# Patient Record
Sex: FEMALE | Race: WHITE | NOT HISPANIC OR LATINO | Employment: OTHER | URBAN - NONMETROPOLITAN AREA
[De-identification: names, ages, dates, MRNs, and addresses within clinical notes are randomized per-mention and may not be internally consistent; named-entity substitution may affect disease eponyms.]

---

## 2017-02-23 ENCOUNTER — OFFICE VISIT (OUTPATIENT)
Dept: OTOLARYNGOLOGY | Facility: CLINIC | Age: 77
End: 2017-02-23

## 2017-02-23 VITALS
HEART RATE: 64 BPM | SYSTOLIC BLOOD PRESSURE: 128 MMHG | WEIGHT: 181 LBS | DIASTOLIC BLOOD PRESSURE: 80 MMHG | HEIGHT: 60 IN | BODY MASS INDEX: 35.53 KG/M2

## 2017-02-23 DIAGNOSIS — E04.2 MULTINODULAR GOITER: Primary | ICD-10-CM

## 2017-02-23 DIAGNOSIS — E04.1 THYROID NODULE: Primary | ICD-10-CM

## 2017-02-23 PROCEDURE — 76536 US EXAM OF HEAD AND NECK: CPT | Performed by: OTOLARYNGOLOGY

## 2017-02-23 PROCEDURE — 99213 OFFICE O/P EST LOW 20 MIN: CPT | Performed by: PHYSICIAN ASSISTANT

## 2017-02-23 NOTE — PROGRESS NOTES
YOB: 1940  Location: [facility]  Location Address: [address] [place] [address], [facility], [address] [place] [address]  Location Phone: [phone]    Chief Complaint   Patient presents with   • Follow-up     Thyroid       History of Present Illness  Carmen Obrien is a 77 y.o. female who presents for follow up for thyroid complaints. She has fatigue, insomnia, weight gain and dysphagia. Patient denies hoarseness, neck swelling and neck tenderness. She has had a barium swallow to evaluate esophagus. An ultrasound was repeated today. A TSH level was last performed 2016.                  10/3/16  TSH 1.110                Past Medical History   Diagnosis Date   • Anxiety    • Arthritis    • Deep venous thrombosis    • Dysphagia    • GERD (gastroesophageal reflux disease)    • History of transfusion    • Hypercholesteremia    • Hypertension    • LPRD (laryngopharyngeal reflux disease)    • Macular degeneration    • Multinodular goiter 2017   • Osteoarthritis    • Thyroid nodule        Past Surgical History   Procedure Laterality Date   • Cataract extraction     • Hysterectomy     • Cystocele repair     • Replacement total knee     • Cholecystectomy     • Femur fracture surgery     • Ulnar nerve transposition           Current Outpatient Prescriptions:   •  ALPRAZolam (XANAX) 1 MG tablet, Take 1 mg by mouth 2 (Two) Times a Day As Needed for anxiety., Disp: , Rfl:   •  atorvastatin (LIPITOR) 10 MG tablet, Take 10 mg by mouth Daily., Disp: , Rfl:   •  Calcium Carbonate-Vitamin D (CALTRATE 600+D PO), Take  by mouth 2 (Two) Times a Day., Disp: , Rfl:   •  Cyanocobalamin (VITAMIN B-12 IJ), Inject 1,000 mcg as directed Every 30 (Thirty) Days., Disp: , Rfl:   •  cycloSPORINE (RESTASIS) 0.05 % ophthalmic emulsion, Administer 1 drop to both eyes 2 (Two) Times a Day As Needed., Disp: , Rfl:   •  esomeprazole (NexIUM) 20 MG capsule, Take 40 mg by mouth Every Morning Before Breakfast., Disp: , Rfl:   •  gabapentin  (NEURONTIN) 300 MG capsule, Take 300 mg by mouth 2 (Two) Times a Day., Disp: , Rfl:   •  hydrochlorothiazide (HYDRODIURIL) 25 MG tablet, Take 25 mg by mouth 2 (Two) Times a Day., Disp: , Rfl:   •  HYDROcodone-acetaminophen (NORCO)  MG per tablet, Take 1 tablet by mouth Every 6 (Six) Hours As Needed for moderate pain (4-6)., Disp: , Rfl:   •  lidocaine (LIDODERM) 5 %, Place 1 patch on the skin Every 12 (Twelve) Hours., Disp: , Rfl:   •  lisinopril (PRINIVIL,ZESTRIL) 40 MG tablet, Take 40 mg by mouth Daily., Disp: , Rfl:   •  montelukast (SINGULAIR) 10 MG tablet, Take 10 mg by mouth Every Night., Disp: , Rfl:   •  MULTIPLE VITAMINS-MINERALS ER PO, Take by mouth daily, Disp: , Rfl:   •  mupirocin (BACTROBAN) 2 % ointment, Apply 1 application topically 3 (Three) Times a Day., Disp: , Rfl:   •  nabumetone (RELAFEN) 500 MG tablet, Take 500 mg by mouth 2 (Two) Times a Day., Disp: , Rfl:   •  nortriptyline (PAMELOR) 50 MG capsule, Take 50 mg by mouth Every Night., Disp: , Rfl:   •  nystatin (NYSTOP) 949524 UNIT/GM powder powder, Apply 100,000 Units topically 2 (Two) Times a Day., Disp: , Rfl:   •  senna (SENOKOT) 8.6 MG tablet, Take 1 tablet by mouth Daily., Disp: , Rfl:     Levaquin [levofloxacin] and Lyrica [pregabalin]    History reviewed. No pertinent family history.    Social History     Social History   • Marital status:      Spouse name: N/A   • Number of children: N/A   • Years of education: N/A     Occupational History   • Not on file.     Social History Main Topics   • Smoking status: Never Smoker   • Smokeless tobacco: Not on file   • Alcohol use No   • Drug use: No   • Sexual activity: Defer     Other Topics Concern   • Not on file     Social History Narrative       Review of Systems   Constitutional: Positive for fatigue and unexpected weight change.   HENT: Positive for trouble swallowing.    Eyes: Negative.    Respiratory: Negative.    Cardiovascular: Negative.    Gastrointestinal: Negative.     Endocrine: Negative.    Genitourinary: Negative.    Musculoskeletal: Negative.    Skin: Negative.    Allergic/Immunologic: Negative.    Neurological: Negative.    Hematological: Negative.    Psychiatric/Behavioral: Positive for sleep disturbance.       Vitals:    02/23/17 1117   BP: 128/80   Pulse: 64       Objective     Physical Exam  CONSTITUTIONAL: well nourished, alert, oriented, in no acute distress     COMMUNICATION AND VOICE: able to communicate normally, normal voice quality    HEAD: normocephalic, no lesions, atraumatic, no tenderness, no masses     FACE: appearance normal, no lesions, no tenderness, no deformities, facial motion symmetric    SALIVARY GLANDS: parotid glands with no tenderness, no swelling, no masses, submandibular glands with normal size, nontender    EYES: ocular motility normal, eyelids normal, orbits normal, no proptosis, conjunctiva normal , pupils equal, round     EARS:  Hearing: response to conversational voice normal bilaterally   External Ears: auricles without lesions    NOSE:  External Nose: structure normal, no tenderness on palpation, no nasal discharge, no lesions, no evidence of trauma, nostrils patent     ORAL:  Lips: upper and lower lips without lesion     NECK: neck appearance normal, no mass,  noted without erythema or tenderness    THYROID: no overt thyromegaly, no tenderness, nodules or mass present on palpation, position midline     LYMPH NODES: no lymphadenopathy    CHEST/RESPIRATORY: respiratory effort normal, normal breath sounds     CARDIOVASCULAR: rate and rhythm normal, extremities without cyanosis or edema      NEUROLOGIC/PSYCHIATRIC: oriented to time, place and person, mood normal, affect appropriate, CN II-XII intact grossly    Assessment/Plan   Problems Addressed this Visit        Endocrine    Multinodular goiter - Primary    Relevant Orders    US Thyroid    TSH        * Surgery not found *  Orders Placed This Encounter   Procedures   • US Thyroid      Standing Status:   Future     Standing Expiration Date:   2/23/2018     Order Specific Question:   Reason for Exam:     Answer:   thyroid nodules   • TSH     Standing Status:   Future     Standing Expiration Date:   2/23/2018     Return in about 1 year (around 2/23/2018) for Recheck thyroid with ultrasound.       Patient Instructions           FALL PREVENTION INSTRUCTIONS      1. Use prescribed walking aid all times.  2. Move through positional transitions slowly to avoid imbalance.  3. Use elevators instead of stairs or ramps, if possible.  4. Use handrails, whenever possible, if stairs and ramps must be used.  5. Avoid sitting in low, overstuffed chairs, if you have difficulty standing up. When standing up, scoot your hips to the edge of the chair prior to rising.  6. Wear low-heeled, laced shoes with a firm sole (no slippers).  7. Home modifications:  • Always keep a light on.  Turn a light on prior to entering any dark room.  • Keep electrical cords and small objects out of walking paths.  • Remove throw rugs.  • Keep frequently used items in the kitchen and bathroom within easy reach, preferably on eye-level shelves.  • Use nonskid decals or a rubber mat in the bathtub and shower.  • Install grab bars in the bathtub and shower, if needed.    8.   If rapid head movements cause imbalance, do not drive.    :

## 2017-02-23 NOTE — PATIENT INSTRUCTIONS
FALL PREVENTION INSTRUCTIONS      1. Use prescribed walking aid all times.  2. Move through positional transitions slowly to avoid imbalance.  3. Use elevators instead of stairs or ramps, if possible.  4. Use handrails, whenever possible, if stairs and ramps must be used.  5. Avoid sitting in low, overstuffed chairs, if you have difficulty standing up. When standing up, scoot your hips to the edge of the chair prior to rising.  6. Wear low-heeled, laced shoes with a firm sole (no slippers).  7. Home modifications:  • Always keep a light on.  Turn a light on prior to entering any dark room.  • Keep electrical cords and small objects out of walking paths.  • Remove throw rugs.  • Keep frequently used items in the kitchen and bathroom within easy reach, preferably on eye-level shelves.  • Use nonskid decals or a rubber mat in the bathtub and shower.  • Install grab bars in the bathtub and shower, if needed.    8.   If rapid head movements cause imbalance, do not drive.    :

## 2017-06-26 ENCOUNTER — APPOINTMENT (OUTPATIENT)
Dept: LAB | Facility: HOSPITAL | Age: 77
End: 2017-06-26

## 2017-06-26 ENCOUNTER — TRANSCRIBE ORDERS (OUTPATIENT)
Dept: ADMINISTRATIVE | Facility: HOSPITAL | Age: 77
End: 2017-06-26

## 2017-06-26 DIAGNOSIS — R19.7 DIARRHEA, UNSPECIFIED TYPE: Primary | ICD-10-CM

## 2017-06-26 DIAGNOSIS — M85.9 DISORDER OF BONE DENSITY AND STRUCTURE, UNSPECIFIED: ICD-10-CM

## 2017-06-26 DIAGNOSIS — E55.9 UNSPECIFIED VITAMIN D DEFICIENCY: ICD-10-CM

## 2017-06-26 DIAGNOSIS — R41.3 MEMORY LOSS: ICD-10-CM

## 2017-06-26 DIAGNOSIS — M15.0 PRIMARY GENERALIZED HYPERTROPHIC OSTEOARTHROSIS: ICD-10-CM

## 2017-06-26 LAB
25(OH)D3 SERPL-MCNC: 78.3 NG/ML (ref 30–100)
ALBUMIN SERPL-MCNC: 4.4 G/DL (ref 3.5–5)
ALBUMIN/GLOB SERPL: 1.3 G/DL (ref 1.1–2.5)
ALP SERPL-CCNC: 61 U/L (ref 24–120)
ALT SERPL W P-5'-P-CCNC: 55 U/L (ref 0–54)
ANION GAP SERPL CALCULATED.3IONS-SCNC: 12 MMOL/L (ref 4–13)
AST SERPL-CCNC: 47 U/L (ref 7–45)
BILIRUB SERPL-MCNC: 0.8 MG/DL (ref 0.1–1)
BUN BLD-MCNC: 12 MG/DL (ref 5–21)
BUN/CREAT SERPL: 15.2 (ref 7–25)
CALCIUM SPEC-SCNC: 12.3 MG/DL (ref 8.4–10.4)
CHLORIDE SERPL-SCNC: 98 MMOL/L (ref 98–110)
CO2 SERPL-SCNC: 26 MMOL/L (ref 24–31)
CREAT BLD-MCNC: 0.79 MG/DL (ref 0.5–1.4)
DEPRECATED RDW RBC AUTO: 45 FL (ref 40–54)
ERYTHROCYTE [DISTWIDTH] IN BLOOD BY AUTOMATED COUNT: 13.3 % (ref 12–15)
GFR SERPL CREATININE-BSD FRML MDRD: 71 ML/MIN/1.73
GLOBULIN UR ELPH-MCNC: 3.3 GM/DL
GLUCOSE BLD-MCNC: 97 MG/DL (ref 70–100)
HCT VFR BLD AUTO: 35.9 % (ref 37–47)
HGB BLD-MCNC: 11.8 G/DL (ref 12–16)
MCH RBC QN AUTO: 30.5 PG (ref 28–32)
MCHC RBC AUTO-ENTMCNC: 32.9 G/DL (ref 33–36)
MCV RBC AUTO: 92.8 FL (ref 82–98)
PLATELET # BLD AUTO: 305 10*3/MM3 (ref 130–400)
PMV BLD AUTO: 10.4 FL (ref 6–12)
POTASSIUM BLD-SCNC: 4.1 MMOL/L (ref 3.5–5.3)
PROT SERPL-MCNC: 7.7 G/DL (ref 6.3–8.7)
RBC # BLD AUTO: 3.87 10*6/MM3 (ref 4.2–5.4)
SODIUM BLD-SCNC: 136 MMOL/L (ref 135–145)
T4 FREE SERPL-MCNC: 2.05 NG/DL (ref 0.78–2.19)
TSH SERPL DL<=0.05 MIU/L-ACNC: 0.58 MIU/ML (ref 0.47–4.68)
VIT B12 BLD-MCNC: >1000 PG/ML (ref 239–931)
WBC NRBC COR # BLD: 6.65 10*3/MM3 (ref 4.8–10.8)

## 2017-06-26 PROCEDURE — 36415 COLL VENOUS BLD VENIPUNCTURE: CPT | Performed by: NURSE PRACTITIONER

## 2017-06-26 PROCEDURE — 82607 VITAMIN B-12: CPT | Performed by: NURSE PRACTITIONER

## 2017-06-26 PROCEDURE — 84439 ASSAY OF FREE THYROXINE: CPT | Performed by: NURSE PRACTITIONER

## 2017-06-26 PROCEDURE — 85027 COMPLETE CBC AUTOMATED: CPT | Performed by: NURSE PRACTITIONER

## 2017-06-26 PROCEDURE — 82306 VITAMIN D 25 HYDROXY: CPT | Performed by: NURSE PRACTITIONER

## 2017-06-26 PROCEDURE — 84443 ASSAY THYROID STIM HORMONE: CPT | Performed by: NURSE PRACTITIONER

## 2017-06-26 PROCEDURE — 80053 COMPREHEN METABOLIC PANEL: CPT | Performed by: NURSE PRACTITIONER

## 2017-07-03 ENCOUNTER — APPOINTMENT (OUTPATIENT)
Dept: LAB | Facility: HOSPITAL | Age: 77
End: 2017-07-03

## 2017-07-03 LAB
ADV 40+41 DNA STL QL NAA+NON-PROBE: NOT DETECTED
ASTRO TYP 1-8 RNA STL QL NAA+NON-PROBE: NOT DETECTED
C CAYETANENSIS DNA STL QL NAA+NON-PROBE: NOT DETECTED
C DIFF TOX GENS STL QL NAA+PROBE: NOT DETECTED
CAMPY SP DNA.DIARRHEA STL QL NAA+PROBE: NOT DETECTED
CRYPTOSP STL CULT: NOT DETECTED
E COLI DNA SPEC QL NAA+PROBE: NOT DETECTED
E HISTOLYT AG STL-ACNC: NOT DETECTED
EAEC PAA PLAS AGGR+AATA ST NAA+NON-PRB: NOT DETECTED
EC STX1+STX2 GENES STL QL NAA+NON-PROBE: NOT DETECTED
EPEC EAE GENE STL QL NAA+NON-PROBE: NOT DETECTED
ETEC LTA+ST1A+ST1B TOX ST NAA+NON-PROBE: NOT DETECTED
G LAMBLIA DNA SPEC QL NAA+PROBE: NOT DETECTED
NOROVIRUS GI+II RNA STL QL NAA+NON-PROBE: NOT DETECTED
P SHIGELLOIDES DNA STL QL NAA+NON-PROBE: NOT DETECTED
RV RNA STL NAA+PROBE: NOT DETECTED
SALMONELLA DNA SPEC QL NAA+PROBE: NOT DETECTED
SAPO I+II+IV+V RNA STL QL NAA+NON-PROBE: NOT DETECTED
SHIGELLA SP+EIEC IPAH ST NAA+NON-PROBE: NOT DETECTED
V CHOLERAE DNA SPEC QL NAA+PROBE: NOT DETECTED
VIBRIO DNA SPEC NAA+PROBE: NOT DETECTED
YERSINIA STL CULT: NOT DETECTED

## 2017-07-03 PROCEDURE — 87999 UNLISTED MICROBIOLOGY PX: CPT | Performed by: NURSE PRACTITIONER

## 2017-07-03 PROCEDURE — 87507 IADNA-DNA/RNA PROBE TQ 12-25: CPT | Performed by: NURSE PRACTITIONER

## 2017-07-17 ENCOUNTER — APPOINTMENT (OUTPATIENT)
Dept: LAB | Facility: HOSPITAL | Age: 77
End: 2017-07-17

## 2017-07-17 ENCOUNTER — TRANSCRIBE ORDERS (OUTPATIENT)
Dept: ADMINISTRATIVE | Facility: HOSPITAL | Age: 77
End: 2017-07-17

## 2017-07-17 DIAGNOSIS — M81.0 AGE RELATED OSTEOPOROSIS: Primary | ICD-10-CM

## 2017-07-17 LAB
25(OH)D3 SERPL-MCNC: 68 NG/ML (ref 30–100)
CALCIUM SPEC-SCNC: 10.4 MG/DL (ref 8.4–10.4)

## 2017-07-17 PROCEDURE — 82306 VITAMIN D 25 HYDROXY: CPT | Performed by: NURSE PRACTITIONER

## 2017-07-17 PROCEDURE — 82310 ASSAY OF CALCIUM: CPT | Performed by: NURSE PRACTITIONER

## 2017-07-17 PROCEDURE — 36415 COLL VENOUS BLD VENIPUNCTURE: CPT | Performed by: NURSE PRACTITIONER

## 2017-08-10 ENCOUNTER — HOSPITAL ENCOUNTER (OUTPATIENT)
Dept: VASCULAR LAB | Age: 77
Discharge: HOME | End: 2017-08-10
Payer: MEDICARE

## 2017-08-10 ENCOUNTER — OFFICE VISIT (OUTPATIENT)
Dept: VASCULAR SURGERY | Age: 77
End: 2017-08-10
Payer: MEDICARE

## 2017-08-10 VITALS
SYSTOLIC BLOOD PRESSURE: 131 MMHG | HEART RATE: 70 BPM | TEMPERATURE: 97.5 F | DIASTOLIC BLOOD PRESSURE: 67 MMHG | RESPIRATION RATE: 18 BRPM

## 2017-08-10 DIAGNOSIS — I70.213 ATHEROSCLEROSIS OF NATIVE ARTERY OF BOTH LOWER EXTREMITIES WITH INTERMITTENT CLAUDICATION (HCC): ICD-10-CM

## 2017-08-10 DIAGNOSIS — R09.89 DECREASED PULSES IN FEET: ICD-10-CM

## 2017-08-10 PROCEDURE — 93923 UPR/LXTR ART STDY 3+ LVLS: CPT

## 2017-08-10 PROCEDURE — 99212 OFFICE O/P EST SF 10 MIN: CPT | Performed by: NURSE PRACTITIONER

## 2017-08-10 RX ORDER — GINKGO BILOBA 60 MG
TABLET ORAL
COMMUNITY
End: 2018-08-21 | Stop reason: CLARIF

## 2017-08-10 RX ORDER — AMLODIPINE BESYLATE 10 MG/1
10 TABLET ORAL DAILY
COMMUNITY

## 2017-08-10 RX ORDER — MAGNESIUM 200 MG
200 TABLET ORAL DAILY
COMMUNITY
End: 2018-08-21 | Stop reason: CLARIF

## 2017-08-11 PROBLEM — I70.213 ATHEROSCLEROSIS OF NATIVE ARTERY OF BOTH LOWER EXTREMITIES WITH INTERMITTENT CLAUDICATION (HCC): Status: ACTIVE | Noted: 2017-08-11

## 2018-02-14 ENCOUNTER — TRANSCRIBE ORDERS (OUTPATIENT)
Dept: ADMINISTRATIVE | Facility: HOSPITAL | Age: 78
End: 2018-02-14

## 2018-02-14 ENCOUNTER — LAB (OUTPATIENT)
Dept: LAB | Facility: HOSPITAL | Age: 78
End: 2018-02-14

## 2018-02-14 DIAGNOSIS — E04.2 NONTOXIC MULTINODULAR GOITER: ICD-10-CM

## 2018-02-14 DIAGNOSIS — E04.2 NONTOXIC MULTINODULAR GOITER: Primary | ICD-10-CM

## 2018-02-14 LAB — TSH SERPL DL<=0.05 MIU/L-ACNC: 1.9 MIU/ML (ref 0.47–4.68)

## 2018-02-14 PROCEDURE — 84443 ASSAY THYROID STIM HORMONE: CPT | Performed by: PHYSICIAN ASSISTANT

## 2018-02-14 PROCEDURE — 36415 COLL VENOUS BLD VENIPUNCTURE: CPT

## 2018-02-22 ENCOUNTER — OFFICE VISIT (OUTPATIENT)
Dept: OTOLARYNGOLOGY | Facility: CLINIC | Age: 78
End: 2018-02-22

## 2018-02-22 VITALS
TEMPERATURE: 97.2 F | BODY MASS INDEX: 34.55 KG/M2 | WEIGHT: 176 LBS | DIASTOLIC BLOOD PRESSURE: 80 MMHG | SYSTOLIC BLOOD PRESSURE: 130 MMHG | HEIGHT: 60 IN

## 2018-02-22 DIAGNOSIS — E04.2 MULTINODULAR GOITER: Primary | ICD-10-CM

## 2018-02-22 PROCEDURE — 99214 OFFICE O/P EST MOD 30 MIN: CPT | Performed by: PHYSICIAN ASSISTANT

## 2018-02-22 RX ORDER — AMLODIPINE BESYLATE 10 MG/1
5 TABLET ORAL DAILY
COMMUNITY
End: 2021-05-18 | Stop reason: SDUPTHER

## 2018-02-22 RX ORDER — OXYCODONE 13.5 MG/1
CAPSULE, EXTENDED RELEASE ORAL
COMMUNITY
Start: 2018-02-12 | End: 2021-04-21

## 2018-02-22 NOTE — PROGRESS NOTES
YOB: 1940  Location: [facility]  Location Address: [address] [place] [address], [facility], [address] [place] [address]  Location Phone: [phone]    Chief Complaint   Patient presents with   • Follow-up     thyroid       History of Present Illness  Carmen Obrien is a 77 y.o. female who presents for follow up for thyroid complaints. She has fatigue, insomnia, weight gain and dysphagia. Patient denies hoarseness, neck swelling and neck tenderness. She has had a barium swallow to evaluate esophagus. An ultrasound was repeated today. A TSH level was last performed 2016.                        10/3/16  TSH 1.110                   18   TSH 1.900                          Past Medical History:   Diagnosis Date   • Anxiety    • Arthritis    • Deep venous thrombosis    • Dysphagia    • GERD (gastroesophageal reflux disease)    • History of transfusion    • Hypercholesteremia    • Hypertension    • LPRD (laryngopharyngeal reflux disease)    • Macular degeneration    • Multinodular goiter 2017   • Osteoarthritis    • Thyroid nodule        Past Surgical History:   Procedure Laterality Date   • CATARACT EXTRACTION     • CHOLECYSTECTOMY     • CYSTOCELE REPAIR     • FEMUR FRACTURE SURGERY     • HYSTERECTOMY     • REPLACEMENT TOTAL KNEE     • ULNAR NERVE TRANSPOSITION         Outpatient Prescriptions Marked as Taking for the 18 encounter (Office Visit) with Antonio Lunsford MD   Medication Sig Dispense Refill   • ALPRAZolam (XANAX) 1 MG tablet Take 1 mg by mouth 2 (Two) Times a Day As Needed for anxiety.     • amLODIPine (NORVASC) 10 MG tablet Take 10 mg by mouth.     • aspirin 81 MG tablet Take 81 mg by mouth.     • atorvastatin (LIPITOR) 10 MG tablet Take 10 mg by mouth Daily.     • Calcium Carbonate-Vitamin D (CALTRATE 600+D PO) Take  by mouth 2 (Two) Times a Day.     • Cyanocobalamin (VITAMIN B-12 IJ) Inject 1,000 mcg as directed Every 30 (Thirty) Days.     • cycloSPORINE (RESTASIS) 0.05 %  ophthalmic emulsion Administer 1 drop to both eyes 2 (Two) Times a Day As Needed.     • esomeprazole (NexIUM) 20 MG capsule Take 40 mg by mouth Every Morning Before Breakfast.     • hydrochlorothiazide (HYDRODIURIL) 25 MG tablet Take 25 mg by mouth 2 (Two) Times a Day.     • HYDROcodone-acetaminophen (NORCO)  MG per tablet Take 1 tablet by mouth Every 6 (Six) Hours As Needed for moderate pain (4-6).     • lidocaine (LIDODERM) 5 % Place 1 patch on the skin Every 12 (Twelve) Hours.     • lisinopril (PRINIVIL,ZESTRIL) 40 MG tablet Take 40 mg by mouth Daily.     • montelukast (SINGULAIR) 10 MG tablet Take 10 mg by mouth Every Night.     • MULTIPLE VITAMINS-MINERALS ER PO Take by mouth daily     • nabumetone (RELAFEN) 500 MG tablet Take 500 mg by mouth 2 (Two) Times a Day.     • nortriptyline (PAMELOR) 50 MG capsule Take 50 mg by mouth Every Night.     • senna (SENOKOT) 8.6 MG tablet Take 1 tablet by mouth Daily.     • XTAMPZA ER 13.5 MG capsule extended-release 12 hour           Levaquin [levofloxacin] and Lyrica [pregabalin]    History reviewed. No pertinent family history.    Social History     Social History   • Marital status:      Spouse name: N/A   • Number of children: N/A   • Years of education: N/A     Occupational History   • Not on file.     Social History Main Topics   • Smoking status: Never Smoker   • Smokeless tobacco: Never Used   • Alcohol use No   • Drug use: No   • Sexual activity: Defer     Other Topics Concern   • Not on file     Social History Narrative       Review of Systems    Vitals:    02/22/18 1056   BP: 130/80   Temp: 97.2 °F (36.2 °C)       Body mass index is 34.37 kg/(m^2).    Objective     Physical Exam  CONSTITUTIONAL: well nourished, alert, oriented, in no acute distress     COMMUNICATION AND VOICE: able to communicate normally, normal voice quality    HEAD: normocephalic, no lesions, atraumatic, no tenderness, no masses     FACE: appearance normal, no lesions, no  tenderness, no deformities, facial motion symmetric    SALIVARY GLANDS: parotid glands with no tenderness, no swelling, no masses, submandibular glands with normal size, nontender    EYES: ocular motility normal, eyelids normal, orbits normal, no proptosis, conjunctiva normal , pupils equal, round     EARS:  Hearing: response to conversational voice normal bilaterally   External Ears: auricles without lesions  Otoscopic: tympanic membrane appearance normal, no lesions, no perforation, normal mobility, no fluid    NOSE:  External Nose: structure normal, no tenderness on palpation, no nasal discharge, no lesions, no evidence of trauma, nostrils patent   Intranasal Exam: nasal mucosa normal, vestibule within normal limits, inferior turbinate normal, nasal septum midline   Nasopharynx:     ORAL:  Lips: upper and lower lips without lesion   Teeth: dentition within normal limits for age   Gums: gingivae healthy   Oral Mucosa: oral mucosa normal, no mucosal lesions   Floor of Mouth: Warthin’s duct patent, mucosa normal  Tongue: lingual mucosa normal without lesions, normal tongue mobility   Palate: soft and hard palates with normal mucosa and structure  Oropharynx: oropharyngeal mucosa normal    HYPOPHARYNX:   LARYNX: epiglottis and arytenoid cartilage within normal limits, vocal cord mucosa normal with normal mobility     NECK: neck appearance normal, no mass,  noted without erythema or tenderness    THYROID: no overt thyromegaly, no tenderness, nodules or mass present on palpation, position midline     LYMPH NODES: no lymphadenopathy    CHEST/RESPIRATORY: respiratory effort normal, normal breath sounds     CARDIOVASCULAR: rate and rhythm normal, extremities without cyanosis or edema      NEUROLOGIC/PSYCHIATRIC: oriented to time, place and person, mood normal, affect appropriate, CN II-XII intact grossly    Assessment/Plan     * Surgery not found *  No orders of the defined types were placed in this encounter.    No  Follow-up on file.       There are no Patient Instructions on file for this visit.

## 2018-02-22 NOTE — PROGRESS NOTES
YOB: 1940  Location: [facility]  Location Address: [address] [place] [address], [facility], [address] [place] [address]  Location Phone: [phone]    Chief Complaint   Patient presents with   • Follow-up     thyroid       History of Present Illness  Carmen Obrien is a 78 y.o. female who presents for follow up for thyroid complaints. She has fatigue, insomnia, weight gain and dysphagia. Patient denies hoarseness, neck swelling and neck tenderness. Thyroid ultrasound and TSH stable and normal today.                  10/3/16  TSH 1.110                      Ref Range & Units 8d ago   8mo ago   1yr ago      TSH 0.470 - 4.680 mIU/mL 1.900 0.582 1.110   Resulting Agency   [facility]      Specimen Collected: 18 11:43 AM Last Resulted: 18 12:33 PM          Past Medical History:   Diagnosis Date   • Anxiety    • Arthritis    • Deep venous thrombosis    • Dysphagia    • GERD (gastroesophageal reflux disease)    • History of transfusion    • Hypercholesteremia    • Hypertension    • LPRD (laryngopharyngeal reflux disease)    • Macular degeneration    • Multinodular goiter 2017   • Osteoarthritis    • Thyroid nodule        Past Surgical History:   Procedure Laterality Date   • CATARACT EXTRACTION     • CHOLECYSTECTOMY     • CYSTOCELE REPAIR     • FEMUR FRACTURE SURGERY     • HYSTERECTOMY     • REPLACEMENT TOTAL KNEE     • ULNAR NERVE TRANSPOSITION           Current Outpatient Prescriptions:   •  ALPRAZolam (XANAX) 1 MG tablet, Take 1 mg by mouth 2 (Two) Times a Day As Needed for anxiety., Disp: , Rfl:   •  amLODIPine (NORVASC) 10 MG tablet, Take 10 mg by mouth., Disp: , Rfl:   •  aspirin 81 MG tablet, Take 81 mg by mouth., Disp: , Rfl:   •  atorvastatin (LIPITOR) 10 MG tablet, Take 10 mg by mouth Daily., Disp: , Rfl:   •  Calcium Carbonate-Vitamin D (CALTRATE 600+D PO), Take  by mouth 2 (Two) Times a Day., Disp: , Rfl:   •  Cyanocobalamin (VITAMIN B-12 IJ), Inject 1,000 mcg as directed  Every 30 (Thirty) Days., Disp: , Rfl:   •  cycloSPORINE (RESTASIS) 0.05 % ophthalmic emulsion, Administer 1 drop to both eyes 2 (Two) Times a Day As Needed., Disp: , Rfl:   •  esomeprazole (NexIUM) 20 MG capsule, Take 40 mg by mouth Every Morning Before Breakfast., Disp: , Rfl:   •  hydrochlorothiazide (HYDRODIURIL) 25 MG tablet, Take 25 mg by mouth 2 (Two) Times a Day., Disp: , Rfl:   •  HYDROcodone-acetaminophen (NORCO)  MG per tablet, Take 1 tablet by mouth Every 6 (Six) Hours As Needed for moderate pain (4-6)., Disp: , Rfl:   •  lidocaine (LIDODERM) 5 %, Place 1 patch on the skin Every 12 (Twelve) Hours., Disp: , Rfl:   •  lisinopril (PRINIVIL,ZESTRIL) 40 MG tablet, Take 40 mg by mouth Daily., Disp: , Rfl:   •  montelukast (SINGULAIR) 10 MG tablet, Take 10 mg by mouth Every Night., Disp: , Rfl:   •  MULTIPLE VITAMINS-MINERALS ER PO, Take by mouth daily, Disp: , Rfl:   •  nabumetone (RELAFEN) 500 MG tablet, Take 500 mg by mouth 2 (Two) Times a Day., Disp: , Rfl:   •  nortriptyline (PAMELOR) 50 MG capsule, Take 50 mg by mouth Every Night., Disp: , Rfl:   •  senna (SENOKOT) 8.6 MG tablet, Take 1 tablet by mouth Daily., Disp: , Rfl:   •  XTAMPZA ER 13.5 MG capsule extended-release 12 hour , , Disp: , Rfl:   •  gabapentin (NEURONTIN) 300 MG capsule, Take 300 mg by mouth 2 (Two) Times a Day., Disp: , Rfl:   •  mupirocin (BACTROBAN) 2 % ointment, Apply 1 application topically 3 (Three) Times a Day., Disp: , Rfl:   •  nystatin (NYSTOP) 490511 UNIT/GM powder powder, Apply 100,000 Units topically 2 (Two) Times a Day., Disp: , Rfl:     Levaquin [levofloxacin] and Lyrica [pregabalin]    History reviewed. No pertinent family history.    Social History     Social History   • Marital status:      Spouse name: N/A   • Number of children: N/A   • Years of education: N/A     Occupational History   • Not on file.     Social History Main Topics   • Smoking status: Never Smoker   • Smokeless tobacco: Never Used   •  Alcohol use No   • Drug use: No   • Sexual activity: Defer     Other Topics Concern   • Not on file     Social History Narrative       Review of Systems   Constitutional: Positive for fatigue and unexpected weight change.   HENT: Positive for trouble swallowing.    Eyes: Negative.    Respiratory: Negative.    Cardiovascular: Negative.    Gastrointestinal: Negative.    Endocrine: Negative.    Genitourinary: Negative.    Musculoskeletal: Negative.    Skin: Negative.    Allergic/Immunologic: Negative.    Neurological: Negative.    Hematological: Negative.    Psychiatric/Behavioral: Positive for sleep disturbance.       Vitals:    02/22/18 1056   BP: 130/80   Temp: 97.2 °F (36.2 °C)       Objective     Physical Exam  CONSTITUTIONAL: well nourished, alert, oriented, in no acute distress     COMMUNICATION AND VOICE: able to communicate normally, normal voice quality    HEAD: normocephalic, no lesions, atraumatic, no tenderness, no masses     FACE: appearance normal, no lesions, no tenderness, no deformities, facial motion symmetric    SALIVARY GLANDS: parotid glands with no tenderness, no swelling, no masses, submandibular glands with normal size, nontender    EYES: ocular motility normal, eyelids normal, orbits normal, no proptosis, conjunctiva normal , pupils equal, round     EARS:  Hearing: response to conversational voice normal bilaterally   External Ears: auricles without lesions    NOSE:  External Nose: structure normal, no tenderness on palpation, no nasal discharge, no lesions, no evidence of trauma, nostrils patent     ORAL:  Lips: upper and lower lips without lesion     NECK: neck appearance normal, no mass,  noted without erythema or tenderness    THYROID: no overt thyromegaly, no tenderness, nodules or mass present on palpation, position midline     LYMPH NODES: no lymphadenopathy    CHEST/RESPIRATORY: respiratory effort normal, normal breath sounds     CARDIOVASCULAR: rate and rhythm normal, extremities  without cyanosis or edema      NEUROLOGIC/PSYCHIATRIC: oriented to time, place and person, mood normal, affect appropriate, CN II-XII intact grossly    Assessment/Plan   Problems Addressed this Visit        Endocrine    Multinodular goiter - Primary    Relevant Orders    TSH    US Thyroid        * Surgery not found *  Orders Placed This Encounter   Procedures   • US Thyroid     Standing Status:   Future     Standing Expiration Date:   3/22/2019     Order Specific Question:   Reason for Exam:     Answer:   multinodular goiter   • TSH     Standing Status:   Future     Standing Expiration Date:   3/22/2019     Return in about 1 year (around 2/22/2019) for Recheck thyroid with ultrasound and lab.       Patient Instructions   Will continue to monitor with repeat ultrasound and TSH in one year. Follow-up sooner if symptoms worsen.    [unfilled]    [employer] from [employer]  The general, healthful diet is based on the 2010 Dietary Guidelines for Americans. The amount of food you need to eat from each food group depends on your age, sex, and level of physical activity and can be individualized by a dietitian. Go to [employer]Plate.gov for more information.  What do I need to know about the MyPlate plan?  · Enjoy your food, but eat less.  · Avoid oversized portions.  ¨ ½ of your plate should include fruits and vegetables.  ¨ ¼ of your plate should be grains.  ¨ ¼ of your plate should be protein.  Grains   · Make at least half of your grains whole grains.  · For a 2,000 calorie daily food plan, eat 6 oz every day.  · 1 oz is about 1 slice bread, 1 cup cereal, or ½ cup cooked rice, cereal, or pasta.  Vegetables   · Make half your plate fruits and vegetables.  · For a 2,000 calorie daily food plan, eat 2½ cups every day.  · 1 cup is about 1 cup raw or cooked vegetables or vegetable juice or 2 cups raw leafy greens.  Fruits   · Make half your plate fruits and vegetables.  · For a 2,000 calorie daily food plan, eat 2 cups every  day.  · 1 cup is about 1 cup fruit or 100% fruit juice or ½ cup dried fruit.  Protein   · For a 2,000 calorie daily food plan, eat 5½ oz every day.  · 1 oz is about 1 oz meat, poultry, or fish, ¼ cup cooked beans, 1 egg, 1 Tbsp peanut butter, or ½ oz nuts or seeds.  Dairy   · Switch to fat-free or low-fat (1%) milk.  · For a 2,000 calorie daily food plan, eat 3 cups every day.  · 1 cup is about 1 cup milk or yogurt or soy milk (soy beverage), 1½ oz natural cheese, or 2 oz processed cheese.  Fats, Oils, and Empty Calories   · Only small amounts of oils are recommended.  · Empty calories are calories from solid fats or added sugars.  · Compare sodium in foods like soup, bread, and frozen meals. Choose the foods with lower numbers.  · Drink water instead of sugary drinks.  What foods can I eat?  Grains   Whole grains such as whole wheat, quinoa, millet, and bulgur. Bread, rolls, and pasta made from whole grains. Brown or wild rice. Hot or cold cereals made from whole grains and without added sugar.  Vegetables   All fresh vegetables, especially fresh red, dark green, or orange vegetables. Peas and beans. Low-sodium frozen or canned vegetables prepared without added salt. Low-sodium vegetable juices.  Fruits   All fresh, frozen, and dried fruits. Canned fruit packed in water or fruit juice without added sugar. Fruit juices without added sugar.  Meats and Other Protein Sources   Boiled, baked, or grilled lean meat trimmed of fat. Skinless poultry. Fresh seafood and shellfish. Canned seafood packed in water. Unsalted nuts and unsalted nut butters. Tofu. Dried beans and pea. Eggs.  Dairy   Low-fat or fat-free milk, yogurt, and cheeses.  Sweets and Desserts   Frozen desserts made from low-fat milk.  Fats and Oils   Olive, peanut, and canola oils and margarine. Salad dressing and mayonnaise made from these oils.  Other   Soups and casseroles made from allowed ingredients and without added fat or salt.  The items listed above  may not be a complete list of recommended foods or beverages. Contact your dietitian for more options.   What foods are not recommended?  Grains   Sweetened, low-fiber cereals. Packaged baked goods. Snack crackers and chips. Cheese crackers, butter crackers, and biscuits. Frozen waffles, sweet breads, doughnuts, pastries, packaged baking mixes, pancakes, cakes, and cookies.  Vegetables   Regular canned or frozen vegetables or vegetables prepared with salt. Canned tomatoes. Canned tomato sauce. Fried vegetables. Vegetables in cream sauce or cheese sauce.  Fruits   Fruits packed in syrup or made with added sugar.  Meats and Other Protein Sources   Marbled or fatty meats such as ribs. Poultry with skin. Fried meats, poultry, eggs, or fish. Sausages, hot dogs, and deli meats such as pastrami, bologna, or salami.  Dairy   Whole milk, cream, cheeses made from whole milk, sour cream. Ice cream or yogurt made from whole milk or with added sugar.  Beverages   For adults, no more than one alcoholic drink per day. Regular soft drinks or other sugary beverages. Juice drinks.  Sweets and Desserts   Sugary or fatty desserts, candy, and other sweets.  Fats and Oils   Solid shortening or partially hydrogenated oils. Solid margarine. Margarine that contains trans fats. Butter.  The items listed above may not be a complete list of foods and beverages to avoid. Contact your dietitian for more information.   This information is not intended to replace advice given to you by your health care provider. Make sure you discuss any questions you have with your health care provider.  Document Released: 01/06/2009 Document Revised: 05/25/2017 Document Reviewed: 11/26/2014  [employer] Interactive Patient Education © 2017 [employer]     Calorie Counting for Weight Loss  Calories are units of energy. Your body needs a certain amount of calories from food to keep you going throughout the day. When you eat more calories than your body needs, your  body stores the extra calories as fat. When you eat fewer calories than your body needs, your body burns fat to get the energy it needs.  Calorie counting means keeping track of how many calories you eat and drink each day. Calorie counting can be helpful if you need to lose weight. If you make sure to eat fewer calories than your body needs, you should lose weight. Ask your health care provider what a healthy weight is for you.  For calorie counting to work, you will need to eat the right number of calories in a day in order to lose a healthy amount of weight per week. A dietitian can help you determine how many calories you need in a day and will give you suggestions on how to reach your calorie goal.  · A healthy amount of weight to lose per week is usually 1-2 lb (0.5-0.9 kg). This usually means that your daily calorie intake should be reduced by 500-750 calories.  · Eating 1,200 - 1,500 calories per day can help most women lose weight.  · Eating 1,500 - 1,800 calories per day can help most men lose weight.  What is my plan?  My goal is to have __________ calories per day.  If I have this many calories per day, I should lose around __________ pounds per week.  What do I need to know about calorie counting?  In order to meet your daily calorie goal, you will need to:  · Find out how many calories are in each food you would like to eat. Try to do this before you eat.  · Decide how much of the food you plan to eat.  · Write down what you ate and how many calories it had. Doing this is called keeping a food log.  To successfully lose weight, it is important to balance calorie counting with a healthy lifestyle that includes regular activity. Aim for 150 minutes of moderate exercise (such as walking) or 75 minutes of vigorous exercise (such as running) each week.  Where do I find calorie information?     The number of calories in a food can be found on a Nutrition Facts label. If a food does not have a Nutrition Facts  label, try to look up the calories online or ask your dietitian for help.  Remember that calories are listed per serving. If you choose to have more than one serving of a food, you will have to multiply the calories per serving by the amount of servings you plan to eat. For example, the label on a package of bread might say that a serving size is 1 slice and that there are 90 calories in a serving. If you eat 1 slice, you will have eaten 90 calories. If you eat 2 slices, you will have eaten 180 calories.  How do I keep a food log?  Immediately after each meal, record the following information in your food log:  · What you ate. Don't forget to include toppings, sauces, and other extras on the food.  · How much you ate. This can be measured in cups, ounces, or number of items.  · How many calories each food and drink had.  · The total number of calories in the meal.  Keep your food log near you, such as in a small notebook in your pocket, or use a mobile lior or website. Some programs will calculate calories for you and show you how many calories you have left for the day to meet your goal.  What are some calorie counting tips?  · Use your calories on foods and drinks that will fill you up and not leave you hungry:  ¨ Some examples of foods that fill you up are nuts and nut butters, vegetables, lean proteins, and high-fiber foods like whole grains. High-fiber foods are foods with more than 5 g fiber per serving.  ¨ Drinks such as sodas, specialty coffee drinks, alcohol, and juices have a lot of calories, yet do not fill you up.  · Eat nutritious foods and avoid empty calories. Empty calories are calories you get from foods or beverages that do not have many vitamins or protein, such as candy, sweets, and soda. It is better to have a nutritious high-calorie food (such as an avocado) than a food with few nutrients (such as a bag of chips).  · Know how many calories are in the foods you eat most often. This will help you  "calculate calorie counts faster.  · Pay attention to calories in drinks. Low-calorie drinks include water and unsweetened drinks.  · Pay attention to nutrition labels for \"low fat\" or \"fat free\" foods. These foods sometimes have the same amount of calories or more calories than the full fat versions. They also often have added sugar, starch, or salt, to make up for flavor that was removed with the fat.  · Find a way of tracking calories that works for you. Get creative. Try different apps or programs if writing down calories does not work for you.  What are some portion control tips?  · Know how many calories are in a serving. This will help you know how many servings of a certain food you can have.  · Use a measuring cup to measure serving sizes. You could also try weighing out portions on a kitchen scale. With time, you will be able to estimate serving sizes for some foods.  · Take some time to put servings of different foods on your favorite plates, bowls, and cups so you know what a serving looks like.  · Try not to eat straight from a bag or box. Doing this can lead to overeating. Put the amount you would like to eat in a cup or on a plate to make sure you are eating the right portion.  · Use smaller plates, glasses, and bowls to prevent overeating.  · Try not to multitask (for example, watch TV or use your computer) while eating. If it is time to eat, sit down at a table and enjoy your food. This will help you to know when you are full. It will also help you to be aware of what you are eating and how much you are eating.  What are tips for following this plan?  Reading food labels   · Check the calorie count compared to the serving size. The serving size may be smaller than what you are used to eating.  · Check the source of the calories. Make sure the food you are eating is high in vitamins and protein and low in saturated and trans fats.  Shopping   · Read nutrition labels while you shop. This will help you " make healthy decisions before you decide to purchase your food.  · Make a grocery list and stick to it.  Cooking   · Try to cook your favorite foods in a healthier way. For example, try baking instead of frying.  · Use low-fat dairy products.  Meal planning   · Use more fruits and vegetables. Half of your plate should be fruits and vegetables.  · Include lean proteins like poultry and fish.  How do I count calories when eating out?  · Ask for smaller portion sizes.  · Consider sharing an entree and sides instead of getting your own entree.  · If you get your own entree, eat only half. Ask for a box at the beginning of your meal and put the rest of your entree in it so you are not tempted to eat it.  · If calories are listed on the menu, choose the lower calorie options.  · Choose dishes that include vegetables, fruits, whole grains, low-fat dairy products, and lean protein.  · Choose items that are boiled, broiled, grilled, or steamed. Stay away from items that are buttered, battered, fried, or served with cream sauce. Items labeled “crispy” are usually fried, unless stated otherwise.  · Choose water, low-fat milk, unsweetened iced tea, or other drinks without added sugar. If you want an alcoholic beverage, choose a lower calorie option such as a glass of wine or light beer.  · Ask for dressings, sauces, and syrups on the side. These are usually high in calories, so you should limit the amount you eat.  · If you want a salad, choose a garden salad and ask for grilled meats. Avoid extra toppings like nunn, cheese, or fried items. Ask for the dressing on the side, or ask for olive oil and vinegar or lemon to use as dressing.  · Estimate how many servings of a food you are given. For example, a serving of cooked rice is ½ cup or about the size of half a baseball. Knowing serving sizes will help you be aware of how much food you are eating at restaurants. The list below tells you how big or small some common portion  sizes are based on everyday objects:  ¨ 1 oz--4 stacked dice.  ¨ 3 oz--1 deck of cards.  ¨ 1 tsp--1 die.  ¨ 1 Tbsp--½ a ping-pong ball.  ¨ 2 Tbsp--1 ping-pong ball.  ¨ ½ cup--½ baseball.  ¨ 1 cup--1 baseball.  Summary  · Calorie counting means keeping track of how many calories you eat and drink each day. If you eat fewer calories than your body needs, you should lose weight.  · A healthy amount of weight to lose per week is usually 1-2 lb (0.5-0.9 kg). This usually means reducing your daily calorie intake by 500-750 calories.  · The number of calories in a food can be found on a Nutrition Facts label. If a food does not have a Nutrition Facts label, try to look up the calories online or ask your dietitian for help.  · Use your calories on foods and drinks that will fill you up, and not on foods and drinks that will leave you hungry.  · Use smaller plates, glasses, and bowls to prevent overeating.  This information is not intended to replace advice given to you by your health care provider. Make sure you discuss any questions you have with your health care provider.  Document Released: 12/18/2006 Document Revised: 11/17/2017 Document Reviewed: 11/17/2017  [employer] Interactive Patient Education © 2017 [employer]     Exercising to Lose Weight  Exercising can help you to lose weight. In order to lose weight through exercise, you need to do vigorous-intensity exercise. You can tell that you are exercising with vigorous intensity if you are breathing very hard and fast and cannot hold a conversation while exercising.  Moderate-intensity exercise helps to maintain your current weight. You can tell that you are exercising at a moderate level if you have a higher heart rate and faster breathing, but you are still able to hold a conversation.  How often should I exercise?  Choose an activity that you enjoy and set realistic goals. Your health care provider can help you to make an activity plan that works for you. Exercise  regularly as directed by your health care provider. This may include:  · Doing resistance training twice each week, such as:  ¨ Push-ups.  ¨ Sit-ups.  ¨ Lifting weights.  ¨ Using resistance bands.  · Doing a given intensity of exercise for a given amount of time. Choose from these options:  ¨ 150 minutes of moderate-intensity exercise every week.  ¨ 75 minutes of vigorous-intensity exercise every week.  ¨ A mix of moderate-intensity and vigorous-intensity exercise every week.  Children, pregnant women, people who are out of shape, people who are overweight, and older adults may need to consult a health care provider for individual recommendations. If you have any sort of medical condition, be sure to consult your health care provider before starting a new exercise program.  What are some activities that can help me to lose weight?  · Walking at a rate of at least 4.5 miles an hour.  · Jogging or running at a rate of 5 miles per hour.  · Biking at a rate of at least 10 miles per hour.  · Lap swimming.  · Roller-skating or in-line skating.  · Cross-country skiing.  · Vigorous competitive sports, such as football, basketball, and soccer.  · Jumping rope.  · Aerobic dancing.  How can I be more active in my day-to-day activities?  · Use the stairs instead of the elevator.  · Take a walk during your lunch break.  · If you drive, park your car farther away from work or school.  · If you take public transportation, get off one stop early and walk the rest of the way.  · Make all of your phone calls while standing up and walking around.  · Get up, stretch, and walk around every 30 minutes throughout the day.  What guidelines should I follow while exercising?  · Do not exercise so much that you hurt yourself, feel dizzy, or get very short of breath.  · Consult your health care provider prior to starting a new exercise program.  · Wear comfortable clothes and shoes with good support.  · Drink plenty of water while you exercise  to prevent dehydration or heat stroke. Body water is lost during exercise and must be replaced.  · Work out until you breathe faster and your heart beats faster.  This information is not intended to replace advice given to you by your health care provider. Make sure you discuss any questions you have with your health care provider.  Document Released: 01/20/2012 Document Revised: 05/25/2017 Document Reviewed: 05/21/2015  [employer] Patient Education © 2017 [employer]    [unfilled]

## 2018-02-22 NOTE — PATIENT INSTRUCTIONS
Will continue to monitor with repeat ultrasound and TSH in one year. Follow-up sooner if symptoms worsen.    [unfilled]    MyPlate from [employer]  The general, healthful diet is based on the 2010 Dietary Guidelines for Americans. The amount of food you need to eat from each food group depends on your age, sex, and level of physical activity and can be individualized by a dietitian. Go to ChooseMyPlate.gov for more information.  What do I need to know about the MyPlate plan?  · Enjoy your food, but eat less.  · Avoid oversized portions.  ¨ ½ of your plate should include fruits and vegetables.  ¨ ¼ of your plate should be grains.  ¨ ¼ of your plate should be protein.  Grains   · Make at least half of your grains whole grains.  · For a 2,000 calorie daily food plan, eat 6 oz every day.  · 1 oz is about 1 slice bread, 1 cup cereal, or ½ cup cooked rice, cereal, or pasta.  Vegetables   · Make half your plate fruits and vegetables.  · For a 2,000 calorie daily food plan, eat 2½ cups every day.  · 1 cup is about 1 cup raw or cooked vegetables or vegetable juice or 2 cups raw leafy greens.  Fruits   · Make half your plate fruits and vegetables.  · For a 2,000 calorie daily food plan, eat 2 cups every day.  · 1 cup is about 1 cup fruit or 100% fruit juice or ½ cup dried fruit.  Protein   · For a 2,000 calorie daily food plan, eat 5½ oz every day.  · 1 oz is about 1 oz meat, poultry, or fish, ¼ cup cooked beans, 1 egg, 1 Tbsp peanut butter, or ½ oz nuts or seeds.  Dairy   · Switch to fat-free or low-fat (1%) milk.  · For a 2,000 calorie daily food plan, eat 3 cups every day.  · 1 cup is about 1 cup milk or yogurt or soy milk (soy beverage), 1½ oz natural cheese, or 2 oz processed cheese.  Fats, Oils, and Empty Calories   · Only small amounts of oils are recommended.  · Empty calories are calories from solid fats or added sugars.  · Compare sodium in foods like soup, bread, and frozen meals. Choose the foods with lower  numbers.  · Drink water instead of sugary drinks.  What foods can I eat?  Grains   Whole grains such as whole wheat, quinoa, millet, and bulgur. Bread, rolls, and pasta made from whole grains. Brown or wild rice. Hot or cold cereals made from whole grains and without added sugar.  Vegetables   All fresh vegetables, especially fresh red, dark green, or orange vegetables. Peas and beans. Low-sodium frozen or canned vegetables prepared without added salt. Low-sodium vegetable juices.  Fruits   All fresh, frozen, and dried fruits. Canned fruit packed in water or fruit juice without added sugar. Fruit juices without added sugar.  Meats and Other Protein Sources   Boiled, baked, or grilled lean meat trimmed of fat. Skinless poultry. Fresh seafood and shellfish. Canned seafood packed in water. Unsalted nuts and unsalted nut butters. Tofu. Dried beans and pea. Eggs.  Dairy   Low-fat or fat-free milk, yogurt, and cheeses.  Sweets and Desserts   Frozen desserts made from low-fat milk.  Fats and Oils   Olive, peanut, and canola oils and margarine. Salad dressing and mayonnaise made from these oils.  Other   Soups and casseroles made from allowed ingredients and without added fat or salt.  The items listed above may not be a complete list of recommended foods or beverages. Contact your dietitian for more options.   What foods are not recommended?  Grains   Sweetened, low-fiber cereals. Packaged baked goods. Snack crackers and chips. Cheese crackers, butter crackers, and biscuits. Frozen waffles, sweet breads, doughnuts, pastries, packaged baking mixes, pancakes, cakes, and cookies.  Vegetables   Regular canned or frozen vegetables or vegetables prepared with salt. Canned tomatoes. Canned tomato sauce. Fried vegetables. Vegetables in cream sauce or cheese sauce.  Fruits   Fruits packed in syrup or made with added sugar.  Meats and Other Protein Sources   Marbled or fatty meats such as ribs. Poultry with skin. Fried meats,  poultry, eggs, or fish. Sausages, hot dogs, and deli meats such as pastrami, bologna, or salami.  Dairy   Whole milk, cream, cheeses made from whole milk, sour cream. Ice cream or yogurt made from whole milk or with added sugar.  Beverages   For adults, no more than one alcoholic drink per day. Regular soft drinks or other sugary beverages. Juice drinks.  Sweets and Desserts   Sugary or fatty desserts, candy, and other sweets.  Fats and Oils   Solid shortening or partially hydrogenated oils. Solid margarine. Margarine that contains trans fats. Butter.  The items listed above may not be a complete list of foods and beverages to avoid. Contact your dietitian for more information.   This information is not intended to replace advice given to you by your health care provider. Make sure you discuss any questions you have with your health care provider.  Document Released: 01/06/2009 Document Revised: 05/25/2017 Document Reviewed: 11/26/2014  [employer] Interactive Patient Education © 2017 [employer]     Calorie Counting for Weight Loss  Calories are units of energy. Your body needs a certain amount of calories from food to keep you going throughout the day. When you eat more calories than your body needs, your body stores the extra calories as fat. When you eat fewer calories than your body needs, your body burns fat to get the energy it needs.  Calorie counting means keeping track of how many calories you eat and drink each day. Calorie counting can be helpful if you need to lose weight. If you make sure to eat fewer calories than your body needs, you should lose weight. Ask your health care provider what a healthy weight is for you.  For calorie counting to work, you will need to eat the right number of calories in a day in order to lose a healthy amount of weight per week. A dietitian can help you determine how many calories you need in a day and will give you suggestions on how to reach your calorie goal.  · A  healthy amount of weight to lose per week is usually 1-2 lb (0.5-0.9 kg). This usually means that your daily calorie intake should be reduced by 500-750 calories.  · Eating 1,200 - 1,500 calories per day can help most women lose weight.  · Eating 1,500 - 1,800 calories per day can help most men lose weight.  What is my plan?  My goal is to have __________ calories per day.  If I have this many calories per day, I should lose around __________ pounds per week.  What do I need to know about calorie counting?  In order to meet your daily calorie goal, you will need to:  · Find out how many calories are in each food you would like to eat. Try to do this before you eat.  · Decide how much of the food you plan to eat.  · Write down what you ate and how many calories it had. Doing this is called keeping a food log.  To successfully lose weight, it is important to balance calorie counting with a healthy lifestyle that includes regular activity. Aim for 150 minutes of moderate exercise (such as walking) or 75 minutes of vigorous exercise (such as running) each week.  Where do I find calorie information?     The number of calories in a food can be found on a Nutrition Facts label. If a food does not have a Nutrition Facts label, try to look up the calories online or ask your dietitian for help.  Remember that calories are listed per serving. If you choose to have more than one serving of a food, you will have to multiply the calories per serving by the amount of servings you plan to eat. For example, the label on a package of bread might say that a serving size is 1 slice and that there are 90 calories in a serving. If you eat 1 slice, you will have eaten 90 calories. If you eat 2 slices, you will have eaten 180 calories.  How do I keep a food log?  Immediately after each meal, record the following information in your food log:  · What you ate. Don't forget to include toppings, sauces, and other extras on the food.  · How  "much you ate. This can be measured in cups, ounces, or number of items.  · How many calories each food and drink had.  · The total number of calories in the meal.  Keep your food log near you, such as in a small notebook in your pocket, or use a mobile lior or website. Some programs will calculate calories for you and show you how many calories you have left for the day to meet your goal.  What are some calorie counting tips?  · Use your calories on foods and drinks that will fill you up and not leave you hungry:  ¨ Some examples of foods that fill you up are nuts and nut butters, vegetables, lean proteins, and high-fiber foods like whole grains. High-fiber foods are foods with more than 5 g fiber per serving.  ¨ Drinks such as sodas, specialty coffee drinks, alcohol, and juices have a lot of calories, yet do not fill you up.  · Eat nutritious foods and avoid empty calories. Empty calories are calories you get from foods or beverages that do not have many vitamins or protein, such as candy, sweets, and soda. It is better to have a nutritious high-calorie food (such as an avocado) than a food with few nutrients (such as a bag of chips).  · Know how many calories are in the foods you eat most often. This will help you calculate calorie counts faster.  · Pay attention to calories in drinks. Low-calorie drinks include water and unsweetened drinks.  · Pay attention to nutrition labels for \"low fat\" or \"fat free\" foods. These foods sometimes have the same amount of calories or more calories than the full fat versions. They also often have added sugar, starch, or salt, to make up for flavor that was removed with the fat.  · Find a way of tracking calories that works for you. Get creative. Try different apps or programs if writing down calories does not work for you.  What are some portion control tips?  · Know how many calories are in a serving. This will help you know how many servings of a certain food you can " have.  · Use a measuring cup to measure serving sizes. You could also try weighing out portions on a kitchen scale. With time, you will be able to estimate serving sizes for some foods.  · Take some time to put servings of different foods on your favorite plates, bowls, and cups so you know what a serving looks like.  · Try not to eat straight from a bag or box. Doing this can lead to overeating. Put the amount you would like to eat in a cup or on a plate to make sure you are eating the right portion.  · Use smaller plates, glasses, and bowls to prevent overeating.  · Try not to multitask (for example, watch TV or use your computer) while eating. If it is time to eat, sit down at a table and enjoy your food. This will help you to know when you are full. It will also help you to be aware of what you are eating and how much you are eating.  What are tips for following this plan?  Reading food labels   · Check the calorie count compared to the serving size. The serving size may be smaller than what you are used to eating.  · Check the source of the calories. Make sure the food you are eating is high in vitamins and protein and low in saturated and trans fats.  Shopping   · Read nutrition labels while you shop. This will help you make healthy decisions before you decide to purchase your food.  · Make a grocery list and stick to it.  Cooking   · Try to cook your favorite foods in a healthier way. For example, try baking instead of frying.  · Use low-fat dairy products.  Meal planning   · Use more fruits and vegetables. Half of your plate should be fruits and vegetables.  · Include lean proteins like poultry and fish.  How do I count calories when eating out?  · Ask for smaller portion sizes.  · Consider sharing an entree and sides instead of getting your own entree.  · If you get your own entree, eat only half. Ask for a box at the beginning of your meal and put the rest of your entree in it so you are not tempted to eat  it.  · If calories are listed on the menu, choose the lower calorie options.  · Choose dishes that include vegetables, fruits, whole grains, low-fat dairy products, and lean protein.  · Choose items that are boiled, broiled, grilled, or steamed. Stay away from items that are buttered, battered, fried, or served with cream sauce. Items labeled “crispy” are usually fried, unless stated otherwise.  · Choose water, low-fat milk, unsweetened iced tea, or other drinks without added sugar. If you want an alcoholic beverage, choose a lower calorie option such as a glass of wine or light beer.  · Ask for dressings, sauces, and syrups on the side. These are usually high in calories, so you should limit the amount you eat.  · If you want a salad, choose a garden salad and ask for grilled meats. Avoid extra toppings like nunn, cheese, or fried items. Ask for the dressing on the side, or ask for olive oil and vinegar or lemon to use as dressing.  · Estimate how many servings of a food you are given. For example, a serving of cooked rice is ½ cup or about the size of half a baseball. Knowing serving sizes will help you be aware of how much food you are eating at restaurants. The list below tells you how big or small some common portion sizes are based on everyday objects:  ¨ 1 oz--4 stacked dice.  ¨ 3 oz--1 deck of cards.  ¨ 1 tsp--1 die.  ¨ 1 Tbsp--½ a ping-pong ball.  ¨ 2 Tbsp--1 ping-pong ball.  ¨ ½ cup--½ baseball.  ¨ 1 cup--1 baseball.  Summary  · Calorie counting means keeping track of how many calories you eat and drink each day. If you eat fewer calories than your body needs, you should lose weight.  · A healthy amount of weight to lose per week is usually 1-2 lb (0.5-0.9 kg). This usually means reducing your daily calorie intake by 500-750 calories.  · The number of calories in a food can be found on a Nutrition Facts label. If a food does not have a Nutrition Facts label, try to look up the calories online or ask your  dietitian for help.  · Use your calories on foods and drinks that will fill you up, and not on foods and drinks that will leave you hungry.  · Use smaller plates, glasses, and bowls to prevent overeating.  This information is not intended to replace advice given to you by your health care provider. Make sure you discuss any questions you have with your health care provider.  Document Released: 12/18/2006 Document Revised: 11/17/2017 Document Reviewed: 11/17/2017  [employer] Patient Education © 2017 [employer]     Exercising to Lose Weight  Exercising can help you to lose weight. In order to lose weight through exercise, you need to do vigorous-intensity exercise. You can tell that you are exercising with vigorous intensity if you are breathing very hard and fast and cannot hold a conversation while exercising.  Moderate-intensity exercise helps to maintain your current weight. You can tell that you are exercising at a moderate level if you have a higher heart rate and faster breathing, but you are still able to hold a conversation.  How often should I exercise?  Choose an activity that you enjoy and set realistic goals. Your health care provider can help you to make an activity plan that works for you. Exercise regularly as directed by your health care provider. This may include:  · Doing resistance training twice each week, such as:  ¨ Push-ups.  ¨ Sit-ups.  ¨ Lifting weights.  ¨ Using resistance bands.  · Doing a given intensity of exercise for a given amount of time. Choose from these options:  ¨ 150 minutes of moderate-intensity exercise every week.  ¨ 75 minutes of vigorous-intensity exercise every week.  ¨ A mix of moderate-intensity and vigorous-intensity exercise every week.  Children, pregnant women, people who are out of shape, people who are overweight, and older adults may need to consult a health care provider for individual recommendations. If you have any sort of medical condition, be  sure to consult your health care provider before starting a new exercise program.  What are some activities that can help me to lose weight?  · Walking at a rate of at least 4.5 miles an hour.  · Jogging or running at a rate of 5 miles per hour.  · Biking at a rate of at least 10 miles per hour.  · Lap swimming.  · Roller-skating or in-line skating.  · Cross-country skiing.  · Vigorous competitive sports, such as football, basketball, and soccer.  · Jumping rope.  · Aerobic dancing.  How can I be more active in my day-to-day activities?  · Use the stairs instead of the elevator.  · Take a walk during your lunch break.  · If you drive, park your car farther away from work or school.  · If you take public transportation, get off one stop early and walk the rest of the way.  · Make all of your phone calls while standing up and walking around.  · Get up, stretch, and walk around every 30 minutes throughout the day.  What guidelines should I follow while exercising?  · Do not exercise so much that you hurt yourself, feel dizzy, or get very short of breath.  · Consult your health care provider prior to starting a new exercise program.  · Wear comfortable clothes and shoes with good support.  · Drink plenty of water while you exercise to prevent dehydration or heat stroke. Body water is lost during exercise and must be replaced.  · Work out until you breathe faster and your heart beats faster.  This information is not intended to replace advice given to you by your health care provider. Make sure you discuss any questions you have with your health care provider.  Document Released: 01/20/2012 Document Revised: 05/25/2017 Document Reviewed: 05/21/2015  [employer] Interactive Patient Education © 2017 [employer]    [unfilled]

## 2018-03-05 ENCOUNTER — TRANSCRIBE ORDERS (OUTPATIENT)
Dept: PULMONOLOGY | Facility: HOSPITAL | Age: 78
End: 2018-03-05

## 2018-03-05 DIAGNOSIS — Z78.0 POSTMENOPAUSAL: ICD-10-CM

## 2018-03-05 DIAGNOSIS — Z12.31 ENCOUNTER FOR SCREENING MAMMOGRAM FOR MALIGNANT NEOPLASM OF BREAST: Primary | ICD-10-CM

## 2018-03-06 ENCOUNTER — OFFICE VISIT (OUTPATIENT)
Dept: UROLOGY | Facility: CLINIC | Age: 78
End: 2018-03-06

## 2018-03-06 VITALS
HEIGHT: 65 IN | SYSTOLIC BLOOD PRESSURE: 130 MMHG | BODY MASS INDEX: 29.72 KG/M2 | WEIGHT: 178.4 LBS | DIASTOLIC BLOOD PRESSURE: 58 MMHG | TEMPERATURE: 98.5 F

## 2018-03-06 DIAGNOSIS — R31.29 MICROSCOPIC HEMATURIA: Primary | ICD-10-CM

## 2018-03-06 LAB
BILIRUB BLD-MCNC: NEGATIVE MG/DL
CLARITY, POC: CLEAR
COLOR UR: YELLOW
GLUCOSE UR STRIP-MCNC: NEGATIVE MG/DL
KETONES UR QL: ABNORMAL
LEUKOCYTE EST, POC: ABNORMAL
NITRITE UR-MCNC: NEGATIVE MG/ML
PH UR: 5 [PH] (ref 5–8)
PROT UR STRIP-MCNC: ABNORMAL MG/DL
RBC # UR STRIP: NEGATIVE /UL
SP GR UR: 1.01 (ref 1–1.03)
UROBILINOGEN UR QL: NORMAL

## 2018-03-06 PROCEDURE — 51798 US URINE CAPACITY MEASURE: CPT | Performed by: UROLOGY

## 2018-03-06 PROCEDURE — 99203 OFFICE O/P NEW LOW 30 MIN: CPT | Performed by: UROLOGY

## 2018-03-06 PROCEDURE — 81001 URINALYSIS AUTO W/SCOPE: CPT | Performed by: UROLOGY

## 2018-03-06 NOTE — PROGRESS NOTES
Ms. Obrien is 78 y.o. female    Chief Complaint   Patient presents with   • Blood in Urine       Blood in Urine   This is a new problem. The current episode started 1 to 4 weeks ago. The problem is unchanged. She describes the hematuria as microscopic hematuria. She is experiencing no pain. She describes her urine color as clear. Irritative symptoms include urgency. Irritative symptoms do not include frequency. Pertinent negatives include no abdominal pain, chills, dysuria, fever or flank pain. There is no history of recent infection.       The following portions of the patient's history were reviewed and updated as appropriate: allergies, current medications, past family history, past medical history, past social history, past surgical history and problem list.    Review of Systems   Constitutional: Negative for chills and fever.   HENT: Negative for congestion and sore throat.    Eyes: Positive for itching. Negative for pain.   Respiratory: Negative for cough and shortness of breath.    Cardiovascular: Negative for chest pain.   Gastrointestinal: Negative for abdominal pain, anal bleeding and blood in stool.   Endocrine: Negative for cold intolerance and heat intolerance.   Genitourinary: Positive for urgency. Negative for difficulty urinating, dysuria, flank pain, frequency and hematuria.   Musculoskeletal: Negative for back pain and neck pain.   Skin: Negative for color change and rash.   Allergic/Immunologic: Negative for food allergies.   Neurological: Negative for dizziness and headaches.   Hematological: Bruises/bleeds easily.   Psychiatric/Behavioral: Negative for confusion and sleep disturbance.         Current Outpatient Prescriptions:   •  ALPRAZolam (XANAX) 1 MG tablet, Take 1 mg by mouth 2 (Two) Times a Day As Needed for anxiety., Disp: , Rfl:   •  amLODIPine (NORVASC) 10 MG tablet, Take 10 mg by mouth., Disp: , Rfl:   •  aspirin 81 MG tablet, Take 81 mg by mouth., Disp: , Rfl:   •  atorvastatin  (LIPITOR) 10 MG tablet, Take 10 mg by mouth Daily., Disp: , Rfl:   •  Calcium Carbonate-Vitamin D (CALTRATE 600+D PO), Take  by mouth 2 (Two) Times a Day., Disp: , Rfl:   •  Cyanocobalamin (VITAMIN B-12 IJ), Inject 1,000 mcg as directed Every 30 (Thirty) Days., Disp: , Rfl:   •  cycloSPORINE (RESTASIS) 0.05 % ophthalmic emulsion, Administer 1 drop to both eyes 2 (Two) Times a Day As Needed., Disp: , Rfl:   •  esomeprazole (NexIUM) 20 MG capsule, Take 40 mg by mouth Every Morning Before Breakfast., Disp: , Rfl:   •  hydrochlorothiazide (HYDRODIURIL) 25 MG tablet, Take 25 mg by mouth 2 (Two) Times a Day., Disp: , Rfl:   •  HYDROcodone-acetaminophen (NORCO)  MG per tablet, Take 1 tablet by mouth Every 6 (Six) Hours As Needed for moderate pain (4-6)., Disp: , Rfl:   •  lisinopril (PRINIVIL,ZESTRIL) 40 MG tablet, Take 40 mg by mouth Daily., Disp: , Rfl:   •  montelukast (SINGULAIR) 10 MG tablet, Take 10 mg by mouth Every Night., Disp: , Rfl:   •  MULTIPLE VITAMINS-MINERALS ER PO, Take by mouth daily, Disp: , Rfl:   •  mupirocin (BACTROBAN) 2 % ointment, Apply 1 application topically 3 (Three) Times a Day., Disp: , Rfl:   •  nabumetone (RELAFEN) 500 MG tablet, Take 500 mg by mouth 2 (Two) Times a Day., Disp: , Rfl:   •  nortriptyline (PAMELOR) 50 MG capsule, Take 50 mg by mouth Every Night., Disp: , Rfl:   •  nystatin (NYSTOP) 987237 UNIT/GM powder powder, Apply 100,000 Units topically 2 (Two) Times a Day., Disp: , Rfl:   •  senna (SENOKOT) 8.6 MG tablet, Take 1 tablet by mouth Daily., Disp: , Rfl:   •  XTAMPZA ER 13.5 MG capsule extended-release 12 hour , , Disp: , Rfl:     Past Medical History:   Diagnosis Date   • Anxiety    • Arthritis    • Deep venous thrombosis    • Dysphagia    • GERD (gastroesophageal reflux disease)    • History of transfusion    • Hypercholesteremia    • Hypertension    • LPRD (laryngopharyngeal reflux disease)    • Macular degeneration    • Multinodular goiter 2/23/2017   • Osteoarthritis  "   • Thyroid nodule        Past Surgical History:   Procedure Laterality Date   • CATARACT EXTRACTION     • CHOLECYSTECTOMY     • CYSTOCELE REPAIR     • FEMUR FRACTURE SURGERY     • HYSTERECTOMY     • REPLACEMENT TOTAL KNEE     • ULNAR NERVE TRANSPOSITION         Social History     Social History   • Marital status:      Social History Main Topics   • Smoking status: Never Smoker   • Smokeless tobacco: Never Used   • Alcohol use No   • Drug use: No   • Sexual activity: Defer       History reviewed. No pertinent family history.    Objective    /58  Temp 98.5 °F (36.9 °C)  Ht 165.1 cm (65\")  Wt 80.9 kg (178 lb 6.4 oz)  BMI 29.69 kg/m2    Physical Exam  Constitutional: Obese  Psychiatric: Appropriate affect; Alert and oriented  Eyes: Unremarkable  Musculoskeletal: Onset he gait and station, swollen left knee  GI: Abdomen is soft, non-tender  Respiratory: No distress; Unlabored movement; No accessory musculature needed with symmetric movements  Skin: No pallor or diaphoresis  : Labia normal; Urethral meatus normal position, not stenotic; mild bladder and rectal prolapse    Lab on 02/14/2018   Component Date Value Ref Range Status   • TSH 02/14/2018 1.900  0.470 - 4.680 mIU/mL Final       Results for orders placed or performed in visit on 03/06/18   POC Urinalysis Dipstick, Automated   Result Value Ref Range    Color Yellow Yellow, Straw, Dark Yellow, Jaz    Clarity, UA Clear Clear    Glucose, UA Negative Negative, 1000 mg/dL (3+) mg/dL    Bilirubin Negative Negative    Ketones, UA Trace (A) Negative    Specific Gravity  1.015 1.005 - 1.030    Blood, UA Negative Negative    pH, Urine 5.0 5.0 - 8.0    Protein, POC Trace (A) Negative mg/dL    Urobilinogen, UA Normal Normal    Leukocytes Small (1+) (A) Negative    Nitrite, UA Negative Negative     Assessment and Plan    Carmen was seen today for blood in urine.    Diagnoses and all orders for this visit:    Microscopic hematuria  -     POC Urinalysis " Dipstick, Automated   I reviewed her outside records.  This is a 78-year-old female with multiple medical problems who was found to have microscopic hematuria during routine workup.  On review her outside labs, she had dipstick +1+ blood in her urine.  A microscopic analysis showed 0-2 red blood cells per high-powered field, which is normal.    Urinalysis today is negative for any blood.  With review of her outside records, she had a microscopic urinalysis showing 0-2 red blood cells per high-powered field.  I discussed with her that the indication for a full hematuria workup is gross hematuria or microscopic hematuria showing greater than 3 red blood cells per high-powered field.  At this point, she does not meet you these criteria.  She expressed understanding.  At this point she does not have a strong indication for a full hematuria workup and I would continue to monitor this.  I will see her back in 3 months with a repeat urinalysis.  If she does at some point display microscopic hematuria, I will consider full hematuria workup in the future.    She is complaining of significant prolapse, and on exam today she does have mild bladder and rectum prolapse, but this is not significant.  Given her other comorbidities I would not suggest surgical intervention.        Estimation of residual urine via abdominal ultrasound  Residual Urine: 44 ml  Indication: urinary urgency   Position: Supine  Examination: Incremental scanning of the suprapubic area using 3 MHz transducer using copious amounts of acoustic gel.   Findings: An anechoic area was demonstrated which represented the bladder, with measurement of residual urine as noted. I inspected this myself. In that the residual urine was stable or insignificant, no treatment will be necessary at this time.

## 2018-03-14 ENCOUNTER — HOSPITAL ENCOUNTER (OUTPATIENT)
Dept: MAMMOGRAPHY | Facility: HOSPITAL | Age: 78
Discharge: HOME | End: 2018-03-14
Admitting: NURSE PRACTITIONER

## 2018-03-14 DIAGNOSIS — Z12.31 ENCOUNTER FOR SCREENING MAMMOGRAM FOR MALIGNANT NEOPLASM OF BREAST: ICD-10-CM

## 2018-03-14 PROCEDURE — 77067 SCR MAMMO BI INCL CAD: CPT

## 2018-03-14 PROCEDURE — 77063 BREAST TOMOSYNTHESIS BI: CPT

## 2018-06-01 ENCOUNTER — APPOINTMENT (OUTPATIENT)
Dept: ULTRASOUND IMAGING | Facility: HOSPITAL | Age: 78
End: 2018-06-01

## 2018-06-01 ENCOUNTER — HOSPITAL ENCOUNTER (EMERGENCY)
Facility: HOSPITAL | Age: 78
Discharge: HOME | End: 2018-06-01
Attending: FAMILY MEDICINE | Admitting: FAMILY MEDICINE

## 2018-06-01 VITALS
RESPIRATION RATE: 18 BRPM | WEIGHT: 175 LBS | OXYGEN SATURATION: 98 % | TEMPERATURE: 97 F | SYSTOLIC BLOOD PRESSURE: 143 MMHG | BODY MASS INDEX: 29.88 KG/M2 | HEART RATE: 69 BPM | HEIGHT: 64 IN | DIASTOLIC BLOOD PRESSURE: 62 MMHG

## 2018-06-01 DIAGNOSIS — M79.605 PAIN OF LEFT LOWER EXTREMITY: Primary | ICD-10-CM

## 2018-06-01 LAB
ALBUMIN SERPL-MCNC: 4.2 G/DL (ref 3.5–5)
ALBUMIN/GLOB SERPL: 1.4 G/DL (ref 1.1–2.5)
ALP SERPL-CCNC: 48 U/L (ref 24–120)
ALT SERPL W P-5'-P-CCNC: 42 U/L (ref 0–54)
ANION GAP SERPL CALCULATED.3IONS-SCNC: 9 MMOL/L (ref 4–13)
APTT PPP: 31.5 SECONDS (ref 24.1–34.8)
AST SERPL-CCNC: 34 U/L (ref 7–45)
BASOPHILS # BLD AUTO: 0.08 10*3/MM3 (ref 0–0.2)
BASOPHILS NFR BLD AUTO: 0.7 % (ref 0–2)
BILIRUB SERPL-MCNC: 0.3 MG/DL (ref 0.1–1)
BUN BLD-MCNC: 34 MG/DL (ref 5–21)
BUN/CREAT SERPL: 41.5 (ref 7–25)
CALCIUM SPEC-SCNC: 9.1 MG/DL (ref 8.4–10.4)
CHLORIDE SERPL-SCNC: 96 MMOL/L (ref 98–110)
CO2 SERPL-SCNC: 27 MMOL/L (ref 24–31)
CREAT BLD-MCNC: 0.82 MG/DL (ref 0.5–1.4)
DEPRECATED RDW RBC AUTO: 45.9 FL (ref 40–54)
EOSINOPHIL # BLD AUTO: 0.14 10*3/MM3 (ref 0–0.7)
EOSINOPHIL NFR BLD AUTO: 1.2 % (ref 0–4)
ERYTHROCYTE [DISTWIDTH] IN BLOOD BY AUTOMATED COUNT: 13.5 % (ref 12–15)
GFR SERPL CREATININE-BSD FRML MDRD: 68 ML/MIN/1.73
GLOBULIN UR ELPH-MCNC: 3 GM/DL
GLUCOSE BLD-MCNC: 121 MG/DL (ref 70–100)
HCT VFR BLD AUTO: 35.3 % (ref 37–47)
HGB BLD-MCNC: 11.6 G/DL (ref 12–16)
IMM GRANULOCYTES # BLD: 0.08 10*3/MM3 (ref 0–0.03)
IMM GRANULOCYTES NFR BLD: 0.7 % (ref 0–5)
INR PPP: 0.99 (ref 0.91–1.09)
LYMPHOCYTES # BLD AUTO: 1.9 10*3/MM3 (ref 0.72–4.86)
LYMPHOCYTES NFR BLD AUTO: 16.6 % (ref 15–45)
MCH RBC QN AUTO: 30.4 PG (ref 28–32)
MCHC RBC AUTO-ENTMCNC: 32.9 G/DL (ref 33–36)
MCV RBC AUTO: 92.4 FL (ref 82–98)
MONOCYTES # BLD AUTO: 0.95 10*3/MM3 (ref 0.19–1.3)
MONOCYTES NFR BLD AUTO: 8.3 % (ref 4–12)
NEUTROPHILS # BLD AUTO: 8.31 10*3/MM3 (ref 1.87–8.4)
NEUTROPHILS NFR BLD AUTO: 72.5 % (ref 39–78)
NRBC BLD MANUAL-RTO: 0 /100 WBC (ref 0–0)
PLATELET # BLD AUTO: 289 10*3/MM3 (ref 130–400)
PMV BLD AUTO: 10 FL (ref 6–12)
POTASSIUM BLD-SCNC: 4.3 MMOL/L (ref 3.5–5.3)
PROT SERPL-MCNC: 7.2 G/DL (ref 6.3–8.7)
PROTHROMBIN TIME: 13.4 SECONDS (ref 11.9–14.6)
RBC # BLD AUTO: 3.82 10*6/MM3 (ref 4.2–5.4)
SODIUM BLD-SCNC: 132 MMOL/L (ref 135–145)
WBC NRBC COR # BLD: 11.46 10*3/MM3 (ref 4.8–10.8)

## 2018-06-01 PROCEDURE — 36415 COLL VENOUS BLD VENIPUNCTURE: CPT

## 2018-06-01 PROCEDURE — 93971 EXTREMITY STUDY: CPT

## 2018-06-01 PROCEDURE — 93971 EXTREMITY STUDY: CPT | Performed by: SURGERY

## 2018-06-01 PROCEDURE — 85025 COMPLETE CBC W/AUTO DIFF WBC: CPT | Performed by: FAMILY MEDICINE

## 2018-06-01 PROCEDURE — 85610 PROTHROMBIN TIME: CPT | Performed by: FAMILY MEDICINE

## 2018-06-01 PROCEDURE — 80053 COMPREHEN METABOLIC PANEL: CPT | Performed by: FAMILY MEDICINE

## 2018-06-01 PROCEDURE — 99282 EMERGENCY DEPT VISIT SF MDM: CPT

## 2018-06-01 PROCEDURE — 85730 THROMBOPLASTIN TIME PARTIAL: CPT | Performed by: FAMILY MEDICINE

## 2018-06-02 NOTE — ED PROVIDER NOTES
[facility]  eMERGENCY dEPARTMENT eNCOUnter      Pt Name: Carmen Obrien  MRN: [id number]  Birthdate 1940  Date of evaluation: 6/1/2018      CHIEF COMPLAINT       Chief Complaint   Patient presents with   • Leg Pain       Nurses Notes reviewed and I agree except as noted in the HPI.      HISTORY OF PRESENT ILLNESS    Carmen Obrien is a 78 y.o. female who presents      presents for intermittent sharp pains in her lower left leg.  Patient reports that she has had a DVT in the past.  She is concerned that she has another DVT.  Pain is worse with flexion of the foot.  I does worse with any type of squeezing on the calf.  No treatment prior to arrival other than her home medications and over-the-counter medications.  She states that that has not improved her leg pain.  She does however state that it is intermittent in nature.  And that currently it not present.       REVIEW OF SYSTEMS     Review of Systems  CONSTITUTION: No Fever, No chills, No activity change, No diaphoresis, No unexpected wt change.    HENT: No congestion, no dental problems, no ear pain or discharge, , no nuchal rigidity, no meningeal signs.  EYES: No drainage, no itching, no photophobia, no visual disturbance  RESPIRATORY: No apnea, no chest tightness, no cough, no shortness of breath, no stridor, no wheezing  CARDIOVASCULAR: No chest pain, no palpitations, no leg swelling  GI: No abdominal distention, no abdominal pain, no rectal bleeding, no melena, no hematochezia, no diarrhea, no nausea, no vomiting  ENDOCRINE: No polydipsia, no polyphagia, no polyuria, no cold or heat intolerance  : No difficulty urinating, no dyspareunia, no dysuria, no flank pain, no frequency, no genital sore, no hematuria, no menstrual problem if female, no decreased urination, no hesitation of urination, no vaginal discharge if female, no vaginal pain if female no penile pain if male  ALLERG/IMMUNO:  No env or food allergies, not  immunocompromised  NEUROLOGICAL: No dizziness, no facial asymmetry, no Headaches, no Light-headedness, no numbness nor paresthesias, no seizures, no speech difficulty, No syncope, no tremors, no weakness  HEMATOLOGIC: No adenopathy, no unusual bleeding or bruising  MUSCULOSKELETAL: As per the HPI otherwise negative..  SKIN: No rash, no color change, no pallor, no wound  PSYCH: No agitation, no behavior problem, no confusion, no decreased concentration, no hallucinations, no suicidal ideation, no homicidal ideation, no self injury, no sleep disturbance  All other systems negative.    PAST MEDICAL HISTORY     Past Medical History:   Diagnosis Date   • Anxiety    • Arthritis    • Deep venous thrombosis    • Dysphagia    • GERD (gastroesophageal reflux disease)    • History of transfusion    • Hypercholesteremia    • Hypertension    • LPRD (laryngopharyngeal reflux disease)    • Macular degeneration    • Multinodular goiter 2/23/2017   • Osteoarthritis    • Thyroid nodule        SURGICAL HISTORY      has a past surgical history that includes Cataract extraction; Hysterectomy; Cystocele repair; Replacement total knee; Cholecystectomy; Femur fracture surgery; and Ulnar nerve transposition.    CURRENT MEDICATIONS        Medication List      CONTINUE taking these medications    ALPRAZolam 1 MG tablet  Commonly known as:  XANAX     amLODIPine 10 MG tablet  Commonly known as:  NORVASC     aspirin 81 MG tablet     atorvastatin 10 MG tablet  Commonly known as:  LIPITOR     CALTRATE 600+D PO     cycloSPORINE 0.05 % ophthalmic emulsion  Commonly known as:  RESTASIS     esomeprazole 20 MG capsule  Commonly known as:  nexIUM     hydrochlorothiazide 25 MG tablet  Commonly known as:  HYDRODIURIL     HYDROcodone-acetaminophen  MG per tablet  Commonly known as:  NORCO     lisinopril 40 MG tablet  Commonly known as:  PRINIVIL,ZESTRIL     montelukast 10 MG tablet  Commonly known as:  SINGULAIR     MULTIPLE VITAMINS-MINERALS ER PO    "  mupirocin 2 % ointment  Commonly known as:  BACTROBAN     nabumetone 500 MG tablet  Commonly known as:  RELAFEN     nortriptyline 50 MG capsule  Commonly known as:  PAMELOR     nystatin 456523 UNIT/GM powder  Generic drug:  nystatin     senna 8.6 MG tablet  Commonly known as:  SENOKOT     VITAMIN B-12 IJ     XTAMPZA ER 13.5 MG capsule extended-release 12 hour   Generic drug:  OxyCODONE ER          ALLERGIES     is allergic to levaquin [levofloxacin] and lyrica [pregabalin].    FAMILY HISTORY     has no family status information on file.    family history is not on file.    SOCIAL HISTORY      reports that she has never smoked. She has never used smokeless tobacco. She reports that she does not drink alcohol or use drugs.    PHYSICAL EXAM     INITIAL VITALS:  height is 162.6 cm (64\") and weight is 79.4 kg (175 lb). Her tympanic temperature is 97.4 °F (36.3 °C). Her blood pressure is 158/58 and her pulse is 78. Her respiration is 13 and oxygen saturation is 97%.    Physical Exam    CONSTITUTIONAL: Well developed, well nourished, not diaphoretic nor distressed  HENT: Normocephalic, atraumatic, oropharynx clear and moist  EYES: PERRL, EOM normal, no discharge, no scleral icterus  NECK: ROM normal, supple, no tracheal deviation nor JVD, no stridor  CARDIOVASCULAR: Normal rate and rhythm, heart sounds normal, no rub no gallop, intact distal pulses, normal cap refill  PULMONARY: Normal effort and breath sounds, no distress, no wheezes, rhonchi or rales, no chest tenderness  ABDOMINAL: Soft, nontender, no guarding, no mass, no rebound, no hernia  GENITOURINARY/ANORECTAL: deferred  MUSCULOSKELETAL: ROM normal, Tenderness to palpation left calf nor deformity, no edema  NEUROLOGICAL: Alert, oriented x 3, DTRs normal, CN x 10 normal, normal tone  SKIN: Warm, dry, no erythema, no rash, normal color  PSYCH: Mood and affect normal, behavior normal, thought content and judgement normal.      DIFFERENTIAL DIAGNOSIS: "       DIAGNOSTIC RESULTS     EKG: All EKG's are interpreted by the Emergency Department Physician who either signs or Co-signs this chart in the absence of a cardiologist.      RADIOLOGY: non-plain film images(s) such as CT, Ultrasound and MRI are read by the radiologist.  Plain radiographic images are visualized and preliminarily interpreted by the emergency physician unless otherwise stated below.  Us Venous Doppler Lower Extremity Left (duplex)    Result Date: 6/4/2018  Narrative: History: Pain and swelling      Impression: Impression: There is no evidence of deep venous thrombosis or superficial thrombophlebitis of the left lower extremity.  Comments: Left lower extremity venous duplex exam was performed using color Doppler flow, Doppler wave form analysis, and grayscale imaging, with and without compression. There is no evidence of deep venous thrombosis of the common femoral, superficial femoral, popliteal, posterior tibial, and peroneal veins. There is no thrombus identified in the saphenofemoral junction or the greater saphenous vein. There is no evidence of clot in the right common femoral vein.  This report was finalized on 06/04/2018 08:43 by Dr. Devin Grubbs MD.             LABS:   Lab Results (last 24 hours)     Procedure Component Value Units Date/Time    CBC & Differential [[id number]] Collected:  06/01/18 1908    Specimen:  Blood Updated:  06/01/18 1916    Narrative:       The following orders were created for panel order CBC & Differential.  Procedure                               Abnormality         Status                     ---------                               -----------         ------                     CBC Auto Differential[[id number]]         Abnormal            Final result                 Please view results for these tests on the individual orders.    Comprehensive Metabolic Panel [[id number]]  (Abnormal) Collected:  06/01/18 1908    Specimen:  Blood Updated:  06/01/18 1925     Glucose  "121 (H) mg/dL      BUN 34 (H) mg/dL      Creatinine 0.82 mg/dL      Sodium 132 (L) mmol/L      Potassium 4.3 mmol/L      Chloride 96 (L) mmol/L      CO2 27.0 mmol/L      Calcium 9.1 mg/dL      Total Protein 7.2 g/dL      Albumin 4.20 g/dL      ALT (SGPT) 42 U/L      AST (SGOT) 34 U/L      Alkaline Phosphatase 48 U/L      Total Bilirubin 0.3 mg/dL      eGFR Non African Amer 68 mL/min/1.73      Globulin 3.0 gm/dL      A/G Ratio 1.4 g/dL      BUN/Creatinine Ratio 41.5 (H)     Anion Gap 9.0 mmol/L     Narrative:       The MDRD GFR formula is only valid for adults with stable renal function between ages 18 and 70.    Protime-INR [[id number]]  (Normal) Collected:  06/01/18 1908    Specimen:  Blood Updated:  06/01/18 1930     Protime 13.4 Seconds      INR 0.99    aPTT [[id number]]  (Normal) Collected:  06/01/18 1908    Specimen:  Blood Updated:  06/01/18 1930     PTT 31.5 seconds     CBC Auto Differential [[id number]]  (Abnormal) Collected:  06/01/18 1908    Specimen:  Blood Updated:  06/01/18 1916     WBC 11.46 (H) 10*3/mm3      RBC 3.82 (L) 10*6/mm3      Hemoglobin 11.6 (L) g/dL      Hematocrit 35.3 (L) %      MCV 92.4 fL      MCH 30.4 pg      MCHC 32.9 (L) g/dL      RDW 13.5 %      RDW-SD 45.9 fl      MPV 10.0 fL      Platelets 289 10*3/mm3      Neutrophil % 72.5 %      Lymphocyte % 16.6 %      Monocyte % 8.3 %      Eosinophil % 1.2 %      Basophil % 0.7 %      Immature Grans % 0.7 %      Neutrophils, Absolute 8.31 10*3/mm3      Lymphocytes, Absolute 1.90 10*3/mm3      Monocytes, Absolute 0.95 10*3/mm3      Eosinophils, Absolute 0.14 10*3/mm3      Basophils, Absolute 0.08 10*3/mm3      Immature Grans, Absolute 0.08 (H) 10*3/mm3      nRBC 0.0 /100 WBC           EMERGENCY DEPARTMENT COURSE:   Vitals:    Vitals:    06/01/18 1837 06/01/18 1838   BP:  158/58   Pulse: 78    Resp: 13    Temp: 97.4 °F (36.3 °C)    TempSrc: Tympanic    SpO2: 97%    Weight: 79.4 kg (175 lb)    Height: 162.6 cm (64\")        The patient was given the " following medications:  Medications - No data to display         CRITICAL CARE:  none    CONSULTS:  none    PROCEDURES:  None    MDM    FINAL IMPRESSION      1. Pain of left lower extremity          DISPOSITION/PLAN   Condition discharged home in improved condition.      PATIENT REFERRED TO:  Adam Delgadillo MD  [address] [address]  [phone]    In 2 days        DISCHARGE MEDICATIONS:     Medication List      CONTINUE taking these medications    ALPRAZolam 1 MG tablet  Commonly known as:  XANAX     amLODIPine 10 MG tablet  Commonly known as:  NORVASC     aspirin 81 MG tablet     atorvastatin 10 MG tablet  Commonly known as:  LIPITOR     CALTRATE 600+D PO     cycloSPORINE 0.05 % ophthalmic emulsion  Commonly known as:  RESTASIS     esomeprazole 20 MG capsule  Commonly known as:  nexIUM     hydrochlorothiazide 25 MG tablet  Commonly known as:  HYDRODIURIL     HYDROcodone-acetaminophen  MG per tablet  Commonly known as:  NORCO     lisinopril 40 MG tablet  Commonly known as:  PRINIVIL,ZESTRIL     montelukast 10 MG tablet  Commonly known as:  SINGULAIR     MULTIPLE VITAMINS-MINERALS ER PO     mupirocin 2 % ointment  Commonly known as:  BACTROBAN     nabumetone 500 MG tablet  Commonly known as:  RELAFEN     nortriptyline 50 MG capsule  Commonly known as:  PAMELOR     nystatin 467756 UNIT/GM powder  Generic drug:  nystatin     senna 8.6 MG tablet  Commonly known as:  SENOKOT     VITAMIN B-12 IJ     XTAMPZA ER 13.5 MG capsule extended-release 12 hour   Generic drug:  OxyCODONE ER          (Please note that portions of this note were completed with a voice recognition program.)    Ana Gilbert, DO Ana Gilbert DO  06/14/18 0427

## 2018-06-07 NOTE — ED NOTES
"ED Call Back Questions    1. How are you doing since leaving the Emergency Department?    Just doing, still feel bad  2. Do you have any questions about your discharge instructions? No     3. Have you filled your new prescriptions yet? Yes   a. Do you have any questions about those medications? N/A    4. Were you able to make a follow-up appointment with the physician? Yes     5. Do you have a primary care physician? Yes   a. If No, would you like for me to set you up with one? N/A  i. If Yes, “I will have our ED  give you a call right back at this number to work with you on the best time for an appointment.”    6. We are always looking to get better at what we do. Do you have any suggestions for what we can do to be even better? N/A  a. If Yes, \"Thank you for sharing your concerns. I apologize. I will follow up with our manager and patient . Would you like someone to call you back?\" N/A    7. Is there anything else I can do for you? N/A visit heber Ceballos  06/07/18 1027    "

## 2018-08-21 ENCOUNTER — HOSPITAL ENCOUNTER (OUTPATIENT)
Dept: VASCULAR LAB | Age: 78
Discharge: HOME | End: 2018-08-21
Payer: MEDICARE

## 2018-08-21 ENCOUNTER — OFFICE VISIT (OUTPATIENT)
Dept: VASCULAR SURGERY | Age: 78
End: 2018-08-21
Payer: MEDICARE

## 2018-08-21 VITALS
SYSTOLIC BLOOD PRESSURE: 137 MMHG | HEART RATE: 66 BPM | RESPIRATION RATE: 18 BRPM | DIASTOLIC BLOOD PRESSURE: 70 MMHG | TEMPERATURE: 96.2 F

## 2018-08-21 DIAGNOSIS — I70.213 ATHEROSCLEROSIS OF NATIVE ARTERY OF BOTH LOWER EXTREMITIES WITH INTERMITTENT CLAUDICATION (HCC): ICD-10-CM

## 2018-08-21 DIAGNOSIS — I70.213 ATHEROSCLEROSIS OF NATIVE ARTERY OF BOTH LOWER EXTREMITIES WITH INTERMITTENT CLAUDICATION (HCC): Primary | ICD-10-CM

## 2018-08-21 PROCEDURE — 1036F TOBACCO NON-USER: CPT | Performed by: PHYSICIAN ASSISTANT

## 2018-08-21 PROCEDURE — 1090F PRES/ABSN URINE INCON ASSESS: CPT | Performed by: PHYSICIAN ASSISTANT

## 2018-08-21 PROCEDURE — G8421 BMI NOT CALCULATED: HCPCS | Performed by: PHYSICIAN ASSISTANT

## 2018-08-21 PROCEDURE — 99213 OFFICE O/P EST LOW 20 MIN: CPT | Performed by: PHYSICIAN ASSISTANT

## 2018-08-21 PROCEDURE — 1123F ACP DISCUSS/DSCN MKR DOCD: CPT | Performed by: PHYSICIAN ASSISTANT

## 2018-08-21 PROCEDURE — 4040F PNEUMOC VAC/ADMIN/RCVD: CPT | Performed by: PHYSICIAN ASSISTANT

## 2018-08-21 PROCEDURE — 93923 UPR/LXTR ART STDY 3+ LVLS: CPT

## 2018-08-21 PROCEDURE — 1101F PT FALLS ASSESS-DOCD LE1/YR: CPT | Performed by: PHYSICIAN ASSISTANT

## 2018-08-21 PROCEDURE — G8400 PT W/DXA NO RESULTS DOC: HCPCS | Performed by: PHYSICIAN ASSISTANT

## 2018-08-21 PROCEDURE — G8427 DOCREV CUR MEDS BY ELIG CLIN: HCPCS | Performed by: PHYSICIAN ASSISTANT

## 2018-08-21 PROCEDURE — G8598 ASA/ANTIPLAT THER USED: HCPCS | Performed by: PHYSICIAN ASSISTANT

## 2018-08-21 RX ORDER — CALCIUM CARBONATE 200(500)MG
1 TABLET,CHEWABLE ORAL DAILY
COMMUNITY

## 2018-08-21 NOTE — PROGRESS NOTES
reports has claudication. Gastrointestinal - no abdominal swelling or pain. No blood in stool. No severe constipation, diarrhea, nausea, or vomiting. Genitourinary - No difficulty urinating, dysuria, frequency, or urgency. No flank pain or hematuria. Musculoskeletal - no back pain, gait disturbance, or myalgia. Skin - no color change, rash, pallor, or new wound. Neurologic - no dizziness, facial asymmetry, or light headedness. No seizures. No speech difficulty or lateralizing weakness. Hematologic - no easy bruising or excessive bleeding. Psychiatric - no severe anxiety or nervousness. No confusion. All other review of systems are negative. Physical Exam    /70 Comment: right  Pulse 66   Temp 96.2 °F (35.7 °C)   Resp 18     Constitutional - well developed, well nourished. No diaphoresis or acute distress. HENT - head normocephalic. Right external ear canal appears normal.  Left external ear canal appears normal.  Septum appears midline. Eyes - conjunctiva normal.  EOMS normal.  No exudate. No icterus. Neck- ROM appears normal, no tracheal deviation. Cardiovascular - Regular rate and rhythm. Heart sounds are normal.  No murmur, rub, or gallop. Carotid pulses are 2+ to palpation bilaterally without bruit. Extremities - Radial and brachial pulses are 2+ to palpation bilaterally. Right femoral pulse: present 2+; Right popliteal pulse: absent Right DP: absent; Right PT absent; Left femoral pulse: present 2+; Left popliteal pulse: absent; Left DP: absent; Left PT: absent No cyanosis, clubbing, or significant edema. No signs atheroembolic event. Pulmonary - effort appears normal.  No respiratory distress. Lungs - Breath sounds normal. No wheezes or rales. GI - Abdomen - soft, non tender, bowel sounds X 4 quadrants. No guarding or rebound tenderness. No distension or palpable mass. Genitourinary - deferred.   Musculoskeletal - ROM appears normal.  No significant

## 2018-09-19 ENCOUNTER — HOSPITAL ENCOUNTER (OUTPATIENT)
Dept: VASCULAR LAB | Age: 78
Discharge: HOME | End: 2018-09-19
Payer: MEDICARE

## 2018-09-19 DIAGNOSIS — M79.605 PAIN IN LEFT LEG: ICD-10-CM

## 2018-09-19 DIAGNOSIS — M79.604 PAIN IN RIGHT LEG: Primary | ICD-10-CM

## 2018-09-19 LAB
BASOPHILS ABSOLUTE: 0.1 K/UL (ref 0–0.2)
BASOPHILS RELATIVE PERCENT: 0.5 % (ref 0–1)
C-REACTIVE PROTEIN: 0.08 MG/DL (ref 0–0.5)
EOSINOPHILS ABSOLUTE: 0.2 K/UL (ref 0–0.6)
EOSINOPHILS RELATIVE PERCENT: 2.3 % (ref 0–5)
HCT VFR BLD CALC: 37.7 % (ref 37–47)
HEMOGLOBIN: 12.1 G/DL (ref 12–16)
LYMPHOCYTES ABSOLUTE: 1.4 K/UL (ref 1.1–4.5)
LYMPHOCYTES RELATIVE PERCENT: 15.2 % (ref 20–40)
MCH RBC QN AUTO: 30.5 PG (ref 27–31)
MCHC RBC AUTO-ENTMCNC: 32.1 G/DL (ref 33–37)
MCV RBC AUTO: 95 FL (ref 81–99)
MONOCYTES ABSOLUTE: 0.7 K/UL (ref 0–0.9)
MONOCYTES RELATIVE PERCENT: 7.5 % (ref 0–10)
NEUTROPHILS ABSOLUTE: 7 K/UL (ref 1.5–7.5)
NEUTROPHILS RELATIVE PERCENT: 74.2 % (ref 50–65)
PDW BLD-RTO: 13.2 % (ref 11.5–14.5)
PLATELET # BLD: 268 K/UL (ref 130–400)
PMV BLD AUTO: 10 FL (ref 9.4–12.3)
RBC # BLD: 3.97 M/UL (ref 4.2–5.4)
SEDIMENTATION RATE, ERYTHROCYTE: 28 MM/HR (ref 0–25)
WBC # BLD: 9.4 K/UL (ref 4.8–10.8)

## 2018-09-19 PROCEDURE — 93970 EXTREMITY STUDY: CPT

## 2018-11-19 ENCOUNTER — HOSPITAL ENCOUNTER (OUTPATIENT)
Dept: GENERAL RADIOLOGY | Age: 78
Discharge: HOME | End: 2018-11-19
Payer: MEDICARE

## 2018-11-19 ENCOUNTER — HOSPITAL ENCOUNTER (OUTPATIENT)
Dept: PREADMISSION TESTING | Age: 78
Discharge: HOME | End: 2018-11-23
Payer: MEDICARE

## 2018-11-19 VITALS — WEIGHT: 184 LBS

## 2018-11-19 LAB
ALBUMIN SERPL-MCNC: 4 G/DL (ref 3.5–5.2)
ALP BLD-CCNC: 56 U/L (ref 35–104)
ALT SERPL-CCNC: 22 U/L (ref 5–33)
ANION GAP SERPL CALCULATED.3IONS-SCNC: 11 MMOL/L (ref 7–19)
APTT: 35.6 SEC (ref 26–36.2)
AST SERPL-CCNC: 23 U/L (ref 5–32)
BACTERIA: NEGATIVE /HPF
BASOPHILS ABSOLUTE: 0.1 K/UL (ref 0–0.2)
BASOPHILS RELATIVE PERCENT: 0.6 % (ref 0–1)
BILIRUB SERPL-MCNC: 0.3 MG/DL (ref 0.2–1.2)
BILIRUBIN URINE: NEGATIVE
BLOOD, URINE: ABNORMAL
BUN BLDV-MCNC: 15 MG/DL (ref 8–23)
C-REACTIVE PROTEIN: 0.29 MG/DL (ref 0–0.5)
CALCIUM SERPL-MCNC: 9.4 MG/DL (ref 8.8–10.2)
CHLORIDE BLD-SCNC: 96 MMOL/L (ref 98–111)
CLARITY: CLEAR
CO2: 25 MMOL/L (ref 22–29)
COLOR: YELLOW
CREAT SERPL-MCNC: 0.6 MG/DL (ref 0.5–0.9)
EKG P AXIS: 43 DEGREES
EKG P-R INTERVAL: 192 MS
EKG Q-T INTERVAL: 398 MS
EKG QRS DURATION: 112 MS
EKG QTC CALCULATION (BAZETT): 410 MS
EKG T AXIS: 46 DEGREES
EOSINOPHILS ABSOLUTE: 0.2 K/UL (ref 0–0.6)
EOSINOPHILS RELATIVE PERCENT: 1.8 % (ref 0–5)
EPITHELIAL CELLS, UA: 0 /HPF (ref 0–5)
GFR NON-AFRICAN AMERICAN: >60
GLUCOSE BLD-MCNC: 95 MG/DL (ref 74–109)
GLUCOSE URINE: NEGATIVE MG/DL
HCT VFR BLD CALC: 34.1 % (ref 37–47)
HEMOGLOBIN: 11 G/DL (ref 12–16)
HYALINE CASTS: 1 /HPF (ref 0–8)
INR BLD: 1.03 (ref 0.88–1.18)
KETONES, URINE: NEGATIVE MG/DL
LEUKOCYTE ESTERASE, URINE: ABNORMAL
LYMPHOCYTES ABSOLUTE: 1.2 K/UL (ref 1.1–4.5)
LYMPHOCYTES RELATIVE PERCENT: 12.1 % (ref 20–40)
MCH RBC QN AUTO: 30.2 PG (ref 27–31)
MCHC RBC AUTO-ENTMCNC: 32.3 G/DL (ref 33–37)
MCV RBC AUTO: 93.7 FL (ref 81–99)
MONOCYTES ABSOLUTE: 0.7 K/UL (ref 0–0.9)
MONOCYTES RELATIVE PERCENT: 6.6 % (ref 0–10)
NEUTROPHILS ABSOLUTE: 7.9 K/UL (ref 1.5–7.5)
NEUTROPHILS RELATIVE PERCENT: 78.4 % (ref 50–65)
NITRITE, URINE: NEGATIVE
PDW BLD-RTO: 12.9 % (ref 11.5–14.5)
PH UA: 6.5
PLATELET # BLD: 346 K/UL (ref 130–400)
PMV BLD AUTO: 10.3 FL (ref 9.4–12.3)
POTASSIUM SERPL-SCNC: 4.3 MMOL/L (ref 3.5–5)
PROTEIN UA: NEGATIVE MG/DL
PROTHROMBIN TIME: 13.4 SEC (ref 12–14.6)
RBC # BLD: 3.64 M/UL (ref 4.2–5.4)
RBC UA: 1 /HPF (ref 0–4)
SEDIMENTATION RATE, ERYTHROCYTE: 43 MM/HR (ref 0–25)
SODIUM BLD-SCNC: 132 MMOL/L (ref 136–145)
SPECIFIC GRAVITY UA: 1.01
TOTAL PROTEIN: 7.4 G/DL (ref 6.6–8.7)
URINE REFLEX TO CULTURE: YES
UROBILINOGEN, URINE: 0.2 E.U./DL
WBC # BLD: 10 K/UL (ref 4.8–10.8)
WBC UA: 1 /HPF (ref 0–5)

## 2018-11-19 PROCEDURE — 85025 COMPLETE CBC W/AUTO DIFF WBC: CPT

## 2018-11-19 PROCEDURE — 85730 THROMBOPLASTIN TIME PARTIAL: CPT

## 2018-11-19 PROCEDURE — 80053 COMPREHEN METABOLIC PANEL: CPT

## 2018-11-19 PROCEDURE — 87081 CULTURE SCREEN ONLY: CPT

## 2018-11-19 PROCEDURE — 71046 X-RAY EXAM CHEST 2 VIEWS: CPT

## 2018-11-19 PROCEDURE — 86140 C-REACTIVE PROTEIN: CPT

## 2018-11-19 PROCEDURE — 85652 RBC SED RATE AUTOMATED: CPT

## 2018-11-19 PROCEDURE — 87086 URINE CULTURE/COLONY COUNT: CPT

## 2018-11-19 PROCEDURE — 93005 ELECTROCARDIOGRAM TRACING: CPT

## 2018-11-19 PROCEDURE — 81001 URINALYSIS AUTO W/SCOPE: CPT

## 2018-11-19 PROCEDURE — 85610 PROTHROMBIN TIME: CPT

## 2018-11-19 RX ORDER — CELECOXIB 200 MG/1
200 CAPSULE ORAL ONCE
Status: CANCELLED | OUTPATIENT
Start: 2018-11-30

## 2018-11-19 RX ORDER — OXYCODONE HCL 10 MG/1
10 TABLET, FILM COATED, EXTENDED RELEASE ORAL ONCE
Status: CANCELLED | OUTPATIENT
Start: 2018-11-30

## 2018-11-19 RX ORDER — VANCOMYCIN HYDROCHLORIDE 1 G/200ML
1000 INJECTION, SOLUTION INTRAVENOUS ONCE
Status: CANCELLED | OUTPATIENT
Start: 2018-11-30

## 2018-11-19 RX ORDER — DIPHENHYDRAMINE HCL 25 MG
25 TABLET ORAL NIGHTLY PRN
COMMUNITY
End: 2020-09-01 | Stop reason: ALTCHOICE

## 2018-11-19 RX ORDER — DEXAMETHASONE SODIUM PHOSPHATE 10 MG/ML
10 INJECTION INTRAMUSCULAR; INTRAVENOUS ONCE
Status: CANCELLED | OUTPATIENT
Start: 2018-11-30

## 2018-11-19 RX ORDER — ACETAMINOPHEN 500 MG
1000 TABLET ORAL ONCE
Status: CANCELLED | OUTPATIENT
Start: 2018-11-30

## 2018-11-20 LAB — MRSA CULTURE ONLY: NORMAL

## 2018-11-21 LAB — URINE CULTURE, ROUTINE: NORMAL

## 2018-11-27 ENCOUNTER — HOSPITAL ENCOUNTER (EMERGENCY)
Facility: HOSPITAL | Age: 78
Discharge: HOME | End: 2018-11-28
Attending: EMERGENCY MEDICINE | Admitting: EMERGENCY MEDICINE

## 2018-11-27 DIAGNOSIS — Z77.098 CHEMICAL EXPOSURE OF EYE: Primary | ICD-10-CM

## 2018-11-27 PROCEDURE — 99283 EMERGENCY DEPT VISIT LOW MDM: CPT

## 2018-11-27 RX ORDER — SULFACETAMIDE SODIUM 100 MG/ML
1 SOLUTION/ DROPS OPHTHALMIC EVERY 4 HOURS
Qty: 5 ML | Refills: 0 | Status: SHIPPED | OUTPATIENT
Start: 2018-11-27 | End: 2021-04-21

## 2018-11-28 VITALS
WEIGHT: 174 LBS | HEIGHT: 64 IN | DIASTOLIC BLOOD PRESSURE: 76 MMHG | TEMPERATURE: 98.3 F | OXYGEN SATURATION: 97 % | SYSTOLIC BLOOD PRESSURE: 131 MMHG | RESPIRATION RATE: 17 BRPM | BODY MASS INDEX: 29.71 KG/M2 | HEART RATE: 81 BPM

## 2019-02-09 ENCOUNTER — HOSPITAL ENCOUNTER (EMERGENCY)
Facility: HOSPITAL | Age: 79
Discharge: HOME | End: 2019-02-10
Attending: EMERGENCY MEDICINE | Admitting: EMERGENCY MEDICINE

## 2019-02-09 DIAGNOSIS — R55 SYNCOPE, UNSPECIFIED SYNCOPE TYPE: ICD-10-CM
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
## 2019-02-09 PROCEDURE — 99284 EMERGENCY DEPT VISIT MOD MDM: CPT

## 2019-02-09 PROCEDURE — 93010 ELECTROCARDIOGRAM REPORT: CPT | Performed by: INTERNAL MEDICINE

## 2019-02-09 PROCEDURE — 93005 ELECTROCARDIOGRAM TRACING: CPT | Performed by: EMERGENCY MEDICINE

## 2019-02-09 RX ORDER — SODIUM CHLORIDE 0.9 % (FLUSH) 0.9 %
10 SYRINGE (ML) INJECTION AS NEEDED
Status: DISCONTINUED | OUTPATIENT
Start: 2019-02-09 | End: 2019-02-10 | Stop reason: HOSPADM

## 2019-02-10 ENCOUNTER — APPOINTMENT (OUTPATIENT)
Dept: GENERAL RADIOLOGY | Facility: HOSPITAL | Age: 79
End: 2019-02-10

## 2019-02-10 VITALS
OXYGEN SATURATION: 99 % | HEIGHT: 61 IN | HEART RATE: 71 BPM | DIASTOLIC BLOOD PRESSURE: 69 MMHG | WEIGHT: 178 LBS | TEMPERATURE: 97.8 F | BODY MASS INDEX: 33.61 KG/M2 | RESPIRATION RATE: 15 BRPM | SYSTOLIC BLOOD PRESSURE: 144 MMHG

## 2019-02-10 LAB
ALBUMIN SERPL-MCNC: 4.3 G/DL (ref 3.5–5)
ALBUMIN/GLOB SERPL: 1.4 G/DL (ref 1.1–2.5)
ALP SERPL-CCNC: 84 U/L (ref 24–120)
ALT SERPL W P-5'-P-CCNC: 46 U/L (ref 0–54)
ANION GAP SERPL CALCULATED.3IONS-SCNC: 12 MMOL/L (ref 4–13)
AST SERPL-CCNC: 80 U/L (ref 7–45)
BASOPHILS # BLD AUTO: 0.05 10*3/MM3 (ref 0–0.2)
BASOPHILS NFR BLD AUTO: 0.3 % (ref 0–2)
BILIRUB SERPL-MCNC: 1.1 MG/DL (ref 0.1–1)
BUN BLD-MCNC: 18 MG/DL (ref 5–21)
BUN/CREAT SERPL: 24 (ref 7–25)
CALCIUM SPEC-SCNC: 10.3 MG/DL (ref 8.4–10.4)
CHLORIDE SERPL-SCNC: 92 MMOL/L (ref 98–110)
CO2 SERPL-SCNC: 28 MMOL/L (ref 24–31)
CREAT BLD-MCNC: 0.75 MG/DL (ref 0.5–1.4)
DEPRECATED RDW RBC AUTO: 41.4 FL (ref 40–54)
EOSINOPHIL # BLD AUTO: 0.06 10*3/MM3 (ref 0–0.7)
EOSINOPHIL NFR BLD AUTO: 0.4 % (ref 0–4)
ERYTHROCYTE [DISTWIDTH] IN BLOOD BY AUTOMATED COUNT: 13.4 % (ref 12–15)
GFR SERPL CREATININE-BSD FRML MDRD: 75 ML/MIN/1.73
GLOBULIN UR ELPH-MCNC: 3.1 GM/DL
GLUCOSE BLD-MCNC: 139 MG/DL (ref 70–100)
HCT VFR BLD AUTO: 32.8 % (ref 37–47)
HGB BLD-MCNC: 11.4 G/DL (ref 12–16)
IMM GRANULOCYTES # BLD AUTO: 0.06 10*3/MM3 (ref 0–0.03)
IMM GRANULOCYTES NFR BLD AUTO: 0.4 % (ref 0–5)
LYMPHOCYTES # BLD AUTO: 1.02 10*3/MM3 (ref 0.72–4.86)
LYMPHOCYTES NFR BLD AUTO: 6.9 % (ref 15–45)
MCH RBC QN AUTO: 29.5 PG (ref 28–32)
MCHC RBC AUTO-ENTMCNC: 34.8 G/DL (ref 33–36)
MCV RBC AUTO: 84.8 FL (ref 82–98)
MONOCYTES # BLD AUTO: 1.11 10*3/MM3 (ref 0.19–1.3)
MONOCYTES NFR BLD AUTO: 7.6 % (ref 4–12)
NEUTROPHILS # BLD AUTO: 12.38 10*3/MM3 (ref 1.87–8.4)
NEUTROPHILS NFR BLD AUTO: 84.4 % (ref 39–78)
NRBC BLD AUTO-RTO: 0 /100 WBC (ref 0–0)
PLATELET # BLD AUTO: 326 10*3/MM3 (ref 130–400)
PMV BLD AUTO: 9.9 FL (ref 6–12)
POTASSIUM BLD-SCNC: 3.8 MMOL/L (ref 3.5–5.3)
PROT SERPL-MCNC: 7.4 G/DL (ref 6.3–8.7)
RBC # BLD AUTO: 3.87 10*6/MM3 (ref 4.2–5.4)
SODIUM BLD-SCNC: 132 MMOL/L (ref 135–145)
TROPONIN I SERPL-MCNC: <0.012 NG/ML (ref 0–0.03)
WBC NRBC COR # BLD: 14.68 10*3/MM3 (ref 4.8–10.8)

## 2019-02-10 PROCEDURE — 73560 X-RAY EXAM OF KNEE 1 OR 2: CPT

## 2019-02-10 PROCEDURE — 73030 X-RAY EXAM OF SHOULDER: CPT

## 2019-02-10 PROCEDURE — 84484 ASSAY OF TROPONIN QUANT: CPT | Performed by: EMERGENCY MEDICINE

## 2019-02-10 PROCEDURE — 85025 COMPLETE CBC W/AUTO DIFF WBC: CPT | Performed by: EMERGENCY MEDICINE

## 2019-02-10 PROCEDURE — 80053 COMPREHEN METABOLIC PANEL: CPT | Performed by: EMERGENCY MEDICINE

## 2019-02-10 PROCEDURE — 73523 X-RAY EXAM HIPS BI 5/> VIEWS: CPT

## 2019-02-10 NOTE — ED PROVIDER NOTES
Subjective   History of Present Illness    79-year-old female presenting with a fall and possible syncopal episode yesterday.    Patient notes falling yesterday, she cannot remember the exact circumstances surrounding this, states that she fell down and had difficulty getting back up. Patient was able to get back up and get back into bed. Patient is presenting to the emergency department today due to worsening pain throughout the day today. Patient notes that the pain is dull, constant, worse with movement, located primarily in her left shoulder, bilateral hips and bilateral knees.    Patient denies any associated chest pain, shortness of breath, nausea, vomiting, lightheadedness, palpitations    Review of Systems   Constitutional: Negative for chills and fever.   HENT: Negative for sinus pain.    Eyes: Negative for pain.   Respiratory: Negative for shortness of breath.    Cardiovascular: Negative for chest pain.   Gastrointestinal: Negative for abdominal pain, nausea and vomiting.   Genitourinary: Negative for flank pain.   Musculoskeletal: Positive for arthralgias and gait problem. Negative for back pain and neck pain.   Skin: Negative for wound.   Neurological: Negative for syncope and weakness.   Psychiatric/Behavioral: The patient is not hyperactive.        Past Medical History:   Diagnosis Date   • Anxiety    • Arthritis    • Deep venous thrombosis (CMS/HCC)    • Dysphagia    • GERD (gastroesophageal reflux disease)    • History of transfusion    • Hypercholesteremia    • Hypertension    • LPRD (laryngopharyngeal reflux disease)    • Macular degeneration    • Multinodular goiter 2/23/2017   • Osteoarthritis    • Thyroid nodule        Allergies   Allergen Reactions   • Levaquin [Levofloxacin] Unknown (See Comments)     Pt doesn't remember   • Lyrica [Pregabalin] Unknown (See Comments)     Pt doesn't know       Past Surgical History:   Procedure Laterality Date   • CATARACT EXTRACTION     • CHOLECYSTECTOMY     •  CYSTOCELE REPAIR     • FEMUR FRACTURE SURGERY     • HYSTERECTOMY     • REPLACEMENT TOTAL KNEE     • ULNAR NERVE TRANSPOSITION         No family history on file.    Social History     Socioeconomic History   • Marital status:      Spouse name: Not on file   • Number of children: Not on file   • Years of education: Not on file   • Highest education level: Not on file   Tobacco Use   • Smoking status: Never Smoker   • Smokeless tobacco: Never Used   Substance and Sexual Activity   • Alcohol use: No   • Drug use: No   • Sexual activity: Defer           Objective   Physical Exam   Constitutional: She is oriented to person, place, and time.   Chronically ill-appearing   HENT:   Head: Normocephalic and atraumatic.   Eyes: Conjunctivae are normal.   Neck: Normal range of motion.   Cardiovascular: Normal rate, regular rhythm and intact distal pulses.   Pulmonary/Chest: Effort normal and breath sounds normal. No respiratory distress.   Abdominal: Soft. She exhibits no distension. There is no tenderness.   Musculoskeletal: She exhibits tenderness. She exhibits no edema.   Left shoulder, lateral bruising and tenderness, range of motion limited by pain.  Bilateral lateral hip tenderness.  Bilateral anterior knee tenderness   Neurological: She is alert and oriented to person, place, and time.   Skin: Skin is warm and dry.   Psychiatric: She has a normal mood and affect.   Nursing note and vitals reviewed.      Procedures           ED Course  ED Course as of Feb 10 0643   Sun Feb 10, 2019   0642 Imaging negative for acute fracture or dislocation. Patient is able to stand and ambulate slowly. Awaiting family to come pick the patient up and assist her back to her house.  [NR]      ED Course User Index  [NR] Hermes Bhatti MD                  [facility]  Number of Diagnoses or Management Options  Fall, initial encounter:   Syncope, unspecified syncope type:   Diagnosis management comments: 79-year-old female presenting with a  fall yesterday. Questionable syncopal episode as well. Today patient has pain in various parts of her body. Workup will include EKG, basic labs, troponin x-rays.       Amount and/or Complexity of Data Reviewed  Clinical lab tests: reviewed  Tests in the radiology section of CPT®: reviewed  Tests in the medicine section of CPT®: reviewed  Independent visualization of images, tracings, or specimens: yes          Final diagnoses:   Fall, initial encounter   Syncope, unspecified syncope type            Hermes Bhatti MD  02/10/19 0660

## 2019-02-13 ENCOUNTER — TRANSCRIBE ORDERS (OUTPATIENT)
Dept: LAB | Facility: HOSPITAL | Age: 79
End: 2019-02-13

## 2019-02-13 ENCOUNTER — TRANSCRIBE ORDERS (OUTPATIENT)
Dept: ADMINISTRATIVE | Facility: HOSPITAL | Age: 79
End: 2019-02-13

## 2019-02-13 ENCOUNTER — APPOINTMENT (OUTPATIENT)
Dept: LAB | Facility: HOSPITAL | Age: 79
End: 2019-02-13

## 2019-02-13 DIAGNOSIS — L90.5 SCAR PAINFUL: ICD-10-CM

## 2019-02-13 DIAGNOSIS — R52 SCAR PAINFUL: ICD-10-CM

## 2019-02-13 DIAGNOSIS — I65.23 CAROTID OCCLUSION, BILATERAL: ICD-10-CM
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
## 2019-02-13 DIAGNOSIS — I65.29: ICD-10-CM

## 2019-02-13 DIAGNOSIS — I65.29 OBSTRUCTION OF CAROTID ARTERY, UNSPECIFIED LATERALITY: ICD-10-CM

## 2019-02-13 DIAGNOSIS — R41.3 AMNESIA: Primary | ICD-10-CM

## 2019-02-13 DIAGNOSIS — R41.3 MEMORY LOSS: ICD-10-CM

## 2019-02-13 LAB
ALBUMIN SERPL-MCNC: 4.2 G/DL (ref 3.5–5)
ALBUMIN/GLOB SERPL: 1.2 G/DL (ref 1.1–2.5)
ALP SERPL-CCNC: 75 U/L (ref 24–120)
ALT SERPL W P-5'-P-CCNC: 54 U/L (ref 0–54)
ANION GAP SERPL CALCULATED.3IONS-SCNC: 12 MMOL/L (ref 4–13)
AST SERPL-CCNC: 81 U/L (ref 7–45)
BACTERIA UR QL AUTO: ABNORMAL /HPF
BILIRUB SERPL-MCNC: 0.6 MG/DL (ref 0.1–1)
BILIRUB UR QL STRIP: NEGATIVE
BUN BLD-MCNC: 15 MG/DL (ref 5–21)
BUN/CREAT SERPL: 21.1 (ref 7–25)
CALCIUM SPEC-SCNC: 9.5 MG/DL (ref 8.4–10.4)
CHLORIDE SERPL-SCNC: 95 MMOL/L (ref 98–110)
CLARITY UR: ABNORMAL
CO2 SERPL-SCNC: 26 MMOL/L (ref 24–31)
COLOR UR: YELLOW
CREAT BLD-MCNC: 0.71 MG/DL (ref 0.5–1.4)
DEPRECATED RDW RBC AUTO: 43.7 FL (ref 40–54)
ERYTHROCYTE [DISTWIDTH] IN BLOOD BY AUTOMATED COUNT: 13.6 % (ref 12–15)
GFR SERPL CREATININE-BSD FRML MDRD: 80 ML/MIN/1.73
GLOBULIN UR ELPH-MCNC: 3.6 GM/DL
GLUCOSE BLD-MCNC: 101 MG/DL (ref 70–100)
GLUCOSE UR STRIP-MCNC: NEGATIVE MG/DL
HCT VFR BLD AUTO: 31 % (ref 37–47)
HGB BLD-MCNC: 10.3 G/DL (ref 12–16)
HGB UR QL STRIP.AUTO: NEGATIVE
HYALINE CASTS UR QL AUTO: ABNORMAL /LPF
KETONES UR QL STRIP: NEGATIVE
LEUKOCYTE ESTERASE UR QL STRIP.AUTO: ABNORMAL
MCH RBC QN AUTO: 28.9 PG (ref 28–32)
MCHC RBC AUTO-ENTMCNC: 33.2 G/DL (ref 33–36)
MCV RBC AUTO: 87.1 FL (ref 82–98)
NITRITE UR QL STRIP: POSITIVE
PH UR STRIP.AUTO: 7 [PH] (ref 5–8)
PLATELET # BLD AUTO: 320 10*3/MM3 (ref 130–400)
PMV BLD AUTO: 9.5 FL (ref 6–12)
POTASSIUM BLD-SCNC: 3.8 MMOL/L (ref 3.5–5.3)
PROT SERPL-MCNC: 7.8 G/DL (ref 6.3–8.7)
PROT UR QL STRIP: NEGATIVE
RBC # BLD AUTO: 3.56 10*6/MM3 (ref 4.2–5.4)
RBC # UR: ABNORMAL /HPF
REF LAB TEST METHOD: ABNORMAL
SODIUM BLD-SCNC: 133 MMOL/L (ref 135–145)
SP GR UR STRIP: 1.01 (ref 1–1.03)
SQUAMOUS #/AREA URNS HPF: ABNORMAL /HPF
T4 FREE SERPL-MCNC: 2.17 NG/DL (ref 0.78–2.19)
TSH SERPL DL<=0.05 MIU/L-ACNC: 1.36 MIU/ML (ref 0.47–4.68)
UROBILINOGEN UR QL STRIP: ABNORMAL
VIT B12 BLD-MCNC: >1000 PG/ML (ref 239–931)
WBC NRBC COR # BLD: 7.43 10*3/MM3 (ref 4.8–10.8)
WBC UR QL AUTO: ABNORMAL /HPF

## 2019-02-13 PROCEDURE — 81001 URINALYSIS AUTO W/SCOPE: CPT | Performed by: NURSE PRACTITIONER

## 2019-02-13 PROCEDURE — 85027 COMPLETE CBC AUTOMATED: CPT | Performed by: NURSE PRACTITIONER

## 2019-02-13 PROCEDURE — 84443 ASSAY THYROID STIM HORMONE: CPT | Performed by: NURSE PRACTITIONER

## 2019-02-13 PROCEDURE — 84439 ASSAY OF FREE THYROXINE: CPT | Performed by: NURSE PRACTITIONER

## 2019-02-13 PROCEDURE — 80053 COMPREHEN METABOLIC PANEL: CPT | Performed by: NURSE PRACTITIONER

## 2019-02-13 PROCEDURE — 36415 COLL VENOUS BLD VENIPUNCTURE: CPT

## 2019-02-13 PROCEDURE — 82607 VITAMIN B-12: CPT | Performed by: NURSE PRACTITIONER

## 2019-02-15 ENCOUNTER — APPOINTMENT (OUTPATIENT)
Dept: ULTRASOUND IMAGING | Facility: HOSPITAL | Age: 79
End: 2019-02-15

## 2019-02-15 ENCOUNTER — APPOINTMENT (OUTPATIENT)
Dept: CT IMAGING | Facility: HOSPITAL | Age: 79
End: 2019-02-15

## 2019-02-18 ENCOUNTER — HOSPITAL ENCOUNTER (OUTPATIENT)
Dept: ULTRASOUND IMAGING | Facility: HOSPITAL | Age: 79
Discharge: HOME | End: 2019-02-18

## 2019-02-18 ENCOUNTER — HOSPITAL ENCOUNTER (OUTPATIENT)
Dept: CT IMAGING | Facility: HOSPITAL | Age: 79
Discharge: HOME | End: 2019-02-18
Admitting: NURSE PRACTITIONER

## 2019-02-18 DIAGNOSIS — I65.23 CAROTID OCCLUSION, BILATERAL: ICD-10-CM

## 2019-02-18 DIAGNOSIS — R41.3 AMNESIA: ICD-10-CM

## 2019-02-18 PROCEDURE — 93880 EXTRACRANIAL BILAT STUDY: CPT

## 2019-02-18 PROCEDURE — 93880 EXTRACRANIAL BILAT STUDY: CPT | Performed by: SURGERY

## 2019-02-18 PROCEDURE — 70450 CT HEAD/BRAIN W/O DYE: CPT

## 2019-02-19 DIAGNOSIS — E04.2 MULTINODULAR GOITER: Primary | ICD-10-CM

## 2019-03-04 ENCOUNTER — APPOINTMENT (OUTPATIENT)
Dept: ULTRASOUND IMAGING | Facility: HOSPITAL | Age: 79
End: 2019-03-04

## 2019-03-06 ENCOUNTER — APPOINTMENT (OUTPATIENT)
Dept: ULTRASOUND IMAGING | Facility: HOSPITAL | Age: 79
End: 2019-03-06

## 2019-03-07 ENCOUNTER — OFFICE VISIT (OUTPATIENT)
Dept: OTOLARYNGOLOGY | Facility: CLINIC | Age: 79
End: 2019-03-07

## 2019-03-07 ENCOUNTER — HOSPITAL ENCOUNTER (OUTPATIENT)
Dept: ULTRASOUND IMAGING | Facility: HOSPITAL | Age: 79
Discharge: HOME | End: 2019-03-07
Admitting: PHYSICIAN ASSISTANT

## 2019-03-07 VITALS
WEIGHT: 176 LBS | BODY MASS INDEX: 33.23 KG/M2 | SYSTOLIC BLOOD PRESSURE: 134 MMHG | DIASTOLIC BLOOD PRESSURE: 80 MMHG | HEIGHT: 61 IN | TEMPERATURE: 97.7 F

## 2019-03-07 DIAGNOSIS — E04.2 MULTINODULAR GOITER: ICD-10-CM

## 2019-03-07 DIAGNOSIS — E04.2 MULTINODULAR GOITER: Primary | ICD-10-CM

## 2019-03-07 PROCEDURE — 76536 US EXAM OF HEAD AND NECK: CPT

## 2019-03-07 PROCEDURE — 99213 OFFICE O/P EST LOW 20 MIN: CPT | Performed by: PHYSICIAN ASSISTANT

## 2019-03-07 NOTE — PATIENT INSTRUCTIONS
Will continue to monitor thyroid with repeat ultrasound and TSH in one year. If symptoms worsen call for sooner appointment.      MyPlate from [employer]  The general, healthful diet is based on the 2010 Dietary Guidelines for Americans. The amount of food you need to eat from each food group depends on your age, sex, and level of physical activity and can be individualized by a dietitian. Go to ChooseMyPlate.gov for more information.  What do I need to know about the MyPlate plan?  · Enjoy your food, but eat less.  · Avoid oversized portions.  ? ½ of your plate should include fruits and vegetables.  ? ¼ of your plate should be grains.  ? ¼ of your plate should be protein.  Grains  · Make at least half of your grains whole grains.  · For a 2,000 calorie daily food plan, eat 6 oz every day.  · 1 oz is about 1 slice bread, 1 cup cereal, or ½ cup cooked rice, cereal, or pasta.  Vegetables  · Make half your plate fruits and vegetables.  · For a 2,000 calorie daily food plan, eat 2½ cups every day.  · 1 cup is about 1 cup raw or cooked vegetables or vegetable juice or 2 cups raw leafy greens.  Fruits  · Make half your plate fruits and vegetables.  · For a 2,000 calorie daily food plan, eat 2 cups every day.  · 1 cup is about 1 cup fruit or 100% fruit juice or ½ cup dried fruit.  Protein  · For a 2,000 calorie daily food plan, eat 5½ oz every day.  · 1 oz is about 1 oz meat, poultry, or fish, ¼ cup cooked beans, 1 egg, 1 Tbsp peanut butter, or ½ oz nuts or seeds.  Dairy  · Switch to fat-free or low-fat (1%) milk.  · For a 2,000 calorie daily food plan, eat 3 cups every day.  · 1 cup is about 1 cup milk or yogurt or soy milk (soy beverage), 1½ oz natural cheese, or 2 oz processed cheese.  Fats, Oils, and Empty Calories  · Only small amounts of oils are recommended.  · Empty calories are calories from solid fats or added sugars.  · Compare sodium in foods like soup, bread, and frozen meals. Choose the foods with lower  numbers.  · Drink water instead of sugary drinks.  What foods can I eat?  Grains  Whole grains such as whole wheat, quinoa, millet, and bulgur. Bread, rolls, and pasta made from whole grains. Brown or wild rice. Hot or cold cereals made from whole grains and without added sugar.  Vegetables  All fresh vegetables, especially fresh red, dark green, or orange vegetables. Peas and beans. Low-sodium frozen or canned vegetables prepared without added salt. Low-sodium vegetable juices.  Fruits  All fresh, frozen, and dried fruits. Canned fruit packed in water or fruit juice without added sugar. Fruit juices without added sugar.  Meats and Other Protein Sources  Boiled, baked, or grilled lean meat trimmed of fat. Skinless poultry. Fresh seafood and shellfish. Canned seafood packed in water. Unsalted nuts and unsalted nut butters. Tofu. Dried beans and pea. Eggs.  Dairy  Low-fat or fat-free milk, yogurt, and cheeses.  Sweets and Desserts  Frozen desserts made from low-fat milk.  Fats and Oils  Olive, peanut, and canola oils and margarine. Salad dressing and mayonnaise made from these oils.  Other  Soups and casseroles made from allowed ingredients and without added fat or salt.  The items listed above may not be a complete list of recommended foods or beverages. Contact your dietitian for more options.  What foods are not recommended?  Grains  Sweetened, low-fiber cereals. Packaged baked goods. Snack crackers and chips. Cheese crackers, butter crackers, and biscuits. Frozen waffles, sweet breads, doughnuts, pastries, packaged baking mixes, pancakes, cakes, and cookies.  Vegetables  Regular canned or frozen vegetables or vegetables prepared with salt. Canned tomatoes. Canned tomato sauce. Fried vegetables. Vegetables in cream sauce or cheese sauce.  Fruits  Fruits packed in syrup or made with added sugar.  Meats and Other Protein Sources  Marbled or fatty meats such as ribs. Poultry with skin. Fried meats, poultry, eggs, or  fish. Sausages, hot dogs, and deli meats such as pastrami, bologna, or salami.  Dairy  Whole milk, cream, cheeses made from whole milk, sour cream. Ice cream or yogurt made from whole milk or with added sugar.  Beverages  For adults, no more than one alcoholic drink per day. Regular soft drinks or other sugary beverages. Juice drinks.  Sweets and Desserts  Sugary or fatty desserts, candy, and other sweets.  Fats and Oils  Solid shortening or partially hydrogenated oils. Solid margarine. Margarine that contains trans fats. Butter.  The items listed above may not be a complete list of foods and beverages to avoid. Contact your dietitian for more information.  This information is not intended to replace advice given to you by your health care provider. Make sure you discuss any questions you have with your health care provider.  Document Released: 01/06/2009 Document Revised: 05/25/2017 Document Reviewed: 11/26/2014  [email or web address] Interactive Patient Education © 2018 [employer]     Calorie Counting for Weight Loss  Calories are units of energy. Your body needs a certain amount of calories from food to keep you going throughout the day. When you eat more calories than your body needs, your body stores the extra calories as fat. When you eat fewer calories than your body needs, your body burns fat to get the energy it needs.  Calorie counting means keeping track of how many calories you eat and drink each day. Calorie counting can be helpful if you need to lose weight. If you make sure to eat fewer calories than your body needs, you should lose weight. Ask your health care provider what a healthy weight is for you.  For calorie counting to work, you will need to eat the right number of calories in a day in order to lose a healthy amount of weight per week. A dietitian can help you determine how many calories you need in a day and will give you suggestions on how to reach your calorie goal.  · A healthy amount of weight to  lose per week is usually 1-2 lb (0.5-0.9 kg). This usually means that your daily calorie intake should be reduced by 500-750 calories.  · Eating 1,200 - 1,500 calories per day can help most women lose weight.  · Eating 1,500 - 1,800 calories per day can help most men lose weight.    What do I need to know about calorie counting?  In order to meet your daily calorie goal, you will need to:  · Find out how many calories are in each food you would like to eat. Try to do this before you eat.  · Decide how much of the food you plan to eat.  · Write down what you ate and how many calories it had. Doing this is called keeping a food log.    To successfully lose weight, it is important to balance calorie counting with a healthy lifestyle that includes regular activity. Aim for 150 minutes of moderate exercise (such as walking) or 75 minutes of vigorous exercise (such as running) each week.  Where do I find calorie information?    The number of calories in a food can be found on a Nutrition Facts label. If a food does not have a Nutrition Facts label, try to look up the calories online or ask your dietitian for help.  Remember that calories are listed per serving. If you choose to have more than one serving of a food, you will have to multiply the calories per serving by the amount of servings you plan to eat. For example, the label on a package of bread might say that a serving size is 1 slice and that there are 90 calories in a serving. If you eat 1 slice, you will have eaten 90 calories. If you eat 2 slices, you will have eaten 180 calories.  How do I keep a food log?  Immediately after each meal, record the following information in your food log:  · What you ate. Don't forget to include toppings, sauces, and other extras on the food.  · How much you ate. This can be measured in cups, ounces, or number of items.  · How many calories each food and drink had.  · The total number of calories in the meal.    Keep your food  "log near you, such as in a small notebook in your pocket, or use a mobile lior or website. Some programs will calculate calories for you and show you how many calories you have left for the day to meet your goal.  What are some calorie counting tips?  · Use your calories on foods and drinks that will fill you up and not leave you hungry:  ? Some examples of foods that fill you up are nuts and nut butters, vegetables, lean proteins, and high-fiber foods like whole grains. High-fiber foods are foods with more than 5 g fiber per serving.  ? Drinks such as sodas, specialty coffee drinks, alcohol, and juices have a lot of calories, yet do not fill you up.  · Eat nutritious foods and avoid empty calories. Empty calories are calories you get from foods or beverages that do not have many vitamins or protein, such as candy, sweets, and soda. It is better to have a nutritious high-calorie food (such as an avocado) than a food with few nutrients (such as a bag of chips).  · Know how many calories are in the foods you eat most often. This will help you calculate calorie counts faster.  · Pay attention to calories in drinks. Low-calorie drinks include water and unsweetened drinks.  · Pay attention to nutrition labels for \"low fat\" or \"fat free\" foods. These foods sometimes have the same amount of calories or more calories than the full fat versions. They also often have added sugar, starch, or salt, to make up for flavor that was removed with the fat.  · Find a way of tracking calories that works for you. Get creative. Try different apps or programs if writing down calories does not work for you.  What are some portion control tips?  · Know how many calories are in a serving. This will help you know how many servings of a certain food you can have.  · Use a measuring cup to measure serving sizes. You could also try weighing out portions on a kitchen scale. With time, you will be able to estimate serving sizes for some " foods.  · Take some time to put servings of different foods on your favorite plates, bowls, and cups so you know what a serving looks like.  · Try not to eat straight from a bag or box. Doing this can lead to overeating. Put the amount you would like to eat in a cup or on a plate to make sure you are eating the right portion.  · Use smaller plates, glasses, and bowls to prevent overeating.  · Try not to multitask (for example, watch TV or use your computer) while eating. If it is time to eat, sit down at a table and enjoy your food. This will help you to know when you are full. It will also help you to be aware of what you are eating and how much you are eating.  What are tips for following this plan?  Reading food labels  · Check the calorie count compared to the serving size. The serving size may be smaller than what you are used to eating.  · Check the source of the calories. Make sure the food you are eating is high in vitamins and protein and low in saturated and trans fats.  Shopping  · Read nutrition labels while you shop. This will help you make healthy decisions before you decide to purchase your food.  · Make a grocery list and stick to it.  Cooking  · Try to cook your favorite foods in a healthier way. For example, try baking instead of frying.  · Use low-fat dairy products.  Meal planning  · Use more fruits and vegetables. Half of your plate should be fruits and vegetables.  · Include lean proteins like poultry and fish.  How do I count calories when eating out?  · Ask for smaller portion sizes.  · Consider sharing an entree and sides instead of getting your own entree.  · If you get your own entree, eat only half. Ask for a box at the beginning of your meal and put the rest of your entree in it so you are not tempted to eat it.  · If calories are listed on the menu, choose the lower calorie options.  · Choose dishes that include vegetables, fruits, whole grains, low-fat dairy products, and lean  protein.  · Choose items that are boiled, broiled, grilled, or steamed. Stay away from items that are buttered, battered, fried, or served with cream sauce. Items labeled “crispy” are usually fried, unless stated otherwise.  · Choose water, low-fat milk, unsweetened iced tea, or other drinks without added sugar. If you want an alcoholic beverage, choose a lower calorie option such as a glass of wine or light beer.  · Ask for dressings, sauces, and syrups on the side. These are usually high in calories, so you should limit the amount you eat.  · If you want a salad, choose a garden salad and ask for grilled meats. Avoid extra toppings like nunn, cheese, or fried items. Ask for the dressing on the side, or ask for olive oil and vinegar or lemon to use as dressing.  · Estimate how many servings of a food you are given. For example, a serving of cooked rice is ½ cup or about the size of half a baseball. Knowing serving sizes will help you be aware of how much food you are eating at restaurants. The list below tells you how big or small some common portion sizes are based on everyday objects:  ? 1 oz--4 stacked dice.  ? 3 oz--1 deck of cards.  ? 1 tsp--1 die.  ? 1 Tbsp--½ a ping-pong ball.  ? 2 Tbsp--1 ping-pong ball.  ? ½ cup--½ baseball.  ? 1 cup--1 baseball.  Summary  · Calorie counting means keeping track of how many calories you eat and drink each day. If you eat fewer calories than your body needs, you should lose weight.  · A healthy amount of weight to lose per week is usually 1-2 lb (0.5-0.9 kg). This usually means reducing your daily calorie intake by 500-750 calories.  · The number of calories in a food can be found on a Nutrition Facts label. If a food does not have a Nutrition Facts label, try to look up the calories online or ask your dietitian for help.  · Use your calories on foods and drinks that will fill you up, and not on foods and drinks that will leave you hungry.  · Use smaller plates, glasses,  and bowls to prevent overeating.  This information is not intended to replace advice given to you by your health care provider. Make sure you discuss any questions you have with your health care provider.  Document Released: 12/18/2006 Document Revised: 11/17/2017 Document Reviewed: 11/17/2017  [employer] Interactive Patient Education © 2018 [employer]     Exercising to Lose Weight  Exercising can help you to lose weight. In order to lose weight through exercise, you need to do vigorous-intensity exercise. You can tell that you are exercising with vigorous intensity if you are breathing very hard and fast and cannot hold a conversation while exercising.  Moderate-intensity exercise helps to maintain your current weight. You can tell that you are exercising at a moderate level if you have a higher heart rate and faster breathing, but you are still able to hold a conversation.  How often should I exercise?  Choose an activity that you enjoy and set realistic goals. Your health care provider can help you to make an activity plan that works for you. Exercise regularly as directed by your health care provider. This may include:  · Doing resistance training twice each week, such as:  ? Push-ups.  ? Sit-ups.  ? Lifting weights.  ? Using resistance bands.  · Doing a given intensity of exercise for a given amount of time. Choose from these options:  ? 150 minutes of moderate-intensity exercise every week.  ? 75 minutes of vigorous-intensity exercise every week.  ? A mix of moderate-intensity and vigorous-intensity exercise every week.    Children, pregnant women, people who are out of shape, people who are overweight, and older adults may need to consult a health care provider for individual recommendations. If you have any sort of medical condition, be sure to consult your health care provider before starting a new exercise program.  What are some activities that can help me to lose weight?  · Walking at a rate of at least  4.5 miles an hour.  · Jogging or running at a rate of 5 miles per hour.  · Biking at a rate of at least 10 miles per hour.  · Lap swimming.  · Roller-skating or in-line skating.  · Cross-country skiing.  · Vigorous competitive sports, such as football, basketball, and soccer.  · Jumping rope.  · Aerobic dancing.  How can I be more active in my day-to-day activities?  · Use the stairs instead of the elevator.  · Take a walk during your lunch break.  · If you drive, park your car farther away from work or school.  · If you take public transportation, get off one stop early and walk the rest of the way.  · Make all of your phone calls while standing up and walking around.  · Get up, stretch, and walk around every 30 minutes throughout the day.  What guidelines should I follow while exercising?  · Do not exercise so much that you hurt yourself, feel dizzy, or get very short of breath.  · Consult your health care provider prior to starting a new exercise program.  · Wear comfortable clothes and shoes with good support.  · Drink plenty of water while you exercise to prevent dehydration or heat stroke. Body water is lost during exercise and must be replaced.  · Work out until you breathe faster and your heart beats faster.  This information is not intended to replace advice given to you by your health care provider. Make sure you discuss any questions you have with your health care provider.  Document Released: 01/20/2012 Document Revised: 05/25/2017 Document Reviewed: 05/21/2015  [employer] Interactive Patient Education © 2018 [employer]

## 2019-03-07 NOTE — PROGRESS NOTES
YOB: 1940  Location: [facility]  Location Address: [address] [place] [address], [facility], [address] [place] [address]  Location Phone: [phone]    Chief Complaint   Patient presents with   • Follow-up     thyroid, ears       History of Present Illness  Carmen Obrien is a 79 y.o. female who presents for follow up for thyroid complaints. She has fatigue, insomnia, weight gain and dysphagia. Patient denies hoarseness, neck swelling and neck tenderness. Thyroid ultrasound and TSH stable today.    Study Result     EXAMINATION: US THYROID-     3/7/2019 8:14 AM CST     HISTORY: THYROID DISEASE.     Grayscale and color-flow thyroid ultrasound evaluation.     Comparison is made with 2015.     Bilateral thyroid nodules.     Right lobe = 46 x 16 x 20 mm.     Left lobe = 37 x 16 x 19 mm.     Normal isthmus measuring 5 mm.     Complex nodule within the midportion of the left lobe measures 18 x 14 x  10 mm compared with 13 x 12 x 8 mm 4 years ago. Echogenicity and  discrete margins are similar.     Small nodules noted within the right lobe.  The largest of these measures 11 x 7 x 5 mm compared with 9 x 6 x 4 mm.     Summary:  1. Similar appearance of bilateral thyroid nodules showing slight  increase size compared with 4 years ago.  This report was finalized on 2019 09:07 by Dr. Yaakov Childress MD.           Past Medical History:   Diagnosis Date   • Anxiety    • Arthritis    • Deep venous thrombosis (CMS/HCC)    • Dysphagia    • GERD (gastroesophageal reflux disease)    • History of transfusion    • Hypercholesteremia    • Hypertension    • LPRD (laryngopharyngeal reflux disease)    • Macular degeneration    • Multinodular goiter 2017   • Osteoarthritis    • Thyroid nodule        Past Surgical History:   Procedure Laterality Date   • CATARACT EXTRACTION     • CHOLECYSTECTOMY     • CYSTOCELE REPAIR     • FEMUR FRACTURE SURGERY     • HYSTERECTOMY     • REPLACEMENT TOTAL KNEE     • ULNAR NERVE TRANSPOSITION            Current Outpatient Medications:   •  ALPRAZolam (XANAX) 1 MG tablet, Take 1 mg by mouth 2 (Two) Times a Day As Needed for anxiety., Disp: , Rfl:   •  amLODIPine (NORVASC) 10 MG tablet, Take 10 mg by mouth., Disp: , Rfl:   •  aspirin 81 MG tablet, Take 81 mg by mouth., Disp: , Rfl:   •  atorvastatin (LIPITOR) 10 MG tablet, Take 10 mg by mouth Daily., Disp: , Rfl:   •  Calcium Carbonate-Vitamin D (CALTRATE 600+D PO), Take  by mouth 2 (Two) Times a Day., Disp: , Rfl:   •  Cyanocobalamin (VITAMIN B-12 IJ), Inject 1,000 mcg as directed Every 30 (Thirty) Days., Disp: , Rfl:   •  cycloSPORINE (RESTASIS) 0.05 % ophthalmic emulsion, Administer 1 drop to both eyes 2 (Two) Times a Day As Needed., Disp: , Rfl:   •  Dextran 70-Hypromellose, PF, ([facility] NATURAL TEARS) 0.1-0.3 % solution, Apply 1 drop to eye(s) as directed by provider 2 (Two) Times a Day As Needed (itchy or dry eyes)., Disp: 32 each, Rfl: 0  •  esomeprazole (NexIUM) 20 MG capsule, Take 40 mg by mouth Every Morning Before Breakfast., Disp: , Rfl:   •  hydrochlorothiazide (HYDRODIURIL) 25 MG tablet, Take 25 mg by mouth 2 (Two) Times a Day., Disp: , Rfl:   •  HYDROcodone-acetaminophen (NORCO)  MG per tablet, Take 1 tablet by mouth Every 6 (Six) Hours As Needed for moderate pain (4-6)., Disp: , Rfl:   •  lisinopril (PRINIVIL,ZESTRIL) 40 MG tablet, Take 40 mg by mouth Daily., Disp: , Rfl:   •  montelukast (SINGULAIR) 10 MG tablet, Take 10 mg by mouth Every Night., Disp: , Rfl:   •  MULTIPLE VITAMINS-MINERALS ER PO, Take by mouth daily, Disp: , Rfl:   •  nabumetone (RELAFEN) 500 MG tablet, Take 500 mg by mouth 2 (Two) Times a Day., Disp: , Rfl:   •  nortriptyline (PAMELOR) 50 MG capsule, Take 50 mg by mouth Every Night., Disp: , Rfl:   •  senna (SENOKOT) 8.6 MG tablet, Take 1 tablet by mouth Daily., Disp: , Rfl:   •  sulfacetamide (BLEPH-10) 10 % ophthalmic solution, Administer 1 drop to both eyes Every 4 (Four) Hours. For 7 days, Disp: 5 mL, Rfl: 0  •   XTAMPZA ER 13.5 MG capsule extended-release 12 hour , , Disp: , Rfl:   •  mupirocin (BACTROBAN) 2 % ointment, Apply 1 application topically 3 (Three) Times a Day., Disp: , Rfl:   •  nystatin (NYSTOP) 120229 UNIT/GM powder powder, Apply 100,000 Units topically 2 (Two) Times a Day., Disp: , Rfl:     Levaquin [levofloxacin] and Lyrica [pregabalin]    History reviewed. No pertinent family history.    Social History     Socioeconomic History   • Marital status:      Spouse name: Not on file   • Number of children: Not on file   • Years of education: Not on file   • Highest education level: Not on file   Social Needs   • Financial resource strain: Not on file   • Food insecurity - worry: Not on file   • Food insecurity - inability: Not on file   • Transportation needs - medical: Not on file   • Transportation needs - non-medical: Not on file   Occupational History   • Not on file   Tobacco Use   • Smoking status: Never Smoker   • Smokeless tobacco: Never Used   Substance and Sexual Activity   • Alcohol use: No   • Drug use: No   • Sexual activity: Defer   Other Topics Concern   • Not on file   Social History Narrative   • Not on file       Review of Systems   Constitutional: Positive for fatigue and unexpected weight change.   HENT: Positive for trouble swallowing.    Eyes: Negative.    Respiratory: Negative.    Cardiovascular: Negative.    Gastrointestinal: Negative.    Endocrine: Negative.    Genitourinary: Negative.    Musculoskeletal: Negative.    Skin: Negative.    Allergic/Immunologic: Negative.    Neurological: Negative.    Hematological: Negative.    Psychiatric/Behavioral: Positive for sleep disturbance.       Vitals:    03/07/19 0934   BP: 134/80   Temp: 97.7 °F (36.5 °C)       Objective     Physical Exam  CONSTITUTIONAL: well nourished, alert, oriented, in no acute distress     COMMUNICATION AND VOICE: able to communicate normally, normal voice quality    HEAD: normocephalic, no lesions, atraumatic, no  tenderness, no masses     FACE: appearance normal, no lesions, no tenderness, no deformities, facial motion symmetric    SALIVARY GLANDS: parotid glands with no tenderness, no swelling, no masses, submandibular glands with normal size, nontender    EYES: ocular motility normal, eyelids normal, orbits normal, no proptosis, conjunctiva normal , pupils equal, round     EARS:  Hearing: response to conversational voice normal bilaterally   External Ears: auricles without lesions    NOSE:  External Nose: structure normal, no tenderness on palpation, no nasal discharge, no lesions, no evidence of trauma, nostrils patent     ORAL:  Lips: upper and lower lips without lesion     NECK: neck appearance normal, no mass,  noted without erythema or tenderness    THYROID: no overt thyromegaly, no tenderness, nodules or mass present on palpation, position midline     LYMPH NODES: no lymphadenopathy    CHEST/RESPIRATORY: respiratory effort normal, normal breath sounds     CARDIOVASCULAR: rate and rhythm normal, extremities without cyanosis or edema      NEUROLOGIC/PSYCHIATRIC: oriented to time, place and person, mood normal, affect appropriate, CN II-XII intact grossly    Assessment/Plan   Problems Addressed this Visit        Endocrine    Multinodular goiter - Primary    Relevant Orders    US Thyroid    TSH        * Surgery not found *  Orders Placed This Encounter   Procedures   • US Thyroid     Standing Status:   Future     Standing Expiration Date:   3/7/2020     Order Specific Question:   Reason for Exam:     Answer:   multinodular goiter   • TSH     Standing Status:   Future     Standing Expiration Date:   3/7/2020     Return in about 1 year (around 3/7/2020) for Recheck thyroid with ultrasound and TSH.       Patient Instructions   Will continue to monitor thyroid with repeat ultrasound and TSH in one year. If symptoms worsen call for sooner appointment.      MyPlate from [employer]  The general, healthful diet is based on the 2010  Dietary Guidelines for Americans. The amount of food you need to eat from each food group depends on your age, sex, and level of physical activity and can be individualized by a dietitian. Go to ChooseMyPlate.gov for more information.  What do I need to know about the MyPlate plan?  · Enjoy your food, but eat less.  · Avoid oversized portions.  ? ½ of your plate should include fruits and vegetables.  ? ¼ of your plate should be grains.  ? ¼ of your plate should be protein.  Grains  · Make at least half of your grains whole grains.  · For a 2,000 calorie daily food plan, eat 6 oz every day.  · 1 oz is about 1 slice bread, 1 cup cereal, or ½ cup cooked rice, cereal, or pasta.  Vegetables  · Make half your plate fruits and vegetables.  · For a 2,000 calorie daily food plan, eat 2½ cups every day.  · 1 cup is about 1 cup raw or cooked vegetables or vegetable juice or 2 cups raw leafy greens.  Fruits  · Make half your plate fruits and vegetables.  · For a 2,000 calorie daily food plan, eat 2 cups every day.  · 1 cup is about 1 cup fruit or 100% fruit juice or ½ cup dried fruit.  Protein  · For a 2,000 calorie daily food plan, eat 5½ oz every day.  · 1 oz is about 1 oz meat, poultry, or fish, ¼ cup cooked beans, 1 egg, 1 Tbsp peanut butter, or ½ oz nuts or seeds.  Dairy  · Switch to fat-free or low-fat (1%) milk.  · For a 2,000 calorie daily food plan, eat 3 cups every day.  · 1 cup is about 1 cup milk or yogurt or soy milk (soy beverage), 1½ oz natural cheese, or 2 oz processed cheese.  Fats, Oils, and Empty Calories  · Only small amounts of oils are recommended.  · Empty calories are calories from solid fats or added sugars.  · Compare sodium in foods like soup, bread, and frozen meals. Choose the foods with lower numbers.  · Drink water instead of sugary drinks.  What foods can I eat?  Grains  Whole grains such as whole wheat, quinoa, millet, and bulgur. Bread, rolls, and pasta made from whole grains. Brown or wild  rice. Hot or cold cereals made from whole grains and without added sugar.  Vegetables  All fresh vegetables, especially fresh red, dark green, or orange vegetables. Peas and beans. Low-sodium frozen or canned vegetables prepared without added salt. Low-sodium vegetable juices.  Fruits  All fresh, frozen, and dried fruits. Canned fruit packed in water or fruit juice without added sugar. Fruit juices without added sugar.  Meats and Other Protein Sources  Boiled, baked, or grilled lean meat trimmed of fat. Skinless poultry. Fresh seafood and shellfish. Canned seafood packed in water. Unsalted nuts and unsalted nut butters. Tofu. Dried beans and pea. Eggs.  Dairy  Low-fat or fat-free milk, yogurt, and cheeses.  Sweets and Desserts  Frozen desserts made from low-fat milk.  Fats and Oils  Olive, peanut, and canola oils and margarine. Salad dressing and mayonnaise made from these oils.  Other  Soups and casseroles made from allowed ingredients and without added fat or salt.  The items listed above may not be a complete list of recommended foods or beverages. Contact your dietitian for more options.  What foods are not recommended?  Grains  Sweetened, low-fiber cereals. Packaged baked goods. Snack crackers and chips. Cheese crackers, butter crackers, and biscuits. Frozen waffles, sweet breads, doughnuts, pastries, packaged baking mixes, pancakes, cakes, and cookies.  Vegetables  Regular canned or frozen vegetables or vegetables prepared with salt. Canned tomatoes. Canned tomato sauce. Fried vegetables. Vegetables in cream sauce or cheese sauce.  Fruits  Fruits packed in syrup or made with added sugar.  Meats and Other Protein Sources  Marbled or fatty meats such as ribs. Poultry with skin. Fried meats, poultry, eggs, or fish. Sausages, hot dogs, and deli meats such as pastrami, bologna, or salami.  Dairy  Whole milk, cream, cheeses made from whole milk, sour cream. Ice cream or yogurt made from whole milk or with added  sugar.  Beverages  For adults, no more than one alcoholic drink per day. Regular soft drinks or other sugary beverages. Juice drinks.  Sweets and Desserts  Sugary or fatty desserts, candy, and other sweets.  Fats and Oils  Solid shortening or partially hydrogenated oils. Solid margarine. Margarine that contains trans fats. Butter.  The items listed above may not be a complete list of foods and beverages to avoid. Contact your dietitian for more information.  This information is not intended to replace advice given to you by your health care provider. Make sure you discuss any questions you have with your health care provider.  Document Released: 01/06/2009 Document Revised: 05/25/2017 Document Reviewed: 11/26/2014  [employer] Interactive Patient Education © 2018 [employer]     Calorie Counting for Weight Loss  Calories are units of energy. Your body needs a certain amount of calories from food to keep you going throughout the day. When you eat more calories than your body needs, your body stores the extra calories as fat. When you eat fewer calories than your body needs, your body burns fat to get the energy it needs.  Calorie counting means keeping track of how many calories you eat and drink each day. Calorie counting can be helpful if you need to lose weight. If you make sure to eat fewer calories than your body needs, you should lose weight. Ask your health care provider what a healthy weight is for you.  For calorie counting to work, you will need to eat the right number of calories in a day in order to lose a healthy amount of weight per week. A dietitian can help you determine how many calories you need in a day and will give you suggestions on how to reach your calorie goal.  · A healthy amount of weight to lose per week is usually 1-2 lb (0.5-0.9 kg). This usually means that your daily calorie intake should be reduced by 500-750 calories.  · Eating 1,200 - 1,500 calories per day can help most women lose  weight.  · Eating 1,500 - 1,800 calories per day can help most men lose weight.    What do I need to know about calorie counting?  In order to meet your daily calorie goal, you will need to:  · Find out how many calories are in each food you would like to eat. Try to do this before you eat.  · Decide how much of the food you plan to eat.  · Write down what you ate and how many calories it had. Doing this is called keeping a food log.    To successfully lose weight, it is important to balance calorie counting with a healthy lifestyle that includes regular activity. Aim for 150 minutes of moderate exercise (such as walking) or 75 minutes of vigorous exercise (such as running) each week.  Where do I find calorie information?    The number of calories in a food can be found on a Nutrition Facts label. If a food does not have a Nutrition Facts label, try to look up the calories online or ask your dietitian for help.  Remember that calories are listed per serving. If you choose to have more than one serving of a food, you will have to multiply the calories per serving by the amount of servings you plan to eat. For example, the label on a package of bread might say that a serving size is 1 slice and that there are 90 calories in a serving. If you eat 1 slice, you will have eaten 90 calories. If you eat 2 slices, you will have eaten 180 calories.  How do I keep a food log?  Immediately after each meal, record the following information in your food log:  · What you ate. Don't forget to include toppings, sauces, and other extras on the food.  · How much you ate. This can be measured in cups, ounces, or number of items.  · How many calories each food and drink had.  · The total number of calories in the meal.    Keep your food log near you, such as in a small notebook in your pocket, or use a mobile lior or website. Some programs will calculate calories for you and show you how many calories you have left for the day to meet  "your goal.  What are some calorie counting tips?  · Use your calories on foods and drinks that will fill you up and not leave you hungry:  ? Some examples of foods that fill you up are nuts and nut butters, vegetables, lean proteins, and high-fiber foods like whole grains. High-fiber foods are foods with more than 5 g fiber per serving.  ? Drinks such as sodas, specialty coffee drinks, alcohol, and juices have a lot of calories, yet do not fill you up.  · Eat nutritious foods and avoid empty calories. Empty calories are calories you get from foods or beverages that do not have many vitamins or protein, such as candy, sweets, and soda. It is better to have a nutritious high-calorie food (such as an avocado) than a food with few nutrients (such as a bag of chips).  · Know how many calories are in the foods you eat most often. This will help you calculate calorie counts faster.  · Pay attention to calories in drinks. Low-calorie drinks include water and unsweetened drinks.  · Pay attention to nutrition labels for \"low fat\" or \"fat free\" foods. These foods sometimes have the same amount of calories or more calories than the full fat versions. They also often have added sugar, starch, or salt, to make up for flavor that was removed with the fat.  · Find a way of tracking calories that works for you. Get creative. Try different apps or programs if writing down calories does not work for you.  What are some portion control tips?  · Know how many calories are in a serving. This will help you know how many servings of a certain food you can have.  · Use a measuring cup to measure serving sizes. You could also try weighing out portions on a kitchen scale. With time, you will be able to estimate serving sizes for some foods.  · Take some time to put servings of different foods on your favorite plates, bowls, and cups so you know what a serving looks like.  · Try not to eat straight from a bag or box. Doing this can lead to " overeating. Put the amount you would like to eat in a cup or on a plate to make sure you are eating the right portion.  · Use smaller plates, glasses, and bowls to prevent overeating.  · Try not to multitask (for example, watch TV or use your computer) while eating. If it is time to eat, sit down at a table and enjoy your food. This will help you to know when you are full. It will also help you to be aware of what you are eating and how much you are eating.  What are tips for following this plan?  Reading food labels  · Check the calorie count compared to the serving size. The serving size may be smaller than what you are used to eating.  · Check the source of the calories. Make sure the food you are eating is high in vitamins and protein and low in saturated and trans fats.  Shopping  · Read nutrition labels while you shop. This will help you make healthy decisions before you decide to purchase your food.  · Make a grocery list and stick to it.  Cooking  · Try to cook your favorite foods in a healthier way. For example, try baking instead of frying.  · Use low-fat dairy products.  Meal planning  · Use more fruits and vegetables. Half of your plate should be fruits and vegetables.  · Include lean proteins like poultry and fish.  How do I count calories when eating out?  · Ask for smaller portion sizes.  · Consider sharing an entree and sides instead of getting your own entree.  · If you get your own entree, eat only half. Ask for a box at the beginning of your meal and put the rest of your entree in it so you are not tempted to eat it.  · If calories are listed on the menu, choose the lower calorie options.  · Choose dishes that include vegetables, fruits, whole grains, low-fat dairy products, and lean protein.  · Choose items that are boiled, broiled, grilled, or steamed. Stay away from items that are buttered, battered, fried, or served with cream sauce. Items labeled “crispy” are usually fried, unless stated  otherwise.  · Choose water, low-fat milk, unsweetened iced tea, or other drinks without added sugar. If you want an alcoholic beverage, choose a lower calorie option such as a glass of wine or light beer.  · Ask for dressings, sauces, and syrups on the side. These are usually high in calories, so you should limit the amount you eat.  · If you want a salad, choose a garden salad and ask for grilled meats. Avoid extra toppings like nunn, cheese, or fried items. Ask for the dressing on the side, or ask for olive oil and vinegar or lemon to use as dressing.  · Estimate how many servings of a food you are given. For example, a serving of cooked rice is ½ cup or about the size of half a baseball. Knowing serving sizes will help you be aware of how much food you are eating at restaurants. The list below tells you how big or small some common portion sizes are based on everyday objects:  ? 1 oz--4 stacked dice.  ? 3 oz--1 deck of cards.  ? 1 tsp--1 die.  ? 1 Tbsp--½ a ping-pong ball.  ? 2 Tbsp--1 ping-pong ball.  ? ½ cup--½ baseball.  ? 1 cup--1 baseball.  Summary  · Calorie counting means keeping track of how many calories you eat and drink each day. If you eat fewer calories than your body needs, you should lose weight.  · A healthy amount of weight to lose per week is usually 1-2 lb (0.5-0.9 kg). This usually means reducing your daily calorie intake by 500-750 calories.  · The number of calories in a food can be found on a Nutrition Facts label. If a food does not have a Nutrition Facts label, try to look up the calories online or ask your dietitian for help.  · Use your calories on foods and drinks that will fill you up, and not on foods and drinks that will leave you hungry.  · Use smaller plates, glasses, and bowls to prevent overeating.  This information is not intended to replace advice given to you by your health care provider. Make sure you discuss any questions you have with your health care provider.  Document  Released: 12/18/2006 Document Revised: 11/17/2017 Document Reviewed: 11/17/2017  [employer] Interactive Patient Education © 2018 [employer]     Exercising to Lose Weight  Exercising can help you to lose weight. In order to lose weight through exercise, you need to do vigorous-intensity exercise. You can tell that you are exercising with vigorous intensity if you are breathing very hard and fast and cannot hold a conversation while exercising.  Moderate-intensity exercise helps to maintain your current weight. You can tell that you are exercising at a moderate level if you have a higher heart rate and faster breathing, but you are still able to hold a conversation.  How often should I exercise?  Choose an activity that you enjoy and set realistic goals. Your health care provider can help you to make an activity plan that works for you. Exercise regularly as directed by your health care provider. This may include:  · Doing resistance training twice each week, such as:  ? Push-ups.  ? Sit-ups.  ? Lifting weights.  ? Using resistance bands.  · Doing a given intensity of exercise for a given amount of time. Choose from these options:  ? 150 minutes of moderate-intensity exercise every week.  ? 75 minutes of vigorous-intensity exercise every week.  ? A mix of moderate-intensity and vigorous-intensity exercise every week.    Children, pregnant women, people who are out of shape, people who are overweight, and older adults may need to consult a health care provider for individual recommendations. If you have any sort of medical condition, be sure to consult your health care provider before starting a new exercise program.  What are some activities that can help me to lose weight?  · Walking at a rate of at least 4.5 miles an hour.  · Jogging or running at a rate of 5 miles per hour.  · Biking at a rate of at least 10 miles per hour.  · Lap swimming.  · Roller-skating or in-line skating.  · Cross-country skiing.  · Vigorous  competitive sports, such as football, basketball, and soccer.  · Jumping rope.  · Aerobic dancing.  How can I be more active in my day-to-day activities?  · Use the stairs instead of the elevator.  · Take a walk during your lunch break.  · If you drive, park your car farther away from work or school.  · If you take public transportation, get off one stop early and walk the rest of the way.  · Make all of your phone calls while standing up and walking around.  · Get up, stretch, and walk around every 30 minutes throughout the day.  What guidelines should I follow while exercising?  · Do not exercise so much that you hurt yourself, feel dizzy, or get very short of breath.  · Consult your health care provider prior to starting a new exercise program.  · Wear comfortable clothes and shoes with good support.  · Drink plenty of water while you exercise to prevent dehydration or heat stroke. Body water is lost during exercise and must be replaced.  · Work out until you breathe faster and your heart beats faster.  This information is not intended to replace advice given to you by your health care provider. Make sure you discuss any questions you have with your health care provider.  Document Released: 01/20/2012 Document Revised: 05/25/2017 Document Reviewed: 05/21/2015  Elsevier Interactive Patient Education © 2018 [employer] Inc[employer]

## 2019-03-11 ENCOUNTER — TRANSCRIBE ORDERS (OUTPATIENT)
Dept: ADMINISTRATIVE | Facility: HOSPITAL | Age: 79
End: 2019-03-11

## 2019-03-11 DIAGNOSIS — Z12.31 ENCOUNTER FOR SCREENING MAMMOGRAM FOR MALIGNANT NEOPLASM OF BREAST: Primary | ICD-10-CM

## 2019-03-15 ENCOUNTER — APPOINTMENT (OUTPATIENT)
Dept: MAMMOGRAPHY | Facility: HOSPITAL | Age: 79
End: 2019-03-15

## 2019-04-01 ENCOUNTER — HOSPITAL ENCOUNTER (OUTPATIENT)
Dept: GENERAL RADIOLOGY | Age: 79
Discharge: HOME | End: 2019-04-01
Payer: MEDICARE

## 2019-04-01 ENCOUNTER — HOSPITAL ENCOUNTER (OUTPATIENT)
Dept: PREADMISSION TESTING | Age: 79
Discharge: HOME | End: 2019-04-05
Payer: MEDICARE

## 2019-04-01 VITALS — WEIGHT: 171 LBS | HEIGHT: 60 IN | BODY MASS INDEX: 33.57 KG/M2

## 2019-04-01 LAB
ALBUMIN SERPL-MCNC: 4.1 G/DL (ref 3.5–5.2)
ALP BLD-CCNC: 63 U/L (ref 35–104)
ALT SERPL-CCNC: 22 U/L (ref 5–33)
ANION GAP SERPL CALCULATED.3IONS-SCNC: 16 MMOL/L (ref 7–19)
APTT: 31.8 SEC (ref 26–36.2)
AST SERPL-CCNC: 22 U/L (ref 5–32)
BASOPHILS ABSOLUTE: 0 K/UL (ref 0–0.2)
BASOPHILS RELATIVE PERCENT: 0.4 % (ref 0–1)
BILIRUB SERPL-MCNC: 0.4 MG/DL (ref 0.2–1.2)
BILIRUBIN URINE: NEGATIVE
BLOOD, URINE: NEGATIVE
BUN BLDV-MCNC: 20 MG/DL (ref 8–23)
C-REACTIVE PROTEIN: 0.59 MG/DL (ref 0–0.5)
CALCIUM SERPL-MCNC: 9.3 MG/DL (ref 8.8–10.2)
CHLORIDE BLD-SCNC: 96 MMOL/L (ref 98–111)
CLARITY: CLEAR
CO2: 23 MMOL/L (ref 22–29)
COLOR: NORMAL
CREAT SERPL-MCNC: 0.7 MG/DL (ref 0.5–0.9)
EKG P AXIS: 19 DEGREES
EKG P-R INTERVAL: 172 MS
EKG Q-T INTERVAL: 394 MS
EKG QRS DURATION: 102 MS
EKG QTC CALCULATION (BAZETT): 411 MS
EKG T AXIS: 38 DEGREES
EOSINOPHILS ABSOLUTE: 0.2 K/UL (ref 0–0.6)
EOSINOPHILS RELATIVE PERCENT: 1.6 % (ref 0–5)
GFR NON-AFRICAN AMERICAN: >60
GLUCOSE BLD-MCNC: 95 MG/DL (ref 74–109)
GLUCOSE URINE: NEGATIVE MG/DL
HCT VFR BLD CALC: 34.7 % (ref 37–47)
HEMOGLOBIN: 10.7 G/DL (ref 12–16)
INR BLD: 1.1 (ref 0.88–1.18)
KETONES, URINE: NEGATIVE MG/DL
LEUKOCYTE ESTERASE, URINE: NEGATIVE
LYMPHOCYTES ABSOLUTE: 1.5 K/UL (ref 1.1–4.5)
LYMPHOCYTES RELATIVE PERCENT: 13.4 % (ref 20–40)
MCH RBC QN AUTO: 29.2 PG (ref 27–31)
MCHC RBC AUTO-ENTMCNC: 30.8 G/DL (ref 33–37)
MCV RBC AUTO: 94.8 FL (ref 81–99)
MONOCYTES ABSOLUTE: 0.9 K/UL (ref 0–0.9)
MONOCYTES RELATIVE PERCENT: 7.6 % (ref 0–10)
NEUTROPHILS ABSOLUTE: 8.5 K/UL (ref 1.5–7.5)
NEUTROPHILS RELATIVE PERCENT: 76.5 % (ref 50–65)
NITRITE, URINE: NEGATIVE
PDW BLD-RTO: 14.4 % (ref 11.5–14.5)
PH UA: 6 (ref 5–8)
PLATELET # BLD: 285 K/UL (ref 130–400)
PMV BLD AUTO: 10.6 FL (ref 9.4–12.3)
POTASSIUM SERPL-SCNC: 4.3 MMOL/L (ref 3.5–5)
PROTEIN UA: NEGATIVE MG/DL
PROTHROMBIN TIME: 13.6 SEC (ref 12–14.6)
RBC # BLD: 3.66 M/UL (ref 4.2–5.4)
SEDIMENTATION RATE, ERYTHROCYTE: 34 MM/HR (ref 0–25)
SODIUM BLD-SCNC: 135 MMOL/L (ref 136–145)
SPECIFIC GRAVITY UA: 1.02 (ref 1–1.03)
TOTAL PROTEIN: 7.4 G/DL (ref 6.6–8.7)
URINE REFLEX TO CULTURE: NORMAL
UROBILINOGEN, URINE: 0.2 E.U./DL
WBC # BLD: 11.2 K/UL (ref 4.8–10.8)

## 2019-04-01 PROCEDURE — 85652 RBC SED RATE AUTOMATED: CPT

## 2019-04-01 PROCEDURE — 85610 PROTHROMBIN TIME: CPT

## 2019-04-01 PROCEDURE — 85730 THROMBOPLASTIN TIME PARTIAL: CPT

## 2019-04-01 PROCEDURE — 71046 X-RAY EXAM CHEST 2 VIEWS: CPT

## 2019-04-01 PROCEDURE — 87081 CULTURE SCREEN ONLY: CPT

## 2019-04-01 PROCEDURE — 81003 URINALYSIS AUTO W/O SCOPE: CPT

## 2019-04-01 PROCEDURE — 86140 C-REACTIVE PROTEIN: CPT

## 2019-04-01 PROCEDURE — 85025 COMPLETE CBC W/AUTO DIFF WBC: CPT

## 2019-04-01 PROCEDURE — 93005 ELECTROCARDIOGRAM TRACING: CPT

## 2019-04-01 PROCEDURE — 80053 COMPREHEN METABOLIC PANEL: CPT

## 2019-04-01 RX ORDER — CYCLOSPORINE 0.5 MG/ML
1 EMULSION OPHTHALMIC 2 TIMES DAILY
COMMUNITY

## 2019-04-01 RX ORDER — DEXAMETHASONE SODIUM PHOSPHATE 10 MG/ML
10 INJECTION INTRAMUSCULAR; INTRAVENOUS ONCE
Status: CANCELLED | OUTPATIENT
Start: 2019-04-24

## 2019-04-01 RX ORDER — ACETAMINOPHEN 500 MG
1000 TABLET ORAL ONCE
Status: CANCELLED | OUTPATIENT
Start: 2019-04-24

## 2019-04-01 RX ORDER — CELECOXIB 200 MG/1
200 CAPSULE ORAL ONCE
Status: CANCELLED | OUTPATIENT
Start: 2019-04-24

## 2019-04-03 LAB — MRSA CULTURE ONLY: NORMAL

## 2019-04-24 ENCOUNTER — HOSPITAL ENCOUNTER (INPATIENT)
Age: 79
LOS: 4 days | Discharge: NURSING, REHAB OR LONG-TERM CARE | DRG: 467 | End: 2019-04-28
Attending: ORTHOPAEDIC SURGERY | Admitting: ORTHOPAEDIC SURGERY
Payer: MEDICARE

## 2019-04-24 ENCOUNTER — APPOINTMENT (OUTPATIENT)
Dept: GENERAL RADIOLOGY | Age: 79
DRG: 467 | End: 2019-04-24
Attending: ORTHOPAEDIC SURGERY
Payer: MEDICARE

## 2019-04-24 ENCOUNTER — ANESTHESIA (OUTPATIENT)
Dept: OPERATING ROOM | Age: 79
DRG: 467 | End: 2019-04-24
Payer: MEDICARE

## 2019-04-24 ENCOUNTER — ANESTHESIA EVENT (OUTPATIENT)
Dept: OPERATING ROOM | Age: 79
DRG: 467 | End: 2019-04-24
Payer: MEDICARE

## 2019-04-24 VITALS
TEMPERATURE: 94.6 F | RESPIRATION RATE: 17 BRPM | DIASTOLIC BLOOD PRESSURE: 44 MMHG | OXYGEN SATURATION: 100 % | SYSTOLIC BLOOD PRESSURE: 105 MMHG

## 2019-04-24 DIAGNOSIS — Z96.659 LOOSE TOTAL KNEE ARTHROPLASTY, SUBSEQUENT ENCOUNTER: Primary | ICD-10-CM

## 2019-04-24 DIAGNOSIS — T84.038D LOOSE TOTAL KNEE ARTHROPLASTY, SUBSEQUENT ENCOUNTER: Primary | ICD-10-CM

## 2019-04-24 PROBLEM — T84.038A LOOSE TOTAL KNEE ARTHROPLASTY (HCC): Status: ACTIVE | Noted: 2019-04-24

## 2019-04-24 LAB
ABO/RH: NORMAL
ANTIBODY SCREEN: NORMAL
BASOPHILS ABSOLUTE: 0 K/UL (ref 0–0.2)
BASOPHILS RELATIVE PERCENT: 0.2 % (ref 0–1)
BLOOD BANK DISPENSE STATUS: NORMAL
BLOOD BANK DISPENSE STATUS: NORMAL
BLOOD BANK PRODUCT CODE: NORMAL
BLOOD BANK PRODUCT CODE: NORMAL
BPU ID: NORMAL
BPU ID: NORMAL
DESCRIPTION BLOOD BANK: NORMAL
DESCRIPTION BLOOD BANK: NORMAL
EOSINOPHILS ABSOLUTE: 0 K/UL (ref 0–0.6)
EOSINOPHILS RELATIVE PERCENT: 0.1 % (ref 0–5)
HCT VFR BLD CALC: 26.2 % (ref 37–47)
HEMOGLOBIN: 8.4 G/DL (ref 12–16)
LYMPHOCYTES ABSOLUTE: 0.8 K/UL (ref 1.1–4.5)
LYMPHOCYTES RELATIVE PERCENT: 5.3 % (ref 20–40)
MCH RBC QN AUTO: 29.7 PG (ref 27–31)
MCHC RBC AUTO-ENTMCNC: 32.1 G/DL (ref 33–37)
MCV RBC AUTO: 92.6 FL (ref 81–99)
MONOCYTES ABSOLUTE: 0.3 K/UL (ref 0–0.9)
MONOCYTES RELATIVE PERCENT: 1.8 % (ref 0–10)
NEUTROPHILS ABSOLUTE: 14 K/UL (ref 1.5–7.5)
NEUTROPHILS RELATIVE PERCENT: 91.7 % (ref 50–65)
PDW BLD-RTO: 13.8 % (ref 11.5–14.5)
PLATELET # BLD: 245 K/UL (ref 130–400)
PMV BLD AUTO: 9.4 FL (ref 9.4–12.3)
RBC # BLD: 2.83 M/UL (ref 4.2–5.4)
WBC # BLD: 15.3 K/UL (ref 4.8–10.8)

## 2019-04-24 PROCEDURE — 87075 CULTR BACTERIA EXCEPT BLOOD: CPT

## 2019-04-24 PROCEDURE — 1210000000 HC MED SURG R&B

## 2019-04-24 PROCEDURE — 6370000000 HC RX 637 (ALT 250 FOR IP): Performed by: ORTHOPAEDIC SURGERY

## 2019-04-24 PROCEDURE — 2580000003 HC RX 258: Performed by: ORTHOPAEDIC SURGERY

## 2019-04-24 PROCEDURE — 85025 COMPLETE CBC W/AUTO DIFF WBC: CPT

## 2019-04-24 PROCEDURE — 2780000010 HC IMPLANT OTHER: Performed by: ORTHOPAEDIC SURGERY

## 2019-04-24 PROCEDURE — 7100000000 HC PACU RECOVERY - FIRST 15 MIN: Performed by: ORTHOPAEDIC SURGERY

## 2019-04-24 PROCEDURE — 6360000002 HC RX W HCPCS

## 2019-04-24 PROCEDURE — 6360000002 HC RX W HCPCS: Performed by: ORTHOPAEDIC SURGERY

## 2019-04-24 PROCEDURE — 87205 SMEAR GRAM STAIN: CPT

## 2019-04-24 PROCEDURE — 0SRD0J9 REPLACEMENT OF LEFT KNEE JOINT WITH SYNTHETIC SUBSTITUTE, CEMENTED, OPEN APPROACH: ICD-10-PCS | Performed by: ORTHOPAEDIC SURGERY

## 2019-04-24 PROCEDURE — C1776 JOINT DEVICE (IMPLANTABLE): HCPCS | Performed by: ORTHOPAEDIC SURGERY

## 2019-04-24 PROCEDURE — 7100000001 HC PACU RECOVERY - ADDTL 15 MIN: Performed by: ORTHOPAEDIC SURGERY

## 2019-04-24 PROCEDURE — 86900 BLOOD TYPING SEROLOGIC ABO: CPT

## 2019-04-24 PROCEDURE — 6360000002 HC RX W HCPCS: Performed by: ANESTHESIOLOGY

## 2019-04-24 PROCEDURE — 2709999900 HC NON-CHARGEABLE SUPPLY: Performed by: ORTHOPAEDIC SURGERY

## 2019-04-24 PROCEDURE — 2500000003 HC RX 250 WO HCPCS: Performed by: ORTHOPAEDIC SURGERY

## 2019-04-24 PROCEDURE — C9290 INJ, BUPIVACAINE LIPOSOME: HCPCS | Performed by: ORTHOPAEDIC SURGERY

## 2019-04-24 PROCEDURE — 86923 COMPATIBILITY TEST ELECTRIC: CPT

## 2019-04-24 PROCEDURE — 86901 BLOOD TYPING SEROLOGIC RH(D): CPT

## 2019-04-24 PROCEDURE — 6360000002 HC RX W HCPCS: Performed by: NURSE ANESTHETIST, CERTIFIED REGISTERED

## 2019-04-24 PROCEDURE — 86850 RBC ANTIBODY SCREEN: CPT

## 2019-04-24 PROCEDURE — C1713 ANCHOR/SCREW BN/BN,TIS/BN: HCPCS | Performed by: ORTHOPAEDIC SURGERY

## 2019-04-24 PROCEDURE — 73560 X-RAY EXAM OF KNEE 1 OR 2: CPT

## 2019-04-24 PROCEDURE — 94664 DEMO&/EVAL PT USE INHALER: CPT

## 2019-04-24 PROCEDURE — 36415 COLL VENOUS BLD VENIPUNCTURE: CPT

## 2019-04-24 PROCEDURE — 87070 CULTURE OTHR SPECIMN AEROBIC: CPT

## 2019-04-24 PROCEDURE — 64447 NJX AA&/STRD FEMORAL NRV IMG: CPT | Performed by: NURSE ANESTHETIST, CERTIFIED REGISTERED

## 2019-04-24 PROCEDURE — 3700000000 HC ANESTHESIA ATTENDED CARE: Performed by: ORTHOPAEDIC SURGERY

## 2019-04-24 PROCEDURE — 3600000005 HC SURGERY LEVEL 5 BASE: Performed by: ORTHOPAEDIC SURGERY

## 2019-04-24 PROCEDURE — 2500000003 HC RX 250 WO HCPCS: Performed by: NURSE ANESTHETIST, CERTIFIED REGISTERED

## 2019-04-24 PROCEDURE — 2580000003 HC RX 258: Performed by: NURSE ANESTHETIST, CERTIFIED REGISTERED

## 2019-04-24 PROCEDURE — 3600000015 HC SURGERY LEVEL 5 ADDTL 15MIN: Performed by: ORTHOPAEDIC SURGERY

## 2019-04-24 PROCEDURE — 2720000010 HC SURG SUPPLY STERILE: Performed by: ORTHOPAEDIC SURGERY

## 2019-04-24 PROCEDURE — P9045 ALBUMIN (HUMAN), 5%, 250 ML: HCPCS | Performed by: NURSE ANESTHETIST, CERTIFIED REGISTERED

## 2019-04-24 PROCEDURE — 0SPD0JZ REMOVAL OF SYNTHETIC SUBSTITUTE FROM LEFT KNEE JOINT, OPEN APPROACH: ICD-10-PCS | Performed by: ORTHOPAEDIC SURGERY

## 2019-04-24 PROCEDURE — P9016 RBC LEUKOCYTES REDUCED: HCPCS

## 2019-04-24 PROCEDURE — 3700000001 HC ADD 15 MINUTES (ANESTHESIA): Performed by: ORTHOPAEDIC SURGERY

## 2019-04-24 PROCEDURE — 87102 FUNGUS ISOLATION CULTURE: CPT

## 2019-04-24 DEVICE — IMPLANTABLE DEVICE: Type: IMPLANTABLE DEVICE | Site: KNEE | Status: FUNCTIONAL

## 2019-04-24 DEVICE — STEM FEM L75MM DIA14MM UNIV KNEE FLUT PRESSFIT FOR ROT HNG: Type: IMPLANTABLE DEVICE | Site: KNEE | Status: FUNCTIONAL

## 2019-04-24 DEVICE — SLEEVE FEM L34MM UNIV MTPHSEAL FULL POR LPS: Type: IMPLANTABLE DEVICE | Site: KNEE | Status: FUNCTIONAL

## 2019-04-24 DEVICE — TRAY TIB SZ 3 AP45.8MM ML69.6MM THK4.8MM CEM KEELED MOB: Type: IMPLANTABLE DEVICE | Site: KNEE | Status: FUNCTIONAL

## 2019-04-24 DEVICE — STEM FEM L115MM DIA12MM UNIV KNEE FLUT PRESSFIT FOR ROT HNG: Type: IMPLANTABLE DEVICE | Site: KNEE | Status: FUNCTIONAL

## 2019-04-24 DEVICE — SLEEVE TIB H40MM AP27MM ML45MM MTPHSEAL TI PORCOAT POR REV: Type: IMPLANTABLE DEVICE | Site: KNEE | Status: FUNCTIONAL

## 2019-04-24 DEVICE — CABLE SURG DIA1.7MM S STL HA CERCLAGE W/ CRMP 29880101S] DEPUY SYNTHES USA]: Type: IMPLANTABLE DEVICE | Site: KNEE | Status: FUNCTIONAL

## 2019-04-24 RX ORDER — OXYCODONE HYDROCHLORIDE 5 MG/1
10 TABLET ORAL EVERY 4 HOURS PRN
Status: DISCONTINUED | OUTPATIENT
Start: 2019-04-24 | End: 2019-04-28 | Stop reason: HOSPADM

## 2019-04-24 RX ORDER — FENTANYL CITRATE 50 UG/ML
INJECTION, SOLUTION INTRAMUSCULAR; INTRAVENOUS PRN
Status: DISCONTINUED | OUTPATIENT
Start: 2019-04-24 | End: 2019-04-24 | Stop reason: SDUPTHER

## 2019-04-24 RX ORDER — SODIUM CHLORIDE, SODIUM LACTATE, POTASSIUM CHLORIDE, CALCIUM CHLORIDE 600; 310; 30; 20 MG/100ML; MG/100ML; MG/100ML; MG/100ML
INJECTION, SOLUTION INTRAVENOUS CONTINUOUS
Status: DISCONTINUED | OUTPATIENT
Start: 2019-04-24 | End: 2019-04-24

## 2019-04-24 RX ORDER — 0.9 % SODIUM CHLORIDE 0.9 %
250 INTRAVENOUS SOLUTION INTRAVENOUS ONCE
Status: DISCONTINUED | OUTPATIENT
Start: 2019-04-24 | End: 2019-04-28 | Stop reason: HOSPADM

## 2019-04-24 RX ORDER — ONDANSETRON 2 MG/ML
INJECTION INTRAMUSCULAR; INTRAVENOUS PRN
Status: DISCONTINUED | OUTPATIENT
Start: 2019-04-24 | End: 2019-04-24 | Stop reason: SDUPTHER

## 2019-04-24 RX ORDER — CYANOCOBALAMIN 1000 UG/ML
1000 INJECTION INTRAMUSCULAR; SUBCUTANEOUS
Status: DISCONTINUED | OUTPATIENT
Start: 2019-05-01 | End: 2019-04-28 | Stop reason: HOSPADM

## 2019-04-24 RX ORDER — ESOMEPRAZOLE MAGNESIUM 40 MG/1
40 FOR SUSPENSION ORAL DAILY
Status: DISCONTINUED | OUTPATIENT
Start: 2019-04-24 | End: 2019-04-24 | Stop reason: CLARIF

## 2019-04-24 RX ORDER — CHOLECALCIFEROL (VITAMIN D3) 125 MCG
1000 CAPSULE ORAL
Status: DISCONTINUED | OUTPATIENT
Start: 2019-04-24 | End: 2019-04-24

## 2019-04-24 RX ORDER — ATORVASTATIN CALCIUM 10 MG/1
10 TABLET, FILM COATED ORAL DAILY
Status: DISCONTINUED | OUTPATIENT
Start: 2019-04-24 | End: 2019-04-28 | Stop reason: HOSPADM

## 2019-04-24 RX ORDER — METOCLOPRAMIDE HYDROCHLORIDE 5 MG/ML
10 INJECTION INTRAMUSCULAR; INTRAVENOUS
Status: DISCONTINUED | OUTPATIENT
Start: 2019-04-24 | End: 2019-04-24 | Stop reason: HOSPADM

## 2019-04-24 RX ORDER — MIDAZOLAM HYDROCHLORIDE 1 MG/ML
2 INJECTION INTRAMUSCULAR; INTRAVENOUS
Status: DISCONTINUED | OUTPATIENT
Start: 2019-04-24 | End: 2019-04-24 | Stop reason: HOSPADM

## 2019-04-24 RX ORDER — DIPHENHYDRAMINE HYDROCHLORIDE 50 MG/ML
12.5 INJECTION INTRAMUSCULAR; INTRAVENOUS
Status: DISCONTINUED | OUTPATIENT
Start: 2019-04-24 | End: 2019-04-24 | Stop reason: HOSPADM

## 2019-04-24 RX ORDER — CYCLOSPORINE 0.5 MG/ML
1 EMULSION OPHTHALMIC 2 TIMES DAILY
Status: DISCONTINUED | OUTPATIENT
Start: 2019-04-24 | End: 2019-04-24 | Stop reason: CLARIF

## 2019-04-24 RX ORDER — SODIUM CHLORIDE 0.9 % (FLUSH) 0.9 %
10 SYRINGE (ML) INJECTION EVERY 12 HOURS SCHEDULED
Status: DISCONTINUED | OUTPATIENT
Start: 2019-04-24 | End: 2019-04-24 | Stop reason: HOSPADM

## 2019-04-24 RX ORDER — LABETALOL HYDROCHLORIDE 5 MG/ML
5 INJECTION, SOLUTION INTRAVENOUS EVERY 10 MIN PRN
Status: DISCONTINUED | OUTPATIENT
Start: 2019-04-24 | End: 2019-04-24 | Stop reason: HOSPADM

## 2019-04-24 RX ORDER — CEFAZOLIN SODIUM 1 G/3ML
INJECTION, POWDER, FOR SOLUTION INTRAMUSCULAR; INTRAVENOUS PRN
Status: DISCONTINUED | OUTPATIENT
Start: 2019-04-24 | End: 2019-04-24 | Stop reason: SDUPTHER

## 2019-04-24 RX ORDER — CALCIUM CHLORIDE 100 MG/ML
INJECTION INTRAVENOUS; INTRAVENTRICULAR PRN
Status: DISCONTINUED | OUTPATIENT
Start: 2019-04-24 | End: 2019-04-24 | Stop reason: SDUPTHER

## 2019-04-24 RX ORDER — DEXAMETHASONE SODIUM PHOSPHATE 10 MG/ML
INJECTION INTRAMUSCULAR; INTRAVENOUS PRN
Status: DISCONTINUED | OUTPATIENT
Start: 2019-04-24 | End: 2019-04-24 | Stop reason: SDUPTHER

## 2019-04-24 RX ORDER — DEXAMETHASONE SODIUM PHOSPHATE 10 MG/ML
10 INJECTION INTRAMUSCULAR; INTRAVENOUS ONCE
Status: DISCONTINUED | OUTPATIENT
Start: 2019-04-24 | End: 2019-04-24 | Stop reason: HOSPADM

## 2019-04-24 RX ORDER — SODIUM CHLORIDE 0.9 % (FLUSH) 0.9 %
10 SYRINGE (ML) INJECTION PRN
Status: DISCONTINUED | OUTPATIENT
Start: 2019-04-24 | End: 2019-04-28 | Stop reason: HOSPADM

## 2019-04-24 RX ORDER — DIPHENHYDRAMINE HCL 25 MG
25 TABLET ORAL NIGHTLY PRN
Status: DISCONTINUED | OUTPATIENT
Start: 2019-04-24 | End: 2019-04-28 | Stop reason: HOSPADM

## 2019-04-24 RX ORDER — CALCIUM CARBONATE 200(500)MG
500 TABLET,CHEWABLE ORAL DAILY
Status: DISCONTINUED | OUTPATIENT
Start: 2019-04-24 | End: 2019-04-28 | Stop reason: HOSPADM

## 2019-04-24 RX ORDER — SODIUM CHLORIDE 9 MG/ML
INJECTION, SOLUTION INTRAVENOUS CONTINUOUS
Status: DISCONTINUED | OUTPATIENT
Start: 2019-04-24 | End: 2019-04-28 | Stop reason: HOSPADM

## 2019-04-24 RX ORDER — SCOLOPAMINE TRANSDERMAL SYSTEM 1 MG/1
1 PATCH, EXTENDED RELEASE TRANSDERMAL ONCE
Status: DISCONTINUED | OUTPATIENT
Start: 2019-04-24 | End: 2019-04-24 | Stop reason: HOSPADM

## 2019-04-24 RX ORDER — OXYCODONE HYDROCHLORIDE 5 MG/1
5 TABLET ORAL EVERY 4 HOURS PRN
Status: DISCONTINUED | OUTPATIENT
Start: 2019-04-24 | End: 2019-04-28 | Stop reason: HOSPADM

## 2019-04-24 RX ORDER — TRAMADOL HYDROCHLORIDE 50 MG/1
50 TABLET ORAL EVERY 6 HOURS
Status: DISCONTINUED | OUTPATIENT
Start: 2019-04-24 | End: 2019-04-28 | Stop reason: HOSPADM

## 2019-04-24 RX ORDER — SODIUM CHLORIDE 0.9 % (FLUSH) 0.9 %
10 SYRINGE (ML) INJECTION EVERY 12 HOURS SCHEDULED
Status: DISCONTINUED | OUTPATIENT
Start: 2019-04-24 | End: 2019-04-28 | Stop reason: HOSPADM

## 2019-04-24 RX ORDER — ROPIVACAINE HYDROCHLORIDE 5 MG/ML
INJECTION, SOLUTION EPIDURAL; INFILTRATION; PERINEURAL PRN
Status: DISCONTINUED | OUTPATIENT
Start: 2019-04-24 | End: 2019-04-24 | Stop reason: SDUPTHER

## 2019-04-24 RX ORDER — DOCUSATE SODIUM 100 MG/1
100 CAPSULE, LIQUID FILLED ORAL 2 TIMES DAILY
Status: DISCONTINUED | OUTPATIENT
Start: 2019-04-24 | End: 2019-04-28 | Stop reason: HOSPADM

## 2019-04-24 RX ORDER — DIAZEPAM 5 MG/1
5 TABLET ORAL EVERY 6 HOURS PRN
Status: DISCONTINUED | OUTPATIENT
Start: 2019-04-24 | End: 2019-04-28 | Stop reason: HOSPADM

## 2019-04-24 RX ORDER — PROMETHAZINE HYDROCHLORIDE 25 MG/ML
6.25 INJECTION, SOLUTION INTRAMUSCULAR; INTRAVENOUS
Status: DISCONTINUED | OUTPATIENT
Start: 2019-04-24 | End: 2019-04-24 | Stop reason: HOSPADM

## 2019-04-24 RX ORDER — EPHEDRINE SULFATE 50 MG/ML
INJECTION, SOLUTION INTRAVENOUS PRN
Status: DISCONTINUED | OUTPATIENT
Start: 2019-04-24 | End: 2019-04-24 | Stop reason: SDUPTHER

## 2019-04-24 RX ORDER — HYDRALAZINE HYDROCHLORIDE 20 MG/ML
5 INJECTION INTRAMUSCULAR; INTRAVENOUS EVERY 10 MIN PRN
Status: DISCONTINUED | OUTPATIENT
Start: 2019-04-24 | End: 2019-04-24 | Stop reason: HOSPADM

## 2019-04-24 RX ORDER — CYANOCOBALAMIN 1000 UG/ML
1000 INJECTION INTRAMUSCULAR; SUBCUTANEOUS
Status: ON HOLD | COMMUNITY
End: 2019-06-04 | Stop reason: HOSPADM

## 2019-04-24 RX ORDER — LIDOCAINE HYDROCHLORIDE 10 MG/ML
INJECTION, SOLUTION INFILTRATION; PERINEURAL PRN
Status: DISCONTINUED | OUTPATIENT
Start: 2019-04-24 | End: 2019-04-24 | Stop reason: SDUPTHER

## 2019-04-24 RX ORDER — OXYCODONE HCL 10 MG/1
10 TABLET, FILM COATED, EXTENDED RELEASE ORAL EVERY 12 HOURS SCHEDULED
Status: DISCONTINUED | OUTPATIENT
Start: 2019-04-24 | End: 2019-04-28 | Stop reason: HOSPADM

## 2019-04-24 RX ORDER — PROPOFOL 10 MG/ML
INJECTION, EMULSION INTRAVENOUS PRN
Status: DISCONTINUED | OUTPATIENT
Start: 2019-04-24 | End: 2019-04-24 | Stop reason: SDUPTHER

## 2019-04-24 RX ORDER — ROCURONIUM BROMIDE 10 MG/ML
INJECTION, SOLUTION INTRAVENOUS PRN
Status: DISCONTINUED | OUTPATIENT
Start: 2019-04-24 | End: 2019-04-24 | Stop reason: SDUPTHER

## 2019-04-24 RX ORDER — MONTELUKAST SODIUM 10 MG/1
10 TABLET ORAL NIGHTLY
Status: DISCONTINUED | OUTPATIENT
Start: 2019-04-24 | End: 2019-04-28 | Stop reason: HOSPADM

## 2019-04-24 RX ORDER — FENTANYL CITRATE 50 UG/ML
50 INJECTION, SOLUTION INTRAMUSCULAR; INTRAVENOUS
Status: DISCONTINUED | OUTPATIENT
Start: 2019-04-24 | End: 2019-04-24 | Stop reason: HOSPADM

## 2019-04-24 RX ORDER — ROPIVACAINE HYDROCHLORIDE 5 MG/ML
INJECTION, SOLUTION EPIDURAL; INFILTRATION; PERINEURAL
Status: COMPLETED | OUTPATIENT
Start: 2019-04-24 | End: 2019-04-24

## 2019-04-24 RX ORDER — OXYCODONE HYDROCHLORIDE 5 MG/1
20 TABLET ORAL EVERY 4 HOURS PRN
Status: DISCONTINUED | OUTPATIENT
Start: 2019-04-24 | End: 2019-04-28 | Stop reason: HOSPADM

## 2019-04-24 RX ORDER — POLYVINYL ALCOHOL 14 MG/ML
1 SOLUTION/ DROPS OPHTHALMIC 2 TIMES DAILY
Status: DISCONTINUED | OUTPATIENT
Start: 2019-04-24 | End: 2019-04-28 | Stop reason: HOSPADM

## 2019-04-24 RX ORDER — ALBUMIN, HUMAN INJ 5% 5 %
SOLUTION INTRAVENOUS PRN
Status: DISCONTINUED | OUTPATIENT
Start: 2019-04-24 | End: 2019-04-24 | Stop reason: SDUPTHER

## 2019-04-24 RX ORDER — NORTRIPTYLINE HYDROCHLORIDE 25 MG/1
50 CAPSULE ORAL NIGHTLY
Status: DISCONTINUED | OUTPATIENT
Start: 2019-04-24 | End: 2019-04-28 | Stop reason: HOSPADM

## 2019-04-24 RX ORDER — ENALAPRILAT 2.5 MG/2ML
1.25 INJECTION INTRAVENOUS
Status: DISCONTINUED | OUTPATIENT
Start: 2019-04-24 | End: 2019-04-24 | Stop reason: HOSPADM

## 2019-04-24 RX ORDER — ONDANSETRON 2 MG/ML
4 INJECTION INTRAMUSCULAR; INTRAVENOUS EVERY 6 HOURS PRN
Status: DISCONTINUED | OUTPATIENT
Start: 2019-04-24 | End: 2019-04-28 | Stop reason: HOSPADM

## 2019-04-24 RX ORDER — PHENOL 1.4 %
600 AEROSOL, SPRAY (ML) MUCOUS MEMBRANE 2 TIMES DAILY
Status: DISCONTINUED | OUTPATIENT
Start: 2019-04-24 | End: 2019-04-28 | Stop reason: HOSPADM

## 2019-04-24 RX ORDER — MIDAZOLAM HYDROCHLORIDE 1 MG/ML
INJECTION INTRAMUSCULAR; INTRAVENOUS
Status: COMPLETED
Start: 2019-04-24 | End: 2019-04-24

## 2019-04-24 RX ORDER — SODIUM CHLORIDE 9 MG/ML
INJECTION, SOLUTION INTRAVENOUS CONTINUOUS PRN
Status: DISCONTINUED | OUTPATIENT
Start: 2019-04-24 | End: 2019-04-24 | Stop reason: SDUPTHER

## 2019-04-24 RX ORDER — SODIUM CHLORIDE 0.9 % (FLUSH) 0.9 %
10 SYRINGE (ML) INJECTION PRN
Status: DISCONTINUED | OUTPATIENT
Start: 2019-04-24 | End: 2019-04-24 | Stop reason: HOSPADM

## 2019-04-24 RX ORDER — VANCOMYCIN HYDROCHLORIDE 1 G/200ML
1000 INJECTION, SOLUTION INTRAVENOUS ONCE
Status: COMPLETED | OUTPATIENT
Start: 2019-04-24 | End: 2019-04-24

## 2019-04-24 RX ORDER — CELECOXIB 200 MG/1
200 CAPSULE ORAL ONCE
Status: COMPLETED | OUTPATIENT
Start: 2019-04-24 | End: 2019-04-24

## 2019-04-24 RX ORDER — 0.9 % SODIUM CHLORIDE 0.9 %
500 INTRAVENOUS SOLUTION INTRAVENOUS PRN
Status: DISCONTINUED | OUTPATIENT
Start: 2019-04-24 | End: 2019-04-28 | Stop reason: HOSPADM

## 2019-04-24 RX ORDER — HYDROCHLOROTHIAZIDE 25 MG/1
25 TABLET ORAL 2 TIMES DAILY
Status: DISCONTINUED | OUTPATIENT
Start: 2019-04-24 | End: 2019-04-28 | Stop reason: HOSPADM

## 2019-04-24 RX ORDER — KETAMINE HYDROCHLORIDE 100 MG/ML
INJECTION, SOLUTION INTRAMUSCULAR; INTRAVENOUS PRN
Status: DISCONTINUED | OUTPATIENT
Start: 2019-04-24 | End: 2019-04-24 | Stop reason: SDUPTHER

## 2019-04-24 RX ORDER — LIDOCAINE HYDROCHLORIDE 10 MG/ML
1 INJECTION, SOLUTION EPIDURAL; INFILTRATION; INTRACAUDAL; PERINEURAL
Status: DISCONTINUED | OUTPATIENT
Start: 2019-04-24 | End: 2019-04-24 | Stop reason: HOSPADM

## 2019-04-24 RX ORDER — PANTOPRAZOLE SODIUM 40 MG/1
40 TABLET, DELAYED RELEASE ORAL DAILY
Status: DISCONTINUED | OUTPATIENT
Start: 2019-04-25 | End: 2019-04-28 | Stop reason: HOSPADM

## 2019-04-24 RX ORDER — POLYVINYL ALCOHOL 14 MG/ML
1 SOLUTION/ DROPS OPHTHALMIC PRN
Status: DISCONTINUED | OUTPATIENT
Start: 2019-04-24 | End: 2019-04-28 | Stop reason: HOSPADM

## 2019-04-24 RX ORDER — ALPRAZOLAM 1 MG/1
1 TABLET ORAL NIGHTLY PRN
Status: DISCONTINUED | OUTPATIENT
Start: 2019-04-24 | End: 2019-04-28 | Stop reason: HOSPADM

## 2019-04-24 RX ORDER — VANCOMYCIN HYDROCHLORIDE 1 G/200ML
1000 INJECTION, SOLUTION INTRAVENOUS EVERY 12 HOURS
Status: DISCONTINUED | OUTPATIENT
Start: 2019-04-24 | End: 2019-04-24 | Stop reason: DRUGHIGH

## 2019-04-24 RX ORDER — MEPERIDINE HYDROCHLORIDE 50 MG/ML
12.5 INJECTION INTRAMUSCULAR; INTRAVENOUS; SUBCUTANEOUS EVERY 5 MIN PRN
Status: DISCONTINUED | OUTPATIENT
Start: 2019-04-24 | End: 2019-04-24 | Stop reason: HOSPADM

## 2019-04-24 RX ORDER — ACETAMINOPHEN 500 MG
1000 TABLET ORAL ONCE
Status: COMPLETED | OUTPATIENT
Start: 2019-04-24 | End: 2019-04-24

## 2019-04-24 RX ADMIN — ROCURONIUM BROMIDE 20 MG: 10 INJECTION INTRAVENOUS at 14:33

## 2019-04-24 RX ADMIN — CALCIUM CHLORIDE 1 G: 100 INJECTION, SOLUTION INTRAVENOUS; INTRAVENTRICULAR at 16:13

## 2019-04-24 RX ADMIN — ROPIVACAINE HYDROCHLORIDE 20 ML: 5 INJECTION, SOLUTION EPIDURAL; INFILTRATION; PERINEURAL at 11:46

## 2019-04-24 RX ADMIN — CELECOXIB 200 MG: 200 CAPSULE ORAL at 11:12

## 2019-04-24 RX ADMIN — EPHEDRINE SULFATE 10 MG: 50 INJECTION, SOLUTION INTRAMUSCULAR; INTRAVENOUS; SUBCUTANEOUS at 16:07

## 2019-04-24 RX ADMIN — EPHEDRINE SULFATE 15 MG: 50 INJECTION, SOLUTION INTRAMUSCULAR; INTRAVENOUS; SUBCUTANEOUS at 17:32

## 2019-04-24 RX ADMIN — SODIUM CHLORIDE, SODIUM LACTATE, POTASSIUM CHLORIDE, AND CALCIUM CHLORIDE: 600; 310; 30; 20 INJECTION, SOLUTION INTRAVENOUS at 15:32

## 2019-04-24 RX ADMIN — CEFAZOLIN 2000 MG: 330 INJECTION, POWDER, FOR SOLUTION INTRAMUSCULAR; INTRAVENOUS at 16:10

## 2019-04-24 RX ADMIN — MIDAZOLAM 1 MG: 1 INJECTION INTRAMUSCULAR; INTRAVENOUS at 11:41

## 2019-04-24 RX ADMIN — ONDANSETRON HYDROCHLORIDE 4 MG: 2 INJECTION, SOLUTION INTRAMUSCULAR; INTRAVENOUS at 16:26

## 2019-04-24 RX ADMIN — FENTANYL CITRATE 50 MCG: 50 INJECTION INTRAMUSCULAR; INTRAVENOUS at 13:17

## 2019-04-24 RX ADMIN — Medication 30 MG: at 12:28

## 2019-04-24 RX ADMIN — ROCURONIUM BROMIDE 70 MG: 10 INJECTION INTRAVENOUS at 12:28

## 2019-04-24 RX ADMIN — ACETAMINOPHEN 1000 MG: 500 TABLET, FILM COATED ORAL at 11:12

## 2019-04-24 RX ADMIN — SODIUM CHLORIDE: 9 INJECTION, SOLUTION INTRAVENOUS at 20:25

## 2019-04-24 RX ADMIN — FENTANYL CITRATE 50 MCG: 50 INJECTION INTRAMUSCULAR; INTRAVENOUS at 12:28

## 2019-04-24 RX ADMIN — Medication 2 G: at 20:24

## 2019-04-24 RX ADMIN — FENTANYL CITRATE 100 MCG: 50 INJECTION INTRAMUSCULAR; INTRAVENOUS at 13:02

## 2019-04-24 RX ADMIN — Medication 10 MG: at 13:29

## 2019-04-24 RX ADMIN — Medication 10 ML: at 20:29

## 2019-04-24 RX ADMIN — NORTRIPTYLINE HYDROCHLORIDE 50 MG: 25 CAPSULE ORAL at 20:20

## 2019-04-24 RX ADMIN — ROPIVACAINE HYDROCHLORIDE 20 ML: 5 INJECTION, SOLUTION EPIDURAL; INFILTRATION; PERINEURAL at 11:45

## 2019-04-24 RX ADMIN — Medication 2 G: at 12:32

## 2019-04-24 RX ADMIN — ONDANSETRON 4 MG: 2 INJECTION INTRAMUSCULAR; INTRAVENOUS at 20:24

## 2019-04-24 RX ADMIN — EPHEDRINE SULFATE 10 MG: 50 INJECTION, SOLUTION INTRAMUSCULAR; INTRAVENOUS; SUBCUTANEOUS at 12:43

## 2019-04-24 RX ADMIN — ATORVASTATIN CALCIUM 10 MG: 10 TABLET, FILM COATED ORAL at 22:03

## 2019-04-24 RX ADMIN — VANCOMYCIN HYDROCHLORIDE 1 G: 1 INJECTION, SOLUTION INTRAVENOUS at 12:47

## 2019-04-24 RX ADMIN — ALBUMIN (HUMAN) 250 ML: 2.5 SOLUTION INTRAVENOUS at 15:10

## 2019-04-24 RX ADMIN — POLYVINYL ALCOHOL 1 DROP: 14 SOLUTION/ DROPS OPHTHALMIC at 20:25

## 2019-04-24 RX ADMIN — Medication 10 MG: at 14:35

## 2019-04-24 RX ADMIN — SODIUM CHLORIDE, SODIUM LACTATE, POTASSIUM CHLORIDE, AND CALCIUM CHLORIDE: 600; 310; 30; 20 INJECTION, SOLUTION INTRAVENOUS at 13:29

## 2019-04-24 RX ADMIN — EPHEDRINE SULFATE 10 MG: 50 INJECTION, SOLUTION INTRAMUSCULAR; INTRAVENOUS; SUBCUTANEOUS at 16:14

## 2019-04-24 RX ADMIN — Medication 600 MG: at 20:20

## 2019-04-24 RX ADMIN — SUGAMMADEX 160 MG: 100 INJECTION, SOLUTION INTRAVENOUS at 16:39

## 2019-04-24 RX ADMIN — TRAMADOL HYDROCHLORIDE 50 MG: 50 TABLET, COATED ORAL at 20:22

## 2019-04-24 RX ADMIN — MONTELUKAST SODIUM 10 MG: 10 TABLET, FILM COATED ORAL at 20:22

## 2019-04-24 RX ADMIN — DEXAMETHASONE SODIUM PHOSPHATE 10 MG: 10 INJECTION INTRAMUSCULAR; INTRAVENOUS at 12:34

## 2019-04-24 RX ADMIN — SODIUM CHLORIDE, SODIUM LACTATE, POTASSIUM CHLORIDE, AND CALCIUM CHLORIDE: 600; 310; 30; 20 INJECTION, SOLUTION INTRAVENOUS at 11:12

## 2019-04-24 RX ADMIN — CALCIUM CARBONATE 500 MG: 500 TABLET, CHEWABLE ORAL at 22:03

## 2019-04-24 RX ADMIN — OXYCODONE HYDROCHLORIDE 10 MG: 5 TABLET ORAL at 22:04

## 2019-04-24 RX ADMIN — HYDROMORPHONE HYDROCHLORIDE 0.5 MG: 1 INJECTION, SOLUTION INTRAMUSCULAR; INTRAVENOUS; SUBCUTANEOUS at 16:49

## 2019-04-24 RX ADMIN — HYDROMORPHONE HYDROCHLORIDE 0.5 MG: 1 INJECTION, SOLUTION INTRAMUSCULAR; INTRAVENOUS; SUBCUTANEOUS at 16:55

## 2019-04-24 RX ADMIN — ROCURONIUM BROMIDE 20 MG: 10 INJECTION INTRAVENOUS at 15:45

## 2019-04-24 RX ADMIN — LIDOCAINE HYDROCHLORIDE 50 MG: 10 INJECTION, SOLUTION INFILTRATION; PERINEURAL at 12:28

## 2019-04-24 RX ADMIN — PROPOFOL 120 MG: 10 INJECTION, EMULSION INTRAVENOUS at 12:28

## 2019-04-24 RX ADMIN — EPHEDRINE SULFATE 5 MG: 50 INJECTION, SOLUTION INTRAMUSCULAR; INTRAVENOUS; SUBCUTANEOUS at 14:21

## 2019-04-24 RX ADMIN — SODIUM CHLORIDE: 9 INJECTION, SOLUTION INTRAVENOUS at 16:00

## 2019-04-24 RX ADMIN — OXYCODONE HYDROCHLORIDE 10 MG: 10 TABLET, FILM COATED, EXTENDED RELEASE ORAL at 20:24

## 2019-04-24 RX ADMIN — FENTANYL CITRATE 50 MCG: 50 INJECTION INTRAMUSCULAR; INTRAVENOUS at 16:44

## 2019-04-24 RX ADMIN — ALBUMIN (HUMAN) 250 ML: 2.5 SOLUTION INTRAVENOUS at 13:57

## 2019-04-24 RX ADMIN — DOCUSATE SODIUM 100 MG: 100 CAPSULE, LIQUID FILLED ORAL at 20:20

## 2019-04-24 ASSESSMENT — LIFESTYLE VARIABLES: SMOKING_STATUS: 0

## 2019-04-24 ASSESSMENT — PAIN SCALES - GENERAL
PAINLEVEL_OUTOF10: 7
PAINLEVEL_OUTOF10: 0
PAINLEVEL_OUTOF10: 8

## 2019-04-24 ASSESSMENT — PAIN - FUNCTIONAL ASSESSMENT: PAIN_FUNCTIONAL_ASSESSMENT: 0-10

## 2019-04-24 NOTE — H&P
[facility] ([facility]) Pre-Operative History and Physical    Patient Name: Ariane Todd  : 1940    BP (!) 140/60   Pulse 70   Temp 98.7 °F (37.1 °C)   Resp 16   Ht 5' (1.524 m)   Wt 171 lb (77.6 kg)   SpO2 98%   BMI 33.40 kg/m²     Pre-Operative Diagnosis:  Knee arthritits    Proposed Surgical Procedure:   Knee replacement      Past Medical Hisotry:       Diagnosis Date    Anxiety     Carotid artery stenosis     Chronic back pain     H/O blood clots     Hx of blood clots     bilat legs    Hyperlipidemia     Hypertension     Osteoarthritis      Past Surgical History:       Procedure Laterality Date    CATARACT REMOVAL Bilateral     CHOLECYSTECTOMY      EYE SURGERY      HYSTERECTOMY      JOINT REPLACEMENT      OTHER SURGICAL HISTORY      Decompression of ulnar nerve        Medications:   Prior to Admission medications    Medication Sig Start Date End Date Taking? Authorizing Provider   rivaroxaban (XARELTO) 10 MG TABS tablet Take 10 mg by mouth nightly   Yes Historical Provider, MD   vitamin B-12 (CYANOCOBALAMIN) 1000 MCG tablet Take 1,000 mcg by mouth every 30 days    Yes Historical Provider, MD   cycloSPORINE (RESTASIS) 0.05 % ophthalmic emulsion Place 1 drop into both eyes 2 times daily    Historical Provider, MD   polyethyl glycol-propyl glycol 0.4-0.3 % (SYSTANE) 0.4-0.3 % ophthalmic solution 1 drop as needed for Dry Eyes    Historical Provider, MD   diphenhydrAMINE (BENADRYL) 25 MG tablet Take 25 mg by mouth nightly as needed for Itching    Historical Provider, MD   Linaclotide (LINZESS PO) Take 290 mcg by mouth daily as needed     Historical Provider, MD   calcium carbonate (TUMS) 500 MG chewable tablet Take 1 tablet by mouth daily    Historical Provider, MD   Cholecalciferol (VITAMIN D3) 5000 units TABS Take by mouth daily     Historical Provider, MD   OxyCODONE ER (XTAMPZA ER) 9 MG C12A Take by mouth 2 times daily as needed. Shannon Olivares     Historical Provider, MD   amLODIPine (NORVASC) 10 MG tablet Take 10 mg by mouth daily    Historical Provider, MD   atorvastatin (LIPITOR) 10 MG tablet Take 10 mg by mouth daily    Historical Provider, MD   Calcium Carbonate (CALTRATE 600 PO) Take 600 mg by mouth 2 times daily    Historical Provider, MD   hydrochlorothiazide (HYDRODIURIL) 25 MG tablet Take 25 mg by mouth 2 times daily    Historical Provider, MD   lisinopril (PRINIVIL;ZESTRIL) 40 MG tablet Take 40 mg by mouth daily    Historical Provider, MD   montelukast (SINGULAIR) 10 MG tablet Take 10 mg by mouth nightly    Historical Provider, MD   nabumetone (RELAFEN) 500 MG tablet Take 500 mg by mouth 2 times daily    Historical Provider, MD   esomeprazole Magnesium (NEXIUM) 40 MG PACK Take 40 mg by mouth daily    Historical Provider, MD   HYDROcodone-acetaminophen (NORCO)  MG per tablet Take 1 tablet by mouth every 8 hours as needed for Pain. Historical Provider, MD   nortriptyline (PAMELOR) 50 MG capsule Take 50 mg by mouth nightly    Historical Provider, MD   nystatin 160845 UNIT/GM POWD Apply 100,000 g topically 2 times daily    Historical Provider, MD   Multiple Vitamins-Minerals (PRESERVISION AREDS PO) Take by mouth daily    Historical Provider, MD   Misc. Devices (QUAD CANE) MISC by Does not apply route    Historical Provider, MD   ALPRAZolam Mustapha Tita) 1 MG tablet Take 1 mg by mouth nightly as needed for Sleep    Historical Provider, MD       Allergies:  Codeine; Levaquin [levofloxacin]; Lyrica [pregabalin]; and Morphine    Social History:   Tobacco:  reports that she has never smoked. She has never used smokeless tobacco.   Alcohol:  reports that she does not drink alcohol.     Review of Systems:  General: Denies any fever or chills  EYES: Denies any diplopia  ENT: Tinnitus or vertigo  Resp: Denies any shortness of breath, cough or wheezing  Cardiac: Denies any chest pain, palpitations, claudication or edema  GI: Denies any melena, hematochezia, hematemesis or pyrosis  : Denies any frequency, urgency, hesitancy or incontinence  Musculoskeletal: Denies back pain, joint pain, myalgias  Heme: Denies bruising or bleeding easily  Endocrine: Denies any history of diabetes or thyroid disease  Psych: Denies anxiety or depression  Neuro: Denies any focal motor or sensory deficits    NEUROLOGIC: CN II-XI grossly intact, no motor or sensory deficits   PSYCH: mood and affect are normal with a normal rate and tone of speech    Physical Exam:  Vitals: BP (!) 140/60   Pulse 70   Temp 98.7 °F (37.1 °C)   Resp 16   Ht 5' (1.524 m)   Wt 171 lb (77.6 kg)   SpO2 98%   BMI 33.40 kg/m²   CONSTITUTIONAL: Alert, appropriate, no acute distress. EYES: Non icteric, EOM intact, pupils equal round and reactive to light  ENT: Mucus membranes moist, no oral pharyngeal lesions, nares patent   NECK: Supple, no masses, no JVD, trachea mid line   CHEST/LUNGS: CTA bilaterally, normal respiratory effort   CARDIOVASCULAR: RRR, no murmurs,  2+ DP and radial pulses bilaterally  ABDOMEN: soft, nontender  EXTREMITIES: warm, well perfused, no edema   SKIN: warm, dry with no rashes or lesions  LYMPH: No cervical or inguinal lymphadenopathy    General Appearance: Patient is well nourished, well developed    HEENT: Normal or PERRLA, EOMI, fundi benign    CARDIOVASCULAR: Normal S1 and S2.  Regular rhythm. No murmurs, gallops, or rubs. PULMONARY: Normal.    GASTROINTESTINAL: Soft, non-tender, normal bowel sounds. No bruits, organomegaly or masses.     EXTREMITIES: positive exam findings: lateral joint line tenderness, tender over tibial tubercle, ecchymosis noted entire limb and ROM limited to approximately 80 degrees    MUSCULOSKELETAL: Tenderness over right hip(s)    NEUROLOGICAL: gait and coordination normal or speech normal    Diagnostic Studies:      Labs: CBC with Differential:    Lab Results   Component Value Date    WBC 11.2 04/01/2019    RBC 3.66 04/01/2019    HGB 10.7 04/01/2019    HCT 34.7 04/01/2019    HCT 35.6 03/01/2012     04/01/2019     03/01/2012    MCV 94.8 04/01/2019    MCH 29.2 04/01/2019    MCHC 30.8 04/01/2019    RDW 14.4 04/01/2019    LYMPHOPCT 13.4 04/01/2019    MONOPCT 7.6 04/01/2019    EOSPCT 5.0 03/01/2012    BASOPCT 0.4 04/01/2019    MONOSABS 0.90 04/01/2019    LYMPHSABS 1.5 04/01/2019    EOSABS 0.20 04/01/2019    BASOSABS 0.00 04/01/2019     BMP:    Lab Results   Component Value Date     04/01/2019     03/01/2012    K 4.3 04/01/2019    K 4.9 03/01/2012    CL 96 04/01/2019     03/01/2012    CO2 23 04/01/2019    BUN 20 04/01/2019    LABALBU 4.1 04/01/2019    LABALBU 3.7 03/01/2012    CREATININE 0.7 04/01/2019    CREATININE 0.6 03/01/2012    CALCIUM 9.3 04/01/2019    LABGLOM >60 04/01/2019    GLUCOSE 95 04/01/2019     Sodium:    Lab Results   Component Value Date     04/01/2019     03/01/2012     Potassium:    Lab Results   Component Value Date    K 4.3 04/01/2019    K 4.9 03/01/2012     BUN/Creatinine:    Lab Results   Component Value Date    BUN 20 04/01/2019    CREATININE 0.7 04/01/2019    CREATININE 0.6 03/01/2012     PT/INR:    Lab Results   Component Value Date    PROTIME 13.6 04/01/2019    PROTIME 10.80 02/29/2012    INR 1.10 04/01/2019     PTT:    Lab Results   Component Value Date    APTT 31.8 04/01/2019    PTT 22.20 02/29/2012   [APTT}  U/A:    Lab Results   Component Value Date    COLORU DK YELLOW 04/01/2019    PHUR 6.0 04/01/2019    WBCUA 1 11/19/2018    RBCUA 1 11/19/2018    BACTERIA NEGATIVE 11/19/2018    CLARITYU Clear 04/01/2019    SPECGRAV 1.017 04/01/2019    LEUKOCYTESUR Negative 04/01/2019    UROBILINOGEN 0.2 04/01/2019    BILIRUBINUR Negative 04/01/2019    BILIRUBINUR NEGATIVE 02/29/2012    BLOODU Negative 04/01/2019    GLUCOSEU Negative 04/01/2019     HgBA1c:  No components found for: HGBA1C        PLAN:  Knee replacement L.       Electronically signed by Chanda Mullen MD on 4/24/2019 at 12:23 PM

## 2019-04-24 NOTE — ANESTHESIA POSTPROCEDURE EVALUATION
Department of Anesthesiology  Postprocedure Note    Patient: Linda Garrett  MRN: [id number]  [place]: 1940  Date of evaluation: 4/24/2019  Time:  5:39 PM     Procedure Summary     Date:  04/24/19 Room / Location:  [facility] OR 08 / [facility] OR    Anesthesia Start:  1224 Anesthesia Stop:  5941    Procedures:       LEFT KNEE REVISION (Left )      HARDWARE REMOVAL (Left ) Diagnosis:  (O96.621U, M25.562)    Surgeon:  Tawny Young MD Responsible Provider:  MATTEO Doan CRNA    Anesthesia Type:  general, regional ASA Status:  3          Anesthesia Type: general, regional    Fazal Phase I: Fazal Score: 4    Fazal Phase II:      Last vitals: Reviewed and per EMR flowsheets. Anesthesia Post Evaluation    Patient location during evaluation: PACU  Patient participation: complete - patient participated  Level of consciousness: sleepy but conscious  Pain score: 0  Airway patency: patent  Nausea & Vomiting: no nausea and no vomiting  Complications: no  Cardiovascular status: hemodynamically stable and hypotensive  Respiratory status: acceptable, oral airway and nasal cannula  Hydration status: euvolemic  Comments: Hypotensive upon arrival to PACU with SBP in 70s. Responsive to ephedrine. Patient opens her eyes to command. Fluid bolus given and CBC drawn.

## 2019-04-24 NOTE — ANESTHESIA PROCEDURE NOTES
Peripheral Block    Patient location during procedure: holding area  Staffing  Anesthesiologist: Trinidad Evans MD  Performed: anesthesiologist   Preanesthetic Checklist  Completed: patient identified, site marked, surgical consent, pre-op evaluation, timeout performed, IV checked, risks and benefits discussed, monitors and equipment checked, anesthesia consent given, oxygen available and patient being monitored  Peripheral Block  Patient position: supine  Prep: ChloraPrep  Patient monitoring: cardiac monitor, continuous pulse ox, frequent blood pressure checks and IV access  Block type: Femoral  Laterality: left  Injection technique: single-shot  Procedures: ultrasound guided  Local infiltration: ropivacaine  Infiltration strength: 0.5 %  Dose: 20 mL  Adductor canal  Provider prep: mask and sterile gloves  Local infiltration: ropivacaine  Needle  Needle type: combined needle/nerve stimulator   Needle gauge: 22 G  Needle length: 10 cm  Needle localization: ultrasound guidance  Assessment  Injection assessment: negative aspiration for heme, no paresthesia on injection and local visualized surrounding nerve on ultrasound  Paresthesia pain: none  Slow fractionated injection: yes  Hemodynamics: stable  Additional Notes  Under ultrasound (\"US\") guidance, a 22 gauge needle was inserted and placed in close proximity to the femoral nerve. Ultrasound was also used to visualize the spread of the anesthetic in close proximity to the nerve being blocked. The nerve appeared anatomically normal, and there were no abnormal pathological findings. A permanent image was recorded.      20 mL 0.5% Ropivacaine injected  Reason for block: post-op pain management

## 2019-04-24 NOTE — ANESTHESIA PRE PROCEDURE
10 MG tablet Take 10 mg by mouth nightly    Historical Provider, MD   nabumetone (RELAFEN) 500 MG tablet Take 500 mg by mouth 2 times daily    Historical Provider, MD   esomeprazole Magnesium (NEXIUM) 40 MG PACK Take 40 mg by mouth daily    Historical Provider, MD   HYDROcodone-acetaminophen (NORCO)  MG per tablet Take 1 tablet by mouth every 8 hours as needed for Pain. Historical Provider, MD   nortriptyline (PAMELOR) 50 MG capsule Take 50 mg by mouth nightly    Historical Provider, MD   nystatin [id number] UNIT/GM POWD Apply 100,000 g topically 2 times daily    Historical Provider, MD   Multiple Vitamins-Minerals (PRESERVISION AREDS PO) Take by mouth daily    Historical Provider, MD   Misc. Devices (QUAD CANE) MISC by Does not apply route    Historical Provider, MD   ALPRAZolam Elfida Scarlet) 1 MG tablet Take 1 mg by mouth nightly as needed for Sleep    Historical Provider, MD       Current medications:    Current Facility-Administered Medications   Medication Dose Route Frequency Provider Last Rate Last Dose    midazolam (VERSED) 2 MG/2ML injection             lactated ringers infusion   Intravenous Continuous Som Bernard MD        acetaminophen (TYLENOL) tablet 1,000 mg  1,000 mg Oral Once Som Bernard MD        ceFAZolin (ANCEF) 2 g in 0.9% sodium chloride 50 mL IVPB  2 g Intravenous Once Som Bernard MD        celecoxib (CELEBREX) capsule 200 mg  200 mg Oral Once Som Bernard MD        dexamethasone (DECADRON) injection 10 mg  10 mg Intravenous Once Som Bernard MD           Allergies: Allergies   Allergen Reactions    Codeine      Patient unsure of allergies.  Levaquin [Levofloxacin]      Patient unsure of allergies.  Lyrica [Pregabalin]      Patient unsure of allergies.  Morphine      Patient unsure of allergies.        Problem List:    Patient Active Problem List   Diagnosis Code    Decreased pulses in feet R09.89    Atherosclerosis of native artery of both lower extremities with intermittent claudication (HCC) I70.213       Past Medical History:        Diagnosis Date    Anxiety     Carotid artery stenosis     Chronic back pain     H/O blood clots     Hx of blood clots     bilat legs    Hyperlipidemia     Hypertension     Osteoarthritis        Past Surgical History:        Procedure Laterality Date    CATARACT REMOVAL Bilateral     CHOLECYSTECTOMY      EYE SURGERY      HYSTERECTOMY      JOINT REPLACEMENT      OTHER SURGICAL HISTORY      Decompression of ulnar nerve        Social History:    Social History     Tobacco Use    Smoking status: Never Smoker    Smokeless tobacco: Never Used   Substance Use Topics    Alcohol use: No                                Counseling given: Not Answered      Vital Signs (Current): There were no vitals filed for this visit.                                            BP Readings from Last 3 Encounters:   08/21/18 137/70   08/10/17 131/67   08/09/16 143/72       NPO Status: Time of last liquid consumption: 0000                        Time of last solid consumption: 0000                        Date of last liquid consumption: 04/23/19                        Date of last solid food consumption: 04/23/19    BMI:   Wt Readings from Last 3 Encounters:   04/01/19 171 lb (77.6 kg)   11/19/18 184 lb (83.5 kg)     There is no height or weight on file to calculate BMI.    CBC:   Lab Results   Component Value Date    WBC 11.2 04/01/2019    RBC 3.66 04/01/2019    HGB 10.7 04/01/2019    HCT 34.7 04/01/2019    HCT 35.6 03/01/2012    MCV 94.8 04/01/2019    RDW 14.4 04/01/2019     04/01/2019     03/01/2012       CMP:   Lab Results   Component Value Date     04/01/2019     03/01/2012    K 4.3 04/01/2019    K 4.9 03/01/2012    CL 96 04/01/2019     03/01/2012    CO2 23 04/01/2019    BUN 20 04/01/2019    CREATININE 0.7 04/01/2019    CREATININE 0.6 03/01/2012    LABGLOM >60 04/01/2019    GLUCOSE 95 04/01/2019    PROT 7.4 04/01/2019    PROT 6.2 03/01/2012    CALCIUM 9.3 04/01/2019    BILITOT 0.4 04/01/2019    ALKPHOS 63 04/01/2019    ALKPHOS 107 03/01/2012    AST 22 04/01/2019    ALT 22 04/01/2019       POC Tests: No results for input(s): POCGLU, POCNA, POCK, POCCL, POCBUN, POCHEMO, POCHCT in the last 72 hours. Coags:   Lab Results   Component Value Date    PROTIME 13.6 04/01/2019    PROTIME 10.80 02/29/2012    INR 1.10 04/01/2019    APTT 31.8 04/01/2019       HCG (If Applicable): No results found for: PREGTESTUR, PREGSERUM, HCG, HCGQUANT     ABGs: No results found for: PHART, PO2ART, OFV8XJS, TRN7HFX, BEART, M3XQEBXY     Type & Screen (If Applicable):  No results found for: LABABO, [address]    Anesthesia Evaluation  Patient summary reviewed and Nursing notes reviewed no history of anesthetic complications:   Airway: Mallampati: II  TM distance: >3 FB   Neck ROM: full  Mouth opening: > = 3 FB Dental:    (+) edentulous      Pulmonary:normal exam        (-) not a current smoker                           Cardiovascular:    (+) hypertension:, hyperlipidemia    (-) CAD and CABG/stent    ECG reviewed               Beta Blocker:  Not on Beta Blocker         Neuro/Psych:   (+) depression/anxiety             GI/Hepatic/Renal:   (+) GERD: well controlled,      (-) liver disease and no renal disease       Endo/Other:    (+) blood dyscrasia: anticoagulation therapy:., .                 Abdominal:           Vascular:   + DVT, . Anesthesia Plan      general and regional     ASA 3     (Adductor canal block)  Induction: intravenous. MIPS: Postoperative opioids intended and Prophylactic antiemetics administered. Anesthetic plan and risks discussed with patient. Plan discussed with CRNA.                   Joe Pina MD   4/24/2019

## 2019-04-24 NOTE — BRIEF OP NOTE
Department of Orthopedic Surgery  Brief Operative Report      Surgeon: Jamie Reeves    Procedure: Left TKA Revision    Anesthesia:  General endotrachial anesthesia and block    Estimated blood loss:  1600cc    Fluids:3000cc LR, 500cc Albumin, 2u PRBC    TT:   1:17 minutes       2: 103 minutes    UO: 355cc     Drians:  HV x 1. See dictated operative report for full details.     Electronically signed by Honey Sood MD on 4/24/19 at 5:09 PM

## 2019-04-25 PROBLEM — I10 ESSENTIAL HYPERTENSION: Status: ACTIVE | Noted: 2019-04-25

## 2019-04-25 PROBLEM — Z86.718 HISTORY OF MATERNAL DEEP VEIN THROMBOSIS (DVT): Status: ACTIVE | Noted: 2019-04-25

## 2019-04-25 PROBLEM — E87.1 HYPONATREMIA: Status: ACTIVE | Noted: 2019-04-25

## 2019-04-25 PROBLEM — K21.9 GERD (GASTROESOPHAGEAL REFLUX DISEASE): Status: ACTIVE | Noted: 2019-04-25

## 2019-04-25 PROBLEM — K59.01 SLOW TRANSIT CONSTIPATION: Status: ACTIVE | Noted: 2019-04-25

## 2019-04-25 PROBLEM — D62 ACUTE BLOOD LOSS AS CAUSE OF POSTOPERATIVE ANEMIA: Status: ACTIVE | Noted: 2019-04-25

## 2019-04-25 PROBLEM — Z87.59 HISTORY OF MATERNAL DEEP VEIN THROMBOSIS (DVT): Status: ACTIVE | Noted: 2019-04-25

## 2019-04-25 PROBLEM — R73.9 HYPERGLYCEMIA: Status: ACTIVE | Noted: 2019-04-25

## 2019-04-25 LAB
ANION GAP SERPL CALCULATED.3IONS-SCNC: 8 MMOL/L (ref 7–19)
BUN BLDV-MCNC: 11 MG/DL (ref 8–23)
CALCIUM SERPL-MCNC: 7.9 MG/DL (ref 8.8–10.2)
CHLORIDE BLD-SCNC: 103 MMOL/L (ref 98–111)
CO2: 21 MMOL/L (ref 22–29)
CREAT SERPL-MCNC: 0.5 MG/DL (ref 0.5–0.9)
GFR NON-AFRICAN AMERICAN: >60
GLUCOSE BLD-MCNC: 188 MG/DL (ref 70–99)
GLUCOSE BLD-MCNC: 213 MG/DL (ref 70–99)
GLUCOSE BLD-MCNC: 223 MG/DL (ref 74–109)
GLUCOSE BLD-MCNC: 259 MG/DL (ref 70–99)
HCT VFR BLD CALC: 23 % (ref 37–47)
HEMOGLOBIN: 7.6 G/DL (ref 12–16)
PERFORMED ON: ABNORMAL
POTASSIUM REFLEX MAGNESIUM: 4.3 MMOL/L (ref 3.5–5)
SODIUM BLD-SCNC: 132 MMOL/L (ref 136–145)

## 2019-04-25 PROCEDURE — 2580000003 HC RX 258: Performed by: ORTHOPAEDIC SURGERY

## 2019-04-25 PROCEDURE — 80048 BASIC METABOLIC PNL TOTAL CA: CPT

## 2019-04-25 PROCEDURE — 85018 HEMOGLOBIN: CPT

## 2019-04-25 PROCEDURE — 6370000000 HC RX 637 (ALT 250 FOR IP): Performed by: PHYSICIAN ASSISTANT

## 2019-04-25 PROCEDURE — 85014 HEMATOCRIT: CPT

## 2019-04-25 PROCEDURE — 97530 THERAPEUTIC ACTIVITIES: CPT

## 2019-04-25 PROCEDURE — 6360000002 HC RX W HCPCS: Performed by: ORTHOPAEDIC SURGERY

## 2019-04-25 PROCEDURE — 6370000000 HC RX 637 (ALT 250 FOR IP): Performed by: ORTHOPAEDIC SURGERY

## 2019-04-25 PROCEDURE — 36415 COLL VENOUS BLD VENIPUNCTURE: CPT

## 2019-04-25 PROCEDURE — 97165 OT EVAL LOW COMPLEX 30 MIN: CPT

## 2019-04-25 PROCEDURE — 6370000000 HC RX 637 (ALT 250 FOR IP): Performed by: FAMILY MEDICINE

## 2019-04-25 PROCEDURE — 94664 DEMO&/EVAL PT USE INHALER: CPT

## 2019-04-25 PROCEDURE — 97161 PT EVAL LOW COMPLEX 20 MIN: CPT

## 2019-04-25 PROCEDURE — 2700000000 HC OXYGEN THERAPY PER DAY

## 2019-04-25 PROCEDURE — 82948 REAGENT STRIP/BLOOD GLUCOSE: CPT

## 2019-04-25 PROCEDURE — 1210000000 HC MED SURG R&B

## 2019-04-25 RX ORDER — POLYETHYLENE GLYCOL 3350 17 G/17G
17 POWDER, FOR SOLUTION ORAL DAILY
Status: DISCONTINUED | OUTPATIENT
Start: 2019-04-25 | End: 2019-04-28 | Stop reason: HOSPADM

## 2019-04-25 RX ORDER — DEXTROSE MONOHYDRATE 50 MG/ML
100 INJECTION, SOLUTION INTRAVENOUS PRN
Status: DISCONTINUED | OUTPATIENT
Start: 2019-04-25 | End: 2019-04-28 | Stop reason: HOSPADM

## 2019-04-25 RX ORDER — BISACODYL 10 MG
10 SUPPOSITORY, RECTAL RECTAL DAILY PRN
Status: DISCONTINUED | OUTPATIENT
Start: 2019-04-25 | End: 2019-04-28 | Stop reason: HOSPADM

## 2019-04-25 RX ORDER — DEXTROSE MONOHYDRATE 25 G/50ML
12.5 INJECTION, SOLUTION INTRAVENOUS PRN
Status: DISCONTINUED | OUTPATIENT
Start: 2019-04-25 | End: 2019-04-28 | Stop reason: HOSPADM

## 2019-04-25 RX ORDER — ALPRAZOLAM 1 MG/1
1 TABLET ORAL ONCE
Status: COMPLETED | OUTPATIENT
Start: 2019-04-25 | End: 2019-04-25

## 2019-04-25 RX ORDER — NICOTINE POLACRILEX 4 MG
15 LOZENGE BUCCAL PRN
Status: DISCONTINUED | OUTPATIENT
Start: 2019-04-25 | End: 2019-04-28 | Stop reason: HOSPADM

## 2019-04-25 RX ADMIN — OXYCODONE HYDROCHLORIDE 10 MG: 5 TABLET ORAL at 00:15

## 2019-04-25 RX ADMIN — OXYCODONE HYDROCHLORIDE 10 MG: 10 TABLET, FILM COATED, EXTENDED RELEASE ORAL at 10:31

## 2019-04-25 RX ADMIN — OXYCODONE HYDROCHLORIDE 20 MG: 5 TABLET ORAL at 13:38

## 2019-04-25 RX ADMIN — INSULIN LISPRO 1 UNITS: 100 INJECTION, SOLUTION INTRAVENOUS; SUBCUTANEOUS at 21:45

## 2019-04-25 RX ADMIN — CALCIUM CARBONATE 500 MG: 500 TABLET, CHEWABLE ORAL at 10:32

## 2019-04-25 RX ADMIN — HYDROMORPHONE HYDROCHLORIDE 1 MG: 1 INJECTION, SOLUTION INTRAMUSCULAR; INTRAVENOUS; SUBCUTANEOUS at 05:51

## 2019-04-25 RX ADMIN — ALPRAZOLAM 1 MG: 1 TABLET ORAL at 21:30

## 2019-04-25 RX ADMIN — DOCUSATE SODIUM 100 MG: 100 CAPSULE, LIQUID FILLED ORAL at 12:14

## 2019-04-25 RX ADMIN — POLYETHYLENE GLYCOL 3350 17 G: 17 POWDER, FOR SOLUTION ORAL at 12:14

## 2019-04-25 RX ADMIN — DOCUSATE SODIUM 100 MG: 100 CAPSULE, LIQUID FILLED ORAL at 21:30

## 2019-04-25 RX ADMIN — VANCOMYCIN HYDROCHLORIDE 1250 MG: 10 INJECTION, POWDER, LYOPHILIZED, FOR SOLUTION INTRAVENOUS at 14:45

## 2019-04-25 RX ADMIN — Medication 10 ML: at 21:30

## 2019-04-25 RX ADMIN — TRAMADOL HYDROCHLORIDE 50 MG: 50 TABLET, COATED ORAL at 10:32

## 2019-04-25 RX ADMIN — SODIUM CHLORIDE: 9 INJECTION, SOLUTION INTRAVENOUS at 10:30

## 2019-04-25 RX ADMIN — ALPRAZOLAM 1 MG: 1 TABLET ORAL at 14:20

## 2019-04-25 RX ADMIN — PANTOPRAZOLE SODIUM 40 MG: 40 TABLET, DELAYED RELEASE ORAL at 10:32

## 2019-04-25 RX ADMIN — NORTRIPTYLINE HYDROCHLORIDE 50 MG: 25 CAPSULE ORAL at 21:30

## 2019-04-25 RX ADMIN — RIVAROXABAN 10 MG: 10 TABLET, FILM COATED ORAL at 01:45

## 2019-04-25 RX ADMIN — HYDROCHLOROTHIAZIDE 25 MG: 25 TABLET ORAL at 21:30

## 2019-04-25 RX ADMIN — TRAMADOL HYDROCHLORIDE 50 MG: 50 TABLET, COATED ORAL at 21:43

## 2019-04-25 RX ADMIN — HYDROCHLOROTHIAZIDE 25 MG: 25 TABLET ORAL at 10:31

## 2019-04-25 RX ADMIN — Medication 2 G: at 21:43

## 2019-04-25 RX ADMIN — OXYCODONE HYDROCHLORIDE 10 MG: 10 TABLET, FILM COATED, EXTENDED RELEASE ORAL at 21:33

## 2019-04-25 RX ADMIN — Medication 600 MG: at 09:44

## 2019-04-25 RX ADMIN — Medication 2 G: at 10:37

## 2019-04-25 RX ADMIN — POLYVINYL ALCOHOL 1 DROP: 14 SOLUTION/ DROPS OPHTHALMIC at 17:01

## 2019-04-25 RX ADMIN — RIVAROXABAN 10 MG: 10 TABLET, FILM COATED ORAL at 17:34

## 2019-04-25 RX ADMIN — POLYVINYL ALCOHOL 1 DROP: 14 SOLUTION/ DROPS OPHTHALMIC at 21:30

## 2019-04-25 RX ADMIN — Medication 600 MG: at 21:30

## 2019-04-25 RX ADMIN — MONTELUKAST SODIUM 10 MG: 10 TABLET, FILM COATED ORAL at 21:30

## 2019-04-25 RX ADMIN — ATORVASTATIN CALCIUM 10 MG: 10 TABLET, FILM COATED ORAL at 10:31

## 2019-04-25 RX ADMIN — TRAMADOL HYDROCHLORIDE 50 MG: 50 TABLET, COATED ORAL at 00:15

## 2019-04-25 ASSESSMENT — PAIN SCALES - GENERAL
PAINLEVEL_OUTOF10: 10
PAINLEVEL_OUTOF10: 8
PAINLEVEL_OUTOF10: 8
PAINLEVEL_OUTOF10: 10

## 2019-04-25 ASSESSMENT — PAIN DESCRIPTION - PAIN TYPE: TYPE: SURGICAL PAIN

## 2019-04-25 ASSESSMENT — PAIN DESCRIPTION - DESCRIPTORS: DESCRIPTORS: ACHING

## 2019-04-25 ASSESSMENT — PAIN DESCRIPTION - LOCATION: LOCATION: KNEE

## 2019-04-25 ASSESSMENT — PAIN DESCRIPTION - ORIENTATION: ORIENTATION: LEFT

## 2019-04-25 ASSESSMENT — PAIN SCALES - WONG BAKER
WONGBAKER_NUMERICALRESPONSE: 8
WONGBAKER_NUMERICALRESPONSE: 2

## 2019-04-25 NOTE — CARE COORDINATION
Pt has requested to be listed with [facility].  SW has notified the facility of the referral. Awaiting approval/denial.  [address] [facility], [place]  [phone]  Electronically signed by Marita Pinto on 4/25/2019 at 7:47 AM

## 2019-04-25 NOTE — PROGRESS NOTES
Pharmacy Note  Vancomycin Consult    Juli Wilks is a 70 y.o. female started on Vancomycin for Post op prophylaxis; consult received from Dr. Sangita Guadalupe to manage therapy. Also receiving the following antibiotics: Cefepime. Patient Active Problem List   Diagnosis    Decreased pulses in feet    Atherosclerosis of native artery of both lower extremities with intermittent claudication (HCC)    Loose total knee arthroplasty (HCC)       Allergies:  Codeine; Levaquin [levofloxacin]; Lyrica [pregabalin]; and Morphine     Temp max: 98.7    No results for input(s): BUN in the last 72 hours. No results for input(s): CREATININE in the last 72 hours. Recent Labs     04/24/19  1737   WBC 15.3*         Intake/Output Summary (Last 24 hours) at 4/24/2019 1956  Last data filed at 4/24/2019 1653  Gross per 24 hour   Intake 3600 ml   Output 1955 ml   Net 1645 ml       Culture Date Source Results   04/24/19 Surgical sent                 Ht Readings from Last 1 Encounters:   04/24/19 5' (1.524 m)        Wt Readings from Last 1 Encounters:   04/24/19 171 lb (77.6 kg)         Body mass index is 33.4 kg/m². CrCl cannot be calculated (Patient's most recent lab result is older than the maximum 10 days allowed. ). Assessment/Plan:  Will initiate vancomycin 1250 mg IV every 24 hours X 2 doses. Thank you for the consult.      Electronically signed by Zaida Bell [address] [place] [address] on 4/24/2019 at 7:56 PM

## 2019-04-25 NOTE — PROGRESS NOTES
Occupational Therapy   Occupational Therapy Initial Assessment  Date: 2019   Patient Name: Micaela Escobedo  MRN: [id number]     : 1940    Date of Service: 2019    Discharge Recommendations:          Assessment   Performance deficits / Impairments: Decreased functional mobility ; Decreased ADL status; Decreased endurance;Decreased balance  Assessment: Will progress as tolerated  Treatment Diagnosis: L TKR (revision)  Prognosis: Good  Decision Making: Low Complexity  REQUIRES OT FOLLOW UP: Yes  Activity Tolerance  Activity Tolerance: Patient Tolerated treatment well           Patient Diagnosis(es): There were no encounter diagnoses. has a past medical history of Anxiety, Carotid artery stenosis, Chronic back pain, H/O blood clots, Hx of blood clots, Hyperlipidemia, Hypertension, and Osteoarthritis. has a past surgical history that includes eye surgery; Hysterectomy; joint replacement; Cholecystectomy; other surgical history; Cataract removal (Bilateral); Revision total knee arthroplasty (Left, 2019); and REMOVE HARDWARE FEMUR (Left, 2019).     Treatment Diagnosis: L TKR (revision)      Restrictions       Subjective   General  Chart Reviewed: Yes  Patient assessed for rehabilitation services?: Yes  Family / Caregiver Present: Yes  Diagnosis: L TKR (revision)  Pain Assessment  Pain Assessment: Faces  Bowen-Baker Pain Rating: Hurts whole lot  Pain Location: Knee  Pain Orientation: Left  Pain Descriptors: Aching  Social/Functional History  Social/Functional History  Lives With: Alone  Type of Home: House  Bathroom Toilet: Handicap height  Home Equipment: 4 wheeled walker  ADL Assistance: Independent  Ambulation Assistance: Independent  Transfer Assistance: Independent       Objective   Vision: Within Functional Limits  Hearing: Within functional limits    Orientation  Overall Orientation Status: Within Normal Limits        ADL  Feeding: Independent  Grooming: Independent  UE Bathing:
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
## 2019-04-25 NOTE — PROGRESS NOTES
4 Eyes Skin Assessment    Pratima Del Valle is being assessed upon: Admission    I agree that I, Lindy Hanson, along with Theo Reyes, RN (either 2 RN's or 1 LPN and 1 RN) have performed a thorough Head to Toe Skin Assessment on the patient. ALL assessment sites listed below have been assessed. Areas assessed by both nurses:     [x]   Head, Face, and Ears   [x]   Shoulders, Back, and Chest  [x]   Arms, Elbows, and Hands   [x]   Coccyx, Sacrum, and Ischium  [x]   Legs, Feet, and Heels    Does the Patient have Skin Breakdown?  No    Renato Prevention initiated: Yes  Wound Care Orders initiated: No    [facility] nurse consulted for Pressure Injury (Stage 3,4, Unstageable, DTI, NWPT, and Complex wounds) and New or Established Ostomies: No        Primary Nurse eSignature: Lindy Hanson RN on 4/25/2019 at 1:37 AM      Co-Signer eSignature: Electronically signed by Marcelo Cox RN on 4/25/19 at 2:04 AM

## 2019-04-25 NOTE — PROGRESS NOTES
Subjective:     Post-Operative Day: 1 Status Post left tka revision  Systemic or Specific Complaints:No Complaints  no nausea  Pain 4    Objective:     Patient Vitals for the past 24 hrs:   BP Temp Temp src Pulse Resp SpO2 Height Weight   04/25/19 0659 -- -- -- -- 18 96 % -- --   04/25/19 0239 (!) 112/58 98 °F (36.7 °C) Temporal 78 16 97 % -- --   04/24/19 2323 (!) 119/57 97 °F (36.1 °C) Temporal 70 18 95 % -- --   04/24/19 2038 (!) 113/56 96.1 °F (35.6 °C) Temporal 64 16 96 % -- --   04/24/19 2014 (!) 113/50 98.3 °F (36.8 °C) Temporal 61 16 97 % -- --   04/24/19 1929 (!) 108/51 97.8 °F (36.6 °C) Temporal 56 16 96 % -- --   04/24/19 1840 (!) 112/48 -- -- 54 13 94 % -- --   04/24/19 1833 (!) 121/41 -- -- 55 18 93 % -- --   04/24/19 1830 (!) 118/42 -- -- 54 14 93 % -- --   04/24/19 1815 (!) 105/33 97.1 °F (36.2 °C) Temporal 54 17 93 % -- --   04/24/19 1810 (!) 100/40 -- -- 55 15 94 % -- --   04/24/19 1805 105/76 -- -- 56 15 94 % -- --   04/24/19 1800 (!) 112/39 -- -- 55 14 95 % -- --   04/24/19 1755 (!) 109/37 -- -- 55 13 94 % -- --   04/24/19 1750 (!) 100/50 -- -- 53 16 95 % -- --   04/24/19 1745 (!) 96/31 -- -- 53 14 91 % -- --   04/24/19 1740 (!) 90/29 -- -- 53 14 92 % -- --   04/24/19 1735 (!) 74/24 -- -- 52 13 92 % -- --   04/24/19 1730 -- -- -- -- -- 92 % -- --   04/24/19 1722 (!) 69/26 97.5 °F (36.4 °C) Temporal 57 16 91 % -- --   04/24/19 1115 (!) 140/60 98.7 °F (37.1 °C) -- 70 16 98 % 5' (1.524 m) 171 lb (77.6 kg)       General: alert, appears stated age and cooperative   Exam: Incision clean, dry, and intact, no evidence of infection. Neurovascular: Exam normal       Data Review:  Recent Labs     04/24/19 1737 04/25/19 0218   HGB 8.4* 7.6*     Recent Labs     04/25/19 0218   *   K 4.3   CREATININE 0.5     Recent Labs     04/25/19 0218   LABGLOM >60           Assessment:     Status Post left tka revision. Doing well postoperatively. Plan:     Continues current post-op course.   SNF on pod 3      Electronically signed by Zeinab Oro MD on 4/25/2019 at 7:08 AM

## 2019-04-25 NOTE — PROGRESS NOTES
Physical Therapy    Facility/Department: [facility] SURG SERVICES  Initial Assessment    NAME: Alexa Winchester  : 1940  MRN: [id number]    Date of Service: 2019    Discharge Recommendations:           Assessment   Body structures, Functions, Activity limitations: Decreased functional mobility   Treatment Diagnosis: Left TKA  Prognosis: Good  Decision Making: Low Complexity  REQUIRES PT FOLLOW UP: Yes  Activity Tolerance  Activity Tolerance: Patient Tolerated treatment well       Patient Diagnosis(es): There were no encounter diagnoses. has a past medical history of Anxiety, Carotid artery stenosis, Chronic back pain, H/O blood clots, Hx of blood clots, Hyperlipidemia, Hypertension, and Osteoarthritis. has a past surgical history that includes eye surgery; Hysterectomy; joint replacement; Cholecystectomy; other surgical history; Cataract removal (Bilateral); Revision total knee arthroplasty (Left, 2019); and REMOVE HARDWARE FEMUR (Left, 2019). Restrictions     Vision/Hearing        Subjective  General  Chart Reviewed: Yes  Follows Commands: Within Functional Limits  Subjective  Subjective: Agrees to work with therapy          Orientation  Orientation  Overall Orientation Status: Within Functional Limits  Social/Functional History     Cognition        Objective          PROM RLE (degrees)  RLE PROM: WFL  PROM LLE (degrees)  LLE General PROM: Knee full extension in supine and flexion to 80 in sitting  Strength RLE  Strength RLE: WFL  Strength LLE  Comment: Grossly 3-/5        Bed mobility  Supine to Sit: Moderate assistance  Sit to Supine: Moderate assistance  Scooting:  Moderate assistance  Transfers  Sit to Stand: Minimal Assistance  Stand to sit: Minimal Assistance  Bed to Chair: Minimal assistance  Ambulation  Ambulation?: Yes  WB Status: WBAT  Ambulation 1  Device: Rolling Walker  Assistance: Minimal assistance  Quality of Gait: Unsteady  Distance: 5 feet to chair     Balance  Posture:
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
## 2019-04-26 LAB
ANION GAP SERPL CALCULATED.3IONS-SCNC: 12 MMOL/L (ref 7–19)
BLOOD BANK DISPENSE STATUS: NORMAL
BLOOD BANK DISPENSE STATUS: NORMAL
BLOOD BANK PRODUCT CODE: NORMAL
BLOOD BANK PRODUCT CODE: NORMAL
BPU ID: NORMAL
BPU ID: NORMAL
BUN BLDV-MCNC: 11 MG/DL (ref 8–23)
CALCIUM SERPL-MCNC: 8.2 MG/DL (ref 8.8–10.2)
CHLORIDE BLD-SCNC: 97 MMOL/L (ref 98–111)
CO2: 20 MMOL/L (ref 22–29)
CREAT SERPL-MCNC: <0.5 MG/DL (ref 0.5–0.9)
DESCRIPTION BLOOD BANK: NORMAL
DESCRIPTION BLOOD BANK: NORMAL
GFR NON-AFRICAN AMERICAN: >60
GLUCOSE BLD-MCNC: 111 MG/DL (ref 70–99)
GLUCOSE BLD-MCNC: 151 MG/DL (ref 74–109)
GLUCOSE BLD-MCNC: 207 MG/DL (ref 70–99)
GLUCOSE BLD-MCNC: 400 MG/DL (ref 70–99)
GLUCOSE BLD-MCNC: 85 MG/DL (ref 70–99)
HCT VFR BLD CALC: 20.3 % (ref 37–47)
HEMOGLOBIN: 6.1 G/DL (ref 12–16)
IRON SATURATION: 7 % (ref 14–50)
IRON: 13 UG/DL (ref 37–145)
PERFORMED ON: ABNORMAL
PERFORMED ON: NORMAL
POTASSIUM REFLEX MAGNESIUM: 4.3 MMOL/L (ref 3.5–5)
SODIUM BLD-SCNC: 129 MMOL/L (ref 136–145)
TOTAL IRON BINDING CAPACITY: 175 UG/DL (ref 250–400)
VITAMIN B-12: 897 PG/ML (ref 211–946)

## 2019-04-26 PROCEDURE — 2700000000 HC OXYGEN THERAPY PER DAY

## 2019-04-26 PROCEDURE — 80048 BASIC METABOLIC PNL TOTAL CA: CPT

## 2019-04-26 PROCEDURE — 2580000003 HC RX 258: Performed by: FAMILY MEDICINE

## 2019-04-26 PROCEDURE — P9016 RBC LEUKOCYTES REDUCED: HCPCS

## 2019-04-26 PROCEDURE — 36430 TRANSFUSION BLD/BLD COMPNT: CPT

## 2019-04-26 PROCEDURE — 83550 IRON BINDING TEST: CPT

## 2019-04-26 PROCEDURE — 82948 REAGENT STRIP/BLOOD GLUCOSE: CPT

## 2019-04-26 PROCEDURE — 6370000000 HC RX 637 (ALT 250 FOR IP): Performed by: PHYSICIAN ASSISTANT

## 2019-04-26 PROCEDURE — 6370000000 HC RX 637 (ALT 250 FOR IP): Performed by: FAMILY MEDICINE

## 2019-04-26 PROCEDURE — 6370000000 HC RX 637 (ALT 250 FOR IP): Performed by: ORTHOPAEDIC SURGERY

## 2019-04-26 PROCEDURE — 86923 COMPATIBILITY TEST ELECTRIC: CPT

## 2019-04-26 PROCEDURE — 36415 COLL VENOUS BLD VENIPUNCTURE: CPT

## 2019-04-26 PROCEDURE — 1210000000 HC MED SURG R&B

## 2019-04-26 PROCEDURE — 6360000002 HC RX W HCPCS: Performed by: ORTHOPAEDIC SURGERY

## 2019-04-26 PROCEDURE — 2580000003 HC RX 258: Performed by: ORTHOPAEDIC SURGERY

## 2019-04-26 PROCEDURE — 82607 VITAMIN B-12: CPT

## 2019-04-26 PROCEDURE — 85018 HEMOGLOBIN: CPT

## 2019-04-26 PROCEDURE — 83540 ASSAY OF IRON: CPT

## 2019-04-26 PROCEDURE — 85014 HEMATOCRIT: CPT

## 2019-04-26 RX ORDER — 0.9 % SODIUM CHLORIDE 0.9 %
250 INTRAVENOUS SOLUTION INTRAVENOUS ONCE
Status: COMPLETED | OUTPATIENT
Start: 2019-04-26 | End: 2019-04-26

## 2019-04-26 RX ORDER — IRON POLYSACCHARIDE COMPLEX 150 MG
150 CAPSULE ORAL 2 TIMES DAILY
Status: DISCONTINUED | OUTPATIENT
Start: 2019-04-26 | End: 2019-04-28 | Stop reason: HOSPADM

## 2019-04-26 RX ADMIN — DOCUSATE SODIUM 100 MG: 100 CAPSULE, LIQUID FILLED ORAL at 20:28

## 2019-04-26 RX ADMIN — Medication 150 MG: at 20:27

## 2019-04-26 RX ADMIN — VANCOMYCIN HYDROCHLORIDE 1250 MG: 10 INJECTION, POWDER, LYOPHILIZED, FOR SOLUTION INTRAVENOUS at 12:43

## 2019-04-26 RX ADMIN — RIVAROXABAN 10 MG: 10 TABLET, FILM COATED ORAL at 17:16

## 2019-04-26 RX ADMIN — TRAMADOL HYDROCHLORIDE 50 MG: 50 TABLET, COATED ORAL at 12:43

## 2019-04-26 RX ADMIN — PANTOPRAZOLE SODIUM 40 MG: 40 TABLET, DELAYED RELEASE ORAL at 07:36

## 2019-04-26 RX ADMIN — TRAMADOL HYDROCHLORIDE 50 MG: 50 TABLET, COATED ORAL at 20:28

## 2019-04-26 RX ADMIN — HYDROCHLOROTHIAZIDE 25 MG: 25 TABLET ORAL at 07:34

## 2019-04-26 RX ADMIN — DOCUSATE SODIUM 100 MG: 100 CAPSULE, LIQUID FILLED ORAL at 07:36

## 2019-04-26 RX ADMIN — Medication 2 G: at 20:28

## 2019-04-26 RX ADMIN — SODIUM CHLORIDE 250 ML: 9 INJECTION, SOLUTION INTRAVENOUS at 07:00

## 2019-04-26 RX ADMIN — OXYCODONE HYDROCHLORIDE 10 MG: 10 TABLET, FILM COATED, EXTENDED RELEASE ORAL at 20:27

## 2019-04-26 RX ADMIN — NORTRIPTYLINE HYDROCHLORIDE 50 MG: 25 CAPSULE ORAL at 20:28

## 2019-04-26 RX ADMIN — Medication 150 MG: at 07:37

## 2019-04-26 RX ADMIN — Medication 2 G: at 07:33

## 2019-04-26 RX ADMIN — OXYCODONE HYDROCHLORIDE 20 MG: 5 TABLET ORAL at 00:00

## 2019-04-26 RX ADMIN — Medication 10 MG: at 12:42

## 2019-04-26 RX ADMIN — Medication 10 ML: at 07:38

## 2019-04-26 RX ADMIN — Medication 600 MG: at 07:37

## 2019-04-26 RX ADMIN — POLYETHYLENE GLYCOL 3350 17 G: 17 POWDER, FOR SOLUTION ORAL at 07:34

## 2019-04-26 RX ADMIN — INSULIN LISPRO 6 UNITS: 100 INJECTION, SOLUTION INTRAVENOUS; SUBCUTANEOUS at 17:16

## 2019-04-26 RX ADMIN — HYDROCHLOROTHIAZIDE 25 MG: 25 TABLET ORAL at 20:28

## 2019-04-26 RX ADMIN — POLYVINYL ALCOHOL 1 DROP: 14 SOLUTION/ DROPS OPHTHALMIC at 20:29

## 2019-04-26 RX ADMIN — TRAMADOL HYDROCHLORIDE 50 MG: 50 TABLET, COATED ORAL at 07:37

## 2019-04-26 RX ADMIN — POLYVINYL ALCOHOL 1 DROP: 14 SOLUTION/ DROPS OPHTHALMIC at 07:37

## 2019-04-26 RX ADMIN — DIAZEPAM 5 MG: 5 TABLET ORAL at 13:54

## 2019-04-26 RX ADMIN — Medication 600 MG: at 20:28

## 2019-04-26 RX ADMIN — CALCIUM CARBONATE 500 MG: 500 TABLET, CHEWABLE ORAL at 07:36

## 2019-04-26 RX ADMIN — OXYCODONE HYDROCHLORIDE 20 MG: 5 TABLET ORAL at 10:27

## 2019-04-26 RX ADMIN — ATORVASTATIN CALCIUM 10 MG: 10 TABLET, FILM COATED ORAL at 07:37

## 2019-04-26 RX ADMIN — MONTELUKAST SODIUM 10 MG: 10 TABLET, FILM COATED ORAL at 20:28

## 2019-04-26 RX ADMIN — OXYCODONE HYDROCHLORIDE 10 MG: 10 TABLET, FILM COATED, EXTENDED RELEASE ORAL at 07:36

## 2019-04-26 ASSESSMENT — PAIN SCALES - GENERAL
PAINLEVEL_OUTOF10: 8
PAINLEVEL_OUTOF10: 0
PAINLEVEL_OUTOF10: 8
PAINLEVEL_OUTOF10: 10
PAINLEVEL_OUTOF10: 9
PAINLEVEL_OUTOF10: 10

## 2019-04-26 NOTE — PROGRESS NOTES
Upon entering patients room, I found the hemovac drain on her bedside table. Patient states she removed it. Pressure dressing (4x4 and Mepilex) applied. Vitals stable. Patient in bed resting comfortably. Will continue to monitor.     Electronically signed by Chely Wise RN on 4/25/2019 at 10:45 PM

## 2019-04-26 NOTE — CARE COORDINATION
Pt has been accepted at [facility] and Pre-Cert has cleared for Pt to DC to the facility on Saturday April 27th. If Pt does not DC on Saturday, then a Brand New Pre-Cert will need to be started on Monday April 29th.    [address]  Electronically signed by Elba Peter on 4/26/2019 at 1:22 PM

## 2019-04-26 NOTE — PROGRESS NOTES
04/26/19 1102   Subjective   Subjective I just got up and went to the bathroom I am hurting I don't feel like walking   Physical Therapy    Electronically signed by Flo Juarez PTA on 4/26/2019 at 11:04 AM

## 2019-04-26 NOTE — PROGRESS NOTES
Subjective:     Post-Operative Day: 2 Status Post left tka revision. Pt is resting in bed. Reports pain to left lower extremity. Otherwise stable. No other issues. Systemic or Specific Complaints:No Complaints  no nausea  Pain 7    Objective:     Patient Vitals for the past 24 hrs:   BP Temp Temp src Pulse Resp SpO2   04/26/19 0843 (!) 154/72 99 °F (37.2 °C) Temporal 94 16 100 %   04/26/19 0816 (!) 161/66 98.6 °F (37 °C) Temporal 93 14 100 %   04/26/19 0709 (!) 148/69 97.7 °F (36.5 °C) Temporal 97 18 99 %   04/26/19 0523 (!) 115/52 98.2 °F (36.8 °C) -- 97 16 100 %   04/26/19 0453 135/68 97.5 °F (36.4 °C) Temporal 96 16 100 %   04/26/19 0449 135/68 97.5 °F (36.4 °C) -- 92 16 --   04/26/19 0247 136/64 96.4 °F (35.8 °C) Temporal 96 16 100 %   04/25/19 2237 (!) 153/75 99.3 °F (37.4 °C) Temporal 106 14 100 %   04/25/19 1911 128/61 97.5 °F (36.4 °C) Temporal 101 18 100 %   04/25/19 1455 -- -- -- -- 16 97 %   04/25/19 1412 (!) 152/66 98.8 °F (37.1 °C) Temporal 95 14 98 %       General: alert, appears stated age and cooperative   Exam: Incision clean, dry, and intact, no evidence of infection. Good active motion to left ankle and foot. Neurovascular: Exam normal       Data Review:  Recent Labs     04/25/19  0218 04/26/19  0155   HGB 7.6* 6.1*     Recent Labs     04/26/19 0155   *   K 4.3   CREATININE <0.5     Recent Labs     04/26/19 0155   LABGLOM >60           Assessment:     Status Post left tka revision. Plan:     Pain control. Continue current post-op course.   SNF on pod 3      Electronically signed by Emile Richard PA-C on 4/26/2019 at 8:59 AM

## 2019-04-26 NOTE — PROGRESS NOTES
Physical Therapy       04/26/19 1316   Subjective   Subjective Attempted therapy. Pt said she was in to much pain.    Electronically signed by Hood Tapia PTA Student on 4/26/2019 at 1:17 PM

## 2019-04-26 NOTE — PROGRESS NOTES
[facility]  Daily Progress Note  Erum Verma  MRN: [id number] LOS: 2    Admit Date: 4/24/2019 4/26/2019 6:42 AM          Chief Complaint:  Left knee pain      Interval History:    Reviewed overnight events and nursing notes. Status:  not changed  Postoperative pain is controlled. Denies chest pain no shortness of breath. Reviewed with patient and family at bedside had normal labs.       DIET:  Dietary Nutrition Supplements: Standard High Calorie Oral Supplement  DIET GENERAL; Carb Control: 4 carb choices (60 gms)/meal    Medications:      dextrose      sodium chloride 150 mL/hr at 04/25/19 1030      sodium chloride  250 mL Intravenous Once    insulin lispro  0-6 Units Subcutaneous TID WC    insulin lispro  0-3 Units Subcutaneous Nightly    polyethylene glycol  17 g Oral Daily    sodium chloride  250 mL Intravenous Once    sodium chloride  250 mL Intravenous Once    atorvastatin  10 mg Oral Daily    calcium carbonate  600 mg Oral BID    calcium carbonate  500 mg Oral Daily    hydrochlorothiazide  25 mg Oral BID    montelukast  10 mg Oral Nightly    nortriptyline  50 mg Oral Nightly    sodium chloride flush  10 mL Intravenous 2 times per day    docusate sodium  100 mg Oral BID    rivaroxaban  10 mg Oral Daily    oxyCODONE  10 mg Oral 2 times per day    traMADol  50 mg Oral Q6H    cefepime  2 g Intravenous Q12H    polyvinyl alcohol  1 drop Both Eyes BID    pantoprazole  40 mg Oral Daily    [START ON 5/1/2019] cyanocobalamin  1,000 mcg Intramuscular Q30 Days    vancomycin  1,250 mg Intravenous Q24H       Data:     Code Status: Full Code    Family History   Problem Relation Age of Onset    Dementia Mother     Heart Disease Father     Cancer Brother     COPD Other     Other Other         Blood clot    Diabetes Other      Social History     Socioeconomic History    Marital status:      Spouse name: Not on file    Number of children: Not on file    Years of education: Not on file    Highest education level: Not on file   Occupational History    Not on file   Social Needs    Financial resource strain: Not on file    Food insecurity:     Worry: Not on file     Inability: Not on file    Transportation needs:     Medical: Not on file     Non-medical: Not on file   Tobacco Use    Smoking status: Never Smoker    Smokeless tobacco: Never Used   Substance and Sexual Activity    Alcohol use: No    Drug use: No    Sexual activity: Not on file   Lifestyle    Physical activity:     Days per week: Not on file     Minutes per session: Not on file    Stress: Not on file   Relationships    Social connections:     Talks on phone: Not on file     Gets together: Not on file     Attends Orthodox service: Not on file     Active member of club or organization: Not on file     Attends meetings of clubs or organizations: Not on file     Relationship status: Not on file    Intimate partner violence:     Fear of current or ex partner: Not on file     Emotionally abused: Not on file     Physically abused: Not on file     Forced sexual activity: Not on file   Other Topics Concern    Not on file   Social History Narrative    Not on file       Labs:  Hematology:  Recent Labs     04/24/19  1737 04/25/19  0218 04/26/19  0155   WBC 15.3*  --   --    HGB 8.4* 7.6* 6.1*   HCT 26.2* 23.0* 20.3*     --   --      Chemistry:  Recent Labs     04/25/19  0218 04/26/19  0155   * 129*   K 4.3 4.3    97*   CO2 21* 20*   GLUCOSE 223* 151*   BUN 11 11   CREATININE 0.5 <0.5   ANIONGAP 8 12   LABGLOM >60 >60   CALCIUM 7.9* 8.2*     No results for input(s): PROT, LABALBU, LABA1C, V9IAPHG, B7OMCFX, FT4, TSH, AST, ALT, LDH, GGT, ALKPHOS, BILITOT, BILIDIR, AMMONIA, AMYLASE, LIPASE, LACTATE, CHOL, HDL, LDLCHOLESTEROL, CHOLHDLRATIO, TRIG, VLDL, PHENYTOIN, PHENYF in the last 72 hours.     Objective:     Vitals: BP (!) 115/52   Pulse 97   Temp 98.2 °F (36.8 °C)   Resp 16   Ht 5' (1.524 m)   [employer] to face interaction and coordination of care. Electronically signed by Luis Mendes PA-C on 4/26/2019 at 6:42 AM     Awaiting pre-CERT for [facility]. Patient is medically stable for discharge to [facility] when bed available. Her primary care doctor, Dr. Eran Atkins will also serve as her SNiFist at [facility]. I have discussed the care of Lavera Klinefelter, including pertinent history and exam findings with the ARNP/PA. I have seen and examined the patient and the key elements of all parts of the encounter have been performed by me. I agree with the assessment and plan as outlined by the ARNP/PA. Please refer to my separate note for complete documentation.      Electronically signed by Gloria Lombardi MD on 4/26/2019 at 7:47 AM

## 2019-04-27 LAB
ANION GAP SERPL CALCULATED.3IONS-SCNC: 9 MMOL/L (ref 7–19)
BUN BLDV-MCNC: 10 MG/DL (ref 8–23)
CALCIUM SERPL-MCNC: 8.6 MG/DL (ref 8.8–10.2)
CHLORIDE BLD-SCNC: 93 MMOL/L (ref 98–111)
CO2: 27 MMOL/L (ref 22–29)
CREAT SERPL-MCNC: <0.5 MG/DL (ref 0.5–0.9)
GFR NON-AFRICAN AMERICAN: >60
GLUCOSE BLD-MCNC: 100 MG/DL (ref 70–99)
GLUCOSE BLD-MCNC: 107 MG/DL (ref 70–99)
GLUCOSE BLD-MCNC: 108 MG/DL (ref 74–109)
GLUCOSE BLD-MCNC: 120 MG/DL (ref 70–99)
GLUCOSE BLD-MCNC: 185 MG/DL (ref 70–99)
HCT VFR BLD CALC: 26.4 % (ref 37–47)
HEMOGLOBIN: 8.5 G/DL (ref 12–16)
PERFORMED ON: ABNORMAL
POTASSIUM REFLEX MAGNESIUM: 4.1 MMOL/L (ref 3.5–5)
SODIUM BLD-SCNC: 129 MMOL/L (ref 136–145)

## 2019-04-27 PROCEDURE — 6370000000 HC RX 637 (ALT 250 FOR IP): Performed by: PHYSICIAN ASSISTANT

## 2019-04-27 PROCEDURE — 85018 HEMOGLOBIN: CPT

## 2019-04-27 PROCEDURE — 97530 THERAPEUTIC ACTIVITIES: CPT

## 2019-04-27 PROCEDURE — 6360000002 HC RX W HCPCS: Performed by: ORTHOPAEDIC SURGERY

## 2019-04-27 PROCEDURE — 80048 BASIC METABOLIC PNL TOTAL CA: CPT

## 2019-04-27 PROCEDURE — 6370000000 HC RX 637 (ALT 250 FOR IP): Performed by: ORTHOPAEDIC SURGERY

## 2019-04-27 PROCEDURE — 85014 HEMATOCRIT: CPT

## 2019-04-27 PROCEDURE — 97116 GAIT TRAINING THERAPY: CPT

## 2019-04-27 PROCEDURE — 6370000000 HC RX 637 (ALT 250 FOR IP): Performed by: FAMILY MEDICINE

## 2019-04-27 PROCEDURE — 2580000003 HC RX 258: Performed by: ORTHOPAEDIC SURGERY

## 2019-04-27 PROCEDURE — 82948 REAGENT STRIP/BLOOD GLUCOSE: CPT

## 2019-04-27 PROCEDURE — 2700000000 HC OXYGEN THERAPY PER DAY

## 2019-04-27 PROCEDURE — 1210000000 HC MED SURG R&B

## 2019-04-27 RX ADMIN — OXYCODONE HYDROCHLORIDE 10 MG: 10 TABLET, FILM COATED, EXTENDED RELEASE ORAL at 07:57

## 2019-04-27 RX ADMIN — OXYCODONE HYDROCHLORIDE 5 MG: 5 TABLET ORAL at 13:30

## 2019-04-27 RX ADMIN — TRAMADOL HYDROCHLORIDE 50 MG: 50 TABLET, COATED ORAL at 13:29

## 2019-04-27 RX ADMIN — ATORVASTATIN CALCIUM 10 MG: 10 TABLET, FILM COATED ORAL at 07:56

## 2019-04-27 RX ADMIN — POLYETHYLENE GLYCOL 3350 17 G: 17 POWDER, FOR SOLUTION ORAL at 07:55

## 2019-04-27 RX ADMIN — Medication 10 ML: at 20:14

## 2019-04-27 RX ADMIN — Medication 600 MG: at 20:10

## 2019-04-27 RX ADMIN — OXYCODONE HYDROCHLORIDE 5 MG: 5 TABLET ORAL at 02:18

## 2019-04-27 RX ADMIN — POLYVINYL ALCOHOL 1 DROP: 14 SOLUTION/ DROPS OPHTHALMIC at 20:09

## 2019-04-27 RX ADMIN — HYDROCHLOROTHIAZIDE 25 MG: 25 TABLET ORAL at 07:56

## 2019-04-27 RX ADMIN — INSULIN LISPRO 1 UNITS: 100 INJECTION, SOLUTION INTRAVENOUS; SUBCUTANEOUS at 17:39

## 2019-04-27 RX ADMIN — Medication 600 MG: at 07:56

## 2019-04-27 RX ADMIN — MONTELUKAST SODIUM 10 MG: 10 TABLET, FILM COATED ORAL at 20:09

## 2019-04-27 RX ADMIN — DOCUSATE SODIUM 100 MG: 100 CAPSULE, LIQUID FILLED ORAL at 20:10

## 2019-04-27 RX ADMIN — PANTOPRAZOLE SODIUM 40 MG: 40 TABLET, DELAYED RELEASE ORAL at 07:56

## 2019-04-27 RX ADMIN — POLYVINYL ALCOHOL 1 DROP: 14 SOLUTION/ DROPS OPHTHALMIC at 07:55

## 2019-04-27 RX ADMIN — RIVAROXABAN 10 MG: 10 TABLET, FILM COATED ORAL at 17:40

## 2019-04-27 RX ADMIN — Medication 2 G: at 07:55

## 2019-04-27 RX ADMIN — TRAMADOL HYDROCHLORIDE 50 MG: 50 TABLET, COATED ORAL at 20:09

## 2019-04-27 RX ADMIN — Medication 150 MG: at 07:56

## 2019-04-27 RX ADMIN — Medication 150 MG: at 20:10

## 2019-04-27 RX ADMIN — TRAMADOL HYDROCHLORIDE 50 MG: 50 TABLET, COATED ORAL at 02:17

## 2019-04-27 RX ADMIN — Medication 10 ML: at 07:57

## 2019-04-27 RX ADMIN — NORTRIPTYLINE HYDROCHLORIDE 50 MG: 25 CAPSULE ORAL at 20:10

## 2019-04-27 RX ADMIN — DOCUSATE SODIUM 100 MG: 100 CAPSULE, LIQUID FILLED ORAL at 07:57

## 2019-04-27 RX ADMIN — OXYCODONE HYDROCHLORIDE 10 MG: 10 TABLET, FILM COATED, EXTENDED RELEASE ORAL at 21:55

## 2019-04-27 ASSESSMENT — PAIN SCALES - GENERAL
PAINLEVEL_OUTOF10: 10
PAINLEVEL_OUTOF10: 8
PAINLEVEL_OUTOF10: 6
PAINLEVEL_OUTOF10: 3
PAINLEVEL_OUTOF10: 4
PAINLEVEL_OUTOF10: 0
PAINLEVEL_OUTOF10: 7
PAINLEVEL_OUTOF10: 4
PAINLEVEL_OUTOF10: 2
PAINLEVEL_OUTOF10: 2

## 2019-04-27 NOTE — PROGRESS NOTES
Physical Therapy  Juli Wilks  [id number]     04/27/19 1430   Subjective   Subjective Pt states she is willing to walk    General Comment   Comments needs to use the restroom    Bed Mobility   Supine to Sit Minimal assistance; Moderate assistance   Scooting Moderate assistance   Transfers   Sit to Stand Contact guard assistance;Minimal Assistance   Stand to sit Contact guard assistance   Ambulation   Ambulation? Yes   WB Status WBAT   Ambulation 1   Surface level tile   Device Rolling Walker   Assistance Contact guard assistance   Quality of Gait slow and antalgic   Distance 15' + 10'   Comments back to bed    Short term goals   Time Frame for Short term goals 2 weeks   Short term goal 1 Independent with bed mobility   Short term goal 2 Independent with transfers   Short term goal 3 Ambulate 75 feet with assistive device and CGA   Conditions Requiring Skilled Therapeutic Intervention   Body structures, Functions, Activity limitations Decreased functional mobility    Assessment Pt back to bed with ice to knee   Activity Tolerance   Activity Tolerance Patient limited by endurance; Patient limited by pain   Electronically signed by Lizzy Mireles PTA on 4/27/2019 at 2:40 PM

## 2019-04-27 NOTE — PROGRESS NOTES
Subjective:     Post-Operative Day: 3 Status Post left tka revision. Pt is resting in bed. Reports pain improved to left lower extremity. She had some confusion after taking valium, better currently. Systemic or Specific Complaints:No Complaints  no nausea  Pain 5    Objective:     Patient Vitals for the past 24 hrs:   BP Temp Temp src Pulse Resp SpO2   04/27/19 0616 131/64 97.9 °F (36.6 °C) Temporal 86 20 92 %   04/27/19 0230 (!) 146/67 97.4 °F (36.3 °C) Temporal 88 18 92 %   04/26/19 2200 (!) 142/66 98 °F (36.7 °C) Temporal 96 18 94 %   04/26/19 1818 (!) 157/79 98.9 °F (37.2 °C) Temporal 105 18 93 %   04/26/19 1518 (!) 150/65 98.3 °F (36.8 °C) Temporal 103 18 96 %   04/26/19 1120 (!) 152/66 97.4 °F (36.3 °C) Temporal 101 18 98 %       General: alert, appears stated age and cooperative   Exam: Incision clean, dry, and intact, no evidence of infection. Good active motion to left ankle and foot. Neurovascular: Exam normal       Data Review:  Recent Labs     04/26/19  0155 04/27/19  0249   HGB 6.1* 8.5*     Recent Labs     04/27/19  0249   *   K 4.1   CREATININE <0.5     Recent Labs     04/27/19  0249   LABGLOM >60           Assessment:     Status Post left tka revision. Plan:     Pt is doing better. We will stop Valium. She wants to wait until Monday for [facility]. Continue current post-op course.   SNF on pod 3      Electronically signed by Rosendo Mayes PA-C on 4/27/2019 at 9:09 AM

## 2019-04-27 NOTE — PROGRESS NOTES
Progress Note  Gema Wilson  4/27/2019 5:39 PM  Subjective:   Admit Date:   4/24/2019      CC/ADMIT DX:       Interval History:   Reviewed overnight events and nursing notes. No new issues. Pain is controlled. I have reviewed all labs/diagnostics from the last 24hrs. ROS:   I have done a 10 point ROS and all are negative, except what is mentioned in the HPI.     Dietary Nutrition Supplements: Standard High Calorie Oral Supplement  DIET GENERAL; Carb Control: 4 carb choices (60 gms)/meal    Medications:      dextrose      sodium chloride 150 mL/hr at 04/25/19 1030      iron polysaccharides  150 mg Oral BID    insulin lispro  0-6 Units Subcutaneous TID WC    insulin lispro  0-3 Units Subcutaneous Nightly    polyethylene glycol  17 g Oral Daily    sodium chloride  250 mL Intravenous Once    sodium chloride  250 mL Intravenous Once    atorvastatin  10 mg Oral Daily    calcium carbonate  600 mg Oral BID    calcium carbonate  500 mg Oral Daily    hydrochlorothiazide  25 mg Oral BID    montelukast  10 mg Oral Nightly    nortriptyline  50 mg Oral Nightly    sodium chloride flush  10 mL Intravenous 2 times per day    docusate sodium  100 mg Oral BID    rivaroxaban  10 mg Oral Daily    oxyCODONE  10 mg Oral 2 times per day    traMADol  50 mg Oral Q6H    polyvinyl alcohol  1 drop Both Eyes BID    pantoprazole  40 mg Oral Daily    [START ON 5/1/2019] cyanocobalamin  1,000 mcg Intramuscular Q30 Days           Objective:   Vitals: BP (!) 151/68   Pulse 94   Temp 97.8 °F (36.6 °C) (Temporal)   Resp 20   Ht 5' (1.524 m)   Wt 171 lb (77.6 kg)   SpO2 93%   BMI 33.40 kg/m²      Intake/Output Summary (Last 24 hours) at 4/27/2019 1739  Last data filed at 4/27/2019 1206  Gross per 24 hour   Intake 1080 ml   Output 600 ml   Net 480 ml     General appearance: resting  Lungs: clear to auscultation bilaterally  Heart: RRR  Abdomen: soft, non-tender; bowel sounds normal; no masses,  no organomegaly  Extremities: extremities normal, atraumatic, no cyanosis or edema  Neurologic:  No obvious focal neurologic deficits. Assessment and Plan:   Principal Problem:    Loose total knee arthroplasty (HCC)  Active Problems:    History of maternal deep vein thrombosis (DVT)    Acute blood loss as cause of postoperative anemia    Essential hypertension    Slow transit constipation    Hyponatremia    Hyperglycemia    GERD (gastroesophageal reflux disease)  Resolved Problems:    * No resolved hospital problems. *      Plan:  1. Continue present medication(s)   2. Recheck labs in am  3. Follow with Orthopedics      Discharge planning:   SNF vs Home    Reviewed treatment plans with the patient and/or family.              Electronically signed by Sury Avitia MD on 4/27/2019 at 5:39 PM

## 2019-04-28 VITALS
SYSTOLIC BLOOD PRESSURE: 139 MMHG | BODY MASS INDEX: 33.57 KG/M2 | WEIGHT: 171 LBS | HEART RATE: 86 BPM | RESPIRATION RATE: 16 BRPM | OXYGEN SATURATION: 96 % | TEMPERATURE: 97.5 F | DIASTOLIC BLOOD PRESSURE: 66 MMHG | HEIGHT: 60 IN

## 2019-04-28 LAB
ANAEROBIC CULTURE: NORMAL
ANION GAP SERPL CALCULATED.3IONS-SCNC: 9 MMOL/L (ref 7–19)
BUN BLDV-MCNC: 9 MG/DL (ref 8–23)
CALCIUM SERPL-MCNC: 8.4 MG/DL (ref 8.8–10.2)
CHLORIDE BLD-SCNC: 90 MMOL/L (ref 98–111)
CO2: 28 MMOL/L (ref 22–29)
CREAT SERPL-MCNC: <0.5 MG/DL (ref 0.5–0.9)
CULTURE SURGICAL: NORMAL
GFR NON-AFRICAN AMERICAN: >60
GLUCOSE BLD-MCNC: 100 MG/DL (ref 74–109)
GLUCOSE BLD-MCNC: 135 MG/DL (ref 70–99)
GLUCOSE BLD-MCNC: 95 MG/DL (ref 70–99)
GRAM STAIN RESULT: NORMAL
PERFORMED ON: ABNORMAL
PERFORMED ON: NORMAL
POTASSIUM SERPL-SCNC: 3.6 MMOL/L (ref 3.5–5)
SODIUM BLD-SCNC: 127 MMOL/L (ref 136–145)

## 2019-04-28 PROCEDURE — 97116 GAIT TRAINING THERAPY: CPT

## 2019-04-28 PROCEDURE — 82948 REAGENT STRIP/BLOOD GLUCOSE: CPT

## 2019-04-28 PROCEDURE — 97530 THERAPEUTIC ACTIVITIES: CPT

## 2019-04-28 PROCEDURE — 6370000000 HC RX 637 (ALT 250 FOR IP): Performed by: ORTHOPAEDIC SURGERY

## 2019-04-28 PROCEDURE — 36415 COLL VENOUS BLD VENIPUNCTURE: CPT

## 2019-04-28 PROCEDURE — 6370000000 HC RX 637 (ALT 250 FOR IP): Performed by: PHYSICIAN ASSISTANT

## 2019-04-28 PROCEDURE — 2580000003 HC RX 258: Performed by: ORTHOPAEDIC SURGERY

## 2019-04-28 PROCEDURE — 80048 BASIC METABOLIC PNL TOTAL CA: CPT

## 2019-04-28 RX ORDER — ALPRAZOLAM 1 MG/1
1 TABLET ORAL NIGHTLY PRN
Qty: 30 TABLET | Refills: 0 | Status: SHIPPED | OUTPATIENT
Start: 2019-04-28 | End: 2019-05-28

## 2019-04-28 RX ORDER — OXYCODONE HYDROCHLORIDE 5 MG/1
5 TABLET ORAL SEE ADMIN INSTRUCTIONS
Qty: 90 TABLET | Refills: 0 | Status: SHIPPED | OUTPATIENT
Start: 2019-04-28 | End: 2019-05-28

## 2019-04-28 RX ADMIN — ATORVASTATIN CALCIUM 10 MG: 10 TABLET, FILM COATED ORAL at 09:11

## 2019-04-28 RX ADMIN — POLYVINYL ALCOHOL 1 DROP: 14 SOLUTION/ DROPS OPHTHALMIC at 09:11

## 2019-04-28 RX ADMIN — Medication 150 MG: at 09:10

## 2019-04-28 RX ADMIN — TRAMADOL HYDROCHLORIDE 50 MG: 50 TABLET, COATED ORAL at 09:10

## 2019-04-28 RX ADMIN — OXYCODONE HYDROCHLORIDE 5 MG: 5 TABLET ORAL at 05:17

## 2019-04-28 RX ADMIN — OXYCODONE HYDROCHLORIDE 5 MG: 5 TABLET ORAL at 10:37

## 2019-04-28 RX ADMIN — OXYCODONE HYDROCHLORIDE 5 MG: 5 TABLET ORAL at 05:15

## 2019-04-28 RX ADMIN — TRAMADOL HYDROCHLORIDE 50 MG: 50 TABLET, COATED ORAL at 01:15

## 2019-04-28 RX ADMIN — CALCIUM CARBONATE 500 MG: 500 TABLET, CHEWABLE ORAL at 09:11

## 2019-04-28 RX ADMIN — PANTOPRAZOLE SODIUM 40 MG: 40 TABLET, DELAYED RELEASE ORAL at 09:10

## 2019-04-28 RX ADMIN — DOCUSATE SODIUM 100 MG: 100 CAPSULE, LIQUID FILLED ORAL at 09:10

## 2019-04-28 RX ADMIN — Medication 10 ML: at 09:12

## 2019-04-28 RX ADMIN — Medication 600 MG: at 09:10

## 2019-04-28 RX ADMIN — OXYCODONE HYDROCHLORIDE 10 MG: 10 TABLET, FILM COATED, EXTENDED RELEASE ORAL at 09:10

## 2019-04-28 ASSESSMENT — PAIN SCALES - GENERAL
PAINLEVEL_OUTOF10: 7
PAINLEVEL_OUTOF10: 10
PAINLEVEL_OUTOF10: 2
PAINLEVEL_OUTOF10: 4
PAINLEVEL_OUTOF10: 10
PAINLEVEL_OUTOF10: 5
PAINLEVEL_OUTOF10: 4
PAINLEVEL_OUTOF10: 4

## 2019-04-28 NOTE — PROGRESS NOTES
Subjective:     Post-Operative Day: 4 Status Post left tka revision. Pt is resting in bed. Reports pain improved to left lower extremity. She had some confusion after taking valium, better currently. Systemic or Specific Complaints:No Complaints  no nausea  Pain 5    Objective:     Patient Vitals for the past 24 hrs:   BP Temp Temp src Pulse Resp SpO2   04/28/19 0955 138/63 98.1 °F (36.7 °C) Temporal 83 16 95 %   04/28/19 0753 132/60 98 °F (36.7 °C) Temporal 78 16 95 %   04/28/19 0317 (!) 141/67 99 °F (37.2 °C) Temporal 82 16 96 %   04/27/19 2154 (!) 140/64 97.9 °F (36.6 °C) Temporal 89 17 92 %   04/27/19 1825 (!) 142/68 97.9 °F (36.6 °C) Temporal 92 16 93 %       General: alert, appears stated age and cooperative   Exam: Incision clean, dry, and intact, no evidence of infection. Good active motion to left ankle and foot. Neurovascular: Exam normal       Data Review:  Recent Labs     04/26/19  0155 04/27/19  0249   HGB 6.1* 8.5*     Recent Labs     04/28/19  0231   *   K 3.6   CREATININE <0.5     Recent Labs     04/28/19  0231   LABGLOM >60           Assessment:     Status Post left tka revision. Plan:     Pt is doing better. We will stop Valium. SNF today.       Electronically signed by Therese Santana MD on 4/28/2019 at 11:31 AM

## 2019-04-28 NOTE — DISCHARGE SUMMARY
[facility] [place] [address]  Discharge Summary    The Naun Ramirez is a 70 y.o. female underwent L TKR procedure without complication. Naun Ramirez was admitted to the floor following her   recovery in the PACU.      Discharge Diagnosis  left loose tka    Current Inpatient Medications    Current Facility-Administered Medications: iron polysaccharides (NIFEREX) capsule 150 mg, 150 mg, Oral, BID  insulin lispro (HUMALOG) injection vial 0-6 Units, 0-6 Units, Subcutaneous, TID WC  insulin lispro (HUMALOG) injection vial 0-3 Units, 0-3 Units, Subcutaneous, Nightly  glucose (GLUTOSE) 40 % oral gel 15 g, 15 g, Oral, PRN  dextrose 50 % solution 12.5 g, 12.5 g, Intravenous, PRN  glucagon (rDNA) injection 1 mg, 1 mg, Intramuscular, PRN  dextrose 5 % solution, 100 mL/hr, Intravenous, PRN  polyethylene glycol (GLYCOLAX) packet 17 g, 17 g, Oral, Daily  bisacodyl (DULCOLAX) suppository 10 mg, 10 mg, Rectal, Daily PRN  0.9 % sodium chloride bolus, 250 mL, Intravenous, Once  0.9 % sodium chloride bolus, 250 mL, Intravenous, Once  ALPRAZolam (XANAX) tablet 1 mg, 1 mg, Oral, Nightly PRN  atorvastatin (LIPITOR) tablet 10 mg, 10 mg, Oral, Daily  calcium carbonate tablet 600 mg, 600 mg, Oral, BID  calcium carbonate (TUMS) chewable tablet 500 mg, 500 mg, Oral, Daily  diphenhydrAMINE (BENADRYL) tablet 25 mg, 25 mg, Oral, Nightly PRN  hydrochlorothiazide (HYDRODIURIL) tablet 25 mg, 25 mg, Oral, BID  montelukast (SINGULAIR) tablet 10 mg, 10 mg, Oral, Nightly  nortriptyline (PAMELOR) capsule 50 mg, 50 mg, Oral, Nightly  0.9 % sodium chloride infusion, , Intravenous, Continuous  0.9 % sodium chloride bolus, 500 mL, Intravenous, PRN  sodium chloride flush 0.9 % injection 10 mL, 10 mL, Intravenous, 2 times per day  sodium chloride flush 0.9 % injection 10 mL, 10 mL, Intravenous, PRN  oxyCODONE (ROXICODONE) immediate release tablet 5 mg, 5 mg, Oral, Q4H PRN **OR** oxyCODONE (ROXICODONE) immediate release tablet 10 mg, 10 mg, Oral, Q4H PRN  docusate sodium (COLACE) capsule 100 mg, 100 mg, Oral, BID  ondansetron (ZOFRAN) injection 4 mg, 4 mg, Intravenous, Q6H PRN  rivaroxaban (XARELTO) tablet 10 mg, 10 mg, Oral, Daily  oxyCODONE (OXYCONTIN) extended release tablet 10 mg, 10 mg, Oral, 2 times per day  oxyCODONE (ROXICODONE) immediate release tablet 10 mg, 10 mg, Oral, Q4H PRN **OR** oxyCODONE (ROXICODONE) immediate release tablet 20 mg, 20 mg, Oral, Q4H PRN  diazepam (VALIUM) tablet 5 mg, 5 mg, Oral, Q6H PRN  traMADol (ULTRAM) tablet 50 mg, 50 mg, Oral, Q6H  HYDROmorphone (DILAUDID) injection 0.5 mg, 0.5 mg, Intravenous, Q3H PRN **OR** HYDROmorphone (DILAUDID) injection 1 mg, 1 mg, Intravenous, Q3H PRN  HYDROmorphone (DILAUDID) injection 1 mg, 1 mg, Intravenous, Q3H PRN **OR** HYDROmorphone (DILAUDID) injection 2 mg, 2 mg, Intravenous, Q3H PRN  polyvinyl alcohol (LIQUIFILM TEARS) 1.4 % ophthalmic solution 1 drop, 1 drop, Both Eyes, BID  pantoprazole (PROTONIX) tablet 40 mg, 40 mg, Oral, Daily  polyvinyl alcohol (LIQUIFILM TEARS) 1.4 % ophthalmic solution 1 drop, 1 drop, Both Eyes, PRN  [START ON 5/1/2019] cyanocobalamin injection 1,000 mcg, 1,000 mcg, Intramuscular, Q30 Days    Post-operatively the patients diet was advanced as tolerated and their incision was checked on POD #1. The incision is dressing in place, clean, dry and intact with no signs of infection. The patient remained neurovascularly intact in the lower extremity and had intact pulses distally. Patients calf remained soft and showed no evidence of DVT. The patient was able to move their knee without any problems post-operatively. Physical therapy and occupational therapy were consulted and began working with the patient post-operatively. The patient progressed with PT/OT as would be expected and continued to improve through their stay.   The patients pain was initially controlled with IV medications but we were able to transition to oral pain medications soon after arrival to the floor and their pain remained under good control through their hospital stay. From a medical standpoint the patient remained stable and continued to have the medicine team follow throughout their stay. The patients dressing was changed/incison was checked on day of d/c. The patient will be discharged at this time to [address] [place] [address]  with their current diet restrictions and will continue to follow the knee precautions outlined to them by us and PT/OT. Condition on Discharge: Stable    Plan  Return visit in 5 weeks. .  Patient was instructed on the use of pain medications, the signs and symptoms of infection, and was given our number to call should they have any questions or concerns following discharge.

## 2019-04-28 NOTE — PROGRESS NOTES
Physical Therapy  Gema Wilson  [id number]     04/28/19 7989   Subjective   Subjective Pt states she is ready to walk, states her leg is more swollen today but thinks she is doing better. General Comment   Comments on [facility]   Transfers   Sit to Stand Stand by assistance   Stand to sit Stand by assistance   Ambulation   Ambulation? Yes   WB Status WBAT   Ambulation 1   Surface level tile   Device Rolling Walker   Assistance Contact guard assistance   Quality of Gait antalgic, slow but steady   Distance 60'   Comments pt was able to increase amb distance , back to recliner with feet elevated and fresh ice to knee   Short term goals   Time Frame for Short term goals 2 weeks   Short term goal 1 Independent with bed mobility   Short term goal 2 Independent with transfers   Short term goal 3 Ambulate 75 feet with assistive device and CGA   Conditions Requiring Skilled Therapeutic Intervention   Body structures, Functions, Activity limitations Decreased functional mobility ; Decreased ADL status   Assessment Pt did better with amb today, increasing her distance. Activity Tolerance   Activity Tolerance Patient limited by endurance; Patient limited by pain; Patient Tolerated treatment well   Safety Devices   Type of devices Left in chair;Call light within reach  (family present)   Electronically signed by Pam Ross PTA on 4/28/2019 at 9:52 AM

## 2019-04-30 ENCOUNTER — LAB REQUISITION (OUTPATIENT)
Dept: LAB | Facility: HOSPITAL | Age: 79
End: 2019-04-30

## 2019-04-30 DIAGNOSIS — Z00.00 ENCOUNTER FOR GENERAL ADULT MEDICAL EXAMINATION WITHOUT ABNORMAL FINDINGS: ICD-10-CM

## 2019-04-30 LAB
ANION GAP SERPL CALCULATED.3IONS-SCNC: 10 MMOL/L (ref 4–13)
BASOPHILS # BLD AUTO: 0.01 10*3/MM3 (ref 0–0.2)
BASOPHILS NFR BLD AUTO: 0.1 % (ref 0–2)
BUN BLD-MCNC: 12 MG/DL (ref 5–21)
BUN/CREAT SERPL: 32.4 (ref 7–25)
CALCIUM SPEC-SCNC: 8.3 MG/DL (ref 8.4–10.4)
CHLORIDE SERPL-SCNC: 90 MMOL/L (ref 98–110)
CO2 SERPL-SCNC: 28 MMOL/L (ref 24–31)
CREAT BLD-MCNC: 0.37 MG/DL (ref 0.5–1.4)
DEPRECATED RDW RBC AUTO: 41.3 FL (ref 40–54)
EOSINOPHIL # BLD AUTO: 0.02 10*3/MM3 (ref 0–0.7)
EOSINOPHIL NFR BLD AUTO: 0.2 % (ref 0–4)
ERYTHROCYTE [DISTWIDTH] IN BLOOD BY AUTOMATED COUNT: 13.2 % (ref 12–15)
GFR SERPL CREATININE-BSD FRML MDRD: >150 ML/MIN/1.73
GLUCOSE BLD-MCNC: 136 MG/DL (ref 70–100)
HCT VFR BLD AUTO: 22.7 % (ref 37–47)
HGB BLD-MCNC: 7.6 G/DL (ref 12–16)
IMM GRANULOCYTES # BLD AUTO: 0.21 10*3/MM3 (ref 0–0.05)
IMM GRANULOCYTES NFR BLD AUTO: 2.2 % (ref 0–5)
LYMPHOCYTES # BLD AUTO: 0.64 10*3/MM3 (ref 0.72–4.86)
LYMPHOCYTES NFR BLD AUTO: 6.6 % (ref 15–45)
MCH RBC QN AUTO: 28.8 PG (ref 28–32)
MCHC RBC AUTO-ENTMCNC: 33.5 G/DL (ref 33–36)
MCV RBC AUTO: 86 FL (ref 82–98)
MONOCYTES # BLD AUTO: 0.48 10*3/MM3 (ref 0.19–1.3)
MONOCYTES NFR BLD AUTO: 5 % (ref 4–12)
NEUTROPHILS # BLD AUTO: 8.33 10*3/MM3 (ref 1.87–8.4)
NEUTROPHILS NFR BLD AUTO: 85.9 % (ref 39–78)
NRBC BLD AUTO-RTO: 0 /100 WBC (ref 0–0.2)
PLATELET # BLD AUTO: 369 10*3/MM3 (ref 130–400)
PMV BLD AUTO: 9.2 FL (ref 6–12)
POTASSIUM BLD-SCNC: 4 MMOL/L (ref 3.5–5.3)
RBC # BLD AUTO: 2.64 10*6/MM3 (ref 4.2–5.4)
SODIUM BLD-SCNC: 128 MMOL/L (ref 135–145)
WBC NRBC COR # BLD: 9.69 10*3/MM3 (ref 4.8–10.8)

## 2019-04-30 PROCEDURE — 80048 BASIC METABOLIC PNL TOTAL CA: CPT | Performed by: NURSE PRACTITIONER

## 2019-04-30 PROCEDURE — 36415 COLL VENOUS BLD VENIPUNCTURE: CPT | Performed by: NURSE PRACTITIONER

## 2019-04-30 PROCEDURE — 85025 COMPLETE CBC W/AUTO DIFF WBC: CPT | Performed by: NURSE PRACTITIONER

## 2019-05-03 ENCOUNTER — LAB REQUISITION (OUTPATIENT)
Dept: LAB | Facility: HOSPITAL | Age: 79
End: 2019-05-03

## 2019-05-03 DIAGNOSIS — Z00.00 ENCOUNTER FOR GENERAL ADULT MEDICAL EXAMINATION WITHOUT ABNORMAL FINDINGS: ICD-10-CM

## 2019-05-03 LAB
ANION GAP SERPL CALCULATED.3IONS-SCNC: 9 MMOL/L (ref 4–13)
BASOPHILS # BLD AUTO: 0.05 10*3/MM3 (ref 0–0.2)
BASOPHILS NFR BLD AUTO: 0.5 % (ref 0–2)
BUN BLD-MCNC: 11 MG/DL (ref 5–21)
BUN/CREAT SERPL: 22.9 (ref 7–25)
CALCIUM SPEC-SCNC: 8.2 MG/DL (ref 8.4–10.4)
CHLORIDE SERPL-SCNC: 90 MMOL/L (ref 98–110)
CO2 SERPL-SCNC: 29 MMOL/L (ref 24–31)
CREAT BLD-MCNC: 0.48 MG/DL (ref 0.5–1.4)
DEPRECATED RDW RBC AUTO: 42.4 FL (ref 40–54)
EOSINOPHIL # BLD AUTO: 0.3 10*3/MM3 (ref 0–0.7)
EOSINOPHIL NFR BLD AUTO: 2.7 % (ref 0–4)
ERYTHROCYTE [DISTWIDTH] IN BLOOD BY AUTOMATED COUNT: 13.5 % (ref 12–15)
GFR SERPL CREATININE-BSD FRML MDRD: 125 ML/MIN/1.73
GLUCOSE BLD-MCNC: 103 MG/DL (ref 70–100)
HCT VFR BLD AUTO: 22.9 % (ref 37–47)
HGB BLD-MCNC: 7.7 G/DL (ref 12–16)
IMM GRANULOCYTES # BLD AUTO: 0.25 10*3/MM3 (ref 0–0.05)
IMM GRANULOCYTES NFR BLD AUTO: 2.3 % (ref 0–5)
LYMPHOCYTES # BLD AUTO: 1.77 10*3/MM3 (ref 0.72–4.86)
LYMPHOCYTES NFR BLD AUTO: 15.9 % (ref 15–45)
MCH RBC QN AUTO: 29.1 PG (ref 28–32)
MCHC RBC AUTO-ENTMCNC: 33.6 G/DL (ref 33–36)
MCV RBC AUTO: 86.4 FL (ref 82–98)
MONOCYTES # BLD AUTO: 1.02 10*3/MM3 (ref 0.19–1.3)
MONOCYTES NFR BLD AUTO: 9.2 % (ref 4–12)
NEUTROPHILS # BLD AUTO: 7.71 10*3/MM3 (ref 1.87–8.4)
NEUTROPHILS NFR BLD AUTO: 69.4 % (ref 39–78)
NRBC BLD AUTO-RTO: 0 /100 WBC (ref 0–0.2)
PLATELET # BLD AUTO: 506 10*3/MM3 (ref 130–400)
PMV BLD AUTO: 8.9 FL (ref 6–12)
POTASSIUM BLD-SCNC: 3.8 MMOL/L (ref 3.5–5.3)
RBC # BLD AUTO: 2.65 10*6/MM3 (ref 4.2–5.4)
SODIUM BLD-SCNC: 128 MMOL/L (ref 135–145)
WBC NRBC COR # BLD: 11.1 10*3/MM3 (ref 4.8–10.8)

## 2019-05-03 PROCEDURE — 80048 BASIC METABOLIC PNL TOTAL CA: CPT | Performed by: NURSE PRACTITIONER

## 2019-05-03 PROCEDURE — 36415 COLL VENOUS BLD VENIPUNCTURE: CPT | Performed by: NURSE PRACTITIONER

## 2019-05-03 PROCEDURE — 85025 COMPLETE CBC W/AUTO DIFF WBC: CPT | Performed by: NURSE PRACTITIONER

## 2019-05-06 ENCOUNTER — LAB REQUISITION (OUTPATIENT)
Dept: LAB | Facility: HOSPITAL | Age: 79
End: 2019-05-06

## 2019-05-06 DIAGNOSIS — Z00.00 ENCOUNTER FOR GENERAL ADULT MEDICAL EXAMINATION WITHOUT ABNORMAL FINDINGS: ICD-10-CM

## 2019-05-06 LAB
ANION GAP SERPL CALCULATED.3IONS-SCNC: 11 MMOL/L (ref 4–13)
BASOPHILS # BLD AUTO: 0.04 10*3/MM3 (ref 0–0.2)
BASOPHILS NFR BLD AUTO: 0.4 % (ref 0–2)
BUN BLD-MCNC: 14 MG/DL (ref 5–21)
BUN/CREAT SERPL: 26.9 (ref 7–25)
CALCIUM SPEC-SCNC: 8 MG/DL (ref 8.4–10.4)
CHLORIDE SERPL-SCNC: 90 MMOL/L (ref 98–110)
CO2 SERPL-SCNC: 28 MMOL/L (ref 24–31)
CREAT BLD-MCNC: 0.52 MG/DL (ref 0.5–1.4)
DEPRECATED RDW RBC AUTO: 44 FL (ref 40–54)
EOSINOPHIL # BLD AUTO: 0.28 10*3/MM3 (ref 0–0.7)
EOSINOPHIL NFR BLD AUTO: 2.6 % (ref 0–4)
ERYTHROCYTE [DISTWIDTH] IN BLOOD BY AUTOMATED COUNT: 13.5 % (ref 12–15)
GFR SERPL CREATININE-BSD FRML MDRD: 114 ML/MIN/1.73
GLUCOSE BLD-MCNC: 108 MG/DL (ref 70–100)
HCT VFR BLD AUTO: 26.1 % (ref 37–47)
HGB BLD-MCNC: 8.5 G/DL (ref 12–16)
IMM GRANULOCYTES # BLD AUTO: 0.11 10*3/MM3 (ref 0–0.05)
IMM GRANULOCYTES NFR BLD AUTO: 1 % (ref 0–5)
LYMPHOCYTES # BLD AUTO: 1.41 10*3/MM3 (ref 0.72–4.86)
LYMPHOCYTES NFR BLD AUTO: 13.3 % (ref 15–45)
MCH RBC QN AUTO: 28.8 PG (ref 28–32)
MCHC RBC AUTO-ENTMCNC: 32.6 G/DL (ref 33–36)
MCV RBC AUTO: 88.5 FL (ref 82–98)
MONOCYTES # BLD AUTO: 0.81 10*3/MM3 (ref 0.19–1.3)
MONOCYTES NFR BLD AUTO: 7.6 % (ref 4–12)
NEUTROPHILS # BLD AUTO: 7.99 10*3/MM3 (ref 1.87–8.4)
NEUTROPHILS NFR BLD AUTO: 75.1 % (ref 39–78)
NRBC BLD AUTO-RTO: 0 /100 WBC (ref 0–0.2)
PLATELET # BLD AUTO: 589 10*3/MM3 (ref 130–400)
PMV BLD AUTO: 9 FL (ref 6–12)
POTASSIUM BLD-SCNC: 4.8 MMOL/L (ref 3.5–5.3)
RBC # BLD AUTO: 2.95 10*6/MM3 (ref 4.2–5.4)
SODIUM BLD-SCNC: 129 MMOL/L (ref 135–145)
WBC NRBC COR # BLD: 10.64 10*3/MM3 (ref 4.8–10.8)

## 2019-05-06 PROCEDURE — 36415 COLL VENOUS BLD VENIPUNCTURE: CPT | Performed by: NURSE PRACTITIONER

## 2019-05-06 PROCEDURE — 80048 BASIC METABOLIC PNL TOTAL CA: CPT | Performed by: NURSE PRACTITIONER

## 2019-05-06 PROCEDURE — 85025 COMPLETE CBC W/AUTO DIFF WBC: CPT | Performed by: NURSE PRACTITIONER

## 2019-05-13 ENCOUNTER — LAB REQUISITION (OUTPATIENT)
Dept: LAB | Facility: HOSPITAL | Age: 79
End: 2019-05-13

## 2019-05-13 DIAGNOSIS — Z00.00 ENCOUNTER FOR GENERAL ADULT MEDICAL EXAMINATION WITHOUT ABNORMAL FINDINGS: ICD-10-CM

## 2019-05-13 LAB
ANION GAP SERPL CALCULATED.3IONS-SCNC: 10 MMOL/L (ref 4–13)
BASOPHILS # BLD AUTO: 0.04 10*3/MM3 (ref 0–0.2)
BASOPHILS NFR BLD AUTO: 0.5 % (ref 0–2)
BUN BLD-MCNC: 16 MG/DL (ref 5–21)
BUN/CREAT SERPL: 28.1 (ref 7–25)
CALCIUM SPEC-SCNC: 9.3 MG/DL (ref 8.4–10.4)
CHLORIDE SERPL-SCNC: 90 MMOL/L (ref 98–110)
CO2 SERPL-SCNC: 28 MMOL/L (ref 24–31)
CREAT BLD-MCNC: 0.57 MG/DL (ref 0.5–1.4)
DEPRECATED RDW RBC AUTO: 42.6 FL (ref 40–54)
EOSINOPHIL # BLD AUTO: 0.18 10*3/MM3 (ref 0–0.7)
EOSINOPHIL NFR BLD AUTO: 2.3 % (ref 0–4)
ERYTHROCYTE [DISTWIDTH] IN BLOOD BY AUTOMATED COUNT: 13.7 % (ref 12–15)
GFR SERPL CREATININE-BSD FRML MDRD: 103 ML/MIN/1.73
GLUCOSE BLD-MCNC: 90 MG/DL (ref 70–100)
HCT VFR BLD AUTO: 23.3 % (ref 37–47)
HGB BLD-MCNC: 7.8 G/DL (ref 12–16)
IMM GRANULOCYTES # BLD AUTO: 0.06 10*3/MM3 (ref 0–0.05)
IMM GRANULOCYTES NFR BLD AUTO: 0.8 % (ref 0–5)
LYMPHOCYTES # BLD AUTO: 1.07 10*3/MM3 (ref 0.72–4.86)
LYMPHOCYTES NFR BLD AUTO: 13.6 % (ref 15–45)
MCH RBC QN AUTO: 29.2 PG (ref 28–32)
MCHC RBC AUTO-ENTMCNC: 33.5 G/DL (ref 33–36)
MCV RBC AUTO: 87.3 FL (ref 82–98)
MONOCYTES # BLD AUTO: 0.97 10*3/MM3 (ref 0.19–1.3)
MONOCYTES NFR BLD AUTO: 12.4 % (ref 4–12)
NEUTROPHILS # BLD AUTO: 5.53 10*3/MM3 (ref 1.87–8.4)
NEUTROPHILS NFR BLD AUTO: 70.4 % (ref 39–78)
NRBC BLD AUTO-RTO: 0 /100 WBC (ref 0–0.2)
PLATELET # BLD AUTO: 535 10*3/MM3 (ref 130–400)
PMV BLD AUTO: 9.3 FL (ref 6–12)
POTASSIUM BLD-SCNC: 4.4 MMOL/L (ref 3.5–5.3)
RBC # BLD AUTO: 2.67 10*6/MM3 (ref 4.2–5.4)
SODIUM BLD-SCNC: 128 MMOL/L (ref 135–145)
WBC NRBC COR # BLD: 7.85 10*3/MM3 (ref 4.8–10.8)

## 2019-05-13 PROCEDURE — 85025 COMPLETE CBC W/AUTO DIFF WBC: CPT | Performed by: NURSE PRACTITIONER

## 2019-05-13 PROCEDURE — 80048 BASIC METABOLIC PNL TOTAL CA: CPT | Performed by: NURSE PRACTITIONER

## 2019-05-13 PROCEDURE — 36415 COLL VENOUS BLD VENIPUNCTURE: CPT | Performed by: NURSE PRACTITIONER

## 2019-05-20 ENCOUNTER — LAB REQUISITION (OUTPATIENT)
Dept: LAB | Facility: HOSPITAL | Age: 79
End: 2019-05-20

## 2019-05-20 DIAGNOSIS — Z00.00 ENCOUNTER FOR GENERAL ADULT MEDICAL EXAMINATION WITHOUT ABNORMAL FINDINGS: ICD-10-CM

## 2019-05-20 LAB
ANION GAP SERPL CALCULATED.3IONS-SCNC: 8 MMOL/L (ref 4–13)
BASOPHILS # BLD AUTO: 0.04 10*3/MM3 (ref 0–0.2)
BASOPHILS NFR BLD AUTO: 0.5 % (ref 0–2)
BUN BLD-MCNC: 12 MG/DL (ref 5–21)
BUN/CREAT SERPL: 20 (ref 7–25)
CALCIUM SPEC-SCNC: 8.8 MG/DL (ref 8.4–10.4)
CHLORIDE SERPL-SCNC: 96 MMOL/L (ref 98–110)
CO2 SERPL-SCNC: 28 MMOL/L (ref 24–31)
CREAT BLD-MCNC: 0.6 MG/DL (ref 0.5–1.4)
DEPRECATED RDW RBC AUTO: 45.4 FL (ref 40–54)
EOSINOPHIL # BLD AUTO: 0.63 10*3/MM3 (ref 0–0.7)
EOSINOPHIL NFR BLD AUTO: 8.5 % (ref 0–4)
ERYTHROCYTE [DISTWIDTH] IN BLOOD BY AUTOMATED COUNT: 13.9 % (ref 12–15)
GFR SERPL CREATININE-BSD FRML MDRD: 97 ML/MIN/1.73
GLUCOSE BLD-MCNC: 79 MG/DL (ref 70–100)
HCT VFR BLD AUTO: 26 % (ref 37–47)
HGB BLD-MCNC: 8.5 G/DL (ref 12–16)
IMM GRANULOCYTES # BLD AUTO: 0.06 10*3/MM3 (ref 0–0.05)
IMM GRANULOCYTES NFR BLD AUTO: 0.8 % (ref 0–5)
LYMPHOCYTES # BLD AUTO: 1.06 10*3/MM3 (ref 0.72–4.86)
LYMPHOCYTES NFR BLD AUTO: 14.2 % (ref 15–45)
MCH RBC QN AUTO: 29.1 PG (ref 28–32)
MCHC RBC AUTO-ENTMCNC: 32.7 G/DL (ref 33–36)
MCV RBC AUTO: 89 FL (ref 82–98)
MONOCYTES # BLD AUTO: 0.78 10*3/MM3 (ref 0.19–1.3)
MONOCYTES NFR BLD AUTO: 10.5 % (ref 4–12)
NEUTROPHILS # BLD AUTO: 4.88 10*3/MM3 (ref 1.87–8.4)
NEUTROPHILS NFR BLD AUTO: 65.5 % (ref 39–78)
NRBC BLD AUTO-RTO: 0 /100 WBC (ref 0–0.2)
PLATELET # BLD AUTO: 398 10*3/MM3 (ref 130–400)
PMV BLD AUTO: 9.6 FL (ref 6–12)
POTASSIUM BLD-SCNC: 5.1 MMOL/L (ref 3.5–5.3)
RBC # BLD AUTO: 2.92 10*6/MM3 (ref 4.2–5.4)
SODIUM BLD-SCNC: 132 MMOL/L (ref 135–145)
WBC NRBC COR # BLD: 7.45 10*3/MM3 (ref 4.8–10.8)

## 2019-05-20 PROCEDURE — 36415 COLL VENOUS BLD VENIPUNCTURE: CPT | Performed by: NURSE PRACTITIONER

## 2019-05-20 PROCEDURE — 85025 COMPLETE CBC W/AUTO DIFF WBC: CPT | Performed by: NURSE PRACTITIONER

## 2019-05-20 PROCEDURE — 80048 BASIC METABOLIC PNL TOTAL CA: CPT | Performed by: NURSE PRACTITIONER

## 2019-05-21 ENCOUNTER — LAB REQUISITION (OUTPATIENT)
Dept: LAB | Facility: HOSPITAL | Age: 79
End: 2019-05-21

## 2019-05-21 DIAGNOSIS — Z00.00 ENCOUNTER FOR GENERAL ADULT MEDICAL EXAMINATION WITHOUT ABNORMAL FINDINGS: ICD-10-CM

## 2019-05-21 LAB
BACTERIA UR QL AUTO: ABNORMAL /HPF
BILIRUB UR QL STRIP: NEGATIVE
CLARITY UR: ABNORMAL
COLOR UR: YELLOW
GLUCOSE UR STRIP-MCNC: NEGATIVE MG/DL
HGB UR QL STRIP.AUTO: ABNORMAL
HYALINE CASTS UR QL AUTO: ABNORMAL /LPF
KETONES UR QL STRIP: NEGATIVE
LEUKOCYTE ESTERASE UR QL STRIP.AUTO: ABNORMAL
NITRITE UR QL STRIP: NEGATIVE
PH UR STRIP.AUTO: <=5 [PH] (ref 5–8)
PROT UR QL STRIP: NEGATIVE
RBC # UR: ABNORMAL /HPF
REF LAB TEST METHOD: ABNORMAL
SP GR UR STRIP: 1.01 (ref 1–1.03)
SQUAMOUS #/AREA URNS HPF: ABNORMAL /HPF
UROBILINOGEN UR QL STRIP: ABNORMAL
WBC UR QL AUTO: ABNORMAL /HPF

## 2019-05-21 PROCEDURE — 81001 URINALYSIS AUTO W/SCOPE: CPT | Performed by: NURSE PRACTITIONER

## 2019-05-21 PROCEDURE — 87184 SC STD DISK METHOD PER PLATE: CPT | Performed by: NURSE PRACTITIONER

## 2019-05-21 PROCEDURE — 87088 URINE BACTERIA CULTURE: CPT | Performed by: NURSE PRACTITIONER

## 2019-05-21 PROCEDURE — 87086 URINE CULTURE/COLONY COUNT: CPT | Performed by: NURSE PRACTITIONER

## 2019-05-21 PROCEDURE — 87186 SC STD MICRODIL/AGAR DIL: CPT | Performed by: NURSE PRACTITIONER

## 2019-05-24 LAB — BACTERIA SPEC AEROBE CULT: ABNORMAL

## 2019-05-28 LAB
FUNGUS (MYCOLOGY) CULTURE: NORMAL
KOH PREP: NORMAL

## 2019-05-29 ENCOUNTER — APPOINTMENT (OUTPATIENT)
Dept: CT IMAGING | Age: 79
DRG: 683 | End: 2019-05-29
Payer: MEDICARE

## 2019-05-29 ENCOUNTER — APPOINTMENT (OUTPATIENT)
Dept: GENERAL RADIOLOGY | Age: 79
DRG: 683 | End: 2019-05-29
Payer: MEDICARE

## 2019-05-29 ENCOUNTER — HOSPITAL ENCOUNTER (INPATIENT)
Age: 79
LOS: 5 days | Discharge: NURSING, REHAB OR LONG-TERM CARE | DRG: 683 | End: 2019-06-04
Attending: INTERNAL MEDICINE | Admitting: INTERNAL MEDICINE
Payer: MEDICARE

## 2019-05-29 DIAGNOSIS — G47.00 INSOMNIA, UNSPECIFIED TYPE: ICD-10-CM

## 2019-05-29 DIAGNOSIS — S22.41XA CLOSED FRACTURE OF MULTIPLE RIBS OF RIGHT SIDE, INITIAL ENCOUNTER: ICD-10-CM
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
## 2019-05-29 LAB
ALBUMIN SERPL-MCNC: 3.4 G/DL (ref 3.5–5.2)
ALP BLD-CCNC: 83 U/L (ref 35–104)
ALT SERPL-CCNC: 65 U/L (ref 5–33)
ANION GAP SERPL CALCULATED.3IONS-SCNC: 14 MMOL/L (ref 7–19)
APTT: 43.6 SEC (ref 26–36.2)
AST SERPL-CCNC: 50 U/L (ref 5–32)
BASOPHILS ABSOLUTE: 0 K/UL (ref 0–0.2)
BASOPHILS RELATIVE PERCENT: 0.5 % (ref 0–1)
BILIRUB SERPL-MCNC: 0.4 MG/DL (ref 0.2–1.2)
BILIRUBIN URINE: NEGATIVE
BLOOD, URINE: NEGATIVE
BUN BLDV-MCNC: 28 MG/DL (ref 8–23)
CALCIUM SERPL-MCNC: 8.8 MG/DL (ref 8.8–10.2)
CHLORIDE BLD-SCNC: 93 MMOL/L (ref 98–111)
CLARITY: CLEAR
CO2: 22 MMOL/L (ref 22–29)
COLOR: YELLOW
CREAT SERPL-MCNC: 1.1 MG/DL (ref 0.5–0.9)
EOSINOPHILS ABSOLUTE: 0.3 K/UL (ref 0–0.6)
EOSINOPHILS RELATIVE PERCENT: 3.9 % (ref 0–5)
GFR NON-AFRICAN AMERICAN: 48
GLUCOSE BLD-MCNC: 132 MG/DL (ref 74–109)
GLUCOSE URINE: NEGATIVE MG/DL
HCT VFR BLD CALC: 26.7 % (ref 37–47)
HEMOGLOBIN: 8.5 G/DL (ref 12–16)
INR BLD: 1.72 (ref 0.88–1.18)
KETONES, URINE: NEGATIVE MG/DL
LACTIC ACID: 1.4 MMOL/L (ref 0.5–1.9)
LEUKOCYTE ESTERASE, URINE: NEGATIVE
LYMPHOCYTES ABSOLUTE: 1.1 K/UL (ref 1.1–4.5)
LYMPHOCYTES RELATIVE PERCENT: 12 % (ref 20–40)
MCH RBC QN AUTO: 28.9 PG (ref 27–31)
MCHC RBC AUTO-ENTMCNC: 31.8 G/DL (ref 33–37)
MCV RBC AUTO: 90.8 FL (ref 81–99)
MONOCYTES ABSOLUTE: 0.6 K/UL (ref 0–0.9)
MONOCYTES RELATIVE PERCENT: 7.3 % (ref 0–10)
NEUTROPHILS ABSOLUTE: 6.7 K/UL (ref 1.5–7.5)
NEUTROPHILS RELATIVE PERCENT: 75.7 % (ref 50–65)
NITRITE, URINE: NEGATIVE
PDW BLD-RTO: 13.9 % (ref 11.5–14.5)
PH UA: 5.5 (ref 5–8)
PLATELET # BLD: 300 K/UL (ref 130–400)
PMV BLD AUTO: 9.3 FL (ref 9.4–12.3)
POTASSIUM REFLEX MAGNESIUM: 3.7 MMOL/L (ref 3.5–5)
PRO-BNP: 190 PG/ML (ref 0–1800)
PROTEIN UA: NEGATIVE MG/DL
PROTHROMBIN TIME: 19.4 SEC (ref 12–14.6)
RBC # BLD: 2.94 M/UL (ref 4.2–5.4)
SODIUM BLD-SCNC: 129 MMOL/L (ref 136–145)
SPECIFIC GRAVITY UA: 1.02 (ref 1–1.03)
TOTAL PROTEIN: 6.6 G/DL (ref 6.6–8.7)
TROPONIN: 0.01 NG/ML (ref 0–0.03)
URINE REFLEX TO CULTURE: NORMAL
UROBILINOGEN, URINE: 0.2 E.U./DL
WBC # BLD: 8.8 K/UL (ref 4.8–10.8)

## 2019-05-29 PROCEDURE — 70450 CT HEAD/BRAIN W/O DYE: CPT

## 2019-05-29 PROCEDURE — 85652 RBC SED RATE AUTOMATED: CPT

## 2019-05-29 PROCEDURE — 99219 PR INITIAL OBSERVATION CARE/DAY 50 MINUTES: CPT | Performed by: INTERNAL MEDICINE

## 2019-05-29 PROCEDURE — 93005 ELECTROCARDIOGRAM TRACING: CPT

## 2019-05-29 PROCEDURE — 36415 COLL VENOUS BLD VENIPUNCTURE: CPT

## 2019-05-29 PROCEDURE — 86140 C-REACTIVE PROTEIN: CPT

## 2019-05-29 PROCEDURE — 2580000003 HC RX 258: Performed by: NURSE PRACTITIONER

## 2019-05-29 PROCEDURE — 84484 ASSAY OF TROPONIN QUANT: CPT

## 2019-05-29 PROCEDURE — 73560 X-RAY EXAM OF KNEE 1 OR 2: CPT

## 2019-05-29 PROCEDURE — 80053 COMPREHEN METABOLIC PANEL: CPT

## 2019-05-29 PROCEDURE — 83880 ASSAY OF NATRIURETIC PEPTIDE: CPT

## 2019-05-29 PROCEDURE — 85610 PROTHROMBIN TIME: CPT

## 2019-05-29 PROCEDURE — 83605 ASSAY OF LACTIC ACID: CPT

## 2019-05-29 PROCEDURE — 87040 BLOOD CULTURE FOR BACTERIA: CPT

## 2019-05-29 PROCEDURE — 99285 EMERGENCY DEPT VISIT HI MDM: CPT

## 2019-05-29 PROCEDURE — 71045 X-RAY EXAM CHEST 1 VIEW: CPT

## 2019-05-29 PROCEDURE — 85730 THROMBOPLASTIN TIME PARTIAL: CPT

## 2019-05-29 PROCEDURE — 81003 URINALYSIS AUTO W/O SCOPE: CPT

## 2019-05-29 PROCEDURE — 85025 COMPLETE CBC W/AUTO DIFF WBC: CPT

## 2019-05-29 RX ORDER — OXYCODONE HYDROCHLORIDE 5 MG/1
5 TABLET ORAL EVERY 4 HOURS PRN
Status: ON HOLD | COMMUNITY
End: 2019-06-04 | Stop reason: HOSPADM

## 2019-05-29 RX ORDER — POLYETHYLENE GLYCOL 3350 17 G/17G
17 POWDER, FOR SOLUTION ORAL 3 TIMES DAILY
Status: ON HOLD | COMMUNITY
End: 2019-06-04 | Stop reason: HOSPADM

## 2019-05-29 RX ORDER — TIZANIDINE 4 MG/1
4 TABLET ORAL EVERY 6 HOURS PRN
Status: ON HOLD | COMMUNITY
End: 2019-07-19 | Stop reason: HOSPADM

## 2019-05-29 RX ORDER — 0.9 % SODIUM CHLORIDE 0.9 %
30 INTRAVENOUS SOLUTION INTRAVENOUS ONCE
Status: COMPLETED | OUTPATIENT
Start: 2019-05-29 | End: 2019-05-30

## 2019-05-29 RX ORDER — FERROUS SULFATE 325(65) MG
325 TABLET ORAL 2 TIMES DAILY
COMMUNITY

## 2019-05-29 RX ADMIN — SODIUM CHLORIDE 2313 ML: 9 INJECTION, SOLUTION INTRAVENOUS at 22:10

## 2019-05-30 PROBLEM — R29.6 MULTIPLE FALLS: Status: ACTIVE | Noted: 2019-05-30

## 2019-05-30 PROBLEM — E86.0 DEHYDRATION: Status: ACTIVE | Noted: 2019-05-30

## 2019-05-30 PROBLEM — R62.7 FAILURE TO THRIVE IN ADULT: Status: ACTIVE | Noted: 2019-05-30

## 2019-05-30 PROBLEM — D64.9 ANEMIA: Status: ACTIVE | Noted: 2019-05-30

## 2019-05-30 LAB
ALBUMIN SERPL-MCNC: 3 G/DL (ref 3.5–5.2)
ALP BLD-CCNC: 75 U/L (ref 35–104)
ALT SERPL-CCNC: 57 U/L (ref 5–33)
ANION GAP SERPL CALCULATED.3IONS-SCNC: 15 MMOL/L (ref 7–19)
AST SERPL-CCNC: 37 U/L (ref 5–32)
BASE EXCESS ARTERIAL: -0.5 MMOL/L (ref -2–2)
BILIRUB SERPL-MCNC: 0.3 MG/DL (ref 0.2–1.2)
BUN BLDV-MCNC: 22 MG/DL (ref 8–23)
C-REACTIVE PROTEIN: 1.9 MG/DL (ref 0–0.5)
CALCIUM SERPL-MCNC: 8.6 MG/DL (ref 8.8–10.2)
CARBOXYHEMOGLOBIN ARTERIAL: 1.6 % (ref 0–5)
CHLORIDE BLD-SCNC: 102 MMOL/L (ref 98–111)
CO2: 19 MMOL/L (ref 22–29)
CREAT SERPL-MCNC: 0.6 MG/DL (ref 0.5–0.9)
GFR NON-AFRICAN AMERICAN: >60
GLUCOSE BLD-MCNC: 98 MG/DL (ref 74–109)
HCO3 ARTERIAL: 24.2 MMOL/L (ref 22–26)
HCT VFR BLD CALC: 26.6 % (ref 37–47)
HEMOGLOBIN, ART, EXTENDED: 8 G/DL (ref 12–16)
HEMOGLOBIN: 8.5 G/DL (ref 12–16)
MCH RBC QN AUTO: 29.4 PG (ref 27–31)
MCHC RBC AUTO-ENTMCNC: 32 G/DL (ref 33–37)
MCV RBC AUTO: 92 FL (ref 81–99)
METHEMOGLOBIN ARTERIAL: 1.2 %
O2 CONTENT ARTERIAL: 11 ML/DL
O2 SAT, ARTERIAL: 96.4 %
O2 THERAPY: ABNORMAL
PCO2 ARTERIAL: 39 MMHG (ref 35–45)
PDW BLD-RTO: 14 % (ref 11.5–14.5)
PH ARTERIAL: 7.4 (ref 7.35–7.45)
PLATELET # BLD: 302 K/UL (ref 130–400)
PMV BLD AUTO: 9.8 FL (ref 9.4–12.3)
PO2 ARTERIAL: 99 MMHG (ref 80–100)
POTASSIUM SERPL-SCNC: 4.2 MMOL/L (ref 3.5–5)
POTASSIUM, WHOLE BLOOD: 3.4
RBC # BLD: 2.89 M/UL (ref 4.2–5.4)
SEDIMENTATION RATE, ERYTHROCYTE: 49 MM/HR (ref 0–25)
SODIUM BLD-SCNC: 136 MMOL/L (ref 136–145)
TOTAL PROTEIN: 6.1 G/DL (ref 6.6–8.7)
WBC # BLD: 6.6 K/UL (ref 4.8–10.8)

## 2019-05-30 PROCEDURE — 36415 COLL VENOUS BLD VENIPUNCTURE: CPT

## 2019-05-30 PROCEDURE — 1210000000 HC MED SURG R&B

## 2019-05-30 PROCEDURE — 97116 GAIT TRAINING THERAPY: CPT

## 2019-05-30 PROCEDURE — 97161 PT EVAL LOW COMPLEX 20 MIN: CPT

## 2019-05-30 PROCEDURE — 6360000002 HC RX W HCPCS: Performed by: INTERNAL MEDICINE

## 2019-05-30 PROCEDURE — 6370000000 HC RX 637 (ALT 250 FOR IP): Performed by: ORTHOPAEDIC SURGERY

## 2019-05-30 PROCEDURE — 36600 WITHDRAWAL OF ARTERIAL BLOOD: CPT

## 2019-05-30 PROCEDURE — 99225 PR SBSQ OBSERVATION CARE/DAY 25 MINUTES: CPT | Performed by: PHYSICIAN ASSISTANT

## 2019-05-30 PROCEDURE — G0378 HOSPITAL OBSERVATION PER HR: HCPCS

## 2019-05-30 PROCEDURE — 80053 COMPREHEN METABOLIC PANEL: CPT

## 2019-05-30 PROCEDURE — 99285 EMERGENCY DEPT VISIT HI MDM: CPT | Performed by: EMERGENCY MEDICINE

## 2019-05-30 PROCEDURE — 85027 COMPLETE CBC AUTOMATED: CPT

## 2019-05-30 PROCEDURE — 2580000003 HC RX 258: Performed by: INTERNAL MEDICINE

## 2019-05-30 PROCEDURE — 6370000000 HC RX 637 (ALT 250 FOR IP): Performed by: INTERNAL MEDICINE

## 2019-05-30 PROCEDURE — 82803 BLOOD GASES ANY COMBINATION: CPT

## 2019-05-30 PROCEDURE — 84132 ASSAY OF SERUM POTASSIUM: CPT

## 2019-05-30 PROCEDURE — 97535 SELF CARE MNGMENT TRAINING: CPT

## 2019-05-30 PROCEDURE — 97165 OT EVAL LOW COMPLEX 30 MIN: CPT

## 2019-05-30 RX ORDER — ACETAMINOPHEN 500 MG
500 TABLET ORAL EVERY 6 HOURS PRN
COMMUNITY
End: 2020-09-01 | Stop reason: ALTCHOICE

## 2019-05-30 RX ORDER — POLYETHYLENE GLYCOL 3350 17 G/17G
17 POWDER, FOR SOLUTION ORAL DAILY PRN
Status: DISCONTINUED | OUTPATIENT
Start: 2019-05-30 | End: 2019-06-04 | Stop reason: HOSPADM

## 2019-05-30 RX ORDER — ACETAMINOPHEN 325 MG/1
650 TABLET ORAL EVERY 8 HOURS PRN
Status: DISCONTINUED | OUTPATIENT
Start: 2019-05-30 | End: 2019-06-04 | Stop reason: HOSPADM

## 2019-05-30 RX ORDER — OXYCODONE HYDROCHLORIDE 5 MG/1
5 TABLET ORAL EVERY 4 HOURS PRN
Status: DISCONTINUED | OUTPATIENT
Start: 2019-05-30 | End: 2019-06-02

## 2019-05-30 RX ORDER — NABUMETONE 500 MG/1
500 TABLET, FILM COATED ORAL 2 TIMES DAILY
COMMUNITY
End: 2019-08-26

## 2019-05-30 RX ORDER — POLYETHYLENE GLYCOL 3350 17 G/17G
17 POWDER, FOR SOLUTION ORAL 3 TIMES DAILY
Status: DISCONTINUED | OUTPATIENT
Start: 2019-05-30 | End: 2019-05-31

## 2019-05-30 RX ORDER — ATORVASTATIN CALCIUM 10 MG/1
10 TABLET, FILM COATED ORAL DAILY
Status: DISCONTINUED | OUTPATIENT
Start: 2019-05-30 | End: 2019-06-04 | Stop reason: HOSPADM

## 2019-05-30 RX ORDER — LACTULOSE 10 G/15ML
20 SOLUTION ORAL ONCE
Status: COMPLETED | OUTPATIENT
Start: 2019-05-30 | End: 2019-05-30

## 2019-05-30 RX ORDER — ONDANSETRON 2 MG/ML
4 INJECTION INTRAMUSCULAR; INTRAVENOUS EVERY 6 HOURS PRN
Status: DISCONTINUED | OUTPATIENT
Start: 2019-05-30 | End: 2019-06-04 | Stop reason: HOSPADM

## 2019-05-30 RX ORDER — SODIUM CHLORIDE 9 MG/ML
INJECTION, SOLUTION INTRAVENOUS CONTINUOUS
Status: DISCONTINUED | OUTPATIENT
Start: 2019-05-30 | End: 2019-05-31

## 2019-05-30 RX ORDER — SENNA PLUS 8.6 MG/1
1 TABLET ORAL DAILY
COMMUNITY

## 2019-05-30 RX ORDER — PANTOPRAZOLE SODIUM 40 MG/1
40 TABLET, DELAYED RELEASE ORAL DAILY
Status: DISCONTINUED | OUTPATIENT
Start: 2019-05-30 | End: 2019-05-31

## 2019-05-30 RX ORDER — NORTRIPTYLINE HYDROCHLORIDE 25 MG/1
50 CAPSULE ORAL NIGHTLY
Status: DISCONTINUED | OUTPATIENT
Start: 2019-05-30 | End: 2019-06-04 | Stop reason: HOSPADM

## 2019-05-30 RX ADMIN — RIVAROXABAN 10 MG: 10 TABLET, FILM COATED ORAL at 08:34

## 2019-05-30 RX ADMIN — LACTULOSE 20 G: 20 SOLUTION ORAL at 08:34

## 2019-05-30 RX ADMIN — ACETAMINOPHEN 650 MG: 325 TABLET ORAL at 20:55

## 2019-05-30 RX ADMIN — PANTOPRAZOLE SODIUM 40 MG: 40 TABLET, DELAYED RELEASE ORAL at 08:34

## 2019-05-30 RX ADMIN — POLYETHYLENE GLYCOL 3350 17 G: 17 POWDER, FOR SOLUTION ORAL at 22:09

## 2019-05-30 RX ADMIN — ATORVASTATIN CALCIUM 10 MG: 10 TABLET, FILM COATED ORAL at 08:33

## 2019-05-30 RX ADMIN — NORTRIPTYLINE HYDROCHLORIDE 50 MG: 25 CAPSULE ORAL at 20:55

## 2019-05-30 RX ADMIN — POLYETHYLENE GLYCOL 3350 17 G: 17 POWDER, FOR SOLUTION ORAL at 08:33

## 2019-05-30 RX ADMIN — OXYCODONE HYDROCHLORIDE 5 MG: 5 TABLET ORAL at 22:08

## 2019-05-30 RX ADMIN — ONDANSETRON 4 MG: 2 INJECTION INTRAMUSCULAR; INTRAVENOUS at 22:50

## 2019-05-30 RX ADMIN — SODIUM CHLORIDE: 9 INJECTION, SOLUTION INTRAVENOUS at 03:00

## 2019-05-30 RX ADMIN — POLYETHYLENE GLYCOL 3350 17 G: 17 POWDER, FOR SOLUTION ORAL at 20:56

## 2019-05-30 ASSESSMENT — ENCOUNTER SYMPTOMS
BACK PAIN: 0
EYE PAIN: 0
WHEEZING: 0
PHOTOPHOBIA: 0
VOMITING: 0
EYE ITCHING: 0
EYE REDNESS: 0
ALLERGIC/IMMUNOLOGIC NEGATIVE: 1
STRIDOR: 0
SHORTNESS OF BREATH: 0
DIARRHEA: 0
BLOOD IN STOOL: 0
CONSTIPATION: 0
EYE DISCHARGE: 0
TROUBLE SWALLOWING: 0
NAUSEA: 0
COLOR CHANGE: 0
ABDOMINAL DISTENTION: 0
ABDOMINAL PAIN: 0
CHEST TIGHTNESS: 0
SINUS PAIN: 0
CHANGE IN BOWEL HABIT: 1
SORE THROAT: 0

## 2019-05-30 ASSESSMENT — PAIN SCALES - GENERAL
PAINLEVEL_OUTOF10: 6
PAINLEVEL_OUTOF10: 10
PAINLEVEL_OUTOF10: 6

## 2019-05-30 NOTE — CARE COORDINATION
SW met with Pt to discuss DC planning options. Pt understands she will need short term rehab again before she goes back home. Pt was agreeable to [address] [place] [address] since she had been there recently. Pt insurance requires PT/OT evals for Pre-Cert to begin.    [address]  Electronically signed by Foreign Rios on 5/30/2019 at 12:02 PM

## 2019-05-30 NOTE — PROGRESS NOTES
Occupational Therapy   Occupational Therapy Initial Assessment  Date: 2019   Patient Name: Carl Acosta  MRN: [id number]     : 1940    Date of Service: 2019    Discharge Recommendations:  Patient would benefit from continued therapy after discharge       Assessment   Performance deficits / Impairments: Decreased functional mobility ; Decreased high-level IADLs;Decreased balance;Decreased ADL status; Decreased strength  Assessment: Pt benefits from skilled OT to address decreased pt I to complete functional mobility, balance, endurance, and ADL tasks s/p frequent falls, and safe d/c plan. Pt most likely would benefit from Assisted living placement, Long term care due to pt decreased safety to complete homemaking tasks, balance ,endurance, and ADL tasks on own and safety concerns. Prognosis: Good  Decision Making: Low Complexity  REQUIRES OT FOLLOW UP: Yes  Activity Tolerance  Activity Tolerance: Patient Tolerated treatment well  Safety Devices  Safety Devices in place: Yes  Type of devices: Call light within reach;Nurse notified; Left in chair           Patient Diagnosis(es): There were no encounter diagnoses. has a past medical history of Anxiety, Carotid artery stenosis, Chronic back pain, H/O blood clots, Hx of blood clots, Hyperlipidemia, Hypertension, and Osteoarthritis. has a past surgical history that includes eye surgery; Hysterectomy; joint replacement; Cholecystectomy; other surgical history; Cataract removal (Bilateral); Revision total knee arthroplasty (Left, 2019); and REMOVE HARDWARE FEMUR (Left, 2019).          Restrictions  Restrictions/Precautions  Restrictions/Precautions: Weight Bearing  Lower Extremity Weight Bearing Restrictions  Left Lower Extremity Weight Bearing: Weight Bearing As Tolerated    Subjective   General  Patient assessed for rehabilitation services?: Yes  Additional Pertinent Hx: Pt reports that she has broken R shoulder   Referring Practitioner:  Swati   Diagnosis: L TKR   Subjective  Subjective: Pt demonstrates high anxiety and severe depression during session      Social/Functional History  Social/Functional History  Lives With: Alone  Type of Home: House  Home Access: Ramped entrance  Bathroom Shower/Tub: Tub/Shower unit  Bathroom Toilet: Handicap height  Bathroom Equipment: Tub transfer bench  Home Equipment: 4 wheeled walker, Rolling walker, Electric scooter  ADL Assistance: Independent  Homemaking Assistance: Independent  Ambulation Assistance: Independent  Transfer Assistance: Independent  Active : No  Additional Comments: pt has been trying to get meals on wheels, and someone to help assist with cleaning        Objective   Vision: Impaired  Vision Exceptions: Wears glasses for reading  Hearing: Within functional limits    Orientation  Overall Orientation Status: Within Functional Limits  Observation/Palpation  Posture: Good  Observation: hunches over RW, keeps RW in front of pt during ambulation   Balance  Sitting Balance: Stand by assistance  Standing Balance: Contact guard assistance  Functional Mobility  Functional - Mobility Device: Rolling Walker  Activity: To/from bathroom  Assist Level: Minimal assistance  Functional Mobility Comments: Min A to stand from toilet; cues for upright posture  Toilet Transfers  Toilet Transfer: Contact guard assistance;Minimal assistance  ADL  Feeding: Independent  Grooming: Stand by assistance  UE Bathing: Stand by assistance  LE Bathing: Moderate assistance  UE Dressing: Stand by assistance  LE Dressing:  Moderate assistance  Toileting: Minimal assistance  Additional Comments: Pt demonstrates decreased safety throughout transfers and mobility due to inability to maintain upright posture with RW         Bed mobility  Supine to Sit: Contact guard assistance;Minimal assistance  Transfers  Sit to stand: Contact guard assistance  Stand to sit: Contact guard assistance     Cognition  Overall Cognitive Status: WFL  Cognition Comment: Anxiety impairs safety at times with ambulation especially RW use         Sensation  Overall Sensation Status: WFL        LUE AROM (degrees)  LUE AROM : WFL  LUE General AROM: 90 degrees of shoulder flexion   Left Hand AROM (degrees)  Left Hand AROM: WFL  RUE AROM (degrees)  RUE General AROM: Pt able to complete shoulder flexion to 70 degrees   Right Hand AROM (degrees)  Right Hand AROM: WFL        Plan   Plan  Times per week: 3-5x/wk  Current Treatment Recommendations: Strengthening, Equipment Evaluation, Education, & procurement, Self-Care / ADL, Functional Mobility Training, Patient/Caregiver Education & Training, Balance Training, Safety Education & Training, Endurance Training       Goals  Short term goals  Time Frame for Short term goals: 1 week  Short term goal 1: Pt will be Modified I for toileting task/transfer  Short term goal 2: Pt will be Modified I for functional mobility to/from bathroom   Short term goal 3: Pt will be Modified I for LE dressing   Patient Goals   Patient goals :  To go home     Electronically signed by Harshil Lopez OT on 5/30/2019 at 1:26 PM    Harshil Lopez OT

## 2019-05-30 NOTE — H&P
Hospital Medicine    History & Physical      PCP: Andrei Desai MD      CC:   Chief Complaint   Patient presents with   Lyndon Marie     pt fell to knees with walker, pt had knee replacement recently, pt complains of left leg pain       History Obtained From:  patient, electronic medical record    HISTORY OF PRESENT ILLNESS:      The patient is a 70 y.o. female who presents:    Fatigue   This is a new problem. The current episode started in the past 7 days. The problem occurs constantly. The problem has been gradually worsening. Associated symptoms include anorexia, arthralgias, a change in bowel habit and fatigue. Nothing aggravates the symptoms. She has tried nothing for the symptoms. The treatment provided no relief. post op left knee surgery. Progressive weakness, lower ext,         Past Medical History:        Diagnosis Date    Anxiety     Carotid artery stenosis     Chronic back pain     H/O blood clots     Hx of blood clots     bilat legs    Hyperlipidemia     Hypertension     Osteoarthritis        Past Surgical History:        Procedure Laterality Date    CATARACT REMOVAL Bilateral     CHOLECYSTECTOMY      EYE SURGERY      HYSTERECTOMY      JOINT REPLACEMENT      OTHER SURGICAL HISTORY      Decompression of ulnar nerve     REMOVE HARDWARE FEMUR Left 4/24/2019    HARDWARE REMOVAL performed by Chanda Mullen MD at [place] [address] Left 4/24/2019    LEFT KNEE REVISION performed by Chanda Mullen MD at [address] [place] [address]       Medications Prior to Admission:    Not in a hospital admission. Allergies:  Codeine; Levaquin [levofloxacin]; Lyrica [pregabalin]; and Morphine    Social History:   TOBACCO:   reports that she has never smoked. She has never used smokeless tobacco.  ETOH:   reports that she does not drink alcohol.   Patient currently lives with family     Family History:       Problem Relation Age of Onset    Dementia Mother     Heart Disease Father     Cancer Brother I have reviewed EKG with the following interpretation:  Imaging:    XR CHEST PORTABLE   Final Result   Impression:   No acute cardiopulmonary disease. Signed by Dr Malina Middleton on 5/29/2019 10:01 PM      XR KNEE LEFT (1-2 VIEWS)   Final Result   Impression:   No acute osseous pathology. Signed by Dr Malina Middleton on 5/29/2019 10:02 PM      CT Head WO Contrast    (Results Pending)              Labs Reviewed   CBC WITH AUTO DIFFERENTIAL - Abnormal; Notable for the following components:       Result Value    RBC 2.94 (*)     Hemoglobin 8.5 (*)     Hematocrit 26.7 (*)     MCHC 31.8 (*)     MPV 9.3 (*)     Neutrophils % 75.7 (*)     Lymphocytes % 12.0 (*)     All other components within normal limits   COMPREHENSIVE METABOLIC PANEL W/ REFLEX TO MG FOR LOW K - Abnormal; Notable for the following components:    Sodium 129 (*)     Chloride 93 (*)     Glucose 132 (*)     BUN 28 (*)     CREATININE 1.1 (*)     GFR Non- 48 (*)     Alb 3.4 (*)     ALT 65 (*)     AST 50 (*)     All other components within normal limits   PROTIME-INR - Abnormal; Notable for the following components:    Protime 19.4 (*)     INR 1.72 (*)     All other components within normal limits   APTT - Abnormal; Notable for the following components:    aPTT 43.6 (*)     All other components within normal limits   CULTURE BLOOD #1   CULTURE BLOOD #2   URINE RT REFLEX TO CULTURE   LACTIC ACID, PLASMA   TROPONIN   BRAIN NATRIURETIC PEPTIDE          IMPRESSION:    1. Failure to thrive  2. Dehydration  3. raphael  4. Hyponatremia  5.  Sp left tkr  6. constipation    Patient Active Problem List   Diagnosis    Decreased pulses in feet    Atherosclerosis of native artery of both lower extremities with intermittent claudication (HCC)    Loose total knee arthroplasty ([facility] [address]    History of maternal deep vein thrombosis (DVT)    Acute blood loss as cause of postoperative anemia    Essential hypertension    Slow transit constipation    Hyponatremia  Hyperglycemia    GERD (gastroesophageal reflux disease)    Failure to thrive in adult         PLAN:       1. Admit to ortho floor  2. Fall precaution  3. Review home medications  4. Iv hydration  5. Case management consult   6. Continue rivaroxaban  7. Ortho consult follow up  8. Treat constipation  9.  Monitor I/o    CC: MD Marya Crowe MD      Internal Medicine Hospitalist

## 2019-05-30 NOTE — ED NOTES
Bed: 08  Expected date:   Expected time:   Means of arrival:   Comments:  [facility]     [facility]  05/29/19 4640

## 2019-05-30 NOTE — PROGRESS NOTES
[facility] Hospitalists      Patient:    Sj Mloina  YOB: 1940  Date of Service:  5/30/2019  MRN:    [id number]    Room:   [address] [place] [address]   PCP:    Bethel Ricardo MD     Chief Complaint:  Fall    Subjective:  Ms. Jorge Post reports weakness at home. States she was doing well with therapy while at [facility] and ambulating with the use of a walker. She was discharged home on 5/26/19. There was some confusion with her medication list, a neighbor is helping her with her meds. She isn't sure if she was even taking the correct medications. She then had two falls at home. Cumulative Hospital Course: The patient is a pleasant 70year old female who was recently admitted to the hospital and underwent an explantation of left knee, deep hardware removal requiring metal cutting kobi and cutting of distal femoral plate, and segmental left total knee revision on 4/24/19. 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 She had reached a point where she required a walker constantly. After the surgery, the patient was discharged to [facility], where, according to the patient, she progressed well with therapy. She was discharged home. She states she began feeling weaker. She reports two different falls. She states there was some confusion regarding her medication list. She came to the ER after the second fall and was admitted to the hospital for further evaluation and treatment. She was evaluated on 5/30/19 by Dr. Renee Cueva. After examining the patient, he did not find any problems with the knee itself. PT/OT are consulted. SW consulted to assist with discharge planning. Review of Systems:   10 point review of systems is negative except as specifically addressed above.     Objective:   VITALS:  BP (!) 122/55   Pulse 83   Temp 98.1 °F (36.7 °C) (Temporal)   Resp 16   Ht 5' (1.524 m)   Wt 172 lb (78 kg)   SpO2 93%   BMI 33.59 kg/m²   24HR INTAKE/OUTPUT:      Intake/Output Summary (Last 24 hours) at 5/30/2019 1243  Last data filed at 5/30/2019 1000  Gross per 24 hour   Intake 120 ml   Output --   Net 120 ml     General appearance: alert, appears stated age, cooperative and no distress  Head: Normocephalic, without obvious abnormality, atraumatic  Eyes: conjunctivae/corneas clear. PERRL, EOM's intact. Ears: normal external ears and nose, throat without exudate  Neck: Supple, no JVD, no tracheal deviation  Lungs: Normal respiratory effort, clear to auscultation bilaterally,no rales or wheezes   Heart: Regular rate and rhythm, S1, S2 normal, no murmur  Abdomen: Soft, non-tender; non-distended, normal bowel sounds no masses, no organomegaly  Extremities: No lower extremity edema,  Left knee incision well healed, no erythema. Skin: Warm and dry, not diaphoretic. Neurologic: No focal deficits noted.   Psychiatric: Alert and oriented, thought content appropriate, normal insight, mood appropriate      Medications:      sodium chloride 75 mL/hr at 05/30/19 0300      atorvastatin  10 mg Oral Daily    pantoprazole  40 mg Oral Daily    nortriptyline  50 mg Oral Nightly    polyethylene glycol  17 g Oral TID    rivaroxaban  10 mg Oral Q24H     ondansetron, polyethylene glycol, acetaminophen  DIET CARB CONTROL;     Lab and other Data:     Recent Labs     05/29/19 2158 05/30/19  0751   WBC 8.8 6.6   HGB 8.5* 8.5*    302     Recent Labs     05/29/19 2158 05/30/19  0134 05/30/19  0751   *  --  136   K 3.7 3.4 4.2   CL 93*  --  102   CO2 22  --  19*   BUN 28*  --  22   CREATININE 1.1*  --  0.6   GLUCOSE 132*  --  98     Recent Labs     05/29/19 2158 05/30/19  0751   AST 50* 37*   ALT 65* 57*   BILITOT 0.4 0.3   ALKPHOS 83 75     Troponin T:   Recent Labs     05/29/19 2158   TROPONINI 0.01     Pro-BNP: No results for input(s): BNP in the last 72 hours.  INR:   Recent Labs     05/29/19  2158   INR 1.72*     ABGs:   Lab Results   Component Value Date    PHART 7.400 05/30/2019    PO2ART 99.0 05/30/2019    QHS2LZY 39.0 05/30/2019     UA:  Recent Labs     05/29/19  2214   COLORU YELLOW   PHUR 5.5   CLARITYU Clear   SPECGRAV 1.021   LEUKOCYTESUR Negative   UROBILINOGEN 0.2   BILIRUBINUR Negative   BLOODU Negative   GLUCOSEU Negative       RAD:   Xr Knee Left (1-2 Views)    Result Date: 5/29/2019  Exam:   XR KNEE LEFT (1-2 VIEWS)  Date:  5/29/2019 History:  Female, age  70 years; recent total knee. Swelling, fall COMPARISON:  None. Findings : There is no acute displaced fracture. No dislocation. No suspicious lytic or blastic lesion. No radiopaque foreign body. Left knee arthroplasty, without hardware loosening or failure. There is no acute fracture line identified. Joint effusion is present. Impression: No acute osseous pathology. Signed by Dr Zain Healy on 5/29/2019 10:02 PM    Ct Head Wo Contrast    Result Date: 5/30/2019  Examination. CT HEAD WO CONTRAST History: The patient fell and complains of headache. She is on anticoagulant therapy. DLP: 667 mGycm. The CT scan of the head is performed without intravenous contrast enhancement. The images are acquired in axial plane with subsequent reconstruction in coronal and sagittal planes. The comparison is made with the previous study dated 2/29/2012. There is no evidence of intracranial hemorrhage or hematoma. There is no evidence of mass or midline shift. Moderately prominent ventricles, basal cistern and the cortical sulci are age-appropriate. The scattered areas of chronic white matter ischemia bilaterally are noted which is stable. There is normal gray-white matter differentiation. The images reviewed in bone window show no acute bony abnormality. The visualized paranasal sinuses and mastoid air cells are normal and clear. No acute intracranial abnormality.  Moderate chronic ischemic and atrophic

## 2019-05-30 NOTE — PROGRESS NOTES
5/30/2019  1:36 AM - Krys Olmos Incoming Lab Results From [employer] Value Ref Range & Units Status Collected Lab   pH, Arterial 7.400  7.350 - 7.450 Final 05/30/2019  1:34 AM [facility]   pCO2, Arterial 39.0  35.0 - 45.0 mmHg Final 05/30/2019  1:34 AM [facility]   pO2, Arterial 99.0  80.0 - 100.0 mmHg Final 05/30/2019  1:34 AM [facility]   HCO3, Arterial 24.2  22.0 - 26.0 mmol/L Final 05/30/2019  1:34 AM [facility] Excess, Arterial -0.5  -2.0 - 2.0 mmol/L Final 05/30/2019  1:34 AM [facility]   Hemoglobin, Art, Extended 8.0Low   12.0 - 16.0 g/dL Final 05/30/2019  1:34 AM [facility]   O2 Sat, Arterial 96.4  >92 % Final 05/30/2019  1:34 AM [facility]   Carboxyhgb, Arterial 1.6  0.0 - 5.0 % Final 05/30/2019  1:34 AM [facility]        0.0-1.5   (Smokers 1.5-5.0)    Methemoglobin, Arterial 1.2  <1.5 % Final 05/30/2019  1:34 AM [facility]   O2 Content, Arterial 11.0          Pt on 1 lpm nc, RR, AT+

## 2019-05-30 NOTE — ED PROVIDER NOTES
[facility] 5 SURG SERVICES  eMERGENCYdEPARTMENT eNCOUnter      Pt Name: Janett Medrano  MRN: [id number]  [place] 1940  Date of evaluation: 5/29/2019  MATTEO Gamez - KRYSTAL    CHIEF COMPLAINT       Chief Complaint   Patient presents with    Fall     pt fell to knees with walker, pt had knee replacement recently, pt complains of left leg pain         HISTORY OF PRESENT ILLNESS  (Location/Symptom, Timing/Onset, Context/Setting, Quality, Duration, Modifying Factors, Severity.)   Janett Medrano is a 70 y.o. female who presents to the emergency department for evaluation following a fall. Upon arrival patient was difficult to arouse but answered all questions appropriately and was alert and oriented once she was aroused. Pt was hypotensive initially and verbalized to me that she felt weak. Patient says she has fallen twice and multiple times this week. Patient reports to me she fell onto her left knee. Reports having recent left knee replacement by Dr. Saritha Canas- this surgery was on 4/24/2019. Pt was sent to [address] [place] [address] for rehab and has been at home for the past week. Daughter in law at the bedside says patient has complained of ongoing knee pain and difficulty with ambulation despite using a walker. Patient says she did not hit her head but the fall was unwitnessed and patient is on xarelto. Patient has also been on doxycycline for the past week for a UTI. Patient says she is drowsy because she took night time medications prior to her family calling EMS. The history is provided by the patient and a relative. Nursing Notes were reviewed and I agree. REVIEW OF SYSTEMS    (2-9 systems for level 4, 10 or more for level 5)     Review of Systems   Constitutional: Positive for fatigue. Negative for activity change, appetite change, chills, diaphoresis, fever and unexpected weight change. HENT: Negative for ear pain, sinus pain, sore throat and trouble swallowing.     Eyes: Negative for Severe Chronic Pain, Pain      senna (SENOKOT) 8.6 MG tablet Take 1 tablet by mouth daily      acetaminophen (TYLENOL) 500 MG tablet Take 500 mg by mouth every 6 hours as needed for Pain      Miconazole Nitrate (REMEDY PHYTOPLEX ANTIFUNGAL EX) Apply topically      polyethylene glycol (GLYCOLAX) powder Take 17 g by mouth 3 times daily      ferrous sulfate 325 (65 Fe) MG tablet Take 325 mg by mouth 2 times daily      oxyCODONE (ROXICODONE) 5 MG immediate release tablet Take 5 mg by mouth every 4 hours as needed for Pain.  Indications: 1-2 tabs      tiZANidine (ZANAFLEX) 4 MG tablet Take 4 mg by mouth every 6 hours as needed      rivaroxaban (XARELTO) 10 MG TABS tablet Take 1 tablet by mouth every 24 hours  Qty: 40 tablet, Refills: 0      polyethyl glycol-propyl glycol 0.4-0.3 % (SYSTANE) 0.4-0.3 % ophthalmic solution 2 drops as needed for Dry Eyes       diphenhydrAMINE (BENADRYL) 25 MG tablet Take 25 mg by mouth nightly as needed for Itching      Linaclotide (LINZESS PO) Take 290 mcg by mouth daily as needed       calcium carbonate (TUMS) 500 MG chewable tablet Take 1 tablet by mouth daily      Cholecalciferol (VITAMIN D3) 5000 units TABS Take by mouth daily       amLODIPine (NORVASC) 10 MG tablet Take 10 mg by mouth daily      atorvastatin (LIPITOR) 10 MG tablet Take 10 mg by mouth daily      Calcium Carbonate (CALTRATE 600 PO) Take 600 mg by mouth 2 times daily      hydrochlorothiazide (HYDRODIURIL) 25 MG tablet Take 25 mg by mouth 2 times daily      lisinopril (PRINIVIL;ZESTRIL) 40 MG tablet Take 40 mg by mouth daily      montelukast (SINGULAIR) 10 MG tablet Take 10 mg by mouth nightly      esomeprazole Magnesium (NEXIUM) 40 MG PACK Take 40 mg by mouth daily      nortriptyline (PAMELOR) 50 MG capsule Take 50 mg by mouth nightly      nystatin 229910 UNIT/GM POWD Apply 100,000 g topically 2 times daily      Multiple Vitamins-Minerals (PRESERVISION AREDS PO) Take by mouth daily      ALPRAZolam (XANAX) 1 MG tablet Take 1 mg by mouth nightly as needed for Sleep      cyanocobalamin 1000 MCG/ML injection Inject 1,000 mcg into the muscle every 30 days      cycloSPORINE (RESTASIS) 0.05 % ophthalmic emulsion Place 1 drop into both eyes 2 times daily             ALLERGIES     Codeine; Levaquin [levofloxacin];  Lyrica [pregabalin]; and Morphine    FAMILY HISTORY       Family History   Problem Relation Age of Onset    Dementia Mother     Heart Disease Father     Cancer Brother     COPD Other     Other Other         Blood clot    Diabetes Other           SOCIAL HISTORY       Social History     Socioeconomic History    Marital status:      Spouse name: None    Number of children: None    Years of education: None    Highest education level: None   Occupational History    None   Social Needs    Financial resource strain: None    Food insecurity:     Worry: None     Inability: None    Transportation needs:     Medical: None     Non-medical: None   Tobacco Use    Smoking status: Never Smoker    Smokeless tobacco: Never Used   Substance and Sexual Activity    Alcohol use: No    Drug use: No    Sexual activity: None   Lifestyle    Physical activity:     Days per week: None     Minutes per session: None    Stress: None   Relationships    Social connections:     Talks on phone: None     Gets together: None     Attends Religion service: None     Active member of club or organization: None     Attends meetings of clubs or organizations: None     Relationship status: None    Intimate partner violence:     Fear of current or ex partner: None     Emotionally abused: None     Physically abused: None     Forced sexual activity: None   Other Topics Concern    None   Social History Narrative    None       SCREENINGS    Juan Coma Scale  Eye Opening: Spontaneous  Best Verbal Response: Oriented  Best Motor Response: Obeys commands  Juan Coma Scale Score: 15      PHYSICAL EXAM    (up to 7 forlevel 4, 8 or more for level 5) Notable for the following components:    aPTT 43.6 (*)     All other components within normal limits   C-REACTIVE PROTEIN - Abnormal; Notable for the following components:    CRP 1.90 (*)     All other components within normal limits   SEDIMENTATION RATE - Abnormal; Notable for the following components:    Sed Rate 49 (*)     All other components within normal limits   BLOOD GAS, ARTERIAL - Abnormal; Notable for the following components:    Hemoglobin, Art, Extended 8.0 (*)     All other components within normal limits   COMPREHENSIVE METABOLIC PANEL - Abnormal; Notable for the following components:    CO2 19 (*)     Calcium 8.6 (*)     Total Protein 6.1 (*)     Alb 3.0 (*)     ALT 57 (*)     AST 37 (*)     All other components within normal limits   CBC - Abnormal; Notable for the following components:    RBC 2.89 (*)     Hemoglobin 8.5 (*)     Hematocrit 26.6 (*)     MCHC 32.0 (*)     All other components within normal limits   BASIC METABOLIC PANEL - Abnormal; Notable for the following components:    Sodium 130 (*)     Chloride 96 (*)     CO2 20 (*)     Glucose 179 (*)     Calcium 8.7 (*)     All other components within normal limits   CBC - Abnormal; Notable for the following components:    RBC 3.18 (*)     Hemoglobin 9.3 (*)     Hematocrit 28.0 (*)     All other components within normal limits   FERRITIN - Abnormal; Notable for the following components:    Ferritin 517.1 (*)     All other components within normal limits   IRON AND TIBC - Abnormal; Notable for the following components:    Iron 21 (*)     Iron Saturation 8 (*)     All other components within normal limits   VITAMIN B12 - Abnormal; Notable for the following components:    Vitamin B-12 1655 (*)     All other components within normal limits   CULTURE BLOOD #1    Narrative:     ORDER#: [id number]                          ORDERED BY: Troy Peters  SOURCE: Blood Hand, Left                   COLLECTED:  05/29/19 21:45  ANTIBIOTICS AT [facility].: RECEIVED :  05/29/19 22:05   CULTURE BLOOD #2    Narrative:     ORDER#: [id number]                          ORDERED BY: Aniya Eduardo  SOURCE: Blood Antecubital-Left             COLLECTED:  05/29/19 21:58  ANTIBIOTICS AT [facility].:                      RECEIVED :  05/29/19 22:05   URINE RT REFLEX TO CULTURE   LACTIC ACID, PLASMA   TROPONIN   BRAIN NATRIURETIC PEPTIDE   POTASSIUM, WHOLE BLOOD   FOLATE       All other labs were within normal range or notreturned as of this dictation. RE-ASSESSMENT        EMERGENCY DEPARTMENT COURSE and DIFFERENTIAL DIAGNOSIS/MDM:   Vitals:    Vitals:    05/30/19 1815 05/30/19 2205 05/31/19 0833 05/31/19 0833   BP: (!) 131/57 (!) 161/64 (!) 148/64    Pulse: 88 95  83   Resp: 16 16 18    Temp: 98.9 °F (37.2 °C) 98.4 °F (36.9 °C) 97.2 °F (36.2 °C)    TempSrc: Temporal Axillary Temporal    SpO2: 94% 98% (!) 75% 99%   Weight:       Height:             MDM  Number of Diagnoses or Management Options  Diagnosis management comments: Patient presented to the ED for evaluation following two falls today. Patient was hypotensive so septic protocol was initiated. After two liters of NS bp increased to 103/47. Patient was still drowsy. A CT of the head was obtained due to patient being on anti-coagulants and possibly hitting head. CT of the head was negative. Lab workup initiated which showed normal WBC count. CMP showed elevated creatinine at 1.1 compared to 0.5 previously. Troponin negative. Coags negative. EKG showed Regular rate and rhythm. Normal P waves and normal CA interval. Normal QRS. No ST elevations or ST depressions. No significant Twave abnormalities. Discussed case with Dr. Karmen Baker and Dr. Karmen Baker also assessed the patient and agrees with need for admission for nursing home placement. Dr. Karmen Baker examined the left knee and does not feel infection is present. Xray was normal. Sed rate and CPR were added by Dr. Karmen Baker.      Dr. Hannah De Jesus was contacted for admission. PROCEDURES:    Procedures      FINAL IMPRESSION      1.  Fall, initial encounter          DISPOSITION/PLAN   DISPOSITION        PATIENT REFERRED TO:  Kirk Devi MD  [address] [place] [address] Night, [address] [facility]  [phone]    Schedule an appointment as soon as possible for a visit in 5 weeks  Post op check      DISCHARGE MEDICATIONS:  Current Discharge Medication List          (Please note that portions of this note were completed with a voice recognition program.  Efforts were made to edit the dictations but occasionallywords are mis-transcribed.)    MATTEO Howell CNP, APRN - CNP  05/31/19 0128

## 2019-05-30 NOTE — CARE COORDINATION
Patient is current with [facility] for SN/PT/OT/ST Services. OT eval scheduled for today. ST has seen but not sure if they picked her up. Will follow. Please advise when patient discharges. [address] [place] [address] [phone]. [phone].   Electronically signed by Trace Trotter RN on 5/30/19 at 9:00 AM

## 2019-05-30 NOTE — PROGRESS NOTES
I called [facility] in [place] and requested that they fax over a home medication list. They informed me that the most recent one they have on her is from March but they are sending it. I also called [address] [place] [address] and requested a medication list since the patient was just discharged from them this past Sagar, May 26th.  They are also going to fax over a med list. Electronically signed by Clarisa Red RN on 5/30/2019 at 4:42 PM

## 2019-05-30 NOTE — ED PROVIDER NOTES
Attending Supervising Physician's Attestation Statement  I performed a history and physical examination on the patient and discussed the management with the nurse practitioner. I reviewed and agree with the findings and plan as documented in her note . I was asked to evaluate this pt by the hospitalist since it is unclear if the left knee has a possible infection. Family tells me that pt has had 2 falls today alone. Pt had left knee replacement 4/24/19 with Dr. Sawyer Peter. Apparently pt had a difficult time following the operation and NH/rehab stay. She got home about a week ago and has since had trouble with ambulation even while using her walker. Pt has become unsteady on the walker and family is afraid that she may injure herself. The falls today did not help the situation. Eval of the left knee does not show an overlying cellulitis. Her lab work this evening is essentially unremarkable. The Creatinine is up above her baseline, but this could be hydration status. Imaging was also non-diagnostic. She was initially hypotensive but fluids have helped. Pt has also taken her nighttime meds which was hydrocodone, nortriptyline, and Xanax. Pt is a bit tired, but answers all questions appropriately. Family would like to get pt admitted for NH placement since she lives alone and there is no one near that can care for her.       Electronically signed by Elijah Grijalva MD on 5/30/19 at 12:28 AM        Makayla Redd MD  05/30/19 [id number]

## 2019-05-30 NOTE — PROGRESS NOTES
Physical Therapy    Facility/Department: [facility] SURG SERVICES  Initial Assessment    NAME: David Long  : 1940  MRN: [id number]    Date of Service: 2019    Discharge Recommendations:  Continue to assess pending progress, Patient would benefit from continued therapy after discharge   PT Equipment Recommendations  Other: ASSESSING    Assessment   Body structures, Functions, Activity limitations: Decreased functional mobility ; Decreased endurance;Decreased safe awareness; Increased Pain  Assessment: pt APPEARS TO BE AT INC RISK FOR A FALL DUE TO RECENT FALL AND GT DEVIATIONS AS WELL AS DECREASED ENDURANCE/FATIGUE  Prognosis: Good  Decision Making: Low Complexity  Patient Education: FALL PREVENTION  REQUIRES PT FOLLOW UP: Yes  Activity Tolerance  Activity Tolerance: Patient Tolerated treatment well       Patient Diagnosis(es): There were no encounter diagnoses. has a past medical history of Anxiety, Carotid artery stenosis, Chronic back pain, H/O blood clots, Hx of blood clots, Hyperlipidemia, Hypertension, and Osteoarthritis. has a past surgical history that includes eye surgery; Hysterectomy; joint replacement; Cholecystectomy; other surgical history; Cataract removal (Bilateral); Revision total knee arthroplasty (Left, 2019); and REMOVE HARDWARE FEMUR (Left, 2019). Restrictions  Restrictions/Precautions  Restrictions/Precautions: Weight Bearing  Lower Extremity Weight Bearing Restrictions  Left Lower Extremity Weight Bearing: Weight Bearing As Tolerated  Vision/Hearing  Vision: Impaired  Vision Exceptions: Wears glasses for reading  Hearing: Within functional limits     Subjective  General  Patient assessed for rehabilitation services?: Yes  Additional Pertinent Hx: L TKR 2019  Diagnosis: FAILURE TO THRIVE IN ADULT  Follows Commands: Within Functional Limits  Subjective  Subjective: pt  STATES SHE NEEDS TO GO TO THE BR AND THEN WILL SIT UP IN THE RECLINER.  CHART NOTES pt HAS HAD FALLS AT HOME AND NOW WISHES TO D/C TO SNF. pt C/O PAIN IN R SIDE BUT IS NOT REQUESTING PAIN MEDS. Pain Screening  Patient Currently in Pain: No          Orientation  Orientation  Overall Orientation Status: (NOT FORMALLY TESTED. CONVERSING APPROPRIATELY BUT DID SAY SHE HAS A FX R SHOULDER BUT NO NOTE OF THIS IN CHART)  Social/Functional History  Social/Functional History  Lives With: Alone  Type of Home: House  Home Access: Ramped entrance  Bathroom Shower/Tub: Tub/Shower unit  Bathroom Toilet: Handicap height  Bathroom Equipment: Tub transfer bench  Home Equipment: 4 wheeled walker, Rolling walker, Electric scooter  ADL Assistance: Independent  Homemaking Assistance: Independent  Ambulation Assistance: Independent  Transfer Assistance: Independent  Active : No  Additional Comments: pt has been trying to get meals on wheels, and someone to help assist with cleaning   Cognition        Objective          AROM RLE (degrees)  RLE AROM: WFL  AROM LLE (degrees)  LLE AROM : WFL  Strength Other  Other: MOVES LE'S AGAINST GRAVITY        Bed mobility  Supine to Sit: Contact guard assistance;Minimal assistance  Transfers  Sit to Stand: Contact guard assistance  Stand to sit: Contact guard assistance  Bed to Chair: Contact guard assistance  Ambulation  Ambulation?: Yes  Ambulation 1  Surface: level tile  Device: Rolling Walker  Other Apparatus: (GT BELT)  Assistance: Contact guard assistance  Quality of Gait: FLEXED POSTURE. HAS A TENDENCY TO PLACE FOREARMS ON HANDGRIPS OF WALKER AND NOT STAY IN [employer].    Gait Deviations: Slow Gina;Decreased step length;Decreased step height  Distance: 10 FT X2  Comments: pt DEMONSTRATES DECREASED AWARENESS OF SAFETY FOR AMB AND IS AT INC RISK FOR A FALL     Balance  Comments: pt WAS ABLE TO STAND AND PULL UP BRIEF WITHOUT LOB         Plan   Plan  Times per week: PT AT LEAST ONCE DAILY X 14 DAYS  Current Treatment Recommendations: Strengthening, Gait Training, Patient/Caregiver Education & Training, Pain Management, Functional Mobility Training, Positioning, Transfer Training, Safety Education & Training  Plan Comment: CONTINUE TO PROGRESS MOBILITY   Safety Devices  Type of devices: Call light within reach, Chair alarm in place, Left in chair, Gait belt    G-Code       OutComes Score                                                  AM-PAC Score             Goals  Short term goals  Time Frame for Short term goals: 14 DAYS BEFORE RE EVAL  Short term goal 1: SUP<>SIT, IND  Short term goal 2: SIT<>STAND, SBA  Short term goal 3:  FT WITH RW AND SBA       Therapy Time   Individual Concurrent Group Co-treatment   Time In           Time Out           Minutes                   Mike Gibbons PT    Electronically signed by Mike Gibbons PT on 5/30/2019 at 1:10 PM

## 2019-05-31 ENCOUNTER — APPOINTMENT (OUTPATIENT)
Dept: ULTRASOUND IMAGING | Age: 79
DRG: 683 | End: 2019-05-31
Payer: MEDICARE

## 2019-05-31 ENCOUNTER — APPOINTMENT (OUTPATIENT)
Dept: GENERAL RADIOLOGY | Age: 79
DRG: 683 | End: 2019-05-31
Payer: MEDICARE

## 2019-05-31 LAB
ANION GAP SERPL CALCULATED.3IONS-SCNC: 14 MMOL/L (ref 7–19)
BUN BLDV-MCNC: 10 MG/DL (ref 8–23)
CALCIUM SERPL-MCNC: 8.7 MG/DL (ref 8.8–10.2)
CHLORIDE BLD-SCNC: 96 MMOL/L (ref 98–111)
CO2: 20 MMOL/L (ref 22–29)
CREAT SERPL-MCNC: <0.5 MG/DL (ref 0.5–0.9)
EKG P AXIS: 21 DEGREES
EKG P-R INTERVAL: 216 MS
EKG Q-T INTERVAL: 440 MS
EKG QRS DURATION: 120 MS
EKG QTC CALCULATION (BAZETT): 443 MS
EKG T AXIS: 57 DEGREES
FERRITIN: 517.1 NG/ML (ref 13–150)
FOLATE: >20 NG/ML (ref 4.8–37.3)
GFR NON-AFRICAN AMERICAN: >60
GLUCOSE BLD-MCNC: 179 MG/DL (ref 74–109)
HCT VFR BLD CALC: 28 % (ref 37–47)
HEMOGLOBIN: 9.3 G/DL (ref 12–16)
IRON SATURATION: 8 % (ref 14–50)
IRON: 21 UG/DL (ref 37–145)
MCH RBC QN AUTO: 29.2 PG (ref 27–31)
MCHC RBC AUTO-ENTMCNC: 33.2 G/DL (ref 33–37)
MCV RBC AUTO: 88.1 FL (ref 81–99)
PDW BLD-RTO: 13.9 % (ref 11.5–14.5)
PLATELET # BLD: 320 K/UL (ref 130–400)
PMV BLD AUTO: 10.1 FL (ref 9.4–12.3)
POTASSIUM SERPL-SCNC: 4.5 MMOL/L (ref 3.5–5)
RBC # BLD: 3.18 M/UL (ref 4.2–5.4)
SODIUM BLD-SCNC: 130 MMOL/L (ref 136–145)
TOTAL IRON BINDING CAPACITY: 250 UG/DL (ref 250–400)
VITAMIN B-12: 1655 PG/ML (ref 211–946)
WBC # BLD: 8.5 K/UL (ref 4.8–10.8)

## 2019-05-31 PROCEDURE — 6370000000 HC RX 637 (ALT 250 FOR IP): Performed by: HOSPITALIST

## 2019-05-31 PROCEDURE — 83540 ASSAY OF IRON: CPT

## 2019-05-31 PROCEDURE — 82728 ASSAY OF FERRITIN: CPT

## 2019-05-31 PROCEDURE — 99225 PR SBSQ OBSERVATION CARE/DAY 25 MINUTES: CPT | Performed by: PHYSICIAN ASSISTANT

## 2019-05-31 PROCEDURE — 99999 PR OFFICE/OUTPT VISIT,PROCEDURE ONLY: CPT | Performed by: HOSPITALIST

## 2019-05-31 PROCEDURE — 6370000000 HC RX 637 (ALT 250 FOR IP): Performed by: ORTHOPAEDIC SURGERY

## 2019-05-31 PROCEDURE — 85027 COMPLETE CBC AUTOMATED: CPT

## 2019-05-31 PROCEDURE — 6360000002 HC RX W HCPCS: Performed by: INTERNAL MEDICINE

## 2019-05-31 PROCEDURE — G0378 HOSPITAL OBSERVATION PER HR: HCPCS

## 2019-05-31 PROCEDURE — 83550 IRON BINDING TEST: CPT

## 2019-05-31 PROCEDURE — 82607 VITAMIN B-12: CPT

## 2019-05-31 PROCEDURE — 6370000000 HC RX 637 (ALT 250 FOR IP): Performed by: INTERNAL MEDICINE

## 2019-05-31 PROCEDURE — 94640 AIRWAY INHALATION TREATMENT: CPT

## 2019-05-31 PROCEDURE — 6360000002 HC RX W HCPCS: Performed by: HOSPITALIST

## 2019-05-31 PROCEDURE — 82746 ASSAY OF FOLIC ACID SERUM: CPT

## 2019-05-31 PROCEDURE — 1210000000 HC MED SURG R&B

## 2019-05-31 PROCEDURE — C9113 INJ PANTOPRAZOLE SODIUM, VIA: HCPCS | Performed by: HOSPITALIST

## 2019-05-31 PROCEDURE — 74018 RADEX ABDOMEN 1 VIEW: CPT

## 2019-05-31 PROCEDURE — 36415 COLL VENOUS BLD VENIPUNCTURE: CPT

## 2019-05-31 PROCEDURE — 80048 BASIC METABOLIC PNL TOTAL CA: CPT

## 2019-05-31 PROCEDURE — 2580000003 HC RX 258: Performed by: HOSPITALIST

## 2019-05-31 PROCEDURE — 76770 US EXAM ABDO BACK WALL COMP: CPT

## 2019-05-31 PROCEDURE — 71100 X-RAY EXAM RIBS UNI 2 VIEWS: CPT

## 2019-05-31 PROCEDURE — 6370000000 HC RX 637 (ALT 250 FOR IP): Performed by: PHYSICIAN ASSISTANT

## 2019-05-31 RX ORDER — POLYETHYLENE GLYCOL 3350 17 G/17G
17 POWDER, FOR SOLUTION ORAL DAILY
Status: DISCONTINUED | OUTPATIENT
Start: 2019-06-01 | End: 2019-06-04 | Stop reason: HOSPADM

## 2019-05-31 RX ORDER — MONTELUKAST SODIUM 10 MG/1
10 TABLET ORAL NIGHTLY
Status: DISCONTINUED | OUTPATIENT
Start: 2019-05-31 | End: 2019-06-04 | Stop reason: HOSPADM

## 2019-05-31 RX ORDER — AMLODIPINE BESYLATE 10 MG/1
10 TABLET ORAL DAILY
Status: DISCONTINUED | OUTPATIENT
Start: 2019-05-31 | End: 2019-06-04 | Stop reason: HOSPADM

## 2019-05-31 RX ORDER — FERROUS SULFATE 325(65) MG
325 TABLET ORAL 2 TIMES DAILY WITH MEALS
Status: DISCONTINUED | OUTPATIENT
Start: 2019-05-31 | End: 2019-06-04 | Stop reason: HOSPADM

## 2019-05-31 RX ORDER — 0.9 % SODIUM CHLORIDE 0.9 %
10 VIAL (ML) INJECTION DAILY
Status: DISCONTINUED | OUTPATIENT
Start: 2019-05-31 | End: 2019-06-04

## 2019-05-31 RX ORDER — IPRATROPIUM BROMIDE AND ALBUTEROL SULFATE 2.5; .5 MG/3ML; MG/3ML
1 SOLUTION RESPIRATORY (INHALATION)
Status: DISCONTINUED | OUTPATIENT
Start: 2019-05-31 | End: 2019-06-01

## 2019-05-31 RX ORDER — CYANOCOBALAMIN 1000 UG/ML
1000 INJECTION INTRAMUSCULAR; SUBCUTANEOUS
Status: DISCONTINUED | OUTPATIENT
Start: 2019-05-31 | End: 2019-05-31

## 2019-05-31 RX ORDER — PANTOPRAZOLE SODIUM 40 MG/10ML
40 INJECTION, POWDER, LYOPHILIZED, FOR SOLUTION INTRAVENOUS DAILY
Status: DISCONTINUED | OUTPATIENT
Start: 2019-05-31 | End: 2019-06-04

## 2019-05-31 RX ORDER — SENNA PLUS 8.6 MG/1
1 TABLET ORAL DAILY
Status: DISCONTINUED | OUTPATIENT
Start: 2019-05-31 | End: 2019-06-04 | Stop reason: HOSPADM

## 2019-05-31 RX ORDER — ALPRAZOLAM 1 MG/1
1 TABLET ORAL NIGHTLY PRN
Status: DISCONTINUED | OUTPATIENT
Start: 2019-05-31 | End: 2019-06-04 | Stop reason: HOSPADM

## 2019-05-31 RX ORDER — CALCIUM CARBONATE 200(500)MG
1 TABLET,CHEWABLE ORAL DAILY
Status: DISCONTINUED | OUTPATIENT
Start: 2019-05-31 | End: 2019-06-04 | Stop reason: HOSPADM

## 2019-05-31 RX ORDER — CALCIUM CARBONATE 200(500)MG
500 TABLET,CHEWABLE ORAL 3 TIMES DAILY PRN
Status: DISCONTINUED | OUTPATIENT
Start: 2019-05-31 | End: 2019-06-04 | Stop reason: HOSPADM

## 2019-05-31 RX ADMIN — METHYLNALTREXONE BROMIDE 12 MG: 12 INJECTION, SOLUTION SUBCUTANEOUS at 18:48

## 2019-05-31 RX ADMIN — IPRATROPIUM BROMIDE AND ALBUTEROL SULFATE 1 AMPULE: .5; 3 SOLUTION RESPIRATORY (INHALATION) at 18:28

## 2019-05-31 RX ADMIN — FERROUS SULFATE TAB 325 MG (65 MG ELEMENTAL FE) 325 MG: 325 (65 FE) TAB at 12:52

## 2019-05-31 RX ADMIN — ATORVASTATIN CALCIUM 10 MG: 10 TABLET, FILM COATED ORAL at 08:04

## 2019-05-31 RX ADMIN — MONTELUKAST SODIUM 10 MG: 10 TABLET, FILM COATED ORAL at 20:50

## 2019-05-31 RX ADMIN — RIVAROXABAN 10 MG: 10 TABLET, FILM COATED ORAL at 08:04

## 2019-05-31 RX ADMIN — OXYCODONE HYDROCHLORIDE 5 MG: 5 TABLET ORAL at 08:04

## 2019-05-31 RX ADMIN — PANTOPRAZOLE SODIUM 40 MG: 40 TABLET, DELAYED RELEASE ORAL at 08:04

## 2019-05-31 RX ADMIN — OXYCODONE HYDROCHLORIDE 5 MG: 5 TABLET ORAL at 20:50

## 2019-05-31 RX ADMIN — Medication 10 ML: at 10:55

## 2019-05-31 RX ADMIN — OXYCODONE HYDROCHLORIDE 5 MG: 5 TABLET ORAL at 02:45

## 2019-05-31 RX ADMIN — FERROUS SULFATE TAB 325 MG (65 MG ELEMENTAL FE) 325 MG: 325 (65 FE) TAB at 20:50

## 2019-05-31 RX ADMIN — ANTACID TABLETS 500 MG: 500 TABLET, CHEWABLE ORAL at 10:54

## 2019-05-31 RX ADMIN — ONDANSETRON 4 MG: 2 INJECTION INTRAMUSCULAR; INTRAVENOUS at 10:54

## 2019-05-31 RX ADMIN — Medication 8.6 MG: at 10:54

## 2019-05-31 RX ADMIN — ANTACID TABLETS 500 MG: 500 TABLET, CHEWABLE ORAL at 18:39

## 2019-05-31 RX ADMIN — ALPRAZOLAM 1 MG: 1 TABLET ORAL at 20:50

## 2019-05-31 RX ADMIN — AMLODIPINE BESYLATE 10 MG: 10 TABLET ORAL at 10:54

## 2019-05-31 RX ADMIN — PANTOPRAZOLE SODIUM 40 MG: 40 INJECTION, POWDER, FOR SOLUTION INTRAVENOUS at 10:52

## 2019-05-31 RX ADMIN — NORTRIPTYLINE HYDROCHLORIDE 50 MG: 25 CAPSULE ORAL at 20:49

## 2019-05-31 ASSESSMENT — PAIN SCALES - GENERAL
PAINLEVEL_OUTOF10: 10
PAINLEVEL_OUTOF10: 10
PAINLEVEL_OUTOF10: 8
PAINLEVEL_OUTOF10: 0
PAINLEVEL_OUTOF10: 0

## 2019-05-31 NOTE — CONSULTS
[employer] [facility] [address] [facility]                                  CONSULTATION    PATIENT NAME: Benny Kumar                    :        1940  MED REC NO:   [id number]                              ROOM:       [facility]  ACCOUNT NO:   [de-identified]                           ADMIT DATE: 2019  PROVIDER:     Elbert Galvan MD    CONSULT DATE:  2019    REASON FOR REFERRAL:  She is 6 weeks status post complex primary left  knee replacement, admitted for failure to thrive. HISTORY:  This is a 68-year-old lady who is well known to me, who  underwent a very complex left knee revision on 2019. She was  apparently admitted because of recurrent falls and some failure to  thrive. Orthopedic surgery consultation was asked for. PAST MEDICAL HISTORY:  For details of her past medical and surgical  history please see the admission history and physical done by the  Internal Medicine team.  Specific past orthopedic history:  Complex  primary left knee replacement done by myself on 2019. PHYSICAL EXAMINATION:  This is a 68-year-old lady. She is alert and  oriented. Looking at her left knee, her incision is well healed. She  is able to straight leg raise, has full extension, flexion is about 85  to 90 degrees. LABORATORY DATA:  Her x-rays taken on 2019, left knee x-rays show  maintained alignment of her segmental left total knee replacement. IMPRESSION:  Six weeks status post complex left knee revision on  2019. PLAN:  At this point, the knee looks excellent. From a medical  standpoint, I did review her last set of electrolytes, which were within  normal limits. Her hemoglobin is 9.3.   From an orthopedic standpoint,  she is doing well and we will defer her medical issues to the medical  team.        Javier Ruiz MD    D: 2019 8:35:39      T: 2019 10:25:32     [id number]  Job#: [id number]

## 2019-05-31 NOTE — PROGRESS NOTES
[id number] [facility]      Patient:    Larissa Askew  YOB: 1940  Date of Service:  5/31/2019  MRN:    [id number]    Room:   [address] [place] [address]   PCP:    Baldo Caicedo MD     Chief Complaint:  Fall    Subjective:  Complains of indigestion and belching this morning. States she takes nexium at home. Denies abdominal pain or discomfort. Requesting to go to [facility] rather than return to [facility] as her spouse is at [address] yesterday. Cumulative Hospital Course: The patient is a pleasant 70year old female who was recently admitted to the hospital and underwent an explantation of left knee, deep hardware removal requiring metal cutting okbi and cutting of distal femoral plate, and segmental left total knee revision on 4/24/19. 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 She had reached a point where she required a walker constantly. After the surgery, the patient was discharged to [facility], where, according to the patient, she progressed well with therapy. She was discharged home. She states she began feeling weaker. She reports two different falls. She states there was some confusion regarding her medication list. She came to the ER after the second fall and was admitted to the hospital for further evaluation and treatment. She was evaluated on 5/30/19 by Dr. Burke Bernheim. After examining the patient, he did not find any problems with the knee itself. PT/OT are consulted. SW consulted to assist with discharge planning. Review of Systems:   10 point review of systems is negative except as specifically addressed above.     Objective:   VITALS:  BP (!) 148/64   Pulse 83   Temp 97.2 °F (36.2 °C) (Temporal)   Resp 18   Ht 5' (1.524 m)   Wt 172 lb (78 kg)   SpO2 99%   BMI 33.59 kg/m²   24HR INTAKE/OUTPUT: Intake/Output Summary (Last 24 hours) at 5/31/2019 1227  Last data filed at 5/31/2019 1058  Gross per 24 hour   Intake 1443.58 ml   Output 900 ml   Net 543.58 ml     General appearance: alert, appears stated age, cooperative and no distress  Head: Normocephalic, without obvious abnormality, atraumatic  Eyes: conjunctivae/corneas clear. PERRL, EOM's intact. Ears: normal external ears and nose, throat without exudate  Neck: Supple, no JVD, no tracheal deviation  Lungs: Normal respiratory effort, clear to auscultation bilaterally, no wheezes, rales, or rhonchi. Heart: Regular rate and rhythm, normal S1 and S2. Abdomen: Soft, nontender, nondistended, positive bowel sounds, no rebound or guarding. Extremities: Moves all extremities. No pedal edema. Skin: Warm and dry, not diaphoretic. Neurologic: No focal deficits noted.   Psychiatric: Alert and oriented, thought content appropriate, normal insight, mood appropriate      Medications:        pantoprazole  40 mg Intravenous Daily    And    sodium chloride (PF)  10 mL Intravenous Daily    amLODIPine  10 mg Oral Daily    calcium carbonate  1 tablet Oral Daily    montelukast  10 mg Oral Nightly    senna  1 tablet Oral Daily    ferrous sulfate  325 mg Oral BID WC    atorvastatin  10 mg Oral Daily    nortriptyline  50 mg Oral Nightly    polyethylene glycol  17 g Oral TID    rivaroxaban  10 mg Oral Q24H     calcium carbonate, ALPRAZolam, ondansetron, polyethylene glycol, acetaminophen, oxyCODONE  DIET CARB CONTROL;     Lab and other Data:     Recent Labs     05/29/19 2158 05/30/19  0751 05/31/19  0319   WBC 8.8 6.6 8.5   HGB 8.5* 8.5* 9.3*    302 320     Recent Labs     05/29/19 2158 05/30/19  0134 05/30/19  0751 05/31/19  0319   *  --  136 130*   K 3.7 3.4 4.2 4.5   CL 93*  --  102 96*   CO2 22  --  19* 20*   BUN 28*  --  22 10   CREATININE 1.1*  --  0.6 <0.5   GLUCOSE 132*  --  98 179*     Recent Labs     05/29/19 2158 05/30/19  0751   AST 50* 37*   ALT 65* 57*   BILITOT 0.4 0.3   ALKPHOS 83 75     Troponin T:   Recent Labs     05/29/19 2158   TROPONINI 0.01     Pro-BNP: No results for input(s): BNP in the last 72 hours. INR:   Recent Labs     05/29/19 2158   INR 1.72*     ABGs:   Lab Results   Component Value Date    PHART 7.400 05/30/2019    PO2ART 99.0 05/30/2019    VAC9KPC 39.0 05/30/2019     UA:  Recent Labs     05/29/19 2214   COLORU YELLOW   PHUR 5.5   CLARITYU Clear   SPECGRAV 1.021   LEUKOCYTESUR Negative   UROBILINOGEN 0.2   BILIRUBINUR Negative   BLOODU Negative   GLUCOSEU Negative       RAD:   Xr Knee Left (1-2 Views)    Result Date: 5/29/2019  Exam:   XR KNEE LEFT (1-2 VIEWS)  Date:  5/29/2019 History:  Female, age  70 years; recent total knee. Swelling, fall COMPARISON:  None. Findings : There is no acute displaced fracture. No dislocation. No suspicious lytic or blastic lesion. No radiopaque foreign body. Left knee arthroplasty, without hardware loosening or failure. There is no acute fracture line identified. Joint effusion is present. Impression: No acute osseous pathology. Signed by Dr Raymond Murrell on 5/29/2019 10:02 PM    Ct Head Wo Contrast    Result Date: 5/30/2019  Examination. CT HEAD WO CONTRAST History: The patient fell and complains of headache. She is on anticoagulant therapy. DLP: 667 mGycm. The CT scan of the head is performed without intravenous contrast enhancement. The images are acquired in axial plane with subsequent reconstruction in coronal and sagittal planes. The comparison is made with the previous study dated 2/29/2012. There is no evidence of intracranial hemorrhage or hematoma. There is no evidence of mass or midline shift. Moderately prominent ventricles, basal cistern and the cortical sulci are age-appropriate. The scattered areas of chronic white matter ischemia bilaterally are noted which is stable. There is normal gray-white matter differentiation.  The images reviewed in bone window show no acute bony abnormality. The visualized paranasal sinuses and mastoid air cells are normal and clear. No acute intracranial abnormality. Moderate chronic ischemic and atrophic changes. The above study was initially reviewed and reported by stat rads. I do not find any discrepancies. Signed by Dr Desiree Sheth on 5/30/2019 7:29 AM    Xr Chest Portable    Result Date: 5/29/2019  Exam:   XR CHEST PORTABLE  Date:  5/29/2019 History:  Female, age  70 year fatigue. COMPARISON:  None. Findings : The heart and mediastinum are normal in size. Lungs are without focal infiltrate, mass or effusions. Chronic interstitial lung changes. The bones show no acute pathology. Impression: No acute cardiopulmonary disease. Signed by Dr Lou Booth on 5/29/2019 10:01 PM      Problem List:     Patient Active Problem List    Diagnosis Date Noted    Failure to thrive in adult 05/30/2019    Dehydration 05/30/2019    Multiple falls 05/30/2019    Anemia 05/30/2019    History of maternal deep vein thrombosis (DVT) 04/25/2019    Acute blood loss as cause of postoperative anemia 04/25/2019    Essential hypertension 04/25/2019    Slow transit constipation 04/25/2019    Hyponatremia 04/25/2019    Hyperglycemia 04/25/2019    GERD (gastroesophageal reflux disease) 04/25/2019    Loose total knee arthroplasty ([facility] [address] 04/24/2019    Atherosclerosis of native artery of both lower extremities with intermittent claudication ([facility] [address] 08/11/2017    Decreased pulses in feet 08/16/2016       Assessment and Plan:     Principal Problem:    Multiple falls  Active Problems:    Essential hypertension    Hyponatremia    Failure to thrive in adult    Dehydration    Anemia  Resolved Problems:    * No resolved hospital problems. *    Continue PT/OT. Repeat labs in am. Replace iron. D/C cyanocobalamin supplementation given Vitamin B12 level elevation. Change protonix to IV. Plan for discharge to SNF when arrangements finalized.     Advance Directive:  Full Code    DVT Prophylaxis:  Xarelto  GI prophylaxis:  Protonix    Sima Story PA-C  5/31/2019       Attestation Statement     I have independently seen and examined this patient and agree with the asesment and plan by mid level provider                                                           [facility] MEDICINE  - PROGRESS NOTE         Objective:   Vitals: BP (!) 148/64   Pulse 83   Temp 97.2 °F (36.2 °C) (Temporal)   Resp 18   Ht 5' (1.524 m)   Wt 172 lb (78 kg)   SpO2 99%   BMI 33.59 kg/m²   General appearance: alert, appears stated age and cooperative  Skin: Skin color, texture, turgor normal.   HEENT: Head: Normocephalic, no lesions, without obvious abnormality.   Neck: no adenopathy, no carotid bruit, no JVD and supple, symmetrical, trachea midline  Lungs: clear to auscultation bilaterally  Heart: regular rate and rhythm, S1, S2 normal, no murmur, click, rub or gallop  Abdomen: soft, non-tender; bowel sounds normal; no masses,  no organomegaly  Extremities: extremities normal, atraumatic, no cyanosis or edema  Lymphatic: No significant lymph node enlargement papable  Neurologic: Mental status: Alert, oriented, thought content appropriate      Assessment & Plan:    Non displaced right 10th rib fx- pain control  Constipation - relistor times one   Hyponatremia - monitor- hold fluids     Neftali Maravilla MD

## 2019-05-31 NOTE — PROGRESS NOTES
Physical Therapy  Sagarlissette Askew  [id number]     05/31/19 7198   Subjective   Subjective Attempt, patient sitting up in recliner and states she has been vomitting and does not feel up walking at this time. Patient left in chair with blue emesis bag and all needs in reach. Will cont to follow.    Electronically signed by Everette Ramirez PTA on 5/31/2019 at 8:45 AM

## 2019-05-31 NOTE — CARE COORDINATION
Pt has now decided to go with [facility] instead of [facility] since her  is already there. Pre-Cert has been stopped for [facility].  [facility] will initiate new Pre-Cert for approval.   [facility]  [address] [facility]  Electronically signed by Sai Pink on 5/31/2019 at 11:48 AM

## 2019-05-31 NOTE — PROGRESS NOTES
[facility]      Patient:  Erum Verma  YOB: 1940  Date of Service: 5/31/2019  MRN: [id number]   Acct: [de-identified]   Primary Care Physician: Kiana Ruiz MD  Advance Directive: Full Code  Admit Date: 5/29/2019       Hospital Day: 0    CHIEF COMPLAINT- Fall- Pt fell to knees with walker and complains of left leg pain. Pt recently had a knee replacement. SUBJECTIVE:  Pt complains of right-sided lower thoracic pain, worsens with breathing and movement. Pt complains of heartburn and significant hiccups since last night. She states that Nexium is the only thing that helps and that Protonix does nothing to help. Admits to feeling weak. She admits to nausea, but denies vomiting. She denies diarrhea, but is unsure of when her last BM was. Review of Systems:   Constitutional / general:  Denies fever / chills / sweats  Head:  Denies headache / neck stiffness   Eyes:  Denies acute visual changes  CV:  Denies chest pain / palpitations  Respiratory:  Denies cough / shortness of breath   GI: Admits to nausea. Denies vomiting / abdominal pain / diarrhea / constipation  :  Denies dysuria / hesitancy / urgency   Neuro: Denies  syncope / headache   Musculoskeletal:  Admits to right sided thoracic pain / generalized weakness   Vascular: Admits to left ankle swelling, but states that is normal for her. Denies edema elsewhere. 14 point review of systems is negative except as specifically addressed above.       Objective:   VITALS:  BP (!) 148/64   Pulse 83   Temp 97.2 °F (36.2 °C) (Temporal)   Resp 18   Ht 5' (1.524 m)   Wt 172 lb (78 kg)   SpO2 99%   BMI 33.59 kg/m²   24HR INTAKE/OUTPUT:      Intake/Output Summary (Last 24 hours) at 5/31/2019 1227  Last data filed at 5/31/2019 1058  Gross per 24 hour   Intake 1443.58 ml   Output 900 ml   Net 543.58 ml       General appearance: alert, appears stated age, cooperative and no distress  Head: Normocephalic, without obvious abnormality, atraumatic  Eyes: conjunctivae/corneas clear. EOM's intact. Ears: normal external ears and nose  Lungs: clear to auscultation bilaterally,no rales or wheezes   Heart: regular rate and rhythm, S1, S2 normal, no murmur  Abdomen:soft, non-tender; non-distended, normal bowel sounds no masses, no organomegaly  Extremities: Left ankle swelling present. No lower extremity edema,  No erythema, no tenderness to palpation  Skin: Skin color, texture, turgor normal. No rashes or lesions  Neurologic: Alert and oriented X 3, normal strength and tone.   Mental status: Alert, oriented, thought content appropriate  Cranial nerves:II-XII Grossly intact Sensory: normal Motor:grossly normal  Psychiatric: Alert and oriented, thought content appropriate, normal insight, mood appropriate      Medications:      pantoprazole  40 mg Intravenous Daily    And    sodium chloride (PF)  10 mL Intravenous Daily    amLODIPine  10 mg Oral Daily    calcium carbonate  1 tablet Oral Daily    montelukast  10 mg Oral Nightly    senna  1 tablet Oral Daily    ferrous sulfate  325 mg Oral BID WC    atorvastatin  10 mg Oral Daily    nortriptyline  50 mg Oral Nightly    polyethylene glycol  17 g Oral TID    rivaroxaban  10 mg Oral Q24H     calcium carbonate, ALPRAZolam, ondansetron, polyethylene glycol, acetaminophen, oxyCODONE  DIET CARB CONTROL;     Lab and other Data:     Recent Labs     05/29/19 2158 05/30/19  0751 05/31/19  0319   WBC 8.8 6.6 8.5   HGB 8.5* 8.5* 9.3*    302 320     Recent Labs     05/29/19 2158 05/30/19  0134 05/30/19  0751 05/31/19  0319   *  --  136 130*   K 3.7 3.4 4.2 4.5   CL 93*  --  102 96*   CO2 22  --  19* 20*   BUN 28*  --  22 10   CREATININE 1.1*  --  0.6 <0.5   GLUCOSE 132*  --  98 179*     Recent Labs     05/29/19 2158 05/30/19  0751   AST 50* 37*   ALT 65* 57*   BILITOT 0.4 0.3   ALKPHOS 83 75     Troponin T:   Recent Labs     05/29/19 2158   TROPONINI 0.01     Pro-BNP: No results for input(s): air cells are normal and clear. No acute intracranial abnormality. Moderate chronic ischemic and atrophic changes. The above study was initially reviewed and reported by stat rads. I do not find any discrepancies. Signed by Dr Britt Schmitt on 5/30/2019 7:29 AM    Xr Chest Portable    Result Date: 5/29/2019  Exam:   XR CHEST PORTABLE  Date:  5/29/2019 History:  Female, age  70 year fatigue. COMPARISON:  None. Findings : The heart and mediastinum are normal in size. Lungs are without focal infiltrate, mass or effusions. Chronic interstitial lung changes. The bones show no acute pathology. Impression: No acute cardiopulmonary disease. Signed by Dr Richard Shi on 5/29/2019 10:01 PM          Assessment/Plan   Principal Problem:    Multiple falls  Active Problems:    Essential hypertension    Hyponatremia    Failure to thrive in adult    Dehydration    Anemia  Resolved Problems:    * No resolved hospital problems. *     Falls- Admit for observation, vitals Q4 hrs to monitor for hypotension, PT consult, discharge to SNF for rehab    Essential HTN- Continue home BP meds, vitals Q4 hours to monitor for medication effectiveness     Hyponatremia- NaCl 0.9% IVF to return to normal Na+ levels, CBC QAM to monitor changes in Na+ levels     Dehydration- IVF fluid resuscitation, vitals Q4 hours to monitor for HTN or other changes    Anemia- Repeat CBC CMP QAM, order iron studies, supplement as needed for low iron, blood transfusions as needed, monitor stool and urine for gross blood, order POC guiac testing to R/O occult GI bleed.            DVT Prophylaxis: Xarelto           Compression device     GI prophylaxis:  Protonix

## 2019-06-01 LAB
ANION GAP SERPL CALCULATED.3IONS-SCNC: 11 MMOL/L (ref 7–19)
BACTERIA: ABNORMAL /HPF
BILIRUBIN URINE: NEGATIVE
BLOOD, URINE: NEGATIVE
BUN BLDV-MCNC: 5 MG/DL (ref 8–23)
CALCIUM SERPL-MCNC: 9 MG/DL (ref 8.8–10.2)
CHLORIDE BLD-SCNC: 93 MMOL/L (ref 98–111)
CLARITY: ABNORMAL
CO2: 24 MMOL/L (ref 22–29)
COLOR: YELLOW
CREAT SERPL-MCNC: <0.5 MG/DL (ref 0.5–0.9)
EPITHELIAL CELLS, UA: 0 /HPF (ref 0–5)
GFR NON-AFRICAN AMERICAN: >60
GLUCOSE BLD-MCNC: 124 MG/DL (ref 74–109)
GLUCOSE URINE: NEGATIVE MG/DL
HCT VFR BLD CALC: 30.3 % (ref 37–47)
HEMOGLOBIN: 9.7 G/DL (ref 12–16)
HYALINE CASTS: 0 /HPF (ref 0–8)
KETONES, URINE: NEGATIVE MG/DL
LEUKOCYTE ESTERASE, URINE: ABNORMAL
MCH RBC QN AUTO: 28.6 PG (ref 27–31)
MCHC RBC AUTO-ENTMCNC: 32 G/DL (ref 33–37)
MCV RBC AUTO: 89.4 FL (ref 81–99)
NITRITE, URINE: NEGATIVE
PDW BLD-RTO: 13.6 % (ref 11.5–14.5)
PH UA: 6.5 (ref 5–8)
PLATELET # BLD: 336 K/UL (ref 130–400)
PMV BLD AUTO: 9.4 FL (ref 9.4–12.3)
POTASSIUM SERPL-SCNC: 3.4 MMOL/L (ref 3.5–5)
PROTEIN UA: 30 MG/DL
RBC # BLD: 3.39 M/UL (ref 4.2–5.4)
RBC UA: 2 /HPF (ref 0–4)
SODIUM BLD-SCNC: 128 MMOL/L (ref 136–145)
SPECIFIC GRAVITY UA: 1.01 (ref 1–1.03)
TSH SERPL DL<=0.05 MIU/L-ACNC: 1.28 UIU/ML (ref 0.27–4.2)
UROBILINOGEN, URINE: 1 E.U./DL
WBC # BLD: 8.7 K/UL (ref 4.8–10.8)
WBC UA: 46 /HPF (ref 0–5)

## 2019-06-01 PROCEDURE — 84443 ASSAY THYROID STIM HORMONE: CPT

## 2019-06-01 PROCEDURE — G0378 HOSPITAL OBSERVATION PER HR: HCPCS

## 2019-06-01 PROCEDURE — 6370000000 HC RX 637 (ALT 250 FOR IP): Performed by: HOSPITALIST

## 2019-06-01 PROCEDURE — 51798 US URINE CAPACITY MEASURE: CPT

## 2019-06-01 PROCEDURE — 87186 SC STD MICRODIL/AGAR DIL: CPT

## 2019-06-01 PROCEDURE — 2580000003 HC RX 258: Performed by: HOSPITALIST

## 2019-06-01 PROCEDURE — 6370000000 HC RX 637 (ALT 250 FOR IP): Performed by: INTERNAL MEDICINE

## 2019-06-01 PROCEDURE — 1210000000 HC MED SURG R&B

## 2019-06-01 PROCEDURE — 80048 BASIC METABOLIC PNL TOTAL CA: CPT

## 2019-06-01 PROCEDURE — 6370000000 HC RX 637 (ALT 250 FOR IP): Performed by: PHYSICIAN ASSISTANT

## 2019-06-01 PROCEDURE — 81001 URINALYSIS AUTO W/SCOPE: CPT

## 2019-06-01 PROCEDURE — 36415 COLL VENOUS BLD VENIPUNCTURE: CPT

## 2019-06-01 PROCEDURE — 94640 AIRWAY INHALATION TREATMENT: CPT

## 2019-06-01 PROCEDURE — C9113 INJ PANTOPRAZOLE SODIUM, VIA: HCPCS | Performed by: HOSPITALIST

## 2019-06-01 PROCEDURE — 99232 SBSQ HOSP IP/OBS MODERATE 35: CPT | Performed by: HOSPITALIST

## 2019-06-01 PROCEDURE — 85027 COMPLETE CBC AUTOMATED: CPT

## 2019-06-01 PROCEDURE — 6370000000 HC RX 637 (ALT 250 FOR IP): Performed by: ORTHOPAEDIC SURGERY

## 2019-06-01 PROCEDURE — 51701 INSERT BLADDER CATHETER: CPT

## 2019-06-01 PROCEDURE — 87086 URINE CULTURE/COLONY COUNT: CPT

## 2019-06-01 PROCEDURE — 6360000002 HC RX W HCPCS: Performed by: HOSPITALIST

## 2019-06-01 RX ORDER — POTASSIUM CHLORIDE 750 MG/1
10 TABLET, EXTENDED RELEASE ORAL ONCE
Status: COMPLETED | OUTPATIENT
Start: 2019-06-01 | End: 2019-06-01

## 2019-06-01 RX ORDER — BISACODYL 10 MG
10 SUPPOSITORY, RECTAL RECTAL DAILY
Status: DISCONTINUED | OUTPATIENT
Start: 2019-06-01 | End: 2019-06-04 | Stop reason: HOSPADM

## 2019-06-01 RX ORDER — SODIUM CHLORIDE 9 MG/ML
INJECTION, SOLUTION INTRAVENOUS CONTINUOUS
Status: DISCONTINUED | OUTPATIENT
Start: 2019-06-01 | End: 2019-06-02

## 2019-06-01 RX ADMIN — POTASSIUM CHLORIDE 10 MEQ: 750 TABLET, EXTENDED RELEASE ORAL at 13:02

## 2019-06-01 RX ADMIN — Medication 8.6 MG: at 08:46

## 2019-06-01 RX ADMIN — FERROUS SULFATE TAB 325 MG (65 MG ELEMENTAL FE) 325 MG: 325 (65 FE) TAB at 08:46

## 2019-06-01 RX ADMIN — OXYCODONE HYDROCHLORIDE 5 MG: 5 TABLET ORAL at 00:09

## 2019-06-01 RX ADMIN — MONTELUKAST SODIUM 10 MG: 10 TABLET, FILM COATED ORAL at 20:18

## 2019-06-01 RX ADMIN — ANTACID TABLETS 500 MG: 500 TABLET, CHEWABLE ORAL at 08:46

## 2019-06-01 RX ADMIN — Medication 10 MG: at 17:54

## 2019-06-01 RX ADMIN — OXYCODONE HYDROCHLORIDE 5 MG: 5 TABLET ORAL at 20:18

## 2019-06-01 RX ADMIN — Medication 10 ML: at 08:45

## 2019-06-01 RX ADMIN — MAGNESIUM HYDROXIDE 30 ML: 400 SUSPENSION ORAL at 17:23

## 2019-06-01 RX ADMIN — IPRATROPIUM BROMIDE AND ALBUTEROL SULFATE 1 AMPULE: .5; 3 SOLUTION RESPIRATORY (INHALATION) at 07:06

## 2019-06-01 RX ADMIN — ALPRAZOLAM 1 MG: 1 TABLET ORAL at 20:18

## 2019-06-01 RX ADMIN — SODIUM CHLORIDE: 9 INJECTION, SOLUTION INTRAVENOUS at 12:18

## 2019-06-01 RX ADMIN — Medication 1 G: at 17:23

## 2019-06-01 RX ADMIN — OXYCODONE HYDROCHLORIDE 5 MG: 5 TABLET ORAL at 08:46

## 2019-06-01 RX ADMIN — METHYLNALTREXONE BROMIDE 12 MG: 12 INJECTION, SOLUTION SUBCUTANEOUS at 17:24

## 2019-06-01 RX ADMIN — ANTACID TABLETS 500 MG: 500 TABLET, CHEWABLE ORAL at 20:19

## 2019-06-01 RX ADMIN — ANTACID TABLETS 500 MG: 500 TABLET, CHEWABLE ORAL at 17:54

## 2019-06-01 RX ADMIN — NORTRIPTYLINE HYDROCHLORIDE 50 MG: 25 CAPSULE ORAL at 20:18

## 2019-06-01 RX ADMIN — FERROUS SULFATE TAB 325 MG (65 MG ELEMENTAL FE) 325 MG: 325 (65 FE) TAB at 17:24

## 2019-06-01 RX ADMIN — Medication 10 ML: at 20:20

## 2019-06-01 RX ADMIN — POLYETHYLENE GLYCOL 3350 17 G: 17 POWDER, FOR SOLUTION ORAL at 08:42

## 2019-06-01 RX ADMIN — RIVAROXABAN 10 MG: 10 TABLET, FILM COATED ORAL at 08:46

## 2019-06-01 RX ADMIN — ATORVASTATIN CALCIUM 10 MG: 10 TABLET, FILM COATED ORAL at 08:46

## 2019-06-01 RX ADMIN — AMLODIPINE BESYLATE 10 MG: 10 TABLET ORAL at 08:46

## 2019-06-01 RX ADMIN — PANTOPRAZOLE SODIUM 40 MG: 40 INJECTION, POWDER, FOR SOLUTION INTRAVENOUS at 08:45

## 2019-06-01 ASSESSMENT — PAIN SCALES - GENERAL
PAINLEVEL_OUTOF10: 10
PAINLEVEL_OUTOF10: 0
PAINLEVEL_OUTOF10: 5
PAINLEVEL_OUTOF10: 10
PAINLEVEL_OUTOF10: 0
PAINLEVEL_OUTOF10: 5

## 2019-06-01 NOTE — PROGRESS NOTES
[facility] MEDICINE  - PROGRESS NOTE    Admit Date: 5/29/2019         CC: fall    Subjective: abd pain    Objective: mild distress    Diet: DIET CLEAR LIQUID;  Pain is:Mild  Nausea:Mild  Bowel Movement/Flatus no    Data:   Scheduled Meds: Reviewed  Current Facility-Administered Medications   Medication Dose Route Frequency Provider Last Rate Last Dose    0.9 % sodium chloride infusion   Intravenous Continuous Rosalino Chavez MD 75 mL/hr at 06/01/19 1218      cefTRIAXone (ROCEPHIN) 1 g in sodium chloride (PF) 10 mL IV syringe  1 g Intravenous Q24H Rosalino Chavez MD        methylnaltrexone (RELISTOR) injection 12 mg  12 mg Subcutaneous Once Rosalino Chavez MD        magnesium hydroxide (MILK OF MAGNESIA) 400 MG/5ML suspension 30 mL  30 mL Oral Daily Rosalino Chavez MD        bisacodyl (DULCOLAX) suppository 10 mg  10 mg Rectal Daily Rosalino Chavez MD        pantoprazole (PROTONIX) injection 40 mg  40 mg Intravenous Daily Rosalino Chavez MD   40 mg at 06/01/19 0845    And    sodium chloride (PF) 0.9 % injection 10 mL  10 mL Intravenous Daily Rosalino Chavez MD   10 mL at 06/01/19 0845    calcium carbonate (TUMS) chewable tablet 500 mg  500 mg Oral TID PRN Rosalino Chavez MD   500 mg at 05/31/19 1839    ALPRAZolam (XANAX) tablet 1 mg  1 mg Oral Nightly PRN Rosalino Chavez MD   1 mg at 05/31/19 2050    amLODIPine (NORVASC) tablet 10 mg  10 mg Oral Daily Rosalino Chavez MD   10 mg at 06/01/19 0846    calcium carbonate (TUMS) chewable tablet 500 mg  1 tablet Oral Daily Rosalino Chavez MD   500 mg at 06/01/19 0846    montelukast (SINGULAIR) tablet 10 mg  10 mg Oral Nightly Rosalino Chavez MD   10 mg at 05/31/19 2050    senna (SENOKOT) tablet 8.6 mg  1 tablet Oral Daily Rosalino Chavez MD   8.6 mg at 06/01/19 0846    ferrous sulfate tablet 325 mg  325 mg and cooperative  Skin: Skin color, texture, turgor normal.   HEENT: Head: Normocephalic, no lesions, without obvious abnormality.   Neck: no adenopathy, no carotid bruit, no JVD and supple, symmetrical, trachea midline  Lungs: clear to auscultation bilaterally  Heart: regular rate and rhythm, S1, S2 normal, no murmur, click, rub or gallop  Abdomen: soft, non-tender; bowel sounds normal; no masses,  no organomegaly  Extremities: extremities normal, atraumatic, no cyanosis or edema  Lymphatic: No significant lymph node enlargement papable  Neurologic: Mental status: Alert, oriented, thought content appropriate        Assessment & Plan:    · uti- start ab  · Fall- needs rehab  · Hyponatremia - stop fluids       Slow transit constipation     Hyponatremia     GERD (gastroesophageal reflux disease)     Failure to thrive in adult        Disposition: needs SNF    Sandra Hutson

## 2019-06-01 NOTE — PROGRESS NOTES
Patient resting better this shift, vs stable and cms+ to all 4 extremities, pulses noted x 4 , ambulates p x 1 to bathroom, will cont to monitor Electronically signed by Codie Epstein RN on 6/1/2019 at 2:01 AM

## 2019-06-02 LAB
ANION GAP SERPL CALCULATED.3IONS-SCNC: 12 MMOL/L (ref 7–19)
ANION GAP SERPL CALCULATED.3IONS-SCNC: 13 MMOL/L (ref 7–19)
ANION GAP SERPL CALCULATED.3IONS-SCNC: 14 MMOL/L (ref 7–19)
BUN BLDV-MCNC: 6 MG/DL (ref 8–23)
BUN BLDV-MCNC: 6 MG/DL (ref 8–23)
BUN BLDV-MCNC: 8 MG/DL (ref 8–23)
CALCIUM SERPL-MCNC: 8.2 MG/DL (ref 8.8–10.2)
CALCIUM SERPL-MCNC: 8.4 MG/DL (ref 8.8–10.2)
CALCIUM SERPL-MCNC: 8.6 MG/DL (ref 8.8–10.2)
CHLORIDE BLD-SCNC: 89 MMOL/L (ref 98–111)
CHLORIDE BLD-SCNC: 91 MMOL/L (ref 98–111)
CHLORIDE BLD-SCNC: 91 MMOL/L (ref 98–111)
CO2: 21 MMOL/L (ref 22–29)
CO2: 23 MMOL/L (ref 22–29)
CO2: 24 MMOL/L (ref 22–29)
CREAT SERPL-MCNC: <0.5 MG/DL (ref 0.5–0.9)
GFR NON-AFRICAN AMERICAN: >60
GLUCOSE BLD-MCNC: 132 MG/DL (ref 74–109)
GLUCOSE BLD-MCNC: 134 MG/DL (ref 74–109)
GLUCOSE BLD-MCNC: 142 MG/DL (ref 74–109)
POTASSIUM SERPL-SCNC: 3.2 MMOL/L (ref 3.5–5)
POTASSIUM SERPL-SCNC: 3.3 MMOL/L (ref 3.5–5)
POTASSIUM SERPL-SCNC: 4.2 MMOL/L (ref 3.5–5)
PRO-BNP: 1159 PG/ML (ref 0–1800)
SODIUM BLD-SCNC: 126 MMOL/L (ref 136–145)
URIC ACID, SERUM: 3.3 MG/DL (ref 2.4–5.7)

## 2019-06-02 PROCEDURE — 36415 COLL VENOUS BLD VENIPUNCTURE: CPT

## 2019-06-02 PROCEDURE — C9113 INJ PANTOPRAZOLE SODIUM, VIA: HCPCS | Performed by: HOSPITALIST

## 2019-06-02 PROCEDURE — 51798 US URINE CAPACITY MEASURE: CPT

## 2019-06-02 PROCEDURE — 84550 ASSAY OF BLOOD/URIC ACID: CPT

## 2019-06-02 PROCEDURE — 83880 ASSAY OF NATRIURETIC PEPTIDE: CPT

## 2019-06-02 PROCEDURE — 6370000000 HC RX 637 (ALT 250 FOR IP): Performed by: ORTHOPAEDIC SURGERY

## 2019-06-02 PROCEDURE — 6360000002 HC RX W HCPCS: Performed by: HOSPITALIST

## 2019-06-02 PROCEDURE — 6370000000 HC RX 637 (ALT 250 FOR IP): Performed by: HOSPITALIST

## 2019-06-02 PROCEDURE — 1210000000 HC MED SURG R&B

## 2019-06-02 PROCEDURE — 2580000003 HC RX 258: Performed by: HOSPITALIST

## 2019-06-02 PROCEDURE — 6370000000 HC RX 637 (ALT 250 FOR IP): Performed by: INTERNAL MEDICINE

## 2019-06-02 PROCEDURE — 6370000000 HC RX 637 (ALT 250 FOR IP): Performed by: PHYSICIAN ASSISTANT

## 2019-06-02 PROCEDURE — 80048 BASIC METABOLIC PNL TOTAL CA: CPT

## 2019-06-02 PROCEDURE — 99232 SBSQ HOSP IP/OBS MODERATE 35: CPT | Performed by: HOSPITALIST

## 2019-06-02 RX ORDER — OXYCODONE AND ACETAMINOPHEN 7.5; 325 MG/1; MG/1
1 TABLET ORAL EVERY 4 HOURS PRN
Status: DISCONTINUED | OUTPATIENT
Start: 2019-06-02 | End: 2019-06-04 | Stop reason: HOSPADM

## 2019-06-02 RX ORDER — POTASSIUM CHLORIDE 20 MEQ/1
20 TABLET, EXTENDED RELEASE ORAL DAILY
Status: DISCONTINUED | OUTPATIENT
Start: 2019-06-02 | End: 2019-06-04 | Stop reason: HOSPADM

## 2019-06-02 RX ORDER — POTASSIUM CHLORIDE 750 MG/1
20 CAPSULE, EXTENDED RELEASE ORAL DAILY
Status: DISCONTINUED | OUTPATIENT
Start: 2019-06-02 | End: 2019-06-02 | Stop reason: SDUPTHER

## 2019-06-02 RX ADMIN — Medication 1 G: at 19:11

## 2019-06-02 RX ADMIN — Medication 10 ML: at 08:47

## 2019-06-02 RX ADMIN — POTASSIUM CHLORIDE 20 MEQ: 20 TABLET, EXTENDED RELEASE ORAL at 19:11

## 2019-06-02 RX ADMIN — AMLODIPINE BESYLATE 10 MG: 10 TABLET ORAL at 08:47

## 2019-06-02 RX ADMIN — OXYCODONE HYDROCHLORIDE 5 MG: 5 TABLET ORAL at 05:27

## 2019-06-02 RX ADMIN — POLYETHYLENE GLYCOL 3350 17 G: 17 POWDER, FOR SOLUTION ORAL at 08:46

## 2019-06-02 RX ADMIN — ATORVASTATIN CALCIUM 10 MG: 10 TABLET, FILM COATED ORAL at 08:47

## 2019-06-02 RX ADMIN — OXYCODONE HYDROCHLORIDE 5 MG: 5 TABLET ORAL at 09:02

## 2019-06-02 RX ADMIN — OXYCODONE HYDROCHLORIDE AND ACETAMINOPHEN 1 TABLET: 7.5; 325 TABLET ORAL at 15:34

## 2019-06-02 RX ADMIN — PANTOPRAZOLE SODIUM 40 MG: 40 INJECTION, POWDER, FOR SOLUTION INTRAVENOUS at 08:49

## 2019-06-02 RX ADMIN — RIVAROXABAN 10 MG: 10 TABLET, FILM COATED ORAL at 08:46

## 2019-06-02 RX ADMIN — MAGNESIUM HYDROXIDE 30 ML: 400 SUSPENSION ORAL at 08:46

## 2019-06-02 RX ADMIN — ALPRAZOLAM 1 MG: 1 TABLET ORAL at 20:43

## 2019-06-02 RX ADMIN — NORTRIPTYLINE HYDROCHLORIDE 50 MG: 25 CAPSULE ORAL at 20:43

## 2019-06-02 RX ADMIN — FERROUS SULFATE TAB 325 MG (65 MG ELEMENTAL FE) 325 MG: 325 (65 FE) TAB at 19:11

## 2019-06-02 RX ADMIN — ANTACID TABLETS 500 MG: 500 TABLET, CHEWABLE ORAL at 15:34

## 2019-06-02 RX ADMIN — MONTELUKAST SODIUM 10 MG: 10 TABLET, FILM COATED ORAL at 20:43

## 2019-06-02 RX ADMIN — ANTACID TABLETS 500 MG: 500 TABLET, CHEWABLE ORAL at 08:47

## 2019-06-02 RX ADMIN — OXYCODONE HYDROCHLORIDE AND ACETAMINOPHEN 1 TABLET: 7.5; 325 TABLET ORAL at 20:43

## 2019-06-02 RX ADMIN — FERROUS SULFATE TAB 325 MG (65 MG ELEMENTAL FE) 325 MG: 325 (65 FE) TAB at 08:47

## 2019-06-02 RX ADMIN — Medication 10 MG: at 08:46

## 2019-06-02 RX ADMIN — Medication 8.6 MG: at 08:47

## 2019-06-02 RX ADMIN — ACETAMINOPHEN 650 MG: 325 TABLET ORAL at 09:02

## 2019-06-02 ASSESSMENT — PAIN SCALES - GENERAL
PAINLEVEL_OUTOF10: 0
PAINLEVEL_OUTOF10: 10
PAINLEVEL_OUTOF10: 10
PAINLEVEL_OUTOF10: 0
PAINLEVEL_OUTOF10: 10
PAINLEVEL_OUTOF10: 8
PAINLEVEL_OUTOF10: 0
PAINLEVEL_OUTOF10: 0

## 2019-06-02 NOTE — PLAN OF CARE
Patient is very unpleasant and complains about everything. She states that she got a cold shower at midnight last night and she told me that she did not think she had a BM last night but it is charted that her bowels moved at 0100. Patient is very steady on her feet and uses the walker appropriately. She refuses to walk in the hallway but does really well with assist of one person and her walker. She is voiding, but not emptying her bladder. Her skin is normal. She says she is always hurting no matter what we do for her.  Electronically signed by Juana Kong RN on 6/2/2019 at 10:08 AM

## 2019-06-02 NOTE — CONSULTS
Inpatient consult to Nephrology  Consult performed by: Steffen Jones MD  Consult ordered by: Ole Reed MD  Assessment/Recommendations: Nephrology [address] [facility]) Consult Note      Patient:  Natasha Mckeon  YOB: 1940  Date of Service: 6/2/2019  MRN: [id number]   Acct: [de-identified]   Primary Care Physician: Anshu Chadwick MD  Advance Directive: Full Code  Admit Date: 5/29/2019       Hospital Day: 0  Referring Provider: Ole Reed, *    Patient independently seen and examined, Chart, Consults, Notes, Operative notes, Labs, Cardiology, and Radiology studies reviewed as able. Chief complaint: Abnormal lab    Subjective:  Natasha Mckeon is a 70 y.o. female  whom we were consulted for HYPONATREMIA/hypokalemia. Patient denies any previous history of hyponatremia. She was admitted from the nursing home where she has been recently repeatedly falling. She was recently hospitalized, underwent left knee surgery that included removal of previous prosthesis and insertion of new prosthetic knee. Apparently she has been taking hydrochlorothiazide as part of blood pressure medications. He presented with a recurrent fall. Incidental finding of the labs shows serum sodium was 125 mmol. Patient was also drinking plenty of water to keep herself hydrated. Allergies:  Codeine; Levaquin (levofloxacin);  Lyrica (pregabalin); and Morphine    Medicines:  Current Facility-Administered Medications:  oxyCODONE-acetaminophen (PERCOCET) 7.5-325 MG per tablet 1 tablet, 1 tablet, Oral, Q4H PRN, Ole Reed MD, 1 tablet at 06/02/19 1534  potassium chloride (KLOR-CON M) extended release tablet 20 mEq, 20 mEq, Oral, Daily, Ole Reed MD  cefTRIAXone (ROCEPHIN) 1 g in sodium chloride (PF) 10 mL IV syringe, 1 g, Intravenous, Q24H, Ole Reed MD, 1 g at 06/01/19 1723  magnesium hydroxide (MILK OF MAGNESIA) 400 MG/5ML suspension 30 mL, 30 mL, Oral, Daily, 2186        Past Medical History:  Past Medical History:  No date: Anxiety  No date: Carotid artery stenosis  No date: Chronic back pain  No date: H/O blood clots  No date: Hx of blood clots      Comment:  bilat legs  No date: Hyperlipidemia  No date: Hypertension  No date: Osteoarthritis    Past Surgical History:  Past Surgical History:  No date: CATARACT REMOVAL; Bilateral  No date: CHOLECYSTECTOMY  No date: EYE SURGERY  No date: HYSTERECTOMY  No date: JOINT REPLACEMENT  No date: OTHER SURGICAL HISTORY      Comment:  Decompression of ulnar nerve   4/24/2019: REMOVE HARDWARE FEMUR; Left      Comment:  HARDWARE REMOVAL performed by Sangita Miller MD at [address] [place] [address]  4/24/2019: 7 Nicole Dia;  Left      Comment:  LEFT KNEE REVISION performed by Sangita Miller MD at                [address] [place] [address] J History  Review of patient's family history indicates:  Problem: Dementia      Relation: Mother          Age of Onset: (Not Specified)  Problem: Heart Disease      Relation: Father          Age of Onset: (Not Specified)  Problem: Cancer      Relation: Brother          Age of Onset: (Not Specified)  Problem: COPD      Relation: Other          Age of Onset: (Not Specified)  Problem: Other      Relation: Other          Age of Onset: (Not Specified)          Comment: Blood clot  Problem: Diabetes      Relation: Other          Age of Onset: (Not Specified)      Social History  Social History    Socioeconomic History      Marital status:       Spouse name: Not on file      Number of children: Not on file      Years of education: Not on file      Highest education level: Not on file    Occupational History      Not on file    Social Needs      Financial resource strain: Not on file      Food insecurity:        Worry: Not on file        Inability: Not on file      Transportation needs:        Medical: Not on file        Non-medical: Not on file    Tobacco Use      Smoking status: Never Smoker Smokeless tobacco: Never Used    Substance and Sexual Activity      Alcohol use: No      Drug use: No      Sexual activity: Not on file    Lifestyle      Physical activity:        Days per week: Not on file        Minutes per session: Not on file      Stress: Not on file    Relationships      Social connections:        Talks on phone: Not on file        Gets together: Not on file        Attends Mu-ism service: Not on file        Active member of club or organization: Not on file        Attends meetings of clubs or organizations: Not on file        Relationship status: Not on file      Intimate partner violence:        Fear of current or ex partner: Not on file        Emotionally abused: Not on file        Physically abused: Not on file        Forced sexual activity: Not on file    Other Topics      Concerns:        Not on file    Social History Narrative      Not on file        Review of Systems:  History obtained from chart review and the patient  General ROS: No fever or chills  Respiratory ROS: No cough, shortness of breath, wheezing  Cardiovascular ROS: No chest pain or palpitations  Gastrointestinal ROS: No abdominal pain or melena  Genito-Urinary ROS: No dysuria or hematuria  Musculoskeletal ROS: No joint pain or swelling   14 point ROS reviewed with the patient and negative except as noted above and in the HPI unless unable to obtain.     Objective:  Patient Vitals in the past 24 hrs:  06/02/19 1115, BP:123/60, Temp:99.1 °F (37.3 °C), Temp src:Temporal, Pulse:79, Resp:18, SpO2:98 %  06/02/19 0643, BP:134/79, Temp:98.9 °F (37.2 °C), Temp src:Temporal, Pulse:96, Resp:16, SpO2:97 %  06/02/19 0530, BP:(!) 158/77, Temp:99.1 °F (37.3 °C), Temp src:Temporal, Pulse:81, Resp:18, SpO2:96 %  06/02/19 0040, BP:(!) 149/67, Temp:99.5 °F (37.5 °C), Temp src:Temporal, Pulse:85, Resp:17, SpO2:94 %  06/01/19 1830, BP:(!) 152/64, Temp:98.9 °F (37.2 °C), Temp src:Temporal, Pulse:85, Resp:18, SpO2:95 %  Intake/Output Summary (Last 24 hours) at 06/02/19 1748  Last data filed at 06/02/19 1344          Gross per 24 hour  Intake:             1183 ml  Output:             2642 ml  Net   :            -1459 ml  General: awake/alert   HEENT: Normocephalic atraumatic head  Neck: Supple with no JVD or carotid bruits. Chest:  clear to auscultation bilaterally without respiratory distress  CVS: regular rate and rhythm  Abdominal: soft, nontender, normal bowel sounds  Extremities: no cyanosis or edema  Skin: warm and dry without rash      Labs:  BMP:                 06/01/19 06/02/19 06/02/19                       0150          0842          1200          NA           128*         126*         126*          K            3.4*         4.2          3.3*          CL           93*          91*          91*           CO2          24           21*          23            BUN          5*           6*           6*            CREATININE   <0.5         <0.5         <0.5          CALCIUM      9.0          8.6*         8.4*          CBC:                 05/31/19 06/01/19                       0319          0150          WBC          8.5          8.7           HGB          9.3*         9.7*          HCT          28.0*        30.3*         MCV          88.1         89.4          PLT          320          336           LIVER PROFILE: No results for input(s): AST, ALT, LIPASE, BILIDIR, BILITOT, ALKPHOS in the last 72 hours. Invalid input(s): AMYLASE,  ALB  PT/INR: No results for input(s): PROTIME, INR in the last 72 hours. APTT: No results for input(s): APTT in the last 72 hours. BNP:  No results for input(s): BNP in the last 72 hours. Ionized Calcium:No results for input(s): IONCA in the last 72 hours. Magnesium:No results for input(s): MG in the last 72 hours. Phosphorus:No results for input(s): PHOS in the last 72 hours. HgbA1C: No results for input(s): LABA1C in the last 72 hours.   Hepatic: No results for input(s): ALKPHOS, ALT, AST, PROT, BILITOT, BILIDIR, LABALBU in the last 72 hours. Lactic Acid: No results for input(s): LACTA in the last 72 hours. Troponin: No results for input(s): CKTOTAL, CKMB, TROPONINT in the last 72 hours. ABGs: No results for input(s): PH, PCO2, PO2, HCO3, O2SAT in the last 72 hours. CRP:  No results for input(s): CRP in the last 72 hours. Sed Rate:  No results for input(s): SEDRATE in the last 72 hours. Cultures:   No results for input(s): CULTURE in the last 72 hours. No results for input(s): BC, Rick Left in the last 72 hours. No results for input(s): CXSURG in the last 72 hours. Radiology reports as per the Radiologist  Radiology: Xr Knee Left (1-2 Views)    Result Date: 5/29/2019  Exam:   XR KNEE LEFT (1-2 VIEWS)  Date:  5/29/2019 History:  Female, age  70 years; recent total knee. Swelling, fall COMPARISON:  None. Findings : There is no acute displaced fracture. No dislocation. No suspicious lytic or blastic lesion. No radiopaque foreign body. Left knee arthroplasty, without hardware loosening or failure. There is no acute fracture line identified. Joint effusion is present. Impression: No acute osseous pathology. Signed by Dr Dorothy Monroe on 5/29/2019 10:02 PM    Ct Head Wo Contrast    Result Date: 5/30/2019  Examination. CT HEAD WO CONTRAST History: The patient fell and complains of headache. She is on anticoagulant therapy. DLP: 667 mGycm. The CT scan of the head is performed without intravenous contrast enhancement. The images are acquired in axial plane with subsequent reconstruction in coronal and sagittal planes. The comparison is made with the previous study dated 2/29/2012. There is no evidence of intracranial hemorrhage or hematoma. There is no evidence of mass or midline shift. Moderately prominent ventricles, basal cistern and the cortical sulci are age-appropriate. The scattered areas of chronic white matter ischemia bilaterally are noted which is stable.  There is normal gray-white matter differentiation. The images reviewed in bone window show no acute bony abnormality. The visualized paranasal sinuses and mastoid air cells are normal and clear. No acute intracranial abnormality. Moderate chronic ischemic and atrophic changes. The above study was initially reviewed and reported by stat [facility]. I do not find any discrepancies. Signed by Dr Stephanie Ovalle on 5/30/2019 7:29 AM    Xr Chest Portable    Result Date: 5/29/2019  Exam:   XR CHEST PORTABLE  Date:  5/29/2019 History:  Female, age  70 year fatigue. COMPARISON:  None. Findings : The heart and mediastinum are normal in size. Lungs are without focal infiltrate, mass or effusions. Chronic interstitial lung changes. The bones show no acute pathology. Impression: No acute cardiopulmonary disease. Signed by Dr Mary Nassar on 5/29/2019 10:01 PM       Assessment  1. HYPONATREMIA/use of hydrochlorothiazide. 2. Hypokalemia related to #1.  3. Excessive use of water also contributed towards hyponatremia. 4. Recent history of left knee surgery. Plan:  1. By mouth water restriction. 2. Potassium supplement. 3. Serum uric acid level. 4. Urinary electrolytes/osmolality. 5. Continue to monitor serum sodium. Thank you for the consult, we appreciate the opportunity to provide care to your patients. Feel free to contact me if I can be of any further assistance.       Linwood Beltran MD  06/02/19  5:48 PM

## 2019-06-03 PROBLEM — E86.0 DEHYDRATION: Status: RESOLVED | Noted: 2019-05-30 | Resolved: 2019-06-03

## 2019-06-03 PROBLEM — Z16.12 UTI DUE TO EXTENDED-SPECTRUM BETA LACTAMASE (ESBL) PRODUCING ESCHERICHIA COLI: Status: ACTIVE | Noted: 2019-06-03

## 2019-06-03 PROBLEM — B96.29 UTI DUE TO EXTENDED-SPECTRUM BETA LACTAMASE (ESBL) PRODUCING ESCHERICHIA COLI: Status: ACTIVE | Noted: 2019-06-03

## 2019-06-03 PROBLEM — N39.0 UTI DUE TO EXTENDED-SPECTRUM BETA LACTAMASE (ESBL) PRODUCING ESCHERICHIA COLI: Status: ACTIVE | Noted: 2019-06-03

## 2019-06-03 LAB
ALBUMIN SERPL-MCNC: 3 G/DL (ref 3.5–5.2)
ALP BLD-CCNC: 68 U/L (ref 35–104)
ALT SERPL-CCNC: 29 U/L (ref 5–33)
ANION GAP SERPL CALCULATED.3IONS-SCNC: 10 MMOL/L (ref 7–19)
ANION GAP SERPL CALCULATED.3IONS-SCNC: 12 MMOL/L (ref 7–19)
AST SERPL-CCNC: 24 U/L (ref 5–32)
BILIRUB SERPL-MCNC: 0.3 MG/DL (ref 0.2–1.2)
BUN BLDV-MCNC: 10 MG/DL (ref 8–23)
BUN BLDV-MCNC: 11 MG/DL (ref 8–23)
CALCIUM SERPL-MCNC: 7.9 MG/DL (ref 8.8–10.2)
CALCIUM SERPL-MCNC: 8 MG/DL (ref 8.8–10.2)
CHLORIDE BLD-SCNC: 90 MMOL/L (ref 98–111)
CHLORIDE BLD-SCNC: 92 MMOL/L (ref 98–111)
CO2: 23 MMOL/L (ref 22–29)
CO2: 25 MMOL/L (ref 22–29)
CREAT SERPL-MCNC: 0.5 MG/DL (ref 0.5–0.9)
CREAT SERPL-MCNC: <0.5 MG/DL (ref 0.5–0.9)
GFR NON-AFRICAN AMERICAN: >60
GFR NON-AFRICAN AMERICAN: >60
GLUCOSE BLD-MCNC: 86 MG/DL (ref 74–109)
GLUCOSE BLD-MCNC: 91 MG/DL (ref 74–109)
HCT VFR BLD CALC: 26.7 % (ref 37–47)
HEMOGLOBIN: 8.9 G/DL (ref 12–16)
MCH RBC QN AUTO: 29.5 PG (ref 27–31)
MCHC RBC AUTO-ENTMCNC: 33.3 G/DL (ref 33–37)
MCV RBC AUTO: 88.4 FL (ref 81–99)
ORGANISM: ABNORMAL
PDW BLD-RTO: 13.6 % (ref 11.5–14.5)
PLATELET # BLD: 368 K/UL (ref 130–400)
PMV BLD AUTO: 9.5 FL (ref 9.4–12.3)
POTASSIUM SERPL-SCNC: 3.7 MMOL/L (ref 3.5–5)
POTASSIUM SERPL-SCNC: 3.9 MMOL/L (ref 3.5–5)
RBC # BLD: 3.02 M/UL (ref 4.2–5.4)
SODIUM BLD-SCNC: 125 MMOL/L (ref 136–145)
SODIUM BLD-SCNC: 127 MMOL/L (ref 136–145)
TOTAL PROTEIN: 5.1 G/DL (ref 6.6–8.7)
URINE CULTURE, ROUTINE: ABNORMAL
URINE CULTURE, ROUTINE: ABNORMAL
WBC # BLD: 9.7 K/UL (ref 4.8–10.8)

## 2019-06-03 PROCEDURE — 97116 GAIT TRAINING THERAPY: CPT

## 2019-06-03 PROCEDURE — 2580000003 HC RX 258: Performed by: PHYSICIAN ASSISTANT

## 2019-06-03 PROCEDURE — 85027 COMPLETE CBC AUTOMATED: CPT

## 2019-06-03 PROCEDURE — 97535 SELF CARE MNGMENT TRAINING: CPT

## 2019-06-03 PROCEDURE — 97530 THERAPEUTIC ACTIVITIES: CPT

## 2019-06-03 PROCEDURE — 36415 COLL VENOUS BLD VENIPUNCTURE: CPT

## 2019-06-03 PROCEDURE — 99232 SBSQ HOSP IP/OBS MODERATE 35: CPT | Performed by: PHYSICIAN ASSISTANT

## 2019-06-03 PROCEDURE — 6360000002 HC RX W HCPCS: Performed by: HOSPITALIST

## 2019-06-03 PROCEDURE — 6370000000 HC RX 637 (ALT 250 FOR IP): Performed by: HOSPITALIST

## 2019-06-03 PROCEDURE — 1210000000 HC MED SURG R&B

## 2019-06-03 PROCEDURE — 6360000002 HC RX W HCPCS: Performed by: PHYSICIAN ASSISTANT

## 2019-06-03 PROCEDURE — C9113 INJ PANTOPRAZOLE SODIUM, VIA: HCPCS | Performed by: HOSPITALIST

## 2019-06-03 PROCEDURE — 6370000000 HC RX 637 (ALT 250 FOR IP): Performed by: INTERNAL MEDICINE

## 2019-06-03 PROCEDURE — 2580000003 HC RX 258: Performed by: HOSPITALIST

## 2019-06-03 PROCEDURE — 6370000000 HC RX 637 (ALT 250 FOR IP): Performed by: PHYSICIAN ASSISTANT

## 2019-06-03 PROCEDURE — 80053 COMPREHEN METABOLIC PANEL: CPT

## 2019-06-03 PROCEDURE — 6360000002 HC RX W HCPCS: Performed by: INTERNAL MEDICINE

## 2019-06-03 RX ORDER — ONDANSETRON 4 MG/1
4 TABLET, FILM COATED ORAL EVERY 6 HOURS PRN
Status: DISCONTINUED | OUTPATIENT
Start: 2019-06-03 | End: 2019-06-04 | Stop reason: HOSPADM

## 2019-06-03 RX ADMIN — Medication 10 ML: at 07:39

## 2019-06-03 RX ADMIN — RIVAROXABAN 10 MG: 10 TABLET, FILM COATED ORAL at 07:39

## 2019-06-03 RX ADMIN — OXYCODONE HYDROCHLORIDE AND ACETAMINOPHEN 1 TABLET: 7.5; 325 TABLET ORAL at 02:41

## 2019-06-03 RX ADMIN — ONDANSETRON HYDROCHLORIDE 4 MG: 4 TABLET, FILM COATED ORAL at 11:52

## 2019-06-03 RX ADMIN — ANTACID TABLETS 500 MG: 500 TABLET, CHEWABLE ORAL at 13:15

## 2019-06-03 RX ADMIN — NORTRIPTYLINE HYDROCHLORIDE 50 MG: 25 CAPSULE ORAL at 20:26

## 2019-06-03 RX ADMIN — FERROUS SULFATE TAB 325 MG (65 MG ELEMENTAL FE) 325 MG: 325 (65 FE) TAB at 07:39

## 2019-06-03 RX ADMIN — OXYCODONE HYDROCHLORIDE AND ACETAMINOPHEN 1 TABLET: 7.5; 325 TABLET ORAL at 13:15

## 2019-06-03 RX ADMIN — MONTELUKAST SODIUM 10 MG: 10 TABLET, FILM COATED ORAL at 20:26

## 2019-06-03 RX ADMIN — Medication 8.6 MG: at 07:39

## 2019-06-03 RX ADMIN — MEROPENEM 1 G: 1 INJECTION, POWDER, FOR SOLUTION INTRAVENOUS at 11:52

## 2019-06-03 RX ADMIN — ANTACID TABLETS 500 MG: 500 TABLET, CHEWABLE ORAL at 07:39

## 2019-06-03 RX ADMIN — OXYCODONE HYDROCHLORIDE AND ACETAMINOPHEN 1 TABLET: 7.5; 325 TABLET ORAL at 07:39

## 2019-06-03 RX ADMIN — PANTOPRAZOLE SODIUM 40 MG: 40 INJECTION, POWDER, FOR SOLUTION INTRAVENOUS at 07:38

## 2019-06-03 RX ADMIN — MEROPENEM 1 G: 1 INJECTION, POWDER, FOR SOLUTION INTRAVENOUS at 16:49

## 2019-06-03 RX ADMIN — ACETAMINOPHEN 650 MG: 325 TABLET ORAL at 17:20

## 2019-06-03 RX ADMIN — POTASSIUM CHLORIDE 20 MEQ: 20 TABLET, EXTENDED RELEASE ORAL at 07:39

## 2019-06-03 RX ADMIN — ATORVASTATIN CALCIUM 10 MG: 10 TABLET, FILM COATED ORAL at 07:39

## 2019-06-03 ASSESSMENT — PAIN SCALES - GENERAL
PAINLEVEL_OUTOF10: 10
PAINLEVEL_OUTOF10: 0
PAINLEVEL_OUTOF10: 10
PAINLEVEL_OUTOF10: 8
PAINLEVEL_OUTOF10: 7

## 2019-06-03 NOTE — PROGRESS NOTES
Occupational Therapy  Facility/Department: [facility] SURG SERVICES  Daily Treatment Note  NAME: Max Brunson  : 1940  MRN: [id number]    Date of Service: 6/3/2019    Discharge Recommendations:  Patient would benefit from continued therapy after discharge       Assessment   Performance deficits / Impairments: Decreased functional mobility ; Decreased high-level IADLs;Decreased balance;Decreased ADL status; Decreased strength  Assessment: Pt tolerated tx well, pt benefits from continued therapy to address decreased ADL's, decreased mobility and transfers. Prognosis: Good  REQUIRES OT FOLLOW UP: Yes  Activity Tolerance  Activity Tolerance: Patient Tolerated treatment well         Patient Diagnosis(es): The encounter diagnosis was Fall, initial encounter. has a past medical history of Anxiety, Carotid artery stenosis, Chronic back pain, H/O blood clots, Hx of blood clots, Hyperlipidemia, Hypertension, and Osteoarthritis. has a past surgical history that includes eye surgery; Hysterectomy; joint replacement; Cholecystectomy; other surgical history; Cataract removal (Bilateral); Revision total knee arthroplasty (Left, 2019); and REMOVE HARDWARE FEMUR (Left, 2019).     Restrictions  Restrictions/Precautions  Restrictions/Precautions: Weight Bearing  Lower Extremity Weight Bearing Restrictions  Left Lower Extremity Weight Bearing: Weight Bearing As Tolerated  Subjective   General  Chart Reviewed: Yes  Patient assessed for rehabilitation services?: Yes  Additional Pertinent Hx: Pt reports that she has broken R shoulder   Response to previous treatment: Patient with no complaints from previous session  Family / Caregiver Present: No  Referring Practitioner: Dr. Jo Ann Sue   Diagnosis: L TKR   Subjective  Subjective: Pt demonstrates high anxiety and severe depression during session   Vital Signs  Patient Currently in Pain: No   Orientation     Objective    ADL  Toileting: Contact guard assistance Balance  Sitting Balance: Stand by assistance  Standing Balance: Contact guard assistance  Toilet Transfers  Toilet Transfer: Contact guard assistance  Bed mobility  Supine to Sit: Contact guard assistance;Minimal assistance  Sit to Supine: Stand by assistance  Scooting: Minimal assistance  Transfers  Sit to stand: Contact guard assistance  Stand to sit: Contact guard assistance                                                                 Plan   Plan  Times per week: 3-5x/wk  Current Treatment Recommendations: Strengthening, Equipment Evaluation, Education, & procurement, Self-Care / ADL, Functional Mobility Training, Patient/Caregiver Education & Training, Balance Training, Safety Education & Training, Endurance Training  G-Code     OutComes Score                                                  AM-PAC Score             Goals  Short term goals  Time Frame for Short term goals: 1 week  Short term goal 1: Pt will be Modified I for toileting task/transfer  Short term goal 2: Pt will be Modified I for functional mobility to/from bathroom   Short term goal 3: Pt will be Modified I for LE dressing   Patient Goals   Patient goals :  To go home       Therapy Time   Individual Concurrent Group Co-treatment   Time In           Time Out           Minutes                   HORACE Matute

## 2019-06-03 NOTE — PROGRESS NOTES
Physical Therapy  Name: Nakia Pedroza  MRN:  [id number]  Date of service:  6/3/2019       06/03/19 1642   Subjective   Subjective Patient agreeable to work with therapy   Bed Mobility   Supine to Sit Minimal assistance   Sit to Supine Contact guard assistance   Transfers   Sit to Stand Contact guard assistance   Stand to sit Contact guard assistance   Bed to Chair Contact guard assistance   Ambulation 1   Surface level tile   Device Rolling Walker   Assistance Contact guard assistance   Quality of Gait antalgic, very guarded, but no LOB noted   Gait Deviations Slow Gina;Decreased step length;Decreased step height   Distance 10' & 95'   Other Activities   Comment Assisted patient to and from the bathroom, patient able to stand at sink and wash hands without LOB noted   Short term goals   Time Frame for Short term goals 14 DAYS BEFORE RE EVAL   Short term goal 1 SUP<>SIT, IND   Short term goal 2 SIT<>STAND, SBA   Short term goal 3  FT WITH RW AND SBA   Conditions Requiring Skilled Therapeutic Intervention   Body structures, Functions, Activity limitations Decreased functional mobility ; Decreased endurance;Decreased safe awareness; Increased Pain   Assessment Assisted patient back to bed and left with all needs in reach.    Prognosis Good       Electronically signed by Maliha Reynolds PTA on 6/3/2019 at 4:46 PM

## 2019-06-03 NOTE — PROGRESS NOTES
HOSPITAL MEDICINE  - PROGRESS NOTE    Admit Date: 5/29/2019         CC: fall    Subjective: constipated     Objective: mild distress    Diet: DIET CLEAR LIQUID;  Pain is:Mild  Nausea:Mild  Bowel Movement/Flatus no    Data:   Scheduled Meds: Reviewed  Current Facility-Administered Medications   Medication Dose Route Frequency Provider Last Rate Last Dose    oxyCODONE-acetaminophen (PERCOCET) 7.5-325 MG per tablet 1 tablet  1 tablet Oral Q4H PRN Catie Bradley MD   1 tablet at 06/02/19 1534    potassium chloride (KLOR-CON M) extended release tablet 20 mEq  20 mEq Oral Daily Catie Bradley MD   20 mEq at 06/02/19 1911    cefTRIAXone (ROCEPHIN) 1 g in sodium chloride (PF) 10 mL IV syringe  1 g Intravenous Q24H Catie Bradley MD   1 g at 06/02/19 1911    magnesium hydroxide (MILK OF MAGNESIA) 400 MG/5ML suspension 30 mL  30 mL Oral Daily Catie Bradley MD   30 mL at 06/02/19 0846    bisacodyl (DULCOLAX) suppository 10 mg  10 mg Rectal Daily Catie Bradley MD   10 mg at 06/02/19 0846    pantoprazole (PROTONIX) injection 40 mg  40 mg Intravenous Daily Catie Bradley MD   40 mg at 06/02/19 0849    And    sodium chloride (PF) 0.9 % injection 10 mL  10 mL Intravenous Daily Catie Bradley MD   10 mL at 06/02/19 0847    calcium carbonate (TUMS) chewable tablet 500 mg  500 mg Oral TID PRN Catie Bradley MD   500 mg at 06/02/19 1534    ALPRAZolam (XANAX) tablet 1 mg  1 mg Oral Nightly PRN Catie Bradley MD   1 mg at 06/01/19 2018    amLODIPine (NORVASC) tablet 10 mg  10 mg Oral Daily Catie Bradley MD   10 mg at 06/02/19 0847    calcium carbonate (TUMS) chewable tablet 500 mg  1 tablet Oral Daily Catie Bradley MD   500 mg at 06/02/19 0847    montelukast (SINGULAIR) tablet 10 mg  10 mg Oral Nightly Catie Bradley MD   10 mg at 06/01/19 2018    senna (SENOKOT) tablet 8.6 mg  1 tablet Oral Daily Leigha Castellanos MD   8.6 mg at 06/02/19 0847    ferrous sulfate tablet 325 mg  325 mg Oral BID  Aiden Gates PA-C   325 mg at 06/02/19 1911    polyethylene glycol (GLYCOLAX) packet 17 g  17 g Oral Daily Leigha Castellanos MD   17 g at 06/02/19 0846    ondansetron (ZOFRAN) injection 4 mg  4 mg Intravenous Q6H PRN Danielle Moncada MD   4 mg at 05/31/19 1054    polyethylene glycol (GLYCOLAX) packet 17 g  17 g Oral Daily PRN Danielle Moncada MD   17 g at 05/30/19 2056    acetaminophen (TYLENOL) tablet 650 mg  650 mg Oral Q8H PRN Danielle Moncada MD   650 mg at 06/02/19 0902    atorvastatin (LIPITOR) tablet 10 mg  10 mg Oral Daily Bradley Guillen MD   10 mg at 06/02/19 0847    nortriptyline (PAMELOR) capsule 50 mg  50 mg Oral Nightly Bradley Guillen MD   50 mg at 06/01/19 2018    rivaroxaban (XARELTO) tablet 10 mg  10 mg Oral Q24H Bradley Guillen MD   10 mg at 06/02/19 0846     DVT Prophylaxis: Lovenox 40 mg sq daily    Continuous Infusions:      Intake/Output Summary (Last 24 hours) at 6/2/2019 1920  Last data filed at 6/2/2019 1344  Gross per 24 hour   Intake 1033 ml   Output 2642 ml   Net -1609 ml     CBC:   Recent Labs     05/31/19  0319 06/01/19  0150   WBC 8.5 8.7   HGB 9.3* 9.7*    336     BMP:  Recent Labs     06/02/19  0842 06/02/19  1200 06/02/19  1750   * 126* 126*   K 4.2 3.3* 3.2*   CL 91* 91* 89*   CO2 21* 23 24   BUN 6* 6* 8   CREATININE <0.5 <0.5 <0.5   GLUCOSE 142* 132* 134*     ABGs:   Lab Results   Component Value Date    PHART 7.400 05/30/2019    PO2ART 99.0 05/30/2019    DFO3CHG 39.0 05/30/2019     INR:   No results for input(s): INR in the last 72 hours.       Objective:   Vitals: /60   Pulse 79   Temp 99.1 °F (37.3 °C) (Temporal)   Resp 18   Ht 5' (1.524 m)   Wt 172 lb (78 kg)   SpO2 98%   BMI 33.59 kg/m²   General appearance: alert, appears stated age and cooperative  Skin: Skin color, texture, turgor normal.   HEENT: Head: Normocephalic, no lesions, without obvious abnormality.   Neck: no adenopathy, no carotid bruit, no JVD and supple, symmetrical, trachea midline  Lungs: clear to auscultation bilaterally  Heart: regular rate and rhythm, S1, S2 normal, no murmur, click, rub or gallop  Abdomen: soft, non-tender; bowel sounds normal; no masses,  no organomegaly  Extremities: extremities normal, atraumatic, no cyanosis or edema  Lymphatic: No significant lymph node enlargement papable  Neurologic: Mental status: Alert, oriented, thought content appropriate        Assessment & Plan:    · uti- start ab  · Fall- needs rehab  · Hyponatremia - consult nephrology  · Hypokalemia -replace  · Constipation - treat   · Rib fx- pain control  · Urinary retention - in and out cath       Slow transit constipation     Hyponatremia     GERD (gastroesophageal reflux disease)     Failure to thrive in adult        Disposition: needs [facility]    Hermann Marie 44

## 2019-06-03 NOTE — PROGRESS NOTES
Nephrology ([address] [facility]) Progress Note    Patient:  Pratima Del Valle  YOB: 1940  Date of Service: 6/3/2019  MRN: [id number]   Acct: [de-identified]   Primary Care Physician: Rosalino Edward MD  Advance Directive: Full Code  Admit Date: 5/29/2019       Hospital Day: 1  Referring Provider: Laura Natarajan DO    Patient independently seen and examined, Chart, Consults, Notes, Operative notes, Labs, Cardiology, and Radiology studies reviewed as able. Subjective:  Pratima Del Valle is a 70 y.o. female  whom we were consulted for hyponatremia/hypokalemia. Patient denies any previous history of hyponatremia. She was admitted from the nursing home where she has been recently repeatedly falling. She was recently hospitalized, underwent left knee surgery that included removal of previous prosthesis and insertion of new prosthetic knee. Apparently she has been taking hydrochlorothiazide as part of blood pressure medications. She presented with a recurrent fall. Incidental finding showed serum sodium was 125 mmol. Patient was also drinking plenty of water to keep herself hydrated. Today, she complains of nausea. Nursing notes that she has lost IV access and only has IV medications for nausea and as of the time of the examination had been unable to get orders to change to oral medications. She denies chest pain, shortness of air. Complains of back pain. Allergies:  Codeine; Levaquin [levofloxacin];  Lyrica [pregabalin]; and Morphine    Medicines:  Current Facility-Administered Medications   Medication Dose Route Frequency Provider Last Rate Last Dose    meropenem (MERREM) 1 g in sodium chloride 0.9 % 100 mL IVPB (mini-bag)  1 g Intravenous Q8H Stiven Matos PA-C 200 mL/hr at 06/03/19 1152 1 g at 06/03/19 1152    ondansetron (ZOFRAN) tablet 4 mg  4 mg Oral Q6H PRN Barrington Allen MD   4 mg at 06/03/19 1152    oxyCODONE-acetaminophen (PERCOCET) 7.5-325 MG per tablet 1 tablet  1 tablet Oral Q4H PRN Yuly You MD   1 tablet at 06/03/19 1315    potassium chloride (KLOR-CON M) extended release tablet 20 mEq  20 mEq Oral Daily Yuly You MD   20 mEq at 06/03/19 0739    magnesium hydroxide (MILK OF MAGNESIA) 400 MG/5ML suspension 30 mL  30 mL Oral Daily Yuly You MD   30 mL at 06/02/19 0846    bisacodyl (DULCOLAX) suppository 10 mg  10 mg Rectal Daily Yuly You MD   10 mg at 06/02/19 0846    pantoprazole (PROTONIX) injection 40 mg  40 mg Intravenous Daily Yuly You MD   40 mg at 06/03/19 8731    And    sodium chloride (PF) 0.9 % injection 10 mL  10 mL Intravenous Daily Yuly You MD   10 mL at 06/03/19 0739    calcium carbonate (TUMS) chewable tablet 500 mg  500 mg Oral TID PRN Yuly You MD   500 mg at 06/03/19 1315    ALPRAZolam (XANAX) tablet 1 mg  1 mg Oral Nightly PRN Yuly You MD   1 mg at 06/02/19 2043    amLODIPine (NORVASC) tablet 10 mg  10 mg Oral Daily Yuly You MD   10 mg at 06/02/19 0847    calcium carbonate (TUMS) chewable tablet 500 mg  1 tablet Oral Daily Yuly You MD   500 mg at 06/03/19 0739    montelukast (SINGULAIR) tablet 10 mg  10 mg Oral Nightly Yuly You MD   10 mg at 06/02/19 2043    senna (SENOKOT) tablet 8.6 mg  1 tablet Oral Daily Yuly You MD   8.6 mg at 06/03/19 0739    ferrous sulfate tablet 325 mg  325 mg Oral BID NATHAN Marks PA-C   325 mg at 06/03/19 0739    polyethylene glycol (GLYCOLAX) packet 17 g  17 g Oral Daily Yuly You MD   17 g at 06/02/19 0846    ondansetron (ZOFRAN) injection 4 mg  4 mg Intravenous Q6H PRN Bradley Guillen MD   4 mg at 05/31/19 1054    polyethylene glycol (GLYCOLAX) packet 17 g  17 g Oral Daily PRN Simon Rene MD   17 g at 05/30/19 2056    acetaminophen (TYLENOL) tablet 650 mg  650 mg Oral Q8H PRN Simon Rene MD   650 mg at 06/02/19 0902    atorvastatin (LIPITOR) tablet 10 mg  10 mg Oral Daily Bradley Guillen MD   10 mg at 06/03/19 0739    nortriptyline (PAMELOR) capsule 50 mg  50 mg Oral Nightly Bradley Guillen MD   50 mg at 06/02/19 2043    rivaroxaban (XARELTO) tablet 10 mg  10 mg Oral Q24H Ryan Park MD   10 mg at 06/03/19 0561       Past Medical History:  Past Medical History:   Diagnosis Date    Anxiety     Carotid artery stenosis     Chronic back pain     H/O blood clots     Hx of blood clots     bilat legs    Hyperlipidemia     Hypertension     Osteoarthritis        Past Surgical History:  Past Surgical History:   Procedure Laterality Date    CATARACT REMOVAL Bilateral     CHOLECYSTECTOMY      EYE SURGERY      HYSTERECTOMY      JOINT REPLACEMENT      OTHER SURGICAL HISTORY      Decompression of ulnar nerve     REMOVE HARDWARE FEMUR Left 4/24/2019    HARDWARE REMOVAL performed by Rebecca Gama MD at [place] [id number] Left 4/24/2019    LEFT KNEE REVISION performed [facility] OR       Family History  Family History   Problem Relation Age of Onset    Dementia Mother     Heart Disease Father     Cancer Brother     COPD Other     Other Other         Blood clot    Diabetes Other        Social History  Social History     Socioeconomic History    Marital status:      Spouse name: Not on file    Number of children: Not on file    Years of education: Not on file    Highest education level: Not on file   Occupational History    Not on file   Social Needs    Financial resource strain: Not on file    Food insecurity:     Worry: Not on file     Inability: Not on file    Transportation needs:     Medical: Not on file     Non-medical: Not on file   Tobacco Use    Smoking status: Never Smoker    Smokeless tobacco: Never Used   Substance and Sexual Activity    Alcohol use: No    Drug use: No    Sexual activity: Not on file   Lifestyle    Physical activity:     Days per week: Not on file     Minutes per session: Not on file    Stress: Not on file   Relationships    Social connections:     Talks on phone: Not on file     Gets together: Not on file     Attends Synagogue service: Not on file     Active member of club or organization: Not on file     Attends meetings of clubs or organizations: Not on file     Relationship status: Not on file    Intimate partner violence:     Fear of current or ex partner: Not on file     Emotionally abused: Not on file     Physically abused: Not on file     Forced sexual activity: Not on file   Other Topics Concern    Not on file   Social History Narrative    Not on file         Review of Systems:  History obtained from chart review and the patient  General ROS: No fever or chills  Respiratory ROS: No cough, shortness of breath, wheezing  Cardiovascular ROS: No chest pain or palpitations  Gastrointestinal ROS: No abdominal pain or melena  Genito-Urinary ROS: No dysuria or hematuria  Musculoskeletal ROS: No joint pain or swelling         Objective:  Patient Vitals for the past 24 hrs:   BP Temp Temp src Pulse Resp SpO2 Weight   06/03/19 0747 -- -- -- 69 -- -- --   06/03/19 0614 (!) 111/51 97.7 °F (36.5 °C) Temporal 69 18 92 % --   06/03/19 0530 -- -- -- -- -- -- 168 lb 12.8 oz (76.6 kg)   06/03/19 0420 124/66 99 °F (37.2 °C) Temporal 65 18 95 % --   06/02/19 2232 116/62 100 °F (37.8 °C) Temporal 74 16 95 % --   06/02/19 2100 -- -- -- 78 -- -- --   06/02/19 1924 (!) 145/68 98.5 °F (36.9 °C) Temporal 78 16 96 % --       Intake/Output Summary (Last 24 hours) at 6/3/2019 1403  Last data filed at 6/3/2019 0858  Gross per 24 hour   Intake 480 ml   Output 500 ml   Net -20 ml     General: awake/alert   Chest:  clear to auscultation bilaterally without respiratory distress  CVS: regular rate and rhythm  Abdominal: soft, nontender, normal bowel sounds  Extremities: no cyanosis or edema  Skin: warm and dry without rash    Labs:  BMP:   Recent Labs of the head is performed without intravenous contrast enhancement. The images are acquired in axial plane with subsequent reconstruction in coronal and sagittal planes. The comparison is made with the previous study dated 2/29/2012. There is no evidence of intracranial hemorrhage or hematoma. There is no evidence of mass or midline shift. Moderately prominent ventricles, basal cistern and the cortical sulci are age-appropriate. The scattered areas of chronic white matter ischemia bilaterally are noted which is stable. There is normal gray-white matter differentiation. The images reviewed in bone window show no acute bony abnormality. The visualized paranasal sinuses and mastoid air cells are normal and clear. No acute intracranial abnormality. Moderate chronic ischemic and atrophic changes. The above study was initially reviewed and reported by stat rads. I do not find any discrepancies. Signed by Dr Dulce Maria Bhandari on 5/30/2019 7:29 AM    Us Renal Complete    Result Date: 5/31/2019  EXAMINATION: US RENAL COMPLETE 5/31/2019 4:08 PM HISTORY: Abdominal pain COMPARISON: None FINDINGS: Transabdominal sonographic evaluation of the kidneys and urinary bladder was performed in the transverse and longitudinal projections. The right kidney appears normal in size and echogenicity, measuring 9.9 x 5.5 x 5.0 cm in size. There is no sign of collecting system dilatation or solid renal mass. No shadowing stones are demonstrated. The urinary bladder is partially distended and grossly normal in appearance. Left kidney appears normal in size and echogenicity, measuring 9.9 x 5.6 x 5.0 cm in size. There is no sign of collecting system dilatation or solid renal mass. No shadowing stones are demonstrated. Normal renal ultrasound. Signed by Dr Michael Jones on 5/31/2019 4:09 PM    Xr Chest Portable    Result Date: 5/29/2019  Exam:   XR CHEST PORTABLE  Date:  5/29/2019 History:  Female, age  70 year fatigue. COMPARISON:  None. Findings : The heart and mediastinum are normal in size. Lungs are without focal infiltrate, mass or effusions. Chronic interstitial lung changes. The bones show no acute pathology. Impression: No acute cardiopulmonary disease.  Signed by Dr Mary Nassar on 5/29/2019 10:01 PM       Assessment   Hyponatremia  Hypokalemia  Nausea  Back pain  Urinary tract infection with expanded spectrum beta-lactamase producing E. coli  Fall  Failure to thrive in adult  Anemia with recent acute blood loss due to surgery  hypertension    Plan:  Sodium and potassium improving off hydrochlorothiazide, would not restart as an outpatient  Verbal order given to nursing for oral anti-emetics until hospitalist can be reached  Continue antibiotics for infection  Discussed with patient, family, nursing, Jasmeet Patton MD  06/03/19  2:03 PM

## 2019-06-03 NOTE — CARE COORDINATION
Pre-Cert has cleared for Pt to DC to [facility] on Monday June 3rd.   [facility]  [address] [facility]  Electronically signed by Derick Dutton on 6/3/2019 at 10:58 AM

## 2019-06-03 NOTE — PROGRESS NOTES
Patient resting well this shift, cms+ to lle and palpable pedal pulse noted lle, prineo dressing d/i to left knee will cont to monitor Electronically signed by Estevan Luong RN on 6/3/2019 at 1:54 AM

## 2019-06-03 NOTE — PROGRESS NOTES
[facility]      Patient:    Lissette Turcios  YOB: 1940  Date of Service:  6/3/2019  MRN:    [id number]    Room:   [address] [place] [address]   PCP:    Giles Ormond, MD     Chief Complaint:  Fall    Subjective:  Constipation improved. Pain is controlled. Urine culture + ESBL E coli - patient complains of urinary retention - states they have had to straight cath on 2 occasions during this hospitalization, new problem for her. Denies dysuria or burning. Cumulative Hospital Course: The patient is a pleasant 70year old female who was recently admitted to the hospital and underwent an explantation of left knee, deep hardware removal requiring metal cutting kobi and cutting of distal femoral plate, and segmental left total knee revision on 4/24/19. 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 She had reached a point where she required a walker constantly. After the surgery, the patient was discharged to [facility], where, according to the patient, she progressed well with therapy. She was discharged home. She states she began feeling weaker. She reports two different falls. She states there was some confusion regarding her medication list. She came to the ER after the second fall and was admitted to the hospital for further evaluation and treatment.     She was evaluated on 5/30/19 by Dr. Facundo Preston. After examining the patient, he did not find any problems with the knee itself. PT/OT are consulted. SW consulted to assist with discharge planning. Referral sent to [facility]. Nephrology is consulted for hyponatremia. HCTZ is discontinued. Fluid restriction of 1200 mL / 24 hour period. Urine culture + ESBL E Coli - Antibiotics adjusted.     Review of Systems:   10 point review of systems is negative except as specifically addressed above. Objective:   VITALS:  BP (!) 111/51   Pulse 69   Temp 97.7 °F (36.5 °C) (Temporal)   Resp 18   Ht 5' (1.524 m)   Wt 168 lb 12.8 oz (76.6 kg)   SpO2 92%   BMI 32.97 kg/m²   24HR INTAKE/OUTPUT:      Intake/Output Summary (Last 24 hours) at 6/3/2019 3846  Last data filed at 6/3/2019 0858  Gross per 24 hour   Intake 720 ml   Output 1142 ml   Net -422 ml     General appearance: alert, appears stated age, cooperative and no distress  Head: Normocephalic, without obvious abnormality, atraumatic  Eyes: conjunctivae/corneas clear. PERRL, EOM's intact. Ears: normal external ears and nose, throat without exudate  Neck: Supple, no JVD, no tracheal deviation  Lungs: Normal respiratory effort, clear to auscultation bilaterally,no rales or wheezes   Heart: Regular rate and rhythm, S1, S2 normal, no murmur  Abdomen: Soft, non-tender; non-distended, normal bowel sounds no masses, no organomegaly  Extremities: No lower extremity edema,  No erythema, no tenderness to palpation  Skin: Skin color, texture, turgor normal. No rashes or lesions  Neurologic: Cranial nerves:II-XII Grossly intact. No focal deficits noted.   Psychiatric: Alert and oriented, thought content appropriate, normal insight, mood appropriate      Medications:      meropenem  1 g Intravenous Q8H    potassium chloride  20 mEq Oral Daily    magnesium hydroxide  30 mL Oral Daily    bisacodyl  10 mg Rectal Daily    pantoprazole  40 mg Intravenous Daily    And    sodium chloride (PF)  10 mL Intravenous Daily    amLODIPine  10 mg Oral Daily    calcium carbonate  1 tablet Oral Daily    montelukast  10 mg Oral Nightly    senna  1 tablet Oral Daily    ferrous sulfate  325 mg Oral BID WC    polyethylene glycol  17 g Oral Daily    atorvastatin  10 mg Oral Daily    nortriptyline  50 mg Oral Nightly    rivaroxaban  10 mg Oral Q24H     oxyCODONE-acetaminophen, calcium carbonate, ALPRAZolam, ondansetron, polyethylene glycol, 5/29/2019 10:02 PM    Xr Abdomen (kub) (single Ap View)    Result Date: 5/31/2019  EXAMINATION: KUB radiograph 5/31/2019 HISTORY: Abdominal pain and vomiting FINDINGS: KUB radiograph demonstrates a nonspecific bowel gas pattern with mild constipation. There is increased opacity over the pelvis suggesting a distended bladder. There is arthritic change of the lower lumbar spine and hips. The patient's undergone prior cholecystectomy. 1.. Nonspecific bowel gas pattern. There is mild/moderate constipation. 2. Increased opacity over the pelvis likely representing a distended urinary bladder. Signed by Dr Chiara Leo on 5/31/2019 4:12 PM    Ct Head Wo Contrast    Result Date: 5/30/2019  Examination. CT HEAD WO CONTRAST History: The patient fell and complains of headache. She is on anticoagulant therapy. DLP: 667 mGycm. The CT scan of the head is performed without intravenous contrast enhancement. The images are acquired in axial plane with subsequent reconstruction in coronal and sagittal planes. The comparison is made with the previous study dated 2/29/2012. There is no evidence of intracranial hemorrhage or hematoma. There is no evidence of mass or midline shift. Moderately prominent ventricles, basal cistern and the cortical sulci are age-appropriate. The scattered areas of chronic white matter ischemia bilaterally are noted which is stable. There is normal gray-white matter differentiation. The images reviewed in bone window show no acute bony abnormality. The visualized paranasal sinuses and mastoid air cells are normal and clear. No acute intracranial abnormality. Moderate chronic ischemic and atrophic changes. The above study was initially reviewed and reported by stat rads. I do not find any discrepancies.  Signed by Dr Sarah Sommer on 5/30/2019 7:29 AM    Us Renal Complete    Result Date: 5/31/2019  EXAMINATION: US RENAL COMPLETE 5/31/2019 4:08 PM HISTORY: Abdominal pain COMPARISON: None FINDINGS: Transabdominal sonographic evaluation of the kidneys and urinary bladder was performed in the transverse and longitudinal projections. The right kidney appears normal in size and echogenicity, measuring 9.9 x 5.5 x 5.0 cm in size. There is no sign of collecting system dilatation or solid renal mass. No shadowing stones are demonstrated. The urinary bladder is partially distended and grossly normal in appearance. Left kidney appears normal in size and echogenicity, measuring 9.9 x 5.6 x 5.0 cm in size. There is no sign of collecting system dilatation or solid renal mass. No shadowing stones are demonstrated. Normal renal ultrasound. Signed by Dr Yamilka Canales on 5/31/2019 4:09 PM    Xr Chest Portable    Result Date: 5/29/2019  Exam:   XR CHEST PORTABLE  Date:  5/29/2019 History:  Female, age  70 year fatigue. COMPARISON:  None. Findings : The heart and mediastinum are normal in size. Lungs are without focal infiltrate, mass or effusions. Chronic interstitial lung changes. The bones show no acute pathology. Impression: No acute cardiopulmonary disease.  Signed by Dr Colten Raymond on 5/29/2019 10:01 PM    Problem List:     Patient Active Problem List    Diagnosis Date Noted    UTI due to extended-spectrum beta lactamase (ESBL) producing Escherichia coli 06/03/2019    Fall     Failure to thrive in adult 05/30/2019    Multiple falls 05/30/2019    Anemia 05/30/2019    History of maternal deep vein thrombosis (DVT) 04/25/2019    Acute blood loss as cause of postoperative anemia 04/25/2019    Essential hypertension 04/25/2019    Slow transit constipation 04/25/2019    Hyponatremia 04/25/2019    Hyperglycemia 04/25/2019    GERD (gastroesophageal reflux disease) 04/25/2019    Loose total knee arthroplasty ([address] 04/24/2019    Atherosclerosis of native artery of both lower extremities with intermittent claudication ([address] 08/11/2017    Decreased pulses in feet 08/16/2016       Assessment and Plan:

## 2019-06-04 VITALS
HEART RATE: 76 BPM | WEIGHT: 168.8 LBS | HEIGHT: 60 IN | OXYGEN SATURATION: 100 % | DIASTOLIC BLOOD PRESSURE: 63 MMHG | RESPIRATION RATE: 16 BRPM | TEMPERATURE: 97.6 F | BODY MASS INDEX: 33.14 KG/M2 | SYSTOLIC BLOOD PRESSURE: 138 MMHG

## 2019-06-04 LAB
ANION GAP SERPL CALCULATED.3IONS-SCNC: 10 MMOL/L (ref 7–19)
BACTERIA: NEGATIVE /HPF
BILIRUBIN URINE: NEGATIVE
BLOOD CULTURE, ROUTINE: NORMAL
BLOOD, URINE: NEGATIVE
BUN BLDV-MCNC: 8 MG/DL (ref 8–23)
CALCIUM SERPL-MCNC: 8.2 MG/DL (ref 8.8–10.2)
CHLORIDE BLD-SCNC: 95 MMOL/L (ref 98–111)
CLARITY: ABNORMAL
CO2: 22 MMOL/L (ref 22–29)
COLOR: YELLOW
CREAT SERPL-MCNC: <0.5 MG/DL (ref 0.5–0.9)
CULTURE, BLOOD 2: NORMAL
EPITHELIAL CELLS, UA: 0 /HPF (ref 0–5)
GFR NON-AFRICAN AMERICAN: >60
GLUCOSE BLD-MCNC: 83 MG/DL (ref 74–109)
GLUCOSE URINE: NEGATIVE MG/DL
HCT VFR BLD CALC: 29.9 % (ref 37–47)
HEMOGLOBIN: 9.1 G/DL (ref 12–16)
HYALINE CASTS: 1 /HPF (ref 0–8)
KETONES, URINE: NEGATIVE MG/DL
LEUKOCYTE ESTERASE, URINE: ABNORMAL
MCH RBC QN AUTO: 28.3 PG (ref 27–31)
MCHC RBC AUTO-ENTMCNC: 30.4 G/DL (ref 33–37)
MCV RBC AUTO: 93.1 FL (ref 81–99)
NITRITE, URINE: NEGATIVE
OSMOLALITY URINE: 184 MOSM/KG (ref 250–1200)
PDW BLD-RTO: 13.8 % (ref 11.5–14.5)
PH UA: 7.5 (ref 5–8)
PLATELET # BLD: 325 K/UL (ref 130–400)
PMV BLD AUTO: 9 FL (ref 9.4–12.3)
POTASSIUM SERPL-SCNC: 4.1 MMOL/L (ref 3.5–5)
PROTEIN UA: NEGATIVE MG/DL
RBC # BLD: 3.21 M/UL (ref 4.2–5.4)
RBC UA: 2 /HPF (ref 0–4)
SODIUM BLD-SCNC: 127 MMOL/L (ref 136–145)
SODIUM URINE: 51 MMOL/L
SPECIFIC GRAVITY UA: 1.01 (ref 1–1.03)
UROBILINOGEN, URINE: 0.2 E.U./DL
WBC # BLD: 9.7 K/UL (ref 4.8–10.8)
WBC UA: 213 /HPF (ref 0–5)

## 2019-06-04 PROCEDURE — 36415 COLL VENOUS BLD VENIPUNCTURE: CPT

## 2019-06-04 PROCEDURE — 83935 ASSAY OF URINE OSMOLALITY: CPT

## 2019-06-04 PROCEDURE — 97116 GAIT TRAINING THERAPY: CPT

## 2019-06-04 PROCEDURE — 02HV33Z INSERTION OF INFUSION DEVICE INTO SUPERIOR VENA CAVA, PERCUTANEOUS APPROACH: ICD-10-PCS | Performed by: INTERNAL MEDICINE

## 2019-06-04 PROCEDURE — 2580000003 HC RX 258: Performed by: PHYSICIAN ASSISTANT

## 2019-06-04 PROCEDURE — 99239 HOSP IP/OBS DSCHRG MGMT >30: CPT | Performed by: PHYSICIAN ASSISTANT

## 2019-06-04 PROCEDURE — 6370000000 HC RX 637 (ALT 250 FOR IP): Performed by: HOSPITALIST

## 2019-06-04 PROCEDURE — 84300 ASSAY OF URINE SODIUM: CPT

## 2019-06-04 PROCEDURE — 6360000002 HC RX W HCPCS: Performed by: HOSPITALIST

## 2019-06-04 PROCEDURE — 36569 INSJ PICC 5 YR+ W/O IMAGING: CPT

## 2019-06-04 PROCEDURE — 85027 COMPLETE CBC AUTOMATED: CPT

## 2019-06-04 PROCEDURE — 80048 BASIC METABOLIC PNL TOTAL CA: CPT

## 2019-06-04 PROCEDURE — C9113 INJ PANTOPRAZOLE SODIUM, VIA: HCPCS | Performed by: HOSPITALIST

## 2019-06-04 PROCEDURE — 76937 US GUIDE VASCULAR ACCESS: CPT

## 2019-06-04 PROCEDURE — 81001 URINALYSIS AUTO W/SCOPE: CPT

## 2019-06-04 PROCEDURE — C1751 CATH, INF, PER/CENT/MIDLINE: HCPCS

## 2019-06-04 PROCEDURE — 6360000002 HC RX W HCPCS: Performed by: PHYSICIAN ASSISTANT

## 2019-06-04 PROCEDURE — 2580000003 HC RX 258: Performed by: HOSPITALIST

## 2019-06-04 PROCEDURE — 6370000000 HC RX 637 (ALT 250 FOR IP): Performed by: INTERNAL MEDICINE

## 2019-06-04 PROCEDURE — 6370000000 HC RX 637 (ALT 250 FOR IP): Performed by: PHYSICIAN ASSISTANT

## 2019-06-04 RX ORDER — OXYCODONE AND ACETAMINOPHEN 7.5; 325 MG/1; MG/1
1 TABLET ORAL EVERY 4 HOURS PRN
Qty: 18 TABLET | Refills: 0 | Status: SHIPPED | OUTPATIENT
Start: 2019-06-04 | End: 2019-06-07

## 2019-06-04 RX ORDER — BISACODYL 10 MG
10 SUPPOSITORY, RECTAL RECTAL DAILY PRN
Qty: 30 SUPPOSITORY | Refills: 0 | Status: ON HOLD | DISCHARGE
Start: 2019-06-04 | End: 2019-07-19 | Stop reason: HOSPADM

## 2019-06-04 RX ORDER — POLYETHYLENE GLYCOL 3350 17 G/17G
17 POWDER, FOR SOLUTION ORAL DAILY
Qty: 527 G | Refills: 0 | Status: ON HOLD | DISCHARGE
Start: 2019-06-05 | End: 2019-07-19 | Stop reason: HOSPADM

## 2019-06-04 RX ORDER — SODIUM CHLORIDE 0.9 % (FLUSH) 0.9 %
10 SYRINGE (ML) INJECTION EVERY 12 HOURS SCHEDULED
Status: DISCONTINUED | OUTPATIENT
Start: 2019-06-04 | End: 2019-06-04 | Stop reason: HOSPADM

## 2019-06-04 RX ORDER — PANTOPRAZOLE SODIUM 40 MG/1
40 TABLET, DELAYED RELEASE ORAL
Status: DISCONTINUED | OUTPATIENT
Start: 2019-06-05 | End: 2019-06-04 | Stop reason: HOSPADM

## 2019-06-04 RX ORDER — LIDOCAINE HYDROCHLORIDE 10 MG/ML
5 INJECTION, SOLUTION EPIDURAL; INFILTRATION; INTRACAUDAL; PERINEURAL ONCE
Status: DISCONTINUED | OUTPATIENT
Start: 2019-06-04 | End: 2019-06-04 | Stop reason: HOSPADM

## 2019-06-04 RX ORDER — SODIUM CHLORIDE 0.9 % (FLUSH) 0.9 %
10 SYRINGE (ML) INJECTION PRN
Status: DISCONTINUED | OUTPATIENT
Start: 2019-06-04 | End: 2019-06-04 | Stop reason: HOSPADM

## 2019-06-04 RX ORDER — ALPRAZOLAM 1 MG/1
1 TABLET ORAL NIGHTLY PRN
Qty: 3 TABLET | Refills: 0 | Status: SHIPPED | OUTPATIENT
Start: 2019-06-04 | End: 2019-06-07

## 2019-06-04 RX ADMIN — Medication 8.6 MG: at 08:01

## 2019-06-04 RX ADMIN — ANTACID TABLETS 500 MG: 500 TABLET, CHEWABLE ORAL at 08:01

## 2019-06-04 RX ADMIN — MEROPENEM 1 G: 1 INJECTION, POWDER, FOR SOLUTION INTRAVENOUS at 08:00

## 2019-06-04 RX ADMIN — PANTOPRAZOLE SODIUM 40 MG: 40 INJECTION, POWDER, FOR SOLUTION INTRAVENOUS at 08:01

## 2019-06-04 RX ADMIN — MEROPENEM 1 G: 1 INJECTION, POWDER, FOR SOLUTION INTRAVENOUS at 15:58

## 2019-06-04 RX ADMIN — FERROUS SULFATE TAB 325 MG (65 MG ELEMENTAL FE) 325 MG: 325 (65 FE) TAB at 08:02

## 2019-06-04 RX ADMIN — OXYCODONE HYDROCHLORIDE AND ACETAMINOPHEN 1 TABLET: 7.5; 325 TABLET ORAL at 13:11

## 2019-06-04 RX ADMIN — ALPRAZOLAM 1 MG: 1 TABLET ORAL at 01:09

## 2019-06-04 RX ADMIN — ANTACID TABLETS 500 MG: 500 TABLET, CHEWABLE ORAL at 01:09

## 2019-06-04 RX ADMIN — POLYETHYLENE GLYCOL 3350 17 G: 17 POWDER, FOR SOLUTION ORAL at 08:01

## 2019-06-04 RX ADMIN — Medication 10 ML: at 08:01

## 2019-06-04 RX ADMIN — ATORVASTATIN CALCIUM 10 MG: 10 TABLET, FILM COATED ORAL at 08:01

## 2019-06-04 RX ADMIN — AMLODIPINE BESYLATE 10 MG: 10 TABLET ORAL at 08:01

## 2019-06-04 RX ADMIN — POTASSIUM CHLORIDE 20 MEQ: 20 TABLET, EXTENDED RELEASE ORAL at 08:01

## 2019-06-04 RX ADMIN — RIVAROXABAN 10 MG: 10 TABLET, FILM COATED ORAL at 08:02

## 2019-06-04 RX ADMIN — MAGNESIUM HYDROXIDE 30 ML: 400 SUSPENSION ORAL at 08:01

## 2019-06-04 RX ADMIN — MEROPENEM 1 G: 1 INJECTION, POWDER, FOR SOLUTION INTRAVENOUS at 00:14

## 2019-06-04 RX ADMIN — OXYCODONE HYDROCHLORIDE AND ACETAMINOPHEN 1 TABLET: 7.5; 325 TABLET ORAL at 00:14

## 2019-06-04 RX ADMIN — Medication 10 MG: at 08:02

## 2019-06-04 RX ADMIN — OXYCODONE HYDROCHLORIDE AND ACETAMINOPHEN 1 TABLET: 7.5; 325 TABLET ORAL at 06:08

## 2019-06-04 ASSESSMENT — PAIN DESCRIPTION - PAIN TYPE
TYPE: ACUTE PAIN
TYPE: CHRONIC PAIN
TYPE: ACUTE PAIN

## 2019-06-04 ASSESSMENT — PAIN DESCRIPTION - LOCATION
LOCATION: RIB CAGE
LOCATION: BACK

## 2019-06-04 ASSESSMENT — PAIN SCALES - GENERAL
PAINLEVEL_OUTOF10: 6
PAINLEVEL_OUTOF10: 10
PAINLEVEL_OUTOF10: 8
PAINLEVEL_OUTOF10: 2

## 2019-06-04 ASSESSMENT — PAIN DESCRIPTION - ORIENTATION: ORIENTATION: RIGHT

## 2019-06-04 NOTE — PROGRESS NOTES
Nephrology ([facility]) Progress Note    Patient:  Micaela Escobedo  YOB: 1940  Date of Service: 6/4/2019  MRN: [id number]   Acct: [de-identified]   Primary Care Physician: Neville Saenz MD  Advance Directive: Full Code  Admit Date: 5/29/2019       Hospital Day: 5  Referring Provider: Garrett Redd DO    Patient independently seen and examined, Chart, Consults, Notes, Operative notes, Labs, Cardiology, and Radiology studies reviewed as able. Subjective:  Micaela Escobedo is a 70 y.o. female  whom we were consulted for hyponatremia/hypokalemia. Patient denies any previous history of hyponatremia. She was admitted from the nursing home where she has been recently repeatedly falling. She was recently hospitalized, underwent left knee surgery that included removal of previous prosthesis and insertion of new prosthetic knee. Apparently she has been taking hydrochlorothiazide as part of blood pressure medications. She presented with a recurrent fall. Incidental finding showed serum sodium was 125 mmol. Patient was also drinking plenty of water to keep herself hydrated. Today, has questions about see Angelique and electrolytes. She denies chest pain, shortness of air. Her nausea is better. Allergies:  Codeine; Levaquin [levofloxacin];  Lyrica [pregabalin]; and Morphine    Medicines:  Current Facility-Administered Medications   Medication Dose Route Frequency Provider Last Rate Last Dose    lidocaine PF 1 % injection 5 mL  5 mL Intradermal Once Lukasz Archer DO        sodium chloride flush 0.9 % injection 10 mL  10 mL Intravenous 2 times per day Garrett Redd DO        sodium chloride flush 0.9 % injection 10 mL  10 mL Intravenous PRN Margarita Archer DO        [START ON 6/5/2019] pantoprazole (PROTONIX) tablet 40 mg  40 mg Oral QAM BRYAN Dennis MD        meropenem (MERREM) 1 g in sodium chloride 0.9 % 100 mL IVPB (mini-bag)  1 g Intravenous Q8H Tressia Lake, BRUCE   Stopped at 06/04/19 0830    ondansetron (ZOFRAN) tablet 4 mg  4 mg Oral Q6H PRN Jessica Higgins MD   4 mg at 06/03/19 1152    oxyCODONE-acetaminophen (PERCOCET) 7.5-325 MG per tablet 1 tablet  1 tablet Oral Q4H PRN Georges Arango MD   1 tablet at 06/04/19 1311    potassium chloride (KLOR-CON M) extended release tablet 20 mEq  20 mEq Oral Daily Georges Arango MD   20 mEq at 06/04/19 0801    magnesium hydroxide (MILK OF MAGNESIA) 400 MG/5ML suspension 30 mL  30 mL Oral Daily Georges Arango MD   30 mL at 06/04/19 0801    bisacodyl (DULCOLAX) suppository 10 mg  10 mg Rectal Daily Georges Arango MD   10 mg at 06/04/19 0802    calcium carbonate (TUMS) chewable tablet 500 mg  500 mg Oral TID PRN Goerges Arango MD   500 mg at 06/04/19 0109    ALPRAZolam (XANAX) tablet 1 mg  1 mg Oral Nightly PRN Georges Arango MD   1 mg at 06/04/19 0109    amLODIPine (NORVASC) tablet 10 mg  10 mg Oral Daily Georges Arango MD   10 mg at 06/04/19 0801    calcium carbonate (TUMS) chewable tablet 500 mg  1 tablet Oral Daily Georges Arango MD   500 mg at 06/04/19 0801    montelukast (SINGULAIR) tablet 10 mg  10 mg Oral Nightly Georges Arango MD   10 mg at 06/03/19 2026    senna (SENOKOT) tablet 8.6 mg  1 tablet Oral Daily Georges Arango MD   8.6 mg at 06/04/19 0801    ferrous sulfate tablet 325 mg  325 mg Oral BID WC Gordon Reeves PA-C   325 mg at 06/04/19 0802    polyethylene glycol (GLYCOLAX) packet 17 g  17 g Oral Daily Georges Arango MD   17 g at 06/04/19 0801    ondansetron (ZOFRAN) injection 4 mg  4 mg Intravenous Q6H PRN Bradley Guillen MD   4 mg at 05/31/19 1054    polyethylene glycol (GLYCOLAX) packet 17 g  17 g Oral Daily PRN Thomas Breaux MD   17 g at 05/30/19 2056    acetaminophen (TYLENOL) tablet 650 mg  650 mg Oral Q8H PRN Thomas Breaux MD   650 mg at 06/03/19 1720    atorvastatin (LIPITOR) tablet 10 mg 10 mg Oral Daily Bradley Guillen MD   10 mg at 06/04/19 0801    nortriptyline (PAMELOR) capsule 50 mg  50 mg Oral Nightly Bradley Guillen MD   50 mg at 06/03/19 2026    rivaroxaban (XARELTO) tablet 10 mg  10 mg Oral Q24H Lizet Johnson MD   10 mg at 06/04/19 0802       Past Medical History:  Past Medical History:   Diagnosis Date    Anxiety     Carotid artery stenosis     Chronic back pain     H/O blood clots     Hx of blood clots     bilat legs    Hyperlipidemia     Hypertension     Osteoarthritis        Past Surgical History:  Past Surgical History:   Procedure Laterality Date    CATARACT REMOVAL Bilateral     CHOLECYSTECTOMY      EYE SURGERY      HYSTERECTOMY      JOINT REPLACEMENT      OTHER SURGICAL HISTORY      Decompression of ulnar nerve     REMOVE HARDWARE FEMUR Left 4/24/2019    HARDWARE REMOVAL performed by Shriley Miramontes MD at [place] [id number] Left 4/24/2019    LEFT KNEE REVISION performed by Shirley [facility] at [facility] OR       Family History  Family History   Problem Relation Age of Onset    Dementia Mother     Heart Disease Father     Cancer Brother     COPD Other     Other Other         Blood clot    Diabetes Other        Social History  Social History     Socioeconomic History    Marital status:      Spouse name: Not on file    Number of children: Not on file    Years of education: Not on file    Highest education level: Not on file   Occupational History    Not on file   Social Needs    Financial resource strain: Not on file    Food insecurity:     Worry: Not on file     Inability: Not on file    Transportation needs:     Medical: Not on file     Non-medical: Not on file   Tobacco Use    Smoking status: Never Smoker    Smokeless tobacco: Never Used   Substance and Sexual Activity    Alcohol use: No    Drug use: No    Sexual activity: Not on file   Lifestyle    Physical activity:     Days per week: Not on file     Minutes per session: Not on file    Stress: Not on file   Relationships    Social connections:     Talks on phone: Not on file     Gets together: Not on file     Attends Sikh service: Not on file     Active member of club or organization: Not on file     Attends meetings of clubs or organizations: Not on file     Relationship status: Not on file    Intimate partner violence:     Fear of current or ex partner: Not on file     Emotionally abused: Not on file     Physically abused: Not on file     Forced sexual activity: Not on file   Other Topics Concern    Not on file   Social History Narrative    Not on file         Review of Systems:  History obtained from chart review and the patient  General ROS: No fever or chills  Respiratory ROS: No cough, shortness of breath, wheezing  Cardiovascular ROS: No chest pain or palpitations  Gastrointestinal ROS: No abdominal pain or melena  Genito-Urinary ROS: No dysuria or hematuria  Musculoskeletal ROS: No joint pain or swelling         Objective:  Patient Vitals for the past 24 hrs:   BP Temp Temp src Pulse Resp SpO2   06/04/19 1037 138/63 97.6 °F (36.4 °C) Temporal 76 16 100 %   06/04/19 0610 (!) 153/68 97.2 °F (36.2 °C) Temporal 73 16 96 %   06/04/19 0307 128/61 99.6 °F (37.6 °C) Temporal 62 14 96 %   06/03/19 2251 (!) 161/66 99.8 °F (37.7 °C) Temporal 69 18 96 %   06/03/19 1918 137/70 99.8 °F (37.7 °C) Temporal 73 16 93 %       Intake/Output Summary (Last 24 hours) at 6/4/2019 1433  Last data filed at 6/4/2019 1000  Gross per 24 hour   Intake 480 ml   Output 500 ml   Net -20 ml     General: awake/alert   Chest:  clear to auscultation bilaterally without respiratory distress  CVS: regular rate and rhythm  Abdominal: soft, nontender, normal bowel sounds  Extremities: no cyanosis or edema  Skin: warm and dry without rash    Labs:  BMP:   Recent Labs     06/03/19  0000 06/03/19  0550 06/04/19  0224   * 127* 127*   K 3.7 3.9 4.1   CL 90* 92* 95*   CO2 23 25 22   BUN 10 11 8 CREATININE 0.5 <0.5 <0.5   CALCIUM 8.0* 7.9* 8.2*     CBC:   Recent Labs     06/03/19  0000 06/04/19  0224   WBC 9.7 9.7   HGB 8.9* 9.1*   HCT 26.7* 29.9*   MCV 88.4 93.1    325     LIVER PROFILE:   Recent Labs     06/03/19  0550   AST 24   ALT 29   BILITOT 0.3   ALKPHOS 68     PT/INR: No results for input(s): PROTIME, INR in the last 72 hours. APTT: No results for input(s): APTT in the last 72 hours. BNP:  No results for input(s): BNP in the last 72 hours. Ionized Calcium:No results for input(s): IONCA in the last 72 hours. Magnesium:No results for input(s): MG in the last 72 hours. Phosphorus:No results for input(s): PHOS in the last 72 hours. HgbA1C: No results for input(s): LABA1C in the last 72 hours. Hepatic:   Recent Labs     06/03/19  0550   ALKPHOS 68   ALT 29   AST 24   PROT 5.1*   BILITOT 0.3   LABALBU 3.0*     Lactic Acid: No results for input(s): LACTA in the last 72 hours. Troponin: No results for input(s): CKTOTAL, CKMB, TROPONINT in the last 72 hours. ABGs:   Lab Results   Component Value Date    PHART 7.400 05/30/2019    PO2ART 99.0 05/30/2019    ZSI4FUO 39.0 05/30/2019     CRP:  No results for input(s): CRP in the last 72 hours. Sed Rate:  No results for input(s): SEDRATE in the last 72 hours. Culture Results:   Blood Culture Recent:   Recent Labs     05/29/19  2145   BC No growth after 5 days of incubation. Urine Culture Recent :   Recent Labs     06/01/19  1225   LABURIN >100,000 CFU/ml*  Heavy growth  CONTACT PRECAUTIONS INDICATED  Carbapenem antibiotics are the best option for infections caused  by ESBL-producing organisms. Other antibiotic classes are  likely to result in treatment failure, even for organisms  demonstrating in vitro susceptibility. *       Radiology reports as per the Radiologist  Radiology: Xr Ribs Right (2 Views)    Result Date: 5/31/2019  Examination. XR RIBS RIGHT (2 VIEWS) History: The patient fell and complains of right-sided hip pain.  The and sagittal planes. The comparison is made with the previous study dated 2/29/2012. There is no evidence of intracranial hemorrhage or hematoma. There is no evidence of mass or midline shift. Moderately prominent ventricles, basal cistern and the cortical sulci are age-appropriate. The scattered areas of chronic white matter ischemia bilaterally are noted which is stable. There is normal gray-white matter differentiation. The images reviewed in bone window show no acute bony abnormality. The visualized paranasal sinuses and mastoid air cells are normal and clear. No acute intracranial abnormality. Moderate chronic ischemic and atrophic changes. The above study was initially reviewed and reported by stat rads. I do not find any discrepancies. Signed by Dr Herrera Leal on 5/30/2019 7:29 AM    Us Renal Complete    Result Date: 5/31/2019  EXAMINATION: US RENAL COMPLETE 5/31/2019 4:08 PM HISTORY: Abdominal pain COMPARISON: None FINDINGS: Transabdominal sonographic evaluation of the kidneys and urinary bladder was performed in the transverse and longitudinal projections. The right kidney appears normal in size and echogenicity, measuring 9.9 x 5.5 x 5.0 cm in size. There is no sign of collecting system dilatation or solid renal mass. No shadowing stones are demonstrated. The urinary bladder is partially distended and grossly normal in appearance. Left kidney appears normal in size and echogenicity, measuring 9.9 x 5.6 x 5.0 cm in size. There is no sign of collecting system dilatation or solid renal mass. No shadowing stones are demonstrated. Normal renal ultrasound. Signed by Dr Danyel Richmond on 5/31/2019 4:09 PM    Xr Chest Portable    Result Date: 5/29/2019  Exam:   XR CHEST PORTABLE  Date:  5/29/2019 History:  Female, age  70 year fatigue. COMPARISON:  None. Findings : The heart and mediastinum are normal in size. Lungs are without focal infiltrate, mass or effusions. Chronic interstitial lung changes.  The bones show no acute pathology. Impression: No acute cardiopulmonary disease.  Signed by Dr Lakshmi Kelsey on 5/29/2019 10:01 PM       Assessment   Hyponatremia  Hypokalemia  Nausea  Back pain  Urinary tract infection with expanded spectrum beta-lactamase producing E. coli  Fall  Failure to thrive in adult  Anemia with recent acute blood loss due to surgery  hypertension    Plan:  Sodium and potassium improving off hydrochlorothiazide, would not restart as an outpatient  Continue antibiotics for infection  Discussed with patient, family, nursing, Gil Park MD  06/04/19  2:33 PM

## 2019-06-04 NOTE — PROGRESS NOTES
Pharmacy Intravenous to Oral Protocol  Medication changed per P&T protocol: Protonix    Patient meets criteria based on the followin. IV therapy > 24 hours - yes  2. Nausea/vomiting - none documented today  3. Regular diet - yes  4. Tolerating other oral medications: yes  5. Afebrile for 24 hours: n/a  6.  WBC trending downward: n/a    BOAZ POTTS, PHARM D, 2019, 2:09 PM

## 2019-06-04 NOTE — DISCHARGE SUMMARY
restriction of 1200 mL / 24 hour period. Hyponatremia remains stable.      Urine culture + ESBL E Coli - Antibiotics adjusted. She will continue antibiotic treatment with ertapenem 1 gram IV q 24 hours. She has been difficult to maintain access. Midline order placed per Dr. Marcy Orlando. She has been accepted to [facility]. Insurance precert has cleared. On day of discharge, the patient's vital signs and laboratory data are stable. She is cleared for discharge by attending and consulting phsyicians. She will be discharged to [address] [place] [address] in Stable condition. Physical Examination:  VITALS:  /63   Pulse 76   Temp 97.6 °F (36.4 °C) (Temporal)   Resp 16   Ht 5' (1.524 m)   Wt 168 lb 12.8 oz (76.6 kg)   SpO2 100%   BMI 32.97 kg/m²   24HR INTAKE/OUTPUT:      Intake/Output Summary (Last 24 hours) at 6/4/2019 1417  Last data filed at 6/4/2019 1000  Gross per 24 hour   Intake 480 ml   Output 500 ml   Net -20 ml     Constitutional: 70 y.o. female, Well-developed and well-nourished. No distress. HENT:    Head: Normocephalic and atraumatic.    Mouth/Throat: Oropharynx is clear and moist. No oropharyngeal exudate. Eyes: Conjunctivae and EOM are normal. Pupils are equal, round, and reactive to light. No scleral icterus. Neck: Normal range of motion. Neck supple. No JVD present. No tracheal deviation present. No thyromegaly present. Cardiovascular: Normal rate, regular rhythm and normal heart sounds.  Exam reveals no gallop and no friction rub.     No murmur heard. Pulmonary/Chest: Effort normal and breath sounds normal. No stridor. No respiratory distress. No wheezes. No rales. Abdominal: Soft. Bowel sounds are normal. No distension and no mass. There is no tenderness. There is no rebound and no guarding. Musculoskeletal: Normal range of motion. There is no edema or tenderness. Lymphadenopathy: No cervical adenopathy.    Neurological: Alert and oriented to person, place, and time. No cranial nerve deficit. Skin: Skin is warm and dry. No rash noted. Not diaphoretic. No erythema. No pallor. Psychiatric: Normal mood and affect. Behavior is normal. Judgment and thought content normal.     Significant Diagnostic Studies:    Recent Labs     06/03/19  0000 06/04/19  0224   WBC 9.7 9.7   HGB 8.9* 9.1*   HCT 26.7* 29.9*    325     Recent Labs     06/03/19  0000 06/03/19  0550 06/04/19 0224   * 127* 127*   K 3.7 3.9 4.1   CL 90* 92* 95*   CO2 23 25 22   BUN 10 11 8   CREATININE 0.5 <0.5 <0.5   CALCIUM 8.0* 7.9* 8.2*   GLUCOSE 91 86 83     Lab Results   Component Value Date    TSH 1.280 06/01/2019     No results found for: LABA1C  No results found for: EAG  No results found for: CHOL  No results found for: TRIG  No results found for: HDL  No results found for: LDLCHOLESTEROL, LDLCALC  No results found for: LABVLDL, VLDL  No results found for: CHOLHDLRATIO    Culture: No results for input(s): BC, BLOODCULT2, ORG in the last 72 hours. Discharge Medications:       Melo Torres   Home Medication Instructions QRB:[id number]    Printed on:06/04/19 0943   Medication Information                      acetaminophen (TYLENOL) 500 MG tablet  Take 500 mg by mouth every 6 hours as needed for Pain             ALPRAZolam (XANAX) 1 MG tablet  Take 1 tablet by mouth nightly as needed for Sleep for up to 3 days.              amLODIPine (NORVASC) 10 MG tablet  Take 10 mg by mouth daily             atorvastatin (LIPITOR) 10 MG tablet  Take 10 mg by mouth daily             bisacodyl (DULCOLAX) 10 MG suppository  Place 1 suppository rectally daily as needed for Constipation             Calcium Carbonate (CALTRATE 600 PO)  Take 600 mg by mouth 2 times daily             calcium carbonate (TUMS) 500 MG chewable tablet  Take 1 tablet by mouth daily             Cholecalciferol (VITAMIN D3) 5000 units TABS  Take by mouth daily              cycloSPORINE (RESTASIS) 0.05 % ophthalmic emulsion  Place 1 drop into both eyes 2 times daily             diphenhydrAMINE (BENADRYL) 25 MG tablet  Take 25 mg by mouth nightly as needed for Itching             ertapenem (INVANZ) infusion  Infuse 1,000 mg intravenously every 24 hours for 6 days Compound per protocol. esomeprazole Magnesium (NEXIUM) 40 MG PACK  Take 40 mg by mouth daily             ferrous sulfate 325 (65 Fe) MG tablet  Take 325 mg by mouth 2 times daily             Linaclotide (LINZESS PO)  Take 290 mcg by mouth daily as needed              lisinopril (PRINIVIL;ZESTRIL) 40 MG tablet  Take 40 mg by mouth daily             Miconazole Nitrate (REMEDY PHYTOPLEX ANTIFUNGAL EX)  Apply topically             montelukast (SINGULAIR) 10 MG tablet  Take 10 mg by mouth nightly             Multiple Vitamins-Minerals (PRESERVISION AREDS PO)  Take by mouth daily             nabumetone (RELAFEN) 500 MG tablet  Take 500 mg by mouth 2 times daily             nortriptyline (PAMELOR) 50 MG capsule  Take 50 mg by mouth nightly             nystatin 549783 UNIT/GM POWD  Apply 100,000 g topically 2 times daily             oxyCODONE-acetaminophen (PERCOCET) 7.5-325 MG per tablet  Take 1 tablet by mouth every 4 hours as needed for Pain for up to 3 days. polyethyl glycol-propyl glycol 0.4-0.3 % (SYSTANE) 0.4-0.3 % ophthalmic solution  2 drops as needed for Dry Eyes              polyethylene glycol (GLYCOLAX) packet  Take 17 g by mouth daily             rivaroxaban (XARELTO) 10 MG TABS tablet  Take 1 tablet by mouth every 24 hours             senna (SENOKOT) 8.6 MG tablet  Take 1 tablet by mouth daily             tiZANidine (ZANAFLEX) 4 MG tablet  Take 4 mg by mouth every 6 hours as needed                 Condition:  Stable    Disposition:  Skilled nursing facility    Diet:  cardiac diet and 1200 mL / 24 hour period fluid restriction     Follow Up Instructions: Follow up with Giles Ormond, MD in 2-3 days. Follow up with Dr. Facundo Preston as directed.   Follow up with Nephrology as directed. Time spent on discharge: 32 minutes.     Signed:  Stiven Matos PA-C  6/4/2019

## 2019-06-04 NOTE — PROCEDURES
Midline Insertion Procedure Note    Procedure: Insertion of #4 FR/18G Midline    Indications:  Home IV therapy X 6 days    Procedure Details   Informed consent was obtained for the procedure, including local anesthetic. Risks and benefits were discussed. #4 FR/18G Midline inserted to the R Basilic vein per hospital protocol. Blood return:  yes    Findings:  Catheter inserted to 12 cm, with 0 cm exposed. Catheter was flushed with 10 cc NS. Patient did tolerate procedure well. Recommendations:  Tip distal to axilla per measurements. Okay to use. Midline Brochure given to patient with teaching instruction.

## 2019-06-18 PROBLEM — Z86.010 HISTORY OF ADENOMATOUS POLYP OF COLON: Status: ACTIVE | Noted: 2019-06-18

## 2019-06-18 PROBLEM — Z80.0 FAMILY HX OF COLON CANCER: Status: ACTIVE | Noted: 2019-06-18

## 2019-06-18 PROBLEM — Z86.0101 HISTORY OF ADENOMATOUS POLYP OF COLON: Status: ACTIVE | Noted: 2019-06-18

## 2019-07-02 ENCOUNTER — APPOINTMENT (OUTPATIENT)
Dept: GENERAL RADIOLOGY | Age: 79
End: 2019-07-02
Payer: MEDICARE

## 2019-07-02 ENCOUNTER — HOSPITAL ENCOUNTER (OUTPATIENT)
Age: 79
Setting detail: OBSERVATION
Discharge: NURSING, REHAB OR LONG-TERM CARE | End: 2019-07-19
Attending: FAMILY MEDICINE
Payer: MEDICARE

## 2019-07-02 ENCOUNTER — APPOINTMENT (OUTPATIENT)
Dept: CT IMAGING | Age: 79
End: 2019-07-02
Payer: MEDICARE

## 2019-07-02 DIAGNOSIS — R07.89 CHEST WALL PAIN: ICD-10-CM

## 2019-07-02 DIAGNOSIS — R29.6 FALLS FREQUENTLY: ICD-10-CM

## 2019-07-02 DIAGNOSIS — R62.7 ADULT FAILURE TO THRIVE: Primary | ICD-10-CM

## 2019-07-02 DIAGNOSIS — T76.01XA SUSPECTED ELDER NEGLECT, INITIAL ENCOUNTER: ICD-10-CM

## 2019-07-02 DIAGNOSIS — R41.82 ALTERED MENTAL STATUS, UNSPECIFIED ALTERED MENTAL STATUS TYPE: ICD-10-CM

## 2019-07-02 LAB
ACETAMINOPHEN LEVEL: <15 UG/ML
ALBUMIN SERPL-MCNC: 4 G/DL (ref 3.5–5.2)
ALP BLD-CCNC: 83 U/L (ref 35–104)
ALT SERPL-CCNC: 16 U/L (ref 5–33)
ANION GAP SERPL CALCULATED.3IONS-SCNC: 15 MMOL/L (ref 7–19)
AST SERPL-CCNC: 18 U/L (ref 5–32)
BASOPHILS ABSOLUTE: 0.1 K/UL (ref 0–0.2)
BASOPHILS RELATIVE PERCENT: 0.4 % (ref 0–1)
BILIRUB SERPL-MCNC: 0.5 MG/DL (ref 0.2–1.2)
BILIRUBIN URINE: NEGATIVE
BLOOD, URINE: NEGATIVE
BUN BLDV-MCNC: 9 MG/DL (ref 8–23)
C-REACTIVE PROTEIN: 0.82 MG/DL (ref 0–0.5)
CALCIUM SERPL-MCNC: 10.3 MG/DL (ref 8.8–10.2)
CHLORIDE BLD-SCNC: 96 MMOL/L (ref 98–111)
CLARITY: CLEAR
CO2: 21 MMOL/L (ref 22–29)
COLOR: YELLOW
CREAT SERPL-MCNC: 0.7 MG/DL (ref 0.5–0.9)
EOSINOPHILS ABSOLUTE: 0.1 K/UL (ref 0–0.6)
EOSINOPHILS RELATIVE PERCENT: 0.4 % (ref 0–5)
GFR NON-AFRICAN AMERICAN: >60
GLUCOSE BLD-MCNC: 119 MG/DL (ref 74–109)
GLUCOSE URINE: NEGATIVE MG/DL
HCT VFR BLD CALC: 33.7 % (ref 37–47)
HEMOGLOBIN: 10.8 G/DL (ref 12–16)
KETONES, URINE: NEGATIVE MG/DL
LEUKOCYTE ESTERASE, URINE: NEGATIVE
LYMPHOCYTES ABSOLUTE: 1.4 K/UL (ref 1.1–4.5)
LYMPHOCYTES RELATIVE PERCENT: 12.6 % (ref 20–40)
MCH RBC QN AUTO: 29.3 PG (ref 27–31)
MCHC RBC AUTO-ENTMCNC: 32 G/DL (ref 33–37)
MCV RBC AUTO: 91.3 FL (ref 81–99)
MONOCYTES ABSOLUTE: 0.8 K/UL (ref 0–0.9)
MONOCYTES RELATIVE PERCENT: 6.9 % (ref 0–10)
NEUTROPHILS ABSOLUTE: 9.1 K/UL (ref 1.5–7.5)
NEUTROPHILS RELATIVE PERCENT: 79.1 % (ref 50–65)
NITRITE, URINE: NEGATIVE
PDW BLD-RTO: 15.2 % (ref 11.5–14.5)
PH UA: 7 (ref 5–8)
PLATELET # BLD: 316 K/UL (ref 130–400)
PMV BLD AUTO: 10.3 FL (ref 9.4–12.3)
POTASSIUM SERPL-SCNC: 3.8 MMOL/L (ref 3.5–5)
PROTEIN UA: NEGATIVE MG/DL
RBC # BLD: 3.69 M/UL (ref 4.2–5.4)
SALICYLATE, SERUM: <3 MG/DL (ref 3–10)
SODIUM BLD-SCNC: 132 MMOL/L (ref 136–145)
SPECIFIC GRAVITY UA: 1.01 (ref 1–1.03)
TOTAL PROTEIN: 7.2 G/DL (ref 6.6–8.7)
TROPONIN: <0.01 NG/ML (ref 0–0.03)
TSH SERPL DL<=0.05 MIU/L-ACNC: 1.1 UIU/ML (ref 0.27–4.2)
URINE REFLEX TO CULTURE: NORMAL
UROBILINOGEN, URINE: 0.2 E.U./DL
WBC # BLD: 11.5 K/UL (ref 4.8–10.8)

## 2019-07-02 PROCEDURE — 80053 COMPREHEN METABOLIC PANEL: CPT

## 2019-07-02 PROCEDURE — 85025 COMPLETE CBC W/AUTO DIFF WBC: CPT

## 2019-07-02 PROCEDURE — 87040 BLOOD CULTURE FOR BACTERIA: CPT

## 2019-07-02 PROCEDURE — 84443 ASSAY THYROID STIM HORMONE: CPT

## 2019-07-02 PROCEDURE — G0480 DRUG TEST DEF 1-7 CLASSES: HCPCS

## 2019-07-02 PROCEDURE — 70450 CT HEAD/BRAIN W/O DYE: CPT

## 2019-07-02 PROCEDURE — 71045 X-RAY EXAM CHEST 1 VIEW: CPT

## 2019-07-02 PROCEDURE — 86140 C-REACTIVE PROTEIN: CPT

## 2019-07-02 PROCEDURE — 36415 COLL VENOUS BLD VENIPUNCTURE: CPT

## 2019-07-02 PROCEDURE — 2580000003 HC RX 258: Performed by: FAMILY MEDICINE

## 2019-07-02 PROCEDURE — 93005 ELECTROCARDIOGRAM TRACING: CPT

## 2019-07-02 PROCEDURE — 84484 ASSAY OF TROPONIN QUANT: CPT

## 2019-07-02 PROCEDURE — 99285 EMERGENCY DEPT VISIT HI MDM: CPT

## 2019-07-02 RX ORDER — SODIUM CHLORIDE 9 MG/ML
INJECTION, SOLUTION INTRAVENOUS CONTINUOUS
Status: DISCONTINUED | OUTPATIENT
Start: 2019-07-02 | End: 2019-07-05

## 2019-07-02 RX ADMIN — SODIUM CHLORIDE: 9 INJECTION, SOLUTION INTRAVENOUS at 23:55

## 2019-07-02 ASSESSMENT — PAIN SCALES - WONG BAKER: WONGBAKER_NUMERICALRESPONSE: 2

## 2019-07-02 ASSESSMENT — PAIN DESCRIPTION - LOCATION: LOCATION: CHEST

## 2019-07-03 PROBLEM — T76.01XA SUSPECTED ELDER NEGLECT: Status: ACTIVE | Noted: 2019-07-03

## 2019-07-03 LAB
AMMONIA: 24 UMOL/L (ref 11–51)
EKG P AXIS: 37 DEGREES
EKG P-R INTERVAL: 190 MS
EKG Q-T INTERVAL: 374 MS
EKG QRS DURATION: 106 MS
EKG QTC CALCULATION (BAZETT): 417 MS
EKG T AXIS: 58 DEGREES
MAGNESIUM: 1.5 MG/DL (ref 1.6–2.4)
TROPONIN: <0.01 NG/ML (ref 0–0.03)

## 2019-07-03 PROCEDURE — G0378 HOSPITAL OBSERVATION PER HR: HCPCS

## 2019-07-03 PROCEDURE — 2580000003 HC RX 258: Performed by: HOSPITALIST

## 2019-07-03 PROCEDURE — 6360000002 HC RX W HCPCS: Performed by: INTERNAL MEDICINE

## 2019-07-03 PROCEDURE — 2500000003 HC RX 250 WO HCPCS: Performed by: HOSPITALIST

## 2019-07-03 PROCEDURE — 99222 1ST HOSP IP/OBS MODERATE 55: CPT | Performed by: HOSPITALIST

## 2019-07-03 PROCEDURE — 84484 ASSAY OF TROPONIN QUANT: CPT

## 2019-07-03 PROCEDURE — 6370000000 HC RX 637 (ALT 250 FOR IP): Performed by: HOSPITALIST

## 2019-07-03 PROCEDURE — 82140 ASSAY OF AMMONIA: CPT

## 2019-07-03 PROCEDURE — 96372 THER/PROPH/DIAG INJ SC/IM: CPT

## 2019-07-03 PROCEDURE — 87040 BLOOD CULTURE FOR BACTERIA: CPT

## 2019-07-03 PROCEDURE — 99285 EMERGENCY DEPT VISIT HI MDM: CPT | Performed by: FAMILY MEDICINE

## 2019-07-03 PROCEDURE — 83735 ASSAY OF MAGNESIUM: CPT

## 2019-07-03 PROCEDURE — 36415 COLL VENOUS BLD VENIPUNCTURE: CPT

## 2019-07-03 PROCEDURE — 92523 SPEECH SOUND LANG COMPREHEN: CPT

## 2019-07-03 PROCEDURE — 81003 URINALYSIS AUTO W/O SCOPE: CPT

## 2019-07-03 PROCEDURE — 2580000003 HC RX 258: Performed by: FAMILY MEDICINE

## 2019-07-03 RX ORDER — LISINOPRIL 20 MG/1
40 TABLET ORAL DAILY
Status: DISCONTINUED | OUTPATIENT
Start: 2019-07-03 | End: 2019-07-19 | Stop reason: HOSPADM

## 2019-07-03 RX ORDER — HEPARIN SODIUM 5000 [USP'U]/ML
5000 INJECTION, SOLUTION INTRAVENOUS; SUBCUTANEOUS EVERY 8 HOURS SCHEDULED
Status: DISCONTINUED | OUTPATIENT
Start: 2019-07-03 | End: 2019-07-03

## 2019-07-03 RX ORDER — ATORVASTATIN CALCIUM 10 MG/1
10 TABLET, FILM COATED ORAL DAILY
Status: DISCONTINUED | OUTPATIENT
Start: 2019-07-03 | End: 2019-07-15

## 2019-07-03 RX ORDER — POLYETHYLENE GLYCOL 3350 17 G/17G
17 POWDER, FOR SOLUTION ORAL DAILY PRN
Status: DISCONTINUED | OUTPATIENT
Start: 2019-07-03 | End: 2019-07-19 | Stop reason: HOSPADM

## 2019-07-03 RX ORDER — MONTELUKAST SODIUM 10 MG/1
10 TABLET ORAL NIGHTLY
Status: DISCONTINUED | OUTPATIENT
Start: 2019-07-03 | End: 2019-07-19 | Stop reason: HOSPADM

## 2019-07-03 RX ORDER — MAGNESIUM SULFATE 1 G/100ML
1 INJECTION INTRAVENOUS PRN
Status: DISCONTINUED | OUTPATIENT
Start: 2019-07-03 | End: 2019-07-19 | Stop reason: HOSPADM

## 2019-07-03 RX ORDER — SODIUM CHLORIDE 0.9 % (FLUSH) 0.9 %
10 SYRINGE (ML) INJECTION EVERY 12 HOURS SCHEDULED
Status: DISCONTINUED | OUTPATIENT
Start: 2019-07-03 | End: 2019-07-19 | Stop reason: HOSPADM

## 2019-07-03 RX ORDER — POTASSIUM CHLORIDE 20 MEQ/1
40 TABLET, EXTENDED RELEASE ORAL PRN
Status: DISCONTINUED | OUTPATIENT
Start: 2019-07-03 | End: 2019-07-19 | Stop reason: HOSPADM

## 2019-07-03 RX ORDER — AMLODIPINE BESYLATE 10 MG/1
10 TABLET ORAL DAILY
Status: DISCONTINUED | OUTPATIENT
Start: 2019-07-03 | End: 2019-07-19 | Stop reason: HOSPADM

## 2019-07-03 RX ORDER — DIPHENHYDRAMINE HCL 25 MG
25 TABLET ORAL NIGHTLY PRN
Status: DISCONTINUED | OUTPATIENT
Start: 2019-07-03 | End: 2019-07-19 | Stop reason: HOSPADM

## 2019-07-03 RX ORDER — CELECOXIB 100 MG/1
200 CAPSULE ORAL 2 TIMES DAILY
Status: DISCONTINUED | OUTPATIENT
Start: 2019-07-03 | End: 2019-07-19 | Stop reason: HOSPADM

## 2019-07-03 RX ORDER — POLYVINYL ALCOHOL 14 MG/ML
1 SOLUTION/ DROPS OPHTHALMIC EVERY 4 HOURS PRN
Status: DISCONTINUED | OUTPATIENT
Start: 2019-07-03 | End: 2019-07-19 | Stop reason: HOSPADM

## 2019-07-03 RX ORDER — FERROUS SULFATE 325(65) MG
325 TABLET ORAL 2 TIMES DAILY
Status: DISCONTINUED | OUTPATIENT
Start: 2019-07-03 | End: 2019-07-19 | Stop reason: HOSPADM

## 2019-07-03 RX ORDER — TIZANIDINE 4 MG/1
4 TABLET ORAL EVERY 8 HOURS PRN
Status: DISCONTINUED | OUTPATIENT
Start: 2019-07-03 | End: 2019-07-19 | Stop reason: HOSPADM

## 2019-07-03 RX ORDER — ACETAMINOPHEN 500 MG
500 TABLET ORAL EVERY 6 HOURS PRN
Status: DISCONTINUED | OUTPATIENT
Start: 2019-07-03 | End: 2019-07-19 | Stop reason: HOSPADM

## 2019-07-03 RX ORDER — BISACODYL 10 MG
10 SUPPOSITORY, RECTAL RECTAL DAILY PRN
Status: DISCONTINUED | OUTPATIENT
Start: 2019-07-03 | End: 2019-07-19 | Stop reason: HOSPADM

## 2019-07-03 RX ORDER — VIT A/VIT C/VIT E/ZINC/COPPER 4296-226
1 CAPSULE ORAL DAILY
Status: DISCONTINUED | OUTPATIENT
Start: 2019-07-03 | End: 2019-07-05 | Stop reason: CLARIF

## 2019-07-03 RX ORDER — CALCIUM CARBONATE 200(500)MG
1 TABLET,CHEWABLE ORAL DAILY
Status: DISCONTINUED | OUTPATIENT
Start: 2019-07-03 | End: 2019-07-05

## 2019-07-03 RX ORDER — PANTOPRAZOLE SODIUM 40 MG/1
40 TABLET, DELAYED RELEASE ORAL
Status: DISCONTINUED | OUTPATIENT
Start: 2019-07-03 | End: 2019-07-19 | Stop reason: HOSPADM

## 2019-07-03 RX ORDER — NORTRIPTYLINE HYDROCHLORIDE 25 MG/1
50 CAPSULE ORAL NIGHTLY
Status: DISCONTINUED | OUTPATIENT
Start: 2019-07-03 | End: 2019-07-19 | Stop reason: HOSPADM

## 2019-07-03 RX ORDER — SODIUM CHLORIDE 0.9 % (FLUSH) 0.9 %
10 SYRINGE (ML) INJECTION PRN
Status: DISCONTINUED | OUTPATIENT
Start: 2019-07-03 | End: 2019-07-19 | Stop reason: HOSPADM

## 2019-07-03 RX ORDER — POLYETHYLENE GLYCOL 3350 17 G/17G
17 POWDER, FOR SOLUTION ORAL DAILY
Status: DISCONTINUED | OUTPATIENT
Start: 2019-07-03 | End: 2019-07-19 | Stop reason: HOSPADM

## 2019-07-03 RX ORDER — HALOPERIDOL 5 MG/ML
5 INJECTION INTRAMUSCULAR ONCE
Status: COMPLETED | OUTPATIENT
Start: 2019-07-03 | End: 2019-07-03

## 2019-07-03 RX ORDER — ONDANSETRON 2 MG/ML
4 INJECTION INTRAMUSCULAR; INTRAVENOUS EVERY 6 HOURS PRN
Status: DISCONTINUED | OUTPATIENT
Start: 2019-07-03 | End: 2019-07-19 | Stop reason: HOSPADM

## 2019-07-03 RX ORDER — ESOMEPRAZOLE MAGNESIUM 40 MG/1
40 FOR SUSPENSION ORAL DAILY
Status: DISCONTINUED | OUTPATIENT
Start: 2019-07-03 | End: 2019-07-03 | Stop reason: CLARIF

## 2019-07-03 RX ORDER — POTASSIUM CHLORIDE 7.45 MG/ML
10 INJECTION INTRAVENOUS PRN
Status: DISCONTINUED | OUTPATIENT
Start: 2019-07-03 | End: 2019-07-19 | Stop reason: HOSPADM

## 2019-07-03 RX ORDER — SENNA PLUS 8.6 MG/1
1 TABLET ORAL DAILY
Status: DISCONTINUED | OUTPATIENT
Start: 2019-07-03 | End: 2019-07-19 | Stop reason: HOSPADM

## 2019-07-03 RX ORDER — PHENOL 1.4 %
600 AEROSOL, SPRAY (ML) MUCOUS MEMBRANE 2 TIMES DAILY
Status: DISCONTINUED | OUTPATIENT
Start: 2019-07-03 | End: 2019-07-19 | Stop reason: HOSPADM

## 2019-07-03 RX ADMIN — Medication 10 ML: at 10:20

## 2019-07-03 RX ADMIN — CHOLECALCIFEROL CAP 125 MCG (5000 UNIT) 5000 UNITS: 125 CAP at 10:20

## 2019-07-03 RX ADMIN — POLYVINYL ALCOHOL 1 DROP: 14 SOLUTION/ DROPS OPHTHALMIC at 10:20

## 2019-07-03 RX ADMIN — RIVAROXABAN 10 MG: 10 TABLET, FILM COATED ORAL at 10:24

## 2019-07-03 RX ADMIN — PANTOPRAZOLE SODIUM 40 MG: 40 TABLET, DELAYED RELEASE ORAL at 10:19

## 2019-07-03 RX ADMIN — LISINOPRIL 40 MG: 20 TABLET ORAL at 10:20

## 2019-07-03 RX ADMIN — CELECOXIB 200 MG: 100 CAPSULE ORAL at 10:19

## 2019-07-03 RX ADMIN — HALOPERIDOL LACTATE 5 MG: 5 INJECTION INTRAMUSCULAR at 11:35

## 2019-07-03 RX ADMIN — Medication 8.6 MG: at 10:19

## 2019-07-03 RX ADMIN — MICONAZOLE NITRATE: 20 POWDER TOPICAL at 10:20

## 2019-07-03 RX ADMIN — Medication 600 MG: at 10:19

## 2019-07-03 RX ADMIN — ANTACID TABLETS 500 MG: 500 TABLET, CHEWABLE ORAL at 10:19

## 2019-07-03 RX ADMIN — SODIUM CHLORIDE: 9 INJECTION, SOLUTION INTRAVENOUS at 10:37

## 2019-07-03 RX ADMIN — ATORVASTATIN CALCIUM 10 MG: 10 TABLET, FILM COATED ORAL at 10:20

## 2019-07-03 RX ADMIN — AMLODIPINE BESYLATE 10 MG: 10 TABLET ORAL at 10:19

## 2019-07-03 RX ADMIN — FERROUS SULFATE TAB 325 MG (65 MG ELEMENTAL FE) 325 MG: 325 (65 FE) TAB at 10:19

## 2019-07-03 RX ADMIN — POLYETHYLENE GLYCOL (3350) 17 G: 17 POWDER, FOR SOLUTION ORAL at 10:20

## 2019-07-03 ASSESSMENT — ENCOUNTER SYMPTOMS
NAUSEA: 0
CHEST TIGHTNESS: 0
VOMITING: 0
CONSTIPATION: 0
COLOR CHANGE: 0
DIARRHEA: 0
SINUS PAIN: 0
ABDOMINAL PAIN: 0
RHINORRHEA: 0
COUGH: 0
WHEEZING: 0
SHORTNESS OF BREATH: 0
BACK PAIN: 0

## 2019-07-03 ASSESSMENT — PAIN SCALES - GENERAL: PAINLEVEL_OUTOF10: 0
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
## 2019-07-03 NOTE — ED PROVIDER NOTES
ANTIFUNGAL EX)    Apply topically    MONTELUKAST (SINGULAIR) 10 MG TABLET    Take 10 mg by mouth nightly    MULTIPLE VITAMINS-MINERALS (PRESERVISION AREDS PO)    Take by mouth daily    NABUMETONE (RELAFEN) 500 MG TABLET    Take 500 mg by mouth 2 times daily    NORTRIPTYLINE (PAMELOR) 50 MG CAPSULE    Take 50 mg by mouth nightly    NYSTATIN 732233 UNIT/GM POWD    Apply 100,000 g topically 2 times daily    POLYETHYL GLYCOL-PROPYL GLYCOL 0.4-0.3 % (SYSTANE) 0.4-0.3 % OPHTHALMIC SOLUTION    2 drops as needed for Dry Eyes     POLYETHYLENE GLYCOL (GLYCOLAX) PACKET    Take 17 g by mouth daily    RIVAROXABAN (XARELTO) 10 MG TABS TABLET    Take 1 tablet by mouth every 24 hours    SENNA (SENOKOT) 8.6 MG TABLET    Take 1 tablet by mouth daily    TIZANIDINE (ZANAFLEX) 4 MG TABLET    Take 4 mg by mouth every 6 hours as needed       ALLERGIES     Codeine; Levaquin [levofloxacin];  Lyrica [pregabalin]; and Morphine    FAMILY HISTORY       Family History   Problem Relation Age of Onset    Dementia Mother     Heart Disease Father     Cancer Brother     COPD Other     Other Other         Blood clot    Diabetes Other           SOCIAL HISTORY       Social History     Socioeconomic History    Marital status:      Spouse name: None    Number of children: None    Years of education: None    Highest education level: None   Occupational History    None   Social Needs    Financial resource strain: None    Food insecurity:     Worry: None     Inability: None    Transportation needs:     Medical: None     Non-medical: None   Tobacco Use    Smoking status: Never Smoker    Smokeless tobacco: Never Used   Substance and Sexual Activity    Alcohol use: No    Drug use: No    Sexual activity: None   Lifestyle    Physical activity:     Days per week: None     Minutes per session: None    Stress: None   Relationships    Social connections:     Talks on phone: None     Gets together: None     Attends Spiritism service: None
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
## 2019-07-03 NOTE — PROGRESS NOTES
Patient uncooperative with exam and cares. Sitter at bedside due to multiple attempts to get OOB. Bruises present buttocks, BUE, BLE. Patient indicates discomfort at legs being touched and at back of head being touched. Unable to see any injury. Excoriation present abdominal fold. Bandage present rt hand, patient will not allow examination. BLE 2+ edema. Bag of medications taken to pharmacy for storage. Check book taken by security for safety. Patient was able to identify the year, place and current president but is unaware of own physical limitations.

## 2019-07-03 NOTE — CARE COORDINATION
Patient is current with [facility] for SN/PT/OT/MSW Services. Will follow. Please advise when patient discharges. [address] [place] [address] [phone]. [phone].     Jacky Alvarez RN, Home Care Liaison  [phone] P  Electronically signed by Dre Martínez RN on 7/3/2019 at 9:13 AM

## 2019-07-04 LAB
ANION GAP SERPL CALCULATED.3IONS-SCNC: 13 MMOL/L (ref 7–19)
BASOPHILS ABSOLUTE: 0 K/UL (ref 0–0.2)
BASOPHILS RELATIVE PERCENT: 0.4 % (ref 0–1)
BUN BLDV-MCNC: 7 MG/DL (ref 8–23)
CALCIUM SERPL-MCNC: 9.3 MG/DL (ref 8.8–10.2)
CHLORIDE BLD-SCNC: 102 MMOL/L (ref 98–111)
CO2: 22 MMOL/L (ref 22–29)
CREAT SERPL-MCNC: <0.5 MG/DL (ref 0.5–0.9)
EOSINOPHILS ABSOLUTE: 0.1 K/UL (ref 0–0.6)
EOSINOPHILS RELATIVE PERCENT: 1.4 % (ref 0–5)
GFR NON-AFRICAN AMERICAN: >60
GLUCOSE BLD-MCNC: 114 MG/DL (ref 74–109)
HCT VFR BLD CALC: 34 % (ref 37–47)
HEMOGLOBIN: 10.8 G/DL (ref 12–16)
LYMPHOCYTES ABSOLUTE: 1.4 K/UL (ref 1.1–4.5)
LYMPHOCYTES RELATIVE PERCENT: 13.9 % (ref 20–40)
MCH RBC QN AUTO: 28.9 PG (ref 27–31)
MCHC RBC AUTO-ENTMCNC: 31.8 G/DL (ref 33–37)
MCV RBC AUTO: 90.9 FL (ref 81–99)
MONOCYTES ABSOLUTE: 0.8 K/UL (ref 0–0.9)
MONOCYTES RELATIVE PERCENT: 7.4 % (ref 0–10)
NEUTROPHILS ABSOLUTE: 7.7 K/UL (ref 1.5–7.5)
NEUTROPHILS RELATIVE PERCENT: 76.5 % (ref 50–65)
PDW BLD-RTO: 15.5 % (ref 11.5–14.5)
PLATELET # BLD: 325 K/UL (ref 130–400)
PMV BLD AUTO: 10.4 FL (ref 9.4–12.3)
POTASSIUM REFLEX MAGNESIUM: 4 MMOL/L (ref 3.5–5)
RBC # BLD: 3.74 M/UL (ref 4.2–5.4)
SODIUM BLD-SCNC: 137 MMOL/L (ref 136–145)
URIC ACID, SERUM: 6.2 MG/DL (ref 2.4–5.7)
WBC # BLD: 10.1 K/UL (ref 4.8–10.8)

## 2019-07-04 PROCEDURE — G0378 HOSPITAL OBSERVATION PER HR: HCPCS

## 2019-07-04 PROCEDURE — 2580000003 HC RX 258: Performed by: HOSPITALIST

## 2019-07-04 PROCEDURE — 80048 BASIC METABOLIC PNL TOTAL CA: CPT

## 2019-07-04 PROCEDURE — 36415 COLL VENOUS BLD VENIPUNCTURE: CPT

## 2019-07-04 PROCEDURE — 6370000000 HC RX 637 (ALT 250 FOR IP): Performed by: HOSPITALIST

## 2019-07-04 PROCEDURE — 99225 PR SBSQ OBSERVATION CARE/DAY 25 MINUTES: CPT | Performed by: INTERNAL MEDICINE

## 2019-07-04 PROCEDURE — 85025 COMPLETE CBC W/AUTO DIFF WBC: CPT

## 2019-07-04 PROCEDURE — 84550 ASSAY OF BLOOD/URIC ACID: CPT

## 2019-07-04 RX ADMIN — MICONAZOLE NITRATE: 20 POWDER TOPICAL at 09:13

## 2019-07-04 RX ADMIN — RIVAROXABAN 10 MG: 10 TABLET, FILM COATED ORAL at 09:19

## 2019-07-04 RX ADMIN — AMLODIPINE BESYLATE 10 MG: 10 TABLET ORAL at 09:12

## 2019-07-04 RX ADMIN — MICONAZOLE NITRATE: 20 POWDER TOPICAL at 23:19

## 2019-07-04 RX ADMIN — FERROUS SULFATE TAB 325 MG (65 MG ELEMENTAL FE) 325 MG: 325 (65 FE) TAB at 23:19

## 2019-07-04 RX ADMIN — Medication 8.6 MG: at 09:11

## 2019-07-04 RX ADMIN — NORTRIPTYLINE HYDROCHLORIDE 50 MG: 25 CAPSULE ORAL at 23:19

## 2019-07-04 RX ADMIN — Medication 10 ML: at 23:19

## 2019-07-04 RX ADMIN — FERROUS SULFATE TAB 325 MG (65 MG ELEMENTAL FE) 325 MG: 325 (65 FE) TAB at 09:12

## 2019-07-04 RX ADMIN — PANTOPRAZOLE SODIUM 40 MG: 40 TABLET, DELAYED RELEASE ORAL at 09:19

## 2019-07-04 RX ADMIN — Medication 600 MG: at 23:19

## 2019-07-04 RX ADMIN — CELECOXIB 200 MG: 100 CAPSULE ORAL at 23:18

## 2019-07-04 RX ADMIN — Medication 600 MG: at 09:12

## 2019-07-04 RX ADMIN — CHOLECALCIFEROL CAP 125 MCG (5000 UNIT) 5000 UNITS: 125 CAP at 09:11

## 2019-07-04 RX ADMIN — CELECOXIB 200 MG: 100 CAPSULE ORAL at 09:12

## 2019-07-04 RX ADMIN — LISINOPRIL 40 MG: 20 TABLET ORAL at 09:11

## 2019-07-04 RX ADMIN — ANTACID TABLETS 500 MG: 500 TABLET, CHEWABLE ORAL at 09:12

## 2019-07-04 RX ADMIN — ATORVASTATIN CALCIUM 10 MG: 10 TABLET, FILM COATED ORAL at 09:12

## 2019-07-04 RX ADMIN — MONTELUKAST SODIUM 10 MG: 10 TABLET, FILM COATED ORAL at 23:19

## 2019-07-04 ASSESSMENT — PAIN SCALES - GENERAL
PAINLEVEL_OUTOF10: 0
PAINLEVEL_OUTOF10: 4
PAINLEVEL_OUTOF10: 0
PAINLEVEL_OUTOF10: 0

## 2019-07-04 NOTE — PLAN OF CARE
Problem: Falls - Risk of:  Goal: Will remain free from falls  Description  Will remain free from falls  7/4/2019 0327 by Abner Beach RN  Outcome: Ongoing  7/3/2019 1540 by Hayde Serra RN  Outcome: Ongoing  Goal: Absence of physical injury  Description  Absence of physical injury  7/4/2019 0327 by Abner Beach RN  Outcome: Ongoing  7/3/2019 1540 by Hayde Serra RN  Outcome: Ongoing

## 2019-07-05 PROCEDURE — 6370000000 HC RX 637 (ALT 250 FOR IP): Performed by: HOSPITALIST

## 2019-07-05 PROCEDURE — 99225 PR SBSQ OBSERVATION CARE/DAY 25 MINUTES: CPT | Performed by: INTERNAL MEDICINE

## 2019-07-05 PROCEDURE — 2580000003 HC RX 258: Performed by: HOSPITALIST

## 2019-07-05 PROCEDURE — 2580000003 HC RX 258: Performed by: FAMILY MEDICINE

## 2019-07-05 PROCEDURE — G0378 HOSPITAL OBSERVATION PER HR: HCPCS

## 2019-07-05 RX ADMIN — LISINOPRIL 40 MG: 20 TABLET ORAL at 08:34

## 2019-07-05 RX ADMIN — PANTOPRAZOLE SODIUM 40 MG: 40 TABLET, DELAYED RELEASE ORAL at 06:34

## 2019-07-05 RX ADMIN — RIVAROXABAN 10 MG: 10 TABLET, FILM COATED ORAL at 08:34

## 2019-07-05 RX ADMIN — FERROUS SULFATE TAB 325 MG (65 MG ELEMENTAL FE) 325 MG: 325 (65 FE) TAB at 08:34

## 2019-07-05 RX ADMIN — SODIUM CHLORIDE: 9 INJECTION, SOLUTION INTRAVENOUS at 02:16

## 2019-07-05 RX ADMIN — CELECOXIB 200 MG: 100 CAPSULE ORAL at 08:34

## 2019-07-05 RX ADMIN — POLYETHYLENE GLYCOL (3350) 17 G: 17 POWDER, FOR SOLUTION ORAL at 08:34

## 2019-07-05 RX ADMIN — MICONAZOLE NITRATE: 20 POWDER TOPICAL at 08:45

## 2019-07-05 RX ADMIN — AMLODIPINE BESYLATE 10 MG: 10 TABLET ORAL at 08:34

## 2019-07-05 RX ADMIN — Medication 8.6 MG: at 08:35

## 2019-07-05 RX ADMIN — MICONAZOLE NITRATE: 20 POWDER TOPICAL at 20:11

## 2019-07-05 RX ADMIN — ATORVASTATIN CALCIUM 10 MG: 10 TABLET, FILM COATED ORAL at 08:34

## 2019-07-05 RX ADMIN — Medication 10 ML: at 20:10

## 2019-07-05 RX ADMIN — CHOLECALCIFEROL CAP 125 MCG (5000 UNIT) 5000 UNITS: 125 CAP at 08:34

## 2019-07-05 RX ADMIN — Medication 600 MG: at 08:35

## 2019-07-05 ASSESSMENT — PAIN SCALES - GENERAL
PAINLEVEL_OUTOF10: 0

## 2019-07-05 NOTE — PROGRESS NOTES
Physical Therapy  Pt has been too  Confused and agitated since admission to participate in PT eval. On this date, Pt initially agreed to get oob and the quickly became agitated and stated she wanted PT to leave her alone. Pt has a 1:1 sitter that can assist her to chair of bsc as needed. Please re order PT/OT when Pt is able to have meaningful participation with therapy.   Electronically signed by Mat Khan PT on 7/5/2019 at 11:14 AM

## 2019-07-05 NOTE — PLAN OF CARE
Problem: Falls - Risk of:  Goal: Will remain free from falls  Description  Will remain free from falls  Outcome: Ongoing  Goal: Absence of physical injury  Description  Absence of physical injury  Outcome: Ongoing     Problem: Risk for Impaired Skin Integrity  Goal: Tissue integrity - skin and mucous membranes  Description  Structural intactness and normal physiological function of skin and  mucous membranes.   Outcome: Ongoing     Problem: Skin Integrity:  Goal: Will show no infection signs and symptoms  Description  Will show no infection signs and symptoms  Outcome: Ongoing  Goal: Absence of new skin breakdown  Description  Absence of new skin breakdown  Outcome: Ongoing  Goal: Risk for impaired skin integrity will decrease  Description  Risk for impaired skin integrity will decrease  Outcome: Ongoing     Problem: ABCDS Injury Assessment  Goal: Absence of physical injury  Outcome: Ongoing     Problem: Infection:  Goal: Will remain free from infection  Description  Will remain free from infection  Outcome: Ongoing     Problem: Safety:  Goal: Free from accidental physical injury  Description  Free from accidental physical injury  Outcome: Ongoing  Goal: Free from intentional harm  Description  Free from intentional harm  Outcome: Ongoing  Goal: Ability to remain free from injury will improve  Description  Ability to remain free from injury will improve  Outcome: Ongoing     Problem: Daily Care:  Goal: Daily care needs are met  Description  Daily care needs are met  Outcome: Ongoing     Problem: Pain:  Goal: Patient's pain/discomfort is manageable  Description  Patient's pain/discomfort is manageable  Outcome: Ongoing     Problem: Skin Integrity:  Goal: Skin integrity will stabilize  Description  Skin integrity will stabilize  Outcome: Ongoing     Problem: Discharge Planning:  Goal: Patients continuum of care needs are met  Description  Patients continuum of care needs are met  Outcome: Ongoing     Problem: Health of psychomotor disturbance signs and symptoms  Description  Absence of psychomotor disturbance signs and symptoms  Outcome: Ongoing     Problem: Sensory Perception - Impaired:  Goal: Demonstrations of improved sensory functioning will increase  Description  Demonstrations of improved sensory functioning will increase  Outcome: Ongoing  Goal: Decrease in sensory misperception frequency  Description  Decrease in sensory misperception frequency  Outcome: Ongoing  Goal: Able to refrain from responding to false sensory perceptions  Description  Able to refrain from responding to false sensory perceptions  Outcome: Ongoing  Goal: Demonstrates accurate environmental perceptions  Description  Demonstrates accurate environmental perceptions  Outcome: Ongoing  Goal: Able to distinguish between reality-based and nonreality-based thinking  Description  Able to distinguish between reality-based and nonreality-based thinking  Outcome: Ongoing  Goal: Able to interrupt nonreality-based thinking  Description  Able to interrupt nonreality-based thinking  Outcome: Ongoing     Problem: Sleep Pattern Disturbance:  Goal: Appears well-rested  Description  Appears well-rested  Outcome: Ongoing     Problem: Sensory:  Goal: General experience of comfort will improve  Description  General experience of comfort will improve  Outcome: Ongoing     Problem: Urinary Elimination:  Goal: Signs and symptoms of infection will decrease  Description  Signs and symptoms of infection will decrease  Outcome: Ongoing  Goal: Ability to reestablish a normal urinary elimination pattern will improve - after catheter removal  Description  Ability to reestablish a normal urinary elimination pattern will improve  Outcome: Ongoing  Goal: Complications related to the disease process, condition or treatment will be avoided or minimized  Description  Complications related to the disease process, condition or treatment will be avoided or minimized  Outcome: Ongoing

## 2019-07-06 PROCEDURE — 2580000003 HC RX 258: Performed by: HOSPITALIST

## 2019-07-06 PROCEDURE — 99224 PR SBSQ OBSERVATION CARE/DAY 15 MINUTES: CPT | Performed by: INTERNAL MEDICINE

## 2019-07-06 PROCEDURE — 6370000000 HC RX 637 (ALT 250 FOR IP): Performed by: HOSPITALIST

## 2019-07-06 PROCEDURE — G0378 HOSPITAL OBSERVATION PER HR: HCPCS

## 2019-07-06 RX ADMIN — LISINOPRIL 40 MG: 20 TABLET ORAL at 08:57

## 2019-07-06 RX ADMIN — Medication 10 ML: at 22:45

## 2019-07-06 RX ADMIN — MICONAZOLE NITRATE: 20 POWDER TOPICAL at 09:01

## 2019-07-06 RX ADMIN — CELECOXIB 200 MG: 100 CAPSULE ORAL at 08:57

## 2019-07-06 RX ADMIN — Medication 600 MG: at 08:57

## 2019-07-06 RX ADMIN — Medication 8.6 MG: at 08:57

## 2019-07-06 RX ADMIN — ATORVASTATIN CALCIUM 10 MG: 10 TABLET, FILM COATED ORAL at 08:57

## 2019-07-06 RX ADMIN — CHOLECALCIFEROL CAP 125 MCG (5000 UNIT) 5000 UNITS: 125 CAP at 08:57

## 2019-07-06 RX ADMIN — MICONAZOLE NITRATE: 20 POWDER TOPICAL at 22:45

## 2019-07-06 RX ADMIN — Medication 10 ML: at 08:57

## 2019-07-06 RX ADMIN — AMLODIPINE BESYLATE 10 MG: 10 TABLET ORAL at 08:57

## 2019-07-06 RX ADMIN — RIVAROXABAN 10 MG: 10 TABLET, FILM COATED ORAL at 08:57

## 2019-07-06 RX ADMIN — FERROUS SULFATE TAB 325 MG (65 MG ELEMENTAL FE) 325 MG: 325 (65 FE) TAB at 08:57

## 2019-07-06 ASSESSMENT — PAIN SCALES - GENERAL
PAINLEVEL_OUTOF10: 0

## 2019-07-06 NOTE — PROGRESS NOTES
the last 72 hours. TSH:    Lab Results   Component Value Date    TSH 1.100 07/02/2019     Troponin T:   No results for input(s): TROPONINI in the last 72 hours. CT Head WO Contrast [[id number]] Resulted: 07/03/19 0645     Order Status: Completed Updated: 07/03/19 0647     Narrative:       EXAMINATION: CT HEAD WO CONTRAST 7/3/2019 6:44 AM  HISTORY: CT BRAIN without contrast 7/2/2019 9:30 PM  HISTORY: Confusion  COMPARISON: May 29, 2019   DLP: 660 mGy cm  TECHNIQUE: Serial axial tomographic images of the brain were obtained  without the use of intravenous contrast.   FINDINGS:   The midline structures are nondisplaced. There is mild cerebral and  cerebellar volume loss, with an associated increase in the prominence  of the ventricles and sulci. The basilar cisterns are normal in size  and configuration. There is no evidence of intracranial hemorrhage or  mass-effect. There is low attenuation in the periventricular white  matter, consistent with chronic ischemic change. There are no abnormal  extra-axial fluid collections. There is no evidence of tonsillar  herniation. The included orbits and their contents are unremarkable. The  visualized paranasal sinuses, mastoid air cells and middle ear  cavities are clear. The visualized osseous structures and overlying  soft tissues of the skull and face are intact.      Impression:       Mild cerebral and cerebellar volume loss with chronic microvascular  disease but no evidence of acute intracranial process. Signed by Dr Goran Douglas on 7/3/2019 6:45 AM     XR CHEST PORTABLE [[id number]] Resulted: 07/03/19 0643     Order Status: Completed Updated: 07/03/19 0645     Narrative:       EXAMINATION: XR CHEST PORTABLE 7/3/2019 6:41 AM  HISTORY: XR CHEST PORTABLE 7/2/2019 9:30 PM  HISTORY: Left-sided chest pain  COMPARISON: May 31, 2019. FINDINGS:   The lungs are clear. The cardiomediastinal silhouette and pulmonary  vascularity are within normal limits.    The osseous

## 2019-07-06 NOTE — PLAN OF CARE
Problem: Falls - Risk of:  Goal: Will remain free from falls  Description  Will remain free from falls  7/6/2019 0040 by Simeon Friend RN  Outcome: Ongoing  7/5/2019 1737 by Kristofer Harris LPN  Outcome: Ongoing  Goal: Absence of physical injury  Description  Absence of physical injury  7/6/2019 0040 by Simeon Friend RN  Outcome: Ongoing  7/5/2019 1737 by Kristofer Harris LPN  Outcome: Ongoing     Problem: Risk for Impaired Skin Integrity  Goal: Tissue integrity - skin and mucous membranes  Description  Structural intactness and normal physiological function of skin and  mucous membranes.   7/6/2019 0040 by Simeon Friend RN  Outcome: Ongoing  7/5/2019 1737 by Kristofer Harris LPN  Outcome: Ongoing     Problem: Skin Integrity:  Goal: Will show no infection signs and symptoms  Description  Will show no infection signs and symptoms  7/6/2019 0040 by Simeon Friend RN  Outcome: Ongoing  7/5/2019 1737 by Kristofer Harris LPN  Outcome: Ongoing  Goal: Absence of new skin breakdown  Description  Absence of new skin breakdown  7/6/2019 0040 by Simeon Friend RN  Outcome: Ongoing  7/5/2019 1737 by Kristofer Harris LPN  Outcome: Ongoing  Goal: Risk for impaired skin integrity will decrease  Description  Risk for impaired skin integrity will decrease  7/6/2019 0040 by Simeon Friend RN  Outcome: Ongoing  7/5/2019 1737 by Kristofer Harris LPN  Outcome: Ongoing     Problem: ABCDS Injury Assessment  Goal: Absence of physical injury  7/6/2019 0040 by Simeon Friend RN  Outcome: Ongoing  7/5/2019 1737 by Kristofer Harris LPN  Outcome: Ongoing     Problem: Infection:  Goal: Will remain free from infection  Description  Will remain free from infection  7/6/2019 0040 by Simeon Friend RN  Outcome: Ongoing  7/5/2019 1737 by Kristofer Harris LPN  Outcome: Ongoing     Problem: Safety:  Goal: Free from accidental physical injury  Description  Free from accidental physical Valencia Arellano LPN  Outcome: Ongoing     Problem: Physical Regulation:  Goal: Prevent transmision of infection  Description  Prevent transmision of infection  7/6/2019 0040 by Jair Nieves RN  Outcome: Ongoing  7/5/2019 1737 by Jake Caballero LPN  Outcome: Ongoing     Problem:  Activity:  Goal: Ability to tolerate increased activity will improve  Description  Ability to tolerate increased activity will improve  7/6/2019 0040 by Jair Nieves RN  Outcome: Ongoing  7/5/2019 1737 by Jake Caballero LPN  Outcome: Ongoing     Problem: Cardiac:  Goal: Complications related to the disease process, condition or treatment will be avoided or minimized  Description  Complications related to the disease process, condition or treatment will be avoided or minimized  7/6/2019 0040 by Jair Nieves RN  Outcome: Ongoing  7/5/2019 1737 by Jake Caballero LPN  Outcome: Ongoing  Goal: Hemodynamic stability will improve  Description  Hemodynamic stability will improve  7/6/2019 0040 by Jair Nieves RN  Outcome: Ongoing  7/5/2019 1737 by Jake Caballero LPN  Outcome: Ongoing  Goal: Cerebral tissue perfusion will improve  Description  Cerebral tissue perfusion will improve  7/6/2019 0040 by Jair Nieves RN  Outcome: Ongoing  7/5/2019 1737 by Jake Caballero LPN  Outcome: Ongoing     Problem: Coping:  Goal: Ability to identify and develop effective coping behavior will improve  Description  Ability to identify and develop effective coping behavior will improve  7/6/2019 0040 by Jair Nieves RN  Outcome: Ongoing  7/5/2019 1737 by Jake Caballero LPN  Outcome: Ongoing     Problem: Nutritional:  Goal: Ability to identify appropriate dietary choices will improve  Description  Ability to identify appropriate dietary choices will improve  7/6/2019 0040 by Jair Nieves RN  Outcome: Ongoing  7/5/2019 1737 by Jake Caballero LPN  Outcome: Ongoing     Problem: Nutrition  Goal: Optimal

## 2019-07-07 PROCEDURE — G0378 HOSPITAL OBSERVATION PER HR: HCPCS

## 2019-07-07 PROCEDURE — 2580000003 HC RX 258: Performed by: HOSPITALIST

## 2019-07-07 PROCEDURE — 6370000000 HC RX 637 (ALT 250 FOR IP): Performed by: HOSPITALIST

## 2019-07-07 PROCEDURE — 99224 PR SBSQ OBSERVATION CARE/DAY 15 MINUTES: CPT | Performed by: INTERNAL MEDICINE

## 2019-07-07 RX ADMIN — Medication 10 ML: at 22:18

## 2019-07-07 RX ADMIN — FERROUS SULFATE TAB 325 MG (65 MG ELEMENTAL FE) 325 MG: 325 (65 FE) TAB at 11:04

## 2019-07-07 RX ADMIN — Medication 10 ML: at 13:10

## 2019-07-07 RX ADMIN — LISINOPRIL 40 MG: 20 TABLET ORAL at 11:04

## 2019-07-07 RX ADMIN — AMLODIPINE BESYLATE 10 MG: 10 TABLET ORAL at 11:04

## 2019-07-07 RX ADMIN — MICONAZOLE NITRATE: 20 POWDER TOPICAL at 09:00

## 2019-07-07 RX ADMIN — Medication 8.6 MG: at 11:04

## 2019-07-07 RX ADMIN — PANTOPRAZOLE SODIUM 40 MG: 40 TABLET, DELAYED RELEASE ORAL at 13:10

## 2019-07-07 RX ADMIN — RIVAROXABAN 10 MG: 10 TABLET, FILM COATED ORAL at 13:10

## 2019-07-07 RX ADMIN — ATORVASTATIN CALCIUM 10 MG: 10 TABLET, FILM COATED ORAL at 11:04

## 2019-07-07 RX ADMIN — CHOLECALCIFEROL CAP 125 MCG (5000 UNIT) 5000 UNITS: 125 CAP at 11:04

## 2019-07-07 RX ADMIN — CELECOXIB 200 MG: 100 CAPSULE ORAL at 11:04

## 2019-07-07 RX ADMIN — Medication 600 MG: at 11:04

## 2019-07-07 RX ADMIN — MICONAZOLE NITRATE: 20 POWDER TOPICAL at 22:18

## 2019-07-07 ASSESSMENT — PAIN SCALES - GENERAL
PAINLEVEL_OUTOF10: 0

## 2019-07-07 ASSESSMENT — PAIN SCALES - WONG BAKER: WONGBAKER_NUMERICALRESPONSE: 2

## 2019-07-07 NOTE — PLAN OF CARE
Problem: Falls - Risk of:  Goal: Will remain free from falls  Description  Will remain free from falls  7/7/2019 1018 by Aniya Jacobson RN  Outcome: Ongoing  7/7/2019 0311 by Surendra Gambino RN  Outcome: Ongoing  Goal: Absence of physical injury  Description  Absence of physical injury  7/7/2019 1018 by Aniya Jacobson RN  Outcome: Ongoing  7/7/2019 0311 by Surendra Gambino RN  Outcome: Ongoing     Problem: Risk for Impaired Skin Integrity  Goal: Tissue integrity - skin and mucous membranes  Description  Structural intactness and normal physiological function of skin and  mucous membranes.   7/7/2019 1018 by Aniya Jacobson RN  Outcome: Ongoing  7/7/2019 0311 by Surendra Gambino RN  Outcome: Ongoing     Problem: Skin Integrity:  Goal: Will show no infection signs and symptoms  Description  Will show no infection signs and symptoms  7/7/2019 1018 by Aniya Jacobson RN  Outcome: Ongoing  7/7/2019 0311 by Surendra Gambino RN  Outcome: Ongoing  Goal: Absence of new skin breakdown  Description  Absence of new skin breakdown  7/7/2019 1018 by Aniya Jacobson RN  Outcome: Ongoing  7/7/2019 0311 by Surendra Gambino RN  Outcome: Ongoing  Goal: Risk for impaired skin integrity will decrease  Description  Risk for impaired skin integrity will decrease  7/7/2019 1018 by Aniya Jacobson RN  Outcome: Ongoing  7/7/2019 0311 by Surendra Gambino RN  Outcome: Ongoing  Goal: Skin integrity will be maintained  Description  Skin integrity will be maintained  7/7/2019 1018 by Aniya Jacobson RN  Outcome: Ongoing  7/7/2019 0311 by Surendra Gambino RN  Outcome: Ongoing     Problem: ABCDS Injury Assessment  Goal: Absence of physical injury  7/7/2019 1018 by Aniya Jacobson RN  Outcome: Ongoing  7/7/2019 0311 by Surendra Gambino RN  Outcome: Ongoing     Problem: Infection:  Goal: Will remain free from infection  Description  Will remain free from infection  7/7/2019 1018 by Aniya Jacobson RN  Outcome: Ongoing  7/7/2019 0311 by Surendra Gambino RN  Outcome: Ongoing     Problem: improve  Description  Ability to participate appropriately in conversations will improve  7/7/2019 1018 by Wilver Beyer RN  Outcome: Ongoing  7/7/2019 0311 by Mari Odonnell RN  Outcome: Ongoing     Problem: Self-Care:  Goal: Ability to participate in self-care as condition permits will improve  Description  Ability to participate in self-care as condition permits will improve  7/7/2019 1018 by Wilver Beyer RN  Outcome: Ongoing  7/7/2019 0311 by Mari Odonnell RN  Outcome: Ongoing     Problem: Confusion - Acute:  Goal: Absence of continued neurological deterioration signs and symptoms  Description  Absence of continued neurological deterioration signs and symptoms  7/7/2019 1018 by Wilver Beyer RN  Outcome: Ongoing  7/7/2019 0311 by Mari Odonnell RN  Outcome: Ongoing  Goal: Mental status will be restored to baseline  Description  Mental status will be restored to baseline  7/7/2019 1018 by Wilver Beyer RN  Outcome: Ongoing  7/7/2019 0311 by Mari Odonnell RN  Outcome: Ongoing     Problem: Discharge Planning:  Goal: Ability to perform activities of daily living will improve  Description  Ability to perform activities of daily living will improve  7/7/2019 1018 by Wilver Beyer RN  Outcome: Ongoing  7/7/2019 0311 by Mari Odonnell RN  Outcome: Ongoing  Goal: Participates in care planning  Description  Participates in care planning  7/7/2019 1018 by Wilver Beyer RN  Outcome: Ongoing  7/7/2019 0311 by Mari Odonnell RN  Outcome: Ongoing     Problem: Injury - Risk of, Physical Injury:  Goal: Will remain free from falls  Description  Will remain free from falls  7/7/2019 1018 by Wilver Beyer RN  Outcome: Ongoing  7/7/2019 0311 by Mari Odonnell RN  Outcome: Ongoing  Goal: Absence of physical injury  Description  Absence of physical injury  7/7/2019 1018 by Wilver Beyer RN  Outcome: Ongoing  7/7/2019 0311 by Mari Odonnell RN  Outcome: Ongoing     Problem: Mood - Altered:  Goal: Mood stable  Description  Mood stable  7/7/2019 1018 reality-based and nonreality-based thinking  7/7/2019 1018 by Angela Lopez RN  Outcome: Ongoing  7/7/2019 0311 by Kellie Posadas RN  Outcome: Ongoing  Goal: Able to interrupt nonreality-based thinking  Description  Able to interrupt nonreality-based thinking  7/7/2019 1018 by Angela Lopez RN  Outcome: Ongoing  7/7/2019 0311 by Kellie Posadas RN  Outcome: Ongoing     Problem: Sleep Pattern Disturbance:  Goal: Appears well-rested  Description  Appears well-rested  7/7/2019 1018 by Angela Lopez RN  Outcome: Ongoing  7/7/2019 0311 by Kellie Posadas RN  Outcome: Ongoing     Problem: Sensory:  Goal: General experience of comfort will improve  Description  General experience of comfort will improve  7/7/2019 1018 by Angela Lopez RN  Outcome: Ongoing  7/7/2019 0311 by Kellie Posadas RN  Outcome: Ongoing     Problem: Urinary Elimination:  Goal: Signs and symptoms of infection will decrease  Description  Signs and symptoms of infection will decrease  7/7/2019 1018 by Angela Lopez RN  Outcome: Ongoing  7/7/2019 0311 by Kellie Posadas RN  Outcome: Ongoing  Goal: Ability to reestablish a normal urinary elimination pattern will improve - after catheter removal  Description  Ability to reestablish a normal urinary elimination pattern will improve  7/7/2019 1018 by Angela Lopez RN  Outcome: Ongoing  7/7/2019 0311 by Kellie Posadas RN  Outcome: Ongoing  Goal: Complications related to the disease process, condition or treatment will be avoided or minimized  Description  Complications related to the disease process, condition or treatment will be avoided or minimized  7/7/2019 1018 by Angela Lopez RN  Outcome: Ongoing  7/7/2019 0311 by Kellie Posadas RN  Outcome: Ongoing  Goal: Ability to urinate at appropriate intervals will improve  Description  Ability to urinate at appropriate intervals will improve  7/7/2019 1018 by Angela Lopez RN  Outcome: Ongoing  7/7/2019 0311 by Kellie Posadas RN  Outcome: Ongoing  Goal: Occurrences of urination in Luciana Vasquez RN  Outcome: Ongoing  Goal: Hemodynamic stability will improve  Description  Hemodynamic stability will improve  7/7/2019 0311 by Rocky Wheeler RN  Outcome: Ongoing  Goal: Cerebral tissue perfusion will improve  Description  Cerebral tissue perfusion will improve  7/7/2019 0311 by Rocky Wheeler RN  Outcome: Ongoing     Problem: Coping:  Goal: Ability to identify and develop effective coping behavior will improve  Description  Ability to identify and develop effective coping behavior will improve  7/7/2019 0311 by Rocky Wheeler RN  Outcome: Ongoing     Problem: Nutritional:  Goal: Ability to identify appropriate dietary choices will improve  Description  Ability to identify appropriate dietary choices will improve  7/7/2019 0311 by Rocky Wheeler RN  Outcome: Ongoing     Problem: Nutrition  Goal: Optimal nutrition therapy  Description  Nutrition Problem: Inadequate oral intake  Intervention: Food and/or Nutrient Delivery: Continue current diet, Start ONS  Nutritional Goals: po intake 5-% or greater.   Weight stable     7/7/2019 0311 by Rocky Wheeler RN  Outcome: Ongoing

## 2019-07-07 NOTE — PROGRESS NOTES
Pt was assisted to the chair with the [facility]. Pt stood easily and was cooperative. She has been confused this shift. She has been very tearful over family members.

## 2019-07-07 NOTE — PLAN OF CARE
by Star Polk RN  Outcome: Ongoing  7/6/2019 1403 by Cristy Jeong RN  Outcome: Ongoing  Goal: Absence of abusive behavior  Description  Absence of abusive behavior  7/7/2019 0311 by Star Polk RN  Outcome: Ongoing  7/6/2019 1403 by Cristy Jeong RN  Outcome: Ongoing  Goal: Verbalizations of feeling emotionally comfortable while being cared for will increase  Description  Verbalizations of feeling emotionally comfortable while being cared for will increase  7/7/2019 0311 by Star Polk RN  Outcome: Ongoing  7/6/2019 1403 by Cristy Jeong RN  Outcome: Ongoing     Problem: Psychomotor Activity - Altered:  Goal: Absence of psychomotor disturbance signs and symptoms  Description  Absence of psychomotor disturbance signs and symptoms  7/7/2019 0311 by Star Polk RN  Outcome: Ongoing  7/6/2019 1403 by Cristy Jeong RN  Outcome: Ongoing     Problem: Sensory Perception - Impaired:  Goal: Demonstrations of improved sensory functioning will increase  Description  Demonstrations of improved sensory functioning will increase  7/7/2019 0311 by Star Polk RN  Outcome: Ongoing  7/6/2019 1403 by Cristy Jeong RN  Outcome: Ongoing  Goal: Decrease in sensory misperception frequency  Description  Decrease in sensory misperception frequency  7/7/2019 0311 by Star Polk RN  Outcome: Ongoing  7/6/2019 1403 by Cristy Jeong RN  Outcome: Ongoing  Goal: Able to refrain from responding to false sensory perceptions  Description  Able to refrain from responding to false sensory perceptions  7/7/2019 0311 by Star Polk RN  Outcome: Ongoing  7/6/2019 1403 by Cristy Jeong RN  Outcome: Ongoing  Goal: Demonstrates accurate environmental perceptions  Description  Demonstrates accurate environmental perceptions  7/7/2019 0311 by Star Polk RN  Outcome: Ongoing  7/6/2019 1403 by Cristy Jeong RN  Outcome: Ongoing  Goal: Able to distinguish between reality-based and nonreality-based thinking  Description  Able to distinguish between
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
## 2019-07-08 LAB
BLOOD CULTURE, ROUTINE: NORMAL
CULTURE, BLOOD 2: NORMAL

## 2019-07-08 PROCEDURE — 2580000003 HC RX 258: Performed by: HOSPITALIST

## 2019-07-08 PROCEDURE — 99232 SBSQ HOSP IP/OBS MODERATE 35: CPT | Performed by: FAMILY MEDICINE

## 2019-07-08 PROCEDURE — G0378 HOSPITAL OBSERVATION PER HR: HCPCS

## 2019-07-08 RX ADMIN — Medication 10 ML: at 22:51

## 2019-07-08 RX ADMIN — MICONAZOLE NITRATE: 20 POWDER TOPICAL at 08:19

## 2019-07-08 RX ADMIN — MICONAZOLE NITRATE: 20 POWDER TOPICAL at 22:51

## 2019-07-08 RX ADMIN — Medication 10 ML: at 08:20

## 2019-07-08 ASSESSMENT — PAIN SCALES - GENERAL
PAINLEVEL_OUTOF10: 0
PAINLEVEL_OUTOF10: 0

## 2019-07-08 NOTE — PROGRESS NOTES
Patient was found to be incontinent of urine. She was provided with bed bath and complete linen change. The patient is very tearful this morning and is speaking out loud to family members that are not present in the room. Repositioned and is now resting comfortably in bed. Will continue to monitor.      Electronically signed by Michael Medeiros RN on 7/8/2019 at 10:27 AM

## 2019-07-08 NOTE — PLAN OF CARE
being cared for will increase  Outcome: Ongoing     Problem: Psychomotor Activity - Altered:  Goal: Absence of psychomotor disturbance signs and symptoms  Description  Absence of psychomotor disturbance signs and symptoms  Outcome: Ongoing     Problem: Sensory Perception - Impaired:  Goal: Demonstrations of improved sensory functioning will increase  Description  Demonstrations of improved sensory functioning will increase  Outcome: Ongoing  Goal: Decrease in sensory misperception frequency  Description  Decrease in sensory misperception frequency  Outcome: Ongoing  Goal: Able to refrain from responding to false sensory perceptions  Description  Able to refrain from responding to false sensory perceptions  Outcome: Ongoing  Goal: Demonstrates accurate environmental perceptions  Description  Demonstrates accurate environmental perceptions  Outcome: Ongoing  Goal: Able to distinguish between reality-based and nonreality-based thinking  Description  Able to distinguish between reality-based and nonreality-based thinking  Outcome: Ongoing  Goal: Able to interrupt nonreality-based thinking  Description  Able to interrupt nonreality-based thinking  Outcome: Ongoing     Problem: Sleep Pattern Disturbance:  Goal: Appears well-rested  Description  Appears well-rested  Outcome: Ongoing     Problem: Sensory:  Goal: General experience of comfort will improve  Description  General experience of comfort will improve  Outcome: Ongoing     Problem: Urinary Elimination:  Goal: Signs and symptoms of infection will decrease  Description  Signs and symptoms of infection will decrease  Outcome: Ongoing  Goal: Ability to reestablish a normal urinary elimination pattern will improve - after catheter removal  Description  Ability to reestablish a normal urinary elimination pattern will improve  Outcome: Ongoing  Goal: Complications related to the disease process, condition or treatment will be avoided or
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
## 2019-07-08 NOTE — PLAN OF CARE
Nutrition Problem: Inadequate oral intake  Intervention: Food and/or Nutrient Delivery: Continue current diet, Continue current ONS  Nutritional Goals: po intake 5-% or greater.   Weight stable

## 2019-07-09 PROCEDURE — G0378 HOSPITAL OBSERVATION PER HR: HCPCS

## 2019-07-09 PROCEDURE — 2580000003 HC RX 258: Performed by: HOSPITALIST

## 2019-07-09 PROCEDURE — 99232 SBSQ HOSP IP/OBS MODERATE 35: CPT | Performed by: FAMILY MEDICINE

## 2019-07-09 PROCEDURE — 6370000000 HC RX 637 (ALT 250 FOR IP): Performed by: HOSPITALIST

## 2019-07-09 RX ADMIN — PANTOPRAZOLE SODIUM 40 MG: 40 TABLET, DELAYED RELEASE ORAL at 06:13

## 2019-07-09 RX ADMIN — MICONAZOLE NITRATE: 20 POWDER TOPICAL at 23:22

## 2019-07-09 RX ADMIN — Medication 10 ML: at 09:39

## 2019-07-09 ASSESSMENT — PAIN SCALES - GENERAL
PAINLEVEL_OUTOF10: 0

## 2019-07-09 NOTE — PLAN OF CARE
improve  Outcome: Ongoing  Goal: Ability to verbalize feelings about incontinence will improve  Description  Ability to verbalize feelings about incontinence will improve  Outcome: Ongoing  Goal: Ability to recognize the urge to urinate will improve  Description  Ability to recognize the urge to urinate will improve  Outcome: Ongoing  Goal: Ability to maintain continence will improve  Description  Ability to maintain continence will improve  Outcome: Ongoing     Problem: Physical Regulation:  Goal: Prevent transmision of infection  Description  Prevent transmision of infection  Outcome: Ongoing     Problem: Cardiac:  Goal: Complications related to the disease process, condition or treatment will be avoided or minimized  Description  Complications related to the disease process, condition or treatment will be avoided or minimized  Outcome: Ongoing  Goal: Hemodynamic stability will improve  Description  Hemodynamic stability will improve  Outcome: Ongoing  Goal: Cerebral tissue perfusion will improve  Description  Cerebral tissue perfusion will improve  Outcome: Ongoing     Problem: Nutrition  Goal: Optimal nutrition therapy  Description  Nutrition Problem: Inadequate oral intake  Intervention: Food and/or Nutrient Delivery: Continue current diet, Start ONS  Nutritional Goals: po intake 5-% or greater.   Weight stable     Outcome: Ongoing
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
## 2019-07-10 PROCEDURE — 6370000000 HC RX 637 (ALT 250 FOR IP): Performed by: HOSPITALIST

## 2019-07-10 PROCEDURE — G0378 HOSPITAL OBSERVATION PER HR: HCPCS

## 2019-07-10 PROCEDURE — 99232 SBSQ HOSP IP/OBS MODERATE 35: CPT | Performed by: FAMILY MEDICINE

## 2019-07-10 RX ADMIN — NORTRIPTYLINE HYDROCHLORIDE 50 MG: 25 CAPSULE ORAL at 22:04

## 2019-07-10 RX ADMIN — MONTELUKAST SODIUM 10 MG: 10 TABLET, FILM COATED ORAL at 22:05

## 2019-07-10 RX ADMIN — Medication 600 MG: at 22:03

## 2019-07-10 RX ADMIN — FERROUS SULFATE TAB 325 MG (65 MG ELEMENTAL FE) 325 MG: 325 (65 FE) TAB at 22:03

## 2019-07-10 RX ADMIN — CELECOXIB 200 MG: 100 CAPSULE ORAL at 22:03

## 2019-07-10 ASSESSMENT — PAIN SCALES - GENERAL
PAINLEVEL_OUTOF10: 0

## 2019-07-10 ASSESSMENT — PAIN DESCRIPTION - PAIN TYPE: TYPE: ACUTE PAIN
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
## 2019-07-11 PROCEDURE — G0378 HOSPITAL OBSERVATION PER HR: HCPCS

## 2019-07-11 PROCEDURE — 99224 PR SBSQ OBSERVATION CARE/DAY 15 MINUTES: CPT | Performed by: FAMILY MEDICINE

## 2019-07-11 PROCEDURE — 6370000000 HC RX 637 (ALT 250 FOR IP): Performed by: HOSPITALIST

## 2019-07-11 PROCEDURE — 2580000003 HC RX 258: Performed by: HOSPITALIST

## 2019-07-11 RX ADMIN — ATORVASTATIN CALCIUM 10 MG: 10 TABLET, FILM COATED ORAL at 08:58

## 2019-07-11 RX ADMIN — Medication 10 ML: at 21:19

## 2019-07-11 RX ADMIN — AMLODIPINE BESYLATE 10 MG: 10 TABLET ORAL at 08:58

## 2019-07-11 RX ADMIN — LISINOPRIL 40 MG: 20 TABLET ORAL at 08:58

## 2019-07-11 RX ADMIN — FERROUS SULFATE TAB 325 MG (65 MG ELEMENTAL FE) 325 MG: 325 (65 FE) TAB at 21:18

## 2019-07-11 RX ADMIN — NORTRIPTYLINE HYDROCHLORIDE 50 MG: 25 CAPSULE ORAL at 21:18

## 2019-07-11 RX ADMIN — CHOLECALCIFEROL CAP 125 MCG (5000 UNIT) 5000 UNITS: 125 CAP at 08:58

## 2019-07-11 RX ADMIN — MONTELUKAST SODIUM 10 MG: 10 TABLET, FILM COATED ORAL at 21:18

## 2019-07-11 RX ADMIN — PANTOPRAZOLE SODIUM 40 MG: 40 TABLET, DELAYED RELEASE ORAL at 06:42

## 2019-07-11 RX ADMIN — FERROUS SULFATE TAB 325 MG (65 MG ELEMENTAL FE) 325 MG: 325 (65 FE) TAB at 08:58

## 2019-07-11 RX ADMIN — Medication 600 MG: at 08:58

## 2019-07-11 RX ADMIN — CELECOXIB 200 MG: 100 CAPSULE ORAL at 08:58

## 2019-07-11 RX ADMIN — RIVAROXABAN 10 MG: 10 TABLET, FILM COATED ORAL at 08:58

## 2019-07-11 RX ADMIN — Medication 600 MG: at 21:18

## 2019-07-11 RX ADMIN — Medication 8.6 MG: at 08:58

## 2019-07-11 RX ADMIN — CELECOXIB 200 MG: 100 CAPSULE ORAL at 21:18

## 2019-07-11 ASSESSMENT — PAIN SCALES - GENERAL
PAINLEVEL_OUTOF10: 0
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
## 2019-07-12 PROCEDURE — 6370000000 HC RX 637 (ALT 250 FOR IP): Performed by: HOSPITALIST

## 2019-07-12 PROCEDURE — 99224 PR SBSQ OBSERVATION CARE/DAY 15 MINUTES: CPT | Performed by: FAMILY MEDICINE

## 2019-07-12 PROCEDURE — G0378 HOSPITAL OBSERVATION PER HR: HCPCS

## 2019-07-12 RX ADMIN — Medication 8.6 MG: at 09:30

## 2019-07-12 RX ADMIN — TIZANIDINE 4 MG: 4 TABLET ORAL at 18:15

## 2019-07-12 RX ADMIN — PANTOPRAZOLE SODIUM 40 MG: 40 TABLET, DELAYED RELEASE ORAL at 06:34

## 2019-07-12 RX ADMIN — ATORVASTATIN CALCIUM 10 MG: 10 TABLET, FILM COATED ORAL at 09:30

## 2019-07-12 RX ADMIN — AMLODIPINE BESYLATE 10 MG: 10 TABLET ORAL at 09:30

## 2019-07-12 RX ADMIN — RIVAROXABAN 10 MG: 10 TABLET, FILM COATED ORAL at 09:30

## 2019-07-12 RX ADMIN — POLYVINYL ALCOHOL 1 DROP: 14 SOLUTION/ DROPS OPHTHALMIC at 09:51

## 2019-07-12 RX ADMIN — MICONAZOLE NITRATE: 20 POWDER TOPICAL at 09:51

## 2019-07-12 RX ADMIN — POLYETHYLENE GLYCOL (3350) 17 G: 17 POWDER, FOR SOLUTION ORAL at 09:29

## 2019-07-12 RX ADMIN — CELECOXIB 200 MG: 100 CAPSULE ORAL at 09:30

## 2019-07-12 RX ADMIN — FERROUS SULFATE TAB 325 MG (65 MG ELEMENTAL FE) 325 MG: 325 (65 FE) TAB at 09:30

## 2019-07-12 RX ADMIN — ACETAMINOPHEN 500 MG: 500 TABLET, FILM COATED ORAL at 18:15

## 2019-07-12 RX ADMIN — Medication 600 MG: at 20:05

## 2019-07-12 RX ADMIN — CHOLECALCIFEROL CAP 125 MCG (5000 UNIT) 5000 UNITS: 125 CAP at 09:30

## 2019-07-12 RX ADMIN — Medication 600 MG: at 09:30

## 2019-07-12 RX ADMIN — LISINOPRIL 40 MG: 20 TABLET ORAL at 09:30

## 2019-07-12 ASSESSMENT — PAIN DESCRIPTION - PAIN TYPE: TYPE: CHRONIC PAIN

## 2019-07-12 ASSESSMENT — PAIN SCALES - GENERAL
PAINLEVEL_OUTOF10: 0
PAINLEVEL_OUTOF10: 0
PAINLEVEL_OUTOF10: 8
PAINLEVEL_OUTOF10: 0

## 2019-07-12 ASSESSMENT — PAIN DESCRIPTION - LOCATION: LOCATION: GENERALIZED

## 2019-07-13 PROCEDURE — G0378 HOSPITAL OBSERVATION PER HR: HCPCS

## 2019-07-13 PROCEDURE — 6370000000 HC RX 637 (ALT 250 FOR IP): Performed by: HOSPITALIST

## 2019-07-13 PROCEDURE — 99224 PR SBSQ OBSERVATION CARE/DAY 15 MINUTES: CPT | Performed by: FAMILY MEDICINE

## 2019-07-13 RX ADMIN — Medication 8.6 MG: at 10:32

## 2019-07-13 RX ADMIN — MICONAZOLE NITRATE: 20 POWDER TOPICAL at 10:37

## 2019-07-13 RX ADMIN — MONTELUKAST SODIUM 10 MG: 10 TABLET, FILM COATED ORAL at 20:32

## 2019-07-13 RX ADMIN — NORTRIPTYLINE HYDROCHLORIDE 50 MG: 25 CAPSULE ORAL at 20:32

## 2019-07-13 RX ADMIN — RIVAROXABAN 10 MG: 10 TABLET, FILM COATED ORAL at 10:37

## 2019-07-13 RX ADMIN — CELECOXIB 200 MG: 100 CAPSULE ORAL at 20:31

## 2019-07-13 RX ADMIN — FERROUS SULFATE TAB 325 MG (65 MG ELEMENTAL FE) 325 MG: 325 (65 FE) TAB at 20:32

## 2019-07-13 RX ADMIN — LISINOPRIL 40 MG: 20 TABLET ORAL at 10:32

## 2019-07-13 RX ADMIN — CELECOXIB 200 MG: 100 CAPSULE ORAL at 05:52

## 2019-07-13 RX ADMIN — Medication 600 MG: at 20:31

## 2019-07-13 RX ADMIN — ATORVASTATIN CALCIUM 10 MG: 10 TABLET, FILM COATED ORAL at 10:32

## 2019-07-13 RX ADMIN — ACETAMINOPHEN 500 MG: 500 TABLET, FILM COATED ORAL at 12:58

## 2019-07-13 RX ADMIN — Medication 600 MG: at 10:32

## 2019-07-13 RX ADMIN — FERROUS SULFATE TAB 325 MG (65 MG ELEMENTAL FE) 325 MG: 325 (65 FE) TAB at 05:53

## 2019-07-13 RX ADMIN — FERROUS SULFATE TAB 325 MG (65 MG ELEMENTAL FE) 325 MG: 325 (65 FE) TAB at 10:32

## 2019-07-13 RX ADMIN — TIZANIDINE 4 MG: 4 TABLET ORAL at 12:58

## 2019-07-13 RX ADMIN — MONTELUKAST SODIUM 10 MG: 10 TABLET, FILM COATED ORAL at 05:53

## 2019-07-13 RX ADMIN — POLYVINYL ALCOHOL 1 DROP: 14 SOLUTION/ DROPS OPHTHALMIC at 10:37

## 2019-07-13 RX ADMIN — PANTOPRAZOLE SODIUM 40 MG: 40 TABLET, DELAYED RELEASE ORAL at 06:47

## 2019-07-13 RX ADMIN — POLYETHYLENE GLYCOL (3350) 17 G: 17 POWDER, FOR SOLUTION ORAL at 10:32

## 2019-07-13 RX ADMIN — CELECOXIB 200 MG: 100 CAPSULE ORAL at 10:32

## 2019-07-13 RX ADMIN — AMLODIPINE BESYLATE 10 MG: 10 TABLET ORAL at 10:32

## 2019-07-13 RX ADMIN — NORTRIPTYLINE HYDROCHLORIDE 50 MG: 25 CAPSULE ORAL at 05:53

## 2019-07-13 RX ADMIN — CHOLECALCIFEROL CAP 125 MCG (5000 UNIT) 5000 UNITS: 125 CAP at 10:32

## 2019-07-13 ASSESSMENT — PAIN SCALES - GENERAL
PAINLEVEL_OUTOF10: 0
PAINLEVEL_OUTOF10: 6
PAINLEVEL_OUTOF10: 10
PAINLEVEL_OUTOF10: 8

## 2019-07-13 ASSESSMENT — PAIN DESCRIPTION - LOCATION: LOCATION: GENERALIZED

## 2019-07-13 ASSESSMENT — PAIN DESCRIPTION - PAIN TYPE: TYPE: CHRONIC PAIN
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
## 2019-07-14 PROCEDURE — G0378 HOSPITAL OBSERVATION PER HR: HCPCS

## 2019-07-14 PROCEDURE — 99224 PR SBSQ OBSERVATION CARE/DAY 15 MINUTES: CPT | Performed by: FAMILY MEDICINE

## 2019-07-14 PROCEDURE — 6370000000 HC RX 637 (ALT 250 FOR IP): Performed by: HOSPITALIST

## 2019-07-14 RX ADMIN — FERROUS SULFATE TAB 325 MG (65 MG ELEMENTAL FE) 325 MG: 325 (65 FE) TAB at 09:04

## 2019-07-14 RX ADMIN — ATORVASTATIN CALCIUM 10 MG: 10 TABLET, FILM COATED ORAL at 09:04

## 2019-07-14 RX ADMIN — POLYVINYL ALCOHOL 1 DROP: 14 SOLUTION/ DROPS OPHTHALMIC at 09:16

## 2019-07-14 RX ADMIN — ACETAMINOPHEN 500 MG: 500 TABLET, FILM COATED ORAL at 15:56

## 2019-07-14 RX ADMIN — CELECOXIB 200 MG: 100 CAPSULE ORAL at 09:04

## 2019-07-14 RX ADMIN — CHOLECALCIFEROL CAP 125 MCG (5000 UNIT) 5000 UNITS: 125 CAP at 09:04

## 2019-07-14 RX ADMIN — TIZANIDINE 4 MG: 4 TABLET ORAL at 15:56

## 2019-07-14 RX ADMIN — AMLODIPINE BESYLATE 10 MG: 10 TABLET ORAL at 09:04

## 2019-07-14 RX ADMIN — Medication 600 MG: at 09:04

## 2019-07-14 RX ADMIN — POLYETHYLENE GLYCOL (3350) 17 G: 17 POWDER, FOR SOLUTION ORAL at 09:04

## 2019-07-14 RX ADMIN — Medication 8.6 MG: at 09:04

## 2019-07-14 RX ADMIN — MONTELUKAST SODIUM 10 MG: 10 TABLET, FILM COATED ORAL at 21:13

## 2019-07-14 RX ADMIN — RIVAROXABAN 10 MG: 10 TABLET, FILM COATED ORAL at 09:07

## 2019-07-14 RX ADMIN — NORTRIPTYLINE HYDROCHLORIDE 50 MG: 25 CAPSULE ORAL at 21:13

## 2019-07-14 RX ADMIN — Medication 600 MG: at 21:13

## 2019-07-14 RX ADMIN — CELECOXIB 200 MG: 100 CAPSULE ORAL at 21:13

## 2019-07-14 RX ADMIN — MICONAZOLE NITRATE: 20 POWDER TOPICAL at 09:16

## 2019-07-14 RX ADMIN — LISINOPRIL 40 MG: 20 TABLET ORAL at 09:04

## 2019-07-14 RX ADMIN — FERROUS SULFATE TAB 325 MG (65 MG ELEMENTAL FE) 325 MG: 325 (65 FE) TAB at 21:14

## 2019-07-14 RX ADMIN — PANTOPRAZOLE SODIUM 40 MG: 40 TABLET, DELAYED RELEASE ORAL at 06:40

## 2019-07-14 ASSESSMENT — PAIN DESCRIPTION - LOCATION: LOCATION: GENERALIZED

## 2019-07-14 ASSESSMENT — PAIN SCALES - GENERAL
PAINLEVEL_OUTOF10: 7
PAINLEVEL_OUTOF10: 0
PAINLEVEL_OUTOF10: 8

## 2019-07-14 ASSESSMENT — PAIN DESCRIPTION - PAIN TYPE: TYPE: CHRONIC PAIN

## 2019-07-14 NOTE — PROGRESS NOTES
Teodora Mi MD   17 g at 07/14/19 0904    polyethyl glycol-propyl glycol 0.4-0.3 % (SYSTANE) ophthalmic solution 2 drop  2 drop Both Eyes PRN Noel Garcia MD        [facility]) tablet 8.6 mg  1 tablet Oral Daily Noel Garcia MD   8.6 mg at 07/14/19 0904    tiZANidine (ZANAFLEX) tablet 4 mg  4 mg Oral Q8H PRN Noel Garcia MD   4 mg at 07/13/19 1258    pantoprazole (PROTONIX) tablet 40 mg  40 mg Oral QAM AC Noel Garcia MD   40 mg at 07/14/19 0640        Labs:   No results for input(s): WBC, RBC, HGB, HCT, MCV, MCH, MCHC, PLT in the last 72 hours. No results for input(s): NA, K, ANIONGAP, CL, CO2, BUN, CREATININE, GLUCOSE, CALCIUM, GFR in the last 72 hours. No results for input(s): MG, PHOS in the last 72 hours. No results for input(s): AST, ALT, ALB, BILITOT, ALKPHOS, ALB in the last 72 hours. ABGs:No results for input(s): PH, PO2, PCO2, HCO3, BE, O2SAT in the last 72 hours. Troponin T: No results for input(s): TROPONINI in the last 72 hours. INR: No results for input(s): INR in the last 72 hours. Lactic Acid: No results for input(s): LACTA in the last 72 hours.     Objective:   Vitals: /72   Pulse 76   Temp 98.2 °F (36.8 °C) (Temporal)   Resp 18   Ht 5' 3\" (1.6 m)   Wt 148 lb 12.8 oz (67.5 kg)   SpO2 98%   BMI 26.36 kg/m²   24HR INTAKE/OUTPUT:      Intake/Output Summary (Last 24 hours) at 7/14/2019 0946  Last data filed at 7/13/2019 1820  Gross per 24 hour   Intake 840 ml   Output --   Net 840 ml     General appearance: alert and cooperative with exam  HEENT: atraumatic, eyes with clear conjunctiva and normal lids, pupils and irises normal, external ears and nose are normal, lips normal  Neck without masses, lympadenopathy, bruit, thyroid normal  Lungs: no increased work of breathing, \"clear to auscultation bilaterally\" without rales, rhonchi or wheezes  Heart: regular rate and rhythm, S1, S2 normal, no murmur, click, rub or gallop  Abdomen: soft, non-tender; bowel sounds normal; no

## 2019-07-15 LAB
ANION GAP SERPL CALCULATED.3IONS-SCNC: 15 MMOL/L (ref 7–19)
BUN BLDV-MCNC: 18 MG/DL (ref 8–23)
C-REACTIVE PROTEIN: <0.03 MG/DL (ref 0–0.5)
CALCIUM SERPL-MCNC: 10.6 MG/DL (ref 8.8–10.2)
CHLORIDE BLD-SCNC: 96 MMOL/L (ref 98–111)
CO2: 24 MMOL/L (ref 22–29)
CREAT SERPL-MCNC: 0.8 MG/DL (ref 0.5–0.9)
D DIMER: 2.86 UG/ML FEU (ref 0–0.48)
GFR NON-AFRICAN AMERICAN: >60
GLUCOSE BLD-MCNC: 135 MG/DL (ref 74–109)
MAGNESIUM: 1.5 MG/DL (ref 1.6–2.4)
POTASSIUM SERPL-SCNC: 3.8 MMOL/L (ref 3.5–5)
RHEUMATOID FACTOR: <10 IU/ML
SEDIMENTATION RATE, ERYTHROCYTE: 22 MM/HR (ref 0–25)
SODIUM BLD-SCNC: 135 MMOL/L (ref 136–145)
TOTAL CK: 27 U/L (ref 26–192)
TSH SERPL DL<=0.05 MIU/L-ACNC: 0.6 UIU/ML (ref 0.27–4.2)
VITAMIN D 25-HYDROXY: 80.4 NG/ML

## 2019-07-15 PROCEDURE — 96365 THER/PROPH/DIAG IV INF INIT: CPT

## 2019-07-15 PROCEDURE — 82550 ASSAY OF CK (CPK): CPT

## 2019-07-15 PROCEDURE — 84443 ASSAY THYROID STIM HORMONE: CPT

## 2019-07-15 PROCEDURE — 83735 ASSAY OF MAGNESIUM: CPT

## 2019-07-15 PROCEDURE — 86431 RHEUMATOID FACTOR QUANT: CPT

## 2019-07-15 PROCEDURE — 86140 C-REACTIVE PROTEIN: CPT

## 2019-07-15 PROCEDURE — 80048 BASIC METABOLIC PNL TOTAL CA: CPT

## 2019-07-15 PROCEDURE — 99225 PR SBSQ OBSERVATION CARE/DAY 25 MINUTES: CPT | Performed by: HOSPITALIST

## 2019-07-15 PROCEDURE — 82306 VITAMIN D 25 HYDROXY: CPT

## 2019-07-15 PROCEDURE — G0378 HOSPITAL OBSERVATION PER HR: HCPCS

## 2019-07-15 PROCEDURE — 6360000002 HC RX W HCPCS: Performed by: HOSPITALIST

## 2019-07-15 PROCEDURE — 85379 FIBRIN DEGRADATION QUANT: CPT

## 2019-07-15 PROCEDURE — 85652 RBC SED RATE AUTOMATED: CPT

## 2019-07-15 PROCEDURE — 6370000000 HC RX 637 (ALT 250 FOR IP): Performed by: HOSPITALIST

## 2019-07-15 RX ADMIN — MAGNESIUM SULFATE HEPTAHYDRATE 1 G: 1 INJECTION, SOLUTION INTRAVENOUS at 23:52

## 2019-07-15 RX ADMIN — PANTOPRAZOLE SODIUM 40 MG: 40 TABLET, DELAYED RELEASE ORAL at 06:34

## 2019-07-15 RX ADMIN — FERROUS SULFATE TAB 325 MG (65 MG ELEMENTAL FE) 325 MG: 325 (65 FE) TAB at 20:21

## 2019-07-15 RX ADMIN — RIVAROXABAN 10 MG: 10 TABLET, FILM COATED ORAL at 08:48

## 2019-07-15 RX ADMIN — Medication 600 MG: at 20:21

## 2019-07-15 RX ADMIN — POLYVINYL ALCOHOL 1 DROP: 14 SOLUTION/ DROPS OPHTHALMIC at 08:48

## 2019-07-15 RX ADMIN — CELECOXIB 200 MG: 100 CAPSULE ORAL at 08:48

## 2019-07-15 RX ADMIN — Medication 600 MG: at 08:48

## 2019-07-15 RX ADMIN — MICONAZOLE NITRATE: 20 POWDER TOPICAL at 08:50

## 2019-07-15 RX ADMIN — CELECOXIB 200 MG: 100 CAPSULE ORAL at 20:21

## 2019-07-15 RX ADMIN — Medication 8.6 MG: at 08:49

## 2019-07-15 RX ADMIN — FERROUS SULFATE TAB 325 MG (65 MG ELEMENTAL FE) 325 MG: 325 (65 FE) TAB at 08:49

## 2019-07-15 RX ADMIN — ACETAMINOPHEN 500 MG: 500 TABLET, FILM COATED ORAL at 20:21

## 2019-07-15 RX ADMIN — AMLODIPINE BESYLATE 10 MG: 10 TABLET ORAL at 08:48

## 2019-07-15 RX ADMIN — ATORVASTATIN CALCIUM 10 MG: 10 TABLET, FILM COATED ORAL at 08:49

## 2019-07-15 RX ADMIN — NORTRIPTYLINE HYDROCHLORIDE 50 MG: 25 CAPSULE ORAL at 20:21

## 2019-07-15 RX ADMIN — CHOLECALCIFEROL CAP 125 MCG (5000 UNIT) 5000 UNITS: 125 CAP at 08:49

## 2019-07-15 RX ADMIN — POLYETHYLENE GLYCOL (3350) 17 G: 17 POWDER, FOR SOLUTION ORAL at 08:48

## 2019-07-15 RX ADMIN — LISINOPRIL 40 MG: 20 TABLET ORAL at 08:49

## 2019-07-15 RX ADMIN — MONTELUKAST SODIUM 10 MG: 10 TABLET, FILM COATED ORAL at 20:32

## 2019-07-15 RX ADMIN — DIPHENHYDRAMINE HCL 25 MG: 25 TABLET ORAL at 22:19

## 2019-07-15 RX ADMIN — ACETAMINOPHEN 500 MG: 500 TABLET, FILM COATED ORAL at 08:49

## 2019-07-15 ASSESSMENT — PAIN DESCRIPTION - PAIN TYPE
TYPE: CHRONIC PAIN
TYPE: CHRONIC PAIN

## 2019-07-15 ASSESSMENT — PAIN DESCRIPTION - ORIENTATION
ORIENTATION: RIGHT;LEFT
ORIENTATION: RIGHT;LEFT

## 2019-07-15 ASSESSMENT — PAIN DESCRIPTION - LOCATION
LOCATION: GENERALIZED;KNEE
LOCATION: GENERALIZED;KNEE

## 2019-07-15 ASSESSMENT — PAIN SCALES - GENERAL
PAINLEVEL_OUTOF10: 5
PAINLEVEL_OUTOF10: 8
PAINLEVEL_OUTOF10: 8

## 2019-07-15 NOTE — PLAN OF CARE
Problem: Falls - Risk of:  Goal: Will remain free from falls  Description  Will remain free from falls  Outcome: Ongoing  Goal: Absence of physical injury  Description  Absence of physical injury  Outcome: Ongoing     Problem: Risk for Impaired Skin Integrity  Goal: Tissue integrity - skin and mucous membranes  Description  Structural intactness and normal physiological function of skin and  mucous membranes.   Outcome: Ongoing     Problem: Skin Integrity:  Goal: Skin integrity will be maintained  Description  Skin integrity will be maintained  Outcome: Ongoing     Problem: Daily Care:  Goal: Daily care needs are met  Description  Daily care needs are met  Outcome: Ongoing     Problem: Discharge Planning:  Goal: Patients continuum of care needs are met  Description  Patients continuum of care needs are met  Outcome: Ongoing     Problem: Discharge Planning:  Goal: Participates in care planning  Description  Participates in care planning  Outcome: Ongoing     Problem: Injury - Risk of, Physical Injury:  Goal: Will remain free from falls  Description  Will remain free from falls  Outcome: Ongoing  Goal: Absence of physical injury  Description  Absence of physical injury  Outcome: Ongoing

## 2019-07-16 LAB
HCT VFR BLD CALC: 31.7 % (ref 37–47)
HEMOGLOBIN: 10.1 G/DL (ref 12–16)
MCH RBC QN AUTO: 28.7 PG (ref 27–31)
MCHC RBC AUTO-ENTMCNC: 31.9 G/DL (ref 33–37)
MCV RBC AUTO: 90.1 FL (ref 81–99)
PDW BLD-RTO: 14.7 % (ref 11.5–14.5)
PLATELET # BLD: 248 K/UL (ref 130–400)
PMV BLD AUTO: 10.8 FL (ref 9.4–12.3)
RBC # BLD: 3.52 M/UL (ref 4.2–5.4)
WBC # BLD: 7 K/UL (ref 4.8–10.8)

## 2019-07-16 PROCEDURE — G0378 HOSPITAL OBSERVATION PER HR: HCPCS

## 2019-07-16 PROCEDURE — 36415 COLL VENOUS BLD VENIPUNCTURE: CPT

## 2019-07-16 PROCEDURE — 96366 THER/PROPH/DIAG IV INF ADDON: CPT

## 2019-07-16 PROCEDURE — 93970 EXTREMITY STUDY: CPT

## 2019-07-16 PROCEDURE — 6360000002 HC RX W HCPCS: Performed by: HOSPITALIST

## 2019-07-16 PROCEDURE — 6370000000 HC RX 637 (ALT 250 FOR IP): Performed by: HOSPITALIST

## 2019-07-16 PROCEDURE — 99225 PR SBSQ OBSERVATION CARE/DAY 25 MINUTES: CPT | Performed by: HOSPITALIST

## 2019-07-16 PROCEDURE — 2500000003 HC RX 250 WO HCPCS: Performed by: HOSPITALIST

## 2019-07-16 PROCEDURE — 2580000003 HC RX 258: Performed by: HOSPITALIST

## 2019-07-16 PROCEDURE — 85027 COMPLETE CBC AUTOMATED: CPT

## 2019-07-16 RX ADMIN — ACETAMINOPHEN 500 MG: 500 TABLET, FILM COATED ORAL at 19:17

## 2019-07-16 RX ADMIN — TIZANIDINE 4 MG: 4 TABLET ORAL at 00:24

## 2019-07-16 RX ADMIN — RIVAROXABAN 10 MG: 10 TABLET, FILM COATED ORAL at 09:54

## 2019-07-16 RX ADMIN — CELECOXIB 200 MG: 100 CAPSULE ORAL at 09:53

## 2019-07-16 RX ADMIN — AMLODIPINE BESYLATE 10 MG: 10 TABLET ORAL at 09:54

## 2019-07-16 RX ADMIN — CELECOXIB 200 MG: 100 CAPSULE ORAL at 20:17

## 2019-07-16 RX ADMIN — NORTRIPTYLINE HYDROCHLORIDE 50 MG: 25 CAPSULE ORAL at 20:17

## 2019-07-16 RX ADMIN — MONTELUKAST SODIUM 10 MG: 10 TABLET, FILM COATED ORAL at 20:17

## 2019-07-16 RX ADMIN — FERROUS SULFATE TAB 325 MG (65 MG ELEMENTAL FE) 325 MG: 325 (65 FE) TAB at 20:18

## 2019-07-16 RX ADMIN — Medication 600 MG: at 09:53

## 2019-07-16 RX ADMIN — MAGNESIUM SULFATE HEPTAHYDRATE 1 G: 1 INJECTION, SOLUTION INTRAVENOUS at 01:50

## 2019-07-16 RX ADMIN — DIPHENHYDRAMINE HCL 25 MG: 25 TABLET ORAL at 20:21

## 2019-07-16 RX ADMIN — Medication 8.6 MG: at 09:54

## 2019-07-16 RX ADMIN — POLYETHYLENE GLYCOL (3350) 17 G: 17 POWDER, FOR SOLUTION ORAL at 09:54

## 2019-07-16 RX ADMIN — MICONAZOLE NITRATE: 20 POWDER TOPICAL at 20:17

## 2019-07-16 RX ADMIN — Medication 10 ML: at 09:54

## 2019-07-16 RX ADMIN — Medication 600 MG: at 20:17

## 2019-07-16 RX ADMIN — MICONAZOLE NITRATE: 20 POWDER TOPICAL at 09:54

## 2019-07-16 RX ADMIN — CHOLECALCIFEROL CAP 125 MCG (5000 UNIT) 5000 UNITS: 125 CAP at 09:54

## 2019-07-16 RX ADMIN — Medication 10 ML: at 20:18

## 2019-07-16 RX ADMIN — PANTOPRAZOLE SODIUM 40 MG: 40 TABLET, DELAYED RELEASE ORAL at 06:22

## 2019-07-16 RX ADMIN — LISINOPRIL 40 MG: 20 TABLET ORAL at 09:54

## 2019-07-16 RX ADMIN — MICONAZOLE NITRATE: 20 POWDER TOPICAL at 00:23

## 2019-07-16 RX ADMIN — FERROUS SULFATE TAB 325 MG (65 MG ELEMENTAL FE) 325 MG: 325 (65 FE) TAB at 09:54

## 2019-07-16 ASSESSMENT — PAIN SCALES - GENERAL
PAINLEVEL_OUTOF10: 0
PAINLEVEL_OUTOF10: 5
PAINLEVEL_OUTOF10: 5

## 2019-07-16 NOTE — PROGRESS NOTES
Vascular lab preliminary. Bilateral L/E venous duplex scan completed. No evidence for DVT, SVT, or reflux noted at this time. Final report pending.

## 2019-07-17 LAB — MAGNESIUM: 1.6 MG/DL (ref 1.6–2.4)

## 2019-07-17 PROCEDURE — 83735 ASSAY OF MAGNESIUM: CPT

## 2019-07-17 PROCEDURE — 2580000003 HC RX 258: Performed by: HOSPITALIST

## 2019-07-17 PROCEDURE — 36415 COLL VENOUS BLD VENIPUNCTURE: CPT

## 2019-07-17 PROCEDURE — 99232 SBSQ HOSP IP/OBS MODERATE 35: CPT | Performed by: HOSPITALIST

## 2019-07-17 PROCEDURE — 6370000000 HC RX 637 (ALT 250 FOR IP): Performed by: HOSPITALIST

## 2019-07-17 PROCEDURE — G0378 HOSPITAL OBSERVATION PER HR: HCPCS

## 2019-07-17 PROCEDURE — 96366 THER/PROPH/DIAG IV INF ADDON: CPT

## 2019-07-17 PROCEDURE — 6360000002 HC RX W HCPCS: Performed by: HOSPITALIST

## 2019-07-17 RX ORDER — DIAPER,BRIEF,INFANT-TODD,DISP
EACH MISCELLANEOUS 3 TIMES DAILY PRN
Status: DISCONTINUED | OUTPATIENT
Start: 2019-07-17 | End: 2019-07-19 | Stop reason: HOSPADM

## 2019-07-17 RX ORDER — KETOROLAC TROMETHAMINE 30 MG/ML
15 INJECTION, SOLUTION INTRAMUSCULAR; INTRAVENOUS ONCE
Status: COMPLETED | OUTPATIENT
Start: 2019-07-17 | End: 2019-07-17

## 2019-07-17 RX ADMIN — HYDROCORTISONE: 1 CREAM TOPICAL at 13:13

## 2019-07-17 RX ADMIN — Medication 10 ML: at 21:37

## 2019-07-17 RX ADMIN — AMLODIPINE BESYLATE 10 MG: 10 TABLET ORAL at 07:15

## 2019-07-17 RX ADMIN — Medication 600 MG: at 21:37

## 2019-07-17 RX ADMIN — LISINOPRIL 40 MG: 20 TABLET ORAL at 07:14

## 2019-07-17 RX ADMIN — FERROUS SULFATE TAB 325 MG (65 MG ELEMENTAL FE) 325 MG: 325 (65 FE) TAB at 07:15

## 2019-07-17 RX ADMIN — MICONAZOLE NITRATE: 20 POWDER TOPICAL at 07:16

## 2019-07-17 RX ADMIN — CHOLECALCIFEROL CAP 125 MCG (5000 UNIT) 5000 UNITS: 125 CAP at 07:14

## 2019-07-17 RX ADMIN — CELECOXIB 200 MG: 100 CAPSULE ORAL at 21:37

## 2019-07-17 RX ADMIN — ACETAMINOPHEN 500 MG: 500 TABLET, FILM COATED ORAL at 10:49

## 2019-07-17 RX ADMIN — PANTOPRAZOLE SODIUM 40 MG: 40 TABLET, DELAYED RELEASE ORAL at 05:31

## 2019-07-17 RX ADMIN — MICONAZOLE NITRATE: 20 POWDER TOPICAL at 21:44

## 2019-07-17 RX ADMIN — Medication 600 MG: at 07:15

## 2019-07-17 RX ADMIN — Medication 8.6 MG: at 07:16

## 2019-07-17 RX ADMIN — MONTELUKAST SODIUM 10 MG: 10 TABLET, FILM COATED ORAL at 21:37

## 2019-07-17 RX ADMIN — KETOROLAC TROMETHAMINE 15 MG: 30 INJECTION, SOLUTION INTRAMUSCULAR; INTRAVENOUS at 18:35

## 2019-07-17 RX ADMIN — FERROUS SULFATE TAB 325 MG (65 MG ELEMENTAL FE) 325 MG: 325 (65 FE) TAB at 21:37

## 2019-07-17 RX ADMIN — MAGNESIUM SULFATE HEPTAHYDRATE 1 G: 1 INJECTION, SOLUTION INTRAVENOUS at 15:19

## 2019-07-17 RX ADMIN — NORTRIPTYLINE HYDROCHLORIDE 50 MG: 25 CAPSULE ORAL at 21:37

## 2019-07-17 RX ADMIN — RIVAROXABAN 10 MG: 10 TABLET, FILM COATED ORAL at 07:15

## 2019-07-17 RX ADMIN — POLYETHYLENE GLYCOL (3350) 17 G: 17 POWDER, FOR SOLUTION ORAL at 07:16

## 2019-07-17 RX ADMIN — MAGNESIUM SULFATE HEPTAHYDRATE 1 G: 1 INJECTION, SOLUTION INTRAVENOUS at 13:13

## 2019-07-17 RX ADMIN — Medication 10 ML: at 13:13

## 2019-07-17 RX ADMIN — CELECOXIB 200 MG: 100 CAPSULE ORAL at 07:15

## 2019-07-17 ASSESSMENT — PAIN SCALES - GENERAL
PAINLEVEL_OUTOF10: 6
PAINLEVEL_OUTOF10: 7
PAINLEVEL_OUTOF10: 8
PAINLEVEL_OUTOF10: 5
PAINLEVEL_OUTOF10: 0
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
## 2019-07-17 NOTE — PLAN OF CARE
Problem: Falls - Risk of:  Goal: Will remain free from falls  Description  Will remain free from falls  Outcome: Ongoing  Goal: Absence of physical injury  Description  Absence of physical injury  Outcome: Ongoing     Problem: Risk for Impaired Skin Integrity  Goal: Tissue integrity - skin and mucous membranes  Description  Structural intactness and normal physiological function of skin and  mucous membranes.   Outcome: Ongoing     Problem: Skin Integrity:  Goal: Will show no infection signs and symptoms  Description  Will show no infection signs and symptoms  Outcome: Ongoing  Goal: Absence of new skin breakdown  Description  Absence of new skin breakdown  Outcome: Ongoing  Goal: Risk for impaired skin integrity will decrease  Description  Risk for impaired skin integrity will decrease  Outcome: Ongoing  Goal: Skin integrity will be maintained  Description  Skin integrity will be maintained  Outcome: Ongoing     Problem: ABCDS Injury Assessment  Goal: Absence of physical injury  Outcome: Ongoing     Problem: Infection:  Goal: Will remain free from infection  Description  Will remain free from infection  Outcome: Ongoing     Problem: Safety:  Goal: Free from accidental physical injury  Description  Free from accidental physical injury  Outcome: Ongoing  Goal: Free from intentional harm  Description  Free from intentional harm  Outcome: Ongoing  Goal: Ability to remain free from injury will improve  Description  Ability to remain free from injury will improve  Outcome: Ongoing     Problem: Daily Care:  Goal: Daily care needs are met  Description  Daily care needs are met  Outcome: Ongoing     Problem: Pain:  Goal: Patient's pain/discomfort is manageable  Description  Patient's pain/discomfort is manageable  Outcome: Ongoing  Goal: Pain level will decrease  Description  Pain level will decrease  Outcome: Ongoing  Goal: Control of acute pain  Description  Control of acute pain  Outcome: Ongoing  Goal: Control of Ongoing  Goal: Absence of abusive behavior  Description  Absence of abusive behavior  Outcome: Ongoing  Goal: Verbalizations of feeling emotionally comfortable while being cared for will increase  Description  Verbalizations of feeling emotionally comfortable while being cared for will increase  Outcome: Ongoing     Problem: Psychomotor Activity - Altered:  Goal: Absence of psychomotor disturbance signs and symptoms  Description  Absence of psychomotor disturbance signs and symptoms  Outcome: Ongoing     Problem: Sensory Perception - Impaired:  Goal: Demonstrations of improved sensory functioning will increase  Description  Demonstrations of improved sensory functioning will increase  Outcome: Ongoing  Goal: Decrease in sensory misperception frequency  Description  Decrease in sensory misperception frequency  Outcome: Ongoing  Goal: Able to refrain from responding to false sensory perceptions  Description  Able to refrain from responding to false sensory perceptions  Outcome: Ongoing  Goal: Demonstrates accurate environmental perceptions  Description  Demonstrates accurate environmental perceptions  Outcome: Ongoing  Goal: Able to distinguish between reality-based and nonreality-based thinking  Description  Able to distinguish between reality-based and nonreality-based thinking  Outcome: Ongoing  Goal: Able to interrupt nonreality-based thinking  Description  Able to interrupt nonreality-based thinking  Outcome: Ongoing     Problem: Sleep Pattern Disturbance:  Goal: Appears well-rested  Description  Appears well-rested  Outcome: Ongoing     Problem: Sensory:  Goal: General experience of comfort will improve  Description  General experience of comfort will improve  Outcome: Ongoing     Problem: Urinary Elimination:  Goal: Signs and symptoms of infection will decrease  Description  Signs and symptoms of infection will decrease  Outcome: Ongoing  Goal: Ability to reestablish a normal urinary elimination pattern will

## 2019-07-18 LAB
BACTERIA: ABNORMAL /HPF
BILIRUBIN URINE: NEGATIVE
BLOOD, URINE: NEGATIVE
CLARITY: ABNORMAL
COLOR: YELLOW
EPITHELIAL CELLS, UA: 0 /HPF (ref 0–5)
GLUCOSE URINE: NEGATIVE MG/DL
HYALINE CASTS: 3 /HPF (ref 0–8)
KETONES, URINE: NEGATIVE MG/DL
LEUKOCYTE ESTERASE, URINE: ABNORMAL
NITRITE, URINE: NEGATIVE
PH UA: 7 (ref 5–8)
PROTEIN UA: NEGATIVE MG/DL
RBC UA: 1 /HPF (ref 0–4)
SPECIFIC GRAVITY UA: 1.02 (ref 1–1.03)
URINE REFLEX TO CULTURE: YES
UROBILINOGEN, URINE: 0.2 E.U./DL
WBC UA: 75 /HPF (ref 0–5)

## 2019-07-18 PROCEDURE — 97530 THERAPEUTIC ACTIVITIES: CPT

## 2019-07-18 PROCEDURE — 97161 PT EVAL LOW COMPLEX 20 MIN: CPT

## 2019-07-18 PROCEDURE — 87077 CULTURE AEROBIC IDENTIFY: CPT

## 2019-07-18 PROCEDURE — G0378 HOSPITAL OBSERVATION PER HR: HCPCS

## 2019-07-18 PROCEDURE — 6370000000 HC RX 637 (ALT 250 FOR IP): Performed by: HOSPITALIST

## 2019-07-18 PROCEDURE — 97165 OT EVAL LOW COMPLEX 30 MIN: CPT

## 2019-07-18 PROCEDURE — 2580000003 HC RX 258: Performed by: HOSPITALIST

## 2019-07-18 PROCEDURE — 87186 SC STD MICRODIL/AGAR DIL: CPT

## 2019-07-18 PROCEDURE — 6360000002 HC RX W HCPCS: Performed by: HOSPITALIST

## 2019-07-18 PROCEDURE — 81001 URINALYSIS AUTO W/SCOPE: CPT

## 2019-07-18 PROCEDURE — 99232 SBSQ HOSP IP/OBS MODERATE 35: CPT | Performed by: HOSPITALIST

## 2019-07-18 PROCEDURE — 87086 URINE CULTURE/COLONY COUNT: CPT

## 2019-07-18 PROCEDURE — 96375 TX/PRO/DX INJ NEW DRUG ADDON: CPT

## 2019-07-18 PROCEDURE — 97535 SELF CARE MNGMENT TRAINING: CPT

## 2019-07-18 RX ADMIN — CHOLECALCIFEROL CAP 125 MCG (5000 UNIT) 5000 UNITS: 125 CAP at 09:01

## 2019-07-18 RX ADMIN — DIPHENHYDRAMINE HCL 25 MG: 25 TABLET ORAL at 22:13

## 2019-07-18 RX ADMIN — NORTRIPTYLINE HYDROCHLORIDE 50 MG: 25 CAPSULE ORAL at 22:13

## 2019-07-18 RX ADMIN — RIVAROXABAN 10 MG: 10 TABLET, FILM COATED ORAL at 09:01

## 2019-07-18 RX ADMIN — Medication 1 G: at 17:02

## 2019-07-18 RX ADMIN — MICONAZOLE NITRATE: 20 POWDER TOPICAL at 22:12

## 2019-07-18 RX ADMIN — FERROUS SULFATE TAB 325 MG (65 MG ELEMENTAL FE) 325 MG: 325 (65 FE) TAB at 09:00

## 2019-07-18 RX ADMIN — POLYETHYLENE GLYCOL (3350) 17 G: 17 POWDER, FOR SOLUTION ORAL at 09:05

## 2019-07-18 RX ADMIN — FERROUS SULFATE TAB 325 MG (65 MG ELEMENTAL FE) 325 MG: 325 (65 FE) TAB at 22:13

## 2019-07-18 RX ADMIN — AMLODIPINE BESYLATE 10 MG: 10 TABLET ORAL at 09:01

## 2019-07-18 RX ADMIN — MONTELUKAST SODIUM 10 MG: 10 TABLET, FILM COATED ORAL at 22:13

## 2019-07-18 RX ADMIN — Medication 8.6 MG: at 09:01

## 2019-07-18 RX ADMIN — Medication 10 ML: at 22:16

## 2019-07-18 RX ADMIN — Medication 600 MG: at 22:13

## 2019-07-18 RX ADMIN — Medication 600 MG: at 09:01

## 2019-07-18 RX ADMIN — Medication 10 ML: at 12:32

## 2019-07-18 RX ADMIN — PANTOPRAZOLE SODIUM 40 MG: 40 TABLET, DELAYED RELEASE ORAL at 06:43

## 2019-07-18 RX ADMIN — MICONAZOLE NITRATE: 20 POWDER TOPICAL at 09:05

## 2019-07-18 RX ADMIN — ACETAMINOPHEN 500 MG: 500 TABLET, FILM COATED ORAL at 14:25

## 2019-07-18 RX ADMIN — CELECOXIB 200 MG: 100 CAPSULE ORAL at 09:01

## 2019-07-18 RX ADMIN — CELECOXIB 200 MG: 100 CAPSULE ORAL at 22:13

## 2019-07-18 RX ADMIN — LISINOPRIL 40 MG: 20 TABLET ORAL at 09:01

## 2019-07-18 ASSESSMENT — PAIN SCALES - GENERAL
PAINLEVEL_OUTOF10: 7
PAINLEVEL_OUTOF10: 6
PAINLEVEL_OUTOF10: 0
PAINLEVEL_OUTOF10: 5
PAINLEVEL_OUTOF10: 0

## 2019-07-18 ASSESSMENT — PAIN DESCRIPTION - ORIENTATION: ORIENTATION: RIGHT;LEFT

## 2019-07-18 ASSESSMENT — PAIN DESCRIPTION - PAIN TYPE: TYPE: CHRONIC PAIN

## 2019-07-18 NOTE — PLAN OF CARE
chronic pain  Description  Control of chronic pain  Outcome: Ongoing     Problem: Skin Integrity:  Goal: Skin integrity will stabilize  Description  Skin integrity will stabilize  Outcome: Ongoing     Problem: Discharge Planning:  Goal: Patients continuum of care needs are met  Description  Patients continuum of care needs are met  Outcome: Ongoing     Problem: Health Behavior:  Goal: Identification of resources available to assist in meeting health care needs will improve  Description  Identification of resources available to assist in meeting health care needs will improve  Outcome: Ongoing     Problem: Role Relationship:  Goal: Ability to participate appropriately in conversations will improve  Description  Ability to participate appropriately in conversations will improve  Outcome: Ongoing     Problem: Self-Care:  Goal: Ability to participate in self-care as condition permits will improve  Description  Ability to participate in self-care as condition permits will improve  Outcome: Ongoing     Problem: Confusion - Acute:  Goal: Absence of continued neurological deterioration signs and symptoms  Description  Absence of continued neurological deterioration signs and symptoms  Outcome: Ongoing  Goal: Mental status will be restored to baseline  Description  Mental status will be restored to baseline  Outcome: Ongoing     Problem: Discharge Planning:  Goal: Ability to perform activities of daily living will improve  Description  Ability to perform activities of daily living will improve  Outcome: Ongoing  Goal: Participates in care planning  Description  Participates in care planning  Outcome: Ongoing     Problem: Injury - Risk of, Physical Injury:  Goal: Will remain free from falls  Description  Will remain free from falls  Outcome: Ongoing  Goal: Absence of physical injury  Description  Absence of physical injury  Outcome: Ongoing     Problem: Mood - Altered:  Goal: Mood stable  Description  Mood stable  Outcome:

## 2019-07-18 NOTE — PROGRESS NOTES
Safety Education & Training  Safety Devices  Type of devices: Left in chair, Call light within reach, Gait belt    G-Code       OutComes Score                                                  AM-PAC Score             Goals  Short term goals  Time Frame for Short term goals: 14 DAYS  Short term goal 1: SIT<>STAND, SUPERVISION  Short term goal 2: AMB 50 FT, RW, SUPERVISION  Short term goal 3: SUP<>SIT, SUPERVISION  Patient Goals   Patient goals : D/C SNF       Therapy Time   Individual Concurrent Group Co-treatment   Time In           Time Out           Minutes                   Nishant Espinosa    Electronically signed by Nishant Espinosa PT Student, 7/18/2019, 1:01 PM

## 2019-07-18 NOTE — PROGRESS NOTES
No  Pain Assessment  Pain Assessment: 0-10  Pain Level: 0  Pain Type: Chronic pain  Pain Orientation: Right;Left  Response to Pain Intervention: Patient Satisfied  Vital Signs  Patient Currently in Pain: No       Social/Functional History  Social/Functional History  Lives With: Alone  Type of Home: House  Home Layout: Able to Live on Main level with bedroom/bathroom  Home Access: Stairs to enter with rails  Entrance Stairs - Number of Steps: 1 step  Bathroom Shower/Tub: Tub/Shower unit  Bathroom Equipment: Shower chair  Home Equipment: 4 wheeled walker, Rolling walker  ADL Assistance: Independent  Homemaking Assistance: Independent  Ambulation Assistance: Independent(with use of RW)  Transfer Assistance: Independent  Active : Yes  Mode of Transportation: Car       Objective   Vision: Within Functional Limits  Hearing: Within functional limits    Orientation  Overall Orientation Status: Within Functional Limits  Observation/Palpation  Posture: Good  Balance  Sitting Balance: Stand by assistance  Standing Balance: Contact guard assistance  Functional Mobility  Functional - Mobility Device: Rolling Walker  Assist Level: Contact guard assistance  Toilet Transfers  Toilet Transfer: Stand by assistance;Contact guard assistance  ADL  Feeding: Independent  Grooming: Stand by assistance  UE Bathing: Stand by assistance  LE Bathing: Contact guard assistance  UE Dressing: Stand by assistance  LE Dressing: Minimal assistance;Contact guard assistance  Toileting: Contact guard assistance        Bed mobility  Supine to Sit: Contact guard assistance  Sit to Supine: Contact guard assistance  Scooting: Contact guard assistance  Transfers  Sit to stand: Contact guard assistance;Stand by assistance  Stand to sit: Contact guard assistance;Stand by assistance     Cognition  Overall Cognitive Status: [facility]        Sensation  Overall Sensation Status: [facility]        Plan   Plan  Times per week: 3-5x/wk   Current Treatment Recommendations:

## 2019-07-18 NOTE — PROGRESS NOTES
Units at 07/18/19 0901    polyvinyl alcohol (LIQUIFILM TEARS) 1.4 % ophthalmic solution 1 drop  1 drop Both Eyes Q4H PRN Man Acosta MD   1 drop at 07/15/19 0848    diphenhydrAMINE (BENADRYL) tablet 25 mg  25 mg Oral Nightly PRN Man Acosta MD   25 mg at 07/16/19 2021    ferrous sulfate tablet 325 mg  325 mg Oral BID Man Acosta MD   325 mg at 07/18/19 0900    linaclotide (LINZESS) capsule 290 mcg  290 mcg Oral Daily PRN Man Acosta MD        lisinopril (PRINIVIL;ZESTRIL) tablet 40 mg  40 mg Oral Daily Man Acosta MD   40 mg at 07/18/19 0901    miconazole (MICOTIN) 2 % powder   Topical BID Man Acosta MD        montelukast (SINGULAIR) tablet 10 mg  10 mg Oral Nightly Man Acosta MD   10 mg at 07/17/19 2137    celecoxib (CELEBREX) capsule 200 mg  200 mg Oral BID Man Acosta MD   200 mg at 07/18/19 0901    nortriptyline (PAMELOR) capsule 50 mg  50 mg Oral Nightly Man Acosta MD   50 mg at 07/17/19 2137    rivaroxaban (XARELTO) tablet 10 mg  10 mg Oral Q24H Man Acosta MD   10 mg at 07/18/19 0901    polyethylene glycol (GLYCOLAX) packet 17 g  17 g Oral Daily Man Acosta MD   17 g at 07/18/19 0905    polyethyl glycol-propyl glycol 0.4-0.3 % (SYSTANE) ophthalmic solution 2 drop  2 drop Both Eyes PRN Man Acosta MD        [facility]) tablet 8.6 mg  1 tablet Oral Daily Man Acosta MD   8.6 mg at 07/18/19 0901    tiZANidine (ZANAFLEX) tablet 4 mg  4 mg Oral Q8H PRN Man Acosta MD   4 mg at 07/16/19 0024    pantoprazole (PROTONIX) tablet 40 mg  40 mg Oral QAM AC Man Acosta MD   40 mg at 07/18/19 5608     DVT Prophylaxis: Lovenox 40 mg sq daily    Continuous Infusions:    Intake/Output Summary (Last 24 hours) at 7/18/2019 1512  Last data filed at 7/18/2019 1506  Gross per 24 hour   Intake 960 ml   Output 1225 ml   Net -265 ml     CBC:   Recent Labs     07/16/19  1450   WBC 7.0   HGB 10.1*        BMP:  Recent Labs     07/15/19  1740   *   K 3.8   CL

## 2019-07-19 VITALS
WEIGHT: 148.8 LBS | SYSTOLIC BLOOD PRESSURE: 123 MMHG | TEMPERATURE: 98.6 F | BODY MASS INDEX: 26.36 KG/M2 | RESPIRATION RATE: 20 BRPM | HEIGHT: 63 IN | HEART RATE: 72 BPM | DIASTOLIC BLOOD PRESSURE: 66 MMHG | OXYGEN SATURATION: 97 %

## 2019-07-19 PROCEDURE — 99238 HOSP IP/OBS DSCHRG MGMT 30/<: CPT | Performed by: HOSPITALIST

## 2019-07-19 PROCEDURE — 96376 TX/PRO/DX INJ SAME DRUG ADON: CPT

## 2019-07-19 PROCEDURE — 2580000003 HC RX 258: Performed by: HOSPITALIST

## 2019-07-19 PROCEDURE — 6370000000 HC RX 637 (ALT 250 FOR IP): Performed by: HOSPITALIST

## 2019-07-19 PROCEDURE — 6360000002 HC RX W HCPCS: Performed by: HOSPITALIST

## 2019-07-19 PROCEDURE — G0378 HOSPITAL OBSERVATION PER HR: HCPCS

## 2019-07-19 RX ORDER — CEFDINIR 300 MG/1
300 CAPSULE ORAL 2 TIMES DAILY
Qty: 14 CAPSULE | Refills: 0
Start: 2019-07-19 | End: 2019-07-26

## 2019-07-19 RX ADMIN — Medication 10 ML: at 08:13

## 2019-07-19 RX ADMIN — ACETAMINOPHEN 500 MG: 500 TABLET, FILM COATED ORAL at 05:11

## 2019-07-19 RX ADMIN — CELECOXIB 200 MG: 100 CAPSULE ORAL at 08:12

## 2019-07-19 RX ADMIN — RIVAROXABAN 10 MG: 10 TABLET, FILM COATED ORAL at 08:13

## 2019-07-19 RX ADMIN — PANTOPRAZOLE SODIUM 40 MG: 40 TABLET, DELAYED RELEASE ORAL at 05:14

## 2019-07-19 RX ADMIN — POLYETHYLENE GLYCOL (3350) 17 G: 17 POWDER, FOR SOLUTION ORAL at 08:12

## 2019-07-19 RX ADMIN — CHOLECALCIFEROL CAP 125 MCG (5000 UNIT) 5000 UNITS: 125 CAP at 08:13

## 2019-07-19 RX ADMIN — TIZANIDINE 4 MG: 4 TABLET ORAL at 01:02

## 2019-07-19 RX ADMIN — FERROUS SULFATE TAB 325 MG (65 MG ELEMENTAL FE) 325 MG: 325 (65 FE) TAB at 08:13

## 2019-07-19 RX ADMIN — Medication 8.6 MG: at 08:13

## 2019-07-19 RX ADMIN — LISINOPRIL 40 MG: 20 TABLET ORAL at 08:13

## 2019-07-19 RX ADMIN — MICONAZOLE NITRATE: 20 POWDER TOPICAL at 08:14

## 2019-07-19 RX ADMIN — ACETAMINOPHEN 500 MG: 500 TABLET, FILM COATED ORAL at 15:02

## 2019-07-19 RX ADMIN — Medication 1 G: at 15:02

## 2019-07-19 RX ADMIN — Medication 600 MG: at 08:13

## 2019-07-19 RX ADMIN — AMLODIPINE BESYLATE 10 MG: 10 TABLET ORAL at 08:12

## 2019-07-19 ASSESSMENT — PAIN SCALES - GENERAL
PAINLEVEL_OUTOF10: 2
PAINLEVEL_OUTOF10: 5
PAINLEVEL_OUTOF10: 3
PAINLEVEL_OUTOF10: 1
PAINLEVEL_OUTOF10: 1

## 2019-07-19 NOTE — PROGRESS NOTES
Monitoring    Nutrition Evaluation:   · Evaluation: Progressing toward goals   · Goals: PO >/= 50%. Weight stable.     · Monitoring: Meal Intake, Supplement Intake, Weight, Pertinent Labs      Electronically signed by Sky Espinoza MS, RD, LD on 7/19/19 at [facility] Number: [phone]
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
## 2019-07-19 NOTE — CARE COORDINATION
Dc summary faxed to [facility] darian to Jorge Gupta as requested from [facility]  awaiitng to hear back if they have the auth for her to be discharged there today
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
## 2019-07-19 NOTE — PLAN OF CARE
met  Description  Patients continuum of care needs are met  7/19/2019 0202 by Priscila Santana RN  Outcome: Ongoing  7/18/2019 1355 by Radha England RN  Outcome: Ongoing     Problem: Health Behavior:  Goal: Identification of resources available to assist in meeting health care needs will improve  Description  Identification of resources available to assist in meeting health care needs will improve  7/19/2019 0202 by Priscila Santana RN  Outcome: Ongoing  7/18/2019 1355 by Radha England RN  Outcome: Ongoing     Problem: Role Relationship:  Goal: Ability to participate appropriately in conversations will improve  Description  Ability to participate appropriately in conversations will improve  7/19/2019 0202 by Priscila Santana RN  Outcome: Ongoing  7/18/2019 1355 by Radha England RN  Outcome: Ongoing     Problem: Self-Care:  Goal: Ability to participate in self-care as condition permits will improve  Description  Ability to participate in self-care as condition permits will improve  7/19/2019 0202 by Priscila Santana RN  Outcome: Ongoing  7/18/2019 1355 by Radha England RN  Outcome: Ongoing     Problem: Confusion - Acute:  Goal: Absence of continued neurological deterioration signs and symptoms  Description  Absence of continued neurological deterioration signs and symptoms  7/19/2019 0202 by Priscila Santana RN  Outcome: Ongoing  7/18/2019 1355 by Radha England RN  Outcome: Ongoing  Goal: Mental status will be restored to baseline  Description  Mental status will be restored to baseline  7/19/2019 0202 by Priscila Santana RN  Outcome: Ongoing  7/18/2019 1355 by Radha England RN  Outcome: Ongoing     Problem: Discharge Planning:  Goal: Ability to perform activities of daily living will improve  Description  Ability to perform activities of daily living will improve  7/19/2019 0202 by Priscila Santana RN  Outcome: Ongoing  7/18/2019 1355 by Radha England RN  Outcome: Ongoing  Goal: Participates in care planning  Description  Participates in care planning  7/19/2019 0202 by Claudette Page, RN  Outcome: Ongoing  7/18/2019 1355 by Anderson Oneil RN  Outcome: Ongoing     Problem: Injury - Risk of, Physical Injury:  Goal: Will remain free from falls  Description  Will remain free from falls  7/19/2019 0202 by Claudette Page, RN  Outcome: Ongoing  7/18/2019 1355 by Anderson Oneil RN  Outcome: Ongoing  Goal: Absence of physical injury  Description  Absence of physical injury  7/19/2019 0202 by Claudette Page, RN  Outcome: Ongoing  7/18/2019 1355 by Anderson Oneil RN  Outcome: Ongoing     Problem: Mood - Altered:  Goal: Mood stable  Description  Mood stable  7/19/2019 0202 by Claudette Page, RN  Outcome: Ongoing  7/18/2019 1355 by Anderson Oneil RN  Outcome: Ongoing  Goal: Absence of abusive behavior  Description  Absence of abusive behavior  7/19/2019 0202 by Claudette Page, RN  Outcome: Ongoing  7/18/2019 1355 by Anderson Oneil RN  Outcome: Ongoing  Goal: Verbalizations of feeling emotionally comfortable while being cared for will increase  Description  Verbalizations of feeling emotionally comfortable while being cared for will increase  7/19/2019 0202 by Claudette Page, RN  Outcome: Ongoing  7/18/2019 1355 by Anderson Oneil RN  Outcome: Ongoing     Problem: Psychomotor Activity - Altered:  Goal: Absence of psychomotor disturbance signs and symptoms  Description  Absence of psychomotor disturbance signs and symptoms  7/19/2019 0202 by Claudette Page, RN  Outcome: Ongoing  7/18/2019 1355 by Anderson Oneil RN  Outcome: Ongoing     Problem: Sensory Perception - Impaired:  Goal: Demonstrations of improved sensory functioning will increase  Description  Demonstrations of improved sensory functioning will increase  7/19/2019 0202 by Claudette Page, RN  Outcome: Ongoing  7/18/2019 1355 by Anderson Oneil RN  Outcome: Ongoing  Goal: Decrease in sensory misperception frequency  Description  Decrease in sensory misperception frequency  7/19/2019 0202 by Scott Bassett RN  Outcome: Ongoing  7/18/2019 1355 by Preet Yost RN  Outcome: Ongoing  Goal: Able to refrain from responding to false sensory perceptions  Description  Able to refrain from responding to false sensory perceptions  7/19/2019 0202 by Scott Bassett RN  Outcome: Ongoing  7/18/2019 1355 by Preet Yost RN  Outcome: Ongoing  Goal: Demonstrates accurate environmental perceptions  Description  Demonstrates accurate environmental perceptions  7/19/2019 0202 by Scott Bassett RN  Outcome: Ongoing  7/18/2019 1355 by Preet Yost RN  Outcome: Ongoing  Goal: Able to distinguish between reality-based and nonreality-based thinking  Description  Able to distinguish between reality-based and nonreality-based thinking  7/19/2019 0202 by Scott Bassett RN  Outcome: Ongoing  7/18/2019 1355 by Preet Yost RN  Outcome: Ongoing  Goal: Able to interrupt nonreality-based thinking  Description  Able to interrupt nonreality-based thinking  7/19/2019 0202 by Scott Bassett RN  Outcome: Ongoing  7/18/2019 1355 by Preet Yost RN  Outcome: Ongoing     Problem: Sleep Pattern Disturbance:  Goal: Appears well-rested  Description  Appears well-rested  7/19/2019 0202 by Scott Bassett RN  Outcome: Ongoing  7/18/2019 1355 by Preet Yost RN  Outcome: Ongoing     Problem: Sensory:  Goal: General experience of comfort will improve  Description  General experience of comfort will improve  7/19/2019 0202 by Scott Bassett RN  Outcome: Ongoing  7/18/2019 1355 by Preet Yost RN  Outcome: Ongoing     Problem: Urinary Elimination:  Goal: Signs and symptoms of infection will decrease  Description  Signs and symptoms of infection will decrease  7/19/2019 0202 by Scott Bassett RN  Outcome: Ongoing  7/18/2019 1355 by Preet Yost RN  Outcome: Ongoing  Goal: Ability to reestablish a normal urinary elimination pattern will improve - after catheter removal  Description  Ability to reestablish a normal urinary elimination pattern will improve  7/19/2019 0202 by Unruly Duval RN  Outcome: Ongoing  7/18/2019 1355 by León Bonds RN  Outcome: Ongoing  Goal: Complications related to the disease process, condition or treatment will be avoided or minimized  Description  Complications related to the disease process, condition or treatment will be avoided or minimized  7/19/2019 0202 by Unruly Duval RN  Outcome: Ongoing  7/18/2019 1355 by León Bonds RN  Outcome: Ongoing  Goal: Ability to urinate at appropriate intervals will improve  Description  Ability to urinate at appropriate intervals will improve  7/19/2019 0202 by Unruly Duval RN  Outcome: Ongoing  7/18/2019 1355 by León Bonds RN  Outcome: Ongoing  Goal: Occurrences of urination in toilet or other acceptable device will increase  Description  Occurrences of urination in toilet or other acceptable device will increase  7/19/2019 0202 by Unruly Duval RN  Outcome: Ongoing  7/18/2019 1355 by León Bonds RN  Outcome: Ongoing  Goal: Ability to completely empty bladder with each voiding will improve  Description  Ability to completely empty bladder with each voiding will improve  7/19/2019 0202 by Unruly Duval RN  Outcome: Ongoing  7/18/2019 1355 by León Bonds RN  Outcome: Ongoing  Goal: Ability to verbalize feelings about incontinence will improve  Description  Ability to verbalize feelings about incontinence will improve  7/19/2019 0202 by Unruly Duval RN  Outcome: Ongoing  7/18/2019 1355 by León Bonds RN  Outcome: Ongoing  Goal: Ability to recognize the urge to urinate will improve  Description  Ability to recognize the urge to urinate will improve  7/19/2019 0202 by Unruly Duval RN  Outcome: Ongoing  7/18/2019 1355 by León Bonds RN  Outcome: Ongoing  Goal: Ability to maintain continence will improve  Description  Ability to maintain continence will improve  7/19/2019 0202 by Unruly Duval RN  Outcome:

## 2019-07-20 LAB
ORGANISM: ABNORMAL
ORGANISM: ABNORMAL
URINE CULTURE, ROUTINE: ABNORMAL

## 2019-08-26 ENCOUNTER — OFFICE VISIT (OUTPATIENT)
Dept: VASCULAR SURGERY | Age: 79
End: 2019-08-26
Payer: MEDICARE

## 2019-08-26 ENCOUNTER — HOSPITAL ENCOUNTER (OUTPATIENT)
Dept: VASCULAR LAB | Age: 79
Discharge: HOME | End: 2019-08-26
Payer: MEDICARE

## 2019-08-26 VITALS — SYSTOLIC BLOOD PRESSURE: 152 MMHG | TEMPERATURE: 97.5 F | DIASTOLIC BLOOD PRESSURE: 84 MMHG | HEART RATE: 74 BPM

## 2019-08-26 DIAGNOSIS — I73.9 PVD (PERIPHERAL VASCULAR DISEASE) (HCC): ICD-10-CM

## 2019-08-26 DIAGNOSIS — I70.213 ATHEROSCLEROSIS OF NATIVE ARTERY OF BOTH LOWER EXTREMITIES WITH INTERMITTENT CLAUDICATION (HCC): ICD-10-CM

## 2019-08-26 PROBLEM — E04.2 MULTINODULAR GOITER: Status: ACTIVE | Noted: 2017-02-23

## 2019-08-26 PROBLEM — Z80.0 FAMILY HX OF COLON CANCER: Status: ACTIVE | Noted: 2019-06-18

## 2019-08-26 PROBLEM — Z86.010 HISTORY OF ADENOMATOUS POLYP OF COLON: Status: ACTIVE | Noted: 2019-06-18

## 2019-08-26 PROBLEM — Z86.0101 HISTORY OF ADENOMATOUS POLYP OF COLON: Status: ACTIVE | Noted: 2019-06-18

## 2019-08-26 PROCEDURE — 1090F PRES/ABSN URINE INCON ASSESS: CPT | Performed by: PHYSICIAN ASSISTANT

## 2019-08-26 PROCEDURE — 4040F PNEUMOC VAC/ADMIN/RCVD: CPT | Performed by: PHYSICIAN ASSISTANT

## 2019-08-26 PROCEDURE — G8598 ASA/ANTIPLAT THER USED: HCPCS | Performed by: PHYSICIAN ASSISTANT

## 2019-08-26 PROCEDURE — G8400 PT W/DXA NO RESULTS DOC: HCPCS | Performed by: PHYSICIAN ASSISTANT

## 2019-08-26 PROCEDURE — G8427 DOCREV CUR MEDS BY ELIG CLIN: HCPCS | Performed by: PHYSICIAN ASSISTANT

## 2019-08-26 PROCEDURE — G8419 CALC BMI OUT NRM PARAM NOF/U: HCPCS | Performed by: PHYSICIAN ASSISTANT

## 2019-08-26 PROCEDURE — 1036F TOBACCO NON-USER: CPT | Performed by: PHYSICIAN ASSISTANT

## 2019-08-26 PROCEDURE — 1123F ACP DISCUSS/DSCN MKR DOCD: CPT | Performed by: PHYSICIAN ASSISTANT

## 2019-08-26 PROCEDURE — 99212 OFFICE O/P EST SF 10 MIN: CPT | Performed by: PHYSICIAN ASSISTANT

## 2019-08-26 PROCEDURE — 93923 UPR/LXTR ART STDY 3+ LVLS: CPT

## 2019-08-26 RX ORDER — AMLODIPINE BESYLATE 5 MG/1
TABLET ORAL
Refills: 5 | COMMUNITY
Start: 2019-05-28 | End: 2019-08-26

## 2019-08-26 RX ORDER — RIVAROXABAN 10 MG/1
TABLET, FILM COATED ORAL
COMMUNITY
Start: 2019-08-15 | End: 2019-08-26

## 2019-08-26 RX ORDER — ESOMEPRAZOLE MAGNESIUM 40 MG/1
CAPSULE, DELAYED RELEASE ORAL
Refills: 0 | COMMUNITY
Start: 2019-08-01

## 2019-08-26 RX ORDER — LISINOPRIL 40 MG/1
20 TABLET ORAL
COMMUNITY

## 2019-08-26 RX ORDER — FERROUS FUMARATE 324(106)MG
TABLET ORAL
Refills: 0 | COMMUNITY
Start: 2019-08-01

## 2019-08-26 RX ORDER — ALPRAZOLAM 1 MG/1
TABLET ORAL
COMMUNITY

## 2019-08-26 RX ORDER — DIAPER,BRIEF,INFANT-TODD,DISP
EACH MISCELLANEOUS
COMMUNITY
End: 2020-09-01 | Stop reason: ALTCHOICE

## 2019-08-26 RX ORDER — HYDROCHLOROTHIAZIDE 25 MG/1
TABLET ORAL
COMMUNITY

## 2019-08-26 RX ORDER — MONTELUKAST SODIUM 10 MG/1
TABLET ORAL
COMMUNITY

## 2019-08-26 RX ORDER — NABUMETONE 500 MG/1
TABLET, FILM COATED ORAL
COMMUNITY
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
## 2020-01-31 ENCOUNTER — OFFICE VISIT (OUTPATIENT)
Dept: OBSTETRICS AND GYNECOLOGY | Facility: CLINIC | Age: 80
End: 2020-01-31

## 2020-01-31 VITALS
SYSTOLIC BLOOD PRESSURE: 144 MMHG | BODY MASS INDEX: 34.36 KG/M2 | WEIGHT: 182 LBS | DIASTOLIC BLOOD PRESSURE: 82 MMHG | HEIGHT: 61 IN

## 2020-01-31 DIAGNOSIS — R32 URINARY INCONTINENCE, UNSPECIFIED TYPE: ICD-10-CM

## 2020-01-31 DIAGNOSIS — R15.9 INCONTINENCE OF FECES, UNSPECIFIED FECAL INCONTINENCE TYPE: Primary | ICD-10-CM

## 2020-01-31 DIAGNOSIS — Z78.9 NON-SMOKER: ICD-10-CM

## 2020-01-31 PROCEDURE — 99203 OFFICE O/P NEW LOW 30 MIN: CPT | Performed by: OBSTETRICS & GYNECOLOGY

## 2020-01-31 RX ORDER — HYDROCODONE BITARTRATE AND ACETAMINOPHEN 7.5; 325 MG/1; MG/1
TABLET ORAL
COMMUNITY
Start: 2020-01-04 | End: 2020-01-31 | Stop reason: ALTCHOICE

## 2020-01-31 RX ORDER — HYDROXYZINE HYDROCHLORIDE 25 MG/1
25 TABLET, FILM COATED ORAL 3 TIMES DAILY PRN
COMMUNITY
Start: 2020-01-29 | End: 2021-05-18 | Stop reason: SDUPTHER

## 2020-01-31 RX ORDER — FERROUS SULFATE 325(65) MG
325 TABLET ORAL
COMMUNITY
Start: 2019-11-06

## 2020-01-31 RX ORDER — ESOMEPRAZOLE MAGNESIUM 40 MG/1
CAPSULE, DELAYED RELEASE ORAL
COMMUNITY
Start: 2019-08-01 | End: 2020-01-31 | Stop reason: ALTCHOICE

## 2020-01-31 RX ORDER — CYCLOSPORINE 0.5 MG/ML
1 EMULSION OPHTHALMIC EVERY 12 HOURS
COMMUNITY

## 2020-01-31 RX ORDER — CALCIUM CARBONATE 200(500)MG
1 TABLET,CHEWABLE ORAL
COMMUNITY
End: 2020-01-31 | Stop reason: ALTCHOICE

## 2020-01-31 RX ORDER — MONTELUKAST SODIUM 10 MG/1
TABLET ORAL
COMMUNITY
End: 2021-05-18 | Stop reason: SDUPTHER

## 2020-01-31 NOTE — PROGRESS NOTES
Subjective   Carmen Obrien is a 80 y.o. female is being seen for consultation today at the request of Self Referring     Patient presents today with multiple complaints.    History of Present Illness     She has a long history of prolapse symptoms, urinary incontinence, and fecal incontinence.  Urinary incontinence is constant and has mixed components.  Prolapse symptoms include pressure and bulging.  Fecal incontinence includes loss of gas, liquid, and solid stool.    The following portions of the patient's history were reviewed and updated as appropriate: allergies, current medications, past family history, past medical history, past social history, past surgical history and problem list.    Review of Systems   Gastrointestinal:        Fecal incontinence   Genitourinary: Positive for pelvic pain.        Urinary incontinence   All other systems reviewed and are negative.      Objective   Physical Exam   Constitutional: She is oriented to person, place, and time. She appears well-developed and well-nourished.   HENT:   Head: Normocephalic and atraumatic.   Abdominal: Soft. Bowel sounds are normal.   Genitourinary:   Genitourinary Comments: Deferred   Neurological: She is alert and oriented to person, place, and time.   Skin: Skin is warm and dry.   Psychiatric: She has a normal mood and affect. Her behavior is normal. Judgment and thought content normal.   Nursing note and vitals reviewed.        Assessment/Plan   Carmen was seen today for urine leakage.    Diagnoses and all orders for this visit:    Incontinence of feces, unspecified fecal incontinence type  -     Ambulatory Referral to Gynecology    Urinary incontinence, unspecified type  -     Ambulatory Referral to Gynecology    Non-smoker      Referral to urogynecology.  At least needs some sort of diagnostic evaluation of the external anal sphincter.  Nobody locally performs the diagnostic testing nor the surgical treatment if indicated.  Can return to office  for postop checkups if needed.  Call for concerns or questions.    Will Guajardo MD

## 2020-02-18 ENCOUNTER — LAB (OUTPATIENT)
Dept: LAB | Facility: HOSPITAL | Age: 80
End: 2020-02-18

## 2020-02-18 ENCOUNTER — TRANSCRIBE ORDERS (OUTPATIENT)
Dept: ADMINISTRATIVE | Facility: HOSPITAL | Age: 80
End: 2020-02-18

## 2020-02-18 DIAGNOSIS — M81.0 AGE-RELATED OSTEOPOROSIS WITHOUT CURRENT PATHOLOGICAL FRACTURE: ICD-10-CM

## 2020-02-18 DIAGNOSIS — E04.2 MULTINODULAR GOITER: ICD-10-CM

## 2020-02-18 DIAGNOSIS — M81.0 AGE-RELATED OSTEOPOROSIS WITHOUT CURRENT PATHOLOGICAL FRACTURE: Primary | ICD-10-CM

## 2020-02-18 PROCEDURE — 36415 COLL VENOUS BLD VENIPUNCTURE: CPT

## 2020-02-18 PROCEDURE — 84443 ASSAY THYROID STIM HORMONE: CPT | Performed by: PHYSICIAN ASSISTANT

## 2020-02-18 PROCEDURE — 82310 ASSAY OF CALCIUM: CPT | Performed by: PHYSICIAN ASSISTANT

## 2020-02-18 PROCEDURE — 82306 VITAMIN D 25 HYDROXY: CPT | Performed by: PHYSICIAN ASSISTANT

## 2020-02-19 ENCOUNTER — TELEPHONE (OUTPATIENT)
Dept: OTOLARYNGOLOGY | Facility: CLINIC | Age: 80
End: 2020-02-19

## 2020-02-19 LAB
25(OH)D3 SERPL-MCNC: 56.9 NG/ML (ref 30–100)
CALCIUM SPEC-SCNC: 9.8 MG/DL (ref 8.6–10.5)
TSH SERPL DL<=0.05 MIU/L-ACNC: 1.94 UIU/ML (ref 0.27–4.2)

## 2020-02-19 NOTE — TELEPHONE ENCOUNTER
----- Message from SUZAN Ramirez sent at 2/19/2020  8:56 AM CST -----  Please call the patient regarding her normal result.

## 2020-03-05 ENCOUNTER — HOSPITAL ENCOUNTER (OUTPATIENT)
Dept: ULTRASOUND IMAGING | Facility: HOSPITAL | Age: 80
Discharge: HOME | End: 2020-03-05
Admitting: PHYSICIAN ASSISTANT

## 2020-03-05 DIAGNOSIS — E04.2 MULTINODULAR GOITER: ICD-10-CM

## 2020-03-05 PROCEDURE — 76536 US EXAM OF HEAD AND NECK: CPT

## 2020-03-09 ENCOUNTER — OFFICE VISIT (OUTPATIENT)
Dept: OTOLARYNGOLOGY | Facility: CLINIC | Age: 80
End: 2020-03-09

## 2020-03-09 VITALS
HEIGHT: 60 IN | TEMPERATURE: 96.8 F | SYSTOLIC BLOOD PRESSURE: 147 MMHG | BODY MASS INDEX: 35.14 KG/M2 | WEIGHT: 179 LBS | HEART RATE: 70 BPM | DIASTOLIC BLOOD PRESSURE: 68 MMHG

## 2020-03-09 DIAGNOSIS — E04.2 MULTINODULAR GOITER: Primary | ICD-10-CM

## 2020-03-09 PROCEDURE — 99213 OFFICE O/P EST LOW 20 MIN: CPT | Performed by: PHYSICIAN ASSISTANT

## 2020-03-09 NOTE — PROGRESS NOTES
YOB: 1940  Location: [facility]  Location Address: [address] [place] [address], [facility], [address] [place] [address]  Location Phone: [phone]    Chief Complaint   Patient presents with   • Follow-up     thyroid        History of Present Illness  Carmen Obrien is a 80 y.o. female who presents for follow up for thyroid complaints. She has been followed for thyroid nodules for several years with stability noted on ultrasound. The patient has no obvious clinical symptoms related to thyroid otherwise. Patient denies hoarseness, neck swelling and neck tenderness. Thyroid ultrasound and TSH stable today.    Thyroid ultrasounds since  have been relatively stable.    Study Result     EXAMINATION: US THYROID- 3/5/2020 1:03 PM CST     HISTORY: Multinodular goiter     COMPARISON: Thyroid ultrasound 3/7/2019     FINDINGS:  Sonographic evaluation of the thyroid gland was performed in the  transverse and longitudinal projections.     The right thyroid lobe measures 4.2 x 1.4 x 2.1 cm in size. Multiple  nodules are noted throughout the right thyroid lobe. A hypoechoic nodule  measures 1.0 x 0.4 x 0.6 cm in size, previously measuring 1.1 x 0.4 x  0.7 cm. An adjacent complex cystic and solid nodule measures 0.6 x 0.4 x  0.6 cm in size, previously measuring 0.6 x 0.6 x 0.8 cm.     Thyroid isthmus measures 0.4 cm in AP diameter.     The left thyroid lobe measures 4.2 x 1.6 x 1.9 cm in size. A spongiform  nodule measures 1.8 x 1.1 x 1.7 cm in size, previously measuring 1.8 x  1.0 x 1.4 cm in size. A small adjacent nodule measures 0.6 x 0.4 x 0.7  cm in size, previously measuring 0.6 x 0.4 x 0.5 cm in size.     IMPRESSION:  Findings compatible with multinodular goiter. The largest  nodule in the left is stable in size compared to the prior exam.  This report was finalized on 2020 13:08 by Dr. Serg Arevalo MD.             Past Medical History:   Diagnosis Date   • Anxiety    • Arthritis    • CAD (coronary artery  disease)    • Colon polyp    • DDD (degenerative disc disease), lumbar    • Deep venous thrombosis (CMS/HCC)    • Depression    • Diverticulosis    • Esophageal stricture    • GERD (gastroesophageal reflux disease)    • History of transfusion    • Hypercholesteremia    • Hypertension    • LPRD (laryngopharyngeal reflux disease)    • Macular degeneration    • Multinodular goiter 2/23/2017   • Osteoarthritis    • Pruritic disorder    • Spastic colon    • Thyroid nodule        Past Surgical History:   Procedure Laterality Date   • BLADDER REPAIR     • CATARACT EXTRACTION     • CHOLECYSTECTOMY     • COLONOSCOPY  04/22/2014    2 polyps, adenomatous, diverticulosis   • CYSTOCELE REPAIR     • ENDOSCOPY  04/22/2014    Schatzki's ring dilated   • FEMUR FRACTURE SURGERY     • HYSTERECTOMY     • REPLACEMENT TOTAL KNEE     • ULNAR NERVE TRANSPOSITION           Current Outpatient Medications:   •  ALPRAZolam (XANAX) 1 MG tablet, Take 1 mg by mouth 2 (Two) Times a Day As Needed for anxiety., Disp: , Rfl:   •  amLODIPine (NORVASC) 10 MG tablet, Take 10 mg by mouth., Disp: , Rfl:   •  atorvastatin (LIPITOR) 10 MG tablet, Take 10 mg by mouth Daily., Disp: , Rfl:   •  Calcium Carb-Cholecalciferol (CALTRATE 600+D3 SOFT) 600-800 MG-UNIT chewable tablet, Caltrate 600 plus D  600 mg (1,500 mg)-800 unit chewable tablet  Take by oral route., Disp: , Rfl:   •  Cholecalciferol (VITAMIN D3) 125 MCG (5000 UT) capsule capsule, Vitamin D3 125 mcg (5,000 unit) tablet  Take by oral route., Disp: , Rfl:   •  Cyanocobalamin (VITAMIN B-12 IJ), Inject 1,000 mcg as directed Every 30 (Thirty) Days., Disp: , Rfl:   •  cycloSPORINE (RESTASIS) 0.05 % ophthalmic emulsion, Restasis 0.05 % eye drops in a dropperette  INSTILL 1 DROP INTO AFFECTED EYE(S) BY OPHTHALMIC ROUTE EVERY 12 HOURS, Disp: , Rfl:   •  Dextran 70-Hypromellose, PF, ([facility] NATURAL TEARS) 0.1-0.3 % solution, Apply 1 drop to eye(s) as directed by provider 2 (Two) Times a Day As Needed (itchy or  dry eyes)., Disp: 32 each, Rfl: 0  •  esomeprazole (NexIUM) 20 MG capsule, Take 40 mg by mouth Every Morning Before Breakfast., Disp: , Rfl:   •  FEROSUL 325 (65 Fe) MG tablet, , Disp: , Rfl:   •  hydrochlorothiazide (HYDRODIURIL) 25 MG tablet, Take 25 mg by mouth 2 (Two) Times a Day., Disp: , Rfl:   •  hydrOXYzine (ATARAX) 25 MG tablet, , Disp: , Rfl:   •  linaclotide (LINZESS) 145 MCG capsule capsule, Linzess 145 mcg capsule  Take 1 capsule every day by oral route., Disp: , Rfl:   •  lisinopril (PRINIVIL,ZESTRIL) 40 MG tablet, Take 40 mg by mouth Daily., Disp: , Rfl:   •  montelukast (SINGULAIR) 10 MG tablet, montelukast 10 mg tablet  Take 1 tablet every day by oral route., Disp: , Rfl:   •  MULTIPLE VITAMINS-MINERALS ER PO, Take by mouth daily, Disp: , Rfl:   •  mupirocin (BACTROBAN) 2 % ointment, Apply 1 application topically 3 (Three) Times a Day., Disp: , Rfl:   •  nabumetone (RELAFEN) 500 MG tablet, Take 500 mg by mouth 2 (Two) Times a Day., Disp: , Rfl:   •  nortriptyline (PAMELOR) 50 MG capsule, Take 50 mg by mouth Every Night., Disp: , Rfl:   •  nystatin (MYCOSTATIN) 048775 UNIT/ML suspension, , Disp: , Rfl:   •  nystatin (NYSTOP) 914023 UNIT/GM powder powder, Apply 100,000 Units topically 2 (Two) Times a Day., Disp: , Rfl:   •  polyethyl glycol-propyl glycol (SYSTANE) 0.4-0.3 % solution ophthalmic solution, 2 drops., Disp: , Rfl:   •  rivaroxaban (XARELTO) 10 MG tablet, Take 10 mg by mouth., Disp: , Rfl:   •  senna (SENOKOT) 8.6 MG tablet, Take 1 tablet by mouth Daily., Disp: , Rfl:   •  sulfacetamide (BLEPH-10) 10 % ophthalmic solution, Administer 1 drop to both eyes Every 4 (Four) Hours. For 7 days, Disp: 5 mL, Rfl: 0  •  XTAMPZA ER 13.5 MG capsule extended-release 12 hour , , Disp: , Rfl:     Levaquin [levofloxacin]; Lyrica [pregabalin]; Codeine; and Morphine and related    Family History   Problem Relation Age of Onset   • Colon cancer Mother        Social History     Socioeconomic History   • Marital  status:      Spouse name: Not on file   • Number of children: Not on file   • Years of education: Not on file   • Highest education level: Not on file   Tobacco Use   • Smoking status: Never Smoker   • Smokeless tobacco: Never Used   Substance and Sexual Activity   • Alcohol use: No   • Drug use: No   • Sexual activity: Defer       Review of Systems   Constitutional: Negative for activity change, appetite change, chills, diaphoresis, fatigue, fever and unexpected weight change.   HENT: Negative for congestion, dental problem, drooling, ear discharge, ear pain, facial swelling, hearing loss, mouth sores, nosebleeds, postnasal drip, rhinorrhea, sinus pressure, sneezing, sore throat, tinnitus, trouble swallowing and voice change.         Multinodular goiter   Eyes: Negative.    Respiratory: Negative.    Cardiovascular: Negative.    Gastrointestinal: Negative.    Endocrine: Negative.    Genitourinary: Negative.    Musculoskeletal: Negative.    Skin: Negative.    Allergic/Immunologic: Negative.  Negative for environmental allergies, food allergies and immunocompromised state.   Neurological: Negative.    Hematological: Negative.    Psychiatric/Behavioral: Negative for sleep disturbance.       Vitals:    03/09/20 1012   BP: 147/68   Pulse: 70   Temp: 96.8 °F (36 °C)       Objective     Physical Exam  CONSTITUTIONAL: well nourished, alert, oriented, in no acute distress     COMMUNICATION AND VOICE: able to communicate normally, normal voice quality    HEAD: normocephalic, no lesions, atraumatic, no tenderness, no masses     FACE: appearance normal, no lesions, no tenderness, no deformities, facial motion symmetric    SALIVARY GLANDS: parotid glands with no tenderness, no swelling, no masses, submandibular glands with normal size, nontender    EYES: ocular motility normal, eyelids normal, orbits normal, no proptosis, conjunctiva normal , pupils equal, round     EARS:  Hearing: response to conversational voice normal  bilaterally   External Ears: auricles without lesions    NOSE:  External Nose: structure normal, no tenderness on palpation, no nasal discharge, no lesions, no evidence of trauma, nostrils patent     ORAL:  Lips: upper and lower lips without lesion     NECK: neck appearance normal, no mass,  noted without erythema or tenderness    THYROID: no overt thyromegaly, no tenderness, nodules or mass present on palpation, but noted stability on ultrasound, position midline     LYMPH NODES: no lymphadenopathy    CHEST/RESPIRATORY: respiratory effort normal, normal breath sounds     CARDIOVASCULAR: rate and rhythm normal, extremities without cyanosis or edema      NEUROLOGIC/PSYCHIATRIC: oriented to time, place and person, mood normal, affect appropriate, CN II-XII intact grossly    Assessment/Plan   Problems Addressed this Visit        Endocrine    Multinodular goiter - Primary        * Surgery not found *  No orders of the defined types were placed in this encounter.    Return if symptoms worsen or fail to improve.       Patient Instructions   Due to relative stability of thyroid for 6 years will have patient follow-up as needed. No further ultrasounds recommended at this time. If symptoms develop or other ENT problems develop advised to call for follow-up appointment.

## 2020-03-09 NOTE — PATIENT INSTRUCTIONS
Due to relative stability of thyroid for 6 years will have patient follow-up as needed. No further ultrasounds recommended at this time. If symptoms develop or other ENT problems develop advised to call for follow-up appointment.

## 2020-03-10 NOTE — PROGRESS NOTES
Antonio Lunsford MD   I have seen and/or examined Carmen Obrien and have reviewed the notes, assessments, and/or procedures and I concur with this documentation.    Antonio Lunsford MD  3/10/2020  08:41

## 2020-06-11 ENCOUNTER — OFFICE VISIT (OUTPATIENT)
Dept: GASTROENTEROLOGY | Facility: CLINIC | Age: 80
End: 2020-06-11

## 2020-06-11 VITALS
BODY MASS INDEX: 34.74 KG/M2 | WEIGHT: 184 LBS | HEART RATE: 70 BPM | SYSTOLIC BLOOD PRESSURE: 154 MMHG | DIASTOLIC BLOOD PRESSURE: 70 MMHG | OXYGEN SATURATION: 100 % | HEIGHT: 61 IN | TEMPERATURE: 97.2 F

## 2020-06-11 DIAGNOSIS — Z80.0 FAMILY HX OF COLON CANCER: ICD-10-CM

## 2020-06-11 DIAGNOSIS — K21.9 GASTROESOPHAGEAL REFLUX DISEASE, ESOPHAGITIS PRESENCE NOT SPECIFIED: ICD-10-CM

## 2020-06-11 DIAGNOSIS — I10 ESSENTIAL HYPERTENSION: ICD-10-CM

## 2020-06-11 DIAGNOSIS — Z86.010 HISTORY OF ADENOMATOUS POLYP OF COLON: ICD-10-CM

## 2020-06-11 DIAGNOSIS — K59.00 CONSTIPATION, UNSPECIFIED CONSTIPATION TYPE: Primary | ICD-10-CM

## 2020-06-11 DIAGNOSIS — R13.14 PHARYNGOESOPHAGEAL DYSPHAGIA: ICD-10-CM

## 2020-06-11 PROCEDURE — 99204 OFFICE O/P NEW MOD 45 MIN: CPT | Performed by: NURSE PRACTITIONER

## 2020-06-11 RX ORDER — CETIRIZINE HYDROCHLORIDE 10 MG/1
10 TABLET ORAL DAILY
COMMUNITY
End: 2022-10-15 | Stop reason: HOSPADM

## 2020-06-11 RX ORDER — CLONIDINE HYDROCHLORIDE 0.1 MG/1
0.1 TABLET ORAL DAILY
COMMUNITY
End: 2021-05-18 | Stop reason: SDUPTHER

## 2020-06-11 RX ORDER — HYDROCODONE BITARTRATE AND ACETAMINOPHEN 7.5; 325 MG/1; MG/1
1 TABLET ORAL EVERY 8 HOURS PRN
Status: ON HOLD | COMMUNITY
End: 2021-11-11 | Stop reason: SDUPTHER

## 2020-06-11 RX ORDER — ACETAMINOPHEN 500 MG
500 TABLET ORAL EVERY 6 HOURS PRN
COMMUNITY
End: 2021-04-21

## 2020-06-11 RX ORDER — CALCIUM CARBONATE 200(500)MG
1 TABLET,CHEWABLE ORAL AS NEEDED
COMMUNITY
End: 2021-05-27

## 2020-06-11 NOTE — H&P (VIEW-ONLY)
[facility]    Primary Physician Adam Delgadillo MD    6/11/2020    Carmen Obrien   1940      Chief Complaint   Patient presents with   • Colonoscopy   • Endoscopy   constipation  Dysphagia  Regurgitation      Subjective     HPI    Carmen Obrien is a 80 y.o. female who presents as a referral for preventative maintenance. She has no complaints of nausea or vomiting.  No wt loss. No BRBPR. No melena. No abdominal pain.       Chronic constipation  Has had worsening constipation.  Has bm once every 4 days. Takes milk of magnesia that will promote bm. Takes narcotic for joint.  No rectal bleeding.       Dysphagia  Intermittent x > 1 year. Points to the neck area where solids will. Has had to regurgitates food up to get relief.  No problems with liquids.  No hematemesis.  No nausea or vomiting.      Regurgitation  Almost daily for over a year. Takes nexium once daily for years. Caffeine ( tea 1 glass/day).   Cokes 2 /day. No tobacco.  No hematemesis.      Last colonoscopy was 4/2014 noting colon polyps ( path tubular adenomatous) and diverticulosis.   The patient does have history of colon polyps. The patient does have history of colon cancer.   There is not a  family history of colon polyps. There is family history of colon cancer mother.       Last egd 4/2014 noting a schatzki ring dilated.    Past Medical History:   Diagnosis Date   • Anxiety    • Arthritis    • CAD (coronary artery disease)    • Colon polyp    • DDD (degenerative disc disease), lumbar    • Deep venous thrombosis (CMS/HCC)    • Depression    • Diverticulosis    • Esophageal stricture    • GERD (gastroesophageal reflux disease)    • History of transfusion    • Hypercholesteremia    • Hypertension    • LPRD (laryngopharyngeal reflux disease)    • Macular degeneration    • Multinodular goiter 2/23/2017   • Osteoarthritis    • Pruritic disorder    • Spastic colon    • Thyroid nodule        Past Surgical History:      Procedure Laterality Date   • BLADDER REPAIR     • CATARACT EXTRACTION     • CHOLECYSTECTOMY     • COLONOSCOPY  04/22/2014    2 polyps, adenomatous, diverticulosis   • CYSTOCELE REPAIR     • ENDOSCOPY  04/22/2014    Schatzki's ring dilated   • FEMUR FRACTURE SURGERY     • HYSTERECTOMY     • REPLACEMENT TOTAL KNEE     • ULNAR NERVE TRANSPOSITION         Outpatient Medications Marked as Taking for the 6/11/20 encounter (Office Visit) with Mecca Lunsford APRN   Medication Sig Dispense Refill   • acetaminophen (TYLENOL) 500 MG tablet Take 500 mg by mouth Every 6 (Six) Hours As Needed for Mild Pain .     • amLODIPine (NORVASC) 10 MG tablet Take 10 mg by mouth.     • atorvastatin (LIPITOR) 10 MG tablet Take 10 mg by mouth Daily.     • Calcium Carb-Cholecalciferol (CALTRATE 600+D3 SOFT) 600-800 MG-UNIT chewable tablet Caltrate 600 plus D  600 mg (1,500 mg)-800 unit chewable tablet   Take by oral route.     • calcium carbonate (Tums) 500 MG chewable tablet Chew 1 tablet Daily.     • cetirizine (zyrTEC) 10 MG tablet Take 10 mg by mouth Daily.     • Cholecalciferol (VITAMIN D3) 125 MCG (5000 UT) capsule capsule Vitamin D3 125 mcg (5,000 unit) tablet   Take by oral route.     • cloNIDine (CATAPRES) 0.1 MG tablet Take 0.1 mg by mouth 2 (Two) Times a Day.     • Cyanocobalamin (VITAMIN B-12 IJ) Inject 1,000 mcg as directed Every 30 (Thirty) Days.     • esomeprazole (nexIUM) 20 MG capsule Take 2 capsules by mouth 2 (two) times a day. 60 capsule 11   • FEROSUL 325 (65 Fe) MG tablet      • hydrochlorothiazide (HYDRODIURIL) 25 MG tablet Take 25 mg by mouth 2 (Two) Times a Day.     • HYDROcodone-acetaminophen (NORCO) 7.5-325 MG per tablet Take 1 tablet by mouth Every 6 (Six) Hours As Needed for Moderate Pain .     • hydrOXYzine (ATARAX) 25 MG tablet      • lisinopril (PRINIVIL,ZESTRIL) 40 MG tablet Take 40 mg by mouth Daily.     • magnesium hydroxide (MILK OF MAGNESIA) 2400 MG/10ML suspension suspension Take 10 mL by mouth Daily As  Needed.     • Menthol, Topical Analgesic, (BIOFREEZE EX) Apply  topically.     • montelukast (SINGULAIR) 10 MG tablet montelukast 10 mg tablet   Take 1 tablet every day by oral route.     • MULTIPLE VITAMINS-MINERALS ER PO Take by mouth daily     • nabumetone (RELAFEN) 500 MG tablet Take 500 mg by mouth 2 (Two) Times a Day.     • nortriptyline (PAMELOR) 50 MG capsule Take 50 mg by mouth Every Night.     • polyethyl glycol-propyl glycol (SYSTANE) 0.4-0.3 % solution ophthalmic solution 2 drops.     • [DISCONTINUED] esomeprazole (NexIUM) 20 MG capsule Take 40 mg by mouth Every Morning Before Breakfast.         Allergies   Allergen Reactions   • Levaquin [Levofloxacin] Unknown (See Comments)     Pt doesn't remember   • Lyrica [Pregabalin] Unknown (See Comments)     Pt doesn't know   • Codeine Other (See Comments)     Pt does not recall   • Morphine And Related Other (See Comments)     Pt does not recall       Social History     Socioeconomic History   • Marital status:      Spouse name: Not on file   • Number of children: Not on file   • Years of education: Not on file   • Highest education level: Not on file   Tobacco Use   • Smoking status: Never Smoker   • Smokeless tobacco: Never Used   Substance and Sexual Activity   • Alcohol use: No   • Drug use: No   • Sexual activity: Defer       Family History   Problem Relation Age of Onset   • Colon cancer Mother    • Colon polyps Neg Hx        Review of Systems   Constitutional: Negative for appetite change, chills, fatigue, fever and unexpected weight change.   HENT: Positive for trouble swallowing. Negative for sore throat.    Eyes: Negative for visual disturbance.   Respiratory: Negative for cough, shortness of breath and wheezing.    Cardiovascular: Negative for chest pain and palpitations.   Gastrointestinal: Positive for constipation. Negative for abdominal distention, abdominal pain, anal bleeding, blood in stool, diarrhea, nausea and vomiting.        As  mentioned in hpi   Genitourinary: Negative for difficulty urinating and hematuria.   Musculoskeletal: Negative for back pain.        Chronic joint pain    Skin: Negative for color change and rash.   Neurological: Negative for dizziness, seizures, syncope, light-headedness and headaches.   Hematological: Negative for adenopathy.   Psychiatric/Behavioral: Negative for confusion. The patient is not nervous/anxious.        Objective     Vitals:    06/11/20 0837   BP: 154/70   Pulse: 70   Temp: 97.2 °F (36.2 °C)   SpO2: 100%         06/11/20  0837   Weight: 83.5 kg (184 lb)     Body mass index is 34.77 kg/m².    Physical Exam   Constitutional: She appears well-developed and well-nourished. No distress.   HENT:   Head: Normocephalic and atraumatic.   Eyes: EOM are normal. No scleral icterus.   Neck: Neck supple. No JVD present.   Cardiovascular: Normal rate, regular rhythm and normal heart sounds.   Pulmonary/Chest: Effort normal and breath sounds normal.   Abdominal: Soft. Bowel sounds are normal. She exhibits no distension. There is no tenderness.   Musculoskeletal: Normal range of motion. She exhibits no deformity.   Neurological: She is alert.   Skin: Skin is warm and dry. No rash noted.   Psychiatric: She has a normal mood and affect. Her behavior is normal.   Vitals reviewed.      Imaging Results (Most Recent)     None          Assessment/Plan     Carmen was seen today for colonoscopy and endoscopy.    Diagnoses and all orders for this visit:    Constipation, unspecified constipation type  -     Case Request; Standing  -     Case Request    Family hx of colon cancer  -     Case Request; Standing  -     Case Request    History of adenomatous polyp of colon  -     Case Request; Standing  -     Case Request    Pharyngoesophageal dysphagia  -     Case Request; Standing  -     Case Request    Gastroesophageal reflux disease, esophagitis presence not specified  -     Case Request; Standing  -     Case Request    Essential  hypertension    Other orders  -     Follow Anesthesia Guidelines / Protocol; Future  -     Obtain Informed Consent; Future  -     polyethylene glycol (Golytely) 236 g solution; Take 4,000 mL by mouth 1 (One) Time for 1 dose. Take as directed per instruction sheet.  -     esomeprazole (nexIUM) 20 MG capsule; Take 2 capsules by mouth 2 (two) times a day.      In regards to constipation, would recommend increase water intake, exercise, and daily fiber supplement.  Also recommended taking MiraLAX on a daily basis and discussed dosing.  Also plan for repeat colonoscopy.    In regards to dysphagia, differential diagnosis discussed.  Recommend cut food small pieces and chew well.  I would recommend upper endoscopy for further evaluation and she is agreeable.    In regards to GERD, recommend strict antireflux precautions.  She has taken Nexium daily for several years.  I am going to increase dose to twice daily for now.       Body mass index is 34.77 kg/m².    Patient's Body mass index is 34.77 kg/m². BMI is above normal parameters. Recommendations include: recommmend weight loss, no f/u .          ESOPHAGOGASTRODUODENOSCOPY WITH ANESTHESIA (N/A), COLONOSCOPY WITH ANESTHESIA (N/A)  All risks, benefits, alternatives, and indications of colonoscopy procedure have been discussed with the patient. Risks to include perforation of the colon requiring possible surgery or colostomy, risk of bleeding from biopsies or removal of colon tissue, possibility of missing a colon polyp or cancer, or adverse drug reaction.  Benefits to include the diagnosis and management of disease of the colon and rectum. Alternatives to include barium enema, radiographic evaluation, lab testing or no intervention. Pt verbalizes understanding and agrees.     Risk, benefits, and alternatives of endoscopy were explained in full.  They understand that there is a risk of bleeding, perforation, and infection.  The risk of perforation goes up with esophageal  dilation.  Other options to evaluate UGI complaints could involve barium swallow or UGI series, but these would be diagnostic tests only.  Patient was given time to ask questions.  I answered them to their satisfaction and they are agreeable to proceeding    ABDON Gonzalez Dragon/transcription disclaimer:  Much of this encounter note is electronic transcription/translation of spoken language to printed text.  The electronic translation of spoken language may be erroneous, or at times, nonsensical words or phrases may be inadvertently transcribed.  Although I have reviewed the note for such errors, some may still exist.

## 2020-06-11 NOTE — PROGRESS NOTES
[facility] Gastroenterology    Primary Physician Adam Delgadillo MD    6/11/2020    Carmen Obrien   1940      Chief Complaint   Patient presents with   • Colonoscopy   • Endoscopy   constipation  Dysphagia  Regurgitation      Subjective     HPI    Carmen Obrien is a 80 y.o. female who presents as a referral for preventative maintenance. She has no complaints of nausea or vomiting.  No wt loss. No BRBPR. No melena. No abdominal pain.       Chronic constipation  Has had worsening constipation.  Has bm once every 4 days. Takes milk of magnesia that will promote bm. Takes narcotic for joint.  No rectal bleeding.       Dysphagia  Intermittent x > 1 year. Points to the neck area where solids will. Has had to regurgitates food up to get relief.  No problems with liquids.  No hematemesis.  No nausea or vomiting.      Regurgitation  Almost daily for over a year. Takes nexium once daily for years. Caffeine ( tea 1 glass/day).   Cokes 2 /day. No tobacco.  No hematemesis.      Last colonoscopy was 4/2014 noting colon polyps ( path tubular adenomatous) and diverticulosis.   The patient does have history of colon polyps. The patient does have history of colon cancer.   There is not a  family history of colon polyps. There is family history of colon cancer mother.       Last egd 4/2014 noting a schatzki ring dilated.    Past Medical History:   Diagnosis Date   • Anxiety    • Arthritis    • CAD (coronary artery disease)    • Colon polyp    • DDD (degenerative disc disease), lumbar    • Deep venous thrombosis (CMS/HCC)    • Depression    • Diverticulosis    • Esophageal stricture    • GERD (gastroesophageal reflux disease)    • History of transfusion    • Hypercholesteremia    • Hypertension    • LPRD (laryngopharyngeal reflux disease)    • Macular degeneration    • Multinodular goiter 2/23/2017   • Osteoarthritis    • Pruritic disorder    • Spastic colon    • Thyroid nodule        Past Surgical History:    Procedure Laterality Date   • BLADDER REPAIR     • CATARACT EXTRACTION     • CHOLECYSTECTOMY     • COLONOSCOPY  04/22/2014    2 polyps, adenomatous, diverticulosis   • CYSTOCELE REPAIR     • ENDOSCOPY  04/22/2014    Schatzki's ring dilated   • FEMUR FRACTURE SURGERY     • HYSTERECTOMY     • REPLACEMENT TOTAL KNEE     • ULNAR NERVE TRANSPOSITION         Outpatient Medications Marked as Taking for the 6/11/20 encounter (Office Visit) with Mecca Lunsford APRN   Medication Sig Dispense Refill   • acetaminophen (TYLENOL) 500 MG tablet Take 500 mg by mouth Every 6 (Six) Hours As Needed for Mild Pain .     • amLODIPine (NORVASC) 10 MG tablet Take 10 mg by mouth.     • atorvastatin (LIPITOR) 10 MG tablet Take 10 mg by mouth Daily.     • Calcium Carb-Cholecalciferol (CALTRATE 600+D3 SOFT) 600-800 MG-UNIT chewable tablet Caltrate 600 plus D  600 mg (1,500 mg)-800 unit chewable tablet   Take by oral route.     • calcium carbonate (Tums) 500 MG chewable tablet Chew 1 tablet Daily.     • cetirizine (zyrTEC) 10 MG tablet Take 10 mg by mouth Daily.     • Cholecalciferol (VITAMIN D3) 125 MCG (5000 UT) capsule capsule Vitamin D3 125 mcg (5,000 unit) tablet   Take by oral route.     • cloNIDine (CATAPRES) 0.1 MG tablet Take 0.1 mg by mouth 2 (Two) Times a Day.     • Cyanocobalamin (VITAMIN B-12 IJ) Inject 1,000 mcg as directed Every 30 (Thirty) Days.     • esomeprazole (nexIUM) 20 MG capsule Take 2 capsules by mouth 2 (two) times a day. 60 capsule 11   • FEROSUL 325 (65 Fe) MG tablet      • hydrochlorothiazide (HYDRODIURIL) 25 MG tablet Take 25 mg by mouth 2 (Two) Times a Day.     • HYDROcodone-acetaminophen (NORCO) 7.5-325 MG per tablet Take 1 tablet by mouth Every 6 (Six) Hours As Needed for Moderate Pain .     • hydrOXYzine (ATARAX) 25 MG tablet      • lisinopril (PRINIVIL,ZESTRIL) 40 MG tablet Take 40 mg by mouth Daily.     • magnesium hydroxide (MILK OF MAGNESIA) 2400 MG/10ML suspension suspension Take 10 mL by mouth Daily As  Needed.     • Menthol, Topical Analgesic, (BIOFREEZE EX) Apply  topically.     • montelukast (SINGULAIR) 10 MG tablet montelukast 10 mg tablet   Take 1 tablet every day by oral route.     • MULTIPLE VITAMINS-MINERALS ER PO Take by mouth daily     • nabumetone (RELAFEN) 500 MG tablet Take 500 mg by mouth 2 (Two) Times a Day.     • nortriptyline (PAMELOR) 50 MG capsule Take 50 mg by mouth Every Night.     • polyethyl glycol-propyl glycol (SYSTANE) 0.4-0.3 % solution ophthalmic solution 2 drops.     • [DISCONTINUED] esomeprazole (NexIUM) 20 MG capsule Take 40 mg by mouth Every Morning Before Breakfast.         Allergies   Allergen Reactions   • Levaquin [Levofloxacin] Unknown (See Comments)     Pt doesn't remember   • Lyrica [Pregabalin] Unknown (See Comments)     Pt doesn't know   • Codeine Other (See Comments)     Pt does not recall   • Morphine And Related Other (See Comments)     Pt does not recall       Social History     Socioeconomic History   • Marital status:      Spouse name: Not on file   • Number of children: Not on file   • Years of education: Not on file   • Highest education level: Not on file   Tobacco Use   • Smoking status: Never Smoker   • Smokeless tobacco: Never Used   Substance and Sexual Activity   • Alcohol use: No   • Drug use: No   • Sexual activity: Defer       Family History   Problem Relation Age of Onset   • Colon cancer Mother    • Colon polyps Neg Hx        Review of Systems   Constitutional: Negative for appetite change, chills, fatigue, fever and unexpected weight change.   HENT: Positive for trouble swallowing. Negative for sore throat.    Eyes: Negative for visual disturbance.   Respiratory: Negative for cough, shortness of breath and wheezing.    Cardiovascular: Negative for chest pain and palpitations.   Gastrointestinal: Positive for constipation. Negative for abdominal distention, abdominal pain, anal bleeding, blood in stool, diarrhea, nausea and vomiting.        As  mentioned in hpi   Genitourinary: Negative for difficulty urinating and hematuria.   Musculoskeletal: Negative for back pain.        Chronic joint pain    Skin: Negative for color change and rash.   Neurological: Negative for dizziness, seizures, syncope, light-headedness and headaches.   Hematological: Negative for adenopathy.   Psychiatric/Behavioral: Negative for confusion. The patient is not nervous/anxious.        Objective     Vitals:    06/11/20 0837   BP: 154/70   Pulse: 70   Temp: 97.2 °F (36.2 °C)   SpO2: 100%         06/11/20  0837   Weight: 83.5 kg (184 lb)     Body mass index is 34.77 kg/m².    Physical Exam   Constitutional: She appears well-developed and well-nourished. No distress.   HENT:   Head: Normocephalic and atraumatic.   Eyes: EOM are normal. No scleral icterus.   Neck: Neck supple. No JVD present.   Cardiovascular: Normal rate, regular rhythm and normal heart sounds.   Pulmonary/Chest: Effort normal and breath sounds normal.   Abdominal: Soft. Bowel sounds are normal. She exhibits no distension. There is no tenderness.   Musculoskeletal: Normal range of motion. She exhibits no deformity.   Neurological: She is alert.   Skin: Skin is warm and dry. No rash noted.   Psychiatric: She has a normal mood and affect. Her behavior is normal.   Vitals reviewed.      Imaging Results (Most Recent)     None          Assessment/Plan     Carmen was seen today for colonoscopy and endoscopy.    Diagnoses and all orders for this visit:    Constipation, unspecified constipation type  -     Case Request; Standing  -     Case Request    Family hx of colon cancer  -     Case Request; Standing  -     Case Request    History of adenomatous polyp of colon  -     Case Request; Standing  -     Case Request    Pharyngoesophageal dysphagia  -     Case Request; Standing  -     Case Request    Gastroesophageal reflux disease, esophagitis presence not specified  -     Case Request; Standing  -     Case Request    Essential  hypertension    Other orders  -     Follow Anesthesia Guidelines / Protocol; Future  -     Obtain Informed Consent; Future  -     polyethylene glycol (Golytely) 236 g solution; Take 4,000 mL by mouth 1 (One) Time for 1 dose. Take as directed per instruction sheet.  -     esomeprazole (nexIUM) 20 MG capsule; Take 2 capsules by mouth 2 (two) times a day.      In regards to constipation, would recommend increase water intake, exercise, and daily fiber supplement.  Also recommended taking MiraLAX on a daily basis and discussed dosing.  Also plan for repeat colonoscopy.    In regards to dysphagia, differential diagnosis discussed.  Recommend cut food small pieces and chew well.  I would recommend upper endoscopy for further evaluation and she is agreeable.    In regards to GERD, recommend strict antireflux precautions.  She has taken Nexium daily for several years.  I am going to increase dose to twice daily for now.       Body mass index is 34.77 kg/m².    Patient's Body mass index is 34.77 kg/m². BMI is above normal parameters. Recommendations include: recommmend weight loss, no f/u .          ESOPHAGOGASTRODUODENOSCOPY WITH ANESTHESIA (N/A), COLONOSCOPY WITH ANESTHESIA (N/A)  All risks, benefits, alternatives, and indications of colonoscopy procedure have been discussed with the patient. Risks to include perforation of the colon requiring possible surgery or colostomy, risk of bleeding from biopsies or removal of colon tissue, possibility of missing a colon polyp or cancer, or adverse drug reaction.  Benefits to include the diagnosis and management of disease of the colon and rectum. Alternatives to include barium enema, radiographic evaluation, lab testing or no intervention. Pt verbalizes understanding and agrees.     Risk, benefits, and alternatives of endoscopy were explained in full.  They understand that there is a risk of bleeding, perforation, and infection.  The risk of perforation goes up with esophageal  dilation.  Other options to evaluate UGI complaints could involve barium swallow or UGI series, but these would be diagnostic tests only.  Patient was given time to ask questions.  I answered them to their satisfaction and they are agreeable to proceeding    ABDON Gonzalez Dragon/transcription disclaimer:  Much of this encounter note is electronic transcription/translation of spoken language to printed text.  The electronic translation of spoken language may be erroneous, or at times, nonsensical words or phrases may be inadvertently transcribed.  Although I have reviewed the note for such errors, some may still exist.

## 2020-06-12 PROBLEM — R13.14 PHARYNGOESOPHAGEAL DYSPHAGIA: Status: ACTIVE | Noted: 2020-06-12

## 2020-06-12 PROBLEM — K21.9 GASTROESOPHAGEAL REFLUX DISEASE: Status: ACTIVE | Noted: 2020-06-12

## 2020-06-17 ENCOUNTER — TRANSCRIBE ORDERS (OUTPATIENT)
Dept: ADMINISTRATIVE | Facility: HOSPITAL | Age: 80
End: 2020-06-17

## 2020-06-17 DIAGNOSIS — Z01.818 PRE-OP TESTING: Primary | ICD-10-CM

## 2020-06-23 ENCOUNTER — LAB (OUTPATIENT)
Dept: LAB | Facility: HOSPITAL | Age: 80
End: 2020-06-23

## 2020-06-23 PROCEDURE — U0003 INFECTIOUS AGENT DETECTION BY NUCLEIC ACID (DNA OR RNA); SEVERE ACUTE RESPIRATORY SYNDROME CORONAVIRUS 2 (SARS-COV-2) (CORONAVIRUS DISEASE [COVID-19]), AMPLIFIED PROBE TECHNIQUE, MAKING USE OF HIGH THROUGHPUT TECHNOLOGIES AS DESCRIBED BY CMS-2020-01-R: HCPCS | Performed by: INTERNAL MEDICINE

## 2020-06-24 LAB
COVID LABCORP PRIORITY: NORMAL
SARS-COV-2 RNA RESP QL NAA+PROBE: NOT DETECTED

## 2020-06-26 ENCOUNTER — HOSPITAL ENCOUNTER (OUTPATIENT)
Facility: HOSPITAL | Age: 80
Setting detail: HOSPITAL OUTPATIENT SURGERY
Discharge: HOME | End: 2020-06-26
Attending: INTERNAL MEDICINE | Admitting: INTERNAL MEDICINE

## 2020-06-26 ENCOUNTER — ANESTHESIA EVENT (OUTPATIENT)
Dept: GASTROENTEROLOGY | Facility: HOSPITAL | Age: 80
End: 2020-06-26

## 2020-06-26 ENCOUNTER — TELEPHONE (OUTPATIENT)
Dept: GASTROENTEROLOGY | Facility: CLINIC | Age: 80
End: 2020-06-26

## 2020-06-26 ENCOUNTER — ANESTHESIA (OUTPATIENT)
Dept: GASTROENTEROLOGY | Facility: HOSPITAL | Age: 80
End: 2020-06-26

## 2020-06-26 VITALS
SYSTOLIC BLOOD PRESSURE: 134 MMHG | HEIGHT: 61 IN | OXYGEN SATURATION: 100 % | DIASTOLIC BLOOD PRESSURE: 50 MMHG | TEMPERATURE: 97.2 F | WEIGHT: 180 LBS | HEART RATE: 66 BPM | RESPIRATION RATE: 15 BRPM | BODY MASS INDEX: 33.99 KG/M2

## 2020-06-26 PROCEDURE — 43248 EGD GUIDE WIRE INSERTION: CPT | Performed by: INTERNAL MEDICINE

## 2020-06-26 PROCEDURE — 45378 DIAGNOSTIC COLONOSCOPY: CPT | Performed by: INTERNAL MEDICINE

## 2020-06-26 PROCEDURE — 25010000002 PROPOFOL 10 MG/ML EMULSION: Performed by: NURSE ANESTHETIST, CERTIFIED REGISTERED

## 2020-06-26 RX ORDER — ONDANSETRON 2 MG/ML
4 INJECTION INTRAMUSCULAR; INTRAVENOUS ONCE AS NEEDED
Status: DISCONTINUED | OUTPATIENT
Start: 2020-06-26 | End: 2020-06-26 | Stop reason: HOSPADM

## 2020-06-26 RX ORDER — PROPOFOL 10 MG/ML
VIAL (ML) INTRAVENOUS AS NEEDED
Status: DISCONTINUED | OUTPATIENT
Start: 2020-06-26 | End: 2020-06-26 | Stop reason: SURG

## 2020-06-26 RX ORDER — SODIUM CHLORIDE 0.9 % (FLUSH) 0.9 %
10 SYRINGE (ML) INJECTION AS NEEDED
Status: DISCONTINUED | OUTPATIENT
Start: 2020-06-26 | End: 2020-06-26 | Stop reason: HOSPADM

## 2020-06-26 RX ORDER — SODIUM CHLORIDE 9 MG/ML
500 INJECTION, SOLUTION INTRAVENOUS CONTINUOUS PRN
Status: DISCONTINUED | OUTPATIENT
Start: 2020-06-26 | End: 2020-06-26 | Stop reason: HOSPADM

## 2020-06-26 RX ORDER — LIDOCAINE HYDROCHLORIDE 10 MG/ML
0.5 INJECTION, SOLUTION EPIDURAL; INFILTRATION; INTRACAUDAL; PERINEURAL ONCE AS NEEDED
Status: CANCELLED | OUTPATIENT
Start: 2020-06-26

## 2020-06-26 RX ADMIN — PROPOFOL 50 MG: 10 INJECTION, EMULSION INTRAVENOUS at 09:42

## 2020-06-26 RX ADMIN — PROPOFOL 200 MG: 10 INJECTION, EMULSION INTRAVENOUS at 09:32

## 2020-06-26 RX ADMIN — LIDOCAINE HYDROCHLORIDE 40 MG: 20 INJECTION, SOLUTION INTRAVENOUS at 09:23

## 2020-06-26 RX ADMIN — SODIUM CHLORIDE 500 ML: 9 INJECTION, SOLUTION INTRAVENOUS at 08:56

## 2020-06-26 RX ADMIN — PROPOFOL 200 MG: 10 INJECTION, EMULSION INTRAVENOUS at 09:23

## 2020-06-26 NOTE — ANESTHESIA POSTPROCEDURE EVALUATION
Patient: Carmen Obrien    Procedure Summary     Date:  06/26/20 Room / Location:  [facility] ENDOSCOPY 5 / [facility]    Anesthesia Start:  0915 Anesthesia Stop:  0949    Procedures:       ESOPHAGOGASTRODUODENOSCOPY WITH ANESTHESIA (N/A )      COLONOSCOPY WITH ANESTHESIA (N/A ) Diagnosis:       Constipation, unspecified constipation type      Family hx of colon cancer      History of adenomatous polyp of colon      Pharyngoesophageal dysphagia      Gastroesophageal reflux disease, esophagitis presence not specified      (Constipation, unspecified constipation type [K59.00])      (Family hx of colon cancer [Z80.0])      (History of adenomatous polyp of colon [Z86.010])      (Pharyngoesophageal dysphagia [R13.14])      (Gastroesophageal reflux disease, esophagitis presence not specified [K21.9])    Surgeon:  John Coleman MD Provider:  Ti Guzman CRNA    Anesthesia Type:  MAC ASA Status:  2          Anesthesia Type: MAC    Vitals  Vitals Value Taken Time   /50 6/26/2020 10:00 AM   Temp     Pulse 66 6/26/2020 10:03 AM   Resp 15 6/26/2020 10:00 AM   SpO2 100 % 6/26/2020 10:03 AM   Vitals shown include unvalidated device data.        Post Anesthesia Care and Evaluation    Patient location during evaluation: PHASE II  Patient participation: complete - patient participated  Level of consciousness: awake  Pain score: 0  Pain management: adequate  Airway patency: patent  Anesthetic complications: No anesthetic complications  PONV Status: noneRespiratory status: acceptable  Hydration status: acceptable  No anesthesia care post op

## 2020-06-26 NOTE — ANESTHESIA PREPROCEDURE EVALUATION
Anesthesia Evaluation     Patient summary reviewed   no history of anesthetic complications:  NPO Solid Status: > 8 hours  NPO Liquid Status: > 2 hours           Airway   Mallampati: I  TM distance: >3 FB  Neck ROM: full  Dental    (+) upper dentures and lower dentures    Pulmonary - negative pulmonary ROS   Cardiovascular     (+) hypertension, hyperlipidemia,   (-) past MI, CAD, dysrhythmias, cardiac stents, DVT      Neuro/Psych- negative ROS  GI/Hepatic/Renal/Endo    (+) obesity,     (-) liver disease, no renal disease, diabetes    Musculoskeletal     Abdominal    Substance History      OB/GYN          Other                        Anesthesia Plan    ASA 2     MAC     intravenous induction     Anesthetic plan, all risks, benefits, and alternatives have been provided, discussed and informed consent has been obtained with: patient.

## 2020-09-01 ENCOUNTER — APPOINTMENT (OUTPATIENT)
Dept: CT IMAGING | Age: 80
End: 2020-09-01
Payer: MEDICARE

## 2020-09-01 ENCOUNTER — HOSPITAL ENCOUNTER (EMERGENCY)
Age: 80
Discharge: HOME | End: 2020-09-01
Payer: MEDICARE

## 2020-09-01 ENCOUNTER — APPOINTMENT (OUTPATIENT)
Dept: GENERAL RADIOLOGY | Age: 80
End: 2020-09-01
Payer: MEDICARE

## 2020-09-01 VITALS
WEIGHT: 190 LBS | RESPIRATION RATE: 18 BRPM | SYSTOLIC BLOOD PRESSURE: 180 MMHG | OXYGEN SATURATION: 97 % | DIASTOLIC BLOOD PRESSURE: 73 MMHG | HEART RATE: 67 BPM | BODY MASS INDEX: 37.3 KG/M2 | HEIGHT: 60 IN | TEMPERATURE: 97.9 F

## 2020-09-01 PROCEDURE — 6360000002 HC RX W HCPCS: Performed by: PHYSICIAN ASSISTANT

## 2020-09-01 PROCEDURE — 12002 RPR S/N/AX/GEN/TRNK2.6-7.5CM: CPT

## 2020-09-01 PROCEDURE — 73030 X-RAY EXAM OF SHOULDER: CPT

## 2020-09-01 PROCEDURE — 90471 IMMUNIZATION ADMIN: CPT | Performed by: PHYSICIAN ASSISTANT

## 2020-09-01 PROCEDURE — 99284 EMERGENCY DEPT VISIT MOD MDM: CPT

## 2020-09-01 PROCEDURE — 70450 CT HEAD/BRAIN W/O DYE: CPT

## 2020-09-01 PROCEDURE — 2500000003 HC RX 250 WO HCPCS: Performed by: PHYSICIAN ASSISTANT

## 2020-09-01 PROCEDURE — 90715 TDAP VACCINE 7 YRS/> IM: CPT | Performed by: PHYSICIAN ASSISTANT

## 2020-09-01 PROCEDURE — 99285 EMERGENCY DEPT VISIT HI MDM: CPT

## 2020-09-01 PROCEDURE — 72125 CT NECK SPINE W/O DYE: CPT

## 2020-09-01 PROCEDURE — 71045 X-RAY EXAM CHEST 1 VIEW: CPT

## 2020-09-01 RX ORDER — CEPHALEXIN 500 MG/1
500 CAPSULE ORAL 2 TIMES DAILY
Qty: 14 CAPSULE | Refills: 0 | Status: SHIPPED | OUTPATIENT
Start: 2020-09-01 | End: 2020-09-08

## 2020-09-01 RX ORDER — BUPIVACAINE HYDROCHLORIDE 5 MG/ML
30 INJECTION, SOLUTION EPIDURAL; INTRACAUDAL ONCE
Status: COMPLETED | OUTPATIENT
Start: 2020-09-01 | End: 2020-09-01

## 2020-09-01 RX ORDER — LIDOCAINE HYDROCHLORIDE AND EPINEPHRINE 10; 10 MG/ML; UG/ML
20 INJECTION, SOLUTION INFILTRATION; PERINEURAL ONCE
Status: COMPLETED | OUTPATIENT
Start: 2020-09-01 | End: 2020-09-01

## 2020-09-01 RX ORDER — HYDROCODONE BITARTRATE AND ACETAMINOPHEN 7.5; 325 MG/1; MG/1
1 TABLET ORAL EVERY 6 HOURS PRN
COMMUNITY

## 2020-09-01 RX ADMIN — LIDOCAINE HYDROCHLORIDE,EPINEPHRINE BITARTRATE 20 ML: 10; .01 INJECTION, SOLUTION INFILTRATION; PERINEURAL at 13:15

## 2020-09-01 RX ADMIN — BUPIVACAINE HYDROCHLORIDE 150 MG: 5 INJECTION, SOLUTION EPIDURAL; INTRACAUDAL; PERINEURAL at 11:50

## 2020-09-01 RX ADMIN — TETANUS TOXOID, REDUCED DIPHTHERIA TOXOID AND ACELLULAR PERTUSSIS VACCINE, ADSORBED 0.5 ML: 5; 2.5; 8; 8; 2.5 SUSPENSION INTRAMUSCULAR at 11:50

## 2020-09-01 ASSESSMENT — ENCOUNTER SYMPTOMS
COUGH: 0
NAUSEA: 0
ABDOMINAL PAIN: 0
APNEA: 0
PHOTOPHOBIA: 0
SORE THROAT: 0
BACK PAIN: 0
SHORTNESS OF BREATH: 0
COLOR CHANGE: 0
ABDOMINAL DISTENTION: 0
RHINORRHEA: 0
EYE PAIN: 0
EYE DISCHARGE: 0

## 2020-09-01 ASSESSMENT — PAIN SCALES - GENERAL
PAINLEVEL_OUTOF10: 7

## 2020-09-01 ASSESSMENT — PAIN DESCRIPTION - PAIN TYPE: TYPE: ACUTE PAIN

## 2020-09-01 ASSESSMENT — PAIN DESCRIPTION - LOCATION: LOCATION: HEAD

## 2020-09-01 ASSESSMENT — PAIN DESCRIPTION - ORIENTATION: ORIENTATION: RIGHT

## 2020-09-01 NOTE — ED PROVIDER NOTES
[facility] [place] [facility] [place]  eMERGENCYdEPARTMENT eNCOUnter      Pt Name: Terrance Ganser  MRN: [id number]  [place] 1940  Date of evaluation: 9/1/2020  Provider:TAD Ma    CHIEF COMPLAINT       Chief Complaint   Patient presents with    Fall    Head Laceration     right         HISTORY OF PRESENT ILLNESS  (Location/Symptom, Timing/Onset, Context/Setting, Quality, Duration, Modifying Factors, Severity.)   Terrance Ganser is a 68 y.o. female who presents to the emergency department with complaints of mechanical fall coming out of the bathroom today at [place] here with her caregiver confirms normal mentation she has an obvious laceration to the right side parietal parietal aspect of her scalp. Patient is not on blood thinners denies any LOC. She denies any vision changes or headache. She has ambulated since the event. Denies any neck pain or any other arthralgias or complaints. She is mainly worried about the blood in her hair and that she has 2 appointments tomorrow at [facility]. Patient denies any back pain abdominal pain or chest discomfort. HPI    Nursing Notes were reviewed and I agree. REVIEW OF SYSTEMS    (2-9 systems for level 4, 10 or more for level 5)     Review of Systems   Constitutional: Negative for activity change, appetite change, chills and fever. HENT: Negative for congestion, postnasal drip, rhinorrhea and sore throat. Eyes: Negative for photophobia, pain, discharge and visual disturbance. Respiratory: Negative for apnea, cough and shortness of breath. Cardiovascular: Negative for chest pain and leg swelling. Gastrointestinal: Negative for abdominal distention, abdominal pain and nausea. Genitourinary: Negative for vaginal bleeding. Musculoskeletal: Negative for arthralgias, back pain, joint swelling, neck pain and neck stiffness. Skin: Positive for wound. Negative for color change and rash.    Neurological: Negative for dizziness, syncope, facial asymmetry and headaches. Hematological: Negative for adenopathy. Does not bruise/bleed easily. Psychiatric/Behavioral: Negative for agitation, behavioral problems and confusion. Except as noted above the remainder of the review of systems was reviewed and negative. PAST MEDICAL HISTORY     Past Medical History:   Diagnosis Date    Anxiety     Carotid artery stenosis     Chronic back pain     H/O blood clots     Hx of blood clots     bilat legs    Hyperlipidemia     Hypertension     Osteoarthritis          SURGICAL HISTORY       Past Surgical History:   Procedure Laterality Date    CATARACT REMOVAL Bilateral     CHOLECYSTECTOMY      EYE SURGERY      HYSTERECTOMY      JOINT REPLACEMENT      OTHER SURGICAL HISTORY      Decompression of ulnar nerve     REMOVE HARDWARE FEMUR Left 4/24/2019    HARDWARE REMOVAL performed by Creighton Bosworth, MD at [place] [address] Left 4/24/2019    LEFT KNEE REVISION performed by Creighton Bosworth, MD at [address] [place] [address]       Discharge Medication List as of 9/1/2020  3:49 PM      CONTINUE these medications which have NOT CHANGED    Details   HYDROcodone-acetaminophen (NORCO) 7.5-325 MG per tablet Take 1 tablet by mouth every 6 hours as needed for Pain. Historical Med      ALPRAZolam (XANAX) 1 MG tablet Xanax 1 mg tablet   Take 1 tablet twice a day by oral route as needed. Historical Med      Calcium Carb-Cholecalciferol (CALTRATE 600+D3 SOFT) 600-800 MG-UNIT CHEW Caltrate 600 plus D  600 mg (1,500 mg)-800 unit chewable tablet   Take by oral route. Historical Med      FERROCITE 324 MG TABS 1 TABLET BY MOUTH TWICE A DAY, R-0, DAWHistorical Med      esomeprazole (NEXIUM) 40 MG delayed release capsule 1 CAPSULE BY MOUTH DAILY, R-0Historical Med      linaclotide (LINZESS) 145 MCG capsule Linzess 145 mcg capsule   Take 1 capsule every day by oral route. Historical Med      lisinopril (PRINIVIL;ZESTRIL) 40 MG tablet lisinopril 40 mg tablet Take 1 tablet every day by oral route. Historical Med      montelukast (SINGULAIR) 10 MG tablet montelukast 10 mg tablet   Take 1 tablet every day by oral route. Historical Med      nabumetone (RELAFEN) 500 MG tablet nabumetone 500 mg tablet   Take 1 tablet twice a day by oral route. Historical Med      ferrous sulfate 325 (65 Fe) MG tablet Take 325 mg by mouth 2 times dailyHistorical Med      cycloSPORINE (RESTASIS) 0.05 % ophthalmic emulsion Place 1 drop into both eyes 2 times dailyHistorical Med      polyethyl glycol-propyl glycol 0.4-0.3 % (SYSTANE) 0.4-0.3 % ophthalmic solution 2 drops as needed for Dry Eyes Historical Med      calcium carbonate (TUMS) 500 MG chewable tablet Take 1 tablet by mouth dailyHistorical Med      Cholecalciferol (VITAMIN D3) 5000 units TABS Take by mouth daily Historical Med      amLODIPine (NORVASC) 10 MG tablet Take 10 mg by mouth dailyHistorical Med      atorvastatin (LIPITOR) 10 MG tablet Take 10 mg by mouth daily      Calcium Carbonate (CALTRATE 600 PO) Take 600 mg by mouth 2 times daily      nortriptyline (PAMELOR) 50 MG capsule Take 50 mg by mouth nightly      Multiple Vitamins-Minerals (PRESERVISION AREDS PO) Take by mouth daily      hydrochlorothiazide (HYDRODIURIL) 25 MG tablet hydrochlorothiazide 25 mg tablet   Take 1 tablet twice a day by oral route. Historical Med      senna (SENOKOT) 8.6 MG tablet Take 1 tablet by mouth dailyHistorical Med             ALLERGIES     Codeine; Levofloxacin; Levofloxacin in d5w; Morphine; and Pregabalin    FAMILY HISTORY       Family History   Problem Relation Age of Onset    Dementia Mother     Heart Disease Father    [de-identified] Cancer Brother     COPD Other     Other Other         Blood clot    Diabetes Other           SOCIAL HISTORY       Social History     Socioeconomic History    Marital status:       Spouse name: Not on file    Number of children: Not on file    Years of education: Not on file    Highest education level: Not on file exudate. Eyes:      General:         Right eye: No discharge. Left eye: No discharge. Pupils: Pupils are equal, round, and reactive to light. Neck:      Musculoskeletal: Normal range of motion and neck supple. Thyroid: No thyromegaly. Cardiovascular:      Rate and Rhythm: Normal rate and regular rhythm. Pulses: Normal pulses. Heart sounds: Normal heart sounds. No murmur. No friction rub. Pulmonary:      Effort: Pulmonary effort is normal. No respiratory distress. Breath sounds: Normal breath sounds. No stridor. No wheezing. Abdominal:      General: Abdomen is flat. Bowel sounds are normal. There is no distension. Palpations: Abdomen is soft. Tenderness: There is no abdominal tenderness. Musculoskeletal: Normal range of motion. Skin:     General: Skin is warm and dry. Capillary Refill: Capillary refill takes less than 2 seconds. Findings: No rash. Neurological:      General: No focal deficit present. Mental Status: She is alert and oriented to person, place, and time. Mental status is at baseline. Cranial Nerves: No cranial nerve deficit. Sensory: No sensory deficit. Coordination: Coordination normal.   Psychiatric:         Mood and Affect: Mood normal.         Behavior: Behavior normal.         Thought Content: Thought content normal.         Judgment: Judgment normal.           DIAGNOSTIC RESULTS     RADIOLOGY:   Non-plain film images such as CT, Ultrasound and MRI are read by the radiologist. [facility] radiographic images are visualized and preliminarilyinterpreted by No att. providers found with the below findings:      Interpretation per the Radiologist below, if available at the time of this note:    CT Cervical Spine WO Contrast   Final Result   1. No CT evidence of acute bony injury to the cervical spine. 2. Multilevel disc degeneration and spondylosis.    Signed by Dr Avinash Espinosa on 9/1/2020 1:08 PM      CT Head WO Contrast   Final Result   Impression:    1. Right frontal/parietal scalp laceration. 2. No CT evidence of acute intracranial process. Signed by Dr No Levine on 9/1/2020 1:01 PM      XR CHEST PORTABLE   Final Result   Impression:   No acute cardiopulmonary disease. Signed by Dr Goldy Castillo on 9/1/2020 1:02 PM      XR SHOULDER RIGHT (MIN 2 VIEWS)   Final Result   1. No acute osseous abnormality. 2. Degenerative changes. Signed by Dr No Levine on 9/1/2020 1:17 PM      XR SHOULDER LEFT (MIN 2 VIEWS)   Final Result   Impression:   No acute osseous pathology. Signed by Dr Goldy Castillo on 9/1/2020 1:11 PM          LABS:  Labs Reviewed - No data to display    All other labs were within normal range or notreturned as of this dictation. RE-ASSESSMENT        EMERGENCY DEPARTMENT COURSE and DIFFERENTIAL DIAGNOSIS/MDM:   Vitals:    Vitals:    09/01/20 1200 09/01/20 1233 09/01/20 1330 09/01/20 1533   BP: (!) 197/80 (!) 207/74 (!) 198/78 (!) 180/73   Pulse:   67    Resp:   18    Temp:       TempSrc:       SpO2: 97% 95% 95% 97%   Weight:       Height:           MDM  Patient has no acute findings for fracture on imaging. She tolerates stapling and suturing well. Plan will be for discharge and removal of staples in 10 to 14 days and sutures in 7 estimated. PROCEDURES:    Lac Repair    Date/Time: 9/1/2020 5:04 PM  Performed by: TAD Navarrete  Authorized by: TAD Navarrete     Consent:     Consent obtained:  Verbal    Risks discussed:  Infection, pain and poor cosmetic result  Anesthesia (see MAR for exact dosages):      Anesthesia method:  Local infiltration    Local anesthetic:  Lidocaine 1% WITH epi  Laceration details:     Location:  Scalp    Scalp location:  R parietal    Length (cm):  3  Repair type:     Repair type:  Simple  Pre-procedure details:     Preparation:  Patient was prepped and draped in usual sterile fashion  Exploration:     Contaminated: no    Treatment:     Area cleansed with:  Betadine and saline    Visualized foreign bodies/material removed: no    Skin repair:     Repair method:  Staples    Number of staples:  10  Approximation:     Approximation:  Close  Post-procedure details:     Dressing:  Open (no dressing)    Patient tolerance of procedure: Tolerated well, no immediate complications  Lac Repair    Date/Time: 9/1/2020 5:05 PM  Performed by: TAD Joseph  Authorized by: TAD Joseph     Consent:     Consent obtained:  Verbal    Consent given by:  Patient    Risks discussed:  Pain  Anesthesia (see MAR for exact dosages): Anesthesia method:  Local infiltration    Local anesthetic:  Lidocaine 1% WITH epi  Laceration details:     Location:  Shoulder/arm    Shoulder/arm location:  R elbow    Wound length (cm): puncture. Repair type:     Repair type:  Simple  Exploration:     Wound exploration: wound explored through full range of motion      Contaminated: no    Treatment:     Area cleansed with:  Betadine and saline    Amount of cleaning:  Standard    Irrigation solution:  Sterile saline    Visualized foreign bodies/material removed: no    Skin repair:     Repair method:  Sutures    Suture size:  4-0    Suture material:  Nylon    Suture technique:  Simple interrupted    Number of sutures:  2  Approximation:     Approximation:  Close  Post-procedure details:     Dressing:  Adhesive bandage and antibiotic ointment    Patient tolerance of procedure: Tolerated well, no immediate complications          FINAL IMPRESSION      1. Fall, initial encounter    2. Laceration of scalp, initial encounter    3.  Elbow laceration, right, initial encounter          DISPOSITION/PLAN   DISPOSITION Decision To Discharge 09/01/2020 03:36:19 PM      PATIENT REFERRED TO:  [facility] [place] [facility] EMERGENCY DEPT  [address] [address] [phone]    scalp staples can be removed in 7-14 days; sutures in upper extremity 7 days      DISCHARGE MEDICATIONS:  Discharge Medication List as of 9/1/2020  3:49 PM      START taking these medications    Details   cephALEXin (KEFLEX) 500 MG capsule Take 1 capsule by mouth 2 times daily for 7 days, Disp-14 capsule,R-0Print             (Please note that portions of this note were completed with a voice recognition program.  Efforts were made to edit the dictations but occasionallywords are mis-transcribed.)    Marlene Flaherty [address], [place]  09/01/20 3979

## 2020-09-01 NOTE — ED TRIAGE NOTES
[facility] coming out of bathroom and hit right side head.  Lac to  Head no loc not on blood thinners

## 2020-09-10 ENCOUNTER — HOSPITAL ENCOUNTER (EMERGENCY)
Age: 80
Discharge: HOME | End: 2020-09-10
Payer: MEDICARE

## 2020-09-10 VITALS
RESPIRATION RATE: 20 BRPM | TEMPERATURE: 97.8 F | HEART RATE: 78 BPM | WEIGHT: 190 LBS | BODY MASS INDEX: 37.11 KG/M2 | DIASTOLIC BLOOD PRESSURE: 78 MMHG | SYSTOLIC BLOOD PRESSURE: 175 MMHG | OXYGEN SATURATION: 99 %

## 2020-09-10 PROCEDURE — 99282 EMERGENCY DEPT VISIT SF MDM: CPT

## 2020-09-10 PROCEDURE — 99999 PR OFFICE/OUTPT VISIT,PROCEDURE ONLY: CPT | Performed by: NURSE PRACTITIONER

## 2020-10-07 ENCOUNTER — TRANSCRIBE ORDERS (OUTPATIENT)
Dept: ADMINISTRATIVE | Facility: HOSPITAL | Age: 80
End: 2020-10-07

## 2020-10-07 ENCOUNTER — HOSPITAL ENCOUNTER (OUTPATIENT)
Dept: ULTRASOUND IMAGING | Facility: HOSPITAL | Age: 80
Discharge: HOME | End: 2020-10-07
Admitting: NURSE PRACTITIONER

## 2020-10-07 DIAGNOSIS — R60.9 EDEMA, UNSPECIFIED TYPE: Primary | ICD-10-CM

## 2020-10-07 DIAGNOSIS — I82.503 CHRONIC EMBLSM AND THOMBOS UNSP DEEP VEINS OF LOW EXTRM, BI (HCC): ICD-10-CM

## 2020-10-07 DIAGNOSIS — R60.9 EDEMA, UNSPECIFIED TYPE: ICD-10-CM

## 2020-10-07 PROCEDURE — 93970 EXTREMITY STUDY: CPT

## 2020-11-06 NOTE — CARE COORDINATION
Pt has been accepted at [address] [place] [address] with the insurance. Pt will need Pre-Cert approval before they can DC to the facility. Admissions will notify  once Pre-Cert has cleared.   [address] [place] [address] Street  [phone] Y   F  Electronically signed by Myla Ulloa on 4/25/2019 at 7:58 AM Calcipotriene Counseling:  I discussed with the patient the risks of calcipotriene including but not limited to erythema, scaling, itching, and irritation.

## 2020-12-03 ENCOUNTER — HOSPITAL ENCOUNTER (OUTPATIENT)
Dept: NON INVASIVE DIAGNOSTICS | Age: 80
Discharge: HOME | End: 2020-12-03
Payer: MEDICARE

## 2020-12-03 PROCEDURE — 93923 UPR/LXTR ART STDY 3+ LVLS: CPT

## 2020-12-09 ENCOUNTER — VIRTUAL VISIT (OUTPATIENT)
Dept: VASCULAR SURGERY | Age: 80
End: 2020-12-09
Payer: MEDICARE

## 2020-12-09 PROCEDURE — G8428 CUR MEDS NOT DOCUMENT: HCPCS | Performed by: PHYSICIAN ASSISTANT

## 2020-12-09 PROCEDURE — 99213 OFFICE O/P EST LOW 20 MIN: CPT | Performed by: PHYSICIAN ASSISTANT

## 2020-12-09 PROCEDURE — 1090F PRES/ABSN URINE INCON ASSESS: CPT | Performed by: PHYSICIAN ASSISTANT

## 2020-12-09 PROCEDURE — G8417 CALC BMI ABV UP PARAM F/U: HCPCS | Performed by: PHYSICIAN ASSISTANT

## 2020-12-09 PROCEDURE — 1123F ACP DISCUSS/DSCN MKR DOCD: CPT | Performed by: PHYSICIAN ASSISTANT

## 2020-12-09 PROCEDURE — 1036F TOBACCO NON-USER: CPT | Performed by: PHYSICIAN ASSISTANT

## 2020-12-09 PROCEDURE — G8400 PT W/DXA NO RESULTS DOC: HCPCS | Performed by: PHYSICIAN ASSISTANT

## 2020-12-09 PROCEDURE — 4040F PNEUMOC VAC/ADMIN/RCVD: CPT | Performed by: PHYSICIAN ASSISTANT

## 2020-12-09 PROCEDURE — G8484 FLU IMMUNIZE NO ADMIN: HCPCS | Performed by: PHYSICIAN ASSISTANT

## 2020-12-09 NOTE — PROGRESS NOTES
2020    TELEHEALTH EVALUATION -- Audio/Visual (During [facility]PUOQ-83 public health emergency)    HPI:    Doreen Posadas (:  1940) has requested an audio/video evaluation for the following concern(s):      Details of this discussion including any medical advice provided: PMH, medications and allergies reviewed. Mrs. Laura Bauman is a 68 female who has a history of PVD. Today we are following up for this. She  denies any claudication, IRP or new or non healing wounds. She is currently on a statin daily. She reports BLE leg swelling. She reports that her BP medication has been changed but her legs are still swollen. Review of Systems    Constitutional - no significant activity change, appetite change, or unexpected weight change. No fever or chills. No diaphoresis or significant fatigue. HENT - no significant rhinorrhea or epistaxis. No tinnitus or significant hearing loss. Eyes - no sudden vision change or amaurosis. Respiratory - no significant shortness of breath, wheezing, or stridor. No apnea, cough, or chest tightness associated with shortness of breath. Cardiovascular - no chest pain, syncope, or significant dizziness. No palpitations. She reports bilateral leg swelling. Patient reports no claudication. Gastrointestinal - no abdominal swelling or pain. No blood in stool. No severe constipation, diarrhea, nausea, or vomiting. Genitourinary - No difficulty urinating, dysuria, frequency, or urgency. No flank pain or hematuria. Musculoskeletal - no back pain, gait disturbance, or myalgia. Skin - no color change, rash, pallor, or new wound. Neurologic - no dizziness, facial asymmetry, or light headedness. No seizures. No speech difficulty or lateralizing weakness. Hematologic - no easy bruising or excessive bleeding. Psychiatric - no severe anxiety or nervousness. No confusion. All other review of systems are negative.       PHYSICAL EXAMINATION:  [ INSTRUCTIONS:  \"[x]\" Indicates a on 12/03/2020 03:12 PM     ----------------------------------------------------------------         Velocities are measured in cm/s ; Diameters are measured in mm         Pressures    +--------------------------------------++--------+-----+----+--------+-----+    !                                      ! ! Right   !     !Left!        !     !    +--------------------------------------++--------+-----+----+--------+-----+    ! Location                              ! ! Pressure! Ratio!    !Pressure! Ratio! +--------------------------------------++--------+-----+----+--------+-----+    ! Lower Thigh                           !!255     !1.53 !    !255     !1.53 !    +--------------------------------------++--------+-----+----+--------+-----+    ! Calf                                  !!255     !1.53 !    !255     !1.53 !    +--------------------------------------++--------+-----+----+--------+-----+    ! Ankle PT                              !!255     !1.53 !    !255     !1.53 !    +--------------------------------------++--------+-----+----+--------+-----+    ! DP                                    !!255     !1.53 !    !255     !1.53 !    +--------------------------------------++--------+-----+----+--------+-----+    ! Great Toe                             !!80      !0.5  !    !104     !0.62 !    +--------------------------------------++--------+-----+----+--------+-----+           - Brachial Pressure:Right: 167. Left:163.           - DANYELLE:Right: 1.53. Left: 1.53.         Plethysmographic Digit Evaluation    +---------++--------+-----+---------------++--------+-----+----------------+    !         ! ! Right   !     ! Left           !!        !     !                !    +---------++--------+-----+---------------++--------+-----+----------------+    ! Location ! !Pressure! Ratio! PPG Wave Form  !!Pressure! Ratio! PPG Wave Form   !    +---------++--------+-----+---------------++--------+-----+----------------+    ! Great Toe!!83      !0.5  !               !!104     !0.62 !                !    +---------++--------+-----+---------------++--------+-----+----------------+           Assessment/Plan -      1. PVD  2. Leg swelling      Recommend she start ASA unless contraindicated. Recommend she continue statin daily  Recommend no smoking  Recommend leg elevation above the level of the heart  Recommend continue compression stockings daily  Follow up 12 months with a BRANDEN or sooner if claudication worsens/develops, develops new wound or IRP. Jasmina Tomas is a 68 y.o. female being evaluated by a Virtual Visit (video visit) encounter to address concerns as mentioned above. A caregiver was present when appropriate. Due to this being a TeleHealth encounter (During [facility]-75 public health emergency), evaluation of the following organ systems was limited: Vitals/Constitutional/EENT/Resp/CV/GI//MS/Neuro/Skin/Heme-Lymph-Imm. Pursuant to the emergency declaration under the [facility], [address] [place] [address] authority and the [employer] Act, this Virtual Visit was conducted with patient's (and/or legal guardian's) consent, to reduce the patient's risk of exposure to COVID-19 and provide necessary medical care. The patient (and/or legal guardian) has also been advised to contact this office for worsening conditions or problems, and seek emergency medical treatment and/or call 911 if deemed necessary. Patient identification was verified at the start of the visit: Yes    Services were provided through a video synchronous discussion virtually to substitute for in-person clinic visit. Patient and provider were located at their individual homes. --Isidro Perkins PA-C on 12/9/2020 at 3:45 PM    An electronic signature was used to authenticate this note.

## 2020-12-13 NOTE — ED PROVIDER NOTES
[facility] EMERGENCY DEPT  eMERGENCY dEPARTMENT eNCOUnter      Pt Name: Apolinar Anthony  MRN: [id number]  [place] 1940  Date of evaluation: 9/10/2020  Provider: Danilo Jain, [address]       Chief Complaint   Patient presents with    Suture / Staple Removal     sutures placed in R elbow and staples to R side of head. placed on 9/1         HISTORY OF PRESENT ILLNESS   (Location/Symptom, Timing/Onset,Context/Setting, Quality, Duration, Modifying Factors, Severity)  Note limiting factors. Rodney Parmar a 68 y.o. female who presents to the emergency department for evaluation of suture removal. Pt tells me that she is here for staple and suture removal having had sustained wounds to right parietal scalp and righ elbow area 10 days ago after a fall. She had wound repair here at that time. She tells me that she is here as previously recommended for staple and suture removal.   HPI    Nursing Notes were reviewed. REVIEW OF SYSTEMS    (2-9 systems for level 4, 10 or more for level 5)     Review of Systems   Constitutional: Negative. Skin: Positive for wound (right parietal scalp, right elbow). A complete review of systems was performed and is negative except as noted above in the HPI.        PAST MEDICAL HISTORY     Past Medical History:   Diagnosis Date    Anxiety     Carotid artery stenosis     Chronic back pain     H/O blood clots     Hx of blood clots     bilat legs    Hyperlipidemia     Hypertension     Osteoarthritis          SURGICAL HISTORY       Past Surgical History:   Procedure Laterality Date    CATARACT REMOVAL Bilateral     CHOLECYSTECTOMY      EYE SURGERY      HYSTERECTOMY      JOINT REPLACEMENT      OTHER SURGICAL HISTORY      Decompression of ulnar nerve     REMOVE HARDWARE FEMUR Left 4/24/2019    HARDWARE REMOVAL performed by Batool Muñoz MD at [place] [id number] Left 4/24/2019    LEFT KNEE REVISION performed by Batool Muñoz MD at [address] [place] [address] OR         CURRENT MEDICATIONS       Discharge Medication List as of 9/10/2020 10:34 AM      CONTINUE these medications which have NOT CHANGED    Details   HYDROcodone-acetaminophen (NORCO) 7.5-325 MG per tablet Take 1 tablet by mouth every 6 hours as needed for Pain. Historical Med      ALPRAZolam (XANAX) 1 MG tablet Xanax 1 mg tablet   Take 1 tablet twice a day by oral route as needed. Historical Med      Calcium Carb-Cholecalciferol (CALTRATE 600+D3 SOFT) 600-800 MG-UNIT CHEW Caltrate 600 plus D  600 mg (1,500 mg)-800 unit chewable tablet   Take by oral route. Historical Med      FERROCITE 324 MG TABS 1 TABLET BY MOUTH TWICE A DAY, R-0, DAWHistorical Med      hydrochlorothiazide (HYDRODIURIL) 25 MG tablet hydrochlorothiazide 25 mg tablet   Take 1 tablet twice a day by oral route. Historical Med      esomeprazole (NEXIUM) 40 MG delayed release capsule 1 CAPSULE BY MOUTH DAILY, R-0Historical Med      linaclotide (LINZESS) 145 MCG capsule Linzess 145 mcg capsule   Take 1 capsule every day by oral route. Historical Med      lisinopril (PRINIVIL;ZESTRIL) 40 MG tablet lisinopril 40 mg tablet   Take 1 tablet every day by oral route. Historical Med      montelukast (SINGULAIR) 10 MG tablet montelukast 10 mg tablet   Take 1 tablet every day by oral route. Historical Med      nabumetone (RELAFEN) 500 MG tablet nabumetone 500 mg tablet   Take 1 tablet twice a day by oral route. Historical Med      senna (SENOKOT) 8.6 MG tablet Take 1 tablet by mouth dailyHistorical Med      ferrous sulfate 325 (65 Fe) MG tablet Take 325 mg by mouth 2 times dailyHistorical Med      cycloSPORINE (RESTASIS) 0.05 % ophthalmic emulsion Place 1 drop into both eyes 2 times dailyHistorical Med      polyethyl glycol-propyl glycol 0.4-0.3 % (SYSTANE) 0.4-0.3 % ophthalmic solution 2 drops as needed for Dry Eyes Historical Med      calcium carbonate (TUMS) 500 MG chewable tablet Take 1 tablet by mouth dailyHistorical Med      Cholecalciferol (VITAMIN D3) 5000 Not on file     Forced sexual activity: Not on file   Other Topics Concern    Not on file   Social History Narrative    Not on file       SCREENINGS             PHYSICAL EXAM    (up to 7 for level 4, 8 or more for level 5)     ED Triage Vitals [09/10/20 0950]   BP Temp Temp Source Pulse Resp SpO2 Height Weight   (!) 175/78 97.8 °F (36.6 °C) Temporal 78 20 99 % -- 190 lb (86.2 kg)       Physical Exam  Vitals signs reviewed. HENT:      Head:      Comments: Right parietal scalp with flap laceration staples intact, crusting/scabbing to wound edges that are well approximated without secondary sign of infection  Right olecranon area with small healing wound with sutures in place  Cardiovascular:      Rate and Rhythm: Normal rate. Pulmonary:      Effort: Pulmonary effort is normal.   Skin:     General: Skin is warm and dry. Neurological:      Mental Status: She is alert and oriented to person, place, and time. DIAGNOSTIC RESULTS     EKG: All EKG's are interpreted by the Emergency Department Physician who either signs or Co-signs this chart in the absence of acardiologist.        RADIOLOGY:   Non-plain film images such as CT, Ultrasound andMRI are read by the radiologist. Plain radiographic images are visualized and preliminarily interpreted by the emergency physician with the below findings:        Interpretation per the Radiologist below, if available at the time of this note:    No orders to display         ED BEDSIDE ULTRASOUND:   Performed by ED Physician - none    LABS:  Labs Reviewed - No data to display    All other labs were within normal range or not returned as of this dictation.     RE-ASSESSMENT           EMERGENCY DEPARTMENT COURSE and DIFFERENTIALDIAGNOSIS/MDM:   Vitals:    Vitals:    09/10/20 0950   BP: (!) 175/78   Pulse: 78   Resp: 20   Temp: 97.8 °F (36.6 °C)   TempSrc: Temporal   SpO2: 99%   Weight: 190 lb (86.2 kg)       MDM      CONSULTS:  None    PROCEDURES:  Unless otherwise notedbelow, none     Procedures    FINAL IMPRESSION     1. Encounter for removal of sutures    2. Encounter for staple removal    3.  Healing wound          DISPOSITION/PLAN   DISPOSITION Decision To Discharge 09/10/2020 10:38:31 AM      PATIENT REFERRED TO:  Ritika Purvis, [address] [facility] [phone]    Schedule an appointment as soon as possible for a visit   As needed      DISCHARGE MEDICATIONS:       Discharge Medication List as of 9/10/2020 10:34 AM          (Pleasenote that portions of this note were completed with a voice recognition program.  Efforts were made to edit the dictations but occasionally words are mis-transcribed.)              Henri Abbott, MATTEO  09/10/20 7231 Statement Selected

## 2021-01-06 ENCOUNTER — TRANSCRIBE ORDERS (OUTPATIENT)
Dept: ADMINISTRATIVE | Facility: HOSPITAL | Age: 81
End: 2021-01-06

## 2021-01-06 ENCOUNTER — HOSPITAL ENCOUNTER (OUTPATIENT)
Dept: GENERAL RADIOLOGY | Facility: HOSPITAL | Age: 81
Discharge: HOME | End: 2021-01-06
Admitting: NURSE PRACTITIONER

## 2021-01-06 DIAGNOSIS — M25.511 RIGHT SHOULDER PAIN, UNSPECIFIED CHRONICITY: Primary | ICD-10-CM

## 2021-01-06 PROCEDURE — 73030 X-RAY EXAM OF SHOULDER: CPT

## 2021-02-21 ENCOUNTER — APPOINTMENT (OUTPATIENT)
Dept: ULTRASOUND IMAGING | Facility: HOSPITAL | Age: 81
End: 2021-02-21

## 2021-02-21 ENCOUNTER — HOSPITAL ENCOUNTER (EMERGENCY)
Facility: HOSPITAL | Age: 81
Discharge: HOME | End: 2021-02-21
Admitting: EMERGENCY MEDICINE

## 2021-02-21 ENCOUNTER — APPOINTMENT (OUTPATIENT)
Dept: GENERAL RADIOLOGY | Facility: HOSPITAL | Age: 81
End: 2021-02-21

## 2021-02-21 ENCOUNTER — NURSE TRIAGE (OUTPATIENT)
Dept: CALL CENTER | Facility: HOSPITAL | Age: 81
End: 2021-02-21

## 2021-02-21 VITALS
TEMPERATURE: 97.9 F | SYSTOLIC BLOOD PRESSURE: 143 MMHG | RESPIRATION RATE: 15 BRPM | BODY MASS INDEX: 35.73 KG/M2 | DIASTOLIC BLOOD PRESSURE: 69 MMHG | WEIGHT: 182 LBS | HEART RATE: 78 BPM | HEIGHT: 60 IN | OXYGEN SATURATION: 99 %

## 2021-02-21 DIAGNOSIS — T14.8XXA MUSCLE STRAIN: Primary | ICD-10-CM

## 2021-02-21 PROCEDURE — 99283 EMERGENCY DEPT VISIT LOW MDM: CPT

## 2021-02-21 PROCEDURE — 73502 X-RAY EXAM HIP UNI 2-3 VIEWS: CPT

## 2021-02-21 PROCEDURE — 93971 EXTREMITY STUDY: CPT

## 2021-02-21 PROCEDURE — 93971 EXTREMITY STUDY: CPT | Performed by: SURGERY

## 2021-02-21 NOTE — TELEPHONE ENCOUNTER
"11:00am trying on new clothes all of a sudden had pain in left groin and shoots all the way to the knee. Has bad legs, they are red and inflammed and wears compression hose. Can't bear weight, it is all she can do to stand. Has taken Norco and Tylenol for pain, no relief. Has a history of blood clots. Has been on blood thinners in the past but not now. Care advice per guideline.    Reason for Disposition  • [1] SEVERE pain (e.g., excruciating, unable to do any normal activities) AND [2] not improved after 2 hours of pain medicine    Additional Information  • Negative: Looks like a broken bone or dislocated joint (e.g., crooked or deformed)  • Negative: Sounds like a life-threatening emergency to the triager  • Negative: Followed a leg injury  • Negative: Leg swelling is main symptom  • Negative: Back pain radiating (shooting) into leg(s)  • Negative: Knee pain is main symptom  • Negative: Ankle pain is main symptom  • Negative: Pregnant  • Negative: Postpartum (from 0 to 6 weeks after delivery)  • Negative: Chest pain  • Negative: Difficulty breathing  • Negative: Entire foot is cool or blue in comparison to other side  • Negative: Unable to walk  • Negative: [1] Red area or streak AND [2] fever  • Negative: [1] Swollen joint AND [2] fever  • Negative: [1] Cast on leg or ankle AND [2] now increased pain  • Negative: Patient sounds very sick or weak to the triager    Answer Assessment - Initial Assessment Questions  1. ONSET: \"When did the pain start?\"   About 11:00am  2. LOCATION: \"Where is the pain located?\"   Left groin  3. PAIN: \"How bad is the pain?\"    (Scale 1-10; or mild, moderate, severe)    -  MILD (1-3): doesn't interfere with normal activities     -  MODERATE (4-7): interferes with normal activities (e.g., work or school) or awakens from sleep, limping     -  SEVERE (8-10): excruciating pain, unable to do any normal activities, unable to walk      10/10  4. WORK OR EXERCISE: \"Has there been any recent " "work or exercise that involved this part of the body?\"       *No Answer*  5. CAUSE: \"What do you think is causing the leg pain?\"    Not sure  6. OTHER SYMPTOMS: \"Do you have any other symptoms?\" (e.g., chest pain, back pain, breathing difficulty, swelling, rash, fever, numbness, weakness)   lower back and hips x 2 weeks  7. PREGNANCY: \"Is there any chance you are pregnant?\" \"When was your last menstrual period?\"  na    Protocols used: LEG PAIN-ADULT-AH      "

## 2021-02-22 NOTE — ED PROVIDER NOTES
Subjective   Patient is a 81-year-old female presents emergency department with chief complaints of left groin pain.  Patient states that she was trying on some clothing that she had recently purchased.  She states that she was putting a pair of pants on when she began experiencing acute onset of left groin pain.  She states that she does not experience pain until she stands up or with certain leg positions.  She states that she assumed that she pulled a groin muscle however due to continued pain she elected to come to the ER for evaluation and treatment.  Patient additionally tells me that she has history of a DVT and is no longer on blood thinners and was uncertain if she was experiencing a blood clot to the region.  Patient denies any shortness of breath or chest pain.  She has no other associated symptoms or modifying factors.          Review of Systems   Constitutional: Negative.  Negative for fever.   HENT: Negative.  Negative for congestion.    Eyes: Negative.    Respiratory: Negative.  Negative for cough and shortness of breath.    Cardiovascular: Negative.  Negative for chest pain.   Gastrointestinal: Negative.  Negative for abdominal pain, diarrhea, nausea and vomiting.   Genitourinary: Negative.    Musculoskeletal:        Positive for left groin pain   Skin: Negative.    All other systems reviewed and are negative.      Past Medical History:   Diagnosis Date   • Anxiety    • Arthritis    • CAD (coronary artery disease)    • Colon polyp    • DDD (degenerative disc disease), lumbar    • Deep venous thrombosis (CMS/HCC)    • Depression    • Diverticulosis    • Esophageal stricture    • GERD (gastroesophageal reflux disease)    • History of transfusion    • Hypercholesteremia    • Hypertension    • LPRD (laryngopharyngeal reflux disease)    • Macular degeneration    • Multinodular goiter 2/23/2017   • Osteoarthritis    • Pruritic disorder    • Spastic colon    • Thyroid nodule        Allergies   Allergen  Reactions   • Levaquin [Levofloxacin] Unknown (See Comments)     Pt doesn't remember   • Lyrica [Pregabalin] Unknown (See Comments)     Pt doesn't know   • Codeine Other (See Comments)     Pt does not recall   • Morphine And Related Other (See Comments)     Pt does not recall       Past Surgical History:   Procedure Laterality Date   • BLADDER REPAIR     • CATARACT EXTRACTION     • CHOLECYSTECTOMY     • COLONOSCOPY  04/22/2014    2 polyps, adenomatous, diverticulosis   • COLONOSCOPY N/A 6/26/2020    Procedure: COLONOSCOPY WITH ANESTHESIA;  Surgeon: John Coleman MD;  Location:  [facility];  Service: Gastroenterology;  Laterality: N/A;  pre op: constipation  post op: diverticulosis,   PCP: Adam Delgadillo MD   • CYSTOCELE REPAIR     • ENDOSCOPY  04/22/2014    Schatzki's ring dilated   • ENDOSCOPY N/A 6/26/2020    Procedure: ESOPHAGOGASTRODUODENOSCOPY WITH ANESTHESIA;  Surgeon: John Coleman MD;  Location:  [facility];  Service: Gastroenterology;  Laterality: N/A;  pre op: dysphagia  post op:stricture, dilated   PCP:Adam Delgadillo MD   • FEMUR FRACTURE SURGERY     • HYSTERECTOMY     • REPLACEMENT TOTAL KNEE     • ULNAR NERVE TRANSPOSITION         Family History   Problem Relation Age of Onset   • Colon cancer Mother    • Colon polyps Neg Hx        Social History     Socioeconomic History   • Marital status:      Spouse name: Not on file   • Number of children: Not on file   • Years of education: Not on file   • Highest education level: Not on file   Tobacco Use   • Smoking status: Never Smoker   • Smokeless tobacco: Never Used   Substance and Sexual Activity   • Alcohol use: No   • Drug use: No   • Sexual activity: Defer           Objective   Physical Exam  Vitals signs and nursing note reviewed.   Constitutional:       Appearance: Normal appearance. She is well-developed.   HENT:      Head: Normocephalic and atraumatic.      Right Ear: External ear normal.      Left Ear: External  ear normal.      Nose: Nose normal.      Mouth/Throat:      Mouth: Mucous membranes are moist.      Pharynx: Oropharynx is clear.   Eyes:      Extraocular Movements: Extraocular movements intact.      Conjunctiva/sclera: Conjunctivae normal.      Pupils: Pupils are equal, round, and reactive to light.   Neck:      Musculoskeletal: Normal range of motion and neck supple.   Cardiovascular:      Rate and Rhythm: Normal rate and regular rhythm.      Heart sounds: Normal heart sounds.   Pulmonary:      Effort: Pulmonary effort is normal.      Breath sounds: Normal breath sounds.   Abdominal:      General: Abdomen is flat. Bowel sounds are normal.      Palpations: Abdomen is soft.   Musculoskeletal: Normal range of motion.         General: Tenderness present.      Comments: Pain to the inner aspect of the left groin, range of motion intact however induces pain with movement of the leg, neurovascular intact, sensory intact   Skin:     General: Skin is warm and dry.      Capillary Refill: Capillary refill takes less than 2 seconds.   Neurological:      General: No focal deficit present.      Mental Status: She is alert and oriented to person, place, and time.   Psychiatric:         Mood and Affect: Mood normal.         Behavior: Behavior normal.         Thought Content: Thought content normal.         Judgment: Judgment normal.         Procedures           ED Course venous duplex Doppler ultrasound is negative for DVT.  X-ray of the left hip and pelvis were negative for acute findings.  Explained to the patient we will go ahead and assume she may have pulled a groin muscle given the mechanism of injury however she will need close follow-up with her primary care physician for further evaluation with continued symptoms.  Patient expressed understanding.  Currently she denies any pain on reexam.                                           MDM  Number of Diagnoses or Management Options  Muscle strain: new and requires workup      Amount and/or Complexity of Data Reviewed  Tests in the radiology section of CPT®: ordered and reviewed  Discuss the patient with other providers: yes    Risk of Complications, Morbidity, and/or Mortality  Presenting problems: low  Diagnostic procedures: low  Management options: low    Patient Progress  Patient progress: improved      Final diagnoses:   Muscle strain            Nereida Holden, APRN  02/21/21 2055

## 2021-03-03 ENCOUNTER — LAB (OUTPATIENT)
Dept: LAB | Facility: HOSPITAL | Age: 81
End: 2021-03-03

## 2021-03-03 ENCOUNTER — TRANSCRIBE ORDERS (OUTPATIENT)
Dept: ADMINISTRATIVE | Facility: HOSPITAL | Age: 81
End: 2021-03-03

## 2021-03-03 DIAGNOSIS — M81.0 AGE-RELATED OSTEOPOROSIS WITHOUT CURRENT PATHOLOGICAL FRACTURE: Primary | ICD-10-CM

## 2021-03-03 LAB
25(OH)D3 SERPL-MCNC: 71.2 NG/ML (ref 30–100)
CALCIUM SPEC-SCNC: 10.2 MG/DL (ref 8.4–10.4)

## 2021-03-03 PROCEDURE — 82310 ASSAY OF CALCIUM: CPT | Performed by: PHYSICIAN ASSISTANT

## 2021-03-03 PROCEDURE — 36415 COLL VENOUS BLD VENIPUNCTURE: CPT | Performed by: PHYSICIAN ASSISTANT

## 2021-03-03 PROCEDURE — 82306 VITAMIN D 25 HYDROXY: CPT | Performed by: PHYSICIAN ASSISTANT

## 2021-04-21 ENCOUNTER — LAB (OUTPATIENT)
Dept: LAB | Facility: HOSPITAL | Age: 81
End: 2021-04-21

## 2021-04-21 ENCOUNTER — OFFICE VISIT (OUTPATIENT)
Dept: INTERNAL MEDICINE | Facility: CLINIC | Age: 81
End: 2021-04-21

## 2021-04-21 VITALS
DIASTOLIC BLOOD PRESSURE: 80 MMHG | TEMPERATURE: 97.2 F | SYSTOLIC BLOOD PRESSURE: 138 MMHG | RESPIRATION RATE: 15 BRPM | OXYGEN SATURATION: 98 % | WEIGHT: 173.4 LBS | BODY MASS INDEX: 34.04 KG/M2 | HEIGHT: 60 IN | HEART RATE: 74 BPM

## 2021-04-21 DIAGNOSIS — Z11.59 NEED FOR HEPATITIS C SCREENING TEST: ICD-10-CM

## 2021-04-21 DIAGNOSIS — D64.9 ANEMIA, UNSPECIFIED TYPE: ICD-10-CM

## 2021-04-21 DIAGNOSIS — E78.5 HYPERLIPIDEMIA, UNSPECIFIED HYPERLIPIDEMIA TYPE: ICD-10-CM

## 2021-04-21 DIAGNOSIS — M15.9 PRIMARY OSTEOARTHRITIS INVOLVING MULTIPLE JOINTS: Primary | ICD-10-CM

## 2021-04-21 DIAGNOSIS — I87.8 CHRONIC VENOUS STASIS: ICD-10-CM

## 2021-04-21 DIAGNOSIS — E04.2 MULTINODULAR GOITER: ICD-10-CM

## 2021-04-21 DIAGNOSIS — I10 ESSENTIAL HYPERTENSION: ICD-10-CM

## 2021-04-21 DIAGNOSIS — M15.9 PRIMARY OSTEOARTHRITIS INVOLVING MULTIPLE JOINTS: ICD-10-CM

## 2021-04-21 PROBLEM — M81.0 AGE-RELATED OSTEOPOROSIS WITHOUT CURRENT PATHOLOGICAL FRACTURE: Status: ACTIVE | Noted: 2021-04-21

## 2021-04-21 PROCEDURE — 99204 OFFICE O/P NEW MOD 45 MIN: CPT | Performed by: INTERNAL MEDICINE

## 2021-04-21 RX ORDER — UREA 10 %
1 LOTION (ML) TOPICAL DAILY
COMMUNITY
End: 2021-04-21 | Stop reason: SDUPTHER

## 2021-04-21 RX ORDER — MAGNESIUM 200 MG
2.5 TABLET ORAL DAILY
Status: ON HOLD | COMMUNITY
End: 2022-11-07 | Stop reason: SDUPTHER

## 2021-04-21 RX ORDER — DENOSUMAB 60 MG/ML
60 INJECTION SUBCUTANEOUS ONCE
COMMUNITY
Start: 2021-03-02 | End: 2021-11-11 | Stop reason: HOSPADM

## 2021-04-21 RX ORDER — MELOXICAM 15 MG/1
15 TABLET ORAL DAILY PRN
COMMUNITY
Start: 2021-03-30 | End: 2021-11-01

## 2021-04-21 RX ORDER — PREGABALIN 50 MG/1
50 CAPSULE ORAL EVERY 12 HOURS PRN
Status: ON HOLD | COMMUNITY
Start: 2021-04-08 | End: 2022-10-15 | Stop reason: SDUPTHER

## 2021-04-21 RX ORDER — TOLTERODINE 4 MG/1
1 CAPSULE, EXTENDED RELEASE ORAL DAILY
COMMUNITY
Start: 2021-04-15 | End: 2022-10-15 | Stop reason: HOSPADM

## 2021-04-21 NOTE — PATIENT INSTRUCTIONS
-we will get labs today  -stop the HCTZ  -stop the relafen        How to Use Compression Stockings  Compression stockings are elastic socks that squeeze the legs. They help increase blood flow (circulation) to the legs, decrease swelling in the legs, and reduce the chance of developing blood clots in the lower legs. Compression stockings are often used by people who:  · Are recovering from surgery.  · Have poor circulation in their legs.  · Tend to get blood clots in their legs.  · Have bulging (varicose) veins.  · Sit or stay in bed for long periods of time.  Follow instructions from your health care provider about how and when to wear your compression stockings.  How to wear compression stockings  Before you put on your compression stockings:  · Make sure that they are the correct size and degree of compression. If you do not know your size or required grade of compression, ask your health care provider and follow the 's instructions that come with the stockings.  · Make sure that they are clean, dry, and in good condition.  · Check them for rips and tears. Do not put them on if they are ripped or torn.  Put your stockings on first thing in the morning, before you get out of bed. Keep them on for as long as your health care provider advises. When you are wearing your stockings:  · Keep them as smooth as possible. Do not allow them to bunch up. It is especially important to prevent the stockings from bunching up around your toes or behind your knees.  · Do not roll the stockings downward and leave them rolled down. This can decrease blood flow to your leg.  · Change them right away if they become wet or dirty.  When you take off your stockings, inspect your legs and feet. Check for:  · Open sores.  · Red spots.  · Swelling.  General tips  · Do not stop wearing compression stockings without talking to your health care provider first.  · Wash your stockings every day with mild detergent in cold or warm  water. Do not use bleach. Air-dry your stockings or dry them in a clothes dryer on low heat. It may be helpful to have two pairs so that you have a pair to wear while the other is being washed.  · Replace your stockings every 3-6 months.  · If skin moisturizing is part of your treatment plan, apply lotion or cream at night so that your skin will be dry when you put on the stockings in the morning. It is harder to put the stockings on when you have lotion on your legs or feet.  · Wear nonskid shoes or slip-resistant socks when walking while wearing compression stockings.  Contact a health care provider and remove your stockings if you have:  · A feeling of pins and needles in your feet or legs.  · Open sores, red spots, or other skin changes on your feet or legs.  · Swelling or pain that gets worse.  Get help right away if you have:  · Numbness or tingling in your lower legs that does not get better right after you take the stockings off.  · Toes or feet that are unusually cold or turn a bluish color.  · A warm or red area on your leg.  · New swelling or soreness in your leg.  · Shortness of breath.  · Chest pain.  · A fast or irregular heartbeat.  · Light-headedness.  · Dizziness.  Summary  · Compression stockings are elastic socks that squeeze the legs.  · They help increase blood flow (circulation) to the legs, decrease swelling in the legs, and reduce the chance of developing blood clots in the lower legs.  · Follow instructions from your health care provider about how and when to wear your compression stockings.  · Do not stop wearing your compression stockings without talking to your health care provider first.  This information is not intended to replace advice given to you by your health care provider. Make sure you discuss any questions you have with your health care provider.  Document Revised: 12/20/2018 Document Reviewed: 12/20/2018  Elsevier Patient Education © 2021 [employer]

## 2021-04-21 NOTE — PROGRESS NOTES
Chief Complaint   Patient presents with   • Establish Care   • Arthritis   • Incontinence   • Shoulder Pain     Right Shoulder         History:  Carmen Obrien is a 81 y.o. female who presents today for evaluation of the above problems.      She presents today to establish care.  She has numerous medical issues including urine and stool incontinence, multinodular goiter, arthritis followed by pain management, macular degeneration, hypertension, coronary artery disease, among others.    She sees Matheus Funez for osteoarthritis of numerous joints.  She states that they recommended right shoulder replacement but she has declined.  She has had injections which are only minimally beneficial.  She is currently on Norco and Lyrica for her pain prescribed by orth[facility].    In addition she is now on 2 different NSAIDs including Relafen and Mobic.    She has hypertension and is on lisinopril 40 mg daily, Norvasc 5 mg daily and clonidine 0.1 mg daily.    She has bowel and bladder incontinence and has seen Dr. Miky Mittal in [place] as a referral from Dr. Guajardo.    She also has lower extremity edema.  Has had negative Dopplers in October 2020.  She also reports seeing a vascular specialist who recommended compression stockings but does not currently wearing them.    Her PDMP data was reviewed today.    She is also on Prolia which is being managed by Manasa MARTINEZ    ROS:  Review of Systems   Constitutional: Positive for activity change.   Respiratory: Negative for shortness of breath.    Cardiovascular: Positive for leg swelling. Negative for chest pain.   Musculoskeletal: Positive for arthralgias, back pain, gait problem, joint swelling and myalgias.   Neurological: Positive for weakness.         Current Outpatient Medications:   •  amLODIPine (NORVASC) 10 MG tablet, Take 5 mg by mouth Daily., Disp: , Rfl:   •  atorvastatin (LIPITOR) 10 MG tablet, Take 10 mg by mouth Daily., Disp: , Rfl:   •  calcium  carbonate (Tums) 500 MG chewable tablet, Chew 1 tablet As Needed., Disp: , Rfl:   •  cetirizine (zyrTEC) 10 MG tablet, Take 10 mg by mouth Daily., Disp: , Rfl:   •  Cholecalciferol (VITAMIN D3) 125 MCG (5000 UT) capsule capsule, Vitamin D3 125 mcg (5,000 unit) tablet  Take by oral route., Disp: , Rfl:   •  ciclopirox (LOPROX) 0.77 % cream, Apply  topically to the appropriate area as directed 2 (Two) Times a Day., Disp: , Rfl:   •  cloNIDine (CATAPRES) 0.1 MG tablet, Take 0.1 mg by mouth Daily., Disp: , Rfl:   •  Cyanocobalamin (Vitamin B-12) 1000 MCG sublingual tablet, Place 1 tablet under the tongue Daily., Disp: , Rfl:   •  cycloSPORINE (RESTASIS) 0.05 % ophthalmic emulsion, Restasis 0.05 % eye drops in a dropperette  INSTILL 1 DROP INTO AFFECTED EYE(S) BY OPHTHALMIC ROUTE EVERY 12 HOURS, Disp: , Rfl:   •  Dextran 70-Hypromellose, PF, ([facility] NATURAL TEARS) 0.1-0.3 % solution, Apply 1 drop to eye(s) as directed by provider 2 (Two) Times a Day As Needed (itchy or dry eyes)., Disp: 32 each, Rfl: 0  •  esomeprazole (nexIUM) 20 MG capsule, Take 2 capsules by mouth 2 (two) times a day., Disp: 60 capsule, Rfl: 11  •  FEROSUL 325 (65 Fe) MG tablet, Take 65 mg by mouth Daily With Breakfast., Disp: , Rfl:   •  HYDROcodone-acetaminophen (NORCO) 7.5-325 MG per tablet, Take 1 tablet by mouth Every 8 (Eight) Hours As Needed for Moderate Pain ., Disp: , Rfl:   •  hydrOXYzine (ATARAX) 25 MG tablet, Take 25 mg by mouth 3 (Three) Times a Day As Needed., Disp: , Rfl:   •  lisinopril (PRINIVIL,ZESTRIL) 40 MG tablet, Take 40 mg by mouth Daily., Disp: , Rfl:   •  meloxicam (MOBIC) 15 MG tablet, Take 15 mg by mouth Daily As Needed., Disp: , Rfl:   •  Menthol, Topical Analgesic, (BIOFREEZE EX), Apply  topically., Disp: , Rfl:   •  montelukast (SINGULAIR) 10 MG tablet, montelukast 10 mg tablet  Take 1 tablet every day by oral route., Disp: , Rfl:   •  MULTIPLE VITAMINS-MINERALS ER PO, Take by mouth daily, Disp: , Rfl:   •  nortriptyline  (PAMELOR) 50 MG capsule, Take 50 mg by mouth Every Night., Disp: , Rfl:   •  nystatin (NYSTOP) 103822 UNIT/GM powder powder, Apply 100,000 Units topically 2 (Two) Times a Day., Disp: , Rfl:   •  polyethyl glycol-propyl glycol (SYSTANE) 0.4-0.3 % solution ophthalmic solution, 2 drops., Disp: , Rfl:   •  pregabalin (LYRICA) 50 MG capsule, Take 50 mg by mouth Every 12 (Twelve) Hours As Needed., Disp: , Rfl:   •  Prolia 60 MG/ML solution prefilled syringe syringe, , Disp: , Rfl:   •  senna (SENOKOT) 8.6 MG tablet, Take 1 tablet by mouth Daily., Disp: , Rfl:   •  tolterodine LA (DETROL LA) 4 MG 24 hr capsule, Take 1 capsule by mouth Daily., Disp: , Rfl:   •  linaclotide (LINZESS) 145 MCG capsule capsule, Linzess 145 mcg capsule  Take 1 capsule every day by oral route., Disp: , Rfl:     Lab Results   Component Value Date    GLUCOSE 135 (H) 07/15/2019    BUN 18 07/15/2019    CREATININE 0.8 07/15/2019    EGFRIFNONA >60 07/15/2019    BCR 20.0 05/20/2019    K 3.8 07/15/2019    CO2 24 07/15/2019    CALCIUM 10.2 03/03/2021    ALBUMIN 4 07/02/2019    AST 18 07/02/2019    ALT 16 07/02/2019       WBC   Date Value Ref Range Status   07/16/2019 7.0 4.8 - 10.8 K/uL Final     RBC   Date Value Ref Range Status   07/16/2019 3.52 (L) 4.20 - 5.40 M/uL Final     Hemoglobin   Date Value Ref Range Status   07/16/2019 10.1 (L) 12.0 - 16.0 g/dL Final     Hematocrit   Date Value Ref Range Status   07/16/2019 31.7 (L) 37.0 - 47.0 % Final     MCV   Date Value Ref Range Status   07/16/2019 90.1 81.0 - 99.0 fL Final     MCH   Date Value Ref Range Status   07/16/2019 28.7 27.0 - 31.0 pg Final     MCHC   Date Value Ref Range Status   07/16/2019 31.9 (L) 33.0 - 37.0 g/dL Final     RDW   Date Value Ref Range Status   07/16/2019 14.7 (H) 11.5 - 14.5 % Final     RDW-SD   Date Value Ref Range Status   05/20/2019 45.4 40.0 - 54.0 fl Final     MPV   Date Value Ref Range Status   07/16/2019 10.8 9.4 - 12.3 fL Final     Platelets   Date Value Ref Range Status  "  07/16/2019 248 130 - 400 K/uL Final     Neutrophil Rel %   Date Value Ref Range Status   07/04/2019 76.5 (H) 50.0 - 65.0 % Final     Lymphocyte Rel %   Date Value Ref Range Status   07/04/2019 13.9 (L) 20.0 - 40.0 % Final     Monocyte Rel %   Date Value Ref Range Status   07/04/2019 7.4 0.0 - 10.0 % Final     Eosinophil Rel %   Date Value Ref Range Status   07/04/2019 1.4 0.0 - 5.0 % Final     Basophil Rel %   Date Value Ref Range Status   07/04/2019 0.4 0.0 - 1.0 % Final     Immature Grans %   Date Value Ref Range Status   05/20/2019 0.8 0.0 - 5.0 % Final     Neutrophils Absolute   Date Value Ref Range Status   07/04/2019 7.7 (H) 1.5 - 7.5 K/uL Final     Lymphocytes Absolute   Date Value Ref Range Status   07/04/2019 1.4 1.1 - 4.5 K/uL Final     Monocytes Absolute   Date Value Ref Range Status   07/04/2019 0.80 0.00 - 0.90 K/uL Final     Eosinophils Absolute   Date Value Ref Range Status   07/04/2019 0.10 0.00 - 0.60 K/uL Final     Basophils Absolute   Date Value Ref Range Status   07/04/2019 0.00 0.00 - 0.20 K/uL Final     Immature Grans, Absolute   Date Value Ref Range Status   05/20/2019 0.06 (H) 0.00 - 0.05 10*3/mm3 Final     nRBC   Date Value Ref Range Status   05/20/2019 0.0 0.0 - 0.2 /100 WBC Final         OBJECTIVE:  Visit Vitals  /80 (BP Location: Left arm, Patient Position: Sitting, Cuff Size: Adult)   Pulse 74   Temp 97.2 °F (36.2 °C) (Oral)   Resp 15   Ht 152.4 cm (60\")   Wt 78.7 kg (173 lb 6.4 oz)   SpO2 98%   BMI 33.86 kg/m²      Physical Exam  Constitutional:       General: She is not in acute distress.     Appearance: Normal appearance. She is well-developed. She is obese. She is not diaphoretic.      Comments: pleasant   HENT:      Head: Normocephalic and atraumatic.   Eyes:      Pupils: Pupils are equal, round, and reactive to light.   Neck:      Thyroid: No thyromegaly.      Trachea: Phonation normal.   Cardiovascular:      Rate and Rhythm: Normal rate and regular rhythm.      Heart " sounds: No murmur heard.        Comments: Bilateral nonpitting edema.  Signs of chronic venous stasis on her lower extremities bilaterally  Pulmonary:      Effort: Pulmonary effort is normal. No respiratory distress.      Breath sounds: Normal breath sounds. No stridor. No wheezing, rhonchi or rales.   Abdominal:      Tenderness: There is no abdominal tenderness.   Musculoskeletal:         General: Swelling and tenderness present.      Right shoulder: Swelling, tenderness and bony tenderness present. Decreased range of motion.   Skin:     General: Skin is warm and dry.      Coloration: Skin is not jaundiced or pale.      Findings: No bruising or erythema.   Neurological:      Mental Status: She is alert. Mental status is at baseline.   Psychiatric:         Behavior: Behavior normal.         Thought Content: Thought content normal.         Judgment: Judgment normal.         Assessment/Plan    Diagnoses and all orders for this visit:    1. Primary osteoarthritis involving multiple joints (Primary)  -     Comprehensive Metabolic Panel; Future    2. Essential hypertension  -     Comprehensive Metabolic Panel; Future    3. Chronic venous stasis    4. Multinodular goiter    5. Hyperlipidemia, unspecified hyperlipidemia type  -     Lipid Panel; Future  -     Comprehensive Metabolic Panel; Future    6. Need for hepatitis C screening test  -     Hepatitis C Antibody; Future    7. Anemia, unspecified type  -     CBC & Differential; Future      I would like to check the above labs for further work-up.    I would like her to stop the hydrochlorothiazide.    She is currently taking to use NSAIDs in the form of Relafen and Mobic.  We will stop the Relafen today.    Her chronic and fairly severe arthritis is being managed by orthopedic [place] [facility] and defer medications to them.    At the next visit in 4 weeks we will make further adjustments in her antihypertension regimen.  I would very much like her to come off of her clonidine.   We can make adjustments in her Norvasc increasing the dose if necessary although this may contribute more to her lower extremity edema.  Given her coronary artery disease beta-blocker would be ideal.    Discussed wearing daily compression stockings for her lower extremity edema.  This is secondary to chronic venous stasis.  I would like to avoid diuretics if at all possible    Continue seeing her specialist in [place] for bowel and bladder incontinence    Continue seeing Dr. Guajardo  Continue seeing Dr. Coleman  Continue seeing Perez Pacheco    Continue seeing Manasa Pulido for Prolia.  She does report her last DEXA scan was just last month although I do not have any record of this.    Follow-up in 4 weeks for Medicare wellness.  We will make this a 30-minute appointment.  Follow-up then or sooner if indicated  Return in about 4 weeks (around 5/19/2021) for 30 minutes. Medicare Wellness.    Patient was given instructions and counseling regarding his/her condition or for health maintenance advice. Please see specific information pulled into the AVS if appropriate.     AMY Noble MD   10:06 CDT  4/21/2021

## 2021-04-22 ENCOUNTER — LAB (OUTPATIENT)
Dept: LAB | Facility: HOSPITAL | Age: 81
End: 2021-04-22

## 2021-04-22 DIAGNOSIS — E86.0 DEHYDRATION: ICD-10-CM

## 2021-04-22 DIAGNOSIS — E87.5 HYPERKALEMIA: ICD-10-CM

## 2021-04-22 LAB
ALBUMIN SERPL-MCNC: 4.7 G/DL (ref 3.5–5)
ALBUMIN/GLOB SERPL: 1.4 G/DL (ref 1.1–2.5)
ALP SERPL-CCNC: 67 U/L (ref 24–120)
ALT SERPL W P-5'-P-CCNC: 45 U/L (ref 0–35)
ANION GAP SERPL CALCULATED.3IONS-SCNC: 13 MMOL/L (ref 4–13)
AST SERPL-CCNC: 58 U/L (ref 7–45)
AUTO MIXED CELLS #: 0.7 10*3/MM3 (ref 0.1–2.6)
AUTO MIXED CELLS %: 6.5 % (ref 0.1–24)
BILIRUB SERPL-MCNC: 0.5 MG/DL (ref 0.1–1)
BUN SERPL-MCNC: 28 MG/DL (ref 5–21)
BUN/CREAT SERPL: 27.7
CALCIUM SPEC-SCNC: 10.2 MG/DL (ref 8.4–10.4)
CHLORIDE SERPL-SCNC: 102 MMOL/L (ref 98–110)
CHOLEST SERPL-MCNC: 184 MG/DL (ref 130–200)
CO2 SERPL-SCNC: 22 MMOL/L (ref 24–31)
CREAT SERPL-MCNC: 1.01 MG/DL (ref 0.5–1.4)
ERYTHROCYTE [DISTWIDTH] IN BLOOD BY AUTOMATED COUNT: 13 % (ref 12.3–15.4)
GFR SERPL CREATININE-BSD FRML MDRD: 53 ML/MIN/1.73
GLOBULIN UR ELPH-MCNC: 3.4 GM/DL
GLUCOSE SERPL-MCNC: 110 MG/DL (ref 70–100)
HCT VFR BLD AUTO: 38.2 % (ref 34–46.6)
HDLC SERPL-MCNC: 47 MG/DL
HGB BLD-MCNC: 12.4 G/DL (ref 12–15.9)
LDLC SERPL CALC-MCNC: 96 MG/DL (ref 0–99)
LDLC/HDLC SERPL: 1.89 {RATIO}
LYMPHOCYTES # BLD AUTO: 2.1 10*3/MM3 (ref 0.7–3.1)
LYMPHOCYTES NFR BLD AUTO: 20.1 % (ref 19.6–45.3)
MCH RBC QN AUTO: 30 PG (ref 26.6–33)
MCHC RBC AUTO-ENTMCNC: 32.5 G/DL (ref 31.5–35.7)
MCV RBC AUTO: 92.5 FL (ref 79–97)
NEUTROPHILS NFR BLD AUTO: 7.8 10*3/MM3 (ref 1.7–7)
NEUTROPHILS NFR BLD AUTO: 73.4 % (ref 42.7–76)
PLATELET # BLD AUTO: 378 10*3/MM3 (ref 140–450)
PMV BLD AUTO: 8.5 FL (ref 6–12)
POTASSIUM SERPL-SCNC: 5.9 MMOL/L (ref 3.5–5.3)
PROT SERPL-MCNC: 8.1 G/DL (ref 6.3–8.7)
RBC # BLD AUTO: 4.13 10*6/MM3 (ref 3.77–5.28)
SODIUM SERPL-SCNC: 137 MMOL/L (ref 135–145)
TRIGL SERPL-MCNC: 242 MG/DL (ref 0–149)
VLDLC SERPL-MCNC: 41 MG/DL (ref 5–40)
WBC # BLD AUTO: 10.6 10*3/MM3 (ref 3.4–10.8)

## 2021-04-22 PROCEDURE — 80061 LIPID PANEL: CPT

## 2021-04-22 PROCEDURE — 80053 COMPREHEN METABOLIC PANEL: CPT

## 2021-04-22 PROCEDURE — 36415 COLL VENOUS BLD VENIPUNCTURE: CPT

## 2021-04-22 PROCEDURE — 85025 COMPLETE CBC W/AUTO DIFF WBC: CPT

## 2021-04-22 PROCEDURE — 86803 HEPATITIS C AB TEST: CPT

## 2021-04-23 DIAGNOSIS — R74.8 ELEVATED LIVER ENZYMES: Primary | ICD-10-CM

## 2021-04-23 DIAGNOSIS — E87.5 HYPERKALEMIA: ICD-10-CM

## 2021-04-23 DIAGNOSIS — E86.0 DEHYDRATION: ICD-10-CM

## 2021-04-23 LAB — HCV AB SER DONR QL: NORMAL

## 2021-04-26 ENCOUNTER — LAB (OUTPATIENT)
Dept: LAB | Facility: HOSPITAL | Age: 81
End: 2021-04-26

## 2021-04-26 DIAGNOSIS — E87.5 HYPERKALEMIA: ICD-10-CM

## 2021-04-26 DIAGNOSIS — E86.0 DEHYDRATION: ICD-10-CM

## 2021-04-26 DIAGNOSIS — R74.8 ELEVATED LIVER ENZYMES: ICD-10-CM

## 2021-04-26 DIAGNOSIS — E86.0 DEHYDRATION: Primary | ICD-10-CM

## 2021-04-26 LAB
ALBUMIN SERPL-MCNC: 4.6 G/DL (ref 3.5–5)
ALBUMIN/GLOB SERPL: 1.2 G/DL (ref 1.1–2.5)
ALP SERPL-CCNC: 50 U/L (ref 24–120)
ALT SERPL W P-5'-P-CCNC: 31 U/L (ref 0–35)
ANION GAP SERPL CALCULATED.3IONS-SCNC: 11 MMOL/L (ref 4–13)
AST SERPL-CCNC: 45 U/L (ref 7–45)
BILIRUB SERPL-MCNC: 1 MG/DL (ref 0.1–1)
BUN SERPL-MCNC: 21 MG/DL (ref 5–21)
BUN/CREAT SERPL: 37.5
CALCIUM SPEC-SCNC: 9.8 MG/DL (ref 8.4–10.4)
CHLORIDE SERPL-SCNC: 99 MMOL/L (ref 98–110)
CO2 SERPL-SCNC: 24 MMOL/L (ref 24–31)
CREAT SERPL-MCNC: 0.56 MG/DL (ref 0.5–1.4)
GFR SERPL CREATININE-BSD FRML MDRD: 104 ML/MIN/1.73
GLOBULIN UR ELPH-MCNC: 3.7 GM/DL
GLUCOSE SERPL-MCNC: 101 MG/DL (ref 70–100)
POTASSIUM SERPL-SCNC: 5.6 MMOL/L (ref 3.5–5.3)
PROT SERPL-MCNC: 8.3 G/DL (ref 6.3–8.7)
SODIUM SERPL-SCNC: 134 MMOL/L (ref 135–145)

## 2021-04-26 PROCEDURE — 36415 COLL VENOUS BLD VENIPUNCTURE: CPT

## 2021-04-26 PROCEDURE — 80053 COMPREHEN METABOLIC PANEL: CPT

## 2021-04-27 ENCOUNTER — TELEPHONE (OUTPATIENT)
Dept: INTERNAL MEDICINE | Facility: CLINIC | Age: 81
End: 2021-04-27

## 2021-04-27 NOTE — TELEPHONE ENCOUNTER
PATIENT HAS CALLED, SHE ASK IF HYDROCODONE WAS TO BE DISCONTINUED?  SHE STATED THAT SHE SEES PEGGY GARCIA WITH [facility].  SHE STATED THAT AT HER OFFICE VISIT HERE THERE WAS A LOT OF CHANGES AND SHE TRIED TO WRITE THEM DOWN AND SHE CANNOT REMEMBER.  I DID NOT SEE THIS DCD.  PLEASE ADVISE.

## 2021-04-28 RX ORDER — CARVEDILOL 6.25 MG/1
6.25 TABLET ORAL 2 TIMES DAILY WITH MEALS
Qty: 60 TABLET | Refills: 2 | Status: SHIPPED | OUTPATIENT
Start: 2021-04-28 | End: 2021-08-04

## 2021-04-28 NOTE — TELEPHONE ENCOUNTER
PATIENT HAS BEEN CALLED, GIVEN MESSAGE AND VOICED UNDERSTANDING.   PATIENT STATED THAT SHE CANCELED HER APPOINTMENTS WITH DR SEGOVIA AND SHE CANCELED HER LABS HE ORDERED.

## 2021-05-04 ENCOUNTER — TRANSCRIBE ORDERS (OUTPATIENT)
Dept: ADMINISTRATIVE | Facility: HOSPITAL | Age: 81
End: 2021-05-04

## 2021-05-04 ENCOUNTER — LAB (OUTPATIENT)
Dept: LAB | Facility: HOSPITAL | Age: 81
End: 2021-05-04

## 2021-05-04 DIAGNOSIS — E86.0 DEHYDRATION: Primary | ICD-10-CM

## 2021-05-04 DIAGNOSIS — E86.0 DEHYDRATION: ICD-10-CM

## 2021-05-04 LAB
ANION GAP SERPL CALCULATED.3IONS-SCNC: 9 MMOL/L (ref 4–13)
BUN SERPL-MCNC: 18 MG/DL (ref 5–21)
BUN/CREAT SERPL: 27.7
CALCIUM SPEC-SCNC: 9.3 MG/DL (ref 8.4–10.4)
CHLORIDE SERPL-SCNC: 99 MMOL/L (ref 98–110)
CO2 SERPL-SCNC: 28 MMOL/L (ref 24–31)
CREAT SERPL-MCNC: 0.65 MG/DL (ref 0.5–1.4)
GFR SERPL CREATININE-BSD FRML MDRD: 88 ML/MIN/1.73
GLUCOSE SERPL-MCNC: 101 MG/DL (ref 70–100)
POTASSIUM SERPL-SCNC: 4.7 MMOL/L (ref 3.5–5.3)
SODIUM SERPL-SCNC: 136 MMOL/L (ref 135–145)

## 2021-05-04 PROCEDURE — 80048 BASIC METABOLIC PNL TOTAL CA: CPT

## 2021-05-04 PROCEDURE — 36415 COLL VENOUS BLD VENIPUNCTURE: CPT

## 2021-05-18 RX ORDER — MONTELUKAST SODIUM 10 MG/1
10 TABLET ORAL NIGHTLY
Qty: 30 TABLET | Refills: 5 | Status: ON HOLD | OUTPATIENT
Start: 2021-05-18 | End: 2021-11-05

## 2021-05-18 RX ORDER — ATORVASTATIN CALCIUM 10 MG/1
10 TABLET, FILM COATED ORAL DAILY
Qty: 30 TABLET | Refills: 5 | Status: ON HOLD | OUTPATIENT
Start: 2021-05-18 | End: 2021-11-05

## 2021-05-18 RX ORDER — AMLODIPINE BESYLATE 10 MG/1
5 TABLET ORAL DAILY
Qty: 30 TABLET | Refills: 2 | Status: SHIPPED | OUTPATIENT
Start: 2021-05-18 | End: 2021-06-18 | Stop reason: SDUPTHER

## 2021-05-18 RX ORDER — CLONIDINE HYDROCHLORIDE 0.1 MG/1
0.1 TABLET ORAL DAILY
Qty: 30 TABLET | Refills: 2 | Status: SHIPPED | OUTPATIENT
Start: 2021-05-18 | End: 2021-05-27

## 2021-05-18 RX ORDER — HYDROXYZINE HYDROCHLORIDE 25 MG/1
25 TABLET, FILM COATED ORAL 3 TIMES DAILY PRN
Qty: 90 TABLET | Refills: 5 | Status: SHIPPED | OUTPATIENT
Start: 2021-05-18 | End: 2021-07-15

## 2021-05-27 ENCOUNTER — OFFICE VISIT (OUTPATIENT)
Dept: INTERNAL MEDICINE | Facility: CLINIC | Age: 81
End: 2021-05-27

## 2021-05-27 VITALS
BODY MASS INDEX: 37.3 KG/M2 | DIASTOLIC BLOOD PRESSURE: 70 MMHG | WEIGHT: 190 LBS | HEART RATE: 60 BPM | HEIGHT: 60 IN | SYSTOLIC BLOOD PRESSURE: 124 MMHG | RESPIRATION RATE: 15 BRPM | OXYGEN SATURATION: 98 % | TEMPERATURE: 98.2 F

## 2021-05-27 DIAGNOSIS — Z00.00 MEDICARE ANNUAL WELLNESS VISIT, SUBSEQUENT: Primary | ICD-10-CM

## 2021-05-27 DIAGNOSIS — M81.0 AGE-RELATED OSTEOPOROSIS WITHOUT CURRENT PATHOLOGICAL FRACTURE: ICD-10-CM

## 2021-05-27 DIAGNOSIS — Z13.31 DEPRESSION SCREEN: ICD-10-CM

## 2021-05-27 DIAGNOSIS — I10 ESSENTIAL HYPERTENSION: ICD-10-CM

## 2021-05-27 PROCEDURE — 1125F AMNT PAIN NOTED PAIN PRSNT: CPT | Performed by: INTERNAL MEDICINE

## 2021-05-27 PROCEDURE — 1159F MED LIST DOCD IN RCRD: CPT | Performed by: INTERNAL MEDICINE

## 2021-05-27 PROCEDURE — 1170F FXNL STATUS ASSESSED: CPT | Performed by: INTERNAL MEDICINE

## 2021-05-27 PROCEDURE — G0439 PPPS, SUBSEQ VISIT: HCPCS | Performed by: INTERNAL MEDICINE

## 2021-05-27 PROCEDURE — 96160 PT-FOCUSED HLTH RISK ASSMT: CPT | Performed by: INTERNAL MEDICINE

## 2021-05-27 RX ORDER — CLONIDINE HYDROCHLORIDE 0.1 MG/1
0.1 TABLET ORAL DAILY PRN
Qty: 30 TABLET | Refills: 2
Start: 2021-05-27 | End: 2021-11-11 | Stop reason: HOSPADM

## 2021-05-27 NOTE — PROGRESS NOTES
The ABCs of the Annual Wellness Visit  Subsequent Medicare Wellness Visit    Chief Complaint   Patient presents with   • Medicare Wellness-subsequent       Subjective   History of Present Illness:  Carmen Obrien is a 81 y.o. female who presents for a Subsequent Medicare Wellness Visit.    No new complaints  She has appointment with Matheus Funez on June 2nd   Didn't take clonidine last night, but she had been taking it nightly.  I reviewed her blood pressure log which was controlled    HEALTH RISK ASSESSMENT    Recent Hospitalizations:  No hospitalization(s) within the last year.    Current Medical Providers:  Patient Care Team:  VERA Noble MD as PCP - General (Internal Medicine)  Antonio Lunsford MD as Consulting Physician (Otolaryngology)  John Coleman MD as Consulting Physician (Gastroenterology)    Smoking Status:  Social History     Tobacco Use   Smoking Status Never Smoker   Smokeless Tobacco Never Used       Alcohol Consumption:  Social History     Substance and Sexual Activity   Alcohol Use No       Depression Screen:   PHQ-2/PHQ-9 Depression Screening 5/27/2021   Little interest or pleasure in doing things 0   Feeling down, depressed, or hopeless 0   Total Score 0       Fall Risk Screen:  STEADI Fall Risk Assessment was completed, and patient is at HIGH risk for falls. Assessment completed on:5/27/2021    Health Habits and Functional and Cognitive Screening:  Functional & Cognitive Status 5/27/2021   Do you have difficulty preparing food and eating? No   Do you have difficulty bathing yourself, getting dressed or grooming yourself? No   Do you have difficulty using the toilet? No   Do you have difficulty moving around from place to place? Yes   Do you have trouble with steps or getting out of a bed or a chair? No   Current Diet Unhealthy Diet   Dental Exam Up to date        Dental Exam Comment DENTURES   Eye Exam Up to date   Exercise (times per week) 5 times per week   Current  Exercise Activities Include Walking   Do you need help using the phone?  No   Are you deaf or do you have serious difficulty hearing?  No   Do you need help with transportation? No   Do you need help shopping? No   Do you need help preparing meals?  Yes   Do you need help with housework?  No   Do you need help with laundry? No   Do you need help taking your medications? No   Do you need help managing money? No   Do you ever drive or ride in a car without wearing a seat belt? No   Have you felt unusual stress, anger or loneliness in the last month? No   Who do you live with? Alone   If you need help, do you have trouble finding someone available to you? No   Have you been bothered in the last four weeks by sexual problems? No   Do you have difficulty concentrating, remembering or making decisions? No       Does the patient have evidence of cognitive impairment? No    Asprin use counseling:Start ASA 81 mg daily      The 10-year ASCVD risk score (Juan FRANKS Jr., et al., 2013) is: 28.9%    Values used to calculate the score:      Age: 81 years      Sex: Female      Is Non- : No      Diabetic: No      Tobacco smoker: No      Systolic Blood Pressure: 124 mmHg      Is BP treated: Yes      HDL Cholesterol: 47 mg/dL      Total Cholesterol: 184 mg/dL      Age-appropriate Screening Schedule:  Refer to the list below for future screening recommendations based on patient's age, sex and/or medical conditions. Orders for these recommended tests are listed in the plan section. The patient has been provided with a written plan.    Health Maintenance   Topic Date Due   • DXA SCAN  08/20/2016   • ZOSTER VACCINE (2 of 2) 05/06/2019   • INFLUENZA VACCINE  08/01/2021   • LIPID PANEL  04/22/2022   • TDAP/TD VACCINES (2 - Td or Tdap) 09/01/2030          The following portions of the patient's history were reviewed and updated as appropriate: allergies, current medications, past family history, past medical history,  past social history, past surgical history and problem list.    Outpatient Medications Prior to Visit   Medication Sig Dispense Refill   • amLODIPine (NORVASC) 10 MG tablet Take 0.5 tablets by mouth Daily. (Patient taking differently: Take 10 mg by mouth Daily.) 30 tablet 2   • atorvastatin (LIPITOR) 10 MG tablet Take 1 tablet by mouth Daily. 30 tablet 5   • cetirizine (zyrTEC) 10 MG tablet Take 10 mg by mouth Daily.     • Cholecalciferol (VITAMIN D3) 125 MCG (5000 UT) capsule capsule Vitamin D3 125 mcg (5,000 unit) tablet   Take by oral route.     • ciclopirox (LOPROX) 0.77 % cream Apply  topically to the appropriate area as directed 2 (Two) Times a Day.     • Cyanocobalamin (Vitamin B-12) 1000 MCG sublingual tablet Place 1 tablet under the tongue Daily.     • cycloSPORINE (RESTASIS) 0.05 % ophthalmic emulsion Restasis 0.05 % eye drops in a dropperette   INSTILL 1 DROP INTO AFFECTED EYE(S) BY OPHTHALMIC ROUTE EVERY 12 HOURS     • Dextran 70-Hypromellose, PF, ([facility] NATURAL TEARS) 0.1-0.3 % solution Apply 1 drop to eye(s) as directed by provider 2 (Two) Times a Day As Needed (itchy or dry eyes). 32 each 0   • esomeprazole (nexIUM) 20 MG capsule Take 1 capsule by mouth 2 (two) times a day. 60 capsule 11   • FEROSUL 325 (65 Fe) MG tablet Take 65 mg by mouth Daily With Breakfast.     • HYDROcodone-acetaminophen (NORCO) 7.5-325 MG per tablet Take 1 tablet by mouth Every 8 (Eight) Hours As Needed for Moderate Pain .     • hydrOXYzine (ATARAX) 25 MG tablet Take 1 tablet by mouth 3 (Three) Times a Day As Needed for Anxiety. 90 tablet 5   • linaclotide (LINZESS) 145 MCG capsule capsule Linzess 145 mcg capsule   Take 1 capsule every day by oral route.     • meloxicam (MOBIC) 15 MG tablet Take 15 mg by mouth Daily As Needed.     • Menthol, Topical Analgesic, (BIOFREEZE EX) Apply  topically.     • montelukast (SINGULAIR) 10 MG tablet Take 1 tablet by mouth Every Night. 30 tablet 5   • MULTIPLE VITAMINS-MINERALS ER PO Take by  mouth daily     • nortriptyline (PAMELOR) 50 MG capsule Take 50 mg by mouth Every Night.     • nystatin (NYSTOP) 556407 UNIT/GM powder powder Apply 100,000 Units topically 2 (Two) Times a Day.     • polyethyl glycol-propyl glycol (SYSTANE) 0.4-0.3 % solution ophthalmic solution 2 drops.     • pregabalin (LYRICA) 50 MG capsule Take 50 mg by mouth Every 12 (Twelve) Hours As Needed.     • Prolia 60 MG/ML solution prefilled syringe syringe      • tolterodine LA (DETROL LA) 4 MG 24 hr capsule Take 1 capsule by mouth Daily.     • cloNIDine (CATAPRES) 0.1 MG tablet Take 1 tablet by mouth Daily. 30 tablet 2   • carvedilol (Coreg) 6.25 MG tablet Take 1 tablet by mouth 2 (Two) Times a Day With Meals. 60 tablet 2   • calcium carbonate (Tums) 500 MG chewable tablet Chew 1 tablet As Needed.     • senna (SENOKOT) 8.6 MG tablet Take 1 tablet by mouth Daily.       No facility-administered medications prior to visit.       Patient Active Problem List   Diagnosis   • Shoulder dislocation   • Multinodular goiter   • History of adenomatous polyp of colon   • Family hx of colon cancer   • Constipation   • Pharyngoesophageal dysphagia   • Gastroesophageal reflux disease   • Age-related osteoporosis without current pathological fracture   • Hypertension       Advanced Care Planning:  ACP discussion was held with the patient during this visit. Patient does not have an advance directive, information provided.    Review of Systems   Constitutional: Positive for activity change. Negative for fatigue.   Respiratory: Negative for shortness of breath.    Cardiovascular: Positive for leg swelling. Negative for chest pain.   Musculoskeletal: Positive for arthralgias, back pain, gait problem, joint swelling and myalgias.   Neurological: Positive for weakness.   Hematological: Bruises/bleeds easily.   Psychiatric/Behavioral: Negative.  Negative for sleep disturbance.       Compared to one year ago, the patient feels her physical health is  "better.  Compared to one year ago, the patient feels her mental health is the same.    Reviewed chart for potential of high risk medication in the elderly: yes  Reviewed chart for potential of harmful drug interactions in the elderly:yes    Objective         Vitals:    05/27/21 0913   BP: 124/70   BP Location: Left arm   Patient Position: Sitting   Cuff Size: Adult   Pulse: 60   Resp: 15   Temp: 98.2 °F (36.8 °C)   TempSrc: Oral   SpO2: 98%   Weight: 86.2 kg (190 lb)   Height: 152.4 cm (60\")       Body mass index is 37.11 kg/m².  Discussed the patient's BMI with her. The BMI is above average; no BMI management plan is appropriate..    Physical Exam  Constitutional:       General: She is not in acute distress.     Appearance: Normal appearance. She is well-developed. She is obese. She is not diaphoretic.      Comments: pleasant   HENT:      Head: Normocephalic and atraumatic.   Eyes:      Pupils: Pupils are equal, round, and reactive to light.   Neck:      Thyroid: No thyromegaly.      Vascular: No carotid bruit.      Trachea: Phonation normal.   Cardiovascular:      Rate and Rhythm: Normal rate and regular rhythm.      Heart sounds: No murmur heard.        Comments: Bilateral nonpitting edema.  Signs of chronic venous stasis on her lower extremities bilaterally  Pulmonary:      Effort: Pulmonary effort is normal. No respiratory distress.      Breath sounds: Normal breath sounds. No stridor. No wheezing, rhonchi or rales.   Abdominal:      General: Bowel sounds are normal. There is no distension.      Palpations: Abdomen is soft.      Tenderness: There is no abdominal tenderness.   Skin:     General: Skin is warm and dry.      Coloration: Skin is not jaundiced or pale.      Findings: No bruising or erythema.   Neurological:      Mental Status: She is alert. Mental status is at baseline.   Psychiatric:         Behavior: Behavior normal.         Thought Content: Thought content normal.         Judgment: Judgment " normal.         Lab Results   Component Value Date    TRIG 242 (H) 04/22/2021    HDL 47 (L) 04/22/2021    LDL 96 04/22/2021    VLDL 41 (H) 04/22/2021        Assessment/Plan   Medicare Risks and Personalized Health Plan  CMS Preventative Services Quick Reference  Advance Directive Discussion  Chronic Pain   Dementia/Memory   Depression/Dysphoria  Fall Risk  Immunizations Discussed/Encouraged (specific immunizations; COVID19 )  Inadequate Social Support, Isolation, Loneliness, Lack of Transportation, Financial Difficulties, or Caregiver Stress   Inactivity/Sedentary  Osteoporosis Risk  Polypharmacy    The above risks/problems have been discussed with the patient.  Pertinent information has been shared with the patient in the After Visit Summary.  Follow up plans and orders are seen below in the Assessment/Plan Section.    Diagnoses and all orders for this visit:    1. Medicare annual wellness visit, subsequent (Primary)    2. Depression screen    3. Age-related osteoporosis without current pathological fracture    4. Essential hypertension  -     cloNIDine (CATAPRES) 0.1 MG tablet; Take 1 tablet by mouth Daily As Needed for High Blood Pressure (systolic >180 or diastolic >110).  Dispense: 30 tablet; Refill: 2    In regards to her preventive care she is mostly up-to-date.  She reports having a DEXA scan recently at orthopedic [facility] and we will try to get records.  She is on Prolia per Manasa Pulido.  Depression screen was negative with PHQ 2 of 0.  She has received her COVID-19 vaccine.  She has polypharmacy but recently was able to come off of her Xanax.  She is on Lyrica and hydrocodone for pain managed by Matheus Funez.    Her blood pressure is well controlled.  I reviewed her medication list which is quite confusing.  She does not know exactly what she is on either.  We called her pharmacy and found out that her last prescription of Norvasc was early May, last prescription of Coreg was April 28 her last  prescription for lisinopril was in February, but this was a 90-day supply.  I have asked her to bring all of her medications with her to the next visit so we can ensure we have the most accurate records.    For the blood pressure we will continue her current regimen, but start taking clonidine on an as-needed basis for systolic more than 180 or diastolic more than 110.    She has lower extremity edema related to chronic venous stasis.  She does have compression stockings on today.  The edema is not pitting, but in the future she may benefit from an as needed dose of Lasix although I would be worried about causing dehydration or worsening renal function.    Follow-up in 3 months or sooner if clinically indicated  Follow Up:  Return in about 3 months (around 8/27/2021) for Recheck with Linsey.     An After Visit Summary and PPPS were given to the patient.

## 2021-06-18 ENCOUNTER — TELEPHONE (OUTPATIENT)
Dept: INTERNAL MEDICINE | Facility: CLINIC | Age: 81
End: 2021-06-18

## 2021-06-18 RX ORDER — AMLODIPINE BESYLATE 10 MG/1
10 TABLET ORAL DAILY
Qty: 30 TABLET | Refills: 5 | Status: SHIPPED | OUTPATIENT
Start: 2021-06-18 | End: 2021-07-30 | Stop reason: DRUGHIGH

## 2021-06-18 NOTE — TELEPHONE ENCOUNTER
Caller: [facility] - [place], [place] - [address] - [phone] [facility] [phone] FX    Relationship to patient: Pharmacy    Best call back number: [phone]    Patient is needing: Clarification on Amlodipine dosage.

## 2021-07-06 ENCOUNTER — TELEPHONE (OUTPATIENT)
Dept: INTERNAL MEDICINE | Facility: CLINIC | Age: 81
End: 2021-07-06

## 2021-07-06 NOTE — TELEPHONE ENCOUNTER
Spoke with patient and she stated that she will have to call back once she speaks to her daughter since the patient does not drive.

## 2021-07-06 NOTE — TELEPHONE ENCOUNTER
Caller: Carmen Obrien    Relationship: Self    Best call back number: [phone]      Who are you requesting to speak with (clinical staff, provider,  specific staff member): CLINICAL STAFF    What was the call regarding:    PATIENT IS CALLING IN STATES THAT SHE IS RETAINING FLUID. HER LEGS AND FEET ARE COMPLETELY SWOLLEN    SHE IS OUT OF nortriptyline (PAMELOR) 50 MG capsule SO IF THAT MEDICATION     HER STOMACH IS SWOLLEN AND SHE IS SHORT OF BREATHE AT TIMES.     REQUESTING A CALLBACK TO DISCUSS

## 2021-07-07 ENCOUNTER — OFFICE VISIT (OUTPATIENT)
Dept: INTERNAL MEDICINE | Facility: CLINIC | Age: 81
End: 2021-07-07

## 2021-07-07 ENCOUNTER — LAB (OUTPATIENT)
Dept: LAB | Facility: HOSPITAL | Age: 81
End: 2021-07-07

## 2021-07-07 ENCOUNTER — HOSPITAL ENCOUNTER (OUTPATIENT)
Dept: GENERAL RADIOLOGY | Facility: HOSPITAL | Age: 81
Discharge: HOME | End: 2021-07-07

## 2021-07-07 VITALS
HEART RATE: 64 BPM | HEIGHT: 60 IN | DIASTOLIC BLOOD PRESSURE: 62 MMHG | OXYGEN SATURATION: 96 % | SYSTOLIC BLOOD PRESSURE: 118 MMHG | WEIGHT: 187 LBS | BODY MASS INDEX: 36.71 KG/M2

## 2021-07-07 DIAGNOSIS — R60.0 LOWER EXTREMITY EDEMA: ICD-10-CM

## 2021-07-07 DIAGNOSIS — R14.0 ABDOMINAL DISTENTION: ICD-10-CM

## 2021-07-07 DIAGNOSIS — R06.02 SHORTNESS OF BREATH: ICD-10-CM

## 2021-07-07 DIAGNOSIS — R06.02 SHORTNESS OF BREATH: Primary | ICD-10-CM

## 2021-07-07 DIAGNOSIS — R11.10 REGURGITATION OF FOOD: ICD-10-CM

## 2021-07-07 LAB
ALBUMIN SERPL-MCNC: 4.1 G/DL (ref 3.5–5.2)
ALBUMIN/GLOB SERPL: 1.4 G/DL
ALP SERPL-CCNC: 53 U/L (ref 39–117)
ALT SERPL W P-5'-P-CCNC: 25 U/L (ref 1–33)
ANION GAP SERPL CALCULATED.3IONS-SCNC: 9 MMOL/L (ref 5–15)
AST SERPL-CCNC: 31 U/L (ref 1–32)
BILIRUB SERPL-MCNC: 0.6 MG/DL (ref 0–1.2)
BUN SERPL-MCNC: 16 MG/DL (ref 8–23)
BUN/CREAT SERPL: 25.8 (ref 7–25)
CALCIUM SPEC-SCNC: 10.2 MG/DL (ref 8.6–10.5)
CHLORIDE SERPL-SCNC: 102 MMOL/L (ref 98–107)
CO2 SERPL-SCNC: 27 MMOL/L (ref 22–29)
CREAT SERPL-MCNC: 0.62 MG/DL (ref 0.57–1)
GFR SERPL CREATININE-BSD FRML MDRD: 93 ML/MIN/1.73
GLOBULIN UR ELPH-MCNC: 3 GM/DL
GLUCOSE SERPL-MCNC: 113 MG/DL (ref 65–99)
NT-PROBNP SERPL-MCNC: 176.6 PG/ML (ref 0–1800)
POTASSIUM SERPL-SCNC: 4.2 MMOL/L (ref 3.5–5.2)
PROT SERPL-MCNC: 7.1 G/DL (ref 6–8.5)
SODIUM SERPL-SCNC: 138 MMOL/L (ref 136–145)

## 2021-07-07 PROCEDURE — 80053 COMPREHEN METABOLIC PANEL: CPT

## 2021-07-07 PROCEDURE — 99214 OFFICE O/P EST MOD 30 MIN: CPT | Performed by: NURSE PRACTITIONER

## 2021-07-07 PROCEDURE — 36415 COLL VENOUS BLD VENIPUNCTURE: CPT

## 2021-07-07 PROCEDURE — 93000 ELECTROCARDIOGRAM COMPLETE: CPT | Performed by: NURSE PRACTITIONER

## 2021-07-07 PROCEDURE — 83880 ASSAY OF NATRIURETIC PEPTIDE: CPT

## 2021-07-07 PROCEDURE — 71046 X-RAY EXAM CHEST 2 VIEWS: CPT

## 2021-07-07 PROCEDURE — 74018 RADEX ABDOMEN 1 VIEW: CPT

## 2021-07-07 RX ORDER — HYDROCHLOROTHIAZIDE 25 MG/1
25 TABLET ORAL AS NEEDED
COMMUNITY
End: 2021-07-07

## 2021-07-07 RX ORDER — FUROSEMIDE 20 MG/1
20 TABLET ORAL DAILY PRN
Qty: 30 TABLET | Refills: 0 | Status: SHIPPED | OUTPATIENT
Start: 2021-07-07 | End: 2021-07-22

## 2021-07-07 NOTE — PROGRESS NOTES
Chief Complaint   Patient presents with   • Shortness of Breath     c/o   • Edema     in stomach, feet and legs   • Constipation     c/o         History:  Carmen Obrien is a 81 y.o. female who presents today for evaluation of the above problems.          She is here for a couple of acute problems.  Yesterday she was on her feet a lot, hayley.  Her lower legs became extremely swollen below each knee to the toes, to the point they were weeping.  She wears compressions stockings, and the socks were too tight.  Took HCTZ 25 mg from an old prescription she had from Dr. Delgadillo twice yesterday.  Urinated a lot, swelling went down.  She was also feeling short of breath yesterday, especially when she was lying down.  She had to sleep upright on 3 pillows to breathe, but that is better now too.  She has not had any chest pain.  It is not unusual for her to swell sometimes and be short of breath like this, sometimes having to sleep on 3 pillows.    Feels food coming up sometimes after she eats 1-2x/week.  She will eat a meal then the whole mouthful of food comes back up into her mouth, and she has to spit it into a bowl.  It is not just acid reflux, it is the whole meal.  She is not throwing up actually.  It just comes back up into her throat.  Last year she had an upper endoscopy, and they dilated her esophagus.  She thinks this may need to be done again.  She has really bad constipation, has to take 3 stool softeners a day, and sometimes a dose of milk of magnesia to have a bowl movement.  Last colonoscopy last year.      ROS:  Review of Systems   As above    Allergies   Allergen Reactions   • Levaquin [Levofloxacin] Unknown (See Comments)     Pt doesn't remember   • Lyrica [Pregabalin] Unknown (See Comments)     Pt doesn't know   • Codeine Other (See Comments)     Pt does not recall   • Morphine And Related Other (See Comments)     Pt does not recall     Past Medical History:   Diagnosis Date   • Anxiety    • Arthritis     • CAD (coronary artery disease)    • Colon polyp    • DDD (degenerative disc disease), lumbar    • Deep venous thrombosis (CMS/HCC)    • Depression    • Diverticulosis    • Esophageal stricture    • GERD (gastroesophageal reflux disease)    • History of transfusion    • Hypercholesteremia    • Hypertension    • LPRD (laryngopharyngeal reflux disease)    • Macular degeneration    • Multinodular goiter 2/23/2017   • Osteoarthritis    • Pruritic disorder    • Spastic colon    • Thyroid nodule      Past Surgical History:   Procedure Laterality Date   • BLADDER REPAIR     • CATARACT EXTRACTION     • CHOLECYSTECTOMY     • COLONOSCOPY  04/22/2014    2 polyps, adenomatous, diverticulosis   • COLONOSCOPY N/A 6/26/2020    Procedure: COLONOSCOPY WITH ANESTHESIA;  Surgeon: John Coleman MD;  Location:  [facility];  Service: Gastroenterology;  Laterality: N/A;  pre op: constipation  post op: diverticulosis,   PCP: Adam Delgadillo MD   • CYSTOCELE REPAIR     • ENDOSCOPY  04/22/2014    Schatzki's ring dilated   • ENDOSCOPY N/A 6/26/2020    Procedure: ESOPHAGOGASTRODUODENOSCOPY WITH ANESTHESIA;  Surgeon: John Coleman MD;  Location: [facility];  Service: Gastroenterology;  Laterality: N/A;  pre op: dysphagia  post op:stricture, dilated   PCP:Adam Delgadillo MD   • FEMUR FRACTURE SURGERY     • HYSTERECTOMY     • REPLACEMENT TOTAL KNEE     • ULNAR NERVE TRANSPOSITION       Family History   Problem Relation Age of Onset   • Colon cancer Mother    • Colon polyps Neg Hx      Carmen  reports that she has never smoked. She has never used smokeless tobacco. She reports that she does not drink alcohol and does not use drugs.    I have reviewed and updated the above documentation (if necessary) including but not limited to chief complaint, ROS, PFSH, allergies and medications        Current Outpatient Medications:   •  amLODIPine (NORVASC) 10 MG tablet, Take 1 tablet by mouth Daily., Disp: 30 tablet, Rfl: 5  •   atorvastatin (LIPITOR) 10 MG tablet, Take 1 tablet by mouth Daily., Disp: 30 tablet, Rfl: 5  •  carvedilol (Coreg) 6.25 MG tablet, Take 1 tablet by mouth 2 (Two) Times a Day With Meals., Disp: 60 tablet, Rfl: 2  •  cetirizine (zyrTEC) 10 MG tablet, Take 10 mg by mouth Daily., Disp: , Rfl:   •  Cholecalciferol (VITAMIN D3) 125 MCG (5000 UT) capsule capsule, Vitamin D3 125 mcg (5,000 unit) tablet  Take by oral route., Disp: , Rfl:   •  ciclopirox (LOPROX) 0.77 % cream, Apply  topically to the appropriate area as directed 2 (Two) Times a Day., Disp: , Rfl:   •  cloNIDine (CATAPRES) 0.1 MG tablet, Take 1 tablet by mouth Daily As Needed for High Blood Pressure (systolic >180 or diastolic >110)., Disp: 30 tablet, Rfl: 2  •  Cyanocobalamin (Vitamin B-12) 1000 MCG sublingual tablet, Place 1 tablet under the tongue Daily., Disp: , Rfl:   •  cycloSPORINE (RESTASIS) 0.05 % ophthalmic emulsion, Restasis 0.05 % eye drops in a dropperette  INSTILL 1 DROP INTO AFFECTED EYE(S) BY OPHTHALMIC ROUTE EVERY 12 HOURS, Disp: , Rfl:   •  Dextran 70-Hypromellose, PF, ([employer] NATURAL TEARS) 0.1-0.3 % solution, Apply 1 drop to eye(s) as directed by provider 2 (Two) Times a Day As Needed (itchy or dry eyes)., Disp: 32 each, Rfl: 0  •  esomeprazole (nexIUM) 20 MG capsule, Take 1 capsule by mouth 2 (two) times a day., Disp: 60 capsule, Rfl: 11  •  FEROSUL 325 (65 Fe) MG tablet, Take 65 mg by mouth Daily With Breakfast., Disp: , Rfl:   •  HYDROcodone-acetaminophen (NORCO) 7.5-325 MG per tablet, Take 1 tablet by mouth Every 8 (Eight) Hours As Needed for Moderate Pain ., Disp: , Rfl:   •  hydrOXYzine (ATARAX) 25 MG tablet, Take 1 tablet by mouth 3 (Three) Times a Day As Needed for Anxiety., Disp: 90 tablet, Rfl: 5  •  linaclotide (LINZESS) 145 MCG capsule capsule, Linzess 145 mcg capsule  Take 1 capsule every day by oral route., Disp: , Rfl:   •  meloxicam (MOBIC) 15 MG tablet, Take 15 mg by mouth Daily As Needed., Disp: , Rfl:   •  Menthol, Topical  Analgesic, (BIOFREEZE EX), Apply  topically., Disp: , Rfl:   •  montelukast (SINGULAIR) 10 MG tablet, Take 1 tablet by mouth Every Night., Disp: 30 tablet, Rfl: 5  •  MULTIPLE VITAMINS-MINERALS ER PO, Take by mouth daily, Disp: , Rfl:   •  nortriptyline (PAMELOR) 50 MG capsule, Take 50 mg by mouth Every Night., Disp: , Rfl:   •  nystatin (NYSTOP) 278399 UNIT/GM powder powder, Apply 100,000 Units topically 2 (Two) Times a Day., Disp: , Rfl:   •  polyethyl glycol-propyl glycol (SYSTANE) 0.4-0.3 % solution ophthalmic solution, 2 drops., Disp: , Rfl:   •  pregabalin (LYRICA) 50 MG capsule, Take 50 mg by mouth Every 12 (Twelve) Hours As Needed., Disp: , Rfl:   •  Prolia 60 MG/ML solution prefilled syringe syringe, , Disp: , Rfl:   •  tolterodine LA (DETROL LA) 4 MG 24 hr capsule, Take 1 capsule by mouth Daily., Disp: , Rfl:   •  furosemide (Lasix) 20 MG tablet, Take 1 tablet by mouth Daily As Needed (swelling)., Disp: 30 tablet, Rfl: 0    PHQ-9 Depression Screening  Little interest or pleasure in doing things?     Feeling down, depressed, or hopeless?     Trouble falling or staying asleep, or sleeping too much?     Feeling tired or having little energy?     Poor appetite or overeating?     Feeling bad about yourself - or that you are a failure or have let yourself or your family down?     Trouble concentrating on things, such as reading the newspaper or watching television?     Moving or speaking so slowly that other people could have noticed? Or the opposite - being so fidgety or restless that you have been moving around a lot more than usual?     Thoughts that you would be better off dead, or of hurting yourself in some way?     PHQ-9 Total Score     If you checked off any problems, how difficult have these problems made it for you to do your work, take care of things at home, or get along with other people?       PHQ-9 Total Score:      OBJECTIVE:  Visit Vitals  /62 (BP Location: Right arm, Patient Position:  "Sitting, Cuff Size: Adult)   Pulse 64   Ht 152.4 cm (60\")   Wt 84.8 kg (187 lb)   SpO2 96%   BMI 36.52 kg/m²      Physical Exam  Vitals and nursing note reviewed.   Constitutional:       General: She is not in acute distress.     Appearance: Normal appearance. She is not ill-appearing or diaphoretic.   HENT:      Head: Atraumatic.   Cardiovascular:      Rate and Rhythm: Normal rate and regular rhythm.      Heart sounds: Normal heart sounds. No murmur heard.     Pulmonary:      Effort: Pulmonary effort is normal. No respiratory distress.      Breath sounds: Normal breath sounds. No stridor. No wheezing, rhonchi or rales.   Abdominal:      General: There is no distension.      Palpations: Abdomen is soft. There is no mass.      Tenderness: There is no abdominal tenderness.      Hernia: No hernia is present.   Musculoskeletal:         General: Swelling present. No tenderness, deformity or signs of injury. Normal range of motion.      Cervical back: Normal range of motion and neck supple.      Right lower leg: Edema (1+ edema, but soft; one area of weeping small amount; no cellulitis) present.      Left lower leg: Edema (1+ edema but soft) present.   Skin:     General: Skin is warm and dry.      Coloration: Skin is not jaundiced or pale.      Findings: No bruising, erythema, lesion or rash.   Neurological:      General: No focal deficit present.      Mental Status: She is alert and oriented to person, place, and time.   Psychiatric:         Mood and Affect: Mood normal.         Behavior: Behavior normal.         Thought Content: Thought content normal.         Judgment: Judgment normal.         [facility]    Assessment/Plan    Diagnoses and all orders for this visit:    1. Shortness of breath (Primary)  -     XR Chest PA & Lateral; Future  -     Comprehensive metabolic panel; Future  -     ECG 12 Lead  -     proBNP; Future  -     Adult Transthoracic Echo Complete W/ Cont if Necessary Per Protocol; Future  -     furosemide " (Lasix) 20 MG tablet; Take 1 tablet by mouth Daily As Needed (swelling).  Dispense: 30 tablet; Refill: 0    2. Lower extremity edema  -     XR Chest PA & Lateral; Future  -     Comprehensive metabolic panel; Future  -     ECG 12 Lead  -     proBNP; Future  -     Adult Transthoracic Echo Complete W/ Cont if Necessary Per Protocol; Future  -     furosemide (Lasix) 20 MG tablet; Take 1 tablet by mouth Daily As Needed (swelling).  Dispense: 30 tablet; Refill: 0    3. Abdominal distention  -     XR Abdomen KUB; Future    4. Regurgitation of food  -     Ambulatory Referral to Gastroenterology    Normal colonoscopy results from 6/26/20 noted in chart. Same day upper endo showed esophageal stenosis which was dilated during procedure without complication.    EKG was non-acute today.  Will order tests as above to assess for causes of edema and SOB.  As per last note, I will give her Lasix to take PRN, but I have encouraged her not to use it regularly.  We discussed continued use of compression stockings and monitoring how low she stands on her feet.  Always try to elevate feet when she is sitting.    Will refer to GI for evaluation of the regurgitation.  If her KUB shows constipation, consider Miralax regularly for management of the constipation.      Education materials and an After Visit Summary were printed and given to the patient at discharge.  Return for Next scheduled follow up.         ABDON Smith   08:42 CDT  7/7/2021

## 2021-07-07 NOTE — PATIENT INSTRUCTIONS
Shortness of Breath, Adult  Shortness of breath is when a person has trouble breathing enough air or when a person feels like she or he is having trouble breathing in enough air. Shortness of breath could be a sign of a medical problem.  Follow these instructions at home:    · Pay attention to any changes in your symptoms.  · Do not use any products that contain nicotine or tobacco, such as cigarettes, e-cigarettes, and chewing tobacco.  · Do not smoke. Smoking is a common cause of shortness of breath. If you need help quitting, ask your health care provider.  · Avoid things that can irritate your airways, such as:  ? Mold.  ? Dust.  ? Air pollution.  ? Chemical fumes.  ? Things that can cause allergy symptoms (allergens), if you have allergies.  · Keep your living space clean and free of mold and dust.  · Rest as needed. Slowly return to your usual activities.  · Take over-the-counter and prescription medicines only as told by your health care provider. This includes oxygen therapy and inhaled medicines.  · Keep all follow-up visits as told by your health care provider. This is important.  Contact a health care provider if:  · Your condition does not improve as soon as expected.  · You have a hard time doing your normal activities, even after you rest.  · You have new symptoms.  Get help right away if:  · Your shortness of breath gets worse.  · You have shortness of breath when you are resting.  · You feel light-headed or you faint.  · You have a cough that is not controlled with medicines.  · You cough up blood.  · You have pain with breathing.  · You have pain in your chest, arms, shoulders, or abdomen.  · You have a fever.  · You cannot walk up stairs or exercise the way that you normally do.  These symptoms may represent a serious problem that is an emergency. Do not wait to see if the symptoms will go away. Get medical help right away. Call your local emergency services (911 in the [place] Do not drive yourself  to the hospital.  Summary  · Shortness of breath is when a person has trouble breathing enough air. It can be a sign of a medical problem.  · Avoid things that irritate your lungs, such as smoking, pollution, mold, and dust.  · Pay attention to changes in your symptoms and contact your health care provider if you have a hard time completing daily activities because of shortness of breath.  This information is not intended to replace advice given to you by your health care provider. Make sure you discuss any questions you have with your health care provider.  Document Revised: 05/20/2019 Document Reviewed: 05/20/2019  Elsevier Patient Education © 2021 [employer]

## 2021-07-07 NOTE — PROGRESS NOTES
Spoke with patient regarding results. Noted understanding. She is going to try miralax. She has some at home.

## 2021-07-08 NOTE — PROGRESS NOTES
Procedure     ECG 12 Lead    Date/Time: 7/7/2021 9:28 AM  Performed by: Vianney Figueredo APRN  Authorized by: Vianney Figueredo APRN   Comparison: compared with previous ECG from 2/9/2019  Similar to previous ECG  Rhythm: sinus bradycardia  Rhythm comments: 58 bpm  ST Segments: ST segments normal  Other findings comments: Left axis deviation, meets criteria for LVH, consider normal variant    Clinical impression: abnormal EKG  Comments: No acute changes

## 2021-07-14 ENCOUNTER — TELEPHONE (OUTPATIENT)
Dept: INTERNAL MEDICINE | Facility: CLINIC | Age: 81
End: 2021-07-14

## 2021-07-15 ENCOUNTER — TELEPHONE (OUTPATIENT)
Dept: INTERNAL MEDICINE | Facility: CLINIC | Age: 81
End: 2021-07-15

## 2021-07-15 NOTE — TELEPHONE ENCOUNTER
Caller: Carmen Obrien    Relationship: Self    Best call back number: [phone] (H)    Medication needed:   nortriptyline (PAMELOR) 50 MG capsule IF DR QUINTANA WANTS HER TO CONTINUE MEDICATION     When do you need the refill by: SOON     What additional details did the patient provide when requesting the medication: STATES SHE HAS 3 DAYS LEFT OF THE MEDICATION AND WAS TOLD BY [employer]   THAT HER MEDICATION NORTRIPTYLINE COULD CAUSE SIDE AFFECTS ALONG WITH ZYRTEC AND (ATARAX)  REQUESTING TO BE ADVISED     Does the patient have less than a 3 day supply:  [] Yes  [x] No    What is the patient's preferred pharmacy:    [facility] - [place], [place] - [address] [phone] [facility] [phone]   [phone]

## 2021-07-16 RX ORDER — NORTRIPTYLINE HYDROCHLORIDE 50 MG/1
50 CAPSULE ORAL NIGHTLY
Qty: 30 CAPSULE | Refills: 5 | Status: SHIPPED | OUTPATIENT
Start: 2021-07-16 | End: 2022-03-08

## 2021-07-22 ENCOUNTER — TELEPHONE (OUTPATIENT)
Dept: INTERNAL MEDICINE | Facility: CLINIC | Age: 81
End: 2021-07-22

## 2021-07-22 ENCOUNTER — OFFICE VISIT (OUTPATIENT)
Dept: GASTROENTEROLOGY | Facility: CLINIC | Age: 81
End: 2021-07-22

## 2021-07-22 ENCOUNTER — OFFICE VISIT (OUTPATIENT)
Dept: INTERNAL MEDICINE | Facility: CLINIC | Age: 81
End: 2021-07-22

## 2021-07-22 ENCOUNTER — LAB (OUTPATIENT)
Dept: LAB | Facility: HOSPITAL | Age: 81
End: 2021-07-22

## 2021-07-22 VITALS — HEART RATE: 60 BPM | OXYGEN SATURATION: 95 % | SYSTOLIC BLOOD PRESSURE: 148 MMHG | DIASTOLIC BLOOD PRESSURE: 86 MMHG

## 2021-07-22 VITALS
BODY MASS INDEX: 35.3 KG/M2 | OXYGEN SATURATION: 98 % | TEMPERATURE: 97.1 F | WEIGHT: 187 LBS | DIASTOLIC BLOOD PRESSURE: 80 MMHG | SYSTOLIC BLOOD PRESSURE: 162 MMHG | HEART RATE: 62 BPM | HEIGHT: 61 IN

## 2021-07-22 DIAGNOSIS — R60.0 LOWER EXTREMITY EDEMA: Primary | ICD-10-CM

## 2021-07-22 DIAGNOSIS — R06.02 SHORTNESS OF BREATH: ICD-10-CM

## 2021-07-22 DIAGNOSIS — Z79.899 MEDICATION MANAGEMENT: ICD-10-CM

## 2021-07-22 DIAGNOSIS — R60.0 LOWER EXTREMITY EDEMA: ICD-10-CM

## 2021-07-22 DIAGNOSIS — R13.19 ESOPHAGEAL DYSPHAGIA: Primary | ICD-10-CM

## 2021-07-22 PROBLEM — M75.100 ROTATOR CUFF TEAR ARTHROPATHY: Status: ACTIVE | Noted: 2017-04-27

## 2021-07-22 PROBLEM — Z16.12 UTI DUE TO EXTENDED-SPECTRUM BETA LACTAMASE (ESBL) PRODUCING ESCHERICHIA COLI: Status: ACTIVE | Noted: 2019-06-03

## 2021-07-22 PROBLEM — I70.213 ATHEROSCLEROSIS OF NATIVE ARTERY OF BOTH LOWER EXTREMITIES WITH INTERMITTENT CLAUDICATION: Status: ACTIVE | Noted: 2017-08-11

## 2021-07-22 PROBLEM — Z96.659 LOOSE TOTAL KNEE ARTHROPLASTY: Status: ACTIVE | Noted: 2018-09-11

## 2021-07-22 PROBLEM — M12.819 ROTATOR CUFF TEAR ARTHROPATHY: Status: ACTIVE | Noted: 2017-04-27

## 2021-07-22 PROBLEM — Z87.59 HISTORY OF MATERNAL DEEP VEIN THROMBOSIS (DVT): Status: ACTIVE | Noted: 2019-04-25

## 2021-07-22 PROBLEM — T84.038A LOOSE TOTAL KNEE ARTHROPLASTY: Status: ACTIVE | Noted: 2018-09-11

## 2021-07-22 PROBLEM — D64.9 ANEMIA: Status: ACTIVE | Noted: 2019-05-30

## 2021-07-22 PROBLEM — R29.6 MULTIPLE FALLS: Status: ACTIVE | Noted: 2019-05-30

## 2021-07-22 PROBLEM — R73.9 HYPERGLYCEMIA: Status: ACTIVE | Noted: 2019-04-25

## 2021-07-22 PROBLEM — R62.7 FAILURE TO THRIVE IN ADULT: Status: ACTIVE | Noted: 2019-05-30

## 2021-07-22 PROBLEM — E87.1 HYPONATREMIA: Status: ACTIVE | Noted: 2019-04-25

## 2021-07-22 PROBLEM — D62 ACUTE BLOOD LOSS AS CAUSE OF POSTOPERATIVE ANEMIA: Status: ACTIVE | Noted: 2019-04-25

## 2021-07-22 PROBLEM — S46.919A SHOULDER STRAIN: Status: ACTIVE | Noted: 2017-12-18

## 2021-07-22 PROBLEM — T76.01XA SUSPECTED ELDER NEGLECT: Status: ACTIVE | Noted: 2019-07-03

## 2021-07-22 PROBLEM — Z86.718 HISTORY OF MATERNAL DEEP VEIN THROMBOSIS (DVT): Status: ACTIVE | Noted: 2019-04-25

## 2021-07-22 PROBLEM — M25.369 UNSTABLE KNEE: Status: ACTIVE | Noted: 2018-02-19

## 2021-07-22 PROBLEM — N39.0 UTI DUE TO EXTENDED-SPECTRUM BETA LACTAMASE (ESBL) PRODUCING ESCHERICHIA COLI: Status: ACTIVE | Noted: 2019-06-03

## 2021-07-22 PROBLEM — B96.29 UTI DUE TO EXTENDED-SPECTRUM BETA LACTAMASE (ESBL) PRODUCING ESCHERICHIA COLI: Status: ACTIVE | Noted: 2019-06-03

## 2021-07-22 LAB
ANION GAP SERPL CALCULATED.3IONS-SCNC: 13 MMOL/L (ref 5–15)
BUN SERPL-MCNC: 13 MG/DL (ref 8–23)
BUN/CREAT SERPL: 22 (ref 7–25)
CALCIUM SPEC-SCNC: 9.9 MG/DL (ref 8.6–10.5)
CHLORIDE SERPL-SCNC: 101 MMOL/L (ref 98–107)
CO2 SERPL-SCNC: 23 MMOL/L (ref 22–29)
CREAT SERPL-MCNC: 0.59 MG/DL (ref 0.57–1)
GFR SERPL CREATININE-BSD FRML MDRD: 98 ML/MIN/1.73
GLUCOSE SERPL-MCNC: 128 MG/DL (ref 65–99)
POTASSIUM SERPL-SCNC: 4.1 MMOL/L (ref 3.5–5.2)
SODIUM SERPL-SCNC: 137 MMOL/L (ref 136–145)
TSH SERPL DL<=0.05 MIU/L-ACNC: 1.32 UIU/ML (ref 0.27–4.2)

## 2021-07-22 PROCEDURE — 80048 BASIC METABOLIC PNL TOTAL CA: CPT

## 2021-07-22 PROCEDURE — 99214 OFFICE O/P EST MOD 30 MIN: CPT | Performed by: NURSE PRACTITIONER

## 2021-07-22 PROCEDURE — 36415 COLL VENOUS BLD VENIPUNCTURE: CPT

## 2021-07-22 PROCEDURE — 84443 ASSAY THYROID STIM HORMONE: CPT

## 2021-07-22 RX ORDER — NABUMETONE 500 MG/1
500 TABLET, FILM COATED ORAL 2 TIMES DAILY PRN
COMMUNITY
End: 2021-07-30

## 2021-07-22 RX ORDER — LISINOPRIL 40 MG/1
40 TABLET ORAL DAILY
COMMUNITY
End: 2021-07-30

## 2021-07-22 RX ORDER — FUROSEMIDE 40 MG/1
40 TABLET ORAL DAILY
Qty: 30 TABLET | Refills: 2 | Status: ON HOLD | OUTPATIENT
Start: 2021-07-22 | End: 2021-11-05

## 2021-07-22 RX ORDER — CEPHALEXIN 500 MG/1
CAPSULE ORAL
COMMUNITY
Start: 2021-07-15 | End: 2021-07-30

## 2021-07-22 RX ORDER — FLUTICASONE PROPIONATE 50 MCG
2 SPRAY, SUSPENSION (ML) NASAL DAILY PRN
COMMUNITY
End: 2022-11-07 | Stop reason: HOSPADM

## 2021-07-22 RX ORDER — FOLIC ACID 1 MG/1
1 TABLET ORAL DAILY
COMMUNITY
End: 2021-07-30

## 2021-07-22 RX ORDER — HYDROCHLOROTHIAZIDE 25 MG/1
25 TABLET ORAL DAILY
COMMUNITY
End: 2021-07-30

## 2021-07-22 RX ORDER — HYDROXYZINE HYDROCHLORIDE 25 MG/1
25 TABLET, FILM COATED ORAL 3 TIMES DAILY PRN
COMMUNITY
End: 2021-11-11 | Stop reason: HOSPADM

## 2021-07-22 NOTE — PATIENT INSTRUCTIONS
Go get lab today after leaving office.  PLease call us back with exactly which medications you are taking.  Dr. Antoine Morales is the name of the rheumatologist we were talking about.    Scheduling should be calling you to get your echo and lower extremity ultrasound scheduled quickly.

## 2021-07-22 NOTE — PROGRESS NOTES
Chief Complaint   Patient presents with   • Edema     legs and feet         History:  Carmen Obrien is a 81 y.o. female who presents today for evaluation of the above problems.          Here for follow up on bilateral lower extremity edema and dyspnea.  Taking Lasix 20 mg daily since last office visit on 7/7/21.  Her legs continue to swell from the knee down, but not so badly that they are weeping like they were a few weeks ago.  She is not having respiratory difficulty, but she does get short of breath with exertion.  She still has to sleep with head elevated on 3 pillows at times. She says she has not been keeping her feet elevated; worked in yard a long time yesterday.  She is wearing compression stockings regularly.    On Keflex for toe infection left second toe from foot doctor.    At her last visit, I ordered BMP, BNP, and CXR, all of which were normal. KUB was also ordered for regurgitation of food problems.  KUB showed moderate constipation.  She saw GI this morning and is scheduled for esophagram.  I had ordered an echocardiogram, and this is scheduled for 8/27/21.    She is somewhat confused about which medications she is taking and what she is supposed to be taking.  She says a pharmacist from her insurance company called her and told her some of her medications should be discontinued.  She brings a piece of notebook paper full of lists of medications that have been prescribed in the past, several things marked out and re-written--very confusing.      ROS:  Review of Systems  As above    Allergies   Allergen Reactions   • Levaquin [Levofloxacin] Unknown (See Comments)     Pt doesn't remember   • Codeine Other (See Comments)     Pt does not recall   • Morphine And Related Other (See Comments)     Pt does not recall     Past Medical History:   Diagnosis Date   • Anxiety    • Arthritis    • CAD (coronary artery disease)    • Colon polyp    • DDD (degenerative disc disease), lumbar    • Deep venous  thrombosis (CMS/HCC)    • Depression    • Diverticulosis    • Esophageal stricture    • GERD (gastroesophageal reflux disease)    • History of transfusion    • Hypercholesteremia    • Hypertension    • LPRD (laryngopharyngeal reflux disease)    • Macular degeneration    • Multinodular goiter 2/23/2017   • Osteoarthritis    • Pruritic disorder    • Spastic colon    • Thyroid nodule      Past Surgical History:   Procedure Laterality Date   • BLADDER REPAIR     • CATARACT EXTRACTION     • CHOLECYSTECTOMY     • COLONOSCOPY  04/22/2014    2 polyps, adenomatous, diverticulosis   • COLONOSCOPY N/A 6/26/2020    Procedure: COLONOSCOPY WITH ANESTHESIA;  Surgeon: John Coleman MD;  Location:  [facility];  Service: Gastroenterology;  Laterality: N/A;  pre op: constipation  post op: diverticulosis,   PCP: Adam Delgadillo MD   • CYSTOCELE REPAIR     • ENDOSCOPY  04/22/2014    Schatzki's ring dilated   • ENDOSCOPY N/A 6/26/2020    Procedure: ESOPHAGOGASTRODUODENOSCOPY WITH ANESTHESIA;  Surgeon: John Coleman MD;  Location:  [facility];  Service: Gastroenterology;  Laterality: N/A;  pre op: dysphagia  post op:stricture, dilated   PCP:Adam Delgadillo MD   • FEMUR FRACTURE SURGERY     • HYSTERECTOMY     • REPLACEMENT TOTAL KNEE     • ULNAR NERVE TRANSPOSITION       Family History   Problem Relation Age of Onset   • Colon cancer Mother    • Colon polyps Neg Hx    • Esophageal cancer Neg Hx      Carmen  reports that she has never smoked. She has never used smokeless tobacco. She reports that she does not drink alcohol and does not use drugs.    I have reviewed and updated the above documentation (if necessary) including but not limited to chief complaint, ROS, PFSH, allergies and medications        Current Outpatient Medications:   •  amLODIPine (NORVASC) 10 MG tablet, Take 1 tablet by mouth Daily., Disp: 30 tablet, Rfl: 5  •  atorvastatin (LIPITOR) 10 MG tablet, Take 1 tablet by mouth Daily., Disp: 30  tablet, Rfl: 5  •  Calcium Carbonate-Vitamin D (CALTRATE 600+D PO), Take  by mouth., Disp: , Rfl:   •  carvedilol (Coreg) 6.25 MG tablet, Take 1 tablet by mouth 2 (Two) Times a Day With Meals., Disp: 60 tablet, Rfl: 2  •  cephalexin (KEFLEX) 500 MG capsule, TAKE 1 CAPSULE BY MOUTH TWO TIMES A DAY FOR 10 DAYS, Disp: , Rfl:   •  cetirizine (zyrTEC) 10 MG tablet, Take 10 mg by mouth Daily., Disp: , Rfl:   •  Cholecalciferol (VITAMIN D3) 125 MCG (5000 UT) capsule capsule, Vitamin D3 125 mcg (5,000 unit) tablet  Take by oral route., Disp: , Rfl:   •  ciclopirox (LOPROX) 0.77 % cream, Apply  topically to the appropriate area as directed 2 (Two) Times a Day., Disp: , Rfl:   •  cloNIDine (CATAPRES) 0.1 MG tablet, Take 1 tablet by mouth Daily As Needed for High Blood Pressure (systolic >180 or diastolic >110)., Disp: 30 tablet, Rfl: 2  •  Cyanocobalamin (Vitamin B-12) 1000 MCG sublingual tablet, Place 1 tablet under the tongue Daily., Disp: , Rfl:   •  cycloSPORINE (RESTASIS) 0.05 % ophthalmic emulsion, Restasis 0.05 % eye drops in a dropperette  INSTILL 1 DROP INTO AFFECTED EYE(S) BY OPHTHALMIC ROUTE EVERY 12 HOURS, Disp: , Rfl:   •  Dextran 70-Hypromellose, PF, ([employer] NATURAL TEARS) 0.1-0.3 % solution, Apply 1 drop to eye(s) as directed by provider 2 (Two) Times a Day As Needed (itchy or dry eyes)., Disp: 32 each, Rfl: 0  •  docusate sodium (COLACE) 50 MG capsule, Take  by mouth 2 (Two) Times a Day., Disp: , Rfl:   •  esomeprazole (nexIUM) 20 MG capsule, Take 1 capsule by mouth 2 (two) times a day., Disp: 60 capsule, Rfl: 11  •  FEROSUL 325 (65 Fe) MG tablet, Take 65 mg by mouth Daily With Breakfast., Disp: , Rfl:   •  fluticasone (FLONASE) 50 MCG/ACT nasal spray, 2 sprays into the nostril(s) as directed by provider Daily., Disp: , Rfl:   •  folic acid (FOLVITE) 1 MG tablet, Take 1 mg by mouth Daily., Disp: , Rfl:   •  furosemide (Lasix) 40 MG tablet, Take 1 tablet by mouth Daily., Disp: 30 tablet, Rfl: 2  •   HYDROcodone-acetaminophen (NORCO) 7.5-325 MG per tablet, Take 1 tablet by mouth Every 8 (Eight) Hours As Needed for Moderate Pain ., Disp: , Rfl:   •  hydrOXYzine (ATARAX) 25 MG tablet, Take 25 mg by mouth 3 (Three) Times a Day As Needed for Itching., Disp: , Rfl:   •  linaclotide (LINZESS) 145 MCG capsule capsule, Linzess 145 mcg capsule  Take 1 capsule every day by oral route., Disp: , Rfl:   •  lisinopril (PRINIVIL,ZESTRIL) 40 MG tablet, Take 40 mg by mouth Daily., Disp: , Rfl:   •  meloxicam (MOBIC) 15 MG tablet, Take 15 mg by mouth Daily As Needed., Disp: , Rfl:   •  Menthol, Topical Analgesic, (BIOFREEZE EX), Apply  topically., Disp: , Rfl:   •  montelukast (SINGULAIR) 10 MG tablet, Take 1 tablet by mouth Every Night., Disp: 30 tablet, Rfl: 5  •  MULTIPLE VITAMINS-MINERALS ER PO, Take by mouth daily, Disp: , Rfl:   •  nortriptyline (PAMELOR) 50 MG capsule, Take 1 capsule by mouth Every Night., Disp: 30 capsule, Rfl: 5  •  nystatin (NYSTOP) 173709 UNIT/GM powder powder, Apply 100,000 Units topically 2 (Two) Times a Day., Disp: , Rfl:   •  polyethyl glycol-propyl glycol (SYSTANE) 0.4-0.3 % solution ophthalmic solution, 2 drops., Disp: , Rfl:   •  pregabalin (LYRICA) 50 MG capsule, Take 50 mg by mouth Every 12 (Twelve) Hours As Needed., Disp: , Rfl:   •  Prolia 60 MG/ML solution prefilled syringe syringe, , Disp: , Rfl:   •  tolterodine LA (DETROL LA) 4 MG 24 hr capsule, Take 1 capsule by mouth Daily., Disp: , Rfl:   •  hydroCHLOROthiazide (HYDRODIURIL) 25 MG tablet, Take 25 mg by mouth Daily., Disp: , Rfl:   •  nabumetone (RELAFEN) 500 MG tablet, Take 500 mg by mouth 2 (Two) Times a Day As Needed for Mild Pain ., Disp: , Rfl:     PHQ-9 Depression Screening  Little interest or pleasure in doing things?     Feeling down, depressed, or hopeless?     Trouble falling or staying asleep, or sleeping too much?     Feeling tired or having little energy?     Poor appetite or overeating?     Feeling bad about yourself - or  that you are a failure or have let yourself or your family down?     Trouble concentrating on things, such as reading the newspaper or watching television?     Moving or speaking so slowly that other people could have noticed? Or the opposite - being so fidgety or restless that you have been moving around a lot more than usual?     Thoughts that you would be better off dead, or of hurting yourself in some way?     PHQ-9 Total Score     If you checked off any problems, how difficult have these problems made it for you to do your work, take care of things at home, or get along with other people?       PHQ-9 Total Score:      OBJECTIVE:  Visit Vitals  /86 (BP Location: Right arm, Patient Position: Sitting, Cuff Size: Adult)   Pulse 60   SpO2 95%      Physical Exam  Vitals and nursing note reviewed.   Constitutional:       General: She is not in acute distress.     Appearance: Normal appearance. She is not ill-appearing, toxic-appearing or diaphoretic.      Comments: Very pleasant, conversational, appropriate, no distress   HENT:      Head: Normocephalic and atraumatic.   Cardiovascular:      Rate and Rhythm: Normal rate and regular rhythm.      Heart sounds: Murmur (1/6 systolic murmur appreciated) heard.     Pulmonary:      Effort: Pulmonary effort is normal. No respiratory distress.      Breath sounds: Normal breath sounds. No stridor. No wheezing, rhonchi or rales.   Abdominal:      General: There is no distension.      Palpations: Abdomen is soft.      Tenderness: There is no abdominal tenderness.   Musculoskeletal:         General: Swelling present. Normal range of motion.      Cervical back: Neck supple. No rigidity or tenderness.      Right lower leg: Edema (1+ edema from knee-down, soft, no weeping, no calf tenderness) present.      Left lower leg: Edema (1+ edema, knee down, no weeping, no calf tenderness) present.   Lymphadenopathy:      Cervical: No cervical adenopathy.   Skin:     General: Skin is  warm and dry.      Findings: Lesion (small blister left second toe around the nail, no cellulitis) present.   Neurological:      General: No focal deficit present.      Mental Status: She is alert and oriented to person, place, and time.   Psychiatric:         Mood and Affect: Mood normal.         Behavior: Behavior normal.         Thought Content: Thought content normal.         Judgment: Judgment normal.         [facility]    Assessment/Plan    Diagnoses and all orders for this visit:    1. Lower extremity edema (Primary)  -     US venous doppler lower extremity bilateral (duplex); Future  -     Basic metabolic panel; Future  -     TSH; Future  -     furosemide (Lasix) 40 MG tablet; Take 1 tablet by mouth Daily.  Dispense: 30 tablet; Refill: 2  -     Adult Transthoracic Echo Complete W/ Cont if Necessary Per Protocol; Future    2. Medication management  -     Basic metabolic panel; Future    3. Shortness of breath  -     furosemide (Lasix) 40 MG tablet; Take 1 tablet by mouth Daily.  Dispense: 30 tablet; Refill: 2  -     Adult Transthoracic Echo Complete W/ Cont if Necessary Per Protocol; Future    We have called and asked scheduling to get the echo done more urgently; they asked me to re-order as stat, and I have done this.  I told patient to let us know in the next few days if she hasn't heard from scheduling about echo and US venous lower extremities.      I feel her swelling is likely worsened by staying on her feet, and possibly aggravated by the Norvasc 10 mg daily.  I have asked her to go home, get out the bottles of the medications she is actually taking at this time, and call us back so we can be sure to know what she is taking before making adjustments. I did spend about 5 minutes trying to go down her list with her, and the paper she has is very confusing. She does know she is not taking HCTZ any longer.     I am increasing her Lasix to 40 mg daily and will plan to see her back in 1 week for follow up and  BMP.    Education materials and an After Visit Summary were printed and given to the patient at discharge.  Return in about 1 week (around 7/29/2021) for Recheck.         ABDON Smith   09:58 CDT  7/22/2021

## 2021-07-22 NOTE — PROGRESS NOTES
[facility] GASTROENTEROLOGY - OFFICE NOTE    7/22/2021    Carmen Obrien   1940    Primary Physician: VERA Noble MD    Chief Complaint   Patient presents with   • Difficulty Swallowing         HISTORY OF PRESENT ILLNESS:      Carmen Obrien is a 81 y.o. female presents with dysphagia/regurgitation. Occurs intermittently.  Takes nexium twice daily.  She had egd last year for same symptoms and had esophageal dilation that helped. Started having trouble again with dysphagia in the last 2-3 months.  This is noted with solid foods and points to the upper chest area where this occurs.  No heartburn. No fever. No hematemesis.     She has had sob and lower extremity swelling. Saw her pcp 7/7/21 echo ordered, labs, and kub.      KUB 7/7/21 noted moderate to large amount of stool.       Last upper endoscopy June 2020 for dysphagia noting benign esophageal stenosis which was dilated.    Past Medical History:   Diagnosis Date   • Anxiety    • Arthritis    • CAD (coronary artery disease)    • Colon polyp    • DDD (degenerative disc disease), lumbar    • Deep venous thrombosis (CMS/HCC)    • Depression    • Diverticulosis    • Esophageal stricture    • GERD (gastroesophageal reflux disease)    • History of transfusion    • Hypercholesteremia    • Hypertension    • LPRD (laryngopharyngeal reflux disease)    • Macular degeneration    • Multinodular goiter 2/23/2017   • Osteoarthritis    • Pruritic disorder    • Spastic colon    • Thyroid nodule        Past Surgical History:   Procedure Laterality Date   • BLADDER REPAIR     • CATARACT EXTRACTION     • CHOLECYSTECTOMY     • COLONOSCOPY  04/22/2014    2 polyps, adenomatous, diverticulosis   • COLONOSCOPY N/A 6/26/2020    Procedure: COLONOSCOPY WITH ANESTHESIA;  Surgeon: John Coleman MD;  Location: [facility] END[facility]COPY;  Service: Gastroenterology;  Laterality: N/A;  pre op: constipation  post op: diverticulosis,   PCP: Adam Delgadillo MD   • CYSTOCELE REPAIR      • ENDOSCOPY  04/22/2014    Schatzki's ring dilated   • ENDOSCOPY N/A 6/26/2020    Procedure: ESOPHAGOGASTRODUODENOSCOPY WITH ANESTHESIA;  Surgeon: John Cloeman MD;  Location: [facility] ENDOSCOPY;  Service: Gastroenterology;  Laterality: N/A;  pre op: dysphagia  post op:stricture, dilated   PCP:Adam Delgadillo MD   • FEMUR FRACTURE SURGERY     • HYSTERECTOMY     • REPLACEMENT TOTAL KNEE     • ULNAR NERVE TRANSPOSITION         Outpatient Medications Marked as Taking for the 7/22/21 encounter (Office Visit) with Mecca Lunsford APRN   Medication Sig Dispense Refill   • amLODIPine (NORVASC) 10 MG tablet Take 1 tablet by mouth Daily. 30 tablet 5   • atorvastatin (LIPITOR) 10 MG tablet Take 1 tablet by mouth Daily. 30 tablet 5   • Calcium Carbonate-Vitamin D (CALTRATE 600+D PO) Take  by mouth.     • carvedilol (Coreg) 6.25 MG tablet Take 1 tablet by mouth 2 (Two) Times a Day With Meals. 60 tablet 2   • cetirizine (zyrTEC) 10 MG tablet Take 10 mg by mouth Daily.     • Cholecalciferol (VITAMIN D3) 125 MCG (5000 UT) capsule capsule Vitamin D3 125 mcg (5,000 unit) tablet   Take by oral route.     • ciclopirox (LOPROX) 0.77 % cream Apply  topically to the appropriate area as directed 2 (Two) Times a Day.     • cloNIDine (CATAPRES) 0.1 MG tablet Take 1 tablet by mouth Daily As Needed for High Blood Pressure (systolic >180 or diastolic >110). 30 tablet 2   • Cyanocobalamin (Vitamin B-12) 1000 MCG sublingual tablet Place 1 tablet under the tongue Daily.     • cycloSPORINE (RESTASIS) 0.05 % ophthalmic emulsion Restasis 0.05 % eye drops in a dropperette   INSTILL 1 DROP INTO AFFECTED EYE(S) BY OPHTHALMIC ROUTE EVERY 12 HOURS     • Dextran 70-Hypromellose, PF, ([employer] NATURAL TEARS) 0.1-0.3 % solution Apply 1 drop to eye(s) as directed by provider 2 (Two) Times a Day As Needed (itchy or dry eyes). 32 each 0   • docusate sodium (COLACE) 50 MG capsule Take  by mouth 2 (Two) Times a Day.     • esomeprazole (nexIUM) 20 MG capsule  Take 1 capsule by mouth 2 (two) times a day. 60 capsule 11   • FEROSUL 325 (65 Fe) MG tablet Take 65 mg by mouth Daily With Breakfast.     • fluticasone (FLONASE) 50 MCG/ACT nasal spray 2 sprays into the nostril(s) as directed by provider Daily.     • folic acid (FOLVITE) 1 MG tablet Take 1 mg by mouth Daily.     • furosemide (Lasix) 20 MG tablet Take 1 tablet by mouth Daily As Needed (swelling). 30 tablet 0   • hydroCHLOROthiazide (HYDRODIURIL) 25 MG tablet Take 25 mg by mouth Daily.     • HYDROcodone-acetaminophen (NORCO) 7.5-325 MG per tablet Take 1 tablet by mouth Every 8 (Eight) Hours As Needed for Moderate Pain .     • hydrOXYzine (ATARAX) 25 MG tablet Take 25 mg by mouth 3 (Three) Times a Day As Needed for Itching.     • linaclotide (LINZESS) 145 MCG capsule capsule Linzess 145 mcg capsule   Take 1 capsule every day by oral route.     • lisinopril (PRINIVIL,ZESTRIL) 40 MG tablet Take 40 mg by mouth Daily.     • meloxicam (MOBIC) 15 MG tablet Take 15 mg by mouth Daily As Needed.     • Menthol, Topical Analgesic, (BIOFREEZE EX) Apply  topically.     • montelukast (SINGULAIR) 10 MG tablet Take 1 tablet by mouth Every Night. 30 tablet 5   • MULTIPLE VITAMINS-MINERALS ER PO Take by mouth daily     • nabumetone (RELAFEN) 500 MG tablet Take 500 mg by mouth 2 (Two) Times a Day As Needed for Mild Pain .     • nortriptyline (PAMELOR) 50 MG capsule Take 1 capsule by mouth Every Night. 30 capsule 5   • nystatin (NYSTOP) 480767 UNIT/GM powder powder Apply 100,000 Units topically 2 (Two) Times a Day.     • polyethyl glycol-propyl glycol (SYSTANE) 0.4-0.3 % solution ophthalmic solution 2 drops.     • pregabalin (LYRICA) 50 MG capsule Take 50 mg by mouth Every 12 (Twelve) Hours As Needed.     • Prolia 60 MG/ML solution prefilled syringe syringe      • tolterodine LA (DETROL LA) 4 MG 24 hr capsule Take 1 capsule by mouth Daily.         Allergies   Allergen Reactions   • Levaquin [Levofloxacin] Unknown (See Comments)     Pt  "doesn't remember   • Codeine Other (See Comments)     Pt does not recall   • Morphine And Related Other (See Comments)     Pt does not recall       Social History     Socioeconomic History   • Marital status:      Spouse name: Not on file   • Number of children: Not on file   • Years of education: Not on file   • Highest education level: Not on file   Tobacco Use   • Smoking status: Never Smoker   • Smokeless tobacco: Never Used   Substance and Sexual Activity   • Alcohol use: No   • Drug use: No   • Sexual activity: Defer       Family History   Problem Relation Age of Onset   • Colon cancer Mother    • Colon polyps Neg Hx    • Esophageal cancer Neg Hx        Review of Systems   Constitutional: Negative for chills, fever and unexpected weight change.   Respiratory: Negative for cough, shortness of breath and wheezing.    Cardiovascular: Negative for chest pain and palpitations.   Gastrointestinal: Negative for abdominal distention, abdominal pain, anal bleeding, blood in stool, constipation, diarrhea, nausea and vomiting.        Vitals:    07/22/21 0838   BP: 162/80   Pulse: 62   Temp: 97.1 °F (36.2 °C)   SpO2: 98%   Weight: 84.8 kg (187 lb)   Height: 155 cm (61.02\")      Body mass index is 35.31 kg/m².    Physical Exam  Vitals reviewed.   Constitutional:       General: She is not in acute distress.  Cardiovascular:      Rate and Rhythm: Normal rate and regular rhythm.      Heart sounds: Normal heart sounds.   Pulmonary:      Effort: Pulmonary effort is normal.      Breath sounds: Normal breath sounds.   Abdominal:      General: Bowel sounds are normal. There is no distension.      Palpations: Abdomen is soft.      Tenderness: There is no abdominal tenderness.   Skin:     General: Skin is warm and dry.   Neurological:      Mental Status: She is alert.         Results for orders placed or performed in visit on 07/07/21   proBNP    Specimen: Blood   Result Value Ref Range    proBNP 176.6 0.0-1,800.0 pg/mL "           ASSESSMENT AND PLAN    Assessment/Plan     Diagnoses and all orders for this visit:    1. Esophageal dysphagia (Primary)  -     FL Esophagram Complete Single Contrast       Differential diagnoses discussed.   Recommend cut food small pieces and chew well. I would recommend esophagram and she is agreeable.  I will contact patient with results.  I discussed pursuing egd but I would like for her to have echo first. She is breathing much better at this time. Recommend continue ppi twice daily.                    Mecca Lunsford, APRN

## 2021-07-23 ENCOUNTER — TRANSCRIBE ORDERS (OUTPATIENT)
Dept: LAB | Facility: HOSPITAL | Age: 81
End: 2021-07-23

## 2021-07-23 DIAGNOSIS — Z01.818 PREOPERATIVE TESTING: Primary | ICD-10-CM

## 2021-07-26 ENCOUNTER — LAB (OUTPATIENT)
Dept: LAB | Facility: HOSPITAL | Age: 81
End: 2021-07-26

## 2021-07-26 LAB — SARS-COV-2 ORF1AB RESP QL NAA+PROBE: NOT DETECTED

## 2021-07-26 PROCEDURE — U0004 COV-19 TEST NON-CDC HGH THRU: HCPCS | Performed by: NURSE PRACTITIONER

## 2021-07-26 PROCEDURE — C9803 HOPD COVID-19 SPEC COLLECT: HCPCS | Performed by: NURSE PRACTITIONER

## 2021-07-28 ENCOUNTER — TELEPHONE (OUTPATIENT)
Dept: INTERNAL MEDICINE | Facility: CLINIC | Age: 81
End: 2021-07-28

## 2021-07-28 NOTE — TELEPHONE ENCOUNTER
PATIENT HAS CALLED, SHE IS SCHEDULED FOR 3 APPOINTMENTS ON 07/29/21 AND SCHEDULED TO SEE LAURIE COPPOLA AT 0930 AS WELL.  SHE IS ASKING IF THIS APPOINTMENT COULD BE RESCHEDULED FOR A DIFFERENT DAY?  PLEASE ADVISE.   PATIENT STATED SHE WAS COMING IN FOR HER LEGS.

## 2021-07-28 NOTE — TELEPHONE ENCOUNTER
Yes absolutely.  We can change it to a day near that day that would be better for her.  Will you also please ask her to bring the medicine bottles of exactly what she is taking daily when she comes? We need to be sure we have all of her medicines and doses straightened out. Thank you!

## 2021-07-29 ENCOUNTER — APPOINTMENT (OUTPATIENT)
Dept: ULTRASOUND IMAGING | Facility: HOSPITAL | Age: 81
End: 2021-07-29

## 2021-07-29 ENCOUNTER — HOSPITAL ENCOUNTER (OUTPATIENT)
Dept: GENERAL RADIOLOGY | Facility: HOSPITAL | Age: 81
Discharge: HOME | End: 2021-07-29

## 2021-07-29 ENCOUNTER — HOSPITAL ENCOUNTER (OUTPATIENT)
Dept: ULTRASOUND IMAGING | Facility: HOSPITAL | Age: 81
Discharge: HOME | End: 2021-07-29

## 2021-07-29 ENCOUNTER — HOSPITAL ENCOUNTER (OUTPATIENT)
Dept: CARDIOLOGY | Facility: HOSPITAL | Age: 81
Discharge: HOME | End: 2021-07-29

## 2021-07-29 DIAGNOSIS — R60.0 LOWER EXTREMITY EDEMA: ICD-10-CM

## 2021-07-29 DIAGNOSIS — I35.1 AORTIC VALVE INSUFFICIENCY, ETIOLOGY OF CARDIAC VALVE DISEASE UNSPECIFIED: Primary | ICD-10-CM

## 2021-07-29 DIAGNOSIS — R06.02 SHORTNESS OF BREATH: ICD-10-CM

## 2021-07-29 LAB
BH CV ECHO MEAS - AO MAX PG (FULL): 3.5 MMHG
BH CV ECHO MEAS - AO MAX PG: 10.8 MMHG
BH CV ECHO MEAS - AO MEAN PG (FULL): 3 MMHG
BH CV ECHO MEAS - AO MEAN PG: 6 MMHG
BH CV ECHO MEAS - AO ROOT AREA (BSA CORRECTED): 1.6
BH CV ECHO MEAS - AO ROOT AREA: 7.1 CM^2
BH CV ECHO MEAS - AO ROOT DIAM: 3 CM
BH CV ECHO MEAS - AO V2 MAX: 164 CM/SEC
BH CV ECHO MEAS - AO V2 MEAN: 113 CM/SEC
BH CV ECHO MEAS - AO V2 VTI: 35.9 CM
BH CV ECHO MEAS - AVA(I,A): 2.6 CM^2
BH CV ECHO MEAS - AVA(I,D): 2.6 CM^2
BH CV ECHO MEAS - AVA(V,A): 2.3 CM^2
BH CV ECHO MEAS - AVA(V,D): 2.3 CM^2
BH CV ECHO MEAS - BSA(HAYCOCK): 1.9 M^2
BH CV ECHO MEAS - BSA: 1.8 M^2
BH CV ECHO MEAS - BZI_BMI: 35 KILOGRAMS/M^2
BH CV ECHO MEAS - BZI_METRIC_HEIGHT: 154.9 CM
BH CV ECHO MEAS - BZI_METRIC_WEIGHT: 83.9 KG
BH CV ECHO MEAS - EDV(CUBED): 46.7 ML
BH CV ECHO MEAS - EDV(MOD-SP2): 115 ML
BH CV ECHO MEAS - EDV(MOD-SP4): 59.1 ML
BH CV ECHO MEAS - EDV(TEICH): 54.4 ML
BH CV ECHO MEAS - EF(CUBED): 41.5 %
BH CV ECHO MEAS - EF(MOD-SP2): 79.7 %
BH CV ECHO MEAS - EF(MOD-SP4): 59.2 %
BH CV ECHO MEAS - EF(TEICH): 35.2 %
BH CV ECHO MEAS - ESV(CUBED): 27.3 ML
BH CV ECHO MEAS - ESV(MOD-SP2): 23.3 ML
BH CV ECHO MEAS - ESV(MOD-SP4): 24.1 ML
BH CV ECHO MEAS - ESV(TEICH): 35.3 ML
BH CV ECHO MEAS - FS: 16.4 %
BH CV ECHO MEAS - IVS/LVPW: 1.3
BH CV ECHO MEAS - IVSD: 1.1 CM
BH CV ECHO MEAS - LAT PEAK E' VEL: 8.9 CM/SEC
BH CV ECHO MEAS - LV DIASTOLIC VOL/BSA (35-75): 32.3 ML/M^2
BH CV ECHO MEAS - LV MASS(C)D: 97.1 GRAMS
BH CV ECHO MEAS - LV MASS(C)DI: 53.1 GRAMS/M^2
BH CV ECHO MEAS - LV MAX PG: 7.3 MMHG
BH CV ECHO MEAS - LV MEAN PG: 3 MMHG
BH CV ECHO MEAS - LV SYSTOLIC VOL/BSA (12-30): 13.2 ML/M^2
BH CV ECHO MEAS - LV V1 MAX: 135 CM/SEC
BH CV ECHO MEAS - LV V1 MEAN: 83.9 CM/SEC
BH CV ECHO MEAS - LV V1 VTI: 33 CM
BH CV ECHO MEAS - LVIDD: 3.6 CM
BH CV ECHO MEAS - LVIDS: 3 CM
BH CV ECHO MEAS - LVLD AP2: 7.8 CM
BH CV ECHO MEAS - LVLD AP4: 6.1 CM
BH CV ECHO MEAS - LVLS AP2: 5.3 CM
BH CV ECHO MEAS - LVLS AP4: 5.4 CM
BH CV ECHO MEAS - LVOT AREA (M): 2.8 CM^2
BH CV ECHO MEAS - LVOT AREA: 2.8 CM^2
BH CV ECHO MEAS - LVOT DIAM: 1.9 CM
BH CV ECHO MEAS - LVPWD: 0.81 CM
BH CV ECHO MEAS - MED PEAK E' VEL: 7.5 CM/SEC
BH CV ECHO MEAS - MR MAX PG: 130.4 MMHG
BH CV ECHO MEAS - MR MAX VEL: 571 CM/SEC
BH CV ECHO MEAS - MV A MAX VEL: 130 CM/SEC
BH CV ECHO MEAS - MV DEC SLOPE: 309.5 CM/SEC^2
BH CV ECHO MEAS - MV DEC TIME: 317 SEC
BH CV ECHO MEAS - MV E MAX VEL: 73.2 CM/SEC
BH CV ECHO MEAS - MV E/A: 0.56
BH CV ECHO MEAS - MV MAX PG: 7.7 MMHG
BH CV ECHO MEAS - MV MEAN PG: 3 MMHG
BH CV ECHO MEAS - MV P1/2T MAX VEL: 89.6 CM/SEC
BH CV ECHO MEAS - MV P1/2T: 84.8 MSEC
BH CV ECHO MEAS - MV V2 MAX: 139 CM/SEC
BH CV ECHO MEAS - MV V2 MEAN: 79.9 CM/SEC
BH CV ECHO MEAS - MV V2 VTI: 34.8 CM
BH CV ECHO MEAS - MVA P1/2T LCG: 2.5 CM^2
BH CV ECHO MEAS - MVA(P1/2T): 2.6 CM^2
BH CV ECHO MEAS - MVA(VTI): 2.7 CM^2
BH CV ECHO MEAS - RAP SYSTOLE: 5 MMHG
BH CV ECHO MEAS - RVSP: 24 MMHG
BH CV ECHO MEAS - SI(AO): 138.9 ML/M^2
BH CV ECHO MEAS - SI(CUBED): 10.6 ML/M^2
BH CV ECHO MEAS - SI(LVOT): 51.2 ML/M^2
BH CV ECHO MEAS - SI(MOD-SP2): 50.2 ML/M^2
BH CV ECHO MEAS - SI(MOD-SP4): 19.2 ML/M^2
BH CV ECHO MEAS - SI(TEICH): 10.5 ML/M^2
BH CV ECHO MEAS - SV(AO): 253.8 ML
BH CV ECHO MEAS - SV(CUBED): 19.4 ML
BH CV ECHO MEAS - SV(LVOT): 93.6 ML
BH CV ECHO MEAS - SV(MOD-SP2): 91.7 ML
BH CV ECHO MEAS - SV(MOD-SP4): 35 ML
BH CV ECHO MEAS - SV(TEICH): 19.1 ML
BH CV ECHO MEAS - TR MAX VEL: 218 CM/SEC
BH CV ECHO MEASUREMENTS AVERAGE E/E' RATIO: 8.93
BH CV XLRA - RV BASE: 3.1 CM
BH CV XLRA - TDI S': 16.3 CM/SEC
LEFT ATRIUM VOLUME INDEX: 26 ML/M2
MAXIMAL PREDICTED HEART RATE: 141 BPM
STRESS TARGET HR: 120 BPM

## 2021-07-29 PROCEDURE — 63710000001 BARIUM SULFATE 96 % RECONSTITUTED SUSPENSION: Performed by: NURSE PRACTITIONER

## 2021-07-29 PROCEDURE — 25010000002 PERFLUTREN (DEFINITY) 8.476 MG IN SODIUM CHLORIDE (PF) 0.9 % 10 ML INJECTION: Performed by: NURSE PRACTITIONER

## 2021-07-29 PROCEDURE — 93306 TTE W/DOPPLER COMPLETE: CPT

## 2021-07-29 PROCEDURE — A9270 NON-COVERED ITEM OR SERVICE: HCPCS | Performed by: NURSE PRACTITIONER

## 2021-07-29 PROCEDURE — 74220 X-RAY XM ESOPHAGUS 1CNTRST: CPT

## 2021-07-29 PROCEDURE — 93970 EXTREMITY STUDY: CPT

## 2021-07-29 PROCEDURE — 63710000001 BARIUM SULFATE 98 % RECONSTITUTED SUSPENSION: Performed by: NURSE PRACTITIONER

## 2021-07-29 PROCEDURE — 63710000001 BARIUM SULFATE 700 MG TABLET: Performed by: NURSE PRACTITIONER

## 2021-07-29 PROCEDURE — 93306 TTE W/DOPPLER COMPLETE: CPT | Performed by: EMERGENCY MEDICINE

## 2021-07-29 PROCEDURE — 63710000001 SOD BICARB-CITRIC ACID-SIMETHICONE 2.21-1.53-0.04 G PACK: Performed by: NURSE PRACTITIONER

## 2021-07-29 RX ADMIN — BARIUM SULFATE 120 ML: 960 POWDER, FOR SUSPENSION ORAL at 08:40

## 2021-07-29 RX ADMIN — ANTACID/ANTIFLATULENT 1 PACKET: 380; 550; 10; 10 GRANULE, EFFERVESCENT ORAL at 08:40

## 2021-07-29 RX ADMIN — SODIUM CHLORIDE 2 ML: 9 INJECTION INTRAMUSCULAR; INTRAVENOUS; SUBCUTANEOUS at 12:10

## 2021-07-29 RX ADMIN — BARIUM SULFATE 120 ML: 980 POWDER, FOR SUSPENSION ORAL at 08:40

## 2021-07-29 RX ADMIN — BARIUM SULFATE 700 MG: 700 TABLET ORAL at 08:40

## 2021-07-30 ENCOUNTER — TELEPHONE (OUTPATIENT)
Dept: GASTROENTEROLOGY | Facility: CLINIC | Age: 81
End: 2021-07-30

## 2021-07-30 ENCOUNTER — OFFICE VISIT (OUTPATIENT)
Dept: INTERNAL MEDICINE | Facility: CLINIC | Age: 81
End: 2021-07-30

## 2021-07-30 VITALS — OXYGEN SATURATION: 98 % | SYSTOLIC BLOOD PRESSURE: 144 MMHG | DIASTOLIC BLOOD PRESSURE: 78 MMHG | HEART RATE: 68 BPM

## 2021-07-30 DIAGNOSIS — I10 ESSENTIAL HYPERTENSION: ICD-10-CM

## 2021-07-30 DIAGNOSIS — R60.0 LOWER EXTREMITY EDEMA: ICD-10-CM

## 2021-07-30 DIAGNOSIS — Z79.899 MEDICATION MANAGEMENT: Primary | ICD-10-CM

## 2021-07-30 PROCEDURE — 99214 OFFICE O/P EST MOD 30 MIN: CPT | Performed by: NURSE PRACTITIONER

## 2021-07-30 RX ORDER — ASPIRIN 81 MG/1
81 TABLET ORAL DAILY
Status: ON HOLD | COMMUNITY
End: 2022-11-02

## 2021-07-30 RX ORDER — AMLODIPINE BESYLATE 5 MG/1
5 TABLET ORAL DAILY
Qty: 30 TABLET | Refills: 2 | Status: SHIPPED | OUTPATIENT
Start: 2021-07-30 | End: 2021-08-09

## 2021-07-30 NOTE — PROGRESS NOTES
Mecca Hussein.  I think she should be good for the EGD as long as cardiology agrees when they see her.  Thank you!

## 2021-07-30 NOTE — TELEPHONE ENCOUNTER
Please let her know the esophagram showed dysmotility which means the esophagus doesn't squeeze as well as it should, occurs with aging. We can plan for egd if her pcp is in agreement and no further cardiac work-up planned.  Looks like she see Linsey COPPOLA today. Let me know if the patient is in agreement with egd and then I will put case request in.        In regards to esophageal dysmotility, I would recommend that she cut her food in very small pieces and chew well.      I did review her echo that was done recently.

## 2021-07-31 NOTE — PROGRESS NOTES
Chief Complaint   Patient presents with   • Follow-up   • Edema   • Med Management         History:  Carmen Obrien is a 81 y.o. female who presents today for evaluation of the above problems.          I am seeing her today in follow-up of swelling of both lower extremities, and also to thoroughly review all her medications, as she had been confused at last visit about everything she is taking.  Today she has brought a bag with multiple supplements, as well as a storage basket full of bottles of medications for us to review.    She does feel that her swelling in both legs, while still there, is much better.  She is taking Lasix 40 mg for this now.  I had discontinued her HCTZ last visit, but she brings it today as one of the bottles and says she has taken it for a few days.  She is also supposed to be on amlodipine 10 mg day, but there are two bottles of this in her basket, and she is not really sure if she has been taking a pill from both bottles or not. I also note in her basket of meds she is supposedly taking, that she is still taking Relafen and Mobic both; but she is really not sure about this either. She does not have any lisinopril with her and not sure if she has any at home, though we did have that she was taking 40 mg of this.    She is denying any shortness of breath or chest pain.  States swelling in legs is better and is no longer having any weeping from the lower extremities.  No calf pain.    CXR was normal on 7/7/21 when she was first seen for swelling and shortness of breath.  US venous BLE was normal on 7/22/21.  2-D Echocardiogram on 7/29/21 showed EF >60%, but mild to moderate aortic valve regurgitation.  I have referred her to cardiology for this, but she hasn't heard from them yet for appointment.      ROS:  Review of Systems  As above    Allergies   Allergen Reactions   • Levaquin [Levofloxacin] Unknown (See Comments)     Pt doesn't remember   • Codeine Other (See Comments)     Pt does not  recall   • Morphine And Related Other (See Comments)     Pt does not recall     Past Medical History:   Diagnosis Date   • Anxiety    • Arthritis    • CAD (coronary artery disease)    • Colon polyp    • DDD (degenerative disc disease), lumbar    • Deep venous thrombosis (CMS/HCC)    • Depression    • Diverticulosis    • Esophageal stricture    • GERD (gastroesophageal reflux disease)    • History of transfusion    • Hypercholesteremia    • Hypertension    • LPRD (laryngopharyngeal reflux disease)    • Macular degeneration    • Multinodular goiter 2/23/2017   • Osteoarthritis    • Pruritic disorder    • Spastic colon    • Thyroid nodule      Past Surgical History:   Procedure Laterality Date   • BLADDER REPAIR     • CATARACT EXTRACTION     • CHOLECYSTECTOMY     • COLONOSCOPY  04/22/2014    2 polyps, adenomatous, diverticulosis   • COLONOSCOPY N/A 6/26/2020    Procedure: COLONOSCOPY WITH ANESTHESIA;  Surgeon: John Coleman MD;  Location:  [facility];  Service: Gastroenterology;  Laterality: N/A;  pre op: constipation  post op: diverticulosis,   PCP: Adam Delgadillo MD   • CYSTOCELE REPAIR     • ENDOSCOPY  04/22/2014    Schatzki's ring dilated   • ENDOSCOPY N/A 6/26/2020    Procedure: ESOPHAGOGASTRODUODENOSCOPY WITH ANESTHESIA;  Surgeon: John Coleman MD;  Location:  [facility];  Service: Gastroenterology;  Laterality: N/A;  pre op: dysphagia  post op:stricture, dilated   PCP:Adam Delgadillo MD   • FEMUR FRACTURE SURGERY     • HYSTERECTOMY     • REPLACEMENT TOTAL KNEE     • ULNAR NERVE TRANSPOSITION       Family History   Problem Relation Age of Onset   • Colon cancer Mother    • Colon polyps Neg Hx    • Esophageal cancer Neg Hx      Carmen  reports that she has never smoked. She has never used smokeless tobacco. She reports that she does not drink alcohol and does not use drugs.    I have reviewed and updated the above documentation (if necessary) including but not limited to chief  complaint, ROS, PFSH, allergies and medications        Current Outpatient Medications:   •  aspirin 81 MG EC tablet, Take 81 mg by mouth Daily., Disp: , Rfl:   •  atorvastatin (LIPITOR) 10 MG tablet, Take 1 tablet by mouth Daily., Disp: 30 tablet, Rfl: 5  •  Calcium Carbonate-Vitamin D (CALTRATE 600+D PO), Take  by mouth 2 (two) times a day. Morning and nightly, Disp: , Rfl:   •  carvedilol (Coreg) 6.25 MG tablet, Take 1 tablet by mouth 2 (Two) Times a Day With Meals., Disp: 60 tablet, Rfl: 2  •  cetirizine (zyrTEC) 10 MG tablet, Take 10 mg by mouth Daily., Disp: , Rfl:   •  Cholecalciferol (VITAMIN D3) 125 MCG (5000 UT) capsule capsule, Vitamin D3 125 mcg (5,000 unit) tablet  Take by oral route., Disp: , Rfl:   •  cloNIDine (CATAPRES) 0.1 MG tablet, Take 1 tablet by mouth Daily As Needed for High Blood Pressure (systolic >180 or diastolic >110)., Disp: 30 tablet, Rfl: 2  •  Cyanocobalamin (Vitamin B-12) 1000 MCG sublingual tablet, Place 1 tablet under the tongue Daily. 2500mcg, Disp: , Rfl:   •  cycloSPORINE (RESTASIS) 0.05 % ophthalmic emulsion, Restasis 0.05 % eye drops in a dropperette  INSTILL 1 DROP INTO AFFECTED EYE(S) BY OPHTHALMIC ROUTE EVERY 12 HOURS, Disp: , Rfl:   •  Dextran 70-Hypromellose, PF, ([employer] NATURAL TEARS) 0.1-0.3 % solution, Apply 1 drop to eye(s) as directed by provider 2 (Two) Times a Day As Needed (itchy or dry eyes)., Disp: 32 each, Rfl: 0  •  docusate sodium (COLACE) 50 MG capsule, Take  by mouth 2 (Two) Times a Day., Disp: , Rfl:   •  esomeprazole (nexIUM) 20 MG capsule, Take 1 capsule by mouth 2 (two) times a day., Disp: 60 capsule, Rfl: 11  •  FEROSUL 325 (65 Fe) MG tablet, Take 65 mg by mouth Daily With Breakfast., Disp: , Rfl:   •  fluticasone (FLONASE) 50 MCG/ACT nasal spray, 2 sprays into the nostril(s) as directed by provider Daily., Disp: , Rfl:   •  furosemide (Lasix) 40 MG tablet, Take 1 tablet by mouth Daily., Disp: 30 tablet, Rfl: 2  •  HYDROcodone-acetaminophen (NORCO)  7.5-325 MG per tablet, Take 1 tablet by mouth Every 8 (Eight) Hours As Needed for Moderate Pain ., Disp: , Rfl:   •  hydrOXYzine (ATARAX) 25 MG tablet, Take 25 mg by mouth 3 (Three) Times a Day As Needed for Itching., Disp: , Rfl:   •  meloxicam (MOBIC) 15 MG tablet, Take 15 mg by mouth Daily As Needed., Disp: , Rfl:   •  Menthol, Topical Analgesic, (BIOFREEZE EX), Apply  topically., Disp: , Rfl:   •  montelukast (SINGULAIR) 10 MG tablet, Take 1 tablet by mouth Every Night., Disp: 30 tablet, Rfl: 5  •  Multiple Vitamins-Minerals (ICAPS AREDS 2 PO), Take  by mouth 2 (two) times a day. Morning and night, Disp: , Rfl:   •  MULTIPLE VITAMINS-MINERALS ER PO, Centrum silver womens 50+, Disp: , Rfl:   •  nortriptyline (PAMELOR) 50 MG capsule, Take 1 capsule by mouth Every Night., Disp: 30 capsule, Rfl: 5  •  nystatin (NYSTOP) 942163 UNIT/GM powder powder, Apply 100,000 Units topically 2 (Two) Times a Day., Disp: , Rfl:   •  polyethyl glycol-propyl glycol (SYSTANE) 0.4-0.3 % solution ophthalmic solution, 2 drops., Disp: , Rfl:   •  pregabalin (LYRICA) 50 MG capsule, Take 50 mg by mouth Every 12 (Twelve) Hours As Needed., Disp: , Rfl:   •  Prolia 60 MG/ML solution prefilled syringe syringe, , Disp: , Rfl:   •  tolterodine LA (DETROL LA) 4 MG 24 hr capsule, Take 1 capsule by mouth Daily., Disp: , Rfl:   •  amLODIPine (Norvasc) 5 MG tablet, Take 1 tablet by mouth Daily., Disp: 30 tablet, Rfl: 2  •  ciclopirox (LOPROX) 0.77 % cream, Apply  topically to the appropriate area as directed 2 (Two) Times a Day., Disp: , Rfl:   •  linaclotide (LINZESS) 145 MCG capsule capsule, Take 1 capsule by mouth Every Morning Before Breakfast., Disp: 30 capsule, Rfl:          OBJECTIVE:  Visit Vitals  /78 (BP Location: Right arm, Patient Position: Sitting, Cuff Size: Adult)   Pulse 68   SpO2 98%      Physical Exam  Vitals and nursing note reviewed.   Constitutional:       General: She is not in acute distress.     Appearance: Normal  appearance. She is not ill-appearing, toxic-appearing or diaphoretic.      Comments: Very pleasant, conversational, thankful that we are taking time to help her with medications today   HENT:      Head: Atraumatic.   Cardiovascular:      Rate and Rhythm: Regular rhythm.   Pulmonary:      Effort: Pulmonary effort is normal. No respiratory distress.      Breath sounds: Normal breath sounds. No stridor. No wheezing or rhonchi.   Abdominal:      General: There is no distension.      Palpations: Abdomen is soft.      Tenderness: There is no abdominal tenderness.   Musculoskeletal:         General: Swelling present.      Right lower leg: Edema present.      Left lower leg: Edema present.      Comments: BLE still with 1+ pitting edema at ankles and feet, trace pitting pre-tibially.  Calves are soft, no weeping. No evidence of cellulitis.    Skin:     General: Skin is warm and dry.   Neurological:      Mental Status: She is alert and oriented to person, place, and time.   Psychiatric:         Mood and Affect: Mood normal.         Behavior: Behavior normal.         Thought Content: Thought content normal.         Judgment: Judgment normal.     Basic metabolic panel  Order: [id number]  Status:  Final result   Visible to patient:  No (not released)   Next appt:  08/12/2021 at 09:00 AM in Internal Medicine (VERA Noble MD)   Dx:  Lower extremity edema; Medication man...  Specimen Information: Blood         5 Result Notes  Component   Ref Range & Units 9 d ago   Glucose   65 - 99 mg/dL 128High     BUN   8 - 23 mg/dL 13    Creatinine   0.57 - 1.00 mg/dL 0.59    Sodium   136 - 145 mmol/L 137    Potassium   3.5 - 5.2 mmol/L 4.1    Chloride   98 - 107 mmol/L 101    CO2   22.0 - 29.0 mmol/L 23.0    Calcium   8.6 - 10.5 mg/dL 9.9    eGFR Non African Amer   >60 mL/min/1.73 98                [facility]    Assessment/Plan    Diagnoses and all orders for this visit:    1. Medication management (Primary)    2. Essential hypertension  -      amLODIPine (Norvasc) 5 MG tablet; Take 1 tablet by mouth Daily.  Dispense: 30 tablet; Refill: 2    3. Lower extremity edema    Other orders  -     linaclotide (LINZESS) 145 MCG capsule capsule; Take 1 capsule by mouth Every Morning Before Breakfast.  Dispense: 30 capsule    I have spent 25-30 minutes with patient today, taking time to review all of the medications with her even after my MA had spent about 15 minutes trying to be clear with patient on exactly what she is taking.  The patient does have a friend who helps her, with her today.  I have serious concerns about all of her medications and the confusion about all of these bottles and what she is taking.  I have tried to make a list for her discharge summary of what I expect her to be taking, and I have asked her to throw all other bottles away.  All of the extra OTC supplements are in a bag, and I advised her not to take any of these right now, only what is on her list.  I have discontinued the Norvasc 10 mg and am decreasing it to 5 mg, in hopes of helping with the lower extremity edema. I have discontinued the Relafen and told her she can continue Mobic for now, one daily, as per her pain management provider.    I have expressed my concerns to patient and friend that multiple medications, especially taking wrong doses and discontinued medications, can be dangerous and life-threatening.  It could also certainly contribute to her lower extremity edema in this case.    Since I have changed the amlodipine and streamlined the medications, I want her to return in 2 weeks for a blood pressure check and to re-assess the lower extremites.  I did not re-start any lisinopril today since it appears she has not been taking it, but this may be re-started in the future if indicated.    She does wear compression stockings daily, and I have encouraged her to continue this, as well as to elevated feet as much as possible, and avoid extra sodium in diet.    I also told her  she needs to check with her eye doctor to review the eye medications she brought today, as this is not my specialty, and I want her to be sure she is taking what she is supposed to be taking in that regard.    She has been following with GI for constipation and takes Linzess PRN for this.  I told her to check with them on this medication.  I also reviewed note from GI about scheduling a EGD for dysmotility, and responded that I think this would be ok as long as cardiology agrees after they see her.     She was very sweet and gracious as she left the office today; I did emphasize to her friend that she is going to need help sorting through her medication list and bottles, and the friend indicates she is going to help the patient.    Education materials and an After Visit Summary were printed and given to the patient at discharge.  Return in about 2 weeks (around 8/13/2021) for blood pressure check and re-check edema with Dr. Noble.         ABDON Smith   10:40 CDT  7/31/2021

## 2021-08-03 ENCOUNTER — TELEPHONE (OUTPATIENT)
Dept: INTERNAL MEDICINE | Facility: CLINIC | Age: 81
End: 2021-08-03

## 2021-08-03 NOTE — TELEPHONE ENCOUNTER
Yes! Actually, we can go ahead and cancel that follow-up because we are seeing her back sooner with Dr. Noble, and he can decide when the next follow-up needs to be. Thank you!

## 2021-08-03 NOTE — TELEPHONE ENCOUNTER
PATIENT HAS CALLED, SHE IS ASKING IF IT WOULD BE OK TO RESCHEDULE HER APPOINTMENT ON 08/26/21 WITH JORDEN COPPOLA.  SHE STATED THAT SHE CAN HAVE HER ENDO DONE ON THAT DAY.  PLEASE ADVISE.   [phone]

## 2021-08-03 NOTE — TELEPHONE ENCOUNTER
PATIENT HAS CALLED, SHE STATED THAT DR MANSFIELD OFFICE TOLD PATIENT THAT SHE WOULD NEED TO SEE DR FARMER BEFORE SHE HAS AN ENDO.

## 2021-08-04 DIAGNOSIS — R60.0 LOWER EXTREMITY EDEMA: ICD-10-CM

## 2021-08-04 DIAGNOSIS — R06.02 SHORTNESS OF BREATH: ICD-10-CM

## 2021-08-04 RX ORDER — FUROSEMIDE 20 MG/1
20 TABLET ORAL DAILY PRN
Qty: 30 TABLET | Refills: 0 | Status: SHIPPED | OUTPATIENT
Start: 2021-08-04 | End: 2021-08-09 | Stop reason: SDUPTHER

## 2021-08-04 RX ORDER — CARVEDILOL 6.25 MG/1
6.25 TABLET ORAL 2 TIMES DAILY WITH MEALS
Qty: 60 TABLET | Refills: 2 | Status: ON HOLD | OUTPATIENT
Start: 2021-08-04 | End: 2021-11-05

## 2021-08-09 ENCOUNTER — LAB (OUTPATIENT)
Dept: LAB | Facility: HOSPITAL | Age: 81
End: 2021-08-09

## 2021-08-09 ENCOUNTER — OFFICE VISIT (OUTPATIENT)
Dept: CARDIOLOGY | Facility: CLINIC | Age: 81
End: 2021-08-09

## 2021-08-09 VITALS
WEIGHT: 185 LBS | DIASTOLIC BLOOD PRESSURE: 60 MMHG | HEIGHT: 61 IN | OXYGEN SATURATION: 95 % | BODY MASS INDEX: 34.93 KG/M2 | HEART RATE: 52 BPM | SYSTOLIC BLOOD PRESSURE: 118 MMHG

## 2021-08-09 DIAGNOSIS — I35.1 NONRHEUMATIC AORTIC VALVE INSUFFICIENCY: Primary | ICD-10-CM

## 2021-08-09 DIAGNOSIS — R60.0 LOWER EXTREMITY EDEMA: ICD-10-CM

## 2021-08-09 DIAGNOSIS — E66.09 CLASS 1 OBESITY DUE TO EXCESS CALORIES WITH SERIOUS COMORBIDITY AND BODY MASS INDEX (BMI) OF 34.0 TO 34.9 IN ADULT: ICD-10-CM

## 2021-08-09 DIAGNOSIS — I10 ESSENTIAL HYPERTENSION: ICD-10-CM

## 2021-08-09 DIAGNOSIS — Z01.818 PREOPERATIVE EVALUATION TO RULE OUT SURGICAL CONTRAINDICATION: ICD-10-CM

## 2021-08-09 PROBLEM — E66.811 CLASS 1 OBESITY DUE TO EXCESS CALORIES WITH SERIOUS COMORBIDITY AND BODY MASS INDEX (BMI) OF 34.0 TO 34.9 IN ADULT: Status: ACTIVE | Noted: 2021-08-09

## 2021-08-09 LAB
ANION GAP SERPL CALCULATED.3IONS-SCNC: 9 MMOL/L (ref 5–15)
BUN SERPL-MCNC: 14 MG/DL (ref 8–23)
BUN/CREAT SERPL: 22.2 (ref 7–25)
CALCIUM SPEC-SCNC: 9.9 MG/DL (ref 8.6–10.5)
CHLORIDE SERPL-SCNC: 100 MMOL/L (ref 98–107)
CO2 SERPL-SCNC: 30 MMOL/L (ref 22–29)
CREAT SERPL-MCNC: 0.63 MG/DL (ref 0.57–1)
GFR SERPL CREATININE-BSD FRML MDRD: 91 ML/MIN/1.73
GLUCOSE SERPL-MCNC: 123 MG/DL (ref 65–99)
POTASSIUM SERPL-SCNC: 3.6 MMOL/L (ref 3.5–5.2)
SODIUM SERPL-SCNC: 139 MMOL/L (ref 136–145)

## 2021-08-09 PROCEDURE — 99204 OFFICE O/P NEW MOD 45 MIN: CPT | Performed by: INTERNAL MEDICINE

## 2021-08-09 PROCEDURE — 93000 ELECTROCARDIOGRAM COMPLETE: CPT | Performed by: INTERNAL MEDICINE

## 2021-08-09 PROCEDURE — 80048 BASIC METABOLIC PNL TOTAL CA: CPT

## 2021-08-09 PROCEDURE — 36415 COLL VENOUS BLD VENIPUNCTURE: CPT

## 2021-08-09 RX ORDER — HYDROCHLOROTHIAZIDE 25 MG/1
12.5 TABLET ORAL DAILY
Qty: 15 TABLET | Refills: 11 | Status: SHIPPED | OUTPATIENT
Start: 2021-08-09 | End: 2021-11-11 | Stop reason: HOSPADM
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
## 2021-08-10 ENCOUNTER — TELEPHONE (OUTPATIENT)
Dept: INTERNAL MEDICINE | Facility: CLINIC | Age: 81
End: 2021-08-10

## 2021-08-11 ENCOUNTER — PREP FOR SURGERY (OUTPATIENT)
Dept: OTHER | Facility: HOSPITAL | Age: 81
End: 2021-08-11

## 2021-08-11 DIAGNOSIS — R13.19 ESOPHAGEAL DYSPHAGIA: Primary | ICD-10-CM

## 2021-08-12 ENCOUNTER — OFFICE VISIT (OUTPATIENT)
Dept: INTERNAL MEDICINE | Facility: CLINIC | Age: 81
End: 2021-08-12

## 2021-08-12 ENCOUNTER — TELEPHONE (OUTPATIENT)
Dept: INTERNAL MEDICINE | Facility: CLINIC | Age: 81
End: 2021-08-12

## 2021-08-12 VITALS
BODY MASS INDEX: 36.02 KG/M2 | DIASTOLIC BLOOD PRESSURE: 72 MMHG | TEMPERATURE: 97.9 F | HEIGHT: 61 IN | OXYGEN SATURATION: 96 % | HEART RATE: 52 BPM | WEIGHT: 190.8 LBS | SYSTOLIC BLOOD PRESSURE: 138 MMHG

## 2021-08-12 DIAGNOSIS — I87.8 CHRONIC VENOUS STASIS: ICD-10-CM

## 2021-08-12 DIAGNOSIS — R60.0 LOWER EXTREMITY EDEMA: Primary | ICD-10-CM

## 2021-08-12 DIAGNOSIS — I10 ESSENTIAL HYPERTENSION: ICD-10-CM

## 2021-08-12 PROCEDURE — 99213 OFFICE O/P EST LOW 20 MIN: CPT | Performed by: INTERNAL MEDICINE

## 2021-08-12 NOTE — TELEPHONE ENCOUNTER
PATIENT HAS CALLED, SHE IS ASKING FOR SOMETHING TO HELP HER TO SLEEP.  PATIENT STATED THAT SHE FORGOT TO ASK WHEN SHE WAS HERE TODAY FOR APPOINTMENT.   PATIENT USES [facility].

## 2021-08-12 NOTE — PROGRESS NOTES
Chief Complaint   Patient presents with   • Follow-up   • Edema         History:  Carmen Obrien is a 81 y.o. female who presents today for evaluation of the above problems.        She presents today for 2-week follow-up.  There is been a lot of confusion regarding her medications and what she was taking.  She saw Linsey Figueredo on July 22, 2021 and her note was reviewed today.  All of the over-the-counter supplements were stopped.  Her Norvasc 10 mg was decreased to 5 mg to help with her lower extremity edema.  Her Relafen was also discontinued but then the Mobic was continued.      Her blood pressure is good.  Her legs are still bad.  She saw Dr. Araujo on August 9 and he stopped the Norvasc completely and started on hydrochlorothiazide.  She has not started this regimen yet but wanted to get my opinion first.  She does not have compression stockings on today, but does typically wear them and elevates her legs as much as possible.  She also takes Lasix on a daily basis.    She sees Dr. Gomez for her left second toe which has a callus and a scab.  Recommended she schedule another appointment with him.    HPI    ROS:  Review of Systems      Current Outpatient Medications:   •  aspirin 81 MG EC tablet, Take 81 mg by mouth Daily., Disp: , Rfl:   •  atorvastatin (LIPITOR) 10 MG tablet, Take 1 tablet by mouth Daily., Disp: 30 tablet, Rfl: 5  •  Calcium Carbonate-Vitamin D (CALTRATE 600+D PO), Take  by mouth 2 (two) times a day. Morning and nightly, Disp: , Rfl:   •  carvedilol (COREG) 6.25 MG tablet, Take 1 tablet by mouth 2 (Two) Times a Day With Meals., Disp: 60 tablet, Rfl: 2  •  cetirizine (zyrTEC) 10 MG tablet, Take 10 mg by mouth Daily., Disp: , Rfl:   •  Cholecalciferol (VITAMIN D3) 125 MCG (5000 UT) capsule capsule, Vitamin D3 125 mcg (5,000 unit) tablet  Take by oral route., Disp: , Rfl:   •  ciclopirox (LOPROX) 0.77 % cream, Apply  topically to the appropriate area as directed 2 (Two) Times a Day., Disp: , Rfl:    •  cloNIDine (CATAPRES) 0.1 MG tablet, Take 1 tablet by mouth Daily As Needed for High Blood Pressure (systolic >180 or diastolic >110)., Disp: 30 tablet, Rfl: 2  •  Cyanocobalamin (Vitamin B-12) 1000 MCG sublingual tablet, Place 1 tablet under the tongue Daily. 2500mcg, Disp: , Rfl:   •  cycloSPORINE (RESTASIS) 0.05 % ophthalmic emulsion, Restasis 0.05 % eye drops in a dropperette  INSTILL 1 DROP INTO AFFECTED EYE(S) BY OPHTHALMIC ROUTE EVERY 12 HOURS, Disp: , Rfl:   •  Dextran 70-Hypromellose, PF, ([employer] NATURAL TEARS) 0.1-0.3 % solution, Apply 1 drop to eye(s) as directed by provider 2 (Two) Times a Day As Needed (itchy or dry eyes)., Disp: 32 each, Rfl: 0  •  docusate sodium (COLACE) 50 MG capsule, Take  by mouth 2 (Two) Times a Day., Disp: , Rfl:   •  esomeprazole (nexIUM) 20 MG capsule, Take 1 capsule by mouth 2 (two) times a day., Disp: 60 capsule, Rfl: 11  •  FEROSUL 325 (65 Fe) MG tablet, Take 65 mg by mouth Daily With Breakfast., Disp: , Rfl:   •  fluticasone (FLONASE) 50 MCG/ACT nasal spray, 2 sprays into the nostril(s) as directed by provider Daily., Disp: , Rfl:   •  furosemide (Lasix) 40 MG tablet, Take 1 tablet by mouth Daily., Disp: 30 tablet, Rfl: 2  •  hydroCHLOROthiazide (HYDRODIURIL) 25 MG tablet, Take 0.5 tablets by mouth Daily., Disp: 15 tablet, Rfl: 11  •  HYDROcodone-acetaminophen (NORCO) 7.5-325 MG per tablet, Take 1 tablet by mouth Every 8 (Eight) Hours As Needed for Moderate Pain ., Disp: , Rfl:   •  hydrOXYzine (ATARAX) 25 MG tablet, Take 25 mg by mouth 3 (Three) Times a Day As Needed for Itching., Disp: , Rfl:   •  linaclotide (LINZESS) 145 MCG capsule capsule, Take 1 capsule by mouth Every Morning Before Breakfast., Disp: 30 capsule, Rfl:   •  meloxicam (MOBIC) 15 MG tablet, Take 15 mg by mouth Daily As Needed., Disp: , Rfl:   •  Menthol, Topical Analgesic, (BIOFREEZE EX), Apply  topically., Disp: , Rfl:   •  montelukast (SINGULAIR) 10 MG tablet, Take 1 tablet by mouth Every Night.,  Disp: 30 tablet, Rfl: 5  •  Multiple Vitamins-Minerals (ICAPS AREDS 2 PO), Take  by mouth 2 (two) times a day. Morning and night, Disp: , Rfl:   •  MULTIPLE VITAMINS-MINERALS ER PO, Centrum silver womens 50+, Disp: , Rfl:   •  nortriptyline (PAMELOR) 50 MG capsule, Take 1 capsule by mouth Every Night., Disp: 30 capsule, Rfl: 5  •  nystatin (NYSTOP) 561926 UNIT/GM powder powder, Apply 100,000 Units topically 2 (Two) Times a Day., Disp: , Rfl:   •  polyethyl glycol-propyl glycol (SYSTANE) 0.4-0.3 % solution ophthalmic solution, 2 drops., Disp: , Rfl:   •  pregabalin (LYRICA) 50 MG capsule, Take 50 mg by mouth Every 12 (Twelve) Hours As Needed., Disp: , Rfl:   •  Prolia 60 MG/ML solution prefilled syringe syringe, , Disp: , Rfl:   •  tolterodine LA (DETROL LA) 4 MG 24 hr capsule, Take 1 capsule by mouth Daily., Disp: , Rfl:     Lab Results   Component Value Date    GLUCOSE 123 (H) 08/09/2021    BUN 14 08/09/2021    CREATININE 0.63 08/09/2021    EGFRIFNONA 91 08/09/2021    BCR 22.2 08/09/2021    K 3.6 08/09/2021    CO2 30.0 (H) 08/09/2021    CALCIUM 9.9 08/09/2021    ALBUMIN 4.10 07/07/2021    AST 31 07/07/2021    ALT 25 07/07/2021       WBC   Date Value Ref Range Status   04/22/2021 10.60 3.40 - 10.80 10*3/mm3 Final   07/16/2019 7.0 4.8 - 10.8 K/uL Final     RBC   Date Value Ref Range Status   04/22/2021 4.13 3.77 - 5.28 10*6/mm3 Final   07/16/2019 3.52 (L) 4.20 - 5.40 M/uL Final     Hemoglobin   Date Value Ref Range Status   04/22/2021 12.4 12.0 - 15.9 g/dL Final   07/16/2019 10.1 (L) 12.0 - 16.0 g/dL Final     Hematocrit   Date Value Ref Range Status   04/22/2021 38.2 34.0 - 46.6 % Final   07/16/2019 31.7 (L) 37.0 - 47.0 % Final     MCV   Date Value Ref Range Status   04/22/2021 92.5 79.0 - 97.0 fL Final   07/16/2019 90.1 81.0 - 99.0 fL Final     MCH   Date Value Ref Range Status   04/22/2021 30.0 26.6 - 33.0 pg Final   07/16/2019 28.7 27.0 - 31.0 pg Final     MCHC   Date Value Ref Range Status   04/22/2021 32.5 31.5  - 35.7 g/dL Final   07/16/2019 31.9 (L) 33.0 - 37.0 g/dL Final     RDW   Date Value Ref Range Status   04/22/2021 13.0 12.3 - 15.4 % Final   07/16/2019 14.7 (H) 11.5 - 14.5 % Final     RDW-SD   Date Value Ref Range Status   05/20/2019 45.4 40.0 - 54.0 fl Final     MPV   Date Value Ref Range Status   04/22/2021 8.5 6.0 - 12.0 fL Final   07/16/2019 10.8 9.4 - 12.3 fL Final     Platelets   Date Value Ref Range Status   04/22/2021 378 140 - 450 10*3/mm3 Final   07/16/2019 248 130 - 400 K/uL Final     Neutrophil Rel %   Date Value Ref Range Status   07/04/2019 76.5 (H) 50.0 - 65.0 % Final     Neutrophil %   Date Value Ref Range Status   04/22/2021 73.4 42.7 - 76.0 % Final     Lymphocyte Rel %   Date Value Ref Range Status   07/04/2019 13.9 (L) 20.0 - 40.0 % Final     Lymphocyte %   Date Value Ref Range Status   04/22/2021 20.1 19.6 - 45.3 % Final     Monocyte Rel %   Date Value Ref Range Status   07/04/2019 7.4 0.0 - 10.0 % Final     Eosinophil Rel %   Date Value Ref Range Status   07/04/2019 1.4 0.0 - 5.0 % Final     Basophil Rel %   Date Value Ref Range Status   07/04/2019 0.4 0.0 - 1.0 % Final     Immature Grans %   Date Value Ref Range Status   05/20/2019 0.8 0.0 - 5.0 % Final     Neutrophils Absolute   Date Value Ref Range Status   07/04/2019 7.7 (H) 1.5 - 7.5 K/uL Final     Neutrophils, Absolute   Date Value Ref Range Status   04/22/2021 7.80 (H) 1.70 - 7.00 10*3/mm3 Final     Lymphocytes Absolute   Date Value Ref Range Status   07/04/2019 1.4 1.1 - 4.5 K/uL Final     Lymphocytes, Absolute   Date Value Ref Range Status   04/22/2021 2.10 0.70 - 3.10 10*3/mm3 Final     Monocytes Absolute   Date Value Ref Range Status   07/04/2019 0.80 0.00 - 0.90 K/uL Final     Eosinophils Absolute   Date Value Ref Range Status   07/04/2019 0.10 0.00 - 0.60 K/uL Final     Basophils Absolute   Date Value Ref Range Status   07/04/2019 0.00 0.00 - 0.20 K/uL Final     Immature Grans, Absolute   Date Value Ref Range Status   05/20/2019  "0.06 (H) 0.00 - 0.05 10*3/mm3 Final     [facility]   Date Value Ref Range Status   2019 0.0 0.0 - 0.2 /100 WBC Final         OBJECTIVE:  Visit Vitals  /72 (BP Location: Left arm, Patient Position: Sitting, Cuff Size: Adult)   Pulse 52   Temp 97.9 °F (36.6 °C) (Oral)   Ht 154.9 cm (61\")   Wt 86.5 kg (190 lb 12.8 oz)   SpO2 96%   Breastfeeding No   BMI 36.05 kg/m²      Physical Exam  Constitutional:       General: She is not in acute distress.     Appearance: She is well-developed. She is not diaphoretic.   HENT:      Head: Normocephalic and atraumatic.   Eyes:      Pupils: Pupils are equal, round, and reactive to light.   Neck:      Thyroid: No thyromegaly.      Trachea: Phonation normal.   Pulmonary:      Effort: No respiratory distress.   Musculoskeletal:      Right lower le+ Pitting Edema present.      Left lower le+ Pitting Edema present.   Skin:     Coloration: Skin is not pale.      Findings: No erythema.   Neurological:      Mental Status: She is alert.   Psychiatric:         Behavior: Behavior normal.         Thought Content: Thought content normal.         Judgment: Judgment normal.         Assessment/Plan    Diagnoses and all orders for this visit:    1. Lower extremity edema (Primary)  -     Cancel: Basic metabolic panel; Future  -     Basic metabolic panel; Future    2. Essential hypertension  -     Cancel: Basic metabolic panel; Future  -     Basic metabolic panel; Future    3. Chronic venous stasis    Blood pressure is well controlled.  I agree with stopping Norvasc completely and starting hydrochlorothiazide 12.5 mg daily.  She is on Lasix daily as well we will need to keep a very close eye on her potassium and renal function.  Dr. Araujo had ordered a BMP for around .  However, the patient had this lab test the same day as the appointment on accident.  Therefore, I am reordering the BMP for 2 weeks from now.  She has not started the hydrochlorothiazide yet, but will start it " today.    She will continue trying to use of compression stockings and elevate her legs as much as possible.  Suspect the symptoms are mostly related to chronic venous stasis.    Follow-up in 4 weeks to recheck on her swelling and blood pressure.    Return in about 4 weeks (around 9/9/2021) for Recheck with Linsey.    Patient was given instructions and counseling regarding his/her condition or for health maintenance advice. Please see specific information pulled into the AVS if appropriate.     AMY Noble MD   09:09 CDT  8/12/2021

## 2021-08-12 NOTE — TELEPHONE ENCOUNTER
Will you let her know she can try the Atarax (hydroxyzine) that she has at home for itching, to try at bedtime to see if it helps with sleep.

## 2021-08-26 ENCOUNTER — TRANSCRIBE ORDERS (OUTPATIENT)
Dept: LAB | Facility: HOSPITAL | Age: 81
End: 2021-08-26

## 2021-08-26 DIAGNOSIS — Z01.818 PREOPERATIVE TESTING: Primary | ICD-10-CM

## 2021-08-27 ENCOUNTER — APPOINTMENT (OUTPATIENT)
Dept: CARDIOLOGY | Facility: HOSPITAL | Age: 81
End: 2021-08-27

## 2021-08-30 ENCOUNTER — LAB (OUTPATIENT)
Dept: LAB | Facility: HOSPITAL | Age: 81
End: 2021-08-30

## 2021-08-30 LAB — SARS-COV-2 ORF1AB RESP QL NAA+PROBE: NOT DETECTED

## 2021-08-30 PROCEDURE — U0004 COV-19 TEST NON-CDC HGH THRU: HCPCS | Performed by: INTERNAL MEDICINE

## 2021-08-30 PROCEDURE — C9803 HOPD COVID-19 SPEC COLLECT: HCPCS | Performed by: INTERNAL MEDICINE

## 2021-09-02 ENCOUNTER — ANESTHESIA (OUTPATIENT)
Dept: GASTROENTEROLOGY | Facility: HOSPITAL | Age: 81
End: 2021-09-02

## 2021-09-02 ENCOUNTER — ANESTHESIA EVENT (OUTPATIENT)
Dept: GASTROENTEROLOGY | Facility: HOSPITAL | Age: 81
End: 2021-09-02

## 2021-09-02 ENCOUNTER — HOSPITAL ENCOUNTER (OUTPATIENT)
Facility: HOSPITAL | Age: 81
Setting detail: HOSPITAL OUTPATIENT SURGERY
Discharge: HOME | End: 2021-09-02
Attending: INTERNAL MEDICINE | Admitting: INTERNAL MEDICINE

## 2021-09-02 VITALS
BODY MASS INDEX: 34.93 KG/M2 | TEMPERATURE: 96.9 F | SYSTOLIC BLOOD PRESSURE: 169 MMHG | HEIGHT: 61 IN | OXYGEN SATURATION: 100 % | HEART RATE: 62 BPM | RESPIRATION RATE: 18 BRPM | WEIGHT: 185 LBS | DIASTOLIC BLOOD PRESSURE: 72 MMHG

## 2021-09-02 DIAGNOSIS — R13.19 ESOPHAGEAL DYSPHAGIA: ICD-10-CM

## 2021-09-02 PROCEDURE — 87081 CULTURE SCREEN ONLY: CPT | Performed by: INTERNAL MEDICINE

## 2021-09-02 PROCEDURE — 43239 EGD BIOPSY SINGLE/MULTIPLE: CPT | Performed by: INTERNAL MEDICINE

## 2021-09-02 PROCEDURE — 25010000002 PROPOFOL 10 MG/ML EMULSION: Performed by: NURSE ANESTHETIST, CERTIFIED REGISTERED

## 2021-09-02 RX ORDER — ONDANSETRON 2 MG/ML
4 INJECTION INTRAMUSCULAR; INTRAVENOUS ONCE AS NEEDED
Status: DISCONTINUED | OUTPATIENT
Start: 2021-09-02 | End: 2021-09-02 | Stop reason: HOSPADM

## 2021-09-02 RX ORDER — LIDOCAINE HYDROCHLORIDE 20 MG/ML
INJECTION, SOLUTION EPIDURAL; INFILTRATION; INTRACAUDAL; PERINEURAL AS NEEDED
Status: DISCONTINUED | OUTPATIENT
Start: 2021-09-02 | End: 2021-09-02 | Stop reason: SURG

## 2021-09-02 RX ORDER — SODIUM CHLORIDE 9 MG/ML
500 INJECTION, SOLUTION INTRAVENOUS CONTINUOUS PRN
Status: DISCONTINUED | OUTPATIENT
Start: 2021-09-02 | End: 2021-09-02 | Stop reason: HOSPADM

## 2021-09-02 RX ORDER — PROPOFOL 10 MG/ML
VIAL (ML) INTRAVENOUS AS NEEDED
Status: DISCONTINUED | OUTPATIENT
Start: 2021-09-02 | End: 2021-09-02 | Stop reason: SURG

## 2021-09-02 RX ORDER — SODIUM CHLORIDE 0.9 % (FLUSH) 0.9 %
10 SYRINGE (ML) INJECTION AS NEEDED
Status: DISCONTINUED | OUTPATIENT
Start: 2021-09-02 | End: 2021-09-02 | Stop reason: HOSPADM

## 2021-09-02 RX ADMIN — SODIUM CHLORIDE 500 ML: 9 INJECTION, SOLUTION INTRAVENOUS at 08:05

## 2021-09-02 RX ADMIN — PROPOFOL 150 MG: 10 INJECTION, EMULSION INTRAVENOUS at 08:37

## 2021-09-02 RX ADMIN — LIDOCAINE HYDROCHLORIDE 200 MG: 20 INJECTION, SOLUTION EPIDURAL; INFILTRATION; INTRACAUDAL; PERINEURAL at 08:37

## 2021-09-02 NOTE — H&P
[facility] Gastroenterology  Pre Procedure History & Physical    Chief Complaint:   Dysphagia    Subjective     HPI:   The patient complained of dysphagia    Past Medical History:   Past Medical History:   Diagnosis Date   • Anxiety    • Arthritis    • CAD (coronary artery disease)    • Colon polyp    • DDD (degenerative disc disease), lumbar    • Deep venous thrombosis (CMS/HCC)    • Depression    • Diverticulosis    • Esophageal stricture    • GERD (gastroesophageal reflux disease)    • History of transfusion    • Hypercholesteremia    • Hypertension    • LPRD (laryngopharyngeal reflux disease)    • Macular degeneration    • Multinodular goiter 2/23/2017   • Osteoarthritis    • Pruritic disorder    • Spastic colon    • Thyroid nodule        Past Surgical History:  Past Surgical History:   Procedure Laterality Date   • BLADDER REPAIR     • CATARACT EXTRACTION     • CHOLECYSTECTOMY     • COLONOSCOPY  04/22/2014    2 polyps, adenomatous, diverticulosis   • COLONOSCOPY N/A 6/26/2020    Procedure: COLONOSCOPY WITH ANESTHESIA;  Surgeon: John Coleman MD;  Location: [facility] ENDOSCOPY;  Service: Gastroenterology;  Laterality: N/A;  pre op: constipation  post op: diverticulosis,   PCP: Adam Delgadillo MD   • CYSTOCELE REPAIR     • ENDOSCOPY  04/22/2014    Schatzki's ring dilated   • ENDOSCOPY N/A 6/26/2020    Procedure: ESOPHAGOGASTRODUODENOSCOPY WITH ANESTHESIA;  Surgeon: John Coleman MD;  Location: [facility] ENDOSCOPY;  Service: Gastroenterology;  Laterality: N/A;  pre op: dysphagia  post op:stricture, dilated   PCP:Adam Delgadillo MD   • FEMUR FRACTURE SURGERY     • HYSTERECTOMY     • REPLACEMENT TOTAL KNEE     • ULNAR NERVE TRANSPOSITION         Family History:  Family History   Problem Relation Age of Onset   • Colon cancer Mother    • Heart disease Mother    • Heart disease Father    • Colon polyps Neg Hx    • Esophageal cancer Neg Hx        Social History:   reports that she has never smoked. She  has never used smokeless tobacco. She reports that she does not drink alcohol and does not use drugs.    Medications:   Prior to Admission medications    Medication Sig Start Date End Date Taking? Authorizing Provider   aspirin 81 MG EC tablet Take 81 mg by mouth Daily.   Yes Ying Salazar MD   atorvastatin (LIPITOR) 10 MG tablet Take 1 tablet by mouth Daily. 5/18/21  Yes Vianney Figueredo APRN   Calcium Carbonate-Vitamin D (CALTRATE 600+D PO) Take  by mouth 2 (two) times a day. Morning and nightly   Yes Ying Salazar MD   carvedilol (COREG) 6.25 MG tablet Take 1 tablet by mouth 2 (Two) Times a Day With Meals. 8/4/21  Yes VERA Noble MD   cetirizine (zyrTEC) 10 MG tablet Take 10 mg by mouth Daily.   Yes Ying Salazar MD   Cholecalciferol (VITAMIN D3) 125 MCG (5000 UT) capsule capsule Vitamin D3 125 mcg (5,000 unit) tablet   Take by oral route.   Yes Ying Salazar MD   Cyanocobalamin (Vitamin B-12) 1000 MCG sublingual tablet Place 1 tablet under the tongue Daily. 2500mcg   Yes Ying Salazar MD   cycloSPORINE (RESTASIS) 0.05 % ophthalmic emulsion Restasis 0.05 % eye drops in a dropperette   INSTILL 1 DROP INTO AFFECTED EYE(S) BY OPHTHALMIC ROUTE EVERY 12 HOURS   Yes Ying Salazar MD   esomeprazole (nexIUM) 20 MG capsule Take 1 capsule by mouth 2 (two) times a day. 5/18/21  Yes Vianney Figueredo APRN   FEROSUL 325 (65 Fe) MG tablet Take 65 mg by mouth Daily With Breakfast. 11/6/19  Yes Ying Salazar MD   furosemide (Lasix) 40 MG tablet Take 1 tablet by mouth Daily. 7/22/21  Yes Vianney Figueredo APRN   hydroCHLOROthiazide (HYDRODIURIL) 25 MG tablet Take 0.5 tablets by mouth Daily. 8/9/21  Yes Jaxson Araujo MD   HYDROcodone-acetaminophen (NORCO) 7.5-325 MG per tablet Take 1 tablet by mouth Every 8 (Eight) Hours As Needed for Moderate Pain .   Yes Ying Salazar MD   hydrOXYzine (ATARAX) 25 MG tablet Take 25 mg by mouth 3 (Three) Times a Day As  Needed for Itching.   Yes Ying Salazar MD   meloxicam (MOBIC) 15 MG tablet Take 15 mg by mouth Daily As Needed. 3/30/21  Yes Ying Salazar MD   montelukast (SINGULAIR) 10 MG tablet Take 1 tablet by mouth Every Night. 5/18/21  Yes Vianney Figueredo APRN   Multiple Vitamins-Minerals (ICAPS AREDS 2 PO) Take  by mouth 2 (two) times a day. Morning and night   Yes Ying Salazar MD   MULTIPLE VITAMINS-MINERALS ER PO Centrum silver womens 50+   Yes Ying Salazar MD   nortriptyline (PAMELOR) 50 MG capsule Take 1 capsule by mouth Every Night. 7/16/21  Yes VERA Noble MD   pregabalin (LYRICA) 50 MG capsule Take 50 mg by mouth Every 12 (Twelve) Hours As Needed. 4/8/21  Yes Ying Salazar MD   tolterodine LA (DETROL LA) 4 MG 24 hr capsule Take 1 capsule by mouth Daily. 4/15/21  Yes Ying Salazar MD   ciclopirox (LOPROX) 0.77 % cream Apply  topically to the appropriate area as directed 2 (Two) Times a Day.    ProviderYing MD   cloNIDine (CATAPRES) 0.1 MG tablet Take 1 tablet by mouth Daily As Needed for High Blood Pressure (systolic >180 or diastolic >110). 5/27/21   VERA Noble MD   Dextran 70-Hypromellose, PF, ([employer] NATURAL TEARS) 0.1-0.3 % solution Apply 1 drop to eye(s) as directed by provider 2 (Two) Times a Day As Needed (itchy or dry eyes). 11/27/18   Mika Miller MD   docusate sodium (COLACE) 50 MG capsule Take  by mouth 2 (Two) Times a Day.    Ying Salazar MD   fluticasone (FLONASE) 50 MCG/ACT nasal spray 2 sprays into the nostril(s) as directed by provider Daily.    Ying Salaazr MD   linaclotide (LINZESS) 145 MCG capsule capsule Take 1 capsule by mouth Every Morning Before Breakfast. 7/30/21   Vianney Figueredo APRN   Menthol, Topical Analgesic, (BIOFREEZE EX) Apply  topically.    Ying Salazar MD   nystatin (NYSTOP) 687633 UNIT/GM powder powder Apply 100,000 Units topically 2 (Two) Times a Day.    Provider,  "MD Ying   polyethyl glycol-propyl glycol (SYSTANE) 0.4-0.3 % solution ophthalmic solution 2 drops.    ProviderYing MD   Prolia 60 MG/ML solution prefilled syringe syringe  3/2/21   Provider, MD Ying       Allergies:  Levaquin [levofloxacin], Codeine, and Morphine and related    ROS:    General: Weight stable  Resp: No SOA  Cardiovascular: No CP    Objective     Blood pressure 167/65, pulse 63, temperature 96.9 °F (36.1 °C), temperature source Temporal, resp. rate 20, height 154.9 cm (61\"), weight 83.9 kg (185 lb), SpO2 100 %, not currently breastfeeding.    Physical Exam   Constitutional: Pt is oriented to person, place, and in no distress.   Cardiovascular: Normal rate, regular rhythm.    Pulmonary/Chest: Effort normal. No respiratory distress.   Abdominal: Non-distended  Psychiatric: Mood, memory, affect and judgment appear normal.     Assessment/Plan     Diagnosis:  Dysphagia    Anticipated Surgical Procedure:  Endoscopy    The risks, benefits, and alternatives of this procedure have been discussed with the patient or the responsible party- the patient understands and agrees to proceed.      EMR Dragon/transcription disclaimer:  Much of this encounter note is electronic transcription/translation of spoken language to printed text.  The electronic translation of spoken language may be erroneous, or at times, nonsensical words or phrases may be inadvertently transcribed.  Although I have reviewed the note for such errors, some may still exist.  "

## 2021-09-02 NOTE — ANESTHESIA POSTPROCEDURE EVALUATION
Patient: Carmen Obrien    Procedure Summary     Date: 09/02/21 Room / Location: [facility] ENDOSCOPY 5 / [facility] ENDOSCOPY    Anesthesia Start: 0834 Anesthesia Stop: 0847    Procedure: ESOPHAGOGASTRODUODENOSCOPY WITH ANESTHESIA (N/A ) Diagnosis:       Esophageal dysphagia      (Esophageal dysphagia [R13.10])    Surgeons: John Coleman MD Provider: Shakira Roldan CRNA    Anesthesia Type: MAC ASA Status: 3          Anesthesia Type: MAC    Vitals  Vitals Value Taken Time   /86 09/02/21 0856   Temp     Pulse 59 09/02/21 0859   Resp     SpO2 98 % 09/02/21 0858   Vitals shown include unvalidated device data.        Post Anesthesia Care and Evaluation    Patient location during evaluation: PHASE II  Patient participation: complete - patient participated  Level of consciousness: awake and alert  Pain management: adequate  Airway patency: patent  Anesthetic complications: No anesthetic complications  PONV Status: none  Cardiovascular status: acceptable  Respiratory status: acceptable  Hydration status: acceptable

## 2021-09-02 NOTE — ANESTHESIA PREPROCEDURE EVALUATION
Anesthesia Evaluation     Patient summary reviewed   no history of anesthetic complications:  NPO Solid Status: > 8 hours  NPO Liquid Status: > 8 hours           Airway   Mallampati: I  TM distance: >3 FB  Neck ROM: full  No difficulty expected  Dental    (+) lower dentures and upper dentures    Pulmonary - negative pulmonary ROS and normal exam   Cardiovascular - normal exam  Exercise tolerance: poor (<4 METS)    (+) hypertension well controlled 2 medications or greater, CAD, PVD, DVT resolved, hyperlipidemia,       Neuro/Psych  (+) psychiatric history Anxiety and Depression,     GI/Hepatic/Renal/Endo    (+) obesity,  GERD poorly controlled,    (-) morbid obesity    Musculoskeletal     Abdominal  - normal exam   Substance History - negative use     OB/GYN          Other   arthritis,                      Anesthesia Plan    ASA 3     MAC     intravenous induction     Anesthetic plan, all risks, benefits, and alternatives have been provided, discussed and informed consent has been obtained with: patient.

## 2021-09-03 LAB — UREASE TISS QL: NEGATIVE

## 2021-09-09 ENCOUNTER — LAB (OUTPATIENT)
Dept: LAB | Facility: HOSPITAL | Age: 81
End: 2021-09-09

## 2021-09-09 ENCOUNTER — TRANSCRIBE ORDERS (OUTPATIENT)
Dept: ADMINISTRATIVE | Facility: HOSPITAL | Age: 81
End: 2021-09-09

## 2021-09-09 ENCOUNTER — OFFICE VISIT (OUTPATIENT)
Dept: INTERNAL MEDICINE | Facility: CLINIC | Age: 81
End: 2021-09-09

## 2021-09-09 VITALS — HEART RATE: 62 BPM | OXYGEN SATURATION: 96 % | SYSTOLIC BLOOD PRESSURE: 140 MMHG | DIASTOLIC BLOOD PRESSURE: 86 MMHG

## 2021-09-09 DIAGNOSIS — M81.0 OSTEOPOROSIS WITHOUT CURRENT PATHOLOGICAL FRACTURE, UNSPECIFIED OSTEOPOROSIS TYPE: ICD-10-CM

## 2021-09-09 DIAGNOSIS — R60.0 LOWER EXTREMITY EDEMA: ICD-10-CM

## 2021-09-09 DIAGNOSIS — M81.0 OSTEOPOROSIS WITHOUT CURRENT PATHOLOGICAL FRACTURE, UNSPECIFIED OSTEOPOROSIS TYPE: Primary | ICD-10-CM

## 2021-09-09 DIAGNOSIS — I73.9 PERIPHERAL VASCULAR DISEASE (HCC): ICD-10-CM

## 2021-09-09 DIAGNOSIS — L84 PRE-ULCERATIVE CORN OR CALLOUS: ICD-10-CM

## 2021-09-09 DIAGNOSIS — I10 ESSENTIAL HYPERTENSION: ICD-10-CM

## 2021-09-09 DIAGNOSIS — R60.0 LOWER EXTREMITY EDEMA: Primary | ICD-10-CM

## 2021-09-09 LAB
ANION GAP SERPL CALCULATED.3IONS-SCNC: 6 MMOL/L (ref 4–13)
BUN SERPL-MCNC: 25 MG/DL (ref 5–21)
BUN/CREAT SERPL: 32.9
CALCIUM SPEC-SCNC: 10.1 MG/DL (ref 8.4–10.4)
CHLORIDE SERPL-SCNC: 101 MMOL/L (ref 98–110)
CO2 SERPL-SCNC: 30 MMOL/L (ref 24–31)
CREAT SERPL-MCNC: 0.76 MG/DL (ref 0.5–1.4)
GFR SERPL CREATININE-BSD FRML MDRD: 73 ML/MIN/1.73
GLUCOSE SERPL-MCNC: 116 MG/DL (ref 70–100)
POTASSIUM SERPL-SCNC: 4.4 MMOL/L (ref 3.5–5.3)
SODIUM SERPL-SCNC: 137 MMOL/L (ref 135–145)

## 2021-09-09 PROCEDURE — 82306 VITAMIN D 25 HYDROXY: CPT

## 2021-09-09 PROCEDURE — 80048 BASIC METABOLIC PNL TOTAL CA: CPT

## 2021-09-09 PROCEDURE — 99214 OFFICE O/P EST MOD 30 MIN: CPT | Performed by: NURSE PRACTITIONER

## 2021-09-09 PROCEDURE — 36415 COLL VENOUS BLD VENIPUNCTURE: CPT

## 2021-09-09 RX ORDER — AMLODIPINE BESYLATE 10 MG/1
10 TABLET ORAL DAILY
COMMUNITY
End: 2021-09-09

## 2021-09-09 NOTE — PROGRESS NOTES
"Chief Complaint   Patient presents with   • Follow-up   • Fall     pt states she fell at a doctor office since her last visit         History:  Carmen Obrien is a 81 y.o. female who presents today for evaluation of the above problems.          Blood pressure at home \"normal\", she has been checking it.    After her last visit in our office, she fell going into Dr. Gomez' office.  She says she hurt her tailbone and her right shoulder and both hands.  She defers xrays of these areas and has been getting along fine. She would like a referral to a podiatrist here for the callous on her left second toe.    Also, she has seen [facility] vascular surgeon for atherosclerosis of native artery of both lower extremities with intermittent claudication.  She is due for follow-up aterial studies in December.  She would like to start going to someone in [facility] network for this.    At her last visit, she was started on HCTZ 12.5 mg for edema.  Norvasc had recently been discontinued for swelling, and she is now wearing compression stockings regularly.  She feels the swelling is much better.    Is not having any shortness of breath or chest pain. She did not have a syncopal episode when she fell at Dr. Gomez' office.          ROS:  Review of Systems  As above    Allergies   Allergen Reactions   • Levaquin [Levofloxacin] Unknown (See Comments)     Pt doesn't remember   • Codeine Other (See Comments)     Pt does not recall   • Morphine And Related Other (See Comments)     Pt does not recall     Past Medical History:   Diagnosis Date   • Anxiety    • Arthritis    • CAD (coronary artery disease)    • Colon polyp    • DDD (degenerative disc disease), lumbar    • Deep venous thrombosis (CMS/HCC)    • Depression    • Diverticulosis    • Esophageal stricture    • GERD (gastroesophageal reflux disease)    • History of transfusion    • Hypercholesteremia    • Hypertension    • LPRD (laryngopharyngeal reflux disease)    • Macular " degeneration    • Multinodular goiter 2/23/2017   • Osteoarthritis    • Pruritic disorder    • Spastic colon    • Thyroid nodule      Past Surgical History:   Procedure Laterality Date   • BLADDER REPAIR     • CATARACT EXTRACTION     • CHOLECYSTECTOMY     • COLONOSCOPY  04/22/2014    2 polyps, adenomatous, diverticulosis   • COLONOSCOPY N/A 6/26/2020    Procedure: COLONOSCOPY WITH ANESTHESIA;  Surgeon: John Coleman MD;  Location:  [facility];  Service: Gastroenterology;  Laterality: N/A;  pre op: constipation  post op: diverticulosis,   PCP: Adam Delgadillo MD   • CYSTOCELE REPAIR     • ENDOSCOPY  04/22/2014    Schatzki's ring dilated   • ENDOSCOPY N/A 6/26/2020    Procedure: ESOPHAGOGASTRODUODENOSCOPY WITH ANESTHESIA;  Surgeon: John Coleman MD;  Location:  [facility];  Service: Gastroenterology;  Laterality: N/A;  pre op: dysphagia  post op:stricture, dilated   PCP:Adam Delgadillo MD   • ENDOSCOPY N/A 9/2/2021    Procedure: ESOPHAGOGASTRODUODENOSCOPY WITH ANESTHESIA;  Surgeon: John Coleman MD;  Location: [facility];  Service: Gastroenterology;  Laterality: N/A;  pre op: dysphagia  post op:gastritis  PCP: VERA Noble MD   • FEMUR FRACTURE SURGERY     • HYSTERECTOMY     • REPLACEMENT TOTAL KNEE     • ULNAR NERVE TRANSPOSITION       Family History   Problem Relation Age of Onset   • Colon cancer Mother    • Heart disease Mother    • Heart disease Father    • Colon polyps Neg Hx    • Esophageal cancer Neg Hx      Carmen  reports that she has never smoked. She has never used smokeless tobacco. She reports that she does not drink alcohol and does not use drugs.    I have reviewed and updated the above documentation (if necessary) including but not limited to chief complaint, ROS, PFSH, allergies and medications        Current Outpatient Medications:   •  aspirin 81 MG EC tablet, Take 81 mg by mouth Daily., Disp: , Rfl:   •  atorvastatin (LIPITOR) 10 MG tablet, Take 1  tablet by mouth Daily., Disp: 30 tablet, Rfl: 5  •  Calcium Carbonate-Vitamin D (CALTRATE 600+D PO), Take  by mouth 2 (two) times a day. Morning and nightly, Disp: , Rfl:   •  carvedilol (COREG) 6.25 MG tablet, Take 1 tablet by mouth 2 (Two) Times a Day With Meals., Disp: 60 tablet, Rfl: 2  •  cetirizine (zyrTEC) 10 MG tablet, Take 10 mg by mouth Daily., Disp: , Rfl:   •  Cholecalciferol (VITAMIN D3) 125 MCG (5000 UT) capsule capsule, Vitamin D3 125 mcg (5,000 unit) tablet  Take by oral route., Disp: , Rfl:   •  ciclopirox (LOPROX) 0.77 % cream, Apply  topically to the appropriate area as directed 2 (Two) Times a Day., Disp: , Rfl:   •  cloNIDine (CATAPRES) 0.1 MG tablet, Take 1 tablet by mouth Daily As Needed for High Blood Pressure (systolic >180 or diastolic >110)., Disp: 30 tablet, Rfl: 2  •  Cyanocobalamin (Vitamin B-12) 1000 MCG sublingual tablet, Place 1 tablet under the tongue Daily. 2500mcg, Disp: , Rfl:   •  cycloSPORINE (RESTASIS) 0.05 % ophthalmic emulsion, Restasis 0.05 % eye drops in a dropperette  INSTILL 1 DROP INTO AFFECTED EYE(S) BY OPHTHALMIC ROUTE EVERY 12 HOURS, Disp: , Rfl:   •  Dextran 70-Hypromellose, PF, ([employer] NATURAL TEARS) 0.1-0.3 % solution, Apply 1 drop to eye(s) as directed by provider 2 (Two) Times a Day As Needed (itchy or dry eyes)., Disp: 32 each, Rfl: 0  •  docusate sodium (COLACE) 50 MG capsule, Take  by mouth 2 (Two) Times a Day., Disp: , Rfl:   •  esomeprazole (nexIUM) 20 MG capsule, Take 1 capsule by mouth 2 (two) times a day., Disp: 60 capsule, Rfl: 11  •  FEROSUL 325 (65 Fe) MG tablet, Take 65 mg by mouth Daily With Breakfast., Disp: , Rfl:   •  fluticasone (FLONASE) 50 MCG/ACT nasal spray, 2 sprays into the nostril(s) as directed by provider Daily., Disp: , Rfl:   •  furosemide (Lasix) 40 MG tablet, Take 1 tablet by mouth Daily., Disp: 30 tablet, Rfl: 2  •  hydroCHLOROthiazide (HYDRODIURIL) 25 MG tablet, Take 0.5 tablets by mouth Daily., Disp: 15 tablet, Rfl: 11  •   HYDROcodone-acetaminophen (NORCO) 7.5-325 MG per tablet, Take 1 tablet by mouth Every 8 (Eight) Hours As Needed for Moderate Pain ., Disp: , Rfl:   •  hydrOXYzine (ATARAX) 25 MG tablet, Take 25 mg by mouth 3 (Three) Times a Day As Needed for Itching., Disp: , Rfl:   •  linaclotide (LINZESS) 145 MCG capsule capsule, Take 1 capsule by mouth Every Morning Before Breakfast., Disp: 30 capsule, Rfl:   •  meloxicam (MOBIC) 15 MG tablet, Take 15 mg by mouth Daily As Needed., Disp: , Rfl:   •  Menthol, Topical Analgesic, (BIOFREEZE EX), Apply  topically., Disp: , Rfl:   •  montelukast (SINGULAIR) 10 MG tablet, Take 1 tablet by mouth Every Night., Disp: 30 tablet, Rfl: 5  •  Multiple Vitamins-Minerals (ICAPS AREDS 2 PO), Take  by mouth 2 (two) times a day. Morning and night, Disp: , Rfl:   •  MULTIPLE VITAMINS-MINERALS ER PO, Centrum silver womens 50+, Disp: , Rfl:   •  nortriptyline (PAMELOR) 50 MG capsule, Take 1 capsule by mouth Every Night., Disp: 30 capsule, Rfl: 5  •  nystatin (NYSTOP) 314031 UNIT/GM powder powder, Apply 100,000 Units topically 2 (Two) Times a Day., Disp: , Rfl:   •  polyethyl glycol-propyl glycol (SYSTANE) 0.4-0.3 % solution ophthalmic solution, 2 drops., Disp: , Rfl:   •  pregabalin (LYRICA) 50 MG capsule, Take 50 mg by mouth Every 12 (Twelve) Hours As Needed., Disp: , Rfl:   •  Prolia 60 MG/ML solution prefilled syringe syringe, , Disp: , Rfl:   •  tolterodine LA (DETROL LA) 4 MG 24 hr capsule, Take 1 capsule by mouth Daily., Disp: , Rfl:         PHQ-9 Total Score:      OBJECTIVE:  Visit Vitals  /86 (BP Location: Right arm, Patient Position: Sitting, Cuff Size: Adult)   Pulse 62   SpO2 96%      Physical Exam  Vitals and nursing note (She is using a walker) reviewed.   Constitutional:       General: She is not in acute distress.     Appearance: Normal appearance. She is not ill-appearing, toxic-appearing or diaphoretic.   HENT:      Head: Normocephalic and atraumatic.   Cardiovascular:      Rate  and Rhythm: Normal rate and regular rhythm.      Heart sounds: Normal heart sounds.   Pulmonary:      Effort: Pulmonary effort is normal. No respiratory distress.      Breath sounds: Normal breath sounds. No stridor. No wheezing or rhonchi.   Abdominal:      General: There is no distension.      Palpations: Abdomen is soft.      Tenderness: There is no abdominal tenderness.   Musculoskeletal:      Right lower leg: Edema (trace edema ankle) present.      Left lower leg: Edema (trace edema ankle) present.      Comments: Both calves are soft, non-tender.  There is trace pitting edema bilateral ankles   Skin:     General: Skin is warm and dry.      Findings: Bruising (small bruise right wrist) present.   Neurological:      Mental Status: She is alert and oriented to person, place, and time.      Gait: Gait abnormal (slow gait, using walker).   Psychiatric:         Mood and Affect: Mood normal.         Behavior: Behavior normal.         Thought Content: Thought content normal.         Judgment: Judgment normal.         MDM    Assessment/Plan    Diagnoses and all orders for this visit:    1. Lower extremity edema (Primary)    2. Pre-ulcerative corn or callous  -     Ambulatory Referral to Podiatry    3. Essential hypertension    4. Peripheral vascular disease (CMS/HCC)  -     Ambulatory Referral to Vascular Surgery      Her edema is improved with the discontinuation of the Norvasc and the addition of the HCTZ to the Lasix that she takes regularly.  I will have her continue this regimen, and we will check a BMP today.  This order has already been place by Dr. Stoddard at last week's visit.    Referred to Dr. Chung for the callous and to Dr. Grubbs for the PVD.       Education materials and an After Visit Summary were printed and given to the patient at discharge.  Return in about 3 months (around 12/9/2021) for follow up with Dr stoddard.       After the visit was complete, the patient went to the bathroom in our  office.  The  staff heard her calling for help.  Dr. Guerra and Marylu De Los Santos went to her aid and helped her up into a chair.  She said she had fallen while trying to sit on the toilet.  Denies any syncope.  Her right forearm had a new bruise on it, approximately 3 inches long and 1 inch wide, but no other injury or bruise was noted at the time of the fall.  The patient did not report any new pain, and she was able to finish going to the bathroom (Marylu and TIFFANI stayed in with her to assist if necessary.)  She was able to ambulate out with her walker.     Vianney Figueredo, ABDON   10:55 CDT  9/9/2021

## 2021-09-10 LAB — 25(OH)D3 SERPL-MCNC: 60.6 NG/ML (ref 30–100)

## 2021-09-15 ENCOUNTER — TELEPHONE (OUTPATIENT)
Dept: VASCULAR SURGERY | Facility: CLINIC | Age: 81
End: 2021-09-15

## 2021-09-15 NOTE — TELEPHONE ENCOUNTER
Spoke with Mrs Obrien reminding her of her appointment for Thursday, September 16th, 2021 at 145 pm with Dr Grubbs. Mrs Obrien confirmed he would be here.

## 2021-09-16 ENCOUNTER — OFFICE VISIT (OUTPATIENT)
Dept: VASCULAR SURGERY | Facility: CLINIC | Age: 81
End: 2021-09-16

## 2021-09-16 VITALS
WEIGHT: 185 LBS | BODY MASS INDEX: 36.32 KG/M2 | DIASTOLIC BLOOD PRESSURE: 80 MMHG | HEIGHT: 60 IN | HEART RATE: 74 BPM | OXYGEN SATURATION: 96 % | SYSTOLIC BLOOD PRESSURE: 136 MMHG

## 2021-09-16 DIAGNOSIS — E78.2 MIXED HYPERLIPIDEMIA: ICD-10-CM

## 2021-09-16 DIAGNOSIS — I73.9 PAD (PERIPHERAL ARTERY DISEASE) (HCC): Primary | ICD-10-CM

## 2021-09-16 DIAGNOSIS — I87.323 CHRONIC VENOUS HYPERTENSION WITH INFLAMMATION INVOLVING BOTH SIDES: ICD-10-CM

## 2021-09-16 DIAGNOSIS — I89.0 LYMPHEDEMA: ICD-10-CM

## 2021-09-16 DIAGNOSIS — I10 ESSENTIAL HYPERTENSION: ICD-10-CM

## 2021-09-16 DIAGNOSIS — I87.8 CHRONIC VENOUS STASIS: ICD-10-CM

## 2021-09-16 DIAGNOSIS — I70.213 ATHEROSCLEROSIS OF NATIVE ARTERY OF BOTH LOWER EXTREMITIES WITH INTERMITTENT CLAUDICATION (HCC): ICD-10-CM

## 2021-09-16 DIAGNOSIS — I65.23 BILATERAL CAROTID ARTERY STENOSIS: ICD-10-CM

## 2021-09-16 PROCEDURE — 99204 OFFICE O/P NEW MOD 45 MIN: CPT | Performed by: SURGERY
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
## 2021-09-22 ENCOUNTER — TELEPHONE (OUTPATIENT)
Dept: INTERNAL MEDICINE | Facility: CLINIC | Age: 81
End: 2021-09-22

## 2021-09-22 NOTE — TELEPHONE ENCOUNTER
PATIENT HAS CALLED,  HER BP PRESSURE READINGS  09/10/21  148/75   HR 73  5pm  09/11/21  101/75   HR 71  7pm  09/12/21  143/62   HR 78  8pm  09/13/21  134/69   HR 78  3pm  09/14/21  116/76   HR 71  10am  09/15/21  138/75   HR 72    09/17/21  125/70   HR 64  11am  09/19/21  149/74   HR 78   4pm  09/20/21  131/75   HR 71  09/22/21  120/73   HR 84

## 2021-09-23 ENCOUNTER — TRANSCRIBE ORDERS (OUTPATIENT)
Dept: ADMINISTRATIVE | Facility: HOSPITAL | Age: 81
End: 2021-09-23

## 2021-09-23 ENCOUNTER — HOSPITAL ENCOUNTER (OUTPATIENT)
Dept: GENERAL RADIOLOGY | Facility: HOSPITAL | Age: 81
Discharge: HOME | End: 2021-09-23
Admitting: INTERNAL MEDICINE

## 2021-09-23 DIAGNOSIS — M15.0 PRIMARY GENERALIZED (OSTEO)ARTHRITIS: Primary | ICD-10-CM

## 2021-09-23 PROCEDURE — 73130 X-RAY EXAM OF HAND: CPT

## 2021-09-23 NOTE — TELEPHONE ENCOUNTER
Please let her know I am very pleased with these blood pressure readings, and have her continue the same medications. I hope she is doing well! Thank you!

## 2021-09-28 ENCOUNTER — TELEPHONE (OUTPATIENT)
Dept: INTERNAL MEDICINE | Facility: CLINIC | Age: 81
End: 2021-09-28

## 2021-09-28 NOTE — PROGRESS NOTES
"    [facility] - PODIATRY    Today's Date: 09/30/21    Patient Name: Carmen Obrien  MRN: [id number]  CSN: [id number]  PCP: VERA Noble MD  Referring Provider: Vianney Figueredo APRN    SUBJECTIVE     Chief Complaint   Patient presents with   • Establish Care     pcp09/09/2021 Lesion on L second toe and nail trimming, pt is not diabetic- pt states has seen Dr Aviles and he did some work \"grinding\" on her feet and now has a wound that not healing good- pt denies pain at present- pt presents with some swellling to feet and ankles, compression type socks on, long thick discolored nails, sore on inside edge of 2nd toe left foot, another healing sore on tip of 3rd toe right foot, some hard places on ball of right foot     HPI: Carmen Obrien, a 81 y.o.female, comes to clinic as a(n) new patient complaining of painful lesion to left 2nd toe. Patient has h/o anxiety, arthritis, CAD, DDD, DVT, Depression , GERD, HTN, Hypercholesterolemia, LPRD, Macular degeneration, OA. Patient is non-diabetic.  Relates occasional numbness and tingling in her feet but no loss of sensation.  Denies any bari open wounds.  Relates a painful lesion on the inside of her left second toe.  Admits to mild discoloration.  She does utilize a piece of foam in the past to pad the area.  The site has previously been treated by Dr. Gomez.  Denies any redness or drainage.  Relates swelling in her lower legs.  She utilizes compression stockings daily. Denies pain currently. Relates previous treatment(s) including care by podiatry. Denies any constitutional symptoms. No other pedal complaints at this time.    Past Medical History:   Diagnosis Date   • Anxiety    • Arthritis    • CAD (coronary artery disease)    • Colon polyp    • DDD (degenerative disc disease), lumbar    • Deep venous thrombosis (CMS/HCC)    • Depression    • Diverticulosis    • Esophageal stricture    • GERD (gastroesophageal reflux disease)    • History of " transfusion    • Hypercholesteremia    • Hypertension    • LPRD (laryngopharyngeal reflux disease)    • Macular degeneration    • Multinodular goiter 2/23/2017   • Osteoarthritis    • Pruritic disorder    • Spastic colon    • Thyroid nodule      Past Surgical History:   Procedure Laterality Date   • BLADDER REPAIR     • CATARACT EXTRACTION     • CHOLECYSTECTOMY     • COLONOSCOPY  04/22/2014    2 polyps, adenomatous, diverticulosis   • COLONOSCOPY N/A 6/26/2020    Procedure: COLONOSCOPY WITH ANESTHESIA;  Surgeon: John Coleman MD;  Location:  [facility];  Service: Gastroenterology;  Laterality: N/A;  pre op: constipation  post op: diverticulosis,   PCP: Adam Delgadillo MD   • CYSTOCELE REPAIR     • ENDOSCOPY  04/22/2014    Schatzki's ring dilated   • ENDOSCOPY N/A 6/26/2020    Procedure: ESOPHAGOGASTRODUODENOSCOPY WITH ANESTHESIA;  Surgeon: John Coleman MD;  Location: [facility]COPY;  Service: Gastroenterology;  Laterality: N/A;  pre op: dysphagia  post op:stricture, dilated   PCP:Adam Delgadillo MD   • ENDOSCOPY N/A 9/2/2021    Procedure: ESOPHAGOGASTRODUODENOSCOPY WITH ANESTHESIA;  Surgeon: Jonh Coleman MD;  Location:  [facility];  Service: Gastroenterology;  Laterality: N/A;  pre op: dysphagia  post op:gastritis  PCP: VERA Noble MD   • FEMUR FRACTURE SURGERY     • HYSTERECTOMY     • REPLACEMENT TOTAL KNEE     • ULNAR NERVE TRANSPOSITION       Family History   Problem Relation Age of Onset   • Colon cancer Mother    • Heart disease Mother    • Heart disease Father    • Colon polyps Neg Hx    • Esophageal cancer Neg Hx      Social History     Socioeconomic History   • Marital status:      Spouse name: Not on file   • Number of children: Not on file   • Years of education: Not on file   • Highest education level: Not on file   Tobacco Use   • Smoking status: Never Smoker   • Smokeless tobacco: Never Used   Vaping Use   • Vaping Use: Never used   Substance and  Sexual Activity   • Alcohol use: No   • Drug use: No   • Sexual activity: Defer     Allergies   Allergen Reactions   • Levaquin [Levofloxacin] Unknown (See Comments)     Pt doesn't remember   • Codeine Other (See Comments)     Pt does not recall   • Morphine And Related Other (See Comments)     Pt does not recall     Current Outpatient Medications   Medication Sig Dispense Refill   • aspirin 81 MG EC tablet Take 81 mg by mouth Daily.     • atorvastatin (LIPITOR) 10 MG tablet Take 1 tablet by mouth Daily. 30 tablet 5   • Calcium Carbonate-Vitamin D (CALTRATE 600+D PO) Take  by mouth 2 (two) times a day. Morning and nightly     • carvedilol (COREG) 6.25 MG tablet Take 1 tablet by mouth 2 (Two) Times a Day With Meals. 60 tablet 2   • cetirizine (zyrTEC) 10 MG tablet Take 10 mg by mouth Daily.     • Cholecalciferol (VITAMIN D3) 125 MCG (5000 UT) capsule capsule Vitamin D3 125 mcg (5,000 unit) tablet   Take by oral route.     • ciclopirox (LOPROX) 0.77 % cream Apply  topically to the appropriate area as directed 2 (Two) Times a Day.     • cloNIDine (CATAPRES) 0.1 MG tablet Take 1 tablet by mouth Daily As Needed for High Blood Pressure (systolic >180 or diastolic >110). 30 tablet 2   • Cyanocobalamin (Vitamin B-12) 1000 MCG sublingual tablet Place 1 tablet under the tongue Daily. 2500mcg     • cycloSPORINE (RESTASIS) 0.05 % ophthalmic emulsion Restasis 0.05 % eye drops in a dropperette   INSTILL 1 DROP INTO AFFECTED EYE(S) BY OPHTHALMIC ROUTE EVERY 12 HOURS     • Dextran 70-Hypromellose, PF, ([employer] NATURAL TEARS) 0.1-0.3 % solution Apply 1 drop to eye(s) as directed by provider 2 (Two) Times a Day As Needed (itchy or dry eyes). 32 each 0   • docusate sodium (COLACE) 50 MG capsule Take  by mouth 2 (Two) Times a Day.     • esomeprazole (nexIUM) 20 MG capsule Take 1 capsule by mouth 2 (two) times a day. 60 capsule 11   • FEROSUL 325 (65 Fe) MG tablet Take 65 mg by mouth Daily With Breakfast.     • fluticasone (FLONASE) 50  MCG/ACT nasal spray 2 sprays into the nostril(s) as directed by provider Daily.     • furosemide (Lasix) 40 MG tablet Take 1 tablet by mouth Daily. 30 tablet 2   • hydroCHLOROthiazide (HYDRODIURIL) 25 MG tablet Take 0.5 tablets by mouth Daily. 15 tablet 11   • HYDROcodone-acetaminophen (NORCO) 7.5-325 MG per tablet Take 1 tablet by mouth Every 8 (Eight) Hours As Needed for Moderate Pain .     • hydrOXYzine (ATARAX) 25 MG tablet Take 25 mg by mouth 3 (Three) Times a Day As Needed for Itching.     • linaclotide (LINZESS) 145 MCG capsule capsule Take 1 capsule by mouth Every Morning Before Breakfast. 30 capsule    • meloxicam (MOBIC) 15 MG tablet Take 15 mg by mouth Daily As Needed.     • Menthol, Topical Analgesic, (BIOFREEZE EX) Apply  topically.     • montelukast (SINGULAIR) 10 MG tablet Take 1 tablet by mouth Every Night. 30 tablet 5   • Multiple Vitamins-Minerals (ICAPS AREDS 2 PO) Take  by mouth 2 (two) times a day. Morning and night     • MULTIPLE VITAMINS-MINERALS ER PO Centrum silver womens 50+     • nortriptyline (PAMELOR) 50 MG capsule Take 1 capsule by mouth Every Night. 30 capsule 5   • nystatin (NYSTOP) 503064 UNIT/GM powder powder Apply 100,000 Units topically 2 (Two) Times a Day.     • polyethyl glycol-propyl glycol (SYSTANE) 0.4-0.3 % solution ophthalmic solution 2 drops.     • pregabalin (LYRICA) 50 MG capsule Take 50 mg by mouth Every 12 (Twelve) Hours As Needed.     • Prolia 60 MG/ML solution prefilled syringe syringe      • tolterodine LA (DETROL LA) 4 MG 24 hr capsule Take 1 capsule by mouth Daily.       No current facility-administered medications for this visit.     Review of Systems   Constitutional: Negative for chills and fever.   HENT: Negative for congestion.    Respiratory: Negative for shortness of breath.    Cardiovascular: Positive for leg swelling. Negative for chest pain.   Gastrointestinal: Negative for constipation, diarrhea, nausea and vomiting.   Musculoskeletal: Positive for  arthralgias and gait problem.        Foot pain   Skin: Negative for wound.   Neurological: Positive for numbness.       OBJECTIVE     Vitals:    21 0822   BP: 142/64   Pulse: 69   SpO2: 99%       PHYSICAL EXAM  GEN:   Accompanied by none.     Foot/Ankle Exam:       General:   Appearance: appears stated age and healthy and obesity    Orientation: AAOx3    Gait: unimpaired    Assistance: walker    Shoe Gear:  Diabetic shoes    VASCULAR      Right Foot Vascularity   Dorsalis pedis:  2+  Posterior tibial:  2+  Skin Temperature: warm    Edema Gradin+ and pitting  CFT:  4  Pedal Hair Growth:  Present  Varicosities: mild varicosities       Left Foot Vascularity   Dorsalis pedis:  2+  Posterior tibial:  2+  Skin Temperature: warm    Edema Gradin+ and pitting  CFT:  4  Pedal Hair Growth:  Present  Varicosities: mild varicosities        NEUROLOGIC     Right Foot Neurologic   Light touch sensation:  Diminished  Vibratory sensation:  Diminished  Hot/Cold sensation: normal    Protective Sensation using Cooter-Latanya Monofilament:  10     Left Foot Neurologic   Light touch sensation:  Diminished  Vibratory sensation:  Diminished  Hot/cold sensation: normal    Protective Sensation using Cooter-Latanya Monofilament:  10     MUSCULOSKELETAL      Right Foot Musculoskeletal   Ecchymosis:  None  Tenderness: none    Arch:  Normal  Hammertoe:  Second toe, third toe, fourth toe and fifth toe  Hallux valgus: Yes       Left Foot Musculoskeletal   Ecchymosis:  None  Tenderness: left foot callus and toe 2    Arch:  Normal  Hammertoe:  Second toe, third toe, fourth toe and fifth toe  Hallux valgus: Yes       MUSCLE STRENGTH     Right Foot Muscle Strength   Foot dorsiflexion:  4+  Foot plantar flexion:  4+  Foot inversion:  4+  Foot eversion:  4+     Left Foot Muscle Strength   Foot dorsiflexion:  4+  Foot plantar flexion:  4+  Foot inversion:  4+  Foot eversion:  4+     RANGE OF MOTION      Right Foot Range of Motion    Foot and ankle ROM within normal limits       Left Foot Range of Motion   Foot and ankle ROM within normal limits       DERMATOLOGIC     Right Foot Dermatologic   Skin: skin intact    Nails: onychomycosis, abnormally thick, subungual debris and dystrophic nails       Left Foot Dermatologic   Skin: corn (medial 2nd toe.  Subdermal bleeding.  After paring, no open wounds or sores noted.)    Nails: onychomycosis, abnormally thick, subungual debris and dystrophic nails        RADIOLOGY/NUCLEAR:  XR Hand 3+ View Left    Result Date: 9/23/2021  Narrative: EXAMINATION: XR HAND 3+ VW LEFT-  9/23/2021 2:50 PM CDT  HISTORY: m15.0; M15.0-Primary generalized (osteo)arthritis  COMPARISON: 10/23/2013  FINDINGS: Frontal, lateral and oblique radiographs of the left hand were provided for review.  Significant interval worsening of degenerative change in the hand and wrist since the previous study in 2013. There is no evidence of an acute fracture. There is joint space loss at the second, third, and fourth MCP joints, worst at the third MCP joint. There is some chondrocalcinosis at the third MCP joint, the TFCC, and at the lunotriquetral joint space. Severe degenerative osteoarthritis at the first CMC joint with hypertrophic change and joint space loss as well as some sclerosis. There is also osteophyte RIGHT is at the second through fifth DIP joints evidence by hypertrophic change and joint space loss.       Impression:  1.  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   This report was finalized on 09/23/2021 14:51 by Dr. Arsalan Natarajan MD.    XR Hand 3+ View Right    Result Date: 9/23/2021  Narrative: EXAMINATION: XR HAND 3+ VW RIGHT-  9/23/2021 2:46 PM CDT  HISTORY: m15.0; M15.0-Primary generalized (osteo)arthritis  COMPARISON: None  FINDINGS: Frontal, lateral and oblique radiographs of the RIGHT hand were provided for review.  Multifocal chondrocalcinosis and joint space loss throughout  the hand and wrist. Calcification surrounding the second, third, and fourth MCP joints, the TFCC, and the lunotriquetral joint space. There is severe osteoarthritis at the first CMC joint with hypertrophic change and severe joint space loss. There is joint space loss in the second and third MCP joints, most pronounced in the second MCP joint. There is diffuse joint space loss and hypertrophic spurring at the second through fifth DIP joints and the first IP joint. No definite erosive change.       Impression:  1.  Mixed osteoarthritis and CPPD arthropathy as discussed above.   This report was finalized on 09/23/2021 14:50 by Dr. Arsalan Natarajan MD.      LABORATORY/CULTURE RESULTS:      PATHOLOGY RESULTS:       ASSESSMENT/PLAN     Diagnoses and all orders for this visit:    1. Pre-ulcerative calluses (Primary)    2. Hammer toes of both feet  -     XR Foot 3+ View Left; Future    3. Foot pain, left      Comprehensive lower extremity examination and evaluation was performed.  Discussed findings and treatment plan including risks, benefits, and treatment options with patient in detail. Patient agreed with treatment plan.  After verbal consent obtained, calluses x1 pared utilizing dermal curette and/or scalpel without incidence  Patient may maintain nails and calluses at home utilizing emery board or pumice stone between visits as needed   Dispensed 1 toe spacer and Budin toe splint   Xrays to evaluate foot structure.  Discussed that if the patient were to require surgery on the left second toe, amputation versus correction may be needed.  Defer nail care as this may not be a covered service for her.  An After Visit Summary was printed and given to the patient at discharge, including (if requested) any available informative/educational handouts regarding diagnosis, treatment, or medications. All questions were answered to patient/family satisfaction. Should symptoms fail to improve or worsen they agree to call or return  to clinic or to go to the Emergency Department. Discussed the importance of following up with any needed screening tests/labs/specialist appointments and any requested follow-up recommended by me today. Importance of maintaining follow-up discussed and patient accepts that missed appointments can delay diagnosis and potentially lead to worsening of conditions.  Return if symptoms worsen or fail to improve., or sooner if acute issues arise.    Lab Frequency Next Occurrence   Follow Anesthesia Guidelines / Protocol Once 06/11/2020   Obtain Informed Consent Once 06/16/2020   Adult Transthoracic Echo Complete W/ Cont if Necessary Per Protocol Once 07/12/2021   Follow Anesthesia Guidelines / Protocol Once 08/11/2021   Obtain Informed Consent Once 08/16/2021   Diet: Once 09/02/2021   Diet: Once 09/02/2021   US Carotid Bilateral Once 03/15/2022   US Ankle / Brachial Indices Extremity Complete Once 03/15/2022       This document has been electronically signed by Juaquin Chung DPM on September 30, 2021 12:39 CDT

## 2021-09-29 ENCOUNTER — TELEPHONE (OUTPATIENT)
Dept: INTERNAL MEDICINE | Facility: CLINIC | Age: 81
End: 2021-09-29

## 2021-09-30 ENCOUNTER — OFFICE VISIT (OUTPATIENT)
Dept: PODIATRY | Facility: CLINIC | Age: 81
End: 2021-09-30

## 2021-09-30 VITALS
HEIGHT: 60 IN | OXYGEN SATURATION: 99 % | DIASTOLIC BLOOD PRESSURE: 64 MMHG | BODY MASS INDEX: 36.83 KG/M2 | HEART RATE: 69 BPM | WEIGHT: 187.6 LBS | SYSTOLIC BLOOD PRESSURE: 142 MMHG

## 2021-09-30 DIAGNOSIS — M20.41 HAMMER TOES OF BOTH FEET: ICD-10-CM

## 2021-09-30 DIAGNOSIS — L84 PRE-ULCERATIVE CALLUSES: Primary | ICD-10-CM

## 2021-09-30 DIAGNOSIS — M79.672 FOOT PAIN, LEFT: ICD-10-CM

## 2021-09-30 DIAGNOSIS — M20.42 HAMMER TOES OF BOTH FEET: ICD-10-CM

## 2021-09-30 PROCEDURE — 11055 PARING/CUTG B9 HYPRKER LES 1: CPT | Performed by: PODIATRIST

## 2021-09-30 PROCEDURE — 99203 OFFICE O/P NEW LOW 30 MIN: CPT | Performed by: PODIATRIST

## 2021-09-30 NOTE — PATIENT INSTRUCTIONS
Corns and Calluses  Corns are small areas of thickened skin that form on the top, sides, or tip of a toe. Corns have a cone-shaped core with a point that can press on a nerve below. This causes pain.  Calluses are areas of thickened skin that can form anywhere on the body, including the hands, fingers, palms, soles of the feet, and heels. Calluses are usually larger than corns.  What are the causes?  Corns and calluses are caused by rubbing (friction) or pressure, such as from shoes that are too tight or do not fit properly.  What increases the risk?  Corns are more likely to develop in people who have misshapen toes (toe deformities), such as hammer toes.  Calluses can form with friction to any area of the skin. They are more likely to develop in people who:  · Work with their hands.  · Wear shoes that fit poorly, are too tight, or are high-heeled.  · Have toe deformities.  What are the signs or symptoms?  Symptoms of a corn or callus include:  · A hard growth on the skin.  · Pain or tenderness under the skin.  · Redness and swelling.  · Increased discomfort while wearing tight-fitting shoes, if your feet are affected.  If a corn or callus becomes infected, symptoms may include:  · Redness and swelling that gets worse.  · Pain.  · Fluid, blood, or pus draining from the corn or callus.  How is this diagnosed?  Corns and calluses may be diagnosed based on your symptoms, your medical history, and a physical exam.  How is this treated?  Treatment for corns and calluses may include:  · Removing the cause of the friction or pressure. This may involve:  ? Changing your shoes.  ? Wearing shoe inserts (orthotics) or other protective layers in your shoes, such as a corn pad.  ? Wearing gloves.  · Applying medicine to the skin (topical medicine) to help soften skin in the hardened, thickened areas.  · Removing layers of dead skin with a file to reduce the size of the corn or callus.  · Removing the corn or callus with a  scalpel or laser.  · Taking antibiotic medicines, if your corn or callus is infected.  · Having surgery, if a toe deformity is the cause.  Follow these instructions at home:    · Take over-the-counter and prescription medicines only as told by your health care provider.  · If you were prescribed an antibiotic medicine, take it as told by your health care provider. Do not stop taking it even if your condition improves.  · Wear shoes that fit well. Avoid wearing high-heeled shoes and shoes that are too tight or too loose.  · Wear any padding, protective layers, gloves, or orthotics as told by your health care provider.  · Soak your hands or feet. Then use a file or pumice stone to soften your corn or callus. Do this as told by your health care provider.  · Check your corn or callus every day for signs of infection.  Contact a health care provider if:  · Your symptoms do not improve with treatment.  · You have redness or swelling that gets worse.  · Your corn or callus becomes painful.  · You have fluid, blood, or pus coming from your corn or callus.  · You have new symptoms.  Get help right away if:  · You develop severe pain with redness.  Summary  · Corns are small areas of thickened skin that form on the top, sides, or tip of a toe. These can be painful.  · Calluses are areas of thickened skin that can form anywhere on the body, including the hands, fingers, palms, and soles of the feet. Calluses are usually larger than corns.  · Corns and calluses are caused by rubbing (friction) or pressure, such as from shoes that are too tight or do not fit properly.  · Treatment may include wearing padding, protective layers, gloves, or orthotics as told by your health care provider.  This information is not intended to replace advice given to you by your health care provider. Make sure you discuss any questions you have with your health care provider.  Document Revised: 04/15/2021 Document Reviewed: 04/15/2021  Alicia  Patient Education © 2021 [employer]

## 2021-11-01 ENCOUNTER — APPOINTMENT (OUTPATIENT)
Dept: CT IMAGING | Facility: HOSPITAL | Age: 81
End: 2021-11-01

## 2021-11-01 ENCOUNTER — OFFICE VISIT (OUTPATIENT)
Dept: INTERNAL MEDICINE | Facility: CLINIC | Age: 81
End: 2021-11-01

## 2021-11-01 ENCOUNTER — HOSPITAL ENCOUNTER (INPATIENT)
Facility: HOSPITAL | Age: 81
LOS: 8 days | Discharge: NURSING, REHAB OR LONG-TERM CARE | End: 2021-11-11
Attending: EMERGENCY MEDICINE | Admitting: FAMILY MEDICINE

## 2021-11-01 ENCOUNTER — APPOINTMENT (OUTPATIENT)
Dept: ULTRASOUND IMAGING | Facility: HOSPITAL | Age: 81
End: 2021-11-01

## 2021-11-01 VITALS
BODY MASS INDEX: 36.64 KG/M2 | OXYGEN SATURATION: 97 % | DIASTOLIC BLOOD PRESSURE: 84 MMHG | HEART RATE: 72 BPM | TEMPERATURE: 97.9 F | HEIGHT: 60 IN | SYSTOLIC BLOOD PRESSURE: 162 MMHG

## 2021-11-01 DIAGNOSIS — D72.829 LEUKOCYTOSIS, UNSPECIFIED TYPE: ICD-10-CM

## 2021-11-01 DIAGNOSIS — L03.116 CELLULITIS OF LEFT LOWER EXTREMITY: Primary | ICD-10-CM

## 2021-11-01 DIAGNOSIS — Z74.09 IMPAIRED FUNCTIONAL MOBILITY AND ACTIVITY TOLERANCE: ICD-10-CM

## 2021-11-01 DIAGNOSIS — L03.116 LEFT LEG CELLULITIS: Primary | ICD-10-CM

## 2021-11-01 DIAGNOSIS — Z78.9 DECREASED ACTIVITIES OF DAILY LIVING (ADL): ICD-10-CM

## 2021-11-01 DIAGNOSIS — G89.4 CHRONIC PAIN SYNDROME: ICD-10-CM

## 2021-11-01 DIAGNOSIS — M79.89 LEFT LEG SWELLING: ICD-10-CM

## 2021-11-01 DIAGNOSIS — S71.102A OPEN WOUND OF LEFT THIGH, INITIAL ENCOUNTER: ICD-10-CM

## 2021-11-01 DIAGNOSIS — L02.416 ABSCESS OF LEFT THIGH: ICD-10-CM

## 2021-11-01 DIAGNOSIS — M79.605 LEFT LEG PAIN: ICD-10-CM

## 2021-11-01 PROBLEM — N30.01 ACUTE CYSTITIS WITH HEMATURIA: Status: ACTIVE | Noted: 2021-11-01

## 2021-11-01 PROBLEM — E87.6 HYPOKALEMIA: Status: ACTIVE | Noted: 2021-11-01

## 2021-11-01 PROBLEM — Z22.322 POSITIVE RESULT FOR METHICILLIN RESISTANT STAPHYLOCOCCUS AUREUS (MRSA) SCREENING: Status: ACTIVE | Noted: 2021-11-01

## 2021-11-01 LAB
ALBUMIN SERPL-MCNC: 3.9 G/DL (ref 3.5–5.2)
ALBUMIN/GLOB SERPL: 1 G/DL
ALP SERPL-CCNC: 72 U/L (ref 39–117)
ALT SERPL W P-5'-P-CCNC: 40 U/L (ref 1–33)
ANION GAP SERPL CALCULATED.3IONS-SCNC: 11 MMOL/L (ref 5–15)
APTT PPP: 35 SECONDS (ref 24.1–35)
AST SERPL-CCNC: 33 U/L (ref 1–32)
BACTERIA UR QL AUTO: ABNORMAL /HPF
BASOPHILS # BLD AUTO: 0.09 10*3/MM3 (ref 0–0.2)
BASOPHILS NFR BLD AUTO: 0.7 % (ref 0–1.5)
BILIRUB SERPL-MCNC: 0.4 MG/DL (ref 0–1.2)
BILIRUB UR QL STRIP: NEGATIVE
BUN SERPL-MCNC: 18 MG/DL (ref 8–23)
BUN/CREAT SERPL: 25.7 (ref 7–25)
CALCIUM SPEC-SCNC: 9.2 MG/DL (ref 8.6–10.5)
CHLORIDE SERPL-SCNC: 96 MMOL/L (ref 98–107)
CLARITY UR: ABNORMAL
CO2 SERPL-SCNC: 29 MMOL/L (ref 22–29)
COLOR UR: YELLOW
CREAT SERPL-MCNC: 0.7 MG/DL (ref 0.57–1)
CRP SERPL-MCNC: 1.73 MG/DL (ref 0–0.5)
D DIMER PPP FEU-MCNC: 1.23 MG/L (FEU) (ref 0–0.5)
D-LACTATE SERPL-SCNC: 1 MMOL/L (ref 0.5–2)
DEPRECATED RDW RBC AUTO: 49.5 FL (ref 37–54)
EOSINOPHIL # BLD AUTO: 0.48 10*3/MM3 (ref 0–0.4)
EOSINOPHIL NFR BLD AUTO: 3.5 % (ref 0.3–6.2)
ERYTHROCYTE [DISTWIDTH] IN BLOOD BY AUTOMATED COUNT: 15 % (ref 12.3–15.4)
ERYTHROCYTE [SEDIMENTATION RATE] IN BLOOD: 42 MM/HR (ref 0–30)
GFR SERPL CREATININE-BSD FRML MDRD: 81 ML/MIN/1.73
GLOBULIN UR ELPH-MCNC: 3.8 GM/DL
GLUCOSE SERPL-MCNC: 105 MG/DL (ref 65–99)
GLUCOSE UR STRIP-MCNC: NEGATIVE MG/DL
HCT VFR BLD AUTO: 37 % (ref 34–46.6)
HGB BLD-MCNC: 12.2 G/DL (ref 12–15.9)
HGB UR QL STRIP.AUTO: ABNORMAL
HOLD SPECIMEN: NORMAL
HYALINE CASTS UR QL AUTO: ABNORMAL /LPF
IMM GRANULOCYTES # BLD AUTO: 0.22 10*3/MM3 (ref 0–0.05)
IMM GRANULOCYTES NFR BLD AUTO: 1.6 % (ref 0–0.5)
INR PPP: 0.97 (ref 0.91–1.09)
KETONES UR QL STRIP: NEGATIVE
LEUKOCYTE ESTERASE UR QL STRIP.AUTO: ABNORMAL
LYMPHOCYTES # BLD AUTO: 2.17 10*3/MM3 (ref 0.7–3.1)
LYMPHOCYTES NFR BLD AUTO: 15.8 % (ref 19.6–45.3)
MCH RBC QN AUTO: 29.8 PG (ref 26.6–33)
MCHC RBC AUTO-ENTMCNC: 33 G/DL (ref 31.5–35.7)
MCV RBC AUTO: 90.2 FL (ref 79–97)
MONOCYTES # BLD AUTO: 1.44 10*3/MM3 (ref 0.1–0.9)
MONOCYTES NFR BLD AUTO: 10.5 % (ref 5–12)
MRSA DNA SPEC QL NAA+PROBE: ABNORMAL
NEUTROPHILS NFR BLD AUTO: 67.9 % (ref 42.7–76)
NEUTROPHILS NFR BLD AUTO: 9.35 10*3/MM3 (ref 1.7–7)
NITRITE UR QL STRIP: NEGATIVE
NRBC BLD AUTO-RTO: 0 /100 WBC (ref 0–0.2)
PH UR STRIP.AUTO: 6.5 [PH] (ref 5–8)
PLATELET # BLD AUTO: 298 10*3/MM3 (ref 140–450)
PMV BLD AUTO: 9.7 FL (ref 6–12)
POTASSIUM SERPL-SCNC: 3.4 MMOL/L (ref 3.5–5.2)
PROT SERPL-MCNC: 7.7 G/DL (ref 6–8.5)
PROT UR QL STRIP: ABNORMAL
PROTHROMBIN TIME: 12.5 SECONDS (ref 11.9–14.6)
RBC # BLD AUTO: 4.1 10*6/MM3 (ref 3.77–5.28)
RBC # UR: ABNORMAL /HPF
REF LAB TEST METHOD: ABNORMAL
SARS-COV-2 RNA PNL SPEC NAA+PROBE: NOT DETECTED
SODIUM SERPL-SCNC: 136 MMOL/L (ref 136–145)
SP GR UR STRIP: 1.01 (ref 1–1.03)
SQUAMOUS #/AREA URNS HPF: ABNORMAL /HPF
UROBILINOGEN UR QL STRIP: ABNORMAL
WBC # BLD AUTO: 13.75 10*3/MM3 (ref 3.4–10.8)
WBC UR QL AUTO: ABNORMAL /HPF

## 2021-11-01 PROCEDURE — 85379 FIBRIN DEGRADATION QUANT: CPT | Performed by: NURSE PRACTITIONER

## 2021-11-01 PROCEDURE — 99284 EMERGENCY DEPT VISIT MOD MDM: CPT

## 2021-11-01 PROCEDURE — 87635 SARS-COV-2 COVID-19 AMP PRB: CPT | Performed by: EMERGENCY MEDICINE

## 2021-11-01 PROCEDURE — G0378 HOSPITAL OBSERVATION PER HR: HCPCS

## 2021-11-01 PROCEDURE — 81001 URINALYSIS AUTO W/SCOPE: CPT | Performed by: EMERGENCY MEDICINE

## 2021-11-01 PROCEDURE — 25010000002 PIPERACILLIN SOD-TAZOBACTAM PER 1 G: Performed by: NURSE PRACTITIONER

## 2021-11-01 PROCEDURE — 87147 CULTURE TYPE IMMUNOLOGIC: CPT | Performed by: EMERGENCY MEDICINE

## 2021-11-01 PROCEDURE — 25010000002 VANCOMYCIN 10 G RECONSTITUTED SOLUTION: Performed by: EMERGENCY MEDICINE

## 2021-11-01 PROCEDURE — 85652 RBC SED RATE AUTOMATED: CPT | Performed by: EMERGENCY MEDICINE

## 2021-11-01 PROCEDURE — 99214 OFFICE O/P EST MOD 30 MIN: CPT | Performed by: NURSE PRACTITIONER

## 2021-11-01 PROCEDURE — 87641 MR-STAPH DNA AMP PROBE: CPT | Performed by: NURSE PRACTITIONER

## 2021-11-01 PROCEDURE — 87040 BLOOD CULTURE FOR BACTERIA: CPT | Performed by: EMERGENCY MEDICINE

## 2021-11-01 PROCEDURE — 25010000002 IOPAMIDOL 61 % SOLUTION: Performed by: EMERGENCY MEDICINE

## 2021-11-01 PROCEDURE — 85025 COMPLETE CBC W/AUTO DIFF WBC: CPT | Performed by: EMERGENCY MEDICINE

## 2021-11-01 PROCEDURE — 25010000002 ENOXAPARIN PER 10 MG: Performed by: NURSE PRACTITIONER

## 2021-11-01 PROCEDURE — 85610 PROTHROMBIN TIME: CPT | Performed by: EMERGENCY MEDICINE

## 2021-11-01 PROCEDURE — 86140 C-REACTIVE PROTEIN: CPT | Performed by: EMERGENCY MEDICINE

## 2021-11-01 PROCEDURE — 73701 CT LOWER EXTREMITY W/DYE: CPT

## 2021-11-01 PROCEDURE — 80053 COMPREHEN METABOLIC PANEL: CPT | Performed by: EMERGENCY MEDICINE

## 2021-11-01 PROCEDURE — 93971 EXTREMITY STUDY: CPT

## 2021-11-01 PROCEDURE — 85730 THROMBOPLASTIN TIME PARTIAL: CPT | Performed by: EMERGENCY MEDICINE

## 2021-11-01 PROCEDURE — 87086 URINE CULTURE/COLONY COUNT: CPT | Performed by: EMERGENCY MEDICINE

## 2021-11-01 PROCEDURE — 83605 ASSAY OF LACTIC ACID: CPT | Performed by: EMERGENCY MEDICINE

## 2021-11-01 RX ORDER — CELECOXIB 200 MG/1
200 CAPSULE ORAL
Status: ON HOLD | COMMUNITY
Start: 2021-10-12 | End: 2021-11-02

## 2021-11-01 RX ORDER — CARVEDILOL 6.25 MG/1
6.25 TABLET ORAL 2 TIMES DAILY WITH MEALS
Status: DISCONTINUED | OUTPATIENT
Start: 2021-11-01 | End: 2021-11-11 | Stop reason: HOSPADM

## 2021-11-01 RX ORDER — PREGABALIN 50 MG/1
50 CAPSULE ORAL EVERY 12 HOURS SCHEDULED
Status: DISCONTINUED | OUTPATIENT
Start: 2021-11-01 | End: 2021-11-11 | Stop reason: HOSPADM

## 2021-11-01 RX ORDER — HYDROCODONE BITARTRATE AND ACETAMINOPHEN 7.5; 325 MG/1; MG/1
1 TABLET ORAL EVERY 4 HOURS PRN
Status: DISCONTINUED | OUTPATIENT
Start: 2021-11-01 | End: 2021-11-11 | Stop reason: HOSPADM

## 2021-11-01 RX ORDER — PANTOPRAZOLE SODIUM 40 MG/1
40 TABLET, DELAYED RELEASE ORAL
Status: DISCONTINUED | OUTPATIENT
Start: 2021-11-02 | End: 2021-11-11 | Stop reason: HOSPADM

## 2021-11-01 RX ORDER — ONDANSETRON 2 MG/ML
4 INJECTION INTRAMUSCULAR; INTRAVENOUS EVERY 6 HOURS PRN
Status: DISCONTINUED | OUTPATIENT
Start: 2021-11-01 | End: 2021-11-11 | Stop reason: HOSPADM

## 2021-11-01 RX ORDER — ATORVASTATIN CALCIUM 10 MG/1
10 TABLET, FILM COATED ORAL NIGHTLY
Status: DISCONTINUED | OUTPATIENT
Start: 2021-11-01 | End: 2021-11-11 | Stop reason: HOSPADM

## 2021-11-01 RX ORDER — ACETAMINOPHEN 160 MG/5ML
650 SOLUTION ORAL EVERY 4 HOURS PRN
Status: DISCONTINUED | OUTPATIENT
Start: 2021-11-01 | End: 2021-11-11 | Stop reason: HOSPADM

## 2021-11-01 RX ORDER — ASPIRIN 81 MG/1
81 TABLET ORAL DAILY
Status: DISCONTINUED | OUTPATIENT
Start: 2021-11-02 | End: 2021-11-11 | Stop reason: HOSPADM

## 2021-11-01 RX ORDER — ACETAMINOPHEN 650 MG/1
650 SUPPOSITORY RECTAL EVERY 4 HOURS PRN
Status: DISCONTINUED | OUTPATIENT
Start: 2021-11-01 | End: 2021-11-11 | Stop reason: HOSPADM

## 2021-11-01 RX ORDER — ACETAMINOPHEN 325 MG/1
650 TABLET ORAL EVERY 4 HOURS PRN
Status: DISCONTINUED | OUTPATIENT
Start: 2021-11-01 | End: 2021-11-11 | Stop reason: HOSPADM

## 2021-11-01 RX ORDER — FLUTICASONE PROPIONATE 50 MCG
2 SPRAY, SUSPENSION (ML) NASAL DAILY
Status: DISCONTINUED | OUTPATIENT
Start: 2021-11-02 | End: 2021-11-11 | Stop reason: HOSPADM

## 2021-11-01 RX ORDER — SODIUM CHLORIDE 0.9 % (FLUSH) 0.9 %
10 SYRINGE (ML) INJECTION EVERY 12 HOURS SCHEDULED
Status: DISCONTINUED | OUTPATIENT
Start: 2021-11-01 | End: 2021-11-11 | Stop reason: HOSPADM

## 2021-11-01 RX ORDER — ONDANSETRON 4 MG/1
4 TABLET, FILM COATED ORAL EVERY 6 HOURS PRN
Status: DISCONTINUED | OUTPATIENT
Start: 2021-11-01 | End: 2021-11-11 | Stop reason: HOSPADM

## 2021-11-01 RX ORDER — OXYBUTYNIN CHLORIDE 5 MG/1
10 TABLET, EXTENDED RELEASE ORAL DAILY
Status: DISCONTINUED | OUTPATIENT
Start: 2021-11-02 | End: 2021-11-11 | Stop reason: HOSPADM

## 2021-11-01 RX ORDER — CYCLOSPORINE 0.5 MG/ML
1 EMULSION OPHTHALMIC 2 TIMES DAILY
Status: DISCONTINUED | OUTPATIENT
Start: 2021-11-01 | End: 2021-11-11 | Stop reason: HOSPADM

## 2021-11-01 RX ORDER — NORTRIPTYLINE HYDROCHLORIDE 25 MG/1
50 CAPSULE ORAL NIGHTLY
Status: DISCONTINUED | OUTPATIENT
Start: 2021-11-01 | End: 2021-11-11 | Stop reason: HOSPADM

## 2021-11-01 RX ORDER — SODIUM CHLORIDE 0.9 % (FLUSH) 0.9 %
10 SYRINGE (ML) INJECTION AS NEEDED
Status: DISCONTINUED | OUTPATIENT
Start: 2021-11-01 | End: 2021-11-11 | Stop reason: HOSPADM

## 2021-11-01 RX ORDER — CETIRIZINE HYDROCHLORIDE 10 MG/1
10 TABLET ORAL DAILY
Status: DISCONTINUED | OUTPATIENT
Start: 2021-11-02 | End: 2021-11-11 | Stop reason: HOSPADM

## 2021-11-01 RX ORDER — MONTELUKAST SODIUM 10 MG/1
10 TABLET ORAL NIGHTLY
Status: DISCONTINUED | OUTPATIENT
Start: 2021-11-01 | End: 2021-11-11 | Stop reason: HOSPADM

## 2021-11-01 RX ORDER — POTASSIUM CHLORIDE 750 MG/1
40 CAPSULE, EXTENDED RELEASE ORAL
Status: COMPLETED | OUTPATIENT
Start: 2021-11-01 | End: 2021-11-01

## 2021-11-01 RX ORDER — DOCUSATE SODIUM 100 MG/1
100 CAPSULE, LIQUID FILLED ORAL 2 TIMES DAILY
Status: DISCONTINUED | OUTPATIENT
Start: 2021-11-01 | End: 2021-11-04

## 2021-11-01 RX ORDER — CELECOXIB 200 MG/1
200 CAPSULE ORAL DAILY
Status: DISCONTINUED | OUTPATIENT
Start: 2021-11-02 | End: 2021-11-11 | Stop reason: HOSPADM

## 2021-11-01 RX ORDER — CLONIDINE HYDROCHLORIDE 0.1 MG/1
0.1 TABLET ORAL DAILY PRN
Status: DISCONTINUED | OUTPATIENT
Start: 2021-11-01 | End: 2021-11-11 | Stop reason: HOSPADM

## 2021-11-01 RX ADMIN — CARVEDILOL 6.25 MG: 6.25 TABLET, FILM COATED ORAL at 23:09

## 2021-11-01 RX ADMIN — Medication 1750 MG: at 19:31

## 2021-11-01 RX ADMIN — IOPAMIDOL 100 ML: 612 INJECTION, SOLUTION INTRAVENOUS at 20:22

## 2021-11-01 RX ADMIN — ENOXAPARIN SODIUM 40 MG: 40 INJECTION SUBCUTANEOUS at 20:54

## 2021-11-01 RX ADMIN — SODIUM CHLORIDE, PRESERVATIVE FREE 10 ML: 5 INJECTION INTRAVENOUS at 23:10

## 2021-11-01 RX ADMIN — MONTELUKAST SODIUM 10 MG: 10 TABLET, FILM COATED ORAL at 23:09

## 2021-11-01 RX ADMIN — ATORVASTATIN CALCIUM 10 MG: 10 TABLET, FILM COATED ORAL at 23:09

## 2021-11-01 RX ADMIN — POTASSIUM CHLORIDE 40 MEQ: 10 CAPSULE, COATED, EXTENDED RELEASE ORAL at 23:15

## 2021-11-01 RX ADMIN — POTASSIUM CHLORIDE 40 MEQ: 10 CAPSULE, COATED, EXTENDED RELEASE ORAL at 20:55

## 2021-11-01 RX ADMIN — HYDROCODONE BITARTRATE AND ACETAMINOPHEN 1 TABLET: 7.5; 325 TABLET ORAL at 23:59

## 2021-11-01 RX ADMIN — PIPERACILLIN SODIUM AND TAZOBACTAM SODIUM 3.38 G: 3; .375 INJECTION, POWDER, LYOPHILIZED, FOR SOLUTION INTRAVENOUS at 23:08

## 2021-11-01 RX ADMIN — PREGABALIN 50 MG: 50 CAPSULE ORAL at 23:08

## 2021-11-01 RX ADMIN — CYCLOSPORINE 1 DROP: 0.5 EMULSION OPHTHALMIC at 23:09

## 2021-11-01 RX ADMIN — NORTRIPTYLINE HYDROCHLORIDE 50 MG: 25 CAPSULE ORAL at 23:09

## 2021-11-01 RX ADMIN — DOCUSATE SODIUM 100 MG: 100 CAPSULE, LIQUID FILLED ORAL at 23:10

## 2021-11-01 NOTE — PROGRESS NOTES
Chief Complaint   Patient presents with   • Rash     left leg   • Leg Pain     left leg   • Edema     left leg         History:  Carmen Obrien is a 81 y.o. female who presents today for evaluation of the above problems.          Started 10/28/21.    Left leg red, hot, swollen, tender from the knee down.  Yesterday it was below the knee, today above the knee.  A hard knot that she has had on left inner thigh for a long time has gotten red, larger, tender.    She has an artificial joint in the left knee. No fever.      ROS:  Review of Systems  As above    Allergies   Allergen Reactions   • Levaquin [Levofloxacin] Unknown (See Comments)     Pt doesn't remember   • Codeine Other (See Comments)     Pt does not recall   • Morphine And Related Other (See Comments)     Pt does not recall     Past Medical History:   Diagnosis Date   • Anxiety    • Arthritis    • CAD (coronary artery disease)    • Colon polyp    • DDD (degenerative disc disease), lumbar    • Deep venous thrombosis (HCC)    • Depression    • Diverticulosis    • Esophageal stricture    • GERD (gastroesophageal reflux disease)    • History of transfusion    • Hypercholesteremia    • Hypertension    • LPRD (laryngopharyngeal reflux disease)    • Macular degeneration    • Multinodular goiter 2/23/2017   • Osteoarthritis    • Pruritic disorder    • Spastic colon    • Thyroid nodule      Past Surgical History:   Procedure Laterality Date   • BLADDER REPAIR     • CATARACT EXTRACTION     • CHOLECYSTECTOMY     • COLONOSCOPY  04/22/2014    2 polyps, adenomatous, diverticulosis   • COLONOSCOPY N/A 6/26/2020    Procedure: COLONOSCOPY WITH ANESTHESIA;  Surgeon: John Coleman MD;  Location: [facility] ENDOSCOPY;  Service: Gastroenterology;  Laterality: N/A;  pre op: constipation  post op: diverticulosis,   PCP: Adam Delgadillo MD   • CYSTOCELE REPAIR     • ENDOSCOPY  04/22/2014    Schatzki's ring dilated   • ENDOSCOPY N/A 6/26/2020    Procedure:  ESOPHAGOGASTRODUODENOSCOPY WITH ANESTHESIA;  Surgeon: John Coleman MD;  Location:  [facility] ENDOSCOPY;  Service: Gastroenterology;  Laterality: N/A;  pre op: dysphagia  post op:stricture, dilated   PCP:Adam Delgadillo MD   • ENDOSCOPY N/A 9/2/2021    Procedure: ESOPHAGOGASTRODUODENOSCOPY WITH ANESTHESIA;  Surgeon: John Coleman MD;  Location:  [facility] ENDOSCOPY;  Service: Gastroenterology;  Laterality: N/A;  pre op: dysphagia  post op:gastritis  PCP: VERA Noble MD   • FEMUR FRACTURE SURGERY     • HYSTERECTOMY     • REPLACEMENT TOTAL KNEE     • ULNAR NERVE TRANSPOSITION       Family History   Problem Relation Age of Onset   • Colon cancer Mother    • Heart disease Mother    • Heart disease Father    • Colon polyps Neg Hx    • Esophageal cancer Neg Hx      Carmen  reports that she has never smoked. She has never used smokeless tobacco. She reports that she does not drink alcohol and does not use drugs.    I have reviewed and updated the above documentation (if necessary) including but not limited to chief complaint, ROS, PFSH, allergies and medications        Current Outpatient Medications:   •  aspirin 81 MG EC tablet, Take 81 mg by mouth Daily., Disp: , Rfl:   •  atorvastatin (LIPITOR) 10 MG tablet, Take 1 tablet by mouth Daily., Disp: 30 tablet, Rfl: 5  •  Calcium Carbonate-Vitamin D (CALTRATE 600+D PO), Take  by mouth 2 (two) times a day. Morning and nightly, Disp: , Rfl:   •  carvedilol (COREG) 6.25 MG tablet, Take 1 tablet by mouth 2 (Two) Times a Day With Meals., Disp: 60 tablet, Rfl: 2  •  celecoxib (CeleBREX) 200 MG capsule, TAKE 1 CAPSULE BY MOUTH ONCE A DAY WITH FOOD, Disp: , Rfl:   •  cetirizine (zyrTEC) 10 MG tablet, Take 10 mg by mouth Daily., Disp: , Rfl:   •  Cholecalciferol (VITAMIN D3) 125 MCG (5000 UT) capsule capsule, Vitamin D3 125 mcg (5,000 unit) tablet  Take by oral route., Disp: , Rfl:   •  ciclopirox (LOPROX) 0.77 % cream, Apply  topically to the appropriate area as directed  2 (Two) Times a Day., Disp: , Rfl:   •  cloNIDine (CATAPRES) 0.1 MG tablet, Take 1 tablet by mouth Daily As Needed for High Blood Pressure (systolic >180 or diastolic >110)., Disp: 30 tablet, Rfl: 2  •  Cyanocobalamin (Vitamin B-12) 1000 MCG sublingual tablet, Place 1 tablet under the tongue Daily. 2500mcg, Disp: , Rfl:   •  cycloSPORINE (RESTASIS) 0.05 % ophthalmic emulsion, Restasis 0.05 % eye drops in a dropperette  INSTILL 1 DROP INTO AFFECTED EYE(S) BY OPHTHALMIC ROUTE EVERY 12 HOURS, Disp: , Rfl:   •  Dextran 70-Hypromellose, PF, ([employer] NATURAL TEARS) 0.1-0.3 % solution, Apply 1 drop to eye(s) as directed by provider 2 (Two) Times a Day As Needed (itchy or dry eyes)., Disp: 32 each, Rfl: 0  •  docusate sodium (COLACE) 50 MG capsule, Take  by mouth 2 (Two) Times a Day., Disp: , Rfl:   •  esomeprazole (nexIUM) 20 MG capsule, Take 1 capsule by mouth 2 (two) times a day., Disp: 60 capsule, Rfl: 11  •  FEROSUL 325 (65 Fe) MG tablet, Take 65 mg by mouth Daily With Breakfast., Disp: , Rfl:   •  fluticasone (FLONASE) 50 MCG/ACT nasal spray, 2 sprays into the nostril(s) as directed by provider Daily., Disp: , Rfl:   •  furosemide (Lasix) 40 MG tablet, Take 1 tablet by mouth Daily., Disp: 30 tablet, Rfl: 2  •  hydroCHLOROthiazide (HYDRODIURIL) 25 MG tablet, Take 0.5 tablets by mouth Daily., Disp: 15 tablet, Rfl: 11  •  HYDROcodone-acetaminophen (NORCO) 7.5-325 MG per tablet, Take 1 tablet by mouth Every 8 (Eight) Hours As Needed for Moderate Pain ., Disp: , Rfl:   •  hydrOXYzine (ATARAX) 25 MG tablet, Take 25 mg by mouth 3 (Three) Times a Day As Needed for Itching., Disp: , Rfl:   •  linaclotide (LINZESS) 145 MCG capsule capsule, Take 1 capsule by mouth Every Morning Before Breakfast., Disp: 30 capsule, Rfl:   •  Menthol, Topical Analgesic, (BIOFREEZE EX), Apply  topically., Disp: , Rfl:   •  montelukast (SINGULAIR) 10 MG tablet, Take 1 tablet by mouth Every Night., Disp: 30 tablet, Rfl: 5  •  Multiple Vitamins-Minerals  "(ICAPS AREDS 2 PO), Take  by mouth 2 (two) times a day. Morning and night, Disp: , Rfl:   •  MULTIPLE VITAMINS-MINERALS ER PO, Centrum silver womens 50+, Disp: , Rfl:   •  nortriptyline (PAMELOR) 50 MG capsule, Take 1 capsule by mouth Every Night., Disp: 30 capsule, Rfl: 5  •  nystatin (NYSTOP) 698115 UNIT/GM powder powder, Apply 100,000 Units topically 2 (Two) Times a Day., Disp: , Rfl:   •  polyethyl glycol-propyl glycol (SYSTANE) 0.4-0.3 % solution ophthalmic solution, 2 drops., Disp: , Rfl:   •  pregabalin (LYRICA) 50 MG capsule, Take 50 mg by mouth Every 12 (Twelve) Hours As Needed., Disp: , Rfl:   •  Prolia 60 MG/ML solution prefilled syringe syringe, , Disp: , Rfl:   •  tolterodine LA (DETROL LA) 4 MG 24 hr capsule, Take 1 capsule by mouth Daily., Disp: , Rfl:     PHQ-9 Depression Screening  Little interest or pleasure in doing things?     Feeling down, depressed, or hopeless?     Trouble falling or staying asleep, or sleeping too much?     Feeling tired or having little energy?     Poor appetite or overeating?     Feeling bad about yourself - or that you are a failure or have let yourself or your family down?     Trouble concentrating on things, such as reading the newspaper or watching television?     Moving or speaking so slowly that other people could have noticed? Or the opposite - being so fidgety or restless that you have been moving around a lot more than usual?     Thoughts that you would be better off dead, or of hurting yourself in some way?     PHQ-9 Total Score     If you checked off any problems, how difficult have these problems made it for you to do your work, take care of things at home, or get along with other people?       PHQ-9 Total Score:      OBJECTIVE:  Visit Vitals  /84 (BP Location: Right arm, Patient Position: Sitting, Cuff Size: Adult)   Pulse 72   Temp 97.9 °F (36.6 °C) (Oral)   Ht 152.4 cm (60\")   SpO2 97%   BMI 36.64 kg/m²      Physical Exam  Vitals and nursing note " reviewed.   Constitutional:       General: She is not in acute distress.     Appearance: Normal appearance. She is not ill-appearing, toxic-appearing or diaphoretic.   HENT:      Head: Normocephalic and atraumatic.   Musculoskeletal:      Right lower leg: No edema.      Left lower leg: Edema (1+ edema from knee down, including foot) present.   Skin:     Findings: Erythema (left lower extremity with cellulitic appearanc of deep erythema, warmth, tenderness from knee to ankle anteriorly; erythema extends around to calf in patches) present.             Comments: The erythema extends in patches sporadically above the left knee, with a darker patch of rash just superior and medial to knee.  In the medical thigh there is a firm, tender, 3-4 inch diameter knot.  The skin over the knot is also erythematous and warm (X in drawing.)   Neurological:      Mental Status: She is alert and oriented to person, place, and time.   Psychiatric:         Mood and Affect: Mood normal.         Behavior: Behavior normal.         Thought Content: Thought content normal.         Judgment: Judgment normal.         MDM    Assessment/Plan    Diagnoses and all orders for this visit:    1. Cellulitis of left lower extremity (Primary)    She has what appears to be a fairly extensive cellulitis of the left lower extremity that is progressively and rapidly worsening.  In addition, I feel that DVT needs to be ruled out.  I have advised her to go to the ER today for further evaluation, and she is agreeable to this.  I have called report to Ricarda in the ER.    Education materials and an After Visit Summary were printed and given to the patient at discharge.  Return for Next scheduled follow up.         ABDON Smith   15:20 CDT  11/1/2021

## 2021-11-02 LAB
ANION GAP SERPL CALCULATED.3IONS-SCNC: 9 MMOL/L (ref 5–15)
BACTERIA SPEC AEROBE CULT: ABNORMAL
BACTERIA SPEC AEROBE CULT: ABNORMAL
BUN SERPL-MCNC: 16 MG/DL (ref 8–23)
BUN/CREAT SERPL: 29.1 (ref 7–25)
CALCIUM SPEC-SCNC: 9.3 MG/DL (ref 8.6–10.5)
CHLORIDE SERPL-SCNC: 102 MMOL/L (ref 98–107)
CHOLEST SERPL-MCNC: 139 MG/DL (ref 0–200)
CO2 SERPL-SCNC: 26 MMOL/L (ref 22–29)
CREAT SERPL-MCNC: 0.55 MG/DL (ref 0.57–1)
DEPRECATED RDW RBC AUTO: 50 FL (ref 37–54)
ERYTHROCYTE [DISTWIDTH] IN BLOOD BY AUTOMATED COUNT: 14.9 % (ref 12.3–15.4)
GFR SERPL CREATININE-BSD FRML MDRD: 107 ML/MIN/1.73
GLUCOSE SERPL-MCNC: 122 MG/DL (ref 65–99)
HBA1C MFR BLD: 5.6 % (ref 4.8–5.6)
HCT VFR BLD AUTO: 32.8 % (ref 34–46.6)
HDLC SERPL-MCNC: 45 MG/DL (ref 40–60)
HGB BLD-MCNC: 11 G/DL (ref 12–15.9)
LDLC SERPL CALC-MCNC: 70 MG/DL (ref 0–100)
LDLC/HDLC SERPL: 1.48 {RATIO}
MCH RBC QN AUTO: 30.6 PG (ref 26.6–33)
MCHC RBC AUTO-ENTMCNC: 33.5 G/DL (ref 31.5–35.7)
MCV RBC AUTO: 91.1 FL (ref 79–97)
PLATELET # BLD AUTO: 259 10*3/MM3 (ref 140–450)
PMV BLD AUTO: 10 FL (ref 6–12)
POTASSIUM SERPL-SCNC: 4.7 MMOL/L (ref 3.5–5.2)
RBC # BLD AUTO: 3.6 10*6/MM3 (ref 3.77–5.28)
SODIUM SERPL-SCNC: 137 MMOL/L (ref 136–145)
TRIGL SERPL-MCNC: 136 MG/DL (ref 0–150)
VLDLC SERPL-MCNC: 24 MG/DL (ref 5–40)
WBC # BLD AUTO: 12.35 10*3/MM3 (ref 3.4–10.8)

## 2021-11-02 PROCEDURE — 97116 GAIT TRAINING THERAPY: CPT

## 2021-11-02 PROCEDURE — 83036 HEMOGLOBIN GLYCOSYLATED A1C: CPT | Performed by: NURSE PRACTITIONER

## 2021-11-02 PROCEDURE — 97162 PT EVAL MOD COMPLEX 30 MIN: CPT

## 2021-11-02 PROCEDURE — 80061 LIPID PANEL: CPT | Performed by: NURSE PRACTITIONER

## 2021-11-02 PROCEDURE — 25010000002 ENOXAPARIN PER 10 MG: Performed by: NURSE PRACTITIONER

## 2021-11-02 PROCEDURE — G0378 HOSPITAL OBSERVATION PER HR: HCPCS

## 2021-11-02 PROCEDURE — 97166 OT EVAL MOD COMPLEX 45 MIN: CPT

## 2021-11-02 PROCEDURE — 99221 1ST HOSP IP/OBS SF/LOW 40: CPT | Performed by: NURSE PRACTITIONER

## 2021-11-02 PROCEDURE — 25010000002 PIPERACILLIN SOD-TAZOBACTAM PER 1 G: Performed by: NURSE PRACTITIONER

## 2021-11-02 PROCEDURE — 25010000002 VANCOMYCIN 1 G RECONSTITUTED SOLUTION 1 EACH VIAL: Performed by: NURSE PRACTITIONER

## 2021-11-02 PROCEDURE — 36415 COLL VENOUS BLD VENIPUNCTURE: CPT | Performed by: NURSE PRACTITIONER

## 2021-11-02 PROCEDURE — 80048 BASIC METABOLIC PNL TOTAL CA: CPT | Performed by: NURSE PRACTITIONER

## 2021-11-02 PROCEDURE — 85027 COMPLETE CBC AUTOMATED: CPT | Performed by: NURSE PRACTITIONER

## 2021-11-02 RX ORDER — MULTIPLE VITAMINS W/ MINERALS TAB 9MG-400MCG
2 TAB ORAL DAILY
COMMUNITY
End: 2021-11-11 | Stop reason: HOSPADM

## 2021-11-02 RX ORDER — AMLODIPINE BESYLATE 10 MG/1
10 TABLET ORAL DAILY
COMMUNITY
End: 2021-11-11 | Stop reason: HOSPADM

## 2021-11-02 RX ORDER — MELOXICAM 15 MG/1
15 TABLET ORAL DAILY
COMMUNITY
End: 2022-01-27

## 2021-11-02 RX ADMIN — CYCLOSPORINE 1 DROP: 0.5 EMULSION OPHTHALMIC at 09:48

## 2021-11-02 RX ADMIN — PANTOPRAZOLE SODIUM 40 MG: 40 TABLET, DELAYED RELEASE ORAL at 05:00

## 2021-11-02 RX ADMIN — VANCOMYCIN HYDROCHLORIDE 1000 MG: 1 INJECTION, POWDER, LYOPHILIZED, FOR SOLUTION INTRAVENOUS at 20:49

## 2021-11-02 RX ADMIN — Medication 5000 UNITS: at 09:48

## 2021-11-02 RX ADMIN — DOCUSATE SODIUM 100 MG: 100 CAPSULE, LIQUID FILLED ORAL at 20:50

## 2021-11-02 RX ADMIN — PREGABALIN 50 MG: 50 CAPSULE ORAL at 20:51

## 2021-11-02 RX ADMIN — CELECOXIB 200 MG: 200 CAPSULE ORAL at 09:48

## 2021-11-02 RX ADMIN — CARVEDILOL 6.25 MG: 6.25 TABLET, FILM COATED ORAL at 09:48

## 2021-11-02 RX ADMIN — SODIUM CHLORIDE, PRESERVATIVE FREE 10 ML: 5 INJECTION INTRAVENOUS at 20:51

## 2021-11-02 RX ADMIN — HYDROCODONE BITARTRATE AND ACETAMINOPHEN 1 TABLET: 7.5; 325 TABLET ORAL at 05:00

## 2021-11-02 RX ADMIN — ATORVASTATIN CALCIUM 10 MG: 10 TABLET, FILM COATED ORAL at 20:50

## 2021-11-02 RX ADMIN — NORTRIPTYLINE HYDROCHLORIDE 50 MG: 25 CAPSULE ORAL at 20:50

## 2021-11-02 RX ADMIN — HYDROCODONE BITARTRATE AND ACETAMINOPHEN 1 TABLET: 7.5; 325 TABLET ORAL at 12:17

## 2021-11-02 RX ADMIN — PIPERACILLIN SODIUM AND TAZOBACTAM SODIUM 3.38 G: 3; .375 INJECTION, POWDER, LYOPHILIZED, FOR SOLUTION INTRAVENOUS at 11:30

## 2021-11-02 RX ADMIN — VANCOMYCIN HYDROCHLORIDE 1000 MG: 1 INJECTION, POWDER, LYOPHILIZED, FOR SOLUTION INTRAVENOUS at 09:44

## 2021-11-02 RX ADMIN — CARVEDILOL 6.25 MG: 6.25 TABLET, FILM COATED ORAL at 17:52

## 2021-11-02 RX ADMIN — ASPIRIN 81 MG: 81 TABLET, COATED ORAL at 09:48

## 2021-11-02 RX ADMIN — Medication 1 APPLICATION: at 20:50

## 2021-11-02 RX ADMIN — DOCUSATE SODIUM 100 MG: 100 CAPSULE, LIQUID FILLED ORAL at 09:49

## 2021-11-02 RX ADMIN — ENOXAPARIN SODIUM 40 MG: 40 INJECTION SUBCUTANEOUS at 20:49

## 2021-11-02 RX ADMIN — FLUTICASONE PROPIONATE 2 SPRAY: 50 SPRAY, METERED NASAL at 10:41

## 2021-11-02 RX ADMIN — OXYBUTYNIN CHLORIDE 10 MG: 5 TABLET, EXTENDED RELEASE ORAL at 09:48

## 2021-11-02 RX ADMIN — PIPERACILLIN SODIUM AND TAZOBACTAM SODIUM 3.38 G: 3; .375 INJECTION, POWDER, LYOPHILIZED, FOR SOLUTION INTRAVENOUS at 05:01

## 2021-11-02 RX ADMIN — MONTELUKAST SODIUM 10 MG: 10 TABLET, FILM COATED ORAL at 20:50

## 2021-11-02 RX ADMIN — CYCLOSPORINE 1 DROP: 0.5 EMULSION OPHTHALMIC at 20:50

## 2021-11-02 RX ADMIN — CETIRIZINE HYDROCHLORIDE 10 MG: 10 TABLET ORAL at 09:48

## 2021-11-02 RX ADMIN — Medication 1 APPLICATION: at 15:43

## 2021-11-02 RX ADMIN — PREGABALIN 50 MG: 50 CAPSULE ORAL at 09:48

## 2021-11-02 RX ADMIN — HYDROCODONE BITARTRATE AND ACETAMINOPHEN 1 TABLET: 7.5; 325 TABLET ORAL at 17:52

## 2021-11-02 RX ADMIN — PIPERACILLIN SODIUM AND TAZOBACTAM SODIUM 3.38 G: 3; .375 INJECTION, POWDER, LYOPHILIZED, FOR SOLUTION INTRAVENOUS at 20:49

## 2021-11-03 ENCOUNTER — ANESTHESIA (OUTPATIENT)
Dept: PERIOP | Facility: HOSPITAL | Age: 81
End: 2021-11-03

## 2021-11-03 ENCOUNTER — ANESTHESIA EVENT (OUTPATIENT)
Dept: PERIOP | Facility: HOSPITAL | Age: 81
End: 2021-11-03

## 2021-11-03 LAB
ANION GAP SERPL CALCULATED.3IONS-SCNC: 7 MMOL/L (ref 5–15)
BASOPHILS # BLD AUTO: 0.11 10*3/MM3 (ref 0–0.2)
BASOPHILS NFR BLD AUTO: 1 % (ref 0–1.5)
BUN SERPL-MCNC: 16 MG/DL (ref 8–23)
BUN/CREAT SERPL: 26.2 (ref 7–25)
CALCIUM SPEC-SCNC: 8.5 MG/DL (ref 8.6–10.5)
CHLORIDE SERPL-SCNC: 104 MMOL/L (ref 98–107)
CO2 SERPL-SCNC: 25 MMOL/L (ref 22–29)
CREAT SERPL-MCNC: 0.61 MG/DL (ref 0.57–1)
DEPRECATED RDW RBC AUTO: 52.2 FL (ref 37–54)
EOSINOPHIL # BLD AUTO: 0.64 10*3/MM3 (ref 0–0.4)
EOSINOPHIL NFR BLD AUTO: 5.5 % (ref 0.3–6.2)
ERYTHROCYTE [DISTWIDTH] IN BLOOD BY AUTOMATED COUNT: 15.4 % (ref 12.3–15.4)
GFR SERPL CREATININE-BSD FRML MDRD: 95 ML/MIN/1.73
GLUCOSE SERPL-MCNC: 116 MG/DL (ref 65–99)
HCT VFR BLD AUTO: 30.1 % (ref 34–46.6)
HGB BLD-MCNC: 9.7 G/DL (ref 12–15.9)
IMM GRANULOCYTES # BLD AUTO: 0.47 10*3/MM3 (ref 0–0.05)
IMM GRANULOCYTES NFR BLD AUTO: 4.1 % (ref 0–0.5)
LYMPHOCYTES # BLD AUTO: 2 10*3/MM3 (ref 0.7–3.1)
LYMPHOCYTES NFR BLD AUTO: 17.3 % (ref 19.6–45.3)
MCH RBC QN AUTO: 29.8 PG (ref 26.6–33)
MCHC RBC AUTO-ENTMCNC: 32.2 G/DL (ref 31.5–35.7)
MCV RBC AUTO: 92.3 FL (ref 79–97)
MONOCYTES # BLD AUTO: 1.1 10*3/MM3 (ref 0.1–0.9)
MONOCYTES NFR BLD AUTO: 9.5 % (ref 5–12)
NEUTROPHILS NFR BLD AUTO: 62.6 % (ref 42.7–76)
NEUTROPHILS NFR BLD AUTO: 7.24 10*3/MM3 (ref 1.7–7)
NRBC BLD AUTO-RTO: 0 /100 WBC (ref 0–0.2)
PLATELET # BLD AUTO: 239 10*3/MM3 (ref 140–450)
PMV BLD AUTO: 10.1 FL (ref 6–12)
POTASSIUM SERPL-SCNC: 4.5 MMOL/L (ref 3.5–5.2)
RBC # BLD AUTO: 3.26 10*6/MM3 (ref 3.77–5.28)
SODIUM SERPL-SCNC: 136 MMOL/L (ref 136–145)
WBC # BLD AUTO: 11.56 10*3/MM3 (ref 3.4–10.8)

## 2021-11-03 PROCEDURE — G0378 HOSPITAL OBSERVATION PER HR: HCPCS

## 2021-11-03 PROCEDURE — 25010000002 PROPOFOL 10 MG/ML EMULSION: Performed by: NURSE ANESTHETIST, CERTIFIED REGISTERED

## 2021-11-03 PROCEDURE — 25010000002 ONDANSETRON PER 1 MG: Performed by: NURSE ANESTHETIST, CERTIFIED REGISTERED

## 2021-11-03 PROCEDURE — 25010000002 PIPERACILLIN SOD-TAZOBACTAM PER 1 G: Performed by: SPECIALIST

## 2021-11-03 PROCEDURE — 25010000002 PIPERACILLIN SOD-TAZOBACTAM PER 1 G: Performed by: NURSE PRACTITIONER

## 2021-11-03 PROCEDURE — 80048 BASIC METABOLIC PNL TOTAL CA: CPT | Performed by: NURSE PRACTITIONER

## 2021-11-03 PROCEDURE — 0JDM0ZZ EXTRACTION OF LEFT UPPER LEG SUBCUTANEOUS TISSUE AND FASCIA, OPEN APPROACH: ICD-10-PCS | Performed by: SPECIALIST

## 2021-11-03 PROCEDURE — 97535 SELF CARE MNGMENT TRAINING: CPT

## 2021-11-03 PROCEDURE — 25010000002 FENTANYL CITRATE (PF) 50 MCG/ML SOLUTION: Performed by: ANESTHESIOLOGY

## 2021-11-03 PROCEDURE — 87070 CULTURE OTHR SPECIMN AEROBIC: CPT | Performed by: SPECIALIST

## 2021-11-03 PROCEDURE — 87147 CULTURE TYPE IMMUNOLOGIC: CPT | Performed by: SPECIALIST

## 2021-11-03 PROCEDURE — 0J9M0ZZ DRAINAGE OF LEFT UPPER LEG SUBCUTANEOUS TISSUE AND FASCIA, OPEN APPROACH: ICD-10-PCS | Performed by: SPECIALIST

## 2021-11-03 PROCEDURE — 87205 SMEAR GRAM STAIN: CPT | Performed by: SPECIALIST

## 2021-11-03 PROCEDURE — 25010000002 ENOXAPARIN PER 10 MG: Performed by: SPECIALIST

## 2021-11-03 PROCEDURE — 85025 COMPLETE CBC W/AUTO DIFF WBC: CPT | Performed by: NURSE PRACTITIONER

## 2021-11-03 PROCEDURE — 25010000002 FENTANYL CITRATE (PF) 100 MCG/2ML SOLUTION: Performed by: NURSE ANESTHETIST, CERTIFIED REGISTERED

## 2021-11-03 PROCEDURE — 97116 GAIT TRAINING THERAPY: CPT

## 2021-11-03 RX ORDER — LIDOCAINE HYDROCHLORIDE 10 MG/ML
0.5 INJECTION, SOLUTION EPIDURAL; INFILTRATION; INTRACAUDAL; PERINEURAL ONCE AS NEEDED
Status: DISCONTINUED | OUTPATIENT
Start: 2021-11-03 | End: 2021-11-03 | Stop reason: HOSPADM

## 2021-11-03 RX ORDER — ACETAMINOPHEN 500 MG
1000 TABLET ORAL ONCE
Status: COMPLETED | OUTPATIENT
Start: 2021-11-03 | End: 2021-11-03

## 2021-11-03 RX ORDER — OXYCODONE AND ACETAMINOPHEN 7.5; 325 MG/1; MG/1
1 TABLET ORAL EVERY 4 HOURS PRN
Status: DISCONTINUED | OUTPATIENT
Start: 2021-11-03 | End: 2021-11-03 | Stop reason: HOSPADM

## 2021-11-03 RX ORDER — MAGNESIUM HYDROXIDE 1200 MG/15ML
LIQUID ORAL AS NEEDED
Status: DISCONTINUED | OUTPATIENT
Start: 2021-11-03 | End: 2021-11-03 | Stop reason: HOSPADM

## 2021-11-03 RX ORDER — SODIUM CHLORIDE 0.9 % (FLUSH) 0.9 %
3-10 SYRINGE (ML) INJECTION AS NEEDED
Status: DISCONTINUED | OUTPATIENT
Start: 2021-11-03 | End: 2021-11-03 | Stop reason: HOSPADM

## 2021-11-03 RX ORDER — FLUMAZENIL 0.1 MG/ML
0.2 INJECTION INTRAVENOUS AS NEEDED
Status: DISCONTINUED | OUTPATIENT
Start: 2021-11-03 | End: 2021-11-03 | Stop reason: HOSPADM

## 2021-11-03 RX ORDER — PROPOFOL 10 MG/ML
VIAL (ML) INTRAVENOUS AS NEEDED
Status: DISCONTINUED | OUTPATIENT
Start: 2021-11-03 | End: 2021-11-03 | Stop reason: SURG

## 2021-11-03 RX ORDER — OXYCODONE HYDROCHLORIDE AND ACETAMINOPHEN 5; 325 MG/1; MG/1
1 TABLET ORAL ONCE AS NEEDED
Status: DISCONTINUED | OUTPATIENT
Start: 2021-11-03 | End: 2021-11-03 | Stop reason: HOSPADM

## 2021-11-03 RX ORDER — LABETALOL HYDROCHLORIDE 5 MG/ML
5 INJECTION, SOLUTION INTRAVENOUS
Status: DISCONTINUED | OUTPATIENT
Start: 2021-11-03 | End: 2021-11-03 | Stop reason: HOSPADM

## 2021-11-03 RX ORDER — FENTANYL CITRATE 50 UG/ML
INJECTION, SOLUTION INTRAMUSCULAR; INTRAVENOUS AS NEEDED
Status: DISCONTINUED | OUTPATIENT
Start: 2021-11-03 | End: 2021-11-03 | Stop reason: SURG

## 2021-11-03 RX ORDER — SODIUM CHLORIDE 0.9 % (FLUSH) 0.9 %
3 SYRINGE (ML) INJECTION EVERY 12 HOURS SCHEDULED
Status: DISCONTINUED | OUTPATIENT
Start: 2021-11-03 | End: 2021-11-03 | Stop reason: HOSPADM

## 2021-11-03 RX ORDER — IBUPROFEN 600 MG/1
600 TABLET ORAL ONCE AS NEEDED
Status: DISCONTINUED | OUTPATIENT
Start: 2021-11-03 | End: 2021-11-03 | Stop reason: HOSPADM

## 2021-11-03 RX ORDER — SODIUM CHLORIDE, SODIUM LACTATE, POTASSIUM CHLORIDE, CALCIUM CHLORIDE 600; 310; 30; 20 MG/100ML; MG/100ML; MG/100ML; MG/100ML
100 INJECTION, SOLUTION INTRAVENOUS CONTINUOUS
Status: DISCONTINUED | OUTPATIENT
Start: 2021-11-03 | End: 2021-11-03

## 2021-11-03 RX ORDER — ONDANSETRON 2 MG/ML
4 INJECTION INTRAMUSCULAR; INTRAVENOUS ONCE AS NEEDED
Status: DISCONTINUED | OUTPATIENT
Start: 2021-11-03 | End: 2021-11-03 | Stop reason: HOSPADM

## 2021-11-03 RX ORDER — NALOXONE HCL 0.4 MG/ML
0.4 VIAL (ML) INJECTION AS NEEDED
Status: DISCONTINUED | OUTPATIENT
Start: 2021-11-03 | End: 2021-11-03 | Stop reason: HOSPADM

## 2021-11-03 RX ORDER — FENTANYL CITRATE 50 UG/ML
25 INJECTION, SOLUTION INTRAMUSCULAR; INTRAVENOUS
Status: DISCONTINUED | OUTPATIENT
Start: 2021-11-03 | End: 2021-11-03 | Stop reason: HOSPADM

## 2021-11-03 RX ORDER — LIDOCAINE HYDROCHLORIDE 20 MG/ML
INJECTION, SOLUTION EPIDURAL; INFILTRATION; INTRACAUDAL; PERINEURAL AS NEEDED
Status: DISCONTINUED | OUTPATIENT
Start: 2021-11-03 | End: 2021-11-03 | Stop reason: SURG

## 2021-11-03 RX ORDER — ONDANSETRON 2 MG/ML
INJECTION INTRAMUSCULAR; INTRAVENOUS AS NEEDED
Status: DISCONTINUED | OUTPATIENT
Start: 2021-11-03 | End: 2021-11-03 | Stop reason: SURG

## 2021-11-03 RX ADMIN — CARVEDILOL 6.25 MG: 6.25 TABLET, FILM COATED ORAL at 17:50

## 2021-11-03 RX ADMIN — PIPERACILLIN SODIUM AND TAZOBACTAM SODIUM 3.38 G: 3; .375 INJECTION, POWDER, LYOPHILIZED, FOR SOLUTION INTRAVENOUS at 20:01

## 2021-11-03 RX ADMIN — NORTRIPTYLINE HYDROCHLORIDE 50 MG: 25 CAPSULE ORAL at 20:03

## 2021-11-03 RX ADMIN — ENOXAPARIN SODIUM 40 MG: 40 INJECTION SUBCUTANEOUS at 20:04

## 2021-11-03 RX ADMIN — HYDROCODONE BITARTRATE AND ACETAMINOPHEN 1 TABLET: 7.5; 325 TABLET ORAL at 01:26

## 2021-11-03 RX ADMIN — HYDROCODONE BITARTRATE AND ACETAMINOPHEN 1 TABLET: 7.5; 325 TABLET ORAL at 22:05

## 2021-11-03 RX ADMIN — SODIUM CHLORIDE, POTASSIUM CHLORIDE, SODIUM LACTATE AND CALCIUM CHLORIDE 100 ML/HR: 600; 310; 30; 20 INJECTION, SOLUTION INTRAVENOUS at 07:27

## 2021-11-03 RX ADMIN — FENTANYL CITRATE 25 MCG: 50 INJECTION, SOLUTION INTRAMUSCULAR; INTRAVENOUS at 08:17

## 2021-11-03 RX ADMIN — FENTANYL CITRATE 25 MCG: 50 INJECTION INTRAMUSCULAR; INTRAVENOUS at 08:51

## 2021-11-03 RX ADMIN — DOCUSATE SODIUM 100 MG: 100 CAPSULE, LIQUID FILLED ORAL at 20:02

## 2021-11-03 RX ADMIN — HYDROCODONE BITARTRATE AND ACETAMINOPHEN 1 TABLET: 7.5; 325 TABLET ORAL at 14:05

## 2021-11-03 RX ADMIN — ONDANSETRON 4 MG: 2 INJECTION INTRAMUSCULAR; INTRAVENOUS at 08:15

## 2021-11-03 RX ADMIN — PIPERACILLIN SODIUM AND TAZOBACTAM SODIUM 3.38 G: 3; .375 INJECTION, POWDER, LYOPHILIZED, FOR SOLUTION INTRAVENOUS at 05:10

## 2021-11-03 RX ADMIN — FENTANYL CITRATE 50 MCG: 50 INJECTION, SOLUTION INTRAMUSCULAR; INTRAVENOUS at 07:54

## 2021-11-03 RX ADMIN — LIDOCAINE HYDROCHLORIDE 100 MG: 20 INJECTION, SOLUTION EPIDURAL; INFILTRATION; INTRACAUDAL; PERINEURAL at 07:56

## 2021-11-03 RX ADMIN — ATORVASTATIN CALCIUM 10 MG: 10 TABLET, FILM COATED ORAL at 20:01

## 2021-11-03 RX ADMIN — VANCOMYCIN HYDROCHLORIDE 1000 MG: 1 INJECTION, POWDER, LYOPHILIZED, FOR SOLUTION INTRAVENOUS at 08:15

## 2021-11-03 RX ADMIN — SODIUM CHLORIDE, POTASSIUM CHLORIDE, SODIUM LACTATE AND CALCIUM CHLORIDE 100 ML/HR: 600; 310; 30; 20 INJECTION, SOLUTION INTRAVENOUS at 10:54

## 2021-11-03 RX ADMIN — MONTELUKAST SODIUM 10 MG: 10 TABLET, FILM COATED ORAL at 20:01

## 2021-11-03 RX ADMIN — SODIUM CHLORIDE, PRESERVATIVE FREE 10 ML: 5 INJECTION INTRAVENOUS at 20:02

## 2021-11-03 RX ADMIN — ACETAMINOPHEN 1000 MG: 500 TABLET, FILM COATED ORAL at 07:33

## 2021-11-03 RX ADMIN — PREGABALIN 50 MG: 50 CAPSULE ORAL at 20:01

## 2021-11-03 RX ADMIN — FENTANYL CITRATE 25 MCG: 50 INJECTION INTRAMUSCULAR; INTRAVENOUS at 08:56

## 2021-11-03 RX ADMIN — FENTANYL CITRATE 25 MCG: 50 INJECTION, SOLUTION INTRAMUSCULAR; INTRAVENOUS at 08:18

## 2021-11-03 RX ADMIN — OXYCODONE AND ACETAMINOPHEN 1 TABLET: 7.5; 325 TABLET ORAL at 08:47

## 2021-11-03 RX ADMIN — CYCLOSPORINE 1 DROP: 0.5 EMULSION OPHTHALMIC at 20:01

## 2021-11-03 RX ADMIN — PROPOFOL 100 MG: 10 INJECTION, EMULSION INTRAVENOUS at 07:56

## 2021-11-03 RX ADMIN — Medication: at 20:04

## 2021-11-03 RX ADMIN — PIPERACILLIN SODIUM AND TAZOBACTAM SODIUM 3.38 G: 3; .375 INJECTION, POWDER, LYOPHILIZED, FOR SOLUTION INTRAVENOUS at 13:22

## 2021-11-03 NOTE — ANESTHESIA POSTPROCEDURE EVALUATION
"Patient: Carmen Obrien    Procedure Summary     Date: 11/03/21 Room / Location:  [facility] OR 06 /  [facility] OR    Anesthesia Start: 0750 Anesthesia Stop: 0832    Procedure: INCISION AND DRAINAGE LEG ABSCESS (Left ) Diagnosis:     Surgeons: Cesia Mondragon MD Provider: Joi Loredo CRNA    Anesthesia Type: general ASA Status: 3          Anesthesia Type: general    Vitals  Vitals Value Taken Time   /71 11/03/21 0912   Temp 97.7 °F (36.5 °C) 11/03/21 0915   Pulse 55 11/03/21 0918   Resp 13 11/03/21 0915   SpO2 98 % 11/03/21 0918   Vitals shown include unvalidated device data.        Post Anesthesia Care and Evaluation    Patient location during evaluation: PACU  Patient participation: complete - patient participated  Level of consciousness: awake and alert  Pain management: adequate  Airway patency: patent  Anesthetic complications: No anesthetic complications    Cardiovascular status: acceptable  Respiratory status: acceptable  Hydration status: acceptable    Comments: Blood pressure 147/45, pulse 54, temperature 94.4 °F (34.7 °C), temperature source Oral, resp. rate 16, height 152.4 cm (60\"), weight 85.8 kg (189 lb 3.2 oz), SpO2 99 %, not currently breastfeeding.    Pt discharged from PACU based on dereje score >8      "

## 2021-11-03 NOTE — ANESTHESIA PROCEDURE NOTES
Airway  Urgency: elective    Date/Time: 11/3/2021 7:57 AM  Airway not difficult    General Information and Staff    Patient location during procedure: OR  CRNA: Joi Loredo CRNA    Indications and Patient Condition  Indications for airway management: airway protection    Preoxygenated: yes  Mask difficulty assessment: 1 - vent by mask    Final Airway Details  Final airway type: supraglottic airway      Successful airway: I-gel  Size 3    Number of attempts at approach: 1  Assessment: lips, teeth, and gum same as pre-op

## 2021-11-03 NOTE — ANESTHESIA PREPROCEDURE EVALUATION
Anesthesia Evaluation     no history of anesthetic complications:  NPO Solid Status: > 8 hours  NPO Liquid Status: > 8 hours           Airway   Mallampati: I  TM distance: >3 FB  Neck ROM: full  No difficulty expected  Dental    (+) edentulous    Pulmonary    (-) sleep apnea, not a smoker  Cardiovascular   Exercise tolerance: poor (<4 METS)    (+) hypertension, PVD, DVT resolved, hyperlipidemia,     ROS comment: 7/29/21  · Left ventricular ejection fraction appears to be 61 - 65%. Left ventricular systolic function is normal.  · Left ventricular wall thickness is consistent with concentric remodeling.  · Left ventricular diastolic function was normal.  · Mild to moderate aortic valve regurgitation is present.  · Mitral annular calcification is present. There is calcification of the mitral valve anterior leaflet(s). Trace mitral valve regurgitation is present  · No evidence of pulmonary hypertension is present         Neuro/Psych  (+) psychiatric history Anxiety and Depression,     (-) seizures, TIA  GI/Hepatic/Renal/Endo    (+) obesity,  GERD,    (-) liver disease, no renal disease, diabetes    Musculoskeletal     Abdominal    Substance History      OB/GYN          Other   arthritis,                      Anesthesia Plan    ASA 3     general     intravenous induction     Anesthetic plan, all risks, benefits, and alternatives have been provided, discussed and informed consent has been obtained with: patient.

## 2021-11-04 LAB
ANION GAP SERPL CALCULATED.3IONS-SCNC: 6 MMOL/L (ref 5–15)
BUN SERPL-MCNC: 14 MG/DL (ref 8–23)
BUN/CREAT SERPL: 21.5 (ref 7–25)
CALCIUM SPEC-SCNC: 9 MG/DL (ref 8.6–10.5)
CHLORIDE SERPL-SCNC: 104 MMOL/L (ref 98–107)
CO2 SERPL-SCNC: 28 MMOL/L (ref 22–29)
CREAT SERPL-MCNC: 0.65 MG/DL (ref 0.57–1)
DEPRECATED RDW RBC AUTO: 51.8 FL (ref 37–54)
ERYTHROCYTE [DISTWIDTH] IN BLOOD BY AUTOMATED COUNT: 15.3 % (ref 12.3–15.4)
GFR SERPL CREATININE-BSD FRML MDRD: 88 ML/MIN/1.73
GLUCOSE SERPL-MCNC: 112 MG/DL (ref 65–99)
HCT VFR BLD AUTO: 31.9 % (ref 34–46.6)
HGB BLD-MCNC: 10.1 G/DL (ref 12–15.9)
MCH RBC QN AUTO: 29.5 PG (ref 26.6–33)
MCHC RBC AUTO-ENTMCNC: 31.7 G/DL (ref 31.5–35.7)
MCV RBC AUTO: 93.3 FL (ref 79–97)
PLATELET # BLD AUTO: 266 10*3/MM3 (ref 140–450)
PMV BLD AUTO: 9.8 FL (ref 6–12)
POTASSIUM SERPL-SCNC: 4.9 MMOL/L (ref 3.5–5.2)
RBC # BLD AUTO: 3.42 10*6/MM3 (ref 3.77–5.28)
SODIUM SERPL-SCNC: 138 MMOL/L (ref 136–145)
WBC # BLD AUTO: 9.41 10*3/MM3 (ref 3.4–10.8)

## 2021-11-04 PROCEDURE — 25010000002 PIPERACILLIN SOD-TAZOBACTAM PER 1 G: Performed by: SPECIALIST

## 2021-11-04 PROCEDURE — G0378 HOSPITAL OBSERVATION PER HR: HCPCS

## 2021-11-04 PROCEDURE — 97164 PT RE-EVAL EST PLAN CARE: CPT | Performed by: PHYSICAL THERAPIST

## 2021-11-04 PROCEDURE — 25010000002 VANCOMYCIN 1 G RECONSTITUTED SOLUTION 1 EACH VIAL: Performed by: NURSE PRACTITIONER

## 2021-11-04 PROCEDURE — 97606 NEG PRS WND THER DME>50 SQCM: CPT | Performed by: PHYSICAL THERAPIST

## 2021-11-04 PROCEDURE — 97530 THERAPEUTIC ACTIVITIES: CPT | Performed by: OCCUPATIONAL THERAPIST

## 2021-11-04 PROCEDURE — 85027 COMPLETE CBC AUTOMATED: CPT | Performed by: NURSE PRACTITIONER

## 2021-11-04 PROCEDURE — 80048 BASIC METABOLIC PNL TOTAL CA: CPT | Performed by: NURSE PRACTITIONER

## 2021-11-04 PROCEDURE — 97110 THERAPEUTIC EXERCISES: CPT

## 2021-11-04 PROCEDURE — 25010000002 ENOXAPARIN PER 10 MG: Performed by: SPECIALIST

## 2021-11-04 PROCEDURE — 97116 GAIT TRAINING THERAPY: CPT

## 2021-11-04 RX ORDER — POLYETHYLENE GLYCOL 3350 17 G/17G
17 POWDER, FOR SOLUTION ORAL DAILY
Status: DISCONTINUED | OUTPATIENT
Start: 2021-11-04 | End: 2021-11-11 | Stop reason: HOSPADM

## 2021-11-04 RX ORDER — AMOXICILLIN 250 MG
2 CAPSULE ORAL NIGHTLY
Status: DISCONTINUED | OUTPATIENT
Start: 2021-11-04 | End: 2021-11-11 | Stop reason: HOSPADM

## 2021-11-04 RX ADMIN — Medication 1 APPLICATION: at 20:56

## 2021-11-04 RX ADMIN — HYDROCODONE BITARTRATE AND ACETAMINOPHEN 1 TABLET: 7.5; 325 TABLET ORAL at 04:53

## 2021-11-04 RX ADMIN — SODIUM CHLORIDE, PRESERVATIVE FREE 10 ML: 5 INJECTION INTRAVENOUS at 20:57

## 2021-11-04 RX ADMIN — CYCLOSPORINE 1 DROP: 0.5 EMULSION OPHTHALMIC at 20:56

## 2021-11-04 RX ADMIN — ASPIRIN 81 MG: 81 TABLET, COATED ORAL at 08:30

## 2021-11-04 RX ADMIN — PIPERACILLIN SODIUM AND TAZOBACTAM SODIUM 3.38 G: 3; .375 INJECTION, POWDER, LYOPHILIZED, FOR SOLUTION INTRAVENOUS at 20:57

## 2021-11-04 RX ADMIN — DOCUSATE SODIUM 100 MG: 100 CAPSULE, LIQUID FILLED ORAL at 08:30

## 2021-11-04 RX ADMIN — ENOXAPARIN SODIUM 40 MG: 40 INJECTION SUBCUTANEOUS at 20:56

## 2021-11-04 RX ADMIN — PIPERACILLIN SODIUM AND TAZOBACTAM SODIUM 3.38 G: 3; .375 INJECTION, POWDER, LYOPHILIZED, FOR SOLUTION INTRAVENOUS at 04:55

## 2021-11-04 RX ADMIN — HYDROCODONE BITARTRATE AND ACETAMINOPHEN 1 TABLET: 7.5; 325 TABLET ORAL at 17:28

## 2021-11-04 RX ADMIN — PIPERACILLIN SODIUM AND TAZOBACTAM SODIUM 3.38 G: 3; .375 INJECTION, POWDER, LYOPHILIZED, FOR SOLUTION INTRAVENOUS at 12:13

## 2021-11-04 RX ADMIN — ATORVASTATIN CALCIUM 10 MG: 10 TABLET, FILM COATED ORAL at 20:56

## 2021-11-04 RX ADMIN — OXYBUTYNIN CHLORIDE 10 MG: 5 TABLET, EXTENDED RELEASE ORAL at 08:30

## 2021-11-04 RX ADMIN — Medication 1 APPLICATION: at 08:28

## 2021-11-04 RX ADMIN — PREGABALIN 50 MG: 50 CAPSULE ORAL at 08:31

## 2021-11-04 RX ADMIN — Medication 5000 UNITS: at 08:29

## 2021-11-04 RX ADMIN — POLYETHYLENE GLYCOL 3350 17 G: 17 POWDER, FOR SOLUTION ORAL at 10:41

## 2021-11-04 RX ADMIN — VANCOMYCIN HYDROCHLORIDE 1000 MG: 1 INJECTION, POWDER, LYOPHILIZED, FOR SOLUTION INTRAVENOUS at 08:34

## 2021-11-04 RX ADMIN — CARVEDILOL 6.25 MG: 6.25 TABLET, FILM COATED ORAL at 17:30

## 2021-11-04 RX ADMIN — CETIRIZINE HYDROCHLORIDE 10 MG: 10 TABLET ORAL at 08:30

## 2021-11-04 RX ADMIN — CARVEDILOL 6.25 MG: 6.25 TABLET, FILM COATED ORAL at 08:30

## 2021-11-04 RX ADMIN — MONTELUKAST SODIUM 10 MG: 10 TABLET, FILM COATED ORAL at 20:56

## 2021-11-04 RX ADMIN — CELECOXIB 200 MG: 200 CAPSULE ORAL at 08:30

## 2021-11-04 RX ADMIN — PREGABALIN 50 MG: 50 CAPSULE ORAL at 20:56

## 2021-11-04 RX ADMIN — CYCLOSPORINE 1 DROP: 0.5 EMULSION OPHTHALMIC at 08:29

## 2021-11-04 RX ADMIN — HYDROCODONE BITARTRATE AND ACETAMINOPHEN 1 TABLET: 7.5; 325 TABLET ORAL at 12:39

## 2021-11-04 RX ADMIN — FLUTICASONE PROPIONATE 2 SPRAY: 50 SPRAY, METERED NASAL at 08:33

## 2021-11-04 RX ADMIN — NORTRIPTYLINE HYDROCHLORIDE 50 MG: 25 CAPSULE ORAL at 20:56

## 2021-11-04 RX ADMIN — PANTOPRAZOLE SODIUM 40 MG: 40 TABLET, DELAYED RELEASE ORAL at 04:53

## 2021-11-05 DIAGNOSIS — R60.0 LOWER EXTREMITY EDEMA: ICD-10-CM

## 2021-11-05 DIAGNOSIS — R06.02 SHORTNESS OF BREATH: ICD-10-CM

## 2021-11-05 LAB
BACTERIA SPEC AEROBE CULT: ABNORMAL
GRAM STN SPEC: ABNORMAL
GRAM STN SPEC: ABNORMAL

## 2021-11-05 PROCEDURE — 97110 THERAPEUTIC EXERCISES: CPT

## 2021-11-05 PROCEDURE — 25010000002 PIPERACILLIN SOD-TAZOBACTAM PER 1 G: Performed by: SPECIALIST

## 2021-11-05 PROCEDURE — 25010000002 ENOXAPARIN PER 10 MG: Performed by: SPECIALIST

## 2021-11-05 PROCEDURE — 97116 GAIT TRAINING THERAPY: CPT

## 2021-11-05 PROCEDURE — 97535 SELF CARE MNGMENT TRAINING: CPT | Performed by: OCCUPATIONAL THERAPIST

## 2021-11-05 RX ORDER — ATORVASTATIN CALCIUM 10 MG/1
10 TABLET, FILM COATED ORAL DAILY
Qty: 30 TABLET | Refills: 5 | Status: SHIPPED | OUTPATIENT
Start: 2021-11-05 | End: 2022-04-29

## 2021-11-05 RX ORDER — MONTELUKAST SODIUM 10 MG/1
10 TABLET ORAL NIGHTLY
Qty: 30 TABLET | Refills: 5 | Status: SHIPPED | OUTPATIENT
Start: 2021-11-05 | End: 2022-07-21

## 2021-11-05 RX ORDER — CARVEDILOL 6.25 MG/1
6.25 TABLET ORAL 2 TIMES DAILY WITH MEALS
Qty: 60 TABLET | Refills: 5 | Status: SHIPPED | OUTPATIENT
Start: 2021-11-05 | End: 2022-07-21

## 2021-11-05 RX ORDER — FUROSEMIDE 40 MG/1
40 TABLET ORAL DAILY
Qty: 30 TABLET | Refills: 2 | OUTPATIENT
Start: 2021-11-05 | End: 2021-11-11 | Stop reason: HOSPADM

## 2021-11-05 RX ADMIN — PANTOPRAZOLE SODIUM 40 MG: 40 TABLET, DELAYED RELEASE ORAL at 05:23

## 2021-11-05 RX ADMIN — MONTELUKAST SODIUM 10 MG: 10 TABLET, FILM COATED ORAL at 20:47

## 2021-11-05 RX ADMIN — Medication 1 APPLICATION: at 20:48

## 2021-11-05 RX ADMIN — DOCUSATE SODIUM 50 MG AND SENNOSIDES 8.6 MG 2 TABLET: 8.6; 5 TABLET, FILM COATED ORAL at 20:47

## 2021-11-05 RX ADMIN — HYDROCODONE BITARTRATE AND ACETAMINOPHEN 1 TABLET: 7.5; 325 TABLET ORAL at 08:18

## 2021-11-05 RX ADMIN — HYDROCODONE BITARTRATE AND ACETAMINOPHEN 1 TABLET: 7.5; 325 TABLET ORAL at 12:23

## 2021-11-05 RX ADMIN — PREGABALIN 50 MG: 50 CAPSULE ORAL at 08:18

## 2021-11-05 RX ADMIN — PREGABALIN 50 MG: 50 CAPSULE ORAL at 20:47

## 2021-11-05 RX ADMIN — SODIUM CHLORIDE, PRESERVATIVE FREE 10 ML: 5 INJECTION INTRAVENOUS at 08:22

## 2021-11-05 RX ADMIN — PIPERACILLIN SODIUM AND TAZOBACTAM SODIUM 3.38 G: 3; .375 INJECTION, POWDER, LYOPHILIZED, FOR SOLUTION INTRAVENOUS at 12:23

## 2021-11-05 RX ADMIN — CARVEDILOL 6.25 MG: 6.25 TABLET, FILM COATED ORAL at 18:02

## 2021-11-05 RX ADMIN — Medication 5000 UNITS: at 08:22

## 2021-11-05 RX ADMIN — HYDROCODONE BITARTRATE AND ACETAMINOPHEN 1 TABLET: 7.5; 325 TABLET ORAL at 00:01

## 2021-11-05 RX ADMIN — PIPERACILLIN SODIUM AND TAZOBACTAM SODIUM 3.38 G: 3; .375 INJECTION, POWDER, LYOPHILIZED, FOR SOLUTION INTRAVENOUS at 20:48

## 2021-11-05 RX ADMIN — SODIUM CHLORIDE, PRESERVATIVE FREE 10 ML: 5 INJECTION INTRAVENOUS at 20:48

## 2021-11-05 RX ADMIN — CETIRIZINE HYDROCHLORIDE 10 MG: 10 TABLET ORAL at 08:21

## 2021-11-05 RX ADMIN — Medication 1 APPLICATION: at 08:21

## 2021-11-05 RX ADMIN — CARVEDILOL 6.25 MG: 6.25 TABLET, FILM COATED ORAL at 08:20

## 2021-11-05 RX ADMIN — ENOXAPARIN SODIUM 40 MG: 40 INJECTION SUBCUTANEOUS at 20:48

## 2021-11-05 RX ADMIN — HYDROCODONE BITARTRATE AND ACETAMINOPHEN 1 TABLET: 7.5; 325 TABLET ORAL at 16:33

## 2021-11-05 RX ADMIN — CYCLOSPORINE 1 DROP: 0.5 EMULSION OPHTHALMIC at 20:48

## 2021-11-05 RX ADMIN — CELECOXIB 200 MG: 200 CAPSULE ORAL at 08:28

## 2021-11-05 RX ADMIN — POLYETHYLENE GLYCOL 3350 17 G: 17 POWDER, FOR SOLUTION ORAL at 08:22

## 2021-11-05 RX ADMIN — OXYBUTYNIN CHLORIDE 10 MG: 5 TABLET, EXTENDED RELEASE ORAL at 08:21

## 2021-11-05 RX ADMIN — ASPIRIN 81 MG: 81 TABLET, COATED ORAL at 08:21

## 2021-11-05 RX ADMIN — NORTRIPTYLINE HYDROCHLORIDE 50 MG: 25 CAPSULE ORAL at 20:47

## 2021-11-05 RX ADMIN — ATORVASTATIN CALCIUM 10 MG: 10 TABLET, FILM COATED ORAL at 20:48

## 2021-11-05 RX ADMIN — PIPERACILLIN SODIUM AND TAZOBACTAM SODIUM 3.38 G: 3; .375 INJECTION, POWDER, LYOPHILIZED, FOR SOLUTION INTRAVENOUS at 03:38

## 2021-11-05 RX ADMIN — FLUTICASONE PROPIONATE 2 SPRAY: 50 SPRAY, METERED NASAL at 08:24

## 2021-11-05 RX ADMIN — CYCLOSPORINE 1 DROP: 0.5 EMULSION OPHTHALMIC at 08:21

## 2021-11-06 LAB
BACTERIA SPEC AEROBE CULT: NORMAL
BACTERIA SPEC AEROBE CULT: NORMAL

## 2021-11-06 PROCEDURE — 25010000002 ENOXAPARIN PER 10 MG: Performed by: SPECIALIST

## 2021-11-06 PROCEDURE — 97535 SELF CARE MNGMENT TRAINING: CPT

## 2021-11-06 PROCEDURE — 25010000002 PIPERACILLIN SOD-TAZOBACTAM PER 1 G: Performed by: SPECIALIST

## 2021-11-06 PROCEDURE — 97116 GAIT TRAINING THERAPY: CPT

## 2021-11-06 RX ADMIN — MONTELUKAST SODIUM 10 MG: 10 TABLET, FILM COATED ORAL at 20:36

## 2021-11-06 RX ADMIN — PREGABALIN 50 MG: 50 CAPSULE ORAL at 20:36

## 2021-11-06 RX ADMIN — Medication 5000 UNITS: at 08:34

## 2021-11-06 RX ADMIN — CYCLOSPORINE 1 DROP: 0.5 EMULSION OPHTHALMIC at 20:36

## 2021-11-06 RX ADMIN — CARVEDILOL 6.25 MG: 6.25 TABLET, FILM COATED ORAL at 17:42

## 2021-11-06 RX ADMIN — CARVEDILOL 6.25 MG: 6.25 TABLET, FILM COATED ORAL at 08:34

## 2021-11-06 RX ADMIN — Medication 1 APPLICATION: at 08:34

## 2021-11-06 RX ADMIN — NORTRIPTYLINE HYDROCHLORIDE 50 MG: 25 CAPSULE ORAL at 20:36

## 2021-11-06 RX ADMIN — CELECOXIB 200 MG: 200 CAPSULE ORAL at 08:34

## 2021-11-06 RX ADMIN — CETIRIZINE HYDROCHLORIDE 10 MG: 10 TABLET ORAL at 08:34

## 2021-11-06 RX ADMIN — ENOXAPARIN SODIUM 40 MG: 40 INJECTION SUBCUTANEOUS at 20:35

## 2021-11-06 RX ADMIN — SODIUM CHLORIDE, PRESERVATIVE FREE 10 ML: 5 INJECTION INTRAVENOUS at 08:34

## 2021-11-06 RX ADMIN — ASPIRIN 81 MG: 81 TABLET, COATED ORAL at 08:34

## 2021-11-06 RX ADMIN — HYDROCODONE BITARTRATE AND ACETAMINOPHEN 1 TABLET: 7.5; 325 TABLET ORAL at 17:46

## 2021-11-06 RX ADMIN — PIPERACILLIN SODIUM AND TAZOBACTAM SODIUM 3.38 G: 3; .375 INJECTION, POWDER, LYOPHILIZED, FOR SOLUTION INTRAVENOUS at 20:35

## 2021-11-06 RX ADMIN — HYDROCODONE BITARTRATE AND ACETAMINOPHEN 1 TABLET: 7.5; 325 TABLET ORAL at 21:47

## 2021-11-06 RX ADMIN — OXYBUTYNIN CHLORIDE 10 MG: 5 TABLET, EXTENDED RELEASE ORAL at 08:34

## 2021-11-06 RX ADMIN — PANTOPRAZOLE SODIUM 40 MG: 40 TABLET, DELAYED RELEASE ORAL at 05:25

## 2021-11-06 RX ADMIN — HYDROCODONE BITARTRATE AND ACETAMINOPHEN 1 TABLET: 7.5; 325 TABLET ORAL at 05:27

## 2021-11-06 RX ADMIN — Medication 1 APPLICATION: at 20:36

## 2021-11-06 RX ADMIN — PIPERACILLIN SODIUM AND TAZOBACTAM SODIUM 3.38 G: 3; .375 INJECTION, POWDER, LYOPHILIZED, FOR SOLUTION INTRAVENOUS at 14:50

## 2021-11-06 RX ADMIN — CYCLOSPORINE 1 DROP: 0.5 EMULSION OPHTHALMIC at 08:34

## 2021-11-06 RX ADMIN — DOCUSATE SODIUM 50 MG AND SENNOSIDES 8.6 MG 2 TABLET: 8.6; 5 TABLET, FILM COATED ORAL at 20:36

## 2021-11-06 RX ADMIN — FLUTICASONE PROPIONATE 2 SPRAY: 50 SPRAY, METERED NASAL at 08:36

## 2021-11-06 RX ADMIN — PIPERACILLIN SODIUM AND TAZOBACTAM SODIUM 3.38 G: 3; .375 INJECTION, POWDER, LYOPHILIZED, FOR SOLUTION INTRAVENOUS at 03:45

## 2021-11-06 RX ADMIN — PREGABALIN 50 MG: 50 CAPSULE ORAL at 08:34

## 2021-11-06 RX ADMIN — ATORVASTATIN CALCIUM 10 MG: 10 TABLET, FILM COATED ORAL at 20:36

## 2021-11-06 RX ADMIN — POLYETHYLENE GLYCOL 3350 17 G: 17 POWDER, FOR SOLUTION ORAL at 08:36

## 2021-11-06 RX ADMIN — HYDROCODONE BITARTRATE AND ACETAMINOPHEN 1 TABLET: 7.5; 325 TABLET ORAL at 01:13

## 2021-11-07 PROCEDURE — 25010000002 PIPERACILLIN SOD-TAZOBACTAM PER 1 G: Performed by: SPECIALIST

## 2021-11-07 PROCEDURE — 97116 GAIT TRAINING THERAPY: CPT

## 2021-11-07 PROCEDURE — 25010000002 ENOXAPARIN PER 10 MG: Performed by: SPECIALIST

## 2021-11-07 RX ADMIN — CARVEDILOL 6.25 MG: 6.25 TABLET, FILM COATED ORAL at 09:43

## 2021-11-07 RX ADMIN — Medication 1 APPLICATION: at 09:43

## 2021-11-07 RX ADMIN — PREGABALIN 50 MG: 50 CAPSULE ORAL at 21:15

## 2021-11-07 RX ADMIN — PIPERACILLIN SODIUM AND TAZOBACTAM SODIUM 3.38 G: 3; .375 INJECTION, POWDER, LYOPHILIZED, FOR SOLUTION INTRAVENOUS at 05:03

## 2021-11-07 RX ADMIN — ASPIRIN 81 MG: 81 TABLET, COATED ORAL at 09:43

## 2021-11-07 RX ADMIN — PREGABALIN 50 MG: 50 CAPSULE ORAL at 09:43

## 2021-11-07 RX ADMIN — PANTOPRAZOLE SODIUM 40 MG: 40 TABLET, DELAYED RELEASE ORAL at 05:03

## 2021-11-07 RX ADMIN — HYDROCODONE BITARTRATE AND ACETAMINOPHEN 1 TABLET: 7.5; 325 TABLET ORAL at 11:31

## 2021-11-07 RX ADMIN — ATORVASTATIN CALCIUM 10 MG: 10 TABLET, FILM COATED ORAL at 21:15

## 2021-11-07 RX ADMIN — POLYETHYLENE GLYCOL 3350 17 G: 17 POWDER, FOR SOLUTION ORAL at 09:43

## 2021-11-07 RX ADMIN — ENOXAPARIN SODIUM 40 MG: 40 INJECTION SUBCUTANEOUS at 21:16

## 2021-11-07 RX ADMIN — OXYBUTYNIN CHLORIDE 10 MG: 5 TABLET, EXTENDED RELEASE ORAL at 09:43

## 2021-11-07 RX ADMIN — Medication 1 APPLICATION: at 21:16

## 2021-11-07 RX ADMIN — CYCLOSPORINE 1 DROP: 0.5 EMULSION OPHTHALMIC at 09:43

## 2021-11-07 RX ADMIN — HYDROCODONE BITARTRATE AND ACETAMINOPHEN 1 TABLET: 7.5; 325 TABLET ORAL at 23:57

## 2021-11-07 RX ADMIN — MONTELUKAST SODIUM 10 MG: 10 TABLET, FILM COATED ORAL at 21:15

## 2021-11-07 RX ADMIN — PIPERACILLIN SODIUM AND TAZOBACTAM SODIUM 3.38 G: 3; .375 INJECTION, POWDER, LYOPHILIZED, FOR SOLUTION INTRAVENOUS at 13:38

## 2021-11-07 RX ADMIN — CYCLOSPORINE 1 DROP: 0.5 EMULSION OPHTHALMIC at 21:16

## 2021-11-07 RX ADMIN — HYDROCODONE BITARTRATE AND ACETAMINOPHEN 1 TABLET: 7.5; 325 TABLET ORAL at 06:56

## 2021-11-07 RX ADMIN — PIPERACILLIN SODIUM AND TAZOBACTAM SODIUM 3.38 G: 3; .375 INJECTION, POWDER, LYOPHILIZED, FOR SOLUTION INTRAVENOUS at 21:15

## 2021-11-07 RX ADMIN — HYDROCODONE BITARTRATE AND ACETAMINOPHEN 1 TABLET: 7.5; 325 TABLET ORAL at 19:34

## 2021-11-07 RX ADMIN — FLUTICASONE PROPIONATE 2 SPRAY: 50 SPRAY, METERED NASAL at 09:43

## 2021-11-07 RX ADMIN — DOCUSATE SODIUM 50 MG AND SENNOSIDES 8.6 MG 2 TABLET: 8.6; 5 TABLET, FILM COATED ORAL at 21:15

## 2021-11-07 RX ADMIN — HYDROCODONE BITARTRATE AND ACETAMINOPHEN 1 TABLET: 7.5; 325 TABLET ORAL at 01:55

## 2021-11-07 RX ADMIN — CELECOXIB 200 MG: 200 CAPSULE ORAL at 09:43

## 2021-11-07 RX ADMIN — NORTRIPTYLINE HYDROCHLORIDE 50 MG: 25 CAPSULE ORAL at 21:16

## 2021-11-07 RX ADMIN — CARVEDILOL 6.25 MG: 6.25 TABLET, FILM COATED ORAL at 17:19

## 2021-11-07 RX ADMIN — Medication 5000 UNITS: at 09:43

## 2021-11-07 RX ADMIN — CETIRIZINE HYDROCHLORIDE 10 MG: 10 TABLET ORAL at 09:43

## 2021-11-08 PROCEDURE — 97605 NEG PRS WND THER DME<=50SQCM: CPT

## 2021-11-08 PROCEDURE — 25010000002 PIPERACILLIN SOD-TAZOBACTAM PER 1 G: Performed by: SPECIALIST

## 2021-11-08 PROCEDURE — 97116 GAIT TRAINING THERAPY: CPT

## 2021-11-08 PROCEDURE — 97535 SELF CARE MNGMENT TRAINING: CPT

## 2021-11-08 PROCEDURE — 25010000002 ENOXAPARIN PER 10 MG: Performed by: SPECIALIST

## 2021-11-08 RX ADMIN — DOCUSATE SODIUM 50 MG AND SENNOSIDES 8.6 MG 2 TABLET: 8.6; 5 TABLET, FILM COATED ORAL at 20:18

## 2021-11-08 RX ADMIN — CYCLOSPORINE 1 DROP: 0.5 EMULSION OPHTHALMIC at 20:19

## 2021-11-08 RX ADMIN — MONTELUKAST SODIUM 10 MG: 10 TABLET, FILM COATED ORAL at 20:19

## 2021-11-08 RX ADMIN — Medication 1 APPLICATION: at 20:19

## 2021-11-08 RX ADMIN — FLUTICASONE PROPIONATE 2 SPRAY: 50 SPRAY, METERED NASAL at 08:58

## 2021-11-08 RX ADMIN — PANTOPRAZOLE SODIUM 40 MG: 40 TABLET, DELAYED RELEASE ORAL at 05:18

## 2021-11-08 RX ADMIN — ASPIRIN 81 MG: 81 TABLET, COATED ORAL at 08:58

## 2021-11-08 RX ADMIN — OXYBUTYNIN CHLORIDE 10 MG: 5 TABLET, EXTENDED RELEASE ORAL at 08:58

## 2021-11-08 RX ADMIN — Medication 1 APPLICATION: at 08:58

## 2021-11-08 RX ADMIN — HYDROCODONE BITARTRATE AND ACETAMINOPHEN 1 TABLET: 7.5; 325 TABLET ORAL at 17:09

## 2021-11-08 RX ADMIN — HYDROCODONE BITARTRATE AND ACETAMINOPHEN 1 TABLET: 7.5; 325 TABLET ORAL at 13:11

## 2021-11-08 RX ADMIN — PIPERACILLIN SODIUM AND TAZOBACTAM SODIUM 3.38 G: 3; .375 INJECTION, POWDER, LYOPHILIZED, FOR SOLUTION INTRAVENOUS at 12:02

## 2021-11-08 RX ADMIN — HYDROCODONE BITARTRATE AND ACETAMINOPHEN 1 TABLET: 7.5; 325 TABLET ORAL at 05:18

## 2021-11-08 RX ADMIN — CARVEDILOL 6.25 MG: 6.25 TABLET, FILM COATED ORAL at 08:58

## 2021-11-08 RX ADMIN — ENOXAPARIN SODIUM 40 MG: 40 INJECTION SUBCUTANEOUS at 20:19

## 2021-11-08 RX ADMIN — ATORVASTATIN CALCIUM 10 MG: 10 TABLET, FILM COATED ORAL at 20:19

## 2021-11-08 RX ADMIN — CARVEDILOL 6.25 MG: 6.25 TABLET, FILM COATED ORAL at 17:05

## 2021-11-08 RX ADMIN — CYCLOSPORINE 1 DROP: 0.5 EMULSION OPHTHALMIC at 08:58

## 2021-11-08 RX ADMIN — CETIRIZINE HYDROCHLORIDE 10 MG: 10 TABLET ORAL at 08:58

## 2021-11-08 RX ADMIN — HYDROCODONE BITARTRATE AND ACETAMINOPHEN 1 TABLET: 7.5; 325 TABLET ORAL at 22:32

## 2021-11-08 RX ADMIN — NORTRIPTYLINE HYDROCHLORIDE 50 MG: 25 CAPSULE ORAL at 20:19

## 2021-11-08 RX ADMIN — PREGABALIN 50 MG: 50 CAPSULE ORAL at 20:18

## 2021-11-08 RX ADMIN — PIPERACILLIN SODIUM AND TAZOBACTAM SODIUM 3.38 G: 3; .375 INJECTION, POWDER, LYOPHILIZED, FOR SOLUTION INTRAVENOUS at 05:18

## 2021-11-08 RX ADMIN — CELECOXIB 200 MG: 200 CAPSULE ORAL at 08:58

## 2021-11-08 RX ADMIN — Medication 5000 UNITS: at 08:57

## 2021-11-08 RX ADMIN — PREGABALIN 50 MG: 50 CAPSULE ORAL at 09:00

## 2021-11-09 PROCEDURE — 25010000002 ENOXAPARIN PER 10 MG: Performed by: SPECIALIST

## 2021-11-09 PROCEDURE — 97116 GAIT TRAINING THERAPY: CPT

## 2021-11-09 PROCEDURE — 97110 THERAPEUTIC EXERCISES: CPT

## 2021-11-09 PROCEDURE — 97535 SELF CARE MNGMENT TRAINING: CPT

## 2021-11-09 RX ADMIN — ASPIRIN 81 MG: 81 TABLET, COATED ORAL at 09:11

## 2021-11-09 RX ADMIN — OXYBUTYNIN CHLORIDE 10 MG: 5 TABLET, EXTENDED RELEASE ORAL at 09:11

## 2021-11-09 RX ADMIN — CARVEDILOL 6.25 MG: 6.25 TABLET, FILM COATED ORAL at 09:11

## 2021-11-09 RX ADMIN — PREGABALIN 50 MG: 50 CAPSULE ORAL at 09:10

## 2021-11-09 RX ADMIN — CYCLOSPORINE 1 DROP: 0.5 EMULSION OPHTHALMIC at 09:11

## 2021-11-09 RX ADMIN — MONTELUKAST SODIUM 10 MG: 10 TABLET, FILM COATED ORAL at 20:09

## 2021-11-09 RX ADMIN — ENOXAPARIN SODIUM 40 MG: 40 INJECTION SUBCUTANEOUS at 20:09

## 2021-11-09 RX ADMIN — Medication 5000 UNITS: at 09:11

## 2021-11-09 RX ADMIN — CARVEDILOL 6.25 MG: 6.25 TABLET, FILM COATED ORAL at 17:36

## 2021-11-09 RX ADMIN — CYCLOSPORINE 1 DROP: 0.5 EMULSION OPHTHALMIC at 20:08

## 2021-11-09 RX ADMIN — HYDROCODONE BITARTRATE AND ACETAMINOPHEN 1 TABLET: 7.5; 325 TABLET ORAL at 13:57

## 2021-11-09 RX ADMIN — PANTOPRAZOLE SODIUM 40 MG: 40 TABLET, DELAYED RELEASE ORAL at 05:17

## 2021-11-09 RX ADMIN — CELECOXIB 200 MG: 200 CAPSULE ORAL at 09:11

## 2021-11-09 RX ADMIN — PREGABALIN 50 MG: 50 CAPSULE ORAL at 20:09

## 2021-11-09 RX ADMIN — ATORVASTATIN CALCIUM 10 MG: 10 TABLET, FILM COATED ORAL at 20:09

## 2021-11-09 RX ADMIN — NORTRIPTYLINE HYDROCHLORIDE 50 MG: 25 CAPSULE ORAL at 20:08

## 2021-11-09 RX ADMIN — CETIRIZINE HYDROCHLORIDE 10 MG: 10 TABLET ORAL at 09:11

## 2021-11-10 PROCEDURE — 25010000002 ENOXAPARIN PER 10 MG: Performed by: SPECIALIST

## 2021-11-10 PROCEDURE — 97535 SELF CARE MNGMENT TRAINING: CPT

## 2021-11-10 PROCEDURE — 97116 GAIT TRAINING THERAPY: CPT

## 2021-11-10 PROCEDURE — 97605 NEG PRS WND THER DME<=50SQCM: CPT

## 2021-11-10 RX ADMIN — NORTRIPTYLINE HYDROCHLORIDE 50 MG: 25 CAPSULE ORAL at 20:49

## 2021-11-10 RX ADMIN — ATORVASTATIN CALCIUM 10 MG: 10 TABLET, FILM COATED ORAL at 20:48

## 2021-11-10 RX ADMIN — CETIRIZINE HYDROCHLORIDE 10 MG: 10 TABLET ORAL at 08:20

## 2021-11-10 RX ADMIN — CYCLOSPORINE 1 DROP: 0.5 EMULSION OPHTHALMIC at 20:48

## 2021-11-10 RX ADMIN — PANTOPRAZOLE SODIUM 40 MG: 40 TABLET, DELAYED RELEASE ORAL at 05:06

## 2021-11-10 RX ADMIN — CELECOXIB 200 MG: 200 CAPSULE ORAL at 08:21

## 2021-11-10 RX ADMIN — ENOXAPARIN SODIUM 40 MG: 40 INJECTION SUBCUTANEOUS at 20:49

## 2021-11-10 RX ADMIN — MONTELUKAST SODIUM 10 MG: 10 TABLET, FILM COATED ORAL at 20:48

## 2021-11-10 RX ADMIN — OXYBUTYNIN CHLORIDE 10 MG: 5 TABLET, EXTENDED RELEASE ORAL at 08:20

## 2021-11-10 RX ADMIN — HYDROCODONE BITARTRATE AND ACETAMINOPHEN 1 TABLET: 7.5; 325 TABLET ORAL at 01:16

## 2021-11-10 RX ADMIN — PREGABALIN 50 MG: 50 CAPSULE ORAL at 08:20

## 2021-11-10 RX ADMIN — CARVEDILOL 6.25 MG: 6.25 TABLET, FILM COATED ORAL at 17:48

## 2021-11-10 RX ADMIN — FLUTICASONE PROPIONATE 2 SPRAY: 50 SPRAY, METERED NASAL at 08:25

## 2021-11-10 RX ADMIN — ASPIRIN 81 MG: 81 TABLET, COATED ORAL at 08:20

## 2021-11-10 RX ADMIN — HYDROCODONE BITARTRATE AND ACETAMINOPHEN 1 TABLET: 7.5; 325 TABLET ORAL at 08:17

## 2021-11-10 RX ADMIN — HYDROCODONE BITARTRATE AND ACETAMINOPHEN 1 TABLET: 7.5; 325 TABLET ORAL at 20:52

## 2021-11-10 RX ADMIN — PREGABALIN 50 MG: 50 CAPSULE ORAL at 20:48

## 2021-11-10 RX ADMIN — Medication 5000 UNITS: at 08:20

## 2021-11-10 RX ADMIN — CYCLOSPORINE 1 DROP: 0.5 EMULSION OPHTHALMIC at 08:20

## 2021-11-10 RX ADMIN — CARVEDILOL 6.25 MG: 6.25 TABLET, FILM COATED ORAL at 08:20

## 2021-11-11 VITALS
HEART RATE: 64 BPM | RESPIRATION RATE: 16 BRPM | DIASTOLIC BLOOD PRESSURE: 57 MMHG | TEMPERATURE: 97.9 F | SYSTOLIC BLOOD PRESSURE: 170 MMHG | HEIGHT: 60 IN | WEIGHT: 183.6 LBS | OXYGEN SATURATION: 95 % | BODY MASS INDEX: 36.05 KG/M2

## 2021-11-11 LAB — SARS-COV-2 RNA PNL SPEC NAA+PROBE: NOT DETECTED

## 2021-11-11 PROCEDURE — 87635 SARS-COV-2 COVID-19 AMP PRB: CPT | Performed by: FAMILY MEDICINE

## 2021-11-11 PROCEDURE — 97116 GAIT TRAINING THERAPY: CPT

## 2021-11-11 RX ORDER — HYDROCODONE BITARTRATE AND ACETAMINOPHEN 7.5; 325 MG/1; MG/1
1 TABLET ORAL EVERY 8 HOURS PRN
Qty: 6 TABLET | Refills: 0 | Status: SHIPPED | OUTPATIENT
Start: 2021-11-11 | End: 2022-09-01 | Stop reason: HOSPADM

## 2021-11-11 RX ORDER — POLYETHYLENE GLYCOL 3350 17 G/17G
17 POWDER, FOR SOLUTION ORAL DAILY
Status: ON HOLD
Start: 2021-11-12 | End: 2022-08-29

## 2021-11-11 RX ORDER — AMOXICILLIN 250 MG
2 CAPSULE ORAL NIGHTLY
Status: ON HOLD
Start: 2021-11-11 | End: 2022-08-29

## 2021-11-11 RX ADMIN — HYDROCODONE BITARTRATE AND ACETAMINOPHEN 1 TABLET: 7.5; 325 TABLET ORAL at 01:42

## 2021-11-11 RX ADMIN — CYCLOSPORINE 1 DROP: 0.5 EMULSION OPHTHALMIC at 08:49

## 2021-11-11 RX ADMIN — CETIRIZINE HYDROCHLORIDE 10 MG: 10 TABLET ORAL at 08:48

## 2021-11-11 RX ADMIN — PANTOPRAZOLE SODIUM 40 MG: 40 TABLET, DELAYED RELEASE ORAL at 06:28

## 2021-11-11 RX ADMIN — PREGABALIN 50 MG: 50 CAPSULE ORAL at 08:48

## 2021-11-11 RX ADMIN — CELECOXIB 200 MG: 200 CAPSULE ORAL at 08:48

## 2021-11-11 RX ADMIN — CARVEDILOL 6.25 MG: 6.25 TABLET, FILM COATED ORAL at 17:18

## 2021-11-11 RX ADMIN — HYDROCODONE BITARTRATE AND ACETAMINOPHEN 1 TABLET: 7.5; 325 TABLET ORAL at 06:27

## 2021-11-11 RX ADMIN — ASPIRIN 81 MG: 81 TABLET, COATED ORAL at 08:48

## 2021-11-11 RX ADMIN — HYDROCODONE BITARTRATE AND ACETAMINOPHEN 1 TABLET: 7.5; 325 TABLET ORAL at 17:18

## 2021-11-11 RX ADMIN — HYDROCODONE BITARTRATE AND ACETAMINOPHEN 1 TABLET: 7.5; 325 TABLET ORAL at 12:10

## 2021-11-11 RX ADMIN — CARVEDILOL 6.25 MG: 6.25 TABLET, FILM COATED ORAL at 08:48

## 2021-11-11 RX ADMIN — FLUTICASONE PROPIONATE 2 SPRAY: 50 SPRAY, METERED NASAL at 08:48

## 2021-11-11 RX ADMIN — OXYBUTYNIN CHLORIDE 10 MG: 5 TABLET, EXTENDED RELEASE ORAL at 08:48

## 2021-11-11 RX ADMIN — Medication 5000 UNITS: at 08:48

## 2021-11-16 ENCOUNTER — LAB REQUISITION (OUTPATIENT)
Dept: LAB | Facility: HOSPITAL | Age: 81
End: 2021-11-16

## 2021-11-16 DIAGNOSIS — Z00.00 ENCOUNTER FOR GENERAL ADULT MEDICAL EXAMINATION WITHOUT ABNORMAL FINDINGS: ICD-10-CM

## 2021-11-16 LAB
ALBUMIN SERPL-MCNC: 3.9 G/DL (ref 3.5–5.2)
ALBUMIN/GLOB SERPL: 1.4 G/DL
ALP SERPL-CCNC: 53 U/L (ref 39–117)
ALT SERPL W P-5'-P-CCNC: 18 U/L (ref 1–33)
ANION GAP SERPL CALCULATED.3IONS-SCNC: 11 MMOL/L (ref 5–15)
AST SERPL-CCNC: 17 U/L (ref 1–32)
BASOPHILS # BLD AUTO: 0.08 10*3/MM3 (ref 0–0.2)
BASOPHILS NFR BLD AUTO: 1.1 % (ref 0–1.5)
BILIRUB SERPL-MCNC: 0.3 MG/DL (ref 0–1.2)
BUN SERPL-MCNC: 16 MG/DL (ref 8–23)
BUN/CREAT SERPL: 33.3 (ref 7–25)
CALCIUM SPEC-SCNC: 9 MG/DL (ref 8.6–10.5)
CHLORIDE SERPL-SCNC: 102 MMOL/L (ref 98–107)
CO2 SERPL-SCNC: 24 MMOL/L (ref 22–29)
CREAT SERPL-MCNC: 0.48 MG/DL (ref 0.57–1)
DEPRECATED RDW RBC AUTO: 51.2 FL (ref 37–54)
EOSINOPHIL # BLD AUTO: 0.26 10*3/MM3 (ref 0–0.4)
EOSINOPHIL NFR BLD AUTO: 3.4 % (ref 0.3–6.2)
ERYTHROCYTE [DISTWIDTH] IN BLOOD BY AUTOMATED COUNT: 14.8 % (ref 12.3–15.4)
GFR SERPL CREATININE-BSD FRML MDRD: 125 ML/MIN/1.73
GLOBULIN UR ELPH-MCNC: 2.7 GM/DL
GLUCOSE SERPL-MCNC: 108 MG/DL (ref 65–99)
HCT VFR BLD AUTO: 35.1 % (ref 34–46.6)
HGB BLD-MCNC: 11.3 G/DL (ref 12–15.9)
IMM GRANULOCYTES # BLD AUTO: 0.03 10*3/MM3 (ref 0–0.05)
IMM GRANULOCYTES NFR BLD AUTO: 0.4 % (ref 0–0.5)
LYMPHOCYTES # BLD AUTO: 1.8 10*3/MM3 (ref 0.7–3.1)
LYMPHOCYTES NFR BLD AUTO: 23.8 % (ref 19.6–45.3)
MCH RBC QN AUTO: 30.4 PG (ref 26.6–33)
MCHC RBC AUTO-ENTMCNC: 32.2 G/DL (ref 31.5–35.7)
MCV RBC AUTO: 94.4 FL (ref 79–97)
MONOCYTES # BLD AUTO: 0.68 10*3/MM3 (ref 0.1–0.9)
MONOCYTES NFR BLD AUTO: 9 % (ref 5–12)
NEUTROPHILS NFR BLD AUTO: 4.71 10*3/MM3 (ref 1.7–7)
NEUTROPHILS NFR BLD AUTO: 62.3 % (ref 42.7–76)
NRBC BLD AUTO-RTO: 0 /100 WBC (ref 0–0.2)
PLATELET # BLD AUTO: 314 10*3/MM3 (ref 140–450)
PMV BLD AUTO: 10.9 FL (ref 6–12)
POTASSIUM SERPL-SCNC: 5.1 MMOL/L (ref 3.5–5.2)
PROT SERPL-MCNC: 6.6 G/DL (ref 6–8.5)
RBC # BLD AUTO: 3.72 10*6/MM3 (ref 3.77–5.28)
SODIUM SERPL-SCNC: 137 MMOL/L (ref 136–145)
WBC # BLD AUTO: 7.56 10*3/MM3 (ref 3.4–10.8)

## 2021-11-16 PROCEDURE — 36415 COLL VENOUS BLD VENIPUNCTURE: CPT

## 2021-11-16 PROCEDURE — 80053 COMPREHEN METABOLIC PANEL: CPT

## 2021-11-16 PROCEDURE — 85025 COMPLETE CBC W/AUTO DIFF WBC: CPT

## 2021-11-21 ENCOUNTER — LAB REQUISITION (OUTPATIENT)
Dept: LAB | Facility: HOSPITAL | Age: 81
End: 2021-11-21

## 2021-11-21 DIAGNOSIS — Z00.00 ENCOUNTER FOR GENERAL ADULT MEDICAL EXAMINATION WITHOUT ABNORMAL FINDINGS: ICD-10-CM

## 2021-11-21 LAB
ALBUMIN SERPL-MCNC: 3.8 G/DL (ref 3.5–5.2)
ALBUMIN/GLOB SERPL: 1.2 G/DL
ALP SERPL-CCNC: 49 U/L (ref 39–117)
ALT SERPL W P-5'-P-CCNC: 21 U/L (ref 1–33)
ANION GAP SERPL CALCULATED.3IONS-SCNC: 9 MMOL/L (ref 5–15)
AST SERPL-CCNC: 21 U/L (ref 1–32)
BACTERIA UR QL AUTO: ABNORMAL /HPF
BASOPHILS # BLD AUTO: 0.03 10*3/MM3 (ref 0–0.2)
BASOPHILS NFR BLD AUTO: 0.4 % (ref 0–1.5)
BILIRUB SERPL-MCNC: 0.3 MG/DL (ref 0–1.2)
BILIRUB UR QL STRIP: NEGATIVE
BUN SERPL-MCNC: 15 MG/DL (ref 8–23)
BUN/CREAT SERPL: 27.3 (ref 7–25)
CALCIUM SPEC-SCNC: 9.1 MG/DL (ref 8.6–10.5)
CHLORIDE SERPL-SCNC: 101 MMOL/L (ref 98–107)
CLARITY UR: CLEAR
CO2 SERPL-SCNC: 27 MMOL/L (ref 22–29)
COLOR UR: YELLOW
CREAT SERPL-MCNC: 0.55 MG/DL (ref 0.57–1)
DEPRECATED RDW RBC AUTO: 50.1 FL (ref 37–54)
EOSINOPHIL # BLD AUTO: 0.28 10*3/MM3 (ref 0–0.4)
EOSINOPHIL NFR BLD AUTO: 4.1 % (ref 0.3–6.2)
ERYTHROCYTE [DISTWIDTH] IN BLOOD BY AUTOMATED COUNT: 14.6 % (ref 12.3–15.4)
GFR SERPL CREATININE-BSD FRML MDRD: 107 ML/MIN/1.73
GLOBULIN UR ELPH-MCNC: 3.2 GM/DL
GLUCOSE SERPL-MCNC: 113 MG/DL (ref 65–99)
GLUCOSE UR STRIP-MCNC: NEGATIVE MG/DL
HCT VFR BLD AUTO: 36.6 % (ref 34–46.6)
HGB BLD-MCNC: 11.8 G/DL (ref 12–15.9)
HGB UR QL STRIP.AUTO: NEGATIVE
HYALINE CASTS UR QL AUTO: ABNORMAL /LPF
IMM GRANULOCYTES # BLD AUTO: 0.03 10*3/MM3 (ref 0–0.05)
IMM GRANULOCYTES NFR BLD AUTO: 0.4 % (ref 0–0.5)
KETONES UR QL STRIP: NEGATIVE
LEUKOCYTE ESTERASE UR QL STRIP.AUTO: ABNORMAL
LYMPHOCYTES # BLD AUTO: 1.82 10*3/MM3 (ref 0.7–3.1)
LYMPHOCYTES NFR BLD AUTO: 26.4 % (ref 19.6–45.3)
MCH RBC QN AUTO: 29.9 PG (ref 26.6–33)
MCHC RBC AUTO-ENTMCNC: 32.2 G/DL (ref 31.5–35.7)
MCV RBC AUTO: 92.7 FL (ref 79–97)
MONOCYTES # BLD AUTO: 0.61 10*3/MM3 (ref 0.1–0.9)
MONOCYTES NFR BLD AUTO: 8.9 % (ref 5–12)
NEUTROPHILS NFR BLD AUTO: 4.12 10*3/MM3 (ref 1.7–7)
NEUTROPHILS NFR BLD AUTO: 59.8 % (ref 42.7–76)
NITRITE UR QL STRIP: NEGATIVE
NRBC BLD AUTO-RTO: 0 /100 WBC (ref 0–0.2)
PH UR STRIP.AUTO: 7 [PH] (ref 5–8)
PLATELET # BLD AUTO: 291 10*3/MM3 (ref 140–450)
PMV BLD AUTO: 11.3 FL (ref 6–12)
POTASSIUM SERPL-SCNC: 4.4 MMOL/L (ref 3.5–5.2)
PROT SERPL-MCNC: 7 G/DL (ref 6–8.5)
PROT UR QL STRIP: ABNORMAL
RBC # BLD AUTO: 3.95 10*6/MM3 (ref 3.77–5.28)
RBC # UR STRIP: ABNORMAL /HPF
REF LAB TEST METHOD: ABNORMAL
SODIUM SERPL-SCNC: 137 MMOL/L (ref 136–145)
SP GR UR STRIP: 1.02 (ref 1–1.03)
SQUAMOUS #/AREA URNS HPF: ABNORMAL /HPF
UROBILINOGEN UR QL STRIP: ABNORMAL
WBC # UR STRIP: ABNORMAL /HPF
WBC NRBC COR # BLD: 6.89 10*3/MM3 (ref 3.4–10.8)

## 2021-11-21 PROCEDURE — 87086 URINE CULTURE/COLONY COUNT: CPT

## 2021-11-21 PROCEDURE — 85025 COMPLETE CBC W/AUTO DIFF WBC: CPT

## 2021-11-21 PROCEDURE — 87077 CULTURE AEROBIC IDENTIFY: CPT

## 2021-11-21 PROCEDURE — 87186 SC STD MICRODIL/AGAR DIL: CPT

## 2021-11-21 PROCEDURE — 81001 URINALYSIS AUTO W/SCOPE: CPT

## 2021-11-21 PROCEDURE — 80053 COMPREHEN METABOLIC PANEL: CPT

## 2021-11-23 LAB — BACTERIA SPEC AEROBE CULT: ABNORMAL

## 2021-12-09 ENCOUNTER — HOSPITAL ENCOUNTER (OUTPATIENT)
Dept: ULTRASOUND IMAGING | Facility: HOSPITAL | Age: 81
Discharge: HOME | End: 2021-12-09

## 2021-12-09 DIAGNOSIS — I73.9 PAD (PERIPHERAL ARTERY DISEASE) (HCC): ICD-10-CM

## 2021-12-09 DIAGNOSIS — I65.23 BILATERAL CAROTID ARTERY STENOSIS: ICD-10-CM

## 2021-12-09 PROCEDURE — 93880 EXTRACRANIAL BILAT STUDY: CPT | Performed by: SURGERY

## 2021-12-09 PROCEDURE — 93923 UPR/LXTR ART STDY 3+ LVLS: CPT | Performed by: SURGERY

## 2021-12-09 PROCEDURE — 93923 UPR/LXTR ART STDY 3+ LVLS: CPT

## 2021-12-09 PROCEDURE — 93880 EXTRACRANIAL BILAT STUDY: CPT

## 2021-12-10 ENCOUNTER — OFFICE VISIT (OUTPATIENT)
Dept: INTERNAL MEDICINE | Facility: CLINIC | Age: 81
End: 2021-12-10

## 2021-12-10 VITALS
TEMPERATURE: 98.2 F | HEART RATE: 94 BPM | SYSTOLIC BLOOD PRESSURE: 132 MMHG | BODY MASS INDEX: 32.41 KG/M2 | DIASTOLIC BLOOD PRESSURE: 80 MMHG | RESPIRATION RATE: 15 BRPM | WEIGHT: 165.1 LBS | HEIGHT: 60 IN | OXYGEN SATURATION: 98 %

## 2021-12-10 DIAGNOSIS — L02.416 ABSCESS OF LEFT THIGH: Primary | ICD-10-CM

## 2021-12-10 DIAGNOSIS — L03.116 LEFT LEG CELLULITIS: ICD-10-CM

## 2021-12-10 DIAGNOSIS — Z22.322 POSITIVE RESULT FOR METHICILLIN RESISTANT STAPHYLOCOCCUS AUREUS (MRSA) SCREENING: ICD-10-CM

## 2021-12-10 PROCEDURE — 99213 OFFICE O/P EST LOW 20 MIN: CPT | Performed by: INTERNAL MEDICINE

## 2021-12-10 RX ORDER — HYDROXYZINE HYDROCHLORIDE 25 MG/1
25 TABLET, FILM COATED ORAL NIGHTLY
COMMUNITY
End: 2022-01-11

## 2021-12-10 RX ORDER — SIMETHICONE 125 MG
125 TABLET,CHEWABLE ORAL EVERY 6 HOURS PRN
COMMUNITY

## 2021-12-10 RX ORDER — LISINOPRIL 10 MG/1
10 TABLET ORAL DAILY
COMMUNITY
End: 2022-02-10 | Stop reason: SDUPTHER

## 2021-12-10 RX ORDER — HYDROXYZINE PAMOATE 25 MG/1
25 CAPSULE ORAL 3 TIMES DAILY PRN
COMMUNITY
End: 2022-02-10 | Stop reason: SDUPTHER

## 2021-12-10 RX ORDER — MULTIPLE VITAMINS W/ MINERALS TAB 9MG-400MCG
1 TAB ORAL DAILY
COMMUNITY
End: 2022-10-15 | Stop reason: HOSPADM

## 2021-12-10 NOTE — PROGRESS NOTES
Chief Complaint   Patient presents with   • Follow-up   • Fall         History:  Carmen Obrien is a 81 y.o. female who presents today for evaluation of the above problems.      She presents today for hospital and nursing home follow-up.  She just got released from [facility] 1 hour ago.  She was admitted to our hospital between November 1 and November 11 for an abscess of her left thigh.  She had incision and drainage by Dr. Mondragon.  Her wound VAC was removed 2 days ago and reports the infection is gone.  She is going back home alone today.  She feels safe to go back at home and feels like she can get around by herself.  Home health has already been set up with for skilled nursing and physical therapy.  She does feel weak, but is getting stronger.  Denies fevers or chills.    She had ABIs and carotid ultrasound done yesterday for follow-up appointment Dr. Grubbs on December 16      HPI      ROS:  Review of Systems  As above    Current Outpatient Medications:   •  aspirin 81 MG EC tablet, Take 81 mg by mouth Daily., Disp: , Rfl:   •  atorvastatin (LIPITOR) 10 MG tablet, Take 1 tablet by mouth Daily., Disp: 30 tablet, Rfl: 5  •  Calcium Carbonate-Vitamin D (CALTRATE 600+D PO), Take 1 tablet by mouth 2 (two) times a day. Morning and nightly, Disp: , Rfl:   •  carvedilol (COREG) 6.25 MG tablet, Take 1 tablet by mouth 2 (Two) Times a Day With Meals., Disp: 60 tablet, Rfl: 5  •  cetirizine (zyrTEC) 10 MG tablet, Take 10 mg by mouth Daily., Disp: , Rfl:   •  Cholecalciferol (VITAMIN D3) 125 MCG (5000 UT) capsule capsule, Take 5,000 Units by mouth Daily., Disp: , Rfl:   •  Cyanocobalamin (Vitamin B-12) 1000 MCG sublingual tablet, Place 2.5 tablets under the tongue Daily. 2500mcg, Disp: , Rfl:   •  cycloSPORINE (RESTASIS) 0.05 % ophthalmic emulsion, Administer 1 drop to both eyes Every 12 (Twelve) Hours., Disp: , Rfl:   •  esomeprazole (nexIUM) 20 MG capsule, Take 1 capsule by mouth 2 (two) times a day., Disp: 60  capsule, Rfl: 11  •  FEROSUL 325 (65 Fe) MG tablet, Take 325 mg by mouth Daily With Breakfast., Disp: , Rfl:   •  fluticasone (FLONASE) 50 MCG/ACT nasal spray, 2 sprays into the nostril(s) as directed by provider Daily., Disp: , Rfl:   •  HYDROcodone-acetaminophen (NORCO) 7.5-325 MG per tablet, Take 1 tablet by mouth Every 8 (Eight) Hours As Needed for Moderate Pain ., Disp: 6 tablet, Rfl: 0  •  hydrOXYzine (ATARAX) 25 MG tablet, Take 25 mg by mouth Every Night., Disp: , Rfl:   •  hydrOXYzine pamoate (VISTARIL) 25 MG capsule, Take 25 mg by mouth 3 (Three) Times a Day As Needed for Itching., Disp: , Rfl:   •  lisinopril (PRINIVIL,ZESTRIL) 10 MG tablet, Take 10 mg by mouth Daily., Disp: , Rfl:   •  meloxicam (MOBIC) 15 MG tablet, Take 15 mg by mouth Daily., Disp: , Rfl:   •  montelukast (SINGULAIR) 10 MG tablet, Take 1 tablet by mouth Every Night., Disp: 30 tablet, Rfl: 5  •  multivitamin with minerals (PRESERVISION AREDS PO), Take 1 tablet by mouth Daily., Disp: , Rfl:   •  nortriptyline (PAMELOR) 50 MG capsule, Take 1 capsule by mouth Every Night., Disp: 30 capsule, Rfl: 5  •  polyethyl glycol-propyl glycol (SYSTANE) 0.4-0.3 % solution ophthalmic solution, Administer 2 drops to both eyes Every 2 (Two) Hours As Needed (dry eyes)., Disp: , Rfl:   •  polyethylene glycol (polyethylene glycol) 17 g packet, Take 17 g by mouth Daily., Disp: , Rfl:   •  pregabalin (LYRICA) 50 MG capsule, Take 50 mg by mouth Every 12 (Twelve) Hours As Needed., Disp: , Rfl:   •  sennosides-docusate (PERICOLACE) 8.6-50 MG per tablet, Take 2 tablets by mouth Every Night., Disp: , Rfl:   •  simethicone (MYLICON) 125 MG chewable tablet, Chew 125 mg Every 6 (Six) Hours As Needed for Flatulence., Disp: , Rfl:   •  tolterodine LA (DETROL LA) 4 MG 24 hr capsule, Take 1 capsule by mouth Daily., Disp: , Rfl:     Lab Results   Component Value Date    GLUCOSE 113 (H) 11/21/2021    BUN 15 11/21/2021    CREATININE 0.55 (L) 11/21/2021    EGFRIFNONA 107  11/21/2021    BCR 27.3 (H) 11/21/2021    K 4.4 11/21/2021    CO2 27.0 11/21/2021    CALCIUM 9.1 11/21/2021    ALBUMIN 3.80 11/21/2021    AST 21 11/21/2021    ALT 21 11/21/2021       WBC   Date Value Ref Range Status   11/21/2021 6.89 3.40 - 10.80 10*3/mm3 Final   07/16/2019 7.0 4.8 - 10.8 K/uL Final     RBC   Date Value Ref Range Status   11/21/2021 3.95 3.77 - 5.28 10*6/mm3 Final   07/16/2019 3.52 (L) 4.20 - 5.40 M/uL Final     Hemoglobin   Date Value Ref Range Status   11/21/2021 11.8 (L) 12.0 - 15.9 g/dL Final   07/16/2019 10.1 (L) 12.0 - 16.0 g/dL Final     Hematocrit   Date Value Ref Range Status   11/21/2021 36.6 34.0 - 46.6 % Final   07/16/2019 31.7 (L) 37.0 - 47.0 % Final     MCV   Date Value Ref Range Status   11/21/2021 92.7 79.0 - 97.0 fL Final   07/16/2019 90.1 81.0 - 99.0 fL Final     MCH   Date Value Ref Range Status   11/21/2021 29.9 26.6 - 33.0 pg Final   07/16/2019 28.7 27.0 - 31.0 pg Final     MCHC   Date Value Ref Range Status   11/21/2021 32.2 31.5 - 35.7 g/dL Final   07/16/2019 31.9 (L) 33.0 - 37.0 g/dL Final     RDW   Date Value Ref Range Status   11/21/2021 14.6 12.3 - 15.4 % Final   07/16/2019 14.7 (H) 11.5 - 14.5 % Final     RDW-SD   Date Value Ref Range Status   11/21/2021 50.1 37.0 - 54.0 fl Final     MPV   Date Value Ref Range Status   11/21/2021 11.3 6.0 - 12.0 fL Final   07/16/2019 10.8 9.4 - 12.3 fL Final     Platelets   Date Value Ref Range Status   11/21/2021 291 140 - 450 10*3/mm3 Final   07/16/2019 248 130 - 400 K/uL Final     Neutrophil Rel %   Date Value Ref Range Status   07/04/2019 76.5 (H) 50.0 - 65.0 % Final     Neutrophil %   Date Value Ref Range Status   11/21/2021 59.8 42.7 - 76.0 % Final     Lymphocyte Rel %   Date Value Ref Range Status   07/04/2019 13.9 (L) 20.0 - 40.0 % Final     Lymphocyte %   Date Value Ref Range Status   11/21/2021 26.4 19.6 - 45.3 % Final     Monocyte Rel %   Date Value Ref Range Status   07/04/2019 7.4 0.0 - 10.0 % Final     Monocyte %   Date  "Value Ref Range Status   11/21/2021 8.9 5.0 - 12.0 % Final     Eosinophil Rel %   Date Value Ref Range Status   07/04/2019 1.4 0.0 - 5.0 % Final     Eosinophil %   Date Value Ref Range Status   11/21/2021 4.1 0.3 - 6.2 % Final     Basophil Rel %   Date Value Ref Range Status   07/04/2019 0.4 0.0 - 1.0 % Final     Basophil %   Date Value Ref Range Status   11/21/2021 0.4 0.0 - 1.5 % Final     Immature Grans %   Date Value Ref Range Status   11/21/2021 0.4 0.0 - 0.5 % Final     Neutrophils Absolute   Date Value Ref Range Status   07/04/2019 7.7 (H) 1.5 - 7.5 K/uL Final     Neutrophils, Absolute   Date Value Ref Range Status   11/21/2021 4.12 1.70 - 7.00 10*3/mm3 Final     Lymphocytes Absolute   Date Value Ref Range Status   07/04/2019 1.4 1.1 - 4.5 K/uL Final     Lymphocytes, Absolute   Date Value Ref Range Status   11/21/2021 1.82 0.70 - 3.10 10*3/mm3 Final     Monocytes Absolute   Date Value Ref Range Status   07/04/2019 0.80 0.00 - 0.90 K/uL Final     Monocytes, Absolute   Date Value Ref Range Status   11/21/2021 0.61 0.10 - 0.90 10*3/mm3 Final     Eosinophils Absolute   Date Value Ref Range Status   07/04/2019 0.10 0.00 - 0.60 K/uL Final     Eosinophils, Absolute   Date Value Ref Range Status   11/21/2021 0.28 0.00 - 0.40 10*3/mm3 Final     Basophils Absolute   Date Value Ref Range Status   07/04/2019 0.00 0.00 - 0.20 K/uL Final     Basophils, Absolute   Date Value Ref Range Status   11/21/2021 0.03 0.00 - 0.20 10*3/mm3 Final     Immature Grans, Absolute   Date Value Ref Range Status   11/21/2021 0.03 0.00 - 0.05 10*3/mm3 Final     nRBC   Date Value Ref Range Status   11/21/2021 0.0 0.0 - 0.2 /100 WBC Final         OBJECTIVE:  Visit Vitals  /80 (BP Location: Left arm, Patient Position: Sitting, Cuff Size: Adult)   Pulse 94   Temp 98.2 °F (36.8 °C) (Oral)   Resp 15   Ht 152.4 cm (60\")   Wt 74.9 kg (165 lb 1.6 oz)   SpO2 98%   BMI 32.24 kg/m²      Physical Exam  Vitals reviewed.   Constitutional:       " General: She is not in acute distress.     Appearance: Normal appearance. She is well-developed. She is not diaphoretic.      Comments: Very pleasant.  No acute distress.  Sitting in a wheelchair   HENT:      Head: Normocephalic and atraumatic.   Eyes:      Pupils: Pupils are equal, round, and reactive to light.   Neck:      Thyroid: No thyromegaly.      Trachea: Phonation normal.   Cardiovascular:      Rate and Rhythm: Normal rate and regular rhythm.      Heart sounds: No murmur heard.      Pulmonary:      Effort: Pulmonary effort is normal. No respiratory distress.      Breath sounds: Normal breath sounds. No stridor. No wheezing, rhonchi or rales.   Skin:     Coloration: Skin is not pale.      Findings: No erythema.      Comments: Her left upper inner thigh wound was bandaged which I did not remove   Neurological:      Mental Status: She is alert.   Psychiatric:         Behavior: Behavior normal.         Thought Content: Thought content normal.         Judgment: Judgment normal.       Reviewed the hospitalization records.  Reviewed her medication reconciliation [facility] records from [place]  Assessment/Plan    Diagnoses and all orders for this visit:    1. Abscess of left thigh (Primary)    2. Left leg cellulitis    3. Positive result for methicillin resistant Staphylococcus aureus (MRSA) screening      She is recovering from the abscess of her left thigh.  I would continue home health with skilled nursing for wound care, and physical therapy as already ordered.  She feels confident she can take care of herself at home.  She ensures me that in emergency situation she can get out of her home.  She does not need any refills today    It is my medical opinion that my patient needs a wheelchair.  She needs the wheelchair to keep her independence, and continue healing from her recent infection and surgery.  She has full strength on her arms and can adequately operate the wheelchair.  She will also need the  wheelchair for the rest of her life.    20 minutes spent total on the day of the visit    Return in about 4 months (around 4/10/2022) for Recheck, cancel 12/14/21.      AMY Noble MD  15:24 CST  12/10/2021

## 2021-12-13 ENCOUNTER — TELEPHONE (OUTPATIENT)
Dept: INTERNAL MEDICINE | Facility: CLINIC | Age: 81
End: 2021-12-13

## 2021-12-13 NOTE — TELEPHONE ENCOUNTER
Caller: [facility]     Relationship:     Best call back number: [phone]    Who are you requesting to speak with     CLINICAL       Do you know the name of the person who called: JADA    What was the call regarding:     2 X 2 WEEK/ ALMOST TO HER BASELINE.    Do you require a callback: NO

## 2021-12-14 ENCOUNTER — TELEPHONE (OUTPATIENT)
Dept: INTERNAL MEDICINE | Facility: CLINIC | Age: 81
End: 2021-12-14

## 2021-12-14 NOTE — TELEPHONE ENCOUNTER
Caller: MIAN DAI    Relationship: PHYSICAL THERAPIST    Best call back number: [phone]    What is the best time to reach you: ANY TIME    Who are you requesting to speak with (clinical staff, provider,  specific staff member): DR QUINTANA    Do you know the name of the person who called: MIAN    What was the call regarding: MIAN EVALUATED PATIENT TODAY FOR HOME HEALTH PHYSICAL THERAPY AND IS REQUESTING 2 TIMES A WEEK FOR   3 WEEKS    Do you require a callback: YES

## 2021-12-21 ENCOUNTER — TELEPHONE (OUTPATIENT)
Dept: INTERNAL MEDICINE | Facility: CLINIC | Age: 81
End: 2021-12-21

## 2021-12-21 DIAGNOSIS — L02.416 ABSCESS OF LEFT THIGH: Primary | ICD-10-CM

## 2021-12-21 DIAGNOSIS — R06.02 SHORTNESS OF BREATH: ICD-10-CM

## 2021-12-21 DIAGNOSIS — L03.116 LEFT LEG CELLULITIS: ICD-10-CM

## 2021-12-21 DIAGNOSIS — Z22.322 POSITIVE RESULT FOR METHICILLIN RESISTANT STAPHYLOCOCCUS AUREUS (MRSA) SCREENING: ICD-10-CM

## 2021-12-21 NOTE — TELEPHONE ENCOUNTER
PATIENT HAS LEFT VM AND STATED THAT HER FAMILY IS AFRAID TO BE AROUND HER BECAUSE OF HER MRSA.   SHE IS ASKING IF SHE IS STILL CONTAGIOUS?  SHE STATED THAT THE HOME HEALTH NURSES COME IN AND DONT SEEM TO BE WORRIED ABOUT IT.  PLEASE ADVISE.    [phone]    PATIENTS DAUGHTER IN LAW DOES HAVE CANCER CELLS IN HER BREAST.

## 2021-12-21 NOTE — TELEPHONE ENCOUNTER
PATIENT HAS BEEN CALLED, GIVEN MESSAGE AND  UNDERSTANDING VOICED.   PATIENT IS NEEDING A SCRIPT FOR A WHEEL CHAIR TO BE SENT TO [facility]  SHE IS NEEDING THIS FOR THE PATS BUS TO TAKE HER TO WHERE SHE NEEDS TO GO.

## 2021-12-23 ENCOUNTER — TELEPHONE (OUTPATIENT)
Dept: INTERNAL MEDICINE | Facility: CLINIC | Age: 81
End: 2021-12-23

## 2021-12-23 NOTE — TELEPHONE ENCOUNTER
SPOKE WITH LINDA TIM AND WAS TOLD THAT THE OFFICE NOTES IN THE [facility] NEEDS TO STATED WHY PATIENT NEEDS WHEEL CHAIR.      NEED TO KNOW WHY PATIENT NEEDS WHEEL CHAIR.  MEDICARE WANTS TO KNOW THAT THE PATIENT AND  TALKED ABOUT WHEEL CHAIR, DISCUSSED WHY SHE NEEDS THE WHEEL CHAIR AND HOW SHE WILL BENEFIT FROM USING THE WHEEL CHAIR.

## 2021-12-28 ENCOUNTER — TELEPHONE (OUTPATIENT)
Dept: INTERNAL MEDICINE | Facility: CLINIC | Age: 81
End: 2021-12-28

## 2021-12-28 NOTE — TELEPHONE ENCOUNTER
Home health called regarding the patients wheel chair order that was faxed over. Catrachita from [facility] said that the patients office note needs to state that she needs a wheelchair, why needs the wheelchair, that she can use her arms to move around in the wheelchair and that she needs the wheelchair for the rest of her life.

## 2021-12-29 DIAGNOSIS — I73.9 PVD (PERIPHERAL VASCULAR DISEASE) (HCC): Primary | ICD-10-CM

## 2021-12-30 ENCOUNTER — TELEPHONE (OUTPATIENT)
Dept: INTERNAL MEDICINE | Facility: CLINIC | Age: 81
End: 2021-12-30
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
## 2021-12-30 NOTE — TELEPHONE ENCOUNTER
Home health nurse called and stated that the patient took a fall yesterday and she believes that the patient needs a UA done for a possible UTI.     They are also asking about a  referral to try and help the patient be able to get rides to and from doctor appointments.

## 2022-01-03 ENCOUNTER — TELEPHONE (OUTPATIENT)
Dept: INTERNAL MEDICINE | Facility: CLINIC | Age: 82
End: 2022-01-03

## 2022-01-03 NOTE — TELEPHONE ENCOUNTER
Caller: Kristine Rivas  - PT     Relationship to patient: Home Health      Patient is needing:  Pt was discharged from PT last week - All goals met.

## 2022-01-04 ENCOUNTER — TELEPHONE (OUTPATIENT)
Dept: INTERNAL MEDICINE | Facility: CLINIC | Age: 82
End: 2022-01-04

## 2022-01-04 NOTE — TELEPHONE ENCOUNTER
Caller: CARMINE    Relationship:     Best call back number:[phone]  What is the best time to reach you: ANY TIME    Who are you requesting to speak with (clinical staff, provider,  specific staff member): NURSE    Do you know the name of the person who called: CARMINE    What was the call regarding: CARMINE WENT TO CHECK ON PATIENT SINCE SHE HAS BEEN CANCELLING HER APPOINTMENT BECAUSE OF NO TRANSPORTATION. CARMINE HAS HER SET UP WITH TRANSPORTATION THROUGH [employer] SO PATIENT SHOULD BE CALLING TO RESCHEDULE.    Do you require a callback: NO

## 2022-01-11 RX ORDER — CEFDINIR 300 MG/1
300 CAPSULE ORAL 2 TIMES DAILY
Qty: 10 CAPSULE | Refills: 0 | Status: SHIPPED | OUTPATIENT
Start: 2022-01-11 | End: 2022-01-16

## 2022-01-11 RX ORDER — HYDROXYZINE HYDROCHLORIDE 25 MG/1
25 TABLET, FILM COATED ORAL 3 TIMES DAILY PRN
Qty: 90 TABLET | Refills: 5 | Status: SHIPPED | OUTPATIENT
Start: 2022-01-11 | End: 2022-10-15 | Stop reason: HOSPADM

## 2022-01-24 NOTE — PROGRESS NOTES
[facility] - PODIATRY    Today's Date: 01/27/22    Patient Name: Carmen Obrien  MRN: [id number]  CSN: [id number]  PCP: VERA Noble MD  Referring Provider: No ref. provider found    SUBJECTIVE     Chief Complaint   Patient presents with   • Follow-up     VERA Bartholomew MD pcp09/09/2021 FU NAIL C[facility]- pt states has been in nursing home for past month n half rehabing due to absess inner left thigh surgery so nails are out of control- pt denies pain- pt presetns with long thick nails, callus on inside edge of 2nd toe left foot healing wound on top of little toe right foot, another healing wound on top of 4th toe left foot     HPI: Carmen Obrien, a 82 y.o.female, comes to clinic as a(n) established patient complaining of foot pain and complaining of thickened, irregular toenails. Patient has h/o anxiety, arthritis, CAD, DDD, DVT, Depression , GERD, HTN, Hypercholesterolemia, LPRD, Macular degeneration, OA. Patient is non-diabetic.  Relates occasional numbness and tingling in her feet but no loss of sensation.  States that she has been in SNF due to undergoing surgical drainage of abscess of left inner thigh for the last month. States that toenails are long, thick, and dystrophic and that she is unable to care for nails herself. Denies pain at the present time. Relates previous treatment(s) including care by podiatry. Denies any constitutional symptoms. No other pedal complaints at this time.    Past Medical History:   Diagnosis Date   • Anxiety    • Arthritis    • CAD (coronary artery disease)    • Colon polyp    • DDD (degenerative disc disease), lumbar    • Deep venous thrombosis (HCC)    • Depression    • Diverticulosis    • Esophageal stricture    • GERD (gastroesophageal reflux disease)    • History of transfusion    • Hypercholesteremia    • Hypertension    • LPRD (laryngopharyngeal reflux disease)    • Macular degeneration    • Multinodular goiter 2/23/2017   •  Osteoarthritis    • Pruritic disorder    • Spastic colon    • Thyroid nodule      Past Surgical History:   Procedure Laterality Date   • BLADDER REPAIR     • CATARACT EXTRACTION     • CHOLECYSTECTOMY     • COLONOSCOPY  04/22/2014    2 polyps, adenomatous, diverticulosis   • COLONOSCOPY N/A 6/26/2020    Procedure: COLONOSCOPY WITH ANESTHESIA;  Surgeon: John Coleman MD;  Location: [facility] ENDOSCOPY;  Service: Gastroenterology;  Laterality: N/A;  pre op: constipation  post op: diverticulosis,   PCP: Adam Delgadillo MD   • CYSTOCELE REPAIR     • ENDOSCOPY  04/22/2014    Schatzki's ring dilated   • ENDOSCOPY N/A 6/26/2020    Procedure: ESOPHAGOGASTRODUODENOSCOPY WITH ANESTHESIA;  Surgeon: John Coleman MD;  Location: [facility] ENDOSCOPY;  Service: Gastroenterology;  Laterality: N/A;  pre op: dysphagia  post op:stricture, dilated   PCP:Adam Delgadillo MD   • ENDOSCOPY N/A 9/2/2021    Procedure: ESOPHAGOGASTRODUODENOSCOPY WITH ANESTHESIA;  Surgeon: John Coleman MD;  Location: [facility] ENDOSCOPY;  Service: Gastroenterology;  Laterality: N/A;  pre op: dysphagia  post op:gastritis  PCP: VERA Noble MD   • FEMUR FRACTURE SURGERY     • HYSTERECTOMY     • INCISION AND DRAINAGE LEG Left 11/3/2021    Procedure: INCISION AND DRAINAGE LEG ABSCESS;  Surgeon: Cesia Mondragon MD;  Location: [facility] OR;  Service: General;  Laterality: Left;   • REPLACEMENT TOTAL KNEE     • ULNAR NERVE TRANSPOSITION       Family History   Problem Relation Age of Onset   • Colon cancer Mother    • Heart disease Mother    • Heart disease Father    • Colon polyps Neg Hx    • Esophageal cancer Neg Hx      Social History     Socioeconomic History   • Marital status:    Tobacco Use   • Smoking status: Never Smoker   • Smokeless tobacco: Never Used   Vaping Use   • Vaping Use: Never used   Substance and Sexual Activity   • Alcohol use: No   • Drug use: No   • Sexual activity: Defer     Allergies   Allergen Reactions   •  Levaquin [Levofloxacin] Unknown (See Comments)     Pt doesn't remember   • Codeine Other (See Comments)     Pt does not recall   • Morphine And Related Other (See Comments)     Pt does not recall     Current Outpatient Medications   Medication Sig Dispense Refill   • amoxicillin-clavulanate (Augmentin) 875-125 MG per tablet Take 1 tablet by mouth 2 (Two) Times a Day. 20 tablet 0   • aspirin 81 MG EC tablet Take 81 mg by mouth Daily.     • atorvastatin (LIPITOR) 10 MG tablet Take 1 tablet by mouth Daily. 30 tablet 5   • Calcium Carbonate-Vitamin D (CALTRATE 600+D PO) Take 1 tablet by mouth 2 (two) times a day. Morning and nightly     • carvedilol (COREG) 6.25 MG tablet Take 1 tablet by mouth 2 (Two) Times a Day With Meals. 60 tablet 5   • celecoxib (CeleBREX) 200 MG capsule TAKE 1 CAPSULE BY MOUTH ONCE A DAY WITH FOOD     • cetirizine (zyrTEC) 10 MG tablet Take 10 mg by mouth Daily.     • Cholecalciferol (VITAMIN D3) 125 MCG (5000 UT) capsule capsule Take 5,000 Units by mouth Daily.     • Cyanocobalamin (Vitamin B-12) 1000 MCG sublingual tablet Place 2.5 tablets under the tongue Daily. 2500mcg     • cycloSPORINE (RESTASIS) 0.05 % ophthalmic emulsion Administer 1 drop to both eyes Every 12 (Twelve) Hours.     • esomeprazole (nexIUM) 20 MG capsule Take 1 capsule by mouth 2 (two) times a day. 60 capsule 11   • FEROSUL 325 (65 Fe) MG tablet Take 325 mg by mouth Daily With Breakfast.     • fluticasone (FLONASE) 50 MCG/ACT nasal spray 2 sprays into the nostril(s) as directed by provider Daily.     • HYDROcodone-acetaminophen (NORCO) 7.5-325 MG per tablet Take 1 tablet by mouth Every 8 (Eight) Hours As Needed for Moderate Pain . 6 tablet 0   • hydrOXYzine (ATARAX) 25 MG tablet Take 1 tablet by mouth 3 (Three) Times a Day As Needed for Anxiety. 90 tablet 5   • hydrOXYzine pamoate (VISTARIL) 25 MG capsule Take 25 mg by mouth 3 (Three) Times a Day As Needed for Itching.     • lisinopril (PRINIVIL,ZESTRIL) 10 MG tablet Take 10  mg by mouth Daily.     • montelukast (SINGULAIR) 10 MG tablet Take 1 tablet by mouth Every Night. 30 tablet 5   • multivitamin with minerals (PRESERVISION AREDS PO) Take 1 tablet by mouth Daily.     • nortriptyline (PAMELOR) 50 MG capsule Take 1 capsule by mouth Every Night. 30 capsule 5   • polyethyl glycol-propyl glycol (SYSTANE) 0.4-0.3 % solution ophthalmic solution Administer 2 drops to both eyes Every 2 (Two) Hours As Needed (dry eyes).     • polyethylene glycol (polyethylene glycol) 17 g packet Take 17 g by mouth Daily.     • pregabalin (LYRICA) 50 MG capsule Take 50 mg by mouth Every 12 (Twelve) Hours As Needed.     • sennosides-docusate (PERICOLACE) 8.6-50 MG per tablet Take 2 tablets by mouth Every Night.     • simethicone (MYLICON) 125 MG chewable tablet Chew 125 mg Every 6 (Six) Hours As Needed for Flatulence.     • tolterodine LA (DETROL LA) 4 MG 24 hr capsule Take 1 capsule by mouth Daily.       No current facility-administered medications for this visit.     Review of Systems   Constitutional: Negative for chills and fever.   HENT: Negative for congestion.    Respiratory: Negative for shortness of breath.    Cardiovascular: Positive for leg swelling. Negative for chest pain.   Gastrointestinal: Negative for constipation, diarrhea, nausea and vomiting.   Musculoskeletal: Positive for arthralgias and gait problem.        Foot pain   Skin: Negative for wound.   Neurological: Positive for numbness.       OBJECTIVE     Vitals:    22 1022   BP: 134/84   Pulse: 62   SpO2: 99%       PHYSICAL EXAM  GEN:   Accompanied by none.     Foot/Ankle Exam:       General:   Appearance: obesity and elderly    Appearance comment:  Frail  Orientation: AAOx3    Gait: steppage    Assistance: wheelchair    Shoe Gear:  Diabetic shoes    VASCULAR      Right Foot Vascularity   Dorsalis pedis:  2+  Posterior tibial:  2+  Skin Temperature: warm    Edema Gradin+ and pitting  CFT:  4  Pedal Hair Growth:   Present  Varicosities: mild varicosities       Left Foot Vascularity   Dorsalis pedis:  2+  Posterior tibial:  2+  Skin Temperature: warm    Edema Gradin+ and pitting  CFT:  4  Pedal Hair Growth:  Present  Varicosities: mild varicosities        NEUROLOGIC     Right Foot Neurologic   Light touch sensation:  Diminished  Vibratory sensation:  Diminished  Hot/Cold sensation: diminished    Protective Sensation using Coyote-Latanya Monofilament:  6     Left Foot Neurologic   Light touch sensation:  Diminished  Vibratory sensation:  Diminished  Hot/cold sensation: diminished    Protective Sensation using [place]ote-Latanya Monofilament:  8     MUSCULOSKELETAL      Right Foot Musculoskeletal   Ecchymosis:  None  Tenderness: toenails    Arch:  Normal  Hammertoe:  Second toe, third toe, fourth toe and fifth toe  Hallux valgus: Yes       Left Foot Musculoskeletal   Ecchymosis:  None  Tenderness: toenails    Arch:  Normal  Hammertoe:  Second toe, third toe, fourth toe and fifth toe  Hallux valgus: Yes       MUSCLE STRENGTH     Right Foot Muscle Strength   Foot dorsiflexion:  4+  Foot plantar flexion:  4+  Foot inversion:  4+  Foot eversion:  4+     Left Foot Muscle Strength   Foot dorsiflexion:  4+  Foot plantar flexion:  4+  Foot inversion:  4+  Foot eversion:  4+     RANGE OF MOTION      Right Foot Range of Motion   Foot and ankle ROM within normal limits       Left Foot Range of Motion   Foot and ankle ROM within normal limits       DERMATOLOGIC     Right Foot Dermatologic   Skin: ulcer    Skin: right foot skin not intact, no right foot cellulitis and no right foot redness    Nails: onychomycosis, abnormally thick, subungual debris and dystrophic nails       Left Foot Dermatologic   Skin: left foot skin not intact    Nails: onychomycosis, abnormally thick, subungual debris and dystrophic nails       Image:       RADIOLOGY/NUCLEAR:  No results found.    LABORATORY/CULTURE RESULTS:      PATHOLOGY RESULTS:        ASSESSMENT/PLAN     Diagnoses and all orders for this visit:    1. Onychomycosis (Primary)    2. Neuropathic ulcer of toe of right foot, limited to breakdown of skin (HCC)    3. Peripheral polyneuropathy    4. Hammer toes of both feet    5. Valgus deformity of both great toes    6. Chronic venous stasis    7. Foot pain, bilateral      Comprehensive lower extremity examination and evaluation was performed.  Discussed findings and treatment plan including risks, benefits, and treatment options with patient in detail. Patient agreed with treatment plan.  After verbal consent obtained, nail(s) x10 debrided of length and thickness with nail nipper without incidence  Patient may maintain nails and calluses at home utilizing emery board or pumice stone between visits as needed  After verbal consent obtained, selective debridement performed to remove skin, slough and non-viable tissue utilizing dermal curette and/or scapel. Hemostasis achieved with silver nitrate. Wound area debrided was entire measurement documented.   Keep triple antibiotic ointment and band-aid over wound of right 3rd toe daily. Continue to monitor and if not healing in next 1-2 weeks patient advised to call back where wound care evaluation will be ordered.  Continue use of compression stockings daily and f/u with vascular surgery  An After Visit Summary was printed and given to the patient at discharge, including (if requested) any available informative/educational handouts regarding diagnosis, treatment, or medications. All questions were answered to patient/family satisfaction. Should symptoms fail to improve or worsen they agree to call or return to clinic or to go to the Emergency Department. Discussed the importance of following up with any needed screening tests/labs/specialist appointments and any requested follow-up recommended by me today. Importance of maintaining follow-up discussed and patient accepts that missed appointments can delay  diagnosis and potentially lead to worsening of conditions.  Return in about 3 months (around 4/27/2022) for Follow-up with [facility], or sooner if acute issues arise.    Lab Frequency Next Occurrence   Follow Anesthesia Guidelines / Protocol Once 06/11/2020   Obtain Informed Consent Once 06/16/2020   Adult Transthoracic Echo Complete W/ Cont if Necessary Per Protocol Once 07/12/2021   Follow Anesthesia Guidelines / Protocol Once 08/11/2021   Obtain Informed Consent Once 08/16/2021   Diet: Once 09/02/2021   Diet: Once 09/02/2021   US Carotid Bilateral Once 03/15/2022   US Ankle / Brachial Indices Extremity Complete Once 03/15/2022       This document has been electronically signed by Juaquin Chung DPM on January 27, 2022 11:01 CST

## 2022-01-26 ENCOUNTER — TELEPHONE (OUTPATIENT)
Dept: INTERNAL MEDICINE | Facility: CLINIC | Age: 82
End: 2022-01-26

## 2022-01-26 ENCOUNTER — TELEPHONE (OUTPATIENT)
Dept: VASCULAR SURGERY | Facility: CLINIC | Age: 82
End: 2022-01-26

## 2022-01-26 NOTE — TELEPHONE ENCOUNTER
PATIENT HAS CALLED,SHE STATED THAT ON HER LEFT LEG SHE HAS 2 SPOTS AND 1 OF THEM IS ABOUT 3 INCH [place] THAT WAS A KNOT OF WHICH NOW IS A RED AREA.    SHE IS ASKING IF SHE NEEDS TO CALL DR BARBOSA OFFICE OF WHICH DONE SURGERY ON THAT LEG.   SHE STATED THAT IT IS FLAT AND HARD.    PLEASE ADVISE.  DOES SHE NEED APPOINTMENT HERE FIRST.   LEG IS NOT LEAKING, ITS PAINFUL BUT NOT BAD, SHE IS NOT SURE IF IT IS WARM TO THE TOUCH OR NOT.

## 2022-01-26 NOTE — TELEPHONE ENCOUNTER
Spoke with Mrs Obrien reminding her of her appointment for Thursday, January 27th, 2022 at 945 am with Dr Grubbs and 1030 am with Dr Chung. Mrs Obrien confirmed she would be here,

## 2022-01-27 ENCOUNTER — OFFICE VISIT (OUTPATIENT)
Dept: PODIATRY | Facility: CLINIC | Age: 82
End: 2022-01-27

## 2022-01-27 ENCOUNTER — OFFICE VISIT (OUTPATIENT)
Dept: VASCULAR SURGERY | Facility: CLINIC | Age: 82
End: 2022-01-27

## 2022-01-27 ENCOUNTER — OFFICE VISIT (OUTPATIENT)
Dept: INTERNAL MEDICINE | Facility: CLINIC | Age: 82
End: 2022-01-27

## 2022-01-27 VITALS
OXYGEN SATURATION: 99 % | WEIGHT: 165 LBS | HEART RATE: 62 BPM | DIASTOLIC BLOOD PRESSURE: 84 MMHG | SYSTOLIC BLOOD PRESSURE: 134 MMHG | HEIGHT: 60 IN | BODY MASS INDEX: 32.39 KG/M2

## 2022-01-27 VITALS
DIASTOLIC BLOOD PRESSURE: 84 MMHG | SYSTOLIC BLOOD PRESSURE: 134 MMHG | BODY MASS INDEX: 32.39 KG/M2 | WEIGHT: 165 LBS | HEIGHT: 60 IN

## 2022-01-27 VITALS — SYSTOLIC BLOOD PRESSURE: 122 MMHG | DIASTOLIC BLOOD PRESSURE: 70 MMHG | OXYGEN SATURATION: 99 % | HEART RATE: 58 BPM

## 2022-01-27 DIAGNOSIS — M79.672 FOOT PAIN, BILATERAL: ICD-10-CM

## 2022-01-27 DIAGNOSIS — L08.9 SKIN INFECTION: Primary | ICD-10-CM

## 2022-01-27 DIAGNOSIS — M20.12 VALGUS DEFORMITY OF BOTH GREAT TOES: ICD-10-CM

## 2022-01-27 DIAGNOSIS — M79.671 FOOT PAIN, BILATERAL: ICD-10-CM

## 2022-01-27 DIAGNOSIS — G62.9 PERIPHERAL POLYNEUROPATHY: ICD-10-CM

## 2022-01-27 DIAGNOSIS — M20.42 HAMMER TOES OF BOTH FEET: ICD-10-CM

## 2022-01-27 DIAGNOSIS — I87.323 CHRONIC VENOUS HYPERTENSION WITH INFLAMMATION INVOLVING BOTH SIDES: ICD-10-CM

## 2022-01-27 DIAGNOSIS — Z23 NEED FOR VACCINATION: ICD-10-CM

## 2022-01-27 DIAGNOSIS — I65.23 BILATERAL CAROTID ARTERY STENOSIS: ICD-10-CM

## 2022-01-27 DIAGNOSIS — I73.9 PAD (PERIPHERAL ARTERY DISEASE): Primary | ICD-10-CM

## 2022-01-27 DIAGNOSIS — I10 ESSENTIAL HYPERTENSION: ICD-10-CM

## 2022-01-27 DIAGNOSIS — I87.8 CHRONIC VENOUS STASIS: ICD-10-CM

## 2022-01-27 DIAGNOSIS — L97.511 NEUROPATHIC ULCER OF TOE OF RIGHT FOOT, LIMITED TO BREAKDOWN OF SKIN: ICD-10-CM

## 2022-01-27 DIAGNOSIS — I89.0 LYMPHEDEMA: ICD-10-CM

## 2022-01-27 DIAGNOSIS — B35.1 ONYCHOMYCOSIS: Primary | ICD-10-CM

## 2022-01-27 DIAGNOSIS — M20.41 HAMMER TOES OF BOTH FEET: ICD-10-CM

## 2022-01-27 DIAGNOSIS — M20.11 VALGUS DEFORMITY OF BOTH GREAT TOES: ICD-10-CM

## 2022-01-27 DIAGNOSIS — E78.2 MIXED HYPERLIPIDEMIA: ICD-10-CM

## 2022-01-27 PROCEDURE — 99213 OFFICE O/P EST LOW 20 MIN: CPT | Performed by: PODIATRIST

## 2022-01-27 PROCEDURE — G0008 ADMIN INFLUENZA VIRUS VAC: HCPCS | Performed by: NURSE PRACTITIONER

## 2022-01-27 PROCEDURE — 99214 OFFICE O/P EST MOD 30 MIN: CPT | Performed by: NURSE PRACTITIONER

## 2022-01-27 PROCEDURE — 90662 IIV NO PRSV INCREASED AG IM: CPT | Performed by: NURSE PRACTITIONER

## 2022-01-27 PROCEDURE — 11721 DEBRIDE NAIL 6 OR MORE: CPT | Performed by: PODIATRIST

## 2022-01-27 PROCEDURE — 97597 DBRDMT OPN WND 1ST 20 CM/<: CPT | Performed by: PODIATRIST

## 2022-01-27 RX ORDER — CELECOXIB 200 MG/1
CAPSULE ORAL
Status: ON HOLD | COMMUNITY
Start: 2022-01-11 | End: 2022-08-29

## 2022-01-27 RX ORDER — AMOXICILLIN AND CLAVULANATE POTASSIUM 875; 125 MG/1; MG/1
1 TABLET, FILM COATED ORAL 2 TIMES DAILY
Qty: 20 TABLET | Refills: 0 | Status: SHIPPED | OUTPATIENT
Start: 2022-01-27 | End: 2022-03-28

## 2022-01-27 NOTE — PROGRESS NOTES
Chief Complaint   Patient presents with   • Leg Pain     left leg, on the outside of thigh         History:  Carmen Obrien is a 82 y.o. female who presents today for evaluation of the above problems.          Left posterior thigh, red hard spot, getting bigger.  Had an abscess on the inner thigh of this leg that had to be surgically drained in November.  No fever.    Would like flu vaccine.      ROS:  Review of Systems  As above    Allergies   Allergen Reactions   • Levaquin [Levofloxacin] Unknown (See Comments)     Pt doesn't remember   • Codeine Other (See Comments)     Pt does not recall   • Morphine And Related Other (See Comments)     Pt does not recall     Past Medical History:   Diagnosis Date   • Anxiety    • Arthritis    • CAD (coronary artery disease)    • Colon polyp    • DDD (degenerative disc disease), lumbar    • Deep venous thrombosis (HCC)    • Depression    • Diverticulosis    • Esophageal stricture    • GERD (gastroesophageal reflux disease)    • History of transfusion    • Hypercholesteremia    • Hypertension    • LPRD (laryngopharyngeal reflux disease)    • Macular degeneration    • Multinodular goiter 2/23/2017   • Osteoarthritis    • Pruritic disorder    • Spastic colon    • Thyroid nodule      Past Surgical History:   Procedure Laterality Date   • BLADDER REPAIR     • CATARACT EXTRACTION     • CHOLECYSTECTOMY     • COLONOSCOPY  04/22/2014    2 polyps, adenomatous, diverticulosis   • COLONOSCOPY N/A 6/26/2020    Procedure: COLONOSCOPY WITH ANESTHESIA;  Surgeon: John Coleman MD;  Location: [facility] ENDOSCOPY;  Service: Gastroenterology;  Laterality: N/A;  pre op: constipation  post op: diverticulosis,   PCP: Adam Delgadillo MD   • CYSTOCELE REPAIR     • ENDOSCOPY  04/22/2014    Schatzki's ring dilated   • ENDOSCOPY N/A 6/26/2020    Procedure: ESOPHAGOGASTRODUODENOSCOPY WITH ANESTHESIA;  Surgeon: John Coleman MD;  Location: [facility] ENDOSCOPY;  Service: Gastroenterology;   Laterality: N/A;  pre op: dysphagia  post op:stricture, dilated   PCP:Adam Delgadillo MD   • ENDOSCOPY N/A 9/2/2021    Procedure: ESOPHAGOGASTRODUODENOSCOPY WITH ANESTHESIA;  Surgeon: John Coleman MD;  Location: [facility] ENDOSCOPY;  Service: Gastroenterology;  Laterality: N/A;  pre op: dysphagia  post op:gastritis  PCP: VERA Noble MD   • FEMUR FRACTURE SURGERY     • HYSTERECTOMY     • INCISION AND DRAINAGE LEG Left 11/3/2021    Procedure: INCISION AND DRAINAGE LEG ABSCESS;  Surgeon: Cesia Mondragon MD;  Location: [facility] OR;  Service: General;  Laterality: Left;   • REPLACEMENT TOTAL KNEE     • ULNAR NERVE TRANSPOSITION       Family History   Problem Relation Age of Onset   • Colon cancer Mother    • Heart disease Mother    • Heart disease Father    • Colon polyps Neg Hx    • Esophageal cancer Neg Hx      Carmen  reports that she has never smoked. She has never used smokeless tobacco. She reports that she does not drink alcohol and does not use drugs.    I have reviewed and updated the above documentation (if necessary) including but not limited to chief complaint, ROS, PFSH, allergies and medications        Current Outpatient Medications:   •  aspirin 81 MG EC tablet, Take 81 mg by mouth Daily., Disp: , Rfl:   •  atorvastatin (LIPITOR) 10 MG tablet, Take 1 tablet by mouth Daily., Disp: 30 tablet, Rfl: 5  •  Calcium Carbonate-Vitamin D (CALTRATE 600+D PO), Take 1 tablet by mouth 2 (two) times a day. Morning and nightly, Disp: , Rfl:   •  carvedilol (COREG) 6.25 MG tablet, Take 1 tablet by mouth 2 (Two) Times a Day With Meals., Disp: 60 tablet, Rfl: 5  •  celecoxib (CeleBREX) 200 MG capsule, TAKE 1 CAPSULE BY MOUTH ONCE A DAY WITH FOOD, Disp: , Rfl:   •  cetirizine (zyrTEC) 10 MG tablet, Take 10 mg by mouth Daily., Disp: , Rfl:   •  Cholecalciferol (VITAMIN D3) 125 MCG (5000 UT) capsule capsule, Take 5,000 Units by mouth Daily., Disp: , Rfl:   •  Cyanocobalamin (Vitamin B-12) 1000 MCG sublingual  tablet, Place 2.5 tablets under the tongue Daily. 2500mcg, Disp: , Rfl:   •  cycloSPORINE (RESTASIS) 0.05 % ophthalmic emulsion, Administer 1 drop to both eyes Every 12 (Twelve) Hours., Disp: , Rfl:   •  esomeprazole (nexIUM) 20 MG capsule, Take 1 capsule by mouth 2 (two) times a day., Disp: 60 capsule, Rfl: 11  •  FEROSUL 325 (65 Fe) MG tablet, Take 325 mg by mouth Daily With Breakfast., Disp: , Rfl:   •  fluticasone (FLONASE) 50 MCG/ACT nasal spray, 2 sprays into the nostril(s) as directed by provider Daily., Disp: , Rfl:   •  HYDROcodone-acetaminophen (NORCO) 7.5-325 MG per tablet, Take 1 tablet by mouth Every 8 (Eight) Hours As Needed for Moderate Pain ., Disp: 6 tablet, Rfl: 0  •  hydrOXYzine (ATARAX) 25 MG tablet, Take 1 tablet by mouth 3 (Three) Times a Day As Needed for Anxiety., Disp: 90 tablet, Rfl: 5  •  hydrOXYzine pamoate (VISTARIL) 25 MG capsule, Take 25 mg by mouth 3 (Three) Times a Day As Needed for Itching., Disp: , Rfl:   •  lisinopril (PRINIVIL,ZESTRIL) 10 MG tablet, Take 10 mg by mouth Daily., Disp: , Rfl:   •  montelukast (SINGULAIR) 10 MG tablet, Take 1 tablet by mouth Every Night., Disp: 30 tablet, Rfl: 5  •  multivitamin with minerals (PRESERVISION AREDS PO), Take 1 tablet by mouth Daily., Disp: , Rfl:   •  nortriptyline (PAMELOR) 50 MG capsule, Take 1 capsule by mouth Every Night., Disp: 30 capsule, Rfl: 5  •  polyethyl glycol-propyl glycol (SYSTANE) 0.4-0.3 % solution ophthalmic solution, Administer 2 drops to both eyes Every 2 (Two) Hours As Needed (dry eyes)., Disp: , Rfl:   •  polyethylene glycol (polyethylene glycol) 17 g packet, Take 17 g by mouth Daily., Disp: , Rfl:   •  pregabalin (LYRICA) 50 MG capsule, Take 50 mg by mouth Every 12 (Twelve) Hours As Needed., Disp: , Rfl:   •  sennosides-docusate (PERICOLACE) 8.6-50 MG per tablet, Take 2 tablets by mouth Every Night., Disp: , Rfl:   •  simethicone (MYLICON) 125 MG chewable tablet, Chew 125 mg Every 6 (Six) Hours As Needed for  "Flatulence., Disp: , Rfl:   •  tolterodine LA (DETROL LA) 4 MG 24 hr capsule, Take 1 capsule by mouth Daily., Disp: , Rfl:   •  amoxicillin-clavulanate (Augmentin) 875-125 MG per tablet, Take 1 tablet by mouth 2 (Two) Times a Day., Disp: 20 tablet, Rfl: 0    OBJECTIVE:  Visit Vitals  /70 (BP Location: Left arm, Patient Position: Sitting, Cuff Size: Adult)   Pulse 58   SpO2 99%      Physical Exam  Vitals and nursing note reviewed.   Constitutional:       General: She is not in acute distress.     Appearance: Normal appearance. She is not ill-appearing, toxic-appearing or diaphoretic.   Musculoskeletal:         General: No tenderness.      Right lower leg: No edema.      Left lower leg: No edema.   Skin:     General: Skin is warm and dry.             Comments: Left posterior thigh, about 6 inches superior to knee, there is a 3\" wide by 4\" long patch of erythema that is warm to touch.  Approximately 1\" area of induration centrally and superficially.  There is an old scar from previous surgery in this area as well.  A dried flaky central area without actual open wound.     Neurological:      Mental Status: She is alert and oriented to person, place, and time.   Psychiatric:         Mood and Affect: Mood normal.         Behavior: Behavior normal.         Thought Content: Thought content normal.         Judgment: Judgment normal.         [facility]    Assessment/Plan    Diagnoses and all orders for this visit:    1. Skin infection (Primary)  -     amoxicillin-clavulanate (Augmentin) 875-125 MG per tablet; Take 1 tablet by mouth 2 (Two) Times a Day.  Dispense: 20 tablet; Refill: 0    2. Need for vaccination  -     Fluzone High-Dose 65+yrs (5519-8022)    I do feel this represents an infectious process.  I am starting Augmentin; have reviewed the hospital summary from her hospitalization last fall for similar problem, and she was treated with Zosyn and sent home on Augmentin.  I outlined the area on thigh with ballpoint pen " and asked her to watch the margins.  I would like her to follow up here in 1 week; however, if the erythema spreads beyond the pen borders OR if she develops fever, I want her to follow up right away.  She voices understanding of this.    I did review the CT left lower extremity from when she had the abscess in November.  I do not think we are to the point of needing a CT today; however, would consider this is she is worsening.    She has had her Covid booster.  She will get flu vax here today.  She's not sure if she had her second shingles shot at Dr. Delgadillo's because they told her in the past she had everything she needed.  She says she will check with them on that and let us know.        Education materials and an After Visit Summary were printed and given to the patient at discharge.  Return in about 1 week (around 2/3/2022) for Recheck with either.         Vianney Figueredo, ABDON   09:26 CST  1/27/2022

## 2022-02-08 ENCOUNTER — TELEPHONE (OUTPATIENT)
Dept: INTERNAL MEDICINE | Facility: CLINIC | Age: 82
End: 2022-02-08

## 2022-02-09 RX ORDER — FERROUS SULFATE 325(65) MG
325 TABLET ORAL
Qty: 30 TABLET | Refills: 1 | OUTPATIENT
Start: 2022-02-09

## 2022-02-09 RX ORDER — LISINOPRIL 5 MG/1
5 TABLET ORAL DAILY
Qty: 30 TABLET | Refills: 3 | OUTPATIENT
Start: 2022-02-09

## 2022-02-10 ENCOUNTER — OFFICE VISIT (OUTPATIENT)
Dept: INTERNAL MEDICINE | Facility: CLINIC | Age: 82
End: 2022-02-10

## 2022-02-10 ENCOUNTER — HOSPITAL ENCOUNTER (OUTPATIENT)
Dept: CT IMAGING | Facility: HOSPITAL | Age: 82
Discharge: HOME | End: 2022-02-10
Admitting: NURSE PRACTITIONER

## 2022-02-10 VITALS — HEART RATE: 60 BPM | SYSTOLIC BLOOD PRESSURE: 144 MMHG | DIASTOLIC BLOOD PRESSURE: 78 MMHG | OXYGEN SATURATION: 99 %

## 2022-02-10 DIAGNOSIS — I10 ESSENTIAL HYPERTENSION: ICD-10-CM

## 2022-02-10 DIAGNOSIS — L02.416 ABSCESS OF LEFT LEG: ICD-10-CM

## 2022-02-10 DIAGNOSIS — L02.416 ABSCESS OF LEFT LEG: Primary | ICD-10-CM

## 2022-02-10 LAB — CREAT BLDA-MCNC: 0.7 MG/DL (ref 0.6–1.3)

## 2022-02-10 PROCEDURE — 82565 ASSAY OF CREATININE: CPT

## 2022-02-10 PROCEDURE — 99214 OFFICE O/P EST MOD 30 MIN: CPT | Performed by: NURSE PRACTITIONER

## 2022-02-10 PROCEDURE — 73701 CT LOWER EXTREMITY W/DYE: CPT

## 2022-02-10 PROCEDURE — 25010000002 IOPAMIDOL 61 % SOLUTION: Performed by: NURSE PRACTITIONER

## 2022-02-10 RX ORDER — LISINOPRIL 10 MG/1
10 TABLET ORAL DAILY
Qty: 30 TABLET | Refills: 5 | Status: ON HOLD | OUTPATIENT
Start: 2022-02-10 | End: 2022-08-29

## 2022-02-10 RX ORDER — CLONIDINE HYDROCHLORIDE 0.1 MG/1
TABLET ORAL
Qty: 10 TABLET | Refills: 2 | Status: SHIPPED | OUTPATIENT
Start: 2022-02-10 | End: 2022-04-29

## 2022-02-10 RX ADMIN — IOPAMIDOL 100 ML: 612 INJECTION, SOLUTION INTRAVENOUS at 12:20
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
## 2022-02-11 NOTE — PROGRESS NOTES
That is just where calcium deposits are in the fatty tissue and form hard knots.  It is nothing to treat or worry about. Thanks.

## 2022-03-08 RX ORDER — NORTRIPTYLINE HYDROCHLORIDE 50 MG/1
CAPSULE ORAL
Qty: 30 CAPSULE | Refills: 5 | Status: ON HOLD | OUTPATIENT
Start: 2022-03-08 | End: 2022-08-29

## 2022-03-11 ENCOUNTER — TRANSCRIBE ORDERS (OUTPATIENT)
Dept: ADMINISTRATIVE | Facility: HOSPITAL | Age: 82
End: 2022-03-11

## 2022-03-11 ENCOUNTER — LAB (OUTPATIENT)
Dept: LAB | Facility: HOSPITAL | Age: 82
End: 2022-03-11

## 2022-03-11 DIAGNOSIS — M81.0 AGE-RELATED OSTEOPOROSIS WITHOUT CURRENT PATHOLOGICAL FRACTURE: Primary | ICD-10-CM

## 2022-03-11 LAB
25(OH)D3 SERPL-MCNC: 61.1 NG/ML (ref 30–100)
CALCIUM SPEC-SCNC: 9.8 MG/DL (ref 8.4–10.4)

## 2022-03-11 PROCEDURE — 82306 VITAMIN D 25 HYDROXY: CPT | Performed by: NURSE PRACTITIONER

## 2022-03-11 PROCEDURE — 82310 ASSAY OF CALCIUM: CPT | Performed by: NURSE PRACTITIONER

## 2022-03-11 PROCEDURE — 36415 COLL VENOUS BLD VENIPUNCTURE: CPT | Performed by: NURSE PRACTITIONER

## 2022-03-18 NOTE — PROGRESS NOTES
[facility] - PODIATRY    Today's Date: 03/24/22    Patient Name: Carmen Obrien  MRN: [id number]  CSN: [id number]  PCP: VERA Noble MD  Referring Provider: No ref. provider found    SUBJECTIVE     Chief Complaint   Patient presents with   • Follow-up     VERA Noble MD PCP01/05/2022 2 week f/u Onychomycosis- pt states feet about the same- pt pain - pt presents with thick cari nails, spot on inside of 2nd toe left foot, multiple calluses,       HPI: Carmen Obrien, a 82 y.o.female, comes to clinic as a(n) established patient complaining of calluses and wounds to both feet. Patient has h/o anxiety, arthritis, CAD, DDD, DVT, Depression , GERD, HTN, Hypercholesterolemia, LPRD, Macular degeneration, OA. Patient is non-diabetic.  Relates occasional numbness and tingling in her feet but no loss of sensation.  Relates multiple sites to her feet that cause small open sores/wounds. They have have been present off and on for several months. Also notes a blood blister to her right lower leg from bumping her leg against the bed. Denies pain at the present time. Relates previous treatment(s) including care by podiatry. Denies any constitutional symptoms. No other pedal complaints at this time.    Past Medical History:   Diagnosis Date   • Anxiety    • Arthritis    • CAD (coronary artery disease)    • Colon polyp    • DDD (degenerative disc disease), lumbar    • Deep venous thrombosis (HCC)    • Depression    • Diverticulosis    • Esophageal stricture    • GERD (gastroesophageal reflux disease)    • History of transfusion    • Hypercholesteremia    • Hypertension    • LPRD (laryngopharyngeal reflux disease)    • Macular degeneration    • Multinodular goiter 2/23/2017   • Osteoarthritis    • Pruritic disorder    • Spastic colon    • Thyroid nodule      Past Surgical History:   Procedure Laterality Date   • BLADDER REPAIR     • CATARACT EXTRACTION     • CHOLECYSTECTOMY     • COLONOSCOPY  04/22/2014     2 polyps, adenomatous, diverticulosis   • COLONOSCOPY N/A 6/26/2020    Procedure: COLONOSCOPY WITH ANESTHESIA;  Surgeon: John Coleman MD;  Location: [facility] ENDOSCOPY;  Service: Gastroenterology;  Laterality: N/A;  pre op: constipation  post op: diverticulosis,   PCP: Adam Delgadillo MD   • CYSTOCELE REPAIR     • ENDOSCOPY  04/22/2014    Schatzki's ring dilated   • ENDOSCOPY N/A 6/26/2020    Procedure: ESOPHAGOGASTRODUODENOSCOPY WITH ANESTHESIA;  Surgeon: John Coleman MD;  Location:  [facility] ENDOSCOPY;  Service: Gastroenterology;  Laterality: N/A;  pre op: dysphagia  post op:stricture, dilated   PCP:Adam Delgadillo MD   • ENDOSCOPY N/A 9/2/2021    Procedure: ESOPHAGOGASTRODUODENOSCOPY WITH ANESTHESIA;  Surgeon: John Coleman MD;  Location: [facility] ENDOSCOPY;  Service: Gastroenterology;  Laterality: N/A;  pre op: dysphagia  post op:gastritis  PCP: VERA Noble MD   • FEMUR FRACTURE SURGERY     • HYSTERECTOMY     • INCISION AND DRAINAGE LEG Left 11/3/2021    Procedure: INCISION AND DRAINAGE LEG ABSCESS;  Surgeon: Cesia Mondragon MD;  Location: [facility] OR;  Service: General;  Laterality: Left;   • REPLACEMENT TOTAL KNEE     • ULNAR NERVE TRANSPOSITION       Family History   Problem Relation Age of Onset   • Colon cancer Mother    • Heart disease Mother    • Heart disease Father    • Colon polyps Neg Hx    • Esophageal cancer Neg Hx      Social History     Socioeconomic History   • Marital status:    Tobacco Use   • Smoking status: Never Smoker   • Smokeless tobacco: Never Used   Vaping Use   • Vaping Use: Never used   Substance and Sexual Activity   • Alcohol use: No   • Drug use: No   • Sexual activity: Defer     Allergies   Allergen Reactions   • Levaquin [Levofloxacin] Unknown (See Comments)     Pt doesn't remember   • Codeine Other (See Comments)     Pt does not recall   • Morphine And Related Other (See Comments)     Pt does not recall     Current Outpatient Medications    Medication Sig Dispense Refill   • amoxicillin-clavulanate (Augmentin) 875-125 MG per tablet Take 1 tablet by mouth 2 (Two) Times a Day. 20 tablet 0   • aspirin 81 MG EC tablet Take 81 mg by mouth Daily.     • atorvastatin (LIPITOR) 10 MG tablet Take 1 tablet by mouth Daily. 30 tablet 5   • Calcium Carbonate-Vitamin D (CALTRATE 600+D PO) Take 1 tablet by mouth 2 (two) times a day. Morning and nightly     • carvedilol (COREG) 6.25 MG tablet Take 1 tablet by mouth 2 (Two) Times a Day With Meals. 60 tablet 5   • celecoxib (CeleBREX) 200 MG capsule TAKE 1 CAPSULE BY MOUTH ONCE A DAY WITH FOOD     • cetirizine (zyrTEC) 10 MG tablet Take 10 mg by mouth Daily.     • Cholecalciferol (VITAMIN D3) 125 MCG (5000 UT) capsule capsule Take 5,000 Units by mouth Daily.     • cloNIDine (Catapres) 0.1 MG tablet Take one tablet daily as needed for blood pressure >180/100. 10 tablet 2   • Cyanocobalamin (Vitamin B-12) 1000 MCG sublingual tablet Place 2.5 tablets under the tongue Daily. 2500mcg     • cycloSPORINE (RESTASIS) 0.05 % ophthalmic emulsion Administer 1 drop to both eyes Every 12 (Twelve) Hours.     • esomeprazole (nexIUM) 20 MG capsule Take 1 capsule by mouth 2 (two) times a day. 60 capsule 11   • FEROSUL 325 (65 Fe) MG tablet Take 325 mg by mouth Daily With Breakfast.     • fluticasone (FLONASE) 50 MCG/ACT nasal spray 2 sprays into the nostril(s) as directed by provider Daily.     • HYDROcodone-acetaminophen (NORCO) 7.5-325 MG per tablet Take 1 tablet by mouth Every 8 (Eight) Hours As Needed for Moderate Pain . 6 tablet 0   • hydrOXYzine (ATARAX) 25 MG tablet Take 1 tablet by mouth 3 (Three) Times a Day As Needed for Anxiety. 90 tablet 5   • lisinopril (PRINIVIL,ZESTRIL) 10 MG tablet Take 1 tablet by mouth Daily. 30 tablet 5   • montelukast (SINGULAIR) 10 MG tablet Take 1 tablet by mouth Every Night. 30 tablet 5   • multivitamin with minerals tablet tablet Take 1 tablet by mouth Daily.     • nortriptyline (PAMELOR) 50 MG  capsule TAKE 1 CAPSULE BY MOUTH NIGHTLY 30 capsule 5   • polyethyl glycol-propyl glycol (SYSTANE) 0.4-0.3 % solution ophthalmic solution Administer 2 drops to both eyes Every 2 (Two) Hours As Needed (dry eyes).     • polyethylene glycol (polyethylene glycol) 17 g packet Take 17 g by mouth Daily.     • pregabalin (LYRICA) 50 MG capsule Take 50 mg by mouth Every 12 (Twelve) Hours As Needed.     • sennosides-docusate (PERICOLACE) 8.6-50 MG per tablet Take 2 tablets by mouth Every Night.     • simethicone (MYLICON) 125 MG chewable tablet Chew 125 mg Every 6 (Six) Hours As Needed for Flatulence.     • tolterodine LA (DETROL LA) 4 MG 24 hr capsule Take 1 capsule by mouth Daily.       No current facility-administered medications for this visit.     Review of Systems   Constitutional: Negative for chills and fever.   HENT: Negative for congestion.    Respiratory: Negative for shortness of breath.    Cardiovascular: Positive for leg swelling. Negative for chest pain.   Gastrointestinal: Negative for constipation, diarrhea, nausea and vomiting.   Musculoskeletal: Positive for arthralgias and gait problem.        Foot pain   Skin: Positive for wound.   Neurological: Positive for numbness.       OBJECTIVE     Vitals:    22 0818   BP: 126/64   Pulse: 71   SpO2: 97%       PHYSICAL EXAM  GEN:   Accompanied by none.     Foot/Ankle Exam:       General:   Appearance: obesity and elderly    Appearance comment:  Frail  Orientation: AAOx3    Gait: steppage    Assistance: wheelchair    Shoe Gear:  Diabetic shoes (compression stockings)    VASCULAR      Right Foot Vascularity   Dorsalis pedis:  2+  Posterior tibial:  2+  Skin Temperature: warm    Edema Gradin+ and pitting  CFT:  4  Pedal Hair Growth:  Present  Varicosities: mild varicosities       Left Foot Vascularity   Dorsalis pedis:  2+  Posterior tibial:  2+  Skin Temperature: warm    Edema Gradin+ and pitting  CFT:  4  Pedal Hair Growth:  Present  Varicosities: mild  varicosities        NEUROLOGIC     Right Foot Neurologic   Light touch sensation:  Diminished  Vibratory sensation:  Diminished  Hot/Cold sensation: diminished    Protective Sensation using [place] Monofilament:  6     Left Foot Neurologic   Light touch sensation:  Diminished  Vibratory sensation:  Diminished  Hot/cold sensation: diminished    Protective Sensation using [place] Monofilament:  8     MUSCULOSKELETAL      Right Foot Musculoskeletal   Ecchymosis:  None  Tenderness: none    Arch:  Normal  Hammertoe:  Second toe, third toe, fourth toe and fifth toe  Hallux valgus: Yes       Left Foot Musculoskeletal   Ecchymosis:  None  Tenderness: none    Arch:  Normal  Hammertoe:  Second toe, third toe, fourth toe and fifth toe  Hallux valgus: Yes       MUSCLE STRENGTH     Right Foot Muscle Strength   Foot dorsiflexion:  4+  Foot plantar flexion:  4+  Foot inversion:  4+  Foot eversion:  4+     Left Foot Muscle Strength   Foot dorsiflexion:  4+  Foot plantar flexion:  4+  Foot inversion:  4+  Foot eversion:  4+     RANGE OF MOTION      Right Foot Range of Motion   Foot and ankle ROM within normal limits       Left Foot Range of Motion   Foot and ankle ROM within normal limits       DERMATOLOGIC     Right Foot Dermatologic   Skin: corn, skin changes (hematogenous blister to right lower leg - intact) and ulcer    Skin: right foot skin not intact, no right foot cellulitis and no right foot redness    Nails: onychomycosis, abnormally thick, subungual debris and dystrophic nails       Left Foot Dermatologic   Skin: corn    Skin: left foot skin not intact    Nails: onychomycosis, abnormally thick, subungual debris and dystrophic nails       Image:       RADIOLOGY/NUCLEAR:  No results found.    LABORATORY/CULTURE RESULTS:      PATHOLOGY RESULTS:       ASSESSMENT/PLAN     Diagnoses and all orders for this visit:    1. Neuropathic ulcer of toe of right foot with fat layer exposed (HCC) (Primary)  -      Ambulatory Referral to Wound Clinic    2. Pre-ulcerative calluses  -     Ambulatory Referral to Wound Clinic    3. Peripheral polyneuropathy    4. Hammer toes of both feet    5. Valgus deformity of both great toes    6. Chronic venous stasis      Comprehensive lower extremity examination and evaluation was performed.  Discussed findings and treatment plan including risks, benefits, and treatment options with patient in detail. Patient agreed with treatment plan.  Patient may maintain nails and calluses at home utilizing emery board or pumice stone between visits as needed  After verbal consent obtained, excisional debridement performed to remove skin, slough, non-viable, and subQ tissue down to level of healthy bleeding tissue with dermal curette and/or sharp instrumentation. Hemostasis achieved with compression.  Wound area debrided was entire measurement documented.   Keep triple antibiotic ointment and band-aid over wound of right 3rd toe daily.   Referral to [facility] for continued treatment of wounds.  Continue use of compression stockings daily and f/u with vascular surgery  Return in 5 weeks to podiatry for nail care.  An After Visit Summary was printed and given to the patient at discharge, including (if requested) any available informative/educational handouts regarding diagnosis, treatment, or medications. All questions were answered to patient/family satisfaction. Should symptoms fail to improve or worsen they agree to call or return to clinic or to go to the Emergency Department. Discussed the importance of following up with any needed screening tests/labs/specialist appointments and any requested follow-up recommended by me today. Importance of maintaining follow-up discussed and patient accepts that missed appointments can delay diagnosis and potentially lead to worsening of conditions.  Return in about 5 weeks (around 4/28/2022) for Follow-up with Tim SHER, or sooner if acute issues arise.    Lab  Frequency Next Occurrence   Follow Anesthesia Guidelines / Protocol Once 06/11/2020   Obtain Informed Consent Once 06/16/2020   Adult Transthoracic Echo Complete W/ Cont if Necessary Per Protocol Once 07/12/2021   Follow Anesthesia Guidelines / Protocol Once 08/11/2021   Obtain Informed Consent Once 08/16/2021   Diet: Once 09/02/2021   Diet: Once 09/02/2021   US Carotid Bilateral Once 03/15/2022   US Ankle / Brachial Indices Extremity Complete Once 03/15/2022       This document has been electronically signed by Juaquin Chung DPM on March 24, 2022 08:39 CDT

## 2022-03-23 ENCOUNTER — TELEPHONE (OUTPATIENT)
Dept: PODIATRY | Facility: CLINIC | Age: 82
End: 2022-03-23

## 2022-03-24 ENCOUNTER — OFFICE VISIT (OUTPATIENT)
Dept: PODIATRY | Facility: CLINIC | Age: 82
End: 2022-03-24

## 2022-03-24 VITALS
DIASTOLIC BLOOD PRESSURE: 64 MMHG | HEIGHT: 60 IN | WEIGHT: 165 LBS | OXYGEN SATURATION: 97 % | SYSTOLIC BLOOD PRESSURE: 126 MMHG | HEART RATE: 71 BPM | BODY MASS INDEX: 32.39 KG/M2

## 2022-03-24 DIAGNOSIS — G62.9 PERIPHERAL POLYNEUROPATHY: ICD-10-CM

## 2022-03-24 DIAGNOSIS — M20.42 HAMMER TOES OF BOTH FEET: ICD-10-CM

## 2022-03-24 DIAGNOSIS — L84 PRE-ULCERATIVE CALLUSES: ICD-10-CM

## 2022-03-24 DIAGNOSIS — L97.512 NEUROPATHIC ULCER OF TOE OF RIGHT FOOT WITH FAT LAYER EXPOSED: Primary | ICD-10-CM

## 2022-03-24 DIAGNOSIS — I87.8 CHRONIC VENOUS STASIS: ICD-10-CM

## 2022-03-24 DIAGNOSIS — M20.41 HAMMER TOES OF BOTH FEET: ICD-10-CM

## 2022-03-24 DIAGNOSIS — M20.11 VALGUS DEFORMITY OF BOTH GREAT TOES: ICD-10-CM

## 2022-03-24 DIAGNOSIS — M20.12 VALGUS DEFORMITY OF BOTH GREAT TOES: ICD-10-CM

## 2022-03-24 PROCEDURE — 11042 DBRDMT SUBQ TIS 1ST 20SQCM/<: CPT | Performed by: PODIATRIST

## 2022-03-24 PROCEDURE — 99213 OFFICE O/P EST LOW 20 MIN: CPT | Performed by: PODIATRIST

## 2022-03-25 ENCOUNTER — HOME HEALTH ADMISSION (OUTPATIENT)
Dept: HOME HEALTH SERVICES | Facility: HOME HEALTHCARE | Age: 82
End: 2022-03-25

## 2022-03-25 ENCOUNTER — TELEPHONE (OUTPATIENT)
Dept: INTERNAL MEDICINE | Facility: CLINIC | Age: 82
End: 2022-03-25

## 2022-03-28 ENCOUNTER — OFFICE VISIT (OUTPATIENT)
Dept: INTERNAL MEDICINE | Facility: CLINIC | Age: 82
End: 2022-03-28

## 2022-03-28 VITALS — OXYGEN SATURATION: 97 % | DIASTOLIC BLOOD PRESSURE: 70 MMHG | HEART RATE: 68 BPM | SYSTOLIC BLOOD PRESSURE: 170 MMHG

## 2022-03-28 DIAGNOSIS — I10 PRIMARY HYPERTENSION: ICD-10-CM

## 2022-03-28 DIAGNOSIS — L03.116 CELLULITIS OF LEFT LOWER EXTREMITY: Primary | ICD-10-CM

## 2022-03-28 PROCEDURE — 99214 OFFICE O/P EST MOD 30 MIN: CPT | Performed by: NURSE PRACTITIONER

## 2022-03-28 RX ORDER — AMOXICILLIN AND CLAVULANATE POTASSIUM 875; 125 MG/1; MG/1
1 TABLET, FILM COATED ORAL 2 TIMES DAILY
Qty: 14 TABLET | Refills: 0 | Status: SHIPPED | OUTPATIENT
Start: 2022-03-28 | End: 2022-04-15

## 2022-03-28 RX ORDER — COLCHICINE 0.6 MG/1
0.6 TABLET ORAL DAILY
COMMUNITY
Start: 2022-02-26 | End: 2022-11-07 | Stop reason: HOSPADM

## 2022-03-28 RX ORDER — FUROSEMIDE 40 MG/1
40 TABLET ORAL DAILY
COMMUNITY
End: 2022-05-25

## 2022-03-28 RX ORDER — DENOSUMAB 60 MG/ML
60 INJECTION SUBCUTANEOUS ONCE
COMMUNITY
Start: 2022-03-21

## 2022-03-28 NOTE — PROGRESS NOTES
Chief Complaint   Patient presents with   • Leg Problem     redness         History:  Carmen Obrien is a 82 y.o. female who presents today for evaluation of the above problems.          Redness left thigh began yesterday and it worse this AM.  She has had abscess in this thigh several months ago.  Now the redness recurs in an area, not the abscessed area, on the lateral thigh where where she developed cellulitis in February.  At that time, the infection responded to Augmentin.  I had ordered a CT due to firmness palpable under the cellulitic skin; calcifications were noted, likely necrotic fat.  She says no fever.      ROS:  Review of Systems  As above    Allergies   Allergen Reactions   • Levaquin [Levofloxacin] Unknown (See Comments)     Pt doesn't remember   • Codeine Other (See Comments)     Pt does not recall   • Morphine And Related Other (See Comments)     Pt does not recall     Past Medical History:   Diagnosis Date   • Anxiety    • Arthritis    • CAD (coronary artery disease)    • Colon polyp    • DDD (degenerative disc disease), lumbar    • Deep venous thrombosis (HCC)    • Depression    • Diverticulosis    • Esophageal stricture    • GERD (gastroesophageal reflux disease)    • History of transfusion    • Hypercholesteremia    • Hypertension    • LPRD (laryngopharyngeal reflux disease)    • Macular degeneration    • Multinodular goiter 2/23/2017   • Osteoarthritis    • Pruritic disorder    • Spastic colon    • Thyroid nodule      Past Surgical History:   Procedure Laterality Date   • BLADDER REPAIR     • CATARACT EXTRACTION     • CHOLECYSTECTOMY     • COLONOSCOPY  04/22/2014    2 polyps, adenomatous, diverticulosis   • COLONOSCOPY N/A 6/26/2020    Procedure: COLONOSCOPY WITH ANESTHESIA;  Surgeon: John Coleman MD;  Location: [facility] ENDOSCOPY;  Service: Gastroenterology;  Laterality: N/A;  pre op: constipation  post op: diverticulosis,   PCP: Adam Delgadillo MD   • CYSTOCELE REPAIR     •  ENDOSCOPY  04/22/2014    Schatzki's ring dilated   • ENDOSCOPY N/A 6/26/2020    Procedure: ESOPHAGOGASTRODUODENOSCOPY WITH ANESTHESIA;  Surgeon: John Coleman MD;  Location: [facility] ENDOSCOPY;  Service: Gastroenterology;  Laterality: N/A;  pre op: dysphagia  post op:stricture, dilated   PCP:Adam Delgadillo MD   • ENDOSCOPY N/A 9/2/2021    Procedure: ESOPHAGOGASTRODUODENOSCOPY WITH ANESTHESIA;  Surgeon: John Coleman MD;  Location: [facility] ENDOSCOPY;  Service: Gastroenterology;  Laterality: N/A;  pre op: dysphagia  post op:gastritis  PCP: VERA Noble MD   • FEMUR FRACTURE SURGERY     • HYSTERECTOMY     • INCISION AND DRAINAGE LEG Left 11/3/2021    Procedure: INCISION AND DRAINAGE LEG ABSCESS;  Surgeon: Cesia Mondragon MD;  Location: [facility] OR;  Service: General;  Laterality: Left;   • REPLACEMENT TOTAL KNEE     • ULNAR NERVE TRANSPOSITION       Family History   Problem Relation Age of Onset   • Colon cancer Mother    • Heart disease Mother    • Heart disease Father    • Colon polyps Neg Hx    • Esophageal cancer Neg Hx      Carmen  reports that she has never smoked. She has never used smokeless tobacco. She reports that she does not drink alcohol and does not use drugs.    I have reviewed and updated the above documentation (if necessary) including but not limited to chief complaint, ROS, PFSH, allergies and medications        Current Outpatient Medications:   •  aspirin 81 MG EC tablet, Take 81 mg by mouth Daily., Disp: , Rfl:   •  atorvastatin (LIPITOR) 10 MG tablet, Take 1 tablet by mouth Daily., Disp: 30 tablet, Rfl: 5  •  Calcium Carbonate-Vitamin D (CALTRATE 600+D PO), Take 1 tablet by mouth 2 (two) times a day. Morning and nightly, Disp: , Rfl:   •  carvedilol (COREG) 6.25 MG tablet, Take 1 tablet by mouth 2 (Two) Times a Day With Meals., Disp: 60 tablet, Rfl: 5  •  celecoxib (CeleBREX) 200 MG capsule, TAKE 1 CAPSULE BY MOUTH ONCE A DAY WITH FOOD, Disp: , Rfl:   •  cetirizine (zyrTEC) 10 MG  tablet, Take 10 mg by mouth Daily., Disp: , Rfl:   •  Cholecalciferol (VITAMIN D3) 125 MCG (5000 UT) capsule capsule, Take 5,000 Units by mouth Daily., Disp: , Rfl:   •  cloNIDine (Catapres) 0.1 MG tablet, Take one tablet daily as needed for blood pressure >180/100., Disp: 10 tablet, Rfl: 2  •  colchicine 0.6 MG tablet, Take 0.6 mg by mouth Daily., Disp: , Rfl:   •  Cyanocobalamin (Vitamin B-12) 1000 MCG sublingual tablet, Place 2.5 tablets under the tongue Daily. 2500mcg, Disp: , Rfl:   •  cycloSPORINE (RESTASIS) 0.05 % ophthalmic emulsion, Administer 1 drop to both eyes Every 12 (Twelve) Hours., Disp: , Rfl:   •  esomeprazole (nexIUM) 20 MG capsule, Take 1 capsule by mouth 2 (two) times a day., Disp: 60 capsule, Rfl: 11  •  FEROSUL 325 (65 Fe) MG tablet, Take 325 mg by mouth Daily With Breakfast., Disp: , Rfl:   •  fluticasone (FLONASE) 50 MCG/ACT nasal spray, 2 sprays into the nostril(s) as directed by provider Daily., Disp: , Rfl:   •  furosemide (LASIX) 40 MG tablet, Take 40 mg by mouth Daily., Disp: , Rfl:   •  HYDROcodone-acetaminophen (NORCO) 7.5-325 MG per tablet, Take 1 tablet by mouth Every 8 (Eight) Hours As Needed for Moderate Pain ., Disp: 6 tablet, Rfl: 0  •  montelukast (SINGULAIR) 10 MG tablet, Take 1 tablet by mouth Every Night., Disp: 30 tablet, Rfl: 5  •  multivitamin with minerals tablet tablet, Take 1 tablet by mouth Daily., Disp: , Rfl:   •  nortriptyline (PAMELOR) 50 MG capsule, TAKE 1 CAPSULE BY MOUTH NIGHTLY, Disp: 30 capsule, Rfl: 5  •  polyethyl glycol-propyl glycol (SYSTANE) 0.4-0.3 % solution ophthalmic solution, Administer 2 drops to both eyes Every 2 (Two) Hours As Needed (dry eyes)., Disp: , Rfl:   •  polyethylene glycol (polyethylene glycol) 17 g packet, Take 17 g by mouth Daily., Disp: , Rfl:   •  pregabalin (LYRICA) 50 MG capsule, Take 50 mg by mouth Every 12 (Twelve) Hours As Needed., Disp: , Rfl:   •  sennosides-docusate (PERICOLACE) 8.6-50 MG per tablet, Take 2 tablets by mouth  Every Night., Disp: , Rfl:   •  simethicone (MYLICON) 125 MG chewable tablet, Chew 125 mg Every 6 (Six) Hours As Needed for Flatulence., Disp: , Rfl:   •  tolterodine LA (DETROL LA) 4 MG 24 hr capsule, Take 1 capsule by mouth Daily., Disp: , Rfl:   •  amoxicillin-clavulanate (Augmentin) 875-125 MG per tablet, Take 1 tablet by mouth 2 (Two) Times a Day., Disp: 14 tablet, Rfl: 0  •  hydrOXYzine (ATARAX) 25 MG tablet, Take 1 tablet by mouth 3 (Three) Times a Day As Needed for Anxiety., Disp: 90 tablet, Rfl: 5  •  lisinopril (PRINIVIL,ZESTRIL) 10 MG tablet, Take 1 tablet by mouth Daily., Disp: 30 tablet, Rfl: 5  •  Prolia 60 MG/ML solution prefilled syringe syringe, , Disp: , Rfl:     OBJECTIVE:  Visit Vitals  /70 (BP Location: Left arm, Patient Position: Sitting, Cuff Size: Adult) Comment: auto cuff   Pulse 68   SpO2 97%      Physical Exam  Vitals reviewed.   Constitutional:       General: She is not in acute distress.     Appearance: Normal appearance. She is not ill-appearing, toxic-appearing or diaphoretic.   HENT:      Head: Normocephalic and atraumatic.   Eyes:      General: No scleral icterus.  Musculoskeletal:      Right lower leg: No edema.      Left lower leg: No edema.   Skin:     General: Skin is warm and dry.             Comments: Area approximately 8 inches diameter of warm, erythematous skin over the firmness that has been palpated before, left lateral thigh, extending to posterior thigh.  I have marked this area with ballpoint pen and asked her to watch for spreading of redness.   Neurological:      Mental Status: She is alert and oriented to person, place, and time.   Psychiatric:         Mood and Affect: Mood normal.         Behavior: Behavior normal.         Thought Content: Thought content normal.         Judgment: Judgment normal.     I reviewed the CT LLE from February.    [facility]    Assessment/Plan    Diagnoses and all orders for this visit:    1. Cellulitis of left lower extremity (Primary)  -      amoxicillin-clavulanate (Augmentin) 875-125 MG per tablet; Take 1 tablet by mouth 2 (Two) Times a Day.  Dispense: 14 tablet; Refill: 0    2. Primary hypertension    Will treat with antibiotics and plan close follow up in three days here, sooner if the redness is spreading beyond the pen leonidas or she develops fever.  I will not repeat the CT at this point due to the explanation for the firmness (calcifications); however, if she is worsening, I would consider this given her history.  She has had surgery on this thigh and believes she has a metal plate in it.    Medication review with patient reveals she has not been taking her Lasix or lisinopril.  She has been confused as to what she should be taking.  We have printed out a list today to help her keep this straight.  Will not change blood pressure medications today since she has not been taking these, but will re-check in 3 days.    Education materials and an After Visit Summary were printed and given to the patient at discharge.  Return in about 3 days (around 3/31/2022) for thursday or Friday, Recheck.         Vianney Figueredo, ABDON   14:43 CDT  3/28/2022

## 2022-03-31 ENCOUNTER — TELEPHONE (OUTPATIENT)
Dept: INTERNAL MEDICINE | Facility: CLINIC | Age: 82
End: 2022-03-31

## 2022-03-31 ENCOUNTER — OFFICE VISIT (OUTPATIENT)
Dept: INTERNAL MEDICINE | Facility: CLINIC | Age: 82
End: 2022-03-31

## 2022-03-31 ENCOUNTER — OFFICE VISIT (OUTPATIENT)
Dept: WOUND CARE | Facility: HOSPITAL | Age: 82
End: 2022-03-31

## 2022-03-31 VITALS — SYSTOLIC BLOOD PRESSURE: 182 MMHG | OXYGEN SATURATION: 96 % | DIASTOLIC BLOOD PRESSURE: 78 MMHG | HEART RATE: 66 BPM

## 2022-03-31 DIAGNOSIS — L97.512 NON-PRESSURE CHRONIC ULCER OF OTHER PART OF RIGHT FOOT WITH FAT LAYER EXPOSED: ICD-10-CM

## 2022-03-31 DIAGNOSIS — L03.116 CELLULITIS OF LEFT LOWER EXTREMITY: Primary | ICD-10-CM

## 2022-03-31 DIAGNOSIS — G62.9 POLYNEUROPATHY, UNSPECIFIED: ICD-10-CM

## 2022-03-31 DIAGNOSIS — L97.522 NON-PRESSURE CHRONIC ULCER OF OTHER PART OF LEFT FOOT WITH FAT LAYER EXPOSED: ICD-10-CM

## 2022-03-31 DIAGNOSIS — S81.801D UNSPECIFIED OPEN WOUND, RIGHT LOWER LEG, SUBSEQUENT ENCOUNTER: ICD-10-CM

## 2022-03-31 DIAGNOSIS — I10 ESSENTIAL HYPERTENSION: ICD-10-CM

## 2022-03-31 PROCEDURE — 99214 OFFICE O/P EST MOD 30 MIN: CPT | Performed by: NURSE PRACTITIONER

## 2022-03-31 PROCEDURE — 97597 DBRDMT OPN WND 1ST 20 CM/<: CPT | Performed by: NURSE PRACTITIONER

## 2022-03-31 PROCEDURE — 99213 OFFICE O/P EST LOW 20 MIN: CPT | Performed by: NURSE PRACTITIONER

## 2022-03-31 PROCEDURE — 11042 DBRDMT SUBQ TIS 1ST 20SQCM/<: CPT | Performed by: NURSE PRACTITIONER

## 2022-03-31 PROCEDURE — G0463 HOSPITAL OUTPT CLINIC VISIT: HCPCS

## 2022-03-31 NOTE — PROGRESS NOTES
Chief Complaint   Patient presents with   • Leg Pain     left         History:  Carmen Obrien is a 82 y.o. female who presents today for evaluation of the above problems.          She is here for follow up on cellulitis of left thigh after beginning Augmentin 2 days ago.  She is better. The line we pacheco around the redness is still there.  There redness is almost completely resolved. No known fever.  Still feels the hard area under the skin.    Her blood pressure is up today, but she has had a hard morning.  She was running late to her wound management appointment and then had a significant wound debridement and work on her feet there.  That made her late to her appointment here, and this was stressful. Right now she is in a significant amount of pain from debridement of an injury to the right shin, and she took a pain pill that she brought from home during this visit.        ROS:  Review of Systems  As above    Allergies   Allergen Reactions   • Levaquin [Levofloxacin] Unknown (See Comments)     Pt doesn't remember   • Codeine Other (See Comments)     Pt does not recall   • Morphine And Related Other (See Comments)     Pt does not recall     Past Medical History:   Diagnosis Date   • Anxiety    • Arthritis    • CAD (coronary artery disease)    • Colon polyp    • DDD (degenerative disc disease), lumbar    • Deep venous thrombosis (HCC)    • Depression    • Diverticulosis    • Esophageal stricture    • GERD (gastroesophageal reflux disease)    • History of transfusion    • Hypercholesteremia    • Hypertension    • LPRD (laryngopharyngeal reflux disease)    • Macular degeneration    • Multinodular goiter 2/23/2017   • Osteoarthritis    • Pruritic disorder    • Spastic colon    • Thyroid nodule      Past Surgical History:   Procedure Laterality Date   • BLADDER REPAIR     • CATARACT EXTRACTION     • CHOLECYSTECTOMY     • COLONOSCOPY  04/22/2014    2 polyps, adenomatous, diverticulosis   • COLONOSCOPY N/A 6/26/2020     Procedure: COLONOSCOPY WITH ANESTHESIA;  Surgeon: John Coleman MD;  Location: [facility] ENDOSCOPY;  Service: Gastroenterology;  Laterality: N/A;  pre op: constipation  post op: diverticulosis,   PCP: Adam Delgadillo MD   • CYSTOCELE REPAIR     • ENDOSCOPY  04/22/2014    Schatzki's ring dilated   • ENDOSCOPY N/A 6/26/2020    Procedure: ESOPHAGOGASTRODUODENOSCOPY WITH ANESTHESIA;  Surgeon: John Coleman MD;  Location: [facility] ENDOSCOPY;  Service: Gastroenterology;  Laterality: N/A;  pre op: dysphagia  post op:stricture, dilated   PCP:Adam Delgadillo MD   • ENDOSCOPY N/A 9/2/2021    Procedure: ESOPHAGOGASTRODUODENOSCOPY WITH ANESTHESIA;  Surgeon: John Coleman MD;  Location: [facility] ENDOSCOPY;  Service: Gastroenterology;  Laterality: N/A;  pre op: dysphagia  post op:gastritis  PCP: VERA Noble MD   • FEMUR FRACTURE SURGERY     • HYSTERECTOMY     • INCISION AND DRAINAGE LEG Left 11/3/2021    Procedure: INCISION AND DRAINAGE LEG ABSCESS;  Surgeon: Cesia Mondragon MD;  Location: [facility] OR;  Service: General;  Laterality: Left;   • REPLACEMENT TOTAL KNEE     • ULNAR NERVE TRANSPOSITION       Family History   Problem Relation Age of Onset   • Colon cancer Mother    • Heart disease Mother    • Heart disease Father    • Colon polyps Neg Hx    • Esophageal cancer Neg Hx      Carmen  reports that she has never smoked. She has never used smokeless tobacco. She reports that she does not drink alcohol and does not use drugs.    I have reviewed and updated the above documentation (if necessary) including but not limited to chief complaint, ROS, PFSH, allergies and medications        Current Outpatient Medications:   •  amoxicillin-clavulanate (Augmentin) 875-125 MG per tablet, Take 1 tablet by mouth 2 (Two) Times a Day., Disp: 14 tablet, Rfl: 0  •  aspirin 81 MG EC tablet, Take 81 mg by mouth Daily., Disp: , Rfl:   •  atorvastatin (LIPITOR) 10 MG tablet, Take 1 tablet by mouth Daily., Disp: 30 tablet,  Rfl: 5  •  Calcium Carbonate-Vitamin D (CALTRATE 600+D PO), Take 1 tablet by mouth 2 (two) times a day. Morning and nightly, Disp: , Rfl:   •  carvedilol (COREG) 6.25 MG tablet, Take 1 tablet by mouth 2 (Two) Times a Day With Meals., Disp: 60 tablet, Rfl: 5  •  celecoxib (CeleBREX) 200 MG capsule, TAKE 1 CAPSULE BY MOUTH ONCE A DAY WITH FOOD, Disp: , Rfl:   •  cetirizine (zyrTEC) 10 MG tablet, Take 10 mg by mouth Daily., Disp: , Rfl:   •  Cholecalciferol (VITAMIN D3) 125 MCG (5000 UT) capsule capsule, Take 5,000 Units by mouth Daily., Disp: , Rfl:   •  cloNIDine (Catapres) 0.1 MG tablet, Take one tablet daily as needed for blood pressure >180/100., Disp: 10 tablet, Rfl: 2  •  colchicine 0.6 MG tablet, Take 0.6 mg by mouth Daily., Disp: , Rfl:   •  Cyanocobalamin (Vitamin B-12) 1000 MCG sublingual tablet, Place 2.5 tablets under the tongue Daily. 2500mcg, Disp: , Rfl:   •  cycloSPORINE (RESTASIS) 0.05 % ophthalmic emulsion, Administer 1 drop to both eyes Every 12 (Twelve) Hours., Disp: , Rfl:   •  esomeprazole (nexIUM) 20 MG capsule, Take 1 capsule by mouth 2 (two) times a day., Disp: 60 capsule, Rfl: 11  •  FEROSUL 325 (65 Fe) MG tablet, Take 325 mg by mouth Daily With Breakfast., Disp: , Rfl:   •  fluticasone (FLONASE) 50 MCG/ACT nasal spray, 2 sprays into the nostril(s) as directed by provider Daily., Disp: , Rfl:   •  furosemide (LASIX) 40 MG tablet, Take 40 mg by mouth Daily., Disp: , Rfl:   •  HYDROcodone-acetaminophen (NORCO) 7.5-325 MG per tablet, Take 1 tablet by mouth Every 8 (Eight) Hours As Needed for Moderate Pain ., Disp: 6 tablet, Rfl: 0  •  hydrOXYzine (ATARAX) 25 MG tablet, Take 1 tablet by mouth 3 (Three) Times a Day As Needed for Anxiety., Disp: 90 tablet, Rfl: 5  •  lisinopril (PRINIVIL,ZESTRIL) 10 MG tablet, Take 1 tablet by mouth Daily., Disp: 30 tablet, Rfl: 5  •  montelukast (SINGULAIR) 10 MG tablet, Take 1 tablet by mouth Every Night., Disp: 30 tablet, Rfl: 5  •  multivitamin with minerals  tablet tablet, Take 1 tablet by mouth Daily., Disp: , Rfl:   •  nortriptyline (PAMELOR) 50 MG capsule, TAKE 1 CAPSULE BY MOUTH NIGHTLY, Disp: 30 capsule, Rfl: 5  •  polyethyl glycol-propyl glycol (SYSTANE) 0.4-0.3 % solution ophthalmic solution, Administer 2 drops to both eyes Every 2 (Two) Hours As Needed (dry eyes)., Disp: , Rfl:   •  polyethylene glycol (polyethylene glycol) 17 g packet, Take 17 g by mouth Daily., Disp: , Rfl:   •  pregabalin (LYRICA) 50 MG capsule, Take 50 mg by mouth Every 12 (Twelve) Hours As Needed., Disp: , Rfl:   •  Prolia 60 MG/ML solution prefilled syringe syringe, , Disp: , Rfl:   •  sennosides-docusate (PERICOLACE) 8.6-50 MG per tablet, Take 2 tablets by mouth Every Night., Disp: , Rfl:   •  simethicone (MYLICON) 125 MG chewable tablet, Chew 125 mg Every 6 (Six) Hours As Needed for Flatulence., Disp: , Rfl:   •  tolterodine LA (DETROL LA) 4 MG 24 hr capsule, Take 1 capsule by mouth Daily., Disp: , Rfl:     OBJECTIVE:  Visit Vitals  BP (!) 182/78 Comment: manual cuff   Pulse 66   SpO2 96%      Physical Exam  Vitals and nursing note reviewed.   Constitutional:       General: She is not in acute distress (very pleasant, no distress, but flustered).     Appearance: Normal appearance. She is not ill-appearing, toxic-appearing or diaphoretic.   HENT:      Head: Normocephalic and atraumatic.   Skin:     General: Skin is warm and dry.      Findings: Erythema (the left thigh erythema is almost completely resolved at this time; induration under skin 3-4 inches in length still palpable and stable) present.   Neurological:      Mental Status: She is alert and oriented to person, place, and time.   Psychiatric:         Mood and Affect: Mood normal.         Behavior: Behavior normal.         Thought Content: Thought content normal.         Judgment: Judgment normal.         [facility]    Assessment/Plan    Diagnoses and all orders for this visit:    1. Cellulitis of left lower extremity (Primary)    2.  Essential hypertension      1) Continue Augmentin for cellulitis.  She has only been on it for 2 days and has almost complete resolution of cellulitis.  I do still feel the induration in that area, but according to the CT we did of that area on 2/10/22, she has several subcutaneous calcifications in that area that probably represent fat necrosis, largest measuring 2.4 cm.  If she continues to get cellulitis in that area, would consider repeat vs. Surgery evaluation.  She does not want to do either at today's visit.    2) Her blood pressure was good here last week.  I have advised her to get home, relax, and take her blood pressure at home.  If it is >180 systolic at home, she has clonidine for PRN use.  She just took a Norco 7.5 during my visit with her for pain in the right shin after wound debridement. I suspect her pain and the frustration of being late and making it to two appointments today has caused her blood pressure to be elevated.         Education materials and an After Visit Summary were printed and given to the patient at discharge.  Return in about 2 weeks (around 4/14/2022) for Next scheduled follow up.         Vianney Figueredo, ABDON   11:26 CDT  3/31/2022

## 2022-04-07 ENCOUNTER — OFFICE VISIT (OUTPATIENT)
Dept: WOUND CARE | Facility: HOSPITAL | Age: 82
End: 2022-04-07

## 2022-04-07 DIAGNOSIS — L97.522 NON-PRESSURE CHRONIC ULCER OF OTHER PART OF LEFT FOOT WITH FAT LAYER EXPOSED: ICD-10-CM

## 2022-04-07 DIAGNOSIS — S81.801D UNSPECIFIED OPEN WOUND, RIGHT LOWER LEG, SUBSEQUENT ENCOUNTER: ICD-10-CM

## 2022-04-07 DIAGNOSIS — G62.9 POLYNEUROPATHY, UNSPECIFIED: ICD-10-CM

## 2022-04-07 DIAGNOSIS — L97.512 NON-PRESSURE CHRONIC ULCER OF OTHER PART OF RIGHT FOOT WITH FAT LAYER EXPOSED: ICD-10-CM

## 2022-04-07 PROCEDURE — 11042 DBRDMT SUBQ TIS 1ST 20SQCM/<: CPT | Performed by: NURSE PRACTITIONER

## 2022-04-15 ENCOUNTER — OFFICE VISIT (OUTPATIENT)
Dept: INTERNAL MEDICINE | Facility: CLINIC | Age: 82
End: 2022-04-15

## 2022-04-15 ENCOUNTER — OFFICE VISIT (OUTPATIENT)
Dept: WOUND CARE | Facility: HOSPITAL | Age: 82
End: 2022-04-15

## 2022-04-15 ENCOUNTER — HOSPITAL ENCOUNTER (OUTPATIENT)
Dept: ULTRASOUND IMAGING | Facility: HOSPITAL | Age: 82
Discharge: HOME | End: 2022-04-15

## 2022-04-15 ENCOUNTER — LAB (OUTPATIENT)
Dept: LAB | Facility: HOSPITAL | Age: 82
End: 2022-04-15

## 2022-04-15 VITALS — DIASTOLIC BLOOD PRESSURE: 76 MMHG | HEART RATE: 70 BPM | SYSTOLIC BLOOD PRESSURE: 122 MMHG | OXYGEN SATURATION: 97 %

## 2022-04-15 DIAGNOSIS — M79.89 LEFT LEG SWELLING: ICD-10-CM

## 2022-04-15 DIAGNOSIS — M79.89 LEFT LEG SWELLING: Primary | ICD-10-CM

## 2022-04-15 DIAGNOSIS — L97.522 NON-PRESSURE CHRONIC ULCER OF OTHER PART OF LEFT FOOT WITH FAT LAYER EXPOSED: ICD-10-CM

## 2022-04-15 DIAGNOSIS — G62.9 POLYNEUROPATHY, UNSPECIFIED: ICD-10-CM

## 2022-04-15 DIAGNOSIS — L97.512 NON-PRESSURE CHRONIC ULCER OF OTHER PART OF RIGHT FOOT WITH FAT LAYER EXPOSED: ICD-10-CM

## 2022-04-15 DIAGNOSIS — S81.801D UNSPECIFIED OPEN WOUND, RIGHT LOWER LEG, SUBSEQUENT ENCOUNTER: ICD-10-CM

## 2022-04-15 DIAGNOSIS — L03.116 CELLULITIS OF LEFT LOWER EXTREMITY: ICD-10-CM

## 2022-04-15 LAB
ANION GAP SERPL CALCULATED.3IONS-SCNC: 2 MMOL/L (ref 4–13)
AUTO MIXED CELLS #: 0.8 10*3/MM3 (ref 0.1–2.6)
AUTO MIXED CELLS %: 12.4 % (ref 0.1–24)
BUN SERPL-MCNC: 19 MG/DL (ref 5–21)
BUN/CREAT SERPL: 31.7
CALCIUM SPEC-SCNC: 8.9 MG/DL (ref 8.4–10.4)
CHLORIDE SERPL-SCNC: 103 MMOL/L (ref 98–110)
CO2 SERPL-SCNC: 27 MMOL/L (ref 24–31)
CREAT SERPL-MCNC: 0.6 MG/DL (ref 0.5–1.4)
EGFRCR SERPLBLD CKD-EPI 2021: 90.9 ML/MIN/1.73
ERYTHROCYTE [DISTWIDTH] IN BLOOD BY AUTOMATED COUNT: 13.9 % (ref 12.3–15.4)
ERYTHROCYTE [SEDIMENTATION RATE] IN BLOOD: 24 MM/HR (ref 0–30)
GLUCOSE SERPL-MCNC: 109 MG/DL (ref 70–100)
HCT VFR BLD AUTO: 38.4 % (ref 34–46.6)
HGB BLD-MCNC: 12.9 G/DL (ref 12–15.9)
LYMPHOCYTES # BLD AUTO: 1.7 10*3/MM3 (ref 0.7–3.1)
LYMPHOCYTES NFR BLD AUTO: 24.4 % (ref 19.6–45.3)
MCH RBC QN AUTO: 30.9 PG (ref 26.6–33)
MCHC RBC AUTO-ENTMCNC: 33.6 G/DL (ref 31.5–35.7)
MCV RBC AUTO: 92.1 FL (ref 79–97)
NEUTROPHILS NFR BLD AUTO: 4.3 10*3/MM3 (ref 1.7–7)
NEUTROPHILS NFR BLD AUTO: 63.2 % (ref 42.7–76)
PLATELET # BLD AUTO: 272 10*3/MM3 (ref 140–450)
PMV BLD AUTO: 8.8 FL (ref 6–12)
POTASSIUM SERPL-SCNC: 4 MMOL/L (ref 3.5–5.3)
RBC # BLD AUTO: 4.17 10*6/MM3 (ref 3.77–5.28)
SODIUM SERPL-SCNC: 132 MMOL/L (ref 135–145)
WBC NRBC COR # BLD: 6.8 10*3/MM3 (ref 3.4–10.8)

## 2022-04-15 PROCEDURE — 85025 COMPLETE CBC W/AUTO DIFF WBC: CPT

## 2022-04-15 PROCEDURE — 85652 RBC SED RATE AUTOMATED: CPT

## 2022-04-15 PROCEDURE — 80048 BASIC METABOLIC PNL TOTAL CA: CPT

## 2022-04-15 PROCEDURE — 36415 COLL VENOUS BLD VENIPUNCTURE: CPT

## 2022-04-15 PROCEDURE — 97597 DBRDMT OPN WND 1ST 20 CM/<: CPT | Performed by: PODIATRIST

## 2022-04-15 PROCEDURE — 93971 EXTREMITY STUDY: CPT

## 2022-04-15 PROCEDURE — 86140 C-REACTIVE PROTEIN: CPT

## 2022-04-15 PROCEDURE — 99214 OFFICE O/P EST MOD 30 MIN: CPT | Performed by: NURSE PRACTITIONER

## 2022-04-15 PROCEDURE — 11042 DBRDMT SUBQ TIS 1ST 20SQCM/<: CPT | Performed by: PODIATRIST

## 2022-04-15 RX ORDER — AMOXICILLIN AND CLAVULANATE POTASSIUM 875; 125 MG/1; MG/1
1 TABLET, FILM COATED ORAL 2 TIMES DAILY
Qty: 14 TABLET | Refills: 0 | Status: SHIPPED | OUTPATIENT
Start: 2022-04-15 | End: 2022-05-06

## 2022-04-16 LAB — CRP SERPL-MCNC: <0.3 MG/DL (ref 0–0.5)

## 2022-04-16 NOTE — PROGRESS NOTES
Chief Complaint   Patient presents with   • Leg Problem         History:  Carmen Obrien is a 82 y.o. female who presents today for evaluation of the above problems.          The left leg got completely better with Augmentin, but now redness is starting on the left thigh, and there is swelling, redness, and tenderness on the left lower leg as well.    She has a history of left knee arthroplasty and abscess in the left thigh medially.      No fever. She says she has some loose stool earlier in the week,, but that has resolved now.       ROS:  Review of Systems  As above    Allergies   Allergen Reactions   • Levaquin [Levofloxacin] Unknown (See Comments)     Pt doesn't remember   • Codeine Other (See Comments)     Pt does not recall   • Morphine And Related Other (See Comments)     Pt does not recall     Past Medical History:   Diagnosis Date   • Anxiety    • Arthritis    • CAD (coronary artery disease)    • Colon polyp    • DDD (degenerative disc disease), lumbar    • Deep venous thrombosis (HCC)    • Depression    • Diverticulosis    • Esophageal stricture    • GERD (gastroesophageal reflux disease)    • History of transfusion    • Hypercholesteremia    • Hypertension    • LPRD (laryngopharyngeal reflux disease)    • Macular degeneration    • Multinodular goiter 2/23/2017   • Osteoarthritis    • Pruritic disorder    • Spastic colon    • Thyroid nodule      Past Surgical History:   Procedure Laterality Date   • BLADDER REPAIR     • CATARACT EXTRACTION     • CHOLECYSTECTOMY     • COLONOSCOPY  04/22/2014    2 polyps, adenomatous, diverticulosis   • COLONOSCOPY N/A 6/26/2020    Procedure: COLONOSCOPY WITH ANESTHESIA;  Surgeon: John Coleman MD;  Location: [facility] ENDOSCOPY;  Service: Gastroenterology;  Laterality: N/A;  pre op: constipation  post op: diverticulosis,   PCP: Adam Delgadillo MD   • CYSTOCELE REPAIR     • ENDOSCOPY  04/22/2014    Schatzki's ring dilated   • ENDOSCOPY N/A 6/26/2020    Procedure:  ESOPHAGOGASTRODUODENOSCOPY WITH ANESTHESIA;  Surgeon: John Coleman MD;  Location:  [facility] ENDOSCOPY;  Service: Gastroenterology;  Laterality: N/A;  pre op: dysphagia  post op:stricture, dilated   PCP:Adam Delgadillo MD   • ENDOSCOPY N/A 9/2/2021    Procedure: ESOPHAGOGASTRODUODENOSCOPY WITH ANESTHESIA;  Surgeon: John Coleman MD;  Location:  [facility] ENDOSCOPY;  Service: Gastroenterology;  Laterality: N/A;  pre op: dysphagia  post op:gastritis  PCP: VERA Noble MD   • FEMUR FRACTURE SURGERY     • HYSTERECTOMY     • INCISION AND DRAINAGE LEG Left 11/3/2021    Procedure: INCISION AND DRAINAGE LEG ABSCESS;  Surgeon: Cesia Mondragon MD;  Location: [facility] OR;  Service: General;  Laterality: Left;   • REPLACEMENT TOTAL KNEE     • ULNAR NERVE TRANSPOSITION       Family History   Problem Relation Age of Onset   • Colon cancer Mother    • Heart disease Mother    • Heart disease Father    • Colon polyps Neg Hx    • Esophageal cancer Neg Hx      Carmen  reports that she has never smoked. She has never used smokeless tobacco. She reports that she does not drink alcohol and does not use drugs.    I have reviewed and updated the above documentation (if necessary) including but not limited to chief complaint, ROS, PFSH, allergies and medications        Current Outpatient Medications:   •  aspirin 81 MG EC tablet, Take 81 mg by mouth Daily., Disp: , Rfl:   •  atorvastatin (LIPITOR) 10 MG tablet, Take 1 tablet by mouth Daily., Disp: 30 tablet, Rfl: 5  •  Calcium Carbonate-Vitamin D (CALTRATE 600+D PO), Take 1 tablet by mouth 2 (two) times a day. Morning and nightly, Disp: , Rfl:   •  carvedilol (COREG) 6.25 MG tablet, Take 1 tablet by mouth 2 (Two) Times a Day With Meals., Disp: 60 tablet, Rfl: 5  •  celecoxib (CeleBREX) 200 MG capsule, TAKE 1 CAPSULE BY MOUTH ONCE A DAY WITH FOOD, Disp: , Rfl:   •  cetirizine (zyrTEC) 10 MG tablet, Take 10 mg by mouth Daily., Disp: , Rfl:   •  Cholecalciferol (VITAMIN D3) 125 MCG  (5000 UT) capsule capsule, Take 5,000 Units by mouth Daily., Disp: , Rfl:   •  cloNIDine (Catapres) 0.1 MG tablet, Take one tablet daily as needed for blood pressure >180/100., Disp: 10 tablet, Rfl: 2  •  colchicine 0.6 MG tablet, Take 0.6 mg by mouth Daily., Disp: , Rfl:   •  Cyanocobalamin (Vitamin B-12) 1000 MCG sublingual tablet, Place 2.5 tablets under the tongue Daily. 2500mcg, Disp: , Rfl:   •  cycloSPORINE (RESTASIS) 0.05 % ophthalmic emulsion, Administer 1 drop to both eyes Every 12 (Twelve) Hours., Disp: , Rfl:   •  esomeprazole (nexIUM) 20 MG capsule, Take 1 capsule by mouth 2 (two) times a day., Disp: 60 capsule, Rfl: 11  •  FEROSUL 325 (65 Fe) MG tablet, Take 325 mg by mouth Daily With Breakfast., Disp: , Rfl:   •  fluticasone (FLONASE) 50 MCG/ACT nasal spray, 2 sprays into the nostril(s) as directed by provider Daily., Disp: , Rfl:   •  furosemide (LASIX) 40 MG tablet, Take 40 mg by mouth Daily., Disp: , Rfl:   •  HYDROcodone-acetaminophen (NORCO) 7.5-325 MG per tablet, Take 1 tablet by mouth Every 8 (Eight) Hours As Needed for Moderate Pain ., Disp: 6 tablet, Rfl: 0  •  hydrOXYzine (ATARAX) 25 MG tablet, Take 1 tablet by mouth 3 (Three) Times a Day As Needed for Anxiety., Disp: 90 tablet, Rfl: 5  •  lisinopril (PRINIVIL,ZESTRIL) 10 MG tablet, Take 1 tablet by mouth Daily., Disp: 30 tablet, Rfl: 5  •  montelukast (SINGULAIR) 10 MG tablet, Take 1 tablet by mouth Every Night., Disp: 30 tablet, Rfl: 5  •  multivitamin with minerals tablet tablet, Take 1 tablet by mouth Daily., Disp: , Rfl:   •  nortriptyline (PAMELOR) 50 MG capsule, TAKE 1 CAPSULE BY MOUTH NIGHTLY, Disp: 30 capsule, Rfl: 5  •  polyethyl glycol-propyl glycol (SYSTANE) 0.4-0.3 % solution ophthalmic solution, Administer 2 drops to both eyes Every 2 (Two) Hours As Needed (dry eyes)., Disp: , Rfl:   •  polyethylene glycol (polyethylene glycol) 17 g packet, Take 17 g by mouth Daily., Disp: , Rfl:   •  pregabalin (LYRICA) 50 MG capsule, Take 50  mg by mouth Every 12 (Twelve) Hours As Needed., Disp: , Rfl:   •  Prolia 60 MG/ML solution prefilled syringe syringe, , Disp: , Rfl:   •  sennosides-docusate (PERICOLACE) 8.6-50 MG per tablet, Take 2 tablets by mouth Every Night., Disp: , Rfl:   •  simethicone (MYLICON) 125 MG chewable tablet, Chew 125 mg Every 6 (Six) Hours As Needed for Flatulence., Disp: , Rfl:   •  tolterodine LA (DETROL LA) 4 MG 24 hr capsule, Take 1 capsule by mouth Daily., Disp: , Rfl:   •  amoxicillin-clavulanate (Augmentin) 875-125 MG per tablet, Take 1 tablet by mouth 2 (Two) Times a Day., Disp: 14 tablet, Rfl: 0  •  mupirocin (BACTROBAN) 2 % ointment, APPLY OINTMENT TO RIGHT GREAT TOE AND RIGHT 4TH TOE, Disp: , Rfl:     OBJECTIVE:  Visit Vitals  /76 (BP Location: Right arm, Patient Position: Sitting, Cuff Size: Adult)   Pulse 70   SpO2 97%      Physical Exam  Vitals and nursing note reviewed.   Constitutional:       General: She is not in acute distress.     Appearance: Normal appearance. She is not ill-appearing, toxic-appearing or diaphoretic.   HENT:      Head: Normocephalic and atraumatic.   Cardiovascular:      Rate and Rhythm: Normal rate.   Pulmonary:      Effort: Pulmonary effort is normal. No respiratory distress.   Abdominal:      Palpations: Abdomen is soft.   Musculoskeletal:         General: Swelling and tenderness present.        Legs:       Comments: Minimal erythema/warmth left lateral thigh again, along scar line.  Firm induration still palpable, which has been attributed to subcutaneous fat necrosis per previous CT.  Today there is also erythema and tenderness and mild edema along the left medial leg below the knee.    Neurological:      Mental Status: She is alert.         [facility]    Assessment/Plan    Diagnoses and all orders for this visit:    1. Left leg swelling (Primary)  -     US venous doppler lower extremity left (duplex); Future  -     Basic metabolic panel; Future    2. Cellulitis of left lower extremity  -      CBC w AUTO Differential; Future  -     Sedimentation rate, automated; Future  -     C-reactive protein; Future  -     CT lower extremity left w contrast; Future  -     amoxicillin-clavulanate (Augmentin) 875-125 MG per tablet; Take 1 tablet by mouth 2 (Two) Times a Day.  Dispense: 14 tablet; Refill: 0    She has responded well to Augmentin in the past for the cellulitis. I am going to re-check the CT to rule out abscess left thigh.  I am concerned that she if having recurring cellullitis in the same area.  Consider ortho referral as well, due to the history of the left knee replacement.  Check CBC, sed rate and CRP for infection assessment.      Low likelihood of DVT left lower extremity, but with edema and tenderness, will check this today.          Education materials and an After Visit Summary were printed and given to the patient at discharge.  Return in about 2 weeks (around 4/29/2022) for follow up Dr. Noble.         ABDON Smith   23:57 CDT  4/16/2022

## 2022-04-18 ENCOUNTER — TELEPHONE (OUTPATIENT)
Dept: INTERNAL MEDICINE | Facility: CLINIC | Age: 82
End: 2022-04-18

## 2022-04-18 ENCOUNTER — HOSPITAL ENCOUNTER (OUTPATIENT)
Dept: CT IMAGING | Facility: HOSPITAL | Age: 82
End: 2022-04-18

## 2022-04-18 DIAGNOSIS — E87.1 HYPONATREMIA: Primary | ICD-10-CM

## 2022-04-19 ENCOUNTER — APPOINTMENT (OUTPATIENT)
Dept: CT IMAGING | Facility: HOSPITAL | Age: 82
End: 2022-04-19

## 2022-04-19 NOTE — PROGRESS NOTES
Spoke with patient regarding results. She stated that she is doing good with the medication. She is having trouble dressing, and she spoke with wound care and will be going there on Friday. She will get labs then.

## 2022-04-20 ENCOUNTER — TELEPHONE (OUTPATIENT)
Dept: PODIATRY | Facility: CLINIC | Age: 82
End: 2022-04-20

## 2022-04-20 ENCOUNTER — TELEPHONE (OUTPATIENT)
Dept: INTERNAL MEDICINE | Facility: CLINIC | Age: 82
End: 2022-04-20

## 2022-04-20 NOTE — TELEPHONE ENCOUNTER
Home health called and stated that they are needing a DNR order. Will the patient need an appointment for this? Also the nurse stated that there is some medication interactions with the colchicine, coreg, and clonidine. The patient does not take the clonidine daily, only as needed and we did not prescribe the colchicine.

## 2022-04-22 ENCOUNTER — TELEPHONE (OUTPATIENT)
Dept: PODIATRY | Facility: CLINIC | Age: 82
End: 2022-04-22

## 2022-04-22 ENCOUNTER — OFFICE VISIT (OUTPATIENT)
Dept: WOUND CARE | Facility: HOSPITAL | Age: 82
End: 2022-04-22

## 2022-04-22 ENCOUNTER — HOSPITAL ENCOUNTER (OUTPATIENT)
Dept: CT IMAGING | Facility: HOSPITAL | Age: 82
Discharge: HOME | End: 2022-04-22
Admitting: NURSE PRACTITIONER

## 2022-04-22 ENCOUNTER — LAB (OUTPATIENT)
Dept: LAB | Facility: HOSPITAL | Age: 82
End: 2022-04-22

## 2022-04-22 DIAGNOSIS — L03.116 CELLULITIS OF LEFT LOWER EXTREMITY: ICD-10-CM

## 2022-04-22 DIAGNOSIS — E87.1 HYPONATREMIA: ICD-10-CM

## 2022-04-22 DIAGNOSIS — L97.512 NON-PRESSURE CHRONIC ULCER OF OTHER PART OF RIGHT FOOT WITH FAT LAYER EXPOSED: ICD-10-CM

## 2022-04-22 DIAGNOSIS — L97.522 NON-PRESSURE CHRONIC ULCER OF OTHER PART OF LEFT FOOT WITH FAT LAYER EXPOSED: ICD-10-CM

## 2022-04-22 DIAGNOSIS — G62.9 POLYNEUROPATHY, UNSPECIFIED: ICD-10-CM

## 2022-04-22 DIAGNOSIS — S81.801D UNSPECIFIED OPEN WOUND, RIGHT LOWER LEG, SUBSEQUENT ENCOUNTER: ICD-10-CM

## 2022-04-22 PROCEDURE — 97597 DBRDMT OPN WND 1ST 20 CM/<: CPT | Performed by: PODIATRIST

## 2022-04-22 PROCEDURE — 11042 DBRDMT SUBQ TIS 1ST 20SQCM/<: CPT | Performed by: PODIATRIST

## 2022-04-22 PROCEDURE — 73701 CT LOWER EXTREMITY W/DYE: CPT

## 2022-04-22 PROCEDURE — 25010000002 IOPAMIDOL 61 % SOLUTION: Performed by: NURSE PRACTITIONER

## 2022-04-22 RX ADMIN — IOPAMIDOL 100 ML: 612 INJECTION, SOLUTION INTRAVENOUS at 15:02

## 2022-04-25 NOTE — PROGRESS NOTES
Spoke with patient regarding results. Noted understanding. She noted that she has taken all the antibiotics.

## 2022-04-25 NOTE — PROGRESS NOTES
Will you please see if she can come in for a follow up with Dr. Noble or me today or tomorrow? Thanks

## 2022-04-26 NOTE — PROGRESS NOTES
[facility] - PODIATRY    Today's Date: 04/29/22    Patient Name: Carmen Obrien  MRN: [id number]  CSN: [id number]  PCP: VERA Noble MD  Referring Provider: No ref. provider found    SUBJECTIVE     Chief Complaint   Patient presents with   • Follow-up     VERA Noble MD pcp01/05/2022 5 wk f/u (Pt said that she can only come on Thursdays, so had to make with Dr. Chung) - pt states doing ok, no complaints - pt denies pain - pt presents routine nondiabetic nail and foot care, long thick discolored toenails and dry skin      HPI: Carmen Obrien, a 82 y.o.female, comes to clinic as a(n) established patient complaining of calluses and wounds to both feet with thickened toenails. Patient has h/o anxiety, arthritis, CAD, DDD, DVT, Depression , GERD, HTN, Hypercholesterolemia, LPRD, Macular degeneration, OA. Patient is non-diabetic.  Relates occasional numbness and tingling in her feet but no loss of sensation.  Has multiple sites to her feet that cause small open sores/wounds. Has been following with wound care center and continues to use surgical shoes. States that nails are thickened and irregular and that she is unable to care for them herself. Denies pain today. Relates previous treatment(s) including care by podiatry. Denies any constitutional symptoms. No other pedal complaints at this time.    Past Medical History:   Diagnosis Date   • Anxiety    • Arthritis    • CAD (coronary artery disease)    • Colon polyp    • DDD (degenerative disc disease), lumbar    • Deep venous thrombosis (HCC)    • Depression    • Diverticulosis    • Esophageal stricture    • GERD (gastroesophageal reflux disease)    • History of transfusion    • Hypercholesteremia    • Hypertension    • LPRD (laryngopharyngeal reflux disease)    • Macular degeneration    • Multinodular goiter 2/23/2017   • Osteoarthritis    • Pruritic disorder    • Spastic colon    • Thyroid nodule      Past Surgical History:   Procedure  Laterality Date   • BLADDER REPAIR     • CATARACT EXTRACTION     • CHOLECYSTECTOMY     • COLONOSCOPY  04/22/2014    2 polyps, adenomatous, diverticulosis   • COLONOSCOPY N/A 6/26/2020    Procedure: COLONOSCOPY WITH ANESTHESIA;  Surgeon: John Coleman MD;  Location:  [facility];  Service: Gastroenterology;  Laterality: N/A;  pre op: constipation  post op: diverticulosis,   PCP: Adam Delgadillo MD   • CYSTOCELE REPAIR     • ENDOSCOPY  04/22/2014    Schatzki's ring dilated   • ENDOSCOPY N/A 6/26/2020    Procedure: ESOPHAGOGASTRODUODENOSCOPY WITH ANESTHESIA;  Surgeon: John Coleman MD;  Location:  [facility];  Service: Gastroenterology;  Laterality: N/A;  pre op: dysphagia  post op:stricture, dilated   PCP:Adam Delgadillo MD   • ENDOSCOPY N/A 9/2/2021    Procedure: ESOPHAGOGASTRODUODENOSCOPY WITH ANESTHESIA;  Surgeon: John Coleman MD;  Location: [facility];  Service: Gastroenterology;  Laterality: N/A;  pre op: dysphagia  post op:gastritis  PCP: VERA Noble MD   • FEMUR FRACTURE SURGERY     • HYSTERECTOMY     • INCISION AND DRAINAGE LEG Left 11/3/2021    Procedure: INCISION AND DRAINAGE LEG ABSCESS;  Surgeon: Cesia Mondragon MD;  Location: [facility] OR;  Service: General;  Laterality: Left;   • REPLACEMENT TOTAL KNEE     • ULNAR NERVE TRANSPOSITION       Family History   Problem Relation Age of Onset   • Colon cancer Mother    • Heart disease Mother    • Heart disease Father    • Colon polyps Neg Hx    • Esophageal cancer Neg Hx      Social History     Socioeconomic History   • Marital status:    Tobacco Use   • Smoking status: Never Smoker   • Smokeless tobacco: Never Used   Vaping Use   • Vaping Use: Never used   Substance and Sexual Activity   • Alcohol use: No   • Drug use: No   • Sexual activity: Defer     Allergies   Allergen Reactions   • Levaquin [Levofloxacin] Unknown (See Comments)     Pt doesn't remember   • Codeine Other (See Comments)     Pt does not  recall   • Morphine And Related Other (See Comments)     Pt does not recall     Current Outpatient Medications   Medication Sig Dispense Refill   • amoxicillin-clavulanate (Augmentin) 875-125 MG per tablet Take 1 tablet by mouth 2 (Two) Times a Day. 14 tablet 0   • aspirin 81 MG EC tablet Take 81 mg by mouth Daily.     • atorvastatin (LIPITOR) 10 MG tablet TAKE 1 TABLET BY MOUTH DAILY. 30 tablet 5   • Calcium Carbonate-Vitamin D (CALTRATE 600+D PO) Take 1 tablet by mouth 2 (two) times a day. Morning and nightly     • carvedilol (COREG) 6.25 MG tablet Take 1 tablet by mouth 2 (Two) Times a Day With Meals. 60 tablet 5   • celecoxib (CeleBREX) 200 MG capsule TAKE 1 CAPSULE BY MOUTH ONCE A DAY WITH FOOD     • cetirizine (zyrTEC) 10 MG tablet Take 10 mg by mouth Daily.     • Cholecalciferol (VITAMIN D3) 125 MCG (5000 UT) capsule capsule Take 5,000 Units by mouth Daily.     • cloNIDine (CATAPRES) 0.1 MG tablet TAKE ONE TABLET DAILY AS NEEDED FOR BLOOD PRESSURE >180/100. 10 tablet 2   • colchicine 0.6 MG tablet Take 0.6 mg by mouth Daily.     • Cyanocobalamin (Vitamin B-12) 1000 MCG sublingual tablet Place 2.5 tablets under the tongue Daily. 2500mcg     • cycloSPORINE (RESTASIS) 0.05 % ophthalmic emulsion Administer 1 drop to both eyes Every 12 (Twelve) Hours.     • esomeprazole (nexIUM) 20 MG capsule Take 1 capsule by mouth 2 (two) times a day. 60 capsule 11   • FEROSUL 325 (65 Fe) MG tablet Take 325 mg by mouth Daily With Breakfast.     • fluticasone (FLONASE) 50 MCG/ACT nasal spray 2 sprays into the nostril(s) as directed by provider Daily.     • furosemide (LASIX) 40 MG tablet Take 40 mg by mouth Daily.     • HYDROcodone-acetaminophen (NORCO) 7.5-325 MG per tablet Take 1 tablet by mouth Every 8 (Eight) Hours As Needed for Moderate Pain . 6 tablet 0   • hydrOXYzine (ATARAX) 25 MG tablet Take 1 tablet by mouth 3 (Three) Times a Day As Needed for Anxiety. 90 tablet 5   • lisinopril (PRINIVIL,ZESTRIL) 10 MG tablet Take 1  tablet by mouth Daily. 30 tablet 5   • montelukast (SINGULAIR) 10 MG tablet Take 1 tablet by mouth Every Night. 30 tablet 5   • multivitamin with minerals tablet tablet Take 1 tablet by mouth Daily.     • mupirocin (BACTROBAN) 2 % ointment APPLY OINTMENT TO RIGHT GREAT TOE AND RIGHT 4TH TOE     • nortriptyline (PAMELOR) 50 MG capsule TAKE 1 CAPSULE BY MOUTH NIGHTLY 30 capsule 5   • polyethyl glycol-propyl glycol (SYSTANE) 0.4-0.3 % solution ophthalmic solution Administer 2 drops to both eyes Every 2 (Two) Hours As Needed (dry eyes).     • polyethylene glycol (polyethylene glycol) 17 g packet Take 17 g by mouth Daily.     • pregabalin (LYRICA) 50 MG capsule Take 50 mg by mouth Every 12 (Twelve) Hours As Needed.     • Prolia 60 MG/ML solution prefilled syringe syringe      • sennosides-docusate (PERICOLACE) 8.6-50 MG per tablet Take 2 tablets by mouth Every Night.     • simethicone (MYLICON) 125 MG chewable tablet Chew 125 mg Every 6 (Six) Hours As Needed for Flatulence.     • tolterodine LA (DETROL LA) 4 MG 24 hr capsule Take 1 capsule by mouth Daily.       No current facility-administered medications for this visit.     Review of Systems   Constitutional: Negative for chills and fever.   HENT: Negative for congestion.    Respiratory: Negative for shortness of breath.    Cardiovascular: Positive for leg swelling. Negative for chest pain.   Gastrointestinal: Negative for constipation, diarrhea, nausea and vomiting.   Musculoskeletal: Positive for arthralgias and gait problem.        Foot pain   Skin: Positive for wound.   Neurological: Positive for numbness.       OBJECTIVE     Vitals:    04/29/22 0909   BP: 137/80       PHYSICAL EXAM  GEN:   Accompanied by none.     Foot/Ankle Exam:       General:   Appearance: obesity and elderly    Appearance comment:  Frail  Orientation: AAOx3    Gait: steppage    Assistance: wheelchair    Shoe Gear:  Surgical shoe    VASCULAR      Right Foot Vascularity   Dorsalis pedis:   2+  Posterior tibial:  2+  Skin Temperature: warm    Edema Gradin+ and pitting  CFT:  4  Pedal Hair Growth:  Present  Varicosities: mild varicosities       Left Foot Vascularity   Dorsalis pedis:  2+  Posterior tibial:  2+  Skin Temperature: warm    Edema Gradin+ and pitting  CFT:  4  Pedal Hair Growth:  Present  Varicosities: mild varicosities        NEUROLOGIC     Right Foot Neurologic   Light touch sensation:  Diminished  Vibratory sensation:  Diminished  Hot/Cold sensation: diminished    Protective Sensation using [place] Monofilament:  6     Left Foot Neurologic   Light touch sensation:  Diminished  Vibratory sensation:  Diminished  Hot/cold sensation: diminished    Protective Sensation using [place] Monofilament:  8     MUSCULOSKELETAL      Right Foot Musculoskeletal   Ecchymosis:  None  Tenderness: none    Arch:  Normal  Hammertoe:  Second toe, third toe, fourth toe and fifth toe  Hallux valgus: Yes       Left Foot Musculoskeletal   Ecchymosis:  None  Tenderness: none    Arch:  Normal  Hammertoe:  Second toe, third toe, fourth toe and fifth toe  Hallux valgus: Yes       MUSCLE STRENGTH     Right Foot Muscle Strength   Foot dorsiflexion:  4+  Foot plantar flexion:  4+  Foot inversion:  4+  Foot eversion:  4+     Left Foot Muscle Strength   Foot dorsiflexion:  4+  Foot plantar flexion:  4+  Foot inversion:  4+  Foot eversion:  4+     RANGE OF MOTION      Right Foot Range of Motion   Foot and ankle ROM within normal limits       Left Foot Range of Motion   Foot and ankle ROM within normal limits       DERMATOLOGIC     Right Foot Dermatologic   Skin: corn and ulcer    Skin: right foot skin not intact, no right foot cellulitis, no right foot redness and no right foot skin changes    Nails: onychomycosis, abnormally thick, subungual debris and dystrophic nails       Left Foot Dermatologic   Skin: corn    Skin: left foot skin not intact    Nails: onychomycosis, abnormally thick, subungual  debris and dystrophic nails       Image:       RADIOLOGY/NUCLEAR:  US Venous Doppler Lower Extremity Left (duplex)    Result Date: 4/15/2022  Narrative: EXAMINATION: US VENOUS DOPPLER LOWER EXTREMITY LEFT (DUPLEX)-  4/15/2022 2:31 PM CDT  HISTORY: swelling left leg; M79.89-Other specified soft tissue disorders  Routine color flow, doppler, and gray scale ultrasound imaging was performed. Interpretation includes evaluation of the doppler tracings including spectral analysis.  The deep venous structures are patent and normally compressible.  Venous flow is phasic with respiration and augments normally with distal compression.      Impression: 1. No evidence of  left leg DVT. This report was finalized on 04/15/2022 15:13 by Dr. Yaakov Childress MD.    CT Lower Extremity Left With Contrast    Result Date: 4/22/2022  Narrative: EXAMINATION: CT LOWER EXTREMITY LEFT W CONTRAST- 4/22/2022 4:07 PM CDT  HISTORY: recurrent left leg cellulitis, history of abscess; L03.116-Cellulitis of left lower limb.  DOSE: 774 mGycm (Automatic exposure control technique was implemented in an effort to keep the radiation dose as low as possible without compromising image quality)  REPORT: Spiral CT of the left lower extremity was performed after administration of intravenous contrast, reconstructed coronal and sagittal images were also reviewed.  COMPARISON: CT of the left lower extremity 2/10/2022.  Findings in the true pelvis on the left including numerous barium-filled diverticula without evidence of acute diverticulitis. No free fluid is seen in the true pelvis. Bone windows demonstrate no acute fracture, there is evidence of previous resection of the distal femur, with a long stem knee prosthesis identified as before. There are also fixation plates along lateral margin of the left femur, with stabilizing transverse screws proximally and cerclage wires distally. This appears stable and there is no obvious loosening of the prosthesis. Within  the distal lateral by approximately 9 cm superior to the knee joint level there is increased attenuation of subcutaneous soft tissues as before which appears essentially unchanged in is most likely related to scar tissue. There are several noncalcified nodules in the lateral thigh, the largest measures 24 mm. Focal areas of fat necrosis are most likely. This is stable. No abscess is identified. There is a small left knee joint effusion. Incidentally noted is enthesopathy at the hamstring tendon insertions on the inferior ischium, stable. The images and include the lower leg, the long stem portion of the knee prosthesis is well seated without evidence of loosening. No fracture seen involving the lower leg. At the ankle joint there is narrowing of the joint space, with periarticular osteopenia. There is a well-corticated bony density distal to the tip of the medial malleolus, measuring approximately 5 mm. This could be related to previous avulsion injury. Soft tissue windows demonstrate increased attenuation of subcutaneous fat planes over the distal lower leg, greater posteriorly and laterally. This likely represents acute cellulitis. No abscess is seen in this region. There are rim calcified nodules in the subcutaneous soft tissues at the calf level, mostly medial. These likely represent focal areas of fat necrosis. Adjacent soft tissue attenuation anteriorly within the subcutaneous fat is favored to represent scar tissue. At the foot, there is diffuse osteopenia of the midfoot is osseous structures with overlying dorsal soft tissue edema, greater laterally. There are diffuse vascular calcifications.      Impression: No abscess is identified, there is moderate induration of subcutaneous soft tissues over the distal leg likely related to acute cellulitis. No soft tissue gas is identified. There is no bony destruction or evidence of osteomyelitis. There were multiple rim calcified nodules within the subcutaneous  contains fat over the distal left thigh, which are stable and likely related to focal areas of fat necrosis. There are similar calcifications within the medial aspect of the lower leg, medially. Previous resection of the distal left femur is noted, with placement of a long stem knee prosthesis, which shows no evidence of loosening. There is a small left knee joint effusion. 1.  This report was finalized on 04/22/2022 16:23 by Dr. Jerry Aguayo MD.      LABORATORY/CULTURE RESULTS:      PATHOLOGY RESULTS:       ASSESSMENT/PLAN     Diagnoses and all orders for this visit:    1. Onychomycosis (Primary)    2. Hammer toes of both feet    3. Peripheral polyneuropathy    4. Pre-ulcerative calluses    5. Valgus deformity of both great toes    6. Chronic venous stasis      Comprehensive lower extremity examination and evaluation was performed.  Discussed findings and treatment plan including risks, benefits, and treatment options with patient in detail. Patient agreed with treatment plan.  After verbal consent obtained, nail(s) x10 debrided of length and thickness with nail nipper without incidence  After verbal consent obtained, calluses x3 pared utilizing dermal curette and/or scalpel without incidence  Patient may maintain nails and calluses at home utilizing emery board or pumice stone between visits as needed   Keep triple antibiotic ointment and band-aid over wound of right 3rd toe daily.   Continue to follow with wound care center as directed.   Toe spacer and double toe  dispensed for left foot.  Elevate lower extremities at rest.  An After Visit Summary was printed and given to the patient at discharge, including (if requested) any available informative/educational handouts regarding diagnosis, treatment, or medications. All questions were answered to patient/family satisfaction. Should symptoms fail to improve or worsen they agree to call or return to clinic or to go to the Emergency Department.  Discussed the importance of following up with any needed screening tests/labs/specialist appointments and any requested follow-up recommended by me today. Importance of maintaining follow-up discussed and patient accepts that missed appointments can delay diagnosis and potentially lead to worsening of conditions.  Return in about 3 months (around 7/29/2022)., or sooner if acute issues arise.    Lab Frequency Next Occurrence   Follow Anesthesia Guidelines / Protocol Once 06/11/2020   Obtain Informed Consent Once 06/16/2020   Adult Transthoracic Echo Complete W/ Cont if Necessary Per Protocol Once 07/12/2021   Follow Anesthesia Guidelines / Protocol Once 08/11/2021   Obtain Informed Consent Once 08/16/2021   Diet: Once 09/02/2021   Diet: Once 09/02/2021   US Carotid Bilateral Once 03/15/2022   US Ankle / Brachial Indices Extremity Complete Once 03/15/2022       This document has been electronically signed by ABDON Eduardo on April 29, 2022 09:42 CDT

## 2022-04-28 DIAGNOSIS — I10 ESSENTIAL HYPERTENSION: ICD-10-CM

## 2022-04-29 ENCOUNTER — OFFICE VISIT (OUTPATIENT)
Dept: INTERNAL MEDICINE | Facility: CLINIC | Age: 82
End: 2022-04-29

## 2022-04-29 ENCOUNTER — OFFICE VISIT (OUTPATIENT)
Dept: PODIATRY | Facility: CLINIC | Age: 82
End: 2022-04-29

## 2022-04-29 ENCOUNTER — OFFICE VISIT (OUTPATIENT)
Dept: WOUND CARE | Facility: HOSPITAL | Age: 82
End: 2022-04-29

## 2022-04-29 VITALS
BODY MASS INDEX: 32.39 KG/M2 | WEIGHT: 165 LBS | SYSTOLIC BLOOD PRESSURE: 137 MMHG | HEIGHT: 60 IN | DIASTOLIC BLOOD PRESSURE: 80 MMHG

## 2022-04-29 VITALS
SYSTOLIC BLOOD PRESSURE: 138 MMHG | WEIGHT: 165 LBS | OXYGEN SATURATION: 98 % | DIASTOLIC BLOOD PRESSURE: 86 MMHG | HEART RATE: 60 BPM | BODY MASS INDEX: 32.39 KG/M2 | HEIGHT: 60 IN

## 2022-04-29 DIAGNOSIS — M20.42 HAMMER TOES OF BOTH FEET: ICD-10-CM

## 2022-04-29 DIAGNOSIS — G62.9 PERIPHERAL POLYNEUROPATHY: ICD-10-CM

## 2022-04-29 DIAGNOSIS — L84 PRE-ULCERATIVE CALLUSES: ICD-10-CM

## 2022-04-29 DIAGNOSIS — L97.812 NON-PRESSURE CHRONIC ULCER OF OTHER PART OF RIGHT LOWER LEG WITH FAT LAYER EXPOSED: ICD-10-CM

## 2022-04-29 DIAGNOSIS — I87.8 CHRONIC VENOUS STASIS: ICD-10-CM

## 2022-04-29 DIAGNOSIS — B35.1 ONYCHOMYCOSIS: Primary | ICD-10-CM

## 2022-04-29 DIAGNOSIS — M20.12 VALGUS DEFORMITY OF BOTH GREAT TOES: ICD-10-CM

## 2022-04-29 DIAGNOSIS — M20.41 HAMMER TOES OF BOTH FEET: ICD-10-CM

## 2022-04-29 DIAGNOSIS — M20.11 VALGUS DEFORMITY OF BOTH GREAT TOES: ICD-10-CM

## 2022-04-29 DIAGNOSIS — G62.9 POLYNEUROPATHY, UNSPECIFIED: ICD-10-CM

## 2022-04-29 DIAGNOSIS — L03.116 CELLULITIS OF LEFT LOWER EXTREMITY: Primary | ICD-10-CM

## 2022-04-29 PROCEDURE — 11721 DEBRIDE NAIL 6 OR MORE: CPT | Performed by: NURSE PRACTITIONER

## 2022-04-29 PROCEDURE — 99213 OFFICE O/P EST LOW 20 MIN: CPT | Performed by: NURSE PRACTITIONER

## 2022-04-29 PROCEDURE — 11056 PARNG/CUTG B9 HYPRKR LES 2-4: CPT | Performed by: NURSE PRACTITIONER

## 2022-04-29 PROCEDURE — 11042 DBRDMT SUBQ TIS 1ST 20SQCM/<: CPT | Performed by: PODIATRIST

## 2022-04-29 RX ORDER — CLONIDINE HYDROCHLORIDE 0.1 MG/1
TABLET ORAL
Qty: 10 TABLET | Refills: 2 | Status: SHIPPED | OUTPATIENT
Start: 2022-04-29 | End: 2022-08-25

## 2022-04-29 RX ORDER — ATORVASTATIN CALCIUM 10 MG/1
10 TABLET, FILM COATED ORAL DAILY
Qty: 30 TABLET | Refills: 5 | Status: ON HOLD | OUTPATIENT
Start: 2022-04-29 | End: 2022-11-02

## 2022-04-29 RX ORDER — COLLAGENASE SANTYL 250 [ARB'U]/G
OINTMENT TOPICAL
Status: ON HOLD | COMMUNITY
Start: 2022-04-15 | End: 2022-08-29

## 2022-04-29 NOTE — PROGRESS NOTES
Chief Complaint   Patient presents with   • Cellulitis         History:  Carmen Obrien is a 82 y.o. female who presents today for evaluation of the above problems.          She is here for follow up of cellulitis of left leg after being on a course of Augmentin.  She feels it is much improved, and she is back to her baseline.      After last visit she had a normal US venous left leg, and a CT left leg which ruled out abscess.       ROS:  Review of Systems  As above     Allergies   Allergen Reactions   • Levaquin [Levofloxacin] Unknown (See Comments)     Pt doesn't remember   • Codeine Other (See Comments)     Pt does not recall   • Morphine And Related Other (See Comments)     Pt does not recall     Past Medical History:   Diagnosis Date   • Anxiety    • Arthritis    • CAD (coronary artery disease)    • Colon polyp    • DDD (degenerative disc disease), lumbar    • Deep venous thrombosis (HCC)    • Depression    • Diverticulosis    • Esophageal stricture    • GERD (gastroesophageal reflux disease)    • History of transfusion    • Hypercholesteremia    • Hypertension    • LPRD (laryngopharyngeal reflux disease)    • Macular degeneration    • Multinodular goiter 2/23/2017   • Osteoarthritis    • Pruritic disorder    • Spastic colon    • Thyroid nodule      Past Surgical History:   Procedure Laterality Date   • BLADDER REPAIR     • CATARACT EXTRACTION     • CHOLECYSTECTOMY     • COLONOSCOPY  04/22/2014    2 polyps, adenomatous, diverticulosis   • COLONOSCOPY N/A 6/26/2020    Procedure: COLONOSCOPY WITH ANESTHESIA;  Surgeon: John Coleman MD;  Location: [facility] ENDOSCOPY;  Service: Gastroenterology;  Laterality: N/A;  pre op: constipation  post op: diverticulosis,   PCP: Adam Delgadillo MD   • CYSTOCELE REPAIR     • ENDOSCOPY  04/22/2014    Schatzki's ring dilated   • ENDOSCOPY N/A 6/26/2020    Procedure: ESOPHAGOGASTRODUODENOSCOPY WITH ANESTHESIA;  Surgeon: John Coleman MD;  Location: [facility] ENDOSCOPY;   Service: Gastroenterology;  Laterality: N/A;  pre op: dysphagia  post op:stricture, dilated   PCP:Adam Delgadillo MD   • ENDOSCOPY N/A 9/2/2021    Procedure: ESOPHAGOGASTRODUODENOSCOPY WITH ANESTHESIA;  Surgeon: John Coleman MD;  Location: [facility] ENDOSCOPY;  Service: Gastroenterology;  Laterality: N/A;  pre op: dysphagia  post op:gastritis  PCP: VERA Noble MD   • FEMUR FRACTURE SURGERY     • HYSTERECTOMY     • INCISION AND DRAINAGE LEG Left 11/3/2021    Procedure: INCISION AND DRAINAGE LEG ABSCESS;  Surgeon: Cesia Mondragon MD;  Location: [facility] OR;  Service: General;  Laterality: Left;   • REPLACEMENT TOTAL KNEE     • ULNAR NERVE TRANSPOSITION       Family History   Problem Relation Age of Onset   • Colon cancer Mother    • Heart disease Mother    • Heart disease Father    • Colon polyps Neg Hx    • Esophageal cancer Neg Hx      Carmen  reports that she has never smoked. She has never used smokeless tobacco. She reports that she does not drink alcohol and does not use drugs.    I have reviewed and updated the above documentation (if necessary) including but not limited to chief complaint, ROS, PFSH, allergies and medications        Current Outpatient Medications:   •  amoxicillin-clavulanate (Augmentin) 875-125 MG per tablet, Take 1 tablet by mouth 2 (Two) Times a Day., Disp: 14 tablet, Rfl: 0  •  aspirin 81 MG EC tablet, Take 81 mg by mouth Daily., Disp: , Rfl:   •  atorvastatin (LIPITOR) 10 MG tablet, TAKE 1 TABLET BY MOUTH DAILY., Disp: 30 tablet, Rfl: 5  •  Calcium Carbonate-Vitamin D (CALTRATE 600+D PO), Take 1 tablet by mouth 2 (two) times a day. Morning and nightly, Disp: , Rfl:   •  carvedilol (COREG) 6.25 MG tablet, Take 1 tablet by mouth 2 (Two) Times a Day With Meals., Disp: 60 tablet, Rfl: 5  •  celecoxib (CeleBREX) 200 MG capsule, TAKE 1 CAPSULE BY MOUTH ONCE A DAY WITH FOOD, Disp: , Rfl:   •  cetirizine (zyrTEC) 10 MG tablet, Take 10 mg by mouth Daily., Disp: , Rfl:   •   Cholecalciferol (VITAMIN D3) 125 MCG (5000 UT) capsule capsule, Take 5,000 Units by mouth Daily., Disp: , Rfl:   •  cloNIDine (CATAPRES) 0.1 MG tablet, TAKE ONE TABLET DAILY AS NEEDED FOR BLOOD PRESSURE >180/100., Disp: 10 tablet, Rfl: 2  •  colchicine 0.6 MG tablet, Take 0.6 mg by mouth Daily., Disp: , Rfl:   •  Cyanocobalamin (Vitamin B-12) 1000 MCG sublingual tablet, Place 2.5 tablets under the tongue Daily. 2500mcg, Disp: , Rfl:   •  cycloSPORINE (RESTASIS) 0.05 % ophthalmic emulsion, Administer 1 drop to both eyes Every 12 (Twelve) Hours., Disp: , Rfl:   •  esomeprazole (nexIUM) 20 MG capsule, Take 1 capsule by mouth 2 (two) times a day., Disp: 60 capsule, Rfl: 11  •  FEROSUL 325 (65 Fe) MG tablet, Take 325 mg by mouth Daily With Breakfast., Disp: , Rfl:   •  fluticasone (FLONASE) 50 MCG/ACT nasal spray, 2 sprays into the nostril(s) as directed by provider Daily., Disp: , Rfl:   •  furosemide (LASIX) 40 MG tablet, Take 40 mg by mouth Daily., Disp: , Rfl:   •  HYDROcodone-acetaminophen (NORCO) 7.5-325 MG per tablet, Take 1 tablet by mouth Every 8 (Eight) Hours As Needed for Moderate Pain ., Disp: 6 tablet, Rfl: 0  •  hydrOXYzine (ATARAX) 25 MG tablet, Take 1 tablet by mouth 3 (Three) Times a Day As Needed for Anxiety., Disp: 90 tablet, Rfl: 5  •  lisinopril (PRINIVIL,ZESTRIL) 10 MG tablet, Take 1 tablet by mouth Daily., Disp: 30 tablet, Rfl: 5  •  montelukast (SINGULAIR) 10 MG tablet, Take 1 tablet by mouth Every Night., Disp: 30 tablet, Rfl: 5  •  multivitamin with minerals tablet tablet, Take 1 tablet by mouth Daily., Disp: , Rfl:   •  mupirocin (BACTROBAN) 2 % ointment, APPLY OINTMENT TO RIGHT GREAT TOE AND RIGHT 4TH TOE, Disp: , Rfl:   •  nortriptyline (PAMELOR) 50 MG capsule, TAKE 1 CAPSULE BY MOUTH NIGHTLY, Disp: 30 capsule, Rfl: 5  •  polyethyl glycol-propyl glycol (SYSTANE) 0.4-0.3 % solution ophthalmic solution, Administer 2 drops to both eyes Every 2 (Two) Hours As Needed (dry eyes)., Disp: , Rfl:   •   "polyethylene glycol (polyethylene glycol) 17 g packet, Take 17 g by mouth Daily., Disp: , Rfl:   •  pregabalin (LYRICA) 50 MG capsule, Take 50 mg by mouth Every 12 (Twelve) Hours As Needed., Disp: , Rfl:   •  Prolia 60 MG/ML solution prefilled syringe syringe, , Disp: , Rfl:   •  Santyl 250 UNIT/GM ointment, APPLY TOPICALLY ONCE DAILY, Disp: , Rfl:   •  sennosides-docusate (PERICOLACE) 8.6-50 MG per tablet, Take 2 tablets by mouth Every Night., Disp: , Rfl:   •  simethicone (MYLICON) 125 MG chewable tablet, Chew 125 mg Every 6 (Six) Hours As Needed for Flatulence., Disp: , Rfl:   •  tolterodine LA (DETROL LA) 4 MG 24 hr capsule, Take 1 capsule by mouth Daily., Disp: , Rfl:     OBJECTIVE:  Visit Vitals  /86 (BP Location: Left arm, Patient Position: Sitting, Cuff Size: Adult)   Pulse 60   Ht 152.4 cm (60\")   Wt 74.8 kg (165 lb)   SpO2 98%   BMI 32.22 kg/m²      Physical Exam  Vitals and nursing note reviewed.   Constitutional:       General: She is not in acute distress.     Appearance: Normal appearance. She is not ill-appearing, toxic-appearing or diaphoretic.   HENT:      Head: Normocephalic and atraumatic.   Pulmonary:      Effort: Pulmonary effort is normal. No respiratory distress.   Musculoskeletal:      Right lower leg: No edema.      Left lower leg: No edema.      Comments: Her legs look great today for her, back to baseline.  I do not see erythema at the scar site on the left thigh.  The cellulitis of the calf has resolved.  The calves are soft, non-tender.   Skin:     General: Skin is warm and dry.      Findings: No erythema or rash.             Comments: Baseline stasis changes both lower extremities from mid-shin down to toes.   Neurological:      Mental Status: She is alert and oriented to person, place, and time.   Psychiatric:         Mood and Affect: Mood normal.         Behavior: Behavior normal.         Thought Content: Thought content normal.         Judgment: Judgment normal. "         [facility]    Assessment/Plan    Diagnoses and all orders for this visit:    1. Cellulitis of left lower extremity (Primary)    Cellulitis is resolved.  She will come back for her next follow up visit in May and has some DNR papers that Dr. Noble will review with her.           Education materials and an After Visit Summary were printed and given to the patient at discharge.  Return for Next scheduled follow up.         ABDON Smith   16:54 CDT  4/29/2022

## 2022-05-06 ENCOUNTER — OFFICE VISIT (OUTPATIENT)
Dept: WOUND CARE | Facility: HOSPITAL | Age: 82
End: 2022-05-06

## 2022-05-06 ENCOUNTER — OFFICE VISIT (OUTPATIENT)
Dept: INTERNAL MEDICINE | Facility: CLINIC | Age: 82
End: 2022-05-06

## 2022-05-06 VITALS
RESPIRATION RATE: 16 BRPM | OXYGEN SATURATION: 98 % | TEMPERATURE: 97.8 F | WEIGHT: 168 LBS | BODY MASS INDEX: 32.98 KG/M2 | DIASTOLIC BLOOD PRESSURE: 78 MMHG | HEIGHT: 60 IN | HEART RATE: 65 BPM | SYSTOLIC BLOOD PRESSURE: 130 MMHG

## 2022-05-06 DIAGNOSIS — I87.2 VENOUS INSUFFICIENCY (CHRONIC) (PERIPHERAL): ICD-10-CM

## 2022-05-06 DIAGNOSIS — G62.9 POLYNEUROPATHY, UNSPECIFIED: ICD-10-CM

## 2022-05-06 DIAGNOSIS — Z71.89 ADVANCED CARE PLANNING/COUNSELING DISCUSSION: Primary | ICD-10-CM

## 2022-05-06 DIAGNOSIS — Z71.9 ENCOUNTER FOR COUNSELING: ICD-10-CM

## 2022-05-06 DIAGNOSIS — L97.812 NON-PRESSURE CHRONIC ULCER OF OTHER PART OF RIGHT LOWER LEG WITH FAT LAYER EXPOSED: ICD-10-CM

## 2022-05-06 PROCEDURE — 11042 DBRDMT SUBQ TIS 1ST 20SQCM/<: CPT | Performed by: PODIATRIST

## 2022-05-06 PROCEDURE — 99213 OFFICE O/P EST LOW 20 MIN: CPT | Performed by: INTERNAL MEDICINE

## 2022-05-06 NOTE — PROGRESS NOTES
Chief Complaint   Patient presents with   • Office Visit   • DNR order         History:  Carmen Obrien is a 82 y.o. female who presents today for evaluation of the above problems.      She presents today to discuss her advanced directive and living will.  She has filled out part of living will and has already been notarized.  However, she did not indicate a healthcare surrogate, and did not complete all of the sections.    This visit was to discuss the MOST form.    HPI      ROS:  Review of Systems  As above    Current Outpatient Medications:   •  aspirin 81 MG EC tablet, Take 81 mg by mouth Daily., Disp: , Rfl:   •  atorvastatin (LIPITOR) 10 MG tablet, TAKE 1 TABLET BY MOUTH DAILY., Disp: 30 tablet, Rfl: 5  •  Calcium Carbonate-Vitamin D (CALTRATE 600+D PO), Take 1 tablet by mouth 2 (two) times a day. Morning and nightly, Disp: , Rfl:   •  carvedilol (COREG) 6.25 MG tablet, Take 1 tablet by mouth 2 (Two) Times a Day With Meals., Disp: 60 tablet, Rfl: 5  •  celecoxib (CeleBREX) 200 MG capsule, TAKE 1 CAPSULE BY MOUTH ONCE A DAY WITH FOOD, Disp: , Rfl:   •  cetirizine (zyrTEC) 10 MG tablet, Take 10 mg by mouth Daily., Disp: , Rfl:   •  Cholecalciferol (VITAMIN D3) 125 MCG (5000 UT) capsule capsule, Take 5,000 Units by mouth Daily., Disp: , Rfl:   •  cloNIDine (CATAPRES) 0.1 MG tablet, TAKE ONE TABLET DAILY AS NEEDED FOR BLOOD PRESSURE >180/100., Disp: 10 tablet, Rfl: 2  •  colchicine 0.6 MG tablet, Take 0.6 mg by mouth Daily., Disp: , Rfl:   •  Cyanocobalamin (Vitamin B-12) 1000 MCG sublingual tablet, Place 2.5 tablets under the tongue Daily. 2500mcg, Disp: , Rfl:   •  cycloSPORINE (RESTASIS) 0.05 % ophthalmic emulsion, Administer 1 drop to both eyes Every 12 (Twelve) Hours., Disp: , Rfl:   •  esomeprazole (nexIUM) 20 MG capsule, Take 1 capsule by mouth 2 (two) times a day., Disp: 60 capsule, Rfl: 11  •  FEROSUL 325 (65 Fe) MG tablet, Take 325 mg by mouth Daily With Breakfast., Disp: , Rfl:   •  fluticasone  (FLONASE) 50 MCG/ACT nasal spray, 2 sprays into the nostril(s) as directed by provider Daily., Disp: , Rfl:   •  furosemide (LASIX) 40 MG tablet, Take 40 mg by mouth Daily., Disp: , Rfl:   •  HYDROcodone-acetaminophen (NORCO) 7.5-325 MG per tablet, Take 1 tablet by mouth Every 8 (Eight) Hours As Needed for Moderate Pain ., Disp: 6 tablet, Rfl: 0  •  hydrOXYzine (ATARAX) 25 MG tablet, Take 1 tablet by mouth 3 (Three) Times a Day As Needed for Anxiety., Disp: 90 tablet, Rfl: 5  •  lisinopril (PRINIVIL,ZESTRIL) 10 MG tablet, Take 1 tablet by mouth Daily., Disp: 30 tablet, Rfl: 5  •  montelukast (SINGULAIR) 10 MG tablet, Take 1 tablet by mouth Every Night., Disp: 30 tablet, Rfl: 5  •  multivitamin with minerals tablet tablet, Take 1 tablet by mouth Daily., Disp: , Rfl:   •  mupirocin (BACTROBAN) 2 % ointment, APPLY OINTMENT TO RIGHT GREAT TOE AND RIGHT 4TH TOE, Disp: , Rfl:   •  nortriptyline (PAMELOR) 50 MG capsule, TAKE 1 CAPSULE BY MOUTH NIGHTLY, Disp: 30 capsule, Rfl: 5  •  polyethyl glycol-propyl glycol (SYSTANE) 0.4-0.3 % solution ophthalmic solution, Administer 2 drops to both eyes Every 2 (Two) Hours As Needed (dry eyes)., Disp: , Rfl:   •  polyethylene glycol (polyethylene glycol) 17 g packet, Take 17 g by mouth Daily., Disp: , Rfl:   •  pregabalin (LYRICA) 50 MG capsule, Take 50 mg by mouth Every 12 (Twelve) Hours As Needed., Disp: , Rfl:   •  Prolia 60 MG/ML solution prefilled syringe syringe, , Disp: , Rfl:   •  Santyl 250 UNIT/GM ointment, APPLY TOPICALLY ONCE DAILY, Disp: , Rfl:   •  sennosides-docusate (PERICOLACE) 8.6-50 MG per tablet, Take 2 tablets by mouth Every Night., Disp: , Rfl:   •  simethicone (MYLICON) 125 MG chewable tablet, Chew 125 mg Every 6 (Six) Hours As Needed for Flatulence., Disp: , Rfl:   •  tolterodine LA (DETROL LA) 4 MG 24 hr capsule, Take 1 capsule by mouth Daily., Disp: , Rfl:     Lab Results   Component Value Date    GLUCOSE 109 (H) 04/15/2022    BUN 19 04/15/2022    CREATININE  0.60 04/15/2022    EGFRIFNONA 107 11/21/2021    BCR 31.7 (H) 04/15/2022    K 4.0 04/15/2022    CO2 27.0 04/15/2022    CALCIUM 8.9 04/15/2022    ALBUMIN 3.80 11/21/2021    AST 21 11/21/2021    ALT 21 11/21/2021       WBC   Date Value Ref Range Status   04/15/2022 6.80 3.40 - 10.80 10*3/mm3 Final   07/16/2019 7.0 4.8 - 10.8 K/uL Final     RBC   Date Value Ref Range Status   04/15/2022 4.17 3.77 - 5.28 10*6/mm3 Final   07/16/2019 3.52 (L) 4.20 - 5.40 M/uL Final     Hemoglobin   Date Value Ref Range Status   04/15/2022 12.9 12.0 - 15.9 g/dL Final   07/16/2019 10.1 (L) 12.0 - 16.0 g/dL Final     Hematocrit   Date Value Ref Range Status   04/15/2022 38.4 34.0 - 46.6 % Final   07/16/2019 31.7 (L) 37.0 - 47.0 % Final     MCV   Date Value Ref Range Status   04/15/2022 92.1 79.0 - 97.0 fL Final   07/16/2019 90.1 81.0 - 99.0 fL Final     MCH   Date Value Ref Range Status   04/15/2022 30.9 26.6 - 33.0 pg Final   07/16/2019 28.7 27.0 - 31.0 pg Final     MCHC   Date Value Ref Range Status   04/15/2022 33.6 31.5 - 35.7 g/dL Final   07/16/2019 31.9 (L) 33.0 - 37.0 g/dL Final     RDW   Date Value Ref Range Status   04/15/2022 13.9 12.3 - 15.4 % Final   07/16/2019 14.7 (H) 11.5 - 14.5 % Final     RDW-SD   Date Value Ref Range Status   11/21/2021 50.1 37.0 - 54.0 fl Final     MPV   Date Value Ref Range Status   04/15/2022 8.8 6.0 - 12.0 fL Final   07/16/2019 10.8 9.4 - 12.3 fL Final     Platelets   Date Value Ref Range Status   04/15/2022 272 140 - 450 10*3/mm3 Final   07/16/2019 248 130 - 400 K/uL Final     Neutrophil Rel %   Date Value Ref Range Status   07/04/2019 76.5 (H) 50.0 - 65.0 % Final     Neutrophil %   Date Value Ref Range Status   04/15/2022 63.2 42.7 - 76.0 % Final     Lymphocyte Rel %   Date Value Ref Range Status   07/04/2019 13.9 (L) 20.0 - 40.0 % Final     Lymphocyte %   Date Value Ref Range Status   04/15/2022 24.4 19.6 - 45.3 % Final     Monocyte Rel %   Date Value Ref Range Status   07/04/2019 7.4 0.0 - 10.0 %  "Final     Monocyte %   Date Value Ref Range Status   11/21/2021 8.9 5.0 - 12.0 % Final     Eosinophil Rel %   Date Value Ref Range Status   07/04/2019 1.4 0.0 - 5.0 % Final     Eosinophil %   Date Value Ref Range Status   11/21/2021 4.1 0.3 - 6.2 % Final     Basophil Rel %   Date Value Ref Range Status   07/04/2019 0.4 0.0 - 1.0 % Final     Basophil %   Date Value Ref Range Status   11/21/2021 0.4 0.0 - 1.5 % Final     Immature Grans %   Date Value Ref Range Status   11/21/2021 0.4 0.0 - 0.5 % Final     Neutrophils Absolute   Date Value Ref Range Status   07/04/2019 7.7 (H) 1.5 - 7.5 K/uL Final     Neutrophils, Absolute   Date Value Ref Range Status   04/15/2022 4.30 1.70 - 7.00 10*3/mm3 Final     Lymphocytes Absolute   Date Value Ref Range Status   07/04/2019 1.4 1.1 - 4.5 K/uL Final     Lymphocytes, Absolute   Date Value Ref Range Status   04/15/2022 1.70 0.70 - 3.10 10*3/mm3 Final     Monocytes Absolute   Date Value Ref Range Status   07/04/2019 0.80 0.00 - 0.90 K/uL Final     Monocytes, Absolute   Date Value Ref Range Status   11/21/2021 0.61 0.10 - 0.90 10*3/mm3 Final     Eosinophils Absolute   Date Value Ref Range Status   07/04/2019 0.10 0.00 - 0.60 K/uL Final     Eosinophils, Absolute   Date Value Ref Range Status   11/21/2021 0.28 0.00 - 0.40 10*3/mm3 Final     Basophils Absolute   Date Value Ref Range Status   07/04/2019 0.00 0.00 - 0.20 K/uL Final     Basophils, Absolute   Date Value Ref Range Status   11/21/2021 0.03 0.00 - 0.20 10*3/mm3 Final     Immature Grans, Absolute   Date Value Ref Range Status   11/21/2021 0.03 0.00 - 0.05 10*3/mm3 Final     nRBC   Date Value Ref Range Status   11/21/2021 0.0 0.0 - 0.2 /100 WBC Final         OBJECTIVE:  Visit Vitals  /78 (BP Location: Left arm, Patient Position: Sitting, Cuff Size: Adult)   Pulse 65   Temp 97.8 °F (36.6 °C) (Oral)   Resp 16   Ht 152.4 cm (60\")   Wt 76.2 kg (168 lb)   SpO2 98%   BMI 32.81 kg/m²      Physical Exam  Constitutional:       " General: She is not in acute distress.     Appearance: She is well-developed. She is not diaphoretic.   HENT:      Head: Normocephalic and atraumatic.   Eyes:      Pupils: Pupils are equal, round, and reactive to light.   Neck:      Thyroid: No thyromegaly.      Trachea: Phonation normal.   Pulmonary:      Effort: No respiratory distress.   Skin:     Coloration: Skin is not pale.      Findings: No erythema.   Neurological:      Mental Status: She is alert.   Psychiatric:         Behavior: Behavior normal.         Thought Content: Thought content normal.         Judgment: Judgment normal.      Comments: Became frustrated as we were going through the most form as she had a difficult time understanding the questions         Assessment/Plan    Diagnoses and all orders for this visit:    1. Advanced care planning/counseling discussion (Primary)    2. Encounter for counseling      First, I recommend she complete the living will.  This was reviewed with her today and she had not completely filled out the form.  Interestingly, it has already been notarized without being fully completed.    Second, we were to fill out the MOST form today.  However, she became flustered and frustrated with the questions.  Neither 1 of us feel comfortable completing it today.  I did not want her to select options that she did not fully understand, and she will take a copy home to go over.  She will follow up with us to go over the form again.    21 minutes spent total on the day of the visit    Return in about 3 months (around 8/6/2022) for Medicare Wellness.      AMY Noble MD  16:44 CDT  5/6/2022

## 2022-05-12 ENCOUNTER — PATIENT OUTREACH (OUTPATIENT)
Dept: PHARMACY | Facility: HOSPITAL | Age: 82
End: 2022-05-12

## 2022-05-12 NOTE — OUTREACH NOTE
Medication Adherence Call    Patient was called today to discuss medication adherence, as she was identified as having care opportunities.    she denies issues with adherence and denies any barriers to receiving medications. I asked specifically about lisinopril, as there has been a lapse since her last fill. She sounded very uncertain, but she believes this medication has been discontinued by her provider. It does, however, remain active on her medication list.    Jo-Ann Pulido, PharmD  Population Health Pharmacist  05/12/22

## 2022-05-13 ENCOUNTER — OFFICE VISIT (OUTPATIENT)
Dept: WOUND CARE | Facility: HOSPITAL | Age: 82
End: 2022-05-13

## 2022-05-13 ENCOUNTER — TELEPHONE (OUTPATIENT)
Dept: PODIATRY | Facility: CLINIC | Age: 82
End: 2022-05-13

## 2022-05-13 DIAGNOSIS — G62.9 POLYNEUROPATHY, UNSPECIFIED: ICD-10-CM

## 2022-05-13 DIAGNOSIS — S81.801D UNSPECIFIED OPEN WOUND, RIGHT LOWER LEG, SUBSEQUENT ENCOUNTER: ICD-10-CM

## 2022-05-13 DIAGNOSIS — L97.812 NON-PRESSURE CHRONIC ULCER OF OTHER PART OF RIGHT LOWER LEG WITH FAT LAYER EXPOSED: ICD-10-CM

## 2022-05-13 DIAGNOSIS — M20.41 OTHER HAMMER TOE(S) (ACQUIRED), RIGHT FOOT: ICD-10-CM

## 2022-05-13 PROCEDURE — 11042 DBRDMT SUBQ TIS 1ST 20SQCM/<: CPT | Performed by: PODIATRIST

## 2022-05-13 NOTE — TELEPHONE ENCOUNTER
[facility] called and wanted to let you know pt was not home when they went by to do her home health care

## 2022-05-20 ENCOUNTER — OFFICE VISIT (OUTPATIENT)
Dept: WOUND CARE | Facility: HOSPITAL | Age: 82
End: 2022-05-20

## 2022-05-20 DIAGNOSIS — L97.812 NON-PRESSURE CHRONIC ULCER OF OTHER PART OF RIGHT LOWER LEG WITH FAT LAYER EXPOSED: ICD-10-CM

## 2022-05-20 DIAGNOSIS — G62.9 POLYNEUROPATHY, UNSPECIFIED: ICD-10-CM

## 2022-05-20 DIAGNOSIS — S81.801D UNSPECIFIED OPEN WOUND, RIGHT LOWER LEG, SUBSEQUENT ENCOUNTER: ICD-10-CM

## 2022-05-20 DIAGNOSIS — M20.41 OTHER HAMMER TOE(S) (ACQUIRED), RIGHT FOOT: ICD-10-CM

## 2022-05-20 PROCEDURE — 11042 DBRDMT SUBQ TIS 1ST 20SQCM/<: CPT | Performed by: PODIATRIST

## 2022-05-25 RX ORDER — FUROSEMIDE 40 MG/1
TABLET ORAL
Qty: 30 TABLET | Refills: 5 | Status: ON HOLD | OUTPATIENT
Start: 2022-05-25 | End: 2022-08-29

## 2022-05-27 ENCOUNTER — OFFICE VISIT (OUTPATIENT)
Dept: WOUND CARE | Facility: HOSPITAL | Age: 82
End: 2022-05-27

## 2022-05-27 DIAGNOSIS — M20.42 OTHER HAMMER TOE(S) (ACQUIRED), LEFT FOOT: ICD-10-CM

## 2022-05-27 DIAGNOSIS — L97.512 NON-PRESSURE CHRONIC ULCER OF OTHER PART OF RIGHT FOOT WITH FAT LAYER EXPOSED: ICD-10-CM

## 2022-05-27 DIAGNOSIS — L97.812 NON-PRESSURE CHRONIC ULCER OF OTHER PART OF RIGHT LOWER LEG WITH FAT LAYER EXPOSED: ICD-10-CM

## 2022-05-27 DIAGNOSIS — G62.9 POLYNEUROPATHY, UNSPECIFIED: ICD-10-CM

## 2022-05-27 PROCEDURE — 11042 DBRDMT SUBQ TIS 1ST 20SQCM/<: CPT | Performed by: NURSE PRACTITIONER

## 2022-06-10 ENCOUNTER — OFFICE VISIT (OUTPATIENT)
Dept: WOUND CARE | Facility: HOSPITAL | Age: 82
End: 2022-06-10

## 2022-06-10 DIAGNOSIS — L97.512 NON-PRESSURE CHRONIC ULCER OF OTHER PART OF RIGHT FOOT WITH FAT LAYER EXPOSED: ICD-10-CM

## 2022-06-10 DIAGNOSIS — M20.41 OTHER HAMMER TOE(S) (ACQUIRED), RIGHT FOOT: ICD-10-CM

## 2022-06-10 DIAGNOSIS — G62.9 POLYNEUROPATHY, UNSPECIFIED: ICD-10-CM

## 2022-06-10 DIAGNOSIS — L97.812 NON-PRESSURE CHRONIC ULCER OF OTHER PART OF RIGHT LOWER LEG WITH FAT LAYER EXPOSED: ICD-10-CM

## 2022-06-10 PROCEDURE — 11042 DBRDMT SUBQ TIS 1ST 20SQCM/<: CPT | Performed by: PODIATRIST

## 2022-06-17 ENCOUNTER — APPOINTMENT (OUTPATIENT)
Dept: WOUND CARE | Facility: HOSPITAL | Age: 82
End: 2022-06-17

## 2022-06-24 ENCOUNTER — OFFICE VISIT (OUTPATIENT)
Dept: WOUND CARE | Facility: HOSPITAL | Age: 82
End: 2022-06-24

## 2022-06-24 DIAGNOSIS — M20.41 OTHER HAMMER TOE(S) (ACQUIRED), RIGHT FOOT: ICD-10-CM

## 2022-06-24 DIAGNOSIS — M20.42 OTHER HAMMER TOE(S) (ACQUIRED), LEFT FOOT: ICD-10-CM

## 2022-06-24 DIAGNOSIS — L97.812 NON-PRESSURE CHRONIC ULCER OF OTHER PART OF RIGHT LOWER LEG WITH FAT LAYER EXPOSED: ICD-10-CM

## 2022-06-24 DIAGNOSIS — G62.9 POLYNEUROPATHY, UNSPECIFIED: ICD-10-CM

## 2022-06-24 PROCEDURE — 97597 DBRDMT OPN WND 1ST 20 CM/<: CPT | Performed by: NURSE PRACTITIONER

## 2022-07-07 ENCOUNTER — TELEPHONE (OUTPATIENT)
Dept: INTERNAL MEDICINE | Facility: CLINIC | Age: 82
End: 2022-07-07

## 2022-07-08 ENCOUNTER — OFFICE VISIT (OUTPATIENT)
Dept: WOUND CARE | Facility: HOSPITAL | Age: 82
End: 2022-07-08

## 2022-07-08 DIAGNOSIS — M20.42 OTHER HAMMER TOE(S) (ACQUIRED), LEFT FOOT: ICD-10-CM

## 2022-07-08 DIAGNOSIS — G62.9 POLYNEUROPATHY, UNSPECIFIED: ICD-10-CM

## 2022-07-08 DIAGNOSIS — L97.812 NON-PRESSURE CHRONIC ULCER OF OTHER PART OF RIGHT LOWER LEG WITH FAT LAYER EXPOSED: ICD-10-CM

## 2022-07-08 DIAGNOSIS — R60.9 EDEMA, UNSPECIFIED TYPE: ICD-10-CM

## 2022-07-08 PROCEDURE — G0463 HOSPITAL OUTPT CLINIC VISIT: HCPCS

## 2022-07-08 PROCEDURE — 99213 OFFICE O/P EST LOW 20 MIN: CPT | Performed by: PODIATRIST

## 2022-07-21 RX ORDER — MONTELUKAST SODIUM 10 MG/1
10 TABLET ORAL NIGHTLY
Qty: 30 TABLET | Refills: 11 | Status: SHIPPED | OUTPATIENT
Start: 2022-07-21 | End: 2022-11-07 | Stop reason: HOSPADM

## 2022-07-21 RX ORDER — CARVEDILOL 6.25 MG/1
6.25 TABLET ORAL 2 TIMES DAILY WITH MEALS
Qty: 60 TABLET | Refills: 11 | Status: SHIPPED | OUTPATIENT
Start: 2022-07-21 | End: 2022-11-07 | Stop reason: HOSPADM

## 2022-07-26 ENCOUNTER — APPOINTMENT (OUTPATIENT)
Dept: GENERAL RADIOLOGY | Facility: HOSPITAL | Age: 82
End: 2022-07-26

## 2022-07-26 ENCOUNTER — APPOINTMENT (OUTPATIENT)
Dept: CT IMAGING | Facility: HOSPITAL | Age: 82
End: 2022-07-26

## 2022-07-26 ENCOUNTER — HOSPITAL ENCOUNTER (EMERGENCY)
Facility: HOSPITAL | Age: 82
Discharge: HOME | End: 2022-07-26
Admitting: EMERGENCY MEDICINE

## 2022-07-26 VITALS
RESPIRATION RATE: 16 BRPM | SYSTOLIC BLOOD PRESSURE: 217 MMHG | HEART RATE: 64 BPM | OXYGEN SATURATION: 99 % | TEMPERATURE: 97.8 F | BODY MASS INDEX: 35.14 KG/M2 | DIASTOLIC BLOOD PRESSURE: 93 MMHG | HEIGHT: 60 IN | WEIGHT: 179 LBS

## 2022-07-26 DIAGNOSIS — M19.019 SHOULDER ARTHRITIS: ICD-10-CM

## 2022-07-26 DIAGNOSIS — N30.00 ACUTE CYSTITIS WITHOUT HEMATURIA: ICD-10-CM

## 2022-07-26 DIAGNOSIS — S09.90XA INJURY OF HEAD, INITIAL ENCOUNTER: ICD-10-CM

## 2022-07-26 DIAGNOSIS — S01.81XA FACIAL LACERATION, INITIAL ENCOUNTER: Primary | ICD-10-CM

## 2022-07-26 DIAGNOSIS — M48.04 THORACIC SPINAL STENOSIS: ICD-10-CM

## 2022-07-26 LAB
ALBUMIN SERPL-MCNC: 4.2 G/DL (ref 3.5–5.2)
ALBUMIN/GLOB SERPL: 1.4 G/DL
ALP SERPL-CCNC: 60 U/L (ref 39–117)
ALT SERPL W P-5'-P-CCNC: 26 U/L (ref 1–33)
ANION GAP SERPL CALCULATED.3IONS-SCNC: 9 MMOL/L (ref 5–15)
APTT PPP: 34.1 SECONDS (ref 24.1–35)
AST SERPL-CCNC: 26 U/L (ref 1–32)
BACTERIA UR QL AUTO: ABNORMAL /HPF
BASOPHILS # BLD AUTO: 0.05 10*3/MM3 (ref 0–0.2)
BASOPHILS NFR BLD AUTO: 0.6 % (ref 0–1.5)
BILIRUB SERPL-MCNC: 0.6 MG/DL (ref 0–1.2)
BILIRUB UR QL STRIP: NEGATIVE
BUN SERPL-MCNC: 16 MG/DL (ref 8–23)
BUN/CREAT SERPL: 25 (ref 7–25)
CALCIUM SPEC-SCNC: 9.7 MG/DL (ref 8.6–10.5)
CHLORIDE SERPL-SCNC: 105 MMOL/L (ref 98–107)
CLARITY UR: ABNORMAL
CO2 SERPL-SCNC: 26 MMOL/L (ref 22–29)
COLOR UR: YELLOW
CREAT SERPL-MCNC: 0.64 MG/DL (ref 0.57–1)
DEPRECATED RDW RBC AUTO: 49.3 FL (ref 37–54)
EGFRCR SERPLBLD CKD-EPI 2021: 89.5 ML/MIN/1.73
EOSINOPHIL # BLD AUTO: 0.34 10*3/MM3 (ref 0–0.4)
EOSINOPHIL NFR BLD AUTO: 4 % (ref 0.3–6.2)
ERYTHROCYTE [DISTWIDTH] IN BLOOD BY AUTOMATED COUNT: 14.2 % (ref 12.3–15.4)
GLOBULIN UR ELPH-MCNC: 2.9 GM/DL
GLUCOSE SERPL-MCNC: 119 MG/DL (ref 65–99)
GLUCOSE UR STRIP-MCNC: NEGATIVE MG/DL
HCT VFR BLD AUTO: 39.1 % (ref 34–46.6)
HGB BLD-MCNC: 12.8 G/DL (ref 12–15.9)
HGB UR QL STRIP.AUTO: NEGATIVE
HYALINE CASTS UR QL AUTO: ABNORMAL /LPF
IMM GRANULOCYTES # BLD AUTO: 0.04 10*3/MM3 (ref 0–0.05)
IMM GRANULOCYTES NFR BLD AUTO: 0.5 % (ref 0–0.5)
INR PPP: 1.11 (ref 0.91–1.09)
KETONES UR QL STRIP: NEGATIVE
LEUKOCYTE ESTERASE UR QL STRIP.AUTO: ABNORMAL
LYMPHOCYTES # BLD AUTO: 1.54 10*3/MM3 (ref 0.7–3.1)
LYMPHOCYTES NFR BLD AUTO: 18 % (ref 19.6–45.3)
MCH RBC QN AUTO: 31 PG (ref 26.6–33)
MCHC RBC AUTO-ENTMCNC: 32.7 G/DL (ref 31.5–35.7)
MCV RBC AUTO: 94.7 FL (ref 79–97)
MONOCYTES # BLD AUTO: 0.8 10*3/MM3 (ref 0.1–0.9)
MONOCYTES NFR BLD AUTO: 9.3 % (ref 5–12)
NEUTROPHILS NFR BLD AUTO: 5.8 10*3/MM3 (ref 1.7–7)
NEUTROPHILS NFR BLD AUTO: 67.6 % (ref 42.7–76)
NITRITE UR QL STRIP: NEGATIVE
NRBC BLD AUTO-RTO: 0 /100 WBC (ref 0–0.2)
PH UR STRIP.AUTO: 6 [PH] (ref 5–8)
PLATELET # BLD AUTO: 228 10*3/MM3 (ref 140–450)
PMV BLD AUTO: 10.7 FL (ref 6–12)
POTASSIUM SERPL-SCNC: 3.8 MMOL/L (ref 3.5–5.2)
PROT SERPL-MCNC: 7.1 G/DL (ref 6–8.5)
PROT UR QL STRIP: ABNORMAL
PROTHROMBIN TIME: 13.9 SECONDS (ref 11.9–14.6)
RBC # BLD AUTO: 4.13 10*6/MM3 (ref 3.77–5.28)
RBC # UR STRIP: ABNORMAL /HPF
REF LAB TEST METHOD: ABNORMAL
SARS-COV-2 RNA PNL SPEC NAA+PROBE: NOT DETECTED
SODIUM SERPL-SCNC: 140 MMOL/L (ref 136–145)
SP GR UR STRIP: 1.01 (ref 1–1.03)
SQUAMOUS #/AREA URNS HPF: ABNORMAL /HPF
UROBILINOGEN UR QL STRIP: ABNORMAL
WBC # UR STRIP: ABNORMAL /HPF
WBC NRBC COR # BLD: 8.57 10*3/MM3 (ref 3.4–10.8)

## 2022-07-26 PROCEDURE — 73562 X-RAY EXAM OF KNEE 3: CPT

## 2022-07-26 PROCEDURE — 72125 CT NECK SPINE W/O DYE: CPT

## 2022-07-26 PROCEDURE — 85025 COMPLETE CBC W/AUTO DIFF WBC: CPT | Performed by: NURSE PRACTITIONER

## 2022-07-26 PROCEDURE — 85610 PROTHROMBIN TIME: CPT | Performed by: NURSE PRACTITIONER

## 2022-07-26 PROCEDURE — 87635 SARS-COV-2 COVID-19 AMP PRB: CPT | Performed by: NURSE PRACTITIONER

## 2022-07-26 PROCEDURE — 81001 URINALYSIS AUTO W/SCOPE: CPT | Performed by: NURSE PRACTITIONER

## 2022-07-26 PROCEDURE — 25010000002 CEFTRIAXONE PER 250 MG: Performed by: NURSE PRACTITIONER

## 2022-07-26 PROCEDURE — 96365 THER/PROPH/DIAG IV INF INIT: CPT

## 2022-07-26 PROCEDURE — 72128 CT CHEST SPINE W/O DYE: CPT

## 2022-07-26 PROCEDURE — 73030 X-RAY EXAM OF SHOULDER: CPT

## 2022-07-26 PROCEDURE — 73552 X-RAY EXAM OF FEMUR 2/>: CPT

## 2022-07-26 PROCEDURE — 80053 COMPREHEN METABOLIC PANEL: CPT | Performed by: NURSE PRACTITIONER

## 2022-07-26 PROCEDURE — 70486 CT MAXILLOFACIAL W/O DYE: CPT

## 2022-07-26 PROCEDURE — 71045 X-RAY EXAM CHEST 1 VIEW: CPT

## 2022-07-26 PROCEDURE — 70450 CT HEAD/BRAIN W/O DYE: CPT

## 2022-07-26 PROCEDURE — 85730 THROMBOPLASTIN TIME PARTIAL: CPT | Performed by: NURSE PRACTITIONER

## 2022-07-26 PROCEDURE — 99284 EMERGENCY DEPT VISIT MOD MDM: CPT

## 2022-07-26 PROCEDURE — 72170 X-RAY EXAM OF PELVIS: CPT

## 2022-07-26 RX ORDER — CLONIDINE HYDROCHLORIDE 0.1 MG/1
0.1 TABLET ORAL ONCE
Status: COMPLETED | OUTPATIENT
Start: 2022-07-26 | End: 2022-07-26

## 2022-07-26 RX ORDER — LIDOCAINE HYDROCHLORIDE AND EPINEPHRINE BITARTRATE 20; .01 MG/ML; MG/ML
10 INJECTION, SOLUTION SUBCUTANEOUS ONCE
Status: COMPLETED | OUTPATIENT
Start: 2022-07-26 | End: 2022-07-26

## 2022-07-26 RX ORDER — CEFDINIR 300 MG/1
300 CAPSULE ORAL 2 TIMES DAILY
Qty: 14 CAPSULE | Refills: 0 | Status: SHIPPED | OUTPATIENT
Start: 2022-07-26 | End: 2022-08-02

## 2022-07-26 RX ORDER — ACETAMINOPHEN 500 MG
1000 TABLET ORAL ONCE
Status: COMPLETED | OUTPATIENT
Start: 2022-07-26 | End: 2022-07-26

## 2022-07-26 RX ORDER — CLONIDINE HYDROCHLORIDE 0.1 MG/1
0.1 TABLET ORAL ONCE
Status: DISCONTINUED | OUTPATIENT
Start: 2022-07-26 | End: 2022-07-26

## 2022-07-26 RX ORDER — SODIUM CHLORIDE 0.9 % (FLUSH) 0.9 %
10 SYRINGE (ML) INJECTION AS NEEDED
Status: DISCONTINUED | OUTPATIENT
Start: 2022-07-26 | End: 2022-07-26 | Stop reason: HOSPADM

## 2022-07-26 RX ADMIN — LIDOCAINE HYDROCHLORIDE,EPINEPHRINE BITARTRATE 10 ML: 20; .01 INJECTION, SOLUTION INFILTRATION; PERINEURAL at 19:45

## 2022-07-26 RX ADMIN — SODIUM CHLORIDE 1 G: 9 INJECTION, SOLUTION INTRAVENOUS at 19:48

## 2022-07-26 RX ADMIN — ACETAMINOPHEN 1000 MG: 500 TABLET ORAL at 19:29

## 2022-07-26 RX ADMIN — CLONIDINE HYDROCHLORIDE 0.1 MG: 0.1 TABLET ORAL at 20:20

## 2022-07-28 ENCOUNTER — PATIENT OUTREACH (OUTPATIENT)
Dept: CASE MANAGEMENT | Facility: OTHER | Age: 82
End: 2022-07-28

## 2022-07-28 NOTE — OUTREACH NOTE
AMBULATORY CASE MANAGEMENT NOTE    Name and Relationship of Patient/Support Person: Carmen Obrien J - Self    Patient Outreach    Pawan MOBLEY ACALICIA patient seen at [facility] ED 7.26.22 after a fall and acute cystitis.     Adult Patient Profile  Questions/Answers    Flowsheet Row Most Recent Value   Symptoms/Conditions Managed at Home cardiovascular, skin, genitourinary   Barriers to Managing Health none   Cardiovascular Symptoms/Conditions hypertension   Cardiovascular Management Strategies medication therapy, routine screening   Cardiovascular Comment has BP cuff at home but unable to perfrom on self   Genitourinary Symptoms/Conditions excessive odor   Genitourinary Management Strategies medication therapy   Genitourinary Comment prescribed Omnicef in ED and is taking as directed. Educated to increase fluid intake   Skin Symptoms/Conditions wound   Skin Management Strategies other (see comments)  [sutures]   Skin Comment sutures to be removed in 5-7 days. Discussed using PCP or UC for removal. Educated keeping clean and dry   Missed Doses of Prescribed Medications During Past Week no   Taken Prescribed Medications at Different Time or Schedule During Past Week no   Taken More or Less Medication Than Prescribed no   Barriers to Taking Medication as Prescribed none   Difficulty Communicating no   Difficulty Eating/Swallowing no   Equipment Currently Used at Home cane, straight, bp cuff, rollator, wheelchair, motorized, other (see comments)  [Life Alert system]   People in Home alone   Current Living Arrangements home        Send Education  Questions/Answers    Flowsheet Row Most Recent Value   Other Patient Education/Resources  --  [provided ACM contact information]   Advanced Directives: Patient Has        Social Work Assessment  Questions/Answers    Flowsheet Row Most Recent Value   People in Home alone   Current Living Arrangements home   Provides Primary Care For no one          SHER S  Ambulatory Case  Management    7/28/2022, 14:53 CDT

## 2022-07-29 ENCOUNTER — TELEPHONE (OUTPATIENT)
Dept: INTERNAL MEDICINE | Facility: CLINIC | Age: 82
End: 2022-07-29

## 2022-08-02 ENCOUNTER — OFFICE VISIT (OUTPATIENT)
Dept: WOUND CARE | Facility: HOSPITAL | Age: 82
End: 2022-08-02

## 2022-08-02 ENCOUNTER — OFFICE VISIT (OUTPATIENT)
Dept: INTERNAL MEDICINE | Facility: CLINIC | Age: 82
End: 2022-08-02

## 2022-08-02 VITALS
HEIGHT: 60 IN | HEART RATE: 70 BPM | BODY MASS INDEX: 34.96 KG/M2 | DIASTOLIC BLOOD PRESSURE: 78 MMHG | OXYGEN SATURATION: 100 % | SYSTOLIC BLOOD PRESSURE: 122 MMHG

## 2022-08-02 DIAGNOSIS — I89.0 LYMPHEDEMA, NOT ELSEWHERE CLASSIFIED: ICD-10-CM

## 2022-08-02 DIAGNOSIS — S01.90XA UNSPECIFIED OPEN WOUND OF UNSPECIFIED PART OF HEAD, INITIAL ENCOUNTER: ICD-10-CM

## 2022-08-02 DIAGNOSIS — T14.8XXA SUTURED SKIN WOUND: Primary | ICD-10-CM
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
## 2022-08-02 DIAGNOSIS — R26.89 OTHER ABNORMALITIES OF GAIT AND MOBILITY: ICD-10-CM

## 2022-08-02 PROCEDURE — 99213 OFFICE O/P EST LOW 20 MIN: CPT | Performed by: NURSE PRACTITIONER

## 2022-08-02 PROCEDURE — 97597 DBRDMT OPN WND 1ST 20 CM/<: CPT | Performed by: NURSE PRACTITIONER

## 2022-08-02 PROCEDURE — G0463 HOSPITAL OUTPT CLINIC VISIT: HCPCS

## 2022-08-02 NOTE — PROGRESS NOTES
[facility] - PODIATRY    Today's Date: 08/15/22    Patient Name: Carmen Obrien  MRN: [id number]  CSN: [id number]  PCP: VERA Noble MD  Referring Provider: No ref. provider found    SUBJECTIVE     Chief Complaint   Patient presents with   • Follow-up     VERA Noble MD pcp01/05/2022 3 month fu regular foot care - pt states doing ok, no complaints - pt denies pain - pt presents has possible corn/callus built up on the end of 3rd right toe and on 2nd left toe, long thick discolored nails      HPI: Carmen Obrien, a 82 y.o.female, comes to clinic as a(n) established patient complaining of Thickened toenails and calluses to both feet. Patient has h/o anxiety, arthritis, CAD, DDD, DVT, Depression , GERD, HTN, Hypercholesterolemia, LPRD, Macular degeneration, OA. Patient is non-diabetic.  Notes occasional numbness and tingling in her feet but no loss of sensation. Reports recurrence of callused areas to the tips of toes states that nails are thickened and irregular and that she is unable to care for them herself. Denies pain presently. Relates previous treatment(s) including care by podiatry. Denies any constitutional symptoms. No other pedal complaints at this time.    Past Medical History:   Diagnosis Date   • Anxiety    • Arthritis    • CAD (coronary artery disease)    • Colon polyp    • DDD (degenerative disc disease), lumbar    • Deep venous thrombosis (HCC)    • Depression    • Diverticulosis    • Esophageal stricture    • GERD (gastroesophageal reflux disease)    • History of transfusion    • Hypercholesteremia    • Hypertension    • LPRD (laryngopharyngeal reflux disease)    • Macular degeneration    • Multinodular goiter 2/23/2017   • Osteoarthritis    • Pruritic disorder    • Spastic colon    • Thyroid nodule      Past Surgical History:   Procedure Laterality Date   • BLADDER REPAIR     • CATARACT EXTRACTION     • CHOLECYSTECTOMY     • COLONOSCOPY  04/22/2014    2 polyps,  adenomatous, diverticulosis   • COLONOSCOPY N/A 6/26/2020    Procedure: COLONOSCOPY WITH ANESTHESIA;  Surgeon: John Coleman MD;  Location: [facility] ENDOSCOPY;  Service: Gastroenterology;  Laterality: N/A;  pre op: constipation  post op: diverticulosis,   PCP: Adam Delgadillo MD   • CYSTOCELE REPAIR     • ENDOSCOPY  04/22/2014    Schatzki's ring dilated   • ENDOSCOPY N/A 6/26/2020    Procedure: ESOPHAGOGASTRODUODENOSCOPY WITH ANESTHESIA;  Surgeon: John Coleman MD;  Location: [facility] ENDOSCOPY;  Service: Gastroenterology;  Laterality: N/A;  pre op: dysphagia  post op:stricture, dilated   PCP:Adam Delgadilol MD   • ENDOSCOPY N/A 9/2/2021    Procedure: ESOPHAGOGASTRODUODENOSCOPY WITH ANESTHESIA;  Surgeon: John Coleman MD;  Location: [facility] ENDOSCOPY;  Service: Gastroenterology;  Laterality: N/A;  pre op: dysphagia  post op:gastritis  PCP: VERA Noble MD   • FEMUR FRACTURE SURGERY     • HYSTERECTOMY     • INCISION AND DRAINAGE LEG Left 11/3/2021    Procedure: INCISION AND DRAINAGE LEG ABSCESS;  Surgeon: Cesia Mondragon MD;  Location: [facility] OR;  Service: General;  Laterality: Left;   • REPLACEMENT TOTAL KNEE     • ULNAR NERVE TRANSPOSITION       Family History   Problem Relation Age of Onset   • Colon cancer Mother    • Heart disease Mother    • Heart disease Father    • Colon polyps Neg Hx    • Esophageal cancer Neg Hx      Social History     Socioeconomic History   • Marital status:    Tobacco Use   • Smoking status: Never Smoker   • Smokeless tobacco: Never Used   Vaping Use   • Vaping Use: Never used   Substance and Sexual Activity   • Alcohol use: No   • Drug use: No   • Sexual activity: Defer     Allergies   Allergen Reactions   • Levaquin [Levofloxacin] Unknown (See Comments)     Pt doesn't remember   • Codeine Other (See Comments)     Pt does not recall   • Morphine And Related Other (See Comments)     Pt does not recall     Current Outpatient Medications   Medication Sig  Dispense Refill   • aspirin 81 MG EC tablet Take 81 mg by mouth Daily.     • atorvastatin (LIPITOR) 10 MG tablet TAKE 1 TABLET BY MOUTH DAILY. 30 tablet 5   • Calcium Carbonate-Vitamin D (CALTRATE 600+D PO) Take 1 tablet by mouth 2 (two) times a day. Morning and nightly     • carvedilol (COREG) 6.25 MG tablet Take 1 tablet by mouth 2 (Two) Times a Day With Meals. 60 tablet 11   • celecoxib (CeleBREX) 200 MG capsule TAKE 1 CAPSULE BY MOUTH ONCE A DAY WITH FOOD     • cetirizine (zyrTEC) 10 MG tablet Take 10 mg by mouth Daily.     • Cholecalciferol (VITAMIN D3) 125 MCG (5000 UT) capsule capsule Take 5,000 Units by mouth Daily.     • cloNIDine (CATAPRES) 0.1 MG tablet TAKE ONE TABLET DAILY AS NEEDED FOR BLOOD PRESSURE >180/100. 10 tablet 2   • colchicine 0.6 MG tablet Take 0.6 mg by mouth Daily.     • Cyanocobalamin (Vitamin B-12) 1000 MCG sublingual tablet Place 2.5 tablets under the tongue Daily. 2500mcg     • cycloSPORINE (RESTASIS) 0.05 % ophthalmic emulsion Administer 1 drop to both eyes Every 12 (Twelve) Hours.     • esomeprazole (nexIUM) 20 MG capsule Take 1 capsule by mouth 2 (Two) Times a Day. 60 capsule 11   • FEROSUL 325 (65 Fe) MG tablet Take 325 mg by mouth Daily With Breakfast.     • fluticasone (FLONASE) 50 MCG/ACT nasal spray 2 sprays into the nostril(s) as directed by provider Daily.     • furosemide (LASIX) 40 MG tablet TAKE 1 TABLET BY MOUTH DAILY. 30 tablet 5   • HYDROcodone-acetaminophen (NORCO) 7.5-325 MG per tablet Take 1 tablet by mouth Every 8 (Eight) Hours As Needed for Moderate Pain . 6 tablet 0   • hydrOXYzine (ATARAX) 25 MG tablet Take 1 tablet by mouth 3 (Three) Times a Day As Needed for Anxiety. 90 tablet 5   • lisinopril (PRINIVIL,ZESTRIL) 10 MG tablet Take 1 tablet by mouth Daily. 30 tablet 5   • montelukast (SINGULAIR) 10 MG tablet Take 1 tablet by mouth Every Night. 30 tablet 11   • multivitamin with minerals tablet tablet Take 1 tablet by mouth Daily.     • mupirocin (BACTROBAN) 2 %  ointment APPLY OINTMENT TO RIGHT GREAT TOE AND RIGHT 4TH TOE     • nortriptyline (PAMELOR) 50 MG capsule TAKE 1 CAPSULE BY MOUTH NIGHTLY 30 capsule 5   • polyethyl glycol-propyl glycol (SYSTANE) 0.4-0.3 % solution ophthalmic solution Administer 2 drops to both eyes Every 2 (Two) Hours As Needed (dry eyes).     • polyethylene glycol (polyethylene glycol) 17 g packet Take 17 g by mouth Daily.     • pregabalin (LYRICA) 50 MG capsule Take 50 mg by mouth Every 12 (Twelve) Hours As Needed.     • Prolia 60 MG/ML solution prefilled syringe syringe      • Santyl 250 UNIT/GM ointment APPLY TOPICALLY ONCE DAILY     • sennosides-docusate (PERICOLACE) 8.6-50 MG per tablet Take 2 tablets by mouth Every Night.     • simethicone (MYLICON) 125 MG chewable tablet Chew 125 mg Every 6 (Six) Hours As Needed for Flatulence.     • tolterodine LA (DETROL LA) 4 MG 24 hr capsule Take 1 capsule by mouth Daily.       No current facility-administered medications for this visit.     Review of Systems   Constitutional: Negative for chills and fever.   HENT: Negative for congestion.    Respiratory: Negative for shortness of breath.    Cardiovascular: Positive for leg swelling. Negative for chest pain.   Gastrointestinal: Negative for constipation, diarrhea, nausea and vomiting.   Musculoskeletal: Positive for arthralgias and gait problem.        Foot pain   Skin: Negative for wound.   Neurological: Positive for numbness.       OBJECTIVE     Vitals:    22 0915   BP: 140/80       PHYSICAL EXAM  GEN:   Accompanied by none.     Foot/Ankle Exam:       General:   Appearance: obesity and elderly    Appearance comment:  Frail  Orientation: AAOx3    Gait: steppage    Assistance: wheelchair    Shoe Gear:  Surgical shoe    VASCULAR      Right Foot Vascularity   Dorsalis pedis:  2+  Posterior tibial:  2+  Skin Temperature: warm    Edema Gradin+ and pitting  CFT:  4  Pedal Hair Growth:  Present  Varicosities: mild varicosities       Left Foot  Vascularity   Dorsalis pedis:  2+  Posterior tibial:  2+  Skin Temperature: warm    Edema Gradin+ and pitting  CFT:  4  Pedal Hair Growth:  Present  Varicosities: mild varicosities        NEUROLOGIC     Right Foot Neurologic   Light touch sensation:  Diminished  Vibratory sensation:  Diminished  Hot/Cold sensation: diminished    Protective Sensation using Waddington-Latanya Monofilament:  6     Left Foot Neurologic   Light touch sensation:  Diminished  Vibratory sensation:  Diminished  Hot/cold sensation: diminished    Protective Sensation using Waddington-Latanya Monofilament:  8     MUSCULOSKELETAL      Right Foot Musculoskeletal   Ecchymosis:  None  Tenderness: none    Arch:  Normal  Hammertoe:  Second toe, third toe, fourth toe and fifth toe  Hallux valgus: Yes       Left Foot Musculoskeletal   Ecchymosis:  None  Tenderness: none    Arch:  Normal  Hammertoe:  Second toe, third toe, fourth toe and fifth toe  Hallux valgus: Yes       MUSCLE STRENGTH     Right Foot Muscle Strength   Foot dorsiflexion:  4+  Foot plantar flexion:  4+  Foot inversion:  4+  Foot eversion:  4+     Left Foot Muscle Strength   Foot dorsiflexion:  4+  Foot plantar flexion:  4+  Foot inversion:  4+  Foot eversion:  4+     RANGE OF MOTION      Right Foot Range of Motion   Foot and ankle ROM within normal limits       Left Foot Range of Motion   Foot and ankle ROM within normal limits       DERMATOLOGIC     Right Foot Dermatologic   Skin: corn    Skin: no right foot cellulitis, no right foot redness and no right foot skin changes    Nails: onychomycosis, abnormally thick, subungual debris and dystrophic nails       Left Foot Dermatologic   Skin: corn    Skin: left foot skin not intact    Nails: onychomycosis, abnormally thick, subungual debris and dystrophic nails       Image:       RADIOLOGY/NUCLEAR:  XR Shoulder 2+ View Right    Result Date: 2022  Narrative: EXAMINATION: Right shoulder 3 views 2022  HISTORY: Shoulder pain after  fall.  FINDINGS: Three-view exam of the right shoulder demonstrates chronic changes of the shoulder. This includes a chronic rotator cuff tear with the humeral head abutting the acromion with erosive changes of the distal clavicle and acromion. There is also advanced arthritic change of the glenohumeral joint. There is no acute fracture or dislocation.      Impression: 1.. Chronic changes of the shoulder including arthrosis. There is a chronic tear of the rotator cuff with the humeral head abutting the acromion and distal clavicle with erosive change. 2. No acute fracture or dislocation. This report was finalized on 07/26/2022 17:42 by Dr. José Elmore MD.    XR Femur 2 View Right    Result Date: 7/26/2022  Narrative: EXAMINATION: Right femur 2 views 07/26/2022  HISTORY: Leg pain after fall.  FINDINGS: Two-view exam of the right femur reveals the patient's undergone previous total knee arthroplasty. There is no evidence of acute fracture. There is mild arthritic change at the hip.      Impression: . No evidence of acute fracture. This report was finalized on 07/26/2022 17:37 by Dr. José Elmore MD.    XR Knee 3 View Right    Result Date: 7/26/2022  Narrative: EXAMINATION: Right knee 3 views 07/26/2022  HISTORY: Knee pain after fall.  FINDINGS: Three-view exam of the right knee demonstrates no fracture or dislocation. No joint effusion is present. The patient has undergone previous total knee arthroplasty without evidence of complication.      Impression: 1.. No evidence of fracture. This report was finalized on 07/26/2022 17:35 by Dr. José Elmore MD.    CT Head Without Contrast    Result Date: 7/26/2022  Narrative: CT HEAD WO CONTRAST- 7/26/2022 6:11 PM CDT  HISTORY: head injury, fall  COMPARISON: 02/18/2019  DLP: 683 mGy cm. All CT scans are performed using dose optimization techniques as appropriate to the performed exam and including at least one of the following: Automated exposure control,  adjustment of the mA and/or kV according to size, and the use of the iterative reconstruction technique.  TECHNIQUE: Serial axial tomographic images of the brain were obtained without the use of intravenous contrast.  FINDINGS: The midline structures are nondisplaced. There is mild cerebral and cerebellar atrophy, with an associated increase in the prominence of the ventricles and sulci. The basilar cisterns are normal in size and configuration. There is no evidence of intracranial hemorrhage or mass-effect. There is low attenuation in the periventricular white matter, consistent with chronic ischemic change. There are no abnormal extra-axial fluid collections. There is no evidence of tonsillar herniation.  The included orbits and their contents are unremarkable. The visualized paranasal sinuses, mastoid air cells and middle ear cavities are clear. A right frontal scalp hematoma is present with no evidence of retained foreign body or underlying calvarial injury..      Impression: 1. Mild cerebral and cerebellar atrophy with chronic microvascular disease but no evidence of acute intracranial process. 2. Right frontal scalp hematoma with no associated calvarial injury.   This report was finalized on 07/26/2022 18:44 by Dr. José Elmore MD.    CT Cervical Spine Without Contrast    Result Date: 7/26/2022  Narrative: CT CERVICAL SPINE WO CONTRAST- 7/26/2022 6:11 PM CDT  HISTORY: neck pain, fall  COMPARISON: None  DOSE LENGTH PRODUCT: 293 mGy cm. All CT scans are performed using dose optimization techniques as appropriate to the performed exam and including at least one of the following: Automated exposure control, adjustment of the mA and/or kV according to size, and the use of the iterative reconstruction technique.  TECHNIQUE: Serial helical tomographic images of the cervical spine were obtained without the use of intravenous contrast. Additionally, sagittal and coronal reformatted images were also provided for  review.  FINDINGS: Alignment: Normal.  Bones: There is no evidence of fracture. Vertebral body heights are maintained. There is calcification of the transverse ligament at the C1-C2 articulation.  Disc spaces: There is mild degenerative disc disease at C6-C7.  Canal and neuroforamina: Central stenosis is noted at C3-C4, C4-C5 and C6-C7 related to disc osteophyte complex. There is bony neural foraminal encroachment at multiple levels related to facet and uncovertebral hypertrophy.  Soft tissues: Unremarkable.  Lung apices: Clear. No pneumothorax.      Impression: 1. No evidence of acute osseous injury or malalignment in the cervical spine.   This report was finalized on 07/26/2022 18:49 by Dr. José Elmore MD.    CT Thoracic Spine Without Contrast    Result Date: 7/26/2022  Narrative: HISTORY: Fall with back pain  DOSE: 918. All CT scans are performed using dose optimization techniques as appropriate to the performed exam and including at least one of the following: Automated exposure control, adjustment of the mA and/or kV according to size, and the use of the iterative reconstruction technique.  FINDINGS: Multiple contiguous axial images are obtained through the thoracic spine at 2 mm intervals with reformatted images obtained in the sagittal and coronal projection from the original data set. There is no evidence of acute fracture or malalignment. Degenerative disc disease is noted throughout the mid and lower cervical spine with significant narrowing of disc space height and associated spondylosis. Prominent paravertebral spurring is present. There is an accentuated thoracic kyphosis. No significant central stenosis is demonstrated. There is severe right-sided foraminal narrowing at T11-T12 related to prominent spurring and facet overgrowth.      Impression: . 1. No evidence of acute fracture or listhesis. 2. Degenerative disc disease throughout the thoracic spine with spondylosis. Severe right-sided foraminal  narrowing is noted at T11-T12 related to prominent osteophytes and facet overgrowth. There is an accentuated thoracic kyphosis.  This report was finalized on 07/26/2022 18:55 by Dr. José Elmore MD.    XR Chest 1 View    Result Date: 7/26/2022  Narrative: EXAMINATION: Chest 1 view 07/26/2022  HISTORY: Chest pain after fall.  FINDINGS: Today's exam is compared to previous study of 07/07/2021. Lungs are fully expanded and clear. There is no effusion or free air present. The humeral head is high riding and abuts the acromion with erosive change consistent with chronic rotator cuff tear.      Impression: 1. No active disease. This report was finalized on 07/26/2022 17:33 by Dr. José Elmore MD.    XR Pelvis 1 or 2 View    Result Date: 7/26/2022  Narrative: EXAMINATION: AP pelvis 07/26/2022  HISTORY: Fall with right hip pain  FINDINGS: AP radiograph the pelvis demonstrates arthritic changes of both hips. There is no evidence of acute fracture or dislocation. The SI joints and pubic symphysis are intact.      Impression: . No acute fracture. This report was finalized on 07/26/2022 17:39 by Dr. José Elmore MD.    CT Facial Bones Without Contrast    Result Date: 7/26/2022  Narrative: EXAMINATION: CT of the facial bones without contrast 07/26/2022  DOSE: 160 mGycm. All CT scans are performed using dose optimization techniques as appropriate to the performed exam and including at least one of the following: Automated exposure control, adjustment of the mA and/or kV according to size, and the use of the iterative reconstruction technique..  HISTORY: Fall with facial injuries.  FINDINGS: Multiple contiguous axial images are obtained through the facial bones per protocol without intravenous contrast enhancement with reformatted images obtained in the sagittal and coronal projections from the original data set.  There is bony nasal septal deviation to the left. There is no evidence of acute facial fracture. The  orbits are intact. A right frontal scalp hematoma is present with no underlying calvarial injury.      Impression: 1.. No evidence of acute facial fracture. This report was finalized on 07/26/2022 18:47 by Dr. José Elmore MD.      LABORATORY/CULTURE RESULTS:      PATHOLOGY RESULTS:       ASSESSMENT/PLAN     Diagnoses and all orders for this visit:    1. Onychomycosis (Primary)    2. Chronic venous stasis    3. Hammer toes of both feet    4. Peripheral polyneuropathy    5. Pre-ulcerative calluses    6. Valgus deformity of both great toes      Comprehensive lower extremity examination and evaluation was performed.  Discussed findings and treatment plan including risks, benefits, and treatment options with patient in detail. Patient agreed with treatment plan.  After verbal consent obtained, nail(s) x10 debrided of length and thickness with nail nipper without incidence  After verbal consent obtained, calluses x2 pared utilizing dermal curette and/or scalpel without incidence  Patient may maintain nails and calluses at home utilizing emery board or pumice stone between visits as needed .  Elevate lower extremities at rest.  An After Visit Summary was printed and given to the patient at discharge, including (if requested) any available informative/educational handouts regarding diagnosis, treatment, or medications. All questions were answered to patient/family satisfaction. Should symptoms fail to improve or worsen they agree to call or return to clinic or to go to the Emergency Department. Discussed the importance of following up with any needed screening tests/labs/specialist appointments and any requested follow-up recommended by me today. Importance of maintaining follow-up discussed and patient accepts that missed appointments can delay diagnosis and potentially lead to worsening of conditions.  Return in about 3 months (around 11/5/2022)., or sooner if acute issues arise.    Lab Frequency Next Occurrence    Follow Anesthesia Guidelines / Protocol Once 06/11/2020   Obtain Informed Consent Once 06/16/2020   Adult Transthoracic Echo Complete W/ Cont if Necessary Per Protocol Once 07/12/2021   Follow Anesthesia Guidelines / Protocol Once 08/11/2021   Obtain Informed Consent Once 08/16/2021   Diet: Once 09/02/2021   Diet: Once 09/02/2021   US Carotid Bilateral Once 03/15/2022   US Ankle / Brachial Indices Extremity Complete Once 03/15/2022       This document has been electronically signed by ABDON Eduardo on August 15, 2022 12:38 CDT

## 2022-08-02 NOTE — PROGRESS NOTES
Chief Complaint   Patient presents with   • Suture / Staple Removal         History:  Carmen Obrien is a 82 y.o. female who presents today for evaluation of the above problems.          7/26/22 seen in ER for sutures after tripping over a basket in her garage and hitting her head. I reviewed ER note.  She had 2 sutures placed right forehead.    She presents today for suture removal.  She has been cleaning the area with saline.  There are large scabs over the area, and she is not sure we can get the sutures out.      ROS:  Review of Systems  As above    Allergies   Allergen Reactions   • Levaquin [Levofloxacin] Unknown (See Comments)     Pt doesn't remember   • Codeine Other (See Comments)     Pt does not recall   • Morphine And Related Other (See Comments)     Pt does not recall     Past Medical History:   Diagnosis Date   • Anxiety    • Arthritis    • CAD (coronary artery disease)    • Colon polyp    • DDD (degenerative disc disease), lumbar    • Deep venous thrombosis (HCC)    • Depression    • Diverticulosis    • Esophageal stricture    • GERD (gastroesophageal reflux disease)    • History of transfusion    • Hypercholesteremia    • Hypertension    • LPRD (laryngopharyngeal reflux disease)    • Macular degeneration    • Multinodular goiter 2/23/2017   • Osteoarthritis    • Pruritic disorder    • Spastic colon    • Thyroid nodule      Past Surgical History:   Procedure Laterality Date   • BLADDER REPAIR     • CATARACT EXTRACTION     • CHOLECYSTECTOMY     • COLONOSCOPY  04/22/2014    2 polyps, adenomatous, diverticulosis   • COLONOSCOPY N/A 6/26/2020    Procedure: COLONOSCOPY WITH ANESTHESIA;  Surgeon: John Coleman MD;  Location: [facility] ENDOSCOPY;  Service: Gastroenterology;  Laterality: N/A;  pre op: constipation  post op: diverticulosis,   PCP: Adam Delgadillo MD   • CYSTOCELE REPAIR     • ENDOSCOPY  04/22/2014    Schatzki's ring dilated   • ENDOSCOPY N/A 6/26/2020    Procedure:  ESOPHAGOGASTRODUODENOSCOPY WITH ANESTHESIA;  Surgeon: John Coleman MD;  Location:  [facility] ENDOSCOPY;  Service: Gastroenterology;  Laterality: N/A;  pre op: dysphagia  post op:stricture, dilated   PCP:Adam Delgadillo MD   • ENDOSCOPY N/A 9/2/2021    Procedure: ESOPHAGOGASTRODUODENOSCOPY WITH ANESTHESIA;  Surgeon: John Coleman MD;  Location:  [facility] ENDOSCOPY;  Service: Gastroenterology;  Laterality: N/A;  pre op: dysphagia  post op:gastritis  PCP: VERA Noble MD   • FEMUR FRACTURE SURGERY     • HYSTERECTOMY     • INCISION AND DRAINAGE LEG Left 11/3/2021    Procedure: INCISION AND DRAINAGE LEG ABSCESS;  Surgeon: Cesia Mondragon MD;  Location: [facility] OR;  Service: General;  Laterality: Left;   • REPLACEMENT TOTAL KNEE     • ULNAR NERVE TRANSPOSITION       Family History   Problem Relation Age of Onset   • Colon cancer Mother    • Heart disease Mother    • Heart disease Father    • Colon polyps Neg Hx    • Esophageal cancer Neg Hx      Carmen  reports that she has never smoked. She has never used smokeless tobacco. She reports that she does not drink alcohol and does not use drugs.    I have reviewed and updated the above documentation (if necessary) including but not limited to chief complaint, ROS, PFSH, allergies and medications        Current Outpatient Medications:   •  aspirin 81 MG EC tablet, Take 81 mg by mouth Daily., Disp: , Rfl:   •  atorvastatin (LIPITOR) 10 MG tablet, TAKE 1 TABLET BY MOUTH DAILY., Disp: 30 tablet, Rfl: 5  •  Calcium Carbonate-Vitamin D (CALTRATE 600+D PO), Take 1 tablet by mouth 2 (two) times a day. Morning and nightly, Disp: , Rfl:   •  carvedilol (COREG) 6.25 MG tablet, Take 1 tablet by mouth 2 (Two) Times a Day With Meals., Disp: 60 tablet, Rfl: 11  •  cefdinir (OMNICEF) 300 MG capsule, Take 1 capsule by mouth 2 (Two) Times a Day for 7 days., Disp: 14 capsule, Rfl: 0  •  celecoxib (CeleBREX) 200 MG capsule, TAKE 1 CAPSULE BY MOUTH ONCE A DAY WITH FOOD, Disp: , Rfl:    •  cetirizine (zyrTEC) 10 MG tablet, Take 10 mg by mouth Daily., Disp: , Rfl:   •  Cholecalciferol (VITAMIN D3) 125 MCG (5000 UT) capsule capsule, Take 5,000 Units by mouth Daily., Disp: , Rfl:   •  cloNIDine (CATAPRES) 0.1 MG tablet, TAKE ONE TABLET DAILY AS NEEDED FOR BLOOD PRESSURE >180/100., Disp: 10 tablet, Rfl: 2  •  colchicine 0.6 MG tablet, Take 0.6 mg by mouth Daily., Disp: , Rfl:   •  Cyanocobalamin (Vitamin B-12) 1000 MCG sublingual tablet, Place 2.5 tablets under the tongue Daily. 2500mcg, Disp: , Rfl:   •  cycloSPORINE (RESTASIS) 0.05 % ophthalmic emulsion, Administer 1 drop to both eyes Every 12 (Twelve) Hours., Disp: , Rfl:   •  esomeprazole (nexIUM) 20 MG capsule, Take 1 capsule by mouth 2 (Two) Times a Day., Disp: 60 capsule, Rfl: 11  •  FEROSUL 325 (65 Fe) MG tablet, Take 325 mg by mouth Daily With Breakfast., Disp: , Rfl:   •  fluticasone (FLONASE) 50 MCG/ACT nasal spray, 2 sprays into the nostril(s) as directed by provider Daily., Disp: , Rfl:   •  furosemide (LASIX) 40 MG tablet, TAKE 1 TABLET BY MOUTH DAILY., Disp: 30 tablet, Rfl: 5  •  HYDROcodone-acetaminophen (NORCO) 7.5-325 MG per tablet, Take 1 tablet by mouth Every 8 (Eight) Hours As Needed for Moderate Pain ., Disp: 6 tablet, Rfl: 0  •  hydrOXYzine (ATARAX) 25 MG tablet, Take 1 tablet by mouth 3 (Three) Times a Day As Needed for Anxiety., Disp: 90 tablet, Rfl: 5  •  lisinopril (PRINIVIL,ZESTRIL) 10 MG tablet, Take 1 tablet by mouth Daily., Disp: 30 tablet, Rfl: 5  •  montelukast (SINGULAIR) 10 MG tablet, Take 1 tablet by mouth Every Night., Disp: 30 tablet, Rfl: 11  •  multivitamin with minerals tablet tablet, Take 1 tablet by mouth Daily., Disp: , Rfl:   •  mupirocin (BACTROBAN) 2 % ointment, APPLY OINTMENT TO RIGHT GREAT TOE AND RIGHT 4TH TOE, Disp: , Rfl:   •  nortriptyline (PAMELOR) 50 MG capsule, TAKE 1 CAPSULE BY MOUTH NIGHTLY, Disp: 30 capsule, Rfl: 5  •  polyethyl glycol-propyl glycol (SYSTANE) 0.4-0.3 % solution ophthalmic  "solution, Administer 2 drops to both eyes Every 2 (Two) Hours As Needed (dry eyes)., Disp: , Rfl:   •  polyethylene glycol (polyethylene glycol) 17 g packet, Take 17 g by mouth Daily., Disp: , Rfl:   •  pregabalin (LYRICA) 50 MG capsule, Take 50 mg by mouth Every 12 (Twelve) Hours As Needed., Disp: , Rfl:   •  Prolia 60 MG/ML solution prefilled syringe syringe, , Disp: , Rfl:   •  Santyl 250 UNIT/GM ointment, APPLY TOPICALLY ONCE DAILY, Disp: , Rfl:   •  sennosides-docusate (PERICOLACE) 8.6-50 MG per tablet, Take 2 tablets by mouth Every Night., Disp: , Rfl:   •  simethicone (MYLICON) 125 MG chewable tablet, Chew 125 mg Every 6 (Six) Hours As Needed for Flatulence., Disp: , Rfl:   •  tolterodine LA (DETROL LA) 4 MG 24 hr capsule, Take 1 capsule by mouth Daily., Disp: , Rfl:     OBJECTIVE:  Visit Vitals  /78 (BP Location: Left arm, Patient Position: Sitting, Cuff Size: Adult)   Pulse 70   Ht 152.4 cm (60\")   SpO2 100%   BMI 34.96 kg/m²      Physical Exam  HENT:      Head:        Comments: Right forehead with peeled skin and two large thick scabs, about dime-sized diameter and 5 mm depth each.  I cannot see sutures. I have tried to gently wipe area with alcohol swab and suspect the wound will bleed if I aggressively debride it.  We do not have peroxide or any kind of wound care products/dressings here in this office.  Skin:     General: Skin is warm and dry.         [facility]    Assessment/Plan    Diagnoses and all orders for this visit:    1. Sutured skin wound (Primary)    I have called down to wound care office, and they can see patient right now to clean wound and see if they can get the sutures removed.  I feel this is the best option due to lack of wound care supplies in our office.  Patient is agreeable to this plan.           Education materials and an After Visit Summary were printed and given to the patient at discharge.  No follow-ups on file.         ABDON Smith   10:46 CDT  8/2/2022   "

## 2022-08-05 ENCOUNTER — OFFICE VISIT (OUTPATIENT)
Dept: PODIATRY | Facility: CLINIC | Age: 82
End: 2022-08-05

## 2022-08-05 VITALS
WEIGHT: 179 LBS | SYSTOLIC BLOOD PRESSURE: 140 MMHG | DIASTOLIC BLOOD PRESSURE: 80 MMHG | HEIGHT: 60 IN | BODY MASS INDEX: 35.14 KG/M2

## 2022-08-05 DIAGNOSIS — L84 PRE-ULCERATIVE CALLUSES: ICD-10-CM

## 2022-08-05 DIAGNOSIS — M20.42 HAMMER TOES OF BOTH FEET: ICD-10-CM

## 2022-08-05 DIAGNOSIS — I87.8 CHRONIC VENOUS STASIS: ICD-10-CM

## 2022-08-05 DIAGNOSIS — G62.9 PERIPHERAL POLYNEUROPATHY: ICD-10-CM

## 2022-08-05 DIAGNOSIS — M20.11 VALGUS DEFORMITY OF BOTH GREAT TOES: ICD-10-CM

## 2022-08-05 DIAGNOSIS — M20.41 HAMMER TOES OF BOTH FEET: ICD-10-CM

## 2022-08-05 DIAGNOSIS — B35.1 ONYCHOMYCOSIS: Primary | ICD-10-CM

## 2022-08-05 DIAGNOSIS — M20.12 VALGUS DEFORMITY OF BOTH GREAT TOES: ICD-10-CM

## 2022-08-05 PROCEDURE — 11721 DEBRIDE NAIL 6 OR MORE: CPT | Performed by: NURSE PRACTITIONER

## 2022-08-05 PROCEDURE — 11056 PARNG/CUTG B9 HYPRKR LES 2-4: CPT | Performed by: NURSE PRACTITIONER

## 2022-08-12 ENCOUNTER — OFFICE VISIT (OUTPATIENT)
Dept: WOUND CARE | Facility: HOSPITAL | Age: 82
End: 2022-08-12

## 2022-08-12 ENCOUNTER — OFFICE VISIT (OUTPATIENT)
Dept: INTERNAL MEDICINE | Facility: CLINIC | Age: 82
End: 2022-08-12

## 2022-08-12 VITALS
HEART RATE: 60 BPM | BODY MASS INDEX: 35.14 KG/M2 | HEIGHT: 60 IN | OXYGEN SATURATION: 98 % | DIASTOLIC BLOOD PRESSURE: 84 MMHG | WEIGHT: 179 LBS | SYSTOLIC BLOOD PRESSURE: 142 MMHG

## 2022-08-12 DIAGNOSIS — R26.89 OTHER ABNORMALITIES OF GAIT AND MOBILITY: ICD-10-CM
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
## 2022-08-12 DIAGNOSIS — S01.90XD UNSPECIFIED OPEN WOUND OF UNSPECIFIED PART OF HEAD, SUBSEQUENT ENCOUNTER: ICD-10-CM

## 2022-08-12 DIAGNOSIS — R41.3 MEMORY IMPAIRMENT OF GRADUAL ONSET: ICD-10-CM

## 2022-08-12 DIAGNOSIS — I10 PRIMARY HYPERTENSION: ICD-10-CM

## 2022-08-12 DIAGNOSIS — I89.0 LYMPHEDEMA, NOT ELSEWHERE CLASSIFIED: ICD-10-CM

## 2022-08-12 DIAGNOSIS — Z00.00 ENCOUNTER FOR SUBSEQUENT ANNUAL WELLNESS VISIT (AWV) IN MEDICARE PATIENT: Primary | ICD-10-CM

## 2022-08-12 PROCEDURE — 1126F AMNT PAIN NOTED NONE PRSNT: CPT | Performed by: NURSE PRACTITIONER

## 2022-08-12 PROCEDURE — 1170F FXNL STATUS ASSESSED: CPT | Performed by: NURSE PRACTITIONER

## 2022-08-12 PROCEDURE — G0463 HOSPITAL OUTPT CLINIC VISIT: HCPCS

## 2022-08-12 PROCEDURE — 99212 OFFICE O/P EST SF 10 MIN: CPT | Performed by: NURSE PRACTITIONER

## 2022-08-12 PROCEDURE — G0439 PPPS, SUBSEQ VISIT: HCPCS | Performed by: NURSE PRACTITIONER

## 2022-08-12 PROCEDURE — 99213 OFFICE O/P EST LOW 20 MIN: CPT | Performed by: NURSE PRACTITIONER

## 2022-08-12 PROCEDURE — 1159F MED LIST DOCD IN RCRD: CPT | Performed by: NURSE PRACTITIONER

## 2022-08-12 PROCEDURE — 96160 PT-FOCUSED HLTH RISK ASSMT: CPT | Performed by: NURSE PRACTITIONER
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
## 2022-08-12 NOTE — PROGRESS NOTES
Chief Complaint   Patient presents with   • Medicare Wellness-subsequent         History:  Carmen Obrien is a 82 y.o. female who presents today for evaluation of the above problems.          Here for AWV.  As a separately identifiable issue, she is concerned about memory loss.  She says her children wanted her to mention it.  She has noted over the last 2-3 months that she will be in the middle of a sentence and forget what she is saying. She can re-focus and remember, but she feels there is definitely something wrong.  She doesn't get lost or confused.  Can remember things short-term and long-term. Not having headaches or stroke symptoms.        ROS:  Review of Systems  As above    Allergies   Allergen Reactions   • Levaquin [Levofloxacin] Unknown (See Comments)     Pt doesn't remember   • Codeine Other (See Comments)     Pt does not recall   • Morphine And Related Other (See Comments)     Pt does not recall     Past Medical History:   Diagnosis Date   • Anxiety    • Arthritis    • CAD (coronary artery disease)    • Colon polyp    • DDD (degenerative disc disease), lumbar    • Deep venous thrombosis (HCC)    • Depression    • Diverticulosis    • Esophageal stricture    • GERD (gastroesophageal reflux disease)    • History of transfusion    • Hypercholesteremia    • Hypertension    • LPRD (laryngopharyngeal reflux disease)    • Macular degeneration    • Multinodular goiter 2/23/2017   • Osteoarthritis    • Pruritic disorder    • Spastic colon    • Thyroid nodule      Past Surgical History:   Procedure Laterality Date   • BLADDER REPAIR     • CATARACT EXTRACTION     • CHOLECYSTECTOMY     • COLONOSCOPY  04/22/2014    2 polyps, adenomatous, diverticulosis   • COLONOSCOPY N/A 6/26/2020    Procedure: COLONOSCOPY WITH ANESTHESIA;  Surgeon: oJhn Coleman MD;  Location: [facility] ENDOSCOPY;  Service: Gastroenterology;  Laterality: N/A;  pre op: constipation  post op: diverticulosis,   PCP: Adam Delgadillo MD   •  CYSTOCELE REPAIR     • ENDOSCOPY  04/22/2014    Schatzki's ring dilated   • ENDOSCOPY N/A 6/26/2020    Procedure: ESOPHAGOGASTRODUODENOSCOPY WITH ANESTHESIA;  Surgeon: John Coleman MD;  Location: [facility] ENDOSCOPY;  Service: Gastroenterology;  Laterality: N/A;  pre op: dysphagia  post op:stricture, dilated   PCP:Adam Delgadillo MD   • ENDOSCOPY N/A 9/2/2021    Procedure: ESOPHAGOGASTRODUODENOSCOPY WITH ANESTHESIA;  Surgeon: John Coleman MD;  Location: [facility] ENDOSCOPY;  Service: Gastroenterology;  Laterality: N/A;  pre op: dysphagia  post op:gastritis  PCP: VERA Noble MD   • FEMUR FRACTURE SURGERY     • HYSTERECTOMY     • INCISION AND DRAINAGE LEG Left 11/3/2021    Procedure: INCISION AND DRAINAGE LEG ABSCESS;  Surgeon: Cesia Mondragon MD;  Location: [facility] OR;  Service: General;  Laterality: Left;   • REPLACEMENT TOTAL KNEE     • ULNAR NERVE TRANSPOSITION       Family History   Problem Relation Age of Onset   • Colon cancer Mother    • Heart disease Mother    • Heart disease Father    • Colon polyps Neg Hx    • Esophageal cancer Neg Hx      Carmen  reports that she has never smoked. She has never used smokeless tobacco. She reports that she does not drink alcohol and does not use drugs.    I have reviewed and updated the above documentation (if necessary) including but not limited to chief complaint, ROS, PFSH, allergies and medications        Current Outpatient Medications:   •  aspirin 81 MG EC tablet, Take 81 mg by mouth Daily., Disp: , Rfl:   •  atorvastatin (LIPITOR) 10 MG tablet, TAKE 1 TABLET BY MOUTH DAILY., Disp: 30 tablet, Rfl: 5  •  Calcium Carbonate-Vitamin D (CALTRATE 600+D PO), Take 1 tablet by mouth 2 (two) times a day. Morning and nightly, Disp: , Rfl:   •  carvedilol (COREG) 6.25 MG tablet, Take 1 tablet by mouth 2 (Two) Times a Day With Meals., Disp: 60 tablet, Rfl: 11  •  celecoxib (CeleBREX) 200 MG capsule, TAKE 1 CAPSULE BY MOUTH ONCE A DAY WITH FOOD, Disp: , Rfl:   •   cetirizine (zyrTEC) 10 MG tablet, Take 10 mg by mouth Daily., Disp: , Rfl:   •  Cholecalciferol (VITAMIN D3) 125 MCG (5000 UT) capsule capsule, Take 5,000 Units by mouth Daily., Disp: , Rfl:   •  cloNIDine (CATAPRES) 0.1 MG tablet, TAKE ONE TABLET DAILY AS NEEDED FOR BLOOD PRESSURE >180/100., Disp: 10 tablet, Rfl: 2  •  colchicine 0.6 MG tablet, Take 0.6 mg by mouth Daily., Disp: , Rfl:   •  Cyanocobalamin (Vitamin B-12) 1000 MCG sublingual tablet, Place 2.5 tablets under the tongue Daily. 2500mcg, Disp: , Rfl:   •  cycloSPORINE (RESTASIS) 0.05 % ophthalmic emulsion, Administer 1 drop to both eyes Every 12 (Twelve) Hours., Disp: , Rfl:   •  esomeprazole (nexIUM) 20 MG capsule, Take 1 capsule by mouth 2 (Two) Times a Day., Disp: 60 capsule, Rfl: 11  •  FEROSUL 325 (65 Fe) MG tablet, Take 325 mg by mouth Daily With Breakfast., Disp: , Rfl:   •  fluticasone (FLONASE) 50 MCG/ACT nasal spray, 2 sprays into the nostril(s) as directed by provider Daily., Disp: , Rfl:   •  furosemide (LASIX) 40 MG tablet, TAKE 1 TABLET BY MOUTH DAILY., Disp: 30 tablet, Rfl: 5  •  HYDROcodone-acetaminophen (NORCO) 7.5-325 MG per tablet, Take 1 tablet by mouth Every 8 (Eight) Hours As Needed for Moderate Pain ., Disp: 6 tablet, Rfl: 0  •  hydrOXYzine (ATARAX) 25 MG tablet, Take 1 tablet by mouth 3 (Three) Times a Day As Needed for Anxiety., Disp: 90 tablet, Rfl: 5  •  lisinopril (PRINIVIL,ZESTRIL) 10 MG tablet, Take 1 tablet by mouth Daily., Disp: 30 tablet, Rfl: 5  •  montelukast (SINGULAIR) 10 MG tablet, Take 1 tablet by mouth Every Night., Disp: 30 tablet, Rfl: 11  •  multivitamin with minerals tablet tablet, Take 1 tablet by mouth Daily., Disp: , Rfl:   •  mupirocin (BACTROBAN) 2 % ointment, APPLY OINTMENT TO RIGHT GREAT TOE AND RIGHT 4TH TOE, Disp: , Rfl:   •  nortriptyline (PAMELOR) 50 MG capsule, TAKE 1 CAPSULE BY MOUTH NIGHTLY, Disp: 30 capsule, Rfl: 5  •  polyethyl glycol-propyl glycol (SYSTANE) 0.4-0.3 % solution ophthalmic  "solution, Administer 2 drops to both eyes Every 2 (Two) Hours As Needed (dry eyes)., Disp: , Rfl:   •  polyethylene glycol (polyethylene glycol) 17 g packet, Take 17 g by mouth Daily., Disp: , Rfl:   •  pregabalin (LYRICA) 50 MG capsule, Take 50 mg by mouth Every 12 (Twelve) Hours As Needed., Disp: , Rfl:   •  Prolia 60 MG/ML solution prefilled syringe syringe, , Disp: , Rfl:   •  Santyl 250 UNIT/GM ointment, APPLY TOPICALLY ONCE DAILY, Disp: , Rfl:   •  sennosides-docusate (PERICOLACE) 8.6-50 MG per tablet, Take 2 tablets by mouth Every Night., Disp: , Rfl:   •  simethicone (MYLICON) 125 MG chewable tablet, Chew 125 mg Every 6 (Six) Hours As Needed for Flatulence., Disp: , Rfl:   •  tolterodine LA (DETROL LA) 4 MG 24 hr capsule, Take 1 capsule by mouth Daily., Disp: , Rfl:     OBJECTIVE:  Visit Vitals  /84 (BP Location: Left arm, Patient Position: Sitting, Cuff Size: Adult)   Pulse 60   Ht 152.4 cm (60\")   Wt 81.2 kg (179 lb)   SpO2 98%   BMI 34.96 kg/m²      Physical Exam  Vitals and nursing note reviewed.   Constitutional:       General: She is not in acute distress.     Appearance: Normal appearance. She is not ill-appearing, toxic-appearing or diaphoretic.   HENT:      Head: Normocephalic and atraumatic.      Right Ear: Tympanic membrane, ear canal and external ear normal. There is no impacted cerumen.      Left Ear: Tympanic membrane, ear canal and external ear normal. There is no impacted cerumen.   Eyes:      General: No scleral icterus.  Neck:      Vascular: No carotid bruit.      Comments: No mass or thyromegaly  Cardiovascular:      Rate and Rhythm: Normal rate and regular rhythm.   Pulmonary:      Effort: Pulmonary effort is normal. No respiratory distress.      Breath sounds: Normal breath sounds. No wheezing.   Abdominal:      General: There is no distension.      Palpations: Abdomen is soft.      Tenderness: There is no abdominal tenderness.   Musculoskeletal:      Cervical back: Normal range of " motion and neck supple. No rigidity or tenderness.      Right lower leg: No edema.      Left lower leg: No edema.   Lymphadenopathy:      Cervical: No cervical adenopathy.   Skin:     General: Skin is warm and dry.   Neurological:      Mental Status: She is alert and oriented to person, place, and time.      Comments: ACE mini score 18 today, with <25 indicating cognitive impairment.   Psychiatric:         Mood and Affect: Mood normal.         Behavior: Behavior normal.         Thought Content: Thought content normal.         Judgment: Judgment normal.         [facility]    Assessment/Plan    Diagnoses and all orders for this visit:    1. Encounter for subsequent annual wellness visit (AWV) in Medicare patient (Primary)  -     Hemoglobin A1c; Future  -     Lipid panel; Future    2. Memory impairment of gradual onset  -     Ambulatory Referral to Neurology  -     TSH; Future  -     Vitamin B12; Future    3. Primary hypertension      I will refer to neurology and chest TSH and B12 related to memory.  She has had other recent labs which I reviewed, and I do not think these need to be repeated at today's visit.         Education materials and an After Visit Summary were printed and given to the patient at discharge.  Return in about 6 months (around 2/12/2023) for follow up with Dr. Noble.         Vianney Figueredo, ABDON   17:03 CDT  8/12/2022

## 2022-08-12 NOTE — PATIENT INSTRUCTIONS
Medicare Wellness  Personal Prevention Plan of Service     Date of Office Visit:    Encounter Provider:  ABDON Smith  Place of Service:  [facility] INTERNAL MEDICINE  Patient Name: Carmen Obrien  :  1940    As part of the Medicare Wellness portion of your visit today, we are providing you with this personalized preventive plan of services (PPPS). This plan is based upon recommendations of the [place] Preventive Services Task Force (USPSTF) and the Advisory Committee on Immunization Practices (ACIP).    This lists the preventive care services that should be considered, and provides dates of when you are due. Items listed as completed are up-to-date and do not require any further intervention.    Health Maintenance   Topic Date Due    Pneumococcal Vaccine 65+ (1 - PCV) Never done    ZOSTER VACCINE (2 of 2) 2019    ANNUAL WELLNESS VISIT  2022    INFLUENZA VACCINE  10/01/2022    LIPID PANEL  2022    DXA SCAN  2023    COLORECTAL CANCER SCREENING  2025    TDAP/TD VACCINES (2 - Td or Tdap) 2030    COVID-19 Vaccine  Completed       Orders Placed This Encounter   Procedures    Ambulatory Referral to Neurology     Referral Priority:   Routine     Referral Type:   Consultation     Referral Reason:   Specialty Services Required     Referred to Provider:   Dayan Busch MD     Requested Specialty:   Neurology     Number of Visits Requested:   1       No follow-ups on file.

## 2022-08-19 ENCOUNTER — OFFICE VISIT (OUTPATIENT)
Dept: INTERNAL MEDICINE | Facility: CLINIC | Age: 82
End: 2022-08-19

## 2022-08-19 ENCOUNTER — OFFICE VISIT (OUTPATIENT)
Dept: WOUND CARE | Facility: HOSPITAL | Age: 82
End: 2022-08-19

## 2022-08-19 ENCOUNTER — TELEPHONE (OUTPATIENT)
Dept: INTERNAL MEDICINE | Facility: CLINIC | Age: 82
End: 2022-08-19

## 2022-08-19 VITALS
WEIGHT: 179 LBS | DIASTOLIC BLOOD PRESSURE: 88 MMHG | BODY MASS INDEX: 35.14 KG/M2 | HEIGHT: 60 IN | OXYGEN SATURATION: 98 % | SYSTOLIC BLOOD PRESSURE: 138 MMHG | HEART RATE: 66 BPM

## 2022-08-19 DIAGNOSIS — S01.90XD UNSPECIFIED OPEN WOUND OF UNSPECIFIED PART OF HEAD, SUBSEQUENT ENCOUNTER: ICD-10-CM
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
## 2022-08-19 DIAGNOSIS — R26.89 OTHER ABNORMALITIES OF GAIT AND MOBILITY: ICD-10-CM

## 2022-08-19 DIAGNOSIS — R22.41 MASS OF RIGHT THIGH: Primary | ICD-10-CM

## 2022-08-19 DIAGNOSIS — I89.0 LYMPHEDEMA, NOT ELSEWHERE CLASSIFIED: ICD-10-CM

## 2022-08-19 PROCEDURE — 99213 OFFICE O/P EST LOW 20 MIN: CPT | Performed by: NURSE PRACTITIONER

## 2022-08-19 PROCEDURE — G0463 HOSPITAL OUTPT CLINIC VISIT: HCPCS

## 2022-08-19 PROCEDURE — 99212 OFFICE O/P EST SF 10 MIN: CPT | Performed by: NURSE PRACTITIONER

## 2022-08-19 NOTE — PROGRESS NOTES
Chief Complaint   Patient presents with   • Cyst     On right leg         History:  Carmen Obrien is a 82 y.o. female who presents today for evaluation of the above problems.          Over the last month, since her fall, she has noticed a cyst or hard spot in her right thigh.  It is tender. Has not been red or hot like when she had abscess in left thigh.  Has had knee surgery on right knee, but no other surgery/scar tissue.    I reviewed the xray femur report from ER visit 7/26/22, and it was normal.      ROS:  Review of Systems  As above    Allergies   Allergen Reactions   • Levaquin [Levofloxacin] Unknown (See Comments)     Pt doesn't remember   • Codeine Other (See Comments)     Pt does not recall   • Morphine And Related Other (See Comments)     Pt does not recall     Past Medical History:   Diagnosis Date   • Anxiety    • Arthritis    • CAD (coronary artery disease)    • Colon polyp    • DDD (degenerative disc disease), lumbar    • Deep venous thrombosis (HCC)    • Depression    • Diverticulosis    • Esophageal stricture    • GERD (gastroesophageal reflux disease)    • History of transfusion    • Hypercholesteremia    • Hypertension    • LPRD (laryngopharyngeal reflux disease)    • Macular degeneration    • Multinodular goiter 2/23/2017   • Osteoarthritis    • Pruritic disorder    • Spastic colon    • Thyroid nodule      Past Surgical History:   Procedure Laterality Date   • BLADDER REPAIR     • CATARACT EXTRACTION     • CHOLECYSTECTOMY     • COLONOSCOPY  04/22/2014    2 polyps, adenomatous, diverticulosis   • COLONOSCOPY N/A 6/26/2020    Procedure: COLONOSCOPY WITH ANESTHESIA;  Surgeon: John Coleman MD;  Location: [facility] ENDOSCOPY;  Service: Gastroenterology;  Laterality: N/A;  pre op: constipation  post op: diverticulosis,   PCP: Adam Delgadillo MD   • CYSTOCELE REPAIR     • ENDOSCOPY  04/22/2014    Schatzki's ring dilated   • ENDOSCOPY N/A 6/26/2020    Procedure: ESOPHAGOGASTRODUODENOSCOPY WITH  ANESTHESIA;  Surgeon: John Coleman MD;  Location: [facility] ENDOSCOPY;  Service: Gastroenterology;  Laterality: N/A;  pre op: dysphagia  post op:stricture, dilated   PCP:Adam Delgadillo MD   • ENDOSCOPY N/A 9/2/2021    Procedure: ESOPHAGOGASTRODUODENOSCOPY WITH ANESTHESIA;  Surgeon: John Coleman MD;  Location:  [facility] ENDOSCOPY;  Service: Gastroenterology;  Laterality: N/A;  pre op: dysphagia  post op:gastritis  PCP: VERA Noble MD   • FEMUR FRACTURE SURGERY     • HYSTERECTOMY     • INCISION AND DRAINAGE LEG Left 11/3/2021    Procedure: INCISION AND DRAINAGE LEG ABSCESS;  Surgeon: Cesia Mondragon MD;  Location: [facility] OR;  Service: General;  Laterality: Left;   • REPLACEMENT TOTAL KNEE     • ULNAR NERVE TRANSPOSITION       Family History   Problem Relation Age of Onset   • Colon cancer Mother    • Heart disease Mother    • Heart disease Father    • Colon polyps Neg Hx    • Esophageal cancer Neg Hx      Carmen  reports that she has never smoked. She has never used smokeless tobacco. She reports that she does not drink alcohol and does not use drugs.    I have reviewed and updated the above documentation (if necessary) including but not limited to chief complaint, ROS, PFSH, allergies and medications        Current Outpatient Medications:   •  aspirin 81 MG EC tablet, Take 81 mg by mouth Daily., Disp: , Rfl:   •  atorvastatin (LIPITOR) 10 MG tablet, TAKE 1 TABLET BY MOUTH DAILY., Disp: 30 tablet, Rfl: 5  •  Calcium Carbonate-Vitamin D (CALTRATE 600+D PO), Take 1 tablet by mouth 2 (two) times a day. Morning and nightly, Disp: , Rfl:   •  carvedilol (COREG) 6.25 MG tablet, Take 1 tablet by mouth 2 (Two) Times a Day With Meals., Disp: 60 tablet, Rfl: 11  •  celecoxib (CeleBREX) 200 MG capsule, TAKE 1 CAPSULE BY MOUTH ONCE A DAY WITH FOOD, Disp: , Rfl:   •  cetirizine (zyrTEC) 10 MG tablet, Take 10 mg by mouth Daily., Disp: , Rfl:   •  Cholecalciferol (VITAMIN D3) 125 MCG (5000 UT) capsule capsule,  Take 5,000 Units by mouth Daily., Disp: , Rfl:   •  cloNIDine (CATAPRES) 0.1 MG tablet, TAKE ONE TABLET DAILY AS NEEDED FOR BLOOD PRESSURE >180/100., Disp: 10 tablet, Rfl: 2  •  colchicine 0.6 MG tablet, Take 0.6 mg by mouth Daily., Disp: , Rfl:   •  Cyanocobalamin (Vitamin B-12) 1000 MCG sublingual tablet, Place 2.5 tablets under the tongue Daily. 2500mcg, Disp: , Rfl:   •  cycloSPORINE (RESTASIS) 0.05 % ophthalmic emulsion, Administer 1 drop to both eyes Every 12 (Twelve) Hours., Disp: , Rfl:   •  esomeprazole (nexIUM) 20 MG capsule, Take 1 capsule by mouth 2 (Two) Times a Day., Disp: 60 capsule, Rfl: 11  •  FEROSUL 325 (65 Fe) MG tablet, Take 325 mg by mouth Daily With Breakfast., Disp: , Rfl:   •  fluticasone (FLONASE) 50 MCG/ACT nasal spray, 2 sprays into the nostril(s) as directed by provider Daily., Disp: , Rfl:   •  furosemide (LASIX) 40 MG tablet, TAKE 1 TABLET BY MOUTH DAILY., Disp: 30 tablet, Rfl: 5  •  HYDROcodone-acetaminophen (NORCO) 7.5-325 MG per tablet, Take 1 tablet by mouth Every 8 (Eight) Hours As Needed for Moderate Pain ., Disp: 6 tablet, Rfl: 0  •  hydrOXYzine (ATARAX) 25 MG tablet, Take 1 tablet by mouth 3 (Three) Times a Day As Needed for Anxiety., Disp: 90 tablet, Rfl: 5  •  lisinopril (PRINIVIL,ZESTRIL) 10 MG tablet, Take 1 tablet by mouth Daily., Disp: 30 tablet, Rfl: 5  •  montelukast (SINGULAIR) 10 MG tablet, Take 1 tablet by mouth Every Night., Disp: 30 tablet, Rfl: 11  •  multivitamin with minerals tablet tablet, Take 1 tablet by mouth Daily., Disp: , Rfl:   •  mupirocin (BACTROBAN) 2 % ointment, APPLY OINTMENT TO RIGHT GREAT TOE AND RIGHT 4TH TOE, Disp: , Rfl:   •  nortriptyline (PAMELOR) 50 MG capsule, TAKE 1 CAPSULE BY MOUTH NIGHTLY, Disp: 30 capsule, Rfl: 5  •  polyethyl glycol-propyl glycol (SYSTANE) 0.4-0.3 % solution ophthalmic solution, Administer 2 drops to both eyes Every 2 (Two) Hours As Needed (dry eyes)., Disp: , Rfl:   •  polyethylene glycol (polyethylene glycol) 17 g  "packet, Take 17 g by mouth Daily., Disp: , Rfl:   •  pregabalin (LYRICA) 50 MG capsule, Take 50 mg by mouth Every 12 (Twelve) Hours As Needed., Disp: , Rfl:   •  Prolia 60 MG/ML solution prefilled syringe syringe, , Disp: , Rfl:   •  Santyl 250 UNIT/GM ointment, APPLY TOPICALLY ONCE DAILY, Disp: , Rfl:   •  sennosides-docusate (PERICOLACE) 8.6-50 MG per tablet, Take 2 tablets by mouth Every Night., Disp: , Rfl:   •  simethicone (MYLICON) 125 MG chewable tablet, Chew 125 mg Every 6 (Six) Hours As Needed for Flatulence., Disp: , Rfl:   •  tolterodine LA (DETROL LA) 4 MG 24 hr capsule, Take 1 capsule by mouth Daily., Disp: , Rfl:     OBJECTIVE:  Visit Vitals  /88 (BP Location: Left arm, Patient Position: Sitting, Cuff Size: Adult)   Pulse 66   Ht 152.4 cm (60\")   Wt 81.2 kg (179 lb)   SpO2 98%   BMI 34.96 kg/m²      Physical Exam  Vitals and nursing note reviewed.   Constitutional:       General: She is not in acute distress.     Appearance: Normal appearance. She is not ill-appearing, toxic-appearing or diaphoretic.   HENT:      Head: Normocephalic and atraumatic.   Pulmonary:      Effort: Pulmonary effort is normal. No respiratory distress.   Skin:     General: Skin is warm and dry.      Findings: No erythema.             Comments: Right thigh approximately 4 inches superior to knee and slightly lateral, there is a linear, lobular mass that is firm and tender.  There is a crease or slight linear indention in the skin overlying this area, and I am not sure if it is from a natural crease in the fatty tissue.  But she says it was never from an incision/scar.  The overlying skin is not erythematous or warm, not suspicious for cellulitis/abscess by exam.   Neurological:      Mental Status: She is alert.         [facility]    Assessment/Plan    Diagnoses and all orders for this visit:    1. Mass of right thigh (Primary)  -     US Nonvascular Extremity Limited; Future      Will US the area.  Have asked her to let us know if " any redness or swelling develops. I do not think antibiotics are indicated based on exam findings today.         Education materials and an After Visit Summary were printed and given to the patient at discharge.  Return if symptoms worsen or fail to improve, for Next scheduled follow up.         Vianney Figueredo, ABDON   11:26 CDT  8/19/2022

## 2022-08-24 ENCOUNTER — TELEPHONE (OUTPATIENT)
Dept: INTERNAL MEDICINE | Facility: CLINIC | Age: 82
End: 2022-08-24

## 2022-08-25 ENCOUNTER — PATIENT OUTREACH (OUTPATIENT)
Dept: CASE MANAGEMENT | Facility: OTHER | Age: 82
End: 2022-08-25

## 2022-08-25 DIAGNOSIS — I10 ESSENTIAL HYPERTENSION: ICD-10-CM

## 2022-08-25 RX ORDER — CLONIDINE HYDROCHLORIDE 0.1 MG/1
TABLET ORAL
Qty: 10 TABLET | Refills: 0 | Status: ON HOLD | OUTPATIENT
Start: 2022-08-25 | End: 2022-08-29

## 2022-08-25 NOTE — OUTREACH NOTE
AMBULATORY CASE MANAGEMENT NOTE    Name and Relationship of Patient/Support Person: Carmen Obrien - Self    Patient Outreach    HRCM follow up with care plan updated.        SHER POON  Ambulatory Case Management    8/25/2022, 14:22 CDT

## 2022-08-26 ENCOUNTER — LAB (OUTPATIENT)
Dept: LAB | Facility: HOSPITAL | Age: 82
End: 2022-08-26

## 2022-08-26 ENCOUNTER — HOSPITAL ENCOUNTER (OUTPATIENT)
Dept: ULTRASOUND IMAGING | Facility: HOSPITAL | Age: 82
Discharge: HOME | End: 2022-08-26

## 2022-08-26 DIAGNOSIS — R22.41 MASS OF RIGHT THIGH: ICD-10-CM

## 2022-08-26 DIAGNOSIS — Z00.00 ENCOUNTER FOR SUBSEQUENT ANNUAL WELLNESS VISIT (AWV) IN MEDICARE PATIENT: ICD-10-CM

## 2022-08-26 DIAGNOSIS — R41.3 MEMORY IMPAIRMENT OF GRADUAL ONSET: ICD-10-CM

## 2022-08-26 LAB
CHOLEST SERPL-MCNC: 180 MG/DL (ref 130–200)
HBA1C MFR BLD: 5.4 % (ref 4.8–5.9)
HDLC SERPL-MCNC: 74 MG/DL
LDLC SERPL CALC-MCNC: 87 MG/DL (ref 0–99)
LDLC/HDLC SERPL: 1.14 {RATIO}
TRIGL SERPL-MCNC: 109 MG/DL (ref 0–149)
VLDLC SERPL-MCNC: 19 MG/DL (ref 5–40)

## 2022-08-26 PROCEDURE — 76882 US LMTD JT/FCL EVL NVASC XTR: CPT

## 2022-08-26 PROCEDURE — 84443 ASSAY THYROID STIM HORMONE: CPT

## 2022-08-26 PROCEDURE — 82607 VITAMIN B-12: CPT

## 2022-08-26 PROCEDURE — 83036 HEMOGLOBIN GLYCOSYLATED A1C: CPT

## 2022-08-26 PROCEDURE — 80061 LIPID PANEL: CPT

## 2022-08-26 PROCEDURE — 36415 COLL VENOUS BLD VENIPUNCTURE: CPT

## 2022-08-27 LAB
TSH SERPL DL<=0.05 MIU/L-ACNC: 1.57 UIU/ML (ref 0.27–4.2)
VIT B12 BLD-MCNC: >2000 PG/ML (ref 211–946)

## 2022-08-28 ENCOUNTER — HOSPITAL ENCOUNTER (OUTPATIENT)
Facility: HOSPITAL | Age: 82
LOS: 4 days | Discharge: NURSING, REHAB OR LONG-TERM CARE | End: 2022-09-01
Attending: EMERGENCY MEDICINE | Admitting: FAMILY MEDICINE

## 2022-08-28 ENCOUNTER — APPOINTMENT (OUTPATIENT)
Dept: ULTRASOUND IMAGING | Facility: HOSPITAL | Age: 82
End: 2022-08-28

## 2022-08-28 ENCOUNTER — APPOINTMENT (OUTPATIENT)
Dept: GENERAL RADIOLOGY | Facility: HOSPITAL | Age: 82
End: 2022-08-28

## 2022-08-28 DIAGNOSIS — I10 ESSENTIAL HYPERTENSION: ICD-10-CM

## 2022-08-28 DIAGNOSIS — Z74.09 IMPAIRED MOBILITY: ICD-10-CM

## 2022-08-28 DIAGNOSIS — T79.6XXA TRAUMATIC RHABDOMYOLYSIS, INITIAL ENCOUNTER: ICD-10-CM

## 2022-08-28 DIAGNOSIS — L03.116 CELLULITIS OF LEFT LOWER LEG: Primary | ICD-10-CM

## 2022-08-28 DIAGNOSIS — Z78.9 DECREASED ACTIVITIES OF DAILY LIVING (ADL): ICD-10-CM

## 2022-08-28 DIAGNOSIS — G89.4 CHRONIC PAIN SYNDROME: ICD-10-CM

## 2022-08-28 DIAGNOSIS — R29.6 MULTIPLE FALLS: ICD-10-CM
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
## 2022-08-28 PROBLEM — M62.82 RHABDOMYOLYSIS: Status: ACTIVE | Noted: 2022-08-28

## 2022-08-28 PROBLEM — E83.42 HYPOMAGNESEMIA: Status: ACTIVE | Noted: 2022-08-28

## 2022-08-28 LAB
ALBUMIN SERPL-MCNC: 4 G/DL (ref 3.5–5.2)
ALBUMIN/GLOB SERPL: 1.3 G/DL
ALP SERPL-CCNC: 60 U/L (ref 39–117)
ALT SERPL W P-5'-P-CCNC: 29 U/L (ref 1–33)
ANION GAP SERPL CALCULATED.3IONS-SCNC: 14 MMOL/L (ref 5–15)
AST SERPL-CCNC: 38 U/L (ref 1–32)
BACTERIA UR QL AUTO: ABNORMAL /HPF
BASOPHILS # BLD AUTO: 0.05 10*3/MM3 (ref 0–0.2)
BASOPHILS NFR BLD AUTO: 0.3 % (ref 0–1.5)
BILIRUB SERPL-MCNC: 1 MG/DL (ref 0–1.2)
BILIRUB UR QL STRIP: NEGATIVE
BUN SERPL-MCNC: 15 MG/DL (ref 8–23)
BUN/CREAT SERPL: 26.3 (ref 7–25)
CALCIUM SPEC-SCNC: 10.6 MG/DL (ref 8.6–10.5)
CHLORIDE SERPL-SCNC: 99 MMOL/L (ref 98–107)
CK SERPL-CCNC: 580 U/L (ref 20–180)
CLARITY UR: ABNORMAL
CO2 SERPL-SCNC: 22 MMOL/L (ref 22–29)
COLOR UR: YELLOW
CREAT SERPL-MCNC: 0.57 MG/DL (ref 0.57–1)
D-LACTATE SERPL-SCNC: 1.6 MMOL/L (ref 0.5–2)
DEPRECATED RDW RBC AUTO: 46.6 FL (ref 37–54)
EGFRCR SERPLBLD CKD-EPI 2021: 92 ML/MIN/1.73
EOSINOPHIL # BLD AUTO: 0.01 10*3/MM3 (ref 0–0.4)
EOSINOPHIL NFR BLD AUTO: 0.1 % (ref 0.3–6.2)
ERYTHROCYTE [DISTWIDTH] IN BLOOD BY AUTOMATED COUNT: 13.6 % (ref 12.3–15.4)
GLOBULIN UR ELPH-MCNC: 3.2 GM/DL
GLUCOSE SERPL-MCNC: 101 MG/DL (ref 65–99)
GLUCOSE UR STRIP-MCNC: NEGATIVE MG/DL
HCT VFR BLD AUTO: 39.6 % (ref 34–46.6)
HGB BLD-MCNC: 13.4 G/DL (ref 12–15.9)
HGB UR QL STRIP.AUTO: ABNORMAL
HYALINE CASTS UR QL AUTO: ABNORMAL /LPF
IMM GRANULOCYTES # BLD AUTO: 0.1 10*3/MM3 (ref 0–0.05)
IMM GRANULOCYTES NFR BLD AUTO: 0.6 % (ref 0–0.5)
KETONES UR QL STRIP: NEGATIVE
LEUKOCYTE ESTERASE UR QL STRIP.AUTO: NEGATIVE
LYMPHOCYTES # BLD AUTO: 0.76 10*3/MM3 (ref 0.7–3.1)
LYMPHOCYTES NFR BLD AUTO: 4.9 % (ref 19.6–45.3)
MAGNESIUM SERPL-MCNC: 1.4 MG/DL (ref 1.6–2.4)
MCH RBC QN AUTO: 31.7 PG (ref 26.6–33)
MCHC RBC AUTO-ENTMCNC: 33.8 G/DL (ref 31.5–35.7)
MCV RBC AUTO: 93.6 FL (ref 79–97)
MONOCYTES # BLD AUTO: 0.66 10*3/MM3 (ref 0.1–0.9)
MONOCYTES NFR BLD AUTO: 4.3 % (ref 5–12)
NEUTROPHILS NFR BLD AUTO: 13.9 10*3/MM3 (ref 1.7–7)
NEUTROPHILS NFR BLD AUTO: 89.8 % (ref 42.7–76)
NITRITE UR QL STRIP: NEGATIVE
NRBC BLD AUTO-RTO: 0 /100 WBC (ref 0–0.2)
PH UR STRIP.AUTO: 7 [PH] (ref 5–8)
PLATELET # BLD AUTO: 230 10*3/MM3 (ref 140–450)
PMV BLD AUTO: 10 FL (ref 6–12)
POTASSIUM SERPL-SCNC: 3.3 MMOL/L (ref 3.5–5.2)
PROT SERPL-MCNC: 7.2 G/DL (ref 6–8.5)
PROT UR QL STRIP: ABNORMAL
RBC # BLD AUTO: 4.23 10*6/MM3 (ref 3.77–5.28)
RBC # UR STRIP: ABNORMAL /HPF
REF LAB TEST METHOD: ABNORMAL
SARS-COV-2 RNA PNL SPEC NAA+PROBE: NOT DETECTED
SODIUM SERPL-SCNC: 135 MMOL/L (ref 136–145)
SP GR UR STRIP: 1.02 (ref 1–1.03)
SQUAMOUS #/AREA URNS HPF: ABNORMAL /HPF
UROBILINOGEN UR QL STRIP: ABNORMAL
WBC # UR STRIP: ABNORMAL /HPF
WBC NRBC COR # BLD: 15.48 10*3/MM3 (ref 3.4–10.8)

## 2022-08-28 PROCEDURE — 82550 ASSAY OF CK (CPK): CPT | Performed by: EMERGENCY MEDICINE

## 2022-08-28 PROCEDURE — 71045 X-RAY EXAM CHEST 1 VIEW: CPT

## 2022-08-28 PROCEDURE — 99284 EMERGENCY DEPT VISIT MOD MDM: CPT

## 2022-08-28 PROCEDURE — 72170 X-RAY EXAM OF PELVIS: CPT

## 2022-08-28 PROCEDURE — 81001 URINALYSIS AUTO W/SCOPE: CPT | Performed by: EMERGENCY MEDICINE

## 2022-08-28 PROCEDURE — 93971 EXTREMITY STUDY: CPT

## 2022-08-28 PROCEDURE — 87040 BLOOD CULTURE FOR BACTERIA: CPT | Performed by: INTERNAL MEDICINE

## 2022-08-28 PROCEDURE — 87635 SARS-COV-2 COVID-19 AMP PRB: CPT | Performed by: EMERGENCY MEDICINE

## 2022-08-28 PROCEDURE — 80053 COMPREHEN METABOLIC PANEL: CPT | Performed by: EMERGENCY MEDICINE

## 2022-08-28 PROCEDURE — P9612 CATHETERIZE FOR URINE SPEC: HCPCS

## 2022-08-28 PROCEDURE — 93971 EXTREMITY STUDY: CPT | Performed by: SURGERY

## 2022-08-28 PROCEDURE — 96372 THER/PROPH/DIAG INJ SC/IM: CPT

## 2022-08-28 PROCEDURE — 85025 COMPLETE CBC W/AUTO DIFF WBC: CPT | Performed by: EMERGENCY MEDICINE

## 2022-08-28 PROCEDURE — 25010000002 MAGNESIUM SULFATE 2 GM/50ML SOLUTION: Performed by: INTERNAL MEDICINE

## 2022-08-28 PROCEDURE — G0378 HOSPITAL OBSERVATION PER HR: HCPCS

## 2022-08-28 PROCEDURE — 87040 BLOOD CULTURE FOR BACTERIA: CPT | Performed by: EMERGENCY MEDICINE

## 2022-08-28 PROCEDURE — C9803 HOPD COVID-19 SPEC COLLECT: HCPCS

## 2022-08-28 PROCEDURE — 25010000002 ENOXAPARIN PER 10 MG: Performed by: INTERNAL MEDICINE

## 2022-08-28 PROCEDURE — 83735 ASSAY OF MAGNESIUM: CPT | Performed by: EMERGENCY MEDICINE

## 2022-08-28 PROCEDURE — 83605 ASSAY OF LACTIC ACID: CPT | Performed by: EMERGENCY MEDICINE

## 2022-08-28 RX ORDER — ENOXAPARIN SODIUM 100 MG/ML
40 INJECTION SUBCUTANEOUS EVERY 24 HOURS
Status: DISCONTINUED | OUTPATIENT
Start: 2022-08-28 | End: 2022-09-01 | Stop reason: HOSPADM

## 2022-08-28 RX ORDER — FUROSEMIDE 40 MG/1
40 TABLET ORAL DAILY
Status: DISCONTINUED | OUTPATIENT
Start: 2022-08-29 | End: 2022-09-01 | Stop reason: HOSPADM

## 2022-08-28 RX ORDER — SODIUM CHLORIDE 0.9 % (FLUSH) 0.9 %
10 SYRINGE (ML) INJECTION AS NEEDED
Status: DISCONTINUED | OUTPATIENT
Start: 2022-08-28 | End: 2022-09-01 | Stop reason: HOSPADM

## 2022-08-28 RX ORDER — CLINDAMYCIN PHOSPHATE 600 MG/50ML
600 INJECTION INTRAVENOUS ONCE
Status: DISCONTINUED | OUTPATIENT
Start: 2022-08-28 | End: 2022-08-28

## 2022-08-28 RX ORDER — LISINOPRIL 10 MG/1
10 TABLET ORAL DAILY
Status: DISCONTINUED | OUTPATIENT
Start: 2022-08-28 | End: 2022-09-01 | Stop reason: HOSPADM

## 2022-08-28 RX ORDER — PANTOPRAZOLE SODIUM 40 MG/1
40 TABLET, DELAYED RELEASE ORAL
Refills: 11 | Status: DISCONTINUED | OUTPATIENT
Start: 2022-08-29 | End: 2022-09-01 | Stop reason: HOSPADM

## 2022-08-28 RX ORDER — CYCLOSPORINE 0.5 MG/ML
1 EMULSION OPHTHALMIC EVERY 12 HOURS SCHEDULED
Status: DISCONTINUED | OUTPATIENT
Start: 2022-08-28 | End: 2022-09-01 | Stop reason: HOSPADM

## 2022-08-28 RX ORDER — MONTELUKAST SODIUM 10 MG/1
10 TABLET ORAL NIGHTLY
Status: DISCONTINUED | OUTPATIENT
Start: 2022-08-28 | End: 2022-09-01 | Stop reason: HOSPADM

## 2022-08-28 RX ORDER — SODIUM CHLORIDE 0.9 % (FLUSH) 0.9 %
10 SYRINGE (ML) INJECTION EVERY 12 HOURS SCHEDULED
Status: DISCONTINUED | OUTPATIENT
Start: 2022-08-28 | End: 2022-09-01 | Stop reason: HOSPADM

## 2022-08-28 RX ORDER — FERROUS SULFATE 325(65) MG
325 TABLET ORAL
Status: DISCONTINUED | OUTPATIENT
Start: 2022-08-29 | End: 2022-09-01 | Stop reason: HOSPADM

## 2022-08-28 RX ORDER — POLYETHYLENE GLYCOL 3350 17 G/17G
17 POWDER, FOR SOLUTION ORAL DAILY
Status: DISCONTINUED | OUTPATIENT
Start: 2022-08-28 | End: 2022-08-30 | Stop reason: DRUGHIGH

## 2022-08-28 RX ORDER — OXYBUTYNIN CHLORIDE 5 MG/1
5 TABLET, EXTENDED RELEASE ORAL DAILY
Status: DISCONTINUED | OUTPATIENT
Start: 2022-08-29 | End: 2022-09-01 | Stop reason: HOSPADM

## 2022-08-28 RX ORDER — POTASSIUM CHLORIDE 750 MG/1
40 CAPSULE, EXTENDED RELEASE ORAL ONCE
Status: COMPLETED | OUTPATIENT
Start: 2022-08-28 | End: 2022-08-28

## 2022-08-28 RX ORDER — CARVEDILOL 6.25 MG/1
6.25 TABLET ORAL 2 TIMES DAILY WITH MEALS
Status: DISCONTINUED | OUTPATIENT
Start: 2022-08-28 | End: 2022-09-01 | Stop reason: HOSPADM

## 2022-08-28 RX ORDER — MAGNESIUM SULFATE HEPTAHYDRATE 40 MG/ML
2 INJECTION, SOLUTION INTRAVENOUS ONCE
Status: COMPLETED | OUTPATIENT
Start: 2022-08-28 | End: 2022-08-28

## 2022-08-28 RX ORDER — ONDANSETRON 2 MG/ML
4 INJECTION INTRAMUSCULAR; INTRAVENOUS EVERY 6 HOURS PRN
Status: DISCONTINUED | OUTPATIENT
Start: 2022-08-28 | End: 2022-09-01 | Stop reason: HOSPADM

## 2022-08-28 RX ORDER — CLINDAMYCIN PHOSPHATE 600 MG/50ML
600 INJECTION INTRAVENOUS ONCE
Status: COMPLETED | OUTPATIENT
Start: 2022-08-28 | End: 2022-08-28

## 2022-08-28 RX ORDER — ASPIRIN 81 MG/1
81 TABLET ORAL DAILY
Status: DISCONTINUED | OUTPATIENT
Start: 2022-08-28 | End: 2022-09-01 | Stop reason: HOSPADM

## 2022-08-28 RX ORDER — ACETAMINOPHEN 325 MG/1
650 TABLET ORAL EVERY 4 HOURS PRN
Status: DISCONTINUED | OUTPATIENT
Start: 2022-08-28 | End: 2022-09-01 | Stop reason: HOSPADM

## 2022-08-28 RX ORDER — HYDROCODONE BITARTRATE AND ACETAMINOPHEN 5; 325 MG/1; MG/1
1 TABLET ORAL EVERY 8 HOURS PRN
Status: DISCONTINUED | OUTPATIENT
Start: 2022-08-28 | End: 2022-09-01 | Stop reason: HOSPADM

## 2022-08-28 RX ORDER — PREGABALIN 50 MG/1
50 CAPSULE ORAL EVERY 12 HOURS SCHEDULED
Status: DISCONTINUED | OUTPATIENT
Start: 2022-08-28 | End: 2022-09-01 | Stop reason: HOSPADM

## 2022-08-28 RX ORDER — SODIUM CHLORIDE 9 MG/ML
50 INJECTION, SOLUTION INTRAVENOUS CONTINUOUS
Status: DISCONTINUED | OUTPATIENT
Start: 2022-08-28 | End: 2022-08-30

## 2022-08-28 RX ADMIN — ENOXAPARIN SODIUM 40 MG: 100 INJECTION SUBCUTANEOUS at 17:29

## 2022-08-28 RX ADMIN — MAGNESIUM SULFATE HEPTAHYDRATE 2 G: 40 INJECTION, SOLUTION INTRAVENOUS at 12:15

## 2022-08-28 RX ADMIN — Medication 10 ML: at 21:45

## 2022-08-28 RX ADMIN — POLYETHYLENE GLYCOL 3350 17 G: 17 POWDER, FOR SOLUTION ORAL at 13:26

## 2022-08-28 RX ADMIN — PREGABALIN 50 MG: 50 CAPSULE ORAL at 21:53

## 2022-08-28 RX ADMIN — CLINDAMYCIN PHOSPHATE 450 MG: 150 INJECTION, SOLUTION INTRAVENOUS at 21:46

## 2022-08-28 RX ADMIN — SODIUM CHLORIDE 100 ML/HR: 9 INJECTION, SOLUTION INTRAVENOUS at 13:09

## 2022-08-28 RX ADMIN — CYCLOSPORINE 1 DROP: 0.5 EMULSION OPHTHALMIC at 22:01

## 2022-08-28 RX ADMIN — LISINOPRIL 10 MG: 10 TABLET ORAL at 13:21

## 2022-08-28 RX ADMIN — CARVEDILOL 6.25 MG: 6.25 TABLET, FILM COATED ORAL at 17:29

## 2022-08-28 RX ADMIN — ASPIRIN 81 MG: 81 TABLET, COATED ORAL at 13:21

## 2022-08-28 RX ADMIN — CYCLOSPORINE 1 DROP: 0.5 EMULSION OPHTHALMIC at 13:21

## 2022-08-28 RX ADMIN — CLINDAMYCIN PHOSPHATE 600 MG: 600 INJECTION, SOLUTION INTRAVENOUS at 13:40

## 2022-08-28 RX ADMIN — MONTELUKAST SODIUM 10 MG: 10 TABLET, FILM COATED ORAL at 21:53

## 2022-08-28 RX ADMIN — POTASSIUM CHLORIDE 40 MEQ: 10 CAPSULE, COATED, EXTENDED RELEASE ORAL at 12:15

## 2022-08-29 LAB
ALBUMIN SERPL-MCNC: 3.1 G/DL (ref 3.5–5.2)
ALBUMIN/GLOB SERPL: 1.1 G/DL
ALP SERPL-CCNC: 50 U/L (ref 39–117)
ALT SERPL W P-5'-P-CCNC: 24 U/L (ref 1–33)
ANION GAP SERPL CALCULATED.3IONS-SCNC: 10 MMOL/L (ref 5–15)
AST SERPL-CCNC: 29 U/L (ref 1–32)
BASOPHILS # BLD AUTO: 0.03 10*3/MM3 (ref 0–0.2)
BASOPHILS NFR BLD AUTO: 0.3 % (ref 0–1.5)
BILIRUB SERPL-MCNC: 0.5 MG/DL (ref 0–1.2)
BUN SERPL-MCNC: 16 MG/DL (ref 8–23)
BUN/CREAT SERPL: 25.4 (ref 7–25)
CALCIUM SPEC-SCNC: 8.9 MG/DL (ref 8.6–10.5)
CHLORIDE SERPL-SCNC: 104 MMOL/L (ref 98–107)
CK SERPL-CCNC: 254 U/L (ref 20–180)
CO2 SERPL-SCNC: 22 MMOL/L (ref 22–29)
CREAT SERPL-MCNC: 0.63 MG/DL (ref 0.57–1)
DEPRECATED RDW RBC AUTO: 48.6 FL (ref 37–54)
EGFRCR SERPLBLD CKD-EPI 2021: 89.8 ML/MIN/1.73
EOSINOPHIL # BLD AUTO: 0.11 10*3/MM3 (ref 0–0.4)
EOSINOPHIL NFR BLD AUTO: 1.1 % (ref 0.3–6.2)
ERYTHROCYTE [DISTWIDTH] IN BLOOD BY AUTOMATED COUNT: 14 % (ref 12.3–15.4)
GLOBULIN UR ELPH-MCNC: 2.9 GM/DL
GLUCOSE SERPL-MCNC: 95 MG/DL (ref 65–99)
HCT VFR BLD AUTO: 33.3 % (ref 34–46.6)
HGB BLD-MCNC: 10.9 G/DL (ref 12–15.9)
IMM GRANULOCYTES # BLD AUTO: 0.06 10*3/MM3 (ref 0–0.05)
IMM GRANULOCYTES NFR BLD AUTO: 0.6 % (ref 0–0.5)
LYMPHOCYTES # BLD AUTO: 0.98 10*3/MM3 (ref 0.7–3.1)
LYMPHOCYTES NFR BLD AUTO: 9.8 % (ref 19.6–45.3)
MAGNESIUM SERPL-MCNC: 1.6 MG/DL (ref 1.6–2.4)
MCH RBC QN AUTO: 31.1 PG (ref 26.6–33)
MCHC RBC AUTO-ENTMCNC: 32.7 G/DL (ref 31.5–35.7)
MCV RBC AUTO: 94.9 FL (ref 79–97)
MONOCYTES # BLD AUTO: 0.56 10*3/MM3 (ref 0.1–0.9)
MONOCYTES NFR BLD AUTO: 5.6 % (ref 5–12)
NEUTROPHILS NFR BLD AUTO: 8.26 10*3/MM3 (ref 1.7–7)
NEUTROPHILS NFR BLD AUTO: 82.6 % (ref 42.7–76)
NRBC BLD AUTO-RTO: 0 /100 WBC (ref 0–0.2)
PHOSPHATE SERPL-MCNC: 3.3 MG/DL (ref 2.5–4.5)
PLATELET # BLD AUTO: 186 10*3/MM3 (ref 140–450)
PMV BLD AUTO: 10.2 FL (ref 6–12)
POTASSIUM SERPL-SCNC: 3.9 MMOL/L (ref 3.5–5.2)
PROT SERPL-MCNC: 6 G/DL (ref 6–8.5)
RBC # BLD AUTO: 3.51 10*6/MM3 (ref 3.77–5.28)
SODIUM SERPL-SCNC: 136 MMOL/L (ref 136–145)
WBC NRBC COR # BLD: 10 10*3/MM3 (ref 3.4–10.8)

## 2022-08-29 PROCEDURE — G0378 HOSPITAL OBSERVATION PER HR: HCPCS

## 2022-08-29 PROCEDURE — 96360 HYDRATION IV INFUSION INIT: CPT

## 2022-08-29 PROCEDURE — 96372 THER/PROPH/DIAG INJ SC/IM: CPT

## 2022-08-29 PROCEDURE — 97161 PT EVAL LOW COMPLEX 20 MIN: CPT | Performed by: PHYSICAL THERAPIST

## 2022-08-29 PROCEDURE — 97116 GAIT TRAINING THERAPY: CPT

## 2022-08-29 PROCEDURE — 25010000002 ENOXAPARIN PER 10 MG: Performed by: INTERNAL MEDICINE

## 2022-08-29 PROCEDURE — 85025 COMPLETE CBC W/AUTO DIFF WBC: CPT | Performed by: INTERNAL MEDICINE

## 2022-08-29 PROCEDURE — 83735 ASSAY OF MAGNESIUM: CPT | Performed by: INTERNAL MEDICINE

## 2022-08-29 PROCEDURE — 25010000002 CEFTRIAXONE PER 250 MG: Performed by: NURSE PRACTITIONER

## 2022-08-29 PROCEDURE — 80053 COMPREHEN METABOLIC PANEL: CPT | Performed by: INTERNAL MEDICINE

## 2022-08-29 PROCEDURE — 84100 ASSAY OF PHOSPHORUS: CPT | Performed by: INTERNAL MEDICINE

## 2022-08-29 PROCEDURE — 96361 HYDRATE IV INFUSION ADD-ON: CPT

## 2022-08-29 PROCEDURE — 25010000002 MAGNESIUM SULFATE 2 GM/50ML SOLUTION: Performed by: NURSE PRACTITIONER

## 2022-08-29 PROCEDURE — 82550 ASSAY OF CK (CPK): CPT | Performed by: INTERNAL MEDICINE

## 2022-08-29 PROCEDURE — 97165 OT EVAL LOW COMPLEX 30 MIN: CPT | Performed by: OCCUPATIONAL THERAPIST

## 2022-08-29 PROCEDURE — 36415 COLL VENOUS BLD VENIPUNCTURE: CPT | Performed by: INTERNAL MEDICINE

## 2022-08-29 RX ORDER — CELECOXIB 200 MG/1
200 CAPSULE ORAL DAILY
COMMUNITY
End: 2022-09-01 | Stop reason: HOSPADM

## 2022-08-29 RX ORDER — CLONIDINE HYDROCHLORIDE 0.1 MG/1
0.1 TABLET ORAL DAILY PRN
COMMUNITY
End: 2022-11-07 | Stop reason: HOSPADM

## 2022-08-29 RX ORDER — FUROSEMIDE 40 MG/1
40 TABLET ORAL DAILY
COMMUNITY
End: 2022-11-07 | Stop reason: HOSPADM

## 2022-08-29 RX ORDER — NORTRIPTYLINE HYDROCHLORIDE 50 MG/1
50 CAPSULE ORAL NIGHTLY
COMMUNITY
End: 2022-09-01 | Stop reason: HOSPADM

## 2022-08-29 RX ORDER — POLYETHYLENE GLYCOL 3350 17 G/17G
17 POWDER, FOR SOLUTION ORAL DAILY PRN
COMMUNITY

## 2022-08-29 RX ORDER — MAGNESIUM SULFATE HEPTAHYDRATE 40 MG/ML
2 INJECTION, SOLUTION INTRAVENOUS ONCE
Status: COMPLETED | OUTPATIENT
Start: 2022-08-29 | End: 2022-08-29

## 2022-08-29 RX ADMIN — CARVEDILOL 6.25 MG: 6.25 TABLET, FILM COATED ORAL at 08:29

## 2022-08-29 RX ADMIN — ENOXAPARIN SODIUM 40 MG: 100 INJECTION SUBCUTANEOUS at 17:46

## 2022-08-29 RX ADMIN — CYCLOSPORINE 1 DROP: 0.5 EMULSION OPHTHALMIC at 20:41

## 2022-08-29 RX ADMIN — ASPIRIN 81 MG: 81 TABLET, COATED ORAL at 08:29

## 2022-08-29 RX ADMIN — MONTELUKAST SODIUM 10 MG: 10 TABLET, FILM COATED ORAL at 20:41

## 2022-08-29 RX ADMIN — CLINDAMYCIN PHOSPHATE 450 MG: 150 INJECTION, SOLUTION INTRAVENOUS at 10:22

## 2022-08-29 RX ADMIN — FERROUS SULFATE TAB 325 MG (65 MG ELEMENTAL FE) 325 MG: 325 (65 FE) TAB at 08:29

## 2022-08-29 RX ADMIN — FUROSEMIDE 40 MG: 40 TABLET ORAL at 08:29

## 2022-08-29 RX ADMIN — POLYETHYLENE GLYCOL 3350 17 G: 17 POWDER, FOR SOLUTION ORAL at 08:29

## 2022-08-29 RX ADMIN — PREGABALIN 50 MG: 50 CAPSULE ORAL at 08:32

## 2022-08-29 RX ADMIN — CLINDAMYCIN PHOSPHATE 450 MG: 150 INJECTION, SOLUTION INTRAVENOUS at 22:17

## 2022-08-29 RX ADMIN — CYCLOSPORINE 1 DROP: 0.5 EMULSION OPHTHALMIC at 08:29

## 2022-08-29 RX ADMIN — CLINDAMYCIN PHOSPHATE 450 MG: 150 INJECTION, SOLUTION INTRAVENOUS at 04:06

## 2022-08-29 RX ADMIN — CARVEDILOL 6.25 MG: 6.25 TABLET, FILM COATED ORAL at 17:46

## 2022-08-29 RX ADMIN — HYDROCODONE BITARTRATE AND ACETAMINOPHEN 1 TABLET: 5; 325 TABLET ORAL at 15:26

## 2022-08-29 RX ADMIN — SODIUM CHLORIDE 100 ML/HR: 9 INJECTION, SOLUTION INTRAVENOUS at 04:10

## 2022-08-29 RX ADMIN — MAGNESIUM SULFATE HEPTAHYDRATE 2 G: 40 INJECTION, SOLUTION INTRAVENOUS at 10:13

## 2022-08-29 RX ADMIN — PANTOPRAZOLE SODIUM 40 MG: 40 TABLET, DELAYED RELEASE ORAL at 08:32

## 2022-08-29 RX ADMIN — Medication 10 ML: at 20:41

## 2022-08-29 RX ADMIN — HYDROCODONE BITARTRATE AND ACETAMINOPHEN 1 TABLET: 5; 325 TABLET ORAL at 06:03

## 2022-08-29 RX ADMIN — OXYBUTYNIN CHLORIDE 5 MG: 5 TABLET, EXTENDED RELEASE ORAL at 08:29

## 2022-08-29 RX ADMIN — CLINDAMYCIN PHOSPHATE 450 MG: 150 INJECTION, SOLUTION INTRAVENOUS at 15:26

## 2022-08-29 RX ADMIN — LISINOPRIL 10 MG: 10 TABLET ORAL at 08:29

## 2022-08-29 RX ADMIN — PREGABALIN 50 MG: 50 CAPSULE ORAL at 21:36

## 2022-08-29 RX ADMIN — SODIUM CHLORIDE 1 G: 9 INJECTION, SOLUTION INTRAVENOUS at 12:59

## 2022-08-30 ENCOUNTER — TELEPHONE (OUTPATIENT)
Dept: INTERNAL MEDICINE | Facility: CLINIC | Age: 82
End: 2022-08-30

## 2022-08-30 LAB
ANION GAP SERPL CALCULATED.3IONS-SCNC: 8 MMOL/L (ref 5–15)
BASOPHILS # BLD AUTO: 0.04 10*3/MM3 (ref 0–0.2)
BASOPHILS NFR BLD AUTO: 0.5 % (ref 0–1.5)
BUN SERPL-MCNC: 14 MG/DL (ref 8–23)
BUN/CREAT SERPL: 27.5 (ref 7–25)
CALCIUM SPEC-SCNC: 8.9 MG/DL (ref 8.6–10.5)
CHLORIDE SERPL-SCNC: 103 MMOL/L (ref 98–107)
CO2 SERPL-SCNC: 23 MMOL/L (ref 22–29)
CREAT SERPL-MCNC: 0.51 MG/DL (ref 0.57–1)
DEPRECATED RDW RBC AUTO: 49.1 FL (ref 37–54)
EGFRCR SERPLBLD CKD-EPI 2021: 94.5 ML/MIN/1.73
EOSINOPHIL # BLD AUTO: 0.2 10*3/MM3 (ref 0–0.4)
EOSINOPHIL NFR BLD AUTO: 2.6 % (ref 0.3–6.2)
ERYTHROCYTE [DISTWIDTH] IN BLOOD BY AUTOMATED COUNT: 13.9 % (ref 12.3–15.4)
GLUCOSE SERPL-MCNC: 98 MG/DL (ref 65–99)
HCT VFR BLD AUTO: 32.3 % (ref 34–46.6)
HGB BLD-MCNC: 10.2 G/DL (ref 12–15.9)
IMM GRANULOCYTES # BLD AUTO: 0.05 10*3/MM3 (ref 0–0.05)
IMM GRANULOCYTES NFR BLD AUTO: 0.7 % (ref 0–0.5)
LYMPHOCYTES # BLD AUTO: 1.37 10*3/MM3 (ref 0.7–3.1)
LYMPHOCYTES NFR BLD AUTO: 18 % (ref 19.6–45.3)
MAGNESIUM SERPL-MCNC: 1.8 MG/DL (ref 1.6–2.4)
MCH RBC QN AUTO: 30.2 PG (ref 26.6–33)
MCHC RBC AUTO-ENTMCNC: 31.6 G/DL (ref 31.5–35.7)
MCV RBC AUTO: 95.6 FL (ref 79–97)
MONOCYTES # BLD AUTO: 0.54 10*3/MM3 (ref 0.1–0.9)
MONOCYTES NFR BLD AUTO: 7.1 % (ref 5–12)
NEUTROPHILS NFR BLD AUTO: 5.42 10*3/MM3 (ref 1.7–7)
NEUTROPHILS NFR BLD AUTO: 71.1 % (ref 42.7–76)
NRBC BLD AUTO-RTO: 0 /100 WBC (ref 0–0.2)
PLATELET # BLD AUTO: 205 10*3/MM3 (ref 140–450)
PMV BLD AUTO: 10.6 FL (ref 6–12)
POTASSIUM SERPL-SCNC: 3.9 MMOL/L (ref 3.5–5.2)
RBC # BLD AUTO: 3.38 10*6/MM3 (ref 3.77–5.28)
SODIUM SERPL-SCNC: 134 MMOL/L (ref 136–145)
WBC NRBC COR # BLD: 7.62 10*3/MM3 (ref 3.4–10.8)

## 2022-08-30 PROCEDURE — G0378 HOSPITAL OBSERVATION PER HR: HCPCS

## 2022-08-30 PROCEDURE — 83735 ASSAY OF MAGNESIUM: CPT | Performed by: NURSE PRACTITIONER

## 2022-08-30 PROCEDURE — 25010000002 CEFTRIAXONE PER 250 MG: Performed by: NURSE PRACTITIONER

## 2022-08-30 PROCEDURE — 80048 BASIC METABOLIC PNL TOTAL CA: CPT | Performed by: NURSE PRACTITIONER

## 2022-08-30 PROCEDURE — 97116 GAIT TRAINING THERAPY: CPT

## 2022-08-30 PROCEDURE — 85025 COMPLETE CBC W/AUTO DIFF WBC: CPT | Performed by: NURSE PRACTITIONER

## 2022-08-30 PROCEDURE — 25010000002 ENOXAPARIN PER 10 MG: Performed by: INTERNAL MEDICINE

## 2022-08-30 PROCEDURE — 97535 SELF CARE MNGMENT TRAINING: CPT | Performed by: OCCUPATIONAL THERAPIST

## 2022-08-30 PROCEDURE — 96372 THER/PROPH/DIAG INJ SC/IM: CPT

## 2022-08-30 RX ORDER — POLYETHYLENE GLYCOL 3350 17 G/17G
17 POWDER, FOR SOLUTION ORAL DAILY PRN
Status: DISCONTINUED | OUTPATIENT
Start: 2022-08-30 | End: 2022-09-01 | Stop reason: HOSPADM

## 2022-08-30 RX ORDER — NORTRIPTYLINE HYDROCHLORIDE 25 MG/1
25 CAPSULE ORAL NIGHTLY
Status: DISCONTINUED | OUTPATIENT
Start: 2022-08-30 | End: 2022-09-01 | Stop reason: HOSPADM

## 2022-08-30 RX ORDER — ALUMINA, MAGNESIA, AND SIMETHICONE 2400; 2400; 240 MG/30ML; MG/30ML; MG/30ML
15 SUSPENSION ORAL EVERY 6 HOURS PRN
Status: DISCONTINUED | OUTPATIENT
Start: 2022-08-30 | End: 2022-09-01 | Stop reason: HOSPADM

## 2022-08-30 RX ORDER — HYDROXYZINE HYDROCHLORIDE 25 MG/1
25 TABLET, FILM COATED ORAL ONCE AS NEEDED
Status: DISCONTINUED | OUTPATIENT
Start: 2022-08-30 | End: 2022-09-01 | Stop reason: HOSPADM

## 2022-08-30 RX ADMIN — CLINDAMYCIN PHOSPHATE 450 MG: 150 INJECTION, SOLUTION INTRAVENOUS at 04:01

## 2022-08-30 RX ADMIN — MUPIROCIN 1 APPLICATION: 20 OINTMENT TOPICAL at 15:53

## 2022-08-30 RX ADMIN — Medication 10 ML: at 21:05

## 2022-08-30 RX ADMIN — MONTELUKAST SODIUM 10 MG: 10 TABLET, FILM COATED ORAL at 21:04

## 2022-08-30 RX ADMIN — ACETAMINOPHEN 650 MG: 325 TABLET, FILM COATED ORAL at 16:31

## 2022-08-30 RX ADMIN — HYDROCODONE BITARTRATE AND ACETAMINOPHEN 1 TABLET: 5; 325 TABLET ORAL at 01:02

## 2022-08-30 RX ADMIN — OXYBUTYNIN CHLORIDE 5 MG: 5 TABLET, EXTENDED RELEASE ORAL at 08:48

## 2022-08-30 RX ADMIN — CLINDAMYCIN PHOSPHATE 450 MG: 150 INJECTION, SOLUTION INTRAVENOUS at 09:53

## 2022-08-30 RX ADMIN — CLINDAMYCIN PHOSPHATE 450 MG: 150 INJECTION, SOLUTION INTRAVENOUS at 15:53

## 2022-08-30 RX ADMIN — CLINDAMYCIN PHOSPHATE 450 MG: 150 INJECTION, SOLUTION INTRAVENOUS at 21:07

## 2022-08-30 RX ADMIN — POLYETHYLENE GLYCOL 3350 17 G: 17 POWDER, FOR SOLUTION ORAL at 08:48

## 2022-08-30 RX ADMIN — PANTOPRAZOLE SODIUM 40 MG: 40 TABLET, DELAYED RELEASE ORAL at 08:48

## 2022-08-30 RX ADMIN — SODIUM CHLORIDE 50 ML/HR: 9 INJECTION, SOLUTION INTRAVENOUS at 02:05

## 2022-08-30 RX ADMIN — Medication 10 ML: at 08:49

## 2022-08-30 RX ADMIN — CYCLOSPORINE 1 DROP: 0.5 EMULSION OPHTHALMIC at 08:48

## 2022-08-30 RX ADMIN — PREGABALIN 50 MG: 50 CAPSULE ORAL at 08:48

## 2022-08-30 RX ADMIN — NORTRIPTYLINE HYDROCHLORIDE 25 MG: 25 CAPSULE ORAL at 21:04

## 2022-08-30 RX ADMIN — LISINOPRIL 10 MG: 10 TABLET ORAL at 08:48

## 2022-08-30 RX ADMIN — CARVEDILOL 6.25 MG: 6.25 TABLET, FILM COATED ORAL at 08:48

## 2022-08-30 RX ADMIN — ENOXAPARIN SODIUM 40 MG: 100 INJECTION SUBCUTANEOUS at 18:55

## 2022-08-30 RX ADMIN — PREGABALIN 50 MG: 50 CAPSULE ORAL at 21:04

## 2022-08-30 RX ADMIN — ALUMINUM HYDROXIDE, MAGNESIUM HYDROXIDE, AND DIMETHICONE 15 ML: 400; 400; 40 SUSPENSION ORAL at 12:22

## 2022-08-30 RX ADMIN — SODIUM CHLORIDE 1 G: 9 INJECTION, SOLUTION INTRAVENOUS at 12:22

## 2022-08-30 RX ADMIN — FERROUS SULFATE TAB 325 MG (65 MG ELEMENTAL FE) 325 MG: 325 (65 FE) TAB at 08:48

## 2022-08-30 RX ADMIN — FUROSEMIDE 40 MG: 40 TABLET ORAL at 08:48

## 2022-08-30 RX ADMIN — CYCLOSPORINE 1 DROP: 0.5 EMULSION OPHTHALMIC at 21:04

## 2022-08-30 RX ADMIN — ACETAMINOPHEN 650 MG: 325 TABLET, FILM COATED ORAL at 06:59

## 2022-08-30 RX ADMIN — ASPIRIN 81 MG: 81 TABLET, COATED ORAL at 08:48

## 2022-08-31 PROCEDURE — G0378 HOSPITAL OBSERVATION PER HR: HCPCS

## 2022-08-31 PROCEDURE — 25010000002 ENOXAPARIN PER 10 MG: Performed by: INTERNAL MEDICINE

## 2022-08-31 PROCEDURE — 25010000002 CEFTRIAXONE PER 250 MG: Performed by: NURSE PRACTITIONER

## 2022-08-31 PROCEDURE — 96372 THER/PROPH/DIAG INJ SC/IM: CPT

## 2022-08-31 PROCEDURE — 97535 SELF CARE MNGMENT TRAINING: CPT

## 2022-08-31 PROCEDURE — 97116 GAIT TRAINING THERAPY: CPT

## 2022-08-31 RX ADMIN — CARVEDILOL 6.25 MG: 6.25 TABLET, FILM COATED ORAL at 17:08

## 2022-08-31 RX ADMIN — CLINDAMYCIN PHOSPHATE 450 MG: 150 INJECTION, SOLUTION INTRAVENOUS at 03:19

## 2022-08-31 RX ADMIN — HYDROCODONE BITARTRATE AND ACETAMINOPHEN 1 TABLET: 5; 325 TABLET ORAL at 03:51

## 2022-08-31 RX ADMIN — ACETAMINOPHEN 650 MG: 325 TABLET, FILM COATED ORAL at 12:26

## 2022-08-31 RX ADMIN — OXYBUTYNIN CHLORIDE 5 MG: 5 TABLET, EXTENDED RELEASE ORAL at 09:14

## 2022-08-31 RX ADMIN — PREGABALIN 50 MG: 50 CAPSULE ORAL at 21:16

## 2022-08-31 RX ADMIN — LISINOPRIL 10 MG: 10 TABLET ORAL at 09:14

## 2022-08-31 RX ADMIN — FERROUS SULFATE TAB 325 MG (65 MG ELEMENTAL FE) 325 MG: 325 (65 FE) TAB at 09:14

## 2022-08-31 RX ADMIN — NORTRIPTYLINE HYDROCHLORIDE 25 MG: 25 CAPSULE ORAL at 21:16

## 2022-08-31 RX ADMIN — CLINDAMYCIN PHOSPHATE 450 MG: 150 INJECTION, SOLUTION INTRAVENOUS at 09:14

## 2022-08-31 RX ADMIN — MONTELUKAST SODIUM 10 MG: 10 TABLET, FILM COATED ORAL at 21:16

## 2022-08-31 RX ADMIN — HYDROCODONE BITARTRATE AND ACETAMINOPHEN 1 TABLET: 5; 325 TABLET ORAL at 19:56

## 2022-08-31 RX ADMIN — ENOXAPARIN SODIUM 40 MG: 100 INJECTION SUBCUTANEOUS at 17:08

## 2022-08-31 RX ADMIN — MUPIROCIN 1 APPLICATION: 20 OINTMENT TOPICAL at 09:18

## 2022-08-31 RX ADMIN — Medication 10 ML: at 21:16

## 2022-08-31 RX ADMIN — CARVEDILOL 6.25 MG: 6.25 TABLET, FILM COATED ORAL at 09:13

## 2022-08-31 RX ADMIN — SODIUM CHLORIDE 1 G: 9 INJECTION, SOLUTION INTRAVENOUS at 12:26

## 2022-08-31 RX ADMIN — CLINDAMYCIN PHOSPHATE 450 MG: 150 INJECTION, SOLUTION INTRAVENOUS at 17:08

## 2022-08-31 RX ADMIN — PANTOPRAZOLE SODIUM 40 MG: 40 TABLET, DELAYED RELEASE ORAL at 09:12

## 2022-08-31 RX ADMIN — CYCLOSPORINE 1 DROP: 0.5 EMULSION OPHTHALMIC at 09:17

## 2022-08-31 RX ADMIN — CYCLOSPORINE 1 DROP: 0.5 EMULSION OPHTHALMIC at 21:16

## 2022-08-31 RX ADMIN — ASPIRIN 81 MG: 81 TABLET, COATED ORAL at 09:14

## 2022-08-31 RX ADMIN — FUROSEMIDE 40 MG: 40 TABLET ORAL at 09:14

## 2022-08-31 RX ADMIN — PREGABALIN 50 MG: 50 CAPSULE ORAL at 09:13

## 2022-09-01 VITALS
HEIGHT: 60 IN | BODY MASS INDEX: 34.76 KG/M2 | WEIGHT: 177.03 LBS | DIASTOLIC BLOOD PRESSURE: 87 MMHG | HEART RATE: 72 BPM | SYSTOLIC BLOOD PRESSURE: 156 MMHG | OXYGEN SATURATION: 98 % | RESPIRATION RATE: 18 BRPM | TEMPERATURE: 98 F

## 2022-09-01 LAB — SARS-COV-2 RNA PNL SPEC NAA+PROBE: NOT DETECTED

## 2022-09-01 PROCEDURE — 97116 GAIT TRAINING THERAPY: CPT

## 2022-09-01 PROCEDURE — A9270 NON-COVERED ITEM OR SERVICE: HCPCS | Performed by: INTERNAL MEDICINE

## 2022-09-01 PROCEDURE — 63710000001 OXYBUTYNIN XL 5 MG TABLET SUSTAINED-RELEASE 24 HOUR: Performed by: INTERNAL MEDICINE

## 2022-09-01 PROCEDURE — 63710000001 FERROUS SULFATE 325 (65 FE) MG TABLET: Performed by: INTERNAL MEDICINE

## 2022-09-01 PROCEDURE — 63710000001 CARVEDILOL 6.25 MG TABLET: Performed by: INTERNAL MEDICINE

## 2022-09-01 PROCEDURE — 63710000001 CYCLOSPORINE 0.05 % EMULSION: Performed by: INTERNAL MEDICINE

## 2022-09-01 PROCEDURE — 97530 THERAPEUTIC ACTIVITIES: CPT

## 2022-09-01 PROCEDURE — 63710000001 PANTOPRAZOLE 40 MG TABLET DELAYED-RELEASE: Performed by: INTERNAL MEDICINE

## 2022-09-01 PROCEDURE — 25010000002 ENOXAPARIN PER 10 MG: Performed by: INTERNAL MEDICINE

## 2022-09-01 PROCEDURE — 96372 THER/PROPH/DIAG INJ SC/IM: CPT

## 2022-09-01 PROCEDURE — 87635 SARS-COV-2 COVID-19 AMP PRB: CPT | Performed by: FAMILY MEDICINE

## 2022-09-01 PROCEDURE — 63710000001 ACETAMINOPHEN 325 MG TABLET: Performed by: INTERNAL MEDICINE

## 2022-09-01 PROCEDURE — 63710000001 ASPIRIN 81 MG TABLET DELAYED-RELEASE: Performed by: INTERNAL MEDICINE

## 2022-09-01 PROCEDURE — 63710000001 PREGABALIN 50 MG CAPSULE: Performed by: INTERNAL MEDICINE

## 2022-09-01 PROCEDURE — G0378 HOSPITAL OBSERVATION PER HR: HCPCS

## 2022-09-01 PROCEDURE — 63710000001 LISINOPRIL 10 MG TABLET: Performed by: INTERNAL MEDICINE

## 2022-09-01 PROCEDURE — 63710000001 FUROSEMIDE 40 MG TABLET: Performed by: INTERNAL MEDICINE

## 2022-09-01 PROCEDURE — 25010000002 CEFTRIAXONE PER 250 MG: Performed by: NURSE PRACTITIONER

## 2022-09-01 RX ORDER — HYDROCODONE BITARTRATE AND ACETAMINOPHEN 5; 325 MG/1; MG/1
1 TABLET ORAL EVERY 8 HOURS PRN
Qty: 9 TABLET | Refills: 0 | Status: SHIPPED | OUTPATIENT
Start: 2022-09-01 | End: 2022-09-04

## 2022-09-01 RX ORDER — ACETAMINOPHEN 325 MG/1
650 TABLET ORAL EVERY 4 HOURS PRN
Start: 2022-09-01 | End: 2022-11-07 | Stop reason: HOSPADM

## 2022-09-01 RX ORDER — ALUMINA, MAGNESIA, AND SIMETHICONE 2400; 2400; 240 MG/30ML; MG/30ML; MG/30ML
15 SUSPENSION ORAL EVERY 6 HOURS PRN
Start: 2022-09-01 | End: 2022-11-07 | Stop reason: HOSPADM

## 2022-09-01 RX ORDER — CEFDINIR 300 MG/1
300 CAPSULE ORAL 2 TIMES DAILY
Qty: 8 CAPSULE | Refills: 0
Start: 2022-09-01 | End: 2022-09-05

## 2022-09-01 RX ORDER — LISINOPRIL 10 MG/1
10 TABLET ORAL DAILY
Qty: 30 TABLET | Refills: 5 | Status: SHIPPED | OUTPATIENT
Start: 2022-09-01 | End: 2022-09-09 | Stop reason: SDUPTHER

## 2022-09-01 RX ORDER — NORTRIPTYLINE HYDROCHLORIDE 25 MG/1
25 CAPSULE ORAL NIGHTLY
Status: ON HOLD
Start: 2022-09-01 | End: 2022-10-13 | Stop reason: SDUPTHER

## 2022-09-01 RX ADMIN — MUPIROCIN 1 APPLICATION: 20 OINTMENT TOPICAL at 09:05

## 2022-09-01 RX ADMIN — ACETAMINOPHEN 650 MG: 325 TABLET, FILM COATED ORAL at 10:42

## 2022-09-01 RX ADMIN — CARVEDILOL 6.25 MG: 6.25 TABLET, FILM COATED ORAL at 18:03

## 2022-09-01 RX ADMIN — ASPIRIN 81 MG: 81 TABLET, COATED ORAL at 09:00

## 2022-09-01 RX ADMIN — ACETAMINOPHEN 650 MG: 325 TABLET, FILM COATED ORAL at 14:57

## 2022-09-01 RX ADMIN — FERROUS SULFATE TAB 325 MG (65 MG ELEMENTAL FE) 325 MG: 325 (65 FE) TAB at 08:58

## 2022-09-01 RX ADMIN — FUROSEMIDE 40 MG: 40 TABLET ORAL at 09:00

## 2022-09-01 RX ADMIN — LISINOPRIL 10 MG: 10 TABLET ORAL at 09:00

## 2022-09-01 RX ADMIN — PANTOPRAZOLE SODIUM 40 MG: 40 TABLET, DELAYED RELEASE ORAL at 09:04

## 2022-09-01 RX ADMIN — ENOXAPARIN SODIUM 40 MG: 100 INJECTION SUBCUTANEOUS at 18:03

## 2022-09-01 RX ADMIN — SODIUM CHLORIDE 1 G: 9 INJECTION, SOLUTION INTRAVENOUS at 10:43

## 2022-09-01 RX ADMIN — PREGABALIN 50 MG: 50 CAPSULE ORAL at 09:04

## 2022-09-01 RX ADMIN — CYCLOSPORINE 1 DROP: 0.5 EMULSION OPHTHALMIC at 09:00

## 2022-09-01 RX ADMIN — OXYBUTYNIN CHLORIDE 5 MG: 5 TABLET, EXTENDED RELEASE ORAL at 09:00

## 2022-09-01 RX ADMIN — CARVEDILOL 6.25 MG: 6.25 TABLET, FILM COATED ORAL at 08:58

## 2022-09-01 RX ADMIN — Medication 10 ML: at 09:00

## 2022-09-01 NOTE — CASE MANAGEMENT/SOCIAL WORK
Continued Stay Note  [facility]     Patient Name: Carmen Obrien  MRN: [id number]  Today's Date: 9/1/2022    Admit Date: 8/28/2022     Discharge Plan     Row Name 09/01/22 0912       Plan    Plan [facility] - Pending precertt    Plan Comments Contacted Tatyana in admissions with [facility] to check status of precert. Will await response.                Expected Discharge Date and Time     Expected Discharge Date Expected Discharge Time    Sep 2, 2022             BRAEDEN Mcconnell

## 2022-09-01 NOTE — PROGRESS NOTES
Continued Stay Note  [facility]     Patient Name: Carmen Obrien  MRN: [id number]  Today's Date: 9/1/2022    Admit Date: 8/28/2022     Discharge Plan     Row Name 09/01/22 0937       Plan    Plan [facility]    Patient/Family in Agreement with Plan yes    Final Discharge Disposition Code 03 - skilled nursing facility (SNF)    Final Note [facility] has approved admission to [facility].  notified Dr. Schneider. Discharge summary, copy of scripts, and covid swab fax to [phone]. Patient's nurse will call report to [phone].                    Expected Discharge Date and Time     Expected Discharge Date Expected Discharge Time    Sep 2, 2022             BRAEDEN Mcconnell

## 2022-09-01 NOTE — PLAN OF CARE
Goal Outcome Evaluation:              Outcome Evaluation: Pt c/o pain PRN meds given per orders, cont. IV ABX, redness and swelling improved to LLE, up with assist, ponce diet

## 2022-09-01 NOTE — PAYOR COMM NOTE
"9/1/22 [facility] [phone]    [phone]    MD HAS CHANGED STATUS TO OBSERVATION :::  MEET OBSERVATION ORDER.    OBSERVATION APPROVED  [id number]              Carmen Jenkins (82 y.o. Female)             Date of Birth   1940    Social Security Number       Address   [address] [place] [address]    Home Phone   [phone]    MRN   [id number]       Judaism   [facility] [facility]    Marital Status                               Admission Date   8/28/22    Admission Type   Emergency    Admitting Provider   Rodriguez Schneider MD    Attending Provider   Rodriguez Schneider MD    Department, Room/Bed   [address], 364/[address]       Discharge Date       Discharge Disposition       Discharge Destination                               Attending Provider: Rodriguez Schneider MD    Allergies: Levaquin [Levofloxacin], Codeine, Morphine And Related    Isolation: Contact   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 [address]       Subscriber Name Subscriber Birth Date Member ID       CARMEN JENKINS 1940 [id number]                 Emergency Contacts      (Rel.) Home Phone Work Phone Mobile Phone    Selene Jenkins (Neighbor) [phone] -- --    Brandee Anderson (Relative) [phone] -- --    Adam Jenkins (Son) [phone] -- --              [address] [address]  Phone: "  [phone]  Fax:          Patient:     Carmen Obrien MRN:  [id number]   [address] [place] [address] :  1940  SSN:    Phone: [phone] Sex:  F      INSURANCE PAYOR PLAN GROUP # SUBSCRIBER ID   Primary:    HU[employer] MEDICARE REPLACEMENT [id number]   Admitting Diagnosis: Cellulitis of left lower leg [L03.116]  Order Date:  Sep 1, 2022        Initiate Observation Status       (Order ID: [id number])     Diagnosis:         Priority:  Routine Expected Date:   Expiration Date:        Interval:   Count:    Level of Care: Med/Surg [1]  Diagnosis: Cellulitis of left lower leg [712982]  Admitting Physician: RODRIGUEZ SIMENTAL [[id number]]  Attending Physician: RODRIGUEZ SIMENTAL [[id number]]     Specimen Type:   Specimen Source:   Specimen Taken Date:   Specimen Taken Time:                  Verbal Order Mode: Telephone with readback   Authorizing Provider: Rodriguez Simental MD  Authorizing Provider's NPI: [id number]     Order Entered By: Leesa Obrien LPN 2022  7:51 AM                    Encounter Date/Time: 2022 1018   Hospital Account: [id number]    MRN: [id number]   Patient:  Carmen Obrien   Contact Serial #: [id number]   SSN:

## 2022-09-01 NOTE — DISCHARGE SUMMARY
[facility] Medicine Services  DISCHARGE SUMMARY       Date of Admission: 8/28/2022  Date of Discharge:  9/1/2022  Primary Care Physician: VERA Noble MD    Presenting Problem/Chief Complaint:  Fall    Final Discharge Diagnoses:  Active Hospital Problems    Diagnosis    • **Cellulitis of left lower leg    • Fall    • Rhabdomyolysis    • Hypomagnesemia    • Hypokalemia    • Chronic pain syndrome        Consults: None    Procedures Performed: None    Pertinent Test Results:   Results for orders placed during the hospital encounter of 07/29/21    Adult Transthoracic Echo Complete W/ Cont if Necessary Per Protocol    Interpretation Summary  · Left ventricular ejection fraction appears to be 61 - 65%. Left ventricular systolic function is normal.  · Left ventricular wall thickness is consistent with concentric remodeling.  · Left ventricular diastolic function was normal.  · Mild to moderate aortic valve regurgitation is present.  · Mitral annular calcification is present. There is calcification of the mitral valve anterior leaflet(s). Trace mitral valve regurgitation is present  · No evidence of pulmonary hypertension is present      Imaging Results (All)     Procedure Component Value Units Date/Time    US Venous Doppler Lower Extremity Left (duplex) [[id number]] Collected: 08/29/22 1616     Updated: 08/29/22 1620    Narrative:      History: Left lower extremity pain, swelling       Impression:      Impression: There is no evidence of deep venous thrombosis or  superficial thrombophlebitis of the left lower extremity.     Comments: Left lower extremity venous duplex exam was performed using  color Doppler flow, Doppler wave form analysis, and grayscale imaging,  with and without compression. There is no evidence of deep venous  thrombosis of the common femoral, superficial femoral, popliteal,  posterior tibial, and peroneal veins. There is no thrombus identified in  the  saphenofemoral junction or the greater saphenous vein.     This report was finalized on 08/29/2022 16:16 by Dr. Johan Johansen MD.    XR Pelvis 1 or 2 View [[id number]] Collected: 08/28/22 1136     Updated: 08/28/22 1140    Narrative:      XR PELVIS 1 OR 2 VW- 8/28/2022 11:05 AM CDT     HISTORY: bilateral hip pain after fall       COMPARISON: 07/26/2022     FINDINGS:     There is no acute fracture or malalignment at the hip joints. Bilateral  advanced hip joint osteoarthritis. Pelvic ring is intact. Pubic  symphysis and SI joints are intact. Sacral arches are intact. Lumbar  spondylosis.       Impression:      1. No acute fracture or traumatic malalignment.   2. Advanced osteoarthritis change of both hip joints.        This report was finalized on 08/28/2022 11:37 by Dr William Viveros, .    XR Chest 1 View [[id number]] Collected: 08/28/22 1116     Updated: 08/28/22 1120    Narrative:      XR CHEST 1 VW- 8/28/2022 11:06 AM CDT     HISTORY: fall     COMPARISON: Chest x-ray dated 07/26/2022.     FINDINGS:      No lung consolidation. No pleural effusion or pneumothorax. The  cardiomediastinal silhouette and pulmonary vascularity are within normal  limits. The osseous structures and surrounding soft tissues demonstrate  no acute abnormality. There are multiple chronic left posterior rib  fractures. Bilateral glenohumeral joint osteoarthritis change.       Impression:      1. Stable chest exam without acute process.        This report was finalized on 08/28/2022 11:17 by Dr William Viveros, .          LAB RESULTS:      Lab 08/30/22  0421 08/29/22  0615 08/28/22  1033   WBC 7.62 10.00 15.48*   HEMOGLOBIN 10.2* 10.9* 13.4   HEMATOCRIT 32.3* 33.3* 39.6   PLATELETS 205 186 230   NEUTROS ABS 5.42 8.26* 13.90*   IMMATURE GRANS (ABS) 0.05 0.06* 0.10*   LYMPHS ABS 1.37 0.98 0.76   MONOS ABS 0.54 0.56 0.66   EOS ABS 0.20 0.11 0.01   MCV 95.6 94.9 93.6   LACTATE  --   --  1.6         Lab 08/30/22  0421 08/29/22  0615 08/28/22  1033  08/26/22  0826   SODIUM 134* 136 135*  --    POTASSIUM 3.9 3.9 3.3*  --    CHLORIDE 103 104 99  --    CO2 23.0 22.0 22.0  --    ANION GAP 8.0 10.0 14.0  --    BUN 14 16 15  --    CREATININE 0.51* 0.63 0.57  --    EGFR 94.5 89.8 92.0  --    GLUCOSE 98 95 101*  --    CALCIUM 8.9 8.9 10.6*  --    MAGNESIUM 1.8 1.6 1.4*  --    PHOSPHORUS  --  3.3  --   --    HEMOGLOBIN A1C  --   --   --  5.4   TSH  --   --   --  1.570         Lab 08/29/22  0615 08/28/22  1033   TOTAL PROTEIN 6.0 7.2   ALBUMIN 3.10* 4.00   GLOBULIN 2.9 3.2   ALT (SGPT) 24 29   AST (SGOT) 29 38*   BILIRUBIN 0.5 1.0   ALK PHOS 50 60             Lab 08/26/22  0826   CHOLESTEROL 180   LDL CHOL 87   HDL CHOL 74   TRIGLYCERIDES 109         Lab 08/26/22  0826   VITAMIN B 12 >2,000*         Brief Urine Lab Results  (Last result in the past 365 days)      Color   Clarity   Blood   Leuk Est   Nitrite   Protein   CREAT   Urine HCG        08/28/22 1046 Yellow   Slightly Cloudy   Trace   Negative   Negative   100 mg/dL (2+)               Microbiology Results (last 10 days)     Procedure Component Value - Date/Time    Blood Culture - Blood, Arm, Right [[id number]]  (Normal) Collected: 08/28/22 2149    Lab Status: Preliminary result Specimen: Blood from Arm, Right Updated: 08/31/22 2215     Blood Culture No growth at 3 days    COVID PRE-OP / PRE-PROCEDURE SCREENING ORDER (NO ISOLATION) - Swab, Nasal Cavity [[id number]]  (Normal) Collected: 08/28/22 1047    Lab Status: Final result Specimen: Swab from Nasal Cavity Updated: 08/28/22 1129    Narrative:      The following orders were created for panel order COVID PRE-OP / PRE-PROCEDURE SCREENING ORDER (NO ISOLATION) - Swab, Nasal Cavity.  Procedure                               Abnormality         Status                     ---------                               -----------         ------                     COVID-19,[facility]...[[id number]]  Normal              Final result                 Please view results for  these tests on the individual orders.    COVID-19,[facility] IN-HOUSE,Nasal Swab No Transport Media 3-4 HR TAT - Swab, Nasal Cavity [[id number]]  (Normal) Collected: 08/28/22 1047    Lab Status: Final result Specimen: Swab from Nasal Cavity Updated: 08/28/22 1129     COVID19 Not Detected    Narrative:      Fact sheet for providers: https://www.fda.gov/media/974863/download     Fact sheet for patients: https://www.fda.gov/media/992936/download    Test performed by PCR.    Consider negative results in combination with clinical observations, patient history, and epidemiological information.    Blood Culture - Blood, Arm, Left [[id number]]  (Normal) Collected: 08/28/22 1033    Lab Status: Preliminary result Specimen: Blood from Arm, Left Updated: 08/31/22 1048     Blood Culture No growth at 3 days          History of Present Illness on Day of Discharge: Patient is sitting up in the chair eating breakfast.  She reports feeling fatigued today.  She does have ongoing left lower extremity pain.  She feels that she is ready to transition to [place] today.  Discussed that she will need to continue oral antibiotics.  She does not have any new complaints today.    Hospital Course:  The patient is a 82 y.o. female who follows with Dr. Kamlesh Quinonez for primary care.  She has past medical history significant for anxiety, CAD, lumbar degenerative disc disease, DVT, GERD, hypertension, hypercholesterolemia, macular degeneration, osteoarthritis.  Patient presented to the emergency department on 8/28/2022 after being found on the floor.  She reported that the phone rang and she was going to answer it but slipped to the ground.  She denied loss of consciousness, dizziness, or lightheadedness prior to fall.  She scooted back to the living room on her buttocks.  She called her sister-in-law who called a neighbor who then notified EMS.  Patient does have a history of previous fall and is on multiple medications that could alter mental  "status.  It is unknown how long the patient was in the floor.    Patient was found to have cellulitis of the left lower extremity.  She was started on clindamycin and Rocephin.  She reports that her left lower extremity does look significantly better than it did on admission.  She has been educated to keep her left lower extremity elevated.  Blood cultures continue to show no growth at 3 days.  WBC was 15.4 on admission and has trended down to 7.62.  Venous Dopplers on 8/28 showed no thrombus.    Mild rhabdomyolysis was noted and the patient was found on the floor for an unknown amount of time.  CK was 580, repeat CK was 254.  Statin was initially held on admission but feel that it is okay to resume at this time.    Patient reportedly slipped to the floor and scooted to the bathroom.  She was noted to be on multiple medications that may alter mental status.  There were discussions regarding minimizing medications and the patient was agreeable.  Norco and nortriptyline were initially held on admission.  Norco was restarted on 8/29.  Lyrica was continued on admission.  Nortriptyline was restarted at decreased dose of 25 mg on 8/30.  Advised the patient to continue to discuss medication management with PCP after discharge.    Hypomagnesemia and hypokalemia were noted on admission.  She received replacement for both.  Electrolytes have stabilized.    PT and OT were consulted.  They recommended SNF for rehab.  Initially she declined but eventually agreed.    Patient feels that she is ready to be transferred to [facility] today.  Labs have been reviewed.  Home medications reviewed and resumed as appropriate.  Patient is medically stable for discharge.    Condition on Discharge: Medically stable    Physical Exam on Discharge:  /87 (BP Location: Left arm, Patient Position: Sitting)   Pulse 72   Temp 98 °F (36.7 °C) (Oral)   Resp 18   Ht 152.4 cm (60\")   Wt 80.3 kg (177 lb 0.5 oz)   SpO2 98%   BMI 34.57 kg/m² "   Physical Exam  Vitals and nursing note reviewed.   Constitutional:       Appearance: She is obese.      Comments: Sitting up on side of bed.  No oxygen use.  No family in room.   HENT:      Head: Normocephalic and atraumatic.      Nose: No congestion.      Mouth/Throat:      Pharynx: Oropharynx is clear. No oropharyngeal exudate or posterior oropharyngeal erythema.   Eyes:      Extraocular Movements: Extraocular movements intact.      Pupils: Pupils are equal, round, and reactive to light.   Cardiovascular:      Rate and Rhythm: Normal rate and regular rhythm.      Heart sounds: No murmur heard.     Comments: Normal sinus rhythm 72  Pulmonary:      Breath sounds: No wheezing, rhonchi or rales.      Comments: No oxygen in use.  Abdominal:      Palpations: Abdomen is soft.      Tenderness: There is no abdominal tenderness.   Genitourinary:     Comments: Voiding.  Musculoskeletal:         General: Swelling and tenderness (Left lower extremity) present.      Cervical back: Normal range of motion and neck supple.   Skin:     Findings: Bruising (Right lateral shin) and erythema (Left lower extremity knee to foot) present.   Neurological:      General: No focal deficit present.      Mental Status: She is alert and oriented to person, place, and time.   Psychiatric:         Mood and Affect: Mood normal.         Behavior: Behavior normal.         Thought Content: Thought content normal.         Judgment: Judgment normal.     Discharge Disposition:  Skilled Nursing Facility (DC - External)    Discharge Medications:     Discharge Medications      New Medications      Instructions Start Date   acetaminophen 325 MG tablet  Commonly known as: TYLENOL   650 mg, Oral, Every 4 Hours PRN      aluminum-magnesium hydroxide-simethicone 400-400-40 MG/5ML suspension  Commonly known as: MAALOX MAX   15 mL, Oral, Every 6 Hours PRN      cefdinir 300 MG capsule  Commonly known as: OMNICEF   300 mg, Oral, 2 Times Daily       HYDROcodone-acetaminophen 5-325 MG per tablet  Commonly known as: NORCO  Replaces: HYDROcodone-acetaminophen 7.5-325 MG per tablet   1 tablet, Oral, Every 8 Hours PRN         Changes to Medications      Instructions Start Date   nortriptyline 25 MG capsule  Commonly known as: GWENDOLYN  What changed:   · medication strength  · how much to take   25 mg, Oral, Nightly         Continue These Medications      Instructions Start Date   aspirin 81 MG EC tablet   81 mg, Oral, Daily      atorvastatin 10 MG tablet  Commonly known as: LIPITOR   10 mg, Oral, Daily      CALTRATE 600+D PO   1 tablet, Oral, 2 times daily, Morning and nightly      carvedilol 6.25 MG tablet  Commonly known as: COREG   6.25 mg, Oral, 2 Times Daily With Meals      cetirizine 10 MG tablet  Commonly known as: zyrTEC   10 mg, Oral, Daily      cloNIDine 0.1 MG tablet  Commonly known as: CATAPRES   0.1 mg, Oral, Daily PRN      colchicine 0.6 MG tablet   0.6 mg, Oral, Daily      cycloSPORINE 0.05 % ophthalmic emulsion  Commonly known as: RESTASIS   1 drop, Both Eyes, Every 12 Hours      esomeprazole 20 MG capsule  Commonly known as: nexIUM   20 mg, Oral, 2 Times Daily      FeroSul 325 (65 FE) MG tablet  Generic drug: ferrous sulfate   325 mg, Oral, Daily With Breakfast      fluticasone 50 MCG/ACT nasal spray  Commonly known as: FLONASE   2 sprays, Nasal, Daily PRN      furosemide 40 MG tablet  Commonly known as: LASIX   40 mg, Oral, Daily      hydrOXYzine 25 MG tablet  Commonly known as: ATARAX   25 mg, Oral, 3 Times Daily PRN      lisinopril 10 MG tablet  Commonly known as: PRINIVIL,ZESTRIL   10 mg, Oral, Daily      montelukast 10 MG tablet  Commonly known as: SINGULAIR   10 mg, Oral, Nightly      multivitamin with minerals tablet tablet   1 tablet, Oral, Daily      mupirocin 2 % ointment  Commonly known as: BACTROBAN   1 application, Topical, Daily, Apply ointment to Right Great Toe and Right 4th Toe      polyethyl glycol-propyl glycol 0.4-0.3 % solution  ophthalmic solution (artificial tears)  Commonly known as: SYSTANE   2 drops, Both Eyes, Every 2 Hours PRN      polyethylene glycol 17 g packet  Commonly known as: MIRALAX   17 g, Oral, Daily PRN      pregabalin 50 MG capsule  Commonly known as: LYRICA   50 mg, Oral, Every 12 Hours PRN      Prolia 60 MG/ML solution prefilled syringe syringe  Generic drug: denosumab   60 mg, Subcutaneous, Once, Twice a year      simethicone 125 MG chewable tablet  Commonly known as: MYLICON   125 mg, Oral, Every 6 Hours PRN      tolterodine LA 4 MG 24 hr capsule  Commonly known as: DETROL LA   1 capsule, Oral, Daily      Vitamin B-12 1000 MCG sublingual tablet   2.5 tablets, Sublingual, Daily, 2500mcg      vitamin D3 125 MCG (5000 UT) capsule capsule   5,000 Units, Oral, Daily         Stop These Medications    celecoxib 200 MG capsule  Commonly known as: CeleBREX     HYDROcodone-acetaminophen 7.5-325 MG per tablet  Commonly known as: NORCO  Replaced by: HYDROcodone-acetaminophen 5-325 MG per tablet            Discharge Diet:   Diet Instructions     Diet: Regular, Cardiac      Discharge Diet:  Regular  Cardiac             Activity at Discharge:   Activity Instructions     Activity as Tolerated            Discharge Care Plan/Instructions:   1.  Follow-up with PCP in 1 week   2.  Transition to [facility] for 4 days to complete 7 days of antibiotic therapy   3.  Transfer to [place] for rehabilitation   4.  Continue home dose of Norco, Tylenol as needed for pain  5.  Reduce nortriptyline to 25 mg    Follow-up Appointments:   Future Appointments   Date Time Provider Department Center   11/11/2022 10:00 AM Juaquin Chung DPM [facility] POD PAD PAD   12/2/2022  8:30 AM Dayan Busch MD [facility] N PAD PAD   1/5/2023  7:00 AM PAD US [facility]AS CART 3 BH PAD [facility] P[facility]   1/5/2023  8:00 AM PAD US NIVAS CART 3 BH PAD [facility] PAD   1/5/2023  9:30 AM Devin Grubbs DO [facility] VS PAD PAD   2/17/2023  9:00 AM VERA Noble MD [facility] PC PAD PAD       Test  Results Pending at Discharge: Blood culture showing no growth at 3 days.  We will follow to completion.    Electronically signed by ABDON Galan, 09/01/22, 10:21 CDT.    Time: 35 minutes

## 2022-09-01 NOTE — PLAN OF CARE
Goal Outcome Evaluation:  Plan of Care Reviewed With: patient        Progress: improving  Outcome Evaluation: Pt up in chair and sba to stand. Pt walked 30ft x 3 in room with RW. Pt was CGA-SBA for on/off toliet. Pt was cga sit to supine. Pt would benefit from continued PT at SNF.
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
## 2022-09-02 LAB
BACTERIA SPEC AEROBE CULT: NORMAL
BACTERIA SPEC AEROBE CULT: NORMAL

## 2022-09-02 NOTE — THERAPY DISCHARGE NOTE
Acute Care - Physical Therapy Discharge Summary  [facility]       Patient Name: Carmen Obrien  : 1940  MRN: [id number]    Today's Date: 2022                 Admit Date: 2022      PT Recommendation and Plan    Visit Dx:    ICD-10-CM ICD-9-CM   1. Cellulitis of left lower leg  L03.116 682.6   2. Traumatic rhabdomyolysis, initial encounter ([facility])  T79.6XXA 958.6   3. Decreased activities of daily living (ADL)  Z78.9 V49.89   4. Impaired mobility  Z74.09 799.89   5. Multiple falls  R29.6 V15.88   6. Essential hypertension  I10 401.9   7. Chronic pain syndrome  G89.4 338.4        Outcome Measures     Row Name 22 1000 22 1038 22 1317       How much help from another person do you currently need...    Turning from your back to your side while in flat bed without using bedrails? 3  -AE 3  -WK 3  -LY    Moving from lying on back to sitting on the side of a flat bed without bedrails? 3  -AE 3  -WK 3  -LY    Moving to and from a bed to a chair (including a wheelchair)? 3  -AE 3  -WK 3  -LY    Standing up from a chair using your arms (e.g., wheelchair, bedside chair)? 3  -AE 3  -WK 3  -LY    Climbing 3-5 steps with a railing? 3  -AE 3  -WK 3  -LY    To walk in hospital room? 3  -AE 3  -WK 3  -LY    AM-PAC 6 Clicks Score (PT) 18  -AE 18  -WK 18  -LY       Functional Assessment    Outcome Measure Options AM-PAC 6 Clicks Basic Mobility (PT)  -AE AM-PAC 6 Clicks Basic Mobility (PT)  -WK AM-PAC 6 Clicks Basic Mobility (PT)  -LY          User Key  (r) = Recorded By, (t) = Taken By, (c) = Cosigned By    Initials Name Provider Type    Denise Ramirez, PTA Physical Therapist Assistant    WK Juliane Goel, PTA Physical Therapist Assistant    Magdalena Sharma, PTA Physical Therapist Assistant                     PT Rehab Goals     Row Name 22 1134             Bed Mobility Goal 1 (PT)    Activity/Assistive Device (Bed Mobility Goal 1, PT) bridging;rolling to left;rolling to right;scooting;sit  to supine;supine to sit  -NW      [place] Level/Cues Needed (Bed Mobility Goal 1, PT) modified independence  -NW      Time Frame (Bed Mobility Goal 1, PT) long term goal (LTG);10 days  -NW      Progress/Outcomes (Bed Mobility Goal 1, PT) goal not met  -NW              Transfer Goal 1 (PT)    Activity/Assistive Device (Transfer Goal 1, PT) sit-to-stand/stand-to-sit;bed-to-chair/chair-to-bed  -NW      [place] Level/Cues Needed (Transfer Goal 1, PT) modified independence  -NW      Time Frame (Transfer Goal 1, PT) long term goal (LTG);10 days  -NW      Progress/Outcome (Transfer Goal 1, PT) goal not met  -NW              Gait Training Goal 1 (PT)    Activity/Assistive Device (Gait Training Goal 1, PT) gait (walking locomotion);assistive device use;improve balance and speed;increase endurance/gait distance;decrease fall risk;increase energy conservation;walker, rolling  -NW      [place] Level (Gait Training Goal 1, PT) modified independence  -NW      Distance (Gait Training Goal 1, PT) 75ft w/o inc LE pain  -NW      Time Frame (Gait Training Goal 1, PT) long term goal (LTG);10 days  -NW      Progress/Outcome (Gait Training Goal 1, PT) goal not met  -NW            User Key  (r) = Recorded By, (t) = Taken By, (c) = Cosigned By    Initials Name Provider Type Discipline    Camille Kelly PTA Physical Therapist Assistant PT                    PT Discharge Summary  Anticipated Discharge Disposition (PT): skilled nursing facility  Reason for Discharge: Discharge from facility  Outcomes Achieved: Refer to plan of care for updates on goals achieved  Discharge Destination: SNF      Camille Rivera PTA   9/2/2022

## 2022-09-02 NOTE — THERAPY DISCHARGE NOTE
Acute Care - Occupational Therapy Discharge Summary  [facility]     Patient Name: Carmen Obrien  : 1940  MRN: [id number]    Today's Date: 2022                 Admit Date: 2022        OT Recommendation and Plan    Visit Dx:    ICD-10-CM ICD-9-CM   1. Cellulitis of left lower leg  L03.116 682.6   2. Traumatic rhabdomyolysis, initial encounter ([facility])  T79.6XXA 958.6   3. Decreased activities of daily living (ADL)  Z78.9 V49.89   4. Impaired mobility  Z74.09 799.89   5. Multiple falls  R29.6 V15.88   6. Essential hypertension  I10 401.9   7. Chronic pain syndrome  G89.4 338.4                OT Rehab Goals     Row Name 22 0700             Transfer Goal 1 (OT)    Activity/Assistive Device (Transfer Goal 1, OT) tub;tub bench  -TS      [place] Level/Cues Needed (Transfer Goal 1, OT) modified independence  -TS      Time Frame (Transfer Goal 1, OT) long term goal (LTG);by discharge  -TS      Progress/Outcome (Transfer Goal 1, OT) goal not met  -TS              Bathing Goal 1 (OT)    Activity/Device (Bathing Goal 1, OT) bathing skills, all  -TS      [place] Level/Cues Needed (Bathing Goal 1, OT) modified independence  -TS      Time Frame (Bathing Goal 1, OT) long term goal (LTG);by discharge  -TS      Progress/Outcomes (Bathing Goal 1, OT) goal not met  -TS              Problem Specific Goal 1 (OT)    Problem Specific Goal 1 (OT) Pt will participate in 15 minutes of functional task in standing with one or less rest breaks to increase her tolerance for activity.  -TS      Time Frame (Problem Specific Goal 1, OT) long term goal (LTG);by discharge  -TS      Progress/Outcome (Problem Specific Goal 1, OT) goal not met  -TS            User Key  (r) = Recorded By, (t) = Taken By, (c) = Cosigned By    Initials Name Provider Type Discipline    Nereida Rollins COTA Occupational Therapist Assistant OT                 Outcome Measures     Row Name 22 1000 22 1038 22 3227        How much help from another person do you currently need...    Turning from your back to your side while in flat bed without using bedrails? 3  -AE 3  -WK 3  -LY    Moving from lying on back to sitting on the side of a flat bed without bedrails? 3  -AE 3  -WK 3  -LY    Moving to and from a bed to a chair (including a wheelchair)? 3  -AE 3  -WK 3  -LY    Standing up from a chair using your arms (e.g., wheelchair, bedside chair)? 3  -AE 3  -WK 3  -LY    Climbing 3-5 steps with a railing? 3  -AE 3  -WK 3  -LY    To walk in hospital room? 3  -AE 3  -WK 3  -LY    AM-PAC 6 Clicks Score (PT) 18  -AE 18  -WK 18  -LY       Functional Assessment    Outcome Measure Options AM-PAC 6 Clicks Basic Mobility (PT)  -AE AM-PAC 6 Clicks Basic Mobility (PT)  -WK AM-PAC 6 Clicks Basic Mobility (PT)  -LY          User Key  (r) = Recorded By, (t) = Taken By, (c) = Cosigned By    Initials Name Provider Type    AE Denise Benitez, PTA Physical Therapist Assistant    WK Juliane Goel, PTA Physical Therapist Assistant    LY Magdalena Kim, PTA Physical Therapist Assistant                Timed Therapy Charges  Total Units: 2    Charges  Total Units: 2    Procedure Name Documented Minutes Units Code    HC OT SELF CARE/MGMT/TRAIN EA 15 MIN 25  2    18564 (CPT®)               Documented Minutes  Total Minutes: 25    Therapy Provided Minutes    64682 - OT Self Care/Mgmt Minutes 25                    OT Discharge Summary  Anticipated Discharge Disposition (OT): skilled nursing facility  Reason for Discharge: Discharge from facility  Outcomes Achieved: Refer to plan of care for updates on goals achieved  Discharge Destination: SNF      CHAPARRITA Garza  9/2/2022

## 2022-09-05 ENCOUNTER — READMISSION MANAGEMENT (OUTPATIENT)
Dept: CALL CENTER | Facility: HOSPITAL | Age: 82
End: 2022-09-05

## 2022-09-05 NOTE — OUTREACH NOTE
Prep Survey    Flowsheet Row Responses   [facility]uddhism facility patient discharged from? Non-BH   Is LACE score < 7 ? Non-BH Discharge   Emergency Room discharge w/ pulse ox? No   Eligibility [facility]   Date of Discharge 09/02/22   Discharge diagnosis Unavailable   Does the patient have one of the following disease processes/diagnoses(primary or secondary)? Other   Prep survey completed? Yes          ARY POON - Registered Nurse

## 2022-09-06 ENCOUNTER — TRANSITIONAL CARE MANAGEMENT TELEPHONE ENCOUNTER (OUTPATIENT)
Dept: CALL CENTER | Facility: HOSPITAL | Age: 82
End: 2022-09-06

## 2022-09-06 ENCOUNTER — HOME HEALTH ADMISSION (OUTPATIENT)
Dept: HOME HEALTH SERVICES | Facility: HOME HEALTHCARE | Age: 82
End: 2022-09-06

## 2022-09-06 ENCOUNTER — TELEPHONE (OUTPATIENT)
Dept: INTERNAL MEDICINE | Facility: CLINIC | Age: 82
End: 2022-09-06

## 2022-09-06 DIAGNOSIS — T79.6XXA TRAUMATIC RHABDOMYOLYSIS, INITIAL ENCOUNTER: ICD-10-CM

## 2022-09-06 DIAGNOSIS — L03.119 CELLULITIS OF LOWER EXTREMITY, UNSPECIFIED LATERALITY: Primary | ICD-10-CM

## 2022-09-06 NOTE — TELEPHONE ENCOUNTER
PATIENT WAS DISCHARGED TO [facility] FROM [facility].  PATIENT STATED THAT SHE DIDN'T GET THERE UNTIL 8pm ON Friday.  SHE STAYED AT [facility] TILL Sunday 9-4-22  PATIENT STATED THAT SHE WOULD LIKE TO HAVE HOME HEALTH  COME OUT PLEASE.      PATIENT DIDN'T NOT STAY AT [facility] BECAUSE SHE WANTED TO DO PHYSICAL THERAPY AT HOME.      PATIENT IS SCHEDULED ON 09/09/2022

## 2022-09-06 NOTE — OUTREACH NOTE
Call Center TCM Note    Flowsheet Row Responses   [facility] patient discharged from? Non-BH   Does the patient have one of the following disease processes/diagnoses(primary or secondary)? Other   TCM attempt successful? No   Unsuccessful attempts Attempt 1  [Outdated PCP verbal release]          Radha Aldridge RN    9/6/2022, 15:05 CDT

## 2022-09-06 NOTE — OUTREACH NOTE
Call Center TCM Note    Flowsheet Row Responses   [facility] patient discharged from? Non-  [[facility] ]   Does the patient have one of the following disease processes/diagnoses(primary or secondary)? Other   TCM attempt successful? No   Unsuccessful attempts Attempt 2  [Outdated PCP verbal release]          Radha Aldridge RN    9/6/2022, 15:25 CDT

## 2022-09-07 ENCOUNTER — TRANSITIONAL CARE MANAGEMENT TELEPHONE ENCOUNTER (OUTPATIENT)
Dept: CALL CENTER | Facility: HOSPITAL | Age: 82
End: 2022-09-07

## 2022-09-07 ENCOUNTER — TELEPHONE (OUTPATIENT)
Dept: INTERNAL MEDICINE | Facility: CLINIC | Age: 82
End: 2022-09-07

## 2022-09-07 NOTE — PROGRESS NOTES
Yes.  She was supposed to be on Omnicef for 4 days starting on September 1 and ending on September 5.  It looks like you the discharging physician did prescribe the medication

## 2022-09-07 NOTE — TELEPHONE ENCOUNTER
Caller: [facility]    Relationship: CARE SUPPORT / NURSE     Best call back number: [phone]   EXT [phone]      Who are you requesting to speak with     CLINICAL STAFF  PATIENT DISCHARGE FROM [facility]    Do you know the name of the person who called:     FOUZIA    What was the call regarding:     HOME HEALTH ONLY CAN GO THROUGH    [facility] HOME CARE PURCHASE AGENCY WITH HER [facility] [employer]   IN NETWORK     [phone] (F)    Do you require a callback:     YES, PLEASE CALL BACK     NEEDS HOME AIDE AND A PHYSICAL THERAPIST

## 2022-09-07 NOTE — OUTREACH NOTE
Call Center TCM Note    Flowsheet Row Responses   [facility] patient discharged from? Non-   Does the patient have one of the following disease processes/diagnoses(primary or secondary)? Other   TCM attempt successful? Yes   Call start time 1334   Call end time 1338   Discharge diagnosis Unavailable   Is the patient taking all medications as directed (includes completed medication regime)? Yes   Medication comments States she thought she ws to be on 4 more days of an antibiotic Omnicef   What is the Home health agency?  [facility]   Has home health visited the patient within 72 hours of discharge? Yes   Psychosocial issues? No   Did the patient receive a copy of their discharge instructions? Yes   Nursing interventions Reviewed instructions with patient   What is the patient's perception of their health status since discharge? Improving   Is the patient/caregiver able to teach back signs and symptoms related to disease process for when to call PCP? Yes   Is the patient/caregiver able to teach back signs and symptoms related to disease process for when to call 911? Yes   Is the patient/caregiver able to teach back the hierarchy of who to call/visit for symptoms/problems? PCP, Specialist, Home health nurse, Urgent Care, ED, 911 Yes   Additional teach back comments States she is doing well.   TCM call completed? Yes          Destiny Colvin LPN    9/7/2022, 13:39 CDT

## 2022-09-08 ENCOUNTER — TELEPHONE (OUTPATIENT)
Dept: INTERNAL MEDICINE | Facility: CLINIC | Age: 82
End: 2022-09-08

## 2022-09-08 NOTE — TELEPHONE ENCOUNTER
called and stated that she would like a callback from Nurse or PCP    Notes: Home Health nurse called and stated that she went and did a new DNR order, just wanted to let dr know.    Patient has living will and power of  in home    Just an fyi    Please advise with callback @ [phone]    Thank you,  Gerson Bolton, PCT

## 2022-09-09 ENCOUNTER — OFFICE VISIT (OUTPATIENT)
Dept: INTERNAL MEDICINE | Facility: CLINIC | Age: 82
End: 2022-09-09

## 2022-09-09 ENCOUNTER — HOSPITAL ENCOUNTER (OUTPATIENT)
Dept: WOUND CARE | Age: 82
Discharge: HOME | End: 2022-09-09
Payer: MEDICARE

## 2022-09-09 VITALS
RESPIRATION RATE: 18 BRPM | TEMPERATURE: 97.4 F | HEART RATE: 69 BPM | DIASTOLIC BLOOD PRESSURE: 70 MMHG | HEIGHT: 60 IN | SYSTOLIC BLOOD PRESSURE: 155 MMHG | BODY MASS INDEX: 37.3 KG/M2 | WEIGHT: 190 LBS

## 2022-09-09 VITALS
WEIGHT: 185 LBS | DIASTOLIC BLOOD PRESSURE: 80 MMHG | RESPIRATION RATE: 15 BRPM | BODY MASS INDEX: 36.32 KG/M2 | SYSTOLIC BLOOD PRESSURE: 160 MMHG | HEIGHT: 60 IN | TEMPERATURE: 98.5 F | OXYGEN SATURATION: 95 % | HEART RATE: 90 BPM

## 2022-09-09 DIAGNOSIS — I10 ESSENTIAL HYPERTENSION: ICD-10-CM

## 2022-09-09 DIAGNOSIS — L03.116 LEFT LEG CELLULITIS: ICD-10-CM

## 2022-09-09 DIAGNOSIS — Z09 HOSPITAL DISCHARGE FOLLOW-UP: Primary | ICD-10-CM

## 2022-09-09 DIAGNOSIS — L97.522 ULCER OF TOE OF LEFT FOOT, WITH FAT LAYER EXPOSED (HCC): Chronic | ICD-10-CM

## 2022-09-09 DIAGNOSIS — L97.512 ULCER OF RIGHT FOOT, WITH FAT LAYER EXPOSED (HCC): Chronic | ICD-10-CM

## 2022-09-09 DIAGNOSIS — I87.8 CHRONIC VENOUS STASIS: ICD-10-CM

## 2022-09-09 PROCEDURE — 99495 TRANSJ CARE MGMT MOD F2F 14D: CPT | Performed by: INTERNAL MEDICINE

## 2022-09-09 PROCEDURE — 97597 DBRDMT OPN WND 1ST 20 CM/<: CPT | Performed by: SURGERY

## 2022-09-09 PROCEDURE — 99214 OFFICE O/P EST MOD 30 MIN: CPT

## 2022-09-09 PROCEDURE — 1111F DSCHRG MED/CURRENT MED MERGE: CPT | Performed by: INTERNAL MEDICINE

## 2022-09-09 PROCEDURE — 97597 DBRDMT OPN WND 1ST 20 CM/<: CPT

## 2022-09-09 PROCEDURE — 99204 OFFICE O/P NEW MOD 45 MIN: CPT | Performed by: SURGERY

## 2022-09-09 RX ORDER — LISINOPRIL 20 MG/1
20 TABLET ORAL DAILY
Qty: 30 TABLET | Refills: 2 | Status: SHIPPED | OUTPATIENT
Start: 2022-09-09 | End: 2022-11-07 | Stop reason: HOSPADM

## 2022-09-09 NOTE — DISCHARGE INSTRUCTIONS
[address] [facility]   Physician Orders and Discharge Instructions  [address]  Telephone: [phone]    NAME:  Brooke Maldonado OF BIRTH:  1940  MEDICAL RECORD NUMBER:  [id number]  DATE:  9/9/2022    Discharge condition: Stable    Discharge to: Home    Left via:Private automobile    Accompanied by:  self    ECF/HHA: [facility]    Dressing Orders:  Left 3rd and 4th toes and Right 3rd toe:  Wash with soap and water and dry. Apply Xeroform to open wounds, cover with dry gauze and wrap with gauze. Change daily. Treatment Orders:  Protein rich diet (unless restricted by your physician)  Multivitamin daily  Elevate legs when sitting, avoid standing for long periods of time. You are scheduled for Lower extremity arterial study (BRANDEN) is scheduled  on  9/23/2022 @ 8:00 am, go to the main entrance at [facility], turn left, go to desk and register. Please do not smoke for 2 hours before this test.       [facility] follow up visit _________1 week____________________  (Please note your next appointment above and if you are unable to keep, kindly give a 24 hour notice. Thank you.)          If you experience any of the following, please call the [address] during business hours:    * Increase in Pain  * Temperature over 101  * Increase in drainage from your wound  * Drainage with a foul odor  * Bleeding  * Increase in swelling  * Need for compression bandage changes due to slippage, breakthrough drainage. If you need medical attention outside of the business hours of the [address] please contact your PCP or go to the nearest emergency room.

## 2022-09-09 NOTE — PROGRESS NOTES
Transitional Care Follow Up Visit  Subjective     Carmen Obrien is a 82 y.o. female who presents for a transitional care management visit.    Within 48 business hours after discharge our office contacted her via telephone to coordinate her care and needs.      I reviewed and discussed the details of that call along with the discharge summary, hospital problems, inpatient lab results, inpatient diagnostic studies, and consultation reports with Carmen.     Current outpatient and discharge medications have been reconciled for the patient.  Reviewed by: VERA Noble MD      Date of TCM Phone Call 9/5/2022   [facility]   Date of Discharge 9/2/2022     Risk for Readmission (LACE) Score: 9 (9/1/2022  5:01 AM)      History of Present Illness   Course During Hospital Stay:      She initially presented to the hospital after being found down.  She had fallen at home and she did not have her life alert necklace on.  She was found to have traumatic rhabdomyolysis and left lower extremity cellulitis.  She was started on clindamycin and Rocephin.  Upon discharge she was prescribed Omnicef for 4 additional days, but she reports today never had received any new medication.    She also had hypomagnesemia and hypokalemia and after replacement these have stabilized.    She was discharged to [place], but she did not stay there because she wanted to do physical therapy at home.  She does have home health now.    She saw wound care at [facility].  Her toes have been trimmed and the physician is going to wrap her legs.  She feels much better than she has.    She is trying to be careful walking around her house as to not fall anymore.      The following portions of the patient's history were reviewed and updated as appropriate: allergies, current medications, past family history, past medical history, past social history, past surgical history and problem list.    Review of Systems   Constitutional:  Positive for activity change. Negative for fatigue.   Respiratory: Negative for shortness of breath.    Cardiovascular: Positive for leg swelling (Improved).   Genitourinary: Negative.    Musculoskeletal: Positive for arthralgias and gait problem.   Neurological: Positive for weakness.       Objective    Vitals:    09/09/22 1427   BP: 160/80   Pulse: 90   Resp: 15   Temp: 98.5 °F (36.9 °C)   SpO2: 95%       Physical Exam  Constitutional:       General: She is not in acute distress.     Appearance: She is well-developed. She is not diaphoretic.   HENT:      Head: Normocephalic and atraumatic.   Eyes:      Pupils: Pupils are equal, round, and reactive to light.   Neck:      Thyroid: No thyromegaly.      Trachea: Phonation normal.   Cardiovascular:      Rate and Rhythm: Normal rate and regular rhythm.      Heart sounds: No murmur heard.  Pulmonary:      Effort: Pulmonary effort is normal. No respiratory distress.      Breath sounds: Normal breath sounds. No wheezing.   Musculoskeletal:      Right lower leg: Edema (Trace) present.      Left lower leg: Edema (Trace to 1+) present.   Skin:     Coloration: Skin is not pale.      Findings: No erythema.      Comments: Left lower extremity is erythematous, but seems more consistent with chronic venous stasis rather than infection at this time   Neurological:      Mental Status: She is alert.   Psychiatric:         Behavior: Behavior normal.         Thought Content: Thought content normal.         Judgment: Judgment normal.         Assessment & Plan   Diagnoses and all orders for this visit:    1. Hospital discharge follow-up (Primary)    2. Left leg cellulitis    3. Essential hypertension  -     lisinopril (PRINIVIL,ZESTRIL) 20 MG tablet; Take 1 tablet by mouth Daily.  Dispense: 30 tablet; Refill: 2    4. Chronic venous stasis    I would like to increase her lisinopril to 20 mg daily for uncontrolled hypertension.  I believe the left leg cellulitis has resolved.  She still  has erythema and edema but I feel this is more related to chronic venous stasis rather than infection.  Encouraged compression stockings.  Continue seeing wound care at [facility].  Continue with home health for skilled nursing and physical therapy.    VERA Noble MD  09/09/2022

## 2022-09-09 NOTE — PLAN OF CARE
Problem: Discharge Planning  Goal: Discharge to home or other facility with appropriate resources  Outcome: Progressing     Problem: Wound:  Goal: Will show signs of wound healing; wound closure and no evidence of infection  Description: Will show signs of wound healing; wound closure and no evidence of infection  Outcome: Progressing     Problem: Weight control:  Goal: Ability to maintain an optimal weight for height and age will be supported  Description: Ability to maintain an optimal weight for height and age will be supported  Outcome: Progressing     Problem: Falls - Risk of:  Goal: Will remain free from falls  Description: Will remain free from falls  Outcome: Progressing
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
## 2022-09-21 RX ORDER — NORTRIPTYLINE HYDROCHLORIDE 25 MG/1
50 CAPSULE ORAL NIGHTLY
Qty: 90 CAPSULE | Refills: 1 | Status: ON HOLD | OUTPATIENT
Start: 2022-09-21 | End: 2022-10-15 | Stop reason: SDUPTHER

## 2022-09-22 ENCOUNTER — TRANSCRIBE ORDERS (OUTPATIENT)
Dept: ADMINISTRATIVE | Facility: HOSPITAL | Age: 82
End: 2022-09-22

## 2022-09-22 DIAGNOSIS — Z13.820 ENCOUNTER FOR SCREENING FOR OSTEOPOROSIS: ICD-10-CM

## 2022-09-22 DIAGNOSIS — M81.0 AGE-RELATED OSTEOPOROSIS WITHOUT CURRENT PATHOLOGICAL FRACTURE: Primary | ICD-10-CM

## 2022-09-23 ENCOUNTER — LAB (OUTPATIENT)
Dept: LAB | Facility: HOSPITAL | Age: 82
End: 2022-09-23

## 2022-09-23 ENCOUNTER — HOSPITAL ENCOUNTER (OUTPATIENT)
Dept: WOUND CARE | Age: 82
Discharge: HOME | End: 2022-09-23
Payer: MEDICARE

## 2022-09-23 ENCOUNTER — HOSPITAL ENCOUNTER (OUTPATIENT)
Dept: VASCULAR LAB | Age: 82
Discharge: HOME | End: 2022-09-23
Payer: MEDICARE

## 2022-09-23 VITALS
SYSTOLIC BLOOD PRESSURE: 154 MMHG | HEART RATE: 61 BPM | DIASTOLIC BLOOD PRESSURE: 70 MMHG | TEMPERATURE: 97.1 F | RESPIRATION RATE: 18 BRPM

## 2022-09-23 DIAGNOSIS — L97.512 ULCER OF RIGHT FOOT, WITH FAT LAYER EXPOSED (HCC): Chronic | ICD-10-CM

## 2022-09-23 DIAGNOSIS — L97.522 ULCER OF TOE OF LEFT FOOT, WITH FAT LAYER EXPOSED (HCC): Primary | ICD-10-CM

## 2022-09-23 DIAGNOSIS — L97.522 ULCER OF TOE OF LEFT FOOT, WITH FAT LAYER EXPOSED (HCC): Chronic | ICD-10-CM

## 2022-09-23 DIAGNOSIS — L97.512 ULCER OF RIGHT FOOT, WITH FAT LAYER EXPOSED (HCC): ICD-10-CM

## 2022-09-23 LAB — CALCIUM SPEC-SCNC: 10.2 MG/DL (ref 8.6–10.5)

## 2022-09-23 PROCEDURE — 82306 VITAMIN D 25 HYDROXY: CPT | Performed by: NURSE PRACTITIONER

## 2022-09-23 PROCEDURE — 82310 ASSAY OF CALCIUM: CPT | Performed by: NURSE PRACTITIONER

## 2022-09-23 PROCEDURE — 93923 UPR/LXTR ART STDY 3+ LVLS: CPT

## 2022-09-23 PROCEDURE — 97597 DBRDMT OPN WND 1ST 20 CM/<: CPT

## 2022-09-23 PROCEDURE — 97597 DBRDMT OPN WND 1ST 20 CM/<: CPT | Performed by: SURGERY

## 2022-09-23 PROCEDURE — 36415 COLL VENOUS BLD VENIPUNCTURE: CPT | Performed by: NURSE PRACTITIONER

## 2022-09-23 ASSESSMENT — PAIN DESCRIPTION - LOCATION: LOCATION: SHOULDER

## 2022-09-23 ASSESSMENT — PAIN DESCRIPTION - PAIN TYPE: TYPE: ACUTE PAIN

## 2022-09-23 ASSESSMENT — PAIN SCALES - GENERAL: PAINLEVEL_OUTOF10: 5

## 2022-09-23 ASSESSMENT — PAIN DESCRIPTION - ORIENTATION: ORIENTATION: RIGHT

## 2022-09-23 ASSESSMENT — PAIN DESCRIPTION - ONSET: ONSET: ON-GOING

## 2022-09-23 ASSESSMENT — PAIN - FUNCTIONAL ASSESSMENT: PAIN_FUNCTIONAL_ASSESSMENT: PREVENTS OR INTERFERES SOME ACTIVE ACTIVITIES AND ADLS

## 2022-09-23 ASSESSMENT — PAIN DESCRIPTION - FREQUENCY: FREQUENCY: INTERMITTENT

## 2022-09-23 ASSESSMENT — PAIN DESCRIPTION - DESCRIPTORS: DESCRIPTORS: ACHING;SORE

## 2022-09-23 NOTE — PLAN OF CARE
Problem: Discharge Planning  Goal: Discharge to home or other facility with appropriate resources  Outcome: Progressing     Problem: Pain  Goal: Verbalizes/displays adequate comfort level or baseline comfort level  Outcome: Progressing     Problem: Wound:  Goal: Will show signs of wound healing; wound closure and no evidence of infection  Description: Will show signs of wound healing; wound closure and no evidence of infection  Outcome: Progressing     Problem: Weight control:  Goal: Ability to maintain an optimal weight for height and age will be supported  Description: Ability to maintain an optimal weight for height and age will be supported  Outcome: Progressing     Problem: Falls - Risk of:  Goal: Will remain free from falls  Description: Will remain free from falls  Outcome: Progressing

## 2022-09-23 NOTE — PROGRESS NOTES
[address]   Progress Note and Procedure Note      [address] RECORD NUMBER:  [id number]  AGE: [de-identified] y.o. GENDER: female  : 1940  EPISODE DATE:  2022    Subjective:     Chief Complaint   Patient presents with    Wound Check         HISTORY of PRESENT ILLNESS HPI     Rashel Ross is a [de-identified] y.o. female who presents today for wound/ulcer evaluation. Wound Context: Pt with R&L toe wounds here for eval/treat  Wound/Ulcer Pain Timing/Severity: none  Quality of pain: N/A  Severity:  0 / 10   Modifying Factors: None  Associated Signs/Symptoms: none    Ulcer Identification:  Ulcer Type: pressure  Contributing Factors: shear force    Wound:  pressure        PAST MEDICAL HISTORY        Diagnosis Date    Anxiety     Carotid artery stenosis     Chronic back pain     H/O blood clots     Hx of blood clots     bilat legs    Hyperlipidemia     Hypertension     Osteoarthritis        PAST SURGICAL HISTORY    Past Surgical History:   Procedure Laterality Date    CATARACT REMOVAL Bilateral     CHOLECYSTECTOMY      EYE SURGERY      HYSTERECTOMY (CERVIX STATUS UNKNOWN)      JOINT REPLACEMENT      OTHER SURGICAL HISTORY      Decompression of ulnar nerve     REMOVE HARDWARE FEMUR Left 2019    HARDWARE REMOVAL performed by Jael Alston MD at [facility] Left 2019    LEFT KNEE REVISION performed by Jael Alston MD at [facility] OR       FAMILY HISTORY    Family History   Problem Relation Age of Onset    Dementia Mother     Heart Disease Father     Cancer Brother     COPD Other     Other Other         Blood clot    Diabetes Other        SOCIAL HISTORY    Social History     Tobacco Use    Smoking status: Never    Smokeless tobacco: Never   Vaping Use    Vaping Use: Never used   Substance Use Topics    Alcohol use: No    Drug use: No       ALLERGIES    Allergies   Allergen Reactions    Codeine      Patient unsure of allergies.     Levofloxacin      Patient unsure of allergies. Pt doesn't remember    Levofloxacin In D5w     Morphine      Patient unsure of allergies. Pregabalin      Patient unsure of allergies. Pt doesn't know         MEDICATIONS    Current Outpatient Medications on File Prior to Encounter   Medication Sig Dispense Refill    HYDROcodone-acetaminophen (NORCO) 7.5-325 MG per tablet Take 1 tablet by mouth every 6 hours as needed for Pain. ALPRAZolam (XANAX) 1 MG tablet Xanax 1 mg tablet   Take 1 tablet twice a day by oral route as needed. Calcium Carb-Cholecalciferol 600-800 MG-UNIT CHEW Caltrate 600 plus D  600 mg (1,500 mg)-800 unit chewable tablet   Take by oral route. FERROCITE 324 MG TABS 1 TABLET BY MOUTH TWICE A DAY  0    hydrochlorothiazide (HYDRODIURIL) 25 MG tablet hydrochlorothiazide 25 mg tablet   Take 1 tablet twice a day by oral route.      esomeprazole (NEXIUM) 40 MG delayed release capsule 1 CAPSULE BY MOUTH DAILY  0    linaclotide (LINZESS) 145 MCG capsule Linzess 145 mcg capsule   Take 1 capsule every day by oral route. lisinopril (PRINIVIL;ZESTRIL) 40 MG tablet 20 mg      montelukast (SINGULAIR) 10 MG tablet montelukast 10 mg tablet   Take 1 tablet every day by oral route. nabumetone (RELAFEN) 500 MG tablet nabumetone 500 mg tablet   Take 1 tablet twice a day by oral route.       senna (SENOKOT) 8.6 MG tablet Take 1 tablet by mouth daily      ferrous sulfate 325 (65 Fe) MG tablet Take 325 mg by mouth 2 times daily      cycloSPORINE (RESTASIS) 0.05 % ophthalmic emulsion Place 1 drop into both eyes 2 times daily      polyethyl glycol-propyl glycol 0.4-0.3 % (SYSTANE) 0.4-0.3 % ophthalmic solution 2 drops as needed for Dry Eyes       calcium carbonate (TUMS) 500 MG chewable tablet Take 1 tablet by mouth daily      Cholecalciferol (VITAMIN D3) 5000 units TABS Take by mouth daily       amLODIPine (NORVASC) 10 MG tablet Take 10 mg by mouth daily      atorvastatin (LIPITOR) 10 MG tablet Take 10 mg by mouth daily Calcium Carbonate (CALTRATE 600 PO) Take 600 mg by mouth 2 times daily      nortriptyline (PAMELOR) 50 MG capsule Take 50 mg by mouth nightly      Multiple Vitamins-Minerals (PRESERVISION AREDS PO) Take by mouth daily       No current facility-administered medications on file prior to encounter. REVIEW OF SYSTEMS    A comprehensive review of systems was negative.     Objective:      BP (!) 154/70   Pulse 61   Temp 97.1 °F (36.2 °C) (Temporal)   Resp 18     Wt Readings from Last 3 Encounters:   09/09/22 190 lb (86.2 kg)   09/10/20 190 lb (86.2 kg)   09/01/20 190 lb (86.2 kg)       PHYSICAL EXAM    General Appearance: alert and oriented to person, place and time, well developed and well- nourished, in no acute distress  Skin: warm and dry, no rash or erythema  Head: normocephalic and atraumatic  Eyes: pupils equal, round, and reactive to light, extraocular eye movements intact, conjunctivae normal  ENT: tympanic membrane, external ear and ear canal normal bilaterally, nose without deformity, nasal mucosa and turbinates normal without polyps, lips teeth and gums normal  Neck: supple and non-tender without mass, no thyromegaly or thyroid nodules, no cervical lymphadenopathy  Pulmonary/Chest: clear to auscultation bilaterally- no wheezes, rales or rhonchi, normal air movement, no respiratory distress  Cardiovascular: normal rate, regular rhythm, normal S1 and S2, no murmurs, rubs, clicks, or gallops, distal pulses intact, no carotid bruits  Abdomen: soft, non-tender, non-distended, normal bowel sounds, no masses or organomegaly  Extremities: no cyanosis, clubbing or edema  Musculoskeletal: normal range of motion, no joint swelling, deformity or tenderness  Neurologic: reflexes normal and symmetric, no cranial nerve deficit, gait, coordination and speech normal, skin sensation normal      Assessment:      Problem List Items Addressed This Visit       * (Principal) Ulcer of toe of left foot, with fat layer exposed ([address] - Primary (Chronic)    Ulcer of right foot, with fat layer exposed ([address] (Chronic)        Procedure Note  Indications:  Based on my examination of this patient's wound(s)/ulcer(s) today, debridement is required to promote healing and evaluate the wound base. Performed by: Sia Padron MD    Consent obtained:  Yes    Time out taken:  Yes    Pain Control: Anesthetic  Anesthetic: 2% Lidocaine Gel Topical       Debridement:Non-excisional Debridement    Using curette the wound(s)/ulcer(s) was/were sharply debrided down through and including the removal of epidermis and dermis.         Devitalized Tissue Debrided:  fibrin, biofilm, slough, necrotic/eschar, and exudate      Pre Debridement Measurements:  Are located in the Wound/Ulcer Documentation Flow Sheet    Wound/Ulcer #: 1, 2, and 3    Percent of Wound(s)/Ulcer(s) Debrided: 100%    Total Surface Area Debrided:  0.1 sq cm       Diabetic/Pressure/Non Pressure Ulcers only:  Ulcer: Non-Pressure ulcer, fat layer exposed             Post Debridement Measurements:    Wound/Ulcer Descriptions are Pre Debridement --EXCEPT MEASUREMENTS    Wound 09/09/22 Toe (Comment  which one) Left Wound 1, left 3rd toe, pressure unstageable (Active)   Wound Image   09/23/22 0936   Wound Etiology Pressure Unstageable 09/23/22 0936   Dressing Status Old drainage noted 09/23/22 0936   Wound Cleansed Soap and water 09/23/22 0936   Dressing/Treatment Xeroform;Gauze dressing/dressing sponge;Tape/Soft cloth adhesive tape 09/09/22 1145   Wound Length (cm) 0.4 cm 09/23/22 0936   Wound Width (cm) 0.2 cm 09/23/22 0936   Wound Depth (cm) 0.1 cm 09/23/22 0936   Wound Surface Area (cm^2) 0.08 cm^2 09/23/22 0936   Change in Wound Size % (l*w) 84 09/23/22 0936   Wound Volume (cm^3) 0.008 cm^3 09/23/22 0936   Wound Healing % 84 09/23/22 0936   Post-Procedure Length (cm) 0.4 cm 09/23/22 0951   Post-Procedure Width (cm) 0.2 cm 09/23/22 0951   Post-Procedure Depth (cm) 0.1 cm 09/23/22 0951 Post-Procedure Surface Area (cm^2) 0.08 cm^2 09/23/22 0951   Post-Procedure Volume (cm^3) 0.008 cm^3 09/23/22 0951   Drainage Amount Moderate 09/23/22 0936   Drainage Description Serosanguinous 09/23/22 0936   Odor None 09/23/22 0936   Lakshmi-wound Assessment Hyperkeratosis (callous) 09/23/22 0936   Margins Attached edges 09/23/22 0936   Wound Thickness Description not for Pressure Injury Full thickness 09/09/22 1009   Number of days: 14       Wound 09/09/22 Toe (Comment  which one) Right Wound 2, right 4th toe, pressure stage 3 (Active)   Wound Image   09/23/22 0936   Wound Etiology Pressure Stage 3 09/23/22 0936   Dressing Status Dry; Intact 09/23/22 0936   Wound Cleansed Soap and water 09/23/22 0936   Dressing/Treatment Xeroform;Gauze dressing/dressing sponge;Tape/Soft cloth adhesive tape 09/09/22 1145   Wound Length (cm) 0.3 cm 09/23/22 0936   Wound Width (cm) 0.2 cm 09/23/22 0936   Wound Depth (cm) 0.1 cm 09/23/22 0936   Wound Surface Area (cm^2) 0.06 cm^2 09/23/22 0936   Change in Wound Size % (l*w) 0 09/23/22 0936   Wound Volume (cm^3) 0.006 cm^3 09/23/22 0936   Wound Healing % 0 09/23/22 0936   Post-Procedure Length (cm) 0.1 cm 09/23/22 0951   Post-Procedure Width (cm) 0.1 cm 09/23/22 0951   Post-Procedure Depth (cm) 0.1 cm 09/23/22 0951   Post-Procedure Surface Area (cm^2) 0.01 cm^2 09/23/22 0951   Post-Procedure Volume (cm^3) 0.001 cm^3 09/23/22 0951   Distance Tunneling (cm) 0 cm 09/09/22 1009   Tunneling Position ___ O'Clock 0 09/09/22 1009   Undermining Starts ___ O'Clock 0 09/09/22 1009   Undermining Ends___ O'Clock 0 09/09/22 1009   Undermining Maxium Distance (cm) 0 09/09/22 1009   Wound Assessment Eschar dry 09/23/22 0936   Drainage Amount None 09/23/22 0936   Drainage Description Serosanguinous 09/09/22 1009   Odor None 09/23/22 0936   Lakshmi-wound Assessment Intact 09/23/22 0936   Margins Attached edges 09/23/22 0936   Wound Thickness Description not for Pressure Injury Full thickness 09/09/22 1009 Number of days: 14       Wound 09/09/22 Toe (Comment  which one) Anterior;Right Wound #3 Right 3rd toe (pressure ulcer) (Active)   Wound Image   09/09/22 1059   Wound Etiology Pressure Unstageable 09/23/22 0936   Dressing Status Clean;Dry; Intact 09/23/22 0936   Wound Cleansed Soap and water 09/23/22 0936   Dressing/Treatment Xeroform;Gauze dressing/dressing sponge;Tape/Soft cloth adhesive tape 09/09/22 1145   Wound Length (cm) 1.6 cm 09/23/22 0936   Wound Width (cm) 0.8 cm 09/23/22 0936   Wound Depth (cm) 0.1 cm 09/23/22 0936   Wound Surface Area (cm^2) 1.28 cm^2 09/23/22 0936   Change in Wound Size % (l*w) -6.67 09/23/22 0936   Wound Volume (cm^3) 0.128 cm^3 09/23/22 0936   Wound Healing % -7 09/23/22 0936   Post-Procedure Length (cm) 0.2 cm 09/23/22 0951   Post-Procedure Width (cm) 0.4 cm 09/23/22 0951   Post-Procedure Depth (cm) 0.1 cm 09/23/22 0951   Post-Procedure Surface Area (cm^2) 0.08 cm^2 09/23/22 0951   Post-Procedure Volume (cm^3) 0.008 cm^3 09/23/22 0951   Wound Assessment Eschar dry 09/23/22 0936   Drainage Amount None 09/23/22 0936   Odor None 09/23/22 0936   Lakshmi-wound Assessment Intact 09/23/22 0936   Margins Defined edges; Attached edges 09/23/22 0936   Number of days: 13             Estimated Blood Loss:  Minimal    Hemostasis Achieved:  by pressure    Procedural Pain:  0  / 10     Post Procedural Pain:  0 / 10     Response to treatment:  Well tolerated by patient. Plan:     Problem List Items Addressed This Visit       * (Principal) Ulcer of toe of left foot, with fat layer exposed ([address] - Primary (Chronic)    Ulcer of right foot, with fat layer exposed ([address] (Chronic)       BRANDEN ok wounds almost healed. RTO 1 week    Treatment Note please see attached Discharge Instructions    In my professional opinion this patient would benefit from HBO Therapy: No    Written patient dismissal instructions given to patient and signed by patient or POA.          Discharge Instructions           [id number] [facility] [address] [facility]   Physician Orders and Discharge Instructions  Trista Leyva  Telephone: [phone]    NAME:  Ck Acosta OF BIRTH:  1940  MEDICAL RECORD NUMBER:  [id number]  DATE:  9/23/2022    Discharge condition: Stable    Discharge to: Home    Left via:Private automobile    Accompanied by: Self     ECF/HHA: [facility]    Dressing Orders:  Left 3rd and 4th toes and Right 3rd toe:  Wash with soap and water and dry. Apply Xeroform to open wounds, cover with dry gauze and wrap with gauze. Change twice weekly     [facility] follow up visit _____________1 week________________  (Please note your next appointment above and if you are unable to keep, kindly give a 24 hour notice. Thank you.)    If you experience any of the following, please call the [address] [place] [address] during business hours:    * Increase in Pain  * Temperature over 101  * Increase in drainage from your wound  * Drainage with a foul odor  * Bleeding  * Increase in swelling  * Need for compression bandage changes due to slippage, breakthrough drainage. If you need medical attention outside of the business hours of the [address] [place] [address] please contact your PCP or go to the nearest emergency room.          Electronically signed by Valorie Harris MD on 9/23/2022 at 10:08 AM

## 2022-09-23 NOTE — DISCHARGE INSTRUCTIONS
[address] [facility]   Physician Orders and Discharge Instructions  [address]  Telephone: [phone]    NAME:  Edel Ramos OF BIRTH:  1940  MEDICAL RECORD NUMBER:  [id number]  DATE:  9/23/2022    Discharge condition: Stable    Discharge to: Home    Left via:Private automobile    Accompanied by: Self     ECF/HHA: [facility]    Dressing Orders:  Left 3rd and 4th toes and Right 3rd toe:  Wash with soap and water and dry. Apply Xeroform to open wounds, cover with dry gauze and wrap with gauze. Change twice weekly     [facility] follow up visit _____________1 week________________  (Please note your next appointment above and if you are unable to keep, kindly give a 24 hour notice. Thank you.)    If you experience any of the following, please call the [email or web address] during business hours:    * Increase in Pain  * Temperature over 101  * Increase in drainage from your wound  * Drainage with a foul odor  * Bleeding  * Increase in swelling  * Need for compression bandage changes due to slippage, breakthrough drainage. If you need medical attention outside of the business hours of the [email or web address] please contact your PCP or go to the nearest emergency room.

## 2022-09-24 LAB — 25(OH)D3 SERPL-MCNC: 67.2 NG/ML (ref 30–100)

## 2022-09-30 ENCOUNTER — HOSPITAL ENCOUNTER (OUTPATIENT)
Dept: WOUND CARE | Age: 82
Discharge: HOME | End: 2022-09-30
Payer: MEDICARE

## 2022-09-30 VITALS
SYSTOLIC BLOOD PRESSURE: 136 MMHG | RESPIRATION RATE: 18 BRPM | DIASTOLIC BLOOD PRESSURE: 70 MMHG | TEMPERATURE: 98 F | HEART RATE: 60 BPM

## 2022-09-30 DIAGNOSIS — L97.522 ULCER OF TOE OF LEFT FOOT, WITH FAT LAYER EXPOSED (HCC): Primary | ICD-10-CM

## 2022-09-30 DIAGNOSIS — L97.512 ULCER OF RIGHT FOOT, WITH FAT LAYER EXPOSED (HCC): ICD-10-CM

## 2022-09-30 PROCEDURE — 99212 OFFICE O/P EST SF 10 MIN: CPT | Performed by: SURGERY

## 2022-09-30 PROCEDURE — 99213 OFFICE O/P EST LOW 20 MIN: CPT

## 2022-09-30 ASSESSMENT — PAIN - FUNCTIONAL ASSESSMENT: PAIN_FUNCTIONAL_ASSESSMENT: PREVENTS OR INTERFERES SOME ACTIVE ACTIVITIES AND ADLS

## 2022-09-30 ASSESSMENT — PAIN SCALES - GENERAL: PAINLEVEL_OUTOF10: 7

## 2022-09-30 ASSESSMENT — PAIN DESCRIPTION - ONSET: ONSET: ON-GOING

## 2022-09-30 ASSESSMENT — PAIN DESCRIPTION - LOCATION: LOCATION: TOE (COMMENT WHICH ONE)

## 2022-09-30 ASSESSMENT — PAIN DESCRIPTION - PAIN TYPE: TYPE: ACUTE PAIN

## 2022-09-30 ASSESSMENT — PAIN DESCRIPTION - ORIENTATION: ORIENTATION: LEFT

## 2022-09-30 ASSESSMENT — PAIN DESCRIPTION - DESCRIPTORS: DESCRIPTORS: SORE;TENDER

## 2022-09-30 ASSESSMENT — PAIN DESCRIPTION - FREQUENCY: FREQUENCY: CONTINUOUS
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
## 2022-09-30 NOTE — DISCHARGE INSTRUCTIONS
[address] and [facility]   Physician Orders and Discharge Instructions  [address]  Telephone: [phone]    NAME:  Mortimer Gerlach OF BIRTH:  1940  MEDICAL RECORD NUMBER:  [id number]  DATE:  9/30/2022    Discharge condition: Stable    Discharge to: Home    Left via:Private automobile    Accompanied by:  self    [facility]: [facility]     Dressing Orders:  Left 3rd and 4th toes and Right 3rd toe:  Wash with soap and water and dry. Apply Xeroform to open wounds, cover with dry gauze and wrap with gauze. Metatarsal Pads  Separate left 2nd and 3rd toe with lambs's wool  Change twice weekly      C follow up visit _____________1 week________________  (Please note your next appointment above and if you are unable to keep, kindly give a 24 hour notice. Thank you.)          If you experience any of the following, please call the [facility] during business hours:    * Increase in Pain  * Temperature over 101  * Increase in drainage from your wound  * Drainage with a foul odor  * Bleeding  * Increase in swelling  * Need for compression bandage changes due to slippage, breakthrough drainage. If you need medical attention outside of the business hours of the [facility] please contact your PCP or go to the nearest emergency room.

## 2022-09-30 NOTE — PLAN OF CARE
Problem: Discharge Planning  Goal: Discharge to home or other facility with appropriate resources  Outcome: Progressing     Problem: Pain  Goal: Verbalizes/displays adequate comfort level or baseline comfort level  Outcome: Progressing     Problem: Safety - Adult  Goal: Free from fall injury  Outcome: Progressing     Problem: Wound:  Goal: Will show signs of wound healing; wound closure and no evidence of infection  Description: Will show signs of wound healing; wound closure and no evidence of infection  Outcome: Progressing     Problem: Weight control:  Goal: Ability to maintain an optimal weight for height and age will be supported  Description: Ability to maintain an optimal weight for height and age will be supported  Outcome: Progressing     Problem: Falls - Risk of:  Goal: Will remain free from falls  Description: Will remain free from falls  Outcome: Progressing

## 2022-10-07 ENCOUNTER — HOSPITAL ENCOUNTER (OUTPATIENT)
Dept: WOUND CARE | Age: 82
Discharge: HOME | End: 2022-10-07
Payer: MEDICARE

## 2022-10-07 ENCOUNTER — TELEPHONE (OUTPATIENT)
Dept: INTERNAL MEDICINE | Facility: CLINIC | Age: 82
End: 2022-10-07

## 2022-10-07 VITALS
HEIGHT: 60 IN | DIASTOLIC BLOOD PRESSURE: 62 MMHG | HEART RATE: 62 BPM | WEIGHT: 178 LBS | TEMPERATURE: 97.6 F | SYSTOLIC BLOOD PRESSURE: 151 MMHG | RESPIRATION RATE: 18 BRPM | BODY MASS INDEX: 34.95 KG/M2

## 2022-10-07 DIAGNOSIS — L97.512 ULCER OF RIGHT FOOT, WITH FAT LAYER EXPOSED (HCC): ICD-10-CM

## 2022-10-07 DIAGNOSIS — L97.522 ULCER OF TOE OF LEFT FOOT, WITH FAT LAYER EXPOSED (HCC): Primary | ICD-10-CM

## 2022-10-07 PROCEDURE — 97597 DBRDMT OPN WND 1ST 20 CM/<: CPT | Performed by: SURGERY

## 2022-10-07 PROCEDURE — 97597 DBRDMT OPN WND 1ST 20 CM/<: CPT

## 2022-10-07 ASSESSMENT — PAIN DESCRIPTION - PAIN TYPE: TYPE: ACUTE PAIN

## 2022-10-07 ASSESSMENT — PAIN DESCRIPTION - FREQUENCY: FREQUENCY: CONTINUOUS

## 2022-10-07 ASSESSMENT — PAIN DESCRIPTION - ONSET: ONSET: ON-GOING

## 2022-10-07 ASSESSMENT — PAIN DESCRIPTION - ORIENTATION: ORIENTATION: LEFT

## 2022-10-07 ASSESSMENT — PAIN DESCRIPTION - LOCATION: LOCATION: TOE (COMMENT WHICH ONE)

## 2022-10-07 ASSESSMENT — PAIN DESCRIPTION - DESCRIPTORS: DESCRIPTORS: TENDER;SORE

## 2022-10-07 ASSESSMENT — PAIN - FUNCTIONAL ASSESSMENT: PAIN_FUNCTIONAL_ASSESSMENT: PREVENTS OR INTERFERES SOME ACTIVE ACTIVITIES AND ADLS

## 2022-10-07 ASSESSMENT — PAIN SCALES - GENERAL: PAINLEVEL_OUTOF10: 7

## 2022-10-07 NOTE — TELEPHONE ENCOUNTER
Joanne / [facility] called and stated  that all goals have been met and has been discharged.    Just FYI, no callback needed.    Thank you,  Gerson Bolton, PCT

## 2022-10-07 NOTE — PROGRESS NOTES
[address]   Progress Note and Procedure Note      [address] RECORD NUMBER:  [id number]  AGE: [de-identified] y.o. GENDER: female  : 1940  EPISODE DATE:  10/7/2022    Subjective:     Chief Complaint   Patient presents with    Wound Check         HISTORY of PRESENT ILLNESS HPI     Fabiana Waller is a [de-identified] y.o. female who presents today for wound/ulcer evaluation. Wound Context: Pt with L toe wound here for eval/treat  Wound/Ulcer Pain Timing/Severity: none  Quality of pain: N/A  Severity:  0 / 10   Modifying Factors: None  Associated Signs/Symptoms: none    Ulcer Identification:  Ulcer Type: pressure  Contributing Factors: shear force    Wound:  pressure        PAST MEDICAL HISTORY        Diagnosis Date    Anxiety     Carotid artery stenosis     Chronic back pain     H/O blood clots     Hx of blood clots     bilat legs    Hyperlipidemia     Hypertension     Osteoarthritis        PAST SURGICAL HISTORY    Past Surgical History:   Procedure Laterality Date    CATARACT REMOVAL Bilateral     CHOLECYSTECTOMY      EYE SURGERY      HYSTERECTOMY (CERVIX STATUS UNKNOWN)      JOINT REPLACEMENT      OTHER SURGICAL HISTORY      Decompression of ulnar nerve     REMOVE HARDWARE FEMUR Left 2019    HARDWARE REMOVAL performed by Basil Morin MD at [address] Left 2019    LEFT KNEE REVISION performed by Basil Morin MD at [facility] OR       FAMILY HISTORY    Family History   Problem Relation Age of Onset    Dementia Mother     Heart Disease Father     Cancer Brother     COPD Other     Other Other         Blood clot    Diabetes Other        SOCIAL HISTORY    Social History     Tobacco Use    Smoking status: Never    Smokeless tobacco: Never   Vaping Use    Vaping Use: Never used   Substance Use Topics    Alcohol use: No    Drug use: No       ALLERGIES    Allergies   Allergen Reactions    Codeine      Patient unsure of allergies.     Levofloxacin      Patient unsure of allergies. Pt doesn't remember    Levofloxacin In D5w     Morphine      Patient unsure of allergies. Pregabalin      Patient unsure of allergies. Pt doesn't know         MEDICATIONS    Current Outpatient Medications on File Prior to Encounter   Medication Sig Dispense Refill    HYDROcodone-acetaminophen (NORCO) 7.5-325 MG per tablet Take 1 tablet by mouth every 6 hours as needed for Pain. ALPRAZolam (XANAX) 1 MG tablet Xanax 1 mg tablet   Take 1 tablet twice a day by oral route as needed. Calcium Carb-Cholecalciferol 600-800 MG-UNIT CHEW Caltrate 600 plus D  600 mg (1,500 mg)-800 unit chewable tablet   Take by oral route. FERROCITE 324 MG TABS 1 TABLET BY MOUTH TWICE A DAY  0    hydrochlorothiazide (HYDRODIURIL) 25 MG tablet hydrochlorothiazide 25 mg tablet   Take 1 tablet twice a day by oral route.      esomeprazole (NEXIUM) 40 MG delayed release capsule 1 CAPSULE BY MOUTH DAILY  0    linaclotide (LINZESS) 145 MCG capsule Linzess 145 mcg capsule   Take 1 capsule every day by oral route. lisinopril (PRINIVIL;ZESTRIL) 40 MG tablet 20 mg      montelukast (SINGULAIR) 10 MG tablet montelukast 10 mg tablet   Take 1 tablet every day by oral route. nabumetone (RELAFEN) 500 MG tablet nabumetone 500 mg tablet   Take 1 tablet twice a day by oral route.       senna (SENOKOT) 8.6 MG tablet Take 1 tablet by mouth daily      ferrous sulfate 325 (65 Fe) MG tablet Take 325 mg by mouth 2 times daily      cycloSPORINE (RESTASIS) 0.05 % ophthalmic emulsion Place 1 drop into both eyes 2 times daily      polyethyl glycol-propyl glycol 0.4-0.3 % (SYSTANE) 0.4-0.3 % ophthalmic solution 2 drops as needed for Dry Eyes       calcium carbonate (TUMS) 500 MG chewable tablet Take 1 tablet by mouth daily      Cholecalciferol (VITAMIN D3) 5000 units TABS Take by mouth daily       amLODIPine (NORVASC) 10 MG tablet Take 10 mg by mouth daily      atorvastatin (LIPITOR) 10 MG tablet Take 10 mg by mouth daily Calcium Carbonate (CALTRATE 600 PO) Take 600 mg by mouth 2 times daily      nortriptyline (PAMELOR) 50 MG capsule Take 50 mg by mouth nightly      Multiple Vitamins-Minerals (PRESERVISION AREDS PO) Take by mouth daily       No current facility-administered medications on file prior to encounter. REVIEW OF SYSTEMS    A comprehensive review of systems was negative.     Objective:      BP (!) 151/62   Pulse 62   Temp 97.6 °F (36.4 °C) (Temporal)   Resp 18   Ht 5' (1.524 m)   Wt 178 lb (80.7 kg)   BMI 34.76 kg/m²     Wt Readings from Last 3 Encounters:   10/07/22 178 lb (80.7 kg)   09/09/22 190 lb (86.2 kg)   09/10/20 190 lb (86.2 kg)       PHYSICAL EXAM    General Appearance: alert and oriented to person, place and time, well developed and well- nourished, in no acute distress  Skin: warm and dry, no rash or erythema  Head: normocephalic and atraumatic  Eyes: pupils equal, round, and reactive to light, extraocular eye movements intact, conjunctivae normal  ENT: tympanic membrane, external ear and ear canal normal bilaterally, nose without deformity, nasal mucosa and turbinates normal without polyps, lips teeth and gums normal  Neck: supple and non-tender without mass, no thyromegaly or thyroid nodules, no cervical lymphadenopathy  Pulmonary/Chest: clear to auscultation bilaterally- no wheezes, rales or rhonchi, normal air movement, no respiratory distress  Cardiovascular: normal rate, regular rhythm, normal S1 and S2, no murmurs, rubs, clicks, or gallops, distal pulses intact, no carotid bruits  Abdomen: soft, non-tender, non-distended, normal bowel sounds, no masses or organomegaly  Extremities: no cyanosis, clubbing or edema  Musculoskeletal: normal range of motion, no joint swelling, deformity or tenderness  Neurologic: reflexes normal and symmetric, no cranial nerve deficit, gait, coordination and speech normal, skin sensation normal      Assessment:      Problem List Items Addressed This Visit * (Principal) Ulcer of toe of left foot, with fat layer exposed ([address] - Primary (Chronic)    Ulcer of right foot, with fat layer exposed ([address] (Chronic)        Procedure Note  Indications:  Based on my examination of this patient's wound(s)/ulcer(s) today, debridement is required to promote healing and evaluate the wound base. Performed by: Dennis Goodwin MD    Consent obtained:  Yes    Time out taken:  Yes    Pain Control: Anesthetic  Anesthetic: 2% Lidocaine Gel Topical       Debridement:Non-excisional Debridement    Using curette and forceps the wound(s)/ulcer(s) was/were sharply debrided down through and including the removal of epidermis and dermis.         Devitalized Tissue Debrided:  fibrin, biofilm, slough, necrotic/eschar, exudate, and callus      Pre Debridement Measurements:  Are located in the Wound/Ulcer Documentation Flow Sheet    Wound/Ulcer #: 1    Percent of Wound(s)/Ulcer(s) Debrided: 100%    Total Surface Area Debrided:  0.01 sq cm       Diabetic/Pressure/Non Pressure Ulcers only:  Ulcer: Non-Pressure ulcer, fat layer exposed           Post Debridement Measurements:    Wound/Ulcer Descriptions are Pre Debridement --EXCEPT MEASUREMENTS    Wound 09/09/22 Toe (Comment  which one) Left Wound 1, left 3rd toe, pressure unstageable (Active)   Wound Image   10/07/22 0932   Wound Etiology Pressure Unstageable 10/07/22 0932   Dressing Status Old drainage noted 10/07/22 0932   Wound Cleansed Soap and water 10/07/22 0932   Dressing/Treatment Petroleum gauze;Gauze dressing/dressing sponge;Roll gauze 09/30/22 0945   Wound Length (cm) 0.1 cm 10/07/22 0932   Wound Width (cm) 0.1 cm 10/07/22 0932   Wound Depth (cm) 0.1 cm 10/07/22 0932   Wound Surface Area (cm^2) 0.01 cm^2 10/07/22 0932   Change in Wound Size % (l*w) 98 10/07/22 0932   Wound Volume (cm^3) 0.001 cm^3 10/07/22 0932   Wound Healing % 98 10/07/22 0932   Post-Procedure Length (cm) 0.1 cm 10/07/22 0945   Post-Procedure Width (cm) 0.1 cm 10/07/22 0945   Post-Procedure Depth (cm) 0.1 cm 10/07/22 0945   Post-Procedure Surface Area (cm^2) 0.01 cm^2 10/07/22 0945   Post-Procedure Volume (cm^3) 0.001 cm^3 10/07/22 0945   Wound Assessment Pink/red 10/07/22 0932   Drainage Amount Small 10/07/22 0932   Drainage Description Serosanguinous 10/07/22 0932   Odor None 10/07/22 0932   Lakshmi-wound Assessment Hyperkeratosis (callous) 10/07/22 0932   Margins Attached edges 10/07/22 0932   Wound Thickness Description not for Pressure Injury Full thickness 10/07/22 0932   Number of days: 27       Wound 09/09/22 Toe (Comment  which one) Anterior;Right Wound #3 Right 3rd toe (pressure ulcer) (Active)   Wound Image   10/07/22 0932   Wound Etiology Pressure Unstageable 10/07/22 0932   Dressing Status Old drainage noted 10/07/22 0932   Wound Cleansed Soap and water 10/07/22 0932   Dressing/Treatment Petroleum gauze;Gauze dressing/dressing sponge;Roll gauze 09/30/22 0945   Wound Length (cm) 0 cm 10/07/22 0932   Wound Width (cm) 0 cm 10/07/22 0932   Wound Depth (cm) 0 cm 10/07/22 0932   Wound Surface Area (cm^2) 0 cm^2 10/07/22 0932   Change in Wound Size % (l*w) 100 10/07/22 0932   Wound Volume (cm^3) 0 cm^3 10/07/22 0932   Wound Healing % 100 10/07/22 0932   Post-Procedure Length (cm) 0.2 cm 09/23/22 0951   Post-Procedure Width (cm) 0.4 cm 09/23/22 0951   Post-Procedure Depth (cm) 0.1 cm 09/23/22 0951   Post-Procedure Surface Area (cm^2) 0.08 cm^2 09/23/22 0951   Post-Procedure Volume (cm^3) 0.008 cm^3 09/23/22 0951   Wound Assessment Dry 10/07/22 0932   Drainage Amount None 10/07/22 0932   Odor None 10/07/22 0932   Lakshmi-wound Assessment Intact; Hyperkeratosis (callous) 10/07/22 0932   Margins Defined edges; Attached edges 10/07/22 0932   Number of days: 27             Estimated Blood Loss:  Minimal    Hemostasis Achieved:  by pressure    Procedural Pain:  0  / 10     Post Procedural Pain:  0 / 10     Response to treatment:  Well tolerated by patient.          Plan:     Problem List Items Addressed This Visit       * (Principal) Ulcer of toe of left foot, with fat layer exposed ([address] - Primary (Chronic)    Ulcer of right foot, with fat layer exposed ([address] (Chronic)       Cont current care R toe healed. RTO 1 week    Treatment Note please see attached Discharge Instructions    In my professional opinion this patient would benefit from HBO Therapy: No    Written patient dismissal instructions given to patient and signed by patient or POA. Discharge [address] and [facility] O[facility] Therapy   Physician Orders and Discharge Instructions  [address]  Telephone: [phone]    NAME:  Trini Camacho OF BIRTH:  1940  MEDICAL RECORD NUMBER:  [id number]  DATE:  10/7/2022    Discharge condition: Stable    Discharge to: Home    Left via:Private automobile    Accompanied by: Self    ECF/HHA: [facility]     Dressing Orders: Right and Left Foot Toe Wounds   Wash with soap and water and dry. Apply Xeroform to open wounds, cover with dry gauze and wrap with gauze. Metatarsal Pads  Separate left 2nd and 3rd toe with lambs's wool  Change twice weekly     WCC follow up visit _____________1 week________________  (Please note your next appointment above and if you are unable to keep, kindly give a 24 hour notice. Thank you.)    If you experience any of the following, please call the [facility] during business hours:    * Increase in Pain  * Temperature over 101  * Increase in drainage from your wound  * Drainage with a foul odor  * Bleeding  * Increase in swelling  * Need for compression bandage changes due to slippage, breakthrough drainage. If you need medical attention outside of the business hours of the [facility] please contact your PCP or go to the nearest emergency room.          Electronically signed by Lorraine Dennis MD on 10/7/2022 at 9:46 AM

## 2022-10-07 NOTE — DISCHARGE INSTRUCTIONS
[address] [facility]   Physician Orders and Discharge Instructions  [address]  Telephone: [phone]    NAME:  Kayla Plunkett OF BIRTH:  1940  MEDICAL RECORD NUMBER:  [id number]  DATE:  10/7/2022    Discharge condition: Stable    Discharge to: Home    Left via:Private automobile    Accompanied by: Self    ECF/HHA: [facility]     Dressing Orders: Right and Left Foot Toe Wounds   Wash with soap and water and dry. Apply Xeroform to open wounds, cover with dry gauze and wrap with gauze. Metatarsal Pads  Separate left 2nd and 3rd toe with lambs's wool  Change twice weekly     WCC follow up visit _____________1 week________________  (Please note your next appointment above and if you are unable to keep, kindly give a 24 hour notice. Thank you.)    If you experience any of the following, please call the [facility] during business hours:    * Increase in Pain  * Temperature over 101  * Increase in drainage from your wound  * Drainage with a foul odor  * Bleeding  * Increase in swelling  * Need for compression bandage changes due to slippage, breakthrough drainage. If you need medical attention outside of the business hours of the [facility] please contact your PCP or go to the nearest emergency room.

## 2022-10-11 ENCOUNTER — HOSPITAL ENCOUNTER (INPATIENT)
Facility: HOSPITAL | Age: 82
LOS: 4 days | Discharge: NURSING, REHAB OR LONG-TERM CARE | End: 2022-10-15
Attending: EMERGENCY MEDICINE | Admitting: FAMILY MEDICINE

## 2022-10-11 ENCOUNTER — APPOINTMENT (OUTPATIENT)
Dept: GENERAL RADIOLOGY | Facility: HOSPITAL | Age: 82
End: 2022-10-11

## 2022-10-11 ENCOUNTER — APPOINTMENT (OUTPATIENT)
Dept: CT IMAGING | Facility: HOSPITAL | Age: 82
End: 2022-10-11

## 2022-10-11 DIAGNOSIS — L03.116 CELLULITIS OF LEFT LOWER LEG: ICD-10-CM

## 2022-10-11 DIAGNOSIS — S43.004A DISLOCATION OF RIGHT SHOULDER JOINT, INITIAL ENCOUNTER: ICD-10-CM

## 2022-10-11 DIAGNOSIS — R13.19 ESOPHAGEAL DYSPHAGIA: ICD-10-CM

## 2022-10-11 DIAGNOSIS — S40.019D CONTUSION OF SHOULDER, UNSPECIFIED LATERALITY, SUBSEQUENT ENCOUNTER: ICD-10-CM

## 2022-10-11 DIAGNOSIS — M75.100 ROTATOR CUFF TEAR ARTHROPATHY, UNSPECIFIED LATERALITY: ICD-10-CM

## 2022-10-11 DIAGNOSIS — S61.309D AVULSION OF FINGERNAIL, SUBSEQUENT ENCOUNTER: ICD-10-CM

## 2022-10-11 DIAGNOSIS — K59.00 CONSTIPATION, UNSPECIFIED CONSTIPATION TYPE: ICD-10-CM

## 2022-10-11 DIAGNOSIS — N39.0 UTI (URINARY TRACT INFECTION), BACTERIAL: ICD-10-CM

## 2022-10-11 DIAGNOSIS — D72.829 LEUKOCYTOSIS, UNSPECIFIED TYPE: ICD-10-CM

## 2022-10-11 DIAGNOSIS — M12.819 ROTATOR CUFF TEAR ARTHROPATHY, UNSPECIFIED LATERALITY: ICD-10-CM

## 2022-10-11 DIAGNOSIS — R62.7 FAILURE TO THRIVE IN ADULT: ICD-10-CM

## 2022-10-11 DIAGNOSIS — E44.0 MODERATE MALNUTRITION: ICD-10-CM

## 2022-10-11 DIAGNOSIS — E04.2 MULTINODULAR GOITER: ICD-10-CM

## 2022-10-11 DIAGNOSIS — Z22.322 POSITIVE RESULT FOR METHICILLIN RESISTANT STAPHYLOCOCCUS AUREUS (MRSA) SCREENING: ICD-10-CM

## 2022-10-11 DIAGNOSIS — K21.9 GASTROESOPHAGEAL REFLUX DISEASE, UNSPECIFIED WHETHER ESOPHAGITIS PRESENT: ICD-10-CM

## 2022-10-11 DIAGNOSIS — D64.9 ANEMIA, UNSPECIFIED TYPE: ICD-10-CM

## 2022-10-11 DIAGNOSIS — R29.6 FALLING: ICD-10-CM

## 2022-10-11 DIAGNOSIS — T76.01XD: ICD-10-CM

## 2022-10-11 DIAGNOSIS — Z80.0 FAMILY HX OF COLON CANCER: ICD-10-CM

## 2022-10-11 DIAGNOSIS — M81.0 AGE-RELATED OSTEOPOROSIS WITHOUT CURRENT PATHOLOGICAL FRACTURE: ICD-10-CM

## 2022-10-11 DIAGNOSIS — S43.006D: ICD-10-CM

## 2022-10-11 DIAGNOSIS — S46.019D STRAIN OF ROTATOR CUFF CAPSULE, UNSPECIFIED LATERALITY, SUBSEQUENT ENCOUNTER: ICD-10-CM

## 2022-10-11 DIAGNOSIS — Z74.09 IMPAIRED MOBILITY: ICD-10-CM

## 2022-10-11 DIAGNOSIS — R79.89 POSITIVE D DIMER: ICD-10-CM

## 2022-10-11 DIAGNOSIS — D62 ACUTE BLOOD LOSS AS CAUSE OF POSTOPERATIVE ANEMIA: ICD-10-CM

## 2022-10-11 DIAGNOSIS — M79.605 ACUTE PAIN OF LEFT LOWER EXTREMITY: ICD-10-CM

## 2022-10-11 DIAGNOSIS — Z86.718 HISTORY OF DVT OF LOWER EXTREMITY: ICD-10-CM

## 2022-10-11 DIAGNOSIS — E87.1 HYPONATREMIA: ICD-10-CM

## 2022-10-11 DIAGNOSIS — I70.213 ATHEROSCLEROSIS OF NATIVE ARTERY OF BOTH LOWER EXTREMITIES WITH INTERMITTENT CLAUDICATION: ICD-10-CM

## 2022-10-11 DIAGNOSIS — N30.01 ACUTE CYSTITIS WITH HEMATURIA: ICD-10-CM

## 2022-10-11 DIAGNOSIS — R82.1 MYOGLOBINURIA: ICD-10-CM

## 2022-10-11 DIAGNOSIS — N39.0 ACUTE UTI (URINARY TRACT INFECTION): ICD-10-CM

## 2022-10-11 DIAGNOSIS — I87.8 CHRONIC VENOUS STASIS: ICD-10-CM

## 2022-10-11 DIAGNOSIS — M25.519 SHOULDER PAIN, UNSPECIFIED CHRONICITY, UNSPECIFIED LATERALITY: ICD-10-CM

## 2022-10-11 DIAGNOSIS — Z86.010 HISTORY OF ADENOMATOUS POLYP OF COLON: ICD-10-CM

## 2022-10-11 DIAGNOSIS — R09.89 DECREASED PULSES IN FEET: ICD-10-CM

## 2022-10-11 DIAGNOSIS — I10 ESSENTIAL HYPERTENSION: ICD-10-CM

## 2022-10-11 DIAGNOSIS — S46.919D STRAIN OF SHOULDER, UNSPECIFIED LATERALITY, SUBSEQUENT ENCOUNTER: ICD-10-CM

## 2022-10-11 DIAGNOSIS — T79.6XXA TRAUMATIC RHABDOMYOLYSIS, INITIAL ENCOUNTER: Primary | ICD-10-CM

## 2022-10-11 DIAGNOSIS — L03.116 LEFT LEG CELLULITIS: ICD-10-CM
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
## 2022-10-11 DIAGNOSIS — R73.9 HYPERGLYCEMIA: ICD-10-CM

## 2022-10-11 DIAGNOSIS — T84.038D: ICD-10-CM

## 2022-10-11 DIAGNOSIS — T79.6XXD TRAUMATIC RHABDOMYOLYSIS, SUBSEQUENT ENCOUNTER: ICD-10-CM

## 2022-10-11 DIAGNOSIS — Z96.659: ICD-10-CM

## 2022-10-11 DIAGNOSIS — G89.4 CHRONIC PAIN SYNDROME: ICD-10-CM

## 2022-10-11 DIAGNOSIS — R41.82 ALTERED MENTAL STATUS, UNSPECIFIED ALTERED MENTAL STATUS TYPE: ICD-10-CM

## 2022-10-11 DIAGNOSIS — A49.9 UTI (URINARY TRACT INFECTION), BACTERIAL: ICD-10-CM

## 2022-10-11 DIAGNOSIS — M81.0 OSTEOPOROSIS, UNSPECIFIED OSTEOPOROSIS TYPE, UNSPECIFIED PATHOLOGICAL FRACTURE PRESENCE: ICD-10-CM
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
## 2022-10-11 DIAGNOSIS — L02.416 ABSCESS OF LEFT THIGH: ICD-10-CM

## 2022-10-11 DIAGNOSIS — M25.369 INSTABILITY OF KNEE JOINT, UNSPECIFIED LATERALITY: ICD-10-CM

## 2022-10-11 DIAGNOSIS — E66.09 CLASS 1 OBESITY DUE TO EXCESS CALORIES WITH SERIOUS COMORBIDITY AND BODY MASS INDEX (BMI) OF 34.0 TO 34.9 IN ADULT: ICD-10-CM

## 2022-10-11 DIAGNOSIS — E87.6 HYPOKALEMIA: ICD-10-CM

## 2022-10-11 DIAGNOSIS — E83.42 HYPOMAGNESEMIA: ICD-10-CM

## 2022-10-11 LAB
ALBUMIN SERPL-MCNC: 3.7 G/DL (ref 3.5–5.2)
ALBUMIN/GLOB SERPL: 1.2 G/DL
ALP SERPL-CCNC: 69 U/L (ref 39–117)
ALT SERPL W P-5'-P-CCNC: 106 U/L (ref 1–33)
ANION GAP SERPL CALCULATED.3IONS-SCNC: 15 MMOL/L (ref 5–15)
AST SERPL-CCNC: 291 U/L (ref 1–32)
BACTERIA UR QL AUTO: ABNORMAL /HPF
BASOPHILS # BLD AUTO: 0.06 10*3/MM3 (ref 0–0.2)
BASOPHILS NFR BLD AUTO: 0.2 % (ref 0–1.5)
BILIRUB SERPL-MCNC: 0.5 MG/DL (ref 0–1.2)
BILIRUB UR QL STRIP: ABNORMAL
BUN SERPL-MCNC: 18 MG/DL (ref 8–23)
BUN/CREAT SERPL: 28.1 (ref 7–25)
CALCIUM SPEC-SCNC: 9.6 MG/DL (ref 8.6–10.5)
CHLORIDE SERPL-SCNC: 95 MMOL/L (ref 98–107)
CK SERPL-CCNC: ABNORMAL U/L (ref 20–180)
CLARITY UR: ABNORMAL
CO2 SERPL-SCNC: 21 MMOL/L (ref 22–29)
COLOR UR: ABNORMAL
CREAT SERPL-MCNC: 0.64 MG/DL (ref 0.57–1)
CRP SERPL-MCNC: 8.41 MG/DL (ref 0–0.5)
D DIMER PPP FEU-MCNC: 8.89 MCGFEU/ML (ref 0–0.5)
D-LACTATE SERPL-SCNC: 3.5 MMOL/L (ref 0.5–2)
D-LACTATE SERPL-SCNC: 4 MMOL/L (ref 0.5–2)
D-LACTATE SERPL-SCNC: 4.4 MMOL/L (ref 0.5–2)
DEPRECATED RDW RBC AUTO: 47.3 FL (ref 37–54)
EGFRCR SERPLBLD CKD-EPI 2021: 89.5 ML/MIN/1.73
EOSINOPHIL # BLD AUTO: 0.07 10*3/MM3 (ref 0–0.4)
EOSINOPHIL NFR BLD AUTO: 0.3 % (ref 0.3–6.2)
ERYTHROCYTE [DISTWIDTH] IN BLOOD BY AUTOMATED COUNT: 13.9 % (ref 12.3–15.4)
GLOBULIN UR ELPH-MCNC: 3 GM/DL
GLUCOSE SERPL-MCNC: 205 MG/DL (ref 65–99)
GLUCOSE UR STRIP-MCNC: NEGATIVE MG/DL
HCT VFR BLD AUTO: 45 % (ref 34–46.6)
HGB BLD-MCNC: 15.3 G/DL (ref 12–15.9)
HGB UR QL STRIP.AUTO: ABNORMAL
HYALINE CASTS UR QL AUTO: ABNORMAL /LPF
IMM GRANULOCYTES # BLD AUTO: 0.69 10*3/MM3 (ref 0–0.05)
IMM GRANULOCYTES NFR BLD AUTO: 2.7 % (ref 0–0.5)
INR PPP: 1.06 (ref 0.91–1.09)
KETONES UR QL STRIP: NEGATIVE
LEUKOCYTE ESTERASE UR QL STRIP.AUTO: ABNORMAL
LYMPHOCYTES # BLD AUTO: 0.35 10*3/MM3 (ref 0.7–3.1)
LYMPHOCYTES NFR BLD AUTO: 1.4 % (ref 19.6–45.3)
MAGNESIUM SERPL-MCNC: 1.8 MG/DL (ref 1.6–2.4)
MCH RBC QN AUTO: 31.7 PG (ref 26.6–33)
MCHC RBC AUTO-ENTMCNC: 34 G/DL (ref 31.5–35.7)
MCV RBC AUTO: 93.2 FL (ref 79–97)
MONOCYTES # BLD AUTO: 0.75 10*3/MM3 (ref 0.1–0.9)
MONOCYTES NFR BLD AUTO: 2.9 % (ref 5–12)
NEUTROPHILS NFR BLD AUTO: 23.52 10*3/MM3 (ref 1.7–7)
NEUTROPHILS NFR BLD AUTO: 92.5 % (ref 42.7–76)
NITRITE UR QL STRIP: POSITIVE
NRBC BLD AUTO-RTO: 0 /100 WBC (ref 0–0.2)
NT-PROBNP SERPL-MCNC: 2716 PG/ML (ref 0–1800)
PH UR STRIP.AUTO: 7.5 [PH] (ref 5–8)
PLATELET # BLD AUTO: 303 10*3/MM3 (ref 140–450)
PMV BLD AUTO: 11.3 FL (ref 6–12)
POTASSIUM SERPL-SCNC: 4 MMOL/L (ref 3.5–5.2)
PROCALCITONIN SERPL-MCNC: 8.38 NG/ML (ref 0–0.25)
PROT SERPL-MCNC: 6.7 G/DL (ref 6–8.5)
PROT UR QL STRIP: ABNORMAL
PROTHROMBIN TIME: 13.4 SECONDS (ref 11.9–14.6)
RBC # BLD AUTO: 4.83 10*6/MM3 (ref 3.77–5.28)
RBC # UR STRIP: ABNORMAL /HPF
REF LAB TEST METHOD: ABNORMAL
SODIUM SERPL-SCNC: 131 MMOL/L (ref 136–145)
SP GR UR STRIP: 1.02 (ref 1–1.03)
SQUAMOUS #/AREA URNS HPF: ABNORMAL /HPF
TROPONIN T SERPL-MCNC: <0.01 NG/ML (ref 0–0.03)
UROBILINOGEN UR QL STRIP: ABNORMAL
WBC # UR STRIP: ABNORMAL /HPF
WBC NRBC COR # BLD: 25.44 10*3/MM3 (ref 3.4–10.8)

## 2022-10-11 PROCEDURE — 71045 X-RAY EXAM CHEST 1 VIEW: CPT

## 2022-10-11 PROCEDURE — 87088 URINE BACTERIA CULTURE: CPT | Performed by: EMERGENCY MEDICINE

## 2022-10-11 PROCEDURE — 93010 ELECTROCARDIOGRAM REPORT: CPT | Performed by: INTERNAL MEDICINE

## 2022-10-11 PROCEDURE — 84145 PROCALCITONIN (PCT): CPT | Performed by: EMERGENCY MEDICINE

## 2022-10-11 PROCEDURE — 73502 X-RAY EXAM HIP UNI 2-3 VIEWS: CPT

## 2022-10-11 PROCEDURE — 99284 EMERGENCY DEPT VISIT MOD MDM: CPT

## 2022-10-11 PROCEDURE — 84484 ASSAY OF TROPONIN QUANT: CPT | Performed by: EMERGENCY MEDICINE

## 2022-10-11 PROCEDURE — 25010000002 FENTANYL CITRATE (PF) 50 MCG/ML SOLUTION: Performed by: EMERGENCY MEDICINE

## 2022-10-11 PROCEDURE — 87186 SC STD MICRODIL/AGAR DIL: CPT | Performed by: EMERGENCY MEDICINE

## 2022-10-11 PROCEDURE — 85610 PROTHROMBIN TIME: CPT | Performed by: EMERGENCY MEDICINE

## 2022-10-11 PROCEDURE — 86140 C-REACTIVE PROTEIN: CPT | Performed by: EMERGENCY MEDICINE

## 2022-10-11 PROCEDURE — 82550 ASSAY OF CK (CPK): CPT | Performed by: EMERGENCY MEDICINE

## 2022-10-11 PROCEDURE — 73560 X-RAY EXAM OF KNEE 1 OR 2: CPT

## 2022-10-11 PROCEDURE — 25010000002 TETANUS-DIPHTH-ACELL PERTUSSIS 5-2.5-18.5 LF-MCG/0.5 SUSPENSION PREFILLED SYRINGE: Performed by: EMERGENCY MEDICINE

## 2022-10-11 PROCEDURE — 87086 URINE CULTURE/COLONY COUNT: CPT | Performed by: EMERGENCY MEDICINE

## 2022-10-11 PROCEDURE — 83735 ASSAY OF MAGNESIUM: CPT | Performed by: EMERGENCY MEDICINE

## 2022-10-11 PROCEDURE — 81001 URINALYSIS AUTO W/SCOPE: CPT | Performed by: EMERGENCY MEDICINE

## 2022-10-11 PROCEDURE — 36415 COLL VENOUS BLD VENIPUNCTURE: CPT | Performed by: EMERGENCY MEDICINE

## 2022-10-11 PROCEDURE — 85379 FIBRIN DEGRADATION QUANT: CPT | Performed by: EMERGENCY MEDICINE

## 2022-10-11 PROCEDURE — 80053 COMPREHEN METABOLIC PANEL: CPT | Performed by: EMERGENCY MEDICINE

## 2022-10-11 PROCEDURE — P9612 CATHETERIZE FOR URINE SPEC: HCPCS

## 2022-10-11 PROCEDURE — 25010000002 ONDANSETRON PER 1 MG: Performed by: EMERGENCY MEDICINE

## 2022-10-11 PROCEDURE — 93005 ELECTROCARDIOGRAM TRACING: CPT | Performed by: EMERGENCY MEDICINE

## 2022-10-11 PROCEDURE — 90715 TDAP VACCINE 7 YRS/> IM: CPT | Performed by: EMERGENCY MEDICINE

## 2022-10-11 PROCEDURE — 83880 ASSAY OF NATRIURETIC PEPTIDE: CPT | Performed by: EMERGENCY MEDICINE

## 2022-10-11 PROCEDURE — 87040 BLOOD CULTURE FOR BACTERIA: CPT | Performed by: EMERGENCY MEDICINE

## 2022-10-11 PROCEDURE — 25010000002 CEFTRIAXONE PER 250 MG: Performed by: EMERGENCY MEDICINE

## 2022-10-11 PROCEDURE — 90471 IMMUNIZATION ADMIN: CPT | Performed by: EMERGENCY MEDICINE

## 2022-10-11 PROCEDURE — 70450 CT HEAD/BRAIN W/O DYE: CPT

## 2022-10-11 PROCEDURE — 83605 ASSAY OF LACTIC ACID: CPT | Performed by: EMERGENCY MEDICINE

## 2022-10-11 PROCEDURE — 73552 X-RAY EXAM OF FEMUR 2/>: CPT

## 2022-10-11 PROCEDURE — 25010000002 ENOXAPARIN PER 10 MG: Performed by: FAMILY MEDICINE

## 2022-10-11 PROCEDURE — 85025 COMPLETE CBC W/AUTO DIFF WBC: CPT | Performed by: EMERGENCY MEDICINE

## 2022-10-11 RX ORDER — ACETAMINOPHEN 325 MG/1
650 TABLET ORAL EVERY 4 HOURS PRN
Status: DISCONTINUED | OUTPATIENT
Start: 2022-10-11 | End: 2022-10-15 | Stop reason: HOSPADM

## 2022-10-11 RX ORDER — SODIUM CHLORIDE 0.9 % (FLUSH) 0.9 %
10 SYRINGE (ML) INJECTION EVERY 12 HOURS SCHEDULED
Status: DISCONTINUED | OUTPATIENT
Start: 2022-10-11 | End: 2022-10-15 | Stop reason: HOSPADM

## 2022-10-11 RX ORDER — FENTANYL CITRATE 50 UG/ML
50 INJECTION, SOLUTION INTRAMUSCULAR; INTRAVENOUS ONCE
Status: COMPLETED | OUTPATIENT
Start: 2022-10-11 | End: 2022-10-11

## 2022-10-11 RX ORDER — ONDANSETRON 2 MG/ML
4 INJECTION INTRAMUSCULAR; INTRAVENOUS ONCE
Status: COMPLETED | OUTPATIENT
Start: 2022-10-11 | End: 2022-10-11

## 2022-10-11 RX ORDER — LISINOPRIL 20 MG/1
20 TABLET ORAL DAILY
Status: DISCONTINUED | OUTPATIENT
Start: 2022-10-11 | End: 2022-10-15 | Stop reason: HOSPADM

## 2022-10-11 RX ORDER — OXYCODONE HYDROCHLORIDE AND ACETAMINOPHEN 5; 325 MG/1; MG/1
1 TABLET ORAL EVERY 4 HOURS PRN
Status: DISCONTINUED | OUTPATIENT
Start: 2022-10-11 | End: 2022-10-11

## 2022-10-11 RX ORDER — FLUTICASONE PROPIONATE 50 MCG
2 SPRAY, SUSPENSION (ML) NASAL DAILY PRN
Status: DISCONTINUED | OUTPATIENT
Start: 2022-10-11 | End: 2022-10-15 | Stop reason: HOSPADM

## 2022-10-11 RX ORDER — SODIUM CHLORIDE 0.9 % (FLUSH) 0.9 %
10 SYRINGE (ML) INJECTION AS NEEDED
Status: DISCONTINUED | OUTPATIENT
Start: 2022-10-11 | End: 2022-10-15 | Stop reason: HOSPADM

## 2022-10-11 RX ORDER — ONDANSETRON 2 MG/ML
4 INJECTION INTRAMUSCULAR; INTRAVENOUS EVERY 6 HOURS PRN
Status: DISCONTINUED | OUTPATIENT
Start: 2022-10-11 | End: 2022-10-11 | Stop reason: SDUPTHER

## 2022-10-11 RX ORDER — ENOXAPARIN SODIUM 100 MG/ML
40 INJECTION SUBCUTANEOUS DAILY
Status: DISCONTINUED | OUTPATIENT
Start: 2022-10-11 | End: 2022-10-13

## 2022-10-11 RX ORDER — ONDANSETRON 2 MG/ML
4 INJECTION INTRAMUSCULAR; INTRAVENOUS EVERY 6 HOURS PRN
Status: DISCONTINUED | OUTPATIENT
Start: 2022-10-11 | End: 2022-10-15 | Stop reason: HOSPADM

## 2022-10-11 RX ORDER — TRAMADOL HYDROCHLORIDE 50 MG/1
25 TABLET ORAL EVERY 4 HOURS PRN
Status: DISCONTINUED | OUTPATIENT
Start: 2022-10-11 | End: 2022-10-13

## 2022-10-11 RX ORDER — ASPIRIN 81 MG/1
81 TABLET ORAL DAILY
Status: DISCONTINUED | OUTPATIENT
Start: 2022-10-11 | End: 2022-10-15 | Stop reason: HOSPADM

## 2022-10-11 RX ORDER — ATORVASTATIN CALCIUM 10 MG/1
10 TABLET, FILM COATED ORAL DAILY
Status: DISCONTINUED | OUTPATIENT
Start: 2022-10-11 | End: 2022-10-12

## 2022-10-11 RX ORDER — CARVEDILOL 6.25 MG/1
6.25 TABLET ORAL 2 TIMES DAILY WITH MEALS
Status: DISCONTINUED | OUTPATIENT
Start: 2022-10-11 | End: 2022-10-15 | Stop reason: HOSPADM

## 2022-10-11 RX ORDER — ALUMINA, MAGNESIA, AND SIMETHICONE 2400; 2400; 240 MG/30ML; MG/30ML; MG/30ML
15 SUSPENSION ORAL EVERY 6 HOURS PRN
Status: DISCONTINUED | OUTPATIENT
Start: 2022-10-11 | End: 2022-10-15 | Stop reason: HOSPADM

## 2022-10-11 RX ORDER — SODIUM CHLORIDE, SODIUM LACTATE, POTASSIUM CHLORIDE, CALCIUM CHLORIDE 600; 310; 30; 20 MG/100ML; MG/100ML; MG/100ML; MG/100ML
100 INJECTION, SOLUTION INTRAVENOUS CONTINUOUS
Status: DISCONTINUED | OUTPATIENT
Start: 2022-10-11 | End: 2022-10-15

## 2022-10-11 RX ORDER — CYCLOSPORINE 0.5 MG/ML
1 EMULSION OPHTHALMIC EVERY 12 HOURS
Status: DISCONTINUED | OUTPATIENT
Start: 2022-10-11 | End: 2022-10-15 | Stop reason: HOSPADM

## 2022-10-11 RX ORDER — PANTOPRAZOLE SODIUM 40 MG/1
40 TABLET, DELAYED RELEASE ORAL EVERY MORNING
Status: DISCONTINUED | OUTPATIENT
Start: 2022-10-12 | End: 2022-10-15 | Stop reason: HOSPADM

## 2022-10-11 RX ORDER — MONTELUKAST SODIUM 10 MG/1
10 TABLET ORAL NIGHTLY
Status: DISCONTINUED | OUTPATIENT
Start: 2022-10-11 | End: 2022-10-15 | Stop reason: HOSPADM

## 2022-10-11 RX ADMIN — SODIUM CHLORIDE, POTASSIUM CHLORIDE, SODIUM LACTATE AND CALCIUM CHLORIDE 1000 ML: 600; 310; 30; 20 INJECTION, SOLUTION INTRAVENOUS at 14:05

## 2022-10-11 RX ADMIN — ENOXAPARIN SODIUM 40 MG: 100 INJECTION SUBCUTANEOUS at 18:11

## 2022-10-11 RX ADMIN — SODIUM CHLORIDE, POTASSIUM CHLORIDE, SODIUM LACTATE AND CALCIUM CHLORIDE 100 ML/HR: 600; 310; 30; 20 INJECTION, SOLUTION INTRAVENOUS at 18:06

## 2022-10-11 RX ADMIN — CARVEDILOL 6.25 MG: 6.25 TABLET, FILM COATED ORAL at 22:01

## 2022-10-11 RX ADMIN — TRAMADOL HYDROCHLORIDE 25 MG: 50 TABLET ORAL at 18:50

## 2022-10-11 RX ADMIN — ALUMINUM HYDROXIDE, MAGNESIUM HYDROXIDE, AND DIMETHICONE 15 ML: 400; 400; 40 SUSPENSION ORAL at 22:01

## 2022-10-11 RX ADMIN — MUPIROCIN 1 APPLICATION: 20 OINTMENT TOPICAL at 22:01

## 2022-10-11 RX ADMIN — FENTANYL CITRATE 50 MCG: 50 INJECTION INTRAMUSCULAR; INTRAVENOUS at 12:17

## 2022-10-11 RX ADMIN — ASPIRIN 81 MG: 81 TABLET, COATED ORAL at 22:01

## 2022-10-11 RX ADMIN — ATORVASTATIN CALCIUM 10 MG: 10 TABLET, FILM COATED ORAL at 22:01

## 2022-10-11 RX ADMIN — SODIUM CHLORIDE 1 G: 9 INJECTION, SOLUTION INTRAVENOUS at 14:03

## 2022-10-11 RX ADMIN — LISINOPRIL 20 MG: 20 TABLET ORAL at 22:01

## 2022-10-11 RX ADMIN — SODIUM CHLORIDE, POTASSIUM CHLORIDE, SODIUM LACTATE AND CALCIUM CHLORIDE 500 ML: 600; 310; 30; 20 INJECTION, SOLUTION INTRAVENOUS at 14:07

## 2022-10-11 RX ADMIN — MONTELUKAST SODIUM 10 MG: 10 TABLET, FILM COATED ORAL at 22:01

## 2022-10-11 RX ADMIN — TETANUS TOXOID, REDUCED DIPHTHERIA TOXOID AND ACELLULAR PERTUSSIS VACCINE, ADSORBED 0.5 ML: 5; 2.5; 8; 8; 2.5 SUSPENSION INTRAMUSCULAR at 12:16

## 2022-10-11 RX ADMIN — ONDANSETRON 4 MG: 2 INJECTION INTRAMUSCULAR; INTRAVENOUS at 12:17

## 2022-10-11 NOTE — ED PROVIDER NOTES
Subjective   History of Present Illness  This patient is a 89-year-old lady who lives by herself at home she does not remember how she fell or when she fell but she found herself laying on the floor and called EMS EMS and brought her to the ER on examination the patient is awake and alert oriented denies any neck pain or back pain is complaining of right hip pain and right leg pain.  Has got abrasions to both the knees which appear to be carpet burns.  There is no chest wall trauma no abdominal trauma no scalp trauma.    Leg Pain  Location:  Leg and buttock  Injury: yes    Mechanism of injury: fall    Fall:     Fall occurred:  From a bed (Or standing we are not sure how she fell)    Impact surface:  Hard floor    Point of impact:  Unable to specify    Entrapped after fall: yes    Buttock location:  R buttock  Leg location:  R leg  Pain details:     Quality:  Dull and cramping    Radiates to:  Does not radiate    Severity:  Moderate    Onset quality:  Sudden    Timing:  Constant    Progression:  Unchanged  Chronicity:  New  Foreign body present:  No foreign bodies  Tetanus status:  Unknown  Relieved by:  Nothing  Worsened by:  Nothing  Ineffective treatments:  None tried  Associated symptoms: no back pain, no decreased ROM, no neck pain, no numbness, no stiffness and no tingling    Risk factors: obesity    Risk factors: no concern for non-accidental trauma    Fall  Associated symptoms: no back pain, no chest pain and no neck pain        Review of Systems   Constitutional: Negative.  Negative for activity change and appetite change.   HENT: Negative.  Negative for congestion, dental problem, mouth sores and nosebleeds.    Eyes: Negative.  Negative for discharge.   Respiratory: Negative.  Negative for choking, chest tightness and shortness of breath.    Cardiovascular: Negative.  Negative for chest pain and leg swelling.   Gastrointestinal: Negative.    Genitourinary: Negative for difficulty urinating.    Musculoskeletal: Negative.  Negative for back pain, gait problem, neck pain and stiffness.   Skin: Negative.    Allergic/Immunologic: Negative for environmental allergies.   Neurological: Negative.  Negative for dizziness and facial asymmetry.   Psychiatric/Behavioral: Negative for agitation.   All other systems reviewed and are negative.      Past Medical History:   Diagnosis Date   • Anxiety    • Arthritis    • CAD (coronary artery disease)    • Colon polyp    • DDD (degenerative disc disease), lumbar    • Deep venous thrombosis (HCC)    • Depression    • Diverticulosis    • Esophageal stricture    • GERD (gastroesophageal reflux disease)    • History of transfusion    • Hypercholesteremia    • Hypertension    • LPRD (laryngopharyngeal reflux disease)    • Macular degeneration    • Multinodular goiter 2/23/2017   • Osteoarthritis    • Pruritic disorder    • Spastic colon    • Thyroid nodule        Allergies   Allergen Reactions   • Levaquin [Levofloxacin] Unknown (See Comments)     Pt doesn't remember   • Codeine Other (See Comments)     Pt does not recall   • Morphine And Related Other (See Comments)     Pt does not recall       Past Surgical History:   Procedure Laterality Date   • BLADDER REPAIR     • CATARACT EXTRACTION     • CHOLECYSTECTOMY     • COLONOSCOPY  04/22/2014    2 polyps, adenomatous, diverticulosis   • COLONOSCOPY N/A 6/26/2020    Procedure: COLONOSCOPY WITH ANESTHESIA;  Surgeon: John Coleman MD;  Location:  [facility];  Service: Gastroenterology;  Laterality: N/A;  pre op: constipation  post op: diverticulosis,   PCP: Adam Delgadillo MD   • CYSTOCELE REPAIR     • ENDOSCOPY  04/22/2014    Schatzki's ring dilated   • ENDOSCOPY N/A 6/26/2020    Procedure: ESOPHAGOGASTRODUODENOSCOPY WITH ANESTHESIA;  Surgeon: John Coleman MD;  Location:  [facility];  Service: Gastroenterology;  Laterality: N/A;  pre op: dysphagia  post op:stricture, dilated   PCP:Adam Delgadillo MD    • ENDOSCOPY N/A 9/2/2021    Procedure: ESOPHAGOGASTRODUODENOSCOPY WITH ANESTHESIA;  Surgeon: John Coleman MD;  Location: [facility];  Service: Gastroenterology;  Laterality: N/A;  pre op: dysphagia  post op:gastritis  PCP: VERA Noble MD   • FEMUR FRACTURE SURGERY     • HYSTERECTOMY     • INCISION AND DRAINAGE LEG Left 11/3/2021    Procedure: INCISION AND DRAINAGE LEG ABSCESS;  Surgeon: Cesia Mondragon MD;  Location: [facility] OR;  Service: General;  Laterality: Left;   • REPLACEMENT TOTAL KNEE     • ULNAR NERVE TRANSPOSITION         Family History   Problem Relation Age of Onset   • Colon cancer Mother    • Heart disease Mother    • Heart disease Father    • Colon polyps Neg Hx    • Esophageal cancer Neg Hx        Social History     Socioeconomic History   • Marital status:    Tobacco Use   • Smoking status: Never   • Smokeless tobacco: Never   Vaping Use   • Vaping Use: Never used   Substance and Sexual Activity   • Alcohol use: No   • Drug use: No   • Sexual activity: Defer           Objective   Physical Exam  Vitals and nursing note reviewed. Exam conducted with a chaperone present.   Constitutional:       General: She is awake. She is not in acute distress.     Appearance: Normal appearance. She is well-developed. She is not toxic-appearing.   HENT:      Head: Normocephalic. No raccoon eyes, Sanchez's sign, abrasion, contusion or laceration.      Jaw: There is normal jaw occlusion. No tenderness.      Comments: Scalp is atraumatic     Right Ear: Tympanic membrane and external ear normal. No hemotympanum.      Left Ear: Tympanic membrane and external ear normal. No hemotympanum.      Nose: Nose normal.   Eyes:      General: Lids are normal.      Conjunctiva/sclera: Conjunctivae normal.      Pupils: Pupils are equal, round, and reactive to light.      Comments: No hyphema has got a left infraorbital leg red spot which is a birthmark   Neck:      Vascular: No carotid bruit or JVD.       Trachea: Trachea and phonation normal. No tracheal deviation.      Comments: C-spine step-off laxity or tenderness.  Cardiovascular:      Rate and Rhythm: Normal rate and regular rhythm.      Chest Wall: PMI is not displaced.      Pulses:           Dorsalis pedis pulses are 1+ on the right side and 1+ on the left side.        Posterior tibial pulses are 1+ on the right side and 1+ on the left side.      Heart sounds: Normal heart sounds.     No S3 sounds.      Comments: Decreased perfusion to both feet.  Pulmonary:      Effort: Pulmonary effort is normal. No tachypnea, accessory muscle usage or respiratory distress.      Breath sounds: Normal breath sounds. No stridor.   Chest:      Chest wall: No mass, lacerations, deformity, swelling, tenderness or crepitus. There is no dullness to percussion.   Abdominal:      General: Abdomen is flat and protuberant. Bowel sounds are normal. There is no distension.      Palpations: Abdomen is soft. Abdomen is not rigid.      Tenderness: There is no abdominal tenderness. There is no right CVA tenderness or left CVA tenderness.      Comments: No guarding   Musculoskeletal:         General: Normal range of motion.      Cervical back: Full passive range of motion without pain, normal range of motion and neck supple. No rigidity, spasms, tenderness, bony tenderness or abnormal pulses. No spinous process tenderness or muscular tenderness. Normal range of motion and normal range of motion. Normal.      Thoracic back: Normal. No spasms, tenderness or bony tenderness. Normal range of motion. Normal pulses.      Lumbar back: No deformity, lacerations, pain, spasms, tenderness or bony tenderness. Normal range of motion. Normal pulse.      Right lower le+ Edema present.      Left lower le+ Edema present.      Comments: Bilateral knees have abrasions range of motion of the knees are somewhat limited but within normal limits.  Examination of the right hip reveals a significant  tenderness and limitation of movement.  No crepitus noted.  No thoracic spine or lumbar spine step-off/tenderness.  Upper extremity examination long bones are within normal limits and joint examination unremarkable lower extremity examination of the long bones of the right tib-fib left femur and left tib-fib are within normal limits.  The right hip and femur are tender to touch.   Skin:     General: Skin is warm and dry.      Capillary Refill: Capillary refill takes less than 2 seconds.      Findings: No abrasion, ecchymosis or laceration.   Neurological:      General: No focal deficit present.      Mental Status: She is alert and oriented to person, place, and time.      GCS: GCS eye subscore is 4. GCS verbal subscore is 5. GCS motor subscore is 6.      Cranial Nerves: Cranial nerves are intact and 2-12 are intact. No cranial nerve deficit.      Sensory: Sensation is intact. No sensory deficit.      Motor: Motor function is intact.      Deep Tendon Reflexes: Reflexes are normal and symmetric.      Reflex Scores:       Tricep reflexes are 2+ on the right side and 2+ on the left side.       Bicep reflexes are 2+ on the right side and 2+ on the left side.       Achilles reflexes are 2+ on the right side and 2+ on the left side.     Comments: Patient is awake alert knows where she is nonfocal.   Psychiatric:         Speech: Speech normal.         Behavior: Behavior normal. Behavior is cooperative.         Procedures           ED Course  ED Course as of 10/11/22 1642   Tue Oct 11, 2022   1226 Patient with unwitnessed fall [TS]   1230 Normal sinus rhythm left intrafascicular block [TS]   1353 Patient did not receive the recommended 30ml/kg fluid bolus for sepsis because it would be harmful or detrimental to the patient. The patient has Heart Failure.   The patient was ordered 1500 of fluids.  [TS]   1420 XR Femur 2 View Right [TS]   1551 The patient came to the ED via EMS patient either sustained a syncope or a  mechanical fall she is not sure but she woke up on the floor she does not remember how she got there and then she was able to call the ambulance via her alarm system in the ED she is awake and alert oriented knows where she is and does not any focal neurological deficits she is complaining of right leg pain which is tender.  There is no deformities noted bilateral legs of the same size.  The patient has rhabdomyolysis abnormal CT the chest because of the risk of contrast-induced nephropathy compounded by the rhabdomyolysis and myoglobinuria that the patient is suffering from.  I have discussed this case with the hospitalist CT of the head was obtained they want a follow-up on the results of that.  Meanwhile the patient will be hydrated given IV antibiotics. [TS]   1642 Well score 0 [TS]      ED Course User Index  [TS] Steven Reina MD                                           [facility]    Final diagnoses:   Traumatic rhabdomyolysis, initial encounter ([facility])   Fall, initial encounter   Acute UTI (urinary tract infection)   Positive D dimer   Myoglobinuria       ED Disposition  ED Disposition     ED Disposition   Decision to Admit    Condition   --    Comment   Level of Care: Telemetry [5]   Diagnosis: Traumatic rhabdomyolysis, initial encounter ([facility]) [[id number]]   Admitting Physician: STEVEN SIMENTAL [[id number]]   Attending Physician: STEVEN SIMENTAL [[id number]]   Certification: I Certify That Inpatient Hospital Services Are Medically Necessary For Greater Than 2 Midnights               No follow-up provider specified.       Medication List      No changes were made to your prescriptions during this visit.          Steven Reina MD  10/11/22 1229       Steven Reina MD  10/11/22 1628       Steven Reina MD  10/11/22 1642
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
## 2022-10-12 ENCOUNTER — APPOINTMENT (OUTPATIENT)
Dept: ULTRASOUND IMAGING | Facility: HOSPITAL | Age: 82
End: 2022-10-12

## 2022-10-12 LAB
ALBUMIN SERPL-MCNC: 3.6 G/DL (ref 3.5–5.2)
ALBUMIN/GLOB SERPL: 1 G/DL
ALP SERPL-CCNC: 74 U/L (ref 39–117)
ALT SERPL W P-5'-P-CCNC: 258 U/L (ref 1–33)
ANION GAP SERPL CALCULATED.3IONS-SCNC: 15 MMOL/L (ref 5–15)
AST SERPL-CCNC: 721 U/L (ref 1–32)
BILIRUB SERPL-MCNC: 0.5 MG/DL (ref 0–1.2)
BUN SERPL-MCNC: 29 MG/DL (ref 8–23)
BUN/CREAT SERPL: 21.5 (ref 7–25)
CALCIUM SPEC-SCNC: 9.9 MG/DL (ref 8.6–10.5)
CHLORIDE SERPL-SCNC: 94 MMOL/L (ref 98–107)
CHOLEST SERPL-MCNC: 148 MG/DL (ref 0–200)
CK SERPL-CCNC: ABNORMAL U/L (ref 20–180)
CO2 SERPL-SCNC: 23 MMOL/L (ref 22–29)
CREAT SERPL-MCNC: 1.35 MG/DL (ref 0.57–1)
D-LACTATE SERPL-SCNC: 3.2 MMOL/L (ref 0.5–2)
D-LACTATE SERPL-SCNC: 3.3 MMOL/L (ref 0.5–2)
DEPRECATED RDW RBC AUTO: 49.1 FL (ref 37–54)
EGFRCR SERPLBLD CKD-EPI 2021: 39.8 ML/MIN/1.73
ERYTHROCYTE [DISTWIDTH] IN BLOOD BY AUTOMATED COUNT: 14.4 % (ref 12.3–15.4)
GLOBULIN UR ELPH-MCNC: 3.5 GM/DL
GLUCOSE SERPL-MCNC: 183 MG/DL (ref 65–99)
HBA1C MFR BLD: 5.6 % (ref 4.8–5.6)
HCT VFR BLD AUTO: 43.7 % (ref 34–46.6)
HDLC SERPL-MCNC: 54 MG/DL (ref 40–60)
HGB BLD-MCNC: 14.8 G/DL (ref 12–15.9)
HYPOCHROMIA BLD QL: ABNORMAL
LDLC SERPL CALC-MCNC: 66 MG/DL (ref 0–100)
LDLC/HDLC SERPL: 1.11 {RATIO}
LYMPHOCYTES # BLD MANUAL: 0.19 10*3/MM3 (ref 0.7–3.1)
LYMPHOCYTES NFR BLD MANUAL: 2 % (ref 5–12)
MCH RBC QN AUTO: 31.5 PG (ref 26.6–33)
MCHC RBC AUTO-ENTMCNC: 33.9 G/DL (ref 31.5–35.7)
MCV RBC AUTO: 93 FL (ref 79–97)
MONOCYTES # BLD: 0.38 10*3/MM3 (ref 0.1–0.9)
MYELOCYTES NFR BLD MANUAL: 1 % (ref 0–0)
NEUTROPHILS # BLD AUTO: 18.3 10*3/MM3 (ref 1.7–7)
NEUTROPHILS NFR BLD MANUAL: 61 % (ref 42.7–76)
NEUTS BAND NFR BLD MANUAL: 35 % (ref 0–5)
PLAT MORPH BLD: NORMAL
PLATELET # BLD AUTO: 261 10*3/MM3 (ref 140–450)
PMV BLD AUTO: 11.1 FL (ref 6–12)
POLYCHROMASIA BLD QL SMEAR: ABNORMAL
POTASSIUM SERPL-SCNC: 4.3 MMOL/L (ref 3.5–5.2)
PROT SERPL-MCNC: 7.1 G/DL (ref 6–8.5)
QT INTERVAL: 410 MS
QTC INTERVAL: 457 MS
RBC # BLD AUTO: 4.7 10*6/MM3 (ref 3.77–5.28)
SODIUM SERPL-SCNC: 132 MMOL/L (ref 136–145)
TRIGL SERPL-MCNC: 169 MG/DL (ref 0–150)
TSH SERPL DL<=0.05 MIU/L-ACNC: 1.98 UIU/ML (ref 0.27–4.2)
VARIANT LYMPHS NFR BLD MANUAL: 1 % (ref 19.6–45.3)
VLDLC SERPL-MCNC: 28 MG/DL (ref 5–40)
WBC MORPH BLD: NORMAL
WBC NRBC COR # BLD: 19.06 10*3/MM3 (ref 3.4–10.8)

## 2022-10-12 PROCEDURE — 83605 ASSAY OF LACTIC ACID: CPT | Performed by: EMERGENCY MEDICINE

## 2022-10-12 PROCEDURE — 83036 HEMOGLOBIN GLYCOSYLATED A1C: CPT | Performed by: FAMILY MEDICINE

## 2022-10-12 PROCEDURE — 85025 COMPLETE CBC W/AUTO DIFF WBC: CPT | Performed by: FAMILY MEDICINE

## 2022-10-12 PROCEDURE — 25010000002 ONDANSETRON PER 1 MG: Performed by: FAMILY MEDICINE

## 2022-10-12 PROCEDURE — 25010000002 CEFTRIAXONE PER 250 MG: Performed by: FAMILY MEDICINE

## 2022-10-12 PROCEDURE — 25010000002 ENOXAPARIN PER 10 MG: Performed by: FAMILY MEDICINE

## 2022-10-12 PROCEDURE — 80061 LIPID PANEL: CPT | Performed by: FAMILY MEDICINE

## 2022-10-12 PROCEDURE — 80053 COMPREHEN METABOLIC PANEL: CPT | Performed by: FAMILY MEDICINE

## 2022-10-12 PROCEDURE — 97162 PT EVAL MOD COMPLEX 30 MIN: CPT | Performed by: PHYSICAL THERAPIST

## 2022-10-12 PROCEDURE — 97166 OT EVAL MOD COMPLEX 45 MIN: CPT

## 2022-10-12 PROCEDURE — 85007 BL SMEAR W/DIFF WBC COUNT: CPT | Performed by: FAMILY MEDICINE

## 2022-10-12 PROCEDURE — 82550 ASSAY OF CK (CPK): CPT | Performed by: FAMILY MEDICINE

## 2022-10-12 PROCEDURE — 93970 EXTREMITY STUDY: CPT

## 2022-10-12 PROCEDURE — 84443 ASSAY THYROID STIM HORMONE: CPT | Performed by: FAMILY MEDICINE

## 2022-10-12 PROCEDURE — 93970 EXTREMITY STUDY: CPT | Performed by: SURGERY

## 2022-10-12 RX ORDER — ATORVASTATIN CALCIUM 10 MG/1
10 TABLET, FILM COATED ORAL DAILY
Status: DISCONTINUED | OUTPATIENT
Start: 2022-10-14 | End: 2022-10-15 | Stop reason: HOSPADM

## 2022-10-12 RX ADMIN — ATORVASTATIN CALCIUM 10 MG: 10 TABLET, FILM COATED ORAL at 08:10

## 2022-10-12 RX ADMIN — SODIUM CHLORIDE 1 G: 9 INJECTION, SOLUTION INTRAVENOUS at 14:38

## 2022-10-12 RX ADMIN — MUPIROCIN 1 APPLICATION: 20 OINTMENT TOPICAL at 08:11

## 2022-10-12 RX ADMIN — SODIUM CHLORIDE, POTASSIUM CHLORIDE, SODIUM LACTATE AND CALCIUM CHLORIDE 125 ML/HR: 600; 310; 30; 20 INJECTION, SOLUTION INTRAVENOUS at 06:34

## 2022-10-12 RX ADMIN — CYCLOSPORINE 1 DROP: 0.5 EMULSION OPHTHALMIC at 17:58

## 2022-10-12 RX ADMIN — MONTELUKAST SODIUM 10 MG: 10 TABLET, FILM COATED ORAL at 21:15

## 2022-10-12 RX ADMIN — CARVEDILOL 6.25 MG: 6.25 TABLET, FILM COATED ORAL at 08:10

## 2022-10-12 RX ADMIN — Medication 1 TABLET: at 08:46

## 2022-10-12 RX ADMIN — ASPIRIN 81 MG: 81 TABLET, COATED ORAL at 08:10

## 2022-10-12 RX ADMIN — CARVEDILOL 6.25 MG: 6.25 TABLET, FILM COATED ORAL at 17:58

## 2022-10-12 RX ADMIN — PANTOPRAZOLE SODIUM 40 MG: 40 TABLET, DELAYED RELEASE ORAL at 08:11

## 2022-10-12 RX ADMIN — LISINOPRIL 20 MG: 20 TABLET ORAL at 08:10

## 2022-10-12 RX ADMIN — ENOXAPARIN SODIUM 40 MG: 100 INJECTION SUBCUTANEOUS at 17:58

## 2022-10-12 RX ADMIN — ONDANSETRON 4 MG: 2 INJECTION INTRAMUSCULAR; INTRAVENOUS at 09:47

## 2022-10-12 NOTE — PLAN OF CARE
Goal Outcome Evaluation:  Plan of Care Reviewed With: patient  Progress: no change         Pt noted to be restless throughout shift. IVF cont to infuse per orders. Incont of bowel and bladder throughout shift. 2 lg BM's this am; pt states feeling better after BM's. Pt turning self side to side frequently. LLE noted to be extremely reddened and bilat abrasions noted to lower extremities. Bed check in place. VSS. Safety maintained.

## 2022-10-12 NOTE — PROGRESS NOTES
[facility] Medicine Services  INPATIENT PROGRESS NOTE    Length of Stay: 1  Date of Admission: 10/11/2022  Primary Care Physician: VERA Noble MD    Subjective   Chief Complaint: Falling/rhabdo/leg wound/UTI    HPI   Patient planing of loose stool.  T-max 99.6.  T-current 97.3.  Blood pressure stable.  Patient is on room air.  Patient denies any chest pain or shortness of breath.  Doppler ultrasound lower extremity still pending.  Kidney function worsening.  CK increasing.  Increase IV hydration.    Review of Systems   Constitutional: Positive for activity change and appetite change. Negative for chills and fever.   HENT: Negative for hearing loss, nosebleeds, tinnitus and trouble swallowing.    Eyes: Negative for visual disturbance.   Respiratory: Negative for cough, chest tightness, shortness of breath and wheezing.    Cardiovascular: Negative for chest pain, palpitations and leg swelling.   Gastrointestinal: Negative for abdominal distention, abdominal pain, blood in stool, constipation, diarrhea, nausea and vomiting.   Endocrine: Negative for cold intolerance, heat intolerance, polydipsia, polyphagia and polyuria.   Genitourinary: Negative for decreased urine volume, difficulty urinating, dysuria, flank pain, frequency and hematuria.   Musculoskeletal: Positive for arthralgias, gait problem and myalgias. Negative for joint swelling.   Skin: Positive for wound. Negative for rash.        Knee and legs.   Allergic/Immunologic: Negative for immunocompromised state.   Neurological: Positive for weakness. Negative for dizziness, syncope, light-headedness and headaches.   Hematological: Negative for adenopathy. Does not bruise/bleed easily.   Psychiatric/Behavioral: Negative for confusion and sleep disturbance. The patient is not nervous/anxious.      All pertinent negatives and positives are as above. All other systems have been reviewed and are negative unless otherwise  stated.     Objective    Temp:  [97 °F (36.1 °C)-99.6 °F (37.6 °C)] 97.3 °F (36.3 °C)  Heart Rate:  [] 87  Resp:  [16] 16  BP: (115-143)/(51-93) 117/60    Intake/Output Summary (Last 24 hours) at 10/12/2022 1423  Last data filed at 10/12/2022 0900  Gross per 24 hour   Intake 1297 ml   Output --   Net 1297 ml     Physical Exam  Vitals and nursing note reviewed.   Constitutional:       Comments: Advanced age.  Chronically ill.   HENT:      Head: Normocephalic.   Eyes:      Conjunctiva/sclera: Conjunctivae normal.      Pupils: Pupils are equal, round, and reactive to light.   Neck:      Vascular: No JVD.   Cardiovascular:      Rate and Rhythm: Normal rate and regular rhythm.      Heart sounds: Normal heart sounds.   Pulmonary:      Effort: No respiratory distress.      Breath sounds: No wheezing or rales.      Comments: Diminished breath semilateral, clear . patient is on room air  Chest:      Chest wall: No tenderness.   Abdominal:      General: Bowel sounds are normal. There is no distension.      Palpations: Abdomen is soft.      Tenderness: There is no abdominal tenderness.      Comments: Obese .   Musculoskeletal:         General: No tenderness or deformity.      Cervical back: Neck supple.   Skin:     General: Skin is warm and dry.      Capillary Refill: Capillary refill takes 2 to 3 seconds.      Findings: Erythema present. No rash.      Comments: Abrasion bilateral knee/left lower extremity stasis dermatitis.   Neurological:      General: No focal deficit present.      Mental Status: She is alert and oriented to person, place, and time.      Cranial Nerves: No cranial nerve deficit.      Motor: Weakness present. No abnormal muscle tone.      Coordination: Coordination abnormal.      Gait: Gait abnormal.      Deep Tendon Reflexes: Reflexes normal.   Psychiatric:         Mood and Affect: Mood normal.         Behavior: Behavior normal.         Thought Content: Thought content normal.   Results Review:  Lab  Results (last 24 hours)     Procedure Component Value Units Date/Time    Blood Culture - Blood, Arm, Right [[id number]]  (Normal) Collected: 10/11/22 1245    Specimen: Blood from Arm, Right Updated: 10/12/22 1415     Blood Culture No growth at 24 hours    Urine Culture - Urine, Urine, Catheter [[id number]]  (Abnormal) Collected: 10/11/22 1336    Specimen: Urine, Catheter Updated: 10/12/22 1007     Urine Culture >100,000 CFU/mL Escherichia coli    Narrative:      Colonization of the urinary tract without infection is common. Treatment is discouraged unless the patient is symptomatic, pregnant, or undergoing an invasive urologic procedure.    STAT Lactic Acid, Reflex [[id number]]  (Abnormal) Collected: 10/12/22 0904    Specimen: Blood Updated: 10/12/22 1001     Lactate 3.2 mmol/L     CK [[id number]]  (Abnormal) Collected: 10/12/22 0236    Specimen: Blood Updated: 10/12/22 0327     Creatine Kinase >22,000 U/L     Comprehensive Metabolic Panel [[id number]]  (Abnormal) Collected: 10/12/22 0236    Specimen: Blood Updated: 10/12/22 0325     Glucose 183 mg/dL      BUN 29 mg/dL      Creatinine 1.35 mg/dL      Sodium 132 mmol/L      Potassium 4.3 mmol/L      Comment: Slight hemolysis detected by analyzer. Results may be affected.        Chloride 94 mmol/L      CO2 23.0 mmol/L      Calcium 9.9 mg/dL      Total Protein 7.1 g/dL      Albumin 3.60 g/dL      ALT (SGPT) 258 U/L      AST (SGOT) 721 U/L      Alkaline Phosphatase 74 U/L      Total Bilirubin 0.5 mg/dL      Globulin 3.5 gm/dL      A/G Ratio 1.0 g/dL      BUN/Creatinine Ratio 21.5     Anion Gap 15.0 mmol/L      eGFR 39.8 mL/min/1.73      Comment: National Kidney Foundation and American Society of Nephrology (ASN) Task Force recommended calculation based on the Chronic Kidney Disease Epidemiology Collaboration (CKD-EPI) equation refit without adjustment for race.       Narrative:      GFR Normal >60  Chronic Kidney Disease <60  Kidney Failure <15      TSH [[id number]]  (Normal)  Collected: 10/12/22 0236    Specimen: Blood Updated: 10/12/22 0310     TSH 1.980 uIU/mL     Lipid Panel [[id number]]  (Abnormal) Collected: 10/12/22 0236    Specimen: Blood Updated: 10/12/22 0308     Total Cholesterol 148 mg/dL      Triglycerides 169 mg/dL      HDL Cholesterol 54 mg/dL      LDL Cholesterol  66 mg/dL      VLDL Cholesterol 28 mg/dL      LDL/HDL Ratio 1.11    Narrative:      Cholesterol Reference Ranges  (U.S. Department of Health and Human Services ATP III Classifications)    Desirable          <200 mg/dL  Borderline High    200-239 mg/dL  High Risk          >240 mg/dL      Triglyceride Reference Ranges  (U.S. Department of Health and Human Services ATP III Classifications)    Normal           <150 mg/dL  Borderline High  150-199 mg/dL  High             200-499 mg/dL  Very High        >500 mg/dL    HDL Reference Ranges  (U.S. Department of Health and Human Services ATP III Classifications)    Low     <40 mg/dl (major risk factor for CHD)  High    >60 mg/dl ('negative' risk factor for CHD)        LDL Reference Ranges  (U.S. Department of Health and Human Services ATP III Classifications)    Optimal          <100 mg/dL  Near Optimal     100-129 mg/dL  Borderline High  130-159 mg/dL  High             160-189 mg/dL  Very High        >189 mg/dL    Hemoglobin A1c [[id number]]  (Normal) Collected: 10/12/22 0236    Specimen: Blood Updated: 10/12/22 0308     Hemoglobin A1C 5.60 %     Narrative:      Hemoglobin A1C Ranges:    Increased Risk for Diabetes  5.7% to 6.4%  Diabetes                     >= 6.5%  Diabetic Goal                < 7.0%    CBC Auto Differential [[id number]]  (Abnormal) Collected: 10/12/22 0236    Specimen: Blood Updated: 10/12/22 0304     WBC 19.06 10*3/mm3      RBC 4.70 10*6/mm3      Hemoglobin 14.8 g/dL      Hematocrit 43.7 %      MCV 93.0 fL      MCH 31.5 pg      MCHC 33.9 g/dL      RDW 14.4 %      RDW-SD 49.1 fl      MPV 11.1 fL      Platelets 261 10*3/mm3     Narrative:      The  "previously reported component NRBC is no longer being reported. Previous result was 0.0 /100 WBC (Reference Range: 0.0-0.2 /100 WBC) on 10/12/2022 at 0[facility].    Manual Differential [[id number]]  (Abnormal) Collected: 10/12/22 0236    Specimen: Blood Updated: 10/12/22 0304     Neutrophil % 61.0 %      Lymphocyte % 1.0 %      Monocyte % 2.0 %      Bands %  35.0 %      Myelocyte % 1.0 %      Neutrophils Absolute 18.30 10*3/mm3      Lymphocytes Absolute 0.19 10*3/mm3      Monocytes Absolute 0.38 10*3/mm3      Hypochromia Slight/1+     Polychromasia Slight/1+     WBC Morphology Normal     Platelet Morphology Normal    STAT Lactic Acid, Reflex [[id number]]  (Abnormal) Collected: 10/12/22 0236    Specimen: Blood Updated: 10/12/22 0304     Lactate 3.3 mmol/L     STAT Lactic Acid, Reflex [[id number]]  (Abnormal) Collected: 10/11/22 2127    Specimen: Blood Updated: 10/11/22 2206     Lactate 3.5 mmol/L     CK [[id number]]  (Abnormal) Collected: 10/11/22 1442    Specimen: Blood Updated: 10/11/22 1539     Creatine Kinase 21,753 U/L     Procalcitonin [[id number]]  (Abnormal) Collected: 10/11/22 1442    Specimen: Blood Updated: 10/11/22 1519     Procalcitonin 8.38 ng/mL     Narrative:      As a Marker for Sepsis (Non-Neonates):    1. <0.5 ng/mL represents a low risk of severe sepsis and/or septic shock.  2. >2 ng/mL represents a high risk of severe sepsis and/or septic shock.    As a Marker for Lower Respiratory Tract Infections that require antibiotic therapy:    PCT on Admission    Antibiotic Therapy       6-12 Hrs later    >0.5                Strongly Recommended  >0.25 - <0.5        Recommended   0.1 - 0.25          Discouraged              Remeasure/reassess PCT  <0.1                Strongly Discouraged     Remeasure/reassess PCT    As 28 day mortality risk marker: \"Change in Procalcitonin Result\" (>80% or <=80%) if Day 0 (or Day 1) and Day 4 values are available. Refer to http://www.[facility]-pct-calculator.com    Change in PCT " <=80%  A decrease of PCT levels below or equal to 80% defines a positive change in PCT test result representing a higher risk for 28-day all-cause mortality of patients diagnosed with severe sepsis for septic shock.    Change in PCT >80%  A decrease of PCT levels of more than 80% defines a negative change in PCT result representing a lower risk for 28-day all-cause mortality of patients diagnosed with severe sepsis or septic shock.       Comprehensive Metabolic Panel [[id number]]  (Abnormal) Collected: 10/11/22 1442    Specimen: Blood Updated: 10/11/22 1517     Glucose 205 mg/dL      BUN 18 mg/dL      Creatinine 0.64 mg/dL      Sodium 131 mmol/L      Potassium 4.0 mmol/L      Comment: Slight hemolysis detected by analyzer. Results may be affected.        Chloride 95 mmol/L      CO2 21.0 mmol/L      Calcium 9.6 mg/dL      Total Protein 6.7 g/dL      Albumin 3.70 g/dL      ALT (SGPT) 106 U/L      AST (SGOT) 291 U/L      Alkaline Phosphatase 69 U/L      Total Bilirubin 0.5 mg/dL      Globulin 3.0 gm/dL      A/G Ratio 1.2 g/dL      BUN/Creatinine Ratio 28.1     Anion Gap 15.0 mmol/L      eGFR 89.5 mL/min/1.73      Comment: National Kidney Foundation and American Society of Nephrology (ASN) Task Force recommended calculation based on the Chronic Kidney Disease Epidemiology Collaboration (CKD-EPI) equation refit without adjustment for race.       Narrative:      GFR Normal >60  Chronic Kidney Disease <60  Kidney Failure <15      STAT Lactic Acid, Reflex [[id number]]  (Abnormal) Collected: 10/11/22 1442    Specimen: Blood Updated: 10/11/22 1515     Lactate 4.0 mmol/L     C-reactive Protein [[id number]]  (Abnormal) Collected: 10/11/22 1442    Specimen: Blood Updated: 10/11/22 1514     C-Reactive Protein 8.41 mg/dL     Troponin [[id number]]  (Normal) Collected: 10/11/22 1442    Specimen: Blood Updated: 10/11/22 1512     Troponin T <0.010 ng/mL     Narrative:      Troponin T Reference Range:  <= 0.03 ng/mL-   Negative for  AMI  >0.03 ng/mL-     Abnormal for myocardial necrosis.  Clinicians would have to utilize clinical acumen, EKG, Troponin and serial changes to determine if it is an Acute Myocardial Infarction or myocardial injury due to an underlying chronic condition.       Results may be falsely decreased if patient taking Biotin.      Blood Culture - Blood, Arm, Right [[id number]] Collected: 10/11/22 1442    Specimen: Blood from Arm, Right Updated: 10/11/22 1452           Cultures:  Blood Culture   Date Value Ref Range Status   10/11/2022 No growth at 24 hours  Preliminary     Urine Culture   Date Value Ref Range Status   10/11/2022 >100,000 CFU/mL Escherichia coli (A)  Preliminary       Radiology Data:    Imaging Results (Last 24 Hours)     Procedure Component Value Units Date/Time    CT Head Without Contrast [[id number]] Collected: 10/11/22 1717     Updated: 10/11/22 1725    Narrative:      EXAM/TECHNIQUE: CT head without contrast     INDICATION: Mental status change, unknown cause; T79.6XXA-Traumatic  ischemia of muscle, initial encounter; W19.XXXA-Unspecified fall,  initial encounter; N39.0-Urinary tract infection, site not specified;  R79.89-Other specified abnormal findings of blood chemistry;  R82.1-Myoglobinuria     COMPARISON: 07/26/2022     DLP: 1023 mGy cm. Automated exposure control was also utilized to  decrease patient radiation dose.     FINDINGS:     No acute intracranial hemorrhage. No loss of gray-white differentiation.  Presumed chronic microvascular ischemic white matter change. Global  cerebral volume loss is noted. No midline shift or mass effect. Lateral  ventricles are nondilated. Basilar cisterns are patent. No acute orbital  finding. Mastoid air cells are clear. Visualized paranasal sinuses are  clear. No acute osseous finding.       Impression:         No acute intracranial finding.  This report was finalized on 10/11/2022 17:22 by Dr. Arsh Calderon MD.          Allergies   Allergen Reactions   •  Levaquin [Levofloxacin] Unknown (See Comments)     Pt doesn't remember   • Codeine Other (See Comments)     Pt does not recall   • Morphine And Related Other (See Comments)     Pt does not recall       Scheduled meds:   aspirin, 81 mg, Oral, Daily  [START ON 10/14/2022] atorvastatin, 10 mg, Oral, Daily  calcium 500 mg vitamin D 5 mcg (200 UT), 1 tablet, Oral, Daily  carvedilol, 6.25 mg, Oral, BID With Meals  cefTRIAXone, 1 g, Intravenous, Q24H  cycloSPORINE, 1 drop, Both Eyes, Q12H  enoxaparin, 40 mg, Subcutaneous, Daily  lisinopril, 20 mg, Oral, Daily  montelukast, 10 mg, Oral, Nightly  mupirocin, 1 application, Topical, Daily  pantoprazole, 40 mg, Oral, QAM  sodium chloride, 10 mL, Intravenous, Q12H        PRN meds:  •  acetaminophen  •  aluminum-magnesium hydroxide-simethicone  •  fluticasone  •  Glycerin-Hypromellose-  •  ondansetron  •  sodium chloride  •  traMADol    Assessment/Plan       Traumatic rhabdomyolysis, initial encounter (HCC)    Falling    UTI (urinary tract infection), bacterial    Leukocytosis    AMS (altered mental status)      Plan:  Altered mental status.  CK greater than 22,000.  Patient is currently oriented x3 .  CT scan the head-No acute intracranial finding.     UTI/leukocytosis.  Rocephin antibiotics.  Patient received 1.5 bolus in ER.    Increase IV hydration.     Elevated D-dimer.  Patient denies any shortness of breath.  Patient denied of chest pain.  Will not do CTA of the chest because of CK is 21,000-probable starting of rhabdo renal kidney function is normal no IV contrast for now to protect the kidneys.  Patient does not have any symptoms of pulm embolus at this time.  IV hydration for now.    Doppler ultrasound lower extremities pending     Falling/deconditioning.  PT and OT consult.  Chest x-ray-Expiratory chest with bibasilar atelectasis, Lungs are otherwise clear,  No pneumothorax.  Right femur x-ray-No acute fracture.  Bilateral knee x-ray-Status post previous  bilateral total knee arthroplasties, left  knee arthroplasty represents a revision from previous knee arthroplasty and fracture of the distal femur- do not see evidence of complications, No evidence of acute fracture.  Pelvic and right hip x-ray-No acute fracture.    CAD/hyperlipidemia/hypertension. Coreg.  Hold Catapres.  Hold Lasix.  Continue lisinopril.     Chronic left leg wound/abrasion bilateral knee . Wound care.  Tetanus shot given in ER.  Patient used to go to [facility] care outpatient.  Bactroban . consult wound care.     Anemia.  Hold iron sulfate due to GI side effects.    Acute kidney injury.  Ultrasound the kidneys.     Reflux.  Protonix.  Zofran as needed     Allergic rhinitis.  Hold Zyrtec.  Flonase daily.  Continue Singulair     Anxiety/depression.  Hold Atarax.  . Hold nortriptyline.     Chronic pain/neuropathy.  Hold Lyrica.     Urinary incontinence.  Hold dextral LA due to anticholinergic effects     Elevated liver enzyme.    Ultrasound of the liver.  Acute hepatitis panel.     Gout.  Hold colchicine.     Tylenol as needed.     Reflux.  Maalox as needed.     Lovenox prophylaxis.     Nutrition.  Regular diet . calcium vitamin D.    Deconditioning.  PT and OT consult.    Discharge Plannin-6 days.  DNR . DNI    Electronically signed by Rodriguez Schneider MD, 10/12/22, 2:23 PM CDT.

## 2022-10-12 NOTE — PLAN OF CARE
Goal Outcome Evaluation:  Plan of Care Reviewed With: patient        Progress: no change  Outcome Evaluation: Initial RDN eval. Pt reports decreased appetite; PO intake today 25%/1 meal. Areas of muscle and fat wasting noted on NFPE. added Boost plus BID and encouraged intake. Interviewed for preferences and discussed alt options available. Will continue to follow.
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
## 2022-10-12 NOTE — PLAN OF CARE
Goal Outcome Evaluation:      Wound care to bilateral lower extremities of open wounds completed this morning. Dark tea colored urine noted from [facility]. Liver and renal US ordered. Waiting for test to be done. Venous ddoppler study of lower extremities completed this afternoon-pending results. IV maintenance fluids increased to 150 ml/hr. Lab unable to draw lactic and myoglobin today. Will need to be drawn in AM if possible. Continuing Rocephin IV.             Statement Selected

## 2022-10-12 NOTE — PLAN OF CARE
Goal Outcome Evaluation:  Plan of Care Reviewed With: (P) patient        Progress: (P) no change  Outcome Evaluation: (P) PT eval completed. Pt presented in fowlers with OT in room. Pt reports living alone but has been in SNFs frequently. Pt c/o diffuse achy pain throughout. Pt performed scooting in bed with verbal cues to EOB. Pt performed supine to sit with min A. MMT in seated position revealed 3/5 in knees and ankles, B hips unable to flex in sitting however had partial AROM in supine. Pt demonstrated decreased sensation in RLE on the lateral aspect. Pt unable to stand into RW with max x2 with verbal cues on first attempt. On second attempt pt stood with max x2 into RW and performed small sideteps toward HOB. Pt performed sit to supine and scooted toward HOB dependently d/t fatigue. Pt is appropriate for skiled PT to address decreased functional mobility, decreased strength, decreased activity tolerance. Anticipated d/c is SNF

## 2022-10-12 NOTE — PAYOR COMM NOTE
"Mariposa Jenkins (82 y.o. Female) [id number]  Admit 10/11  [facility] phone   Fax        Date of Birth   1940    Social Security Number       Address   [address] [place] [address]    Home Phone   [phone]    MRN   [id number]       [facility]    Marital Status                               Admission Date   10/11/22    Admission Type   Emergency    Admitting Provider   Rodriguez Schneider MD    Attending Provider   Rodriguez Schneider MD    Department, Room/Bed   [facility] 3C, 361/1       Discharge Date       Discharge Disposition       Discharge Destination                               Attending Provider: Rodriguez Schneider MD    Allergies: Levaquin [Levofloxacin], Codeine, Morphine And Related    Isolation: None   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), 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), 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)   Code Status: No CPR    Ht: 152.4 cm (60\")   Wt: 83.5 kg (184 lb)    Admission Cmt: None   Principal Problem: Traumatic rhabdomyolysis, initial encounter ([facility]) [T79.6XXA]                 Active Insurance as of 10/11/2022     Primary Coverage     Payor Plan Insurance Group Employer/Plan Group    HUMANA MEDICARE REPLACEMENT HUMANA MEDICARE REPLACEMENT [id number]     Payor Plan Address Payor Plan Phone Number Payor Plan Fax Number Effective Dates    [address] [address] [phone]  1/1/2019 - None Entered    [facility] [id number]       Subscriber Name Subscriber Birth Date Member ID       MARIPOSA JENKINS 1940 [id number]                 Emergency Contacts      (Rel.) Home Phone Work Phone Mobile Phone    Selene Jenkins (Relative) [phone] -- --    Brandee Anderson (Relative) [phone] -- --    Adam Jenkins (Son) [phone] -- [phone]               History & Physical      Rodriguez Schneider MD at 10/11/22 1611              [facility] Medicine Services  HISTORY AND PHYSICAL    Date of Admission: " 10/11/2022  Primary Care Physician: VERA Noble MD    Subjective     Chief Complaint: Falling/possible rhabdo/leg wound/UTI    History of Present Illness  Patient is 89-year-old  female does not remember by found on the ground laying on the floor when she woke up.  Patient went to bed at 1130 last night.  Patient does not know how long she was on the ground for, patient does not remember . patient then proceeded to press her life line and EMS was called and brought her to ER to be evaluated.  In ER patient was alert awake oriented x3.  Patient does not remember what happened.  Patient denies any neck pain or back pain.  Patient did complain of right hip pain and right leg pain.  Abrasion noted in both knees from carpet burn.  Laboratory shows , troponin was normal.  Elevated liver enzyme.  C-reactive protein 8, lactate 4, procalcitonin 8, D-dimer 8.89, white blood cells 25,000.    Review of Systems   Constitutional: Positive for activity change and appetite change. Negative for chills and fever.   HENT: Negative for hearing loss, nosebleeds, tinnitus and trouble swallowing.    Eyes: Negative for visual disturbance.   Respiratory: Negative for cough, chest tightness, shortness of breath and wheezing.    Cardiovascular: Negative for chest pain, palpitations and leg swelling.   Gastrointestinal: Negative for abdominal distention, abdominal pain, blood in stool, constipation, diarrhea, nausea and vomiting.   Endocrine: Negative for cold intolerance, heat intolerance, polydipsia, polyphagia and polyuria.   Genitourinary: Negative for decreased urine volume, difficulty urinating, dysuria, flank pain, frequency and hematuria.   Musculoskeletal: Positive for arthralgias, gait problem and myalgias. Negative for joint swelling.   Skin: Positive for wound. Negative for rash.        Knee and legs.   Allergic/Immunologic: Negative for immunocompromised state.   Neurological: Positive for weakness.  Negative for dizziness, syncope, light-headedness and headaches.   Hematological: Negative for adenopathy. Does not bruise/bleed easily.   Psychiatric/Behavioral: Negative for confusion and sleep disturbance. The patient is not nervous/anxious.         Otherwise complete ROS reviewed and negative except as mentioned in the HPI.      Past Medical History:   Past Medical History:   Diagnosis Date   • Anxiety    • Arthritis    • CAD (coronary artery disease)    • Colon polyp    • DDD (degenerative disc disease), lumbar    • Deep venous thrombosis (HCC)    • Depression    • Diverticulosis    • Esophageal stricture    • GERD (gastroesophageal reflux disease)    • History of transfusion    • Hypercholesteremia    • Hypertension    • LPRD (laryngopharyngeal reflux disease)    • Macular degeneration    • Multinodular goiter 2/23/2017   • Osteoarthritis    • Pruritic disorder    • Spastic colon    • Thyroid nodule        Past Surgical History:  Past Surgical History:   Procedure Laterality Date   • BLADDER REPAIR     • CATARACT EXTRACTION     • CHOLECYSTECTOMY     • COLONOSCOPY  04/22/2014    2 polyps, adenomatous, diverticulosis   • COLONOSCOPY N/A 6/26/2020    Procedure: COLONOSCOPY WITH ANESTHESIA;  Surgeon: John Coleman MD;  Location: [facility] ENDOSCOPY;  Service: Gastroenterology;  Laterality: N/A;  pre op: constipation  post op: diverticulosis,   PCP: Adam Delgadillo MD   • CYSTOCELE REPAIR     • ENDOSCOPY  04/22/2014    Schatzki's ring dilated   • ENDOSCOPY N/A 6/26/2020    Procedure: ESOPHAGOGASTRODUODENOSCOPY WITH ANESTHESIA;  Surgeon: John Coleman MD;  Location: [facility] ENDOSCOPY;  Service: Gastroenterology;  Laterality: N/A;  pre op: dysphagia  post op:stricture, dilated   PCP:Adam Delgadillo MD   • ENDOSCOPY N/A 9/2/2021    Procedure: ESOPHAGOGASTRODUODENOSCOPY WITH ANESTHESIA;  Surgeon: John Coleman MD;  Location: [facility] ENDOSCOPY;  Service: Gastroenterology;  Laterality: N/A;  pre op:  dysphagia  post op:gastritis  PCP: VERA Noble MD   • FEMUR FRACTURE SURGERY     • HYSTERECTOMY     • INCISION AND DRAINAGE LEG Left 11/3/2021    Procedure: INCISION AND DRAINAGE LEG ABSCESS;  Surgeon: Cesia Mondragon MD;  Location: [facility] OR;  Service: General;  Laterality: Left;   • REPLACEMENT TOTAL KNEE     • ULNAR NERVE TRANSPOSITION         Family History: family history includes Colon cancer in her mother; Heart disease in her father and mother.    Social History:  reports that she has never smoked. She has never used smokeless tobacco. She reports that she does not drink alcohol and does not use drugs.    Allergies:  Allergies   Allergen Reactions   • Levaquin [Levofloxacin] Unknown (See Comments)     Pt doesn't remember   • Codeine Other (See Comments)     Pt does not recall   • Morphine And Related Other (See Comments)     Pt does not recall     Medications:  Prior to Admission medications    Medication Sig Start Date End Date Taking? Authorizing Provider   acetaminophen (TYLENOL) 325 MG tablet Take 2 tablets by mouth Every 4 (Four) Hours As Needed for Mild Pain. 9/1/22   Carla Colorado APRN   aluminum-magnesium hydroxide-simethicone (MAALOX MAX) 400-400-40 MG/5ML suspension Take 15 mL by mouth Every 6 (Six) Hours As Needed for Indigestion or Heartburn. 9/1/22   Carla Colorado APRN   aspirin 81 MG EC tablet Take 81 mg by mouth Daily.    ProviderYing MD   atorvastatin (LIPITOR) 10 MG tablet TAKE 1 TABLET BY MOUTH DAILY. 4/29/22   VERA Noble MD   Calcium Carbonate-Vitamin D (CALTRATE 600+D PO) Take 1 tablet by mouth 2 (two) times a day. Morning and nightly    ProviderYing MD   carvedilol (COREG) 6.25 MG tablet Take 1 tablet by mouth 2 (Two) Times a Day With Meals. 7/21/22   VERA Noble MD   cetirizine (zyrTEC) 10 MG tablet Take 10 mg by mouth Daily.    ProviderYing MD   Cholecalciferol (VITAMIN D3) 125 MCG (5000 UT) capsule capsule Take 5,000 Units by  mouth Daily.    Ying Salazar MD   cloNIDine (CATAPRES) 0.1 MG tablet Take 0.1 mg by mouth Daily As Needed for High Blood Pressure (for Blood Pressure >180/100).    Ying Salazar MD   colchicine 0.6 MG tablet Take 0.6 mg by mouth Daily. 2/26/22   Ying Salazar MD   Cyanocobalamin (Vitamin B-12) 1000 MCG sublingual tablet Place 2.5 tablets under the tongue Daily. 2500mcg    Ying Salazar MD   cycloSPORINE (RESTASIS) 0.05 % ophthalmic emulsion Administer 1 drop to both eyes Every 12 (Twelve) Hours.    Ying Salazar MD   esomeprazole (nexIUM) 20 MG capsule Take 1 capsule by mouth 2 (Two) Times a Day. 5/25/22   VERA Noble MD   FEROSUL 325 (65 Fe) MG tablet Take 325 mg by mouth Daily With Breakfast. 11/6/19   Ying Salazar MD   fluticasone (FLONASE) 50 MCG/ACT nasal spray 2 sprays into the nostril(s) as directed by provider Daily As Needed.    Ying Salazar MD   furosemide (LASIX) 40 MG tablet Take 40 mg by mouth Daily.    Ying Salazar MD   hydrOXYzine (ATARAX) 25 MG tablet Take 1 tablet by mouth 3 (Three) Times a Day As Needed for Anxiety. 1/11/22   Vianney Figueredo APRN   lisinopril (PRINIVIL,ZESTRIL) 20 MG tablet Take 1 tablet by mouth Daily. 9/9/22   VERA Noble MD   montelukast (SINGULAIR) 10 MG tablet Take 1 tablet by mouth Every Night. 7/21/22   VERA Noble MD   multivitamin with minerals tablet tablet Take 1 tablet by mouth Daily.    Ying Salazar MD   mupirocin (BACTROBAN) 2 % ointment Apply 1 application topically to the appropriate area as directed Daily. Apply ointment to Right Great Toe and Right 4th Toe    Ying Salazar MD   nortriptyline (PAMELOR) 25 MG capsule Take 1 capsule by mouth Every Night. 9/1/22   Carla Colorado APRN   nortriptyline (PAMELOR) 25 MG capsule Take 2 capsules by mouth Every Night. 9/21/22   VERA Noble MD   polyethyl glycol-propyl glycol (SYSTANE) 0.4-0.3 % solution  "ophthalmic solution Administer 2 drops to both eyes Every 2 (Two) Hours As Needed (dry eyes).    Ying Salazar MD   polyethylene glycol (MIRALAX) 17 g packet Take 17 g by mouth Daily As Needed.    Ying Salazar MD   pregabalin (LYRICA) 50 MG capsule Take 50 mg by mouth Every 12 (Twelve) Hours As Needed. 4/8/21   Ying Salazar MD   Prolia 60 MG/ML solution prefilled syringe syringe Inject 60 mg under the skin into the appropriate area as directed 1 (One) Time. Twice a year 3/21/22   Ying Salazar MD   simethicone (MYLICON) 125 MG chewable tablet Chew 125 mg Every 6 (Six) Hours As Needed for Flatulence.    Ying Salazar MD   tolterodine LA (DETROL LA) 4 MG 24 hr capsule Take 1 capsule by mouth Daily. 4/15/21   Ying Salazar MD       I have utilized all available immediate resources to obtain, update, and review the patient's current medications.    Objective     Vital Signs: /53   Pulse 79   Temp 97.6 °F (36.4 °C) (Oral)   Resp 18   Ht 152.4 cm (60\")   Wt 79.4 kg (175 lb)   SpO2 96%   BMI 34.18 kg/m²   Physical Exam  Vitals and nursing note reviewed.   Constitutional:       Comments: Advanced age.  Chronically ill.   HENT:      Head: Normocephalic.   Eyes:      Conjunctiva/sclera: Conjunctivae normal.      Pupils: Pupils are equal, round, and reactive to light.   Neck:      Vascular: No JVD.   Cardiovascular:      Rate and Rhythm: Normal rate and regular rhythm.      Heart sounds: Normal heart sounds.   Pulmonary:      Effort: No respiratory distress.      Breath sounds: No wheezing or rales.      Comments: Diminished breath semilateral, clear . patient is on room air  Chest:      Chest wall: No tenderness.   Abdominal:      General: Bowel sounds are normal. There is no distension.      Palpations: Abdomen is soft.      Tenderness: There is no abdominal tenderness.      Comments: Obese .   Musculoskeletal:         General: No tenderness or deformity.      " "Cervical back: Neck supple.   Skin:     General: Skin is warm and dry.      Capillary Refill: Capillary refill takes 2 to 3 seconds.      Findings: Erythema present. No rash.      Comments: Abrasion bilateral knee/left lower extremity stasis dermatitis.   Neurological:      General: No focal deficit present.      Mental Status: She is alert and oriented to person, place, and time.      Cranial Nerves: No cranial nerve deficit.      Motor: Weakness present. No abnormal muscle tone.      Coordination: Coordination abnormal.      Gait: Gait abnormal.      Deep Tendon Reflexes: Reflexes normal.   Psychiatric:         Mood and Affect: Mood normal.         Behavior: Behavior normal.         Thought Content: Thought content normal.             Results Reviewed:    Lab Results (last 24 hours)     Procedure Component Value Units Date/Time    CK [[id number]]  (Abnormal) Collected: 10/11/22 1442    Specimen: Blood Updated: 10/11/22 1539     Creatine Kinase 21,753 U/L     Procalcitonin [[id number]]  (Abnormal) Collected: 10/11/22 1442    Specimen: Blood Updated: 10/11/22 1519     Procalcitonin 8.38 ng/mL     Narrative:      As a Marker for Sepsis (Non-Neonates):    1. <0.5 ng/mL represents a low risk of severe sepsis and/or septic shock.  2. >2 ng/mL represents a high risk of severe sepsis and/or septic shock.    As a Marker for Lower Respiratory Tract Infections that require antibiotic therapy:    PCT on Admission    Antibiotic Therapy       6-12 Hrs later    >0.5                Strongly Recommended  >0.25 - <0.5        Recommended   0.1 - 0.25          Discouraged              Remeasure/reassess PCT  <0.1                Strongly Discouraged     Remeasure/reassess PCT    As 28 day mortality risk marker: \"Change in Procalcitonin Result\" (>80% or <=80%) if Day 0 (or Day 1) and Day 4 values are available. Refer to http://www.[employer]-pct-calculator.com    Change in PCT <=80%  A decrease of PCT levels below or equal to 80% defines a " positive change in PCT test result representing a higher risk for 28-day all-cause mortality of patients diagnosed with severe sepsis for septic shock.    Change in PCT >80%  A decrease of PCT levels of more than 80% defines a negative change in PCT result representing a lower risk for 28-day all-cause mortality of patients diagnosed with severe sepsis or septic shock.       Comprehensive Metabolic Panel [[id number]]  (Abnormal) Collected: 10/11/22 1442    Specimen: Blood Updated: 10/11/22 1517     Glucose 205 mg/dL      BUN 18 mg/dL      Creatinine 0.64 mg/dL      Sodium 131 mmol/L      Potassium 4.0 mmol/L      Comment: Slight hemolysis detected by analyzer. Results may be affected.        Chloride 95 mmol/L      CO2 21.0 mmol/L      Calcium 9.6 mg/dL      Total Protein 6.7 g/dL      Albumin 3.70 g/dL      ALT (SGPT) 106 U/L      AST (SGOT) 291 U/L      Alkaline Phosphatase 69 U/L      Total Bilirubin 0.5 mg/dL      Globulin 3.0 gm/dL      A/G Ratio 1.2 g/dL      BUN/Creatinine Ratio 28.1     Anion Gap 15.0 mmol/L      eGFR 89.5 mL/min/1.73      Comment: National Kidney Foundation and American Society of Nephrology (ASN) Task Force recommended calculation based on the Chronic Kidney Disease Epidemiology Collaboration (CKD-EPI) equation refit without adjustment for race.       Narrative:      GFR Normal >60  Chronic Kidney Disease <60  Kidney Failure <15      STAT Lactic Acid, Reflex [[id number]]  (Abnormal) Collected: 10/11/22 1442    Specimen: Blood Updated: 10/11/22 1515     Lactate 4.0 mmol/L     C-reactive Protein [[id number]]  (Abnormal) Collected: 10/11/22 1442    Specimen: Blood Updated: 10/11/22 1514     C-Reactive Protein 8.41 mg/dL     Troponin [[id number]]  (Normal) Collected: 10/11/22 1442    Specimen: Blood Updated: 10/11/22 1512     Troponin T <0.010 ng/mL     Narrative:      Troponin T Reference Range:  <= 0.03 ng/mL-   Negative for AMI  >0.03 ng/mL-     Abnormal for myocardial necrosis.  Clinicians  would have to utilize clinical acumen, EKG, Troponin and serial changes to determine if it is an Acute Myocardial Infarction or myocardial injury due to an underlying chronic condition.       Results may be falsely decreased if patient taking Biotin.      Blood Culture - Blood, Arm, Right [[id number]] Collected: 10/11/22 1442    Specimen: Blood from Arm, Right Updated: 10/11/22 1452    Blood Culture - Blood, Arm, Right [[id number]] Collected: 10/11/22 1245    Specimen: Blood from Arm, Right Updated: 10/11/22 1409    Urinalysis With Culture If Indicated - Urine, Catheter [[id number]]  (Abnormal) Collected: 10/11/22 1336    Specimen: Urine, Catheter Updated: 10/11/22 1347     Color, UA Red     Appearance, UA Cloudy     pH, UA 7.5     Specific Gravity, UA 1.019     Glucose, UA Negative     Ketones, UA Negative     Bilirubin, UA Small (1+)     Blood, UA Large (3+)     Protein, UA >=300 mg/dL (3+)     Leuk Esterase, UA Moderate (2+)     Nitrite, UA Positive     Urobilinogen, UA 0.2 E.U./dL    Narrative:      In absence of clinical symptoms, the presence of pyuria, bacteria, and/or nitrites on the urinalysis result does not correlate with infection.    Urinalysis, Microscopic Only - Urine, Catheter [[id number]]  (Abnormal) Collected: 10/11/22 1336    Specimen: Urine, Catheter Updated: 10/11/22 1347     RBC, UA 3-5 /HPF      WBC, UA 31-50 /HPF      Bacteria, UA 2+ /HPF      Squamous Epithelial Cells, UA None Seen /HPF      Hyaline Casts, UA 3-6 /LPF      Methodology Automated Microscopy    Urine Culture - Urine, Urine, Catheter [[id number]] Collected: 10/11/22 1336    Specimen: Urine, Catheter Updated: 10/11/22 1347    Protime-INR [[id number]]  (Normal) Collected: 10/11/22 1207    Specimen: Blood Updated: 10/11/22 1242     Protime 13.4 Seconds      INR 1.06    D-dimer, Quantitative [[id number]]  (Abnormal) Collected: 10/11/22 1207    Specimen: Blood Updated: 10/11/22 1242     D-Dimer, Quantitative 8.89 MCGFEU/mL     Narrative:       Reference Range is 0-0.50 MCGFEU/mL. However, results <0.50 MCGFEU/mL tends to rule out DVT or PE. Results >0.50 MCGFEU/mL are not useful in predicting absence or presence of DVT or PE.      Lactic Acid, Plasma [[id number]]  (Abnormal) Collected: 10/11/22 1207    Specimen: Blood Updated: 10/11/22 1238     Lactate 4.4 mmol/L     BNP [[id number]]  (Abnormal) Collected: 10/11/22 1207    Specimen: Blood Updated: 10/11/22 1234     proBNP 2,716.0 pg/mL     Narrative:      Among patients with dyspnea, NT-proBNP is highly sensitive for the detection of acute congestive heart failure. In addition NT-proBNP of <300 pg/ml effectively rules out acute congestive heart failure with 99% negative predictive value.    Results may be falsely decreased if patient taking Biotin.      Magnesium [[id number]]  (Normal) Collected: 10/11/22 1207    Specimen: Blood Updated: 10/11/22 1231     Magnesium 1.8 mg/dL     CBC & Differential [[id number]]  (Abnormal) Collected: 10/11/22 1207    Specimen: Blood Updated: 10/11/22 1216    Narrative:      The following orders were created for panel order CBC & Differential.  Procedure                               Abnormality         Status                     ---------                               -----------         ------                     CBC Auto Differential[[id number]]        Abnormal            Final result                 Please view results for these tests on the individual orders.    CBC Auto Differential [[id number]]  (Abnormal) Collected: 10/11/22 1207    Specimen: Blood Updated: 10/11/22 1216     WBC 25.44 10*3/mm3      RBC 4.83 10*6/mm3      Hemoglobin 15.3 g/dL      Hematocrit 45.0 %      MCV 93.2 fL      MCH 31.7 pg      MCHC 34.0 g/dL      RDW 13.9 %      RDW-SD 47.3 fl      MPV 11.3 fL      Platelets 303 10*3/mm3      Neutrophil % 92.5 %      Lymphocyte % 1.4 %      Monocyte % 2.9 %      Eosinophil % 0.3 %      Basophil % 0.2 %      Immature Grans % 2.7 %      Neutrophils, Absolute  23.52 10*3/mm3      Lymphocytes, Absolute 0.35 10*3/mm3      Monocytes, Absolute 0.75 10*3/mm3      Eosinophils, Absolute 0.07 10*3/mm3      Basophils, Absolute 0.06 10*3/mm3      Immature Grans, Absolute 0.69 10*3/mm3      nRBC 0.0 /100 WBC            Radiology Data:    Imaging Results (Last 24 Hours)     Procedure Component Value Units Date/Time    XR Chest 1 View [[id number]] Collected: 10/11/22 1314     Updated: 10/11/22 1318    Narrative:      EXAMINATION: Chest 1 view 10/11/2022     HISTORY: Fall.     FINDINGS: Today's exam is compared to previous study of 8/21/2022. Lungs  are hypoventilated with mild basilar atelectasis. No evidence of  consolidative pneumonia or effusion. The thoracic aorta is ectatic.  There is arthritic change of the right glenohumeral joint. Humeral head  is high riding suggesting a chronic rotator cuff tear.       Impression:      1. Expiratory chest with bibasilar atelectasis. Lungs are otherwise  clear. No pneumothorax.  This report was finalized on 10/11/2022 13:14 by Dr. José Elmore MD.    XR Hip With or Without Pelvis 2 - 3 View Right [[id number]] Collected: 10/11/22 1313     Updated: 10/11/22 1317    Narrative:      EXAMINATION: AP pelvis and two-view right hip 11/20/2022     HISTORY: Fall.     FINDINGS: AP radiograph of the pelvis as well as two-view exam of the  right hip demonstrates no fracture or dislocation. The femoral head is  concentric and well located within the acetabulum. There is mild  arthritic change of the hip.       Impression:      . No acute fracture.  This report was finalized on 10/11/2022 13:13 by Dr. José Elmore MD.    XR Femur 2 View Right [[id number]] Collected: 10/11/22 1312     Updated: 10/11/22 1316    Narrative:      EXAMINATION: Right femur 2 views 2/11/2022     HISTORY: Fall     FINDINGS: Two-view exam of the right femur demonstrates no evidence of  acute displaced fracture. The femoral head is concentric and well  located within the  acetabulum.       Impression:      1.. No acute fracture.  This report was finalized on 10/11/2022 13:12 by Dr. José Elmore MD.    XR Knee 1 or 2 View Bilateral [[id number]] Collected: 10/11/22 1309     Updated: 10/11/22 1315    Narrative:            Scribe exam bilateral knees 2 views 10/11/2022     HISTORY: Trauma.     FINDINGS: Two-view exam of the right knee reveals the patient is status  post total knee arthroplasty. I do not see evidence of fracture or  loosening. No joint effusion is present.     Examination of the left knee reveals the patient has undergone previous  total knee arthroplasty with subsequent revision and resection of the  distal femur. Cerclage wire is noted within the distal femoral shaft.  There is no evidence of acute fracture. There is some heterotopic bone  or myositis noted within the soft tissues about the distal femur.       Impression:      1. Status post previous bilateral total knee arthroplasties. The left  knee arthroplasty represents a revision from previous knee arthroplasty  and fracture of the distal femur. I do not see evidence of  complications. No evidence of acute fracture.  This report was finalized on 10/11/2022 13:12 by Dr. José Elmore MD.          I have personally reviewed and interpreted the radiology studies and ECG obtained at time of admission.     Assessment / Plan      Assessment & Plan  Active Hospital Problems    Diagnosis    • Leukocytosis    • UTI (urinary tract infection), bacterial    • Falling      Plans    Altered mental status.  CK 20,000.  Patient is currently oriented x3 .  CT scan the head is pending.    UTI/leukocytosis.  Rocephin antibiotics.  Patient received 1.5 bolus in ER.  Continue IV hydration.    Elevated D-dimer.  Patient denies any shortness of breath.  Patient denied of chest pain.  Will not do CTA of the chest because of CK is 21,000-probable starting of rhabdo renal kidney function is normal no IV contrast for now to protect  the kidneys.  Patient does not have any symptoms of pulm embolus at this time.  IV hydration for now.    Doppler ultrasound lower extremities    Falling/deconditioning.  PT and OT consult.  Chest x-ray-Expiratory chest with bibasilar atelectasis, Lungs are otherwise clear,  No pneumothorax.  Right femur x-ray-No acute fracture.  Bilateral knee x-ray-Status post previous bilateral total knee arthroplasties, left  knee arthroplasty represents a revision from previous knee arthroplasty and fracture of the distal femur- do not see evidence of complications, No evidence of acute fracture.  Pelvic and right hip x-ray-No acute fracture.    CAD/hyperlipidemia/hypertension. Coreg.  Hold Catapres.  Hold Lasix.  Continue lisinopril.    Chronic left leg wound/abrasion bilateral knee . Wound care.  Tetanus shot given in ER.  Patient used to go to [facility] [facility] wound care outpatient.  Bactroban . consult wound care.    Anemia.  Hold iron sulfate due to GI side effects.    Reflux.  Protonix.  Zofran as needed    Allergic rhinitis.  Hold Zyrtec.  Flonase daily.  Continue Singulair    Anxiety/depression.  Hold Atarax.  . Hold nortriptyline.    Chronic pain/neuropathy.  Hold Lyrica.    Urinary incontinence.  Hold dextral LA due to anticholinergic effects    Elevated liver enzyme.  We will follow-up    Gout.  Hold colchicine.    Tylenol as needed.    Reflux.  Maalox as needed.    Lovenox prophylaxis.    Nutrition.  Regular diet . calcium vitamin D.    Code Status/Advanced Care Plan: DNR . DNI     The patient's surrogate decision maker is friend Patti Regalado.      I discussed the patient's findings and my recommendations with: Patient    Estimated length of stay: 2 to 6 days    Electronically signed by Rodriguez Schneider MD, 10/11/22, 4:11 PM CDT.              Electronically signed by Rodriguez Schneider MD at 10/11/22 1702          Emergency Department Notes      Steven Reina MD at 10/11/22 1220          Subjective   History of Present  Illness  This patient is a 89-year-old lady who lives by herself at home she does not remember how she fell or when she fell but she found herself laying on the floor and called EMS EMS and brought her to the ER on examination the patient is awake and alert oriented denies any neck pain or back pain is complaining of right hip pain and right leg pain.  Has got abrasions to both the knees which appear to be carpet burns.  There is no chest wall trauma no abdominal trauma no scalp trauma.    Leg Pain  Location:  Leg and buttock  Injury: yes    Mechanism of injury: fall    Fall:     Fall occurred:  From a bed (Or standing we are not sure how she fell)    Impact surface:  Hard floor    Point of impact:  Unable to specify    Entrapped after fall: yes    Buttock location:  R buttock  Leg location:  R leg  Pain details:     Quality:  Dull and cramping    Radiates to:  Does not radiate    Severity:  Moderate    Onset quality:  Sudden    Timing:  Constant    Progression:  Unchanged  Chronicity:  New  Foreign body present:  No foreign bodies  Tetanus status:  Unknown  Relieved by:  Nothing  Worsened by:  Nothing  Ineffective treatments:  None tried  Associated symptoms: no back pain, no decreased ROM, no neck pain, no numbness, no stiffness and no tingling    Risk factors: obesity    Risk factors: no concern for non-accidental trauma    Fall  Associated symptoms: no back pain, no chest pain and no neck pain        Review of Systems   Constitutional: Negative.  Negative for activity change and appetite change.   HENT: Negative.  Negative for congestion, dental problem, mouth sores and nosebleeds.    Eyes: Negative.  Negative for discharge.   Respiratory: Negative.  Negative for choking, chest tightness and shortness of breath.    Cardiovascular: Negative.  Negative for chest pain and leg swelling.   Gastrointestinal: Negative.    Genitourinary: Negative for difficulty urinating.   Musculoskeletal: Negative.  Negative for back  pain, gait problem, neck pain and stiffness.   Skin: Negative.    Allergic/Immunologic: Negative for environmental allergies.   Neurological: Negative.  Negative for dizziness and facial asymmetry.   Psychiatric/Behavioral: Negative for agitation.   All other systems reviewed and are negative.      Past Medical History:   Diagnosis Date   • Anxiety    • Arthritis    • CAD (coronary artery disease)    • Colon polyp    • DDD (degenerative disc disease), lumbar    • Deep venous thrombosis (HCC)    • Depression    • Diverticulosis    • Esophageal stricture    • GERD (gastroesophageal reflux disease)    • History of transfusion    • Hypercholesteremia    • Hypertension    • LPRD (laryngopharyngeal reflux disease)    • Macular degeneration    • Multinodular goiter 2/23/2017   • Osteoarthritis    • Pruritic disorder    • Spastic colon    • Thyroid nodule        Allergies   Allergen Reactions   • Levaquin [Levofloxacin] Unknown (See Comments)     Pt doesn't remember   • Codeine Other (See Comments)     Pt does not recall   • Morphine And Related Other (See Comments)     Pt does not recall       Past Surgical History:   Procedure Laterality Date   • BLADDER REPAIR     • CATARACT EXTRACTION     • CHOLECYSTECTOMY     • COLONOSCOPY  04/22/2014    2 polyps, adenomatous, diverticulosis   • COLONOSCOPY N/A 6/26/2020    Procedure: COLONOSCOPY WITH ANESTHESIA;  Surgeon: John Coleman MD;  Location: [facility] ENDOSCOPY;  Service: Gastroenterology;  Laterality: N/A;  pre op: constipation  post op: diverticulosis,   PCP: Adam Delgadillo MD   • CYSTOCELE REPAIR     • ENDOSCOPY  04/22/2014    Schatzki's ring dilated   • ENDOSCOPY N/A 6/26/2020    Procedure: ESOPHAGOGASTRODUODENOSCOPY WITH ANESTHESIA;  Surgeon: John Coleman MD;  Location:  [facility] ENDOSCOPY;  Service: Gastroenterology;  Laterality: N/A;  pre op: dysphagia  post op:stricture, dilated   PCP:Adam Delgadillo MD   • ENDOSCOPY N/A 9/2/2021    Procedure:  ESOPHAGOGASTRODUODENOSCOPY WITH ANESTHESIA;  Surgeon: John Coleman MD;  Location: [facility] ENDOSCOPY;  Service: Gastroenterology;  Laterality: N/A;  pre op: dysphagia  post op:gastritis  PCP: VERA Noble MD   • FEMUR FRACTURE SURGERY     • HYSTERECTOMY     • INCISION AND DRAINAGE LEG Left 11/3/2021    Procedure: INCISION AND DRAINAGE LEG ABSCESS;  Surgeon: Cesia Mondragon MD;  Location: [facility] OR;  Service: General;  Laterality: Left;   • REPLACEMENT TOTAL KNEE     • ULNAR NERVE TRANSPOSITION         Family History   Problem Relation Age of Onset   • Colon cancer Mother    • Heart disease Mother    • Heart disease Father    • Colon polyps Neg Hx    • Esophageal cancer Neg Hx        Social History     Socioeconomic History   • Marital status:    Tobacco Use   • Smoking status: Never   • Smokeless tobacco: Never   Vaping Use   • Vaping Use: Never used   Substance and Sexual Activity   • Alcohol use: No   • Drug use: No   • Sexual activity: Defer           Objective   Physical Exam  Vitals and nursing note reviewed. Exam conducted with a chaperone present.   Constitutional:       General: She is awake. She is not in acute distress.     Appearance: Normal appearance. She is well-developed. She is not toxic-appearing.   HENT:      Head: Normocephalic. No raccoon eyes, Sanchez's sign, abrasion, contusion or laceration.      Jaw: There is normal jaw occlusion. No tenderness.      Comments: Scalp is atraumatic     Right Ear: Tympanic membrane and external ear normal. No hemotympanum.      Left Ear: Tympanic membrane and external ear normal. No hemotympanum.      Nose: Nose normal.   Eyes:      General: Lids are normal.      Conjunctiva/sclera: Conjunctivae normal.      Pupils: Pupils are equal, round, and reactive to light.      Comments: No hyphema has got a left infraorbital leg red spot which is a birthmark   Neck:      Vascular: No carotid bruit or JVD.      Trachea: Trachea and phonation normal. No  tracheal deviation.      Comments: C-spine step-off laxity or tenderness.  Cardiovascular:      Rate and Rhythm: Normal rate and regular rhythm.      Chest Wall: PMI is not displaced.      Pulses:           Dorsalis pedis pulses are 1+ on the right side and 1+ on the left side.        Posterior tibial pulses are 1+ on the right side and 1+ on the left side.      Heart sounds: Normal heart sounds.     No S3 sounds.      Comments: Decreased perfusion to both feet.  Pulmonary:      Effort: Pulmonary effort is normal. No tachypnea, accessory muscle usage or respiratory distress.      Breath sounds: Normal breath sounds. No stridor.   Chest:      Chest wall: No mass, lacerations, deformity, swelling, tenderness or crepitus. There is no dullness to percussion.   Abdominal:      General: Abdomen is flat and protuberant. Bowel sounds are normal. There is no distension.      Palpations: Abdomen is soft. Abdomen is not rigid.      Tenderness: There is no abdominal tenderness. There is no right CVA tenderness or left CVA tenderness.      Comments: No guarding   Musculoskeletal:         General: Normal range of motion.      Cervical back: Full passive range of motion without pain, normal range of motion and neck supple. No rigidity, spasms, tenderness, bony tenderness or abnormal pulses. No spinous process tenderness or muscular tenderness. Normal range of motion and normal range of motion. Normal.      Thoracic back: Normal. No spasms, tenderness or bony tenderness. Normal range of motion. Normal pulses.      Lumbar back: No deformity, lacerations, pain, spasms, tenderness or bony tenderness. Normal range of motion. Normal pulse.      Right lower le+ Edema present.      Left lower le+ Edema present.      Comments: Bilateral knees have abrasions range of motion of the knees are somewhat limited but within normal limits.  Examination of the right hip reveals a significant tenderness and limitation of movement.  No  crepitus noted.  No thoracic spine or lumbar spine step-off/tenderness.  Upper extremity examination long bones are within normal limits and joint examination unremarkable lower extremity examination of the long bones of the right tib-fib left femur and left tib-fib are within normal limits.  The right hip and femur are tender to touch.   Skin:     General: Skin is warm and dry.      Capillary Refill: Capillary refill takes less than 2 seconds.      Findings: No abrasion, ecchymosis or laceration.   Neurological:      General: No focal deficit present.      Mental Status: She is alert and oriented to person, place, and time.      GCS: GCS eye subscore is 4. GCS verbal subscore is 5. GCS motor subscore is 6.      Cranial Nerves: Cranial nerves are intact and 2-12 are intact. No cranial nerve deficit.      Sensory: Sensation is intact. No sensory deficit.      Motor: Motor function is intact.      Deep Tendon Reflexes: Reflexes are normal and symmetric.      Reflex Scores:       Tricep reflexes are 2+ on the right side and 2+ on the left side.       Bicep reflexes are 2+ on the right side and 2+ on the left side.       Achilles reflexes are 2+ on the right side and 2+ on the left side.     Comments: Patient is awake alert knows where she is nonfocal.   Psychiatric:         Speech: Speech normal.         Behavior: Behavior normal. Behavior is cooperative.         Procedures          ED Course  ED Course as of 10/11/22 1642   Tue Oct 11, 2022   1226 Patient with unwitnessed fall [TS]   1230 Normal sinus rhythm left intrafascicular block [TS]   1353 Patient did not receive the recommended 30ml/kg fluid bolus for sepsis because it would be harmful or detrimental to the patient. The patient has Heart Failure.   The patient was ordered 1500 of fluids.  [TS]   1420 XR Femur 2 View Right [TS]   1551 The patient came to the ED via EMS patient either sustained a syncope or a mechanical fall she is not sure but she woke up on  the floor she does not remember how she got there and then she was able to call the ambulance via her alarm system in the ED she is awake and alert oriented knows where she is and does not any focal neurological deficits she is complaining of right leg pain which is tender.  There is no deformities noted bilateral legs of the same size.  The patient has rhabdomyolysis abnormal CT the chest because of the risk of contrast-induced nephropathy compounded by the rhabdomyolysis and myoglobinuria that the patient is suffering from.  I have discussed this case with the hospitalist CT of the head was obtained they want a follow-up on the results of that.  Meanwhile the patient will be hydrated given IV antibiotics. [TS]   1642 Well score 0 [TS]      ED Course User Index  [TS] Steven Reina MD                                           [facility]    Final diagnoses:   Traumatic rhabdomyolysis, initial encounter ([facility])   Fall, initial encounter   Acute UTI (urinary tract infection)   Positive D dimer   Myoglobinuria       ED Disposition  ED Disposition     ED Disposition   Decision to Admit    Condition   --    Comment   Level of Care: Telemetry [5]   Diagnosis: Traumatic rhabdomyolysis, initial encounter ([facility]) [[id number]]   Admitting Physician: STEVEN SIMENTAL [[id number]]   Attending Physician: STEVEN SIMENTAL [[id number]]   Certification: I Certify That Inpatient Hospital Services Are Medically Necessary For Greater Than 2 Midnights               No follow-up provider specified.       Medication List      No changes were made to your prescriptions during this visit.          Steven Reina MD  10/11/22 1229       Steven Reina MD  10/11/22 1628       Steven Reina MD  10/11/22 1642      Electronically signed by Steven Reina MD at 10/11/22 1642         Current Facility-Administered Medications   Medication Dose Route Frequency Provider Last Rate Last Admin   • acetaminophen (TYLENOL) tablet 650 mg  650 mg Oral Q4H PRN Steven Simental MD        • aluminum-magnesium hydroxide-simethicone (MAALOX MAX) 400-400-40 MG/5ML suspension 15 mL  15 mL Oral Q6H PRN Rodriguez Schneider MD   15 mL at 10/11/22 2201   • aspirin EC tablet 81 mg  81 mg Oral Daily Rodriguez Schneider MD   81 mg at 10/12/22 0810   • atorvastatin (LIPITOR) tablet 10 mg  10 mg Oral Daily Rodriguez Schneider MD   10 mg at 10/12/22 0810   • calcium 500 mg vitamin D 5 mcg (200 UT) per tablet 1 tablet  1 tablet Oral Daily Rodriguez Schneider MD   1 tablet at 10/12/22 0846   • carvedilol (COREG) tablet 6.25 mg  6.25 mg Oral BID With Meals Rodriguez Schneider MD   6.25 mg at 10/12/22 0810   • cefTRIAXone (ROCEPHIN) 1 g in sodium chloride 0.9 % 100 mL IVPB-VTB  1 g Intravenous Q24H Rodriguez Schneider MD       • cycloSPORINE (RESTASIS) 0.05 % ophthalmic emulsion 1 drop  1 drop Both Eyes Q12H Rodriguez Schneider MD       • Enoxaparin Sodium (LOVENOX) syringe 40 mg  40 mg Subcutaneous Daily Rodriguez Schneider MD   40 mg at 10/11/22 1811   • fluticasone (FLONASE) 50 MCG/ACT nasal spray 2 spray  2 spray Nasal Daily PRN Rodriguez Schneider MD       • Glycerin-Hypromellose- (ARTIFICIAL TEARS) 0.2-0.2-1 % ophthalmic solution solution 2 drop  2 drop Both Eyes Q2H PRN Rodriguez Schneider MD       • lactated ringers infusion  125 mL/hr Intravenous Continuous Rodriguez Schneider  mL/hr at 10/12/22 0634 125 mL/hr at 10/12/22 0634   • lisinopril (PRINIVIL,ZESTRIL) tablet 20 mg  20 mg Oral Daily Rodriguez Schneider MD   20 mg at 10/12/22 0810   • montelukast (SINGULAIR) tablet 10 mg  10 mg Oral Nightly Rodriguez Schneider MD   10 mg at 10/11/22 2201   • mupirocin (BACTROBAN) 2 % ointment 1 application  1 application Topical Daily Rodriguez Schneider MD   1 application at 10/12/22 0811   • ondansetron (ZOFRAN) injection 4 mg  4 mg Intravenous Q6H PRN Rodriguez Schneider MD   4 mg at 10/12/22 0947   • pantoprazole (PROTONIX) EC tablet 40 mg  40 mg Oral QAM Rodriguez Schneider MD   40 mg at 10/12/22 0811   • sodium chloride 0.9 % flush 10 mL  10 mL Intravenous Q12H  Rodriguez Schneider MD       • sodium chloride 0.9 % flush 10 mL  10 mL Intravenous PRN Rodriguez Schneider MD       • traMADol (ULTRAM) tablet 25 mg  25 mg Oral Q4H PRN Rodriguez Schneider MD   25 mg at 10/11/22 1850       Lab Results (last 24 hours)     Procedure Component Value Units Date/Time    Urine Culture - Urine, Urine, Catheter [[id number]]  (Abnormal) Collected: 10/11/22 1336    Specimen: Urine, Catheter Updated: 10/12/22 1007     Urine Culture >100,000 CFU/mL Escherichia coli    Narrative:      Colonization of the urinary tract without infection is common. Treatment is discouraged unless the patient is symptomatic, pregnant, or undergoing an invasive urologic procedure.    STAT Lactic Acid, Reflex [[id number]]  (Abnormal) Collected: 10/12/22 0904    Specimen: Blood Updated: 10/12/22 1001     Lactate 3.2 mmol/L     CK [[id number]]  (Abnormal) Collected: 10/12/22 0236    Specimen: Blood Updated: 10/12/22 0327     Creatine Kinase >22,000 U/L     Comprehensive Metabolic Panel [[id number]]  (Abnormal) Collected: 10/12/22 0236    Specimen: Blood Updated: 10/12/22 0325     Glucose 183 mg/dL      BUN 29 mg/dL      Creatinine 1.35 mg/dL      Sodium 132 mmol/L      Potassium 4.3 mmol/L      Comment: Slight hemolysis detected by analyzer. Results may be affected.        Chloride 94 mmol/L      CO2 23.0 mmol/L      Calcium 9.9 mg/dL      Total Protein 7.1 g/dL      Albumin 3.60 g/dL      ALT (SGPT) 258 U/L      AST (SGOT) 721 U/L      Alkaline Phosphatase 74 U/L      Total Bilirubin 0.5 mg/dL      Globulin 3.5 gm/dL      A/G Ratio 1.0 g/dL      BUN/Creatinine Ratio 21.5     Anion Gap 15.0 mmol/L      eGFR 39.8 mL/min/1.73      Comment: National Kidney Foundation and American Society of Nephrology (ASN) Task Force recommended calculation based on the Chronic Kidney Disease Epidemiology Collaboration (CKD-EPI) equation refit without adjustment for race.       Narrative:      GFR Normal >60  Chronic Kidney Disease <60  Kidney Failure  <15      TSH [[id number]]  (Normal) Collected: 10/12/22 0236    Specimen: Blood Updated: 10/12/22 0310     TSH 1.980 uIU/mL     Lipid Panel [[id number]]  (Abnormal) Collected: 10/12/22 0236    Specimen: Blood Updated: 10/12/22 0308     Total Cholesterol 148 mg/dL      Triglycerides 169 mg/dL      HDL Cholesterol 54 mg/dL      LDL Cholesterol  66 mg/dL      VLDL Cholesterol 28 mg/dL      LDL/HDL Ratio 1.11    Narrative:      Cholesterol Reference Ranges  (U.S. Department of Health and Human Services ATP III Classifications)    Desirable          <200 mg/dL  Borderline High    200-239 mg/dL  High Risk          >240 mg/dL      Triglyceride Reference Ranges  (U.S. Department of Health and Human Services ATP III Classifications)    Normal           <150 mg/dL  Borderline High  150-199 mg/dL  High             200-499 mg/dL  Very High        >500 mg/dL    HDL Reference Ranges  (U.S. Department of Health and Human Services ATP III Classifications)    Low     <40 mg/dl (major risk factor for CHD)  High    >60 mg/dl ('negative' risk factor for CHD)        LDL Reference Ranges  (U.S. Department of Health and Human Services ATP III Classifications)    Optimal          <100 mg/dL  Near Optimal     100-129 mg/dL  Borderline High  130-159 mg/dL  High             160-189 mg/dL  Very High        >189 mg/dL    Hemoglobin A1c [[id number]]  (Normal) Collected: 10/12/22 0236    Specimen: Blood Updated: 10/12/22 0308     Hemoglobin A1C 5.60 %     Narrative:      Hemoglobin A1C Ranges:    Increased Risk for Diabetes  5.7% to 6.4%  Diabetes                     >= 6.5%  Diabetic Goal                < 7.0%    CBC Auto Differential [[id number]]  (Abnormal) Collected: 10/12/22 0236    Specimen: Blood Updated: 10/12/22 0304     WBC 19.06 10*3/mm3      RBC 4.70 10*6/mm3      Hemoglobin 14.8 g/dL      Hematocrit 43.7 %      MCV 93.0 fL      MCH 31.5 pg      MCHC 33.9 g/dL      RDW 14.4 %      RDW-SD 49.1 fl      MPV 11.1 fL      Platelets 261  "10*3/mm3     Narrative:      The previously reported component NRBC is no longer being reported. Previous result was 0.0 /100 WBC (Reference Range: 0.0-0.2 /100 WBC) on 10/12/2022 at [address] [place] [address].    Manual Differential [[id number]]  (Abnormal) Collected: 10/12/22 0236    Specimen: Blood Updated: 10/12/22 0304     Neutrophil % 61.0 %      Lymphocyte % 1.0 %      Monocyte % 2.0 %      Bands %  35.0 %      Myelocyte % 1.0 %      Neutrophils Absolute 18.30 10*3/mm3      Lymphocytes Absolute 0.19 10*3/mm3      Monocytes Absolute 0.38 10*3/mm3      Hypochromia Slight/1+     Polychromasia Slight/1+     WBC Morphology Normal     Platelet Morphology Normal    STAT Lactic Acid, Reflex [[id number]]  (Abnormal) Collected: 10/12/22 0236    Specimen: Blood Updated: 10/12/22 0304     Lactate 3.3 mmol/L     STAT Lactic Acid, Reflex [[id number]]  (Abnormal) Collected: 10/11/22 2127    Specimen: Blood Updated: 10/11/22 2206     Lactate 3.5 mmol/L     CK [[id number]]  (Abnormal) Collected: 10/11/22 1442    Specimen: Blood Updated: 10/11/22 1539     Creatine Kinase 21,753 U/L     Procalcitonin [[id number]]  (Abnormal) Collected: 10/11/22 1442    Specimen: Blood Updated: 10/11/22 1519     Procalcitonin 8.38 ng/mL     Narrative:      As a Marker for Sepsis (Non-Neonates):    1. <0.5 ng/mL represents a low risk of severe sepsis and/or septic shock.  2. >2 ng/mL represents a high risk of severe sepsis and/or septic shock.    As a Marker for Lower Respiratory Tract Infections that require antibiotic therapy:    PCT on Admission    Antibiotic Therapy       6-12 Hrs later    >0.5                Strongly Recommended  >0.25 - <0.5        Recommended   0.1 - 0.25          Discouraged              Remeasure/reassess PCT  <0.1                Strongly Discouraged     Remeasure/reassess PCT    As 28 day mortality risk marker: \"Change in Procalcitonin Result\" (>80% or <=80%) if Day 0 (or Day 1) and Day 4 values are available. Refer to " http://www.[facility]-pct-calculator.com    Change in PCT <=80%  A decrease of PCT levels below or equal to 80% defines a positive change in PCT test result representing a higher risk for 28-day all-cause mortality of patients diagnosed with severe sepsis for septic shock.    Change in PCT >80%  A decrease of PCT levels of more than 80% defines a negative change in PCT result representing a lower risk for 28-day all-cause mortality of patients diagnosed with severe sepsis or septic shock.       Comprehensive Metabolic Panel [[id number]]  (Abnormal) Collected: 10/11/22 1442    Specimen: Blood Updated: 10/11/22 1517     Glucose 205 mg/dL      BUN 18 mg/dL      Creatinine 0.64 mg/dL      Sodium 131 mmol/L      Potassium 4.0 mmol/L      Comment: Slight hemolysis detected by analyzer. Results may be affected.        Chloride 95 mmol/L      CO2 21.0 mmol/L      Calcium 9.6 mg/dL      Total Protein 6.7 g/dL      Albumin 3.70 g/dL      ALT (SGPT) 106 U/L      AST (SGOT) 291 U/L      Alkaline Phosphatase 69 U/L      Total Bilirubin 0.5 mg/dL      Globulin 3.0 gm/dL      A/G Ratio 1.2 g/dL      BUN/Creatinine Ratio 28.1     Anion Gap 15.0 mmol/L      eGFR 89.5 mL/min/1.73      Comment: National Kidney Foundation and American Society of Nephrology (ASN) Task Force recommended calculation based on the Chronic Kidney Disease Epidemiology Collaboration (CKD-EPI) equation refit without adjustment for race.       Narrative:      GFR Normal >60  Chronic Kidney Disease <60  Kidney Failure <15      STAT Lactic Acid, Reflex [[id number]]  (Abnormal) Collected: 10/11/22 1442    Specimen: Blood Updated: 10/11/22 1515     Lactate 4.0 mmol/L     C-reactive Protein [[id number]]  (Abnormal) Collected: 10/11/22 1442    Specimen: Blood Updated: 10/11/22 1514     C-Reactive Protein 8.41 mg/dL     Troponin [[id number]]  (Normal) Collected: 10/11/22 1442    Specimen: Blood Updated: 10/11/22 1512     Troponin T <0.010 ng/mL     Narrative:      Troponin T  Reference Range:  <= 0.03 ng/mL-   Negative for AMI  >0.03 ng/mL-     Abnormal for myocardial necrosis.  Clinicians would have to utilize clinical acumen, EKG, Troponin and serial changes to determine if it is an Acute Myocardial Infarction or myocardial injury due to an underlying chronic condition.       Results may be falsely decreased if patient taking Biotin.      Blood Culture - Blood, Arm, Right [[id number]] Collected: 10/11/22 1442    Specimen: Blood from Arm, Right Updated: 10/11/22 1452    Blood Culture - Blood, Arm, Right [[id number]] Collected: 10/11/22 1245    Specimen: Blood from Arm, Right Updated: 10/11/22 1409

## 2022-10-12 NOTE — PROGRESS NOTES
Malnutrition Severity Assessment    Patient Name:  Carmen Obrien  YOB: 1940  MRN: [id number]  Admit Date:  10/11/2022    Patient meets criteria for : Moderate (non-severe) Malnutrition        Malnutrition Severity Assessment  Malnutrition Type: Starvation - Related Malnutrition  Malnutrition Type (last 8 hours)     Malnutrition Severity Assessment     Row Name 10/12/22 1636       Malnutrition Severity Assessment    Malnutrition Type Starvation - Related Malnutrition    Row Name 10/12/22 1636       Insufficient Energy Intake     Insufficient Energy Intake Findings Moderate    Insufficient Energy Intake  <75% of est. energy requirement for > or equal to 3 months    Row Name 10/12/22 1636       Muscle Loss    Loss of Muscle Mass Findings Moderate    [place] Region Moderate - slight depression    Clavicle Bone Region Severe - protruding prominent bone    Acromion Bone Region Severe - squared shoulders, bones, and acromion process protrusion prominent    Dorsal Hand Region Moderate - slight depression    Row Name 10/12/22 1636       Fat Loss    Subcutaneous Fat Loss Findings Moderate    Orbital Region  Moderate -  somewhat hollowness, slightly dark circles    Row Name 10/12/22 1636       Fluid Accumulation (Edema)    Fluid Acumulation Findings Moderate    Fluid Accumulation  Moderate equals 2+ pitting edema    Row Name 10/12/22 1636       Declining Functional Status    Declining Functional Status Findings Measurably Reduced    Row Name 10/12/22 1636       Criteria Met (Must meet criteria for severity in at least 2 of these categories: M Wasting, Fat Loss, Fluid, Secondary Signs, Wt. Status, Intake)    Patient meets criteria for  Moderate (non-severe) Malnutrition                Electronically signed by:  Liz Ahumada RDN, LD  10/12/22 16:37 CDT

## 2022-10-12 NOTE — CASE MANAGEMENT/SOCIAL WORK
Discharge Planning Assessment  [facility]     Patient Name: Carmen Obrien  MRN: [id number]  Today's Date: 10/12/2022    Admit Date: 10/11/2022        Discharge Needs Assessment     Row Name 10/12/22 0953       Living Environment    People in Home alone    Current Living Arrangements home    Primary Care Provided by child(doris)    Provides Primary Care For no one    Family Caregiver if Needed child(doris), adult    Family Caregiver Names Selene daughter in law    Able to Return to Prior Arrangements yes       Resource/Environmental Concerns    Resource/Environmental Concerns none       Transition Planning    Patient/Family Anticipates Transition to home with family    Transportation Anticipated family or friend will provide       Discharge Needs Assessment    Readmission Within the Last 30 Days no previous admission in last 30 days    Equipment Currently Used at Home rollator    Concerns to be Addressed discharge planning    Equipment Needed After Discharge none    Discharge Facility/Level of Care Needs home with home health    Discharge Coordination/Progress spoke with patient who lives alone; has RX coverage and PCP; has had [facility] in the recent past and agreeable to home health at DC; will follow for DC needs               Discharge Plan    No documentation.               Continued Care and Services - Admitted Since 10/11/2022    Coordination has not been started for this encounter.     Selected Continued Care - Episodes Includes continued care and service providers with selected services from the active episodes listed below    High Risk Care Management Episode start date: 7/27/2022   There are no active outsourced providers for this episode.             Selected Continued Care - Prior Encounters Includes continued care and service providers with selected services from prior encounters from 7/13/2022 to 10/12/2022    Discharged on 9/1/2022 Admission date: 8/28/2022 - Discharge disposition: Skilled Nursing  Facility (DC - External)    Destination     Service Provider Selected Services Address Phone Fax Patient Preferred    [facility] [id number], [facility] [address] [phone] --                       Demographic Summary    No documentation.                Functional Status    No documentation.                Psychosocial    No documentation.                Abuse/Neglect    No documentation.                Legal    No documentation.                Substance Abuse    No documentation.                Patient Forms    No documentation.                   Manasa Flynn RN

## 2022-10-12 NOTE — PLAN OF CARE
Goal Outcome Evaluation:  Plan of Care Reviewed With: patient        Progress: no change  Outcome Evaluation: OT evaluation completed.  Pt demo need for skilled OT services.  Pt is A&Ox4 however she has no recollection of the events leading up to her fall.  She also requires increased time for processing simple questions regarding of PLOF and home environment.  Pt required min A x2 for supine to sit, but mod-max A for sit to supine due to difficulty with managing LEs back up into the bed.  Pt attempted to stand x1 and was unable to, however on the second trial, she was able to stand fully with max A and increased time and use of RWX.  Pt able to takd 3-4 side steps to the R however she demo difficulty moving her RLE.  Pt required max A/dep A for LB dressing sitting EOB and max A for donning gown as robe.  Pt demo significant BUE shoulder ROM deficits as she reports it is due to arthritis and need for shoulder replacements.  She is generally very weak and appears to be unable to care for herself alone at this time.  It is recommended for pt to discharge to SNF upon hospital discharge.
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
## 2022-10-13 ENCOUNTER — APPOINTMENT (OUTPATIENT)
Dept: ULTRASOUND IMAGING | Facility: HOSPITAL | Age: 82
End: 2022-10-13

## 2022-10-13 ENCOUNTER — APPOINTMENT (OUTPATIENT)
Dept: NUCLEAR MEDICINE | Facility: HOSPITAL | Age: 82
End: 2022-10-13

## 2022-10-13 PROBLEM — E44.0 MODERATE MALNUTRITION: Status: ACTIVE | Noted: 2022-10-13

## 2022-10-13 LAB
ALBUMIN SERPL-MCNC: 2.7 G/DL (ref 3.5–5.2)
ALBUMIN/GLOB SERPL: 0.8 G/DL
ALP SERPL-CCNC: 73 U/L (ref 39–117)
ALT SERPL W P-5'-P-CCNC: 222 U/L (ref 1–33)
ANION GAP SERPL CALCULATED.3IONS-SCNC: 11 MMOL/L (ref 5–15)
AST SERPL-CCNC: 421 U/L (ref 1–32)
BACTERIA SPEC AEROBE CULT: ABNORMAL
BASOPHILS # BLD AUTO: 0.04 10*3/MM3 (ref 0–0.2)
BASOPHILS NFR BLD AUTO: 0.3 % (ref 0–1.5)
BILIRUB SERPL-MCNC: 0.3 MG/DL (ref 0–1.2)
BUN SERPL-MCNC: 40 MG/DL (ref 8–23)
BUN/CREAT SERPL: 23.5 (ref 7–25)
CALCIUM SPEC-SCNC: 9 MG/DL (ref 8.6–10.5)
CHLORIDE SERPL-SCNC: 97 MMOL/L (ref 98–107)
CK SERPL-CCNC: ABNORMAL U/L (ref 20–180)
CO2 SERPL-SCNC: 24 MMOL/L (ref 22–29)
CREAT SERPL-MCNC: 1.7 MG/DL (ref 0.57–1)
D-LACTATE SERPL-SCNC: 1.2 MMOL/L (ref 0.5–2)
DEPRECATED RDW RBC AUTO: 49.8 FL (ref 37–54)
EGFRCR SERPLBLD CKD-EPI 2021: 30.2 ML/MIN/1.73
EOSINOPHIL # BLD AUTO: 0.05 10*3/MM3 (ref 0–0.4)
EOSINOPHIL NFR BLD AUTO: 0.4 % (ref 0.3–6.2)
ERYTHROCYTE [DISTWIDTH] IN BLOOD BY AUTOMATED COUNT: 14.2 % (ref 12.3–15.4)
GLOBULIN UR ELPH-MCNC: 3.2 GM/DL
GLUCOSE SERPL-MCNC: 116 MG/DL (ref 65–99)
HAV IGM SERPL QL IA: NORMAL
HBV CORE IGM SERPL QL IA: NORMAL
HBV SURFACE AG SERPL QL IA: NORMAL
HCT VFR BLD AUTO: 34.3 % (ref 34–46.6)
HCV AB SER DONR QL: NORMAL
HGB BLD-MCNC: 11 G/DL (ref 12–15.9)
IMM GRANULOCYTES # BLD AUTO: 0.06 10*3/MM3 (ref 0–0.05)
IMM GRANULOCYTES NFR BLD AUTO: 0.5 % (ref 0–0.5)
LYMPHOCYTES # BLD AUTO: 1.06 10*3/MM3 (ref 0.7–3.1)
LYMPHOCYTES NFR BLD AUTO: 8.8 % (ref 19.6–45.3)
MCH RBC QN AUTO: 30.6 PG (ref 26.6–33)
MCHC RBC AUTO-ENTMCNC: 32.1 G/DL (ref 31.5–35.7)
MCV RBC AUTO: 95.3 FL (ref 79–97)
MONOCYTES # BLD AUTO: 0.74 10*3/MM3 (ref 0.1–0.9)
MONOCYTES NFR BLD AUTO: 6.1 % (ref 5–12)
MYOGLOBIN SERPL-MCNC: 2363 NG/ML (ref 25–58)
NEUTROPHILS NFR BLD AUTO: 10.11 10*3/MM3 (ref 1.7–7)
NEUTROPHILS NFR BLD AUTO: 83.9 % (ref 42.7–76)
NRBC BLD AUTO-RTO: 0 /100 WBC (ref 0–0.2)
PLATELET # BLD AUTO: 212 10*3/MM3 (ref 140–450)
PMV BLD AUTO: 11 FL (ref 6–12)
POTASSIUM SERPL-SCNC: 4 MMOL/L (ref 3.5–5.2)
PROT SERPL-MCNC: 5.9 G/DL (ref 6–8.5)
RBC # BLD AUTO: 3.6 10*6/MM3 (ref 3.77–5.28)
SODIUM SERPL-SCNC: 132 MMOL/L (ref 136–145)
WBC NRBC COR # BLD: 12.06 10*3/MM3 (ref 3.4–10.8)

## 2022-10-13 PROCEDURE — 76775 US EXAM ABDO BACK WALL LIM: CPT

## 2022-10-13 PROCEDURE — 11045 DBRDMT SUBQ TISS EACH ADDL: CPT | Performed by: NURSE PRACTITIONER

## 2022-10-13 PROCEDURE — 83874 ASSAY OF MYOGLOBIN: CPT | Performed by: FAMILY MEDICINE

## 2022-10-13 PROCEDURE — 82550 ASSAY OF CK (CPK): CPT | Performed by: FAMILY MEDICINE

## 2022-10-13 PROCEDURE — 80074 ACUTE HEPATITIS PANEL: CPT | Performed by: FAMILY MEDICINE

## 2022-10-13 PROCEDURE — 99232 SBSQ HOSP IP/OBS MODERATE 35: CPT | Performed by: NURSE PRACTITIONER

## 2022-10-13 PROCEDURE — 78582 LUNG VENTILAT&PERFUS IMAGING: CPT

## 2022-10-13 PROCEDURE — 25010000002 ERTAPENEM PER 500 MG: Performed by: FAMILY MEDICINE

## 2022-10-13 PROCEDURE — 0 TECHNETIUM ALBUMIN AGGREGATED: Performed by: FAMILY MEDICINE

## 2022-10-13 PROCEDURE — 97530 THERAPEUTIC ACTIVITIES: CPT

## 2022-10-13 PROCEDURE — A9540 TC99M MAA: HCPCS | Performed by: FAMILY MEDICINE

## 2022-10-13 PROCEDURE — 11042 DBRDMT SUBQ TIS 1ST 20SQCM/<: CPT | Performed by: NURSE PRACTITIONER

## 2022-10-13 PROCEDURE — 25010000002 ENOXAPARIN PER 10 MG: Performed by: FAMILY MEDICINE

## 2022-10-13 PROCEDURE — 83605 ASSAY OF LACTIC ACID: CPT | Performed by: FAMILY MEDICINE

## 2022-10-13 PROCEDURE — 85025 COMPLETE CBC W/AUTO DIFF WBC: CPT | Performed by: FAMILY MEDICINE

## 2022-10-13 PROCEDURE — 76705 ECHO EXAM OF ABDOMEN: CPT

## 2022-10-13 PROCEDURE — 80053 COMPREHEN METABOLIC PANEL: CPT | Performed by: FAMILY MEDICINE

## 2022-10-13 RX ORDER — ENOXAPARIN SODIUM 100 MG/ML
30 INJECTION SUBCUTANEOUS
Status: DISCONTINUED | OUTPATIENT
Start: 2022-10-13 | End: 2022-10-14

## 2022-10-13 RX ORDER — OXYCODONE HYDROCHLORIDE AND ACETAMINOPHEN 5; 325 MG/1; MG/1
1 TABLET ORAL EVERY 4 HOURS PRN
Status: DISCONTINUED | OUTPATIENT
Start: 2022-10-13 | End: 2022-10-15

## 2022-10-13 RX ORDER — CELECOXIB 200 MG/1
200 CAPSULE ORAL DAILY
COMMUNITY
End: 2022-11-07 | Stop reason: HOSPADM

## 2022-10-13 RX ORDER — HYDROCODONE BITARTRATE AND ACETAMINOPHEN 7.5; 325 MG/1; MG/1
1 TABLET ORAL EVERY 8 HOURS PRN
Status: ON HOLD | COMMUNITY
End: 2022-10-15 | Stop reason: SDUPTHER

## 2022-10-13 RX ADMIN — ALUMINUM HYDROXIDE, MAGNESIUM HYDROXIDE, AND DIMETHICONE 15 ML: 400; 400; 40 SUSPENSION ORAL at 21:59

## 2022-10-13 RX ADMIN — MUPIROCIN 1 APPLICATION: 20 OINTMENT TOPICAL at 08:18

## 2022-10-13 RX ADMIN — Medication 1 TABLET: at 09:21

## 2022-10-13 RX ADMIN — ENOXAPARIN SODIUM 30 MG: 100 INJECTION SUBCUTANEOUS at 17:38

## 2022-10-13 RX ADMIN — LISINOPRIL 20 MG: 20 TABLET ORAL at 08:19

## 2022-10-13 RX ADMIN — CYCLOSPORINE 1 DROP: 0.5 EMULSION OPHTHALMIC at 17:39

## 2022-10-13 RX ADMIN — MONTELUKAST SODIUM 10 MG: 10 TABLET, FILM COATED ORAL at 21:03

## 2022-10-13 RX ADMIN — Medication 10 ML: at 21:03

## 2022-10-13 RX ADMIN — TRAMADOL HYDROCHLORIDE 25 MG: 50 TABLET ORAL at 09:26

## 2022-10-13 RX ADMIN — ERTAPENEM SODIUM 500 MG: 1 INJECTION, POWDER, LYOPHILIZED, FOR SOLUTION INTRAMUSCULAR; INTRAVENOUS at 11:56

## 2022-10-13 RX ADMIN — CARVEDILOL 6.25 MG: 6.25 TABLET, FILM COATED ORAL at 17:39

## 2022-10-13 RX ADMIN — OXYCODONE HYDROCHLORIDE AND ACETAMINOPHEN 1 TABLET: 5; 325 TABLET ORAL at 20:23

## 2022-10-13 RX ADMIN — SODIUM CHLORIDE, POTASSIUM CHLORIDE, SODIUM LACTATE AND CALCIUM CHLORIDE 150 ML/HR: 600; 310; 30; 20 INJECTION, SOLUTION INTRAVENOUS at 00:59

## 2022-10-13 RX ADMIN — CARVEDILOL 6.25 MG: 6.25 TABLET, FILM COATED ORAL at 08:19

## 2022-10-13 RX ADMIN — OXYCODONE HYDROCHLORIDE AND ACETAMINOPHEN 1 TABLET: 5; 325 TABLET ORAL at 12:02

## 2022-10-13 RX ADMIN — SODIUM CHLORIDE, POTASSIUM CHLORIDE, SODIUM LACTATE AND CALCIUM CHLORIDE 150 ML/HR: 600; 310; 30; 20 INJECTION, SOLUTION INTRAVENOUS at 08:25

## 2022-10-13 RX ADMIN — KIT FOR THE PREPARATION OF TECHNETIUM TC 99M ALBUMIN AGGREGATED 1 DOSE: 2.5 INJECTION, POWDER, FOR SOLUTION INTRAVENOUS at 16:30

## 2022-10-13 RX ADMIN — ASPIRIN 81 MG: 81 TABLET, COATED ORAL at 08:20

## 2022-10-13 NOTE — CONSULTS
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 highly sensitive for the detection of acute congestive heart failure. In addition NT-proBNP of <300 pg/ml effectively rules out acute congestive heart failure with 99% negative predictive value.    Results may be falsely decreased if patient taking Biotin.      Magnesium [[id number]]  (Normal) Collected: 10/11/22 1207    Specimen: Blood Updated: 10/11/22 1231     Magnesium 1.8 mg/dL     CBC & Differential [[id number]]  (Abnormal) Collected: 10/11/22 1207    Specimen: Blood Updated: 10/11/22 1216    Narrative:      The following orders were created for panel order CBC & Differential.  Procedure                               Abnormality         Status                     ---------                               -----------         ------                     CBC Auto Differential[[id number]]        Abnormal            Final result                 Please view results for these tests on the individual orders.    CBC Auto Differential [[id number]]  (Abnormal) Collected: 10/11/22 1207    Specimen: Blood Updated: 10/11/22 1216     WBC 25.44 10*3/mm3      RBC 4.83 10*6/mm3      Hemoglobin 15.3 g/dL      Hematocrit 45.0 %      MCV 93.2 fL      MCH 31.7 pg      MCHC 34.0 g/dL      RDW 13.9 %      RDW-SD 47.3 fl      MPV 11.3 fL      Platelets 303 10*3/mm3      Neutrophil % 92.5 %      Lymphocyte % 1.4 %      Monocyte % 2.9 %      Eosinophil % 0.3 %      Basophil % 0.2 %      Immature Grans % 2.7  %      Neutrophils, Absolute 23.52 10*3/mm3      Lymphocytes, Absolute 0.35 10*3/mm3      Monocytes, Absolute 0.75 10*3/mm3      Eosinophils, Absolute 0.07 10*3/mm3      Basophils, Absolute 0.06 10*3/mm3      Immature Grans, Absolute 0.69 10*3/mm3      nRBC 0.0 /100 WBC         Imaging Results (Last 72 Hours)     Procedure Component Value Units Date/Time    US Renal Bilateral [[id number]] Collected: 10/13/22 1050     Updated: 10/13/22 1054    Narrative:      RENAL ULTRASOUND COMPLETE 10/13/2022 9:52 AM CDT     REASON FOR EXAM: agustín; T79.6XXA-Traumatic ischemia of muscle, initial  encounter; W19.XXXA-Unspecified fall, initial encounter; N39.0-Urinary  tract infection, site not specified; R79.89-Other specified abnormal  findings of blood chemistry; R82.1-Myoglobinuria; Z74.09-Other reduced  mobility       COMPARISON: None       TECHNIQUE: Multiple longitudinal and transverse realtime sonographic  images of the kidneys and urinary bladder are obtained.      FINDINGS:      RIGHT KIDNEY: 11.4 cm. Normal in size, shape, contour and position. No  solid or cystic masses. The central echo complex is compact with no  evidence for hydronephrosis. No nephrolithiasis or abnormal perinephric  fluid collections . No hydroureter.      LEFT KIDNEY: 10.6 cm. Normal in size, shape, contour and position. No  solid or cystic masses. The central echo complex is compact with no  evidence for hydronephrosis. No nephrolithiasis or abnormal perinephric  fluid collections . No hydroureter.      PELVIS: The bladder is mildly distended with anechoic urine and  demonstrates no significant wall thickening or internal echogenicities.  There is no surrounding ascites.        Impression:      1. Unremarkable renal ultrasound.        This report was finalized on 10/13/2022 10:51 by Dr. José Elmore MD.    US Liver [[id number]] Resulted: 10/13/22 0951     Updated: 10/13/22 1018    US Venous Doppler Lower Extremity Bilateral (duplex) [[id number]]  Resulted: 10/12/22 1641     Updated: 10/12/22 1643    CT Head Without Contrast [[id number]] Collected: 10/11/22 1717     Updated: 10/11/22 1725    Narrative:      EXAM/TECHNIQUE: CT head without contrast     INDICATION: Mental status change, unknown cause; T79.6XXA-Traumatic  ischemia of muscle, initial encounter; W19.XXXA-Unspecified fall,  initial encounter; N39.0-Urinary tract infection, site not specified;  R79.89-Other specified abnormal findings of blood chemistry;  R82.1-Myoglobinuria     COMPARISON: 07/26/2022     DLP: 1023 mGy cm. Automated exposure control was also utilized to  decrease patient radiation dose.     FINDINGS:     No acute intracranial hemorrhage. No loss of gray-white differentiation.  Presumed chronic microvascular ischemic white matter change. Global  cerebral volume loss is noted. No midline shift or mass effect. Lateral  ventricles are nondilated. Basilar cisterns are patent. No acute orbital  finding. Mastoid air cells are clear. Visualized paranasal sinuses are  clear. No acute osseous finding.       Impression:         No acute intracranial finding.  This report was finalized on 10/11/2022 17:22 by Dr. Arsh Calderon MD.    XR Chest 1 View [[id number]] Collected: 10/11/22 1314     Updated: 10/11/22 1318    Narrative:      EXAMINATION: Chest 1 view 10/11/2022     HISTORY: Fall.     FINDINGS: Today's exam is compared to previous study of 8/21/2022. Lungs  are hypoventilated with mild basilar atelectasis. No evidence of  consolidative pneumonia or effusion. The thoracic aorta is ectatic.  There is arthritic change of the right glenohumeral joint. Humeral head  is high riding suggesting a chronic rotator cuff tear.       Impression:      1. Expiratory chest with bibasilar atelectasis. Lungs are otherwise  clear. No pneumothorax.  This report was finalized on 10/11/2022 13:14 by Dr. José Elmore MD.    XR Hip With or Without Pelvis 2 - 3 View Right [[id number]] Collected: 10/11/22 1313      Updated: 10/11/22 1317    Narrative:      EXAMINATION: AP pelvis and two-view right hip 11/20/2022     HISTORY: Fall.     FINDINGS: AP radiograph of the pelvis as well as two-view exam of the  right hip demonstrates no fracture or dislocation. The femoral head is  concentric and well located within the acetabulum. There is mild  arthritic change of the hip.       Impression:      . No acute fracture.  This report was finalized on 10/11/2022 13:13 by Dr. José Elmore MD.    XR Femur 2 View Right [[id number]] Collected: 10/11/22 1312     Updated: 10/11/22 1316    Narrative:      EXAMINATION: Right femur 2 views 2/11/2022     HISTORY: Fall     FINDINGS: Two-view exam of the right femur demonstrates no evidence of  acute displaced fracture. The femoral head is concentric and well  located within the acetabulum.       Impression:      1.. No acute fracture.  This report was finalized on 10/11/2022 13:12 by Dr. José Elmore MD.    XR Knee 1 or 2 View Bilateral [[id number]] Collected: 10/11/22 1309     Updated: 10/11/22 1315    Narrative:            Scribe exam bilateral knees 2 views 10/11/2022     HISTORY: Trauma.     FINDINGS: Two-view exam of the right knee reveals the patient is status  post total knee arthroplasty. I do not see evidence of fracture or  loosening. No joint effusion is present.     Examination of the left knee reveals the patient has undergone previous  total knee arthroplasty with subsequent revision and resection of the  distal femur. Cerclage wire is noted within the distal femoral shaft.  There is no evidence of acute fracture. There is some heterotopic bone  or myositis noted within the soft tissues about the distal femur.       Impression:      1. Status post previous bilateral total knee arthroplasties. The left  knee arthroplasty represents a revision from previous knee arthroplasty  and fracture of the distal femur. I do not see evidence of  complications. No evidence of acute  fracture.  This report was finalized on 10/11/2022 13:12 by Dr. José Elmore MD.             ASSESSMENT/PLAN       Examination and evaluation of wound(s) was performed.    DIAGNOSIS:     Open wound of right knee    Open wound of left knee    Cellulitis of left lower leg    PVD    At high risk for skin breakdown    Traumatic rhabdomyolysis, initial encounter ([facility])    Falling    UTI (urinary tract infection), bacterial    Leukocytosis    AMS (altered mental status)        PLAN:   Due to patient having pain with movement now, limited mobility, and being down on floor for an unknown amount of time, concern for skin breakdown is high.  Orders have been placed for pressure injury prevention.     It seems patient fell and has wounds of bilateral knees.  Wounds placed for wound care to bilateral knees and edema control.        Start     Ordered    10/13/22 1108  Wound Care  Every 12 Hours        Question Answer Comment   Wound Locations Bilateral knees    Wound Care Instructions Clean wounds with NS.  Apply therahoney to wounds and cover with Vaseline gauze.  Wrap with Kerlix.    Cleanse Normal Saline    Intervention Thera Honey    Intervention Vaseline Gauze    Dressing: Gauze Roll        10/13/22 1107    10/13/22 1108  Apply Dolphin mattress Until Discontinued  Until Discontinued        Comments: Patient is to utilize Dolphin Mattress during admission.      Call [employer] to order a Aron bed frame  [phone].  Only need [place] bed frame if a standard dolphin mattress is being ordered.  Do not order bed frame if Bariatric Dolphin is being ordered.  Nurse or Huc is to call [employer], to order the Dolphin Mattress       [phone].  Will have to provide patient's name, room number, if patient is in isolation, and what is being ordered.   Question:  Supply to be applied:  Answer:  Dolphin mattress    10/13/22 1107    10/13/22 1107  Elevate Extremity  Continuous        Comments: Elevate BLE above heart level  when possible for edema control   Question:  Elevate Extremity  Answer:  BLE    10/13/22 1107    10/13/22 1106  Elevate Heels Off of Bed  Until Discontinued         10/13/22 1107    10/13/22 1106  Turn Patient  Now Then Every 2 Hours         10/13/22 1107    10/13/22 1106  Use Repositioning Wedge to Position Patient  Continuous        Comments: Use Comfort Glide repositioning sheet and wedges to position patient.    10/13/22 1107    10/13/22 1106  Use Seat Cushion When Up In Chair  Continuous         10/13/22 1107    Unscheduled  Apply Moisture Barrier After Any Incontinence  As Needed      Question:  Wound Care Instructions  Answer:  Apply Moisture Barrier After Any Incontinence    10/13/22 1107                Discussed findings and treatment plan including risks, benefits, and treatment options with patient in detail. Patient agreed with treatment plan.      This document has been electronically signed by ABDON Burch on 10/13/2022 11:07 CDT

## 2022-10-13 NOTE — PLAN OF CARE
Goal Outcome Evaluation:  Plan of Care Reviewed With: patient        Progress: improving  Outcome Evaluation: PT tx completed. Pt c/o increased RLE pain, blisters around knee noted. Increased time to complete supine to sit with mod x1 d/t pain. Able to stand x2 reps with RWX and min x1. Pt requires assist for RLE placement prior to standing. Able to take steps to chair with RWX and CGA/Min x1 d/t assist with RWX management.  Completed LLE AROM and RLE AAROM x10 reps seated. Will cont to follow.

## 2022-10-13 NOTE — PLAN OF CARE
Goal Outcome Evaluation:         Pt required 1 dose of Tramadol and 1 dose of Oxycodone PO for pain control today. US of renal and liver completed this morning. NM- lung ventilation perfusion study completed this afternoon. Pt worked with physical therapy today. Sat up in the chair for a couple hours. IV fluids decreased to 125 ml/hr. Rocephin discontinued. Invanz started.

## 2022-10-13 NOTE — PROGRESS NOTES
[facility] Medicine Services  INPATIENT PROGRESS NOTE    Length of Stay: 2  Date of Admission: 10/11/2022  Primary Care Physician: VERA Noble MD    Subjective   Chief Complaint: Falling/rhabdo/leg wound/UTI    HPI   ESBL, change antibiotic to Invanz.  Slight increase in creatinine.  CK decreased.  Patient is oriented x3.  Complaining of leg pain, change Ultram to Percocet.    Review of Systems   Constitutional: Positive for activity change and appetite change. Negative for chills and fever.   HENT: Negative for hearing loss, nosebleeds, tinnitus and trouble swallowing.    Eyes: Negative for visual disturbance.   Respiratory: Negative for cough, chest tightness, shortness of breath and wheezing.    Cardiovascular: Negative for chest pain, palpitations and leg swelling.   Gastrointestinal: Negative for abdominal distention, abdominal pain, blood in stool, constipation, diarrhea, nausea and vomiting.   Endocrine: Negative for cold intolerance, heat intolerance, polydipsia, polyphagia and polyuria.   Genitourinary: Negative for decreased urine volume, difficulty urinating, dysuria, flank pain, frequency and hematuria.   Musculoskeletal: Positive for arthralgias, gait problem and myalgias. Negative for joint swelling.   Skin: Positive for wound. Negative for rash.        Knee and legs.   Allergic/Immunologic: Negative for immunocompromised state.   Neurological: Positive for weakness. Negative for dizziness, syncope, light-headedness and headaches.   Hematological: Negative for adenopathy. Does not bruise/bleed easily.   Psychiatric/Behavioral: Negative for confusion and sleep disturbance. The patient is not nervous/anxious.      All pertinent negatives and positives are as above. All other systems have been reviewed and are negative unless otherwise stated.     Objective    Temp:  [97.3 °F (36.3 °C)-99.1 °F (37.3 °C)] 98.1 °F (36.7 °C)  Heart Rate:  [76-87] 77  Resp:  [16]  16  BP: (117-152)/(45-78) 138/55    Intake/Output Summary (Last 24 hours) at 10/13/2022 0951  Last data filed at 10/13/2022 0554  Gross per 24 hour   Intake 2046 ml   Output 1200 ml   Net 846 ml     Physical Exam  Vitals and nursing note reviewed.   Constitutional:       Comments: Advanced age.  Chronically ill.   HENT:      Head: Normocephalic.   Eyes:      Conjunctiva/sclera: Conjunctivae normal.      Pupils: Pupils are equal, round, and reactive to light.   Neck:      Vascular: No JVD.   Cardiovascular:      Rate and Rhythm: Normal rate and regular rhythm.      Heart sounds: Normal heart sounds.   Pulmonary:      Effort: No respiratory distress.      Breath sounds: No wheezing or rales.      Comments: Diminished breath semilateral, clear . patient is on room air  Chest:      Chest wall: No tenderness.   Abdominal:      General: Bowel sounds are normal. There is no distension.      Palpations: Abdomen is soft.      Tenderness: There is no abdominal tenderness.      Comments: Obese .   Musculoskeletal:         General: No tenderness or deformity.      Cervical back: Neck supple.   Skin:     General: Skin is warm and dry.      Capillary Refill: Capillary refill takes 2 to 3 seconds.      Findings: Erythema present. No rash.      Comments: Abrasion bilateral knee/left lower extremity stasis dermatitis.   Neurological:      General: No focal deficit present.      Mental Status: She is alert and oriented to person, place, and time.      Cranial Nerves: No cranial nerve deficit.      Motor: Weakness present. No abnormal muscle tone.      Coordination: Coordination abnormal.      Gait: Gait abnormal.      Deep Tendon Reflexes: Reflexes normal.   Psychiatric:         Mood and Affect: Mood normal.         Behavior: Behavior normal.         Thought Content: Thought content normal.   Results Review:  Lab Results (last 24 hours)     Procedure Component Value Units Date/Time    Urine Culture - Urine, Urine, Catheter  [[id number]]  (Abnormal)  (Susceptibility) Collected: 10/11/22 1336    Specimen: Urine, Catheter Updated: 10/13/22 0942     Urine Culture >100,000 CFU/mL Escherichia coli ESBL     Comment:   Consider infectious disease consult.  Susceptibility results may not correlate to clinical outcomes.       Narrative:      Colonization of the urinary tract without infection is common. Treatment is discouraged unless the patient is symptomatic, pregnant, or undergoing an invasive urologic procedure.  Recent outcomes data supports the use of pip/tazo in the treatment of susceptible ESBL infections for uncomplicated UTI. Consider use of pip/tazo as a carbapenem-sparing regimen in applicable patients.    Susceptibility      Escherichia coli ESBL      CASEY      Ertapenem Susceptible      Gentamicin Susceptible      Levofloxacin Resistant     Meropenem Susceptible      Nitrofurantoin Susceptible      Piperacillin + Tazobactam Susceptible      Trimethoprim + Sulfamethoxazole Resistant                          CK [[id number]]  (Abnormal) Collected: 10/13/22 0338    Specimen: Blood Updated: 10/13/22 0451     Creatine Kinase 12,286 U/L     Hepatitis Panel, Acute [[id number]]  (Normal) Collected: 10/13/22 0338    Specimen: Blood Updated: 10/13/22 0438     Hepatitis B Surface Ag Non-Reactive     Hep A IgM Non-Reactive     Hep B C IgM Non-Reactive     Hepatitis C Ab Non-Reactive    Narrative:      Results may be falsely decreased if patient taking Biotin.     Comprehensive Metabolic Panel [[id number]]  (Abnormal) Collected: 10/13/22 0338    Specimen: Blood Updated: 10/13/22 0433     Glucose 116 mg/dL      BUN 40 mg/dL      Creatinine 1.70 mg/dL      Sodium 132 mmol/L      Potassium 4.0 mmol/L      Chloride 97 mmol/L      CO2 24.0 mmol/L      Calcium 9.0 mg/dL      Total Protein 5.9 g/dL      Albumin 2.70 g/dL      ALT (SGPT) 222 U/L      AST (SGOT) 421 U/L      Alkaline Phosphatase 73 U/L      Total Bilirubin 0.3 mg/dL      Globulin 3.2 gm/dL       A/G Ratio 0.8 g/dL      BUN/Creatinine Ratio 23.5     Anion Gap 11.0 mmol/L      eGFR 30.2 mL/min/1.73      Comment: National Kidney Foundation and American Society of Nephrology (ASN) Task Force recommended calculation based on the Chronic Kidney Disease Epidemiology Collaboration (CKD-EPI) equation refit without adjustment for race.       Narrative:      GFR Normal >60  Chronic Kidney Disease <60  Kidney Failure <15      CBC & Differential [[id number]]  (Abnormal) Collected: 10/13/22 0338    Specimen: Blood Updated: 10/13/22 0432    Narrative:      The following orders were created for panel order CBC & Differential.  Procedure                               Abnormality         Status                     ---------                               -----------         ------                     CBC Auto Differential[[id number]]        Abnormal            Final result                 Please view results for these tests on the individual orders.    CBC Auto Differential [[id number]]  (Abnormal) Collected: 10/13/22 0338    Specimen: Blood Updated: 10/13/22 0432     WBC 12.06 10*3/mm3      RBC 3.60 10*6/mm3      Hemoglobin 11.0 g/dL      Hematocrit 34.3 %      MCV 95.3 fL      MCH 30.6 pg      MCHC 32.1 g/dL      RDW 14.2 %      RDW-SD 49.8 fl      MPV 11.0 fL      Platelets 212 10*3/mm3      Neutrophil % 83.9 %      Lymphocyte % 8.8 %      Monocyte % 6.1 %      Eosinophil % 0.4 %      Basophil % 0.3 %      Immature Grans % 0.5 %      Neutrophils, Absolute 10.11 10*3/mm3      Lymphocytes, Absolute 1.06 10*3/mm3      Monocytes, Absolute 0.74 10*3/mm3      Eosinophils, Absolute 0.05 10*3/mm3      Basophils, Absolute 0.04 10*3/mm3      Immature Grans, Absolute 0.06 10*3/mm3      nRBC 0.0 /100 WBC     STAT Lactic Acid, Reflex [[id number]]  (Normal) Collected: 10/13/22 0338    Specimen: Blood Updated: 10/13/22 0424     Lactate 1.2 mmol/L     Myoglobin, Serum [[id number]] Collected: 10/13/22 0338    Specimen: Blood Updated:  10/13/22 0405    Blood Culture - Blood, Arm, Right [[id number]]  (Normal) Collected: 10/11/22 1442    Specimen: Blood from Arm, Right Updated: 10/12/22 1501     Blood Culture No growth at 24 hours    Blood Culture - Blood, Arm, Right [[id number]]  (Normal) Collected: 10/11/22 1245    Specimen: Blood from Arm, Right Updated: 10/12/22 1415     Blood Culture No growth at 24 hours    STAT Lactic Acid, Reflex [[id number]]  (Abnormal) Collected: 10/12/22 0904    Specimen: Blood Updated: 10/12/22 1001     Lactate 3.2 mmol/L            Cultures:  Blood Culture   Date Value Ref Range Status   10/11/2022 No growth at 24 hours  Preliminary   10/11/2022 No growth at 24 hours  Preliminary     Urine Culture   Date Value Ref Range Status   10/11/2022 >100,000 CFU/mL Escherichia coli ESBL (A)  Final     Comment:       Consider infectious disease consult.  Susceptibility results may not correlate to clinical outcomes.       Radiology Data:    Imaging Results (Last 24 Hours)     Procedure Component Value Units Date/Time    US Venous Doppler Lower Extremity Bilateral (duplex) [[id number]] Resulted: 10/12/22 1641     Updated: 10/12/22 1643          Allergies   Allergen Reactions   • Levaquin [Levofloxacin] Unknown (See Comments)     Pt doesn't remember   • Codeine Other (See Comments)     Pt does not recall   • Morphine And Related Other (See Comments)     Pt does not recall       Scheduled meds:   aspirin, 81 mg, Oral, Daily  [START ON 10/14/2022] atorvastatin, 10 mg, Oral, Daily  calcium 500 mg vitamin D 5 mcg (200 UT), 1 tablet, Oral, Daily  carvedilol, 6.25 mg, Oral, BID With Meals  cefTRIAXone, 1 g, Intravenous, Q24H  cycloSPORINE, 1 drop, Both Eyes, Q12H  enoxaparin, 40 mg, Subcutaneous, Daily  lisinopril, 20 mg, Oral, Daily  montelukast, 10 mg, Oral, Nightly  mupirocin, 1 application, Topical, Daily  pantoprazole, 40 mg, Oral, QAM  sodium chloride, 10 mL, Intravenous, Q12H        PRN meds:  •  acetaminophen  •  aluminum-magnesium  hydroxide-simethicone  •  fluticasone  •  Glycerin-Hypromellose-  •  ondansetron  •  sodium chloride  •  traMADol    Assessment/Plan       Traumatic rhabdomyolysis, initial encounter ([facility])    Falling    UTI (urinary tract infection), bacterial    Leukocytosis    AMS (altered mental status)      Plan:  Altered mental status.   Back to baseline.  Patient is currently oriented x3 .  CT scan the head-No acute intracranial finding.    Rhabdomyolysis/acute kidney injury.    Creatinine slightly worsened.  CK is 12,000 today.  Ongoing IV hydration.  Ultrasound the kidneys-Unremarkable renal ultrasound.     UTI/leukocytosis.  DC Rocephin, changed to Invanz, urine culture shows ESBL.     Elevated D-dimer.  Patient denies any shortness of breath.  Patient denied of chest pain.  Will not do CTA of the chest because of CK is 21,000-probable starting of rhabdo renal kidney function is normal no IV contrast for now to protect the kidneys.  Patient does not have any symptoms of pulm embolus at this time.  IV hydration for now.    Doppler ultrasound lower extremities-preliminary no thrombus vis in BLE. Rt pop v was non vis due to pt position and inability to move leg.  VQ scan .     Falling/deconditioning.  PT and OT consult.  Chest x-ray-Expiratory chest with bibasilar atelectasis, Lungs are otherwise clear,  No pneumothorax.  Right femur x-ray-No acute fracture.  Bilateral knee x-ray-Status post previous bilateral total knee arthroplasties, left  knee arthroplasty represents a revision from previous knee arthroplasty and fracture of the distal femur- do not see evidence of complications, No evidence of acute fracture.  Pelvic and right hip x-ray-No acute fracture.     CAD/hyperlipidemia/hypertension. Coreg.  Hold Catapres.  Hold Lasix.  Continue lisinopril.     Chronic left leg wound/abrasion bilateral knee . Wound care.  Tetanus shot given in ER.  Patient used to go to [facility] care outpatient.  Bactroban . consult  wound care.     Anemia.  Hold iron sulfate due to GI side effects.    Elevated liver enzyme.  Acute hepatitis panel-negative.  Ultrasound the liver-Prior cholecystectomy, No biliary dilatation, No acute sonographic  pathology within the right upper quadrant.     Reflux.  Protonix.  Zofran as needed     Allergic rhinitis.  Hold Zyrtec.  Flonase daily.  Continue Singulair     Anxiety/depression.  Hold Atarax.  . Hold nortriptyline.     Chronic pain/neuropathy.  Hold Lyrica.     Urinary incontinence.  Hold dextral LA due to anticholinergic effects     Gout.  Hold colchicine.     Tylenol as needed.     Reflux.  Maalox as needed.     Lovenox prophylaxis.     Nutrition.  Regular diet . calcium vitamin D.     Deconditioning.  PT and OT consult.    Blood cultures pending.  Urine culture- E. coli-ESBL     Discharge Plannin-5 vdays.  DNR . DNI.  Consult social rehab placement.  Patient wants to go to [facility].    Electronically signed by Rodriguez Schneider MD, 10/13/22, 9:51 AM CDT.

## 2022-10-13 NOTE — PLAN OF CARE
Goal Outcome Evaluation:  Plan of Care Reviewed With: patient  Progress: improving         Pt with no c/o pain throughout shift. IVF cont to infuse per order. Pure wick in place with urine output; urine noted to be dark jess. Pt much more clear and talkative this shift than my prev shift. Venous doppler pending. Renal and liver US later today. VSS. Safety maintained.

## 2022-10-13 NOTE — PROGRESS NOTES
"Pharmacy Renal Dose Conversion    Carmen Obiren is a 82 y.o. year old female  152.4 cm (60\") 84 kg (185 lb 3.2 oz)    Estimated Creatinine Clearance: 25.4 mL/min (A) (by C-G formula based on SCr of 1.7 mg/dL (H)).   Creatinine   Date Value Ref Range Status   10/13/2022 1.70 (H) 0.57 - 1.00 mg/dL Final   10/12/2022 1.35 (H) 0.57 - 1.00 mg/dL Final   10/11/2022 0.64 0.57 - 1.00 mg/dL Final   02/10/2022 0.70 0.60 - 1.30 mg/dL Final     Comment:     Serial Number: [id number]:  [id number]   07/15/2019 0.8 0.5 - 0.9 mg/dL Final   07/02/2019 0.7 0.5 - 0.9 mg/dL Final   06/04/2019 <0.5 0.5 - 0.9 mg/dL Final       PLAN  Based on prescribing information provided by the drug , Ertapenem 1 gm iv Q24H,  has been changed to Ertapenem 500 mg iv Q24H    Adjusted per the directives and guidelines established by [facility] Pharmacy and Therapeutics Committee and [facility] Medical Executive Committee.  Pharmacy will continue to monitor daily and make further adjustment(s) accordingly.    Arsh Ocampo, PharmD  10/13/2210:07 CDT    "
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
## 2022-10-14 LAB
ALBUMIN SERPL-MCNC: 2.8 G/DL (ref 3.5–5.2)
ALBUMIN/GLOB SERPL: 1 G/DL
ALP SERPL-CCNC: 60 U/L (ref 39–117)
ALT SERPL W P-5'-P-CCNC: 215 U/L (ref 1–33)
ANION GAP SERPL CALCULATED.3IONS-SCNC: 8 MMOL/L (ref 5–15)
AST SERPL-CCNC: 329 U/L (ref 1–32)
BASOPHILS # BLD AUTO: 0.05 10*3/MM3 (ref 0–0.2)
BASOPHILS NFR BLD AUTO: 0.7 % (ref 0–1.5)
BILIRUB SERPL-MCNC: 0.3 MG/DL (ref 0–1.2)
BUN SERPL-MCNC: 31 MG/DL (ref 8–23)
BUN/CREAT SERPL: 27.7 (ref 7–25)
CALCIUM SPEC-SCNC: 9 MG/DL (ref 8.6–10.5)
CHLORIDE SERPL-SCNC: 100 MMOL/L (ref 98–107)
CK SERPL-CCNC: 6495 U/L (ref 20–180)
CO2 SERPL-SCNC: 27 MMOL/L (ref 22–29)
CREAT SERPL-MCNC: 1.12 MG/DL (ref 0.57–1)
DEPRECATED RDW RBC AUTO: 48.1 FL (ref 37–54)
EGFRCR SERPLBLD CKD-EPI 2021: 49.8 ML/MIN/1.73
EOSINOPHIL # BLD AUTO: 0.2 10*3/MM3 (ref 0–0.4)
EOSINOPHIL NFR BLD AUTO: 2.7 % (ref 0.3–6.2)
ERYTHROCYTE [DISTWIDTH] IN BLOOD BY AUTOMATED COUNT: 13.9 % (ref 12.3–15.4)
GLOBULIN UR ELPH-MCNC: 2.8 GM/DL
GLUCOSE SERPL-MCNC: 107 MG/DL (ref 65–99)
HCT VFR BLD AUTO: 31.2 % (ref 34–46.6)
HGB BLD-MCNC: 10.1 G/DL (ref 12–15.9)
IMM GRANULOCYTES # BLD AUTO: 0.06 10*3/MM3 (ref 0–0.05)
IMM GRANULOCYTES NFR BLD AUTO: 0.8 % (ref 0–0.5)
LYMPHOCYTES # BLD AUTO: 1.05 10*3/MM3 (ref 0.7–3.1)
LYMPHOCYTES NFR BLD AUTO: 14.2 % (ref 19.6–45.3)
MCH RBC QN AUTO: 30.6 PG (ref 26.6–33)
MCHC RBC AUTO-ENTMCNC: 32.4 G/DL (ref 31.5–35.7)
MCV RBC AUTO: 94.5 FL (ref 79–97)
MONOCYTES # BLD AUTO: 0.6 10*3/MM3 (ref 0.1–0.9)
MONOCYTES NFR BLD AUTO: 8.1 % (ref 5–12)
NEUTROPHILS NFR BLD AUTO: 5.43 10*3/MM3 (ref 1.7–7)
NEUTROPHILS NFR BLD AUTO: 73.5 % (ref 42.7–76)
NRBC BLD AUTO-RTO: 0 /100 WBC (ref 0–0.2)
PLATELET # BLD AUTO: 214 10*3/MM3 (ref 140–450)
PMV BLD AUTO: 10.7 FL (ref 6–12)
POTASSIUM SERPL-SCNC: 3.8 MMOL/L (ref 3.5–5.2)
PROT SERPL-MCNC: 5.6 G/DL (ref 6–8.5)
RBC # BLD AUTO: 3.3 10*6/MM3 (ref 3.77–5.28)
SODIUM SERPL-SCNC: 135 MMOL/L (ref 136–145)
WBC NRBC COR # BLD: 7.39 10*3/MM3 (ref 3.4–10.8)

## 2022-10-14 PROCEDURE — 97530 THERAPEUTIC ACTIVITIES: CPT

## 2022-10-14 PROCEDURE — 82550 ASSAY OF CK (CPK): CPT | Performed by: FAMILY MEDICINE

## 2022-10-14 PROCEDURE — 80053 COMPREHEN METABOLIC PANEL: CPT | Performed by: FAMILY MEDICINE

## 2022-10-14 PROCEDURE — 85025 COMPLETE CBC W/AUTO DIFF WBC: CPT | Performed by: FAMILY MEDICINE

## 2022-10-14 PROCEDURE — 25010000002 ENOXAPARIN PER 10 MG: Performed by: FAMILY MEDICINE

## 2022-10-14 PROCEDURE — 25010000002 ERTAPENEM PER 500 MG: Performed by: FAMILY MEDICINE

## 2022-10-14 RX ORDER — ENOXAPARIN SODIUM 100 MG/ML
40 INJECTION SUBCUTANEOUS
Status: DISCONTINUED | OUTPATIENT
Start: 2022-10-15 | End: 2022-10-15 | Stop reason: HOSPADM

## 2022-10-14 RX ADMIN — ASPIRIN 81 MG: 81 TABLET, COATED ORAL at 08:00

## 2022-10-14 RX ADMIN — SODIUM CHLORIDE, POTASSIUM CHLORIDE, SODIUM LACTATE AND CALCIUM CHLORIDE 125 ML/HR: 600; 310; 30; 20 INJECTION, SOLUTION INTRAVENOUS at 06:06

## 2022-10-14 RX ADMIN — OXYCODONE HYDROCHLORIDE AND ACETAMINOPHEN 1 TABLET: 5; 325 TABLET ORAL at 17:23

## 2022-10-14 RX ADMIN — PANTOPRAZOLE SODIUM 40 MG: 40 TABLET, DELAYED RELEASE ORAL at 05:37

## 2022-10-14 RX ADMIN — SODIUM CHLORIDE, POTASSIUM CHLORIDE, SODIUM LACTATE AND CALCIUM CHLORIDE 100 ML/HR: 600; 310; 30; 20 INJECTION, SOLUTION INTRAVENOUS at 15:01

## 2022-10-14 RX ADMIN — ENOXAPARIN SODIUM 30 MG: 100 INJECTION SUBCUTANEOUS at 17:23

## 2022-10-14 RX ADMIN — MUPIROCIN 1 APPLICATION: 20 OINTMENT TOPICAL at 08:01

## 2022-10-14 RX ADMIN — Medication 10 ML: at 20:45

## 2022-10-14 RX ADMIN — Medication 10 ML: at 08:01

## 2022-10-14 RX ADMIN — LISINOPRIL 20 MG: 20 TABLET ORAL at 08:00

## 2022-10-14 RX ADMIN — SODIUM CHLORIDE, POTASSIUM CHLORIDE, SODIUM LACTATE AND CALCIUM CHLORIDE 125 ML/HR: 600; 310; 30; 20 INJECTION, SOLUTION INTRAVENOUS at 05:01

## 2022-10-14 RX ADMIN — MONTELUKAST SODIUM 10 MG: 10 TABLET, FILM COATED ORAL at 20:44

## 2022-10-14 RX ADMIN — ERTAPENEM SODIUM 500 MG: 1 INJECTION, POWDER, LYOPHILIZED, FOR SOLUTION INTRAMUSCULAR; INTRAVENOUS at 10:00

## 2022-10-14 RX ADMIN — CYCLOSPORINE 1 DROP: 0.5 EMULSION OPHTHALMIC at 05:36

## 2022-10-14 RX ADMIN — CYCLOSPORINE 1 DROP: 0.5 EMULSION OPHTHALMIC at 17:24

## 2022-10-14 RX ADMIN — CARVEDILOL 6.25 MG: 6.25 TABLET, FILM COATED ORAL at 08:00

## 2022-10-14 RX ADMIN — ATORVASTATIN CALCIUM 10 MG: 10 TABLET, FILM COATED ORAL at 08:00

## 2022-10-14 RX ADMIN — OXYCODONE HYDROCHLORIDE AND ACETAMINOPHEN 1 TABLET: 5; 325 TABLET ORAL at 08:39

## 2022-10-14 RX ADMIN — CARVEDILOL 6.25 MG: 6.25 TABLET, FILM COATED ORAL at 17:23

## 2022-10-14 NOTE — PLAN OF CARE
Goal Outcome Evaluation:  Plan of Care Reviewed With: patient        Progress: improving  Outcome Evaluation: Ntn follow up. Pt is on a regular diet. She has consumed 33% of the last 3 meals. She is receiving Boost Plus with meals. She prefers chocolate or strawberry. No sig weight changes in the hospital. She has 2-3+ edema to lower extremities. Plans are for d/c to [facility] at d/c pending precert. She would likely benefit from continued oral nutrition supplementation at SNF.

## 2022-10-14 NOTE — PROGRESS NOTES
[facility] Medicine Services  INPATIENT PROGRESS NOTE    Length of Stay: 3  Date of Admission: 10/11/2022  Primary Care Physician: VERA Noble MD    Subjective   Chief Complaint: Falling/rhabdo/leg wound/UTI    HPI   Kidney function is improved.  CK is also decreasing.  Patient planing pain discomfort all over from falling.  Patient denies any chest pain.  Blood pressure slightly high, decrease fluids for now.  Patient is afebrile.    Review of Systems   Constitutional: Positive for activity change and appetite change. Negative for chills and fever.   HENT: Negative for hearing loss, nosebleeds, tinnitus and trouble swallowing.    Eyes: Negative for visual disturbance.   Respiratory: Negative for cough, chest tightness, shortness of breath and wheezing.    Cardiovascular: Negative for chest pain, palpitations and leg swelling.   Gastrointestinal: Negative for abdominal distention, abdominal pain, blood in stool, constipation, diarrhea, nausea and vomiting.   Endocrine: Negative for cold intolerance, heat intolerance, polydipsia, polyphagia and polyuria.   Genitourinary: Negative for decreased urine volume, difficulty urinating, dysuria, flank pain, frequency and hematuria.   Musculoskeletal: Positive for arthralgias, gait problem and myalgias. Negative for joint swelling.   Skin: Positive for wound. Negative for rash.        Knee and legs.   Allergic/Immunologic: Negative for immunocompromised state.   Neurological: Positive for weakness. Negative for dizziness, syncope, light-headedness and headaches.   Hematological: Negative for adenopathy. Does not bruise/bleed easily.   Psychiatric/Behavioral: Negative for confusion and sleep disturbance. The patient is not nervous/anxious.         All pertinent negatives and positives are as above. All other systems have been reviewed and are negative unless otherwise stated.     Objective    Temp:  [97.6 °F (36.4 °C)-98.1 °F (36.7  °C)] 97.7 °F (36.5 °C)  Heart Rate:  [] 72  Resp:  [16] 16  BP: (117-170)/(47-93) 170/67    Intake/Output Summary (Last 24 hours) at 10/14/2022 1013  Last data filed at 10/14/2022 0900  Gross per 24 hour   Intake 3413 ml   Output 2700 ml   Net 713 ml     Physical Exam  Vitals and nursing note reviewed.   Constitutional:       Comments: Advanced age.  Chronically ill.   HENT:      Head: Normocephalic.   Eyes:      Conjunctiva/sclera: Conjunctivae normal.      Pupils: Pupils are equal, round, and reactive to light.   Neck:      Vascular: No JVD.   Cardiovascular:      Rate and Rhythm: Normal rate and regular rhythm.      Heart sounds: Normal heart sounds.   Pulmonary:      Effort: No respiratory distress.      Breath sounds: No wheezing or rales.      Comments: Diminished breath semilateral, clear . patient is on room air  Chest:      Chest wall: No tenderness.   Abdominal:      General: Bowel sounds are normal. There is no distension.      Palpations: Abdomen is soft.      Tenderness: There is no abdominal tenderness.      Comments: Obese .   Musculoskeletal:         General: No tenderness or deformity.      Cervical back: Neck supple.   Skin:     General: Skin is warm and dry.      Capillary Refill: Capillary refill takes 2 to 3 seconds.      Findings: Erythema present. No rash.      Comments: Abrasion bilateral knee/left lower extremity stasis dermatitis.   Neurological:      General: No focal deficit present.      Mental Status: She is alert and oriented to person, place, and time.      Cranial Nerves: No cranial nerve deficit.      Motor: Weakness present. No abnormal muscle tone.      Coordination: Coordination abnormal.      Gait: Gait abnormal.      Deep Tendon Reflexes: Reflexes normal.   Psychiatric:         Mood and Affect: Mood normal.         Behavior: Behavior normal.         Thought Content: Thought content normal.   Results Review:  Lab Results (last 24 hours)     Procedure Component Value Units  Date/Time    CK [[id number]]  (Abnormal) Collected: 10/14/22 0349    Specimen: Blood Updated: 10/14/22 0608     Creatine Kinase 6,495 U/L     Comprehensive Metabolic Panel [[id number]]  (Abnormal) Collected: 10/14/22 0349    Specimen: Blood Updated: 10/14/22 0554     Glucose 107 mg/dL      BUN 31 mg/dL      Creatinine 1.12 mg/dL      Sodium 135 mmol/L      Potassium 3.8 mmol/L      Chloride 100 mmol/L      CO2 27.0 mmol/L      Calcium 9.0 mg/dL      Total Protein 5.6 g/dL      Albumin 2.80 g/dL      ALT (SGPT) 215 U/L      AST (SGOT) 329 U/L      Alkaline Phosphatase 60 U/L      Total Bilirubin 0.3 mg/dL      Globulin 2.8 gm/dL      A/G Ratio 1.0 g/dL      BUN/Creatinine Ratio 27.7     Anion Gap 8.0 mmol/L      eGFR 49.8 mL/min/1.73      Comment: National Kidney Foundation and American Society of Nephrology (ASN) Task Force recommended calculation based on the Chronic Kidney Disease Epidemiology Collaboration (CKD-EPI) equation refit without adjustment for race.       Narrative:      GFR Normal >60  Chronic Kidney Disease <60  Kidney Failure <15      CBC & Differential [[id number]]  (Abnormal) Collected: 10/14/22 0349    Specimen: Blood Updated: 10/14/22 0531    Narrative:      The following orders were created for panel order CBC & Differential.  Procedure                               Abnormality         Status                     ---------                               -----------         ------                     CBC Auto Differential[[id number]]        Abnormal            Final result                 Please view results for these tests on the individual orders.    CBC Auto Differential [[id number]]  (Abnormal) Collected: 10/14/22 0349    Specimen: Blood Updated: 10/14/22 0531     WBC 7.39 10*3/mm3      RBC 3.30 10*6/mm3      Hemoglobin 10.1 g/dL      Hematocrit 31.2 %      MCV 94.5 fL      MCH 30.6 pg      MCHC 32.4 g/dL      RDW 13.9 %      RDW-SD 48.1 fl      MPV 10.7 fL      Platelets 214 10*3/mm3       Neutrophil % 73.5 %      Lymphocyte % 14.2 %      Monocyte % 8.1 %      Eosinophil % 2.7 %      Basophil % 0.7 %      Immature Grans % 0.8 %      Neutrophils, Absolute 5.43 10*3/mm3      Lymphocytes, Absolute 1.05 10*3/mm3      Monocytes, Absolute 0.60 10*3/mm3      Eosinophils, Absolute 0.20 10*3/mm3      Basophils, Absolute 0.05 10*3/mm3      Immature Grans, Absolute 0.06 10*3/mm3      nRBC 0.0 /100 WBC     Blood Culture - Blood, Arm, Right [[id number]]  (Normal) Collected: 10/11/22 1442    Specimen: Blood from Arm, Right Updated: 10/13/22 1501     Blood Culture No growth at 2 days    Blood Culture - Blood, Arm, Right [[id number]]  (Normal) Collected: 10/11/22 1245    Specimen: Blood from Arm, Right Updated: 10/13/22 1415     Blood Culture No growth at 2 days    Myoglobin, Serum [[id number]]  (Abnormal) Collected: 10/13/22 0338    Specimen: Blood Updated: 10/13/22 1233     Myoglobin 2,363.0 ng/mL     Narrative:      Results may be falsely decreased if patient taking Biotin.             Cultures:  Blood Culture   Date Value Ref Range Status   10/11/2022 No growth at 2 days  Preliminary   10/11/2022 No growth at 2 days  Preliminary     Urine Culture   Date Value Ref Range Status   10/11/2022 >100,000 CFU/mL Escherichia coli ESBL (A)  Final     Comment:       Consider infectious disease consult.  Susceptibility results may not correlate to clinical outcomes.       Radiology Data:    Imaging Results (Last 24 Hours)     Procedure Component Value Units Date/Time    NM Lung Ventilation Perfusion [[id number]] Collected: 10/13/22 1632     Updated: 10/13/22 1636    Narrative:      HISTORY: Dyspnea     Nuclear medicine perfusion scan: Following IV injection of 4.35 mCi of  technetium 99m MAA particles, multiple projectional images of the chest  are obtained.     COMPARISON: Chest x-ray 10/11/2022     FINDINGS: There is no mismatch perfusion defect identified. Low  probability pulmonary emboli.       Impression:      1. Low  probably pulmonary emboli.  This report was finalized on 10/13/2022 16:33 by Dr. Monica Landeros MD.    US Venous Doppler Lower Extremity Bilateral (duplex) [[id number]] Collected: 10/13/22 1516     Updated: 10/13/22 1520    Narrative:      History: Swelling       Impression:      Impression: There is no evidence of deep venous thrombosis or  superficial thrombophlebitis of right or left lower extremities.     Comments: Bilateral lower extremity venous duplex exam was performed  using color Doppler flow, Doppler waveform analysis, and grayscale  imaging, with and without compression. There is no evidence of deep  venous thrombosis in the common femoral, superficial femoral, popliteal,  peroneal, anterior tibial, and posterior tibial veins bilaterally. No  thrombus is identified in the saphenofemoral junctions and greater  saphenous veins bilaterally.         This report was finalized on 10/13/2022 15:16 by Dr. Devin Grubbs MD.    US Liver [[id number]] Collected: 10/13/22 1104     Updated: 10/13/22 1108    Narrative:      HISTORY: Elevated liver enzymes, increased transaminase     Liver ultrasound: Sonographic imaging the right upper quadrant is  performed.     Proximal IVC is patent. Visible abdominal aorta is normal in caliber.  Images the pancreas are unremarkable. Liver appears normal in contour.  No focal liver mass identified. Appropriate directional flow within the  patent portal vein. Prior cholecystectomy. No biliary dilatation. Common  bile duct measures 4 mm.       Impression:      1. Prior cholecystectomy. No biliary dilatation. No acute sonographic  pathology within the right upper quadrant.  This report was finalized on 10/13/2022 11:05 by Dr. Monica Landreos MD.    US Renal Bilateral [[id number]] Collected: 10/13/22 1050     Updated: 10/13/22 1054    Narrative:      RENAL ULTRASOUND COMPLETE 10/13/2022 9:52 AM CDT     REASON FOR EXAM: agustín; T79.6XXA-Traumatic ischemia of muscle, initial  encounter;  W19.XXXA-Unspecified fall, initial encounter; N39.0-Urinary  tract infection, site not specified; R79.89-Other specified abnormal  findings of blood chemistry; R82.1-Myoglobinuria; Z74.09-Other reduced  mobility       COMPARISON: None       TECHNIQUE: Multiple longitudinal and transverse realtime sonographic  images of the kidneys and urinary bladder are obtained.      FINDINGS:      RIGHT KIDNEY: 11.4 cm. Normal in size, shape, contour and position. No  solid or cystic masses. The central echo complex is compact with no  evidence for hydronephrosis. No nephrolithiasis or abnormal perinephric  fluid collections . No hydroureter.      LEFT KIDNEY: 10.6 cm. Normal in size, shape, contour and position. No  solid or cystic masses. The central echo complex is compact with no  evidence for hydronephrosis. No nephrolithiasis or abnormal perinephric  fluid collections . No hydroureter.      PELVIS: The bladder is mildly distended with anechoic urine and  demonstrates no significant wall thickening or internal echogenicities.  There is no surrounding ascites.        Impression:      1. Unremarkable renal ultrasound.        This report was finalized on 10/13/2022 10:51 by Dr. José Elmore MD.          Allergies   Allergen Reactions   • Levaquin [Levofloxacin] Unknown (See Comments)     Pt doesn't remember   • Codeine Other (See Comments)     Pt does not recall   • Morphine And Related Other (See Comments)     Pt does not recall       Scheduled meds:   aspirin, 81 mg, Oral, Daily  atorvastatin, 10 mg, Oral, Daily  calcium 500 mg vitamin D 5 mcg (200 UT), 1 tablet, Oral, Daily  carvedilol, 6.25 mg, Oral, BID With Meals  cycloSPORINE, 1 drop, Both Eyes, Q12H  enoxaparin, 30 mg, Subcutaneous, Q24H  ertapenem, 500 mg, Intravenous, Q24H  lisinopril, 20 mg, Oral, Daily  montelukast, 10 mg, Oral, Nightly  mupirocin, 1 application, Topical, Daily  pantoprazole, 40 mg, Oral, QAM  sodium chloride, 10 mL, Intravenous,  Q12H        PRN meds:  •  acetaminophen  •  aluminum-magnesium hydroxide-simethicone  •  fluticasone  •  Glycerin-Hypromellose-  •  ondansetron  •  oxyCODONE-acetaminophen  •  sodium chloride    Assessment/Plan       Traumatic rhabdomyolysis, initial encounter ([facility])    Falling    UTI (urinary tract infection), bacterial    Leukocytosis    AMS (altered mental status)    Moderate malnutrition ([facility])      Plan:  Altered mental status.   Back to baseline.  Patient is currently oriented x3 .  CT scan the head-No acute intracranial finding.     Rhabdomyolysis/acute kidney injury.    Creatinine slightly worsened.  CK is 6,000 today.  Ongoing IV hydration.  Ultrasound the kidneys-Unremarkable renal ultrasound.     UTI/leukocytosis.  DC Rocephin, changed to Invanz, urine culture shows ESBL.     Elevated D-dimer.  Patient denies any shortness of breath.  Patient denied of chest pain. Doppler ultrasound lower extremities-no evidence of deep venous thrombosis or  superficial thrombophlebitis of right or left lower extremities.  VQ scan -Low probably pulmonary emboli.     Falling/deconditioning.  PT and OT consult.  Chest x-ray-Expiratory chest with bibasilar atelectasis, Lungs are otherwise clear,  No pneumothorax.  Right femur x-ray-No acute fracture.  Bilateral knee x-ray-Status post previous bilateral total knee arthroplasties, left  knee arthroplasty represents a revision from previous knee arthroplasty and fracture of the distal femur- do not see evidence of complications, No evidence of ac mitul fracture.  Pelvic and right hip x-ray-No acute fracture.     CAD/hyperlipidemia/hypertension. Coreg.  Hold Catapres.  Hold Lasix.  Continue lisinopril.     Chronic left leg wound/abrasion bilateral knee . Wound care.  Tetanus shot given in ER.  Patient used to go to [facility] wound care outpatient.  Bactroban . consult wound care.     Anemia.  Hold iron sulfate due to GI side effects.     Elevated liver enzyme.  Liver enzymes  improving Acute hepatitis panel-negative.  Ultrasound the liver-Prior cholecystectomy, No biliary dilatation, No acute sonographic  pathology within the right upper quadrant.     Reflux.  Protonix.  Zofran as needed     Allergic rhinitis.  Hold Zyrtec.  Flonase daily.  Continue Singulair     Anxiety/depression.  Hold Atarax.  . Hold nortriptyline.     Chronic pain/neuropathy.  Hold Lyrica.  Percocet as needed     Urinary incontinence.  Hold dextral LA due to anticholinergic effects     Gout.  Hold colchicine.     Tylenol as needed.     Reflux.  Maalox as needed.     Lovenox prophylaxis.     Nutrition.  Regular diet . calcium vitamin D.     Deconditioning.  PT and OT consult.     Blood cultures-no growth in 2 days.  Urine culture- E. coli-ESBL     Discharge Plannin-5 days.  DNR . DNI.  Consult social rehab placement.  Plan for [facility].    Electronically signed by Rodriguez Schneider MD, 10/14/22, 10:13 AM CDT.

## 2022-10-14 NOTE — SIGNIFICANT NOTE
Pt resting in bed. No signs of distress. Pt medicated for pain per request prior to PT. Dressing to bLE CDI. BLE elevated on pillows. IVF infusing per order. Pt denies any other needs. Call bell in reach, safety maintained.        10/14/22 0893   Pain/Comfort/Sleep   Preferred Pain Scale number (Numeric Rating Pain Scale)   (0-10) Pain Rating: Rest 3   PAINAD Breathing 0-->normal   PAINAD Negative Vocalization 0-->none   PAINAD Facial Expression 0-->smiling or inexpressive   PAINAD Body Language 0-->relaxed   PAINAD Consolability 0-->no need to console   PAINAD Score 0   Pain Location extremity   Pain Side/Orientation left;lower   Nonverbal Indicators of Pain nonverbal indicators absent   POSS (Pasero Opioid-Induced Sed Scale) 1 - Awake and alert   Sleep/Rest/Relaxation awake   Coping/Psychosocial   Observed Emotional State calm;cooperative   Verbalized Emotional State acceptance   Family/Support Persons family   Involvement in Care not present at bedside   HEENT   HEENT WDL WDL   Cognitive   Cognitive/Neuro/Behavioral WDL X;orientation   Orientation disoriented to;time;situation   Mood/Behavior cooperative   Collins Coma Scale   Best Eye Response 4-->(E4) spontaneous   Best Motor Response 6-->(M6) obeys commands   Best Verbal Response 4-->(V4) confused   Collins Coma Scale Score 14   Respiratory   Respiratory WDL X;breath sounds   Breath Sounds   Breath Sounds All Fields   All Lung Fields Breath Sounds Anterior:;diminished   Cardiac   Cardiac WDL WDL   ECG   Rhythm normal sinus rhythm   Peripheral Neurovascular   Peripheral Neurovascular WDL X;edema   Edema   Edema dependent;leg, left;leg, right;foot, left;foot, right;ankle, left;ankle, right   Dependent Edema 3+ (Moderate)   Ankle, Left Edema 2+ (Mild)   Leg, Right Edema 3+ (Moderate)   Foot, Left Edema 2+ (Mild)   Leg, Left Edema 2+ (Mild)   Ankle, Right Edema 3+ (Moderate)   Foot, Right Edema 3+ (Moderate)   Gastrointestinal   Gastrointestinal WDL X;GI symptoms    Last Bowel Movement 10/13/22   Nausea/Vomiting   Nausea/Vomiting Outcome relief of signs/symptoms   Genitourinary   Genitourinary WDL X;voiding ability/characteristics   Voiding Characteristics external catheter   Skin   Skin WDL X;all   Skin Color/Characteristics pale;constantino   Skin Temperature warm   Skin Moisture dry   Skin Elasticity slow return to original state   Skin Integrity bruised (ecchymotic);scab;wound   Tyron Risk Assessment   Sensory Perception 4-->no impairment   Moisture 3-->occasionally moist   Activity 3-->walks occasionally   Mobility 3-->slightly limited   Nutrition 3-->adequate   Friction and Shear 3-->no apparent problem   Tyron Score 19   Wound 10/11/22 1105 Left distal leg Abrasion   Placement Date/Time: 10/11/22 1105   Present on Hospital Admission: No  Side: Left  Orientation: distal  Location: leg  Primary Wound Type: Abrasion   Dressing Appearance dry;intact   Wound 10/11/22 1105 Right proximal leg   Placement Date/Time: 10/11/22 1105   Side: Right  Orientation: proximal  Location: leg   Dressing Appearance dry;intact   Musculoskeletal   Musculoskeletal WDL X;mobility   General Mobility generalized weakness;moderately impaired   Functional Screen (every 3 days/change)   Ambulation 3 - assistive equipment and person   Transferring 2 - assistive person   Toileting 2 - assistive person   Bathing 2 - assistive person   Dressing 2 - assistive person   Eating 0 - independent   Communication 0 - understands/communicates without difficulty   Swallowing 0 - swallows foods/liquids without difficulty   External Urinary Catheter   Placement Date/Time: 10/11/22 2200   Inserted by: Di JUSTIN RN  External Urinary Catheter Sizes: Intermediate   Site Assessment Clean;Skin intact   Collection Container Wall suction   Securement Method Securing device   Peripheral IV 10/11/22 1139 Left Antecubital   Placement Date/Time: 10/11/22 1139   Size (Gauge): 20 G  Orientation: Left  Location: Antecubital   Site  Assessment Clean;Dry;Intact   Dressing Type Transparent   Line Status Saline locked   Dressing Status Clean;Dry;Intact   Reason Not Rotated Patient refused   Peripheral IV 10/11/22 2200 Posterior;Right Hand   Placement Date/Time: 10/11/22 2200   Hand Hygiene Completed: Yes  Size (Gauge): 20 G  Orientation: Posterior;Right  Location: Hand  Site Prep: Chlorhexidine isopropyl alcohol  Local Anesthetic: None  Technique: Anatomical landmarks  Inserted by: [email or web address]   Site Assessment Clean;Dry;Intact   Dressing Type Border Dressing   Line Status Infusing   Dressing Status Clean;Dry;Intact   Reason Not Rotated Not due   Adult Sepsis Screening Tool   Previous adult screen positive in last 48 hrs? no   Are 2 or > of the above criteria present? no: STOP/negative screen   Safety   Safety WDL [facility] High Risk Falls Assessment (If Fall score is >/=13, add the Fall Risk CPG to the care plan)    Fallen in past 6 months 5--> Yes   Mental Status 1--> confused   Elimination 0--> No elimination issues   Mobility 2--> Requires assistance- transfer, walker, etc.   Medications 1--> Narcotics   Nurses' Clinical Judgement 10   Total Fall Risk Score 21   Safety Management   Safety Promotion/Fall Prevention safety round/check completed;fall prevention program maintained   Daily Care   Highest level of mobility 4 --> Transferred to chair/commode   How much help from another person do you currently need...   Turning from your back to your side while in flat bed without using bedrails? 3   Moving from lying on back to sitting on the side of a flat bed without bedrails? 2   Moving to and from a bed to a chair (including a wheelchair)? 3   Standing up from a chair using your arms (e.g., wheelchair, bedside chair)? 3   Climbing 3-5 steps with a railing? 2   To walk in hospital room? 2   AM-PAC 6 Clicks Score (PT) 15

## 2022-10-14 NOTE — CASE MANAGEMENT/SOCIAL WORK
Continued Stay Note  [facility]     Patient Name: Carmen Obrien  MRN: [id number]  Today's Date: 10/14/2022    Admit Date: 10/11/2022    Plan: Referral to [facility]   Discharge Plan     Row Name 10/14/22 1151       Plan    Plan Referral to [facility]    Plan Comments Patient also wants referral to [facility] now. SURESH provided referral information to Saskia in admissions at [facility]. Will await decision.    Row Name 10/14/22 4669       Plan    Plan Referral to [facility]    Plan Comments Spoke to patient in room re SNF placement and she does agree. Patient would like referral to [facility] but she does not have an alternate choice. SURESH faxed referral to Alix Rico in admissions at [facility] and also notified Alix of new referral. Will await decision. If accepted, patient's insurance will require precert.                Expected Discharge Date and Time     Expected Discharge Date Expected Discharge Time    Oct 17, 2022             BRAEDEN Mcconnell

## 2022-10-14 NOTE — CASE MANAGEMENT/SOCIAL WORK
Continued Stay Note  [facility]     Patient Name: Carmen Obrien  MRN: [id number]  Today's Date: 10/14/2022    Admit Date: 10/11/2022    Plan: [facility]   Discharge Plan     Row Name 10/14/22 1636       Plan    Plan [facility]    Plan Comments Insurance has approved admission to [facility].  informed Dr. Schneider. Plan dc to [facility] tomorrow, 10/15. Discharge summary will need to be faxed to [phone]. Patient's nurse will call report to [phone].    Final Discharge Disposition Code 03 - skilled nursing facility (SNF)                       Expected Discharge Date and Time     Expected Discharge Date Expected Discharge Time    Oct 17, 2022             BRAEDEN Mcconnell

## 2022-10-14 NOTE — PROGRESS NOTES
"Pharmacy Renal Dose Conversion    Carmen Obrien is a 82 y.o. year old female  152.4 cm (60\") 84 kg (185 lb 3.2 oz)    Estimated Creatinine Clearance: 38.5 mL/min (A) (by C-G formula based on SCr of 1.12 mg/dL (H)).   Creatinine   Date Value Ref Range Status   10/14/2022 1.12 (H) 0.57 - 1.00 mg/dL Final   10/13/2022 1.70 (H) 0.57 - 1.00 mg/dL Final   10/12/2022 1.35 (H) 0.57 - 1.00 mg/dL Final   02/10/2022 0.70 0.60 - 1.30 mg/dL Final     Comment:     Serial Number: [id number]:  [id number]   07/15/2019 0.8 0.5 - 0.9 mg/dL Final   07/02/2019 0.7 0.5 - 0.9 mg/dL Final   06/04/2019 <0.5 0.5 - 0.9 mg/dL Final       PLAN  Based on prescribing information provided by the drug , Ertapenem 500mg iv Q24H and Enoxaparin 30mg sq every 24H,  have been changed to Ertapenem 1gm iv Q24H and Enoxaparin 40mg sq every 24H.    Adjusted per the directives and guidelines established by [facility] Pharmacy and Therapeutics Committee and [facility] Medical Executive Committee.  Pharmacy will continue to monitor daily and make further adjustment(s) accordingly.    Arsh Ocampo, PharmD  10/14/2218:35 CDT    "
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
## 2022-10-14 NOTE — CASE MANAGEMENT/SOCIAL WORK
Continued Stay Note   [facility]     Patient Name: Carmen Obrien  MRN: [id number]  Today's Date: 10/14/2022    Admit Date: 10/11/2022    Plan: Referral to [facility]   Discharge Plan     Row Name 10/14/22 1049       Plan    Plan Referral to [facility]    Plan Comments Spoke to patient in room re SNF placement and she does agree. Patient would like referral to [facility] but she does not have an alternate choice. SURESH faxed referral to Alix Rico in admissions at [facility] and also notified Alix of new referral. Will await decision. If accepted, patient's insurance will require precert.                       Expected Discharge Date and Time     Expected Discharge Date Expected Discharge Time    Oct 17, 2022             BRAEDEN Mcconnell

## 2022-10-14 NOTE — CASE MANAGEMENT/SOCIAL WORK
Continued Stay Note  [facility]     Patient Name: Carmen Obrien  MRN: [id number]  Today's Date: 10/14/2022    Admit Date: 10/11/2022    Plan: [facility]   Discharge Plan     Row Name 10/14/22 1247       Plan    Plan [facility]    Plan Comments [facility] and [facility] both accept. Patient wants to accept bed offer at [facility]. SURESH informed Saskia in admissions and she will start to precert today.                   Expected Discharge Date and Time     Expected Discharge Date Expected Discharge Time    Oct 17, 2022             BRAEDEN Mcconnell

## 2022-10-14 NOTE — DISCHARGE PLACEMENT REQUEST
"Call back: Connie / Cecilia [phone]    Carmen Jenkins (82 y.o. Female)     Date of Birth   1940    Social Security Number       Address   [address]  [place] [address]    Home Phone   [phone]    MRN   [id number]       [facility]    Marital Status                               Admission Date   10/11/22    Admission Type   Emergency    Admitting Provider   Rodriguez Schneider MD    Attending Provider   Rodriguez Schneider MD    Department, Room/Bed   [facility] 3C, 361/1       Discharge Date       Discharge Disposition       Discharge Destination                               Attending Provider: Rodriguez Schneider MD    Allergies: Levaquin [Levofloxacin], Codeine, Morphine And Related    Isolation: None   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), 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), 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)   Code Status: No CPR    Ht: 152.4 cm (60\")   Wt: 84 kg (185 lb 3.2 oz)    Admission Cmt: None   Principal Problem: Traumatic rhabdomyolysis, initial encounter ([facility]) [T79.6XXA]                 Active Insurance as of 10/11/2022     Primary Coverage     Payor Plan Insurance Group Employer/Plan Group    HUMANA MEDICARE REPLACEMENT HUMANA MEDICARE REPLACEMENT [id number]     Payor Plan Address Payor Plan Phone Number Payor Plan Fax Number Effective Dates    [address] [address] [phone]  1/1/2019 - None Entered    Formerly [facility] [id number]       Subscriber Name Subscriber Birth Date Member ID       JENKINSCARMEN HERNÁNDEZ 1940 [id number]                 Emergency Contacts      (Rel.) Home Phone Work Phone Mobile Phone    MicahSelene (Relative) [phone] -- --    Brandee Anderson (Relative) [phone] -- --    JenkinsAdam araujo (Son) [phone] -- [phone]            Insurance Information                HUMANA MEDICARE REPLACEMENT/HUMANA MEDICARE REPLACEMENT Phone: [phone]    Subscriber: Carmen Jenkins Subscriber#: [id number]    Group#: [id number] Precert#: --             History & Physical      Wyatt, " Rodriguez SONI MD at 10/11/22 1611              [facility]  HISTORY AND PHYSICAL    Date of Admission: 10/11/2022  Primary Care Physician: VERA Noble MD    Subjective     Chief Complaint: Falling/possible rhabdo/leg wound/UTI    History of Present Illness  Patient is 89-year-old  female does not remember by found on the ground laying on the floor when she woke up.  Patient went to bed at 1130 last night.  Patient does not know how long she was on the ground for, patient does not remember . patient then proceeded to press her life line and EMS was called and brought her to ER to be evaluated.  In ER patient was alert awake oriented x3.  Patient does not remember what happened.  Patient denies any neck pain or back pain.  Patient did complain of right hip pain and right leg pain.  Abrasion noted in both knees from carpet burn.  Laboratory shows , troponin was normal.  Elevated liver enzyme.  C-reactive protein 8, lactate 4, procalcitonin 8, D-dimer 8.89, white blood cells 25,000.    Review of Systems   Constitutional: Positive for activity change and appetite change. Negative for chills and fever.   HENT: Negative for hearing loss, nosebleeds, tinnitus and trouble swallowing.    Eyes: Negative for visual disturbance.   Respiratory: Negative for cough, chest tightness, shortness of breath and wheezing.    Cardiovascular: Negative for chest pain, palpitations and leg swelling.   Gastrointestinal: Negative for abdominal distention, abdominal pain, blood in stool, constipation, diarrhea, nausea and vomiting.   Endocrine: Negative for cold intolerance, heat intolerance, polydipsia, polyphagia and polyuria.   Genitourinary: Negative for decreased urine volume, difficulty urinating, dysuria, flank pain, frequency and hematuria.   Musculoskeletal: Positive for arthralgias, gait problem and myalgias. Negative for joint swelling.   Skin: Positive for wound.  Negative for rash.        Knee and legs.   Allergic/Immunologic: Negative for immunocompromised state.   Neurological: Positive for weakness. Negative for dizziness, syncope, light-headedness and headaches.   Hematological: Negative for adenopathy. Does not bruise/bleed easily.   Psychiatric/Behavioral: Negative for confusion and sleep disturbance. The patient is not nervous/anxious.         Otherwise complete ROS reviewed and negative except as mentioned in the HPI.      Past Medical History:   Past Medical History:   Diagnosis Date   • Anxiety    • Arthritis    • CAD (coronary artery disease)    • Colon polyp    • DDD (degenerative disc disease), lumbar    • Deep venous thrombosis (HCC)    • Depression    • Diverticulosis    • Esophageal stricture    • GERD (gastroesophageal reflux disease)    • History of transfusion    • Hypercholesteremia    • Hypertension    • LPRD (laryngopharyngeal reflux disease)    • Macular degeneration    • Multinodular goiter 2/23/2017   • Osteoarthritis    • Pruritic disorder    • Spastic colon    • Thyroid nodule        Past Surgical History:  Past Surgical History:   Procedure Laterality Date   • BLADDER REPAIR     • CATARACT EXTRACTION     • CHOLECYSTECTOMY     • COLONOSCOPY  04/22/2014    2 polyps, adenomatous, diverticulosis   • COLONOSCOPY N/A 6/26/2020    Procedure: COLONOSCOPY WITH ANESTHESIA;  Surgeon: John Coleman MD;  Location: [facility] ENDOSCOPY;  Service: Gastroenterology;  Laterality: N/A;  pre op: constipation  post op: diverticulosis,   PCP: Adam Delgadillo MD   • CYSTOCELE REPAIR     • ENDOSCOPY  04/22/2014    Schatzki's ring dilated   • ENDOSCOPY N/A 6/26/2020    Procedure: ESOPHAGOGASTRODUODENOSCOPY WITH ANESTHESIA;  Surgeon: John Coleman MD;  Location: [facility] ENDOSCOPY;  Service: Gastroenterology;  Laterality: N/A;  pre op: dysphagia  post op:stricture, dilated   PCP:Adam Delgadillo MD   • ENDOSCOPY N/A 9/2/2021    Procedure:  ESOPHAGOGASTRODUODENOSCOPY WITH ANESTHESIA;  Surgeon: John Coleman MD;  Location: [facility] ENDOSCOPY;  Service: Gastroenterology;  Laterality: N/A;  pre op: dysphagia  post op:gastritis  PCP: VERA Noble MD   • FEMUR FRACTURE SURGERY     • HYSTERECTOMY     • INCISION AND DRAINAGE LEG Left 11/3/2021    Procedure: INCISION AND DRAINAGE LEG ABSCESS;  Surgeon: Cesia Mondragon MD;  Location: [facility] OR;  Service: General;  Laterality: Left;   • REPLACEMENT TOTAL KNEE     • ULNAR NERVE TRANSPOSITION         Family History: family history includes Colon cancer in her mother; Heart disease in her father and mother.    Social History:  reports that she has never smoked. She has never used smokeless tobacco. She reports that she does not drink alcohol and does not use drugs.    Allergies:  Allergies   Allergen Reactions   • Levaquin [Levofloxacin] Unknown (See Comments)     Pt doesn't remember   • Codeine Other (See Comments)     Pt does not recall   • Morphine And Related Other (See Comments)     Pt does not recall     Medications:  Prior to Admission medications    Medication Sig Start Date End Date Taking? Authorizing Provider   acetaminophen (TYLENOL) 325 MG tablet Take 2 tablets by mouth Every 4 (Four) Hours As Needed for Mild Pain. 9/1/22   Carla Colorado APRN   aluminum-magnesium hydroxide-simethicone (MAALOX MAX) 400-400-40 MG/5ML suspension Take 15 mL by mouth Every 6 (Six) Hours As Needed for Indigestion or Heartburn. 9/1/22   Carla Colorado APRN   aspirin 81 MG EC tablet Take 81 mg by mouth Daily.    Provider, MD Ying   atorvastatin (LIPITOR) 10 MG tablet TAKE 1 TABLET BY MOUTH DAILY. 4/29/22   VERA Noble MD   Calcium Carbonate-Vitamin D (CALTRATE 600+D PO) Take 1 tablet by mouth 2 (two) times a day. Morning and nightly    ProviderYing MD   carvedilol (COREG) 6.25 MG tablet Take 1 tablet by mouth 2 (Two) Times a Day With Meals. 7/21/22   VERA Noble MD   cetirizine  (zyrTEC) 10 MG tablet Take 10 mg by mouth Daily.    Ying Salazar MD   Cholecalciferol (VITAMIN D3) 125 MCG (5000 UT) capsule capsule Take 5,000 Units by mouth Daily.    Ying Salazar MD   cloNIDine (CATAPRES) 0.1 MG tablet Take 0.1 mg by mouth Daily As Needed for High Blood Pressure (for Blood Pressure >180/100).    Ying Salazar MD   colchicine 0.6 MG tablet Take 0.6 mg by mouth Daily. 2/26/22   Ying Salazar MD   Cyanocobalamin (Vitamin B-12) 1000 MCG sublingual tablet Place 2.5 tablets under the tongue Daily. 2500mcg    Ying Salazar MD   cycloSPORINE (RESTASIS) 0.05 % ophthalmic emulsion Administer 1 drop to both eyes Every 12 (Twelve) Hours.    Ying Salazar MD   esomeprazole (nexIUM) 20 MG capsule Take 1 capsule by mouth 2 (Two) Times a Day. 5/25/22   VERA Noble MD   FEROSUL 325 (65 Fe) MG tablet Take 325 mg by mouth Daily With Breakfast. 11/6/19   Ying Salazar MD   fluticasone (FLONASE) 50 MCG/ACT nasal spray 2 sprays into the nostril(s) as directed by provider Daily As Needed.    Ying Salazar MD   furosemide (LASIX) 40 MG tablet Take 40 mg by mouth Daily.    Ying Salazar MD   hydrOXYzine (ATARAX) 25 MG tablet Take 1 tablet by mouth 3 (Three) Times a Day As Needed for Anxiety. 1/11/22   Vianney Figueredo APRN   lisinopril (PRINIVIL,ZESTRIL) 20 MG tablet Take 1 tablet by mouth Daily. 9/9/22   VERA Noble MD   montelukast (SINGULAIR) 10 MG tablet Take 1 tablet by mouth Every Night. 7/21/22   VERA Noble MD   multivitamin with minerals tablet tablet Take 1 tablet by mouth Daily.    Ying Salazar MD   mupirocin (BACTROBAN) 2 % ointment Apply 1 application topically to the appropriate area as directed Daily. Apply ointment to Right Great Toe and Right 4th Toe    Ying Salazar MD   nortriptyline (PAMELOR) 25 MG capsule Take 1 capsule by mouth Every Night. 9/1/22   Carla Colorado APRN   nortriptyline  "(PAMELOR) 25 MG capsule Take 2 capsules by mouth Every Night. 9/21/22   VERA Noble MD   polyethyl glycol-propyl glycol (SYSTANE) 0.4-0.3 % solution ophthalmic solution Administer 2 drops to both eyes Every 2 (Two) Hours As Needed (dry eyes).    Ying Salazar MD   polyethylene glycol (MIRALAX) 17 g packet Take 17 g by mouth Daily As Needed.    Ying Salazar MD   pregabalin (LYRICA) 50 MG capsule Take 50 mg by mouth Every 12 (Twelve) Hours As Needed. 4/8/21   Ying Salazar MD   Prolia 60 MG/ML solution prefilled syringe syringe Inject 60 mg under the skin into the appropriate area as directed 1 (One) Time. Twice a year 3/21/22   Ying Salazar MD   simethicone (MYLICON) 125 MG chewable tablet Chew 125 mg Every 6 (Six) Hours As Needed for Flatulence.    Ying Salazar MD   tolterodine LA (DETROL LA) 4 MG 24 hr capsule Take 1 capsule by mouth Daily. 4/15/21   Ying Salazar MD       I have utilized all available immediate resources to obtain, update, and review the patient's current medications.    Objective     Vital Signs: /53   Pulse 79   Temp 97.6 °F (36.4 °C) (Oral)   Resp 18   Ht 152.4 cm (60\")   Wt 79.4 kg (175 lb)   SpO2 96%   BMI 34.18 kg/m²   Physical Exam  Vitals and nursing note reviewed.   Constitutional:       Comments: Advanced age.  Chronically ill.   HENT:      Head: Normocephalic.   Eyes:      Conjunctiva/sclera: Conjunctivae normal.      Pupils: Pupils are equal, round, and reactive to light.   Neck:      Vascular: No JVD.   Cardiovascular:      Rate and Rhythm: Normal rate and regular rhythm.      Heart sounds: Normal heart sounds.   Pulmonary:      Effort: No respiratory distress.      Breath sounds: No wheezing or rales.      Comments: Diminished breath semilateral, clear . patient is on room air  Chest:      Chest wall: No tenderness.   Abdominal:      General: Bowel sounds are normal. There is no distension.      Palpations: " "Abdomen is soft.      Tenderness: There is no abdominal tenderness.      Comments: Obese .   Musculoskeletal:         General: No tenderness or deformity.      Cervical back: Neck supple.   Skin:     General: Skin is warm and dry.      Capillary Refill: Capillary refill takes 2 to 3 seconds.      Findings: Erythema present. No rash.      Comments: Abrasion bilateral knee/left lower extremity stasis dermatitis.   Neurological:      General: No focal deficit present.      Mental Status: She is alert and oriented to person, place, and time.      Cranial Nerves: No cranial nerve deficit.      Motor: Weakness present. No abnormal muscle tone.      Coordination: Coordination abnormal.      Gait: Gait abnormal.      Deep Tendon Reflexes: Reflexes normal.   Psychiatric:         Mood and Affect: Mood normal.         Behavior: Behavior normal.         Thought Content: Thought content normal.             Results Reviewed:    Lab Results (last 24 hours)     Procedure Component Value Units Date/Time    CK [[id number]]  (Abnormal) Collected: 10/11/22 1442    Specimen: Blood Updated: 10/11/22 1539     Creatine Kinase 21,753 U/L     Procalcitonin [[id number]]  (Abnormal) Collected: 10/11/22 1442    Specimen: Blood Updated: 10/11/22 1519     Procalcitonin 8.38 ng/mL     Narrative:      As a Marker for Sepsis (Non-Neonates):    1. <0.5 ng/mL represents a low risk of severe sepsis and/or septic shock.  2. >2 ng/mL represents a high risk of severe sepsis and/or septic shock.    As a Marker for Lower Respiratory Tract Infections that require antibiotic therapy:    PCT on Admission    Antibiotic Therapy       6-12 Hrs later    >0.5                Strongly Recommended  >0.25 - <0.5        Recommended   0.1 - 0.25          Discouraged              Remeasure/reassess PCT  <0.1                Strongly Discouraged     Remeasure/reassess PCT    As 28 day mortality risk marker: \"Change in Procalcitonin Result\" (>80% or <=80%) if Day 0 (or Day 1) " and Day 4 values are available. Refer to http://www.[facility]-pct-calculator.com    Change in PCT <=80%  A decrease of PCT levels below or equal to 80% defines a positive change in PCT test result representing a higher risk for 28-day all-cause mortality of patients diagnosed with severe sepsis for septic shock.    Change in PCT >80%  A decrease of PCT levels of more than 80% defines a negative change in PCT result representing a lower risk for 28-day all-cause mortality of patients diagnosed with severe sepsis or septic shock.       Comprehensive Metabolic Panel [[id number]]  (Abnormal) Collected: 10/11/22 1442    Specimen: Blood Updated: 10/11/22 1517     Glucose 205 mg/dL      BUN 18 mg/dL      Creatinine 0.64 mg/dL      Sodium 131 mmol/L      Potassium 4.0 mmol/L      Comment: Slight hemolysis detected by analyzer. Results may be affected.        Chloride 95 mmol/L      CO2 21.0 mmol/L      Calcium 9.6 mg/dL      Total Protein 6.7 g/dL      Albumin 3.70 g/dL      ALT (SGPT) 106 U/L      AST (SGOT) 291 U/L      Alkaline Phosphatase 69 U/L      Total Bilirubin 0.5 mg/dL      Globulin 3.0 gm/dL      A/G Ratio 1.2 g/dL      BUN/Creatinine Ratio 28.1     Anion Gap 15.0 mmol/L      eGFR 89.5 mL/min/1.73      Comment: National Kidney Foundation and American Society of Nephrology (ASN) Task Force recommended calculation based on the Chronic Kidney Disease Epidemiology Collaboration (CKD-EPI) equation refit without adjustment for race.       Narrative:      GFR Normal >60  Chronic Kidney Disease <60  Kidney Failure <15      STAT Lactic Acid, Reflex [[id number]]  (Abnormal) Collected: 10/11/22 1442    Specimen: Blood Updated: 10/11/22 1515     Lactate 4.0 mmol/L     C-reactive Protein [[id number]]  (Abnormal) Collected: 10/11/22 1442    Specimen: Blood Updated: 10/11/22 1514     C-Reactive Protein 8.41 mg/dL     Troponin [[id number]]  (Normal) Collected: 10/11/22 1442    Specimen: Blood Updated: 10/11/22 1512     Troponin T  <0.010 ng/mL     Narrative:      Troponin T Reference Range:  <= 0.03 ng/mL-   Negative for AMI  >0.03 ng/mL-     Abnormal for myocardial necrosis.  Clinicians would have to utilize clinical acumen, EKG, Troponin and serial changes to determine if it is an Acute Myocardial Infarction or myocardial injury due to an underlying chronic condition.       Results may be falsely decreased if patient taking Biotin.      Blood Culture - Blood, Arm, Right [[id number]] Collected: 10/11/22 1442    Specimen: Blood from Arm, Right Updated: 10/11/22 1452    Blood Culture - Blood, Arm, Right [[id number]] Collected: 10/11/22 1245    Specimen: Blood from Arm, Right Updated: 10/11/22 1409    Urinalysis With Culture If Indicated - Urine, Catheter [[id number]]  (Abnormal) Collected: 10/11/22 1336    Specimen: Urine, Catheter Updated: 10/11/22 1347     Color, UA Red     Appearance, UA Cloudy     pH, UA 7.5     Specific Gravity, UA 1.019     Glucose, UA Negative     Ketones, UA Negative     Bilirubin, UA Small (1+)     Blood, UA Large (3+)     Protein, UA >=300 mg/dL (3+)     Leuk Esterase, UA Moderate (2+)     Nitrite, UA Positive     Urobilinogen, UA 0.2 E.U./dL    Narrative:      In absence of clinical symptoms, the presence of pyuria, bacteria, and/or nitrites on the urinalysis result does not correlate with infection.    Urinalysis, Microscopic Only - Urine, Catheter [[id number]]  (Abnormal) Collected: 10/11/22 1336    Specimen: Urine, Catheter Updated: 10/11/22 1347     RBC, UA 3-5 /HPF      WBC, UA 31-50 /HPF      Bacteria, UA 2+ /HPF      Squamous Epithelial Cells, UA None Seen /HPF      Hyaline Casts, UA 3-6 /LPF      Methodology Automated Microscopy    Urine Culture - Urine, Urine, Catheter [[id number]] Collected: 10/11/22 1336    Specimen: Urine, Catheter Updated: 10/11/22 1347    Protime-INR [[id number]]  (Normal) Collected: 10/11/22 1207    Specimen: Blood Updated: 10/11/22 1242     Protime 13.4 Seconds      INR 1.06    D-dimer,  Quantitative [[id number]]  (Abnormal) Collected: 10/11/22 1207    Specimen: Blood Updated: 10/11/22 1242     D-Dimer, Quantitative 8.89 MCGFEU/mL     Narrative:      Reference Range is 0-0.50 MCGFEU/mL. However, results <0.50 MCGFEU/mL tends to rule out DVT or PE. Results >0.50 MCGFEU/mL are not useful in predicting absence or presence of DVT or PE.      Lactic Acid, Plasma [[id number]]  (Abnormal) Collected: 10/11/22 1207    Specimen: Blood Updated: 10/11/22 1238     Lactate 4.4 mmol/L     BNP [[id number]]  (Abnormal) Collected: 10/11/22 1207    Specimen: Blood Updated: 10/11/22 1234     proBNP 2,716.0 pg/mL     Narrative:      Among patients with dyspnea, NT-proBNP is highly sensitive for the detection of acute congestive heart failure. In addition NT-proBNP of <300 pg/ml effectively rules out acute congestive heart failure with 99% negative predictive value.    Results may be falsely decreased if patient taking Biotin.      Magnesium [[id number]]  (Normal) Collected: 10/11/22 1207    Specimen: Blood Updated: 10/11/22 1231     Magnesium 1.8 mg/dL     CBC & Differential [[id number]]  (Abnormal) Collected: 10/11/22 1207    Specimen: Blood Updated: 10/11/22 1216    Narrative:      The following orders were created for panel order CBC & Differential.  Procedure                               Abnormality         Status                     ---------                               -----------         ------                     CBC Auto Differential[[id number]]        Abnormal            Final result                 Please view results for these tests on the individual orders.    CBC Auto Differential [[id number]]  (Abnormal) Collected: 10/11/22 1207    Specimen: Blood Updated: 10/11/22 1216     WBC 25.44 10*3/mm3      RBC 4.83 10*6/mm3      Hemoglobin 15.3 g/dL      Hematocrit 45.0 %      MCV 93.2 fL      MCH 31.7 pg      MCHC 34.0 g/dL      RDW 13.9 %      RDW-SD 47.3 fl      MPV 11.3 fL      Platelets 303 10*3/mm3       Neutrophil % 92.5 %      Lymphocyte % 1.4 %      Monocyte % 2.9 %      Eosinophil % 0.3 %      Basophil % 0.2 %      Immature Grans % 2.7 %      Neutrophils, Absolute 23.52 10*3/mm3      Lymphocytes, Absolute 0.35 10*3/mm3      Monocytes, Absolute 0.75 10*3/mm3      Eosinophils, Absolute 0.07 10*3/mm3      Basophils, Absolute 0.06 10*3/mm3      Immature Grans, Absolute 0.69 10*3/mm3      nRBC 0.0 /100 WBC            Radiology Data:    Imaging Results (Last 24 Hours)     Procedure Component Value Units Date/Time    XR Chest 1 View [[id number]] Collected: 10/11/22 1314     Updated: 10/11/22 1318    Narrative:      EXAMINATION: Chest 1 view 10/11/2022     HISTORY: Fall.     FINDINGS: Today's exam is compared to previous study of 8/21/2022. Lungs  are hypoventilated with mild basilar atelectasis. No evidence of  consolidative pneumonia or effusion. The thoracic aorta is ectatic.  There is arthritic change of the right glenohumeral joint. Humeral head  is high riding suggesting a chronic rotator cuff tear.       Impression:      1. Expiratory chest with bibasilar atelectasis. Lungs are otherwise  clear. No pneumothorax.  This report was finalized on 10/11/2022 13:14 by Dr. José Elmore MD.    XR Hip With or Without Pelvis 2 - 3 View Right [[id number]] Collected: 10/11/22 1313     Updated: 10/11/22 1317    Narrative:      EXAMINATION: AP pelvis and two-view right hip 11/20/2022     HISTORY: Fall.     FINDINGS: AP radiograph of the pelvis as well as two-view exam of the  right hip demonstrates no fracture or dislocation. The femoral head is  concentric and well located within the acetabulum. There is mild  arthritic change of the hip.       Impression:      . No acute fracture.  This report was finalized on 10/11/2022 13:13 by Dr. José Elmore MD.    XR Femur 2 View Right [[id number]] Collected: 10/11/22 1312     Updated: 10/11/22 1316    Narrative:      EXAMINATION: Right femur 2 views 2/11/2022     HISTORY: Fall      FINDINGS: Two-view exam of the right femur demonstrates no evidence of  acute displaced fracture. The femoral head is concentric and well  located within the acetabulum.       Impression:      1.. No acute fracture.  This report was finalized on 10/11/2022 13:12 by Dr. José Elmore MD.    XR Knee 1 or 2 View Bilateral [[id number]] Collected: 10/11/22 1309     Updated: 10/11/22 1315    Narrative:            Scribe exam bilateral knees 2 views 10/11/2022     HISTORY: Trauma.     FINDINGS: Two-view exam of the right knee reveals the patient is status  post total knee arthroplasty. I do not see evidence of fracture or  loosening. No joint effusion is present.     Examination of the left knee reveals the patient has undergone previous  total knee arthroplasty with subsequent revision and resection of the  distal femur. Cerclage wire is noted within the distal femoral shaft.  There is no evidence of acute fracture. There is some heterotopic bone  or myositis noted within the soft tissues about the distal femur.       Impression:      1. Status post previous bilateral total knee arthroplasties. The left  knee arthroplasty represents a revision from previous knee arthroplasty  and fracture of the distal femur. I do not see evidence of  complications. No evidence of acute fracture.  This report was finalized on 10/11/2022 13:12 by Dr. José Elmore MD.          I have personally reviewed and interpreted the radiology studies and ECG obtained at time of admission.     Assessment / Plan      Assessment & Plan  Active Hospital Problems    Diagnosis    • Leukocytosis    • UTI (urinary tract infection), bacterial    • Falling      Plans    Altered mental status.  CK 20,000.  Patient is currently oriented x3 .  CT scan the head is pending.    UTI/leukocytosis.  Rocephin antibiotics.  Patient received 1.5 bolus in ER.  Continue IV hydration.    Elevated D-dimer.  Patient denies any shortness of breath.  Patient denied of  chest pain.  Will not do CTA of the chest because of CK is 21,000-probable starting of rhabdo renal kidney function is normal no IV contrast for now to protect the kidneys.  Patient does not have any symptoms of pulm embolus at this time.  IV hydration for now.    Doppler ultrasound lower extremities    Falling/deconditioning.  PT and OT consult.  Chest x-ray-Expiratory chest with bibasilar atelectasis, Lungs are otherwise clear,  No pneumothorax.  Right femur x-ray-No acute fracture.  Bilateral knee x-ray-Status post previous bilateral total knee arthroplasties, left  knee arthroplasty represents a revision from previous knee arthroplasty and fracture of the distal femur- do not see evidence of complications, No evidence of acute fracture.  Pelvic and right hip x-ray-No acute fracture.    CAD/hyperlipidemia/hypertension. Coreg.  Hold Catapres.  Hold Lasix.  Continue lisinopril.    Chronic left leg wound/abrasion bilateral knee . Wound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

## 2022-10-14 NOTE — PLAN OF CARE
Goal Outcome Evaluation:  Plan of Care Reviewed With: patient        Progress: improving  Outcome Evaluation: PT tx completed. Pt min x1 for supine to sit with HOB elevated. Increased time and effort to complete. Able to stand x5 reps with RWX and min x1, cues for hand placement and assist with R foot placement. Required hygiene care and this was performed in standing. Pt able to take steps to chair toward L side, scoots R foot d/t pain. Will cont to follow.

## 2022-10-15 VITALS
HEART RATE: 70 BPM | RESPIRATION RATE: 16 BRPM | SYSTOLIC BLOOD PRESSURE: 176 MMHG | DIASTOLIC BLOOD PRESSURE: 56 MMHG | TEMPERATURE: 98.1 F | OXYGEN SATURATION: 98 % | HEIGHT: 60 IN | BODY MASS INDEX: 29.86 KG/M2 | WEIGHT: 152.1 LBS

## 2022-10-15 LAB
ALBUMIN SERPL-MCNC: 2.7 G/DL (ref 3.5–5.2)
ALBUMIN/GLOB SERPL: 0.9 G/DL
ALP SERPL-CCNC: 58 U/L (ref 39–117)
ALT SERPL W P-5'-P-CCNC: 183 U/L (ref 1–33)
ANION GAP SERPL CALCULATED.3IONS-SCNC: 9 MMOL/L (ref 5–15)
AST SERPL-CCNC: 209 U/L (ref 1–32)
BILIRUB SERPL-MCNC: 0.4 MG/DL (ref 0–1.2)
BUN SERPL-MCNC: 24 MG/DL (ref 8–23)
BUN/CREAT SERPL: 27.3 (ref 7–25)
CALCIUM SPEC-SCNC: 9.3 MG/DL (ref 8.6–10.5)
CHLORIDE SERPL-SCNC: 100 MMOL/L (ref 98–107)
CK SERPL-CCNC: 3600 U/L (ref 20–180)
CO2 SERPL-SCNC: 28 MMOL/L (ref 22–29)
CREAT SERPL-MCNC: 0.88 MG/DL (ref 0.57–1)
DEPRECATED RDW RBC AUTO: 49.9 FL (ref 37–54)
EGFRCR SERPLBLD CKD-EPI 2021: 66.5 ML/MIN/1.73
ERYTHROCYTE [DISTWIDTH] IN BLOOD BY AUTOMATED COUNT: 13.6 % (ref 12.3–15.4)
GLOBULIN UR ELPH-MCNC: 3 GM/DL
GLUCOSE SERPL-MCNC: 119 MG/DL (ref 65–99)
HCT VFR BLD AUTO: 35.8 % (ref 34–46.6)
HGB BLD-MCNC: 11 G/DL (ref 12–15.9)
MCH RBC QN AUTO: 30.4 PG (ref 26.6–33)
MCHC RBC AUTO-ENTMCNC: 30.7 G/DL (ref 31.5–35.7)
MCV RBC AUTO: 98.9 FL (ref 79–97)
PLATELET # BLD AUTO: 239 10*3/MM3 (ref 140–450)
PMV BLD AUTO: 11 FL (ref 6–12)
POTASSIUM SERPL-SCNC: 3.9 MMOL/L (ref 3.5–5.2)
PROT SERPL-MCNC: 5.7 G/DL (ref 6–8.5)
RBC # BLD AUTO: 3.62 10*6/MM3 (ref 3.77–5.28)
SODIUM SERPL-SCNC: 137 MMOL/L (ref 136–145)
WBC NRBC COR # BLD: 7.83 10*3/MM3 (ref 3.4–10.8)

## 2022-10-15 PROCEDURE — 80053 COMPREHEN METABOLIC PANEL: CPT | Performed by: FAMILY MEDICINE

## 2022-10-15 PROCEDURE — 25010000002 ERTAPENEM PER 500 MG: Performed by: FAMILY MEDICINE

## 2022-10-15 PROCEDURE — C1751 CATH, INF, PER/CENT/MIDLINE: HCPCS

## 2022-10-15 PROCEDURE — 36410 VNPNXR 3YR/> PHY/QHP DX/THER: CPT

## 2022-10-15 PROCEDURE — 82550 ASSAY OF CK (CPK): CPT | Performed by: FAMILY MEDICINE

## 2022-10-15 PROCEDURE — 85027 COMPLETE CBC AUTOMATED: CPT | Performed by: FAMILY MEDICINE

## 2022-10-15 RX ORDER — FUROSEMIDE 40 MG/1
40 TABLET ORAL DAILY
Status: DISCONTINUED | OUTPATIENT
Start: 2022-10-15 | End: 2022-10-15 | Stop reason: HOSPADM

## 2022-10-15 RX ORDER — PREGABALIN 50 MG/1
50 CAPSULE ORAL 2 TIMES DAILY
Qty: 24 CAPSULE | Refills: 0 | Status: SHIPPED | OUTPATIENT
Start: 2022-10-15 | End: 2022-11-07 | Stop reason: HOSPADM

## 2022-10-15 RX ORDER — CLONIDINE HYDROCHLORIDE 0.1 MG/1
0.1 TABLET ORAL DAILY PRN
Status: DISCONTINUED | OUTPATIENT
Start: 2022-10-15 | End: 2022-10-15 | Stop reason: HOSPADM

## 2022-10-15 RX ORDER — COLCHICINE 0.6 MG/1
0.6 TABLET ORAL DAILY
Status: DISCONTINUED | OUTPATIENT
Start: 2022-10-15 | End: 2022-10-15 | Stop reason: HOSPADM

## 2022-10-15 RX ORDER — PREGABALIN 50 MG/1
50 CAPSULE ORAL EVERY 12 HOURS SCHEDULED
Status: DISCONTINUED | OUTPATIENT
Start: 2022-10-15 | End: 2022-10-15 | Stop reason: HOSPADM

## 2022-10-15 RX ORDER — HYDROCODONE BITARTRATE AND ACETAMINOPHEN 7.5; 325 MG/1; MG/1
1 TABLET ORAL EVERY 4 HOURS PRN
Status: DISCONTINUED | OUTPATIENT
Start: 2022-10-15 | End: 2022-10-15 | Stop reason: HOSPADM

## 2022-10-15 RX ORDER — LIDOCAINE HYDROCHLORIDE 10 MG/ML
1 INJECTION, SOLUTION EPIDURAL; INFILTRATION; INTRACAUDAL; PERINEURAL ONCE
Status: COMPLETED | OUTPATIENT
Start: 2022-10-15 | End: 2022-10-15

## 2022-10-15 RX ORDER — ENOXAPARIN SODIUM 100 MG/ML
40 INJECTION SUBCUTANEOUS
Status: ON HOLD
Start: 2022-10-15 | End: 2022-11-02

## 2022-10-15 RX ORDER — NORTRIPTYLINE HYDROCHLORIDE 25 MG/1
25 CAPSULE ORAL NIGHTLY
Status: DISCONTINUED | OUTPATIENT
Start: 2022-10-15 | End: 2022-10-15 | Stop reason: HOSPADM

## 2022-10-15 RX ORDER — HYDROCODONE BITARTRATE AND ACETAMINOPHEN 7.5; 325 MG/1; MG/1
1 TABLET ORAL EVERY 8 HOURS PRN
Status: DISCONTINUED | OUTPATIENT
Start: 2022-10-15 | End: 2022-10-15

## 2022-10-15 RX ORDER — HYDROCODONE BITARTRATE AND ACETAMINOPHEN 7.5; 325 MG/1; MG/1
1 TABLET ORAL EVERY 4 HOURS PRN
Qty: 24 TABLET | Refills: 0 | Status: SHIPPED | OUTPATIENT
Start: 2022-10-15 | End: 2022-11-07 | Stop reason: HOSPADM

## 2022-10-15 RX ORDER — NORTRIPTYLINE HYDROCHLORIDE 25 MG/1
25 CAPSULE ORAL NIGHTLY
Qty: 90 CAPSULE | Refills: 1 | Status: ON HOLD | OUTPATIENT
Start: 2022-10-15 | End: 2022-11-02

## 2022-10-15 RX ORDER — CELECOXIB 200 MG/1
200 CAPSULE ORAL DAILY
Status: DISCONTINUED | OUTPATIENT
Start: 2022-10-15 | End: 2022-10-15 | Stop reason: HOSPADM

## 2022-10-15 RX ADMIN — OXYCODONE HYDROCHLORIDE AND ACETAMINOPHEN 1 TABLET: 5; 325 TABLET ORAL at 08:12

## 2022-10-15 RX ADMIN — PREGABALIN 50 MG: 50 CAPSULE ORAL at 09:21

## 2022-10-15 RX ADMIN — FUROSEMIDE 40 MG: 40 TABLET ORAL at 10:36

## 2022-10-15 RX ADMIN — Medication 1 TABLET: at 10:36

## 2022-10-15 RX ADMIN — COLCHICINE 0.6 MG: 0.6 TABLET, FILM COATED ORAL at 10:36

## 2022-10-15 RX ADMIN — LISINOPRIL 20 MG: 20 TABLET ORAL at 08:12

## 2022-10-15 RX ADMIN — ATORVASTATIN CALCIUM 10 MG: 10 TABLET, FILM COATED ORAL at 08:12

## 2022-10-15 RX ADMIN — LIDOCAINE HYDROCHLORIDE 1 ML: 10 INJECTION, SOLUTION EPIDURAL; INFILTRATION; INTRACAUDAL; PERINEURAL at 12:36

## 2022-10-15 RX ADMIN — SODIUM CHLORIDE, POTASSIUM CHLORIDE, SODIUM LACTATE AND CALCIUM CHLORIDE 100 ML/HR: 600; 310; 30; 20 INJECTION, SOLUTION INTRAVENOUS at 02:58

## 2022-10-15 RX ADMIN — PANTOPRAZOLE SODIUM 40 MG: 40 TABLET, DELAYED RELEASE ORAL at 06:29

## 2022-10-15 RX ADMIN — OXYCODONE HYDROCHLORIDE AND ACETAMINOPHEN 1 TABLET: 5; 325 TABLET ORAL at 02:58

## 2022-10-15 RX ADMIN — CELECOXIB 200 MG: 200 CAPSULE ORAL at 10:36

## 2022-10-15 RX ADMIN — ASPIRIN 81 MG: 81 TABLET, COATED ORAL at 08:12

## 2022-10-15 RX ADMIN — CYCLOSPORINE 1 DROP: 0.5 EMULSION OPHTHALMIC at 06:29

## 2022-10-15 RX ADMIN — CARVEDILOL 6.25 MG: 6.25 TABLET, FILM COATED ORAL at 08:12

## 2022-10-15 RX ADMIN — ERTAPENEM SODIUM 1 G: 1 INJECTION, POWDER, LYOPHILIZED, FOR SOLUTION INTRAMUSCULAR; INTRAVENOUS at 10:35

## 2022-10-15 RX ADMIN — MUPIROCIN 1 APPLICATION: 20 OINTMENT TOPICAL at 08:12
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
## 2022-10-15 NOTE — NURSING NOTE
Attempts x2 to call report to [facility] for pt d/c. D/C summary faxed. Pt will need a Midline per [facility] request for IV therapy. [facility] nurseArsh cannot take report at this time as they need their liaison to give approval from insurance.

## 2022-10-15 NOTE — SIGNIFICANT NOTE
Pt resting in bed. No signs of distress. IVF d/c at this time per order. Pt medicated for pain per order. Pt repositioned for comfort in bed. Call bell in reach, safety maintained.        10/14/22 0879   Pain/Comfort/Sleep   Preferred Pain Scale number (Numeric Rating Pain Scale)   (0-10) Pain Rating: Rest 3   PAINAD Breathing 0-->normal   PAINAD Negative Vocalization 0-->none   PAINAD Facial Expression 0-->smiling or inexpressive   PAINAD Body Language 0-->relaxed   PAINAD Consolability 0-->no need to console   PAINAD Score 0   Pain Location extremity   Pain Side/Orientation left;lower   Nonverbal Indicators of Pain nonverbal indicators absent   POSS (Pasero Opioid-Induced Sed Scale) 1 - Awake and alert   Sleep/Rest/Relaxation awake   Coping/Psychosocial   Observed Emotional State calm;cooperative   Verbalized Emotional State acceptance   Family/Support Persons family   Involvement in Care not present at bedside   HEENT   HEENT WDL WDL   Cognitive   Cognitive/Neuro/Behavioral WDL X;orientation   Orientation disoriented to;time;situation   Mood/Behavior cooperative   Collins Coma Scale   Best Eye Response 4-->(E4) spontaneous   Best Motor Response 6-->(M6) obeys commands   Best Verbal Response 4-->(V4) confused   Collins Coma Scale Score 14   Respiratory   Respiratory WDL X;breath sounds   Breath Sounds   Breath Sounds All Fields   All Lung Fields Breath Sounds Anterior:;diminished   Cardiac   Cardiac WDL WDL   ECG   Rhythm normal sinus rhythm   Peripheral Neurovascular   Peripheral Neurovascular WDL X;edema   Edema   Edema dependent;leg, left;leg, right;foot, left;foot, right;ankle, left;ankle, right   Dependent Edema 3+ (Moderate)   Ankle, Left Edema 2+ (Mild)   Leg, Right Edema 3+ (Moderate)   Foot, Left Edema 2+ (Mild)   Leg, Left Edema 2+ (Mild)   Ankle, Right Edema 3+ (Moderate)   Foot, Right Edema 3+ (Moderate)   Gastrointestinal   Gastrointestinal WDL X;GI symptoms   Last Bowel Movement 10/13/22    Nausea/Vomiting   Nausea/Vomiting Outcome relief of signs/symptoms   Genitourinary   Genitourinary WDL X;voiding ability/characteristics   Voiding Characteristics external catheter   Skin   Skin WDL X;all   Skin Color/Characteristics pale;constantino   Skin Temperature warm   Skin Moisture dry   Skin Elasticity slow return to original state   Skin Integrity bruised (ecchymotic);scab;wound   Tyron Risk Assessment   Sensory Perception 4-->no impairment   Moisture 3-->occasionally moist   Activity 3-->walks occasionally   Mobility 3-->slightly limited   Nutrition 3-->adequate   Friction and Shear 3-->no apparent problem   Tyron Score 19   Wound 10/11/22 1105 Left distal leg Abrasion   Placement Date/Time: 10/11/22 1105   Present on Hospital Admission: No  Side: Left  Orientation: distal  Location: leg  Primary Wound Type: Abrasion   Dressing Appearance dry;intact   Wound 10/11/22 1105 Right proximal leg   Placement Date/Time: 10/11/22 1105   Side: Right  Orientation: proximal  Location: leg   Dressing Appearance dry;intact   Musculoskeletal   Musculoskeletal WDL X;mobility   General Mobility generalized weakness;moderately impaired   Functional Screen (every 3 days/change)   Ambulation 3 - assistive equipment and person   Transferring 2 - assistive person   Toileting 2 - assistive person   Bathing 2 - assistive person   Dressing 2 - assistive person   Eating 0 - independent   Communication 0 - understands/communicates without difficulty   Swallowing 0 - swallows foods/liquids without difficulty   External Urinary Catheter   Placement Date/Time: 10/11/22 2200   Inserted by: Di JUSTIN RN  External Urinary Catheter Sizes: Intermediate   Site Assessment Clean;Skin intact   Collection Container Wall suction   Securement Method Securing device   Peripheral IV 10/11/22 1139 Left Antecubital   Placement Date/Time: 10/11/22 1139   Size (Gauge): 20 G  Orientation: Left  Location: Antecubital   Site Assessment Clean;Dry;Intact    Dressing Type Transparent   Line Status Saline locked   Dressing Status Clean;Dry;Intact   Reason Not Rotated Patient refused   Peripheral IV 10/11/22 2200 Posterior;Right Hand   Placement Date/Time: 10/11/22 2200   Hand Hygiene Completed: Yes  Size (Gauge): 20 G  Orientation: Posterior;Right  Location: Hand  Site Prep: Chlorhexidine isopropyl alcohol  Local Anesthetic: None  Technique: Anatomical landmarks  Inserted by: zelda oliveira   Site Assessment Clean;Dry;Intact   Dressing Type Border Dressing   Line Status Infusing   Dressing Status Clean;Dry;Intact   Reason Not Rotated Not due   Adult Sepsis Screening Tool   Previous adult screen positive in last 48 hrs? no   Are 2 or > of the above criteria present? no: STOP/negative screen   Safety   Safety WDL [facility] High Risk Falls Assessment (If Fall score is >/=13, add the Fall Risk CPG to the care plan)    Fallen in past 6 months 5--> Yes   Mental Status 1--> confused   Elimination 0--> No elimination issues   Mobility 2--> Requires assistance- transfer, walker, etc.   Medications 1--> Narcotics   Nurses' Clinical Judgement 10   Total Fall Risk Score 21   Safety Management   Safety Promotion/Fall Prevention safety round/check completed;fall prevention program maintained   Daily Care   Highest level of mobility 4 --> Transferred to chair/commode   How much help from another person do you currently need...   Turning from your back to your side while in flat bed without using bedrails? 3   Moving from lying on back to sitting on the side of a flat bed without bedrails? 2   Moving to and from a bed to a chair (including a wheelchair)? 3   Standing up from a chair using your arms (e.g., wheelchair, bedside chair)? 3   Climbing 3-5 steps with a railing? 2   To walk in hospital room? 2   AM-PAC 6 Clicks Score (PT) 15

## 2022-10-16 LAB
BACTERIA SPEC AEROBE CULT: NORMAL
BACTERIA SPEC AEROBE CULT: NORMAL
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
## 2022-10-17 NOTE — PAYOR COMM NOTE
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 Rodriguez Simental MD at 10/15/22 0929       Discharge Order (From admission, onward)     Start     Ordered    10/15/22 0916  Discharge patient  Once        Expected Discharge Date: 10/15/22    Discharge Disposition: Skilled Nursing Facility (DC - External)    Physician of Record for Attribution - Please select from Treatment Team: RODRIGUEZ SIMENTAL [[id number]]    Review needed by CMO to determine Physician of Record: No       Question Answer Comment   Physician of Record for Attribution - Please select from Treatment Team RODRIGUEZ SIMENTAL    Review needed by CMO to determine Physician of Record No        10/15/22 0997

## 2022-10-28 ENCOUNTER — HOSPITAL ENCOUNTER (OUTPATIENT)
Dept: WOUND CARE | Age: 82
Discharge: HOME | End: 2022-10-28

## 2022-11-01 ENCOUNTER — HOSPITAL ENCOUNTER (INPATIENT)
Facility: HOSPITAL | Age: 82
LOS: 5 days | Discharge: NURSING, REHAB OR LONG-TERM CARE | End: 2022-11-07
Attending: STUDENT IN AN ORGANIZED HEALTH CARE EDUCATION/TRAINING PROGRAM | Admitting: FAMILY MEDICINE

## 2022-11-01 ENCOUNTER — APPOINTMENT (OUTPATIENT)
Dept: GENERAL RADIOLOGY | Facility: HOSPITAL | Age: 82
End: 2022-11-01

## 2022-11-01 ENCOUNTER — APPOINTMENT (OUTPATIENT)
Dept: CT IMAGING | Facility: HOSPITAL | Age: 82
End: 2022-11-01

## 2022-11-01 DIAGNOSIS — N17.9 AKI (ACUTE KIDNEY INJURY): ICD-10-CM

## 2022-11-01 DIAGNOSIS — R77.8 ELEVATED TROPONIN: Primary | ICD-10-CM

## 2022-11-01 DIAGNOSIS — Z78.9 DECREASED ACTIVITIES OF DAILY LIVING (ADL): ICD-10-CM

## 2022-11-01 DIAGNOSIS — R10.84 GENERALIZED ABDOMINAL PAIN: ICD-10-CM

## 2022-11-01 DIAGNOSIS — Z74.09 IMPAIRED MOBILITY: ICD-10-CM

## 2022-11-01 DIAGNOSIS — R13.10 DYSPHAGIA, UNSPECIFIED TYPE: ICD-10-CM

## 2022-11-01 DIAGNOSIS — N39.0 URINARY TRACT INFECTION WITHOUT HEMATURIA, SITE UNSPECIFIED: ICD-10-CM

## 2022-11-01 DIAGNOSIS — E87.5 HYPERKALEMIA: ICD-10-CM

## 2022-11-01 LAB
ALBUMIN SERPL-MCNC: 4.1 G/DL (ref 3.5–5.2)
ALBUMIN/GLOB SERPL: 1.1 G/DL
ALP SERPL-CCNC: 72 U/L (ref 39–117)
ALT SERPL W P-5'-P-CCNC: 19 U/L (ref 1–33)
ANION GAP SERPL CALCULATED.3IONS-SCNC: 17 MMOL/L (ref 5–15)
APTT PPP: 34.4 SECONDS (ref 24.1–35)
AST SERPL-CCNC: 21 U/L (ref 1–32)
BACTERIA UR QL AUTO: ABNORMAL /HPF
BILIRUB SERPL-MCNC: 0.7 MG/DL (ref 0–1.2)
BILIRUB UR QL STRIP: ABNORMAL
BUN SERPL-MCNC: 55 MG/DL (ref 8–23)
BUN/CREAT SERPL: 20.4 (ref 7–25)
BURR CELLS BLD QL SMEAR: ABNORMAL
CALCIUM SPEC-SCNC: 10.2 MG/DL (ref 8.6–10.5)
CHLORIDE SERPL-SCNC: 92 MMOL/L (ref 98–107)
CLARITY UR: ABNORMAL
CO2 SERPL-SCNC: 23 MMOL/L (ref 22–29)
COLOR UR: ABNORMAL
CREAT SERPL-MCNC: 2.7 MG/DL (ref 0.57–1)
D-LACTATE SERPL-SCNC: 1.9 MMOL/L (ref 0.5–2)
DEPRECATED RDW RBC AUTO: 50.4 FL (ref 37–54)
EGFRCR SERPLBLD CKD-EPI 2021: 17.3 ML/MIN/1.73
ERYTHROCYTE [DISTWIDTH] IN BLOOD BY AUTOMATED COUNT: 14.4 % (ref 12.3–15.4)
FLUAV RNA RESP QL NAA+PROBE: NOT DETECTED
FLUBV RNA RESP QL NAA+PROBE: NOT DETECTED
GIANT PLATELETS: ABNORMAL
GLOBULIN UR ELPH-MCNC: 3.6 GM/DL
GLUCOSE SERPL-MCNC: 156 MG/DL (ref 65–99)
GLUCOSE UR STRIP-MCNC: NEGATIVE MG/DL
HCT VFR BLD AUTO: 37.7 % (ref 34–46.6)
HGB BLD-MCNC: 12.3 G/DL (ref 12–15.9)
HGB UR QL STRIP.AUTO: NEGATIVE
HYALINE CASTS UR QL AUTO: ABNORMAL /LPF
INR PPP: 1.23 (ref 0.91–1.09)
KETONES UR QL STRIP: NEGATIVE
LEUKOCYTE ESTERASE UR QL STRIP.AUTO: ABNORMAL
LIPASE SERPL-CCNC: 15 U/L (ref 13–60)
LYMPHOCYTES # BLD MANUAL: 0.67 10*3/MM3 (ref 0.7–3.1)
LYMPHOCYTES NFR BLD MANUAL: 11 % (ref 5–12)
MCH RBC QN AUTO: 31.4 PG (ref 26.6–33)
MCHC RBC AUTO-ENTMCNC: 32.6 G/DL (ref 31.5–35.7)
MCV RBC AUTO: 96.2 FL (ref 79–97)
MONOCYTES # BLD: 1.46 10*3/MM3 (ref 0.1–0.9)
NEUTROPHILS # BLD AUTO: 11.18 10*3/MM3 (ref 1.7–7)
NEUTROPHILS NFR BLD MANUAL: 64 % (ref 42.7–76)
NEUTS BAND NFR BLD MANUAL: 20 % (ref 0–5)
NITRITE UR QL STRIP: NEGATIVE
PH UR STRIP.AUTO: 5.5 [PH] (ref 5–8)
PLATELET # BLD AUTO: 366 10*3/MM3 (ref 140–450)
PMV BLD AUTO: 10.5 FL (ref 6–12)
POIKILOCYTOSIS BLD QL SMEAR: ABNORMAL
POLYCHROMASIA BLD QL SMEAR: ABNORMAL
POTASSIUM SERPL-SCNC: 5.9 MMOL/L (ref 3.5–5.2)
PROCALCITONIN SERPL-MCNC: 0.75 NG/ML (ref 0–0.25)
PROT SERPL-MCNC: 7.7 G/DL (ref 6–8.5)
PROT UR QL STRIP: NEGATIVE
PROTHROMBIN TIME: 15 SECONDS (ref 11.9–14.6)
RBC # BLD AUTO: 3.92 10*6/MM3 (ref 3.77–5.28)
RBC # UR STRIP: ABNORMAL /HPF
REF LAB TEST METHOD: ABNORMAL
RSV RNA NPH QL NAA+NON-PROBE: DETECTED
SARS-COV-2 RNA RESP QL NAA+PROBE: NOT DETECTED
SODIUM SERPL-SCNC: 132 MMOL/L (ref 136–145)
SP GR UR STRIP: 1.01 (ref 1–1.03)
SQUAMOUS #/AREA URNS HPF: ABNORMAL /HPF
TROPONIN T SERPL-MCNC: 0.27 NG/ML (ref 0–0.03)
UROBILINOGEN UR QL STRIP: ABNORMAL
VARIANT LYMPHS NFR BLD MANUAL: 5 % (ref 19.6–45.3)
WBC # UR STRIP: ABNORMAL /HPF
WBC MORPH BLD: NORMAL
WBC NRBC COR # BLD: 13.31 10*3/MM3 (ref 3.4–10.8)

## 2022-11-01 PROCEDURE — 83690 ASSAY OF LIPASE: CPT | Performed by: STUDENT IN AN ORGANIZED HEALTH CARE EDUCATION/TRAINING PROGRAM

## 2022-11-01 PROCEDURE — 81001 URINALYSIS AUTO W/SCOPE: CPT | Performed by: STUDENT IN AN ORGANIZED HEALTH CARE EDUCATION/TRAINING PROGRAM

## 2022-11-01 PROCEDURE — 87637 SARSCOV2&INF A&B&RSV AMP PRB: CPT | Performed by: STUDENT IN AN ORGANIZED HEALTH CARE EDUCATION/TRAINING PROGRAM

## 2022-11-01 PROCEDURE — 87040 BLOOD CULTURE FOR BACTERIA: CPT | Performed by: STUDENT IN AN ORGANIZED HEALTH CARE EDUCATION/TRAINING PROGRAM

## 2022-11-01 PROCEDURE — 85730 THROMBOPLASTIN TIME PARTIAL: CPT | Performed by: STUDENT IN AN ORGANIZED HEALTH CARE EDUCATION/TRAINING PROGRAM

## 2022-11-01 PROCEDURE — 71045 X-RAY EXAM CHEST 1 VIEW: CPT

## 2022-11-01 PROCEDURE — 84484 ASSAY OF TROPONIN QUANT: CPT | Performed by: STUDENT IN AN ORGANIZED HEALTH CARE EDUCATION/TRAINING PROGRAM

## 2022-11-01 PROCEDURE — 85025 COMPLETE CBC W/AUTO DIFF WBC: CPT | Performed by: STUDENT IN AN ORGANIZED HEALTH CARE EDUCATION/TRAINING PROGRAM

## 2022-11-01 PROCEDURE — 85007 BL SMEAR W/DIFF WBC COUNT: CPT | Performed by: STUDENT IN AN ORGANIZED HEALTH CARE EDUCATION/TRAINING PROGRAM

## 2022-11-01 PROCEDURE — 36415 COLL VENOUS BLD VENIPUNCTURE: CPT

## 2022-11-01 PROCEDURE — 85610 PROTHROMBIN TIME: CPT | Performed by: STUDENT IN AN ORGANIZED HEALTH CARE EDUCATION/TRAINING PROGRAM

## 2022-11-01 PROCEDURE — 80053 COMPREHEN METABOLIC PANEL: CPT | Performed by: STUDENT IN AN ORGANIZED HEALTH CARE EDUCATION/TRAINING PROGRAM

## 2022-11-01 PROCEDURE — 83605 ASSAY OF LACTIC ACID: CPT | Performed by: STUDENT IN AN ORGANIZED HEALTH CARE EDUCATION/TRAINING PROGRAM

## 2022-11-01 PROCEDURE — 99285 EMERGENCY DEPT VISIT HI MDM: CPT

## 2022-11-01 PROCEDURE — 84145 PROCALCITONIN (PCT): CPT | Performed by: STUDENT IN AN ORGANIZED HEALTH CARE EDUCATION/TRAINING PROGRAM

## 2022-11-01 PROCEDURE — 94640 AIRWAY INHALATION TREATMENT: CPT

## 2022-11-01 RX ORDER — DEXTROSE MONOHYDRATE 25 G/50ML
25 INJECTION, SOLUTION INTRAVENOUS ONCE
Status: COMPLETED | OUTPATIENT
Start: 2022-11-01 | End: 2022-11-02

## 2022-11-01 RX ADMIN — SODIUM CHLORIDE, POTASSIUM CHLORIDE, SODIUM LACTATE AND CALCIUM CHLORIDE 1000 ML: 600; 310; 30; 20 INJECTION, SOLUTION INTRAVENOUS at 23:33

## 2022-11-01 RX ADMIN — ALBUTEROL SULFATE 2.5 MG: 2.5 SOLUTION RESPIRATORY (INHALATION) at 23:58

## 2022-11-02 ENCOUNTER — APPOINTMENT (OUTPATIENT)
Dept: CT IMAGING | Facility: HOSPITAL | Age: 82
End: 2022-11-02

## 2022-11-02 ENCOUNTER — APPOINTMENT (OUTPATIENT)
Dept: GENERAL RADIOLOGY | Facility: HOSPITAL | Age: 82
End: 2022-11-02

## 2022-11-02 PROBLEM — N17.9 AKI (ACUTE KIDNEY INJURY): Status: ACTIVE | Noted: 2022-11-02

## 2022-11-02 PROBLEM — J21.0 RSV (ACUTE BRONCHIOLITIS DUE TO RESPIRATORY SYNCYTIAL VIRUS): Status: ACTIVE | Noted: 2022-11-02

## 2022-11-02 PROBLEM — R79.89 ELEVATED TROPONIN: Status: ACTIVE | Noted: 2022-11-02

## 2022-11-02 PROBLEM — E87.5 HYPERKALEMIA: Status: ACTIVE | Noted: 2022-11-02

## 2022-11-02 PROBLEM — I21.A1 TYPE 2 MI (MYOCARDIAL INFARCTION) (HCC): Status: ACTIVE | Noted: 2022-11-02

## 2022-11-02 PROBLEM — Z86.19 HISTORY OF ESBL E. COLI INFECTION: Status: ACTIVE | Noted: 2022-11-02

## 2022-11-02 PROBLEM — R77.8 ELEVATED TROPONIN: Status: ACTIVE | Noted: 2022-11-02

## 2022-11-02 LAB
ALBUMIN SERPL-MCNC: 3.3 G/DL (ref 3.5–5.2)
ALBUMIN/GLOB SERPL: 1.1 G/DL
ALP SERPL-CCNC: 63 U/L (ref 39–117)
ALT SERPL W P-5'-P-CCNC: 16 U/L (ref 1–33)
ANION GAP SERPL CALCULATED.3IONS-SCNC: 19 MMOL/L (ref 5–15)
AST SERPL-CCNC: 23 U/L (ref 1–32)
BASOPHILS # BLD AUTO: 0.02 10*3/MM3 (ref 0–0.2)
BASOPHILS NFR BLD AUTO: 0.2 % (ref 0–1.5)
BILIRUB SERPL-MCNC: 0.3 MG/DL (ref 0–1.2)
BUN SERPL-MCNC: 53 MG/DL (ref 8–23)
BUN/CREAT SERPL: 28.5 (ref 7–25)
CALCIUM SPEC-SCNC: 9.5 MG/DL (ref 8.6–10.5)
CHLORIDE SERPL-SCNC: 99 MMOL/L (ref 98–107)
CHOLEST SERPL-MCNC: 121 MG/DL (ref 0–200)
CK SERPL-CCNC: 38 U/L (ref 20–180)
CO2 SERPL-SCNC: 19 MMOL/L (ref 22–29)
CREAT SERPL-MCNC: 1.86 MG/DL (ref 0.57–1)
DEPRECATED RDW RBC AUTO: 50.2 FL (ref 37–54)
EGFRCR SERPLBLD CKD-EPI 2021: 27.1 ML/MIN/1.73
EOSINOPHIL # BLD AUTO: 0.07 10*3/MM3 (ref 0–0.4)
EOSINOPHIL NFR BLD AUTO: 0.8 % (ref 0.3–6.2)
ERYTHROCYTE [DISTWIDTH] IN BLOOD BY AUTOMATED COUNT: 14.3 % (ref 12.3–15.4)
GLOBULIN UR ELPH-MCNC: 3.1 GM/DL
GLUCOSE BLDC GLUCOMTR-MCNC: 123 MG/DL (ref 70–130)
GLUCOSE SERPL-MCNC: 113 MG/DL (ref 65–99)
HBA1C MFR BLD: 5.2 % (ref 4.8–5.6)
HCT VFR BLD AUTO: 30.6 % (ref 34–46.6)
HDLC SERPL-MCNC: 36 MG/DL (ref 40–60)
HGB BLD-MCNC: 9.9 G/DL (ref 12–15.9)
LDLC SERPL CALC-MCNC: 58 MG/DL (ref 0–100)
LDLC/HDLC SERPL: 1.48 {RATIO}
LYMPHOCYTES # BLD AUTO: 1.12 10*3/MM3 (ref 0.7–3.1)
LYMPHOCYTES NFR BLD AUTO: 12.4 % (ref 19.6–45.3)
MCH RBC QN AUTO: 30.9 PG (ref 26.6–33)
MCHC RBC AUTO-ENTMCNC: 32.4 G/DL (ref 31.5–35.7)
MCV RBC AUTO: 95.6 FL (ref 79–97)
MONOCYTES # BLD AUTO: 1.17 10*3/MM3 (ref 0.1–0.9)
MONOCYTES NFR BLD AUTO: 13 % (ref 5–12)
NEUTROPHILS NFR BLD AUTO: 6.62 10*3/MM3 (ref 1.7–7)
NEUTROPHILS NFR BLD AUTO: 73.3 % (ref 42.7–76)
PLATELET # BLD AUTO: 290 10*3/MM3 (ref 140–450)
PMV BLD AUTO: 10.4 FL (ref 6–12)
POTASSIUM SERPL-SCNC: 3.9 MMOL/L (ref 3.5–5.2)
POTASSIUM SERPL-SCNC: 5.7 MMOL/L (ref 3.5–5.2)
PROT SERPL-MCNC: 6.4 G/DL (ref 6–8.5)
QT INTERVAL: 340 MS
QTC INTERVAL: 422 MS
RBC # BLD AUTO: 3.2 10*6/MM3 (ref 3.77–5.28)
SODIUM SERPL-SCNC: 137 MMOL/L (ref 136–145)
TRIGL SERPL-MCNC: 159 MG/DL (ref 0–150)
TROPONIN T SERPL-MCNC: 0.17 NG/ML (ref 0–0.03)
TROPONIN T SERPL-MCNC: 0.27 NG/ML (ref 0–0.03)
VLDLC SERPL-MCNC: 27 MG/DL (ref 5–40)
WBC NRBC COR # BLD: 9.03 10*3/MM3 (ref 3.4–10.8)

## 2022-11-02 PROCEDURE — 84484 ASSAY OF TROPONIN QUANT: CPT | Performed by: INTERNAL MEDICINE

## 2022-11-02 PROCEDURE — 92610 EVALUATE SWALLOWING FUNCTION: CPT

## 2022-11-02 PROCEDURE — 83036 HEMOGLOBIN GLYCOSYLATED A1C: CPT | Performed by: INTERNAL MEDICINE

## 2022-11-02 PROCEDURE — 70450 CT HEAD/BRAIN W/O DYE: CPT

## 2022-11-02 PROCEDURE — 99232 SBSQ HOSP IP/OBS MODERATE 35: CPT | Performed by: NURSE PRACTITIONER

## 2022-11-02 PROCEDURE — 74176 CT ABD & PELVIS W/O CONTRAST: CPT

## 2022-11-02 PROCEDURE — 80061 LIPID PANEL: CPT | Performed by: INTERNAL MEDICINE

## 2022-11-02 PROCEDURE — 84484 ASSAY OF TROPONIN QUANT: CPT | Performed by: STUDENT IN AN ORGANIZED HEALTH CARE EDUCATION/TRAINING PROGRAM

## 2022-11-02 PROCEDURE — 93005 ELECTROCARDIOGRAM TRACING: CPT | Performed by: INTERNAL MEDICINE

## 2022-11-02 PROCEDURE — 93010 ELECTROCARDIOGRAM REPORT: CPT | Performed by: INTERNAL MEDICINE

## 2022-11-02 PROCEDURE — 94799 UNLISTED PULMONARY SVC/PX: CPT

## 2022-11-02 PROCEDURE — 25010000002 CEFTRIAXONE PER 250 MG: Performed by: STUDENT IN AN ORGANIZED HEALTH CARE EDUCATION/TRAINING PROGRAM

## 2022-11-02 PROCEDURE — 71250 CT THORAX DX C-: CPT

## 2022-11-02 PROCEDURE — 97165 OT EVAL LOW COMPLEX 30 MIN: CPT | Performed by: OCCUPATIONAL THERAPIST

## 2022-11-02 PROCEDURE — 82962 GLUCOSE BLOOD TEST: CPT

## 2022-11-02 PROCEDURE — 84132 ASSAY OF SERUM POTASSIUM: CPT | Performed by: INTERNAL MEDICINE

## 2022-11-02 PROCEDURE — 85025 COMPLETE CBC W/AUTO DIFF WBC: CPT | Performed by: INTERNAL MEDICINE

## 2022-11-02 PROCEDURE — 74018 RADEX ABDOMEN 1 VIEW: CPT

## 2022-11-02 PROCEDURE — 80053 COMPREHEN METABOLIC PANEL: CPT | Performed by: INTERNAL MEDICINE

## 2022-11-02 PROCEDURE — 93005 ELECTROCARDIOGRAM TRACING: CPT | Performed by: STUDENT IN AN ORGANIZED HEALTH CARE EDUCATION/TRAINING PROGRAM

## 2022-11-02 PROCEDURE — 11045 DBRDMT SUBQ TISS EACH ADDL: CPT | Performed by: NURSE PRACTITIONER

## 2022-11-02 PROCEDURE — 11042 DBRDMT SUBQ TIS 1ST 20SQCM/<: CPT | Performed by: NURSE PRACTITIONER

## 2022-11-02 PROCEDURE — 97162 PT EVAL MOD COMPLEX 30 MIN: CPT

## 2022-11-02 PROCEDURE — 25010000002 MEROPENEM PER 100 MG: Performed by: INTERNAL MEDICINE

## 2022-11-02 PROCEDURE — 25010000002 ENOXAPARIN PER 10 MG: Performed by: INTERNAL MEDICINE

## 2022-11-02 PROCEDURE — 25010000002 CALCIUM GLUCONATE PER 10 ML: Performed by: STUDENT IN AN ORGANIZED HEALTH CARE EDUCATION/TRAINING PROGRAM

## 2022-11-02 PROCEDURE — 63710000001 INSULIN REGULAR HUMAN PER 5 UNITS: Performed by: STUDENT IN AN ORGANIZED HEALTH CARE EDUCATION/TRAINING PROGRAM

## 2022-11-02 PROCEDURE — 82550 ASSAY OF CK (CPK): CPT | Performed by: STUDENT IN AN ORGANIZED HEALTH CARE EDUCATION/TRAINING PROGRAM

## 2022-11-02 RX ORDER — PANTOPRAZOLE SODIUM 40 MG/1
40 TABLET, DELAYED RELEASE ORAL
Status: DISCONTINUED | OUTPATIENT
Start: 2022-11-02 | End: 2022-11-05 | Stop reason: ALTCHOICE

## 2022-11-02 RX ORDER — ALLOPURINOL 100 MG/1
200 TABLET ORAL DAILY
COMMUNITY

## 2022-11-02 RX ORDER — ATORVASTATIN CALCIUM 10 MG/1
10 TABLET, FILM COATED ORAL DAILY
Status: DISCONTINUED | OUTPATIENT
Start: 2022-11-02 | End: 2022-11-07 | Stop reason: HOSPADM

## 2022-11-02 RX ORDER — SIMETHICONE 80 MG
125 TABLET,CHEWABLE ORAL EVERY 6 HOURS PRN
Status: DISCONTINUED | OUTPATIENT
Start: 2022-11-02 | End: 2022-11-07 | Stop reason: HOSPADM

## 2022-11-02 RX ORDER — NORTRIPTYLINE HYDROCHLORIDE 50 MG/1
50 CAPSULE ORAL NIGHTLY
COMMUNITY
End: 2022-11-07 | Stop reason: HOSPADM

## 2022-11-02 RX ORDER — ACETAMINOPHEN 325 MG/1
650 TABLET ORAL EVERY 4 HOURS PRN
Status: DISCONTINUED | OUTPATIENT
Start: 2022-11-02 | End: 2022-11-07 | Stop reason: HOSPADM

## 2022-11-02 RX ORDER — ASPIRIN 81 MG/1
81 TABLET, CHEWABLE ORAL DAILY
COMMUNITY

## 2022-11-02 RX ORDER — ENOXAPARIN SODIUM 100 MG/ML
40 INJECTION SUBCUTANEOUS
Status: DISCONTINUED | OUTPATIENT
Start: 2022-11-02 | End: 2022-11-02 | Stop reason: DRUGHIGH

## 2022-11-02 RX ORDER — CAL/D3/MAG11/ZINC/COP/MANG/BOR 600 MG-800
1 TABLET ORAL 2 TIMES DAILY
COMMUNITY

## 2022-11-02 RX ORDER — SODIUM CHLORIDE, SODIUM LACTATE, POTASSIUM CHLORIDE, CALCIUM CHLORIDE 600; 310; 30; 20 MG/100ML; MG/100ML; MG/100ML; MG/100ML
75 INJECTION, SOLUTION INTRAVENOUS CONTINUOUS
Status: DISCONTINUED | OUTPATIENT
Start: 2022-11-02 | End: 2022-11-04

## 2022-11-02 RX ORDER — FERROUS SULFATE 325(65) MG
325 TABLET ORAL
Status: DISCONTINUED | OUTPATIENT
Start: 2022-11-02 | End: 2022-11-07 | Stop reason: HOSPADM

## 2022-11-02 RX ORDER — CARVEDILOL 3.12 MG/1
3.12 TABLET ORAL 2 TIMES DAILY WITH MEALS
Status: DISCONTINUED | OUTPATIENT
Start: 2022-11-02 | End: 2022-11-07 | Stop reason: HOSPADM

## 2022-11-02 RX ORDER — PREGABALIN 75 MG/1
75 CAPSULE ORAL 2 TIMES DAILY
Status: ON HOLD | COMMUNITY
End: 2022-11-02

## 2022-11-02 RX ORDER — CARVEDILOL 6.25 MG/1
6.25 TABLET ORAL 2 TIMES DAILY WITH MEALS
Status: DISCONTINUED | OUTPATIENT
Start: 2022-11-02 | End: 2022-11-02

## 2022-11-02 RX ORDER — SODIUM CHLORIDE 0.9 % (FLUSH) 0.9 %
10 SYRINGE (ML) INJECTION EVERY 12 HOURS SCHEDULED
Status: DISCONTINUED | OUTPATIENT
Start: 2022-11-02 | End: 2022-11-07 | Stop reason: HOSPADM

## 2022-11-02 RX ORDER — ENOXAPARIN SODIUM 100 MG/ML
1 INJECTION SUBCUTANEOUS ONCE
Status: DISCONTINUED | OUTPATIENT
Start: 2022-11-02 | End: 2022-11-02

## 2022-11-02 RX ORDER — ASPIRIN 81 MG/1
81 TABLET ORAL DAILY
Status: DISCONTINUED | OUTPATIENT
Start: 2022-11-02 | End: 2022-11-07 | Stop reason: HOSPADM

## 2022-11-02 RX ORDER — ATORVASTATIN CALCIUM 10 MG/1
10 TABLET, FILM COATED ORAL DAILY
COMMUNITY

## 2022-11-02 RX ORDER — CYCLOSPORINE 0.5 MG/ML
1 EMULSION OPHTHALMIC EVERY 12 HOURS SCHEDULED
Status: DISCONTINUED | OUTPATIENT
Start: 2022-11-02 | End: 2022-11-07 | Stop reason: HOSPADM

## 2022-11-02 RX ORDER — CLONIDINE HYDROCHLORIDE 0.1 MG/1
0.1 TABLET ORAL DAILY PRN
Status: DISCONTINUED | OUTPATIENT
Start: 2022-11-02 | End: 2022-11-07 | Stop reason: HOSPADM

## 2022-11-02 RX ORDER — SODIUM CHLORIDE 0.9 % (FLUSH) 0.9 %
10 SYRINGE (ML) INJECTION AS NEEDED
Status: DISCONTINUED | OUTPATIENT
Start: 2022-11-02 | End: 2022-11-07 | Stop reason: HOSPADM

## 2022-11-02 RX ORDER — ENOXAPARIN SODIUM 100 MG/ML
30 INJECTION SUBCUTANEOUS
Status: DISCONTINUED | OUTPATIENT
Start: 2022-11-02 | End: 2022-11-04

## 2022-11-02 RX ORDER — ONDANSETRON 2 MG/ML
4 INJECTION INTRAMUSCULAR; INTRAVENOUS EVERY 6 HOURS PRN
Status: DISCONTINUED | OUTPATIENT
Start: 2022-11-02 | End: 2022-11-07 | Stop reason: HOSPADM

## 2022-11-02 RX ADMIN — DEXTROSE MONOHYDRATE 25 ML: 25 INJECTION, SOLUTION INTRAVENOUS at 00:22

## 2022-11-02 RX ADMIN — SODIUM ZIRCONIUM CYCLOSILICATE 10 G: 10 POWDER, FOR SUSPENSION ORAL at 12:21

## 2022-11-02 RX ADMIN — Medication 10 ML: at 10:18

## 2022-11-02 RX ADMIN — ASPIRIN 81 MG: 81 TABLET, COATED ORAL at 10:17

## 2022-11-02 RX ADMIN — SODIUM CHLORIDE, POTASSIUM CHLORIDE, SODIUM LACTATE AND CALCIUM CHLORIDE 1000 ML: 600; 310; 30; 20 INJECTION, SOLUTION INTRAVENOUS at 01:12

## 2022-11-02 RX ADMIN — CALCIUM GLUCONATE 1 G: 98 INJECTION, SOLUTION INTRAVENOUS at 00:24

## 2022-11-02 RX ADMIN — ATORVASTATIN CALCIUM 10 MG: 10 TABLET, FILM COATED ORAL at 10:17

## 2022-11-02 RX ADMIN — SODIUM CHLORIDE, POTASSIUM CHLORIDE, SODIUM LACTATE AND CALCIUM CHLORIDE 75 ML/HR: 600; 310; 30; 20 INJECTION, SOLUTION INTRAVENOUS at 04:13

## 2022-11-02 RX ADMIN — CYCLOSPORINE 1 DROP: 0.5 EMULSION OPHTHALMIC at 22:00

## 2022-11-02 RX ADMIN — MEROPENEM 1 G: 1 INJECTION, POWDER, FOR SOLUTION INTRAVENOUS at 09:06

## 2022-11-02 RX ADMIN — FERROUS SULFATE TAB 325 MG (65 MG ELEMENTAL FE) 325 MG: 325 (65 FE) TAB at 10:16

## 2022-11-02 RX ADMIN — MEROPENEM 500 MG: 500 INJECTION, POWDER, FOR SOLUTION INTRAVENOUS at 22:00

## 2022-11-02 RX ADMIN — CEFTRIAXONE SODIUM 2 G: 2 INJECTION, POWDER, FOR SOLUTION INTRAMUSCULAR; INTRAVENOUS at 01:40

## 2022-11-02 RX ADMIN — CARVEDILOL 3.12 MG: 3.12 TABLET, FILM COATED ORAL at 18:38

## 2022-11-02 RX ADMIN — Medication 10 ML: at 22:03

## 2022-11-02 RX ADMIN — ENOXAPARIN SODIUM 30 MG: 100 INJECTION SUBCUTANEOUS at 09:07

## 2022-11-02 RX ADMIN — CARVEDILOL 3.12 MG: 3.12 TABLET, FILM COATED ORAL at 10:19

## 2022-11-02 RX ADMIN — CYCLOSPORINE 1 DROP: 0.5 EMULSION OPHTHALMIC at 09:08

## 2022-11-02 RX ADMIN — INSULIN HUMAN 5 UNITS: 100 INJECTION, SOLUTION PARENTERAL at 00:15

## 2022-11-02 NOTE — PLAN OF CARE
Goal Outcome Evaluation:  Plan of Care Reviewed With: patient        Progress: no change  Outcome Evaluation: Pt was admitted tonight through the ED. Pt comes from [facility] with AMS, UTI, and elevated trop. She did screen sepsis positive. She is only oriented to self. She is unable to give me a history or tell me much about herself. She is currently NPO because she failed a bedside swallow. LR running at 75 mL/hr. Pt is incontinent to bowel and bladder at this time. She is on RA currently. Tele shows SR, HR 94-98 with a 1st degree block. Safety maintained. Bed alarm set.

## 2022-11-02 NOTE — ED NOTES
Family  from [phone] called for pt update, unable to speak on phone due to being in another pt room, obtained phone number and will call back

## 2022-11-02 NOTE — ED PROVIDER NOTES
Subjective   History of Present Illness  Patient states that she was just recently here.  Patient arrives by EMS who provides history and states that the patient was discharged from the hospital last month.  EMS states that the nursing home of the patient is in was concerned the patient was more confused than her baseline and so sent her here for further evaluation.  Patient states that she has been having abdominal pain.  Denies any nausea or any vomiting.  Denies any dysuria or hematuria or hematochezia at this time.  States that she has had some slight diarrhea.  States that she will fall or hit her head recently.  Denies any new rashes.        Review of Systems   All other systems reviewed and are negative.      Past Medical History:   Diagnosis Date   • THERESA (acute kidney injury) (HCC) due to sepsis 11/2/2022   • Anxiety    • Arthritis    • CAD (coronary artery disease)    • Colon polyp    • DDD (degenerative disc disease), lumbar    • Deep venous thrombosis (HCC)    • Depression    • Diverticulosis    • Esophageal stricture    • GERD (gastroesophageal reflux disease)    • History of transfusion    • Hypercholesteremia    • Hypertension    • LPRD (laryngopharyngeal reflux disease)    • Macular degeneration    • Multinodular goiter 2/23/2017   • Osteoarthritis    • Pruritic disorder    • Spastic colon    • Thyroid nodule    • Type 2 MI (myocardial infarction) (HCC) due to THERESA and UTI 11/2/2022       Allergies   Allergen Reactions   • Levaquin [Levofloxacin] Unknown (See Comments)     Pt doesn't remember   • Codeine Other (See Comments)     Pt does not recall   • Morphine And Related Other (See Comments)     Pt does not recall       Past Surgical History:   Procedure Laterality Date   • BLADDER REPAIR     • CATARACT EXTRACTION     • CHOLECYSTECTOMY     • COLONOSCOPY  04/22/2014    2 polyps, adenomatous, diverticulosis   • COLONOSCOPY N/A 6/26/2020    Procedure: COLONOSCOPY WITH ANESTHESIA;  Surgeon: Jared  John LAGOS MD;  Location: [facility] ENDOSCOPY;  Service: Gastroenterology;  Laterality: N/A;  pre op: constipation  post op: diverticulosis,   PCP: Adam Delgadillo MD   • CYSTOCELE REPAIR     • ENDOSCOPY  04/22/2014    Schatzki's ring dilated   • ENDOSCOPY N/A 6/26/2020    Procedure: ESOPHAGOGASTRODUODENOSCOPY WITH ANESTHESIA;  Surgeon: John Coleman MD;  Location: [facility] ENDOSCOPY;  Service: Gastroenterology;  Laterality: N/A;  pre op: dysphagia  post op:stricture, dilated   PCP:Adam Delgadillo MD   • ENDOSCOPY N/A 9/2/2021    Procedure: ESOPHAGOGASTRODUODENOSCOPY WITH ANESTHESIA;  Surgeon: John Coleman MD;  Location: [facility] ENDOSCOPY;  Service: Gastroenterology;  Laterality: N/A;  pre op: dysphagia  post op:gastritis  PCP: VERA Noble MD   • FEMUR FRACTURE SURGERY     • HYSTERECTOMY     • INCISION AND DRAINAGE LEG Left 11/3/2021    Procedure: INCISION AND DRAINAGE LEG ABSCESS;  Surgeon: Cesia Mondragon MD;  Location: [facility] OR;  Service: General;  Laterality: Left;   • REPLACEMENT TOTAL KNEE     • ULNAR NERVE TRANSPOSITION         Family History   Problem Relation Age of Onset   • Colon cancer Mother    • Heart disease Mother    • Heart disease Father    • Colon polyps Neg Hx    • Esophageal cancer Neg Hx        Social History     Socioeconomic History   • Marital status:    Tobacco Use   • Smoking status: Never   • Smokeless tobacco: Never   Vaping Use   • Vaping Use: Never used   Substance and Sexual Activity   • Alcohol use: No   • Drug use: No   • Sexual activity: Defer           Objective   Physical Exam  Vitals and nursing note reviewed.   Constitutional:       General: She is not in acute distress.     Appearance: Normal appearance. She is not toxic-appearing or diaphoretic.   HENT:      Head: Normocephalic and atraumatic.      Nose: Nose normal.   Eyes:      General:         Right eye: No discharge.         Left eye: No discharge.      Extraocular Movements: Extraocular  movements intact.      Conjunctiva/sclera: Conjunctivae normal.   Cardiovascular:      Rate and Rhythm: Normal rate.      Pulses: Normal pulses.   Pulmonary:      Effort: Pulmonary effort is normal. No respiratory distress.   Abdominal:      General: Abdomen is flat.      Tenderness: There is abdominal tenderness. There is no guarding or rebound.   Musculoskeletal:         General: Normal range of motion.      Cervical back: Normal range of motion.   Skin:     General: Skin is warm.   Neurological:      Mental Status: She is alert. She is disoriented.   Psychiatric:         Mood and Affect: Mood normal.         Judgment: Judgment normal.         Procedures           ED Course                                           MDM  Number of Diagnoses or Management Options  THERESA (acute kidney injury) (HCC)  Elevated troponin  Generalized abdominal pain  Hyperkalemia  Urinary tract infection without hematuria, site unspecified  Diagnosis management comments: This is a 82yoF presenting with concern for altered mental status. Patient is speaking in full sentences and is oriented at this time. Patient arrived hemodynamically stable and was afebrile. Neurological exam without any focal deficits. Currently no overt concern for a dangerous emergent cause of the presenting complaint but differentials  include, but are not limited to polypharmacy, toxidrome, CVA, ICH, dementia. No red flags for SAH (based on history).     No evidence of meningismus on exam. Patient is afebrile, without nuchal rigidity, and no concerning skin findings. Very low concern for CNS infection. Presentation not consistent with other acute, emergent causes of AMS at this time.     Plan to obtain cbc, cmp, ekg, troponin x 2, CT head, CT chest/abdomen/pelvis, UA, administer fluids, and reassess.     The imaging and other workup was reviewed and found to have an THERESA, elevated troponin, abdominal pain, hyperkalemia and a urinary tract infection.  Given all the  symptoms she was treated appropriately with insulin, dextrose, calcium gluconate and antibiotics and fluids and showed mild improvement in her mental status.  Given the need for further treatment patient was discussed with the hospitalist who graciously admitted the patient in stable condition.        Amount and/or Complexity of Data Reviewed  Clinical lab tests: ordered and reviewed  Tests in the radiology section of CPT®: ordered and reviewed  Decide to obtain previous medical records or to obtain history from someone other than the patient: yes        Final diagnoses:   Elevated troponin   Hyperkalemia   Urinary tract infection without hematuria, site unspecified   THERESA (acute kidney injury) (HCC)   Generalized abdominal pain       ED Disposition  ED Disposition     ED Disposition   Decision to Admit    Condition   --    Comment   Level of Care: Telemetry [5]   Diagnosis: Elevated troponin [028549]   Admitting Physician: HEATHER BRO [[id number]]   Attending Physician: HEATHER BRO [[id number]]   Certification: I Certify That Inpatient Hospital Services Are Medically Necessary For Greater Than 2 Midnights               No follow-up provider specified.       Medication List      ASK your doctor about these medications    aspirin 81 MG chewable tablet  Ask about: Which instructions should I use?     atorvastatin 10 MG tablet  Commonly known as: LIPITOR  Ask about: Which instructions should I use?     nortriptyline 50 MG capsule  Commonly known as: PAMELOR  Ask about: Which instructions should I use?     pregabalin 50 MG capsule  Commonly known as: LYRICA  Take 1 capsule by mouth 2 (Two) Times a Day.  Ask about: Which instructions should I use?             Shahid Guzmán MD  11/03/22 0024

## 2022-11-02 NOTE — PLAN OF CARE
Goal Outcome Evaluation:              Outcome Evaluation: NTN assessment complete. NPO. SLP evaluation progressing. Will observe recommendations. Concern for SBO. CT abdomen noted. Defer PO initiation to MD. Will continue to follow per protocol.

## 2022-11-02 NOTE — PROGRESS NOTES
"Pharmacy Dosing Service  Anticoagulant  Enoxaparin    Assessment/Action/Plan:  Lovenox 40 mg SQ every 24 hours for VTE prophylaxis has been adjusted to 30 mg SQ every 24 hours for patient with CrCl < 30 ml/min. Pharmacy will continue to monitor renal function and adjust dose accordingly.       Subjective:  Carmen Obrien is a 82 y.o. female on Enoxaparin 30 mg SQ every 24 hours for indication of VTE prophylaxis.  Objective:  [Ht: 165.1 cm (65\"); Wt: 79 kg (174 lb 3.2 oz); BMI: Body mass index is 28.99 kg/m².]  Estimated Creatinine Clearance: 17.3 mL/min (A) (by C-G formula based on SCr of 2.7 mg/dL (H)).   Lab Results   Component Value Date    DDIMER 1.23 (H) 11/01/2021      Lab Results   Component Value Date    INR 1.23 (H) 11/01/2022    PROTIME 15.0 (H) 11/01/2022      Lab Results   Component Value Date    HGB 12.3 11/01/2022      Lab Results   Component Value Date     11/01/2022       Joel Adams, PharmD  11/02/22 03:33 CDT     "

## 2022-11-02 NOTE — PLAN OF CARE
Goal Outcome Evaluation:              Outcome Evaluation: VSS, afebrile, on RA, HR NSR , continues with confusion, does answer some questions appropriately and easy to reorient as needed, no c/o of pain, SLP ok pt for pureed diet with thins, pt reports has dentures but are back at the facility, able to feed herself  about 25% of herlunch and ate all of her magic cup, no difficulties noted, sleepy this am but more alert as day progressed, has family in to visit, had one small liquid bm, anila area/rectal area excoriated, moisture barrier cream in use, bilat arm bruised, Left facial reddened/maroon, pt reports this is a birthmark she has, no urge to void, bladder scan of 678 ml, i/o done and obtained 700 ml, urine dark and strong odor, IV abx given, potassium improved, no c/o of SOB or chest pain today, bed alarm in use, wound care consulted for wounds to bilat knees, wound care nurse debrided wounds and placed dressings, see wound care orders
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
## 2022-11-02 NOTE — PROGRESS NOTES
[facility] Medicine Services  INPATIENT PROGRESS NOTE    Patient Name: Carmen Obrien  Date of Admission: 11/1/2022  Today's Date: 11/02/22  Length of Stay: 0  Primary Care Physician: VERA Noble MD    Subjective   Chief Complaint: weakness, confusion  HPI     Patient was seen and examined at bedside.  Per nursing normally the patient is AO x 3 and functional at SNF.  The patient is oriented to her name and knows she is in a hospital and knows she came from a nursing facility.  Otherwise she is unable to tell me anything of significance of recent vents.  She does indicate she had a few episodes of diarrhea today.  Patient was found to have RSV and THERESA and hyperkalemia.        Review of Systems   Constitutional: Positive for fatigue. Negative for chills and fever.   HENT: Negative for congestion.    Respiratory: Negative for cough and shortness of breath.    Cardiovascular: Negative for chest pain and palpitations.   Gastrointestinal: Positive for diarrhea. Negative for abdominal distention, abdominal pain, constipation, nausea and vomiting.   Neurological: Positive for weakness.        All pertinent negatives and positives are as above. All other systems have been reviewed and are negative unless otherwise stated.     Objective    Temp:  [97.5 °F (36.4 °C)-98.2 °F (36.8 °C)] 97.5 °F (36.4 °C)  Heart Rate:  [82-94] 91  Resp:  [16-18] 18  BP: ()/(36-73) 109/68  Physical Exam  Vitals and nursing note reviewed.   Constitutional:       Appearance: Normal appearance.   HENT:      Head: Normocephalic and atraumatic.      Mouth/Throat:      Mouth: Mucous membranes are dry.   Eyes:      General: No scleral icterus.     Conjunctiva/sclera: Conjunctivae normal.   Cardiovascular:      Rate and Rhythm: Normal rate and regular rhythm.      Heart sounds: Murmur heard.   Pulmonary:      Effort: Pulmonary effort is normal. No respiratory distress.      Breath sounds: Normal breath  sounds. No stridor.   Abdominal:      General: Abdomen is flat. Bowel sounds are normal. There is no distension.      Palpations: Abdomen is soft. There is no mass.   Skin:     General: Skin is warm and dry.      Coloration: Skin is pale.   Neurological:      General: No focal deficit present.      Mental Status: She is alert. She is disoriented.   Psychiatric:         Mood and Affect: Mood normal.         Behavior: Behavior normal.             Results Review:  I have reviewed the labs, radiology results, and diagnostic studies.    Laboratory Data:   Results from last 7 days   Lab Units 11/01/22 2256 10/28/22  0900   WBC 10*3/mm3 13.31* 5.07   HEMOGLOBIN g/dL 12.3 9.1*   HEMATOCRIT % 37.7 28.9*   PLATELETS 10*3/mm3 366 298        Results from last 7 days   Lab Units 11/01/22 2256 10/31/22  1330 10/28/22  0900   SODIUM mmol/L 132* 135* 135*   POTASSIUM mmol/L 5.9* 4.7 3.9   CHLORIDE mmol/L 92* 96* 99   CO2 mmol/L 23.0 28.0 26.0   BUN mg/dL 55* 25* 22   CREATININE mg/dL 2.70* 0.95 0.67   CALCIUM mg/dL 10.2 10.4 10.1   BILIRUBIN mg/dL 0.7  --   --    ALK PHOS U/L 72  --   --    ALT (SGPT) U/L 19  --   --    AST (SGOT) U/L 21  --   --    GLUCOSE mg/dL 156* 125* 150*       Culture Data:   No results found for: BLOODCX, URINECX, WOUNDCX, MRSACX, RESPCX, STOOLCX    Radiology Data:   Imaging Results (Last 24 Hours)     Procedure Component Value Units Date/Time    CT Chest Without Contrast Diagnostic [[id number]] Collected: 11/02/22 0734     Updated: 11/02/22 0744    Narrative:      EXAMINATION: CT CHEST WO CONTRAST DIAGNOSTIC-      11/2/2022 1:20 AM CDT     HISTORY: elevated troponin, AMS, abdominal pain; R77.8-Other specified  abnormalities of plasma proteins; E87.5-Hyperkalemia; N39.0-Urinary  tract infection, site not specified; N17.9-Acute kidney failure,  unspecified; R10.84-Generalized abdominal pain     In order to have a CT radiation dose as low as reasonably achievable  Automated Exposure Control was utilized for  adjustment of the mA and/or  KV according to patient size.     DLP in mGycm= 247     The CT scan of the chest is performed without intravenous contrast  enhancement.     Images are acquired in axial plane and subsequent reconstruction in  coronal and sagittal planes.     There is no previous study for comparison.     Scattered areas of linear parenchymal densities representing scarring  and atelectasis are noted.     Moderate bronchiectatic changes are seen in the lungs bilaterally more  so in the lower lobes.     No finding to suggest acute infiltrate or consolidation.     The visualized tracheobronchial structures are patent.     Limited visualized in soft tissues of the neck are unremarkable. There  are bilateral small thyroid nodules, left larger than the right.     A few borderline prominent supraclavicular lymph nodes bilaterally are  seen.     Atheromatous changes thoracic aorta noted. No aneurysmal dilatation.     Moderate dilatation of the central pulmonary arteries are noted  suggesting pulmonary arterial hypertension.     Atheromatous changes of the coronary arteries are seen.     There is no evidence of mediastinal lymphadenopathy.     The limited visualized abdominal organs are unremarkable.     Images reviewed in bone window show severe chronic degenerative changes  of the thoracic spine. Old healed fracture of the left ribs are noted.  Moderate accentuated dorsal kyphosis. No acute bony abnormality is  noted.       Impression:      1. No acute abnormality in the chest. No mass or infiltrate. No  mediastinal lymphadenopathy.  2. Pulmonary arterial hypertension. Moderate bronchiectasis.     The above study was initially reviewed and reported by [facility]. I do not  find any discrepancies.                             This report was finalized on 11/02/2022 07:41 by Dr. Shea Mcdowell MD.    XR Chest 1 View [[id number]] Collected: 11/02/22 0728     Updated: 11/02/22 0732    Narrative:      EXAMINATION: XR  CHEST 1 VW- 11/2/2022 7:28 AM CDT     HISTORY: Altered mental status.     REPORT: A frontal view the chest was obtained.     COMPARISON: Chest x-ray 10/11/2022.     The lungs are hypoaerated, there is mild central basilar vascular  crowding. No consolidating infiltrate is identified. Heart size is  normal. No pneumothorax or pleural effusion is identified.  Cholecystectomy clips are present. Advanced degenerative changes of the  right shoulder are noted.       Impression:      Shallow inspiration with bibasilar atelectasis and vascular  crowding, no consolidating infiltrates.  This report was finalized on 11/02/2022 07:29 by Dr. Jerry Aguayo MD.    CT Abdomen Pelvis Without Contrast [[id number]] Collected: 11/02/22 0705     Updated: 11/02/22 0726    Narrative:      EXAMINATION: CT ABDOMEN PELVIS WO CONTRAST-      11/2/2022 1:20 AM CDT     HISTORY: abdominal pain, ams; R77.8-Other specified abnormalities of  plasma proteins; E87.5-Hyperkalemia; N39.0-Urinary tract infection, site  not specified; N17.9-Acute kidney failure, unspecified;  R10.84-Generalized abdominal pain     In order to have a CT radiation dose as low as reasonably achievable  Automated Exposure Control was utilized for adjustment of the mA and/or  KV according to patient size.     DLP in mGycm= 668     The CT scan of the abdomen and pelvis is performed without intravenous  or oral contrast enhancement.     Images are acquired in axial plane and subsequent reconstruction in  coronal and sagittal planes.     There is no previous study for comparison.     This study is of limited diagnostic value due to absence of intravenous  and oral contrast enhancement.     The lung bases included in the study appear unremarkable.     Unenhanced liver and spleen are unremarkable.     The gallbladder is surgically absent.     Small atrophic pancreas is seen.     Moderate lobulation left adrenal gland is seen. No discrete mass. The  right adrenal glands are  normal.     The unenhanced kidneys bilaterally appear unremarkable with moderate  lobulation of the contour. No calculi. No hydronephrosis. The limited  visualized ureters are unremarkable and nondilated. The urinary bladder  is moderately distended. No intrinsic abnormality.     The uterus is absent. No adnexal masses.     The stomach is decompressed. The duodenum is normal. Fluid-filled  nondistended small bowel loops are seen. Appendix is not visualized. No  finding to suggest appendicitis. Moderately dilated fluid-filled large  bowel loops are seen. There is moderate thickening of the wall of the  descending and sigmoid colon. There is circumferential thickening of the  wall of the distal sigmoid colon and rectum. There is moderate  surrounding fat infiltration. There is diverticulosis of the distal  colon. No finding to suggest diverticulitis.     Severe atheromatous changes of the abdominal aorta and iliac arteries  are noted. There are large calcific plaques at the origin of celiac and  superior mesenteric arteries. No evidence of abdominal or pelvic  lymphadenopathy.     Images are reviewed in bone window show chronic degenerative changes of  the thoracolumbar spine with multilevel severe disc degeneration. No  acute bony abnormality.       Impression:      1. The changes of acute proctocolitis.  2. Diverticulosis of the colon. No finding to suggest diverticulitis.  3. Fluid-filled small and large bowel loops may suggest the diarrhea  disease.     The above study was initially reviewed and reported by [facility]. I do not  find any discrepancies.                       This report was finalized on 11/02/2022 07:23 by Dr. Shea Mcdowell MD.    CT Head Without Contrast [[id number]] Collected: 11/02/22 0625     Updated: 11/02/22 0631    Narrative:      EXAMINATION: CT HEAD WO CONTRAST-      11/2/2022 1:20 AM CDT     HISTORY: ams; R77.8-Other specified abnormalities of plasma proteins;  E87.5-Hyperkalemia;  N39.0-Urinary tract infection, site not specified;  N17.9-Acute kidney failure, unspecified; R10.84-Generalized abdominal  pain     In order to have a CT radiation dose as low as reasonably achievable  Automated Exposure Control was utilized for adjustment of the mA and/or  KV according to patient size.     DLP in mGycm= 534     The CT scan of the head is performed without intravenous contrast  enhancement.     The images are acquired in axial plane and subsequent reconstruction in  coronal and sagittal planes.     Comparison is made with the previous study dated 10/11/2022.     There is no evidence of a mass. There is no midline shift.     There is no evidence of intracranial hemorrhage or hematoma.     Moderately prominent ventricles, basal cisterns and cortical sulci  represent moderate chronic volume loss. No change.     Scattered areas of chronic white matter ischemia in the centrum  semiovale bilaterally are noted. The gray-white matter differentiation  is maintained.     Images reviewed in bone window show no evidence of a skull fracture. The  visualized paranasal sinuses and mastoid air cells are unremarkable.       Impression:      1. No acute intracranial abnormality.     The above study was initially reviewed and reported by [facility]. I do not  find any discrepancies.                             This report was finalized on 11/02/2022 06:28 by Dr. Shea Mcdowell MD.          I have reviewed the patient's current medications.     Assessment/Plan     Active Hospital Problems    Diagnosis    • **Type 2 MI (myocardial infarction) (HCC) due to THERESA and UTI    • RSV (acute bronchiolitis due to respiratory syncytial virus)    • THERESA (acute kidney injury) (HCC) due to sepsis    • History of ESBL E. coli infection    • Hyperkalemia    • Acute cystitis with hematuria    • Essential hypertension        Plan:  Patient admitted on 11/1 by Dr. Ureña.    Patient was diagnosed with RSV.  Patient having no respiratory  symptoms.  Based on labs suspect patient has had poor PO intake. Continue supportive care.    The patient noted to have findings consistent with a UTI.  The patient has a history of ESBL.  Discontinued the ceftriaxone the patient was placed on and will start meropenem based on previous sensitivities.  Will await urine culture and blood culture results.    The patient is noted to have THERESA and hyperkalemia.  The patient treated with IVF with improvement of the THERESA.  The hyperkalemia, is likely mild but difficult to assess as several specimens have been hemolyzed.  Will give a dose of lokelma and recheck this afternoon.    Continue LR.  Monitor In/out and renal function closely.      Home medications reviewed and resume as appropriate.  Adjusted BP medications given current condition.    Lovenox for VTE PPx              Discharge Planning: I expect the patient to be discharged to SNF in 2-4 days.    Electronically signed by Prashanth Nguyen MD, 11/02/22, 08:09 CDT.

## 2022-11-02 NOTE — PROGRESS NOTES
"Pharmacy Dosing Service  Antimicrobial Dosing  Merrem    Assessment/Action/Plan:  Based on indication and renal function, Merrem dosed at 1g IV once loading dose followed by  500 mg IV every 12 hours via extended infusion. Pharmacy will continue to monitor daily and make further adjustment(s) accordingly.     Subjective:  Carmen Obrien is a 82 y.o. female with a  \"Pharmacy to Dose Merrem\" consult for the treatment of UTI , day 1 of 7 of treatment.    Current end date: 11/9/22    Objective:  Ht: 165.1 cm (65\"); Wt: 79 kg (174 lb 3.2 oz)  Estimated Creatinine Clearance: 17.3 mL/min (A) (by C-G formula based on SCr of 2.7 mg/dL (H)).   Creatinine   Date Value Ref Range Status   11/01/2022 2.70 (H) 0.57 - 1.00 mg/dL Final   10/31/2022 0.95 0.57 - 1.00 mg/dL Final   10/28/2022 0.67 0.57 - 1.00 mg/dL Final      Lab Results   Component Value Date    WBC 13.31 (H) 11/01/2022    WBC 5.07 10/28/2022    WBC 10.10 10/17/2022      Baseline culture results:  Microbiology Results (last 10 days)       Procedure Component Value - Date/Time    COVID PRE-OP / PRE-PROCEDURE SCREENING ORDER (NO ISOLATION) - Swab, Nasopharynx [[id number]]  (Abnormal) Collected: 11/01/22 2259    Lab Status: Final result Specimen: Swab from Nasopharynx Updated: 11/01/22 2348    Narrative:      The following orders were created for panel order COVID PRE-OP / PRE-PROCEDURE SCREENING ORDER (NO ISOLATION) - Swab, Nasopharynx.  Procedure                               Abnormality         Status                     ---------                               -----------         ------                     COVID-19, FLU A/B, RSV P...[[id number]]  Abnormal            Final result                 Please view results for these tests on the individual orders.    COVID-19, FLU A/B, RSV PCR - Swab, Nasopharynx [[id number]]  (Abnormal) Collected: 11/01/22 2259    Lab Status: Final result Specimen: Swab from Nasopharynx Updated: 11/01/22 2348     COVID19 Not Detected     " Influenza A PCR Not Detected     Influenza B PCR Not Detected     RSV, PCR Detected    Narrative:      Fact sheet for providers: https://www.fda.gov/media/783098/download    Fact sheet for patients: https://www.fda.gov/media/198982/download    Test performed by PCR.            J Von Bokel, PharmD  11/02/22 07:40 CDT

## 2022-11-02 NOTE — PAYOR COMM NOTE
"11/2/22 [facility] [phone]  [phone]    ER ADMIT TO INPATIENT ON 11/2/22.              Mariposa Jenkins (82 y.o. Female)     Date of Birth   1940    Social Security Number       [place]   [address] [place] [place] [address]    Home Phone   [phone]    MRN   [id number]       Orthodoxy   [facility]    Marital Status                               Admission Date   11/1/22    Admission Type   Emergency    Admitting Provider   Prashanth Nguyen MD    Attending Provider   Prashanth Nguyen MD    Department, Room/Bed   [facility] 4B, 412/1       Discharge Date       Discharge Disposition       Discharge Destination                               Attending Provider: Prashanth Nguyen MD    Allergies: Levaquin [Levofloxacin], Codeine, Morphine And Related    Isolation: Contact   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), 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), 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), RSV (11/01/22)   Code Status: No CPR    Ht: 165.1 cm (65\")   Wt: 79 kg (174 lb 3.2 oz)    Admission Cmt: None   Principal Problem: Type 2 MI (myocardial infarction) (HCC) due to THERESA and UTI [I21.A1]                 Active Insurance as of 11/1/2022     Primary Coverage     Payor Plan Insurance Group Employer/Plan Group    HUMANA MEDICARE REPLACEMENT HUMANA MEDICARE REPLACEMENT [id number]     Payor Plan Address Payor Plan Phone Number Payor Plan Fax Number Effective Dates    [address] [address] [phone]  1/1/2019 - None Entered    [facility] [id number]       Subscriber Name Subscriber Birth Date Member ID       MARIPOSA JENKINS 1940 [id number]                 Emergency Contacts      (Rel.) Home Phone Work Phone Mobile Phone    Selene Jenkins (Relative) [phone] -- --    Brandee Anderson (Relative) [phone] -- --    Adam Jenkins (Son) [phone] -- [phone]           [facility] Encounter Date/Time: 11/1/2022 2141   Hospital Account: [id number]    MRN: [id number]   Patient:  " Carmen Jenkins   Contact Serial #: [id number]   SSN:          ENCOUNTER             Patient Class: Inpatient   Unit: [facility] 4B   Hospital Service: Medicine     Bed: 412/1   Admitting Provider: Prashanth Nguyen MD   Referring Physician: Provider, No Known   Attending Provider: Prashanth Nguyen MD   Adm Diagnosis: Elevated troponin [R77.8]               PATIENT          Name: Carmen Jenkins : 1940 (82 yrs)   Address: [address] [place] [address] Sex: Female   City: [place] [address]   [place]: [facility]   Marital Status:  Ethnicity: NOT                                                                              Race: WHITE   Primary Care Provider: VERA Noble MD Patients Phone: Home Phone: [phone]     Mobile Phone: [phone]   EMERGENCY CONTACT   Contact Name Legal Guardian? Relationship to Patient Home Phone Work Phone   1. Selene Jenkins  2. Brandee Anderson No  No Relative  Relative [phone]           GUARANTOR            Guarantor: Carmen Jenkins     : 1940   Address: [address] [place] [address] Sex: Female     [place] [address]     Relation to Patient: Self       Home Phone: [phone]   Guarantor ID: [id number]       Work Phone:     GUARANTOR EMPLOYER   Employer:           Status: RETIRED   COVERAGE          PRIMARY INSURANCE   Payor: HUMANA MEDICARE REPLACEMENT Plan: HUMANA MEDICARE REPLACEMENT   Group Number: [id number] Insurance Type: INDEMNITY   Subscriber Name: CARMEN JENKINS Subscriber : 1940   Subscriber ID: [id number] Coverage Address: [address] [address]   Pat. Rel. to Subscriber: Self Coverage Phone: [phone]   SECONDARY INSURANCE   Payor: N/A Plan: N/A   Group Number:   Insurance Type:     Subscriber Name:   Subscriber :     Subscriber ID:   Coverage Address:     Pat. Rel. to Subscriber:   Coverage Phone:        Contact Serial # ([id number])       2022    Chart ID [id number])         "    Noelle Hurt DO   Physician  Medicine  H&P      Signed  Date of Service:  11/02/22 0235  Creation Time:  11/02/22 0235     Signed        [facility] Medicine Services  HISTORY AND PHYSICAL     Date of Admission: 11/1/2022  Primary Care Physician: VERA Noble MD     Subjective      Chief Complaint: AMS     History of Present Illness  82-year-old female who presents from [facility].  She is drowsy, but arousable to give history.  She states that she thought she came in for some type of hypoxia.  ED MD noted that the patient had some type of belly pain.  2 weeks ago she was at the hospital for traumatic rhabdomyolysis and UTI.  Nursing staff notes the patient has had 3-4 very soft small bowel movements while in the emergency department.  Patient's or mucosa is very dry.  When asked about her abdomen, she tells me \"it does not feel too hot.\"  She states that she does walk some at the nursing facility.  She does deny chest pain.  CT scans have not resulted on my exam, but EDMD did note the possibility of a early to partial small bowel obstruction.  The patient's respiratory panel was positive for RSV.  Her normal creatinine is 0.95.  She does have elevated cardiac markers.     10/11/22 urine culture:                             Escherichia coli ESBL       CASEY       Ertapenem <=0.5 ug/ml Susceptible       Gentamicin <=1 ug/ml Susceptible       Levofloxacin >=8 ug/ml Resistant       Meropenem <=0.25 ug/ml Susceptible       Nitrofurantoin 32 ug/ml Susceptible       Piperacillin + Tazobactam <=4 ug/ml Susceptible       Trimethoprim + Sulfamethoxazole >=320 ug/ml Resistant          Medical History:   Diagnosis Date   • Anxiety     • Arthritis     • CAD (coronary artery disease)     • Colon polyp     • DDD (degenerative disc disease), lumbar     • Deep venous thrombosis (HCC)     • Depression     • Diverticulosis     • Esophageal stricture " "    • GERD (gastroesophageal reflux disease)     • History of transfusion     • Hypercholesteremia     • Hypertension     • LPRD (laryngopharyngeal reflux disease)     • Macular degeneration     • Multinodular goiter 2/23/2017   • Osteoarthritis     • Pruritic disorder     • Spastic colon     • Thyroid nodule      Vital Signs: /71   Pulse 87   Temp 98.2 °F (36.8 °C) (Oral)   Resp 18   Ht 165.1 cm (65\")   Wt 79.4 kg (175 lb) Comment: no bed scale  SpO2 99%   BMI 29.12 kg/m²   Physical Exam  Vitals reviewed.   Constitutional:       Comments: Able to give some history   HENT:      Head: Normocephalic and atraumatic.      Right Ear: External ear normal.      Left Ear: External ear normal.      Nose: Nose normal.      Mouth/Throat:      Mouth: Mucous membranes are dry.      Pharynx: No oropharyngeal exudate.   Eyes:      General: No scleral icterus.     Conjunctiva/sclera: Conjunctivae normal.   Cardiovascular:      Rate and Rhythm: Normal rate and regular rhythm.      Heart sounds: Normal heart sounds.   Pulmonary:      Effort: Pulmonary effort is normal.      Breath sounds: Normal breath sounds.   Abdominal:      General: There is no distension.      Palpations: Abdomen is soft.      Tenderness: There is abdominal tenderness.   Musculoskeletal:         General: No swelling or tenderness.      Cervical back: Normal range of motion and neck supple.   Skin:     General: Skin is warm and dry.   Neurological:      Mental Status: She is alert.      Cranial Nerves: No cranial nerve deficit.      Comments: Able to make eye contact   Psychiatric:      Comments: Drowsy but arousable         Results Reviewed:           Lab Results (last 24 hours)      Procedure Component Value Units Date/Time     CK [[id number]] Collected: 11/02/22 0011     Specimen: Blood Updated: 11/02/22 0230     Troponin [[id number]]  (Abnormal) Collected: 11/02/22 0011     Specimen: Blood Updated: 11/02/22 0055       Troponin T 0.271 ng/mL       " Narrative:       Troponin T Reference Range:  <= 0.03 ng/mL-   Negative for AMI  >0.03 ng/mL-     Abnormal for myocardial necrosis.  Clinicians would have to utilize clinical acumen, EKG, Troponin and serial changes to determine if it is an Acute Myocardial Infarction or myocardial injury due to an underlying chronic condition.         Results may be falsely decreased if patient taking Biotin.        COVID PRE-OP / PRE-PROCEDURE SCREENING ORDER (NO ISOLATION) - Swab, Nasopharynx [[id number]]  (Abnormal) Collected: 11/01/22 2259     Specimen: Swab from Nasopharynx Updated: 11/01/22 2348     Narrative:       The following orders were created for panel order COVID PRE-OP / PRE-PROCEDURE SCREENING ORDER (NO ISOLATION) - Swab, Nasopharynx.  Procedure                               Abnormality         Status                     ---------                               -----------         ------                     COVID-19, FLU A/B, RSV P...[[id number]]  Abnormal            Final result                  Please view results for these tests on the individual orders.     COVID-19, FLU A/B, RSV PCR - Swab, Nasopharynx [[id number]]  (Abnormal) Collected: 11/01/22 2259     Specimen: Swab from Nasopharynx Updated: 11/01/22 2348       COVID19 Not Detected       Influenza A PCR Not Detected       Influenza B PCR Not Detected       RSV, PCR Detected     Narrative:       Fact sheet for providers: https://www.fda.gov/media/526293/download    Fact sheet for patients: https://www.fda.gov/media/831342/download     Test performed by PCR.     Procalcitonin [[id number]]  (Abnormal) Collected: 11/01/22 2256     Specimen: Blood Updated: 11/01/22 2345       Procalcitonin 0.75 ng/mL       Narrative:       As a Marker for Sepsis (Non-Neonates):     1. <0.5 ng/mL represents a low risk of severe sepsis and/or septic shock.  2. >2 ng/mL represents a high risk of severe sepsis and/or septic shock.     As a Marker for Lower Respiratory Tract Infections  "that require antibiotic therapy:     PCT on Admission    Antibiotic Therapy       6-12 Hrs later     >0.5                Strongly Recommended  >0.25 - <0.5        Recommended   0.1 - 0.25          Discouraged              Remeasure/reassess PCT  <0.1                Strongly Discouraged     Remeasure/reassess PCT     As 28 day mortality risk marker: \"Change in Procalcitonin Result\" (>80% or <=80%) if Day 0 (or Day 1) and Day 4 values are available. Refer to http://www.[facility]-pct-calculator.com     Change in PCT <=80%  A decrease of PCT levels below or equal to 80% defines a positive change in PCT test result representing a higher risk for 28-day all-cause mortality of patients diagnosed with severe sepsis for septic shock.     Change in PCT >80%  A decrease of PCT levels of more than 80% defines a negative change in PCT result representing a lower risk for 28-day all-cause mortality of patients diagnosed with severe sepsis or septic shock.         Lactic Acid, Plasma [[id number]]  (Normal) Collected: 11/01/22 2256     Specimen: Blood Updated: 11/01/22 2343       Lactate 1.9 mmol/L       aPTT [[id number]]  (Normal) Collected: 11/01/22 2256     Specimen: Blood Updated: 11/01/22 2337       PTT 34.4 seconds       Protime-INR [[id number]]  (Abnormal) Collected: 11/01/22 2256     Specimen: Blood Updated: 11/01/22 2337       Protime 15.0 Seconds         INR 1.23     Manual Differential [[id number]]  (Abnormal) Collected: 11/01/22 2256     Specimen: Blood Updated: 11/01/22 2335       Neutrophil % 64.0 %         Lymphocyte % 5.0 %         Monocyte % 11.0 %         Bands %  20.0 %         Neutrophils Absolute 11.18 10*3/mm3         Lymphocytes Absolute 0.67 10*3/mm3         Monocytes Absolute 1.46 10*3/mm3         Crenated RBC's Slight/1+       Poikilocytes Slight/1+       Polychromasia Slight/1+       WBC Morphology Normal       Giant Platelets Slight/1+     CBC & Differential [[id number]]  (Abnormal) Collected: 11/01/22 2256     " Specimen: Blood Updated: 11/01/22 2335     Narrative:       The following orders were created for panel order CBC & Differential.  Procedure                               Abnormality         Status                     ---------                               -----------         ------                     CBC Auto Differential[[id number]]        Abnormal            Final result                  Please view results for these tests on the individual orders.     CBC Auto Differential [[id number]]  (Abnormal) Collected: 11/01/22 2256     Specimen: Blood Updated: 11/01/22 2335       WBC 13.31 10*3/mm3         RBC 3.92 10*6/mm3         Hemoglobin          Hemoglobin 12.3 g/dL           Hematocrit 37.7 %         MCV 96.2 fL         MCH 31.4 pg         MCHC 32.6 g/dL         RDW 14.4 %         RDW-SD 50.4 fl         MPV 10.5 fL         Platelets 366 10*3/mm3       Narrative:       The previously reported component NRBC is no longer being reported. Previous result was 0.0 /100 WBC (Reference Range: 0.0-0.2 /100 WBC) on 11/1/2022 at 2310 T.     Comprehensive Metabolic Panel [[id number]]  (Abnormal) Collected: 11/01/22 2256     Specimen: Blood Updated: 11/01/22 2330       Glucose 156 mg/dL         BUN 55 mg/dL         Creatinine 2.70 mg/dL         Sodium 132 mmol/L         Potassium 5.9 mmol/L         Chloride 92 mmol/L         CO2 23.0 mmol/L         Calcium 10.2 mg/dL         Total Protein 7.7 g/dL         Albumin 4.10 g/dL         ALT (SGPT) 19 U/L         AST (SGOT) 21 U/L         Alkaline Phosphatase 72 U/L         Total Bilirubin 0.7 mg/dL         Globulin 3.6 gm/dL         A/G Ratio 1.1 g/dL         BUN/Creatinine Ratio 20.4       Anion Gap 17.0 mmol/L         eGFR 17.3       [id number]]  (Normal) Collected: 11/01/22 2256      Specimen: Blood Updated: 11/01/22 2329       Lipase 15 U/L       Troponin [[id number]]  (Abnormal) Collected: 11/01/22 2256     Specimen: Blood Updated: 11/01/22 2327       Troponin T 0.268 ng/mL        Narrative:       Troponin T Reference Range:  <= 0.03 ng/mL-   Negative for AMI  >0.03 ng/mL-     Abnormal for myocardial necrosis.  Clinicians would have to utilize clinical acumen, EKG, Troponin and serial changes to determine if it is an Acute Myocardial Infarction or myocardial injury due to an underlying chronic condition.         Results may be falsely decreased if patient taking Biotin.        Urinalysis With Microscopic If Indicated (No Culture) - Urine, Clean Catch [[id number]]  (Abnormal) Collected: 11/01/22 2306     Specimen: Urine, Clean Catch Updated: 11/01/22 2318       Color, UA Dark Yellow       Appearance, UA Cloudy       pH, UA 5.5       Specific Gravity, UA 1.015       Glucose, UA Negative       Ketones, UA Negative       Bilirubin, UA Small (1+)       Blood, UA Negative       Protein, UA Negative       Leuk Esterase, UA Moderate (2+)       Nitrite, UA Negative       Urobilinogen, UA 1.0 E.U./dL     Urinalysis, Microscopic Only - Urine, Clean Catch [[id number]]  (Abnormal) Collected: 11/01/22 2306     Specimen: Urine, Clean Catch Updated: 11/01/22 2318       RBC, UA 3-5 /HPF         WBC, UA Too Numerous to Count /HPF         Bacteria, UA 2+ /HPF         Squamous Epithelial Cells, UA None Seen /HPF         Hyaline Casts, UA 0-2 /LPF         Methodology Automated Microscopy     Blood Culture - Blood, Arm, Right [[id number]      Assessment:        Active Hospital Problems     Diagnosis     • Elevated troponin        Impression:  1.  Elevated troponin  2.  CT scans are pending, but EDMD noted early to partial small bowel obstruction  3.  UTI  4.  THERESA with prior creatinine of 0.95  5.  Hyperkalemia  6.  RSV  7.  Mild hyponatremia  8.  Hypertension, in the setting of hypotension     Plan:   1.  Admit to the hospital  2.  Trend cardiac markers   3.  Cardiac echo in the morning  4.  Await CT scans  5.  Gentle IV fluids  6.  Continue Rocephin that was started in the emergency department   7.  Lokelma x1  dose  8.  Patient does not appear to be in any respiratory distress or wheezing, will continue to follow secondary to her diagnosis of RSV  9.  Follow-up labs in the morning  10.  Hold several home medications to include lisinopril secondary to hypotension and renal insufficiency  11.  Fall and skin precautions  12.  PT consult        Code Status/Advanced Care Plan: DNR per prior     The patient's surrogate decision maker is family.      I discussed my findings and recommendations with the attempted with the patient.     Estimated length of stay is 2 to 3 days.      The patient was seen and examined by me on 11/2/2022 at 2.     Electronically signed by Noelle Hurt DO, 11/02/22, 02:35 CDT        Current Facility-Administered Medications   Medication Dose Route Frequency Provider Last Rate Last Admin   • acetaminophen (TYLENOL) tablet 650 mg  650 mg Oral Q4H PRN Noelle Hurt DO       • aspirin EC tablet 81 mg  81 mg Oral Daily Noelle Hurt DO   81 mg at 11/02/22 1017   • atorvastatin (LIPITOR) tablet 10 mg  10 mg Oral Daily Noelle Hurt DO   10 mg at 11/02/22 1017   • carvedilol (COREG) tablet 3.125 mg  3.125 mg Oral BID With Meals Prashanth Nguyen MD   3.125 mg at 11/02/22 1019   • cloNIDine (CATAPRES) tablet 0.1 mg  0.1 mg Oral Daily PRN Noelle Hurt DO       • cycloSPORINE (RESTASIS) 0.05 % ophthalmic emulsion 1 drop  1 drop Both Eyes Q12H Noelle Hurt DO   1 drop at 11/02/22 0908   • Enoxaparin Sodium (LOVENOX) syringe 30 mg  30 mg Subcutaneous Q24H Noelle Hurt DO   30 mg at 11/02/22 0907   • ferrous sulfate tablet 325 mg  325 mg Oral Daily With Breakfast Noelle Hurt DO   325 mg at 11/02/22 1016   • lactated ringers infusion  75 mL/hr Intravenous Continuous Noelle Hurt DO 75 mL/hr at 11/02/22 0413 75 mL/hr at 11/02/22 0413   • meropenem (MERREM) 500mg/100 mL 0.9% NS IVPB (mbp)  500 mg Intravenous Q12H Prashanth Nguyen MD       • ondansetron  (ZOFRAN) injection 4 mg  4 mg Intravenous Q6H PRN Noelle Hurt,        • pantoprazole (PROTONIX) EC tablet 40 mg  40 mg Oral Q AM Noelle Hurt DO       • Pharmacy to Dose meropenem (MERREM)   Does not apply Continuous PRN Prashanth Nguyen MD       • simethicone (MYLICON) chewable tablet 120 mg  120 mg Oral Q6H PRN Noelle Hurt,        • sodium chloride 0.9 % flush 10 mL  10 mL Intravenous Q12H Noelle Hurt DO   10 mL at 11/02/22 1018   • sodium chloride 0.9 % flush 10 mL  10 mL Intravenous PRN Noelle Hurt, DO

## 2022-11-02 NOTE — H&P
"    [facility] Medicine Services  HISTORY AND PHYSICAL    Date of Admission: 11/1/2022  Primary Care Physician: VERA Noble MD    Subjective     Chief Complaint: AMS    History of Present Illness  82-year-old female who presents from [facility].  She is drowsy, but arousable to give history.  She states that she thought she came in for some type of hypoxia.  ED MD noted that the patient had some type of belly pain.  2 weeks ago she was at the hospital for traumatic rhabdomyolysis and UTI.  Nursing staff notes the patient has had 3-4 very soft small bowel movements while in the emergency department.  Patient's or mucosa is very dry.  When asked about her abdomen, she tells me \"it does not feel too hot.\"  She states that she does walk some at the nursing facility.  She does deny chest pain.  CT scans have not resulted on my exam, but EDMD did note the possibility of a early to partial small bowel obstruction.  The patient's respiratory panel was positive for RSV.  Her normal creatinine is 0.95.  She does have elevated cardiac markers.    10/11/22 urine culture:    Susceptibility     Escherichia coli ESBL     CASEY     Ertapenem <=0.5 ug/ml Susceptible     Gentamicin <=1 ug/ml Susceptible     Levofloxacin >=8 ug/ml Resistant     Meropenem <=0.25 ug/ml Susceptible     Nitrofurantoin 32 ug/ml Susceptible     Piperacillin + Tazobactam <=4 ug/ml Susceptible     Trimethoprim + Sulfamethoxazole >=320 ug/ml Resistant      Date of Admission: 10/11/2022  Date of Discharge:  10/15/2022  Primary Care Physician: VERA Noble MD     Presenting Problem/Chief Complaint:     Final Discharge Diagnoses:       Active Hospital Problems     Diagnosis     • **Traumatic rhabdomyolysis, initial encounter ([facility])     • Moderate malnutrition ([facility])     • Leukocytosis     • AMS (altered mental status)     • UTI (urinary tract infection), bacterial     • Falling          Review of Systems "   Abdominal pain  Intermittent confusion    Otherwise complete ROS reviewed and negative except as mentioned in the HPI.    Past Medical History:   Past Medical History:   Diagnosis Date   • Anxiety    • Arthritis    • CAD (coronary artery disease)    • Colon polyp    • DDD (degenerative disc disease), lumbar    • Deep venous thrombosis (HCC)    • Depression    • Diverticulosis    • Esophageal stricture    • GERD (gastroesophageal reflux disease)    • History of transfusion    • Hypercholesteremia    • Hypertension    • LPRD (laryngopharyngeal reflux disease)    • Macular degeneration    • Multinodular goiter 2/23/2017   • Osteoarthritis    • Pruritic disorder    • Spastic colon    • Thyroid nodule      Past Surgical History:  Past Surgical History:   Procedure Laterality Date   • BLADDER REPAIR     • CATARACT EXTRACTION     • CHOLECYSTECTOMY     • COLONOSCOPY  04/22/2014    2 polyps, adenomatous, diverticulosis   • COLONOSCOPY N/A 6/26/2020    Procedure: COLONOSCOPY WITH ANESTHESIA;  Surgeon: John Coleman MD;  Location: [facility] ENDOSCOPY;  Service: Gastroenterology;  Laterality: N/A;  pre op: constipation  post op: diverticulosis,   PCP: Adam Delgadillo MD   • CYSTOCELE REPAIR     • ENDOSCOPY  04/22/2014    Schatzki's ring dilated   • ENDOSCOPY N/A 6/26/2020    Procedure: ESOPHAGOGASTRODUODENOSCOPY WITH ANESTHESIA;  Surgeon: John Coleman MD;  Location: [facility] ENDOSCOPY;  Service: Gastroenterology;  Laterality: N/A;  pre op: dysphagia  post op:stricture, dilated   PCP:Adam Delgadillo MD   • ENDOSCOPY N/A 9/2/2021    Procedure: ESOPHAGOGASTRODUODENOSCOPY WITH ANESTHESIA;  Surgeon: John Coleman MD;  Location: [facility] ENDOSCOPY;  Service: Gastroenterology;  Laterality: N/A;  pre op: dysphagia  post op:gastritis  PCP: VERA Noble MD   • FEMUR FRACTURE SURGERY     • HYSTERECTOMY     • INCISION AND DRAINAGE LEG Left 11/3/2021    Procedure: INCISION AND DRAINAGE LEG ABSCESS;  Surgeon:  Cesia Mondragon MD;  Location: [facility] OR;  Service: General;  Laterality: Left;   • REPLACEMENT TOTAL KNEE     • ULNAR NERVE TRANSPOSITION       Social History:  reports that she has never smoked. She has never used smokeless tobacco. She reports that she does not drink alcohol and does not use drugs.    Family History: family history includes Colon cancer in her mother; Heart disease in her father and mother.       Allergies:  Allergies   Allergen Reactions   • Levaquin [Levofloxacin] Unknown (See Comments)     Pt doesn't remember   • Codeine Other (See Comments)     Pt does not recall   • Morphine And Related Other (See Comments)     Pt does not recall       Medications:  Prior to Admission medications    Medication Sig Start Date End Date Taking? Authorizing Provider   acetaminophen (TYLENOL) 325 MG tablet Take 2 tablets by mouth Every 4 (Four) Hours As Needed for Mild Pain. 9/1/22   Carla Colorado APRN   aluminum-magnesium hydroxide-simethicone (MAALOX MAX) 400-400-40 MG/5ML suspension Take 15 mL by mouth Every 6 (Six) Hours As Needed for Indigestion or Heartburn. 9/1/22   Carla Colorado APRN   aspirin 81 MG EC tablet Take 81 mg by mouth Daily.    Ying Salazar MD   atorvastatin (LIPITOR) 10 MG tablet TAKE 1 TABLET BY MOUTH DAILY. 4/29/22   VERA Noble MD   Calcium Carbonate-Vitamin D (CALTRATE 600+D PO) Take 1 tablet by mouth 2 (two) times a day. Morning and nightly    Ying Salazar MD   carvedilol (COREG) 6.25 MG tablet Take 1 tablet by mouth 2 (Two) Times a Day With Meals. 7/21/22   VERA Noble MD   celecoxib (CeleBREX) 200 MG capsule Take 1 capsule by mouth Daily.    Ying Salazar MD   Cholecalciferol (VITAMIN D3) 125 MCG (5000 UT) capsule capsule Take 5,000 Units by mouth Daily.    Ying Salazar MD   cloNIDine (CATAPRES) 0.1 MG tablet Take 0.1 mg by mouth Daily As Needed for High Blood Pressure (for Blood Pressure >180/100).    Ying Salazar MD    colchicine 0.6 MG tablet Take 0.6 mg by mouth Daily. 2/26/22   Ying Salazar MD   Cyanocobalamin (Vitamin B-12) 1000 MCG sublingual tablet Place 2.5 tablets under the tongue Daily. 2500mcg    Ying Salazar MD   cycloSPORINE (RESTASIS) 0.05 % ophthalmic emulsion Administer 1 drop to both eyes Every 12 (Twelve) Hours.    Ying Salazar MD   Enoxaparin Sodium (LOVENOX) 40 MG/0.4ML solution prefilled syringe syringe Inject 0.4 mL under the skin into the appropriate area as directed Daily. Indications: Prevention of Unwanted Clot in Veins 10/15/22   Rodriguez Schneider MD   esomeprazole (nexIUM) 20 MG capsule Take 1 capsule by mouth 2 (Two) Times a Day. 5/25/22   VERA Noble MD   FEROSUL 325 (65 Fe) MG tablet Take 325 mg by mouth Daily With Breakfast. 11/6/19   Ying Salazar MD   fluticasone (FLONASE) 50 MCG/ACT nasal spray 2 sprays into the nostril(s) as directed by provider Daily As Needed.    Ying Salazar MD   furosemide (LASIX) 40 MG tablet Take 40 mg by mouth Daily.    Ying Salazar MD   HYDROcodone-acetaminophen (NORCO) 7.5-325 MG per tablet Take 1 tablet by mouth Every 4 (Four) Hours As Needed for Moderate Pain. 10/15/22   Rodriguez Schneider MD   lisinopril (PRINIVIL,ZESTRIL) 20 MG tablet Take 1 tablet by mouth Daily. 9/9/22   VERA Noble MD   montelukast (SINGULAIR) 10 MG tablet Take 1 tablet by mouth Every Night. 7/21/22   VERA Noble MD   mupirocin (BACTROBAN) 2 % ointment Apply 1 application topically to the appropriate area as directed Daily. Apply ointment to Right Great Toe and Right 4th Toe    Ying Salazar MD   nortriptyline (PAMELOR) 25 MG capsule Take 1 capsule by mouth Every Night. 10/15/22   Rodriguez Schneider MD   polyethyl glycol-propyl glycol (SYSTANE) 0.4-0.3 % solution ophthalmic solution Administer 2 drops to both eyes Every 2 (Two) Hours As Needed (dry eyes).    Ying Salazar MD   polyethylene glycol (MIRALAX) 17 g  "packet Take 17 g by mouth Daily As Needed.    ProviderYing MD   pregabalin (LYRICA) 50 MG capsule Take 1 capsule by mouth 2 (Two) Times a Day. 10/15/22   Rodriguez Schneider MD   Prolia 60 MG/ML solution prefilled syringe syringe Inject 60 mg under the skin into the appropriate area as directed 1 (One) Time. Twice a year 3/21/22   ProviderYing MD   simethicone (MYLICON) 125 MG chewable tablet Chew 125 mg Every 6 (Six) Hours As Needed for Flatulence.    ProviderYing MD     I have utilized all available immediate resources to obtain, update, and review the patient's current medications.    Objective     Vital Signs: /71   Pulse 87   Temp 98.2 °F (36.8 °C) (Oral)   Resp 18   Ht 165.1 cm (65\")   Wt 79.4 kg (175 lb) Comment: no bed scale  SpO2 99%   BMI 29.12 kg/m²   Physical Exam  Vitals reviewed.   Constitutional:       Comments: Able to give some history   HENT:      Head: Normocephalic and atraumatic.      Right Ear: External ear normal.      Left Ear: External ear normal.      Nose: Nose normal.      Mouth/Throat:      Mouth: Mucous membranes are dry.      Pharynx: No oropharyngeal exudate.   Eyes:      General: No scleral icterus.     Conjunctiva/sclera: Conjunctivae normal.   Cardiovascular:      Rate and Rhythm: Normal rate and regular rhythm.      Heart sounds: Normal heart sounds.   Pulmonary:      Effort: Pulmonary effort is normal.      Breath sounds: Normal breath sounds.   Abdominal:      General: There is no distension.      Palpations: Abdomen is soft.      Tenderness: There is abdominal tenderness.   Musculoskeletal:         General: No swelling or tenderness.      Cervical back: Normal range of motion and neck supple.   Skin:     General: Skin is warm and dry.   Neurological:      Mental Status: She is alert.      Cranial Nerves: No cranial nerve deficit.      Comments: Able to make eye contact   Psychiatric:      Comments: Drowsy but arousable        Results " Reviewed:  Lab Results (last 24 hours)     Procedure Component Value Units Date/Time    CK [[id number]] Collected: 11/02/22 0011    Specimen: Blood Updated: 11/02/22 0230    Troponin [[id number]]  (Abnormal) Collected: 11/02/22 0011    Specimen: Blood Updated: 11/02/22 0055     Troponin T 0.271 ng/mL     Narrative:      Troponin T Reference Range:  <= 0.03 ng/mL-   Negative for AMI  >0.03 ng/mL-     Abnormal for myocardial necrosis.  Clinicians would have to utilize clinical acumen, EKG, Troponin and serial changes to determine if it is an Acute Myocardial Infarction or myocardial injury due to an underlying chronic condition.       Results may be falsely decreased if patient taking Biotin.      COVID PRE-OP / PRE-PROCEDURE SCREENING ORDER (NO ISOLATION) - Swab, Nasopharynx [[id number]]  (Abnormal) Collected: 11/01/22 2259    Specimen: Swab from Nasopharynx Updated: 11/01/22 2348    Narrative:      The following orders were created for panel order COVID PRE-OP / PRE-PROCEDURE SCREENING ORDER (NO ISOLATION) - Swab, Nasopharynx.  Procedure                               Abnormality         Status                     ---------                               -----------         ------                     COVID-19, FLU A/B, RSV P...[[id number]]  Abnormal            Final result                 Please view results for these tests on the individual orders.    COVID-19, FLU A/B, RSV PCR - Swab, Nasopharynx [[id number]]  (Abnormal) Collected: 11/01/22 2259    Specimen: Swab from Nasopharynx Updated: 11/01/22 2348     COVID19 Not Detected     Influenza A PCR Not Detected     Influenza B PCR Not Detected     RSV, PCR Detected    Narrative:      Fact sheet for providers: https://www.fda.gov/media/756310/download    Fact sheet for patients: https://www.fda.gov/media/605922/download    Test performed by PCR.    Procalcitonin [[id number]]  (Abnormal) Collected: 11/01/22 2256    Specimen: Blood Updated: 11/01/22 2345     Procalcitonin 0.75  "ng/mL     Narrative:      As a Marker for Sepsis (Non-Neonates):    1. <0.5 ng/mL represents a low risk of severe sepsis and/or septic shock.  2. >2 ng/mL represents a high risk of severe sepsis and/or septic shock.    As a Marker for Lower Respiratory Tract Infections that require antibiotic therapy:    PCT on Admission    Antibiotic Therapy       6-12 Hrs later    >0.5                Strongly Recommended  >0.25 - <0.5        Recommended   0.1 - 0.25          Discouraged              Remeasure/reassess PCT  <0.1                Strongly Discouraged     Remeasure/reassess PCT    As 28 day mortality risk marker: \"Change in Procalcitonin Result\" (>80% or <=80%) if Day 0 (or Day 1) and Day 4 values are available. Refer to http://www.[facility]-pct-calculator.com    Change in PCT <=80%  A decrease of PCT levels below or equal to 80% defines a positive change in PCT test result representing a higher risk for 28-day all-cause mortality of patients diagnosed with severe sepsis for septic shock.    Change in PCT >80%  A decrease of PCT levels of more than 80% defines a negative change in PCT result representing a lower risk for 28-day all-cause mortality of patients diagnosed with severe sepsis or septic shock.       Lactic Acid, Plasma [[id number]]  (Normal) Collected: 11/01/22 2256    Specimen: Blood Updated: 11/01/22 2343     Lactate 1.9 mmol/L     aPTT [[id number]]  (Normal) Collected: 11/01/22 2256    Specimen: Blood Updated: 11/01/22 2337     PTT 34.4 seconds     Protime-INR [[id number]]  (Abnormal) Collected: 11/01/22 2256    Specimen: Blood Updated: 11/01/22 2337     Protime 15.0 Seconds      INR 1.23    Manual Differential [[id number]]  (Abnormal) Collected: 11/01/22 2256    Specimen: Blood Updated: 11/01/22 2335     Neutrophil % 64.0 %      Lymphocyte % 5.0 %      Monocyte % 11.0 %      Bands %  20.0 %      Neutrophils Absolute 11.18 10*3/mm3      Lymphocytes Absolute 0.67 10*3/mm3      Monocytes Absolute 1.46 10*3/mm3     "  Crenated RBC's Slight/1+     Poikilocytes Slight/1+     Polychromasia Slight/1+     WBC Morphology Normal     Giant Platelets Slight/1+    CBC & Differential [[id number]]  (Abnormal) Collected: 11/01/22 2256    Specimen: Blood Updated: 11/01/22 2335    Narrative:      The following orders were created for panel order CBC & Differential.  Procedure                               Abnormality         Status                     ---------                               -----------         ------                     CBC Auto Differential[[id number]]        Abnormal            Final result                 Please view results for these tests on the individual orders.    CBC Auto Differential [[id number]]  (Abnormal) Collected: 11/01/22 2256    Specimen: Blood Updated: 11/01/22 2335     WBC 13.31 10*3/mm3      RBC 3.92 10*6/mm3      Hemoglobin 12.3 g/dL      Hematocrit 37.7 %      MCV 96.2 fL      MCH 31.4 pg      MCHC 32.6 g/dL      RDW 14.4 %      RDW-SD 50.4 fl      MPV 10.5 fL      Platelets 366 10*3/mm3     Narrative:      The previously reported component NRBC is no longer being reported. Previous result was 0.0 /100 WBC (Reference Range: 0.0-0.2 /100 WBC) on 11/1/2022 at 2310 CDT.    Comprehensive Metabolic Panel [[id number]]  (Abnormal) Collected: 11/01/22 2256    Specimen: Blood Updated: 11/01/22 2330     Glucose 156 mg/dL      BUN 55 mg/dL      Creatinine 2.70 mg/dL      Sodium 132 mmol/L      Potassium 5.9 mmol/L      Chloride 92 mmol/L      CO2 23.0 mmol/L      Calcium 10.2 mg/dL      Total Protein 7.7 g/dL      Albumin 4.10 g/dL      ALT (SGPT) 19 U/L      AST (SGOT) 21 U/L      Alkaline Phosphatase 72 U/L      Total Bilirubin 0.7 mg/dL      Globulin 3.6 gm/dL      A/G Ratio 1.1 g/dL      BUN/Creatinine Ratio 20.4     Anion Gap 17.0 mmol/L      eGFR 17.3 mL/min/1.73      Comment: National Kidney Foundation and American Society of Nephrology (ASN) Task Force recommended calculation based on the Chronic Kidney Disease  Epidemiology Collaboration (CKD-EPI) equation refit without adjustment for race.       Narrative:      GFR Normal >60  Chronic Kidney Disease <60  Kidney Failure <15    The GFR formula is only valid for adults with stable renal function between ages 18 and 70.    Lipase [[id number]]  (Normal) Collected: 11/01/22 2256    Specimen: Blood Updated: 11/01/22 2329     Lipase 15 U/L     Troponin [[id number]]  (Abnormal) Collected: 11/01/22 2256    Specimen: Blood Updated: 11/01/22 2327     Troponin T 0.268 ng/mL     Narrative:      Troponin T Reference Range:  <= 0.03 ng/mL-   Negative for AMI  >0.03 ng/mL-     Abnormal for myocardial necrosis.  Clinicians would have to utilize clinical acumen, EKG, Troponin and serial changes to determine if it is an Acute Myocardial Infarction or myocardial injury due to an underlying chronic condition.       Results may be falsely decreased if patient taking Biotin.      Urinalysis With Microscopic If Indicated (No Culture) - Urine, Clean Catch [[id number]]  (Abnormal) Collected: 11/01/22 2306    Specimen: Urine, Clean Catch Updated: 11/01/22 2318     Color, UA Dark Yellow     Appearance, UA Cloudy     pH, UA 5.5     Specific Gravity, UA 1.015     Glucose, UA Negative     Ketones, UA Negative     Bilirubin, UA Small (1+)     Blood, UA Negative     Protein, UA Negative     Leuk Esterase, UA Moderate (2+)     Nitrite, UA Negative     Urobilinogen, UA 1.0 E.U./dL    Urinalysis, Microscopic Only - Urine, Clean Catch [[id number]]  (Abnormal) Collected: 11/01/22 2306    Specimen: Urine, Clean Catch Updated: 11/01/22 2318     RBC, UA 3-5 /HPF      WBC, UA Too Numerous to Count /HPF      Bacteria, UA 2+ /HPF      Squamous Epithelial Cells, UA None Seen /HPF      Hyaline Casts, UA 0-2 /LPF      Methodology Automated Microscopy    Blood Culture - Blood, Arm, Right [[id number]] Collected: 11/01/22 2256    Specimen: Blood from Arm, Right Updated: 11/01/22 2302    Blood Culture - Blood, Arm, Left  [[id number]] Collected: 11/01/22 2256    Specimen: Blood from Arm, Left Updated: 11/01/22 2302        Imaging Results (Last 24 Hours)     Procedure Component Value Units Date/Time    CT Head Without Contrast [[id number]] Resulted: 11/02/22 0121     Updated: 11/02/22 0142    CT Abdomen Pelvis Without Contrast [[id number]] Resulted: 11/02/22 0121     Updated: 11/02/22 0142    CT Chest Without Contrast Diagnostic [[id number]] Resulted: 11/02/22 0121     Updated: 11/02/22 0142    XR Chest 1 View [[id number]] Resulted: 11/01/22 2239     Updated: 11/01/22 2239        I have personally reviewed and interpreted the radiology studies and ECG obtained at time of admission.     Assessment / Plan     Assessment:   Active Hospital Problems    Diagnosis    • **Elevated troponin      Impression:  1.  Elevated troponin  2.  CT scans are pending, but EDMD noted early to partial small bowel obstruction  3.  UTI  4.  THERESA with prior creatinine of 0.95  5.  Hyperkalemia  6.  RSV  7.  Mild hyponatremia  8.  Hypertension, in the setting of hypotension    Plan:   1.  Admit to the hospital  2.  Trend cardiac markers   3.  Cardiac echo in the morning  4.  Await CT scans  5.  Gentle IV fluids  6.  Continue Rocephin that was started in the emergency department   7.  Lokelma x1 dose  8.  Patient does not appear to be in any respiratory distress or wheezing, will continue to follow secondary to her diagnosis of RSV  9.  Follow-up labs in the morning  10.  Hold several home medications to include lisinopril secondary to hypotension and renal insufficiency  11.  Fall and skin precautions  12.  PT consult      Code Status/Advanced Care Plan: DNR per prior    The patient's surrogate decision maker is family.     I discussed my findings and recommendations with the attempted with the patient.    Estimated length of stay is 2 to 3 days.     The patient was seen and examined by me on 11/2/2022 at 2.    Electronically signed by Noelle Hurt DO,  11/02/22, 02:35 CDT.

## 2022-11-02 NOTE — PLAN OF CARE
Goal Outcome Evaluation:  Plan of Care Reviewed With: patient           Outcome Evaluation: PT eval complete. She is alert and oriented to self and place. Often confused during eval and needs cues to redirect and orient. She needs min assist x 2 to stand. She was able to tolerate stance for ~ 1 minute. She demos weakness in B LE and decreased activity tolerance and SOB with activity. B knee with wounds that are covered with bandages. PT will cont to progress functional mobility, balance, and strength. Recommend d.c to SNF for continued rehab.

## 2022-11-02 NOTE — CONSULTS
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 than the right.     A few borderline prominent supraclavicular lymph nodes bilaterally are  seen.     Atheromatous changes thoracic aorta noted. No aneurysmal dilatation.     Moderate dilatation of the central pulmonary arteries are noted  suggesting pulmonary arterial hypertension.     Atheromatous changes of the coronary arteries are seen.     There is no evidence of mediastinal lymphadenopathy.     The limited visualized abdominal organs are unremarkable.     Images reviewed in bone window show severe chronic degenerative changes  of the thoracic spine. Old healed fracture of the left ribs are noted.  Moderate accentuated dorsal kyphosis. No acute bony abnormality is  noted.       Impression:      1. No acute abnormality in the chest. No mass or infiltrate. No  mediastinal lymphadenopathy.  2. Pulmonary arterial hypertension. Moderate bronchiectasis.     The above study was initially reviewed and reported by [facility]. I do not  find any discrepancies.                             This report was finalized on 11/02/2022 07:41 by Dr. Shea Mcdowell MD.    XR Chest 1 View [[id number]] Collected: 11/02/22 0728     Updated: 11/02/22 0732    Narrative:      EXAMINATION: XR CHEST 1 VW- 11/2/2022 7:28 AM CDT     HISTORY: Altered  mental status.     REPORT: A frontal view the chest was obtained.     COMPARISON: Chest x-ray 10/11/2022.     The lungs are hypoaerated, there is mild central basilar vascular  crowding. No consolidating infiltrate is identified. Heart size is  normal. No pneumothorax or pleural effusion is identified.  Cholecystectomy clips are present. Advanced degenerative changes of the  right shoulder are noted.       Impression:      Shallow inspiration with bibasilar atelectasis and vascular  crowding, no consolidating infiltrates.  This report was finalized on 11/02/2022 07:29 by Dr. Jerry Aguayo MD.    CT Abdomen Pelvis Without Contrast [[id number]] Collected: 11/02/22 0705     Updated: 11/02/22 0726    Narrative:      EXAMINATION: CT ABDOMEN PELVIS WO CONTRAST-      11/2/2022 1:20 AM CDT     HISTORY: abdominal pain, ams; R77.8-Other specified abnormalities of  plasma proteins; E87.5-Hyperkalemia; N39.0-Urinary tract infection, site  not specified; N17.9-Acute kidney failure, unspecified;  R10.84-Generalized abdominal pain     In order to have a CT radiation dose as low as reasonably achievable  Automated Exposure Control was utilized for adjustment of the mA and/or  KV according to patient size.     DLP in mGycm= 668     The CT scan of the abdomen and pelvis is performed without intravenous  or oral contrast enhancement.     Images are acquired in axial plane and subsequent reconstruction in  coronal and sagittal planes.     There is no previous study for comparison.     This study is of limited diagnostic value due to absence of intravenous  and oral contrast enhancement.     The lung bases included in the study appear unremarkable.     Unenhanced liver and spleen are unremarkable.     The gallbladder is surgically absent.     Small atrophic pancreas is seen.     Moderate lobulation left adrenal gland is seen. No discrete mass. The  right adrenal glands are normal.     The unenhanced kidneys bilaterally appear  unremarkable with moderate  lobulation of the contour. No calculi. No hydronephrosis. The limited  visualized ureters are unremarkable and nondilated. The urinary bladder  is moderately distended. No intrinsic abnormality.     The uterus is absent. No adnexal masses.     The stomach is decompressed. The duodenum is normal. Fluid-filled  nondistended small bowel loops are seen. Appendix is not visualized. No  finding to suggest appendicitis. Moderately dilated fluid-filled large  bowel loops are seen. There is moderate thickening of the wall of the  descending and sigmoid colon. There is circumferential thickening of the  wall of the distal sigmoid colon and rectum. There is moderate  surrounding fat infiltration. There is diverticulosis of the distal  colon. No finding to suggest diverticulitis.     Severe atheromatous changes of the abdominal aorta and iliac arteries  are noted. There are large calcific plaques at the origin of celiac and  superior mesenteric arteries. No evidence of abdominal or pelvic  lymphadenopathy.     Images are reviewed in bone window show chronic degenerative changes of  the thoracolumbar spine with multilevel severe disc degeneration. No  acute bony abnormality.       Impression:      1. The changes of acute proctocolitis.  2. Diverticulosis of the colon. No finding to suggest diverticulitis.  3. Fluid-filled small and large bowel loops may suggest the diarrhea  disease.     The above study was initially reviewed and reported by [facility]. I do not  find any discrepancies.                       This report was finalized on 11/02/2022 07:23 by Dr. Shea Mcdowell MD.    CT Head Without Contrast [[id number]] Collected: 11/02/22 0625     Updated: 11/02/22 0631    Narrative:      EXAMINATION: CT HEAD WO CONTRAST-      11/2/2022 1:20 AM CDT     HISTORY: ams; R77.8-Other specified abnormalities of plasma proteins;  E87.5-Hyperkalemia; N39.0-Urinary tract infection, site not specified;  N17.9-Acute  kidney failure, unspecified; R10.84-Generalized abdominal  pain     In order to have a CT radiation dose as low as reasonably achievable  Automated Exposure Control was utilized for adjustment of the mA and/or  KV according to patient size.     DLP in mGycm= 534     The CT scan of the head is performed without intravenous contrast  enhancement.     The images are acquired in axial plane and subsequent reconstruction in  coronal and sagittal planes.     Comparison is made with the previous study dated 10/11/2022.     There is no evidence of a mass. There is no midline shift.     There is no evidence of intracranial hemorrhage or hematoma.     Moderately prominent ventricles, basal cisterns and cortical sulci  represent moderate chronic volume loss. No change.     Scattered areas of chronic white matter ischemia in the centrum  semiovale bilaterally are noted. The gray-white matter differentiation  is maintained.     Images reviewed in bone window show no evidence of a skull fracture. The  visualized paranasal sinuses and mastoid air cells are unremarkable.       Impression:      1. No acute intracranial abnormality.     The above study was initially reviewed and reported by [facility]. I do not  find any discrepancies.                             This report was finalized on 11/02/2022 06:28 by Dr. Shea Mcdowell MD.             ASSESSMENT/PLAN       Examination and evaluation of wound(s) was performed.    An excisional tissue debridement was completed of the wound located on the right knee with a total area of 28.35cm². The following instruments were used: curette and scalpel. Pain control was achieved using lidocaine 4% topical solution. Material removed includes eschar, slough, subcutaneous tissue.  No specimens were taken. A minimal amount of bleeding was controlled with pressure. The procedure was tolerated well with a pain level of 0 prior to procedure and a pain level of 0 following the procedure. Post  debridement measurements: 6.3 cm x 4.5 cm by 0.2 cm. Tissue exposed post debridement is subcutaneous tissue.     An excisional tissue debridement was completed of the wound located on the left knee with a total area of 5 cm². The following instruments were used: Curette and scalpel. Pain control was achieved using lidocaine 4% topical solution. Material removed includes eschar, slough, subcutaneous tissue.  No specimens were taken. A minimal amount of bleeding was controlled with pressure. The procedure was tolerated well with a pain level of 0 prior to procedure and a pain level of 0 following the procedure. Post debridement measurements: 2 cm x 2.5 cm x 0.1 cm. Tissue exposed post debridement is subcutaneous tissue.      DIAGNOSIS:     Open wound of right knee    Open wound of left knee    At high risk for skin breakdown    Type 2 MI (myocardial infarction) ([facility]) due to THERESA and UTI    Essential hypertension    Acute cystitis with hematuria    RSV (acute bronchiolitis due to respiratory syncytial virus)    THERESA (acute kidney injury) ([facility]) due to sepsis    History of ESBL E. coli infection    Hyperkalemia       PLAN:   Open wounds of bilateral knees debrided.  Orders placed for wound care treatment.     Patient is at high risk for skin breakdown due to impaired mobility.  Orders placed for prevention of pressure injury.       Start     Ordered    11/02/22 2000  Wound Care  Every 12 Hours        Question Answer Comment   Wound Locations Bilateral knees    Wound Care Instructions Clean wounds with NS.  Apply therahoney to wound base and cover with adaptic.  Secure with silicone border foam dressing.    Cleanse Normal Saline    Intervention Non-Adherent Dressing    Intervention Thera Honey    Dressing: Silicone Border Dressing        11/02/22 1353    11/02/22 1353  Elevate Heels Off of Bed  Until Discontinued         11/02/22 1353    11/02/22 1353  Turn Patient  Now Then Every 2 Hours         11/02/22 1353    11/02/22  1353  Use Repositioning Wedge to Position Patient  Continuous        Comments: Use Comfort Glide repositioning sheet and wedges to position patient.    11/02/22 1353    11/02/22 1353  Use Seat Cushion When Up In Chair  Continuous         11/02/22 1353    Unscheduled  Apply Moisture Barrier After Any Incontinence  As Needed      Question:  Wound Care Instructions  Answer:  Apply Moisture Barrier After Any Incontinence    11/02/22 1353                 Discussed findings and treatment plan including risks, benefits, and treatment options with patient in detail. Patient agreed with treatment plan.      This document has been electronically signed by ABDON Burch on 11/2/2022 11:59 CDT
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
## 2022-11-02 NOTE — PLAN OF CARE
Goal Outcome Evaluation:  Plan of Care Reviewed With: patient           Outcome Evaluation: OT eval completed. Pt presents alert and oriented x3, with conversational confusion. She has been residing at [facility] since last admission. Today she demonstrates decreased strength, balance, activity tolerance, cognition and increased LE swelling. She has B knee wounds noted during session. She was able to bring self to sitting at EOB with Min A. Required Max A to don socks. Min Ax2 for sit <> stand t/f from EOB, only able to tolerate standing position approx 10-20 seconds before needing to sit back down. She required Min A to return back to fowlers in bed. She would benefit from skilled OT services to address these deficits. Recommend d/c back to SNF.

## 2022-11-03 ENCOUNTER — APPOINTMENT (OUTPATIENT)
Dept: CARDIOLOGY | Facility: HOSPITAL | Age: 82
End: 2022-11-03

## 2022-11-03 LAB
ALBUMIN SERPL-MCNC: 2.8 G/DL (ref 3.5–5.2)
ALBUMIN/GLOB SERPL: 1 G/DL
ALP SERPL-CCNC: 54 U/L (ref 39–117)
ALT SERPL W P-5'-P-CCNC: 11 U/L (ref 1–33)
ANION GAP SERPL CALCULATED.3IONS-SCNC: 11 MMOL/L (ref 5–15)
AST SERPL-CCNC: 14 U/L (ref 1–32)
BH CV ECHO MEAS - AO MAX PG: 10.1 MMHG
BH CV ECHO MEAS - AO MEAN PG: 5 MMHG
BH CV ECHO MEAS - AO ROOT DIAM: 2.9 CM
BH CV ECHO MEAS - AO V2 MAX: 159 CM/SEC
BH CV ECHO MEAS - AO V2 VTI: 32.7 CM
BH CV ECHO MEAS - AVA(I,D): 2.7 CM2
BH CV ECHO MEAS - EDV(CUBED): 95.4 ML
BH CV ECHO MEAS - EDV(MOD-SP2): 98.8 ML
BH CV ECHO MEAS - EDV(MOD-SP4): 79.3 ML
BH CV ECHO MEAS - EF(MOD-BP): 73.7 %
BH CV ECHO MEAS - EF(MOD-SP2): 68.5 %
BH CV ECHO MEAS - EF(MOD-SP4): 77.8 %
BH CV ECHO MEAS - ESV(CUBED): 22 ML
BH CV ECHO MEAS - ESV(MOD-SP2): 31.1 ML
BH CV ECHO MEAS - ESV(MOD-SP4): 17.6 ML
BH CV ECHO MEAS - FS: 38.7 %
BH CV ECHO MEAS - IVS/LVPW: 0.85 CM
BH CV ECHO MEAS - IVSD: 0.65 CM
BH CV ECHO MEAS - LA DIMENSION: 3.6 CM
BH CV ECHO MEAS - LAT PEAK E' VEL: 9.4 CM/SEC
BH CV ECHO MEAS - LV DIASTOLIC VOL/BSA (35-75): 42.3 CM2
BH CV ECHO MEAS - LV MASS(C)D: 99.9 GRAMS
BH CV ECHO MEAS - LV MAX PG: 7 MMHG
BH CV ECHO MEAS - LV MEAN PG: 4 MMHG
BH CV ECHO MEAS - LV SYSTOLIC VOL/BSA (12-30): 9.4 CM2
BH CV ECHO MEAS - LV V1 MAX: 132 CM/SEC
BH CV ECHO MEAS - LV V1 VTI: 30.7 CM
BH CV ECHO MEAS - LVIDD: 4.6 CM
BH CV ECHO MEAS - LVIDS: 2.8 CM
BH CV ECHO MEAS - LVOT AREA: 2.8 CM2
BH CV ECHO MEAS - LVOT DIAM: 1.9 CM
BH CV ECHO MEAS - LVPWD: 0.77 CM
BH CV ECHO MEAS - MED PEAK E' VEL: 6 CM/SEC
BH CV ECHO MEAS - MR MAX PG: 95.6 MMHG
BH CV ECHO MEAS - MR MAX VEL: 488.7 CM/SEC
BH CV ECHO MEAS - MV A MAX VEL: 138 CM/SEC
BH CV ECHO MEAS - MV DEC TIME: 0.28 MSEC
BH CV ECHO MEAS - MV E MAX VEL: 86.2 CM/SEC
BH CV ECHO MEAS - MV E/A: 0.62
BH CV ECHO MEAS - SI(MOD-SP2): 36.1 ML/M2
BH CV ECHO MEAS - SI(MOD-SP4): 32.9 ML/M2
BH CV ECHO MEAS - SV(LVOT): 87 ML
BH CV ECHO MEAS - SV(MOD-SP2): 67.7 ML
BH CV ECHO MEAS - SV(MOD-SP4): 61.7 ML
BH CV ECHO MEAS - TR MAX PG: 6.1 MMHG
BH CV ECHO MEAS - TR MAX VEL: 123 CM/SEC
BH CV ECHO MEASUREMENTS AVERAGE E/E' RATIO: 11.19
BILIRUB SERPL-MCNC: 0.2 MG/DL (ref 0–1.2)
BUN SERPL-MCNC: 25 MG/DL (ref 8–23)
BUN/CREAT SERPL: 42.4 (ref 7–25)
CALCIUM SPEC-SCNC: 9.2 MG/DL (ref 8.6–10.5)
CHLORIDE SERPL-SCNC: 104 MMOL/L (ref 98–107)
CO2 SERPL-SCNC: 22 MMOL/L (ref 22–29)
CREAT SERPL-MCNC: 0.59 MG/DL (ref 0.57–1)
DEPRECATED RDW RBC AUTO: 47.7 FL (ref 37–54)
EGFRCR SERPLBLD CKD-EPI 2021: 91.2 ML/MIN/1.73
ERYTHROCYTE [DISTWIDTH] IN BLOOD BY AUTOMATED COUNT: 14.1 % (ref 12.3–15.4)
GLOBULIN UR ELPH-MCNC: 2.8 GM/DL
GLUCOSE SERPL-MCNC: 114 MG/DL (ref 65–99)
HCT VFR BLD AUTO: 25.7 % (ref 34–46.6)
HGB BLD-MCNC: 8.5 G/DL (ref 12–15.9)
LEFT ATRIUM VOLUME INDEX: 29.9 ML/M2
LEFT ATRIUM VOLUME: 55.9 ML
MAXIMAL PREDICTED HEART RATE: 140 BPM
MCH RBC QN AUTO: 30.8 PG (ref 26.6–33)
MCHC RBC AUTO-ENTMCNC: 33.1 G/DL (ref 31.5–35.7)
MCV RBC AUTO: 93.1 FL (ref 79–97)
PLATELET # BLD AUTO: 241 10*3/MM3 (ref 140–450)
PMV BLD AUTO: 10.6 FL (ref 6–12)
POTASSIUM SERPL-SCNC: 4.2 MMOL/L (ref 3.5–5.2)
PROT SERPL-MCNC: 5.6 G/DL (ref 6–8.5)
QT INTERVAL: 352 MS
QTC INTERVAL: 432 MS
RBC # BLD AUTO: 2.76 10*6/MM3 (ref 3.77–5.28)
SODIUM SERPL-SCNC: 137 MMOL/L (ref 136–145)
STRESS TARGET HR: 119 BPM
TROPONIN T SERPL-MCNC: 0.1 NG/ML (ref 0–0.03)
WBC NRBC COR # BLD: 8.75 10*3/MM3 (ref 3.4–10.8)

## 2022-11-03 PROCEDURE — 25010000002 PERFLUTREN PROTEIN A MICROSPH SUSPENSION: Performed by: INTERNAL MEDICINE

## 2022-11-03 PROCEDURE — 93306 TTE W/DOPPLER COMPLETE: CPT | Performed by: EMERGENCY MEDICINE

## 2022-11-03 PROCEDURE — 25010000002 MEROPENEM PER 100 MG: Performed by: INTERNAL MEDICINE

## 2022-11-03 PROCEDURE — 97535 SELF CARE MNGMENT TRAINING: CPT

## 2022-11-03 PROCEDURE — 85027 COMPLETE CBC AUTOMATED: CPT | Performed by: INTERNAL MEDICINE

## 2022-11-03 PROCEDURE — 97530 THERAPEUTIC ACTIVITIES: CPT

## 2022-11-03 PROCEDURE — 80053 COMPREHEN METABOLIC PANEL: CPT | Performed by: INTERNAL MEDICINE

## 2022-11-03 PROCEDURE — 92526 ORAL FUNCTION THERAPY: CPT

## 2022-11-03 PROCEDURE — 93306 TTE W/DOPPLER COMPLETE: CPT

## 2022-11-03 PROCEDURE — 25010000002 PIPERACILLIN SOD-TAZOBACTAM PER 1 G: Performed by: INTERNAL MEDICINE

## 2022-11-03 PROCEDURE — 84484 ASSAY OF TROPONIN QUANT: CPT | Performed by: INTERNAL MEDICINE

## 2022-11-03 PROCEDURE — 25010000002 ENOXAPARIN PER 10 MG: Performed by: INTERNAL MEDICINE

## 2022-11-03 RX ADMIN — CYCLOSPORINE 1 DROP: 0.5 EMULSION OPHTHALMIC at 20:15

## 2022-11-03 RX ADMIN — CARVEDILOL 3.12 MG: 3.12 TABLET, FILM COATED ORAL at 17:07

## 2022-11-03 RX ADMIN — ACETAMINOPHEN 650 MG: 325 TABLET, FILM COATED ORAL at 12:43

## 2022-11-03 RX ADMIN — Medication 10 ML: at 20:15

## 2022-11-03 RX ADMIN — MEROPENEM 500 MG: 500 INJECTION, POWDER, FOR SOLUTION INTRAVENOUS at 08:06

## 2022-11-03 RX ADMIN — FERROUS SULFATE TAB 325 MG (65 MG ELEMENTAL FE) 325 MG: 325 (65 FE) TAB at 08:01

## 2022-11-03 RX ADMIN — CARVEDILOL 3.12 MG: 3.12 TABLET, FILM COATED ORAL at 08:01

## 2022-11-03 RX ADMIN — HUMAN ALBUMIN MICROSPHERES AND PERFLUTREN 0.66 MG: 10; .22 INJECTION, SOLUTION INTRAVENOUS at 11:45

## 2022-11-03 RX ADMIN — ATORVASTATIN CALCIUM 10 MG: 10 TABLET, FILM COATED ORAL at 08:01

## 2022-11-03 RX ADMIN — ACETAMINOPHEN 650 MG: 325 TABLET, FILM COATED ORAL at 20:14

## 2022-11-03 RX ADMIN — ENOXAPARIN SODIUM 30 MG: 100 INJECTION SUBCUTANEOUS at 08:01

## 2022-11-03 RX ADMIN — Medication 10 ML: at 08:01

## 2022-11-03 RX ADMIN — CYCLOSPORINE 1 DROP: 0.5 EMULSION OPHTHALMIC at 08:01

## 2022-11-03 RX ADMIN — TAZOBACTAM SODIUM AND PIPERACILLIN SODIUM 4.5 G: 500; 4 INJECTION, SOLUTION INTRAVENOUS at 16:37

## 2022-11-03 RX ADMIN — ASPIRIN 81 MG: 81 TABLET, COATED ORAL at 08:01

## 2022-11-03 RX ADMIN — PANTOPRAZOLE SODIUM 40 MG: 40 TABLET, DELAYED RELEASE ORAL at 06:53

## 2022-11-03 RX ADMIN — SODIUM CHLORIDE, POTASSIUM CHLORIDE, SODIUM LACTATE AND CALCIUM CHLORIDE 75 ML/HR: 600; 310; 30; 20 INJECTION, SOLUTION INTRAVENOUS at 07:48

## 2022-11-03 RX ADMIN — TAZOBACTAM SODIUM AND PIPERACILLIN SODIUM 4.5 G: 500; 4 INJECTION, SOLUTION INTRAVENOUS at 11:59

## 2022-11-03 NOTE — PLAN OF CARE
Goal Outcome Evaluation:              Outcome Evaluation: VSS, afebrile, on RA, HR S 72-82, more alert and oriented today, small bm this morning, pt not able to void but did when up to BSC with OT, voided 450 ml with PVR of 242 ml, urine dark concentrated with strong odor, continues with skin excoriation to gluteal cleft and bilat groin, moisture barrier cream in use, bed alarm in use

## 2022-11-03 NOTE — PROGRESS NOTES
[facility] Medicine Services  INPATIENT PROGRESS NOTE    Patient Name: Carmen Obrien  Date of Admission: 11/1/2022  Today's Date: 11/03/22  Length of Stay: 1  Primary Care Physician: VERA Noble MD    Subjective   Chief Complaint: weakness, confusion  HPI     Patient was seen and examined at bedside.  Patient AO x 3, unable to tell me which rehab she was at but she was able to describe it.  The patient's THERESA has resolved.  Patient continues to deny any chest pain or shortness of breath.  Patient was able to work with PT/OT today.        Review of Systems   Constitutional: Negative for activity change, appetite change, chills, diaphoresis, fatigue and fever.   Respiratory: Negative for shortness of breath.    Cardiovascular: Negative for chest pain and palpitations.   Gastrointestinal: Positive for diarrhea. Negative for abdominal distention, abdominal pain, constipation and vomiting.   Musculoskeletal: Negative for arthralgias and myalgias.        All pertinent negatives and positives are as above. All other systems have been reviewed and are negative unless otherwise stated.     Objective    Temp:  [98 °F (36.7 °C)-99.5 °F (37.5 °C)] 98.1 °F (36.7 °C)  Heart Rate:  [] 79  Resp:  [16-18] 16  BP: (116-135)/(49-78) 130/58  Physical Exam  Vitals and nursing note reviewed.   Constitutional:       Appearance: Normal appearance.   HENT:      Head: Normocephalic and atraumatic.      Mouth/Throat:      Mouth: Mucous membranes are dry.   Eyes:      General: No scleral icterus.     Conjunctiva/sclera: Conjunctivae normal.   Cardiovascular:      Rate and Rhythm: Normal rate and regular rhythm.      Heart sounds: Murmur heard.   Pulmonary:      Effort: Pulmonary effort is normal. No respiratory distress.      Breath sounds: Normal breath sounds. No stridor.   Abdominal:      General: Abdomen is flat. Bowel sounds are normal. There is no distension.      Palpations: Abdomen is  soft. There is no mass.   Skin:     General: Skin is warm and dry.      Coloration: Skin is pale.   Neurological:      General: No focal deficit present.      Mental Status: She is alert and oriented to person, place, and time.   Psychiatric:         Mood and Affect: Mood normal.         Behavior: Behavior normal.             Results Review:  I have reviewed the labs, radiology results, and diagnostic studies.    Laboratory Data:   Results from last 7 days   Lab Units 11/02/22  0923 11/01/22  2256 10/28/22  0900   WBC 10*3/mm3 9.03 13.31* 5.07   HEMOGLOBIN g/dL 9.9* 12.3 9.1*   HEMATOCRIT % 30.6* 37.7 28.9*   PLATELETS 10*3/mm3 290 366 298        Results from last 7 days   Lab Units 11/02/22  1525 11/02/22  0729 11/01/22  2256 10/31/22  1330   SODIUM mmol/L  --  137 132* 135*   POTASSIUM mmol/L 3.9 5.7* 5.9* 4.7   CHLORIDE mmol/L  --  99 92* 96*   CO2 mmol/L  --  19.0* 23.0 28.0   BUN mg/dL  --  53* 55* 25*   CREATININE mg/dL  --  1.86* 2.70* 0.95   CALCIUM mg/dL  --  9.5 10.2 10.4   BILIRUBIN mg/dL  --  0.3 0.7  --    ALK PHOS U/L  --  63 72  --    ALT (SGPT) U/L  --  16 19  --    AST (SGOT) U/L  --  23 21  --    GLUCOSE mg/dL  --  113* 156* 125*       Culture Data:   No results found for: BLOODCX, URINECX, WOUNDCX, MRSACX, RESPCX, STOOLCX    Radiology Data:   Imaging Results (Last 24 Hours)     Procedure Component Value Units Date/Time    XR Abdomen KUB [[id number]] Collected: 11/02/22 1031     Updated: 11/02/22 1035    Narrative:      EXAMINATION: XR ABDOMEN KUB- 11/2/2022 10:31 AM CDT     HISTORY: ? SBO FU; R77.8-Other specified abnormalities of plasma  proteins; E87.5-Hyperkalemia; N39.0-Urinary tract infection, site not  specified; N17.9-Acute kidney failure, unspecified; R10.84-Generalized  abdominal pain; R13.10-Dysphagia, unspecified     REPORT: A supine view of the abdomen was obtained.     COMPARISON: KUB 7/7/2021.     The bowel gas pattern is nonobstructive, the patient appears slightly  rotated towards  the right. Cholecystectomy clips are present. No free  air is seen on this supine image which is limited for evaluation of free  air. Advanced degenerative changes are seen throughout the lumbar spine.       Impression:      Nonobstructive bowel gas pattern. No acute findings.  This report was finalized on 11/02/2022 10:32 by Dr. Jerry Aguayo MD.          I have reviewed the patient's current medications.     Assessment/Plan     Active Hospital Problems    Diagnosis    • **Type 2 MI (myocardial infarction) (HCC) due to THERESA and UTI    • RSV (acute bronchiolitis due to respiratory syncytial virus)    • THERESA (acute kidney injury) (HCC) due to sepsis    • History of ESBL E. coli infection    • Hyperkalemia    • Acute cystitis with hematuria    • Essential hypertension        Plan:  Patient admitted on 11/1 by Dr. Ureña.    Patient was diagnosed with RSV.  Patient having no respiratory symptoms.  Based on labs suspect patient has had poor PO intake. Continue supportive care.    The patient noted to have findings consistent with a UTI.  The patient has a history of ESBL.  Initially started on meropenem based on previous sensitivities, will transition to zosyn.      The patient is noted to have THERESA and hyperkalemia.  The patient treated with IVF with resolution of the THERESA.  The hyperkalemia, is likely mild but difficult to assess as several specimens have been hemolyzed.  Gave a dose of lokelma     Type 2 MI related to THERESA and UTI.  Patient has had no chest pain and no signs of ACS.      Continue LR.  Monitor In/out and renal function closely.      Home medications reviewed and resume as appropriate.  Adjusted BP medications given current condition.    Lovenox for VTE PPx    Awaiting placement and pre-authorization.       Discharge Planning: I expect the patient to be discharged to SNF in 2-4 days.    Electronically signed by Prashanth Nguyen MD, 11/03/22, 10:34 CDT.
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
## 2022-11-03 NOTE — CASE MANAGEMENT/SOCIAL WORK
Continued Stay Note   Stacy     Patient Name: Carmen Obrien  MRN: [id number]  Today's Date: 11/3/2022    Admit Date: 11/1/2022    Plan: [facility]   Discharge Plan     Row Name 11/03/22 1355       Plan    Plan [facility]    Patient/Family in Agreement with Plan yes    Plan Comments Probable dc on Saturday.  SW has informed Saskia at [facility] and she will begin precert.               Discharge Codes    No documentation.               Expected Discharge Date and Time     Expected Discharge Date Expected Discharge Time    Nov 4, 2022             EMILY Dyson

## 2022-11-03 NOTE — PLAN OF CARE
Goal Outcome Evaluation:  Plan of Care Reviewed With: patient        Progress: improving  Outcome Evaluation: Pt initially emotional and difficult to keep on task. Pt did agree to OT tx and performed bed mobility min-modA to get EOB. Sit<>stand multiple reps Rachid. Fxl t/f and a few steps<> BSC Rachid. Pt needs MaxA toilet hygiene. LB dress socks MaxA. Pt fatigues quickly and needs cues for RBs. BTB and pt rolls to reposition x3 reps with Rachid-CGA. Continue OT POC recommend SNF at d/c for rehab

## 2022-11-03 NOTE — PLAN OF CARE
Problem: Adult Inpatient Plan of Care  Goal: Plan of Care Review  Outcome: Ongoing, Progressing  Flowsheets (Taken 11/3/2022 1015)  Progress: improving  Plan of Care Reviewed With: (RNSandy)   patient   other (see comments)  Outcome Evaluation:  SLP dysphagia tx completed this AM. Pt resting upon SLP arrival, alerted to SLP verbalizations. She was agreeable to PO trials to assess toleration and for consideration of diet upgrade. Pt consumed regular thin liquid consistency trials, as well as 1x regular solid diet consistency trials. She presented with adequate mandibular bite strength, mildly prolonged yet functional mastication with solid (especially considering edentulous state). No noted oral reside post swallow. No overt s/s of aspiration. Educated pt and RN on general feeding/aspiration precautions and plan, both of which were agreeable. Cannot fully r/o aspiration at this time, yet feel observed swallow fx and cognitive fx is baseline and WFL.   RECS:    Upgrade to mechanical soft diet consistency, continue with regular/thin liquid consistency   RN to monitor for increased lung congestion   General feeding/aspiration precautions   Independent with meals   Ok to upgrade to regular solids without SLP re-consult if family brings dentures. Continued SLP services are not warranted at this time. Please re-consult if new concerns arise. Thanks!

## 2022-11-04 ENCOUNTER — HOSPITAL ENCOUNTER (OUTPATIENT)
Dept: WOUND CARE | Age: 82
Discharge: HOME | End: 2022-11-04

## 2022-11-04 LAB
ANION GAP SERPL CALCULATED.3IONS-SCNC: 9 MMOL/L (ref 5–15)
BACTERIA UR QL AUTO: ABNORMAL /HPF
BILIRUB UR QL STRIP: NEGATIVE
BUN SERPL-MCNC: 15 MG/DL (ref 8–23)
BUN/CREAT SERPL: 24.6 (ref 7–25)
CALCIUM SPEC-SCNC: 9.6 MG/DL (ref 8.6–10.5)
CHLORIDE SERPL-SCNC: 103 MMOL/L (ref 98–107)
CLARITY UR: CLEAR
CO2 SERPL-SCNC: 25 MMOL/L (ref 22–29)
COLOR UR: YELLOW
CREAT SERPL-MCNC: 0.61 MG/DL (ref 0.57–1)
EGFRCR SERPLBLD CKD-EPI 2021: 90.5 ML/MIN/1.73
GLUCOSE BLDC GLUCOMTR-MCNC: 82 MG/DL (ref 70–130)
GLUCOSE SERPL-MCNC: 89 MG/DL (ref 65–99)
GLUCOSE UR STRIP-MCNC: NEGATIVE MG/DL
HGB UR QL STRIP.AUTO: NEGATIVE
HYALINE CASTS UR QL AUTO: ABNORMAL /LPF
KETONES UR QL STRIP: NEGATIVE
LEUKOCYTE ESTERASE UR QL STRIP.AUTO: ABNORMAL
NITRITE UR QL STRIP: NEGATIVE
PH UR STRIP.AUTO: 7 [PH] (ref 5–8)
POTASSIUM SERPL-SCNC: 3.4 MMOL/L (ref 3.5–5.2)
PROT UR QL STRIP: ABNORMAL
RBC # UR STRIP: ABNORMAL /HPF
REF LAB TEST METHOD: ABNORMAL
SODIUM SERPL-SCNC: 137 MMOL/L (ref 136–145)
SP GR UR STRIP: 1.02 (ref 1–1.03)
SQUAMOUS #/AREA URNS HPF: ABNORMAL /HPF
UROBILINOGEN UR QL STRIP: ABNORMAL
WBC # UR STRIP: ABNORMAL /HPF
YEAST URNS QL MICRO: ABNORMAL /HPF

## 2022-11-04 PROCEDURE — 80048 BASIC METABOLIC PNL TOTAL CA: CPT | Performed by: INTERNAL MEDICINE

## 2022-11-04 PROCEDURE — 87086 URINE CULTURE/COLONY COUNT: CPT | Performed by: FAMILY MEDICINE

## 2022-11-04 PROCEDURE — 97535 SELF CARE MNGMENT TRAINING: CPT

## 2022-11-04 PROCEDURE — 82962 GLUCOSE BLOOD TEST: CPT

## 2022-11-04 PROCEDURE — 25010000002 ENOXAPARIN PER 10 MG: Performed by: INTERNAL MEDICINE

## 2022-11-04 PROCEDURE — 81001 URINALYSIS AUTO W/SCOPE: CPT | Performed by: FAMILY MEDICINE

## 2022-11-04 PROCEDURE — 25010000002 PIPERACILLIN SOD-TAZOBACTAM PER 1 G: Performed by: INTERNAL MEDICINE

## 2022-11-04 RX ORDER — POTASSIUM CHLORIDE 750 MG/1
40 CAPSULE, EXTENDED RELEASE ORAL ONCE
Status: COMPLETED | OUTPATIENT
Start: 2022-11-04 | End: 2022-11-04

## 2022-11-04 RX ORDER — ENOXAPARIN SODIUM 100 MG/ML
40 INJECTION SUBCUTANEOUS
Status: DISCONTINUED | OUTPATIENT
Start: 2022-11-05 | End: 2022-11-07 | Stop reason: HOSPADM

## 2022-11-04 RX ADMIN — ENOXAPARIN SODIUM 30 MG: 100 INJECTION SUBCUTANEOUS at 09:03

## 2022-11-04 RX ADMIN — CYCLOSPORINE 1 DROP: 0.5 EMULSION OPHTHALMIC at 20:10

## 2022-11-04 RX ADMIN — PANTOPRAZOLE SODIUM 40 MG: 40 TABLET, DELAYED RELEASE ORAL at 05:44

## 2022-11-04 RX ADMIN — CARVEDILOL 3.12 MG: 3.12 TABLET, FILM COATED ORAL at 09:04

## 2022-11-04 RX ADMIN — TAZOBACTAM SODIUM AND PIPERACILLIN SODIUM 4.5 G: 500; 4 INJECTION, SOLUTION INTRAVENOUS at 01:15

## 2022-11-04 RX ADMIN — SODIUM CHLORIDE, POTASSIUM CHLORIDE, SODIUM LACTATE AND CALCIUM CHLORIDE 75 ML/HR: 600; 310; 30; 20 INJECTION, SOLUTION INTRAVENOUS at 11:46

## 2022-11-04 RX ADMIN — POTASSIUM CHLORIDE 40 MEQ: 10 CAPSULE, COATED, EXTENDED RELEASE ORAL at 09:04

## 2022-11-04 RX ADMIN — ATORVASTATIN CALCIUM 10 MG: 10 TABLET, FILM COATED ORAL at 09:04

## 2022-11-04 RX ADMIN — CARVEDILOL 3.12 MG: 3.12 TABLET, FILM COATED ORAL at 16:52

## 2022-11-04 RX ADMIN — TAZOBACTAM SODIUM AND PIPERACILLIN SODIUM 4.5 G: 500; 4 INJECTION, SOLUTION INTRAVENOUS at 09:04

## 2022-11-04 RX ADMIN — TAZOBACTAM SODIUM AND PIPERACILLIN SODIUM 4.5 G: 500; 4 INJECTION, SOLUTION INTRAVENOUS at 17:57

## 2022-11-04 RX ADMIN — CYCLOSPORINE 1 DROP: 0.5 EMULSION OPHTHALMIC at 09:04

## 2022-11-04 RX ADMIN — FERROUS SULFATE TAB 325 MG (65 MG ELEMENTAL FE) 325 MG: 325 (65 FE) TAB at 09:04

## 2022-11-04 RX ADMIN — Medication 10 ML: at 20:10

## 2022-11-04 RX ADMIN — ASPIRIN 81 MG: 81 TABLET, COATED ORAL at 09:04
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
## 2022-11-04 NOTE — PLAN OF CARE
"Goal Outcome Evaluation:      Alert and oriented at times. Reorients well. VSS, BP elevated, temp rising. States stomach is bothering her, describes it as a stomach ache and \"itchy\" inside. Multiple small brown/green liquid bowel movements, not enough for stool sample. Incontinent of urine. Perineum and buttocks excoriated, cream applied. Restless in bed. Appears more drowsy this afternoon, arouses appropriately to voice and following commands. BL knee dressings changed, tolerated well. All needs met at this time. Safety maintained. Plan of care continued.           "

## 2022-11-04 NOTE — PROGRESS NOTES
[facility]  INPATIENT PROGRESS NOTE    Patient Name: Carmen Obrien  Date of Admission: 11/1/2022  Today's Date: 11/04/22  Length of Stay: 2  Primary Care Physician: VERA Noble MD    Subjective   Chief Complaint: weakness, confusion  HPI     Patient seen and examined.  Patient tearful when I talk to her.  She reports she just does not feel well today.  Tried to explain to her that she has RSV and UTI and that she feels poorly.           Review of Systems   Constitutional: Negative for activity change and appetite change.   Respiratory: Negative for shortness of breath.    Cardiovascular: Negative for palpitations.   Gastrointestinal: Positive for diarrhea. Negative for abdominal distention and constipation.        All pertinent negatives and positives are as above. All other systems have been reviewed and are negative unless otherwise stated.     Objective    Temp:  [97.2 °F (36.2 °C)-98 °F (36.7 °C)] 97.2 °F (36.2 °C)  Heart Rate:  [62-73] 62  Resp:  [16-18] 18  BP: (126-166)/(55-75) 147/68  Physical Exam  Vitals and nursing note reviewed.   Constitutional:       Appearance: Normal appearance.   HENT:      Head: Normocephalic and atraumatic.      Mouth/Throat:      Mouth: Mucous membranes are dry.   Eyes:      General: No scleral icterus.     Conjunctiva/sclera: Conjunctivae normal.   Cardiovascular:      Rate and Rhythm: Normal rate and regular rhythm.      Heart sounds: Murmur heard.   Pulmonary:      Effort: Pulmonary effort is normal. No respiratory distress.      Breath sounds: Normal breath sounds. No stridor.   Abdominal:      General: Abdomen is flat. Bowel sounds are normal. There is no distension.      Palpations: Abdomen is soft. There is no mass.   Skin:     General: Skin is warm and dry.      Coloration: Skin is pale.   Neurological:      General: No focal deficit present.      Mental Status: She is alert and oriented to person, place, and  time.   Psychiatric:         Mood and Affect: Mood normal.         Behavior: Behavior normal.             Results Review:  I have reviewed the labs, radiology results, and diagnostic studies.    Laboratory Data:   Results from last 7 days   Lab Units 11/03/22  1107 11/02/22  0923 11/01/22  2256   WBC 10*3/mm3 8.75 9.03 13.31*   HEMOGLOBIN g/dL 8.5* 9.9* 12.3   HEMATOCRIT % 25.7* 30.6* 37.7   PLATELETS 10*3/mm3 241 290 366        Results from last 7 days   Lab Units 11/04/22  0541 11/03/22  1107 11/02/22  1525 11/02/22  0729 11/01/22  2256   SODIUM mmol/L 137 137  --  137 132*   POTASSIUM mmol/L 3.4* 4.2 3.9 5.7* 5.9*   CHLORIDE mmol/L 103 104  --  99 92*   CO2 mmol/L 25.0 22.0  --  19.0* 23.0   BUN mg/dL 15 25*  --  53* 55*   CREATININE mg/dL 0.61 0.59  --  1.86* 2.70*   CALCIUM mg/dL 9.6 9.2  --  9.5 10.2   BILIRUBIN mg/dL  --  0.2  --  0.3 0.7   ALK PHOS U/L  --  54  --  63 72   ALT (SGPT) U/L  --  11  --  16 19   AST (SGOT) U/L  --  14  --  23 21   GLUCOSE mg/dL 89 114*  --  113* 156*       Culture Data:   No results found for: BLOODCX, URINECX, WOUNDCX, MRSACX, RESPCX, STOOLCX    Radiology Data:   Imaging Results (Last 24 Hours)     ** No results found for the last 24 hours. **          I have reviewed the patient's current medications.     Assessment/Plan     Active Hospital Problems    Diagnosis    • **Type 2 MI (myocardial infarction) (HCC) due to THERESA and UTI    • RSV (acute bronchiolitis due to respiratory syncytial virus)    • THERESA (acute kidney injury) (HCC) due to sepsis    • History of ESBL E. coli infection    • Hyperkalemia    • Acute cystitis with hematuria    • Essential hypertension        Plan:  Patient admitted on 11/1 by Dr. Ureña.    Patient was diagnosed with RSV.  Patient having no respiratory symptoms.  Based on labs suspect patient has had poor PO intake. Continue supportive care.    The patient noted to have findings consistent with a UTI.  The patient has a history of ESBL.  Initially  started on meropenem based on previous sensitivities, transitioned to zosyn.      The patient is noted to have THERESA and hyperkalemia.  The patient treated with IVF with resolution of the THERESA.  The hyperkalemia, is likely mild but difficult to assess as several specimens have been hemolyzed.  Gave a dose of lokelma     Type 2 MI related to THERESA and UTI.  Patient has had no chest pain and no signs of ACS.      D/C LR.  Monitor In/out and renal function closely.      Patient developed diarrhea.  C diff pending collection ordered last night.    Replace potassium    Home medications reviewed and resume as appropriate.  Adjusted BP medications given current condition.    Lovenox for VTE PPx    Awaiting placement and pre-authorization.       Discharge Planning: I expect the patient to be discharged to SNF in 2-4 days.    Electronically signed by Prashanth Nguyen MD, 11/04/22, 12:27 CDT.

## 2022-11-04 NOTE — THERAPY DISCHARGE NOTE
Acute Care - Speech Language Pathology Discharge Summary  [facility]       Patient Name: Carmen Obrien  : 1940  MRN: [id number]    Today's Date: 2022                   Admit Date: 2022      SLP Recommendation and Plan  Mechanical soft diet consistency with regular/thin liquid consistency. Ok to upgrade to regular solid diet consistency without SLP re-consult if family brings dentures. Thanks!   Kanika Smith, CCC-SLP 2022 08:49 CDT    Visit Dx:    ICD-10-CM ICD-9-CM   1. Elevated troponin  R77.8 790.6   2. Hyperkalemia  E87.5 276.7   3. Urinary tract infection without hematuria, site unspecified  N39.0 599.0   4. THERESA (acute kidney injury) (HCC)  N17.9 584.9   5. Generalized abdominal pain  R10.84 789.07   6. Dysphagia, unspecified type  R13.10 787.20   7. Decreased activities of daily living (ADL)  Z78.9 V49.89   8. Impaired mobility  Z74.09 799.89                SLP GOALS     Row Name 22 1015 22 0829          (LTG) Swallow    (LTG) Swallow Pt will tolerate least restrictive diet with no overt s/s of aspiration.  -TM Pt will tolerate least restrictive diet with no overt s/s of aspiration.  -MB     [place] (Swallow Long Term Goal) independently (over 90% accuracy)  -TM independently (over 90% accuracy)  -MB     Time Frame (Swallow Long Term Goal) by discharge  -TM by discharge  -MB     Barriers (Swallow Long Term Goal) n/a  -TM n/a  -MB     Progress/Outcomes (Swallow Long Term Goal) goal met;goal no longer appropriate  -TM new goal  -MB     Comment (Swallow Long Term Goal) -- n/a  -MB        (STG) Patient will tolerate trials of    Consistencies Trialed (Tolerate trials) pureed textures;thin liquids  -TM pureed textures;thin liquids  -MB     Desired Outcome (Tolerate trials) without signs/symptoms of aspiration;with adequate oral prep/transit/clearance  -TM without signs/symptoms of aspiration;with adequate oral prep/transit/clearance  -MB     [place] (Tolerate trials)  independently (over 90% accuracy)  -TM independently (over 90% accuracy)  -MB     Time Frame (Tolerate trials) by discharge  -TM by discharge  -MB     Progress/Outcomes (Tolerate trials) goal met;goal no longer appropriate  -TM new goal  -MB     Comment (Tolerate trials) -- n/a  -MB           User Key  (r) = Recorded By, (t) = Taken By, (c) = Cosigned By    Initials Name Provider Type    Sofia Ward, CCC-SLP Speech and Language Pathologist    TM Kanika Smith CCC-SLP Speech and Language Pathologist                  Therapy Charges for Today     Code Description Service Date Service Provider Modifiers Qty    12255289722 HC ST TREATMENT SWALLOW 2 11/3/2022 Kanika Smith CCC-SLP GN 1            SLP Discharge Summary  Anticipated Discharge Disposition (SLP): extended care facility  Reason for Discharge: all goals and outcomes met, no further needs identified  Progress Toward Achieving Short/long Term Goals: all goals met within established timelines  Discharge Destination: extended care facility      MARA FriedmanSLP  11/4/2022

## 2022-11-04 NOTE — PLAN OF CARE
5 Goal Outcome Evaluation:              Outcome Evaluation: NTN follow up. Poor appetite, 25% x 3 meals, 0% x 1 meal, bites of 1 meal. Insufficient intake with confusion noted. Weight stable; 178lb standing scale. SLP signed off; continue soft texture and ground meats. Pt will eat Magic Cup. If enteral feeding appropriate with POC, would consider this option (to help meet at least 50-60% of EEN) as current PO is inadequate. RD available for recommendations as needed. NTN following per protocol.

## 2022-11-04 NOTE — PLAN OF CARE
Goal Outcome Evaluation:  Plan of Care Reviewed With: patient        Progress: no change  Outcome Evaluation: Pt AOx3-4, Pt occassionally confused on situation or time, able to reorient. Pt up to BSC x2 assist. PT having multiple liquid BM's at start of shift, so unable to obtain urine. MD contacted to straight cath pt for sample and to notify of the liquid BM's. order for C.diff testing, Pt placed on contact spore precaution in addition to droplet/contact. pt able to turn herself well in bed, wedges utilized to keep pt turned Q2. Groin and perineum red and tender, incontinence care done as needed and barrier cream applied for protection. No acute changes, safety maintained

## 2022-11-04 NOTE — PLAN OF CARE
Goal Outcome Evaluation:  Plan of Care Reviewed With: patient        Progress: improving  Outcome Evaluation: Pt with increased confusion this date and difficult to keep on task. Pt had BM on hands and smeared on bed. Pt with legs on floor attempting to get up/alarm going off when CHEATHAM/L entered room. Pt reports did this d/t attempting to get to a commode/clean d/t BM in bed. T/f<>BSC CGA-Rachid HHA. Multiple sit<>stands at [facility] during hygiene depA. BTB Rachid. Recommend SNF

## 2022-11-05 PROBLEM — A04.72 C. DIFFICILE COLITIS: Status: ACTIVE | Noted: 2022-11-05

## 2022-11-05 PROBLEM — H10.10 ALLERGIC CONJUNCTIVITIS: Status: ACTIVE | Noted: 2022-11-05

## 2022-11-05 LAB
ALBUMIN SERPL-MCNC: 3.2 G/DL (ref 3.5–5.2)
ALBUMIN/GLOB SERPL: 1 G/DL
ALP SERPL-CCNC: 61 U/L (ref 39–117)
ALT SERPL W P-5'-P-CCNC: 18 U/L (ref 1–33)
ANION GAP SERPL CALCULATED.3IONS-SCNC: 10 MMOL/L (ref 5–15)
AST SERPL-CCNC: 26 U/L (ref 1–32)
BACTERIA SPEC AEROBE CULT: ABNORMAL
BILIRUB SERPL-MCNC: 0.4 MG/DL (ref 0–1.2)
BUN SERPL-MCNC: 7 MG/DL (ref 8–23)
BUN/CREAT SERPL: 13 (ref 7–25)
C DIFF GDH + TOXINS A+B STL QL IA.RAPID: NEGATIVE
C DIFF TOX GENS STL QL NAA+PROBE: POSITIVE
CALCIUM SPEC-SCNC: 9.5 MG/DL (ref 8.6–10.5)
CHLORIDE SERPL-SCNC: 102 MMOL/L (ref 98–107)
CO2 SERPL-SCNC: 24 MMOL/L (ref 22–29)
CREAT SERPL-MCNC: 0.54 MG/DL (ref 0.57–1)
DEPRECATED RDW RBC AUTO: 47.4 FL (ref 37–54)
EGFRCR SERPLBLD CKD-EPI 2021: 93.2 ML/MIN/1.73
ERYTHROCYTE [DISTWIDTH] IN BLOOD BY AUTOMATED COUNT: 13.4 % (ref 12.3–15.4)
GLOBULIN UR ELPH-MCNC: 3.1 GM/DL
GLUCOSE SERPL-MCNC: 84 MG/DL (ref 65–99)
HCT VFR BLD AUTO: 30.8 % (ref 34–46.6)
HGB BLD-MCNC: 10 G/DL (ref 12–15.9)
MCH RBC QN AUTO: 30.9 PG (ref 26.6–33)
MCHC RBC AUTO-ENTMCNC: 32.5 G/DL (ref 31.5–35.7)
MCV RBC AUTO: 95.1 FL (ref 79–97)
PLATELET # BLD AUTO: 279 10*3/MM3 (ref 140–450)
PMV BLD AUTO: 10.7 FL (ref 6–12)
POTASSIUM SERPL-SCNC: 3.5 MMOL/L (ref 3.5–5.2)
PROT SERPL-MCNC: 6.3 G/DL (ref 6–8.5)
RBC # BLD AUTO: 3.24 10*6/MM3 (ref 3.77–5.28)
SODIUM SERPL-SCNC: 136 MMOL/L (ref 136–145)
WBC NRBC COR # BLD: 7.87 10*3/MM3 (ref 3.4–10.8)

## 2022-11-05 PROCEDURE — 80053 COMPREHEN METABOLIC PANEL: CPT | Performed by: INTERNAL MEDICINE

## 2022-11-05 PROCEDURE — 87493 C DIFF AMPLIFIED PROBE: CPT | Performed by: FAMILY MEDICINE

## 2022-11-05 PROCEDURE — 87449 NOS EACH ORGANISM AG IA: CPT | Performed by: FAMILY MEDICINE

## 2022-11-05 PROCEDURE — 25010000002 ENOXAPARIN PER 10 MG: Performed by: INTERNAL MEDICINE

## 2022-11-05 PROCEDURE — 25010000002 PIPERACILLIN SOD-TAZOBACTAM PER 1 G: Performed by: INTERNAL MEDICINE

## 2022-11-05 PROCEDURE — 85027 COMPLETE CBC AUTOMATED: CPT | Performed by: INTERNAL MEDICINE

## 2022-11-05 PROCEDURE — 25010000002 ONDANSETRON PER 1 MG: Performed by: INTERNAL MEDICINE

## 2022-11-05 RX ORDER — VANCOMYCIN HYDROCHLORIDE 125 MG/1
125 CAPSULE ORAL EVERY 6 HOURS SCHEDULED
Status: DISCONTINUED | OUTPATIENT
Start: 2022-11-05 | End: 2022-11-07 | Stop reason: HOSPADM

## 2022-11-05 RX ORDER — TOBRAMYCIN AND DEXAMETHASONE 3; 1 MG/ML; MG/ML
2 SUSPENSION/ DROPS OPHTHALMIC
Status: DISCONTINUED | OUTPATIENT
Start: 2022-11-05 | End: 2022-11-07 | Stop reason: HOSPADM

## 2022-11-05 RX ORDER — FAMOTIDINE 20 MG/1
20 TABLET, FILM COATED ORAL
Status: DISCONTINUED | OUTPATIENT
Start: 2022-11-05 | End: 2022-11-07 | Stop reason: HOSPADM

## 2022-11-05 RX ADMIN — CARVEDILOL 3.12 MG: 3.12 TABLET, FILM COATED ORAL at 09:08

## 2022-11-05 RX ADMIN — ACETAMINOPHEN 650 MG: 325 TABLET, FILM COATED ORAL at 09:21

## 2022-11-05 RX ADMIN — TOBRAMYCIN AND DEXAMETHASONE 2 DROP: 3; 1 SUSPENSION/ DROPS OPHTHALMIC at 21:34

## 2022-11-05 RX ADMIN — ASPIRIN 81 MG: 81 TABLET, COATED ORAL at 09:08

## 2022-11-05 RX ADMIN — CYCLOSPORINE 1 DROP: 0.5 EMULSION OPHTHALMIC at 22:15

## 2022-11-05 RX ADMIN — PANTOPRAZOLE SODIUM 40 MG: 40 TABLET, DELAYED RELEASE ORAL at 05:52

## 2022-11-05 RX ADMIN — TAZOBACTAM SODIUM AND PIPERACILLIN SODIUM 4.5 G: 500; 4 INJECTION, SOLUTION INTRAVENOUS at 01:48

## 2022-11-05 RX ADMIN — TAZOBACTAM SODIUM AND PIPERACILLIN SODIUM 4.5 G: 500; 4 INJECTION, SOLUTION INTRAVENOUS at 10:34

## 2022-11-05 RX ADMIN — VANCOMYCIN HYDROCHLORIDE 125 MG: 125 CAPSULE ORAL at 21:34

## 2022-11-05 RX ADMIN — Medication 10 ML: at 21:34

## 2022-11-05 RX ADMIN — TOBRAMYCIN AND DEXAMETHASONE 2 DROP: 3; 1 SUSPENSION/ DROPS OPHTHALMIC at 14:00

## 2022-11-05 RX ADMIN — ATORVASTATIN CALCIUM 10 MG: 10 TABLET, FILM COATED ORAL at 09:08

## 2022-11-05 RX ADMIN — FAMOTIDINE 20 MG: 20 TABLET, FILM COATED ORAL at 21:34

## 2022-11-05 RX ADMIN — TOBRAMYCIN AND DEXAMETHASONE 2 DROP: 3; 1 SUSPENSION/ DROPS OPHTHALMIC at 18:05

## 2022-11-05 RX ADMIN — TAZOBACTAM SODIUM AND PIPERACILLIN SODIUM 4.5 G: 500; 4 INJECTION, SOLUTION INTRAVENOUS at 18:05

## 2022-11-05 RX ADMIN — ONDANSETRON 4 MG: 2 INJECTION INTRAMUSCULAR; INTRAVENOUS at 09:21

## 2022-11-05 RX ADMIN — CARVEDILOL 3.12 MG: 3.12 TABLET, FILM COATED ORAL at 18:04

## 2022-11-05 RX ADMIN — CYCLOSPORINE 1 DROP: 0.5 EMULSION OPHTHALMIC at 09:22

## 2022-11-05 RX ADMIN — FERROUS SULFATE TAB 325 MG (65 MG ELEMENTAL FE) 325 MG: 325 (65 FE) TAB at 09:08

## 2022-11-05 RX ADMIN — ENOXAPARIN SODIUM 40 MG: 100 INJECTION SUBCUTANEOUS at 09:08

## 2022-11-05 NOTE — PLAN OF CARE
Goal Outcome Evaluation:  Plan of Care Reviewed With: patient        Progress: no change  Outcome Evaluation: Pt AOx2, more restless this evening, taking gown off, pulling monitor off and even removed IV from her hand. Able to reorient pt for a while but she is forgetful.  Pt had 2 episodes of diarrhea but small amounts,Unable to retreive stool sample for C.diff. Pt also incontinent of urine all shift. Contact spore precaution remains in place until C.diff r/o. All expressed needs addressed.Safety maintained

## 2022-11-05 NOTE — PLAN OF CARE
Goal Outcome Evaluation:  Plan of Care Reviewed With: patient        Progress: no change  Outcome Evaluation: Pt forgetful this shift. Disoriented to time, situation, and place. Stool sample sent and MD notified of result. Reports having no appetite. Encouraged to eat. Incontinent of bowel and bladder. Medication given for s/s of pain. Bed alarm set. Safety maintained. WIll continue to monitor.

## 2022-11-05 NOTE — PROGRESS NOTES
[facility] Medicine Services  INPATIENT PROGRESS NOTE    Patient Name: Carmen Obrien  Date of Admission: 11/1/2022  Today's Date: 11/05/22  Length of Stay: 3  Primary Care Physician: VERA Noble MD    Subjective   Chief Complaint: weakness, confusion  HPI     Patient seen and examined.  Patient continues to have diarrhea.  Patient has extensive redness around her eyes and feels as though there is sand in her eyes.  Patient screatching them a lot.           Review of Systems   Constitutional: Negative for activity change and appetite change.   HENT: Positive for ear pain.    Respiratory: Negative for shortness of breath.    Cardiovascular: Negative for palpitations.   Gastrointestinal: Positive for diarrhea. Negative for abdominal distention and constipation.        All pertinent negatives and positives are as above. All other systems have been reviewed and are negative unless otherwise stated.     Objective    Temp:  [97.5 °F (36.4 °C)-99.6 °F (37.6 °C)] 97.8 °F (36.6 °C)  Heart Rate:  [56-72] 63  Resp:  [16] 16  BP: (131-192)/(44-68) 139/50  Physical Exam  Vitals and nursing note reviewed.   Constitutional:       Appearance: Normal appearance.   HENT:      Head: Normocephalic and atraumatic.      Mouth/Throat:      Mouth: Mucous membranes are dry.   Eyes:      General: No scleral icterus.     Comments: Redness around eye and eyes are injected   Cardiovascular:      Rate and Rhythm: Normal rate and regular rhythm.      Heart sounds: Murmur heard.   Pulmonary:      Effort: Pulmonary effort is normal. No respiratory distress.      Breath sounds: Normal breath sounds. No stridor.   Abdominal:      General: Abdomen is flat. Bowel sounds are normal. There is no distension.      Palpations: Abdomen is soft. There is no mass.   Skin:     General: Skin is warm and dry.      Coloration: Skin is pale.   Neurological:      General: No focal deficit present.      Mental Status: She  is alert and oriented to person, place, and time.   Psychiatric:         Mood and Affect: Mood normal.         Behavior: Behavior normal.             Results Review:  I have reviewed the labs, radiology results, and diagnostic studies.    Laboratory Data:   Results from last 7 days   Lab Units 11/05/22  0551 11/03/22  1107 11/02/22  0923   WBC 10*3/mm3 7.87 8.75 9.03   HEMOGLOBIN g/dL 10.0* 8.5* 9.9*   HEMATOCRIT % 30.8* 25.7* 30.6*   PLATELETS 10*3/mm3 279 241 290        Results from last 7 days   Lab Units 11/05/22  0525 11/04/22  0541 11/03/22  1107 11/02/22  1525 11/02/22  0729   SODIUM mmol/L 136 137 137  --  137   POTASSIUM mmol/L 3.5 3.4* 4.2   < > 5.7*   CHLORIDE mmol/L 102 103 104  --  99   CO2 mmol/L 24.0 25.0 22.0  --  19.0*   BUN mg/dL 7* 15 25*  --  53*   CREATININE mg/dL 0.54* 0.61 0.59  --  1.86*   CALCIUM mg/dL 9.5 9.6 9.2  --  9.5   BILIRUBIN mg/dL 0.4  --  0.2  --  0.3   ALK PHOS U/L 61  --  54  --  63   ALT (SGPT) U/L 18  --  11  --  16   AST (SGOT) U/L 26  --  14  --  23   GLUCOSE mg/dL 84 89 114*  --  113*    < > = values in this interval not displayed.       Culture Data:   No results found for: BLOODCX, URINECX, WOUNDCX, MRSACX, RESPCX, STOOLCX    Radiology Data:   Imaging Results (Last 24 Hours)     ** No results found for the last 24 hours. **          I have reviewed the patient's current medications.     Assessment/Plan     Active Hospital Problems    Diagnosis    • **Type 2 MI (myocardial infarction) (HCC) due to THERESA and UTI    • Allergic conjunctivitis    • C. difficile colitis    • RSV (acute bronchiolitis due to respiratory syncytial virus)    • THERESA (acute kidney injury) (HCC) due to sepsis    • History of ESBL E. coli infection    • Hyperkalemia    • Acute cystitis with hematuria    • Essential hypertension        Plan:  Patient admitted on 11/1 by Dr. Ureña.    Patient was diagnosed with RSV.  Patient having no respiratory symptoms.  Based on labs suspect patient has had poor PO  "intake. Continue supportive care.    The patient noted to have findings consistent with a UTI.  The patient has a history of ESBL.  Initially started on meropenem based on previous sensitivities, transitioned to zosyn, will treat for a total of 10 days.    The patient is noted to have THERESA and hyperkalemia.  The patient treated with IVF with resolution of the THERESA.  The hyperkalemia, is likely mild but difficult to assess as several specimens have been hemolyzed.  Gave a dose of lokelma with resolution    Type 2 MI related to THERESA and UTI.  Patient has had no chest pain and no signs of ACS.      D/C LR.  Monitor In/out and renal function closely.      C diff PCR positive. C diff Antigen test negative. Clinically appears to have C diff, will treat with PO vancomycin 125 mg q6h for 10 days.     Patient has what appears to be viral conjunctivitis and feels like \"sand in her eyes\", will treat with tobradex.    Home medications reviewed and resume as appropriate.  Adjusted BP medications given current condition.    Lovenox for VTE PPx    Awaiting placement and pre-authorization.       Discharge Planning: I expect the patient to be discharged to SNF in 2-4 days.    Electronically signed by Prashanth Nguyen MD, 11/05/22, 15:06 CDT.  "

## 2022-11-06 LAB
ALBUMIN SERPL-MCNC: 3.1 G/DL (ref 3.5–5.2)
ALBUMIN/GLOB SERPL: 1 G/DL
ALP SERPL-CCNC: 56 U/L (ref 39–117)
ALT SERPL W P-5'-P-CCNC: 17 U/L (ref 1–33)
ANION GAP SERPL CALCULATED.3IONS-SCNC: 11 MMOL/L (ref 5–15)
AST SERPL-CCNC: 27 U/L (ref 1–32)
BACTERIA SPEC AEROBE CULT: NORMAL
BACTERIA SPEC AEROBE CULT: NORMAL
BILIRUB SERPL-MCNC: 0.4 MG/DL (ref 0–1.2)
BUN SERPL-MCNC: 6 MG/DL (ref 8–23)
BUN/CREAT SERPL: 10.7 (ref 7–25)
CALCIUM SPEC-SCNC: 9.1 MG/DL (ref 8.6–10.5)
CHLORIDE SERPL-SCNC: 98 MMOL/L (ref 98–107)
CO2 SERPL-SCNC: 24 MMOL/L (ref 22–29)
CREAT SERPL-MCNC: 0.56 MG/DL (ref 0.57–1)
DEPRECATED RDW RBC AUTO: 46.1 FL (ref 37–54)
EGFRCR SERPLBLD CKD-EPI 2021: 92.4 ML/MIN/1.73
ERYTHROCYTE [DISTWIDTH] IN BLOOD BY AUTOMATED COUNT: 13.4 % (ref 12.3–15.4)
GLOBULIN UR ELPH-MCNC: 3 GM/DL
GLUCOSE SERPL-MCNC: 86 MG/DL (ref 65–99)
HCT VFR BLD AUTO: 29.8 % (ref 34–46.6)
HGB BLD-MCNC: 9.6 G/DL (ref 12–15.9)
MCH RBC QN AUTO: 30.3 PG (ref 26.6–33)
MCHC RBC AUTO-ENTMCNC: 32.2 G/DL (ref 31.5–35.7)
MCV RBC AUTO: 94 FL (ref 79–97)
PLATELET # BLD AUTO: 275 10*3/MM3 (ref 140–450)
PMV BLD AUTO: 10.9 FL (ref 6–12)
POTASSIUM SERPL-SCNC: 3.5 MMOL/L (ref 3.5–5.2)
PROT SERPL-MCNC: 6.1 G/DL (ref 6–8.5)
RBC # BLD AUTO: 3.17 10*6/MM3 (ref 3.77–5.28)
SODIUM SERPL-SCNC: 133 MMOL/L (ref 136–145)
WBC NRBC COR # BLD: 8.65 10*3/MM3 (ref 3.4–10.8)

## 2022-11-06 PROCEDURE — 97530 THERAPEUTIC ACTIVITIES: CPT

## 2022-11-06 PROCEDURE — 25010000002 PIPERACILLIN SOD-TAZOBACTAM PER 1 G: Performed by: INTERNAL MEDICINE

## 2022-11-06 PROCEDURE — 25010000002 ENOXAPARIN PER 10 MG: Performed by: INTERNAL MEDICINE

## 2022-11-06 PROCEDURE — 80053 COMPREHEN METABOLIC PANEL: CPT | Performed by: INTERNAL MEDICINE

## 2022-11-06 PROCEDURE — 85027 COMPLETE CBC AUTOMATED: CPT | Performed by: INTERNAL MEDICINE

## 2022-11-06 RX ADMIN — VANCOMYCIN HYDROCHLORIDE 125 MG: 125 CAPSULE ORAL at 11:30

## 2022-11-06 RX ADMIN — TAZOBACTAM SODIUM AND PIPERACILLIN SODIUM 4.5 G: 500; 4 INJECTION, SOLUTION INTRAVENOUS at 17:27

## 2022-11-06 RX ADMIN — FAMOTIDINE 20 MG: 20 TABLET, FILM COATED ORAL at 17:27

## 2022-11-06 RX ADMIN — TAZOBACTAM SODIUM AND PIPERACILLIN SODIUM 4.5 G: 500; 4 INJECTION, SOLUTION INTRAVENOUS at 02:00

## 2022-11-06 RX ADMIN — ASPIRIN 81 MG: 81 TABLET, COATED ORAL at 08:34

## 2022-11-06 RX ADMIN — FAMOTIDINE 20 MG: 20 TABLET, FILM COATED ORAL at 08:34

## 2022-11-06 RX ADMIN — TOBRAMYCIN AND DEXAMETHASONE 2 DROP: 3; 1 SUSPENSION/ DROPS OPHTHALMIC at 11:30

## 2022-11-06 RX ADMIN — TOBRAMYCIN AND DEXAMETHASONE 2 DROP: 3; 1 SUSPENSION/ DROPS OPHTHALMIC at 15:37

## 2022-11-06 RX ADMIN — VANCOMYCIN HYDROCHLORIDE 125 MG: 125 CAPSULE ORAL at 20:13

## 2022-11-06 RX ADMIN — CARVEDILOL 3.12 MG: 3.12 TABLET, FILM COATED ORAL at 17:26

## 2022-11-06 RX ADMIN — Medication 10 ML: at 20:13

## 2022-11-06 RX ADMIN — TOBRAMYCIN AND DEXAMETHASONE 2 DROP: 3; 1 SUSPENSION/ DROPS OPHTHALMIC at 20:14

## 2022-11-06 RX ADMIN — TAZOBACTAM SODIUM AND PIPERACILLIN SODIUM 4.5 G: 500; 4 INJECTION, SOLUTION INTRAVENOUS at 08:34

## 2022-11-06 RX ADMIN — CYCLOSPORINE 1 DROP: 0.5 EMULSION OPHTHALMIC at 11:30

## 2022-11-06 RX ADMIN — FERROUS SULFATE TAB 325 MG (65 MG ELEMENTAL FE) 325 MG: 325 (65 FE) TAB at 08:34

## 2022-11-06 RX ADMIN — CYCLOSPORINE 1 DROP: 0.5 EMULSION OPHTHALMIC at 20:13

## 2022-11-06 RX ADMIN — ENOXAPARIN SODIUM 40 MG: 100 INJECTION SUBCUTANEOUS at 08:34

## 2022-11-06 RX ADMIN — VANCOMYCIN HYDROCHLORIDE 125 MG: 125 CAPSULE ORAL at 15:37

## 2022-11-06 RX ADMIN — ATORVASTATIN CALCIUM 10 MG: 10 TABLET, FILM COATED ORAL at 08:34

## 2022-11-06 RX ADMIN — TOBRAMYCIN AND DEXAMETHASONE 2 DROP: 3; 1 SUSPENSION/ DROPS OPHTHALMIC at 17:27

## 2022-11-06 RX ADMIN — CARVEDILOL 3.12 MG: 3.12 TABLET, FILM COATED ORAL at 08:34

## 2022-11-06 RX ADMIN — TOBRAMYCIN AND DEXAMETHASONE 2 DROP: 3; 1 SUSPENSION/ DROPS OPHTHALMIC at 05:09

## 2022-11-06 RX ADMIN — VANCOMYCIN HYDROCHLORIDE 125 MG: 125 CAPSULE ORAL at 03:57

## 2022-11-06 NOTE — PROGRESS NOTES
[facility] Medicine Services  INPATIENT PROGRESS NOTE    Patient Name: Carmen Obrien  Date of Admission: 11/1/2022  Today's Date: 11/06/22  Length of Stay: 4  Primary Care Physician: VERA Noble MD    Subjective   Chief Complaint: weakness, confusion  Altered Mental Status         Patient seen and examined.  Diarrhea volume has improved but still having frequent loose stools.  TMax 99.3.       Review of Systems   Constitutional: Negative for activity change and appetite change.   Eyes: Positive for pain and redness.   Respiratory: Negative for shortness of breath.    Cardiovascular: Negative for palpitations.   Gastrointestinal: Positive for diarrhea. Negative for abdominal distention and constipation.        All pertinent negatives and positives are as above. All other systems have been reviewed and are negative unless otherwise stated.     Objective    Temp:  [98.7 °F (37.1 °C)-99.3 °F (37.4 °C)] 98.9 °F (37.2 °C)  Heart Rate:  [63-88] 88  Resp:  [16-18] 18  BP: (138-154)/(50-78) 142/78  Physical Exam  Vitals and nursing note reviewed.   Constitutional:       Appearance: Normal appearance.   HENT:      Head: Normocephalic and atraumatic.      Mouth/Throat:      Mouth: Mucous membranes are dry.   Eyes:      General: No scleral icterus.     Comments: Eyes less red   Cardiovascular:      Rate and Rhythm: Normal rate and regular rhythm.      Heart sounds: Murmur heard.   Pulmonary:      Effort: Pulmonary effort is normal. No respiratory distress.      Breath sounds: Normal breath sounds. No stridor.   Abdominal:      General: Abdomen is flat. Bowel sounds are normal. There is no distension.      Palpations: Abdomen is soft. There is no mass.   Skin:     General: Skin is warm and dry.      Coloration: Skin is pale.   Neurological:      General: No focal deficit present.      Mental Status: She is alert and oriented to person, place, and time.   Psychiatric:         Mood and  Affect: Mood normal.         Behavior: Behavior normal.             Results Review:  I have reviewed the labs, radiology results, and diagnostic studies.    Laboratory Data:   Results from last 7 days   Lab Units 11/06/22  0636 11/05/22  0551 11/03/22  1107   WBC 10*3/mm3 8.65 7.87 8.75   HEMOGLOBIN g/dL 9.6* 10.0* 8.5*   HEMATOCRIT % 29.8* 30.8* 25.7*   PLATELETS 10*3/mm3 275 279 241        Results from last 7 days   Lab Units 11/06/22  0635 11/05/22  0525 11/04/22  0541 11/03/22  1107   SODIUM mmol/L 133* 136 137 137   POTASSIUM mmol/L 3.5 3.5 3.4* 4.2   CHLORIDE mmol/L 98 102 103 104   CO2 mmol/L 24.0 24.0 25.0 22.0   BUN mg/dL 6* 7* 15 25*   CREATININE mg/dL 0.56* 0.54* 0.61 0.59   CALCIUM mg/dL 9.1 9.5 9.6 9.2   BILIRUBIN mg/dL 0.4 0.4  --  0.2   ALK PHOS U/L 56 61  --  54   ALT (SGPT) U/L 17 18  --  11   AST (SGOT) U/L 27 26  --  14   GLUCOSE mg/dL 86 84 89 114*       Culture Data:   No results found for: BLOODCX, URINECX, WOUNDCX, MRSACX, RESPCX, STOOLCX    Radiology Data:   Imaging Results (Last 24 Hours)     ** No results found for the last 24 hours. **          I have reviewed the patient's current medications.     Assessment/Plan     Active Hospital Problems    Diagnosis    • **Type 2 MI (myocardial infarction) (HCC) due to THERESA and UTI    • Allergic conjunctivitis    • C. difficile colitis    • RSV (acute bronchiolitis due to respiratory syncytial virus)    • THERESA (acute kidney injury) (HCC) due to sepsis    • History of ESBL E. coli infection    • Hyperkalemia    • Acute cystitis with hematuria    • Essential hypertension        Plan:  Patient admitted on 11/1 by Dr. Ureña.    Patient was diagnosed with RSV.  Patient having no respiratory symptoms.  Based on labs suspect patient has had poor PO intake. Continue supportive care.    The patient noted to have findings consistent with a UTI.  The patient has a history of ESBL.  Initially started on meropenem based on previous sensitivities, transitioned to  "zosyn, will treat for a total of 10 days.    The patient is noted to have THERESA and hyperkalemia.  The patient treated with IVF with resolution of the THERESA.  The hyperkalemia, is likely mild but difficult to assess as several specimens have been hemolyzed.  Gave a dose of lokelma with resolution    Type 2 MI related to THERESA and UTI.  Patient has had no chest pain and no signs of ACS.      D/C LR.  Monitor In/out and renal function closely.      C diff PCR positive. C diff Antigen test negative. Clinically appears to have C diff, will treat with PO vancomycin 125 mg q6h for 10 days.     Patient has what appears to be viral conjunctivitis and feels like \"sand in her eyes\", will treat with tobradex.    Home medications reviewed and resume as appropriate.  Adjusted BP medications given current condition.    Lovenox for VTE PPx    Awaiting placement and pre-authorization.       Discharge Planning: I expect the patient to be discharged to SNF in 2-4 days.    Electronically signed by Prashanth Nguyen MD, 11/06/22, 12:42 CST.  "

## 2022-11-06 NOTE — PLAN OF CARE
Goal Outcome Evaluation:           Progress: improving  Outcome Evaluation: Pt only having small  bms turn and positioned x2 hrs no new skin breakdown  pt remains confused  pt in isolation.

## 2022-11-06 NOTE — PLAN OF CARE
Goal Outcome Evaluation:  Plan of Care Reviewed With: patient        Progress: improving  Outcome Evaluation: PT tx completed. Pt sitting edge of bed independently. Bed alarm on, alittle confused. Sit<>stand Rachid, several steps forward/backward with r wx Rachid. Pt c/o weakness. Recommend SNF for cont'd PT.
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
## 2022-11-07 ENCOUNTER — HOSPITAL ENCOUNTER (EMERGENCY)
Facility: HOSPITAL | Age: 82
Discharge: HOME | End: 2022-11-08
Attending: STUDENT IN AN ORGANIZED HEALTH CARE EDUCATION/TRAINING PROGRAM

## 2022-11-07 VITALS
BODY MASS INDEX: 28.99 KG/M2 | RESPIRATION RATE: 18 BRPM | DIASTOLIC BLOOD PRESSURE: 62 MMHG | SYSTOLIC BLOOD PRESSURE: 154 MMHG | OXYGEN SATURATION: 96 % | WEIGHT: 174 LBS | TEMPERATURE: 98.4 F | HEIGHT: 65 IN | HEART RATE: 62 BPM
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
## 2022-11-07 DIAGNOSIS — S09.90XA CLOSED HEAD INJURY WITHOUT LOSS OF CONSCIOUSNESS, INITIAL ENCOUNTER: ICD-10-CM

## 2022-11-07 DIAGNOSIS — S00.03XA HEMATOMA OF SCALP, INITIAL ENCOUNTER: ICD-10-CM

## 2022-11-07 LAB
ALBUMIN SERPL-MCNC: 3.3 G/DL (ref 3.5–5.2)
ALBUMIN/GLOB SERPL: 1 G/DL
ALP SERPL-CCNC: 55 U/L (ref 39–117)
ALT SERPL W P-5'-P-CCNC: 18 U/L (ref 1–33)
ANION GAP SERPL CALCULATED.3IONS-SCNC: 8 MMOL/L (ref 5–15)
AST SERPL-CCNC: 31 U/L (ref 1–32)
BILIRUB SERPL-MCNC: 0.4 MG/DL (ref 0–1.2)
BUN SERPL-MCNC: 6 MG/DL (ref 8–23)
BUN/CREAT SERPL: 10.5 (ref 7–25)
CALCIUM SPEC-SCNC: 9.3 MG/DL (ref 8.6–10.5)
CHLORIDE SERPL-SCNC: 99 MMOL/L (ref 98–107)
CO2 SERPL-SCNC: 28 MMOL/L (ref 22–29)
CREAT SERPL-MCNC: 0.57 MG/DL (ref 0.57–1)
DEPRECATED RDW RBC AUTO: 45.9 FL (ref 37–54)
EGFRCR SERPLBLD CKD-EPI 2021: 92 ML/MIN/1.73
ERYTHROCYTE [DISTWIDTH] IN BLOOD BY AUTOMATED COUNT: 13.3 % (ref 12.3–15.4)
GLOBULIN UR ELPH-MCNC: 3.2 GM/DL
GLUCOSE SERPL-MCNC: 101 MG/DL (ref 65–99)
HCT VFR BLD AUTO: 30.6 % (ref 34–46.6)
HGB BLD-MCNC: 9.8 G/DL (ref 12–15.9)
MCH RBC QN AUTO: 30.2 PG (ref 26.6–33)
MCHC RBC AUTO-ENTMCNC: 32 G/DL (ref 31.5–35.7)
MCV RBC AUTO: 94.2 FL (ref 79–97)
PLATELET # BLD AUTO: 251 10*3/MM3 (ref 140–450)
PMV BLD AUTO: 10.2 FL (ref 6–12)
POTASSIUM SERPL-SCNC: 3.3 MMOL/L (ref 3.5–5.2)
PROT SERPL-MCNC: 6.5 G/DL (ref 6–8.5)
RBC # BLD AUTO: 3.25 10*6/MM3 (ref 3.77–5.28)
SODIUM SERPL-SCNC: 135 MMOL/L (ref 136–145)
WBC NRBC COR # BLD: 9.02 10*3/MM3 (ref 3.4–10.8)

## 2022-11-07 PROCEDURE — 99284 EMERGENCY DEPT VISIT MOD MDM: CPT

## 2022-11-07 PROCEDURE — 97530 THERAPEUTIC ACTIVITIES: CPT

## 2022-11-07 PROCEDURE — 80053 COMPREHEN METABOLIC PANEL: CPT | Performed by: INTERNAL MEDICINE

## 2022-11-07 PROCEDURE — 25010000002 PIPERACILLIN SOD-TAZOBACTAM PER 1 G: Performed by: INTERNAL MEDICINE

## 2022-11-07 PROCEDURE — 25010000002 ENOXAPARIN PER 10 MG: Performed by: INTERNAL MEDICINE

## 2022-11-07 PROCEDURE — 85027 COMPLETE CBC AUTOMATED: CPT | Performed by: INTERNAL MEDICINE

## 2022-11-07 PROCEDURE — 97535 SELF CARE MNGMENT TRAINING: CPT

## 2022-11-07 RX ORDER — MAGNESIUM 200 MG
1 TABLET ORAL DAILY
Start: 2022-11-07

## 2022-11-07 RX ORDER — CARVEDILOL 3.12 MG/1
3.12 TABLET ORAL 2 TIMES DAILY WITH MEALS
Start: 2022-11-07

## 2022-11-07 RX ORDER — POTASSIUM CHLORIDE 750 MG/1
40 CAPSULE, EXTENDED RELEASE ORAL ONCE
Status: COMPLETED | OUTPATIENT
Start: 2022-11-07 | End: 2022-11-07

## 2022-11-07 RX ORDER — VANCOMYCIN HYDROCHLORIDE 125 MG/1
125 CAPSULE ORAL EVERY 6 HOURS SCHEDULED
Qty: 34 CAPSULE | Refills: 0 | Status: SHIPPED | OUTPATIENT
Start: 2022-11-07 | End: 2022-11-16

## 2022-11-07 RX ADMIN — TAZOBACTAM SODIUM AND PIPERACILLIN SODIUM 4.5 G: 500; 4 INJECTION, SOLUTION INTRAVENOUS at 01:12

## 2022-11-07 RX ADMIN — CARVEDILOL 3.12 MG: 3.12 TABLET, FILM COATED ORAL at 09:45

## 2022-11-07 RX ADMIN — Medication 10 ML: at 09:45

## 2022-11-07 RX ADMIN — TAZOBACTAM SODIUM AND PIPERACILLIN SODIUM 4.5 G: 500; 4 INJECTION, SOLUTION INTRAVENOUS at 09:46

## 2022-11-07 RX ADMIN — POTASSIUM CHLORIDE 40 MEQ: 10 CAPSULE, COATED, EXTENDED RELEASE ORAL at 10:08

## 2022-11-07 RX ADMIN — ATORVASTATIN CALCIUM 10 MG: 10 TABLET, FILM COATED ORAL at 10:08

## 2022-11-07 RX ADMIN — ASPIRIN 81 MG: 81 TABLET, COATED ORAL at 09:46

## 2022-11-07 RX ADMIN — TOBRAMYCIN AND DEXAMETHASONE 2 DROP: 3; 1 SUSPENSION/ DROPS OPHTHALMIC at 05:31

## 2022-11-07 RX ADMIN — ZINC OXIDE 1 APPLICATION: 200 OINTMENT TOPICAL at 09:58

## 2022-11-07 RX ADMIN — FAMOTIDINE 20 MG: 20 TABLET, FILM COATED ORAL at 09:46

## 2022-11-07 RX ADMIN — VANCOMYCIN HYDROCHLORIDE 125 MG: 125 CAPSULE ORAL at 09:45

## 2022-11-07 RX ADMIN — FERROUS SULFATE TAB 325 MG (65 MG ELEMENTAL FE) 325 MG: 325 (65 FE) TAB at 09:46

## 2022-11-07 RX ADMIN — ENOXAPARIN SODIUM 40 MG: 100 INJECTION SUBCUTANEOUS at 09:46

## 2022-11-07 RX ADMIN — CYCLOSPORINE 1 DROP: 0.5 EMULSION OPHTHALMIC at 09:45

## 2022-11-07 RX ADMIN — TOBRAMYCIN AND DEXAMETHASONE 2 DROP: 3; 1 SUSPENSION/ DROPS OPHTHALMIC at 09:51

## 2022-11-07 RX ADMIN — VANCOMYCIN HYDROCHLORIDE 125 MG: 125 CAPSULE ORAL at 04:44

## 2022-11-07 NOTE — CASE MANAGEMENT/SOCIAL WORK
Continued Stay Note  [facility]     Patient Name: Carmen Obrien  MRN: [id number]  Today's Date: 11/7/2022    Admit Date: 11/1/2022    Plan: SNF - Referral to [facility] pending insurance precert.   Discharge Plan     Row Name 11/07/22 0907       Plan    Plan SNF - Referral to [facility] pending insurance precert.    Plan Comments  spoke with Saskia DRUMMOND, admissions director for [facility], who advised insurance precert remains pending.  Saskia will advise when insurance has made a decision.               Discharge Codes    No documentation.               Expected Discharge Date and Time     Expected Discharge Date Expected Discharge Time    Nov 7, 2022             EMILY Martinez

## 2022-11-07 NOTE — PLAN OF CARE
Goal Outcome Evaluation:  Plan of Care Reviewed With: patient        Progress: no change  Outcome Evaluation: PATIENT REMAINS CONFUSED. NO C/O PAIN. CONSTANT LOOSE STOOLS. CREAM APPLIED TO BOTTOM, AND REMINDED PATIENT TO TURN Q2H. ANTIBIOTICS CONTINUE. DRESSINGS CHANGED. CONT TO MONITOR.

## 2022-11-07 NOTE — CASE MANAGEMENT/SOCIAL WORK
Continued Stay Note  [facility]     Patient Name: Carmen Obrien  MRN: [id number]  Today's Date: 11/7/2022    Admit Date: 11/1/2022    Plan: [facility]   Discharge Plan     Row Name 11/07/22 0926       Plan    Plan [facility]    Patient/Family in Agreement with Plan yes    Plan Comments Patient's insurance has approved placement at [facility].  Patient can discharge to [facility] today if ready for discharge.  (Phone:  [phone]  Fax: [phone]).    Final Discharge Disposition Code 03 - skilled nursing facility (SNF)    Final Note SNF - [facility]    Row Name 11/07/22 0907       Plan    Plan SNF - Referral to [facility] pending insurance precert.    Plan Comments  spoke with Saskia DRUMMOND, admissions director for [facility], who advised insurance precert remains pending.  Saskia will advise when insurance has made a decision.               Discharge Codes    No documentation.               Expected Discharge Date and Time     Expected Discharge Date Expected Discharge Time    Nov 8, 2022             EMILY Martinez
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
## 2022-11-07 NOTE — DISCHARGE SUMMARY
[facility]  DISCHARGE SUMMARY       Date of Admission: 11/1/2022  Date of Discharge:  11/7/2022  Primary Care Physician: VERA Noble MD    Discharge Diagnoses:  Active Hospital Problems    Diagnosis    • **Type 2 MI (myocardial infarction) (HCC) due to THERESA and UTI    • Allergic conjunctivitis    • C. difficile colitis    • RSV (acute bronchiolitis due to respiratory syncytial virus)    • THERESA (acute kidney injury) (HCC) due to sepsis    • History of ESBL E. coli infection    • Hyperkalemia    • Acute cystitis with hematuria    • Essential hypertension          Presenting Problem/History of Present Illness:  Elevated troponin [R77.8]     Chief Complaint on Day of Discharge:   Generalized weakness    History of Present Illness on Day of Discharge:   For diarrhea stools noted over the past 24 hours.  The patient remains generally weak.  She continues on oral vancomycin.  She is also receiving Zosyn which will be discontinued at discharge.  She has been accepted to SNF for continuation of rehabilitation.  I suspect broad-spectrum IV antibiotics for empiric treatment of UTI is likely responsible for continued diarrhea.  She has had 5 days of IV antibiotic therapy which should be adequate for treatment of UTI.  She is stable for transfer to skilled nursing facility    Hospital Course  This 82-year-old female was admitted on 11/1 from [facility] with confusion increased of her baseline.  There was a vague complaint of abdominal discomfort with diarrhea.  Cardiac markers were elevated at the time of admission.  Potassium was elevated and she received Lokelma.  She was noted to be positive for RSV.  She was also noted to have an acute kidney injury.  She was treated with IV fluid with improvement in acute kidney injury.  Hyperkalemia was felt to be mild but was difficult to assess as several specimens were hemolyzed.  THERESA had resolved by 11/3.  The  "patient was able to work with PT and OT with recommendations to return to skilled nursing facility for continuation of rehabilitation.  Elevation of troponin down trended.  The patient was felt to have incurred a type II MI related to THERESA and UTI.  The patient denied chest pain throughout her stay.  Urinary tract infection was treated with Zosyn given the patient's history of ESBL E. coli UTI.  Regrettably, no urine culture was obtained in the ED at the time of evaluation.  C. difficile PCR study was positive.  C. difficile antigen test was negative.  Clinically the patient appeared to have C. difficile and was treated with oral vancomycin 125 mg every 6 hours for 10 days.  She was noted to have what appeared to be a viral conjunctivitis and was treated with TobraDex.  Diarrhea volume improved.  The patient is felt to be stable today for transfer to skilled nursing facility.      Result Review    Result Review:  I have personally reviewed the results from the time of this admission to 11/7/2022 09:44 CST and agree with these findings:  []  Laboratory  []  Microbiology  []  Radiology  []  EKG/Telemetry   []  Cardiology/Vascular   []  Pathology  []  Old records  []  Other:  **    Condition on Discharge:    Stable and improved    Physical Exam on Discharge:  /62 (BP Location: Left arm, Patient Position: Lying)   Pulse 62   Temp 98.4 °F (36.9 °C) (Oral)   Resp 18   Ht 165.1 cm (65\")   Wt 78.9 kg (174 lb)   SpO2 96%   BMI 28.96 kg/m²   Physical Exam     Constitutional:       Appearance: Normal appearance.   HENT:      Head: Normocephalic and atraumatic.      Mouth/Throat:      Mouth: Mucous membranes are dry.   Eyes:      General: No scleral icterus.     Comments: Eyes less red   Cardiovascular:      Rate and Rhythm: Normal rate and regular rhythm.      Heart sounds: Murmur heard.   Pulmonary:      Effort: Pulmonary effort is normal. No respiratory distress.      Breath sounds: Normal breath sounds. No " stridor.   Abdominal:      General: Abdomen is flat. Bowel sounds are normal. There is no distension.      Palpations: Abdomen is soft. There is no mass.   Skin:     General: Skin is warm and dry.      Coloration: Skin is pale.   Neurological:      General: No focal deficit present.      Mental Status: She is alert and oriented to person, place, and time.   Psychiatric:         Mood and Affect: Mood normal.         Behavior: Behavior normal.     Discharge Disposition:  Skilled Nursing Facility (DC - External)    Discharge Medications:     Discharge Medications      New Medications      Instructions Start Date   hydrocortisone-bacitracin-zinc oxide-nystatin  Commonly known as: MAGIC BARRIER   1 application, Topical, As Needed      vancomycin 125 MG capsule  Commonly known as: VANCOCIN   125 mg, Oral, Every 6 Hours Scheduled         Changes to Medications      Instructions Start Date   carvedilol 3.125 MG tablet  Commonly known as: COREG  What changed:   · medication strength  · how much to take   3.125 mg, Oral, 2 Times Daily With Meals      Vitamin B-12 1000 MCG sublingual tablet  What changed: how much to take   1,000 mcg, Sublingual, Daily, 2500mcg         Continue These Medications      Instructions Start Date   allopurinol 100 MG tablet  Commonly known as: ZYLOPRIM   200 mg, Oral, Daily, For gout      aspirin 81 MG chewable tablet   81 mg, Oral, Daily      atorvastatin 10 MG tablet  Commonly known as: LIPITOR   10 mg, Oral, Daily      Caltrate 600+D Plus Minerals 600-800 MG-UNIT tablet   1 tablet, Oral, 2 Times Daily      cycloSPORINE 0.05 % ophthalmic emulsion  Commonly known as: RESTASIS   1 drop, Both Eyes, Every 12 Hours      esomeprazole 20 MG capsule  Commonly known as: nexIUM   20 mg, Oral, 2 Times Daily      FeroSul 325 (65 FE) MG tablet  Generic drug: ferrous sulfate   325 mg, Oral, Daily With Breakfast      polyethyl glycol-propyl glycol 0.4-0.3 % solution ophthalmic solution (artificial  tears)  Commonly known as: SYSTANE   2 drops, Both Eyes, Every 2 Hours PRN      polyethylene glycol 17 g packet  Commonly known as: MIRALAX   17 g, Oral, Daily PRN      Prolia 60 MG/ML solution prefilled syringe syringe  Generic drug: denosumab   60 mg, Subcutaneous, Once, Twice a year      simethicone 125 MG chewable tablet  Commonly known as: MYLICON   125 mg, Oral, Every 6 Hours PRN      vitamin D3 125 MCG (5000 UT) capsule capsule   5,000 Units, Oral, Daily         Stop These Medications    acetaminophen 325 MG tablet  Commonly known as: TYLENOL     aluminum-magnesium hydroxide-simethicone 400-400-40 MG/5ML suspension  Commonly known as: MAALOX MAX     celecoxib 200 MG capsule  Commonly known as: CeleBREX     cloNIDine 0.1 MG tablet  Commonly known as: CATAPRES     colchicine 0.6 MG tablet     fluticasone 50 MCG/ACT nasal spray  Commonly known as: FLONASE     furosemide 40 MG tablet  Commonly known as: LASIX     HYDROcodone-acetaminophen 7.5-325 MG per tablet  Commonly known as: NORCO     lisinopril 20 MG tablet  Commonly known as: PRINIVIL,ZESTRIL     montelukast 10 MG tablet  Commonly known as: SINGULAIR     mupirocin 2 % ointment  Commonly known as: BACTROBAN     nortriptyline 50 MG capsule  Commonly known as: PAMELOR     pregabalin 50 MG capsule  Commonly known as: LYRICA            Discharge Diet:   Diet Instructions     Diet: Soft Texture; Thin Liquids, No Restrictions; Ground      Discharge Diet: Soft Texture    Fluid Consistency: Thin Liquids, No Restrictions    Soft Options: Ground          Discharge Care Plan / Instructions:   Discharge to skilled nursing facility  Continue oral vancomycin for total of 10 days treatment.    Activity at Discharge:   Activity Instructions     Activity as Tolerated             Electronically signed by Reji Gonzalez DO, 11/07/22, 09:44 CST.    Time: Discharge Over 30 min    Part of this note may be an electronic transcription/translation of spoken language to  printed text using the Dragon Dictation system.

## 2022-11-07 NOTE — PLAN OF CARE
Goal Outcome Evaluation:  Plan of Care Reviewed With: patient     Bed mobility Rose needed. Pt confused at times but did have moments of clarity and appropriate conversation. Multiple sit<>stands Rose after t/f Rose to BSC. Toilet hygiene MaxA standing. Via RW short distance to chair and pt had posterior lean requiring MaxA to maintain safety and pull BSC up to pt during fxl mobility. After short RB pt able to stand and utilize RW to t/f to recliner with oRse. Encouraged pt to eat lunch tray still un touched. Pt continued to decline. UB dress Rose, LB maxA. Recommend SNF at d/c. Continue t/f training, strengthening and ADL re training.

## 2022-11-07 NOTE — THERAPY DISCHARGE NOTE
Acute Care - Physical Therapy Discharge Summary  [facility]       Patient Name: Carmen Obrien  : 1940  MRN: [id number]    Today's Date: 2022                 Admit Date: 2022      PT Recommendation and Plan    Visit Dx:    ICD-10-CM ICD-9-CM   1. Elevated troponin  R77.8 790.6   2. Hyperkalemia  E87.5 276.7   3. Urinary tract infection without hematuria, site unspecified  N39.0 599.0   4. THERESA (acute kidney injury) (HCC)  N17.9 584.9   5. Generalized abdominal pain  R10.84 789.07   6. Dysphagia, unspecified type  R13.10 787.20   7. Decreased activities of daily living (ADL)  Z78.9 V49.89   8. Impaired mobility  Z74.09 799.89        Outcome Measures     Row Name 22 1000 22 1300          How much help from another person do you currently need...    Turning from your back to your side while in flat bed without using bedrails? 3  - 3  -KJ     Moving from lying on back to sitting on the side of a flat bed without bedrails? 3  - 3  -KJ     Moving to and from a bed to a chair (including a wheelchair)? 3  - 3  -KJ     Standing up from a chair using your arms (e.g., wheelchair, bedside chair)? 3  - 3  -KJ     Climbing 3-5 steps with a railing? 2  - 2  -KJ     To walk in hospital room? 3  - 3  -KJ     AM-PAC 6 Clicks Score (PT) 17  - 17  -KJ        Functional Assessment    Outcome Measure Options AM-PAC 6 Clicks Basic Mobility (PT)  - AM-[facility] 6 Clicks Basic Mobility (PT)  -           User Key  (r) = Recorded By, (t) = Taken By, (c) = Cosigned By    Initials Name Provider Type     Barbara Jones, PTA Physical Therapist Assistant    Manasa Carcamo, ALFREDO Physical Therapist Assistant                 PT Charges     Row Name 22 1023             Time Calculation    Start Time 0916  -      Stop Time 0946  -      Time Calculation (min) 30 min  -      PT Received On 22  -         Time Calculation- PT    Total Timed Code Minutes- PT 30 minute(s)  -         Timed  Charges    57960 - PT Therapeutic Activity Minutes 30  -AH         Total Minutes    Timed Charges Total Minutes 30  -AH       Total Minutes 30  -AH            User Key  (r) = Recorded By, (t) = Taken By, (c) = Cosigned By    Initials Name Provider Type     Barbara Jones PTA Physical Therapist Assistant                 PT Rehab Goals     Row Name 11/07/22 8612             Bed Mobility Goal 1 (PT)    Activity/Assistive Device (Bed Mobility Goal 1, PT) sit to supine/supine to sit  -NW      [place] Level/Cues Needed (Bed Mobility Goal 1, PT) standby assist  -NW      Time Frame (Bed Mobility Goal 1, PT) long term goal (LTG);10 days  -NW      Progress/Outcomes (Bed Mobility Goal 1, PT) goal not met  -NW         Transfer Goal 1 (PT)    Activity/Assistive Device (Transfer Goal 1, PT) sit-to-stand/stand-to-sit;bed-to-chair/chair-to-bed  -NW      [place] Level/Cues Needed (Transfer Goal 1, PT) contact guard required  -NW      Time Frame (Transfer Goal 1, PT) long term goal (LTG);10 days  -NW      Progress/Outcome (Transfer Goal 1, PT) goal met  -NW         Gait Training Goal 1 (PT)    Activity/Assistive Device (Gait Training Goal 1, PT) gait (walking locomotion);assistive device use;decrease fall risk;improve balance and speed;increase endurance/gait distance  -NW      [place] Level (Gait Training Goal 1, PT) minimum assist (75% or more patient effort)  -NW      Distance (Gait Training Goal 1, PT) 20 ft  -NW      Time Frame (Gait Training Goal 1, PT) long term goal (LTG);10 days  -NW      Progress/Outcome (Gait Training Goal 1, PT) goal not met  -NW            User Key  (r) = Recorded By, (t) = Taken By, (c) = Cosigned By    Initials Name Provider Type Discipline    Camille Kelly PTA Physical Therapist Assistant PT                    PT Discharge Summary  Anticipated Discharge Disposition (PT): skilled nursing facility  Reason for Discharge: Discharge from facility  Outcomes Achieved: Refer to plan  of care for updates on goals achieved  Discharge Destination: SNF      Camille Rivera, PTA   11/7/2022
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
## 2022-11-07 NOTE — PLAN OF CARE
Goal Outcome Evaluation:           Progress: improving  Outcome Evaluation: PT REMAINS IN ISOLATION  BEDCHECK ON NO FALLS OR INJURIED , NO NEW AREA SKIN BREAKDOWN NOTED .

## 2022-11-08 ENCOUNTER — APPOINTMENT (OUTPATIENT)
Dept: CT IMAGING | Facility: HOSPITAL | Age: 82
End: 2022-11-08

## 2022-11-08 VITALS
RESPIRATION RATE: 20 BRPM | HEIGHT: 65 IN | SYSTOLIC BLOOD PRESSURE: 132 MMHG | WEIGHT: 179 LBS | OXYGEN SATURATION: 99 % | TEMPERATURE: 98 F | BODY MASS INDEX: 29.82 KG/M2 | HEART RATE: 76 BPM | DIASTOLIC BLOOD PRESSURE: 78 MMHG

## 2022-11-08 PROCEDURE — 72125 CT NECK SPINE W/O DYE: CPT

## 2022-11-08 PROCEDURE — 70450 CT HEAD/BRAIN W/O DYE: CPT

## 2022-11-08 NOTE — THERAPY DISCHARGE NOTE
Acute Care - Occupational Therapy Discharge Summary  [facility]     Patient Name: Carmen Obrien  : 1940  MRN: [id number]    Today's Date: 2022                 Admit Date: 2022        OT Recommendation and Plan    Visit Dx:    ICD-10-CM ICD-9-CM   1. Elevated troponin  R77.8 790.6   2. Hyperkalemia  E87.5 276.7   3. Urinary tract infection without hematuria, site unspecified  N39.0 599.0   4. THERESA (acute kidney injury) (HCC)  N17.9 584.9   5. Generalized abdominal pain  R10.84 789.07   6. Dysphagia, unspecified type  R13.10 787.20   7. Decreased activities of daily living (ADL)  Z78.9 V49.89   8. Impaired mobility  Z74.09 799.89                OT Rehab Goals     Row Name 22 0700             Transfer Goal 1 (OT)    Activity/Assistive Device (Transfer Goal 1, OT) commode, bedside without drop arms;shower chair  -AC      [place] Level/Cues Needed (Transfer Goal 1, OT) contact guard required  -AC      Time Frame (Transfer Goal 1, OT) long term goal (LTG);by discharge  -AC      Progress/Outcome (Transfer Goal 1, OT) goal not met  -AC         Dressing Goal 1 (OT)    Activity/Device (Dressing Goal 1, OT) dressing skills, all  -AC      [place]/Cues Needed (Dressing Goal 1, OT) minimum assist (75% or more patient effort)  -AC      Time Frame (Dressing Goal 1, OT) long term goal (LTG);by discharge  -AC      Progress/Outcome (Dressing Goal 1, OT) goal not met  -AC         Toileting Goal 1 (OT)    Activity/Device (Toileting Goal 1, OT) toileting skills, all  -AC      [place] Level/Cues Needed (Toileting Goal 1, OT) minimum assist (75% or more patient effort)  -AC      Time Frame (Toileting Goal 1, OT) long term goal (LTG);by discharge  -AC      Progress/Outcome (Toileting Goal 1, OT) goal not met  -AC            User Key  (r) = Recorded By, (t) = Taken By, (c) = Cosigned By    Initials Name Provider Type Discipline    AC Dhruv Bangura, OTR/L, CNT Occupational Therapist OT                  Outcome Measures     Row Name 11/07/22 1000 11/06/22 1300          How much help from another person do you currently need...    Turning from your back to your side while in flat bed without using bedrails? 3  -AH 3  -KJ     Moving from lying on back to sitting on the side of a flat bed without bedrails? 3  -AH 3  -KJ     Moving to and from a bed to a chair (including a wheelchair)? 3  -AH 3  -KJ     Standing up from a chair using your arms (e.g., wheelchair, bedside chair)? 3  - 3  -KJ     Climbing 3-5 steps with a railing? 2  -AH 2  -KJ     To walk in hospital room? 3  -AH 3  -KJ     AM-PAC 6 Clicks Score (PT) 17  - 17  -KJ        Functional Assessment    Outcome Measure Options AM-PAC 6 Clicks Basic Mobility (PT)  -AH AM-PAC 6 Clicks Basic Mobility (PT)  -KJ           User Key  (r) = Recorded By, (t) = Taken By, (c) = Cosigned By    Initials Name Provider Type     Barbara Jones, PTA Physical Therapist Assistant    Manasa Carcamo, ALFREDO Physical Therapist Assistant                Timed Therapy Charges  Total Units: 4    Suggested Charges  Total Units: 4    Procedure Name Documented Minutes Units Code    HC OT SELF CARE/MGMT/TRAIN EA 15 MIN 35  3    49037 (CPT®)      HC OT THERAPEUTIC ACT EA 15 MIN 18  1    52631 (CPT®)               Documented Minutes  Total Minutes: 53    Therapy Provided Minutes    63847 - OT Self Care/Mgmt Minutes 35    63213 - OT Therapeutic Activity Minutes 18                    OT Discharge Summary  Anticipated Discharge Disposition (OT): skilled nursing facility  Reason for Discharge: Discharge from facility  Outcomes Achieved: Refer to plan of care for updates on goals achieved  Discharge Destination: SNF      Dhruv Bangura, OTR/L, CNT  11/8/2022

## 2022-11-08 NOTE — ED PROVIDER NOTES
EMERGENCY DEPARTMENT HISTORY AND PHYSICAL EXAM    Patient Name: Carmen Obrien    Chief Complaint   Patient presents with   • Fall   • hematoma to head       History of Presenting Illness:  Carmen Obrien is a 82 y.o. female with complex past medical history presents emergency department after a ground-level fall with hit to her head.    [facility] report patient fell while walking to the bathroom.  No reported loss of consciousness.    Patient is not supposed to walk to the bath without assistance but states that she chose to tripped and fell.  She denies any loss of consciousness.  Denies any neck pain or back pain.  Denies any chest pain or shortness of breath or nausea vomit abdominal pain.  Denies any pain to her lower extremities.  Denies any headache or vision changes.  Endorses mild pain to the area of her hematoma.      Past Medical History:   Past Medical History:   Diagnosis Date   • THERESA (acute kidney injury) (HCC) due to sepsis 11/2/2022   • Anxiety    • Arthritis    • CAD (coronary artery disease)    • Colon polyp    • DDD (degenerative disc disease), lumbar    • Deep venous thrombosis (HCC)    • Depression    • Diverticulosis    • Esophageal stricture    • GERD (gastroesophageal reflux disease)    • History of transfusion    • Hypercholesteremia    • Hypertension    • LPRD (laryngopharyngeal reflux disease)    • Macular degeneration    • Multinodular goiter 2/23/2017   • Osteoarthritis    • Pruritic disorder    • Spastic colon    • Thyroid nodule    • Type 2 MI (myocardial infarction) (HCC) due to THERESA and UTI 11/2/2022       Past Surgical History:  Past Surgical History:   Procedure Laterality Date   • BLADDER REPAIR     • CATARACT EXTRACTION     • CHOLECYSTECTOMY     • COLONOSCOPY  04/22/2014    2 polyps, adenomatous, diverticulosis   • COLONOSCOPY N/A 6/26/2020    Procedure: COLONOSCOPY WITH ANESTHESIA;  Surgeon: John Coleman MD;  Location: [facility] ENDOSCOPY;  Service: Gastroenterology;   Laterality: N/A;  pre op: constipation  post op: diverticulosis,   PCP: Adam Delgadillo MD   • CYSTOCELE REPAIR     • ENDOSCOPY  04/22/2014    Schatzki's ring dilated   • ENDOSCOPY N/A 6/26/2020    Procedure: ESOPHAGOGASTRODUODENOSCOPY WITH ANESTHESIA;  Surgeon: John Coleman MD;  Location:  [facility] ENDOSCOPY;  Service: Gastroenterology;  Laterality: N/A;  pre op: dysphagia  post op:stricture, dilated   PCP:Adam Delgadillo MD   • ENDOSCOPY N/A 9/2/2021    Procedure: ESOPHAGOGASTRODUODENOSCOPY WITH ANESTHESIA;  Surgeon: John Coleman MD;  Location:  [facility] ENDOSCOPY;  Service: Gastroenterology;  Laterality: N/A;  pre op: dysphagia  post op:gastritis  PCP: VERA Noble MD   • FEMUR FRACTURE SURGERY     • HYSTERECTOMY     • INCISION AND DRAINAGE LEG Left 11/3/2021    Procedure: INCISION AND DRAINAGE LEG ABSCESS;  Surgeon: Cesia Mondragon MD;  Location: [facility] OR;  Service: General;  Laterality: Left;   • REPLACEMENT TOTAL KNEE     • ULNAR NERVE TRANSPOSITION         Social History:   Denies tobacco  Denies EtOH  Denies marijuana, cocaine, or IV drugs      Allergies:   Allergies   Allergen Reactions   • Levaquin [Levofloxacin] Unknown (See Comments)     Pt doesn't remember   • Codeine Other (See Comments)     Pt does not recall   • Morphine And Related Other (See Comments)     Pt does not recall       Medications:  No current facility-administered medications for this encounter.    Current Outpatient Medications:   •  allopurinol (ZYLOPRIM) 100 MG tablet, Take 2 tablets by mouth Daily. For gout, Disp: , Rfl:   •  aspirin 81 MG chewable tablet, Chew 1 tablet Daily., Disp: , Rfl:   •  atorvastatin (LIPITOR) 10 MG tablet, Take 1 tablet by mouth Daily., Disp: , Rfl:   •  Calcium Carbonate-Vit D-Min (Caltrate 600+D Plus Minerals) 600-800 MG-UNIT tablet, Take 1 tablet by mouth 2 (Two) Times a Day., Disp: , Rfl:   •  carvedilol (COREG) 3.125 MG tablet, Take 1 tablet by mouth 2 (Two) Times a Day With  "Meals., Disp: , Rfl:   •  Cholecalciferol (VITAMIN D3) 125 MCG (5000 UT) capsule capsule, Take 5,000 Units by mouth Daily., Disp: , Rfl:   •  Cyanocobalamin (Vitamin B-12) 1000 MCG sublingual tablet, Place 1 tablet under the tongue Daily. 2500mcg, Disp: , Rfl:   •  cycloSPORINE (RESTASIS) 0.05 % ophthalmic emulsion, Administer 1 drop to both eyes Every 12 (Twelve) Hours., Disp: , Rfl:   •  esomeprazole (nexIUM) 20 MG capsule, Take 1 capsule by mouth 2 (Two) Times a Day., Disp: 60 capsule, Rfl: 11  •  FEROSUL 325 (65 Fe) MG tablet, Take 325 mg by mouth Daily With Breakfast., Disp: , Rfl:   •  hydrocortisone-bacitracin-zinc oxide-nystatin (MAGIC BARRIER), Apply 1 application topically to the appropriate area as directed As Needed (after stools)., Disp: , Rfl:   •  polyethyl glycol-propyl glycol (SYSTANE) 0.4-0.3 % solution ophthalmic solution, Administer 2 drops to both eyes Every 2 (Two) Hours As Needed (dry eyes)., Disp: , Rfl:   •  polyethylene glycol (MIRALAX) 17 g packet, Take 17 g by mouth Daily As Needed., Disp: , Rfl:   •  Prolia 60 MG/ML solution prefilled syringe syringe, Inject 60 mg under the skin into the appropriate area as directed 1 (One) Time. Twice a year, Disp: , Rfl:   •  simethicone (MYLICON) 125 MG chewable tablet, Chew 125 mg Every 6 (Six) Hours As Needed for Flatulence., Disp: , Rfl:   •  vancomycin (VANCOCIN) 125 MG capsule, Take 1 capsule by mouth Every 6 (Six) Hours for 34 doses. Indications: Colon Inflammation due to Clostridium Bacteria Overgrowth, Disp: 34 capsule, Rfl: 0    Review of Systems:  A full review of systems was obtained and is negative unless otherwise stated in HPI.    Physical Exam:  VS: /76   Pulse 78   Temp 98 °F (36.7 °C)   Resp 20   Ht 165.1 cm (65\")   Wt 81.2 kg (179 lb)   SpO2 99%   BMI 29.79 kg/m²   GENERAL: Well-appearing older woman lying in stretcher no acute distress wearing cervical collar; well nourished, well developed, awake, alert, no acute " distress, nontoxic appearing, comfortable  EYES: PERRL, sclera anicteric, extra-occular movements grossly intact, symmetric lids  EARS, NOSE, MOUTH, THROAT: atraumatic external nose and ears, moist mucous membranes  NECK: Symmetric, trachea midline, no thyromegaly, no adenopathy, no meningismus  RESPIRATORY: Unlabored respiratory effort, clear to auscultation bilaterally, good air movement  CARDIOVASCULAR: No murmurs or gallops, peripheral pulses 2+ and equal in all extremities  GI: Soft, nontender, nondistended, bowel sounds present, no hepatosplenomegaly  LYMPHATIC: no lymphadenopathy  MUSCULOSKELETAL/EXTREMITIES: Extremities without obvious deformity, no cyanosis or clubbing  SKIN: Small hematoma to the right frontal scalp is not in the photo below; otherwise skin is warm and dry with no obvious rashes    NEUROLOGIC: moving all 4 extremities symmetrically, CN II-XII grossly intact; GCS 15; alert and oriented x 3  PSYCHIATRIC: alert, pleasant and cooperative. Appropriate mood and affect.      Radiology:   CT Head Without Contrast    (Results Pending)   CT Cervical Spine Without Contrast    (Results Pending)       Medical Decision Making:  Carmen Obrien is a 82 y.o. female who presents emergency department after a ground-level fall with closed head injury.    GCS 15 with reassuring vital signs.    Imaging was ordered and reviewed.  CT head and cervical spine per overnight stat read read with no acute traumatic injury.    Nursing notes were reviewed.      Patient's presentation is most consistent with closed head injury without loss of consciousness with small hematoma.  No evidence of intracranial bleeding such as subdural subarachnoid or epidural hemorrhage.  No evidence of cervical spine fracture.  Patient with no extremity tenderness concerning for other fractures.    Patient is discharged home with plan to follow-up with primary care provider within 2 days for further management return the emergency  department immediately if symptoms worsen.      ED Diagnosis:  Ground-level fall  Closed head injury without loss of consciousness, initial encounter  Scalp hematoma      Disposition: to home    Follow up plan: PCP follow up within 2 days, return to ED immediately if symptoms worsen        Signed:  William Rivers MD  Emergency Medicine Physician    Please note that portions of this note were completed with a voice recognition program.      William Rivers MD  11/08/22 0236

## 2022-11-08 NOTE — ED NOTES
The following fall interventions were initiated:    [x] Patient and/or family given education   [x] Call light within reach and educated on how to use   [x] Bed rails up per protocol    [x] Bed locked and in the lowest position   [x] Bed alarm set and on loudest setting   [x] Fall wrist band applied   [x] Non skid footwear applied   [x] Room free of clutter   [x] Patient items within reach   [x] Adequate lighting provided  [] Falls sign present    [] Patient moved closer to nursing station   [] Restraints applied

## 2022-11-21 ENCOUNTER — LAB REQUISITION (OUTPATIENT)
Dept: LAB | Facility: HOSPITAL | Age: 82
End: 2022-11-21

## 2022-11-21 DIAGNOSIS — Z00.00 ENCOUNTER FOR GENERAL ADULT MEDICAL EXAMINATION WITHOUT ABNORMAL FINDINGS: ICD-10-CM

## 2022-11-21 LAB
BACTERIA UR QL AUTO: ABNORMAL /HPF
BILIRUB UR QL STRIP: NEGATIVE
CLARITY UR: ABNORMAL
COLOR UR: ABNORMAL
GLUCOSE UR STRIP-MCNC: NEGATIVE MG/DL
HGB UR QL STRIP.AUTO: ABNORMAL
HYALINE CASTS UR QL AUTO: ABNORMAL /LPF
KETONES UR QL STRIP: ABNORMAL
LEUKOCYTE ESTERASE UR QL STRIP.AUTO: ABNORMAL
NITRITE UR QL STRIP: POSITIVE
PH UR STRIP.AUTO: 6.5 [PH] (ref 5–8)
PROT UR QL STRIP: ABNORMAL
RBC # UR STRIP: ABNORMAL /HPF
REF LAB TEST METHOD: ABNORMAL
SP GR UR STRIP: 1.02 (ref 1–1.03)
SQUAMOUS #/AREA URNS HPF: ABNORMAL /HPF
UROBILINOGEN UR QL STRIP: ABNORMAL
WBC # UR STRIP: ABNORMAL /HPF
WBC CLUMPS # UR AUTO: ABNORMAL /HPF

## 2022-11-21 PROCEDURE — 87086 URINE CULTURE/COLONY COUNT: CPT | Performed by: NURSE PRACTITIONER

## 2022-11-21 PROCEDURE — 81001 URINALYSIS AUTO W/SCOPE: CPT | Performed by: NURSE PRACTITIONER

## 2022-11-21 PROCEDURE — 87088 URINE BACTERIA CULTURE: CPT | Performed by: NURSE PRACTITIONER

## 2022-11-21 PROCEDURE — 87186 SC STD MICRODIL/AGAR DIL: CPT | Performed by: NURSE PRACTITIONER

## 2022-11-24 LAB — BACTERIA SPEC AEROBE CULT: ABNORMAL

## 2022-11-25 ENCOUNTER — LAB REQUISITION (OUTPATIENT)
Dept: LAB | Facility: HOSPITAL | Age: 82
End: 2022-11-25

## 2022-11-25 DIAGNOSIS — Z00.00 ENCOUNTER FOR GENERAL ADULT MEDICAL EXAMINATION WITHOUT ABNORMAL FINDINGS: ICD-10-CM

## 2022-11-25 LAB
ANION GAP SERPL CALCULATED.3IONS-SCNC: 14 MMOL/L (ref 5–15)
BUN SERPL-MCNC: 13 MG/DL (ref 8–23)
BUN/CREAT SERPL: 32.5 (ref 7–25)
CALCIUM SPEC-SCNC: 9.4 MG/DL (ref 8.6–10.5)
CHLORIDE SERPL-SCNC: 94 MMOL/L (ref 98–107)
CO2 SERPL-SCNC: 25 MMOL/L (ref 22–29)
CREAT SERPL-MCNC: 0.4 MG/DL (ref 0.57–1)
EGFRCR SERPLBLD CKD-EPI 2021: 100.2 ML/MIN/1.73
GLUCOSE SERPL-MCNC: 164 MG/DL (ref 65–99)
POTASSIUM SERPL-SCNC: 3.8 MMOL/L (ref 3.5–5.2)
SODIUM SERPL-SCNC: 133 MMOL/L (ref 136–145)

## 2022-11-25 PROCEDURE — 36415 COLL VENOUS BLD VENIPUNCTURE: CPT | Performed by: NURSE PRACTITIONER

## 2022-11-25 PROCEDURE — 80048 BASIC METABOLIC PNL TOTAL CA: CPT | Performed by: NURSE PRACTITIONER

## 2022-12-06 ENCOUNTER — LAB REQUISITION (OUTPATIENT)
Dept: LAB | Facility: HOSPITAL | Age: 82
End: 2022-12-06

## 2022-12-06 DIAGNOSIS — Z00.00 ENCOUNTER FOR GENERAL ADULT MEDICAL EXAMINATION WITHOUT ABNORMAL FINDINGS: ICD-10-CM

## 2022-12-06 LAB
ANION GAP SERPL CALCULATED.3IONS-SCNC: 11 MMOL/L (ref 5–15)
BASOPHILS # BLD AUTO: 0.03 10*3/MM3 (ref 0–0.2)
BASOPHILS NFR BLD AUTO: 0.3 % (ref 0–1.5)
BUN SERPL-MCNC: 16 MG/DL (ref 8–23)
BUN/CREAT SERPL: 29.1 (ref 7–25)
CALCIUM SPEC-SCNC: 10.2 MG/DL (ref 8.6–10.5)
CHLORIDE SERPL-SCNC: 97 MMOL/L (ref 98–107)
CO2 SERPL-SCNC: 26 MMOL/L (ref 22–29)
CREAT SERPL-MCNC: 0.55 MG/DL (ref 0.57–1)
DEPRECATED RDW RBC AUTO: 48.5 FL (ref 37–54)
EGFRCR SERPLBLD CKD-EPI 2021: 92.8 ML/MIN/1.73
EOSINOPHIL # BLD AUTO: 0.22 10*3/MM3 (ref 0–0.4)
EOSINOPHIL NFR BLD AUTO: 1.9 % (ref 0.3–6.2)
ERYTHROCYTE [DISTWIDTH] IN BLOOD BY AUTOMATED COUNT: 14.4 % (ref 12.3–15.4)
GLUCOSE SERPL-MCNC: 99 MG/DL (ref 65–99)
HCT VFR BLD AUTO: 33.7 % (ref 34–46.6)
HGB BLD-MCNC: 10.8 G/DL (ref 12–15.9)
IMM GRANULOCYTES # BLD AUTO: 0.07 10*3/MM3 (ref 0–0.05)
IMM GRANULOCYTES NFR BLD AUTO: 0.6 % (ref 0–0.5)
LYMPHOCYTES # BLD AUTO: 0.77 10*3/MM3 (ref 0.7–3.1)
LYMPHOCYTES NFR BLD AUTO: 6.7 % (ref 19.6–45.3)
MCH RBC QN AUTO: 29.3 PG (ref 26.6–33)
MCHC RBC AUTO-ENTMCNC: 32 G/DL (ref 31.5–35.7)
MCV RBC AUTO: 91.6 FL (ref 79–97)
MONOCYTES # BLD AUTO: 0.47 10*3/MM3 (ref 0.1–0.9)
MONOCYTES NFR BLD AUTO: 4.1 % (ref 5–12)
NEUTROPHILS NFR BLD AUTO: 86.4 % (ref 42.7–76)
NEUTROPHILS NFR BLD AUTO: 9.98 10*3/MM3 (ref 1.7–7)
NRBC BLD AUTO-RTO: 0 /100 WBC (ref 0–0.2)
PLATELET # BLD AUTO: 366 10*3/MM3 (ref 140–450)
PMV BLD AUTO: 10.8 FL (ref 6–12)
POTASSIUM SERPL-SCNC: 3.2 MMOL/L (ref 3.5–5.2)
RBC # BLD AUTO: 3.68 10*6/MM3 (ref 3.77–5.28)
SODIUM SERPL-SCNC: 134 MMOL/L (ref 136–145)
TSH SERPL DL<=0.05 MIU/L-ACNC: 1.27 UIU/ML (ref 0.27–4.2)
VIT B12 BLD-MCNC: >2000 PG/ML (ref 211–946)
WBC NRBC COR # BLD: 11.54 10*3/MM3 (ref 3.4–10.8)

## 2022-12-06 PROCEDURE — 36415 COLL VENOUS BLD VENIPUNCTURE: CPT | Performed by: NURSE PRACTITIONER

## 2022-12-06 PROCEDURE — 85025 COMPLETE CBC W/AUTO DIFF WBC: CPT | Performed by: NURSE PRACTITIONER

## 2022-12-06 PROCEDURE — 84443 ASSAY THYROID STIM HORMONE: CPT | Performed by: NURSE PRACTITIONER

## 2022-12-06 PROCEDURE — 82607 VITAMIN B-12: CPT | Performed by: NURSE PRACTITIONER

## 2022-12-06 PROCEDURE — 80048 BASIC METABOLIC PNL TOTAL CA: CPT | Performed by: NURSE PRACTITIONER

## 2022-12-09 ENCOUNTER — LAB REQUISITION (OUTPATIENT)
Dept: LAB | Facility: HOSPITAL | Age: 82
End: 2022-12-09

## 2022-12-09 DIAGNOSIS — Z00.00 ENCOUNTER FOR GENERAL ADULT MEDICAL EXAMINATION WITHOUT ABNORMAL FINDINGS: ICD-10-CM

## 2022-12-09 LAB
AMORPH URATE CRY URNS QL MICRO: ABNORMAL /HPF
BACTERIA UR QL AUTO: ABNORMAL /HPF
BILIRUB UR QL STRIP: NEGATIVE
CLARITY UR: ABNORMAL
COLOR UR: ABNORMAL
GLUCOSE UR STRIP-MCNC: NEGATIVE MG/DL
HGB UR QL STRIP.AUTO: NEGATIVE
HYALINE CASTS UR QL AUTO: ABNORMAL /LPF
KETONES UR QL STRIP: NEGATIVE
LEUKOCYTE ESTERASE UR QL STRIP.AUTO: ABNORMAL
NITRITE UR QL STRIP: NEGATIVE
PH UR STRIP.AUTO: 5.5 [PH] (ref 5–8)
PROT UR QL STRIP: ABNORMAL
RBC # UR STRIP: ABNORMAL /HPF
REF LAB TEST METHOD: ABNORMAL
SP GR UR STRIP: 1.02 (ref 1–1.03)
SQUAMOUS #/AREA URNS HPF: ABNORMAL /HPF
UROBILINOGEN UR QL STRIP: ABNORMAL
WBC # UR STRIP: ABNORMAL /HPF

## 2022-12-09 PROCEDURE — 81001 URINALYSIS AUTO W/SCOPE: CPT | Performed by: NURSE PRACTITIONER

## 2022-12-09 PROCEDURE — 87077 CULTURE AEROBIC IDENTIFY: CPT | Performed by: NURSE PRACTITIONER

## 2022-12-09 PROCEDURE — 87086 URINE CULTURE/COLONY COUNT: CPT | Performed by: NURSE PRACTITIONER

## 2022-12-09 PROCEDURE — 87186 SC STD MICRODIL/AGAR DIL: CPT | Performed by: NURSE PRACTITIONER

## 2022-12-12 LAB
BACTERIA SPEC AEROBE CULT: ABNORMAL
BACTERIA SPEC AEROBE CULT: ABNORMAL

## 2022-12-13 ENCOUNTER — LAB REQUISITION (OUTPATIENT)
Dept: LAB | Facility: HOSPITAL | Age: 82
End: 2022-12-13

## 2022-12-13 DIAGNOSIS — Z00.00 ENCOUNTER FOR GENERAL ADULT MEDICAL EXAMINATION WITHOUT ABNORMAL FINDINGS: ICD-10-CM

## 2022-12-13 LAB
ANION GAP SERPL CALCULATED.3IONS-SCNC: 6 MMOL/L (ref 5–15)
BUN SERPL-MCNC: 15 MG/DL (ref 8–23)
BUN/CREAT SERPL: 24.6 (ref 7–25)
CALCIUM SPEC-SCNC: 10.7 MG/DL (ref 8.6–10.5)
CHLORIDE SERPL-SCNC: 100 MMOL/L (ref 98–107)
CO2 SERPL-SCNC: 30 MMOL/L (ref 22–29)
CREAT SERPL-MCNC: 0.61 MG/DL (ref 0.57–1)
EGFRCR SERPLBLD CKD-EPI 2021: 90.5 ML/MIN/1.73
GLUCOSE SERPL-MCNC: 124 MG/DL (ref 65–99)
POTASSIUM SERPL-SCNC: 3.5 MMOL/L (ref 3.5–5.2)
SODIUM SERPL-SCNC: 136 MMOL/L (ref 136–145)
URATE SERPL-MCNC: 4.2 MG/DL (ref 2.4–5.7)

## 2022-12-13 PROCEDURE — 84550 ASSAY OF BLOOD/URIC ACID: CPT | Performed by: NURSE PRACTITIONER

## 2022-12-13 PROCEDURE — 80048 BASIC METABOLIC PNL TOTAL CA: CPT | Performed by: NURSE PRACTITIONER

## 2022-12-13 PROCEDURE — 36415 COLL VENOUS BLD VENIPUNCTURE: CPT | Performed by: NURSE PRACTITIONER

## 2022-12-30 ENCOUNTER — TELEPHONE (OUTPATIENT)
Dept: PODIATRY | Facility: CLINIC | Age: 82
End: 2022-12-30

## 2022-12-30 NOTE — TELEPHONE ENCOUNTER
Called patient to confirmed appointment for 01/03/2023 @ 1773 with Dr. Chung. I was unable to contact patient at this time.

## 2023-01-04 ENCOUNTER — TELEPHONE (OUTPATIENT)
Dept: VASCULAR SURGERY | Facility: CLINIC | Age: 83
End: 2023-01-04
Payer: MEDICARE

## 2023-01-04 NOTE — TELEPHONE ENCOUNTER
Not able to leave  for pt with testing and appt for 01/05/2023 with Dr Grubbs. Called and left  with Selene's phone.

## 2023-01-05 ENCOUNTER — APPOINTMENT (OUTPATIENT)
Dept: ULTRASOUND IMAGING | Facility: HOSPITAL | Age: 83
End: 2023-01-05
Payer: MEDICARE

## 2023-01-30 ENCOUNTER — EXTERNAL PBMM DATA (OUTPATIENT)
Dept: PHARMACY | Facility: OTHER | Age: 83
End: 2023-01-30
Payer: MEDICARE

## 2023-02-27 ENCOUNTER — EXTERNAL PBMM DATA (OUTPATIENT)
Dept: PHARMACY | Facility: OTHER | Age: 83
End: 2023-02-27
Payer: MEDICARE

## 2023-07-16 ENCOUNTER — APPOINTMENT (OUTPATIENT)
Dept: CT IMAGING | Facility: HOSPITAL | Age: 83
End: 2023-07-16
Payer: MEDICARE

## 2023-07-16 ENCOUNTER — APPOINTMENT (OUTPATIENT)
Dept: GENERAL RADIOLOGY | Facility: HOSPITAL | Age: 83
End: 2023-07-16
Payer: MEDICARE

## 2023-07-16 ENCOUNTER — HOSPITAL ENCOUNTER (OUTPATIENT)
Facility: HOSPITAL | Age: 83
Setting detail: OBSERVATION
LOS: 1 days | Discharge: NURSING, REHAB OR LONG-TERM CARE | End: 2023-07-20
Attending: INTERNAL MEDICINE | Admitting: FAMILY MEDICINE
Payer: MEDICARE

## 2023-07-16 DIAGNOSIS — D62 ACUTE BLOOD LOSS AS CAUSE OF POSTOPERATIVE ANEMIA: ICD-10-CM

## 2023-07-16 DIAGNOSIS — L03.116 CELLULITIS OF LEFT LOWER LEG: ICD-10-CM

## 2023-07-16 DIAGNOSIS — E66.09 CLASS 1 OBESITY DUE TO EXCESS CALORIES WITH SERIOUS COMORBIDITY AND BODY MASS INDEX (BMI) OF 34.0 TO 34.9 IN ADULT: ICD-10-CM

## 2023-07-16 DIAGNOSIS — Z86.010 HISTORY OF ADENOMATOUS POLYP OF COLON: ICD-10-CM

## 2023-07-16 DIAGNOSIS — R29.6 FALLING: ICD-10-CM

## 2023-07-16 DIAGNOSIS — D64.9 ANEMIA, UNSPECIFIED TYPE: ICD-10-CM

## 2023-07-16 DIAGNOSIS — E87.6 HYPOKALEMIA: ICD-10-CM

## 2023-07-16 DIAGNOSIS — Z22.322 POSITIVE RESULT FOR METHICILLIN RESISTANT STAPHYLOCOCCUS AUREUS (MRSA) SCREENING: ICD-10-CM
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
## 2023-07-16 DIAGNOSIS — J21.0 RSV (ACUTE BRONCHIOLITIS DUE TO RESPIRATORY SYNCYTIAL VIRUS): ICD-10-CM

## 2023-07-16 DIAGNOSIS — I70.213 ATHEROSCLEROSIS OF NATIVE ARTERY OF BOTH LOWER EXTREMITIES WITH INTERMITTENT CLAUDICATION: ICD-10-CM

## 2023-07-16 DIAGNOSIS — D72.829 LEUKOCYTOSIS, UNSPECIFIED TYPE: ICD-10-CM

## 2023-07-16 DIAGNOSIS — I16.0 HYPERTENSIVE URGENCY: ICD-10-CM

## 2023-07-16 DIAGNOSIS — R13.19 ESOPHAGEAL DYSPHAGIA: ICD-10-CM

## 2023-07-16 DIAGNOSIS — T79.6XXA TRAUMATIC RHABDOMYOLYSIS, INITIAL ENCOUNTER: ICD-10-CM

## 2023-07-16 DIAGNOSIS — E87.5 HYPERKALEMIA: ICD-10-CM

## 2023-07-16 DIAGNOSIS — N39.0 UTI (URINARY TRACT INFECTION), BACTERIAL: ICD-10-CM

## 2023-07-16 DIAGNOSIS — A04.72 C. DIFFICILE COLITIS: ICD-10-CM

## 2023-07-16 DIAGNOSIS — R09.89 DECREASED PULSES IN FEET: ICD-10-CM

## 2023-07-16 DIAGNOSIS — E44.0 MODERATE MALNUTRITION: ICD-10-CM

## 2023-07-16 DIAGNOSIS — M81.0 OSTEOPOROSIS, UNSPECIFIED OSTEOPOROSIS TYPE, UNSPECIFIED PATHOLOGICAL FRACTURE PRESENCE: ICD-10-CM

## 2023-07-16 DIAGNOSIS — L02.416 ABSCESS OF LEFT THIGH: ICD-10-CM

## 2023-07-16 DIAGNOSIS — E04.2 MULTINODULAR GOITER: ICD-10-CM

## 2023-07-16 DIAGNOSIS — L03.116 LEFT LEG CELLULITIS: ICD-10-CM

## 2023-07-16 DIAGNOSIS — M62.82 NON-TRAUMATIC RHABDOMYOLYSIS: ICD-10-CM

## 2023-07-16 DIAGNOSIS — S46.919S STRAIN OF SHOULDER, UNSPECIFIED LATERALITY, SEQUELA: ICD-10-CM

## 2023-07-16 DIAGNOSIS — M12.819 ROTATOR CUFF TEAR ARTHROPATHY, UNSPECIFIED LATERALITY: ICD-10-CM

## 2023-07-16 DIAGNOSIS — G89.4 CHRONIC PAIN SYNDROME: ICD-10-CM

## 2023-07-16 DIAGNOSIS — Z86.718 HISTORY OF DVT OF LOWER EXTREMITY: ICD-10-CM

## 2023-07-16 DIAGNOSIS — M25.369 INSTABILITY OF KNEE JOINT, UNSPECIFIED LATERALITY: ICD-10-CM

## 2023-07-16 DIAGNOSIS — R62.7 FAILURE TO THRIVE IN ADULT: ICD-10-CM

## 2023-07-16 DIAGNOSIS — I10 ESSENTIAL HYPERTENSION: ICD-10-CM

## 2023-07-16 DIAGNOSIS — I87.8 CHRONIC VENOUS STASIS: ICD-10-CM

## 2023-07-16 DIAGNOSIS — R73.9 HYPERGLYCEMIA: ICD-10-CM

## 2023-07-16 DIAGNOSIS — T84.038S: ICD-10-CM

## 2023-07-16 DIAGNOSIS — T76.01XS: ICD-10-CM

## 2023-07-16 DIAGNOSIS — Z78.9 DECREASED ACTIVITIES OF DAILY LIVING (ADL): ICD-10-CM

## 2023-07-16 DIAGNOSIS — I21.A1 TYPE 2 MI (MYOCARDIAL INFARCTION): ICD-10-CM

## 2023-07-16 DIAGNOSIS — S43.006S: ICD-10-CM

## 2023-07-16 DIAGNOSIS — Z96.659: ICD-10-CM

## 2023-07-16 DIAGNOSIS — K21.9 GASTROESOPHAGEAL REFLUX DISEASE, UNSPECIFIED WHETHER ESOPHAGITIS PRESENT: ICD-10-CM

## 2023-07-16 DIAGNOSIS — E83.42 HYPOMAGNESEMIA: ICD-10-CM

## 2023-07-16 DIAGNOSIS — N30.01 ACUTE CYSTITIS WITH HEMATURIA: Primary | ICD-10-CM

## 2023-07-16 DIAGNOSIS — K59.00 CONSTIPATION, UNSPECIFIED CONSTIPATION TYPE: ICD-10-CM

## 2023-07-16 DIAGNOSIS — M75.100 ROTATOR CUFF TEAR ARTHROPATHY, UNSPECIFIED LATERALITY: ICD-10-CM

## 2023-07-16 DIAGNOSIS — M25.519 SHOULDER PAIN, UNSPECIFIED CHRONICITY, UNSPECIFIED LATERALITY: ICD-10-CM

## 2023-07-16 DIAGNOSIS — Z86.19 HISTORY OF ESBL E. COLI INFECTION: ICD-10-CM

## 2023-07-16 DIAGNOSIS — S40.019S: ICD-10-CM

## 2023-07-16 DIAGNOSIS — R41.82 ALTERED MENTAL STATUS, UNSPECIFIED ALTERED MENTAL STATUS TYPE: ICD-10-CM

## 2023-07-16 DIAGNOSIS — Z80.0 FAMILY HX OF COLON CANCER: ICD-10-CM

## 2023-07-16 DIAGNOSIS — S43.004A DISLOCATION OF RIGHT SHOULDER JOINT, INITIAL ENCOUNTER: ICD-10-CM

## 2023-07-16 DIAGNOSIS — S46.019S: ICD-10-CM

## 2023-07-16 DIAGNOSIS — H10.10 ALLERGIC CONJUNCTIVITIS, UNSPECIFIED LATERALITY: ICD-10-CM

## 2023-07-16 DIAGNOSIS — Z74.09 IMPAIRED MOBILITY: ICD-10-CM

## 2023-07-16 DIAGNOSIS — E87.1 HYPONATREMIA: ICD-10-CM

## 2023-07-16 DIAGNOSIS — M81.0 AGE-RELATED OSTEOPOROSIS WITHOUT CURRENT PATHOLOGICAL FRACTURE: ICD-10-CM

## 2023-07-16 DIAGNOSIS — N17.9 AKI (ACUTE KIDNEY INJURY): ICD-10-CM

## 2023-07-16 DIAGNOSIS — M79.605 ACUTE PAIN OF LEFT LOWER EXTREMITY: ICD-10-CM

## 2023-07-16 DIAGNOSIS — A49.9 UTI (URINARY TRACT INFECTION), BACTERIAL: ICD-10-CM

## 2023-07-16 DIAGNOSIS — S61.309S AVULSION OF FINGERNAIL, SEQUELA: ICD-10-CM

## 2023-07-16 LAB
ALBUMIN SERPL-MCNC: 3.9 G/DL (ref 3.5–5.2)
ALBUMIN/GLOB SERPL: 1.7 G/DL
ALP SERPL-CCNC: 78 U/L (ref 39–117)
ALT SERPL W P-5'-P-CCNC: 16 U/L (ref 1–33)
ANION GAP SERPL CALCULATED.3IONS-SCNC: 12 MMOL/L (ref 5–15)
AST SERPL-CCNC: 20 U/L (ref 1–32)
BACTERIA UR QL AUTO: ABNORMAL /HPF
BASOPHILS # BLD AUTO: 0.05 10*3/MM3 (ref 0–0.2)
BASOPHILS NFR BLD AUTO: 0.7 % (ref 0–1.5)
BILIRUB SERPL-MCNC: 0.3 MG/DL (ref 0–1.2)
BILIRUB UR QL STRIP: NEGATIVE
BUN SERPL-MCNC: 19 MG/DL (ref 8–23)
BUN/CREAT SERPL: 29.7 (ref 7–25)
CALCIUM SPEC-SCNC: 9.1 MG/DL (ref 8.6–10.5)
CHLORIDE SERPL-SCNC: 101 MMOL/L (ref 98–107)
CLARITY UR: ABNORMAL
CO2 SERPL-SCNC: 25 MMOL/L (ref 22–29)
COLOR UR: YELLOW
CREAT SERPL-MCNC: 0.64 MG/DL (ref 0.57–1)
DEPRECATED RDW RBC AUTO: 52.6 FL (ref 37–54)
EGFRCR SERPLBLD CKD-EPI 2021: 88.9 ML/MIN/1.73
EOSINOPHIL # BLD AUTO: 0.3 10*3/MM3 (ref 0–0.4)
EOSINOPHIL NFR BLD AUTO: 4.1 % (ref 0.3–6.2)
ERYTHROCYTE [DISTWIDTH] IN BLOOD BY AUTOMATED COUNT: 13.9 % (ref 12.3–15.4)
GEN 5 2HR TROPONIN T REFLEX: 28 NG/L
GLOBULIN UR ELPH-MCNC: 2.3 GM/DL
GLUCOSE SERPL-MCNC: 129 MG/DL (ref 65–99)
GLUCOSE UR STRIP-MCNC: NEGATIVE MG/DL
HCT VFR BLD AUTO: 36 % (ref 34–46.6)
HGB BLD-MCNC: 11.1 G/DL (ref 12–15.9)
HGB UR QL STRIP.AUTO: NEGATIVE
IMM GRANULOCYTES # BLD AUTO: 0.03 10*3/MM3 (ref 0–0.05)
IMM GRANULOCYTES NFR BLD AUTO: 0.4 % (ref 0–0.5)
KETONES UR QL STRIP: ABNORMAL
LEUKOCYTE ESTERASE UR QL STRIP.AUTO: ABNORMAL
LYMPHOCYTES # BLD AUTO: 1.46 10*3/MM3 (ref 0.7–3.1)
LYMPHOCYTES NFR BLD AUTO: 20.1 % (ref 19.6–45.3)
MCH RBC QN AUTO: 31.4 PG (ref 26.6–33)
MCHC RBC AUTO-ENTMCNC: 30.8 G/DL (ref 31.5–35.7)
MCV RBC AUTO: 102 FL (ref 79–97)
MONOCYTES # BLD AUTO: 0.71 10*3/MM3 (ref 0.1–0.9)
MONOCYTES NFR BLD AUTO: 9.8 % (ref 5–12)
NEUTROPHILS NFR BLD AUTO: 4.7 10*3/MM3 (ref 1.7–7)
NEUTROPHILS NFR BLD AUTO: 64.9 % (ref 42.7–76)
NITRITE UR QL STRIP: NEGATIVE
NRBC BLD AUTO-RTO: 0 /100 WBC (ref 0–0.2)
PH UR STRIP.AUTO: 5.5 [PH] (ref 5–8)
PLATELET # BLD AUTO: 179 10*3/MM3 (ref 140–450)
PMV BLD AUTO: 10.9 FL (ref 6–12)
POTASSIUM SERPL-SCNC: 4.4 MMOL/L (ref 3.5–5.2)
PROT SERPL-MCNC: 6.2 G/DL (ref 6–8.5)
PROT UR QL STRIP: ABNORMAL
RBC # BLD AUTO: 3.53 10*6/MM3 (ref 3.77–5.28)
RBC # UR STRIP: ABNORMAL /HPF
REF LAB TEST METHOD: ABNORMAL
SODIUM SERPL-SCNC: 138 MMOL/L (ref 136–145)
SP GR UR STRIP: 1.02 (ref 1–1.03)
SQUAMOUS #/AREA URNS HPF: ABNORMAL /HPF
TROPONIN T DELTA: -1 NG/L
TROPONIN T SERPL HS-MCNC: 29 NG/L
UROBILINOGEN UR QL STRIP: ABNORMAL
WBC # UR STRIP: ABNORMAL /HPF
WBC NRBC COR # BLD: 7.25 10*3/MM3 (ref 3.4–10.8)

## 2023-07-16 PROCEDURE — 36415 COLL VENOUS BLD VENIPUNCTURE: CPT

## 2023-07-16 PROCEDURE — 87088 URINE BACTERIA CULTURE: CPT | Performed by: INTERNAL MEDICINE

## 2023-07-16 PROCEDURE — 25010000002 CEFTRIAXONE PER 250 MG: Performed by: INTERNAL MEDICINE

## 2023-07-16 PROCEDURE — 80053 COMPREHEN METABOLIC PANEL: CPT | Performed by: INTERNAL MEDICINE

## 2023-07-16 PROCEDURE — 96365 THER/PROPH/DIAG IV INF INIT: CPT

## 2023-07-16 PROCEDURE — 99285 EMERGENCY DEPT VISIT HI MDM: CPT

## 2023-07-16 PROCEDURE — 74177 CT ABD & PELVIS W/CONTRAST: CPT

## 2023-07-16 PROCEDURE — 94799 UNLISTED PULMONARY SVC/PX: CPT

## 2023-07-16 PROCEDURE — 85025 COMPLETE CBC W/AUTO DIFF WBC: CPT | Performed by: INTERNAL MEDICINE

## 2023-07-16 PROCEDURE — 83036 HEMOGLOBIN GLYCOSYLATED A1C: CPT | Performed by: FAMILY MEDICINE

## 2023-07-16 PROCEDURE — 96375 TX/PRO/DX INJ NEW DRUG ADDON: CPT

## 2023-07-16 PROCEDURE — 71045 X-RAY EXAM CHEST 1 VIEW: CPT

## 2023-07-16 PROCEDURE — 94761 N-INVAS EAR/PLS OXIMETRY MLT: CPT

## 2023-07-16 PROCEDURE — 84484 ASSAY OF TROPONIN QUANT: CPT | Performed by: INTERNAL MEDICINE

## 2023-07-16 PROCEDURE — 81001 URINALYSIS AUTO W/SCOPE: CPT | Performed by: INTERNAL MEDICINE

## 2023-07-16 PROCEDURE — 87186 SC STD MICRODIL/AGAR DIL: CPT | Performed by: INTERNAL MEDICINE

## 2023-07-16 PROCEDURE — 25010000002 HYDRALAZINE PER 20 MG: Performed by: INTERNAL MEDICINE

## 2023-07-16 PROCEDURE — 87086 URINE CULTURE/COLONY COUNT: CPT | Performed by: INTERNAL MEDICINE

## 2023-07-16 PROCEDURE — 94760 N-INVAS EAR/PLS OXIMETRY 1: CPT

## 2023-07-16 PROCEDURE — 96367 TX/PROPH/DG ADDL SEQ IV INF: CPT

## 2023-07-16 PROCEDURE — 25510000001 IOPAMIDOL 61 % SOLUTION: Performed by: INTERNAL MEDICINE

## 2023-07-16 RX ORDER — CLONIDINE HYDROCHLORIDE 0.1 MG/1
0.1 TABLET ORAL EVERY 12 HOURS SCHEDULED
Status: DISCONTINUED | OUTPATIENT
Start: 2023-07-16 | End: 2023-07-16

## 2023-07-16 RX ORDER — SIMETHICONE 80 MG
125 TABLET,CHEWABLE ORAL EVERY 6 HOURS PRN
Status: DISCONTINUED | OUTPATIENT
Start: 2023-07-16 | End: 2023-07-18

## 2023-07-16 RX ORDER — SODIUM CHLORIDE 9 MG/ML
40 INJECTION, SOLUTION INTRAVENOUS AS NEEDED
Status: DISCONTINUED | OUTPATIENT
Start: 2023-07-16 | End: 2023-07-20 | Stop reason: HOSPADM

## 2023-07-16 RX ORDER — ASCORBIC ACID 500 MG
500 TABLET ORAL DAILY
COMMUNITY

## 2023-07-16 RX ORDER — LABETALOL HYDROCHLORIDE 5 MG/ML
10 INJECTION, SOLUTION INTRAVENOUS EVERY 4 HOURS PRN
Status: DISCONTINUED | OUTPATIENT
Start: 2023-07-16 | End: 2023-07-20 | Stop reason: HOSPADM

## 2023-07-16 RX ORDER — CHOLECALCIFEROL (VITAMIN D3) 125 MCG
5 CAPSULE ORAL NIGHTLY
Status: DISCONTINUED | OUTPATIENT
Start: 2023-07-16 | End: 2023-07-20 | Stop reason: HOSPADM

## 2023-07-16 RX ORDER — SODIUM CHLORIDE, SODIUM LACTATE, POTASSIUM CHLORIDE, CALCIUM CHLORIDE 600; 310; 30; 20 MG/100ML; MG/100ML; MG/100ML; MG/100ML
75 INJECTION, SOLUTION INTRAVENOUS CONTINUOUS
Status: DISCONTINUED | OUTPATIENT
Start: 2023-07-16 | End: 2023-07-18

## 2023-07-16 RX ORDER — ATORVASTATIN CALCIUM 10 MG/1
10 TABLET, FILM COATED ORAL DAILY
Status: DISCONTINUED | OUTPATIENT
Start: 2023-07-17 | End: 2023-07-20 | Stop reason: HOSPADM

## 2023-07-16 RX ORDER — LABETALOL HYDROCHLORIDE 5 MG/ML
10 INJECTION, SOLUTION INTRAVENOUS ONCE
Status: COMPLETED | OUTPATIENT
Start: 2023-07-16 | End: 2023-07-16

## 2023-07-16 RX ORDER — HYDROCODONE BITARTRATE AND ACETAMINOPHEN 7.5; 325 MG/1; MG/1
1 TABLET ORAL EVERY 8 HOURS PRN
Status: ON HOLD | COMMUNITY
End: 2023-07-20 | Stop reason: SDUPTHER

## 2023-07-16 RX ORDER — POLYETHYLENE GLYCOL 3350 17 G/17G
17 POWDER, FOR SOLUTION ORAL DAILY PRN
Status: DISCONTINUED | OUTPATIENT
Start: 2023-07-16 | End: 2023-07-20 | Stop reason: HOSPADM

## 2023-07-16 RX ORDER — ONDANSETRON 2 MG/ML
4 INJECTION INTRAMUSCULAR; INTRAVENOUS EVERY 6 HOURS PRN
Status: DISCONTINUED | OUTPATIENT
Start: 2023-07-16 | End: 2023-07-20 | Stop reason: HOSPADM

## 2023-07-16 RX ORDER — SODIUM CHLORIDE 0.9 % (FLUSH) 0.9 %
10 SYRINGE (ML) INJECTION AS NEEDED
Status: DISCONTINUED | OUTPATIENT
Start: 2023-07-16 | End: 2023-07-20 | Stop reason: HOSPADM

## 2023-07-16 RX ORDER — PANTOPRAZOLE SODIUM 40 MG/1
40 TABLET, DELAYED RELEASE ORAL
Status: DISCONTINUED | OUTPATIENT
Start: 2023-07-17 | End: 2023-07-20 | Stop reason: HOSPADM

## 2023-07-16 RX ORDER — AMLODIPINE BESYLATE 5 MG/1
5 TABLET ORAL
Status: DISCONTINUED | OUTPATIENT
Start: 2023-07-17 | End: 2023-07-17

## 2023-07-16 RX ORDER — CHOLECALCIFEROL (VITAMIN D3) 125 MCG
10 CAPSULE ORAL
Status: ON HOLD | COMMUNITY
End: 2023-07-17

## 2023-07-16 RX ORDER — POTASSIUM CHLORIDE 750 MG/1
10 CAPSULE, EXTENDED RELEASE ORAL 2 TIMES DAILY
Status: ON HOLD | COMMUNITY
End: 2023-07-17 | Stop reason: DRUGHIGH

## 2023-07-16 RX ORDER — HYDRALAZINE HYDROCHLORIDE 20 MG/ML
10 INJECTION INTRAMUSCULAR; INTRAVENOUS ONCE
Status: COMPLETED | OUTPATIENT
Start: 2023-07-16 | End: 2023-07-16

## 2023-07-16 RX ORDER — ALLOPURINOL 300 MG/1
300 TABLET ORAL DAILY
Status: DISCONTINUED | OUTPATIENT
Start: 2023-07-17 | End: 2023-07-20 | Stop reason: HOSPADM

## 2023-07-16 RX ORDER — BISACODYL 10 MG
10 SUPPOSITORY, RECTAL RECTAL DAILY PRN
Status: DISCONTINUED | OUTPATIENT
Start: 2023-07-16 | End: 2023-07-20 | Stop reason: HOSPADM

## 2023-07-16 RX ORDER — CARVEDILOL 3.12 MG/1
3.12 TABLET ORAL 2 TIMES DAILY WITH MEALS
Status: DISCONTINUED | OUTPATIENT
Start: 2023-07-16 | End: 2023-07-20 | Stop reason: HOSPADM

## 2023-07-16 RX ORDER — SERTRALINE HYDROCHLORIDE 100 MG/1
100 TABLET, FILM COATED ORAL DAILY
Status: DISCONTINUED | OUTPATIENT
Start: 2023-07-17 | End: 2023-07-20 | Stop reason: HOSPADM

## 2023-07-16 RX ORDER — LOPERAMIDE HYDROCHLORIDE 2 MG/1
2 CAPSULE ORAL 4 TIMES DAILY PRN
Status: ON HOLD | COMMUNITY
End: 2023-07-17 | Stop reason: DRUGHIGH

## 2023-07-16 RX ORDER — CYCLOSPORINE 0.5 MG/ML
1 EMULSION OPHTHALMIC EVERY 12 HOURS SCHEDULED
Status: DISCONTINUED | OUTPATIENT
Start: 2023-07-16 | End: 2023-07-20 | Stop reason: HOSPADM

## 2023-07-16 RX ORDER — CLONIDINE HYDROCHLORIDE 0.1 MG/1
0.1 TABLET ORAL 2 TIMES DAILY PRN
COMMUNITY

## 2023-07-16 RX ORDER — FUROSEMIDE 20 MG/1
20 TABLET ORAL DAILY
COMMUNITY

## 2023-07-16 RX ORDER — HYDROCODONE BITARTRATE AND ACETAMINOPHEN 7.5; 325 MG/1; MG/1
1 TABLET ORAL EVERY 8 HOURS PRN
Status: DISCONTINUED | OUTPATIENT
Start: 2023-07-16 | End: 2023-07-20 | Stop reason: HOSPADM

## 2023-07-16 RX ORDER — AMOXICILLIN 250 MG
2 CAPSULE ORAL 2 TIMES DAILY
Status: DISCONTINUED | OUTPATIENT
Start: 2023-07-16 | End: 2023-07-20 | Stop reason: HOSPADM

## 2023-07-16 RX ORDER — BISACODYL 5 MG/1
5 TABLET, DELAYED RELEASE ORAL DAILY PRN
Status: DISCONTINUED | OUTPATIENT
Start: 2023-07-16 | End: 2023-07-20 | Stop reason: HOSPADM

## 2023-07-16 RX ORDER — SODIUM CHLORIDE 0.9 % (FLUSH) 0.9 %
10 SYRINGE (ML) INJECTION EVERY 12 HOURS SCHEDULED
Status: DISCONTINUED | OUTPATIENT
Start: 2023-07-16 | End: 2023-07-20 | Stop reason: HOSPADM

## 2023-07-16 RX ORDER — MELOXICAM 15 MG/1
15 TABLET ORAL DAILY
Status: ON HOLD | COMMUNITY
End: 2023-07-17

## 2023-07-16 RX ORDER — SERTRALINE HYDROCHLORIDE 100 MG/1
100 TABLET, FILM COATED ORAL DAILY
COMMUNITY

## 2023-07-16 RX ADMIN — LABETALOL HYDROCHLORIDE 10 MG: 5 INJECTION INTRAVENOUS at 17:34

## 2023-07-16 RX ADMIN — CEFTRIAXONE 1000 MG: 1 INJECTION, POWDER, FOR SOLUTION INTRAMUSCULAR; INTRAVENOUS at 16:29

## 2023-07-16 RX ADMIN — IOPAMIDOL 100 ML: 612 INJECTION, SOLUTION INTRAVENOUS at 17:08

## 2023-07-16 RX ADMIN — HYDRALAZINE HYDROCHLORIDE 10 MG: 20 INJECTION INTRAMUSCULAR; INTRAVENOUS at 16:30

## 2023-07-16 RX ADMIN — SODIUM CHLORIDE 5 MG/HR: 9 INJECTION, SOLUTION INTRAVENOUS at 18:21

## 2023-07-16 NOTE — ED PROVIDER NOTES
Subjective   History of Present Illness  83-year-old female presents emergency department with complaints of elevated blood pressure.  [facility]  She states that her blood pressure has been running high for about 2 months.  She notes 193/80.  She states that the center did not check her blood pressure this morning.  She ate lunch, and afterwards became very tired and sleepy.  She went to the nursing station and asked them to check her blood pressure.  She states that her systolic was 298.  She started to shake all over.  She has had no headache.  She had no chest pain.  She has some chronic plaints of lower extremity edema and dysuria.  Nothing acute.  The patient was given clonidine and olmesartan prior to her arrival to the emergency department.  Her blood pressure has improved since reported reading at the nursing facility.  She has no shortness of breath.  She notes occasional back pain and neck pain.  She states that she has arthritis in her shoulders.  She had a bowel movement today.  He is able to give history without any difficulty.    Review of Systems   Constitutional:  Negative for chills and fever.   HENT:  Negative for congestion and rhinorrhea.    Respiratory:  Negative for cough and shortness of breath.    Cardiovascular:  Positive for leg swelling. Negative for chest pain.   Gastrointestinal:  Negative for constipation and diarrhea.   Genitourinary:  Positive for dysuria. Negative for frequency.     Past Medical History:   Diagnosis Date    THERESA (acute kidney injury) (HCC) due to sepsis 11/2/2022    Anxiety     Arthritis     CAD (coronary artery disease)     Colon polyp     DDD (degenerative disc disease), lumbar     Deep venous thrombosis     Depression     Diverticulosis     Esophageal stricture     GERD (gastroesophageal reflux disease)     History of transfusion     Hypercholesteremia     Hypertension     LPRD (laryngopharyngeal reflux disease)     Macular  degeneration     Multinodular goiter 2/23/2017    Osteoarthritis     Pruritic disorder     Spastic colon     Thyroid nodule     Type 2 MI (myocardial infarction) (HCC) due to THERESA and UTI 11/2/2022       Allergies   Allergen Reactions    Levaquin [Levofloxacin] Unknown (See Comments)     Pt doesn't remember    Codeine Other (See Comments)     Pt does not recall    Morphine And Related Other (See Comments)     Pt does not recall       Past Surgical History:   Procedure Laterality Date    BLADDER REPAIR      CATARACT EXTRACTION      CHOLECYSTECTOMY      COLONOSCOPY  04/22/2014    2 polyps, adenomatous, diverticulosis    COLONOSCOPY N/A 6/26/2020    Procedure: COLONOSCOPY WITH ANESTHESIA;  Surgeon: John Coleman MD;  Location: [facility] ENDOSCOPY;  Service: Gastroenterology;  Laterality: N/A;  pre op: constipation  post op: diverticulosis,   PCP: Adam Delgadillo MD    CYSTOCELE REPAIR      ENDOSCOPY  04/22/2014    Schatzki's ring dilated    ENDOSCOPY N/A 6/26/2020    Procedure: ESOPHAGOGASTRODUODENOSCOPY WITH ANESTHESIA;  Surgeon: John Coleman MD;  Location: [facility] ENDOSCOPY;  Service: Gastroenterology;  Laterality: N/A;  pre op: dysphagia  post op:stricture, dilated   PCP:Adam Delgadillo MD    ENDOSCOPY N/A 9/2/2021    Procedure: ESOPHAGOGASTRODUODENOSCOPY WITH ANESTHESIA;  Surgeon: John Coleman MD;  Location: [facility] ENDOSCOPY;  Service: Gastroenterology;  Laterality: N/A;  pre op: dysphagia  post op:gastritis  PCP: VERA Noble MD    FEMUR FRACTURE SURGERY      HYSTERECTOMY      INCISION AND DRAINAGE LEG Left 11/3/2021    Procedure: INCISION AND DRAINAGE LEG ABSCESS;  Surgeon: Cesia Mondragon MD;  Location: [facility] OR;  Service: General;  Laterality: Left;    REPLACEMENT TOTAL KNEE      ULNAR NERVE TRANSPOSITION         Family History   Problem Relation Age of Onset    Colon cancer Mother     Heart disease Mother     Heart disease Father     Colon polyps Neg Hx     Esophageal cancer Neg  Hx        Social History     Socioeconomic History    Marital status:    Tobacco Use    Smoking status: Never    Smokeless tobacco: Never   Vaping Use    Vaping Use: Never used   Substance and Sexual Activity    Alcohol use: No    Drug use: No    Sexual activity: Defer           Objective   Physical Exam  Vitals reviewed.   Constitutional:       Appearance: Normal appearance.   HENT:      Head: Normocephalic and atraumatic.      Right Ear: External ear normal.      Left Ear: External ear normal.      Nose: Nose normal.   Eyes:      General: No scleral icterus.     Conjunctiva/sclera: Conjunctivae normal.   Cardiovascular:      Rate and Rhythm: Normal rate and regular rhythm.      Heart sounds: Normal heart sounds.   Pulmonary:      Effort: Pulmonary effort is normal.      Breath sounds: Normal breath sounds.   Abdominal:      General: Bowel sounds are normal.      Palpations: Abdomen is soft.   Musculoskeletal:         General: No swelling or tenderness.      Cervical back: Normal range of motion and neck supple.   Skin:     General: Skin is warm and dry.   Neurological:      Mental Status: She is alert and oriented to person, place, and time.      Cranial Nerves: No cranial nerve deficit.   Psychiatric:         Mood and Affect: Mood normal.         Behavior: Behavior normal.       Procedures           ED Course  ED Course as of 07/16/23 1850   Sun Jul 16, 2023   1617 Patient traces her abdomen and says that she is having abdominal pain. Will order CT.  [AJ]   1712 Ongoing HTN. Will order Labetalol.  [AJ]   1749 BP not controlled. Starting Cardene GTT. [AJ]      ED Course User Index  [AJ] Noelle Hurt DO                Lab Results (last 24 hours)       Procedure Component Value Units Date/Time    CBC & Differential [[id number]]  (Abnormal) Collected: 07/16/23 1513    Specimen: Blood Updated: 07/16/23 1521    Narrative:      The following orders were created for panel order CBC & Differential.  Procedure                                Abnormality         Status                     ---------                               -----------         ------                     CBC Auto Differential[[id number]]        Abnormal            Final result                 Please view results for these tests on the individual orders.    Comprehensive Metabolic Panel [[id number]]  (Abnormal) Collected: 07/16/23 1513    Specimen: Blood Updated: 07/16/23 1540     Glucose 129 mg/dL      BUN 19 mg/dL      Creatinine 0.64 mg/dL      Sodium 138 mmol/L      Potassium 4.4 mmol/L      Comment: Slight hemolysis detected by analyzer. Results may be affected.        Chloride 101 mmol/L      CO2 25.0 mmol/L      Calcium 9.1 mg/dL      Total Protein 6.2 g/dL      Albumin 3.9 g/dL      ALT (SGPT) 16 U/L      AST (SGOT) 20 U/L      Alkaline Phosphatase 78 U/L      Total Bilirubin 0.3 mg/dL      Globulin 2.3 gm/dL      A/G Ratio 1.7 g/dL      BUN/Creatinine Ratio 29.7     Anion Gap 12.0 mmol/L      eGFR 88.9 mL/min/1.73     Narrative:      GFR Normal >60  Chronic Kidney Disease <60  Kidney Failure <15    The GFR formula is only valid for adults with stable renal function between ages 18 and 70.    High Sensitivity Troponin T [[id number]]  (Abnormal) Collected: 07/16/23 1513    Specimen: Blood Updated: 07/16/23 1538     HS Troponin T 29 ng/L     Narrative:      High Sensitive Troponin T Reference Range:  <10.0 ng/L- Negative Female for AMI  <15.0 ng/L- Negative Male for AMI  >=10 - Abnormal Female indicating possible myocardial injury.  >=15 - Abnormal Male indicating possible myocardial injury.   Clinicians would have to utilize clinical acumen, EKG, Troponin, and serial changes to determine if it is an Acute Myocardial Infarction or myocardial injury due to an underlying chronic condition.         CBC Auto Differential [[id number]]  (Abnormal) Collected: 07/16/23 1513    Specimen: Blood Updated: 07/16/23 1521     WBC 7.25 10*3/mm3      RBC 3.53 10*6/mm3       Hemoglobin 11.1 g/dL      Hematocrit 36.0 %      .0 fL      MCH 31.4 pg      MCHC 30.8 g/dL      RDW 13.9 %      RDW-SD 52.6 fl      MPV 10.9 fL      Platelets 179 10*3/mm3      Neutrophil % 64.9 %      Lymphocyte % 20.1 %      Monocyte % 9.8 %      Eosinophil % 4.1 %      Basophil % 0.7 %      Immature Grans % 0.4 %      Neutrophils, Absolute 4.70 10*3/mm3      Lymphocytes, Absolute 1.46 10*3/mm3      Monocytes, Absolute 0.71 10*3/mm3      Eosinophils, Absolute 0.30 10*3/mm3      Basophils, Absolute 0.05 10*3/mm3      Immature Grans, Absolute 0.03 10*3/mm3      nRBC 0.0 /100 WBC     Urinalysis With Culture If Indicated - Urine, Clean Catch [[id number]]  (Abnormal) Collected: 07/16/23 1528    Specimen: Urine, Clean Catch Updated: 07/16/23 1551     Color, UA Yellow     Appearance, UA Cloudy     pH, UA 5.5     Specific Gravity, UA 1.019     Glucose, UA Negative     Ketones, UA Trace     Bilirubin, UA Negative     Blood, UA Negative     Protein,  mg/dL (2+)     Leuk Esterase, UA Moderate (2+)     Nitrite, UA Negative     Urobilinogen, UA 1.0 E.U./dL    Narrative:      In absence of clinical symptoms, the presence of pyuria, bacteria, and/or nitrites on the urinalysis result does not correlate with infection.    Urinalysis, Microscopic Only - Urine, Clean Catch [[id number]]  (Abnormal) Collected: 07/16/23 1528    Specimen: Urine, Clean Catch Updated: 07/16/23 1554     RBC, UA 0-2 /HPF      WBC, UA Too Numerous to Count /HPF      Bacteria, UA 4+ /HPF      Squamous Epithelial Cells, UA 3-6 /HPF      Methodology Manual Light Microscopy    Urine Culture - Urine, Urine, Clean Catch [[id number]] Collected: 07/16/23 1528    Specimen: Urine, Clean Catch Updated: 07/16/23 1554    High Sensitivity Troponin T 2Hr [[id number]]  (Abnormal) Collected: 07/16/23 1733    Specimen: Blood Updated: 07/16/23 1816     HS Troponin T 28 ng/L      Troponin T Delta -1 ng/L     Narrative:      High Sensitive Troponin T Reference  Range:  <10.0 ng/L- Negative Female for AMI  <15.0 ng/L- Negative Male for AMI  >=10 - Abnormal Female indicating possible myocardial injury.  >=15 - Abnormal Male indicating possible myocardial injury.   Clinicians would have to utilize clinical acumen, EKG, Troponin, and serial changes to determine if it is an Acute Myocardial Infarction or myocardial injury due to an underlying chronic condition.               CT Abdomen Pelvis With Contrast   Final Result      XR Chest 1 View   Final Result   1. No acute appearing infiltrate.   2. Stable bronchial wall thickening.           This report was finalized on 07/16/2023 15:01 by Dr. José Miguel Mccain MD.                                   Medical Decision Making  Amount and/or Complexity of Data Reviewed  Labs: ordered.     Details: CBC/CMP/UA  Radiology: ordered.     Details: Chest x-ray    Risk  Prescription drug management.        Final diagnoses:   Acute cystitis with hematuria   Hypertensive urgency       ED Disposition  ED Disposition       ED Disposition   Decision to Admit    Condition   --    Comment   Level of Care: Critical Care [6]   Diagnosis: Hypertensive urgency [103593]   Admitting Physician: STEVEN SIMENTAL [[id number]]   Certification: I Certify That Inpatient Hospital Services Are Medically Necessary For Greater Than 2 Midnights                 No follow-up provider specified.       Medication List        Changed      carvedilol 3.125 MG tablet  Commonly known as: COREG  Take 1 tablet by mouth 2 (Two) Times a Day With Meals.  What changed: how much to take                 Noelle Hurt, DO  07/16/23 1500       Noelle Hurt,   07/16/23 1850

## 2023-07-16 NOTE — H&P
[facility]  HISTORY AND PHYSICAL    Date of Admission: 7/16/2023  Primary Care Physician: VERA Noble MD    Subjective   Primary Historian: Patient    Chief Complaint: Hypertensive urgency/UTI    History of Present Illness  33-year-old presented through the ER with complaining of hypertensive crisis.  Patient is from a nursing facility failed management of blood pressure with clonidine and olmesartan.  Patient presented here with blood pressure of 184/59.  Patient received hydralazine, labetalol in ER without any budging blood pressure.  Cardene had drip was started in ER.    Laboratory shows 4+ bacteria in the urine.  Patient received Rocephin in ER.    Patient has a past medical history of acute kidney injury, anxiety, arthritis, CAD, colon polyp, degenerative disc disease, deep vein thrombosis, depression, diverticulosis, esophageal stricture, reflux, history of transfusion, hyperlipidemia, hypertension, reflux, macular degeneration, multinodular goiter, arthritis, pruritus disorder, spastic colon, thyroid disease, type 2 diabetes.    Review of Systems   Constitutional:  Positive for activity change, appetite change and fatigue. Negative for chills and fever.   HENT:  Negative for hearing loss, nosebleeds, tinnitus and trouble swallowing.    Eyes:  Negative for visual disturbance.   Respiratory:  Negative for cough, chest tightness, shortness of breath and wheezing.    Cardiovascular:  Negative for chest pain, palpitations and leg swelling.   Gastrointestinal:  Negative for abdominal distention, abdominal pain, blood in stool, constipation, diarrhea, nausea and vomiting.   Endocrine: Negative for cold intolerance, heat intolerance, polydipsia, polyphagia and polyuria.   Genitourinary:  Positive for dysuria. Negative for decreased urine volume, difficulty urinating, flank pain, frequency and hematuria.   Musculoskeletal:  Positive for arthralgias, gait  problem and myalgias. Negative for joint swelling.   Skin:  Negative for rash.   Allergic/Immunologic: Negative for immunocompromised state.   Neurological:  Negative for dizziness, syncope, weakness, light-headedness and headaches.   Hematological:  Negative for adenopathy. Does not bruise/bleed easily.   Psychiatric/Behavioral:  Negative for confusion and sleep disturbance. The patient is not nervous/anxious.     Otherwise complete ROS reviewed and negative except as mentioned in the HPI.    Past Medical History:   Past Medical History:   Diagnosis Date    THERESA (acute kidney injury) (HCC) due to sepsis 11/2/2022    Anxiety     Arthritis     CAD (coronary artery disease)     Colon polyp     DDD (degenerative disc disease), lumbar     Deep venous thrombosis     Depression     Diverticulosis     Esophageal stricture     GERD (gastroesophageal reflux disease)     History of transfusion     Hypercholesteremia     Hypertension     LPRD (laryngopharyngeal reflux disease)     Macular degeneration     Multinodular goiter 2/23/2017    Osteoarthritis     Pruritic disorder     Spastic colon     Thyroid nodule     Type 2 MI (myocardial infarction) (HCC) due to THERESA and UTI 11/2/2022     Past Surgical History:  Past Surgical History:   Procedure Laterality Date    BLADDER REPAIR      CATARACT EXTRACTION      CHOLECYSTECTOMY      COLONOSCOPY  04/22/2014    2 polyps, adenomatous, diverticulosis    COLONOSCOPY N/A 6/26/2020    Procedure: COLONOSCOPY WITH ANESTHESIA;  Surgeon: John Coleman MD;  Location:  [facility];  Service: Gastroenterology;  Laterality: N/A;  pre op: constipation  post op: diverticulosis,   PCP: Adam Delgadillo MD    CYSTOCELE REPAIR      ENDOSCOPY  04/22/2014    Schatzki's ring dilated    ENDOSCOPY N/A 6/26/2020    Procedure: ESOPHAGOGASTRODUODENOSCOPY WITH ANESTHESIA;  Surgeon: John Coleman MD;  Location:  [facility];  Service: Gastroenterology;  Laterality: N/A;  pre op:  dysphagia  post op:stricture, dilated   PCP:Adam Delgadillo MD    ENDOSCOPY N/A 9/2/2021    Procedure: ESOPHAGOGASTRODUODENOSCOPY WITH ANESTHESIA;  Surgeon: John Coleman MD;  Location: [facility];  Service: Gastroenterology;  Laterality: N/A;  pre op: dysphagia  post op:gastritis  PCP: VERA Noble MD    FEMUR FRACTURE SURGERY      HYSTERECTOMY      INCISION AND DRAINAGE LEG Left 11/3/2021    Procedure: INCISION AND DRAINAGE LEG ABSCESS;  Surgeon: Cesia Mondragon MD;  Location: [facility] OR;  Service: General;  Laterality: Left;    REPLACEMENT TOTAL KNEE      ULNAR NERVE TRANSPOSITION       Social History:  reports that she has never smoked. She has never used smokeless tobacco. She reports that she does not drink alcohol and does not use drugs.    Family History: family history includes Colon cancer in her mother; Heart disease in her father and mother.       Allergies:  Allergies   Allergen Reactions    Levaquin [Levofloxacin] Unknown (See Comments)     Pt doesn't remember    Codeine Other (See Comments)     Pt does not recall    Morphine And Related Other (See Comments)     Pt does not recall       Medications:  Prior to Admission medications    Medication Sig Start Date End Date Taking? Authorizing Provider   ascorbic acid (VITAMIN C) 500 MG tablet Take 1 tablet by mouth Daily.   Yes Ying Salazar MD   atorvastatin (LIPITOR) 10 MG tablet Take 1 tablet by mouth Daily.   Yes Ying Salazar MD   Calcium Carbonate-Vit D-Min (Caltrate 600+D Plus Minerals) 600-800 MG-UNIT tablet Take 1 tablet by mouth 2 (Two) Times a Day.   Yes Ying Salazar MD   carvedilol (COREG) 3.125 MG tablet Take 1 tablet by mouth 2 (Two) Times a Day With Meals.  Patient taking differently: Take 4 tablets by mouth 2 (Two) Times a Day With Meals. 11/7/22  Yes Reji Gonzalez DO   Cholecalciferol (VITAMIN D3) 125 MCG (5000 UT) capsule capsule Take 1 capsule by mouth Daily.   Yes Martin  MD Ying   cloNIDine (CATAPRES) 0.1 MG tablet Take 1 tablet by mouth 2 (Two) Times a Day As Needed for High Blood Pressure (systolic greater than 180).   Yes Ying Salazar MD   Cyanocobalamin (Vitamin B-12) 1000 MCG sublingual tablet Place 1 tablet under the tongue Daily. 2500mcg 11/7/22  Yes Reji Gonzalez DO   cycloSPORINE (RESTASIS) 0.05 % ophthalmic emulsion Administer 1 drop to both eyes Every 12 (Twelve) Hours.   Yes Ying Salazar MD   esomeprazole (nexIUM) 20 MG capsule Take 1 capsule by mouth 2 (Two) Times a Day. 5/25/22  Yes VERA Noble MD   FEROSUL 325 (65 Fe) MG tablet Take 1 tablet by mouth Daily With Breakfast. 11/6/19  Yes Ying Salazar MD   furosemide (LASIX) 20 MG tablet Take 1 tablet by mouth Daily.   Yes Ying Salazar MD   loperamide (IMODIUM) 2 MG capsule Take 1 capsule by mouth 4 (Four) Times a Day As Needed for Diarrhea.   Yes Ying Salazar MD   melatonin 5 MG tablet tablet Take 2 tablets by mouth.   Yes Ying Salazar MD   polyethyl glycol-propyl glycol (SYSTANE) 0.4-0.3 % solution ophthalmic solution Administer 2 drops to both eyes Every 2 (Two) Hours As Needed (dry eyes).   Yes Ying Salazar MD   allopurinol (ZYLOPRIM) 100 MG tablet Take 3 tablets by mouth Daily. For gout    Ying Salazar MD   aspirin 81 MG chewable tablet Chew 1 tablet Daily.    Ying Salazar MD   HYDROcodone-acetaminophen (NORCO) 7.5-325 MG per tablet Take 1 tablet by mouth Every 8 (Eight) Hours As Needed for Moderate Pain.    Ying Salazar MD   hydrocortisone-bacitracin-zinc oxide-nystatin (MAGIC BARRIER) Apply 1 application topically to the appropriate area as directed As Needed (after stools). 11/7/22   Reji Gonzalez DO   meloxicam (MOBIC) 15 MG tablet Take 1 tablet by mouth Daily.    Ying Salazar MD   polyethylene glycol (MIRALAX) 17 g packet Take 17 g by mouth Daily As Needed.    Ying Salazar MD  "  potassium chloride (MICRO-K) 10 MEQ CR capsule Take 1 capsule by mouth 2 (Two) Times a Day. Extended release    Ying Salazar MD   Prolia 60 MG/ML solution prefilled syringe syringe Inject 1 mL under the skin into the appropriate area as directed 1 (One) Time. Twice a year 3/21/22   Ying Salazar MD   sertraline (ZOLOFT) 100 MG tablet Take 1 tablet by mouth Daily.    Ying Salazar MD   simethicone (MYLICON) 125 MG chewable tablet Chew 125 mg Every 6 (Six) Hours As Needed for Flatulence.    Ying Salazar MD     I have utilized all available immediate resources to obtain, update, or review the patient's current medications (including all prescriptions, over-the-counter products, herbals, cannabis/cannabidiol products, and vitamin/mineral/dietary (nutritional) supplements).    Objective     Vital Signs: BP (!) 184/50   Pulse 53   Temp 99.1 °F (37.3 °C) (Oral)   Resp 18   Ht 165.1 cm (65\")   Wt 78.2 kg (172 lb 4.8 oz)   SpO2 100%   BMI 28.67 kg/m²   Physical Exam  Vitals and nursing note reviewed.   Constitutional:       General: She is not in acute distress.     Appearance: She is not toxic-appearing.      Comments: Advanced age.  Chronically ill.   HENT:      Head: Normocephalic.      Mouth/Throat:      Mouth: Mucous membranes are moist.      Pharynx: Oropharynx is clear.   Eyes:      Extraocular Movements: Extraocular movements intact.      Conjunctiva/sclera: Conjunctivae normal.      Pupils: Pupils are equal, round, and reactive to light.   Cardiovascular:      Rate and Rhythm: Normal rate and regular rhythm.      Pulses: Normal pulses.      Heart sounds: No murmur heard.  Pulmonary:      Effort: No respiratory distress.      Breath sounds: No wheezing or rhonchi.      Comments: Diminished breath of bilateral, clear, on room air.  Abdominal:      General: Bowel sounds are normal. There is no distension.      Palpations: Abdomen is soft.      Tenderness: There is no abdominal " tenderness.   Musculoskeletal:         General: No swelling or tenderness.      Cervical back: Normal range of motion and neck supple. No muscular tenderness.   Skin:     General: Skin is warm and dry.      Capillary Refill: Capillary refill takes 2 to 3 seconds.      Findings: No erythema or rash.   Neurological:      General: No focal deficit present.      Mental Status: She is alert and oriented to person, place, and time.      Cranial Nerves: No cranial nerve deficit.      Motor: Weakness present.      Coordination: Coordination abnormal.      Gait: Gait abnormal.   Psychiatric:         Mood and Affect: Mood normal.         Behavior: Behavior normal.            Results Reviewed:  Lab Results (last 24 hours)       Procedure Component Value Units Date/Time    High Sensitivity Troponin T 2Hr [[id number]]  (Abnormal) Collected: 07/16/23 1733    Specimen: Blood Updated: 07/16/23 1816     HS Troponin T 28 ng/L      Troponin T Delta -1 ng/L     Narrative:      High Sensitive Troponin T Reference Range:  <10.0 ng/L- Negative Female for AMI  <15.0 ng/L- Negative Male for AMI  >=10 - Abnormal Female indicating possible myocardial injury.  >=15 - Abnormal Male indicating possible myocardial injury.   Clinicians would have to utilize clinical acumen, EKG, Troponin, and serial changes to determine if it is an Acute Myocardial Infarction or myocardial injury due to an underlying chronic condition.         Urinalysis, Microscopic Only - Urine, Clean Catch [[id number]]  (Abnormal) Collected: 07/16/23 1528    Specimen: Urine, Clean Catch Updated: 07/16/23 1554     RBC, UA 0-2 /HPF      WBC, UA Too Numerous to Count /HPF      Bacteria, UA 4+ /HPF      Squamous Epithelial Cells, UA 3-6 /HPF      Methodology Manual Light Microscopy    Urine Culture - Urine, Urine, Clean Catch [[id number]] Collected: 07/16/23 1528    Specimen: Urine, Clean Catch Updated: 07/16/23 1554    Urinalysis With Culture If Indicated - Urine, Clean Catch  [[id number]]  (Abnormal) Collected: 07/16/23 1528    Specimen: Urine, Clean Catch Updated: 07/16/23 1551     Color, UA Yellow     Appearance, UA Cloudy     pH, UA 5.5     Specific Gravity, UA 1.019     Glucose, UA Negative     Ketones, UA Trace     Bilirubin, UA Negative     Blood, UA Negative     Protein,  mg/dL (2+)     Leuk Esterase, UA Moderate (2+)     Nitrite, UA Negative     Urobilinogen, UA 1.0 E.U./dL    Narrative:      In absence of clinical symptoms, the presence of pyuria, bacteria, and/or nitrites on the urinalysis result does not correlate with infection.    Comprehensive Metabolic Panel [[id number]]  (Abnormal) Collected: 07/16/23 1513    Specimen: Blood Updated: 07/16/23 1540     Glucose 129 mg/dL      BUN 19 mg/dL      Creatinine 0.64 mg/dL      Sodium 138 mmol/L      Potassium 4.4 mmol/L      Comment: Slight hemolysis detected by analyzer. Results may be affected.        Chloride 101 mmol/L      CO2 25.0 mmol/L      Calcium 9.1 mg/dL      Total Protein 6.2 g/dL      Albumin 3.9 g/dL      ALT (SGPT) 16 U/L      AST (SGOT) 20 U/L      Alkaline Phosphatase 78 U/L      Total Bilirubin 0.3 mg/dL      Globulin 2.3 gm/dL      A/G Ratio 1.7 g/dL      BUN/Creatinine Ratio 29.7     Anion Gap 12.0 mmol/L      eGFR 88.9 mL/min/1.73     Narrative:      GFR Normal >60  Chronic Kidney Disease <60  Kidney Failure <15    The GFR formula is only valid for adults with stable renal function between ages 18 and 70.    High Sensitivity Troponin T [[id number]]  (Abnormal) Collected: 07/16/23 1513    Specimen: Blood Updated: 07/16/23 1538     HS Troponin T 29 ng/L     Narrative:      High Sensitive Troponin T Reference Range:  <10.0 ng/L- Negative Female for AMI  <15.0 ng/L- Negative Male for AMI  >=10 - Abnormal Female indicating possible myocardial injury.  >=15 - Abnormal Male indicating possible myocardial injury.   Clinicians would have to utilize clinical acumen, EKG, Troponin, and serial changes to determine if  it is an Acute Myocardial Infarction or myocardial injury due to an underlying chronic condition.         CBC & Differential [[id number]]  (Abnormal) Collected: 07/16/23 1513    Specimen: Blood Updated: 07/16/23 1521    Narrative:      The following orders were created for panel order CBC & Differential.  Procedure                               Abnormality         Status                     ---------                               -----------         ------                     CBC Auto Differential[[id number]]        Abnormal            Final result                 Please view results for these tests on the individual orders.    CBC Auto Differential [[id number]]  (Abnormal) Collected: 07/16/23 1513    Specimen: Blood Updated: 07/16/23 1521     WBC 7.25 10*3/mm3      RBC 3.53 10*6/mm3      Hemoglobin 11.1 g/dL      Hematocrit 36.0 %      .0 fL      MCH 31.4 pg      MCHC 30.8 g/dL      RDW 13.9 %      RDW-SD 52.6 fl      MPV 10.9 fL      Platelets 179 10*3/mm3      Neutrophil % 64.9 %      Lymphocyte % 20.1 %      Monocyte % 9.8 %      Eosinophil % 4.1 %      Basophil % 0.7 %      Immature Grans % 0.4 %      Neutrophils, Absolute 4.70 10*3/mm3      Lymphocytes, Absolute 1.46 10*3/mm3      Monocytes, Absolute 0.71 10*3/mm3      Eosinophils, Absolute 0.30 10*3/mm3      Basophils, Absolute 0.05 10*3/mm3      Immature Grans, Absolute 0.03 10*3/mm3      nRBC 0.0 /100 WBC           Imaging Results (Last 24 Hours)       Procedure Component Value Units Date/Time    CT Abdomen Pelvis With Contrast [[id number]] Collected: 07/16/23 1715     Updated: 07/16/23 1721    Narrative:      EXAMINATION: CT ABDOMEN PELVIS W CONTRAST-      7/16/2023 4:58 PM CDT     HISTORY: Upper abdominal pain.     In order to have a CT radiation dose as low as reasonably achievable  Automated Exposure Control was utilized for adjustment of the mA and/or  KV according to patient size.     DLP in mGycm= 478.     Abdomen/pelvis CT with IV contrast  injection.  Axial, sagittal, and coronal sequences.     Comparison is made with 11/02/2022.     Normal heart size.  Clear lung bases.  No diaphragm abnormality is seen.     Cholecystectomy clips are present.  Normal liver, pancreas, and spleen.     Normal adrenal glands and kidneys.  No hydronephrosis or pyelonephritis.     Aortic calcification with no aneurysm.     No pelvic mass or fluid.  Sigmoid diverticula.  No diverticulitis or colitis.     Prominent degenerative disc and endplate change throughout the lumbar  spine.     Summary:  1. No acute abnormality.                                   This report was finalized on 07/16/2023 17:18 by Dr. Yaakov Childress MD.    XR Chest 1 View [[id number]] Collected: 07/16/23 1501     Updated: 07/16/23 1504    Narrative:      EXAMINATION:  XR CHEST 1 VW-  7/16/2023 2:51 PM CDT     HISTORY: Hypertension.     COMPARISON: 11/01/2022.     TECHNIQUE: Single view AP image.     FINDINGS:  There is stable bronchial wall thickening. There is no dense  infiltrate. The heart is normal in size. The thoracic aorta is  atheromatous. There are degenerative changes of the spine and shoulders.          Impression:      1. No acute appearing infiltrate.  2. Stable bronchial wall thickening.        This report was finalized on 07/16/2023 15:01 by Dr. José Miguel Mccain MD.          I have personally reviewed and interpreted the radiology studies and ECG obtained at time of admission.     Assessment / Plan   Assessment:   Active Hospital Problems    Diagnosis     **Hypertensive urgency     Chronic venous stasis     Essential hypertension     Age-related osteoporosis without current pathological fracture     Esophageal dysphagia     Gastroesophageal reflux disease     Failure to thrive in adult     Loose total knee arthroplasty     Atherosclerosis of native artery of both lower extremities with intermittent claudication     Rotator cuff tear arthropathy     Multinodular goiter     Closed traumatic  dislocation of joint of shoulder region     Shoulder dislocation        Treatment Plan  The patient will be admitted to my service here at [facility].     Hypertensive urgency/hyperlipidemia.  Continue Cardene drip.  Labetalol as needed.  Hydralazine as needed.  Coreg.  Lipitor.  DC clonidine.  Start Norvasc.  Hold Lasix.  Chest x-ray-No acute appearing infiltrate, Stable bronchial wall thickening.    UTI.  Continue Rocephin.  IV hydration.    Gout.  Allopurinol.    Reflux.  Protonix.  Zofran as needed.  Simethicone as needed.  CT scan abdomen-No acute abnormality.     Anemia.  Hold iron sulfate due to GI side effects.    Chronic pain.  Norco as needed.  Hold Mobic due to hypertension    Insomnia/depression/anxiety.  Melatonin at night.  Zoloft.    Advanced age.  83 years old.    SCD.    Nutrition.  Vitamin D.    Deconditioning.  PT and OT consult.    DNR.  DNI.  Patient is from [facility] nursing home.    Medical Decision Making  Number and Complexity of problems: Hypertensive urgency/hyperlipidemia/UTI/reflux/advanced age  Differential Diagnosis: None.    Conditions and Status        Condition is unchanged.     [facility] Data  External documents reviewed: Previous notes.  Cardiac tracing (EKG, telemetry) interpretation: Sinus .  Radiology interpretation: X-ray/CT scan  Labs reviewed: Laboratory  Any tests that were considered but not ordered: Lab in a.m.     Decision rules/scores evaluated (example [id number], Wells, etc): None     Discussed with: Patient     Care Planning  Shared decision making: Patient  Code status and discussions: DNR.  DNI    Disposition  Social Determinants of Health that impact treatment or disposition: From nursing home  Estimated length of stay is 2 to 5 days.     I confirmed that the patient's advanced care plan is present, code status is documented, and a surrogate decision maker is listed in the patient's medical record.     The patient's surrogate decision maker is friend,  Alexis, works at [employer], phone #[phone].     The patient was seen and examined by me on 7/16/2023 at 8:45 PM.    Electronically signed by Rodriguez Schneider MD, 07/16/23, 18:57 CDT.

## 2023-07-17 LAB
ALBUMIN SERPL-MCNC: 3.9 G/DL (ref 3.5–5.2)
ALBUMIN/GLOB SERPL: 1.6 G/DL
ALP SERPL-CCNC: 80 U/L (ref 39–117)
ALT SERPL W P-5'-P-CCNC: 14 U/L (ref 1–33)
ANION GAP SERPL CALCULATED.3IONS-SCNC: 12 MMOL/L (ref 5–15)
AST SERPL-CCNC: 23 U/L (ref 1–32)
BASOPHILS # BLD AUTO: 0.05 10*3/MM3 (ref 0–0.2)
BASOPHILS NFR BLD AUTO: 0.8 % (ref 0–1.5)
BILIRUB SERPL-MCNC: 0.4 MG/DL (ref 0–1.2)
BUN SERPL-MCNC: 13 MG/DL (ref 8–23)
BUN/CREAT SERPL: 29.5 (ref 7–25)
CALCIUM SPEC-SCNC: 9.3 MG/DL (ref 8.6–10.5)
CHLORIDE SERPL-SCNC: 101 MMOL/L (ref 98–107)
CHOLEST SERPL-MCNC: 154 MG/DL (ref 0–200)
CO2 SERPL-SCNC: 23 MMOL/L (ref 22–29)
CREAT SERPL-MCNC: 0.44 MG/DL (ref 0.57–1)
DEPRECATED RDW RBC AUTO: 52.3 FL (ref 37–54)
EGFRCR SERPLBLD CKD-EPI 2021: 97.3 ML/MIN/1.73
EOSINOPHIL # BLD AUTO: 0.27 10*3/MM3 (ref 0–0.4)
EOSINOPHIL NFR BLD AUTO: 4.3 % (ref 0.3–6.2)
ERYTHROCYTE [DISTWIDTH] IN BLOOD BY AUTOMATED COUNT: 13.9 % (ref 12.3–15.4)
GLOBULIN UR ELPH-MCNC: 2.4 GM/DL
GLUCOSE SERPL-MCNC: 202 MG/DL (ref 65–99)
HBA1C MFR BLD: 5.3 % (ref 4.8–5.6)
HCT VFR BLD AUTO: 35.5 % (ref 34–46.6)
HDLC SERPL-MCNC: 51 MG/DL (ref 40–60)
HGB BLD-MCNC: 10.9 G/DL (ref 12–15.9)
IMM GRANULOCYTES # BLD AUTO: 0.03 10*3/MM3 (ref 0–0.05)
IMM GRANULOCYTES NFR BLD AUTO: 0.5 % (ref 0–0.5)
LDLC SERPL CALC-MCNC: 82 MG/DL (ref 0–100)
LDLC/HDLC SERPL: 1.57 {RATIO}
LYMPHOCYTES # BLD AUTO: 1.1 10*3/MM3 (ref 0.7–3.1)
LYMPHOCYTES NFR BLD AUTO: 17.7 % (ref 19.6–45.3)
MCH RBC QN AUTO: 31.4 PG (ref 26.6–33)
MCHC RBC AUTO-ENTMCNC: 30.7 G/DL (ref 31.5–35.7)
MCV RBC AUTO: 102.3 FL (ref 79–97)
MONOCYTES # BLD AUTO: 0.57 10*3/MM3 (ref 0.1–0.9)
MONOCYTES NFR BLD AUTO: 9.2 % (ref 5–12)
NEUTROPHILS NFR BLD AUTO: 4.19 10*3/MM3 (ref 1.7–7)
NEUTROPHILS NFR BLD AUTO: 67.5 % (ref 42.7–76)
NRBC BLD AUTO-RTO: 0 /100 WBC (ref 0–0.2)
PLATELET # BLD AUTO: 179 10*3/MM3 (ref 140–450)
PMV BLD AUTO: 11.6 FL (ref 6–12)
POTASSIUM SERPL-SCNC: 4.1 MMOL/L (ref 3.5–5.2)
PROT SERPL-MCNC: 6.3 G/DL (ref 6–8.5)
RBC # BLD AUTO: 3.47 10*6/MM3 (ref 3.77–5.28)
SODIUM SERPL-SCNC: 136 MMOL/L (ref 136–145)
TRIGL SERPL-MCNC: 115 MG/DL (ref 0–150)
TSH SERPL DL<=0.05 MIU/L-ACNC: 0.97 UIU/ML (ref 0.27–4.2)
VLDLC SERPL-MCNC: 21 MG/DL (ref 5–40)
WBC NRBC COR # BLD: 6.21 10*3/MM3 (ref 3.4–10.8)

## 2023-07-17 PROCEDURE — 84443 ASSAY THYROID STIM HORMONE: CPT | Performed by: FAMILY MEDICINE

## 2023-07-17 PROCEDURE — 25010000002 CEFTRIAXONE PER 250 MG: Performed by: FAMILY MEDICINE

## 2023-07-17 PROCEDURE — 97165 OT EVAL LOW COMPLEX 30 MIN: CPT

## 2023-07-17 PROCEDURE — G0378 HOSPITAL OBSERVATION PER HR: HCPCS

## 2023-07-17 PROCEDURE — 96366 THER/PROPH/DIAG IV INF ADDON: CPT

## 2023-07-17 PROCEDURE — 96376 TX/PRO/DX INJ SAME DRUG ADON: CPT

## 2023-07-17 PROCEDURE — 80061 LIPID PANEL: CPT | Performed by: FAMILY MEDICINE

## 2023-07-17 PROCEDURE — 97161 PT EVAL LOW COMPLEX 20 MIN: CPT | Performed by: PHYSICAL THERAPIST

## 2023-07-17 PROCEDURE — 97535 SELF CARE MNGMENT TRAINING: CPT

## 2023-07-17 PROCEDURE — 85025 COMPLETE CBC W/AUTO DIFF WBC: CPT | Performed by: FAMILY MEDICINE

## 2023-07-17 PROCEDURE — 80053 COMPREHEN METABOLIC PANEL: CPT | Performed by: FAMILY MEDICINE

## 2023-07-17 RX ORDER — PHENOL 1.4 %
1 AEROSOL, SPRAY (ML) MUCOUS MEMBRANE NIGHTLY
COMMUNITY

## 2023-07-17 RX ORDER — POTASSIUM CHLORIDE 750 MG/1
10 CAPSULE, EXTENDED RELEASE ORAL DAILY
COMMUNITY

## 2023-07-17 RX ORDER — POLYETHYLENE GLYCOL 3350 17 G/17G
17 POWDER, FOR SOLUTION ORAL DAILY
COMMUNITY

## 2023-07-17 RX ORDER — CARVEDILOL 12.5 MG/1
12.5 TABLET ORAL 2 TIMES DAILY WITH MEALS
COMMUNITY

## 2023-07-17 RX ORDER — WATER / MINERAL OIL / WHITE PETROLATUM 16 OZ
1 CREAM TOPICAL AS NEEDED
COMMUNITY

## 2023-07-17 RX ORDER — LOPERAMIDE HYDROCHLORIDE 2 MG/1
2 CAPSULE ORAL EVERY 8 HOURS PRN
COMMUNITY

## 2023-07-17 RX ORDER — ALLOPURINOL 300 MG/1
300 TABLET ORAL DAILY
COMMUNITY

## 2023-07-17 RX ORDER — MENTHOL 40 MG/ML
1 GEL TOPICAL EVERY 8 HOURS PRN
COMMUNITY

## 2023-07-17 RX ORDER — ACETAMINOPHEN 325 MG/1
650 TABLET ORAL 2 TIMES DAILY
COMMUNITY
End: 2023-07-20 | Stop reason: HOSPADM

## 2023-07-17 RX ORDER — LISINOPRIL 10 MG/1
10 TABLET ORAL
Status: DISCONTINUED | OUTPATIENT
Start: 2023-07-17 | End: 2023-07-18

## 2023-07-17 RX ORDER — LIDOCAINE 50 MG/G
1 PATCH TOPICAL EVERY 24 HOURS
COMMUNITY

## 2023-07-17 RX ORDER — MAGNESIUM HYDROXIDE/ALUMINUM HYDROXICE/SIMETHICONE 120; 1200; 1200 MG/30ML; MG/30ML; MG/30ML
15 SUSPENSION ORAL 2 TIMES DAILY PRN
COMMUNITY

## 2023-07-17 RX ORDER — LANOLIN ALCOHOL/MO/W.PET/CERES
1000 CREAM (GRAM) TOPICAL DAILY
COMMUNITY

## 2023-07-17 RX ORDER — DOCUSATE SODIUM 100 MG/1
100 CAPSULE, LIQUID FILLED ORAL 2 TIMES DAILY
COMMUNITY

## 2023-07-17 RX ORDER — AMLODIPINE BESYLATE 10 MG/1
10 TABLET ORAL
Status: DISCONTINUED | OUTPATIENT
Start: 2023-07-17 | End: 2023-07-20 | Stop reason: HOSPADM

## 2023-07-17 RX ORDER — LIDOCAINE 50 MG/G
2 PATCH TOPICAL EVERY 24 HOURS
Status: ON HOLD | COMMUNITY
End: 2023-07-17 | Stop reason: DRUGHIGH

## 2023-07-17 RX ORDER — ACETAMINOPHEN 325 MG/1
650 TABLET ORAL EVERY 6 HOURS PRN
COMMUNITY

## 2023-07-17 RX ORDER — ERGOCALCIFEROL 1.25 MG/1
50000 CAPSULE ORAL
COMMUNITY

## 2023-07-17 RX ADMIN — Medication 5 MG: at 00:18

## 2023-07-17 RX ADMIN — Medication 5000 UNITS: at 08:52

## 2023-07-17 RX ADMIN — SERTRALINE HYDROCHLORIDE 100 MG: 100 TABLET, FILM COATED ORAL at 08:52

## 2023-07-17 RX ADMIN — CEFTRIAXONE 1000 MG: 1 INJECTION, POWDER, FOR SOLUTION INTRAMUSCULAR; INTRAVENOUS at 17:01

## 2023-07-17 RX ADMIN — CARVEDILOL 3.12 MG: 3.12 TABLET, FILM COATED ORAL at 17:01

## 2023-07-17 RX ADMIN — LISINOPRIL 10 MG: 10 TABLET ORAL at 15:18

## 2023-07-17 RX ADMIN — AMLODIPINE BESYLATE 10 MG: 10 TABLET ORAL at 08:52

## 2023-07-17 RX ADMIN — CYCLOSPORINE 1 DROP: 0.5 EMULSION OPHTHALMIC at 00:18

## 2023-07-17 RX ADMIN — ALLOPURINOL 300 MG: 300 TABLET ORAL at 08:52

## 2023-07-17 RX ADMIN — CYCLOSPORINE 1 DROP: 0.5 EMULSION OPHTHALMIC at 08:52

## 2023-07-17 RX ADMIN — PANTOPRAZOLE SODIUM 40 MG: 40 TABLET, DELAYED RELEASE ORAL at 06:39

## 2023-07-17 RX ADMIN — CYCLOSPORINE 1 DROP: 0.5 EMULSION OPHTHALMIC at 20:44

## 2023-07-17 RX ADMIN — LABETALOL HYDROCHLORIDE 10 MG: 5 INJECTION INTRAVENOUS at 12:40

## 2023-07-17 RX ADMIN — Medication 5 MG: at 20:43

## 2023-07-17 RX ADMIN — HYDROCODONE BITARTRATE AND ACETAMINOPHEN 1 TABLET: 7.5; 325 TABLET ORAL at 20:43

## 2023-07-17 RX ADMIN — SODIUM CHLORIDE, POTASSIUM CHLORIDE, SODIUM LACTATE AND CALCIUM CHLORIDE 75 ML/HR: 600; 310; 30; 20 INJECTION, SOLUTION INTRAVENOUS at 00:18

## 2023-07-17 RX ADMIN — Medication 10 ML: at 20:44

## 2023-07-17 RX ADMIN — LABETALOL HYDROCHLORIDE 10 MG: 5 INJECTION INTRAVENOUS at 20:43

## 2023-07-17 RX ADMIN — HYDROCODONE BITARTRATE AND ACETAMINOPHEN 1 TABLET: 7.5; 325 TABLET ORAL at 12:22

## 2023-07-17 RX ADMIN — CARVEDILOL 3.12 MG: 3.12 TABLET, FILM COATED ORAL at 00:18

## 2023-07-17 RX ADMIN — ATORVASTATIN CALCIUM 10 MG: 10 TABLET, FILM COATED ORAL at 08:52

## 2023-07-17 RX ADMIN — CARVEDILOL 3.12 MG: 3.12 TABLET, FILM COATED ORAL at 08:52

## 2023-07-17 NOTE — PROGRESS NOTES
[facility] Medicine Services  INPATIENT PROGRESS NOTE    Patient Name: Carmen Obrien  Date of Admission: 7/16/2023  Today's Date: 07/17/23  Length of Stay: 1  Primary Care Physician: VERA Noble MD    Subjective   Chief Complaint: My blood pressure has been high  HPI   Patient examined sitting up in bed on room air, no acute distress.  She denies chest pain or shortness of breath.  She is concerned that her blood pressure is running high.  She tells me this is happened for quite some time at the nursing facility.  I explained we have changed her medications and increased her dose of Norvasc this morning.  Also explained we would continue to follow her urine culture as it appears she has grown ESBL in the past.  Patient is agreeable to her plan of care.    Review of Systems   All pertinent negatives and positives are as above. All other systems have been reviewed and are negative unless otherwise stated.     Objective    Temp:  [97.4 °F (36.3 °C)-99.1 °F (37.3 °C)] 98.6 °F (37 °C)  Heart Rate:  [46-61] 57  Resp:  [18] 18  BP: (135-184)/(47-97) 180/58  Physical Exam  Vitals and nursing note reviewed.   Constitutional:       General: She is not in acute distress.     Appearance: She is not toxic-appearing.      Comments: Advanced age.  Chronically ill.   HENT:      Head: Normocephalic.      Mouth/Throat:      Mouth: Mucous membranes are moist.      Pharynx: Oropharynx is clear.   Eyes:      Extraocular Movements: Extraocular movements intact.      Conjunctiva/sclera: Conjunctivae normal.      Pupils: Pupils are equal, round, and reactive to light.   Cardiovascular:      Rate and Rhythm: Normal rate and regular rhythm.      Pulses: Normal pulses.      Heart sounds: No murmur heard.  Pulmonary:      Effort: No respiratory distress.      Breath sounds: No wheezing or rhonchi.      Comments: Clear, equal bilaterally, room air  Abdominal:      General: Bowel sounds are normal.  There is no distension.      Palpations: Abdomen is soft.      Tenderness: There is no abdominal tenderness.   Musculoskeletal:         General: No swelling or tenderness.      Cervical back: Normal range of motion and neck supple. No muscular tenderness.   Skin:     General: Skin is warm and dry.      Capillary Refill: Capillary refill takes 2 to 3 seconds.      Findings: No erythema or rash.   Neurological:      General: No focal deficit present.      Mental Status: She is alert and oriented to person, place, and time.      Cranial Nerves: No cranial nerve deficit.      Motor: Weakness present.      Coordination: Coordination abnormal.      Gait: Gait abnormal.   Psychiatric:         Mood and Affect: Slightly anxious.     Behavior: Behavior normal.       Results Review:  I have reviewed the labs, radiology results, and diagnostic studies.    Laboratory Data:   Results from last 7 days   Lab Units 07/17/23  0632 07/16/23  1513   WBC 10*3/mm3 6.21 7.25   HEMOGLOBIN g/dL 10.9* 11.1*   HEMATOCRIT % 35.5 36.0   PLATELETS 10*3/mm3 179 179        Results from last 7 days   Lab Units 07/16/23  1513   SODIUM mmol/L 138   POTASSIUM mmol/L 4.4   CHLORIDE mmol/L 101   CO2 mmol/L 25.0   BUN mg/dL 19   CREATININE mg/dL 0.64   CALCIUM mg/dL 9.1   BILIRUBIN mg/dL 0.3   ALK PHOS U/L 78   ALT (SGPT) U/L 16   AST (SGOT) U/L 20   GLUCOSE mg/dL 129*       Radiology Data:   Imaging Results (Last 24 Hours)       Procedure Component Value Units Date/Time    CT Abdomen Pelvis With Contrast [[id number]] Collected: 07/16/23 1715     Updated: 07/16/23 1721    Narrative:      EXAMINATION: CT ABDOMEN PELVIS W CONTRAST-      7/16/2023 4:58 PM CDT     HISTORY: Upper abdominal pain.     In order to have a CT radiation dose as low as reasonably achievable  Automated Exposure Control was utilized for adjustment of the mA and/or  KV according to patient size.     DLP in mGycm= 478.     Abdomen/pelvis CT with IV contrast injection.  Axial, sagittal,  and coronal sequences.     Comparison is made with 11/02/2022.     Normal heart size.  Clear lung bases.  No diaphragm abnormality is seen.     Cholecystectomy clips are present.  Normal liver, pancreas, and spleen.     Normal adrenal glands and kidneys.  No hydronephrosis or pyelonephritis.     Aortic calcification with no aneurysm.     No pelvic mass or fluid.  Sigmoid diverticula.  No diverticulitis or colitis.     Prominent degenerative disc and endplate change throughout the lumbar  spine.     Summary:  1. No acute abnormality.                                   This report was finalized on 07/16/2023 17:18 by Dr. Yaakov Childress MD.    XR Chest 1 View [[id number]] Collected: 07/16/23 1501     Updated: 07/16/23 1504    Narrative:      EXAMINATION:  XR CHEST 1 VW-  7/16/2023 2:51 PM CDT     HISTORY: Hypertension.     COMPARISON: 11/01/2022.     TECHNIQUE: Single view AP image.     FINDINGS:  There is stable bronchial wall thickening. There is no dense  infiltrate. The heart is normal in size. The thoracic aorta is  atheromatous. There are degenerative changes of the spine and shoulders.          Impression:      1. No acute appearing infiltrate.  2. Stable bronchial wall thickening.        This report was finalized on 07/16/2023 15:01 by Dr. José Miguel Mccain MD.            I have reviewed the patient's current medications.     Assessment/Plan   Assessment  Active Hospital Problems    Diagnosis     **Hypertensive urgency     Chronic venous stasis     Essential hypertension     Age-related osteoporosis without current pathological fracture     Esophageal dysphagia     Gastroesophageal reflux disease     Failure to thrive in adult     Loose total knee arthroplasty     Atherosclerosis of native artery of both lower extremities with intermittent claudication     Rotator cuff tear arthropathy     Multinodular goiter     Closed traumatic dislocation of joint of shoulder region     Shoulder dislocation        Treatment  Plan  Upon arrival:  Patient presented to [facility] emergency department on 7/16/2023 with complaints of high blood pressure.  Blood pressure was found to be 194/59 upon arrival.  She was given hydralazine and labetalol in the emergency department and initiated on a Cardene drip.  Her blood pressure normalized thereafter and Cardene drip was discontinued.  Urinalysis concerning for bacteria, patient reports urinary frequency without dysuria.  Rocephin was initiated.  Chest x-ray, CT abdomen negative for acute process.  Decision for further inpatient monitoring and management.    Thereafter:  Hypertensive urgency-  PTA clonidine discontinued.  Cardene drip discontinued.  As needed labetalol and hydralazine.  Norvasc initiated 7/16, dose increased to 10 mg 7/17.  Continue Coreg.  Initiate lisinopril 10 mg.    TSH in a.m.    UTI-  Patient reported urinary frequency.  Urinalysis shows 4+ bacteria.  Urine culture pending.  Patient has a history of ESBL.  However, clinically improving on IV Rocephin at this time.  WBC normalized.  Continue to follow urine culture.    GERD-  Continue Protonix.  Zofran and simethicone as needed.    Iron deficiency anemia-  Hemoglobin 10.9, hold daily iron for now.    Chronic pain-  Norco as needed.  Hold Mobic due to hypertension.    PT/OT for deconditioning.  Will likely need to continue when transition back to [facility] nursing facility.    Patient is DNR/DNI.      Medical Decision Making  Number and Complexity of problems: 1 acute problem of moderate complexity in hypertensive urgency.  1 acute problem of moderate complexity and UTI with history of ESBL.  Differential Diagnosis: None    Conditions and Status        Status improving     [facility] Data  External documents reviewed: Prior PCP encounters  Cardiac tracing (EKG, telemetry) interpretation: EKG reviewed  Radiology interpretation: Chest x-ray, CT abdomen reviewed per radiologist interpretation  Labs reviewed: CBC, BMP, urinalysis,  urine culture  Any tests that were considered but not ordered: None     Decision rules/scores evaluated (example [id number], [place], etc): None     Discussed with: Patient and Dr. Schneider     Care Planning  Shared decision making: Plan of care discussed with patient and Dr. Schneider.  Patient is agreeable to her plan of care.  Code status and discussions: DNR/DNI    Disposition  Social Determinants of Health that impact treatment or disposition: None  I expect the patient to be discharged to [facility] with timeline depending on urine culture results and need for IV versus oral antibiotics.    Electronically signed by ABDON Jimenez, 07/17/23, 09:26 CDT.

## 2023-07-17 NOTE — PLAN OF CARE
Goal Outcome Evaluation:  Plan of Care Reviewed With: patient        Progress: no change  Outcome Evaluation: PT eval completed. Pt alet and oriented x4 with c/p neck pain. Pt reports independence with household mobility with RW at baseline, but uses w/c for further distances. Today, pt presents sitting in chair with BP reading 187/53. Cleared by nsg to mobilize in room. Pt with decreased strength in BLE, greater deficits in LLE which is chronic. Pt also c/o pain with pressure on B lower legs. Pt performs sit to stand t/f and gait training in room with CGA and RW, demos dec gait speed and forward flexed posture. Pt will benefit from skilled PT to improve fxl mob and endurance. Recommend d/c SNF.      Anticipated Discharge Disposition (PT): skilled nursing facility
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
## 2023-07-17 NOTE — PLAN OF CARE
Goal Outcome Evaluation:  Plan of Care Reviewed With: patient        Progress: no change  Outcome Evaluation: OT eval completed. Pt seated in chair upon therapist arrival; A&Ox4; 6/10 pain reported in neck. Pt reports performing toileting tasks and UB BADLs with Mod I; Min A with LB dressing/bathing; Mod I with fxl mobility utilizing rwx or w/c at PLOF. Today, Pt donned B socks while seated with SBA. Pt performed sit<>stand utilizing rwx with CGA. Pt ambulated short distance in room utilizing rwx with CGA. Pt additionally demonstrates some BUE weakness. BP monitored during evaluation; seated prior to fxl ambulation 187/63, seated after fxl ambulation 160/59; nursing notified. Skilled OT intervention indicated in order to address deficits in fxl mobility, fxl activity tolerance, balance, strength, and use of adaptive techniques/equipment during performance of BADLs. Recommend SNF at discharge.      Anticipated Discharge Disposition (OT): skilled nursing facility

## 2023-07-17 NOTE — PLAN OF CARE
Goal Outcome Evaluation: Medicated for complaints of shoulder pain this shift. Good relief noted. Patient has been hypertensive this shift. Patient was given Iv Labetolol that was ordered prn.  Patient safety to be maintained this shift, continue to monitor and report abnormal to provider.

## 2023-07-17 NOTE — PLAN OF CARE
Goal Outcome Evaluation:           Progress: improving  Outcome Evaluation: PT HAS UTI , AND HYPERTENTION  PT FROM NH  IV FLUIDS AS ORDERED , PURWICK IN PLACE , PT CAN BE UP ASSIST ONE AND WALKER , POSS D/C BACK NH TODAY  CONT TO MONITOR.

## 2023-07-17 NOTE — CASE MANAGEMENT/SOCIAL WORK
Discharge Planning Assessment  [facility]     Patient Name: Carmen Obrien  MRN: [id number]  Today's Date: 7/17/2023    Admit Date: 7/16/2023        Discharge Needs Assessment       Row Name 07/17/23 1108       Living Environment    People in Home facility resident    Able to Return to Prior Arrangements yes       Transition Planning    Patient/Family Anticipated Services at Transition skilled nursing       Discharge Needs Assessment    Discharge Coordination/Progress Patient is a resident of [facility]. Has a bed hold verified with [facility].                   Discharge Plan    No documentation.                 Continued Care and Services - Admitted Since 7/16/2023    Coordination has not been started for this encounter.          Demographic Summary    No documentation.                  Functional Status    No documentation.                  Psychosocial    No documentation.                  Abuse/Neglect    No documentation.                  Legal    No documentation.                  Substance Abuse    No documentation.                  Patient Forms    No documentation.                     Merlina A Fletcher, RN

## 2023-07-17 NOTE — CASE MANAGEMENT/SOCIAL WORK
Continued Stay Note   [facility]     Patient Name: Carmen Obrien  MRN: [id number]  Today's Date: 7/17/2023    Admit Date: 7/16/2023    Plan: [facility]   Discharge Plan       Row Name 07/17/23 1436       Plan    Plan [facility]    Plan Comments Notified Saskia in admissions at [facility] that patient may need IV abx at discharge. Will follow to assist.                 BRAEDEN Mcconnell

## 2023-07-18 PROCEDURE — 97116 GAIT TRAINING THERAPY: CPT

## 2023-07-18 PROCEDURE — 25010000002 HYDRALAZINE PER 20 MG: Performed by: INTERNAL MEDICINE

## 2023-07-18 PROCEDURE — 96376 TX/PRO/DX INJ SAME DRUG ADON: CPT

## 2023-07-18 PROCEDURE — 25010000002 CEFTRIAXONE PER 250 MG: Performed by: FAMILY MEDICINE

## 2023-07-18 PROCEDURE — G0378 HOSPITAL OBSERVATION PER HR: HCPCS

## 2023-07-18 PROCEDURE — 97530 THERAPEUTIC ACTIVITIES: CPT | Performed by: OCCUPATIONAL THERAPIST

## 2023-07-18 PROCEDURE — 97110 THERAPEUTIC EXERCISES: CPT

## 2023-07-18 RX ORDER — LISINOPRIL 20 MG/1
20 TABLET ORAL
Status: DISCONTINUED | OUTPATIENT
Start: 2023-07-18 | End: 2023-07-20 | Stop reason: HOSPADM

## 2023-07-18 RX ORDER — FUROSEMIDE 20 MG/1
20 TABLET ORAL DAILY
Status: DISCONTINUED | OUTPATIENT
Start: 2023-07-18 | End: 2023-07-20 | Stop reason: HOSPADM

## 2023-07-18 RX ORDER — CLONIDINE HYDROCHLORIDE 0.1 MG/1
0.1 TABLET ORAL ONCE
Status: COMPLETED | OUTPATIENT
Start: 2023-07-18 | End: 2023-07-18

## 2023-07-18 RX ORDER — HYDRALAZINE HYDROCHLORIDE 20 MG/ML
10 INJECTION INTRAMUSCULAR; INTRAVENOUS ONCE
Status: COMPLETED | OUTPATIENT
Start: 2023-07-18 | End: 2023-07-18

## 2023-07-18 RX ORDER — ALUMINA, MAGNESIA, AND SIMETHICONE 2400; 2400; 240 MG/30ML; MG/30ML; MG/30ML
15 SUSPENSION ORAL EVERY 6 HOURS PRN
Status: DISCONTINUED | OUTPATIENT
Start: 2023-07-18 | End: 2023-07-20 | Stop reason: HOSPADM

## 2023-07-18 RX ADMIN — HYDRALAZINE HYDROCHLORIDE 10 MG: 20 INJECTION INTRAMUSCULAR; INTRAVENOUS at 00:16

## 2023-07-18 RX ADMIN — ALLOPURINOL 300 MG: 300 TABLET ORAL at 08:26

## 2023-07-18 RX ADMIN — Medication 5000 UNITS: at 08:26

## 2023-07-18 RX ADMIN — Medication 10 ML: at 21:54

## 2023-07-18 RX ADMIN — CLONIDINE HYDROCHLORIDE 0.1 MG: 0.1 TABLET ORAL at 03:07

## 2023-07-18 RX ADMIN — PANTOPRAZOLE SODIUM 40 MG: 40 TABLET, DELAYED RELEASE ORAL at 08:27

## 2023-07-18 RX ADMIN — Medication 10 ML: at 08:27

## 2023-07-18 RX ADMIN — HYDROCODONE BITARTRATE AND ACETAMINOPHEN 1 TABLET: 7.5; 325 TABLET ORAL at 17:27

## 2023-07-18 RX ADMIN — SODIUM CHLORIDE, POTASSIUM CHLORIDE, SODIUM LACTATE AND CALCIUM CHLORIDE 75 ML/HR: 600; 310; 30; 20 INJECTION, SOLUTION INTRAVENOUS at 04:29

## 2023-07-18 RX ADMIN — HYDROCODONE BITARTRATE AND ACETAMINOPHEN 1 TABLET: 7.5; 325 TABLET ORAL at 09:26

## 2023-07-18 RX ADMIN — CEFTRIAXONE 1000 MG: 1 INJECTION, POWDER, FOR SOLUTION INTRAMUSCULAR; INTRAVENOUS at 15:03

## 2023-07-18 RX ADMIN — CARVEDILOL 3.12 MG: 3.12 TABLET, FILM COATED ORAL at 08:26

## 2023-07-18 RX ADMIN — CYCLOSPORINE 1 DROP: 0.5 EMULSION OPHTHALMIC at 21:54

## 2023-07-18 RX ADMIN — ATORVASTATIN CALCIUM 10 MG: 10 TABLET, FILM COATED ORAL at 08:27

## 2023-07-18 RX ADMIN — ALUMINUM HYDROXIDE, MAGNESIUM HYDROXIDE, AND DIMETHICONE 15 ML: 400; 400; 40 SUSPENSION ORAL at 15:03

## 2023-07-18 RX ADMIN — CYCLOSPORINE 1 DROP: 0.5 EMULSION OPHTHALMIC at 08:27

## 2023-07-18 RX ADMIN — SIMETHICONE 120 MG: 80 TABLET, CHEWABLE ORAL at 04:04

## 2023-07-18 RX ADMIN — AMLODIPINE BESYLATE 10 MG: 10 TABLET ORAL at 08:26

## 2023-07-18 RX ADMIN — FUROSEMIDE 20 MG: 20 TABLET ORAL at 11:14

## 2023-07-18 RX ADMIN — CARVEDILOL 3.12 MG: 3.12 TABLET, FILM COATED ORAL at 17:27

## 2023-07-18 RX ADMIN — SERTRALINE HYDROCHLORIDE 100 MG: 100 TABLET, FILM COATED ORAL at 08:26

## 2023-07-18 RX ADMIN — Medication 5 MG: at 22:54

## 2023-07-18 RX ADMIN — LISINOPRIL 20 MG: 20 TABLET ORAL at 08:26
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
## 2023-07-18 NOTE — PLAN OF CARE
Goal Outcome Evaluation:  Plan of Care Reviewed With: patient        Progress: improving  Outcome Evaluation: PT treatment complete. Bedmobility SBA with bedrail and HOB elevated. CGA for sit/stand and to ambulate 50' x 3 with Rwx, vc's for posture. PT will cont to follow for mobility and strength.

## 2023-07-18 NOTE — PLAN OF CARE
Goal Outcome Evaluation:              Outcome Evaluation: Pt c/o shoulder and neck pain, relieved with PRN norco. Bp elevated, up to 187/59. PRN labetalol given with little effect. IV hydralazine given with no effect. 0.1mg clonidine given with some effectiveness. Vitals otherwise stable. SpO2 94-97 on room air. Sinus/manny 53-65.

## 2023-07-18 NOTE — PROGRESS NOTES
[facility] Medicine Services  INPATIENT PROGRESS NOTE    Patient Name: Carmen Obrien  Date of Admission: 7/16/2023  Today's Date: 07/18/23  Length of Stay: 1  Primary Care Physician: VERA Noble MD    Subjective   Chief Complaint: Some indigestion  HPI   Patient examined sitting up in bed on room air, no acute distress, no visitors at bedside.  She denies chest pain or shortness of breath.  She is pleased with her blood pressure trend.  She notes that this is much improved.  I explained that we have initiated and titrated antihypertensives and will likely continue to do so.  I explained I have made all the changes in these I will plan to do today and we will see how her blood pressure trends over the past 24 hours.  I explained we would avoid clonidine as this has caused rebound hypertension for her in the past.  Otherwise, awaiting on her urine culture and susceptibility to finalize.  Antibiotic therapy will be directed based upon this.  Patient is agreeable to her plan of care.    Review of Systems   All pertinent negatives and positives are as above. All other systems have been reviewed and are negative unless otherwise stated.     Objective    Temp:  [98.1 °F (36.7 °C)-99 °F (37.2 °C)] 98.4 °F (36.9 °C)  Heart Rate:  [58-67] 63  Resp:  [18] 18  BP: (138-187)/(43-59) 138/56  Physical Exam  Vitals and nursing note reviewed.   Constitutional:       General: She is not in acute distress.     Appearance: She is not toxic-appearing.      Comments: Somewhat chronically ill, up in bed, no visitors at bedside.  HENT:      Head: Normocephalic.      Mouth/Throat:      Mouth: Mucous membranes are moist.      Pharynx: Oropharynx is clear.   Eyes:      Extraocular Movements: Extraocular movements intact.      Conjunctiva/sclera: Conjunctivae normal.      Pupils: Pupils are equal, round, and reactive to light.   Cardiovascular:      Rate and Rhythm: Normal rate and regular rhythm.       Pulses: Normal pulses.      Heart sounds: No murmur heard.  Pulmonary:      Effort: No respiratory distress.      Breath sounds: No wheezing or rhonchi.      Comments: Clear, equal bilaterally, room air  Abdominal:      General: Bowel sounds are normal. There is no distension.      Palpations: Abdomen is soft.      Tenderness: There is no abdominal tenderness.   Musculoskeletal:         General: No swelling or tenderness.      Cervical back: Normal range of motion and neck supple. No muscular tenderness.   Skin:     General: Skin is warm and dry.      Capillary Refill: Capillary refill takes 2 to 3 seconds.      Findings: No erythema or rash.   Neurological:      General: No focal deficit present.      Mental Status: She is alert and oriented to person, place, and time.      Cranial Nerves: No cranial nerve deficit.      Motor: Weakness present.      Coordination: Coordination abnormal.      Gait: Gait abnormal.   Psychiatric:         Mood and Affect: Slightly anxious.     Behavior: Behavior normal.       Results Review:  I have reviewed the labs, radiology results, and diagnostic studies.    Laboratory Data:   Results from last 7 days   Lab Units 07/17/23  0632 07/16/23  1513   WBC 10*3/mm3 6.21 7.25   HEMOGLOBIN g/dL 10.9* 11.1*   HEMATOCRIT % 35.5 36.0   PLATELETS 10*3/mm3 179 179          Results from last 7 days   Lab Units 07/17/23  0925 07/16/23  1513   SODIUM mmol/L 136 138   POTASSIUM mmol/L 4.1 4.4   CHLORIDE mmol/L 101 101   CO2 mmol/L 23.0 25.0   BUN mg/dL 13 19   CREATININE mg/dL 0.44* 0.64   CALCIUM mg/dL 9.3 9.1   BILIRUBIN mg/dL 0.4 0.3   ALK PHOS U/L 80 78   ALT (SGPT) U/L 14 16   AST (SGOT) U/L 23 20   GLUCOSE mg/dL 202* 129*       I have reviewed the patient's current medications.     Assessment/Plan   Assessment  Active Hospital Problems    Diagnosis     **Hypertensive urgency     Chronic venous stasis     Essential hypertension     Age-related osteoporosis without current pathological  fracture     Esophageal dysphagia     Gastroesophageal reflux disease     Failure to thrive in adult     Loose total knee arthroplasty     Atherosclerosis of native artery of both lower extremities with intermittent claudication     Rotator cuff tear arthropathy     Multinodular goiter     Closed traumatic dislocation of joint of shoulder region     Shoulder dislocation        Treatment Plan  Upon arrival:  Patient presented to [facility] emergency department on 7/16/2023 with complaints of high blood pressure.  Blood pressure was found to be 194/59 upon arrival.  She was given hydralazine and labetalol in the emergency department and initiated on a Cardene drip.  Her blood pressure normalized thereafter and Cardene drip was discontinued.  Urinalysis concerning for bacteria, patient reports urinary frequency without dysuria.  Rocephin was initiated.  Chest x-ray, CT abdomen negative for acute process.  Decision for further inpatient monitoring and management.    Thereafter:  Hypertensive urgency-  Originally required Cardene drip, later discontinued.  PTA clonidine discontinued, avoid further use.  Continue as needed labetalol and hydralazine.  Continue Norvasc 10 mg, Coreg 3.125 mg.  Increase lisinopril to 20 mg.    TSH normal at 0.974  Lipid profile normal    UTI-  Patient reported urinary frequency.  Urinalysis shows 4+ bacteria.  Urine culture pending.  Patient has a history of ESBL.  However, clinically improving on IV Rocephin at this time.  WBC normalized.  Urine culture preliminary is showing E. coli, susceptibility pending.    GERD-  Continue Protonix.  Zofran and simethicone as needed.    Iron deficiency anemia-  Hemoglobin 10.9, hold daily iron for now.    Chronic pain-  Norco as needed.  Hold Mobic due to hypertension.    PT/OT for deconditioning.  Will likely need to continue when transition back to [facility] nursing facility.    Patient is DNR/DNI.      Medical Decision Making  Number and Complexity  of problems: 1 acute problem of moderate complexity in hypertensive urgency.  1 acute problem of moderate complexity and UTI with history of ESBL.  Differential Diagnosis: None    Conditions and Status        Status improving     MDM Data  External documents reviewed: Prior PCP encounters  Cardiac tracing (EKG, telemetry) interpretation: EKG reviewed  Radiology interpretation: Chest x-ray, CT abdomen reviewed per radiologist interpretation  Labs reviewed: CBC, BMP, urinalysis, urine culture  Any tests that were considered but not ordered: None     Decision rules/scores evaluated (example [id number], Wells, etc): None     Discussed with: Patient and Dr. Schneider     Care Planning  Shared decision making: Plan of care discussed with patient and Dr. Schneider.  Patient is agreeable to her plan of care.  Code status and discussions: DNR/DNI    Disposition  Social Determinants of Health that impact treatment or disposition: None  I expect the patient to be discharged to [facility] with timeline depending on urine culture results and need for IV versus oral antibiotics.    Electronically signed by ABDON Jimenez, 07/18/23, 10:23 CDT.

## 2023-07-18 NOTE — PLAN OF CARE
"  Problem: Adult Inpatient Plan of Care  Goal: Plan of Care Review  Recent Flowsheet Documentation  Taken 7/18/2023 1502 by Alexis Flannery, OTR/L  Progress: no change  Plan of Care Reviewed With: patient  Outcome Evaluation: OT tx completed. Pt reports pain in B shoulders and neck 10/10, RN notified. Pt was SBA for bed mobility. Pt was CGA for sit to stand t/f. Pt was CGA with RW for functional mobility. Pt requires verbal cues for all tasks. Pt was min A to [facility] gown over back. Pt was SBA with set up for \"shaving whiskers\" off of her face. Continue OT POC.     "

## 2023-07-18 NOTE — CASE MANAGEMENT/SOCIAL WORK
Continued Stay Note  [facility]     Patient Name: Carmen Obrien  MRN: [id number]  Today's Date: 7/18/2023    Admit Date: 7/16/2023    Plan: [facility]   Discharge Plan       Row Name 07/18/23 0904       Plan    Plan [facility]    Plan Comments Patient will need precert to return to [facility]. Decision on IV abx vs PO abx still pending. CM dept will work on precert with [facility] when patient is closer to dc.                 BRAEDEN Mcconnell
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
## 2023-07-19 LAB — BACTERIA SPEC AEROBE CULT: ABNORMAL

## 2023-07-19 PROCEDURE — 25010000002 ERTAPENEM PER 500 MG

## 2023-07-19 PROCEDURE — C1751 CATH, INF, PER/CENT/MIDLINE: HCPCS

## 2023-07-19 PROCEDURE — G0378 HOSPITAL OBSERVATION PER HR: HCPCS

## 2023-07-19 PROCEDURE — 97116 GAIT TRAINING THERAPY: CPT

## 2023-07-19 PROCEDURE — 36410 VNPNXR 3YR/> PHY/QHP DX/THER: CPT

## 2023-07-19 RX ORDER — SODIUM CHLORIDE 0.9 % (FLUSH) 0.9 %
10 SYRINGE (ML) INJECTION AS NEEDED
Status: DISCONTINUED | OUTPATIENT
Start: 2023-07-19 | End: 2023-07-20 | Stop reason: HOSPADM

## 2023-07-19 RX ORDER — SODIUM CHLORIDE 9 MG/ML
40 INJECTION, SOLUTION INTRAVENOUS AS NEEDED
Status: DISCONTINUED | OUTPATIENT
Start: 2023-07-19 | End: 2023-07-20 | Stop reason: HOSPADM

## 2023-07-19 RX ORDER — SODIUM CHLORIDE 0.9 % (FLUSH) 0.9 %
10 SYRINGE (ML) INJECTION EVERY 12 HOURS SCHEDULED
Status: DISCONTINUED | OUTPATIENT
Start: 2023-07-19 | End: 2023-07-20 | Stop reason: HOSPADM

## 2023-07-19 RX ORDER — LIDOCAINE 50 MG/G
2 PATCH TOPICAL
Status: DISCONTINUED | OUTPATIENT
Start: 2023-07-19 | End: 2023-07-20 | Stop reason: HOSPADM

## 2023-07-19 RX ADMIN — Medication 10 ML: at 22:26

## 2023-07-19 RX ADMIN — ATORVASTATIN CALCIUM 10 MG: 10 TABLET, FILM COATED ORAL at 08:09

## 2023-07-19 RX ADMIN — HYDROCODONE BITARTRATE AND ACETAMINOPHEN 1 TABLET: 7.5; 325 TABLET ORAL at 03:54

## 2023-07-19 RX ADMIN — LISINOPRIL 20 MG: 20 TABLET ORAL at 08:09

## 2023-07-19 RX ADMIN — Medication 5000 UNITS: at 08:08

## 2023-07-19 RX ADMIN — FUROSEMIDE 20 MG: 20 TABLET ORAL at 08:09

## 2023-07-19 RX ADMIN — Medication 5 MG: at 22:24

## 2023-07-19 RX ADMIN — CARVEDILOL 3.12 MG: 3.12 TABLET, FILM COATED ORAL at 08:09

## 2023-07-19 RX ADMIN — CYCLOSPORINE 1 DROP: 0.5 EMULSION OPHTHALMIC at 08:08

## 2023-07-19 RX ADMIN — ERTAPENEM SODIUM 1000 MG: 1 INJECTION, POWDER, LYOPHILIZED, FOR SOLUTION INTRAMUSCULAR; INTRAVENOUS at 11:28

## 2023-07-19 RX ADMIN — Medication 10 ML: at 08:09

## 2023-07-19 RX ADMIN — CYCLOSPORINE 1 DROP: 0.5 EMULSION OPHTHALMIC at 22:24

## 2023-07-19 RX ADMIN — ALLOPURINOL 300 MG: 300 TABLET ORAL at 08:09

## 2023-07-19 RX ADMIN — CARVEDILOL 3.12 MG: 3.12 TABLET, FILM COATED ORAL at 17:49

## 2023-07-19 RX ADMIN — LIDOCAINE 2 PATCH: 50 PATCH TOPICAL at 11:28

## 2023-07-19 RX ADMIN — AMLODIPINE BESYLATE 10 MG: 10 TABLET ORAL at 08:09

## 2023-07-19 RX ADMIN — HYDROCODONE BITARTRATE AND ACETAMINOPHEN 1 TABLET: 7.5; 325 TABLET ORAL at 11:29

## 2023-07-19 RX ADMIN — SERTRALINE HYDROCHLORIDE 100 MG: 100 TABLET, FILM COATED ORAL at 08:09

## 2023-07-19 RX ADMIN — DOCUSATE SODIUM 50 MG AND SENNOSIDES 8.6 MG 2 TABLET: 8.6; 5 TABLET, FILM COATED ORAL at 08:09

## 2023-07-19 RX ADMIN — PANTOPRAZOLE SODIUM 40 MG: 40 TABLET, DELAYED RELEASE ORAL at 07:23

## 2023-07-19 NOTE — CONSULTS
10 cm Midline catheter placed in pt right cephalic vein. Pt tolerated procedure well. Line flushes and draws well. Sterile dressing applied.
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
## 2023-07-19 NOTE — PROGRESS NOTES
[facility] Medicine Services  INPATIENT PROGRESS NOTE    Patient Name: Carmen Obrien  Date of Admission: 7/16/2023  Today's Date: 07/19/23  Length of Stay: 1  Primary Care Physician: VERA Noble MD    Subjective   Chief Complaint: Some indigestion  HPI   Patient examined sitting up in bed on room air, she is in no acute distress.  She denies chest pain or shortness of breath.  She denies abdominal pain.  She tells me she feels a little tired this morning but otherwise has no complaints.  I explained her urine culture susceptibility had finalized and was resistant.  I explained the need for further IV antibiotic therapy.  I discussed obtaining a midline IV catheter today for prolonged IV antibiotics upon discharge.  Patient is agreeable to this plan of care.    Review of Systems   All pertinent negatives and positives are as above. All other systems have been reviewed and are negative unless otherwise stated.     Objective    Temp:  [98 °F (36.7 °C)-98.5 °F (36.9 °C)] 98.5 °F (36.9 °C)  Heart Rate:  [53-58] 58  Resp:  [18] 18  BP: (118-179)/(48-61) 179/61  Physical Exam  Vitals and nursing note reviewed.   Constitutional:       General: She is not in acute distress.     Appearance: She is not toxic-appearing.      Comments: Somewhat chronically ill, up in bed, no visitors at bedside.  HENT:      Head: Normocephalic.      Mouth/Throat:      Mouth: Mucous membranes are moist.      Pharynx: Oropharynx is clear.   Eyes:      Extraocular Movements: Extraocular movements intact.      Conjunctiva/sclera: Conjunctivae normal.      Pupils: Pupils are equal, round, and reactive to light.   Cardiovascular:      Rate and Rhythm: Normal rate and regular rhythm.      Pulses: Normal pulses.      Heart sounds: No murmur heard.  Pulmonary:      Effort: No respiratory distress.      Breath sounds: No wheezing or rhonchi.      Comments: Clear, equal bilaterally, room air  Abdominal:       General: Bowel sounds are normal. There is no distension.      Palpations: Abdomen is soft.      Tenderness: There is no abdominal tenderness.   Musculoskeletal:         General: No swelling or tenderness.      Cervical back: Normal range of motion and neck supple. No muscular tenderness.   Skin:     General: Skin is warm and dry.      Capillary Refill: Capillary refill takes 2 to 3 seconds.      Findings: No erythema or rash.   Neurological:      General: No focal deficit present.      Mental Status: She is alert and oriented to person, place, and time.      Cranial Nerves: No cranial nerve deficit.      Motor: Weakness present.      Coordination: Coordination abnormal.      Gait: Gait abnormal.   Psychiatric:         Mood and Affect: Slightly anxious.     Behavior: Behavior normal.       Results Review:  I have reviewed the labs, radiology results, and diagnostic studies.    Laboratory Data:   Results from last 7 days   Lab Units 07/17/23  0632 07/16/23  1513   WBC 10*3/mm3 6.21 7.25   HEMOGLOBIN g/dL 10.9* 11.1*   HEMATOCRIT % 35.5 36.0   PLATELETS 10*3/mm3 179 179          Results from last 7 days   Lab Units 07/17/23  0925 07/16/23  1513   SODIUM mmol/L 136 138   POTASSIUM mmol/L 4.1 4.4   CHLORIDE mmol/L 101 101   CO2 mmol/L 23.0 25.0   BUN mg/dL 13 19   CREATININE mg/dL 0.44* 0.64   CALCIUM mg/dL 9.3 9.1   BILIRUBIN mg/dL 0.4 0.3   ALK PHOS U/L 80 78   ALT (SGPT) U/L 14 16   AST (SGOT) U/L 23 20   GLUCOSE mg/dL 202* 129*       I have reviewed the patient's current medications.     Assessment/Plan   Assessment  Active Hospital Problems    Diagnosis     **Hypertensive urgency     Chronic venous stasis     Essential hypertension     Age-related osteoporosis without current pathological fracture     Esophageal dysphagia     Gastroesophageal reflux disease     Failure to thrive in adult     Loose total knee arthroplasty     Atherosclerosis of native artery of both lower extremities with intermittent claudication      Rotator cuff tear arthropathy     Multinodular goiter     Closed traumatic dislocation of joint of shoulder region     Shoulder dislocation        Treatment Plan  Upon arrival:  Patient presented to [facility] emergency department on 7/16/2023 with complaints of high blood pressure.  Blood pressure was found to be 194/59 upon arrival.  She was given hydralazine and labetalol in the emergency department and initiated on a Cardene drip.  Her blood pressure normalized thereafter and Cardene drip was discontinued.  Urinalysis concerning for bacteria, patient reports urinary frequency without dysuria.  Rocephin was initiated.  Chest x-ray, CT abdomen negative for acute process.  Decision for further inpatient monitoring and management.    Thereafter:  Hypertensive urgency-  Originally required Cardene drip, later discontinued.  PTA clonidine discontinued, avoid further use.  Continue as needed labetalol and hydralazine.  Continue Norvasc 10 mg, Coreg 3.125 mg, lisinopril 20 mg.  Blood pressure trend much improved.    TSH normal at 0.974  Lipid profile normal    UTI-  Patient reported urinary frequency.  Urinalysis shows 4+ bacteria.   Urine culture susceptibility shows ESBL.  Discontinue IV ceftriaxone.  Initiate IV Invanz.  Midline IV ordered for continued IV antibiotic therapy upon discharge.    GERD-  Continue Protonix.  Zofran and simethicone as needed.    Iron deficiency anemia-  Hemoglobin 10.9, hold daily iron for now.    Chronic pain-  Norco as needed.  Hold Mobic due to hypertension.    PT/OT for deconditioning.  Will likely need to continue when transition back to [facility] nursing facility.    Patient is DNR/DNI.      Medical Decision Making  Number and Complexity of problems: 1 acute problem of moderate complexity in hypertensive urgency.  1 acute problem of moderate complexity and UTI with history of ESBL.  Differential Diagnosis: None    Conditions and Status        Status improving     MDM  Data  External documents reviewed: Prior PCP encounters  Cardiac tracing (EKG, telemetry) interpretation: EKG reviewed  Radiology interpretation: Chest x-ray, CT abdomen reviewed per radiologist interpretation  Labs reviewed: CBC, BMP, urinalysis, urine culture  Any tests that were considered but not ordered: None     Decision rules/scores evaluated (example [id number], [place], etc): None     Discussed with: Patient and Dr. Schneider     Care Planning  Shared decision making: Plan of care discussed with patient and Dr. Schneider.  Patient is agreeable to her plan of care.  Code status and discussions: DNR/DNI    Disposition  Social Determinants of Health that impact treatment or disposition: None  I expect the patient to be discharged to [facility] with IV antibiotics when accepted.    Electronically signed by ABDON Jimenez, 07/19/23, 10:53 CDT.

## 2023-07-19 NOTE — PAYOR COMM NOTE
"Mariposa Jenkins (83 y.o. Female)       Date of Birth   1940    Social Security Number       Address   [address] [place] [address]    Home Phone   [phone]    MRN   [id number]       [facility]    Marital Status                               Admission Date   7/16/23    Admission Type   Emergency    Admitting Provider   Rodriguez Schneider MD    Attending Provider   Rodriguez Schneider MD    Department, Room/Bed   [facility] 4B, 405/1       Discharge Date       Discharge Disposition       Discharge Destination                                 Attending Provider: Rodriguez Schneider MD    Allergies: Levaquin [Levofloxacin], Codeine, Morphine And Related    Isolation: Contact   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), 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), 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), C.difficile (11/05/22), ESBL Klebsiella (12/12/22)   Code Status: No CPR    Ht: 165.1 cm (65\")   Wt: 80 kg (176 lb 5 oz)    Admission Cmt: None   Principal Problem: Hypertensive urgency [I16.0]                   Active Insurance as of 7/16/2023       Primary Coverage       Payor Plan Insurance Group Employer/Plan Group    HUMANA MEDICARE REPLACEMENT HUMANA MEDICARE REPLACEMENT [id number]       Payor Plan Address Payor Plan Phone Number Payor Plan Fax Number Effective Dates    [address] [address] [phone]  1/1/2019 - None Entered    [facility] [address]         Subscriber Name Subscriber Birth Date Member ID       MARIPOSA JENKINS 1940 [id number]                     Emergency Contacts        (Rel.) Home Phone Work Phone Mobile Phone    Selene Jenkins (Relative) [phone] -- --    Brandee Anderson (Relative) [phone] -- --    Adam Jenkins (Son) [phone] -- [phone]    Can't remeber last namedonald (Friend) [phone] -- --                 History & Physical        Rodriguez Schneider MD at 07/16/23 1850              [facility] Medicine Services  HISTORY AND PHYSICAL    Date of " Admission: 7/16/2023  Primary Care Physician: VERA Noble MD    Subjective   Primary Historian: Patient    Chief Complaint: Hypertensive urgency/UTI    History of Present Illness  33-year-old presented through the ER with complaining of hypertensive crisis.  Patient is from a nursing facility failed management of blood pressure with clonidine and olmesartan.  Patient presented here with blood pressure of 184/59.  Patient received hydralazine, labetalol in ER without any budging blood pressure.  Cardene had drip was started in ER.    Laboratory shows 4+ bacteria in the urine.  Patient received Rocephin in ER.    Patient has a past medical history of acute kidney injury, anxiety, arthritis, CAD, colon polyp, degenerative disc disease, deep vein thrombosis, depression, diverticulosis, esophageal stricture, reflux, history of transfusion, hyperlipidemia, hypertension, reflux, macular degeneration, multinodular goiter, arthritis, pruritus disorder, spastic colon, thyroid disease, type 2 diabetes.    Review of Systems   Constitutional:  Positive for activity change, appetite change and fatigue. Negative for chills and fever.   HENT:  Negative for hearing loss, nosebleeds, tinnitus and trouble swallowing.    Eyes:  Negative for visual disturbance.   Respiratory:  Negative for cough, chest tightness, shortness of breath and wheezing.    Cardiovascular:  Negative for chest pain, palpitations and leg swelling.   Gastrointestinal:  Negative for abdominal distention, abdominal pain, blood in stool, constipation, diarrhea, nausea and vomiting.   Endocrine: Negative for cold intolerance, heat intolerance, polydipsia, polyphagia and polyuria.   Genitourinary:  Positive for dysuria. Negative for decreased urine volume, difficulty urinating, flank pain, frequency and hematuria.   Musculoskeletal:  Positive for arthralgias, gait problem and myalgias. Negative for joint swelling.   Skin:  Negative for rash.    Allergic/Immunologic: Negative for immunocompromised state.   Neurological:  Negative for dizziness, syncope, weakness, light-headedness and headaches.   Hematological:  Negative for adenopathy. Does not bruise/bleed easily.   Psychiatric/Behavioral:  Negative for confusion and sleep disturbance. The patient is not nervous/anxious.     Otherwise complete ROS reviewed and negative except as mentioned in the HPI.    Past Medical History:   Past Medical History:   Diagnosis Date    THERESA (acute kidney injury) (HCC) due to sepsis 11/2/2022    Anxiety     Arthritis     CAD (coronary artery disease)     Colon polyp     DDD (degenerative disc disease), lumbar     Deep venous thrombosis     Depression     Diverticulosis     Esophageal stricture     GERD (gastroesophageal reflux disease)     History of transfusion     Hypercholesteremia     Hypertension     LPRD (laryngopharyngeal reflux disease)     Macular degeneration     Multinodular goiter 2/23/2017    Osteoarthritis     Pruritic disorder     Spastic colon     Thyroid nodule     Type 2 MI (myocardial infarction) (HCC) due to THERESA and UTI 11/2/2022       Social History:  reports that she has never smoked. She has never used smokeless tobacco. She reports that she does not drink alcohol and does not use drugs.    Family History: family history includes Colon cancer in her mother; Heart disease in her father and mother.       Allergies:  Allergies   Allergen Reactions    Levaquin [Levofloxacin] Unknown (See Comments)     Pt doesn't remember    Codeine Other (See Comments)     Pt does not recall    Morphine And Related Other (See Comments)     Pt does not recall       Medications:  Prior to Admission medications    Medication Sig Start Date End Date Taking? Authorizing Provider   ascorbic acid (VITAMIN C) 500 MG tablet Take 1 tablet by mouth Daily.   Yes Provider, MD Ying   atorvastatin (LIPITOR) 10 MG tablet Take 1 tablet by mouth Daily.   Yes Provider Historical,  MD   Calcium Carbonate-Vit D-Min (Caltrate 600+D Plus Minerals) 600-800 MG-UNIT tablet Take 1 tablet by mouth 2 (Two) Times a Day.   Yes Ying Salazar MD   carvedilol (COREG) 3.125 MG tablet Take 1 tablet by mouth 2 (Two) Times a Day With Meals.  Patient taking differently: Take 4 tablets by mouth 2 (Two) Times a Day With Meals. 11/7/22  Yes Reji Gonzalez DO   Cholecalciferol (VITAMIN D3) 125 MCG (5000 UT) capsule capsule Take 1 capsule by mouth Daily.   Yes Ying Salazar MD   cloNIDine (CATAPRES) 0.1 MG tablet Take 1 tablet by mouth 2 (Two) Times a Day As Needed for High Blood Pressure (systolic greater than 180).   Yes Ying Salazar MD   Cyanocobalamin (Vitamin B-12) 1000 MCG sublingual tablet Place 1 tablet under the tongue Daily. 2500mcg 11/7/22  Yes Reji Gonzalez DO   cycloSPORINE (RESTASIS) 0.05 % ophthalmic emulsion Administer 1 drop to both eyes Every 12 (Twelve) Hours.   Yes Ying Salazar MD   esomeprazole (nexIUM) 20 MG capsule Take 1 capsule by mouth 2 (Two) Times a Day. 5/25/22  Yes VERA Noble MD   FEROSUL 325 (65 Fe) MG tablet Take 1 tablet by mouth Daily With Breakfast. 11/6/19  Yes Ying Salazar MD   furosemide (LASIX) 20 MG tablet Take 1 tablet by mouth Daily.   Yes Ying Salazar MD   loperamide (IMODIUM) 2 MG capsule Take 1 capsule by mouth 4 (Four) Times a Day As Needed for Diarrhea.   Yes Ying Salazar MD   melatonin 5 MG tablet tablet Take 2 tablets by mouth.   Yes Ying Salazar MD   polyethyl glycol-propyl glycol (SYSTANE) 0.4-0.3 % solution ophthalmic solution Administer 2 drops to both eyes Every 2 (Two) Hours As Needed (dry eyes).   Yes Ying Salazar MD   allopurinol (ZYLOPRIM) 100 MG tablet Take 3 tablets by mouth Daily. For gout    Ying Salazar MD   aspirin 81 MG chewable tablet Chew 1 tablet Daily.    Ying Salazar MD   HYDROcodone-acetaminophen (NORCO) 7.5-325 MG per tablet Take  "1 tablet by mouth Every 8 (Eight) Hours As Needed for Moderate Pain.    Ying Salazar MD   hydrocortisone-bacitracin-zinc oxide-nystatin (MAGIC BARRIER) Apply 1 application topically to the appropriate area as directed As Needed (after stools). 11/7/22   Reji Gonzalez DO   meloxicam (MOBIC) 15 MG tablet Take 1 tablet by mouth Daily.    Ying Salazar MD   polyethylene glycol (MIRALAX) 17 g packet Take 17 g by mouth Daily As Needed.    Ying Salazar MD   potassium chloride (MICRO-K) 10 MEQ CR capsule Take 1 capsule by mouth 2 (Two) Times a Day. Extended release    Ying Salazar MD   Prolia 60 MG/ML solution prefilled syringe syringe Inject 1 mL under the skin into the appropriate area as directed 1 (One) Time. Twice a year 3/21/22   Ying Salazar MD   sertraline (ZOLOFT) 100 MG tablet Take 1 tablet by mouth Daily.    Ying Salazar MD   simethicone (MYLICON) 125 MG chewable tablet Chew 125 mg Every 6 (Six) Hours As Needed for Flatulence.    Ying Salazar MD     I have utilized all available immediate resources to obtain, update, or review the patient's current medications (including all prescriptions, over-the-counter products, herbals, cannabis/cannabidiol products, and vitamin/mineral/dietary (nutritional) supplements).    Objective     Vital Signs: BP (!) 184/50   Pulse 53   Temp 99.1 °F (37.3 °C) (Oral)   Resp 18   Ht 165.1 cm (65\")   Wt 78.2 kg (172 lb 4.8 oz)   SpO2 100%   BMI 28.67 kg/m²   Physical Exam  Vitals and nursing note reviewed.   Constitutional:       General: She is not in acute distress.     Appearance: She is not toxic-appearing.      Comments: Advanced age.  Chronically ill.   HENT:      Head: Normocephalic.      Mouth/Throat:      Mouth: Mucous membranes are moist.      Pharynx: Oropharynx is clear.   Eyes:      Extraocular Movements: Extraocular movements intact.      Conjunctiva/sclera: Conjunctivae normal.      Pupils: Pupils " are equal, round, and reactive to light.   Cardiovascular:      Rate and Rhythm: Normal rate and regular rhythm.      Pulses: Normal pulses.      Heart sounds: No murmur heard.  Pulmonary:      Effort: No respiratory distress.      Breath sounds: No wheezing or rhonchi.      Comments: Diminished breath of bilateral, clear, on room air.  Abdominal:      General: Bowel sounds are normal. There is no distension.      Palpations: Abdomen is soft.      Tenderness: There is no abdominal tenderness.   Musculoskeletal:         General: No swelling or tenderness.      Cervical back: Normal range of motion and neck supple. No muscular tenderness.   Skin:     General: Skin is warm and dry.      Capillary Refill: Capillary refill takes 2 to 3 seconds.      Findings: No erythema or rash.   Neurological:      General: No focal deficit present.      Mental Status: She is alert and oriented to person, place, and time.      Cranial Nerves: No cranial nerve deficit.      Motor: Weakness present.      Coordination: Coordination abnormal.      Gait: Gait abnormal.   Psychiatric:         Mood and Affect: Mood normal.         Behavior: Behavior normal.            Results Reviewed:  Lab Results (last 24 hours)       Procedure Component Value Units Date/Time    High Sensitivity Troponin T 2Hr [[id number]]  (Abnormal) Collected: 07/16/23 1733    Specimen: Blood Updated: 07/16/23 1816     HS Troponin T 28 ng/L      Troponin T Delta -1 ng/L     Narrative:      High Sensitive Troponin T Reference Range:  <10.0 ng/L- Negative Female for AMI  <15.0 ng/L- Negative Male for AMI  >=10 - Abnormal Female indicating possible myocardial injury.  >=15 - Abnormal Male indicating possible myocardial injury.   Clinicians would have to utilize clinical acumen, EKG, Troponin, and serial changes to determine if it is an Acute Myocardial Infarction or myocardial injury due to an underlying chronic condition.         Urinalysis, Microscopic Only - Urine, Clean  Catch [[id number]]  (Abnormal) Collected: 07/16/23 1528    Specimen: Urine, Clean Catch Updated: 07/16/23 1554     RBC, UA 0-2 /HPF      WBC, UA Too Numerous to Count /HPF      Bacteria, UA 4+ /HPF      Squamous Epithelial Cells, UA 3-6 /HPF      Methodology Manual Light Microscopy    Urine Culture - Urine, Urine, Clean Catch [[id number]] Collected: 07/16/23 1528    Specimen: Urine, Clean Catch Updated: 07/16/23 1554    Urinalysis With Culture If Indicated - Urine, Clean Catch [[id number]]  (Abnormal) Collected: 07/16/23 1528    Specimen: Urine, Clean Catch Updated: 07/16/23 1551     Color, UA Yellow     Appearance, UA Cloudy     pH, UA 5.5     Specific Gravity, UA 1.019     Glucose, UA Negative     Ketones, UA Trace     Bilirubin, UA Negative     Blood, UA Negative     Protein,  mg/dL (2+)     Leuk Esterase, UA Moderate (2+)     Nitrite, UA Negative     Urobilinogen, UA 1.0 E.U./dL    Narrative:      In absence of clinical symptoms, the presence of pyuria, bacteria, and/or nitrites on the urinalysis result does not correlate with infection.    Comprehensive Metabolic Panel [[id number]]  (Abnormal) Collected: 07/16/23 1513    Specimen: Blood Updated: 07/16/23 1540     Glucose 129 mg/dL      BUN 19 mg/dL      Creatinine 0.64 mg/dL      Sodium 138 mmol/L      Potassium 4.4 mmol/L      Comment: Slight hemolysis detected by analyzer. Results may be affected.        Chloride 101 mmol/L      CO2 25.0 mmol/L      Calcium 9.1 mg/dL      Total Protein 6.2 g/dL      Albumin 3.9 g/dL      ALT (SGPT) 16 U/L      AST (SGOT) 20 U/L      Alkaline Phosphatase 78 U/L      Total Bilirubin 0.3 mg/dL      Globulin 2.3 gm/dL      A/G Ratio 1.7 g/dL      BUN/Creatinine Ratio 29.7     Anion Gap 12.0 mmol/L      eGFR 88.9 mL/min/1.73     Narrative:      GFR Normal >60  Chronic Kidney Disease <60  Kidney Failure <15    The GFR formula is only valid for adults with stable renal function between ages 18 and 70.    High Sensitivity Troponin  T [[id number]]  (Abnormal) Collected: 07/16/23 1513    Specimen: Blood Updated: 07/16/23 1538     HS Troponin T 29 ng/L     Narrative:      High Sensitive Troponin T Reference Range:  <10.0 ng/L- Negative Female for AMI  <15.0 ng/L- Negative Male for AMI  >=10 - Abnormal Female indicating possible myocardial injury.  >=15 - Abnormal Male indicating possible myocardial injury.   Clinicians would have to utilize clinical acumen, EKG, Troponin, and serial changes to determine if it is an Acute Myocardial Infarction or myocardial injury due to an underlying chronic condition.         CBC & Differential [[id number]]  (Abnormal) Collected: 07/16/23 1513    Specimen: Blood Updated: 07/16/23 1521    Narrative:      The following orders were created for panel order CBC & Differential.  Procedure                               Abnormality         Status                     ---------                               -----------         ------                     CBC Auto Differential[[id number]]        Abnormal            Final result                 Please view results for these tests on the individual orders.    CBC Auto Differential [[id number]]  (Abnormal) Collected: 07/16/23 1513    Specimen: Blood Updated: 07/16/23 1521     WBC 7.25 10*3/mm3      RBC 3.53 10*6/mm3      Hemoglobin 11.1 g/dL      Hematocrit 36.0 %      .0 fL      MCH 31.4 pg      MCHC 30.8 g/dL      RDW 13.9 %      RDW-SD 52.6 fl      MPV 10.9 fL      Platelets 179 10*3/mm3      Neutrophil % 64.9 %      Lymphocyte % 20.1 %      Monocyte % 9.8 %      Eosinophil % 4.1 %      Basophil % 0.7 %      Immature Grans % 0.4 %      Neutrophils, Absolute 4.70 10*3/mm3      Lymphocytes, Absolute 1.46 10*3/mm3      Monocytes, Absolute 0.71 10*3/mm3      Eosinophils, Absolute 0.30 10*3/mm3      Basophils, Absolute 0.05 10*3/mm3      Immature Grans, Absolute 0.03 10*3/mm3      nRBC 0.0 /100 WBC           Imaging Results (Last 24 Hours)           I have personally reviewed  and interpreted the radiology studies and ECG obtained at time of admission.     Assessment / Plan   Assessment:   Active Hospital Problems    Diagnosis     **Hypertensive urgency     Chronic venous stasis     Essential hypertension     Age-related osteoporosis without current pathological fracture     Esophageal dysphagia     Gastroesophageal reflux disease     Failure to thrive in adult     Loose total knee arthroplasty     Atherosclerosis of native artery of both lower extremities with intermittent claudication     Rotator cuff tear arthropathy     Multinodular goiter     Closed traumatic dislocation of joint of shoulder region     Shoulder dislocation        Treatment Plan  The patient will be admitted to my service here at [facility].     Hypertensive urgency/hyperlipidemia.  Continue Cardene drip.  Labetalol as needed.  Hydralazine as needed.  Coreg.  Lipitor.  DC clonidine.  Start Norvasc.  Hold Lasix.  Chest x-ray-No acute appearing infiltrate, Stable bronchial wall thickening.    UTI.  Continue Rocephin.  IV hydration.    Gout.  Allopurinol.    Reflux.  Protonix.  Zofran as needed.  Simethicone as needed.  CT scan abdomen-No acute abnormality.     Anemia.  Hold iron sulfate due to GI side effects.    Chronic pain.  Norco as needed.  Hold Mobic due to hypertension    Insomnia/depression/anxiety.  Melatonin at night.  Zoloft.    Advanced age.  83 years old.    SCD.    Nutrition.  Vitamin D.    Deconditioning.  PT and OT consult.    DNR.  DNI.  Patient is from [facility] care nursing home.    Medical Decision Making  Number and Complexity of problems: Hypertensive urgency/hyperlipidemia/UTI/reflux/advanced age  Differential Diagnosis: None.    Conditions and Status        Condition is unchanged.     [facility] Data  External documents reviewed: Previous notes.  Cardiac tracing (EKG, telemetry) interpretation: Sinus .  Radiology interpretation: X-ray/CT scan  Labs reviewed: Laboratory  Any tests that were  considered but not ordered: Lab in a.m.     Decision rules/scores evaluated (example [id number], Wells, etc): None     Discussed with: Patient     Care Planning  Shared decision making: Patient  Code status and discussions: DNR.  DNI    Disposition  Social Determinants of Health that impact treatment or disposition: From nursing home  Estimated length of stay is 2 to 5 days.     I confirmed that the patient's advanced care plan is present, code status is documented, and a surrogate decision maker is listed in the patient's medical record.     The patient's surrogate decision maker is friendAlexis, works at [employer], phone #[phone].     The patient was seen and examined by me on 7/16/2023 at 8:45 PM.    Electronically signed by Rodriguez Schneider MD, 07/16/23, 18:57 CDT.              Electronically signed by Rodriguez Schneider MD at 07/16/23 1917          Physician Progress Notes (last 24 hours)        Enmanuel Mcintosh APRN at 07/19/23 1053              [facility] Medicine Services  INPATIENT PROGRESS NOTE    Patient Name: Carmen Obrien  Date of Admission: 7/16/2023  Today's Date: 07/19/23  Length of Stay: 1  Primary Care Physician: VERA Noble MD    Subjective   Chief Complaint: Some indigestion  HPI   Patient examined sitting up in bed on room air, she is in no acute distress.  She denies chest pain or shortness of breath.  She denies abdominal pain.  She tells me she feels a little tired this morning but otherwise has no complaints.  I explained her urine culture susceptibility had finalized and was resistant.  I explained the need for further IV antibiotic therapy.  I discussed obtaining a midline IV catheter today for prolonged IV antibiotics upon discharge.  Patient is agreeable to this plan of care.    Review of Systems   All pertinent negatives and positives are as above. All other systems have been reviewed and are negative unless otherwise stated.     Objective     Temp:  [98 °F (36.7 °C)-98.5 °F (36.9 °C)] 98.5 °F (36.9 °C)  Heart Rate:  [53-58] 58  Resp:  [18] 18  BP: (118-179)/(48-61) 179/61  Physical Exam  Vitals and nursing note reviewed.   Constitutional:       General: She is not in acute distress.     Appearance: She is not toxic-appearing.      Comments: Somewhat chronically ill, up in bed, no visitors at bedside.  HENT:      Head: Normocephalic.      Mouth/Throat:      Mouth: Mucous membranes are moist.      Pharynx: Oropharynx is clear.   Eyes:      Extraocular Movements: Extraocular movements intact.      Conjunctiva/sclera: Conjunctivae normal.      Pupils: Pupils are equal, round, and reactive to light.   Cardiovascular:      Rate and Rhythm: Normal rate and regular rhythm.      Pulses: Normal pulses.      Heart sounds: No murmur heard.  Pulmonary:      Effort: No respiratory distress.      Breath sounds: No wheezing or rhonchi.      Comments: Clear, equal bilaterally, room air  Abdominal:      General: Bowel sounds are normal. There is no distension.      Palpations: Abdomen is soft.      Tenderness: There is no abdominal tenderness.   Musculoskeletal:         General: No swelling or tenderness.      Cervical back: Normal range of motion and neck supple. No muscular tenderness.   Skin:     General: Skin is warm and dry.      Capillary Refill: Capillary refill takes 2 to 3 seconds.      Findings: No erythema or rash.   Neurological:      General: No focal deficit present.      Mental Status: She is alert and oriented to person, place, and time.      Cranial Nerves: No cranial nerve deficit.      Motor: Weakness present.      Coordination: Coordination abnormal.      Gait: Gait abnormal.   Psychiatric:         Mood and Affect: Slightly anxious.     Behavior: Behavior normal.       Results Review:  I have reviewed the labs, radiology results, and diagnostic studies.    Laboratory Data:   Results from last 7 days   Lab Units 07/17/23  0632 07/16/23  1513   WBC  10*3/mm3 6.21 7.25   HEMOGLOBIN g/dL 10.9* 11.1*   HEMATOCRIT % 35.5 36.0   PLATELETS 10*3/mm3 179 179          Results from last 7 days   Lab Units 07/17/23  0925 07/16/23  1513   SODIUM mmol/L 136 138   POTASSIUM mmol/L 4.1 4.4   CHLORIDE mmol/L 101 101   CO2 mmol/L 23.0 25.0   BUN mg/dL 13 19   CREATININE mg/dL 0.44* 0.64   CALCIUM mg/dL 9.3 9.1   BILIRUBIN mg/dL 0.4 0.3   ALK PHOS U/L 80 78   ALT (SGPT) U/L 14 16   AST (SGOT) U/L 23 20   GLUCOSE mg/dL 202* 129*       I have reviewed the patient's current medications.     Assessment/Plan   Assessment  Active Hospital Problems    Diagnosis     **Hypertensive urgency     Chronic venous stasis     Essential hypertension     Age-related osteoporosis without current pathological fracture     Esophageal dysphagia     Gastroesophageal reflux disease     Failure to thrive in adult     Loose total knee arthroplasty     Atherosclerosis of native artery of both lower extremities with intermittent claudication     Rotator cuff tear arthropathy     Multinodular goiter     Closed traumatic dislocation of joint of shoulder region     Shoulder dislocation        Treatment Plan  Upon arrival:  Patient [facility] to [facility] emergency department on 7/16/2023 with complaints of high blood pressure.  Blood pressure was found to be 194/59 upon arrival.  She was given hydralazine and labetalol in the emergency department and initiated on a Cardene drip.  Her blood pressure normalized thereafter and Cardene drip was discontinued.  Urinalysis concerning for bacteria, patient reports urinary frequency without dysuria.  Rocephin was initiated.  Chest x-ray, CT abdomen negative for acute process.  Decision for further inpatient monitoring and management.    Thereafter:  Hypertensive urgency-  Originally required Cardene drip, later discontinued.  PTA clonidine discontinued, avoid further use.  Continue as needed labetalol and hydralazine.  Continue Norvasc 10 mg, Coreg 3.125 mg,  lisinopril 20 mg.  Blood pressure trend much improved.    TSH normal at 0.974  Lipid profile normal    UTI-  Patient reported urinary frequency.  Urinalysis shows 4+ bacteria.   Urine culture susceptibility shows ESBL.  Discontinue IV ceftriaxone.  Initiate IV Invanz.  Midline IV ordered for continued IV antibiotic therapy upon discharge.    GERD-  Continue Protonix.  Zofran and simethicone as needed.    Iron deficiency anemia-  Hemoglobin 10.9, hold daily iron for now.    Chronic pain-  Norco as needed.  Hold Mobic due to hypertension.    PT/OT for deconditioning.  Will likely need to continue when transition back to [facility] nursing facility.    Patient is DNR/DNI.      Medical Decision Making  Number and Complexity of problems: 1 acute problem of moderate complexity in hypertensive urgency.  1 acute problem of moderate complexity and UTI with history of ESBL.  Differential Diagnosis: None    Conditions and Status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 face. Continue OT POC.     Time Calculation:         Time Calculation- OT       Row Name 07/18/23 1502             Time Calculation- OT    OT Start Time 1502  -MM      OT Stop Time 1527  -MM      OT Time Calculation (min) " "25 min  -MM      Total Timed Code Minutes- OT 25 minute(s)  -MM      OT Received On 23  -MM         Timed Charges    81062 - OT Therapeutic Activity Minutes 25  -MM         Total Minutes    Timed Charges Total Minutes 25  -MM       Total Minutes 25  -MM                User Key  (r) = Recorded By, (t) = Taken By, (c) = Cosigned By      Initials Name Provider Type    MM Alexis Flannery OTR/L Occupational Therapist                  Therapy Charges for Today       Code Description Service Date Service Provider Modifiers Qty    18682071228  OT THERAPEUTIC ACT EA 15 MIN 2023 Alexis Flannery OTR/L GO 2                 Alexis LAGOS. JOSEMANUEL Flannery  2023    Electronically signed by Alexis Flannery OTR/L at 23 1616       Alexis Flannery OTR/L at 23 1502            Problem: Adult Inpatient Plan of Care  Goal: Plan of Care Review  Recent Flowsheet Documentation  Taken 2023 1502 by Alexis Flannery OTR/L  Progress: no change  Plan of Care Reviewed With: patient  Outcome Evaluation: OT tx completed. Pt reports pain in B shoulders and neck 10/10, RN notified. Pt was SBA for bed mobility. Pt was CGA for sit to stand t/f. Pt was CGA with RW for functional mobility. Pt requires verbal cues for all tasks. Pt was min A to [facility] gown over back. Pt was SBA with set up for \"shaving whiskers\" off of her face. Continue OT POC.       Electronically signed by Alexis Flannery OTR/L at 23 1616        Magdalena Lyons OTR/L   Occupational Therapist  Specialty: Occupational Therapy     Therapy Evaluation      Signed     Date of Service: 23 1245  Creation Time: 23 124     Signed       Expand All Collapse All    Patient Name: Carmen Obrien                  : 1940                          MRN: [id number]                              Today's Date: 2023                                   Admit Date: 2023                        Visit Dx:   Visit Diagnosis       " ICD-10-CM ICD-9-CM   1. Acute cystitis with hematuria  N30.01 595.0   2. Hypertensive urgency  I16.0 401.9         Problem List       Patient Active Problem List   Diagnosis    Shoulder dislocation    Multinodular goiter    History of adenomatous polyp of colon    Family hx of colon cancer    Constipation    Esophageal dysphagia    Gastroesophageal reflux disease    Age-related osteoporosis without current pathological fracture    Essential hypertension    Acute blood loss as cause of postoperative anemia    Anemia    Atherosclerosis of native artery of both lower extremities with intermittent claudication    Avulsion of fingernail    Closed traumatic dislocation of joint of shoulder region    Contusion of shoulder region    Shoulder strain    Decreased pulses in feet    Failure to thrive in adult    History of DVT of lower extremity    Hyperglycemia    Hyponatremia    Loose total knee arthroplasty    Falling    Osteoporosis    Rotator cuff tear arthropathy    Shoulder pain    Suspected elder neglect    Unstable knee    UTI (urinary tract infection), bacterial    Class 1 obesity due to excess calories with serious comorbidity and body mass index (BMI) of 34.0 to 34.9 in adult    Chronic venous stasis    Strain of rotator cuff capsule    Left leg cellulitis    Abscess of left thigh    Acute cystitis with hematuria    Hypokalemia    Chronic pain syndrome    Acute pain of left lower extremity    Positive result for methicillin resistant Staphylococcus aureus (MRSA) screening    Cellulitis of left lower leg    Fall    Rhabdomyolysis    Hypomagnesemia    Leukocytosis    AMS (altered mental status)    Traumatic rhabdomyolysis, initial encounter    Moderate malnutrition    RSV (acute bronchiolitis due to respiratory syncytial virus)    Type 2 MI (myocardial infarction)    THERESA (acute kidney injury)    History of ESBL E. coli infection    Hyperkalemia    Allergic conjunctivitis    C. difficile colitis    Hypertensive urgency          Medical History        Past Medical History:   Diagnosis Date    THERESA (acute kidney injury) (HCC) due to sepsis 11/2/2022    Anxiety      Arthritis      CAD (coronary artery disease)      Colon polyp      DDD (degenerative disc disease), lumbar      Deep venous thrombosis      Depression      Diverticulosis      Esophageal stricture      GERD (gastroesophageal reflux disease)      History of transfusion      Hypercholesteremia      Hypertension      LPRD (laryngopharyngeal reflux disease)      Macular degeneration      Multinodular goiter 2/23/2017    Osteoarthritis      Pruritic disorder      Spastic colon      Thyroid nodule      Type 2 MI (myocardial infarction) (HCC) due to THERESA and UTI 11/2/2022         Surgical History         Past Surgical History:   Procedure Laterality Date    BLADDER REPAIR        CATARACT EXTRACTION        CHOLECYSTECTOMY        COLONOSCOPY   04/22/2014     2 polyps, adenomatous, diverticulosis    COLONOSCOPY N/A 6/26/2020     Procedure: COLONOSCOPY WITH ANESTHESIA;  Surgeon: John Coleman MD;  Location: [facility] ENDOSCOPY;  Service: Gastroenterology;  Laterality: N/A;  pre op: constipation  post op: diverticulosis,   PCP: Adam Delgadillo MD    CYSTOCELE REPAIR        ENDOSCOPY   04/22/2014     Schatzki's ring dilated    ENDOSCOPY N/A 6/26/2020     Procedure: ESOPHAGOGASTRODUODENOSCOPY WITH ANESTHESIA;  Surgeon: John Coleman MD;  Location: [facility] ENDOSCOPY;  Service: Gastroenterology;  Laterality: N/A;  pre op: dysphagia  post op:stricture, dilated   PCP:Adam Delgadillo MD    ENDOSCOPY N/A 9/2/2021     Procedure: ESOPHAGOGASTRODUODENOSCOPY WITH ANESTHESIA;  Surgeon: John Coleman MD;  Location: [facility] ENDOSCOPY;  Service: Gastroenterology;  Laterality: N/A;  pre op: dysphagia  post op:gastritis  PCP: VERA Noble MD    FEMUR FRACTURE SURGERY        HYSTERECTOMY        INCISION AND DRAINAGE LEG Left 11/3/2021     Procedure: INCISION AND DRAINAGE LEG  ABSCESS;  Surgeon: Cesia Mondragon MD;  Location:  [facility] OR;  Service: General;  Laterality: Left;    REPLACEMENT TOTAL KNEE        ULNAR NERVE TRANSPOSITION               General Information         Row Name 07/17/23 1018                 OT Time and Intention     Document Type evaluation  Presented to ED with c/o hypertensive crisis. Dx: hypertensive urgency. PMH: THERESA, arthritis, CAD, DVT, Degenerative disc disease, HTN, macular degeneration, Type 2 DM.  -LS       Mode of Treatment occupational therapy  -          Row Name 07/17/23 1018                 General Information     Patient Profile Reviewed yes  -LS       Prior Level of Function min assist:;ADL's;independent:;all household mobility;w/c or scooter  Utilizes rwx for short distance ambulation and manual w/c for longer distances.  -LS       Existing Precautions/Restrictions fall  -LS       Barriers to Rehab previous functional deficit  -          Row Name 07/17/23 1018                 Living Environment     People in Home facility resident  -          Row Name 07/17/23 1018                 Home Main Entrance     Number of Stairs, Main Entrance none  -LS          Row Name 07/17/23 1018                 Stairs Within Home, Primary     Number of Stairs, Within Home, Primary none  -LS          Row Name 07/17/23 1018                 Cognition     Orientation Status (Cognition) oriented x 4  -          Row Name 07/17/23 1018                 Safety Issues, Functional Mobility     Impairments Affecting Function (Mobility) pain;strength;range of motion (ROM);balance  -                       User Key  (r) = Recorded By, (t) = Taken By, (c) = Cosigned By        Initials Name Provider Type      Magdalena Lyons OTR/L Occupational Therapist                                     Mobility/ADL's         Row Name 07/17/23 1018                 Transfers     Transfers sit-stand transfer;stand-sit transfer  -          Row Name 07/17/23 1018                 Sit-Stand  Transfer     Sit-Stand [place] (Transfers) contact guard  -LS       Assistive Device (Sit-Stand Transfers) walker, front-wheeled  -LS          Row Name 07/17/23 1018                 Stand-Sit Transfer     Stand-Sit [place] (Transfers) contact guard  -LS       Assistive Device (Stand-Sit Transfers) walker, front-wheeled  -LS          Row Name 07/17/23 1018                 Functional Mobility     Functional Mobility- Ind. Level contact guard assist  Pt ambulated short distance in room utilizing rwx with CGA.  -LS       Functional Mobility- Device walker, front-wheeled  -LS          Row Name 07/17/23 1018                 Activities of Daily Living     BADL Assessment/Intervention upper body dressing;lower body dressing;toileting  -LS          Row Name 07/17/23 1018                 Upper Body Dressing Assessment/Training     [place] Level (Upper Body Dressing) upper body dressing skills;independent  -LS       Position (Upper Body Dressing) unsupported sitting  -          Row Name 07/17/23 1018                 Lower Body Dressing Assessment/Training     [place] Level (Lower Body Dressing) lower body dressing skills;minimum assist (75% patient effort)  -LS       Position (Lower Body Dressing) edge of bed sitting;supported standing  -LS          Row Name 07/17/23 1018                 Toileting Assessment/Training     [place] Level (Toileting) toileting skills;moderate assist (50% patient effort)  -LS       Position (Toileting) unsupported sitting;supported standing  -LS                       User Key  (r) = Recorded By, (t) = Taken By, (c) = Cosigned By        Initials Name Provider Type     Magdalena Brantley, OTR/L Occupational Therapist                            Obj/Interventions         Row Name 07/17/23 1018                 Sensory Assessment (Somatosensory)     Sensory Assessment (Somatosensory) UE sensation intact  -          Row Name 07/17/23 1018                 Vision  Assessment/Intervention     Visual Impairment/Limitations WFL;corrective lenses full-time  -          Row Name 07/17/23 1018                 Range of Motion Comprehensive     General Range of Motion upper extremity range of motion deficits identified  -LS       Comment, General Range of Motion RUE shoulder ROM impaired 50%; LUE shoulder ROM impaired 25%; BUE ROM WFL at all other joints in all planes.  -          Row Name 07/17/23 1018                 Strength Comprehensive (MMT)     General Manual Muscle Testing (MMT) Assessment upper extremity strength deficits identified  -       Comment, General Manual Muscle Testing (MMT) Assessment BUE shoulder flex/ext abd/add 3+/5 within available range; BUE strength grossly 4/5 at all other joints in all planes.  -          Row Name 07/17/23 1018                 Balance     Balance Assessment sitting static balance;sitting dynamic balance;standing static balance;standing dynamic balance  -LS       Static Sitting Balance independent  -LS       Dynamic Sitting Balance independent  -LS       Position, Sitting Balance sitting in chair  -LS       Static Standing Balance contact guard  -LS       Dynamic Standing Balance contact guard  -LS       Position/Device Used, Standing Balance walker, front-wheeled  -                       User Key  (r) = Recorded By, (t) = Taken By, (c) = Cosigned By        Initials Name Provider Type      Magdalena Lyons, OTR/L Occupational Therapist                            Goals/Plan         Row Name 07/17/23 1018                 Transfer Goal 1 (OT)     Activity/Assistive Device (Transfer Goal 1, OT) toilet;shower chair  -       [place] Level/Cues Needed (Transfer Goal 1, OT) supervision required  -       Time Frame (Transfer Goal 1, OT) long term goal (LTG);10 days  -       Progress/Outcome (Transfer Goal 1, OT) new goal  -          Row Name 07/17/23 1018                 Dressing Goal 1 (OT)     Activity/Device (Dressing Goal  1, OT) --  -LS          Row Name 07/17/23 1018                 Toileting Goal 1 (OT)     Activity/Device (Toileting Goal 1, OT) toileting skills, all  -LS       [place] Level/Cues Needed (Toileting Goal 1, OT) modified independence  -LS       Time Frame (Toileting Goal 1, OT) long term goal (LTG);10 days  -LS       Progress/Outcome (Toileting Goal 1, OT) new goal  -LS          Row Name 07/17/23 1018                 Therapy Assessment/Plan (OT)     Planned Therapy Interventions (OT) activity tolerance training;functional balance retraining;occupation/activity based interventions;ROM/therapeutic exercise;strengthening exercise;transfer/mobility retraining;wheelchair assessment/training;patient/caregiver education/training;adaptive equipment training;BADL retraining  -LS                    User Key  (r) = Recorded By, (t) = Taken By, (c) = Cosigned By        Initials Name Provider Type      Magdalena Lyons, OTR/L Occupational Therapist                         Clinical Impression         Row Name 07/17/23 1018                 Pain Assessment     Pretreatment Pain Rating 6/10  -LS       Posttreatment Pain Rating 6/10  -LS       Pain Location - neck  -LS       Pain Intervention(s) Medication (See MAR);Ambulation/increased activity;Repositioned  -          Row Name 07/17/23 1018                 Plan of Care Review     Plan of Care Reviewed With patient  -LS       Progress no change  -LS       Outcome Evaluation OT eval completed. Pt seated in chair upon therapist arrival; A&Ox4; 6/10 pain reported in neck. Pt reports performing toileting tasks and UB BADLs with Mod I; Min A with LB dressing/bathing; Mod I with fxl mobility utilizing rwx or w/c at [facility]. Today, Pt donned B socks while seated with SBA. Pt performed sit<>stand utilizing rwx with CGA. Pt ambulated short distance in room utilizing rwx with CGA. Pt additionally demonstrates some BUE weakness. BP monitored during evaluation; seated prior to fxl ambulation  187/63, seated after fxl ambulation 160/59; nursing notified. Skilled OT intervention indicated in order to address deficits in fxl mobility, fxl activity tolerance, balance, strength, and use of adaptive techniques/equipment during performance of BADLs. Recommend SNF at discharge.  -          Row Name 07/17/23 1018                 Therapy Assessment/Plan (OT)     Rehab Potential (OT) good, to achieve stated therapy goals  -       Criteria for Skilled Therapeutic Interventions Met (OT) yes;skilled treatment is necessary  -       Therapy Frequency (OT) 5 times/wk  -          Row Name 07/17/23 1018                 Therapy Plan Review/Discharge Plan (OT)     Anticipated Discharge Disposition (OT) skilled nursing facility  -          Row Name 07/17/23 1018                 Positioning and Restraints     Pre-Treatment Position sitting in chair/recliner  -LS       Post Treatment Position chair  -LS       In Chair sitting;call light within reach;encouraged to call for assist  -LS                       User Key  (r) = Recorded By, (t) = Taken By, (c) = Cosigned By        Initials Name Provider Type     Magdalena Brantley, OTR/L Occupational Therapist                            Outcome Measures         Row Name 07/17/23 1018                 How much help from another is currently needed...     Putting on and taking off regular lower body clothing? 3  -LS       Bathing (including washing, rinsing, and drying) 2  -LS       Toileting (which includes using toilet bed pan or urinal) 2  -LS       Putting on and taking off regular upper body clothing 4  -LS       Taking care of personal grooming (such as brushing teeth) 4  -LS       Eating meals 4  -LS       AM-PAC 6 Clicks Score (OT) 19  -LS          Row Name 07/17/23 0745                 How much help from another person do you currently need...     Turning from your back to your side while in flat bed without using bedrails? 4  -AO       Moving from lying on back to  sitting on the side of a flat bed without bedrails? 3  -AO       Moving to and from a bed to a chair (including a wheelchair)? 3  -AO       Standing up from a chair using your arms (e.g., wheelchair, bedside chair)? 3  -AO       Climbing 3-5 steps with a railing? 2  -AO       To walk in hospital room? 3  -AO       AM-PAC 6 Clicks Score (PT) 18  -AO       Highest level of mobility 6 --> Walked 10 steps or more  -AO          Row Name 07/17/23 1043 07/17/23 1018            Functional Assessment     Outcome Measure Options AM-PAC 6 Clicks Basic Mobility (PT)  -SB AM-PAC 6 Clicks Daily Activity (OT)  -LS                     User Key  (r) = Recorded By, (t) = Taken By, (c) = Cosigned By        Initials Name Provider Type     Ariana Cortez, RN Registered Nurse     Stephanie Hui, PT DPT Physical Therapist     Magdalena Brantley, OTR/L Occupational Therapist                             Occupational Therapy Education            Title: PT OT SLP Therapies (In Progress)         Topic: Occupational Therapy (In Progress)         Point: ADL training (Done)         Description:   Instruct learner(s) on proper safety adaptation and remediation techniques during self care or transfers.   Instruct in proper use of assistive devices.                       Learning Progress Summary               Patient Acceptance, E, VU by GORAN at 7/17/2023 1242                               Point: Home exercise program (Not Started)         Description:   Instruct learner(s) on appropriate technique for monitoring, assisting and/or progressing therapeutic exercises/activities.                       Learner Progress:  Not documented in this visit.                  Point: Precautions (Not Started)         Description:   Instruct learner(s) on prescribed precautions during self-care and functional transfers.                       Learner Progress:  Not documented in this visit.                  Point: Body mechanics (Done)         Description:    Instruct learner(s) on proper positioning and spine alignment during self-care, functional mobility activities and/or exercises.                       Learning Progress Summary               Patient Acceptance, E, VU by  at 7/17/2023 1242                                                   User Key         Initials Effective Dates Name Provider Type Discipline     GORAN 06/20/22 -  Magdalena Lyons, OTR/L Occupational Therapist OT                          OT Recommendation and Plan  Planned Therapy Interventions (OT): activity tolerance training, functional balance retraining, occupation/activity based interventions, ROM/therapeutic exercise, strengthening exercise, transfer/mobility retraining, wheelchair assessment/training, patient/caregiver education/training, adaptive equipment training, BADL retraining  Therapy Frequency (OT): 5 times/wk  Plan of Care Review  Plan of Care Reviewed With: patient  Progress: no change  Outcome Evaluation: OT eval completed. Pt seated in chair upon therapist arrival; A&Ox4; 6/10 pain reported in neck. Pt reports performing toileting tasks and UB BADLs with Mod I; Min A with LB dressing/bathing; Mod I with fxl mobility utilizing rwx or w/c at [facility]. Today, Pt donned B socks while seated with SBA. Pt performed sit<>stand utilizing rwx with CGA. Pt ambulated short distance in room utilizing rwx with CGA. Pt additionally demonstrates some BUE weakness. BP monitored during evaluation; seated prior to fxl ambulation 187/63, seated after fxl ambulation 160/59; nursing notified. Skilled OT intervention indicated in order to address deficits in fxl mobility, fxl activity tolerance, balance, strength, and use of adaptive techniques/equipment during performance of BADLs. Recommend SNF at discharge.      Time Calculation:     Time Calculation- OT         Row Name 07/17/23 1018                       Time Calculation- OT     OT Start Time 1018  +10 minutes chart review  -         OT Stop Time 1118   -LS         OT Time Calculation (min) 60 min  -LS         OT Non-Billable Time (min) 70 min  -LS         OT Received On 23  -LS         OT Goal Re-Cert Due Date 23  -                      User Key  (r) = Recorded By, (t) = Taken By, (c) = Cosigned By        Initials Name Provider Type     LS Magdalena Lyons, OTR/L Occupational Therapist                       Therapy Charges for Today         Code Description Service Date Service Provider Modifiers Qty     17686372030 HC OT EVAL LOW COMPLEXITY 4 2023 Magdalena Lyons, OTR/L GO 1     80763685599 HC OT SELF CARE/MGMT/TRAIN EA 15 MIN 2023 Magdalena Lyons, OTR/L GO 1                   Magdalena Lyons, OTR/L             2023                     Stephanie Ruiz PT DPT   Physical Therapist  Physical Therapy     Therapy Evaluation      Signed     Date of Service: 23  Creation Time: 23     Signed       Expand All Collapse All    Patient Name: Carmen Obrien                  : 1940                          MRN: [id number]                              Today's Date: 2023                                   Admit Date: 2023                        Visit Dx:   Visit Diagnosis       ICD-10-CM ICD-9-CM   1. Acute cystitis with hematuria  N30.01 595.0   2. Hypertensive urgency  I16.0 401.9   3. Impaired mobility [Z74.09 (ICD-10-CM)]  Z74.09 799.89         Problem List       Patient Active Problem List   Diagnosis    Shoulder dislocation    Multinodular goiter    History of adenomatous polyp of colon    Family hx of colon cancer    Constipation    Esophageal dysphagia    Gastroesophageal reflux disease    Age-related osteoporosis without current pathological fracture    Essential hypertension    Acute blood loss as cause of postoperative anemia    Anemia    Atherosclerosis of native artery of both lower extremities with intermittent claudication    Avulsion of fingernail    Closed traumatic dislocation of joint of shoulder  region    Contusion of shoulder region    Shoulder strain    Decreased pulses in feet    Failure to thrive in adult    History of DVT of lower extremity    Hyperglycemia    Hyponatremia    Loose total knee arthroplasty    Falling    Osteoporosis    Rotator cuff tear arthropathy    Shoulder pain    Suspected elder neglect    Unstable knee    UTI (urinary tract infection), bacterial    Class 1 obesity due to excess calories with serious comorbidity and body mass index (BMI) of 34.0 to 34.9 in adult    Chronic venous stasis    Strain of rotator cuff capsule    Left leg cellulitis    Abscess of left thigh    Acute cystitis with hematuria    Hypokalemia    Chronic pain syndrome    Acute pain of left lower extremity    Positive result for methicillin resistant Staphylococcus aureus (MRSA) screening    Cellulitis of left lower leg    Fall    Rhabdomyolysis    Hypomagnesemia    Leukocytosis    AMS (altered mental status)    Traumatic rhabdomyolysis, initial encounter    Moderate malnutrition    RSV (acute bronchiolitis due to respiratory syncytial virus)    Type 2 MI (myocardial infarction)    THERESA (acute kidney injury)    History of ESBL E. coli infection    Hyperkalemia    Allergic conjunctivitis    C. difficile colitis    Hypertensive urgency         Medical History        Past Medical History:   Diagnosis Date    THERESA (acute kidney injury) (HCC) due to sepsis 11/2/2022    Anxiety      Arthritis      CAD (coronary artery disease)      Colon polyp      DDD (degenerative disc disease), lumbar      Deep venous thrombosis      Depression      Diverticulosis      Esophageal stricture      GERD (gastroesophageal reflux disease)      History of transfusion      Hypercholesteremia      Hypertension      LPRD (laryngopharyngeal reflux disease)      Macular degeneration      Multinodular goiter 2/23/2017    Osteoarthritis      Pruritic disorder      Spastic colon      Thyroid nodule      Type 2 MI (myocardial infarction) (HCC) due  to THERESA and UTI 11/2/2022         Surgical History         Past Surgical History:   Procedure Laterality Date    BLADDER REPAIR        CATARACT EXTRACTION        CHOLECYSTECTOMY        COLONOSCOPY   04/22/2014     2 polyps, adenomatous, diverticulosis    COLONOSCOPY N/A 6/26/2020     Procedure: COLONOSCOPY WITH ANESTHESIA;  Surgeon: John Coleman MD;  Location:  [facility];  Service: Gastroenterology;  Laterality: N/A;  pre op: constipation  post op: diverticulosis,   PCP: Adam Delgadillo MD    CYSTOCELE REPAIR        ENDOSCOPY   04/22/2014     Schatzki's ring dilated    ENDOSCOPY N/A 6/26/2020     Procedure: ESOPHAGOGASTRODUODENOSCOPY WITH ANESTHESIA;  Surgeon: John Coleman MD;  Location:  [facility];  Service: Gastroenterology;  Laterality: N/A;  pre op: dysphagia  post op:stricture, dilated   PCP:Adam Delgadillo MD    ENDOSCOPY N/A 9/2/2021     Procedure: ESOPHAGOGASTRODUODENOSCOPY WITH ANESTHESIA;  Surgeon: John Coleman MD;  Location:  [facility];  Service: Gastroenterology;  Laterality: N/A;  pre op: dysphagia  post op:gastritis  PCP: VERA Noble MD    FEMUR FRACTURE SURGERY        HYSTERECTOMY        INCISION AND DRAINAGE LEG Left 11/3/2021     Procedure: INCISION AND DRAINAGE LEG ABSCESS;  Surgeon: Cesia Mondragon MD;  Location: [facility] OR;  Service: General;  Laterality: Left;    REPLACEMENT TOTAL KNEE        ULNAR NERVE TRANSPOSITION               General Information         Row Name 07/17/23 1043                 Physical Therapy Time and Intention     Document Type evaluation  Presented to ED with c/o hypertensive crisis. Dx: hypertensive urgency. PMH: THERESA, arthritis, CAD, DVT, Degenerative disc disease, HTN, macular degeneration, Type 2 DM.  -SB       Mode of Treatment physical therapy  -SB          Row Name 07/17/23 1043                 General Information     Patient Profile Reviewed yes  -SB       Prior Level of Function independent:;all household  mobility;w/c or scooter;min assist:;ADL's  uses RW for short distances and w/c for longer  -SB       Existing Precautions/Restrictions fall;other (see comments)  hypertension  -SB       Barriers to Rehab previous functional deficit  -SB          Row Name 07/17/23 1043                 Living Environment     People in Home facility resident  -SB          Row Name 07/17/23 1043                 Home Main Entrance     Number of Stairs, Main Entrance none  -SB          Row Name 07/17/23 1043                 Stairs Within Home, Primary     Number of Stairs, Within Home, Primary none  -SB          Row Name 07/17/23 1043                 Cognition     Orientation Status (Cognition) oriented x 4  -SB          Row Name 07/17/23 1043                 Safety Issues, Functional Mobility     Impairments Affecting Function (Mobility) pain;strength;endurance/activity tolerance  -SB                       User Key  (r) = Recorded By, (t) = Taken By, (c) = Cosigned By        Initials Name Provider Type     SB Stephanie Ruiz, HALLEY DPT Physical Therapist                            Mobility         Row Name 07/17/23 1043                 Bed Mobility     Comment, (Bed Mobility) up in chair upon arrival  -SB          Row Name 07/17/23 1043                 Sit-Stand Transfer     Sit-Stand [place] (Transfers) contact guard;verbal cues  -SB       Assistive Device (Sit-Stand Transfers) walker, front-wheeled  -SB       Comment, (Sit-Stand Transfer) increased time to perform t/f  -SB          Row Name 07/17/23 1043                 Gait/Stairs (Locomotion)     [place] Level (Gait) verbal cues;contact guard  -SB       Assistive Device (Gait) walker, front-wheeled  -SB       Distance in Feet (Gait) 30 feet  -SB       Deviations/Abnormal Patterns (Gait) gait speed decreased  -SB       Bilateral Gait Deviations forward flexed posture  -SB                       User Key  (r) = Recorded By, (t) = Taken By, (c) = Cosigned By        Initials Name  Provider Type     SB Stephanie Ruiz, PT DPT Physical Therapist                            Obj/Interventions         Row Name 07/17/23 1043                 Range of Motion Comprehensive     General Range of Motion bilateral lower extremity ROM WFL  -SB          Row Name 07/17/23 1043                 Strength Comprehensive (MMT)     General Manual Muscle Testing (MMT) Assessment lower extremity strength deficits identified  -SB       Comment, General Manual Muscle Testing (MMT) Assessment LLE 4-/5; RLE 4/5  -SB          Row Name 07/17/23 1043                 Balance     Balance Assessment sitting static balance;sitting dynamic balance;standing static balance;standing dynamic balance  -SB       Static Sitting Balance independent  -SB       Dynamic Sitting Balance independent  -SB       Position, Sitting Balance sitting in chair;supported  -SB       Static Standing Balance contact guard  -SB       Dynamic Standing Balance contact guard  -SB       Position/Device Used, Standing Balance walker, front-wheeled;supported  -SB          Row Name 07/17/23 1043                 Sensory Assessment (Somatosensory)     Sensory Assessment (Somatosensory) LE sensation intact  -SB                       User Key  (r) = Recorded By, (t) = Taken By, (c) = Cosigned By        Initials Name Provider Type     SB Stephanie Ruiz, PT DPT Physical Therapist                            Goals/Plan         Row Name 07/17/23 1043                 Bed Mobility Goal 1 (PT)     Activity/Assistive Device (Bed Mobility Goal 1, PT) sit to supine;supine to sit  -SB       [place] Level/Cues Needed (Bed Mobility Goal 1, PT) supervision required  -SB       Time Frame (Bed Mobility Goal 1, PT) long term goal (LTG)  -SB       Progress/Outcomes (Bed Mobility Goal 1, PT) new goal  -SB          Row Name 07/17/23 1043                 Transfer Goal 1 (PT)     Activity/Assistive Device (Transfer Goal 1, PT)  sit-to-stand/stand-to-sit;bed-to-chair/chair-to-bed;walker, rolling  -SB       [place] Level/Cues Needed (Transfer Goal 1, PT) standby assist  -SB       Time Frame (Transfer Goal 1, PT) long term goal (LTG)  -SB       Progress/Outcome (Transfer Goal 1, PT) new goal  -SB          Row Name 07/17/23 1043                 Gait Training Goal 1 (PT)     Activity/Assistive Device (Gait Training Goal 1, PT) gait (walking locomotion);increase endurance/gait distance;walker, rolling  -SB       [place] Level (Gait Training Goal 1, PT) contact guard required  -SB       Distance (Gait Training Goal 1, PT) 50 feet  -SB       Time Frame (Gait Training Goal 1, PT) long term goal (LTG)  -SB       Progress/Outcome (Gait Training Goal 1, PT) new goal  -SB          Row Name 07/17/23 1043                 Therapy Assessment/Plan (PT)     Planned Therapy Interventions (PT) balance training;bed mobility training;gait training;patient/family education;transfer training;strengthening  -SB                    User Key  (r) = Recorded By, (t) = Taken By, (c) = Cosigned By        Initials Name Provider Type     SB Stephanie Ruiz, PT DPT Physical Therapist                         Clinical Impression         Row Name 07/17/23 1043                 Pain     Pretreatment Pain Rating 6/10  -SB       Posttreatment Pain Rating 6/10  -SB       Pain Location - neck  -SB       Pain Intervention(s) Medication (See MAR);Repositioned;Ambulation/increased activity  -SB       Additional Documentation Pain Scale: Numbers Pre/Post-Treatment (Group)  -SB          Row Name 07/17/23 1043                 Plan of Care Review     Plan of Care Reviewed With patient  -SB       Progress no change  -SB       Outcome Evaluation PT eval completed. Pt alet and oriented x4 with c/p neck pain. Pt reports independence with household mobility with RW at baseline, but uses w/c for further distances. Today, pt presents sitting in chair with BP reading 187/53. Cleared by  nsg to mobilize in room. Pt with decreased strength in BLE, greater deficits in LLE which is chronic. Pt also c/o pain with pressure on B lower legs. Pt performs sit to stand t/f and gait training in room with CGA and RW, demos dec gait speed and forward flexed posture. Pt will benefit from skilled PT to improve fxl mob and endurance. Recommend d/c SNF.  -SB          Row Name 07/17/23 1043                 Therapy Assessment/Plan (PT)     Patient/Family Therapy Goals Statement (PT) go back to facility  -SB       Rehab Potential (PT) good, to achieve stated therapy goals  -SB       Criteria for Skilled Interventions Met (PT) yes;meets criteria;skilled treatment is necessary  -SB       Therapy Frequency (PT) 2 times/day  -SB       Predicted Duration of Therapy Intervention (PT) until d/c or goals met  -SB          Row Name 07/17/23 1043                 Vital Signs     Pre Systolic BP Rehab 187   nsg notified  -SB       Pre Treatment Diastolic BP 53  -SB       Pretreatment Heart Rate (beats/min) 63  -SB       O2 Delivery Pre Treatment room air  -SB          Row Name 07/17/23 1043                 Positioning and Restraints     Pre-Treatment Position sitting in chair/recliner  -SB       Post Treatment Position chair  -SB       In Chair sitting;call light within reach;encouraged to call for assist;legs elevated  -SB                       User Key  (r) = Recorded By, (t) = Taken By, (c) = Cosigned By        Initials Name Provider Type     Stephanie Hui, PT DPT Physical Therapist                            Outcome Measures         Row Name 07/17/23 1043 07/17/23 0745            How much help from another person do you currently need...     Turning from your back to your side while in flat bed without using bedrails? 4  -SB 4  -AO     Moving from lying on back to sitting on the side of a flat bed without bedrails? 4  -SB 3  -AO     Moving to and from a bed to a chair (including a wheelchair)? 3  -SB 3  -AO     Standing up  from a chair using your arms (e.g., wheelchair, bedside chair)? 3  -SB 3  -AO     Climbing 3-5 steps with a railing? 3  -SB 2  -AO     To walk in hospital room? 3  -SB 3  -AO     AM-PAC 6 Clicks Score (PT) 20  -SB 18  -AO     Highest level of mobility 6 --> Walked 10 steps or more  -SB 6 --> Walked 10 steps or more  -AO        Row Name 07/17/23 1043 07/17/23 1018            Functional Assessment     Outcome Measure Options AM-PAC 6 Clicks Basic Mobility (PT)  -SB AM-PAC 6 Clicks Daily Activity (OT)  -LS                     User Key  (r) = Recorded By, (t) = Taken By, (c) = Cosigned By        Initials Name Provider Type     AO Ariana Davis RN Registered Nurse     Stephanie Hui, PT DPT Physical Therapist     LS Magdalena Lyons, OTR/L Occupational Therapist                          Physical Therapy Education            Title: PT OT SLP Therapies (In Progress)         Topic: Physical Therapy (In Progress)         Point: Mobility training (Done)         Learning Progress Summary               Patient Acceptance, E, VU by SB at 7/17/2023 1341     Comment: pt edu on POC, benefits of act, d/c plans                               Point: Home exercise program (Not Started)         Learner Progress:  Not documented in this visit.                  Point: Body mechanics (Not Started)         Learner Progress:  Not documented in this visit.                  Point: Precautions (Done)         Learning Progress Summary               Patient Acceptance, E, VU by SB at 7/17/2023 1341     Comment: pt edu on POC, benefits of act, d/c plans                                                   User Key         Initials Effective Dates Name Provider Type Discipline      07/11/23 -  Stephanie Ruiz, PT DPT Physical Therapist PT                          PT Recommendation and Plan  Planned Therapy Interventions (PT): balance training, bed mobility training, gait training, patient/family education, transfer training, strengthening  Plan of  Care Reviewed With: patient  Progress: no change  Outcome Evaluation: PT eval completed. Pt alet and oriented x4 with c/p neck pain. Pt reports independence with household mobility with RW at baseline, but uses w/c for further distances. Today, pt presents sitting in chair with BP reading 187/53. Cleared by nsg to mobilize in room. Pt with decreased strength in BLE, greater deficits in LLE which is chronic. Pt also c/o pain with pressure on B lower legs. Pt performs sit to stand t/f and gait training in room with CGA and RW, demos dec gait speed and forward flexed posture. Pt will benefit from skilled PT to improve fxl mob and endurance. Recommend d/c SNF.      Time Calculation:     PT Charges         Row Name 07/17/23 1342                       Time Calculation     Start Time 1043  -SB         Stop Time 1123  -SB         Time Calculation (min) 40 min  -SB         PT Received On 07/17/23  -SB         PT Goal Re-Cert Due Date 07/27/23  -SB                      User Key  (r) = Recorded By, (t) = Taken By, (c) = Cosigned By        Initials Name Provider Type     Stephanie Hui, PT DPT Physical Therapist                       Therapy Charges for Today         Code Description Service Date Service Provider Modifiers Qty     81157603950 HC PT EVAL LOW COMPLEXITY 3 7/17/2023 Stephanie Ruiz PT DPT GP 1                PT G-Codes  Outcome Measure Options: AM-PAC 6 Clicks Basic Mobility (PT)  AM-PAC 6 Clicks Score (PT): 20  AM-PAC 6 Clicks Score (OT): 19  PT Discharge Summary  Anticipated Discharge Disposition (PT): skilled nursing facility     Stephanie Ruiz PT DPT                   7/17/2023

## 2023-07-19 NOTE — PLAN OF CARE
Goal Outcome Evaluation:  Plan of Care Reviewed With: patient        Progress: improving    Outcome Evaluation: VSS, prn Norco given for neck and shoulder pain, good urine output, SB-S 54-70 with PVC's and PAC's.

## 2023-07-19 NOTE — CASE MANAGEMENT/SOCIAL WORK
Continued Stay Note   Marielle     Patient Name: Carmen Obrien  MRN: [id number]  Today's Date: 7/19/2023    Admit Date: 7/16/2023    Plan: [facility]   Discharge Plan       Row Name 07/19/23 1143       Plan    Plan [facility]    Patient/Family in Agreement with Plan yes    Plan Comments Pt will need iv abx upon return to [facility].  Midline being placed today.  Savanah Serna in  has started precert for return.                   Discharge Codes    No documentation.                 Expected Discharge Date and Time       Expected Discharge Date Expected Discharge Time    Jul 19, 2023               EMILY Dyson

## 2023-07-19 NOTE — PLAN OF CARE
Goal Outcome Evaluation:  Plan of Care Reviewed With: patient        Progress: improving  Outcome Evaluation: PT tx completed. Pt reports increased fatigue and weakness. SBA for supine to sit with HOB elevated. Pt able to stand with CGA, utilized RWX toward the end of the transition in order to achieve upright posture in standing. Amb up to 50' at a time with RWX and CGA. Cues for safety, gait speed, and posture. Pt will benefit from cont rehab upon d/c for improved strength and endurance.      Anticipated Discharge Disposition (PT): skilled nursing facility
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
## 2023-07-20 VITALS
HEART RATE: 68 BPM | TEMPERATURE: 97.5 F | SYSTOLIC BLOOD PRESSURE: 180 MMHG | OXYGEN SATURATION: 97 % | HEIGHT: 65 IN | WEIGHT: 175.38 LBS | DIASTOLIC BLOOD PRESSURE: 58 MMHG | BODY MASS INDEX: 29.22 KG/M2 | RESPIRATION RATE: 18 BRPM

## 2023-07-20 PROCEDURE — 25010000002 ERTAPENEM PER 500 MG

## 2023-07-20 PROCEDURE — G0378 HOSPITAL OBSERVATION PER HR: HCPCS

## 2023-07-20 PROCEDURE — 25010000002 ONDANSETRON PER 1 MG: Performed by: FAMILY MEDICINE

## 2023-07-20 PROCEDURE — 96375 TX/PRO/DX INJ NEW DRUG ADDON: CPT

## 2023-07-20 PROCEDURE — 96367 TX/PROPH/DG ADDL SEQ IV INF: CPT

## 2023-07-20 RX ORDER — AMLODIPINE BESYLATE 10 MG/1
10 TABLET ORAL
Qty: 30 TABLET | Refills: 0 | Status: SHIPPED | OUTPATIENT
Start: 2023-07-20

## 2023-07-20 RX ORDER — NALOXONE HYDROCHLORIDE 4 MG/.1ML
SPRAY NASAL
Qty: 2 EACH | Refills: 0 | Status: SHIPPED | OUTPATIENT
Start: 2023-07-20

## 2023-07-20 RX ORDER — LISINOPRIL 20 MG/1
20 TABLET ORAL
Qty: 30 TABLET | Refills: 0 | Status: SHIPPED | OUTPATIENT
Start: 2023-07-20

## 2023-07-20 RX ORDER — HYDROCODONE BITARTRATE AND ACETAMINOPHEN 7.5; 325 MG/1; MG/1
1 TABLET ORAL EVERY 8 HOURS PRN
Qty: 24 TABLET | Refills: 0 | Status: SHIPPED | OUTPATIENT
Start: 2023-07-20

## 2023-07-20 RX ADMIN — ALUMINUM HYDROXIDE, MAGNESIUM HYDROXIDE, AND DIMETHICONE 15 ML: 400; 400; 40 SUSPENSION ORAL at 12:51

## 2023-07-20 RX ADMIN — FUROSEMIDE 20 MG: 20 TABLET ORAL at 10:32

## 2023-07-20 RX ADMIN — ONDANSETRON 4 MG: 2 INJECTION INTRAMUSCULAR; INTRAVENOUS at 12:51

## 2023-07-20 RX ADMIN — CYCLOSPORINE 1 DROP: 0.5 EMULSION OPHTHALMIC at 10:37

## 2023-07-20 RX ADMIN — Medication 5000 UNITS: at 10:44

## 2023-07-20 RX ADMIN — AMLODIPINE BESYLATE 10 MG: 10 TABLET ORAL at 10:32

## 2023-07-20 RX ADMIN — Medication 10 ML: at 10:39

## 2023-07-20 RX ADMIN — PANTOPRAZOLE SODIUM 40 MG: 40 TABLET, DELAYED RELEASE ORAL at 06:50

## 2023-07-20 RX ADMIN — ATORVASTATIN CALCIUM 10 MG: 10 TABLET, FILM COATED ORAL at 10:32

## 2023-07-20 RX ADMIN — ERTAPENEM SODIUM 1000 MG: 1 INJECTION, POWDER, LYOPHILIZED, FOR SOLUTION INTRAMUSCULAR; INTRAVENOUS at 10:31

## 2023-07-20 RX ADMIN — LIDOCAINE 2 PATCH: 50 PATCH TOPICAL at 10:33

## 2023-07-20 RX ADMIN — SERTRALINE HYDROCHLORIDE 100 MG: 100 TABLET, FILM COATED ORAL at 10:32

## 2023-07-20 RX ADMIN — CARVEDILOL 3.12 MG: 3.12 TABLET, FILM COATED ORAL at 10:32

## 2023-07-20 RX ADMIN — ALLOPURINOL 300 MG: 300 TABLET ORAL at 10:32

## 2023-07-20 RX ADMIN — LISINOPRIL 20 MG: 20 TABLET ORAL at 10:32

## 2023-07-20 RX ADMIN — HYDROCODONE BITARTRATE AND ACETAMINOPHEN 1 TABLET: 7.5; 325 TABLET ORAL at 00:37

## 2023-07-20 NOTE — DISCHARGE SUMMARY
[facility] Medicine Services  DISCHARGE SUMMARY       Date of Admission: 7/16/2023  Date of Discharge:  7/20/2023  Primary Care Physician: VERA Noble MD    Presenting Problem/History of Present Illness:  Hypertensive urgency, UTI    Final Discharge Diagnoses:  Active Hospital Problems    Diagnosis     **Hypertensive urgency     Chronic venous stasis     Essential hypertension     Age-related osteoporosis without current pathological fracture     Esophageal dysphagia     Gastroesophageal reflux disease     Failure to thrive in adult     Loose total knee arthroplasty     Atherosclerosis of native artery of both lower extremities with intermittent claudication     Rotator cuff tear arthropathy     Multinodular goiter     Closed traumatic dislocation of joint of shoulder region     Shoulder dislocation        Consults: Vascular access    Procedures Performed: Midline placement    Pertinent Test Results:   Results for orders placed during the hospital encounter of 11/01/22    Adult Transthoracic Echo Complete W/ Cont if Necessary Per Protocol    Interpretation Summary    Left ventricular systolic function is hyperdynamic (EF > 70%). Left ventricular ejection fraction appears to be greater than 70%.    Left ventricular diastolic function was normal.    Normal right ventricular cavity size and systolic function noted.    Moderate aortic valve regurgitation is present.    Moderate mitral annular calcification is present. There is mild calcification of the mitral valve. Trace mitral valve regurgitation is present    No evidence of pulmonary hypertension is present.      Imaging Results (All)       Procedure Component Value Units Date/Time    CT Abdomen Pelvis With Contrast [[id number]] Collected: 07/16/23 1715     Updated: 07/16/23 1721    Narrative:      EXAMINATION: CT ABDOMEN PELVIS W CONTRAST-      7/16/2023 4:58 PM CDT     HISTORY: Upper abdominal pain.     In order to have  a CT radiation dose as low as reasonably achievable  Automated Exposure Control was utilized for adjustment of the mA and/or  KV according to patient size.     DLP in mGycm= 478.     Abdomen/pelvis CT with IV contrast injection.  Axial, sagittal, and coronal sequences.     Comparison is made with 11/02/2022.     Normal heart size.  Clear lung bases.  No diaphragm abnormality is seen.     Cholecystectomy clips are present.  Normal liver, pancreas, and spleen.     Normal adrenal glands and kidneys.  No hydronephrosis or pyelonephritis.     Aortic calcification with no aneurysm.     No pelvic mass or fluid.  Sigmoid diverticula.  No diverticulitis or colitis.     Prominent degenerative disc and endplate change throughout the lumbar  spine.     Summary:  1. No acute abnormality.                                   This report was finalized on 07/16/2023 17:18 by Dr. Yaakov Childress MD.    XR Chest 1 View [[id number]] Collected: 07/16/23 1501     Updated: 07/16/23 1504    Narrative:      EXAMINATION:  XR CHEST 1 VW-  7/16/2023 2:51 PM CDT     HISTORY: Hypertension.     COMPARISON: 11/01/2022.     TECHNIQUE: Single view AP image.     FINDINGS:  There is stable bronchial wall thickening. There is no dense  infiltrate. The heart is normal in size. The thoracic aorta is  atheromatous. There are degenerative changes of the spine and shoulders.          Impression:      1. No acute appearing infiltrate.  2. Stable bronchial wall thickening.        This report was finalized on 07/16/2023 15:01 by Dr. José Miguel Mccain MD.          LAB RESULTS:      Lab 07/17/23  0632 07/16/23  1513   WBC 6.21 7.25   HEMOGLOBIN 10.9* 11.1*   HEMATOCRIT 35.5 36.0   PLATELETS 179 179   NEUTROS ABS 4.19 4.70   IMMATURE GRANS (ABS) 0.03 0.03   LYMPHS ABS 1.10 1.46   MONOS ABS 0.57 0.71   EOS ABS 0.27 0.30   .3* 102.0*         Lab 07/17/23  0925 07/16/23  1513   SODIUM 136 138   POTASSIUM 4.1 4.4   CHLORIDE 101 101   CO2 23.0 25.0   ANION GAP 12.0  12.0   BUN 13 19   CREATININE 0.44* 0.64   EGFR 97.3 88.9   GLUCOSE 202* 129*   CALCIUM 9.3 9.1   HEMOGLOBIN A1C  --  5.30   TSH 0.974  --          Lab 07/17/23  0925 07/16/23  1513   TOTAL PROTEIN 6.3 6.2   ALBUMIN 3.9 3.9   GLOBULIN 2.4 2.3   ALT (SGPT) 14 16   AST (SGOT) 23 20   BILIRUBIN 0.4 0.3   ALK PHOS 80 78         Lab 07/16/23  1733 07/16/23  1513   HSTROP T 28* 29*         Lab 07/17/23  0925   CHOLESTEROL 154   LDL CHOL 82   HDL CHOL 51   TRIGLYCERIDES 115             Brief Urine Lab Results  (Last result in the past 365 days)        Color   Clarity   Blood   Leuk Est   Nitrite   Protein   CREAT   Urine HCG        07/16/23 1528 Yellow   Cloudy   Negative   Moderate (2+)   Negative   100 mg/dL (2+)                 Microbiology Results (last 10 days)       Procedure Component Value - Date/Time    Urine Culture - Urine, Urine, Clean Catch [[id number]]  (Abnormal)  (Susceptibility) Collected: 07/16/23 1528    Lab Status: Final result Specimen: Urine, Clean Catch Updated: 07/19/23 0452     Urine Culture >100,000 CFU/mL Escherichia coli ESBL     Comment:   Consider infectious disease consult.  Susceptibility results may not correlate to clinical outcomes.       Narrative:      Colonization of the urinary tract without infection is common. Treatment is discouraged unless the patient is symptomatic, pregnant, or undergoing an invasive urologic procedure.  Recent outcomes data supports the use of pip/tazo in the treatment of susceptible ESBL infections for uncomplicated UTI. Consider use of pip/tazo as a carbapenem-sparing regimen in applicable patients.    Susceptibility        Escherichia coli ESBL      CASEY      Ertapenem Susceptible      Gentamicin Susceptible      Levofloxacin Resistant      Meropenem Susceptible      Nitrofurantoin Susceptible      Piperacillin + Tazobactam Susceptible      Trimethoprim + Sulfamethoxazole Resistant                                   Hospital Course:   Upon arrival:  Patient  presented to [facility] emergency department on 7/16/2023 with complaints of high blood pressure.  Blood pressure was found to be 194/59 upon arrival.  She was given hydralazine and labetalol in the emergency department and initiated on a Cardene drip.  Her blood pressure normalized thereafter and Cardene drip was discontinued.  Urinalysis concerning for bacteria, patient reports urinary frequency without dysuria.  Rocephin was initiated.  Chest x-ray, CT abdomen negative for acute process.  Decision for further inpatient monitoring and management.     Thereafter:  Hypertensive urgency-  Originally required Cardene drip, later discontinued.  PTA clonidine discontinued, avoid further use due to rebound hypertension.  New antihypertensive regimen initiated including Norvasc 10 mg, Coreg 3.125 mg, lisinopril 20 mg.  Blood pressure trend much improved.     TSH normal at 0.974  Lipid profile normal     UTI-  Patient reported urinary frequency.  Urinalysis shows 4+ bacteria.   Urine culture susceptibility shows ESBL E. coli.  Discontinue IV ceftriaxone.  Initiate IV Invanz on 7/19.  Midline IV ordered for continued IV antibiotic therapy upon discharge.  Day of discharge, 7/20/2023, patient is on day 2 out of 10 of IV Invanz therapy.  Recommend IV Invanz 1 g every 24 hours x10 days total.  Patient should receive final dose of Invanz on 7/28/2023.       Iron deficiency anemia-  Hemoglobin 10.9, continue daily iron supplementation     Chronic pain-  Norco as needed.  Hold Mobic due to hypertension.     PT/OT for deconditioning.  Recommend continuing PT/OT upon transition back to skilled nursing facility.     Patient is DNR/DNI.    Morning of discharge, patient is on room air sitting up in bed in no acute distress.  She is prepared to discharge and has no further needs or questions.  Plan for her UTI treatment has been established.  Blood pressure trend much improved.  Recommend continuing current antihypertensive regimen  "upon discharge.    Follow-up with PCP in 1 week      Physical Exam on Discharge:  /48 (BP Location: Left arm, Patient Position: Lying)   Pulse 58   Temp 98.7 °F (37.1 °C) (Oral)   Resp 18   Ht 165.1 cm (65\")   Wt 79.5 kg (175 lb 6 oz)   SpO2 93%   BMI 29.18 kg/m²   Physical Exam  Vitals and nursing note reviewed.   Constitutional:       General: She is not in acute distress.     Appearance: She is not toxic-appearing.      Comments: Somewhat chronically ill, up in bed, no visitors at bedside.  HENT:      Head: Normocephalic.      Mouth/Throat:      Mouth: Mucous membranes are moist.      Pharynx: Oropharynx is clear.   Eyes:      Extraocular Movements: Extraocular movements intact.      Conjunctiva/sclera: Conjunctivae normal.      Pupils: Pupils are equal, round, and reactive to light.   Cardiovascular:      Rate and Rhythm: Normal rate and regular rhythm.      Pulses: Normal pulses.      Heart sounds: No murmur heard.  Pulmonary:      Effort: No respiratory distress.      Breath sounds: No wheezing or rhonchi.      Comments: Clear, equal bilaterally, room air  Abdominal:      General: Bowel sounds are normal. There is no distension.      Palpations: Abdomen is soft.      Tenderness: There is no abdominal tenderness.   Musculoskeletal:         General: No swelling or tenderness.      Cervical back: Normal range of motion and neck supple. No muscular tenderness.   Skin:     General: Skin is warm and dry.      Capillary Refill: Capillary refill takes 2 to 3 seconds.      Findings: No erythema or rash.   Neurological:      General: No focal deficit present.      Mental Status: She is alert and oriented to person, place, and time.      Cranial Nerves: No cranial nerve deficit.      Motor: Weakness present.      Coordination: Coordination abnormal.      Gait: Gait abnormal.   Psychiatric:         Mood and Affect: Appropriate     Behavior: Behavior normal.     Condition on Discharge: Stable    Discharge " Disposition:      Discharge Medications:     Discharge Medications        New Medications        Instructions Start Date   amLODIPine 10 MG tablet  Commonly known as: NORVASC   10 mg, Oral, Every 24 Hours Scheduled      ertapenem 1,000 mg in sodium chloride 0.9 % 100 mL IVPB   1,000 mg, Intravenous, Every 24 Hours      lisinopril 20 MG tablet  Commonly known as: PRINIVIL,ZESTRIL   20 mg, Oral, Every 24 Hours Scheduled             Changes to Medications        Instructions Start Date   acetaminophen 325 MG tablet  Commonly known as: TYLENOL  What changed: Another medication with the same name was removed. Continue taking this medication, and follow the directions you see here.   650 mg, Oral, Every 6 Hours PRN             Continue These Medications        Instructions Start Date   allopurinol 300 MG tablet  Commonly known as: ZYLOPRIM   300 mg, Oral, Daily      aluminum-magnesium hydroxide-simethicone 200-200-20 MG/5ML suspension  Commonly known as: MAALOX/MYLANTA   15 mL, Oral, 2 Times Daily PRN      ascorbic acid 500 MG tablet  Commonly known as: VITAMIN C   500 mg, Oral, Daily      atorvastatin 10 MG tablet  Commonly known as: LIPITOR   10 mg, Oral, Daily      Biofreeze 4 % gel  Generic drug: Menthol (Topical Analgesic)   1 application , Topical, Every 8 Hours PRN      Caltrate 600+D Plus Minerals 600-800 MG-UNIT tablet   1 tablet, Oral, 2 Times Daily      carvedilol 12.5 MG tablet  Commonly known as: COREG   12.5 mg, Oral, 2 Times Daily With Meals      cloNIDine 0.1 MG tablet  Commonly known as: CATAPRES   0.1 mg, Oral, 2 Times Daily PRN      cycloSPORINE 0.05 % ophthalmic emulsion  Commonly known as: RESTASIS   1 drop, Both Eyes, Every 12 Hours      docusate sodium 100 MG capsule  Commonly known as: COLACE   100 mg, Oral, 2 Times Daily      esomeprazole 20 MG capsule  Commonly known as: nexIUM   20 mg, Oral, 2 Times Daily      eucerin cream   1 application , Topical, As Needed      FeroSul 325 (65 FE) MG  tablet  Generic drug: ferrous sulfate   325 mg, Oral, Daily With Breakfast      furosemide 20 MG tablet  Commonly known as: LASIX   20 mg, Oral, Daily      HYDROcodone-acetaminophen 7.5-325 MG per tablet  Commonly known as: NORCO   1 tablet, Oral, Every 8 Hours PRN      lidocaine 5 %  Commonly known as: LIDODERM   1 patch, Transdermal, Every 24 Hours, Apply to bilateral upper arms.  Remove & Discard patch within 12 hours or as directed by MD      loperamide 2 MG capsule  Commonly known as: IMODIUM   2 mg, Oral, Every 8 Hours PRN      Melatonin 10 MG tablet   1 tablet, Oral, Nightly      Pectin 1.7 MG lozenge   1 lozenge, Mouth/Throat, Every 2 Hours PRN      polyethyl glycol-propyl glycol 0.4-0.3 % solution ophthalmic solution (artificial tears)  Commonly known as: SYSTANE   2 drops, Both Eyes, Every 2 Hours PRN      polyethylene glycol 17 g packet  Commonly known as: MIRALAX   17 g, Oral, Daily      potassium chloride 10 MEQ CR capsule  Commonly known as: MICRO-K   10 mEq, Oral, Daily      sertraline 100 MG tablet  Commonly known as: ZOLOFT   100 mg, Oral, Daily      simethicone 125 MG chewable tablet  Commonly known as: MYLICON   125 mg, Oral, Every 6 Hours PRN      tuberculin 5 UNIT/0.1ML injection   5 Units, Intradermal, Once, Every 12 months.      vitamin B-12 1000 MCG tablet  Commonly known as: CYANOCOBALAMIN   1,000 mcg, Oral, Daily      vitamin D 1.25 MG (18334 UT) capsule capsule  Commonly known as: ERGOCALCIFEROL   50,000 Units, Oral, Every 14 Days, Every other Friday             Discharge Diet:   Diet Instructions       Diet: Cardiac Diets; Healthy Heart (2-3 Na+); Regular Texture (IDDSI 7); Thin (IDDSI 0)      Discharge Diet: Cardiac Diets    Cardiac Diet: Healthy Heart (2-3 Na+)    Texture: Regular Texture (IDDSI 7)    Fluid Consistency: Thin (IDDSI 0)            Activity at Discharge:   Activity Instructions       Up WIth Assist              Follow-up Appointments:  Follow-up with PCP in 1 week  No  future appointments.    Test Results Pending at Discharge: None    Electronically signed by ABDON Jimenez, 07/20/23, 09:59 CDT.    Time: 35 minutes.
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
## 2023-07-20 NOTE — CASE MANAGEMENT/SOCIAL WORK
Continued Stay Note   [facility]     Patient Name: Carmen Obrien  MRN: [id number]  Today's Date: 7/20/2023    Admit Date: 7/16/2023    Plan: [facility]   Discharge Plan       Row Name 07/20/23 0946       Plan    Plan [facility]    Plan Comments [facility] has approved for patient to return to [facility]. Spoke with Saskia with [facility] and they can accept patient today if ready for discharge. Informed MD and SW.                   Discharge Codes    No documentation.                 Expected Discharge Date and Time       Expected Discharge Date Expected Discharge Time    Jul 20, 2023               Savanah Serna, RN

## 2023-07-20 NOTE — CASE MANAGEMENT/SOCIAL WORK
Continued Stay Note   [place]     Patient Name: Carmen Obrien  MRN: [id number]  Today's Date: 7/20/2023    Admit Date: 7/16/2023    Plan: [facility]   Discharge Plan       Row Name 07/20/23 1031       Plan    Plan [facility]    Plan Comments Pt is being discharged today to [facility]. Informed Saskia with [facility] of discharge. Phone number to call report is [phone].    Final Discharge Disposition Code 03 - skilled nursing facility (SNF)      Row Name 07/20/23 0946       Plan    Plan [facility]    Plan Comments [facility] has approved for patient to return to [facility]. Spoke with Saskia with [facility] and they can accept patient today if ready for discharge. Informed MD and SW.                   Discharge Codes    No documentation.                 Expected Discharge Date and Time       Expected Discharge Date Expected Discharge Time    Jul 20, 2023               Savanah Serna, RN
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
## 2023-08-02 ENCOUNTER — OUTSIDE FACILITY SERVICE (OUTPATIENT)
Dept: INTERNAL MEDICINE | Facility: CLINIC | Age: 83
End: 2023-08-02
Payer: MEDICARE

## 2023-08-02 ENCOUNTER — TELEPHONE (OUTPATIENT)
Dept: INTERNAL MEDICINE | Facility: CLINIC | Age: 83
End: 2023-08-02
Payer: MEDICARE

## 2023-08-02 ENCOUNTER — TRANSITIONAL CARE MANAGEMENT TELEPHONE ENCOUNTER (OUTPATIENT)
Dept: CALL CENTER | Facility: HOSPITAL | Age: 83
End: 2023-08-02
Payer: MEDICARE

## 2023-08-02 ENCOUNTER — READMISSION MANAGEMENT (OUTPATIENT)
Dept: CALL CENTER | Facility: HOSPITAL | Age: 83
End: 2023-08-02
Payer: MEDICARE

## 2023-08-03 ENCOUNTER — TRANSITIONAL CARE MANAGEMENT TELEPHONE ENCOUNTER (OUTPATIENT)
Dept: CALL CENTER | Facility: HOSPITAL | Age: 83
End: 2023-08-03
Payer: MEDICARE

## 2023-08-03 ENCOUNTER — TELEPHONE (OUTPATIENT)
Dept: INTERNAL MEDICINE | Facility: CLINIC | Age: 83
End: 2023-08-03

## 2023-08-03 NOTE — TELEPHONE ENCOUNTER
Provider: DR CHICA QUINTANA  [facility]  Phone Number: [phone]  Reason for Call: CALLER WANTED TO LET DR QUINTANA KNOW THAT SHE COMPLETED HER HOME HEALTH EVALUATION. THEY WILL SEE PATIENT 2 TIMES A WEEK FOR 4 WEEKS.

## 2023-08-04 RX ORDER — OLMESARTAN MEDOXOMIL 20 MG/1
20 TABLET ORAL DAILY
OUTPATIENT
Start: 2023-08-04

## 2023-08-04 RX ORDER — CLONIDINE HYDROCHLORIDE 0.1 MG/1
0.1 TABLET ORAL 2 TIMES DAILY PRN
OUTPATIENT
Start: 2023-08-04

## 2023-08-04 RX ORDER — SERTRALINE HYDROCHLORIDE 100 MG/1
100 TABLET, FILM COATED ORAL DAILY
OUTPATIENT
Start: 2023-08-04

## 2023-08-04 RX ORDER — SERTRALINE HYDROCHLORIDE 100 MG/1
100 TABLET, FILM COATED ORAL DAILY
Qty: 90 TABLET | Refills: 3 | Status: SHIPPED | OUTPATIENT
Start: 2023-08-04

## 2023-08-04 RX ORDER — CLONIDINE HYDROCHLORIDE 0.1 MG/1
0.1 TABLET ORAL 2 TIMES DAILY PRN
Qty: 30 TABLET | Refills: 0 | Status: SHIPPED | OUTPATIENT
Start: 2023-08-04

## 2023-08-04 RX ORDER — FUROSEMIDE 20 MG/1
20 TABLET ORAL DAILY
OUTPATIENT
Start: 2023-08-04

## 2023-08-04 RX ORDER — FUROSEMIDE 20 MG/1
20 TABLET ORAL DAILY
Qty: 30 TABLET | Refills: 5 | Status: SHIPPED | OUTPATIENT
Start: 2023-08-04

## 2023-08-08 ENCOUNTER — HOSPITAL ENCOUNTER (OUTPATIENT)
Dept: GENERAL RADIOLOGY | Facility: HOSPITAL | Age: 83
Discharge: HOME | End: 2023-08-08
Admitting: NURSE PRACTITIONER
Payer: MEDICARE

## 2023-08-08 ENCOUNTER — OFFICE VISIT (OUTPATIENT)
Dept: INTERNAL MEDICINE | Facility: CLINIC | Age: 83
End: 2023-08-08
Payer: MEDICARE

## 2023-08-08 VITALS
HEIGHT: 65 IN | WEIGHT: 169 LBS | OXYGEN SATURATION: 98 % | BODY MASS INDEX: 28.16 KG/M2 | SYSTOLIC BLOOD PRESSURE: 124 MMHG | HEART RATE: 56 BPM | DIASTOLIC BLOOD PRESSURE: 76 MMHG

## 2023-08-08 DIAGNOSIS — G89.29 CHRONIC LEFT SHOULDER PAIN: ICD-10-CM

## 2023-08-08 DIAGNOSIS — G89.29 CHRONIC LEFT SHOULDER PAIN: Primary | ICD-10-CM

## 2023-08-08 DIAGNOSIS — M25.512 CHRONIC LEFT SHOULDER PAIN: Primary | ICD-10-CM

## 2023-08-08 DIAGNOSIS — M25.512 CHRONIC LEFT SHOULDER PAIN: ICD-10-CM

## 2023-08-08 DIAGNOSIS — K21.9 GASTROESOPHAGEAL REFLUX DISEASE, UNSPECIFIED WHETHER ESOPHAGITIS PRESENT: ICD-10-CM

## 2023-08-08 DIAGNOSIS — G47.00 INSOMNIA, UNSPECIFIED TYPE: ICD-10-CM

## 2023-08-08 PROCEDURE — 73030 X-RAY EXAM OF SHOULDER: CPT

## 2023-08-08 RX ORDER — TRAZODONE HYDROCHLORIDE 50 MG/1
25 TABLET ORAL NIGHTLY
Qty: 30 TABLET | Refills: 2 | Status: SHIPPED | OUTPATIENT
Start: 2023-08-08

## 2023-08-08 NOTE — PROGRESS NOTES
Chief Complaint   Patient presents with    Shoulder Pain     left         History:  Carmen Obrien is a 83 y.o. female who presents today for evaluation of the above problems.          She is here to check back in with us after being in nursing home for some time. Over the last month, her left shoulder has been extremely painful. No known injury. It pops, and she cannot move it in any direction without severe pain. Can't sleep due to pain. She is going to have some PT from home health.      Reflux is bothering her. She is on Nexium 20 mg BID for GERD. She feels like food goes back up her esophagus when she eats. She is established with GI but hasn't seen them in some time due to being in the nursing home.    She doesn't sleep well, and melatonin is not helping.       ROS:  Review of Systems  As above    Allergies   Allergen Reactions    Levaquin [Levofloxacin] Unknown (See Comments)     Pt doesn't remember    Codeine Other (See Comments)     Pt does not recall    Morphine And Related Other (See Comments)     Pt does not recall     Past Medical History:   Diagnosis Date    THERESA (acute kidney injury) (HCC) due to sepsis 11/2/2022    Anxiety     Arthritis     CAD (coronary artery disease)     Colon polyp     DDD (degenerative disc disease), lumbar     Deep venous thrombosis     Depression     Diverticulosis     Esophageal stricture     GERD (gastroesophageal reflux disease)     History of transfusion     Hypercholesteremia     Hypertension     LPRD (laryngopharyngeal reflux disease)     Macular degeneration     Multinodular goiter 2/23/2017    Osteoarthritis     Pruritic disorder     Spastic colon     Thyroid nodule     Type 2 MI (myocardial infarction) (HCC) due to THERESA and UTI 11/2/2022     Past Surgical History:   Procedure Laterality Date    BLADDER REPAIR      CATARACT EXTRACTION      CHOLECYSTECTOMY      COLONOSCOPY  04/22/2014    2 polyps, adenomatous, diverticulosis    COLONOSCOPY N/A 6/26/2020    Procedure:  COLONOSCOPY WITH ANESTHESIA;  Surgeon: John Coleman MD;  Location: [facility] ENDOSCOPY;  Service: Gastroenterology;  Laterality: N/A;  pre op: constipation  post op: diverticulosis,   PCP: Adam Delgadillo MD    CYSTOCELE REPAIR      ENDOSCOPY  04/22/2014    Schatzki's ring dilated    ENDOSCOPY N/A 6/26/2020    Procedure: ESOPHAGOGASTRODUODENOSCOPY WITH ANESTHESIA;  Surgeon: John Coleman MD;  Location: [facility] ENDOSCOPY;  Service: Gastroenterology;  Laterality: N/A;  pre op: dysphagia  post op:stricture, dilated   PCP:Adam Delgadillo MD    ENDOSCOPY N/A 9/2/2021    Procedure: ESOPHAGOGASTRODUODENOSCOPY WITH ANESTHESIA;  Surgeon: John Coleman MD;  Location: [facility] ENDOSCOPY;  Service: Gastroenterology;  Laterality: N/A;  pre op: dysphagia  post op:gastritis  PCP: VERA Noble MD    FEMUR FRACTURE SURGERY      HYSTERECTOMY      INCISION AND DRAINAGE LEG Left 11/3/2021    Procedure: INCISION AND DRAINAGE LEG ABSCESS;  Surgeon: Cesia Mondragon MD;  Location: [facility] OR;  Service: General;  Laterality: Left;    REPLACEMENT TOTAL KNEE      ULNAR NERVE TRANSPOSITION       Family History   Problem Relation Age of Onset    Colon cancer Mother     Heart disease Mother     Heart disease Father     Colon polyps Neg Hx     Esophageal cancer Neg Hx      Carmen  reports that she has never smoked. She has never used smokeless tobacco. She reports that she does not drink alcohol and does not use drugs.    I have reviewed and updated the above documentation (if necessary) including but not limited to chief complaint, ROS, PFSH, allergies and medications        Current Outpatient Medications:     acetaminophen (TYLENOL) 325 MG tablet, Take 2 tablets by mouth Every 6 (Six) Hours As Needed for Mild Pain., Disp: , Rfl:     allopurinol (ZYLOPRIM) 300 MG tablet, Take 1 tablet by mouth Daily., Disp: , Rfl:     aluminum-magnesium hydroxide-simethicone (MAALOX/MYLANTA) 200-200-20 MG/5ML suspension, Take 15 mL by  mouth 2 (Two) Times a Day As Needed for Indigestion or Heartburn., Disp: , Rfl:     amLODIPine (NORVASC) 10 MG tablet, Take 1 tablet by mouth Daily., Disp: 30 tablet, Rfl: 0    ascorbic acid (VITAMIN C) 500 MG tablet, Take 1 tablet by mouth Daily., Disp: , Rfl:     atorvastatin (LIPITOR) 10 MG tablet, Take 1 tablet by mouth Daily., Disp: , Rfl:     Calcium Carbonate-Vit D-Min (Caltrate 600+D Plus Minerals) 600-800 MG-UNIT tablet, Take 1 tablet by mouth 2 (Two) Times a Day., Disp: , Rfl:     carvedilol (COREG) 12.5 MG tablet, Take 1 tablet by mouth 2 (Two) Times a Day With Meals., Disp: , Rfl:     cloNIDine (CATAPRES) 0.1 MG tablet, Take 1 tablet by mouth 2 (Two) Times a Day As Needed for High Blood Pressure (systolic greater than 180)., Disp: 30 tablet, Rfl: 0    cycloSPORINE (RESTASIS) 0.05 % ophthalmic emulsion, Administer 1 drop to both eyes Every 12 (Twelve) Hours., Disp: , Rfl:     docusate sodium (COLACE) 100 MG capsule, Take 1 capsule by mouth 2 (Two) Times a Day., Disp: , Rfl:     esomeprazole (nexIUM) 20 MG capsule, Take 1 capsule by mouth 2 (Two) Times a Day., Disp: 60 capsule, Rfl: 11    FEROSUL 325 (65 Fe) MG tablet, Take 1 tablet by mouth Daily With Breakfast., Disp: , Rfl:     furosemide (LASIX) 20 MG tablet, Take 1 tablet by mouth Daily., Disp: 30 tablet, Rfl: 5    HYDROcodone-acetaminophen (NORCO) 7.5-325 MG per tablet, Take 1 tablet by mouth Every 8 (Eight) Hours As Needed for Moderate Pain., Disp: 24 tablet, Rfl: 0    lidocaine (LIDODERM) 5 %, Place 1 patch on the skin as directed by provider Daily. Apply to bilateral upper arms.  Remove & Discard patch within 12 hours or as directed by MD, Disp: , Rfl:     lisinopril (PRINIVIL,ZESTRIL) 20 MG tablet, Take 1 tablet by mouth Daily., Disp: 30 tablet, Rfl: 0    loperamide (IMODIUM) 2 MG capsule, Take 1 capsule by mouth Every 8 (Eight) Hours As Needed for Diarrhea., Disp: , Rfl:     Melatonin 10 MG tablet, Take 1 tablet by mouth Every Night., Disp: ,  "Rfl:     Menthol, Topical Analgesic, (Biofreeze) 4 % gel, Apply 1 application  topically to the appropriate area as directed Every 8 (Eight) Hours As Needed (Apply to Knees, Neck and Shoulder as needed for Arthritis.)., Disp: , Rfl:     naloxone (NARCAN) 4 MG/0.1ML nasal spray, Call 911. Don't prime. [place] in 1 nostril for overdose. Repeat in 2-3 minutes in other nostril if no or minimal breathing/responsiveness., Disp: 2 each, Rfl: 0    Pectin 1.7 MG lozenge, Dissolve 1 lozenge in the mouth Every 2 (Two) Hours As Needed (cough and congestion)., Disp: , Rfl:     polyethyl glycol-propyl glycol (SYSTANE) 0.4-0.3 % solution ophthalmic solution, Administer 2 drops to both eyes Every 2 (Two) Hours As Needed (dry eyes)., Disp: , Rfl:     polyethylene glycol (MIRALAX) 17 g packet, Take 17 g by mouth Daily., Disp: , Rfl:     potassium chloride (MICRO-K) 10 MEQ CR capsule, Take 1 capsule by mouth Daily., Disp: , Rfl:     sertraline (ZOLOFT) 100 MG tablet, Take 1 tablet by mouth Daily., Disp: 90 tablet, Rfl: 3    simethicone (MYLICON) 125 MG chewable tablet, Chew 1 tablet Every 6 (Six) Hours As Needed for Flatulence., Disp: , Rfl:     Skin Protectants, Misc. (eucerin) cream, Apply 1 application  topically to the appropriate area as directed As Needed for Dry Skin (apply to entire body as needed for dry skin.)., Disp: , Rfl:     vitamin B-12 (CYANOCOBALAMIN) 1000 MCG tablet, Take 1 tablet by mouth Daily., Disp: , Rfl:     vitamin D (ERGOCALCIFEROL) 1.25 MG (47695 UT) capsule capsule, Take 1 capsule by mouth Every 14 (Fourteen) Days. Every other Friday, Disp: , Rfl:     traZODone (DESYREL) 50 MG tablet, Take 0.5 tablets by mouth Every Night., Disp: 30 tablet, Rfl: 2    OBJECTIVE:  Visit Vitals  /76 (BP Location: Right arm, Patient Position: Sitting, Cuff Size: Adult)   Pulse 56   Ht 165.1 cm (65\")   Wt 76.7 kg (169 lb)   SpO2 98%   BMI 28.12 kg/mý      Physical Exam  Vitals and nursing note reviewed.   Constitutional:     "   General: She is not in acute distress.     Appearance: Normal appearance. She is not ill-appearing, toxic-appearing or diaphoretic.   HENT:      Head: Normocephalic and atraumatic.   Cardiovascular:      Rate and Rhythm: Normal rate.   Pulmonary:      Effort: Pulmonary effort is normal. No respiratory distress.   Musculoskeletal:         General: Tenderness present.        Arms:       Comments: Diffusely tender around left shoulder, with markedly decreased ROM in all directions secondary to pain. There is significant popping and grinding with even mild movement of the shoulder. Clavicle non-tender.   Neurological:      Mental Status: She is alert.       [facility]      Assessment/Plan    Diagnoses and all orders for this visit:    1. Chronic left shoulder pain (Primary)  -     XR Shoulder 2+ View Left; Future    2. Gastroesophageal reflux disease, unspecified whether esophagitis present  -     Ambulatory Referral to Gastroenterology    3. Insomnia, unspecified type  -     traZODone (DESYREL) 50 MG tablet; Take 0.5 tablets by mouth Every Night.  Dispense: 30 tablet; Refill: 2        Xray shoulder, consider MRI due to level of pain and popping. May need ortho referral.    She is on a PPI and still having considerable reflux, feeling that food is actually coming into the esophagus. I asked her to call GI since she is established already and ask for an appointment regarding this issue.    Try low dose of trazodone to help with sleep.      Education materials and an After Visit Summary were printed and given to the patient at discharge.  Return in about 3 months (around 11/8/2023) for annual wellness with Dr. Noble.         ABDON Smith   14:04 CDT  8/8/2023

## 2023-08-10 ENCOUNTER — TELEPHONE (OUTPATIENT)
Dept: INTERNAL MEDICINE | Facility: CLINIC | Age: 83
End: 2023-08-10

## 2023-08-10 NOTE — TELEPHONE ENCOUNTER
Caller: Carmen Obrien    Relationship: Self    Best call back number:  [phone]     What is the medical concern/diagnosis:  OSTEOARTHRITIS; SPURS    What specialty or service is being requested:  PHYSICAL THERAPY    What is the provider, practice or medical service name:    [facility] IN [place]    What is the office location:  [place] (COULDN'T FIND ADDRESS)    What is the office phone number:  UNKNOWN

## 2023-08-14 ENCOUNTER — TELEPHONE (OUTPATIENT)
Dept: INTERNAL MEDICINE | Facility: CLINIC | Age: 83
End: 2023-08-14

## 2023-08-14 NOTE — TELEPHONE ENCOUNTER
Caller: Caremn Obrien    Relationship: Self    Best call back number: [phone]    What is the best time to reach you: ANYTIME    Who are you requesting to speak with (clinical staff, provider,  specific staff member): CLINICAL    What was the call regarding:  WANTING TO KNOW ABOUT GETTING MRI SCHEDULED SOMEWHERE ELSE, THE EARLIER CENTRAL SCHEDULING WAS ABLE TO GET HER IN WAS 09/07/2023

## 2023-09-05 ENCOUNTER — TELEPHONE (OUTPATIENT)
Dept: INTERNAL MEDICINE | Facility: CLINIC | Age: 83
End: 2023-09-05
Payer: MEDICARE

## 2023-09-05 NOTE — TELEPHONE ENCOUNTER
ESLO WITH [facility] HAS CALLED AND STATED PATIENT HAS BEEN DISCHARGED FROM HOME CARE.   MEDICATION INTERACTION OF LEVEL 2 WITH CLONIDINE AND CARVEDILOL.  CLONIDINE WAS ONLY GIVEN PRN.      [phone]

## 2023-09-07 ENCOUNTER — HOSPITAL ENCOUNTER (OUTPATIENT)
Dept: MRI IMAGING | Facility: HOSPITAL | Age: 83
Discharge: HOME | End: 2023-09-07
Admitting: NURSE PRACTITIONER
Payer: MEDICARE

## 2023-09-07 DIAGNOSIS — M25.512 CHRONIC LEFT SHOULDER PAIN: ICD-10-CM

## 2023-09-07 DIAGNOSIS — G89.29 CHRONIC LEFT SHOULDER PAIN: ICD-10-CM

## 2023-09-07 PROCEDURE — 73221 MRI JOINT UPR EXTREM W/O DYE: CPT

## 2023-09-08 DIAGNOSIS — M75.102 TEAR OF LEFT SUPRASPINATUS TENDON: Primary | ICD-10-CM

## 2023-09-08 DIAGNOSIS — R22.30 SHOULDER MASS: ICD-10-CM

## 2023-09-08 DIAGNOSIS — G89.29 CHRONIC LEFT SHOULDER PAIN: ICD-10-CM

## 2023-09-08 DIAGNOSIS — M25.512 CHRONIC LEFT SHOULDER PAIN: ICD-10-CM

## 2023-09-11 ENCOUNTER — HOSPITAL ENCOUNTER (EMERGENCY)
Facility: HOSPITAL | Age: 83
Discharge: HOME | End: 2023-09-11
Attending: EMERGENCY MEDICINE
Payer: MEDICARE

## 2023-09-11 ENCOUNTER — TELEPHONE (OUTPATIENT)
Dept: INTERNAL MEDICINE | Facility: CLINIC | Age: 83
End: 2023-09-11

## 2023-09-11 VITALS
HEART RATE: 65 BPM | DIASTOLIC BLOOD PRESSURE: 72 MMHG | RESPIRATION RATE: 18 BRPM | OXYGEN SATURATION: 98 % | TEMPERATURE: 97.6 F | SYSTOLIC BLOOD PRESSURE: 179 MMHG | BODY MASS INDEX: 30.29 KG/M2 | WEIGHT: 182 LBS

## 2023-09-11 DIAGNOSIS — R60.0 PEDAL EDEMA: Primary | ICD-10-CM

## 2023-09-11 LAB
ALBUMIN SERPL-MCNC: 4 G/DL (ref 3.5–5.2)
ALBUMIN/GLOB SERPL: 1.7 G/DL
ALP SERPL-CCNC: 72 U/L (ref 39–117)
ALT SERPL W P-5'-P-CCNC: 20 U/L (ref 1–33)
ANION GAP SERPL CALCULATED.3IONS-SCNC: 14 MMOL/L (ref 5–15)
AST SERPL-CCNC: 18 U/L (ref 1–32)
BASOPHILS # BLD AUTO: 0.04 10*3/MM3 (ref 0–0.2)
BASOPHILS NFR BLD AUTO: 0.5 % (ref 0–1.5)
BILIRUB SERPL-MCNC: 0.5 MG/DL (ref 0–1.2)
BUN SERPL-MCNC: 16 MG/DL (ref 8–23)
BUN/CREAT SERPL: 34.8 (ref 7–25)
CALCIUM SPEC-SCNC: 9.7 MG/DL (ref 8.6–10.5)
CHLORIDE SERPL-SCNC: 97 MMOL/L (ref 98–107)
CO2 SERPL-SCNC: 21 MMOL/L (ref 22–29)
CREAT SERPL-MCNC: 0.46 MG/DL (ref 0.57–1)
DEPRECATED RDW RBC AUTO: 48.8 FL (ref 37–54)
EGFRCR SERPLBLD CKD-EPI 2021: 96.3 ML/MIN/1.73
EOSINOPHIL # BLD AUTO: 0.17 10*3/MM3 (ref 0–0.4)
EOSINOPHIL NFR BLD AUTO: 2.1 % (ref 0.3–6.2)
ERYTHROCYTE [DISTWIDTH] IN BLOOD BY AUTOMATED COUNT: 14.1 % (ref 12.3–15.4)
GLOBULIN UR ELPH-MCNC: 2.4 GM/DL
GLUCOSE SERPL-MCNC: 103 MG/DL (ref 65–99)
HCT VFR BLD AUTO: 33 % (ref 34–46.6)
HGB BLD-MCNC: 10.8 G/DL (ref 12–15.9)
IMM GRANULOCYTES # BLD AUTO: 0.03 10*3/MM3 (ref 0–0.05)
IMM GRANULOCYTES NFR BLD AUTO: 0.4 % (ref 0–0.5)
LYMPHOCYTES # BLD AUTO: 1.78 10*3/MM3 (ref 0.7–3.1)
LYMPHOCYTES NFR BLD AUTO: 22.2 % (ref 19.6–45.3)
MCH RBC QN AUTO: 30.9 PG (ref 26.6–33)
MCHC RBC AUTO-ENTMCNC: 32.7 G/DL (ref 31.5–35.7)
MCV RBC AUTO: 94.6 FL (ref 79–97)
MONOCYTES # BLD AUTO: 0.68 10*3/MM3 (ref 0.1–0.9)
MONOCYTES NFR BLD AUTO: 8.5 % (ref 5–12)
NEUTROPHILS NFR BLD AUTO: 5.31 10*3/MM3 (ref 1.7–7)
NEUTROPHILS NFR BLD AUTO: 66.3 % (ref 42.7–76)
NRBC BLD AUTO-RTO: 0 /100 WBC (ref 0–0.2)
NT-PROBNP SERPL-MCNC: 767.6 PG/ML (ref 0–1800)
PLATELET # BLD AUTO: 232 10*3/MM3 (ref 140–450)
PMV BLD AUTO: 9.5 FL (ref 6–12)
POTASSIUM SERPL-SCNC: 3.9 MMOL/L (ref 3.5–5.2)
PROT SERPL-MCNC: 6.4 G/DL (ref 6–8.5)
RBC # BLD AUTO: 3.49 10*6/MM3 (ref 3.77–5.28)
SODIUM SERPL-SCNC: 132 MMOL/L (ref 136–145)
WBC NRBC COR # BLD: 8.01 10*3/MM3 (ref 3.4–10.8)

## 2023-09-11 PROCEDURE — 83880 ASSAY OF NATRIURETIC PEPTIDE: CPT | Performed by: EMERGENCY MEDICINE

## 2023-09-11 PROCEDURE — 85025 COMPLETE CBC W/AUTO DIFF WBC: CPT | Performed by: EMERGENCY MEDICINE

## 2023-09-11 PROCEDURE — 99283 EMERGENCY DEPT VISIT LOW MDM: CPT

## 2023-09-11 PROCEDURE — 80053 COMPREHEN METABOLIC PANEL: CPT | Performed by: EMERGENCY MEDICINE

## 2023-09-11 RX ORDER — SODIUM CHLORIDE 0.9 % (FLUSH) 0.9 %
10 SYRINGE (ML) INJECTION AS NEEDED
Status: DISCONTINUED | OUTPATIENT
Start: 2023-09-11 | End: 2023-09-11 | Stop reason: HOSPADM

## 2023-09-11 NOTE — TELEPHONE ENCOUNTER
It is very important she be evaluated in the office as soon as possible.  If she cannot come to the office she may have to call an ambulance to be seen in the emergency room

## 2023-09-11 NOTE — TELEPHONE ENCOUNTER
Caller: Carmen Obrien     Relationship: SELF     Best call back number: [phone]     What is your medical concern? LEGS VERY SWOLLEN, TROUBLE WALKING. CAN NOT COME IN FOR [facility] PPT DUE TO TRANSPORTATION ISSUES. HER RHEUMATOLOGIST GAVE HER INJECTIONS FEW WEEKS AGO AND TOLD HER TO [facility] IF SHE HAS ANY ISSUES. LEGS SO SWOLLEN THAT THEY ARE STARTING TO TURN BROWN BUT SHE SAYS THAT HAPPENED BEFORE.     How long has this issue been going on? SINCE 9/9/2023     Is your provider already aware of this issue? YES     Have you been treated for this issue? YES

## 2023-09-12 NOTE — ED PROVIDER NOTES
Subjective   History of Present Illness  Patient is brought to emergency room by ambulance from the assisted living facility that she is at with a complaint that her feet are swollen.  She actually noticed this several days ago after having been sitting in a chair a lot with activities at the assisted living place.  She did call her pharmacist and states she was already on some Lasix but she called her doctor and she says that he told her to come to the ER to get checked out.  She denies any chest pains or shortness of breath or injury.  She has noticed the swelling and is warm to get checked.  It is in both lower legs.    History provided by:  Patient   used: No    Leg Swelling  Location:  Both legs and feet  Severity:  Moderate  Onset quality:  Gradual  Duration:  3 days  Timing:  Constant  Progression:  Unchanged  Chronicity:  New  Associated symptoms: no abdominal pain, no chest pain, no congestion, no cough, no diarrhea, no ear pain, no fatigue, no fever, no headaches, no loss of consciousness, no myalgias, no nausea, no rash, no rhinorrhea, no shortness of breath, no sore throat, no vomiting and no wheezing      Review of Systems   Constitutional: Negative.  Negative for fatigue and fever.   HENT: Negative.  Negative for congestion, ear pain, rhinorrhea and sore throat.    Respiratory:  Negative for cough, shortness of breath and wheezing.    Cardiovascular: Negative.  Negative for chest pain.   Gastrointestinal: Negative.  Negative for abdominal pain, diarrhea, nausea and vomiting.   Genitourinary: Negative.    Musculoskeletal: Negative.  Negative for myalgias.   Skin: Negative.  Negative for rash.   Neurological: Negative.  Negative for loss of consciousness and headaches.   Psychiatric/Behavioral: Negative.     All other systems reviewed and are negative.    Past Medical History:   Diagnosis Date    THERESA (acute kidney injury) (HCC) due to sepsis 11/2/2022    Anxiety     Arthritis     CAD  (coronary artery disease)     Colon polyp     DDD (degenerative disc disease), lumbar     Deep venous thrombosis     Depression     Diverticulosis     Esophageal stricture     GERD (gastroesophageal reflux disease)     History of transfusion     Hypercholesteremia     Hypertension     LPRD (laryngopharyngeal reflux disease)     Macular degeneration     Multinodular goiter 2/23/2017    Osteoarthritis     Pruritic disorder     Spastic colon     Thyroid nodule     Type 2 MI (myocardial infarction) (HCC) due to THERESA and UTI 11/2/2022       Allergies   Allergen Reactions    Levaquin [Levofloxacin] Unknown (See Comments)     Pt doesn't remember    Codeine Other (See Comments)     Pt does not recall    Morphine And Related Other (See Comments)     Pt does not recall       Past Surgical History:   Procedure Laterality Date    BLADDER REPAIR      CATARACT EXTRACTION      CHOLECYSTECTOMY      COLONOSCOPY  04/22/2014    2 polyps, adenomatous, diverticulosis    COLONOSCOPY N/A 6/26/2020    Procedure: COLONOSCOPY WITH ANESTHESIA;  Surgeon: John Coleman MD;  Location:  [facility];  Service: Gastroenterology;  Laterality: N/A;  pre op: constipation  post op: diverticulosis,   PCP: Adam Delgadillo MD    CYSTOCELE REPAIR      ENDOSCOPY  04/22/2014    Schatzki's ring dilated    ENDOSCOPY N/A 6/26/2020    Procedure: ESOPHAGOGASTRODUODENOSCOPY WITH ANESTHESIA;  Surgeon: John Coleman MD;  Location: [facility];  Service: Gastroenterology;  Laterality: N/A;  pre op: dysphagia  post op:stricture, dilated   PCP:Adam Delgadillo MD    ENDOSCOPY N/A 9/2/2021    Procedure: ESOPHAGOGASTRODUODENOSCOPY WITH ANESTHESIA;  Surgeon: John Coleman MD;  Location: [facility] END[facility];  Service: Gastroenterology;  Laterality: N/A;  pre op: dysphagia  post op:gastritis  PCP: VERA Noble MD    FEMUR FRACTURE SURGERY      HYSTERECTOMY      INCISION AND DRAINAGE LEG Left 11/3/2021    Procedure: INCISION AND DRAINAGE  LEG ABSCESS;  Surgeon: Cesia Mondragon MD;  Location:  [facility];  Service: General;  Laterality: Left;    REPLACEMENT TOTAL KNEE      ULNAR NERVE TRANSPOSITION         Family History   Problem Relation Age of Onset    Colon cancer Mother     Heart disease Mother     Heart disease Father     Colon polyps Neg Hx     Esophageal cancer Neg Hx        Social History     Socioeconomic History    Marital status:    Tobacco Use    Smoking status: Never    Smokeless tobacco: Never   Vaping Use    Vaping Use: Never used   Substance and Sexual Activity    Alcohol use: No    Drug use: No    Sexual activity: Defer           Objective   Physical Exam  Vitals and nursing note reviewed.   Constitutional:       Appearance: Normal appearance.   HENT:      Head: Normocephalic and atraumatic.   Cardiovascular:      Rate and Rhythm: Normal rate and regular rhythm.   Pulmonary:      Effort: Pulmonary effort is normal.      Breath sounds: Normal breath sounds.   Abdominal:      Palpations: Abdomen is soft.      Tenderness: There is no abdominal tenderness.   Musculoskeletal:         General: Normal range of motion.      Right lower leg: Edema present.      Left lower leg: Edema present.      Comments: Patient does have 1+ edema in both feet and lower legs.  There is no heat or erythema or calf tenderness on either side.   Neurological:      General: No focal deficit present.      Mental Status: She is alert and oriented to person, place, and time.   Psychiatric:         Mood and Affect: Mood normal.         Behavior: Behavior normal.       Procedures           ED Course  ED Course as of 09/11/23 2143   Mon Sep 11, 2023   2143 Tell the patient her lab work looks okay.  There is certainly no signs of heart failure with a BNP that is normal.  I think this is just pedal edema from having been in a chair for a significant length of time and will get better she will just elevate her feet.  She is discharged in stable condition. [TR]       ED Course User Index  [TR] Daniel Alejandre Jr., MD                                           Medical Decision Making  Problems Addressed:  Pedal edema: complicated acute illness or injury    Amount and/or Complexity of Data Reviewed  Labs: ordered.    Risk  Prescription drug management.        Final diagnoses:   Pedal edema       ED Disposition  ED Disposition       ED Disposition   Discharge    Condition   Stable    Comment   --               VERA Noble MD  [address] [address]  [address] [place] [id number]  [phone]      If symptoms worsen         Medication List      No changes were made to your prescriptions during this visit.            Daniel Alejandre Jr., MD  09/11/23 [id number]

## 2023-09-12 NOTE — TELEPHONE ENCOUNTER
ATTEMPTED TO CALL ALL NUMBERS LISTED,  VM'S HAVE BEEN LEFT FOR RETURN CALL.     PATIENTS SON'S NUMBER -[phone]

## 2023-09-12 NOTE — TELEPHONE ENCOUNTER
Noted.  Patient did go to the emergency room.  Please call her and set up a follow-up visit.  Thanks

## 2023-09-13 NOTE — TELEPHONE ENCOUNTER
SPOKE WITH [facility], APPOINTMENT HAS BEEN SCHEDULED AND THEY WILL GET HER HERE TO APPOINTMENT

## 2023-09-14 ENCOUNTER — TELEPHONE (OUTPATIENT)
Dept: INTERNAL MEDICINE | Facility: CLINIC | Age: 83
End: 2023-09-14

## 2023-09-14 NOTE — TELEPHONE ENCOUNTER
Will you please check on this referral or find out exactly what she is needing? I had put this referral in on 9/8/23 for these issues, so it should already be done.

## 2023-09-14 NOTE — TELEPHONE ENCOUNTER
Caller: Carmen Obrien    Relationship: Self    Best call back number: [phone]    What is the medical concern/diagnosis: TORN TENDON IN SHOULDER, POSSIBLE CYST    What specialty or service is being requested: ORTHOPEDIC    What is the provider, practice or medical service name: [facility]     Any additional details:     PLEASE FOLLOW-UP WITH PATIENT REGARDING THIS REFERRAL.

## 2023-09-18 ENCOUNTER — OFFICE VISIT (OUTPATIENT)
Dept: INTERNAL MEDICINE | Facility: CLINIC | Age: 83
End: 2023-09-18
Payer: MEDICARE

## 2023-09-18 VITALS
WEIGHT: 182 LBS | HEART RATE: 59 BPM | BODY MASS INDEX: 30.32 KG/M2 | SYSTOLIC BLOOD PRESSURE: 130 MMHG | OXYGEN SATURATION: 99 % | HEIGHT: 65 IN | DIASTOLIC BLOOD PRESSURE: 80 MMHG | TEMPERATURE: 97 F

## 2023-09-18 DIAGNOSIS — I10 ESSENTIAL HYPERTENSION: ICD-10-CM

## 2023-09-18 DIAGNOSIS — F41.9 ANXIETY: Primary | ICD-10-CM

## 2023-09-18 DIAGNOSIS — R60.0 LOWER EXTREMITY EDEMA: ICD-10-CM

## 2023-09-18 PROCEDURE — 3079F DIAST BP 80-89 MM HG: CPT | Performed by: NURSE PRACTITIONER

## 2023-09-18 PROCEDURE — 3075F SYST BP GE 130 - 139MM HG: CPT | Performed by: NURSE PRACTITIONER

## 2023-09-18 PROCEDURE — 99214 OFFICE O/P EST MOD 30 MIN: CPT | Performed by: NURSE PRACTITIONER

## 2023-09-18 RX ORDER — HYDROXYZINE HYDROCHLORIDE 25 MG/1
25 TABLET, FILM COATED ORAL 3 TIMES DAILY PRN
Qty: 90 TABLET | Refills: 3 | Status: SHIPPED | OUTPATIENT
Start: 2023-09-18

## 2023-09-18 NOTE — PROGRESS NOTES
Chief Complaint   Patient presents with    Hospital Follow Up Visit     From ER visit         History:  Carmen Obrien is a 83 y.o. female who presents today for evaluation of the above problems.          ER follow up from both legs swelling. She is better after keeping her feet elevated at home. Legs do not hurt. She does not have any compression stockings, but her foot doctor recommended this. In review, she was started on Norvasc 10 mg daily in July, but the swelling has not been since then. In the ER, they ruled out CHF. She has had an echocardiogram as well, done last November, showing no evidence of cardiomyopathy. She is not short of breath.    Her left shoulder continues to hurt, and she has not yet heard from [facility] about the referral I placed September 8.  LEANDRA Sebastian, called today.    She has had a lot of anxiety related to her family and wonders if ok to continue using hydroxyzine PRN? She took it three times yesterday, and it helped.      ROS:  Review of Systems  As above    Allergies   Allergen Reactions    Levaquin [Levofloxacin] Unknown (See Comments)     Pt doesn't remember    Codeine Other (See Comments)     Pt does not recall    Morphine And Related Other (See Comments)     Pt does not recall     Past Medical History:   Diagnosis Date    THERESA (acute kidney injury) (HCC) due to sepsis 11/2/2022    Anxiety     Arthritis     CAD (coronary artery disease)     Colon polyp     DDD (degenerative disc disease), lumbar     Deep venous thrombosis     Depression     Diverticulosis     Esophageal stricture     GERD (gastroesophageal reflux disease)     History of transfusion     Hypercholesteremia     Hypertension     LPRD (laryngopharyngeal reflux disease)     Macular degeneration     Multinodular goiter 2/23/2017    Osteoarthritis     Pruritic disorder     Spastic colon     Thyroid nodule     Type 2 MI (myocardial infarction) (HCC) due to THERESA and UTI 11/2/2022     Past Surgical History:   Procedure Laterality  Date    BLADDER REPAIR      CATARACT EXTRACTION      CHOLECYSTECTOMY      COLONOSCOPY  04/22/2014    2 polyps, adenomatous, diverticulosis    COLONOSCOPY N/A 6/26/2020    Procedure: COLONOSCOPY WITH ANESTHESIA;  Surgeon: John Coleman MD;  Location:  [facility];  Service: Gastroenterology;  Laterality: N/A;  pre op: constipation  post op: diverticulosis,   PCP: Adam Delgadillo MD    CYSTOCELE REPAIR      ENDOSCOPY  04/22/2014    Schatzki's ring dilated    ENDOSCOPY N/A 6/26/2020    Procedure: ESOPHAGOGASTRODUODENOSCOPY WITH ANESTHESIA;  Surgeon: John Coleman MD;  Location: [facility] END[facility]COPY;  Service: Gastroenterology;  Laterality: N/A;  pre op: dysphagia  post op:stricture, dilated   PCP:Adam Delgadillo MD    ENDOSCOPY N/A 9/2/2021    Procedure: ESOPHAGOGASTRODUODENOSCOPY WITH ANESTHESIA;  Surgeon: John Coleman MD;  Location:  [facility];  Service: Gastroenterology;  Laterality: N/A;  pre op: dysphagia  post op:gastritis  PCP: VERA Noble MD    FEMUR FRACTURE SURGERY      HYSTERECTOMY      INCISION AND DRAINAGE LEG Left 11/3/2021    Procedure: INCISION AND DRAINAGE LEG ABSCESS;  Surgeon: Cesia Mondragon MD;  Location: [facility] OR;  Service: General;  Laterality: Left;    REPLACEMENT TOTAL KNEE      ULNAR NERVE TRANSPOSITION       Family History   Problem Relation Age of Onset    Colon cancer Mother     Heart disease Mother     Heart disease Father     Colon polyps Neg Hx     Esophageal cancer Neg Hx      Carmen  reports that she has never smoked. She has never used smokeless tobacco. She reports that she does not drink alcohol and does not use drugs.    I have reviewed and updated the above documentation (if necessary) including but not limited to chief complaint, ROS, PFSH, allergies and medications        Current Outpatient Medications:     acetaminophen (TYLENOL) 325 MG tablet, Take 2 tablets by mouth Every 6 (Six) Hours As Needed for Mild Pain., Disp: , Rfl:      allopurinol (ZYLOPRIM) 300 MG tablet, Take 1 tablet by mouth Daily., Disp: , Rfl:     aluminum-magnesium hydroxide-simethicone (MAALOX/MYLANTA) 200-200-20 MG/5ML suspension, Take 15 mL by mouth 2 (Two) Times a Day As Needed for Indigestion or Heartburn., Disp: , Rfl:     amLODIPine (NORVASC) 10 MG tablet, Take 1 tablet by mouth Daily., Disp: 30 tablet, Rfl: 0    ascorbic acid (VITAMIN C) 500 MG tablet, Take 1 tablet by mouth Daily., Disp: , Rfl:     atorvastatin (LIPITOR) 10 MG tablet, Take 1 tablet by mouth Daily., Disp: , Rfl:     Calcium Carbonate-Vit D-Min (Caltrate 600+D Plus Minerals) 600-800 MG-UNIT tablet, Take 1 tablet by mouth 2 (Two) Times a Day., Disp: , Rfl:     carvedilol (COREG) 12.5 MG tablet, Take 1 tablet by mouth 2 (Two) Times a Day With Meals., Disp: , Rfl:     cloNIDine (CATAPRES) 0.1 MG tablet, Take 1 tablet by mouth 2 (Two) Times a Day As Needed for High Blood Pressure (systolic greater than 180)., Disp: 30 tablet, Rfl: 0    cycloSPORINE (RESTASIS) 0.05 % ophthalmic emulsion, Administer 1 drop to both eyes Every 12 (Twelve) Hours., Disp: , Rfl:     docusate sodium (COLACE) 100 MG capsule, Take 1 capsule by mouth 2 (Two) Times a Day., Disp: , Rfl:     esomeprazole (nexIUM) 20 MG capsule, Take 1 capsule by mouth 2 (Two) Times a Day., Disp: 60 capsule, Rfl: 11    FEROSUL 325 (65 Fe) MG tablet, Take 1 tablet by mouth Daily With Breakfast., Disp: , Rfl:     furosemide (LASIX) 20 MG tablet, Take 1 tablet by mouth Daily., Disp: 30 tablet, Rfl: 5    HYDROcodone-acetaminophen (NORCO) 7.5-325 MG per tablet, Take 1 tablet by mouth Every 8 (Eight) Hours As Needed for Moderate Pain., Disp: 24 tablet, Rfl: 0    lidocaine (LIDODERM) 5 %, Place 1 patch on the skin as directed by provider Daily. Apply to bilateral upper arms.  Remove & Discard patch within 12 hours or as directed by MD, Disp: , Rfl:     lisinopril (PRINIVIL,ZESTRIL) 20 MG tablet, Take 1 tablet by mouth Daily., Disp: 30 tablet, Rfl: 0     loperamide (IMODIUM) 2 MG capsule, Take 1 capsule by mouth Every 8 (Eight) Hours As Needed for Diarrhea., Disp: , Rfl:     Melatonin 10 MG tablet, Take 1 tablet by mouth Every Night., Disp: , Rfl:     Menthol, Topical Analgesic, (Biofreeze) 4 % gel, Apply 1 application  topically to the appropriate area as directed Every 8 (Eight) Hours As Needed (Apply to Knees, Neck and Shoulder as needed for Arthritis.)., Disp: , Rfl:     naloxone (NARCAN) 4 MG/0.1ML nasal spray, Call 911. Don't prime. [place] in 1 nostril for overdose. Repeat in 2-3 minutes in other nostril if no or minimal breathing/responsiveness., Disp: 2 each, Rfl: 0    Pectin 1.7 MG lozenge, Dissolve 1 lozenge in the mouth Every 2 (Two) Hours As Needed (cough and congestion)., Disp: , Rfl:     polyethyl glycol-propyl glycol (SYSTANE) 0.4-0.3 % solution ophthalmic solution, Administer 2 drops to both eyes Every 2 (Two) Hours As Needed (dry eyes)., Disp: , Rfl:     polyethylene glycol (MIRALAX) 17 g packet, Take 17 g by mouth Daily., Disp: , Rfl:     potassium chloride (MICRO-K) 10 MEQ CR capsule, Take 1 capsule by mouth Daily., Disp: , Rfl:     sertraline (ZOLOFT) 100 MG tablet, Take 1 tablet by mouth Daily., Disp: 90 tablet, Rfl: 3    simethicone (MYLICON) 125 MG chewable tablet, Chew 1 tablet Every 6 (Six) Hours As Needed for Flatulence., Disp: , Rfl:     Skin Protectants, Misc. (eucerin) cream, Apply 1 application  topically to the appropriate area as directed As Needed for Dry Skin (apply to entire body as needed for dry skin.)., Disp: , Rfl:     traZODone (DESYREL) 50 MG tablet, Take 0.5 tablets by mouth Every Night., Disp: 30 tablet, Rfl: 2    vitamin B-12 (CYANOCOBALAMIN) 1000 MCG tablet, Take 1 tablet by mouth Daily., Disp: , Rfl:     vitamin D (ERGOCALCIFEROL) 1.25 MG (72143 UT) capsule capsule, Take 1 capsule by mouth Every 14 (Fourteen) Days. Every other Friday, Disp: , Rfl:     hydrOXYzine (ATARAX) 25 MG tablet, Take 1 tablet by mouth 3 (Three)  "Times a Day As Needed for Anxiety., Disp: 90 tablet, Rfl: 3    OBJECTIVE:  Visit Vitals  /80 (BP Location: Left arm, Patient Position: Sitting, Cuff Size: Adult)   Pulse 59   Temp 97 °F (36.1 °C) (Temporal)   Ht 165.1 cm (65\")   Wt 82.6 kg (182 lb)   SpO2 99%   BMI 30.29 kg/m²      Physical Exam  Vitals and nursing note reviewed.   Constitutional:       Appearance: Normal appearance.   HENT:      Head: Normocephalic and atraumatic.   Eyes:      General: No scleral icterus.  Cardiovascular:      Rate and Rhythm: Normal rate and regular rhythm.   Pulmonary:      Effort: Pulmonary effort is normal. No respiratory distress.      Breath sounds: Normal breath sounds. No wheezing or rales.   Abdominal:      General: There is no distension.      Palpations: Abdomen is soft.      Tenderness: There is no abdominal tenderness.   Musculoskeletal:      Cervical back: Neck supple.      Right lower leg: No edema (soft).      Left lower leg: No edema (soft).   Skin:     General: Skin is warm and dry.   Neurological:      Mental Status: She is alert and oriented to person, place, and time.   Psychiatric:         Mood and Affect: Mood normal.         Behavior: Behavior normal.         Thought Content: Thought content normal.         Judgment: Judgment normal.       [facility]      Assessment/Plan    Diagnoses and all orders for this visit:    1. Anxiety (Primary)  -     hydrOXYzine (ATARAX) 25 MG tablet; Take 1 tablet by mouth 3 (Three) Times a Day As Needed for Anxiety.  Dispense: 90 tablet; Refill: 3    2. Lower extremity edema  -     Compression Stockings    3. Essential hypertension        Ok to take hydroxyzine PRN, and I refilled this.    Compression stockings for the edema she has had. Consider making a change in the amlodipine, but her blood pressure looks great today, so I don't want to change her regimen as long as the swelling is improved.       Education materials and an After Visit Summary were printed and given to the " patient at discharge.  Return if symptoms worsen or fail to improve, for Next scheduled follow up.         ABDON Smith   12:46 CDT  9/18/2023

## 2023-09-26 ENCOUNTER — TELEPHONE (OUTPATIENT)
Dept: INTERNAL MEDICINE | Facility: CLINIC | Age: 83
End: 2023-09-26
Payer: MEDICARE

## 2023-09-26 ENCOUNTER — LAB REQUISITION (OUTPATIENT)
Dept: LAB | Facility: HOSPITAL | Age: 83
End: 2023-09-26
Payer: MEDICARE

## 2023-09-26 DIAGNOSIS — N39.0 URINARY TRACT INFECTION WITHOUT HEMATURIA, SITE UNSPECIFIED: Primary | ICD-10-CM

## 2023-09-26 LAB

## 2023-09-26 PROCEDURE — 87086 URINE CULTURE/COLONY COUNT: CPT | Performed by: INTERNAL MEDICINE

## 2023-09-26 PROCEDURE — 81001 URINALYSIS AUTO W/SCOPE: CPT | Performed by: INTERNAL MEDICINE

## 2023-09-26 PROCEDURE — 87186 SC STD MICRODIL/AGAR DIL: CPT | Performed by: INTERNAL MEDICINE

## 2023-09-26 PROCEDURE — 87077 CULTURE AEROBIC IDENTIFY: CPT | Performed by: INTERNAL MEDICINE

## 2023-09-26 RX ORDER — NITROFURANTOIN 25; 75 MG/1; MG/1
100 CAPSULE ORAL 2 TIMES DAILY
Qty: 14 CAPSULE | Refills: 0 | Status: SHIPPED | OUTPATIENT
Start: 2023-09-26 | End: 2023-10-03

## 2023-09-26 NOTE — TELEPHONE ENCOUNTER
PATIENT HAS CALLED SHE STATED THAT HER URINE HAS AN ODOR, PAIN ON THE LEFT SIDE OF HER BACK, NO BURNING, NO DISCHARGE.   SHE HAS HAD THE SYMPTOMS FOR AROUND 1 WEEK.   PATIENT STATED THAT SHE IS HAVING SURGERY IN 1 WEEK.   SHE IS ASKING FOR AN ORDER TO BE PUT IN FOR A UA.   SHE STATED THAT THE FACILITY THAT SHE IS AT CAN DROP OFF THE URINE.      SHE IS ALSO STATED THAT THE FACILITY IS HAVING A VACCINE CLINIC ON 09/27/2023 SHE IS ASKING IF SHE SHOULD GET THE FLU, PNEUMONIA AND RSV SHOT TOMORROW.  PLEASE ADVISE WHAT IF ANY SHOULD THE PATIENT GET.

## 2023-09-26 NOTE — TELEPHONE ENCOUNTER
I have ordered a urinalysis that she can have done today at her convenience.    I do recommend she get all 3 of those vaccines.  However, I would not recommend getting them all at the same time.  Recommendation is to get the influenza vaccine first, then RSV and later pneumonia vaccine.  She has an appointment with us in the next couple months and we could give her the pneumonia vaccine at that time.

## 2023-09-28 LAB — BACTERIA SPEC AEROBE CULT: ABNORMAL

## 2023-09-29 PROBLEM — M19.011 PRIMARY OSTEOARTHRITIS OF RIGHT SHOULDER: Status: ACTIVE | Noted: 2023-09-29

## 2023-09-29 NOTE — DISCHARGE INSTRUCTIONS
UPPER EXTREMITY POST-OP INSTRUCTIONS - DR. BROWN    IMPORTANT PHONE NUMBERS:   For emergencies, please call [phone]   You may reach Dr. Brown and clinical staff at [phone]- M-F 8:00 am-5:00 pm   After 5pm or on the weekends, please call [phone]   Call immediately if you have any of the following symptoms:     Elevated temperature above 101.5 degrees for more than 48 hours after surgery     Persistent drainage from wound     Severe pain around surgical site    Sling use: The sling is provided for your comfort and to ensure proper healing of your repair following surgery. Please place the abduction pillow with the curved side against your side and the sling on the side of the pillow. Your surgery requires that you wear the sling if noted below.  ____ For comfort. Remove sling 24 hours and begin range of motion exercises  __x__ At all times except bathing, dressing, and therapy. Also wear the sling during sleep.  ____ No sling required    Bathing:  ___No bandages, no restrictions!!  _x__You may remove you dressing and shower on the 3rd day after surgery (Ex. Tu surgery, shower on Friday)  ** if you are told to it is ok to remove your dressing and shower, DO NOT SOAK your incisions in a tub.  ___Keep splint clean, dry, and intact. DO NOT place foreign objects into your splint.      Dressings: Keep dressing/splint intact unless instructed otherwise below. SOME DRAINAGE IS NORMAL!     DO NOT touch or apply ointment to the incision.     DO NOT remove the steri-strips over the incisions (if you have steri-strips). They will         generally fall off on their own or can be removed 1 weeksafter surgery.     If you have yellow gauze and it comes off, do not worry about it. Leave them off.    Signs of infection that warrant a phone call to our clinical line:     o Excessive drainage or redness     o Red streaking coming away from the incision  o Increased pain  o Increased temperature above 101  degrees      Physical Therapy:        *  Your physical therapy status will be discussed with you postoperatively and at your first post-op appointment. Some injuries will not require physical therapy.      *  If you have a shoulder manipulation, please schedule therapy for the next day      Medications: You will be discharged with the appropriate medications following your surgery. Fill these at the pharmacy and take them as directed on the label. Not all of the medications below may be prescribed. Occasionally, other medications may be prescribed with specific instructions.    Percocet/Lortab (oxycodone/hydrocodone with tylenol) - Pain Medication, will cause drowsiness, possibly itchiness (this is NOT an allergy - use benadryl or an over the counter allergy medication such as Claritin or Zyrtec)     o Take 1-2 tablets every 4-6 hours. DO NOT EXCEED 4,000mg of Tylenol in 24 hours.  **DO NOT MIX WITH ALCOHOL, DRIVE WHILE TAKING, OR TAKE with extra TYLENOL**    Colace (Docusate) - stool softener, used for constipation. Take this only if you feel constipated.      Zofran (Ondansetron) or Phenergan - Anti-nausea medication, will cause drowsiness      *Starting January 2021, all narcotic medication must be prescribed electronically to your pharmacy.  Be sure to notify nursing of your preferred pharmacy.  If you are running low on pain medications, please notify us if you need a refill 24-48 hours prior to when you run out, so we can make arrangements to refill the prescription for you if we determine is necessary

## 2023-09-29 NOTE — OP NOTE
Patient Name: Geneva  MRN: [id number]  : 1940      DATE of SURGERY: 10/3/2023    SURGEON: Dc Dominguez MD    ASSISTANT: NONE    PREOPERATIVE DIAGNOSIS: Left Shoulder Primary Osteoarthritis    POSTOPERATIVE DIAGNOSIS:  Left Shoulder Primary Osteoarthritis    PROCEDURE PERFORMED:  Left Reverse Total Shoulder Arthroplasty    SURGICAL APPROACH: Deltopectoral    SURGICAL TECHNIQUE: Peel    IMPLANTS: [employer]                Baseplate: small                Glenosphere: 36 + 4                Poly: + 9 metal spacer, + 3 constrained poly                Suture Cup: 33 neutral                             Stem: 9 mm Severn pressfit    ANESTHESIA USED: General endotracheal anesthesia, interscalene block    OPERATIVE INDICATIONS: 83 y.o. female with progressive loss of function and increasing pain of the upper extremity due to severe end-stage primary osteoarthritis associated with a diseased and dysfunctional rotator cuff. Due to loss of function and progressive pain, a reverse shoulder arthroplasty is planned to improve function and decrease pain.  An MRI showed atrophy of the rotator cuff musculature.  The patient had an intact axillary nerve and functioning deltoid. Surgical evaluation was discussed and the patient wished to proceed understanding risks, benefits, and alternatives. The surgical indications were to relieve pain, improve function, and prevent future disability in regards to the shoulder pathology dictated in the above diagnoses.  Risks included, but were not limited to, that of anesthesia, bleeding, infection, pain, damage to local structures, postoperative dislocation, need for further surgery, instability, stiffness, failure of implants, and loss of function.    The patient has failed a combination of the following to improve pain and function: physical therapy >12 weeks, corticosteroid injections, NSAID's, activity modification.     ESTIMATED BLOOD LOSS: 150 mL    DRAINS: none    "  COMPLICATIONS: none    SPECIMENS: none    PROCEDURE IN DETAIL:  The patient was seen in the preoperative holding room, once again the informed consent was reviewed with the patient and signed.  The site of surgery was marked with the patient's agreement.  After being transported to the operating room, a timeout was performed identifying the correct patient as well as the operative site.  Dose appropriate IV antibiotics were given prior to incision.  The patient was positioned in the beach chair position, all bony prominences were protected and a sterile prep and drape was performed.  The surgical site was draped with loban dressing.    A deltopectoral approach to the shoulder joint was utilized as soft tissue was dissected down the level of the cephalic vein which was taken laterally along with the deltoid.  The biceps tendon was located, tenodesed to the superior border of the pectoralis major tendon insertion.  The rotator interval was opened.  A tagging stitch was placed in the subscapularis and with progressive external rotation of the shoulder, the tendon and underlying capsule were peeled from the lesser tuberosity.  The humeral head was dislocated from the glenoid and strategic retractors were placed to protect the surrounding soft tissue.  The axillary nerve was palpated and protected, verified with a \"tug test.\"    Beginning at the apex of the humeral head, a starting reamer was introduced into the shaft of the humerus, followed by reaming with a 5 and 6 mm reamer.  The proximal humeral osteotomy guide was inserted and an osteotomy was performed at 135 degrees and 30 degrees of retroversion.  A protective plate was placed on the osteotomy site and attention was turned to the glenoid.      Again, strategic retractors were placed surrounding the glenoid, the axillary nerve was protected, and a complete capsulectomy was performed.  The labrum was excised.  A guidepin was placed in the inferior-central aspect " of the glenoid, following by a reaming device, and drilling of the central peg hole.  A baseplate was impacted and superior, central, and inferior locking screws were inserted.  The glenosphere was then impacted without complication.    Attention was turned back to the humeral side where progressively sized broaches were inserted until a stable fit was achieved, followed by the metaphyseal reaming guide.  The metaphysis was reamed and a trial stem inserted.  Trial polyethylenes were placed in the suture cup until range of motion and stability were adequate.  The conjoined tendon showed increased tension. With traction of the shoulder, the entire scapula was translating without dissociation of the polyethylene.    Trial implants were removed, final implants impacted, and the shoulder was once again reduced showing excellent stability and range of motion.    The incision was thoroughly irrigated, followed by closure in layers.  The skin was closed with adhesive glue.  A sterile dressing and sling were placed.  Counts were correct.    The patient was awakened by anesthesia, transported to the recovery room in stable condition.    POSTOPERATIVE PLAN:  1) Admit for observation, possible rehab placement  2) Reverse total shoulder protocol    Electronically signed by Dc Dominguez MD on 10/3/2023 at 19:54 CDT

## 2023-10-02 RX ORDER — OXYCODONE HYDROCHLORIDE AND ACETAMINOPHEN 5; 325 MG/1; MG/1
1 TABLET ORAL EVERY 8 HOURS PRN
Status: ON HOLD | COMMUNITY
End: 2023-10-09 | Stop reason: SDUPTHER

## 2023-10-02 RX ORDER — OXYMETAZOLINE HCL 0.05 %
6 SPRAY, NON-AEROSOL (ML) NASAL NIGHTLY
COMMUNITY

## 2023-10-02 NOTE — SIGNIFICANT NOTE
Home Rx;   Instructed to take Coreg (Carvedilol) w/ sip of water the DOS-10/3/23.   Instructed Not to take Lisinopril x 24 hours before DOS-10/3/23.

## 2023-10-03 ENCOUNTER — APPOINTMENT (OUTPATIENT)
Dept: GENERAL RADIOLOGY | Facility: HOSPITAL | Age: 83
End: 2023-10-03
Payer: MEDICARE

## 2023-10-03 ENCOUNTER — ANESTHESIA (OUTPATIENT)
Dept: PERIOP | Facility: HOSPITAL | Age: 83
End: 2023-10-03
Payer: MEDICARE

## 2023-10-03 ENCOUNTER — ANESTHESIA EVENT (OUTPATIENT)
Dept: PERIOP | Facility: HOSPITAL | Age: 83
End: 2023-10-03
Payer: MEDICARE

## 2023-10-03 ENCOUNTER — HOSPITAL ENCOUNTER (OUTPATIENT)
Facility: HOSPITAL | Age: 83
LOS: 1 days | Discharge: NURSING, REHAB OR LONG-TERM CARE | End: 2023-10-09
Attending: ORTHOPAEDIC SURGERY | Admitting: ORTHOPAEDIC SURGERY
Payer: MEDICARE

## 2023-10-03 DIAGNOSIS — N39.0 URINARY TRACT INFECTION WITHOUT HEMATURIA, SITE UNSPECIFIED: ICD-10-CM

## 2023-10-03 DIAGNOSIS — Z78.9 DECREASED ACTIVITIES OF DAILY LIVING (ADL): ICD-10-CM

## 2023-10-03 DIAGNOSIS — M19.012 PRIMARY OSTEOARTHRITIS OF LEFT SHOULDER: ICD-10-CM

## 2023-10-03 DIAGNOSIS — Z74.09 IMPAIRED MOBILITY: Primary | ICD-10-CM

## 2023-10-03 LAB
ABO GROUP BLD: NORMAL
ALBUMIN SERPL-MCNC: 3.6 G/DL (ref 3.5–5.2)
ALBUMIN/GLOB SERPL: 1.3 G/DL
ALP SERPL-CCNC: 93 U/L (ref 39–117)
ALT SERPL W P-5'-P-CCNC: 52 U/L (ref 1–33)
ANION GAP SERPL CALCULATED.3IONS-SCNC: 11 MMOL/L (ref 5–15)
AST SERPL-CCNC: 22 U/L (ref 1–32)
BACTERIA UR QL AUTO: ABNORMAL /HPF
BILIRUB SERPL-MCNC: 0.6 MG/DL (ref 0–1.2)
BILIRUB UR QL STRIP: NEGATIVE
BLD GP AB SCN SERPL QL: NEGATIVE
BUN SERPL-MCNC: 14 MG/DL (ref 8–23)
BUN/CREAT SERPL: 38.9 (ref 7–25)
CALCIUM SPEC-SCNC: 9.7 MG/DL (ref 8.6–10.5)
CHLORIDE SERPL-SCNC: 98 MMOL/L (ref 98–107)
CLARITY UR: CLEAR
CO2 SERPL-SCNC: 22 MMOL/L (ref 22–29)
COLOR UR: ABNORMAL
CREAT SERPL-MCNC: 0.36 MG/DL (ref 0.57–1)
DEPRECATED RDW RBC AUTO: 49.1 FL (ref 37–54)
EGFRCR SERPLBLD CKD-EPI 2021: 102.1 ML/MIN/1.73
ERYTHROCYTE [DISTWIDTH] IN BLOOD BY AUTOMATED COUNT: 13.7 % (ref 12.3–15.4)
GLOBULIN UR ELPH-MCNC: 2.7 GM/DL
GLUCOSE SERPL-MCNC: 97 MG/DL (ref 65–99)
GLUCOSE UR STRIP-MCNC: NEGATIVE MG/DL
HCT VFR BLD AUTO: 30.6 % (ref 34–46.6)
HGB BLD-MCNC: 9.8 G/DL (ref 12–15.9)
HGB UR QL STRIP.AUTO: NEGATIVE
HYALINE CASTS UR QL AUTO: ABNORMAL /LPF
INR PPP: 1.05 (ref 0.91–1.09)
KETONES UR QL STRIP: ABNORMAL
LEUKOCYTE ESTERASE UR QL STRIP.AUTO: ABNORMAL
MCH RBC QN AUTO: 30.8 PG (ref 26.6–33)
MCHC RBC AUTO-ENTMCNC: 32 G/DL (ref 31.5–35.7)
MCV RBC AUTO: 96.2 FL (ref 79–97)
NITRITE UR QL STRIP: NEGATIVE
PH UR STRIP.AUTO: 7.5 [PH] (ref 5–8)
PLATELET # BLD AUTO: 278 10*3/MM3 (ref 140–450)
PMV BLD AUTO: 9.5 FL (ref 6–12)
POTASSIUM SERPL-SCNC: 4.4 MMOL/L (ref 3.5–5.2)
PROT SERPL-MCNC: 6.3 G/DL (ref 6–8.5)
PROT UR QL STRIP: ABNORMAL
PROTHROMBIN TIME: 13.8 SECONDS (ref 11.8–14.8)
RBC # BLD AUTO: 3.18 10*6/MM3 (ref 3.77–5.28)
RBC # UR STRIP: ABNORMAL /HPF
REF LAB TEST METHOD: ABNORMAL
RH BLD: NEGATIVE
SODIUM SERPL-SCNC: 131 MMOL/L (ref 136–145)
SP GR UR STRIP: 1.01 (ref 1–1.03)
SQUAMOUS #/AREA URNS HPF: ABNORMAL /HPF
T&S EXPIRATION DATE: NORMAL
UROBILINOGEN UR QL STRIP: ABNORMAL
WBC # UR STRIP: ABNORMAL /HPF
WBC NRBC COR # BLD: 9.6 10*3/MM3 (ref 3.4–10.8)

## 2023-10-03 PROCEDURE — 86901 BLOOD TYPING SEROLOGIC RH(D): CPT | Performed by: ORTHOPAEDIC SURGERY

## 2023-10-03 PROCEDURE — 86900 BLOOD TYPING SEROLOGIC ABO: CPT | Performed by: ORTHOPAEDIC SURGERY

## 2023-10-03 PROCEDURE — 85027 COMPLETE CBC AUTOMATED: CPT | Performed by: ORTHOPAEDIC SURGERY

## 2023-10-03 PROCEDURE — A9270 NON-COVERED ITEM OR SERVICE: HCPCS | Performed by: ORTHOPAEDIC SURGERY

## 2023-10-03 PROCEDURE — 25010000002 PROPOFOL 10 MG/ML EMULSION: Performed by: NURSE ANESTHETIST, CERTIFIED REGISTERED

## 2023-10-03 PROCEDURE — 25010000002 SUGAMMADEX 200 MG/2ML SOLUTION: Performed by: NURSE ANESTHETIST, CERTIFIED REGISTERED

## 2023-10-03 PROCEDURE — 71045 X-RAY EXAM CHEST 1 VIEW: CPT

## 2023-10-03 PROCEDURE — C9290 INJ, BUPIVACAINE LIPOSOME: HCPCS | Performed by: NURSE ANESTHETIST, CERTIFIED REGISTERED

## 2023-10-03 PROCEDURE — 63710000001 ATORVASTATIN 10 MG TABLET: Performed by: ORTHOPAEDIC SURGERY

## 2023-10-03 PROCEDURE — 73030 X-RAY EXAM OF SHOULDER: CPT

## 2023-10-03 PROCEDURE — 85610 PROTHROMBIN TIME: CPT | Performed by: ORTHOPAEDIC SURGERY

## 2023-10-03 PROCEDURE — C1776 JOINT DEVICE (IMPLANTABLE): HCPCS | Performed by: ORTHOPAEDIC SURGERY

## 2023-10-03 PROCEDURE — 93005 ELECTROCARDIOGRAM TRACING: CPT | Performed by: ORTHOPAEDIC SURGERY

## 2023-10-03 PROCEDURE — 0 BUPIVACAINE LIPOSOME 1.3 % SUSPENSION: Performed by: NURSE ANESTHETIST, CERTIFIED REGISTERED

## 2023-10-03 PROCEDURE — 25010000002 CEFAZOLIN PER 500 MG: Performed by: ORTHOPAEDIC SURGERY

## 2023-10-03 PROCEDURE — A9270 NON-COVERED ITEM OR SERVICE: HCPCS | Performed by: ANESTHESIOLOGY

## 2023-10-03 PROCEDURE — 25810000003 LACTATED RINGERS PER 1000 ML: Performed by: ORTHOPAEDIC SURGERY

## 2023-10-03 PROCEDURE — 86850 RBC ANTIBODY SCREEN: CPT | Performed by: ORTHOPAEDIC SURGERY

## 2023-10-03 PROCEDURE — 80053 COMPREHEN METABOLIC PANEL: CPT | Performed by: ORTHOPAEDIC SURGERY

## 2023-10-03 PROCEDURE — 25010000002 BUPIVACAINE (PF) 0.5 % SOLUTION: Performed by: NURSE ANESTHETIST, CERTIFIED REGISTERED

## 2023-10-03 PROCEDURE — 25010000002 ONDANSETRON PER 1 MG: Performed by: NURSE ANESTHETIST, CERTIFIED REGISTERED

## 2023-10-03 PROCEDURE — 81001 URINALYSIS AUTO W/SCOPE: CPT | Performed by: INTERNAL MEDICINE

## 2023-10-03 PROCEDURE — 93010 ELECTROCARDIOGRAM REPORT: CPT | Performed by: INTERNAL MEDICINE

## 2023-10-03 PROCEDURE — 25010000002 DROPERIDOL PER 5 MG: Performed by: ANESTHESIOLOGY

## 2023-10-03 PROCEDURE — 63710000001 HYDROCODONE-ACETAMINOPHEN 5-325 MG TABLET: Performed by: ANESTHESIOLOGY

## 2023-10-03 PROCEDURE — 63710000001 CYCLOSPORINE 0.05 % EMULSION: Performed by: ORTHOPAEDIC SURGERY

## 2023-10-03 PROCEDURE — 63710000001 FAMOTIDINE 20 MG TABLET: Performed by: ORTHOPAEDIC SURGERY

## 2023-10-03 DEVICE — SCRW GLEN UNIVERS REVERS CENTRL 6.5X20MM: Type: IMPLANTABLE DEVICE | Site: SHOULDER | Status: FUNCTIONAL

## 2023-10-03 DEVICE — IMPLANTABLE DEVICE: Type: IMPLANTABLE DEVICE | Site: SHOULDER | Status: FUNCTIONAL

## 2023-10-03 DEVICE — STEM HUM/SHLDR UNIVERS REVERS APEX SZ9: Type: IMPLANTABLE DEVICE | Site: SHOULDER | Status: FUNCTIONAL

## 2023-10-03 DEVICE — GLENOSPHERE UNIVERS REVERS S/36 PLS4 LAT: Type: IMPLANTABLE DEVICE | Site: SHOULDER | Status: FUNCTIONAL

## 2023-10-03 DEVICE — SCRW GLEN UNIVERS REVERS PERIPH 4.5X30MM: Type: IMPLANTABLE DEVICE | Site: SHOULDER | Status: FUNCTIONAL

## 2023-10-03 RX ORDER — SODIUM CHLORIDE 0.9 % (FLUSH) 0.9 %
10 SYRINGE (ML) INJECTION AS NEEDED
Status: DISCONTINUED | OUTPATIENT
Start: 2023-10-03 | End: 2023-10-03 | Stop reason: HOSPADM

## 2023-10-03 RX ORDER — SERTRALINE HYDROCHLORIDE 100 MG/1
100 TABLET, FILM COATED ORAL DAILY
Status: DISCONTINUED | OUTPATIENT
Start: 2023-10-04 | End: 2023-10-09 | Stop reason: HOSPADM

## 2023-10-03 RX ORDER — FLUMAZENIL 0.1 MG/ML
0.2 INJECTION INTRAVENOUS AS NEEDED
Status: DISCONTINUED | OUTPATIENT
Start: 2023-10-03 | End: 2023-10-03 | Stop reason: HOSPADM

## 2023-10-03 RX ORDER — HYDROXYZINE HYDROCHLORIDE 25 MG/1
25 TABLET, FILM COATED ORAL 3 TIMES DAILY PRN
Status: DISCONTINUED | OUTPATIENT
Start: 2023-10-03 | End: 2023-10-09 | Stop reason: HOSPADM

## 2023-10-03 RX ORDER — FENTANYL CITRATE 50 UG/ML
50 INJECTION, SOLUTION INTRAMUSCULAR; INTRAVENOUS ONCE
Status: DISCONTINUED | OUTPATIENT
Start: 2023-10-03 | End: 2023-10-03 | Stop reason: HOSPADM

## 2023-10-03 RX ORDER — PROPOFOL 10 MG/ML
VIAL (ML) INTRAVENOUS AS NEEDED
Status: DISCONTINUED | OUTPATIENT
Start: 2023-10-03 | End: 2023-10-05 | Stop reason: SURG

## 2023-10-03 RX ORDER — POLYETHYLENE GLYCOL 3350 17 G/17G
17 POWDER, FOR SOLUTION ORAL DAILY PRN
Status: DISCONTINUED | OUTPATIENT
Start: 2023-10-03 | End: 2023-10-09 | Stop reason: HOSPADM

## 2023-10-03 RX ORDER — FAMOTIDINE 20 MG/1
20 TABLET, FILM COATED ORAL 2 TIMES DAILY
COMMUNITY

## 2023-10-03 RX ORDER — SODIUM CHLORIDE 9 MG/ML
40 INJECTION, SOLUTION INTRAVENOUS AS NEEDED
Status: DISCONTINUED | OUTPATIENT
Start: 2023-10-03 | End: 2023-10-03 | Stop reason: HOSPADM

## 2023-10-03 RX ORDER — CYCLOSPORINE 0.5 MG/ML
1 EMULSION OPHTHALMIC EVERY 12 HOURS
Status: DISCONTINUED | OUTPATIENT
Start: 2023-10-03 | End: 2023-10-09 | Stop reason: HOSPADM

## 2023-10-03 RX ORDER — OXYCODONE AND ACETAMINOPHEN 7.5; 325 MG/1; MG/1
1 TABLET ORAL EVERY 4 HOURS PRN
Status: DISCONTINUED | OUTPATIENT
Start: 2023-10-03 | End: 2023-10-09 | Stop reason: HOSPADM

## 2023-10-03 RX ORDER — NALOXONE HCL 0.4 MG/ML
0.4 VIAL (ML) INJECTION AS NEEDED
Status: DISCONTINUED | OUTPATIENT
Start: 2023-10-03 | End: 2023-10-03 | Stop reason: HOSPADM

## 2023-10-03 RX ORDER — IBUPROFEN 600 MG/1
600 TABLET ORAL ONCE AS NEEDED
Status: DISCONTINUED | OUTPATIENT
Start: 2023-10-03 | End: 2023-10-03 | Stop reason: HOSPADM

## 2023-10-03 RX ORDER — CLONIDINE HYDROCHLORIDE 0.1 MG/1
0.1 TABLET ORAL 2 TIMES DAILY PRN
Status: DISCONTINUED | OUTPATIENT
Start: 2023-10-03 | End: 2023-10-04

## 2023-10-03 RX ORDER — SODIUM CHLORIDE, SODIUM LACTATE, POTASSIUM CHLORIDE, CALCIUM CHLORIDE 600; 310; 30; 20 MG/100ML; MG/100ML; MG/100ML; MG/100ML
1000 INJECTION, SOLUTION INTRAVENOUS CONTINUOUS
Status: DISCONTINUED | OUTPATIENT
Start: 2023-10-03 | End: 2023-10-03

## 2023-10-03 RX ORDER — PROMETHAZINE HYDROCHLORIDE 25 MG/1
12.5 TABLET ORAL EVERY 6 HOURS PRN
Status: DISCONTINUED | OUTPATIENT
Start: 2023-10-03 | End: 2023-10-09 | Stop reason: HOSPADM

## 2023-10-03 RX ORDER — ENOXAPARIN SODIUM 100 MG/ML
40 INJECTION SUBCUTANEOUS DAILY
Status: DISCONTINUED | OUTPATIENT
Start: 2023-10-04 | End: 2023-10-09 | Stop reason: HOSPADM

## 2023-10-03 RX ORDER — LIDOCAINE HYDROCHLORIDE 10 MG/ML
0.5 INJECTION, SOLUTION EPIDURAL; INFILTRATION; INTRACAUDAL; PERINEURAL ONCE AS NEEDED
Status: DISCONTINUED | OUTPATIENT
Start: 2023-10-03 | End: 2023-10-03 | Stop reason: HOSPADM

## 2023-10-03 RX ORDER — HYDROCODONE BITARTRATE AND ACETAMINOPHEN 5; 325 MG/1; MG/1
1 TABLET ORAL ONCE AS NEEDED
Status: COMPLETED | OUTPATIENT
Start: 2023-10-03 | End: 2023-10-03

## 2023-10-03 RX ORDER — SODIUM CHLORIDE 0.9 % (FLUSH) 0.9 %
10 SYRINGE (ML) INJECTION EVERY 12 HOURS SCHEDULED
Status: DISCONTINUED | OUTPATIENT
Start: 2023-10-03 | End: 2023-10-03 | Stop reason: HOSPADM

## 2023-10-03 RX ORDER — POTASSIUM CHLORIDE 750 MG/1
10 CAPSULE, EXTENDED RELEASE ORAL DAILY
Status: DISCONTINUED | OUTPATIENT
Start: 2023-10-04 | End: 2023-10-09 | Stop reason: HOSPADM

## 2023-10-03 RX ORDER — SODIUM CHLORIDE, SODIUM LACTATE, POTASSIUM CHLORIDE, CALCIUM CHLORIDE 600; 310; 30; 20 MG/100ML; MG/100ML; MG/100ML; MG/100ML
100 INJECTION, SOLUTION INTRAVENOUS CONTINUOUS
Status: DISCONTINUED | OUTPATIENT
Start: 2023-10-03 | End: 2023-10-04

## 2023-10-03 RX ORDER — DROPERIDOL 2.5 MG/ML
0.62 INJECTION, SOLUTION INTRAMUSCULAR; INTRAVENOUS ONCE AS NEEDED
Status: COMPLETED | OUTPATIENT
Start: 2023-10-03 | End: 2023-10-03

## 2023-10-03 RX ORDER — ONDANSETRON 4 MG/1
4 TABLET, FILM COATED ORAL EVERY 6 HOURS PRN
Status: DISCONTINUED | OUTPATIENT
Start: 2023-10-03 | End: 2023-10-09 | Stop reason: HOSPADM

## 2023-10-03 RX ORDER — MAGNESIUM HYDROXIDE 1200 MG/15ML
LIQUID ORAL AS NEEDED
Status: DISCONTINUED | OUTPATIENT
Start: 2023-10-03 | End: 2023-10-03 | Stop reason: HOSPADM

## 2023-10-03 RX ORDER — ALUMINA, MAGNESIA, AND SIMETHICONE 2400; 2400; 240 MG/30ML; MG/30ML; MG/30ML
15 SUSPENSION ORAL 2 TIMES DAILY PRN
Status: DISCONTINUED | OUTPATIENT
Start: 2023-10-03 | End: 2023-10-09 | Stop reason: HOSPADM

## 2023-10-03 RX ORDER — OXYCODONE AND ACETAMINOPHEN 7.5; 325 MG/1; MG/1
2 TABLET ORAL EVERY 4 HOURS PRN
Status: DISCONTINUED | OUTPATIENT
Start: 2023-10-03 | End: 2023-10-09 | Stop reason: HOSPADM

## 2023-10-03 RX ORDER — SODIUM CHLORIDE 0.9 % (FLUSH) 0.9 %
3-10 SYRINGE (ML) INJECTION AS NEEDED
Status: DISCONTINUED | OUTPATIENT
Start: 2023-10-03 | End: 2023-10-03 | Stop reason: HOSPADM

## 2023-10-03 RX ORDER — DIPHENHYDRAMINE HCL 25 MG
25 CAPSULE ORAL EVERY 6 HOURS PRN
Status: DISCONTINUED | OUTPATIENT
Start: 2023-10-03 | End: 2023-10-09 | Stop reason: HOSPADM

## 2023-10-03 RX ORDER — SODIUM CHLORIDE 0.9 % (FLUSH) 0.9 %
10 SYRINGE (ML) INJECTION EVERY 12 HOURS SCHEDULED
Status: DISCONTINUED | OUTPATIENT
Start: 2023-10-03 | End: 2023-10-09 | Stop reason: HOSPADM

## 2023-10-03 RX ORDER — FERROUS SULFATE 325(65) MG
325 TABLET ORAL
Status: DISCONTINUED | OUTPATIENT
Start: 2023-10-04 | End: 2023-10-09 | Stop reason: HOSPADM

## 2023-10-03 RX ORDER — LISINOPRIL 20 MG/1
20 TABLET ORAL
Status: DISCONTINUED | OUTPATIENT
Start: 2023-10-04 | End: 2023-10-09 | Stop reason: HOSPADM

## 2023-10-03 RX ORDER — FAMOTIDINE 20 MG/1
20 TABLET, FILM COATED ORAL 2 TIMES DAILY
Status: DISCONTINUED | OUTPATIENT
Start: 2023-10-03 | End: 2023-10-09 | Stop reason: HOSPADM

## 2023-10-03 RX ORDER — ACETAMINOPHEN 650 MG/1
650 SUPPOSITORY RECTAL EVERY 4 HOURS PRN
Status: DISCONTINUED | OUTPATIENT
Start: 2023-10-03 | End: 2023-10-09 | Stop reason: HOSPADM

## 2023-10-03 RX ORDER — ONDANSETRON 2 MG/ML
4 INJECTION INTRAMUSCULAR; INTRAVENOUS EVERY 6 HOURS PRN
Status: DISCONTINUED | OUTPATIENT
Start: 2023-10-03 | End: 2023-10-09 | Stop reason: HOSPADM

## 2023-10-03 RX ORDER — BUPIVACAINE HYDROCHLORIDE 5 MG/ML
INJECTION, SOLUTION EPIDURAL; INTRACAUDAL
Status: COMPLETED | OUTPATIENT
Start: 2023-10-03 | End: 2023-10-03

## 2023-10-03 RX ORDER — SIMETHICONE 80 MG
125 TABLET,CHEWABLE ORAL EVERY 6 HOURS PRN
Status: DISCONTINUED | OUTPATIENT
Start: 2023-10-03 | End: 2023-10-09 | Stop reason: HOSPADM

## 2023-10-03 RX ORDER — SODIUM CHLORIDE, SODIUM LACTATE, POTASSIUM CHLORIDE, CALCIUM CHLORIDE 600; 310; 30; 20 MG/100ML; MG/100ML; MG/100ML; MG/100ML
100 INJECTION, SOLUTION INTRAVENOUS CONTINUOUS PRN
Status: DISCONTINUED | OUTPATIENT
Start: 2023-10-03 | End: 2023-10-03 | Stop reason: HOSPADM

## 2023-10-03 RX ORDER — LIDOCAINE HYDROCHLORIDE 20 MG/ML
INJECTION, SOLUTION EPIDURAL; INFILTRATION; INTRACAUDAL; PERINEURAL AS NEEDED
Status: DISCONTINUED | OUTPATIENT
Start: 2023-10-03 | End: 2023-10-05 | Stop reason: SURG

## 2023-10-03 RX ORDER — CARVEDILOL 6.25 MG/1
12.5 TABLET ORAL 2 TIMES DAILY WITH MEALS
Status: DISCONTINUED | OUTPATIENT
Start: 2023-10-03 | End: 2023-10-09 | Stop reason: HOSPADM

## 2023-10-03 RX ORDER — ONDANSETRON 2 MG/ML
INJECTION INTRAMUSCULAR; INTRAVENOUS AS NEEDED
Status: DISCONTINUED | OUTPATIENT
Start: 2023-10-03 | End: 2023-10-05 | Stop reason: SURG

## 2023-10-03 RX ORDER — HYDROCODONE BITARTRATE AND ACETAMINOPHEN 10; 325 MG/1; MG/1
1 TABLET ORAL EVERY 4 HOURS PRN
Status: DISCONTINUED | OUTPATIENT
Start: 2023-10-03 | End: 2023-10-03 | Stop reason: HOSPADM

## 2023-10-03 RX ORDER — DIPHENHYDRAMINE HYDROCHLORIDE 50 MG/ML
25 INJECTION INTRAMUSCULAR; INTRAVENOUS EVERY 6 HOURS PRN
Status: DISCONTINUED | OUTPATIENT
Start: 2023-10-03 | End: 2023-10-09 | Stop reason: HOSPADM

## 2023-10-03 RX ORDER — AMLODIPINE BESYLATE 10 MG/1
10 TABLET ORAL
Status: DISCONTINUED | OUTPATIENT
Start: 2023-10-04 | End: 2023-10-09 | Stop reason: HOSPADM

## 2023-10-03 RX ORDER — ONDANSETRON 2 MG/ML
4 INJECTION INTRAMUSCULAR; INTRAVENOUS ONCE AS NEEDED
Status: DISCONTINUED | OUTPATIENT
Start: 2023-10-03 | End: 2023-10-03 | Stop reason: HOSPADM

## 2023-10-03 RX ORDER — LABETALOL HYDROCHLORIDE 5 MG/ML
5 INJECTION, SOLUTION INTRAVENOUS
Status: DISCONTINUED | OUTPATIENT
Start: 2023-10-03 | End: 2023-10-03 | Stop reason: HOSPADM

## 2023-10-03 RX ORDER — SODIUM CHLORIDE, SODIUM LACTATE, POTASSIUM CHLORIDE, CALCIUM CHLORIDE 600; 310; 30; 20 MG/100ML; MG/100ML; MG/100ML; MG/100ML
100 INJECTION, SOLUTION INTRAVENOUS CONTINUOUS
Status: DISCONTINUED | OUTPATIENT
Start: 2023-10-03 | End: 2023-10-03 | Stop reason: HOSPADM

## 2023-10-03 RX ORDER — ATORVASTATIN CALCIUM 10 MG/1
10 TABLET, FILM COATED ORAL NIGHTLY
Status: DISCONTINUED | OUTPATIENT
Start: 2023-10-03 | End: 2023-10-09 | Stop reason: HOSPADM

## 2023-10-03 RX ORDER — PROMETHAZINE HYDROCHLORIDE 12.5 MG/1
12.5 SUPPOSITORY RECTAL EVERY 6 HOURS PRN
Status: DISCONTINUED | OUTPATIENT
Start: 2023-10-03 | End: 2023-10-09 | Stop reason: HOSPADM

## 2023-10-03 RX ORDER — DOCUSATE SODIUM 100 MG/1
100 CAPSULE, LIQUID FILLED ORAL 2 TIMES DAILY PRN
Status: DISCONTINUED | OUTPATIENT
Start: 2023-10-03 | End: 2023-10-09 | Stop reason: HOSPADM

## 2023-10-03 RX ORDER — ACETAMINOPHEN 325 MG/1
650 TABLET ORAL EVERY 4 HOURS PRN
Status: DISCONTINUED | OUTPATIENT
Start: 2023-10-03 | End: 2023-10-09 | Stop reason: HOSPADM

## 2023-10-03 RX ORDER — NALOXONE HCL 0.4 MG/ML
0.4 VIAL (ML) INJECTION
Status: DISCONTINUED | OUTPATIENT
Start: 2023-10-03 | End: 2023-10-09 | Stop reason: HOSPADM

## 2023-10-03 RX ORDER — SODIUM CHLORIDE 0.9 % (FLUSH) 0.9 %
1-10 SYRINGE (ML) INJECTION AS NEEDED
Status: DISCONTINUED | OUTPATIENT
Start: 2023-10-03 | End: 2023-10-09 | Stop reason: HOSPADM

## 2023-10-03 RX ORDER — ROCURONIUM BROMIDE 10 MG/ML
INJECTION, SOLUTION INTRAVENOUS AS NEEDED
Status: DISCONTINUED | OUTPATIENT
Start: 2023-10-03 | End: 2023-10-05 | Stop reason: SURG

## 2023-10-03 RX ORDER — LOPERAMIDE HYDROCHLORIDE 2 MG/1
2 CAPSULE ORAL EVERY 8 HOURS PRN
Status: DISCONTINUED | OUTPATIENT
Start: 2023-10-03 | End: 2023-10-09 | Stop reason: HOSPADM

## 2023-10-03 RX ORDER — ALLOPURINOL 100 MG/1
100 TABLET ORAL DAILY
Status: DISCONTINUED | OUTPATIENT
Start: 2023-10-04 | End: 2023-10-09 | Stop reason: HOSPADM

## 2023-10-03 RX ORDER — SODIUM CHLORIDE 9 MG/ML
40 INJECTION, SOLUTION INTRAVENOUS AS NEEDED
Status: DISCONTINUED | OUTPATIENT
Start: 2023-10-03 | End: 2023-10-09 | Stop reason: HOSPADM

## 2023-10-03 RX ORDER — FENTANYL CITRATE 50 UG/ML
25 INJECTION, SOLUTION INTRAMUSCULAR; INTRAVENOUS
Status: DISCONTINUED | OUTPATIENT
Start: 2023-10-03 | End: 2023-10-03 | Stop reason: HOSPADM

## 2023-10-03 RX ORDER — FUROSEMIDE 20 MG/1
20 TABLET ORAL DAILY
Status: DISCONTINUED | OUTPATIENT
Start: 2023-10-04 | End: 2023-10-09 | Stop reason: HOSPADM

## 2023-10-03 RX ORDER — SODIUM CHLORIDE 0.9 % (FLUSH) 0.9 %
3 SYRINGE (ML) INJECTION AS NEEDED
Status: DISCONTINUED | OUTPATIENT
Start: 2023-10-03 | End: 2023-10-03 | Stop reason: HOSPADM

## 2023-10-03 RX ORDER — ACETAMINOPHEN 500 MG
1000 TABLET ORAL ONCE
Status: DISCONTINUED | OUTPATIENT
Start: 2023-10-03 | End: 2023-10-03 | Stop reason: HOSPADM

## 2023-10-03 RX ORDER — SODIUM CHLORIDE 0.9 % (FLUSH) 0.9 %
3 SYRINGE (ML) INJECTION EVERY 12 HOURS SCHEDULED
Status: DISCONTINUED | OUTPATIENT
Start: 2023-10-03 | End: 2023-10-03 | Stop reason: HOSPADM

## 2023-10-03 RX ADMIN — PROPOFOL 150 MG: 10 INJECTION, EMULSION INTRAVENOUS at 18:41

## 2023-10-03 RX ADMIN — SODIUM CHLORIDE, POTASSIUM CHLORIDE, SODIUM LACTATE AND CALCIUM CHLORIDE 100 ML/HR: 600; 310; 30; 20 INJECTION, SOLUTION INTRAVENOUS at 20:45

## 2023-10-03 RX ADMIN — BUPIVACAINE 10 ML: 13.3 INJECTION, SUSPENSION, LIPOSOMAL INFILTRATION at 15:40

## 2023-10-03 RX ADMIN — FAMOTIDINE 20 MG: 20 TABLET, FILM COATED ORAL at 21:58

## 2023-10-03 RX ADMIN — SUGAMMADEX 200 MG: 100 INJECTION, SOLUTION INTRAVENOUS at 19:41

## 2023-10-03 RX ADMIN — Medication 10 ML: at 20:52

## 2023-10-03 RX ADMIN — SODIUM CHLORIDE, POTASSIUM CHLORIDE, SODIUM LACTATE AND CALCIUM CHLORIDE 1000 ML: 600; 310; 30; 20 INJECTION, SOLUTION INTRAVENOUS at 14:32

## 2023-10-03 RX ADMIN — DROPERIDOL 0.62 MG: 2.5 INJECTION, SOLUTION INTRAMUSCULAR; INTRAVENOUS at 20:24

## 2023-10-03 RX ADMIN — BUPIVACAINE HYDROCHLORIDE 10 ML: 5 INJECTION, SOLUTION EPIDURAL; INTRACAUDAL; PERINEURAL at 15:37

## 2023-10-03 RX ADMIN — LIDOCAINE HYDROCHLORIDE 80 MG: 20 INJECTION, SOLUTION EPIDURAL; INFILTRATION; INTRACAUDAL; PERINEURAL at 18:41

## 2023-10-03 RX ADMIN — HYDROCODONE BITARTRATE AND ACETAMINOPHEN 1 TABLET: 5; 325 TABLET ORAL at 20:25

## 2023-10-03 RX ADMIN — ATORVASTATIN CALCIUM 10 MG: 10 TABLET, FILM COATED ORAL at 21:58

## 2023-10-03 RX ADMIN — ONDANSETRON 4 MG: 2 INJECTION INTRAMUSCULAR; INTRAVENOUS at 19:41

## 2023-10-03 RX ADMIN — ROCURONIUM BROMIDE 40 MG: 10 SOLUTION INTRAVENOUS at 18:41

## 2023-10-03 RX ADMIN — CEFAZOLIN 2000 MG: 2 INJECTION, POWDER, FOR SOLUTION INTRAMUSCULAR; INTRAVENOUS at 18:41

## 2023-10-03 RX ADMIN — CYCLOSPORINE 1 DROP: 0.5 EMULSION OPHTHALMIC at 21:58

## 2023-10-03 NOTE — ANESTHESIA PROCEDURE NOTES
Airway  Urgency: elective    Date/Time: 10/3/2023 6:41 PM  Airway not difficult    General Information and Staff    Patient location during procedure: OR  CRNA/CAA: Prashanth Yen CRNA    Indications and Patient Condition  Indications for airway management: airway protection    Preoxygenated: yes  MILS maintained throughout  Mask difficulty assessment: 1 - vent by mask    Final Airway Details  Final airway type: endotracheal airway      Successful airway: ETT  Cuffed: yes   Successful intubation technique: direct laryngoscopy  Facilitating devices/methods: intubating stylet  Endotracheal tube insertion site: oral  Blade: Stefani  Blade size: 3  ETT size (mm): 7.0  Cormack-Lehane Classification: grade I - full view of glottis  Placement verified by: chest auscultation and capnometry   Cuff volume (mL): 8  Measured from: lips  ETT/EBT  to lips (cm): 21  Number of attempts at approach: 1  Assessment: lips, teeth, and gum same as pre-op and atraumatic intubation

## 2023-10-03 NOTE — H&P
Pt Name: Carmen Obrien  MRN: [id number]  YOB: 1940  Date of evaluation: 10/3/2023    H&P including current review of systems was updated in the paper chart and/or the document previously scanned into the record.  There have been no significant changes or new problems since the original evaluation.  The patient's problems continue and indications for contemplated procedure have not changed.    Electronically signed by Dc Dominguez MD on 10/3/2023 at 14:06 CDT

## 2023-10-03 NOTE — ANESTHESIA PREPROCEDURE EVALUATION
Anesthesia Evaluation     no history of anesthetic complications:   NPO Solid Status: > 8 hours  NPO Liquid Status: > 8 hours           Airway   Mallampati: I  TM distance: >3 FB  Neck ROM: full  No difficulty expected  Dental    (+) edentulous    Pulmonary    (-) sleep apnea, not a smoker  Cardiovascular   Exercise tolerance: poor (<4 METS)    (+) hypertension, PVD, DVT resolved, hyperlipidemia    ROS comment: 7/29/21  · Left ventricular ejection fraction appears to be 61 - 65%. Left ventricular systolic function is normal.  · Left ventricular wall thickness is consistent with concentric remodeling.  · Left ventricular diastolic function was normal.  · Mild to moderate aortic valve regurgitation is present.  · Mitral annular calcification is present. There is calcification of the mitral valve anterior leaflet(s). Trace mitral valve regurgitation is present  · No evidence of pulmonary hypertension is present         Neuro/Psych  (+) psychiatric history Anxiety and Depression  (-) seizures, TIA  GI/Hepatic/Renal/Endo    (+) obesity, GERD, thyroid problem thyroid nodules  (-) liver disease, no renal disease, diabetes    Musculoskeletal     Abdominal    Substance History      OB/GYN          Other   arthritis,                     Anesthesia Plan    ASA 3     general     intravenous induction     Anesthetic plan, risks, benefits, and alternatives have been provided, discussed and informed consent has been obtained with: patient.

## 2023-10-03 NOTE — ANESTHESIA PROCEDURE NOTES
Peripheral Block    Pre-sedation assessment completed: 10/3/2023 3:31 PM    Patient reassessed immediately prior to procedure    Patient location during procedure: holding area  Start time: 10/3/2023 3:37 PM  Stop time: 10/3/2023 3:40 PM  Reason for block: procedure for pain, at surgeon's request, post-op pain management and Request by Dr. Dominguez  Performed by  CRNA/CAA: Prashanth Yen, CRNA  Preanesthetic Checklist  Completed: patient identified, IV checked, site marked, risks and benefits discussed, surgical consent, monitors and equipment checked, pre-op evaluation and timeout performed  Prep:  Pt Position: supine  Sterile barriers:gloves  Prep: ChloraPrep  Patient monitoring: blood pressure monitoring, continuous pulse oximetry and EKG  Procedure    Sedation: yes    Guidance:ultrasound guided, nerve stimulator and Brachial plexus identified and local anesthetic seen surrounding nerves  Images:still images obtained (picture printed and placed in patients chart)  Loss of twitch: 0.5 mA  Laterality:left  Block Type:interscalene  Injection Technique:single-shot  Needle Type:echogenic  Needle Gauge:22 G  Resistance on Injection: none    Medications Used: bupivacaine PF (MARCAINE) injection 0.5% - Injection   10 mL - 10/3/2023 3:37:00 PM  bupivacaine liposome (EXPAREL) 1.3 % injection - Peripheral Nerve   10 mL - 10/3/2023 3:40:00 PM      Post Assessment  Injection Assessment: negative aspiration for heme, no paresthesia on injection and incremental injection  Patient Tolerance:comfortable throughout block  Complications:no

## 2023-10-04 ENCOUNTER — APPOINTMENT (OUTPATIENT)
Dept: ULTRASOUND IMAGING | Facility: HOSPITAL | Age: 83
End: 2023-10-04
Payer: MEDICARE

## 2023-10-04 LAB
ANION GAP SERPL CALCULATED.3IONS-SCNC: 9 MMOL/L (ref 5–15)
BUN SERPL-MCNC: 12 MG/DL (ref 8–23)
BUN/CREAT SERPL: 29.3 (ref 7–25)
CALCIUM SPEC-SCNC: 8.5 MG/DL (ref 8.6–10.5)
CHLORIDE SERPL-SCNC: 101 MMOL/L (ref 98–107)
CO2 SERPL-SCNC: 22 MMOL/L (ref 22–29)
CREAT SERPL-MCNC: 0.41 MG/DL (ref 0.57–1)
EGFRCR SERPLBLD CKD-EPI 2021: 99 ML/MIN/1.73
GLUCOSE SERPL-MCNC: 89 MG/DL (ref 65–99)
HCT VFR BLD AUTO: 25.6 % (ref 34–46.6)
HGB BLD-MCNC: 7.9 G/DL (ref 12–15.9)
POTASSIUM SERPL-SCNC: 4.2 MMOL/L (ref 3.5–5.2)
QT INTERVAL: 416 MS
QTC INTERVAL: 422 MS
SODIUM SERPL-SCNC: 132 MMOL/L (ref 136–145)

## 2023-10-04 PROCEDURE — 63710000001 SERTRALINE 100 MG TABLET: Performed by: ORTHOPAEDIC SURGERY

## 2023-10-04 PROCEDURE — 85018 HEMOGLOBIN: CPT | Performed by: ORTHOPAEDIC SURGERY

## 2023-10-04 PROCEDURE — A9270 NON-COVERED ITEM OR SERVICE: HCPCS | Performed by: ORTHOPAEDIC SURGERY

## 2023-10-04 PROCEDURE — 63710000001 ATORVASTATIN 10 MG TABLET: Performed by: ORTHOPAEDIC SURGERY

## 2023-10-04 PROCEDURE — 63710000001 CARVEDILOL 6.25 MG TABLET: Performed by: ORTHOPAEDIC SURGERY

## 2023-10-04 PROCEDURE — 63710000001 LISINOPRIL 20 MG TABLET: Performed by: ORTHOPAEDIC SURGERY

## 2023-10-04 PROCEDURE — 63710000001 CYCLOSPORINE 0.05 % EMULSION: Performed by: ORTHOPAEDIC SURGERY

## 2023-10-04 PROCEDURE — 63710000001 FERROUS SULFATE 325 (65 FE) MG TABLET: Performed by: ORTHOPAEDIC SURGERY

## 2023-10-04 PROCEDURE — 25010000002 CEFAZOLIN PER 500 MG: Performed by: ORTHOPAEDIC SURGERY

## 2023-10-04 PROCEDURE — A9270 NON-COVERED ITEM OR SERVICE: HCPCS

## 2023-10-04 PROCEDURE — 63710000001 AMLODIPINE 10 MG TABLET: Performed by: ORTHOPAEDIC SURGERY

## 2023-10-04 PROCEDURE — 25010000002 ENOXAPARIN PER 10 MG: Performed by: ORTHOPAEDIC SURGERY

## 2023-10-04 PROCEDURE — 93971 EXTREMITY STUDY: CPT

## 2023-10-04 PROCEDURE — 80048 BASIC METABOLIC PNL TOTAL CA: CPT | Performed by: ORTHOPAEDIC SURGERY

## 2023-10-04 PROCEDURE — 63710000001 FUROSEMIDE 20 MG TABLET

## 2023-10-04 PROCEDURE — 85014 HEMATOCRIT: CPT | Performed by: ORTHOPAEDIC SURGERY

## 2023-10-04 PROCEDURE — 63710000001 FAMOTIDINE 20 MG TABLET: Performed by: ORTHOPAEDIC SURGERY

## 2023-10-04 PROCEDURE — 63710000001 POTASSIUM CHLORIDE 10 MEQ CAPSULE CONTROLLED-RELEASE: Performed by: ORTHOPAEDIC SURGERY

## 2023-10-04 PROCEDURE — 63710000001 FUROSEMIDE 20 MG TABLET: Performed by: ORTHOPAEDIC SURGERY

## 2023-10-04 PROCEDURE — 63710000001 ALLOPURINOL 100 MG TABLET: Performed by: ORTHOPAEDIC SURGERY

## 2023-10-04 PROCEDURE — 97166 OT EVAL MOD COMPLEX 45 MIN: CPT

## 2023-10-04 PROCEDURE — 63710000001 OXYCODONE-ACETAMINOPHEN 7.5-325 MG TABLET: Performed by: ORTHOPAEDIC SURGERY

## 2023-10-04 RX ORDER — TRAZODONE HYDROCHLORIDE 50 MG/1
25 TABLET ORAL NIGHTLY
COMMUNITY

## 2023-10-04 RX ORDER — LISINOPRIL 20 MG/1
20 TABLET ORAL DAILY
COMMUNITY

## 2023-10-04 RX ORDER — MELOXICAM 7.5 MG/1
7.5 TABLET ORAL DAILY
COMMUNITY

## 2023-10-04 RX ORDER — AMLODIPINE BESYLATE 10 MG/1
10 TABLET ORAL DAILY
COMMUNITY

## 2023-10-04 RX ORDER — HYDROCODONE BITARTRATE AND ACETAMINOPHEN 7.5; 325 MG/1; MG/1
1 TABLET ORAL EVERY 6 HOURS PRN
Status: ON HOLD | COMMUNITY
End: 2023-10-04

## 2023-10-04 RX ORDER — FUROSEMIDE 20 MG/1
20 TABLET ORAL ONCE
Status: COMPLETED | OUTPATIENT
Start: 2023-10-04 | End: 2023-10-04

## 2023-10-04 RX ORDER — HYDROXYZINE HYDROCHLORIDE 25 MG/1
25 TABLET, FILM COATED ORAL 3 TIMES DAILY PRN
COMMUNITY

## 2023-10-04 RX ADMIN — ALLOPURINOL 100 MG: 100 TABLET ORAL at 09:25

## 2023-10-04 RX ADMIN — OXYCODONE AND ACETAMINOPHEN 2 TABLET: 7.5; 325 TABLET ORAL at 09:39

## 2023-10-04 RX ADMIN — Medication 10 ML: at 09:27

## 2023-10-04 RX ADMIN — ENOXAPARIN SODIUM 40 MG: 100 INJECTION SUBCUTANEOUS at 09:26

## 2023-10-04 RX ADMIN — OXYCODONE AND ACETAMINOPHEN 2 TABLET: 7.5; 325 TABLET ORAL at 04:01

## 2023-10-04 RX ADMIN — FAMOTIDINE 20 MG: 20 TABLET, FILM COATED ORAL at 09:25

## 2023-10-04 RX ADMIN — CEFAZOLIN 2 G: 2 INJECTION, POWDER, FOR SOLUTION INTRAMUSCULAR; INTRAVENOUS at 15:55

## 2023-10-04 RX ADMIN — AMLODIPINE BESYLATE 10 MG: 10 TABLET ORAL at 09:25

## 2023-10-04 RX ADMIN — FUROSEMIDE 20 MG: 20 TABLET ORAL at 09:26

## 2023-10-04 RX ADMIN — Medication 10 ML: at 20:18

## 2023-10-04 RX ADMIN — ATORVASTATIN CALCIUM 10 MG: 10 TABLET, FILM COATED ORAL at 20:16

## 2023-10-04 RX ADMIN — CARVEDILOL 12.5 MG: 6.25 TABLET, FILM COATED ORAL at 19:07

## 2023-10-04 RX ADMIN — POTASSIUM CHLORIDE 10 MEQ: 10 CAPSULE, COATED, EXTENDED RELEASE ORAL at 09:25

## 2023-10-04 RX ADMIN — CYCLOSPORINE 1 DROP: 0.5 EMULSION OPHTHALMIC at 20:17

## 2023-10-04 RX ADMIN — OXYCODONE AND ACETAMINOPHEN 2 TABLET: 7.5; 325 TABLET ORAL at 20:16

## 2023-10-04 RX ADMIN — SERTRALINE HYDROCHLORIDE 100 MG: 100 TABLET, FILM COATED ORAL at 09:25

## 2023-10-04 RX ADMIN — CARVEDILOL 12.5 MG: 6.25 TABLET, FILM COATED ORAL at 09:26

## 2023-10-04 RX ADMIN — FERROUS SULFATE TAB 325 MG (65 MG ELEMENTAL FE) 325 MG: 325 (65 FE) TAB at 09:27

## 2023-10-04 RX ADMIN — CEFAZOLIN 2 G: 2 INJECTION, POWDER, FOR SOLUTION INTRAMUSCULAR; INTRAVENOUS at 03:57

## 2023-10-04 RX ADMIN — FUROSEMIDE 20 MG: 20 TABLET ORAL at 09:25

## 2023-10-04 RX ADMIN — FAMOTIDINE 20 MG: 20 TABLET, FILM COATED ORAL at 20:16

## 2023-10-04 RX ADMIN — OXYCODONE AND ACETAMINOPHEN 2 TABLET: 7.5; 325 TABLET ORAL at 15:59

## 2023-10-04 RX ADMIN — LISINOPRIL 20 MG: 20 TABLET ORAL at 09:25

## 2023-10-04 RX ADMIN — CYCLOSPORINE 1 DROP: 0.5 EMULSION OPHTHALMIC at 09:27

## 2023-10-04 NOTE — BRIEF OP NOTE
TOTAL SHOULDER REVERSE ARTHROPLASTY  Progress Note    Carmen Obrien  10/3/2023    Pre-op Diagnosis:   M75.102       Post-Op Diagnosis Codes:     * Primary osteoarthritis of left shoulder [M19.012]    Procedure/CPTr Codes:  FL ARTHROPLASTY GLENOHUMERAL JOINT TOTAL SHOULDER [47549]      Procedure(s):  LEFT REVERSE TOTAL SHOULDER ARTHROPLASTY        SURGICAL APPROACH: Deltopectoral    SURGICAL TECHNIQUE: Peel off      Surgeon(s):  Dc Dominguez MD    Anesthesia: General with Block    Staff:   Circulator: Jaxson Boateng RN; Jo-Ann Melton RN  Scrub Person: Juan Carlos Douglass; Marian Pulido; Tommy Maldonado  Vendor Representative: Daniel Delgadillo         Estimated Blood Loss: <500ml    Urine Voided: * No values recorded between 10/3/2023  6:38 PM and 10/3/2023  7:53 PM *    Specimens:                None          Drains:   External Urinary Catheter (Active)       [REMOVED] External Urinary Catheter (Removed)       Findings: see op note         Complications: none          Dc Dominguez MD     Date: 10/3/2023  Time: 19:53 CDT

## 2023-10-04 NOTE — CASE MANAGEMENT/SOCIAL WORK
Discharge Planning Assessment  [facility]     Patient Name: Carmen Obrien  MRN: [id number]  Today's Date: 10/4/2023    Admit Date: 10/3/2023        Discharge Needs Assessment       Row Name 10/04/23 1543       Living Environment    People in Home facility resident    Current Living Arrangements assisted living facility    Potentially Unsafe Housing Conditions none    Primary Care Provided by self    Provides Primary Care For no one    Family Caregiver if Needed child(doris), adult    Quality of Family Relationships helpful;involved    Able to Return to Prior Arrangements other (see comments)    Living Arrangement Comments Pt hopes to be able to return to [facility] but may need short term rehab       Resource/Environmental Concerns    Resource/Environmental Concerns none    Transportation Concerns none       Food Insecurity    Within the past 12 months, you worried that your food would run out before you got the money to buy more. Never true    Within the past 12 months, the food you bought just didn't last and you didn't have money to get more. Never true       Transition Planning    Patient/Family Anticipates Transition to home with help/services;inpatient rehabilitation facility;long-term care facility    Patient/Family Anticipated Services at Transition skilled nursing;rehabilitation services;home health care    Transportation Anticipated family or friend will provide       Discharge Needs Assessment    Readmission Within the Last 30 Days no previous admission in last 30 days    Equipment Currently Used at Home cane, straight    Concerns to be Addressed adjustment to diagnosis/illness    Anticipated Changes Related to Illness none    Equipment Needed After Discharge other (see comments)    Outpatient/Agency/Support Group Needs homecare agency;skilled nursing facility    Discharge Facility/Level of Care Needs nursing facility, skilled;home with home health    Provided Post Acute Provider List? Yes    Discharge  Coordination/Progress Sw spoke to pt about dc planning needs. Pt resides at [facility] and hopes to be able to return at [place]. Pt states depending on how she does with therapy she may need short term rehab. If pt needs rehab she prefers [facility].  Will follow up with pt tomorrow to see status and make referral to [facility] at that time, if needed.                   Discharge Plan    No documentation.                 Continued Care and Services - Admitted Since 10/3/2023    Coordination has not been started for this encounter.          Demographic Summary    No documentation.                  Functional Status    No documentation.                  Psychosocial    No documentation.                  Abuse/Neglect    No documentation.                  Legal    No documentation.                  Substance Abuse    No documentation.                  Patient Forms    No documentation.                     BRAEDEN Liang

## 2023-10-04 NOTE — CONSULTS
[facility] Medicine Consult  Inpatient Hospitalist Consult  Consult performed by: David Miller MD  Consult ordered by: Dc Dominguez MD        Date of Admission: 10/3/2023  Date of Consult: 10/04/23    Primary Care Physician: VERA Noble MD  Referring Physician: Dr Dc Dominguez  Chief Complaint/Reason for Consultation: Post op medical management    Subjective   History of Present Illness  83 y.o. female with PMH of HTN, CAD, DVT, progressive loss of function and increasing pain of the upper extremity due to severe end-stage primary osteoarthritis associated with a diseased and dysfunctional rotator cuf status post reverse arthroplasty. She is resting comfortably this morning.     Review of Systems   Otherwise complete ROS is negative except as mentioned above.    Past Medical History:   Past Medical History:   Diagnosis Date    THERESA (acute kidney injury) (HCC) due to sepsis 11/02/2022    Anxiety     Arthritis     CAD (coronary artery disease)     Colon polyp     DDD (degenerative disc disease), lumbar     Deep venous thrombosis     Depression     Diverticulosis     Esophageal stricture     Full dentures     GERD (gastroesophageal reflux disease)     History of transfusion     w/o Adverse reaction    Hypercholesteremia     Hypertension     LPRD (laryngopharyngeal reflux disease)     Macular degeneration     Multinodular goiter 02/23/2017    Osteoarthritis     Personal history of COVID-19 2020    Pruritic disorder     Spastic colon     Thyroid nodule     Type 2 MI (myocardial infarction) (HCC) due to THERESA and UTI 11/02/2022     Past Surgical History:  Past Surgical History:   Procedure Laterality Date    BLADDER REPAIR      CATARACT EXTRACTION      CHOLECYSTECTOMY      COLONOSCOPY  04/22/2014    2 polyps, adenomatous, diverticulosis    COLONOSCOPY N/A 6/26/2020    Procedure: COLONOSCOPY WITH ANESTHESIA;  Surgeon: John Coleman MD;  Location:   [facility];  Service: Gastroenterology;  Laterality: N/A;  pre op: constipation  post op: diverticulosis,   PCP: Adam Delgadillo MD    CYSTOCELE REPAIR      ENDOSCOPY  04/22/2014    Schatzki's ring dilated    ENDOSCOPY N/A 6/26/2020    Procedure: ESOPHAGOGASTRODUODENOSCOPY WITH ANESTHESIA;  Surgeon: John Coleman MD;  Location:  [facility];  Service: Gastroenterology;  Laterality: N/A;  pre op: dysphagia  post op:stricture, dilated   PCP:Adam Delgadillo MD    ENDOSCOPY N/A 9/2/2021    Procedure: ESOPHAGOGASTRODUODENOSCOPY WITH ANESTHESIA;  Surgeon: John Coleman MD;  Location:  [facility];  Service: Gastroenterology;  Laterality: N/A;  pre op: dysphagia  post op:gastritis  PCP: VERA Noble MD    FEMUR FRACTURE SURGERY      HYSTERECTOMY      INCISION AND DRAINAGE LEG Left 11/3/2021    Procedure: INCISION AND DRAINAGE LEG ABSCESS;  Surgeon: Cesia Mondragon MD;  Location[facility];  Service: General;  Laterality: Left;    REPLACEMENT TOTAL KNEE      ULNAR NERVE TRANSPOSITION       Social History:  reports that she has never smoked. She has never used smokeless tobacco. She reports that she does not drink alcohol and does not use drugs.    Family History: family history includes Colon cancer in her mother; Heart disease in her father and mother.     Allergies:   Allergies   Allergen Reactions    Codeine Other (See Comments)     Pt does not recall    Demerol [Meperidine] Rash    Levaquin [Levofloxacin] Other (See Comments)     Pt doesn't remember    Morphine Other (See Comments)     Unknown     Medications: Scheduled Meds:[START ON 10/4/2023] allopurinol, 100 mg, Oral, Daily  [START ON 10/4/2023] amLODIPine, 10 mg, Oral, Q24H  atorvastatin, 10 mg, Oral, Nightly  carvedilol, 12.5 mg, Oral, BID With Meals  [START ON 10/4/2023] ceFAZolin, 2 g, Intravenous, Q8H  cycloSPORINE, 1 drop, Both Eyes, Q12H  [START ON 10/4/2023] enoxaparin, 40 mg, Subcutaneous, Daily  famotidine, 20 mg,  Oral, BID  [START ON 10/4/2023] ferrous sulfate, 325 mg, Oral, Daily With Breakfast  [START ON 10/4/2023] furosemide, 20 mg, Oral, Daily  [START ON 10/4/2023] lisinopril, 20 mg, Oral, Q24H  [START ON 10/4/2023] potassium chloride, 10 mEq, Oral, Daily  [START ON 10/4/2023] sertraline, 100 mg, Oral, Daily  sodium chloride, 10 mL, Intravenous, Q12H      Continuous Infusions:lactated ringers, 100 mL/hr, Last Rate: 100 mL/hr (10/03/23 2045)      PRN Meds:.  acetaminophen **OR** acetaminophen    aluminum-magnesium hydroxide-simethicone    cloNIDine    diphenhydrAMINE **OR** diphenhydrAMINE    docusate sodium    hydrOXYzine    loperamide    magnesium hydroxide    Morphine **AND** naloxone    ondansetron **OR** ondansetron    oxyCODONE-acetaminophen    oxyCODONE-acetaminophen    phenol    Polyethyl Glyc-Propyl Glyc PF    polyethylene glycol    promethazine **OR** promethazine    simethicone    sodium chloride    sodium chloride    I have utilized all available immediate resources to obtain, update, or review the patient's current medications (including all prescriptions, over-the-counter products, herbals, cannabis/cannabidiol products, and vitamin/mineral/dietary (nutritional) supplements).     Objective   Objective    Physical Exam:   Temp:  [97.9 øF (36.6 øC)-99.2 øF (37.3 øC)] 97.9 øF (36.6 øC)  Heart Rate:  [54-67] 56  Resp:  [16-20] 20  BP: (103-162)/() 117/37  Physical Exam  Vitals reviewed.   Constitutional:       General: She is not in acute distress.     Appearance: She is well-developed. She is not toxic-appearing.   HENT:      Head: Normocephalic and atraumatic.      Right Ear: External ear normal.      Left Ear: External ear normal.      Mouth/Throat:      Mouth: Mucous membranes are dry.      Pharynx: Oropharynx is clear.   Eyes:      General:         Right eye: No discharge.         Left eye: No discharge.      Extraocular Movements: Extraocular movements intact.      Conjunctiva/sclera: Conjunctivae  normal.      Pupils: Pupils are equal, round, and reactive to light.   Neck:      Vascular: No JVD.   Cardiovascular:      Rate and Rhythm: Normal rate and regular rhythm.      Pulses: Normal pulses.      Heart sounds: Normal heart sounds. No murmur heard.    No friction rub. No gallop.   Pulmonary:      Effort: Pulmonary effort is normal. No respiratory distress.      Breath sounds: No stridor. No wheezing, rhonchi or rales.   Chest:      Chest wall: No tenderness.   Abdominal:      General: Bowel sounds are normal. There is no distension.      Palpations: Abdomen is soft.      Tenderness: There is no abdominal tenderness. There is no guarding or rebound.      Hernia: No hernia is present.   Musculoskeletal:         General: No swelling, tenderness or deformity. Normal range of motion.      Cervical back: Normal range of motion and neck supple. No rigidity or tenderness. No muscular tenderness.      Right lower leg: Edema present.      Left lower leg: Edema present.   Skin:     General: Skin is warm and dry.      Findings: No erythema or rash.   Neurological:      General: No focal deficit present.      Mental Status: She is alert and oriented to person, place, and time.      Cranial Nerves: No cranial nerve deficit.      Sensory: No sensory deficit.      Motor: No weakness or abnormal muscle tone.      Deep Tendon Reflexes: Reflexes normal.   Psychiatric:         Mood and Affect: Mood normal.         Behavior: Behavior normal.     Results Reviewed:  I have personally reviewed current lab, radiology, and data and agree with results.  Lab Results (last 24 hours)       Procedure Component Value Units Date/Time    Urinalysis, Microscopic Only - Urine, Clean Catch [[id number]]  (Abnormal) Collected: 10/03/23 1418    Specimen: Urine, Clean Catch Updated: 10/03/23 1442     RBC, UA 0-2 /HPF      WBC, UA 0-2 /HPF      Comment: Urine culture not indicated.        Bacteria, UA None Seen /HPF      Squamous Epithelial Cells, UA  0-2 /HPF      Hyaline Casts, UA 0-2 /LPF      Methodology Automated Microscopy    Urinalysis With Culture If Indicated - Urine, Clean Catch [[id number]]  (Abnormal) Collected: 10/03/23 1418    Specimen: Urine, Clean Catch Updated: 10/03/23 1442     Color, UA Dark Yellow     Appearance, UA Clear     pH, UA 7.5     Specific Gravity, UA 1.013     Glucose, UA Negative     Ketones, UA 15 mg/dL (1+)     Bilirubin, UA Negative     Blood, UA Negative     Protein, UA 30 mg/dL (1+)     Leuk Esterase, UA Trace     Nitrite, UA Negative     Urobilinogen, UA 1.0 E.U./dL    Narrative:      In absence of clinical symptoms, the presence of pyuria, bacteria, and/or nitrites on the urinalysis result does not correlate with infection.    Comprehensive Metabolic Panel [[id number]]  (Abnormal) Collected: 10/03/23 1349    Specimen: Blood Updated: 10/03/23 1430     Glucose 97 mg/dL      BUN 14 mg/dL      Creatinine 0.36 mg/dL      Sodium 131 mmol/L      Potassium 4.4 mmol/L      Comment: Slight hemolysis detected by analyzer. Results may be affected.        Chloride 98 mmol/L      CO2 22.0 mmol/L      Calcium 9.7 mg/dL      Total Protein 6.3 g/dL      Albumin 3.6 g/dL      ALT (SGPT) 52 U/L      AST (SGOT) 22 U/L      Alkaline Phosphatase 93 U/L      Total Bilirubin 0.6 mg/dL      Globulin 2.7 gm/dL      A/G Ratio 1.3 g/dL      BUN/Creatinine Ratio 38.9     Anion Gap 11.0 mmol/L      eGFR 102.1 mL/min/1.73     Narrative:      GFR Normal >60  Chronic Kidney Disease <60  Kidney Failure <15    The GFR formula is only valid for adults with stable renal function between ages 18 and 70.    Protime-INR [[id number]]  (Normal) Collected: 10/03/23 1349    Specimen: Blood Updated: 10/03/23 1415     Protime 13.8 Seconds      INR 1.05    CBC (No Diff) [[id number]]  (Abnormal) Collected: 10/03/23 1349    Specimen: Blood Updated: 10/03/23 1409     WBC 9.60 10*3/mm3      RBC 3.18 10*6/mm3      Hemoglobin 9.8 g/dL      Hematocrit 30.6 %      MCV 96.2 fL       MCH 30.8 pg      MCHC 32.0 g/dL      RDW 13.7 %      RDW-SD 49.1 fl      MPV 9.5 fL      Platelets 278 10*3/mm3           Imaging Results (Last 24 Hours)       Procedure Component Value Units Date/Time    XR Shoulder 2+ View Left [[id number]] Collected: 10/03/23 2041     Updated: 10/03/23 2045    Narrative:      EXAMINATION: XR SHOULDER 2+ VW LEFT-  10/3/2023 8:41 PM CDT     HISTORY: Postop     FINDINGS: 2 view exam of the left shoulder reveals the patient is  undergone a reverse left total shoulder arthroplasty. There is good  anatomic alignment with no evidence of complications.     This report was signed and finalized on 10/3/2023 8:42 PM CDT by Dr. José Elmore MD.       XR Chest 1 View [[id number]] Collected: 10/03/23 1338     Updated: 10/03/23 1342    Narrative:      EXAM/TECHNIQUE: XR CHEST 1 VW-     INDICATION: pre op     COMPARISON: 8/8/2023     FINDINGS:     Cardiac silhouette is normal size. No pleural effusion, pneumothorax, or  focal consolidation. Advanced degenerative change in both glenohumeral  joints. Old left-sided rib fractures. Degenerative change in the  thoracic spine. No acute osseous finding.       Impression:      No acute findings.     This report was signed and finalized on 10/3/2023 1:39 PM CDT by Dr. Arsh Calderon MD.             Echocardiogram 11/02/2022    Left ventricular systolic function is hyperdynamic (EF > 70%). Left ventricular ejection fraction appears to be greater than 70%.    Left ventricular diastolic function was normal.    Normal right ventricular cavity size and systolic function noted.    Moderate aortic valve regurgitation is present.    Moderate mitral annular calcification is present. There is mild calcification of the mitral valve. Trace mitral valve regurgitation is present    No evidence of pulmonary hypertension is present.    Assessment / Plan   Assessment:   Active Hospital Problems    Diagnosis     **Primary osteoarthritis of left shoulder     Primary  osteoarthritis of right shoulder     Chronic venous stasis     Essential hypertension     Gastroesophageal reflux disease     History of DVT of lower extremity      Treatment Plan  Agree with current treatment plan  Blood pressure medications reviewed including Norvasc, Coreg, Lasix, Lisinopril. Vitals are stable at this point.   Leg edema appears chronic, continue Lasix.   Post op care per Dr Dominguez    DVT prophylaxis > Lovenox 40 mg SQ daily    Medical Decision Making  Number and Complexity of problems: 4 complex chronic problems  Differential Diagnosis: none    Conditions and Status        Condition is improving.     [facility] Data  External documents reviewed: Epic EHR    Disposition  Social Determinants of Health that impact treatment or disposition: none  I expect the patient to be discharged by the primary service.     Patient seen and examined by me on 10/03/2023 at 2055.    Electronically signed by David Miller MD, 10/03/23, 20:55 CDT.

## 2023-10-04 NOTE — PROGRESS NOTES
[facility] Medicine Services  INPATIENT PROGRESS NOTE    Patient Name: Carmen Obrien  Date of Admission: 10/3/2023  Today's Date: 10/04/23  Length of Stay: 0  Primary Care Physician: VERA Noble MD    Subjective   Chief Complaint: Left shoulder pain  HPI   Patient examined lying in bed on room air in no acute distress.  She had ice pack on her left shoulder.  She denies chest pain or shortness of breath.  Bilateral lower extremities show edema.  She typically takes daily Lasix.  Left lower extremity is markedly more edematous than the right.  Will obtain venous Doppler of left lower extremity today.  She does have a history of DVT in the past about 5 years ago.  Additionally, along with 20 mg daily Lasix she takes at home, will add additional 20 mg this morning for extra diuresis.  Blood pressure results vary, but overall under better control.  On her prior admission in which I was involved in her care, she actually presented with hypertensive emergency.  She reports this is since not been problematic for her and her blood pressure has been well controlled on her current regimen.  Noted, hemoglobin did drift down to 7.9.  Exam of left shoulder appears stable without obvious signs of hematoma or active bleeding.  Patient reports normal stool.  Continue to monitor.  CBC in AM.  Other orders per Dr. Dominguez, attending.    Review of Systems   All pertinent negatives and positives are as above. All other systems have been reviewed and are negative unless otherwise stated.     Objective    Temp:  [97.4 øF (36.3 øC)-99.2 øF (37.3 øC)] 97.4 øF (36.3 øC)  Heart Rate:  [51-67] 64  Resp:  [16-20] 18  BP: ()/() 134/57  Physical Exam  Vitals and nursing note reviewed.   Constitutional:       Comments: Up in bed, room air, no acute distress, no visitors at bedside   HENT:      Head: Normocephalic and atraumatic.   Cardiovascular:      Rate and Rhythm: Normal rate and  regular rhythm.      Pulses: Normal pulses.      Heart sounds: Normal heart sounds. No murmur heard.    No friction rub.   Pulmonary:      Effort: Pulmonary effort is normal. No respiratory distress.      Breath sounds: Normal breath sounds. No stridor. No wheezing or rhonchi.   Abdominal:      General: Bowel sounds are normal. There is no distension.      Palpations: Abdomen is soft.      Tenderness: There is no abdominal tenderness. There is no guarding.   Musculoskeletal:         General: Normal range of motion.      Cervical back: Normal range of motion and neck supple. No rigidity or tenderness.      Comments: Some left shoulder swelling.  No obvious hematoma in the left shoulder.  Right lower extremity with 2+ pitting edema.  Left lower extremity with 4+ pitting edema.   Skin:     General: Skin is warm and dry.      Capillary Refill: Capillary refill takes less than 2 seconds.      Coloration: Skin is not pale.      Findings: No bruising or erythema.   Neurological:      General: No focal deficit present.      Mental Status: She is alert and oriented to person, place, and time. Mental status is at baseline.      Sensory: No sensory deficit.      Gait: Gait normal.   Psychiatric:         Mood and Affect: Mood normal.         Behavior: Behavior normal.         Thought Content: Thought content normal.         Judgment: Judgment normal.       Results Review:  I have reviewed the labs, radiology results, and diagnostic studies.    Laboratory Data:   Results from last 7 days   Lab Units 10/04/23  0540 10/03/23  1349   WBC 10*3/mm3  --  9.60   HEMOGLOBIN g/dL 7.9* 9.8*   HEMATOCRIT % 25.6* 30.6*   PLATELETS 10*3/mm3  --  278        Results from last 7 days   Lab Units 10/04/23  0540 10/03/23  1349   SODIUM mmol/L 132* 131*   POTASSIUM mmol/L 4.2 4.4   CHLORIDE mmol/L 101 98   CO2 mmol/L 22.0 22.0   BUN mg/dL 12 14   CREATININE mg/dL 0.41* 0.36*   CALCIUM mg/dL 8.5* 9.7   BILIRUBIN mg/dL  --  0.6   ALK PHOS U/L  --   93   ALT (SGPT) U/L  --  52*   AST (SGOT) U/L  --  22   GLUCOSE mg/dL 89 97     Radiology Data:   Imaging Results (Last 24 Hours)       Procedure Component Value Units Date/Time    XR Shoulder 2+ View Left [[id number]] Collected: 10/03/23 2041     Updated: 10/03/23 2045    Narrative:      EXAMINATION: XR SHOULDER 2+ VW LEFT-  10/3/2023 8:41 PM CDT     HISTORY: Postop     FINDINGS: 2 view exam of the left shoulder reveals the patient is  undergone a reverse left total shoulder arthroplasty. There is good  anatomic alignment with no evidence of complications.     This report was signed and finalized on 10/3/2023 8:42 PM CDT by Dr. José Elmore MD.       XR Chest 1 View [[id number]] Collected: 10/03/23 1338     Updated: 10/03/23 1342    Narrative:      EXAM/TECHNIQUE: XR CHEST 1 VW-     INDICATION: pre op     COMPARISON: 8/8/2023     FINDINGS:     Cardiac silhouette is normal size. No pleural effusion, pneumothorax, or  focal consolidation. Advanced degenerative change in both glenohumeral  joints. Old left-sided rib fractures. Degenerative change in the  thoracic spine. No acute osseous finding.       Impression:      No acute findings.     This report was signed and finalized on 10/3/2023 1:39 PM CDT by Dr. Arsh Calderon MD.               I have reviewed the patient's current medications.     Assessment/Plan   Assessment  Active Hospital Problems    Diagnosis     **Primary osteoarthritis of left shoulder     Primary osteoarthritis of right shoulder     Chronic venous stasis     Essential hypertension     Gastroesophageal reflux disease     History of DVT of lower extremity        Treatment Plan  Ms. Obrien underwent left reverse total shoulder arthroplasty per Dr. Dominguez on 10/3/2023.  She was admitted for observation in the hospital postoperatively.  Hospitalist service was consulted for medical management.  She has had issues with hypertension as well as fluid retention in the past.  No known history of  heart failure.  She does have a history of DVT.  History of ESBL Klebsiella, ESBL E. coli urinary tract infections.  She also has an history of an acute kidney injury in the past.  She was recently treated for a nonresistant E. coli UTI on 9/26 and has completed Bactrim therapy since that time.    Primary osteoarthritis of left and right shoulder-  Dr. Dominguez performed left reverse total shoulder arthroplasty on 10/3.  Patient stable postoperatively.  PT/OT  Further orders per Dr. Dominguez    Anemia-  Hemoglobin has trended down to 7.9.  This is perhaps partially dilutional given some of her current fluid overload and administration of IV fluids during and after surgery.  CBC in a.m.    Essential hypertension-  Prior admission with hypertensive urgency.  This is since been well controlled.  Continue Norvasc, Coreg, 20 mg daily Lasix, lisinopril.  Normotensive this morning    Volume overload-  Patient takes 20 mg daily Lasix.  We will give an additional 20 mg Lasix this morning for a total of 40 mg today.  No shortness of breath, continue to monitor.  BMP in AM.    History of DVT-  Left lower extremity with 4+ pitting edema compared to 2+ pitting edema on right lower extremity.  Patient reports she does not have chronic unilateral edema of her lower extremity.  Will obtain venous Doppler left lower extremity today.    GERD-  Continue Pepcid twice daily    Medical Decision Making  Number and Complexity of problems: 1 acute problem of moderate complexity and primary osteoarthritis of left shoulder status post left reverse total shoulder arthroplasty.  1 acute problem of moderate complexity in anemia.  1 acute problem of moderate complexity in essential hypertension.  1 acute problem of moderate complexity and volume overload.  1 acute problem of moderate complexity in history of DVT with acute unilateral leg edema.  Differential Diagnosis: None    Conditions and Status        Status stable     [facility] Data  External  documents reviewed: Prior hospitalization records  Cardiac tracing (EKG, telemetry) interpretation: EKG reviewed, normal sinus rhythm  Radiology interpretation: Chest x-ray, left shoulder x-ray reviewed per radiologist interpretation  Labs reviewed: CBC, BMP, urinalysis, proBNP, lipid panel, TSH  Any tests that were considered but not ordered: None     Decision rules/scores evaluated (example [id number], Wells, etc): None     Discussed with: Patient, Dr. Aldrich     Care Planning  Shared decision making: Discussed with above, patient agreeable to her plan of care  Code status and discussions: No CPR    Disposition  Social Determinants of Health that impact treatment or disposition: None  I expect the patient to be discharged per primary service, likely to SNF.    Electronically signed by ABDON Jimenez, 10/04/23, 09:18 CDT.

## 2023-10-04 NOTE — PLAN OF CARE
Goal Outcome Evaluation:  Plan of Care Reviewed With: patient        Progress: no change  Outcome Evaluation: A & O x 4, VSS, c/o left shoulder pain, prn pain meds given with relief, dressing to left shoulder dry and intact, sling/immobilizer and ice in place, voiding incontinent, purewick in place, lower extremity edema and tenderness, legs elevated, turning, bed alarm set, safety maintained

## 2023-10-04 NOTE — PLAN OF CARE
Goal Outcome Evaluation:  Plan of Care Reviewed With: patient        Progress: no change  Outcome Evaluation: Ntn assessment completed. Per ntn risk screen pt decreased appetite and unsure of wt changes. Pt reports she has not been eating as much lately. Pt reports her UBW is around 170's, current wt of 191 lbs.pt has volume overload at this time. Encourage oral intake, advised of alternate menu choices. Con to follow for plan of care.

## 2023-10-04 NOTE — ANESTHESIA POSTPROCEDURE EVALUATION
"Patient: Carmen Obrien    Procedure Summary       Date: 10/03/23 Room / Location: [facility] OR  /  [facility] OR    Anesthesia Start: 1838 Anesthesia Stop:     Procedure: LEFT REVERSE TOTAL SHOULDER ARTHROPLASTY (Left: Shoulder) Diagnosis:       Primary osteoarthritis of left shoulder      (M75.102)    Surgeons: Dc Dominguez MD Provider: Prashanth Yen CRNA    Anesthesia Type: general ASA Status: 3            Anesthesia Type: general    Vitals  Vitals Value Taken Time   /64 10/03/23 2026   Temp 98.8 °F (37.1 °C) 10/03/23 2026   Pulse 55 10/03/23 2030   Resp 16 10/03/23 2026   SpO2 94 % 10/03/23 2030   Vitals shown include unvalidated device data.        Post Anesthesia Care and Evaluation    Patient location during evaluation: PACU  Patient participation: complete - patient participated  Level of consciousness: awake and alert  Pain management: adequate    Airway patency: patent  Anesthetic complications: No anesthetic complications  PONV Status: none  Cardiovascular status: acceptable and hemodynamically stable  Respiratory status: acceptable  Hydration status: acceptable    Comments: Blood pressure 177/56, pulse 65, temperature 97.7 °F (36.5 °C), temperature source Oral, resp. rate 18, height 152.4 cm (60\"), weight 87 kg (191 lb 14.4 oz), SpO2 95 %, not currently breastfeeding.    Patient discharged from PACU based upon Jerry score. Please see RN notes for further details    "
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
## 2023-10-04 NOTE — PLAN OF CARE
Goal Outcome Evaluation:  Plan of Care Reviewed With: patient        Progress: no change  Outcome Evaluation: OT evaluation completed. Pt is A&Ox4. All bed mobility and functional transfers deferred this date due to pt having a pending LLE doppler due to suspected DVT.  OT provided pt with handouts and verbal edu regarding distal LUE HEP to include elbow, wrist, and hand AROM.  OT also provided pt pt with handouts and edu regarding shoulder precautions, wear/care of shoulder immobilizer and alex upper body dressing technique.  Pt required maxdep A for UB dressing of her hospital gown and shoulder immobilizer sling.  Pt reports that she primariy transfers to/from a wheelchair at her assisted living facility and scoots the wheelchair with her feet.  Pt however reports independence with all ADLs with difficulty with LB dressing and toileting.  At this time, pt is limited due to her shoulder precautions, WBing precautions, overall impaired ROM/strength of LUE, generalized weakness, and impaired balance.  OT will cont to follow to maximize her I and safety with ADLs and functional mobility however it is recommended for pt to discharge to SNF upon hospital discharge.      Anticipated Discharge Disposition (OT): skilled nursing facility

## 2023-10-05 LAB
ANION GAP SERPL CALCULATED.3IONS-SCNC: 12 MMOL/L (ref 5–15)
BUN SERPL-MCNC: 24 MG/DL (ref 8–23)
BUN/CREAT SERPL: 32.4 (ref 7–25)
CALCIUM SPEC-SCNC: 9.3 MG/DL (ref 8.6–10.5)
CHLORIDE SERPL-SCNC: 98 MMOL/L (ref 98–107)
CO2 SERPL-SCNC: 23 MMOL/L (ref 22–29)
CREAT SERPL-MCNC: 0.74 MG/DL (ref 0.57–1)
DEPRECATED RDW RBC AUTO: 50.4 FL (ref 37–54)
EGFRCR SERPLBLD CKD-EPI 2021: 81.4 ML/MIN/1.73
ERYTHROCYTE [DISTWIDTH] IN BLOOD BY AUTOMATED COUNT: 14 % (ref 12.3–15.4)
GLUCOSE SERPL-MCNC: 126 MG/DL (ref 65–99)
HCT VFR BLD AUTO: 30.7 % (ref 34–46.6)
HGB BLD-MCNC: 9.5 G/DL (ref 12–15.9)
MCH RBC QN AUTO: 30.5 PG (ref 26.6–33)
MCHC RBC AUTO-ENTMCNC: 30.9 G/DL (ref 31.5–35.7)
MCV RBC AUTO: 98.7 FL (ref 79–97)
PLATELET # BLD AUTO: 293 10*3/MM3 (ref 140–450)
PMV BLD AUTO: 9.6 FL (ref 6–12)
POTASSIUM SERPL-SCNC: 4.7 MMOL/L (ref 3.5–5.2)
RBC # BLD AUTO: 3.11 10*6/MM3 (ref 3.77–5.28)
SODIUM SERPL-SCNC: 133 MMOL/L (ref 136–145)
WBC NRBC COR # BLD: 9.31 10*3/MM3 (ref 3.4–10.8)

## 2023-10-05 PROCEDURE — 80048 BASIC METABOLIC PNL TOTAL CA: CPT | Performed by: ORTHOPAEDIC SURGERY

## 2023-10-05 PROCEDURE — 63710000001 ALLOPURINOL 100 MG TABLET: Performed by: ORTHOPAEDIC SURGERY

## 2023-10-05 PROCEDURE — A9270 NON-COVERED ITEM OR SERVICE: HCPCS | Performed by: ORTHOPAEDIC SURGERY

## 2023-10-05 PROCEDURE — 97161 PT EVAL LOW COMPLEX 20 MIN: CPT | Performed by: PHYSICAL THERAPIST

## 2023-10-05 PROCEDURE — 63710000001 OXYCODONE-ACETAMINOPHEN 7.5-325 MG TABLET: Performed by: ORTHOPAEDIC SURGERY

## 2023-10-05 PROCEDURE — 97535 SELF CARE MNGMENT TRAINING: CPT

## 2023-10-05 PROCEDURE — 25010000002 MORPHINE PER 10 MG: Performed by: ORTHOPAEDIC SURGERY

## 2023-10-05 PROCEDURE — 63710000001 SERTRALINE 100 MG TABLET: Performed by: ORTHOPAEDIC SURGERY

## 2023-10-05 PROCEDURE — 25010000002 ENOXAPARIN PER 10 MG: Performed by: ORTHOPAEDIC SURGERY

## 2023-10-05 PROCEDURE — 63710000001 FERROUS SULFATE 325 (65 FE) MG TABLET: Performed by: ORTHOPAEDIC SURGERY

## 2023-10-05 PROCEDURE — 25010000002 VASOPRESSIN 20 UNIT/ML SOLUTION: Performed by: NURSE ANESTHETIST, CERTIFIED REGISTERED

## 2023-10-05 PROCEDURE — 63710000001 POTASSIUM CHLORIDE 10 MEQ CAPSULE CONTROLLED-RELEASE: Performed by: ORTHOPAEDIC SURGERY

## 2023-10-05 PROCEDURE — 63710000001 HYDROXYZINE 25 MG TABLET: Performed by: ORTHOPAEDIC SURGERY

## 2023-10-05 PROCEDURE — G0378 HOSPITAL OBSERVATION PER HR: HCPCS

## 2023-10-05 PROCEDURE — 63710000001 FAMOTIDINE 20 MG TABLET: Performed by: ORTHOPAEDIC SURGERY

## 2023-10-05 PROCEDURE — 63710000001 ATORVASTATIN 10 MG TABLET: Performed by: ORTHOPAEDIC SURGERY

## 2023-10-05 PROCEDURE — 85027 COMPLETE CBC AUTOMATED: CPT

## 2023-10-05 PROCEDURE — 63710000001 CYCLOSPORINE 0.05 % EMULSION: Performed by: ORTHOPAEDIC SURGERY

## 2023-10-05 RX ADMIN — OXYCODONE AND ACETAMINOPHEN 2 TABLET: 7.5; 325 TABLET ORAL at 20:51

## 2023-10-05 RX ADMIN — OXYCODONE AND ACETAMINOPHEN 2 TABLET: 7.5; 325 TABLET ORAL at 15:36

## 2023-10-05 RX ADMIN — FERROUS SULFATE TAB 325 MG (65 MG ELEMENTAL FE) 325 MG: 325 (65 FE) TAB at 09:10

## 2023-10-05 RX ADMIN — POTASSIUM CHLORIDE 10 MEQ: 10 CAPSULE, COATED, EXTENDED RELEASE ORAL at 09:09

## 2023-10-05 RX ADMIN — OXYCODONE AND ACETAMINOPHEN 2 TABLET: 7.5; 325 TABLET ORAL at 09:08

## 2023-10-05 RX ADMIN — FAMOTIDINE 20 MG: 20 TABLET, FILM COATED ORAL at 09:10

## 2023-10-05 RX ADMIN — MORPHINE SULFATE 4 MG: 4 INJECTION, SOLUTION INTRAMUSCULAR; INTRAVENOUS at 22:31

## 2023-10-05 RX ADMIN — ALLOPURINOL 100 MG: 100 TABLET ORAL at 09:10

## 2023-10-05 RX ADMIN — SERTRALINE HYDROCHLORIDE 100 MG: 100 TABLET, FILM COATED ORAL at 09:09

## 2023-10-05 RX ADMIN — FAMOTIDINE 20 MG: 20 TABLET, FILM COATED ORAL at 20:51

## 2023-10-05 RX ADMIN — CYCLOSPORINE 1 DROP: 0.5 EMULSION OPHTHALMIC at 09:11

## 2023-10-05 RX ADMIN — ENOXAPARIN SODIUM 40 MG: 100 INJECTION SUBCUTANEOUS at 09:16

## 2023-10-05 RX ADMIN — Medication 10 ML: at 09:11

## 2023-10-05 RX ADMIN — CYCLOSPORINE 1 DROP: 0.5 EMULSION OPHTHALMIC at 20:51

## 2023-10-05 RX ADMIN — Medication 10 ML: at 21:03

## 2023-10-05 RX ADMIN — HYDROXYZINE HYDROCHLORIDE 25 MG: 25 TABLET ORAL at 17:55

## 2023-10-05 RX ADMIN — ATORVASTATIN CALCIUM 10 MG: 10 TABLET, FILM COATED ORAL at 20:51

## 2023-10-05 NOTE — CASE MANAGEMENT/SOCIAL WORK
Continued Stay Note   [place]     Patient Name: Carmen Obrien  MRN: [id number]  Today's Date: 10/5/2023    Admit Date: 10/3/2023        Discharge Plan       Row Name 10/05/23 1136       Plan    Plan Comments SW is waiting for physical therapy eval in regards to appropriate dc plan. Pt is hoping to return to [facility]. SW will need to notify [facility] after therapy eval to find out if pt is appropriate to return. If pt is unable to return to [facility], pt is agreeable to [facility] for short term.                   Discharge Codes    No documentation.                       BRAEDEN Liang
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
## 2023-10-05 NOTE — PLAN OF CARE
Goal Outcome Evaluation:  Plan of Care Reviewed With: patient        Progress: improving  Outcome Evaluation: PT kylah complete. Pt is A&Ox4 with complaints of L shoulder pain 8/10 this date. She is currently in sling with abduction pillow LUE. She performed supine>sit with min A and required verbal cueing and use of handrails. She was able to stand x3 with CGA this visit with large anterior trunk lean but no loss of balance. She was able to ambulate 2 feet from bed>chair this visit with CGA and a shuffling gait pattern and required verbal cueing for safe placement into the chair and safe transfer mechanics. She was educated on AROM exercises for distal LUE and on brace wear schedule. She was educated on DVT prevention via compression and AROM of LEs. Transfers are impaired d/t her new weightbearing precautions and immobility of her LUE. PT services are necesary to help her adapt her functional mobility to her decreased functional capacity of LUE, and to increase strength and independence with transfers. Anticipate discharge to SNF.      Anticipated Discharge Disposition (PT): skilled nursing facility

## 2023-10-05 NOTE — PROGRESS NOTES
Orthopedic Surgery Progress Note    Carmen Obrien  10/5/2023      Subjective:     Systemic or Specific Complaints: doing well.     Objective:     Patient Vitals for the past 24 hrs:   BP Temp Temp src Pulse Resp SpO2   10/05/23 0357 128/79 98 øF (36.7 øC) Oral 57 16 92 %   10/04/23 2009 119/42 98.4 øF (36.9 øC) Oral 65 16 90 %   10/04/23 1550 125/52 98.8 øF (37.1 øC) Oral 70 16 95 %   10/04/23 0820 134/57 97.4 øF (36.3 øC) Oral 64 18 97 %       right upper  General: alert, appears stated age and cooperative   Wound: covered             Dressing: Clean, dry, intact   Extremity: Distal NVI           DVT Exam: No evidence of DVT                   Data Review:  Lab Results (last 24 hours)       Procedure Component Value Units Date/Time    Basic Metabolic Panel [[id number]]  (Abnormal) Collected: 10/05/23 0528    Specimen: Blood Updated: 10/05/23 0626     Glucose 126 mg/dL      BUN 24 mg/dL      Creatinine 0.74 mg/dL      Sodium 133 mmol/L      Potassium 4.7 mmol/L      Chloride 98 mmol/L      CO2 23.0 mmol/L      Calcium 9.3 mg/dL      BUN/Creatinine Ratio 32.4     Anion Gap 12.0 mmol/L      eGFR 81.4 mL/min/1.73     Narrative:      GFR Normal >60  Chronic Kidney Disease <60  Kidney Failure <15    The GFR formula is only valid for adults with stable renal function between ages 18 and 70.    CBC (No Diff) [[id number]]  (Abnormal) Collected: 10/05/23 0528    Specimen: Blood Updated: 10/05/23 0542     WBC 9.31 10*3/mm3      RBC 3.11 10*6/mm3      Hemoglobin 9.5 g/dL      Hematocrit 30.7 %      MCV 98.7 fL      MCH 30.5 pg      MCHC 30.9 g/dL      RDW 14.0 %      RDW-SD 50.4 fl      MPV 9.6 fL      Platelets 293 10*3/mm3           Imaging Results (Last 24 Hours)       Procedure Component Value Units Date/Time    US Venous Doppler Lower Extremity Left (duplex) [[id number]] Collected: 10/04/23 1113     Updated: 10/04/23 1118    Narrative:      EXAMINATION: US VENOUS DOPPLER LOWER EXTREMITY LEFT (DUPLEX)- 10/4/2023  11:13 AM  CDT     HISTORY: unilateral edema and pain; N39.0-Urinary tract infection, site  not specified.     REPORT: Sonographic images of the left lower extremity deep venous  system were obtained, using color, gray scale and spectral Doppler  technique, as well as compression, augmentation and Valsalva maneuvers.  There are no comparison studies.     The left common femoral vein is patent and normally compressible. The  left saphenous femoral junction is patent and normally compressible. The  left superficial femoral vein is patent. The left popliteal vein,  posterior tibial, peroneal and anterior tibial veins are patent. No DVT  is seen.      The left great saphenous vein junction at the femoral vein appears  normally patent     Incidentally noted is a subcutaneous shadowing mass or calcification  within the medial left mid calf, measuring at least 24 mm. This is  nonspecific, could represent an old calcified hematoma or other  calcified mass. Correlate clinically for any history of trauma involving  the medial left calf.       Impression:      No evidence of left lower extremity DVT. There is a  shadowing mass within the medial left mid calf measuring at least 24 mm,  possibly related to old trauma. Focal correlation is recommended.        This report was signed and finalized on 10/4/2023 11:15 AM CDT by Dr. Jerry Aguayo MD.               Assessment:   2 Days Post-Op   Right Reverse Total Shoulder Arthroplasty     Plan:      1:  DVT prophylaxis, ICE, elevate  2:  Pain control  3:  Physical therapy/Occupational therapy  4:  Anticipate discharge in 1-2 days, Rehab vs. Returning to [facility]       Juan Carlos Rebolledo PA-C

## 2023-10-05 NOTE — DISCHARGE PLACEMENT REQUEST
"Mariposa Jenkins (83 y.o. Female)       Date of Birth   1940    Social Security Number       Address   [address] [place] [address] [address] [facility] [address]    Home Phone       MRN   [id number]       [facility]    Marital Status                               Admission Date   10/3/23    Admission Type   Elective    Admitting Provider   Dc Dominguez MD    Attending Provider   Dc Dominguez MD    Department, Room/Bed   [facility] 3A, 347/1       Discharge Date       Discharge Disposition       Discharge Destination                                 Attending Provider: Dc Dominguez MD    Allergies: Codeine, Demerol [Meperidine], Levaquin [Levofloxacin], Morphine    Isolation: Contact   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), 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), 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), C.difficile (11/05/22), ESBL Klebsiella (12/12/22)   Code Status: Prior    Ht: 152.4 cm (60\")   Wt: 87 kg (191 lb 14.4 oz)    Admission Cmt: None   Principal Problem: Primary osteoarthritis of left shoulder [M19.012]                   Active Insurance as of 10/3/2023       Primary Coverage       Payor Plan Insurance Group Employer/Plan Group    HUMANA MEDICARE REPLACEMENT HUMANA MEDICARE REPLACEMENT [id number]       Payor Plan Address Payor Plan Phone Number Payor Plan Fax Number Effective Dates    [address] [address] [phone]  1/1/2019 - None Entered    [facility] [address]         Subscriber Name Subscriber Birth Date Member ID       JENKINSMARIPOSA HERNÁNDEZ 1940 [id number]                     Emergency Contacts        (Rel.) Home Phone Work Phone Mobile Phone    merlynmorales (Friend) -- -- [phone]    MicahSelene (Relative) [phone] -- --    MicahAdam (Son) -- -- [phone]              Insurance Information                  HUMANA MEDICARE REPLACEMENT/HUMANA MEDICARE REPLACEMENT Phone: [phone]    Subscriber: Mariposa Jenkins Subscriber#: [id number]    Group#: [id number] Precert#: -- "             History & Physical        Dc Dominguez MD at 10/03/23 1406          Pt Name: Carmen Obrien  MRN: [id number]  YOB: 1940  Date of evaluation: 10/3/2023    H&P including current review of systems was updated in the paper chart and/or the document previously scanned into the record.  There have been no significant changes or new problems since the original evaluation.  The patient's problems continue and indications for contemplated procedure have not changed.    Electronically signed by Dc Dominguez MD on 10/3/2023 at 14:06 CDT    Electronically signed by Dc Dominguez MD at 10/03/23 1406       H&P signed by [employer], [employer] at 09/27/23 1101         [Media Unavailable] Scan on 10/3/2023 by [employer], [employer]: LEFT REVERSE SHOULDER HISTORY AND PHYSICAL, [facility], 10/03/2023          Electronically signed by [employer], [employer] at 09/27/23 1101          Operative/Procedure Notes (last 72 hours)        Dc Dominguez MD at 10/03/23 1952          TOTAL SHOULDER REVERSE ARTHROPLASTY  Progress Note    Carmen Obrien  10/3/2023    Pre-op Diagnosis:   M75.102       Post-Op Diagnosis Codes:     * Primary osteoarthritis of left shoulder [M19.012]    Procedure/CPTr Codes:  IL ARTHROPLASTY GLENOHUMERAL JOINT TOTAL SHOULDER [34941]      Procedure(s):  LEFT REVERSE TOTAL SHOULDER ARTHROPLASTY        SURGICAL APPROACH: Deltopectoral    SURGICAL TECHNIQUE: Peel off      Surgeon(s):  Dc Dominguez MD    Anesthesia: General with Block    Staff:   Circulator: Jaxson Boateng RN; Jo-Ann Melton RN  Scrub Person: Anand Douglass Selena; Tommy Maldonado  Vendor Representative: Daniel Delgadillo         Estimated Blood Loss: <500ml    Urine Voided: * No values recorded between 10/3/2023  6:38 PM and 10/3/2023  7:53 PM *    Specimens:                None          Drains:   External Urinary Catheter (Active)       [REMOVED] External Urinary Catheter (Removed)        Findings: see op note         Complications: none          Dc Dominguez MD     Date: 10/3/2023  Time: 19:53 CDT          Electronically signed by Dc Dominguez MD at 10/03/23 1953       Dc Dominguez MD at 10/03/23 1954          Patient Name: Geneva  MRN: [id number]  : 1940      DATE of SURGERY: 10/3/2023    SURGEON: Dc Dominguez MD    ASSISTANT: NONE    PREOPERATIVE DIAGNOSIS: Right Shoulder Primary Osteoarthritis    POSTOPERATIVE DIAGNOSIS:  Right Shoulder Primary Osteoarthritis    PROCEDURE PERFORMED:  Right Reverse Total Shoulder Arthroplasty    SURGICAL APPROACH: Deltopectoral    SURGICAL TECHNIQUE: Peel    IMPLANTS: [employer] Revers                Baseplate: small                Glenosphere: 36 + 4                Poly: + 9 metal spacer, + 3 constrained poly                Suture Cup: 33 neutral                             Stem: 9 mm Holliday pressfit    ANESTHESIA USED: General endotracheal anesthesia, interscalene block    OPERATIVE INDICATIONS: 83 y.o. female with progressive loss of function and increasing pain of the upper extremity due to severe end-stage primary osteoarthritis associated with a diseased and dysfunctional rotator cuff. Due to loss of function and progressive pain, a reverse shoulder arthroplasty is planned to improve function and decrease pain.  An MRI showed atrophy of the rotator cuff musculature.  The patient had an intact axillary nerve and functioning deltoid. Surgical evaluation was discussed and the patient wished to proceed understanding risks, benefits, and alternatives. The surgical indications were to relieve pain, improve function, and prevent future disability in regards to the shoulder pathology dictated in the above diagnoses.  Risks included, but were not limited to, that of anesthesia, bleeding, infection, pain, damage to local structures, postoperative dislocation, need for further surgery, instability, stiffness,  "failure of implants, and loss of function.    The patient has failed a combination of the following to improve pain and function: physical therapy >12 weeks, corticosteroid injections, NSAID's, activity modification.     ESTIMATED BLOOD LOSS: 150 mL    DRAINS: none     COMPLICATIONS: none    SPECIMENS: none    PROCEDURE IN DETAIL:  The patient was seen in the preoperative holding room, once again the informed consent was reviewed with the patient and signed.  The site of surgery was marked with the patient's agreement.  After being transported to the operating room, a timeout was performed identifying the correct patient as well as the operative site.  Dose appropriate IV antibiotics were given prior to incision.  The patient was positioned in the beach chair position, all bony prominences were protected and a sterile prep and drape was performed.  The surgical site was draped with loban dressing.    A deltopectoral approach to the shoulder joint was utilized as soft tissue was dissected down the level of the cephalic vein which was taken laterally along with the deltoid.  The biceps tendon was located, tenodesed to the superior border of the pectoralis major tendon insertion.  The rotator interval was opened.  A tagging stitch was placed in the subscapularis and with progressive external rotation of the shoulder, the tendon and underlying capsule were peeled from the lesser tuberosity.  The humeral head was dislocated from the glenoid and strategic retractors were placed to protect the surrounding soft tissue.  The axillary nerve was palpated and protected, verified with a \"tug test.\"    Beginning at the apex of the humeral head, a starting reamer was introduced into the shaft of the humerus, followed by reaming with a 5 and 6 mm reamer.  The proximal humeral osteotomy guide was inserted and an osteotomy was performed at 135 degrees and 30 degrees of retroversion.  A protective plate was placed on the osteotomy " site and attention was turned to the glenoid.      Again, strategic retractors were placed surrounding the glenoid, the axillary nerve was protected, and a complete capsulectomy was performed.  The labrum was excised.  A guidepin was placed in the inferior-central aspect of the glenoid, following by a reaming device, and drilling of the central peg hole.  A baseplate was impacted and superior, central, and inferior locking screws were inserted.  The glenosphere was then impacted without complication.    Attention was turned back to the humeral side where progressively sized broaches were inserted until a stable fit was achieved, followed by the metaphyseal reaming guide.  The metaphysis was reamed and a trial stem inserted.  Trial polyethylenes were placed in the suture cup until range of motion and stability were adequate.  The conjoined tendon showed increased tension. With traction of the shoulder, the entire scapula was translating without dissociation of the polyethylene.    Trial implants were removed, final implants impacted, and the shoulder was once again reduced showing excellent stability and range of motion.    The incision was thoroughly irrigated, followed by closure in layers.  The skin was closed with adhesive glue.  A sterile dressing and sling were placed.  Counts were correct.    The patient was awakened by anesthesia, transported to the recovery room in stable condition.    POSTOPERATIVE PLAN:  1) Admit for observation, possible rehab placement  2) Reverse total shoulder protocol    Electronically signed by Dc Dominguez MD on 10/3/2023 at 19:54 CDT        Electronically signed by Dc Dominguez MD at 10/03/23 1954          Physician Progress Notes (last 24 hours)        Juan Carlos Rebolledo PA-C at 10/05/23 0755            Orthopedic Surgery Progress Note    Carmen Obrien  10/5/2023      Subjective:     Systemic or Specific Complaints: doing well.     Objective:     Patient Vitals  for the past 24 hrs:   BP Temp Temp src Pulse Resp SpO2   10/05/23 0357 128/79 98 øF (36.7 øC) Oral 57 16 92 %   10/04/23 2009 119/42 98.4 øF (36.9 øC) Oral 65 16 90 %   10/04/23 1550 125/52 98.8 øF (37.1 øC) Oral 70 16 95 %   10/04/23 0820 134/57 97.4 øF (36.3 øC) Oral 64 18 97 %       right upper  General: alert, appears stated age and cooperative   Wound: covered             Dressing: Clean, dry, intact   Extremity: Distal NVI           DVT Exam: No evidence of DVT                   Data Review:  Lab Results (last 24 hours)       Procedure Component Value Units Date/Time    Basic Metabolic Panel [[id number]]  (Abnormal) Collected: 10/05/23 0528    Specimen: Blood Updated: 10/05/23 0626     Glucose 126 mg/dL      BUN 24 mg/dL      Creatinine 0.74 mg/dL      Sodium 133 mmol/L      Potassium 4.7 mmol/L      Chloride 98 mmol/L      CO2 23.0 mmol/L      Calcium 9.3 mg/dL      BUN/Creatinine Ratio 32.4     Anion Gap 12.0 mmol/L      eGFR 81.4 mL/min/1.73     Narrative:      GFR Normal >60  Chronic Kidney Disease <60  Kidney Failure <15    The GFR formula is only valid for adults with stable renal function between ages 18 and 70.    CBC (No Diff) [[id number]]  (Abnormal) Collected: 10/05/23 0528    Specimen: Blood Updated: 10/05/23 0542     WBC 9.31 10*3/mm3      RBC 3.11 10*6/mm3      Hemoglobin 9.5 g/dL      Hematocrit 30.7 %      MCV 98.7 fL      MCH 30.5 pg      MCHC 30.9 g/dL      RDW 14.0 %      RDW-SD 50.4 fl      MPV 9.6 fL      Platelets 293 10*3/mm3           Imaging Results (Last 24 Hours)       Procedure Component Value Units Date/Time    US Venous Doppler Lower Extremity Left (duplex) [[id number]] Collected: 10/04/23 1113     Updated: 10/04/23 1118    Narrative:      EXAMINATION: US VENOUS DOPPLER LOWER EXTREMITY LEFT (DUPLEX)- 10/4/2023  11:13 AM CDT     HISTORY: unilateral edema and pain; N39.0-Urinary tract infection, site  not specified.     REPORT: Sonographic images of the left lower extremity deep  venous  system were obtained, using color, gray scale and spectral Doppler  technique, as well as compression, augmentation and Valsalva maneuvers.  There are no comparison studies.     The left common femoral vein is patent and normally compressible. The  left saphenous femoral junction is patent and normally compressible. The  left superficial femoral vein is patent. The left popliteal vein,  posterior tibial, peroneal and anterior tibial veins are patent. No DVT  is seen.      The left great saphenous vein junction at the femoral vein appears  normally patent     Incidentally noted is a subcutaneous shadowing mass or calcification  within the medial left mid calf, measuring at least 24 mm. This is  nonspecific, could represent an old calcified hematoma or other  calcified mass. Correlate clinically for any history of trauma involving  the medial left calf.       Impression:      No evidence of left lower extremity DVT. There is a  shadowing mass within the medial left mid calf measuring at least 24 mm,  possibly related to old trauma. Focal correlation is recommended.        This report was signed and finalized on 10/4/2023 11:15 AM CDT by Dr. Jerry Aguayo MD.               Assessment:   2 Days Post-Op   Right Reverse Total Shoulder Arthroplasty     Plan:      1:  DVT prophylaxis, ICE, elevate  2:  Pain control  3:  Physical therapy/Occupational therapy  4:  Anticipate discharge in 1-2 days, Rehab vs. Returning to [facility]       Juan Carlos Rebolledo PA-C        Electronically signed by Juan Carlos Rebolledo PA-C at 10/05/23 0757       Fadia Coello APRN at 10/05/23 0658       Attestation signed by Bindu Aldrich MD at 10/05/23 0862    I have reviewed this documentation and agree.                      [facility]  INPATIENT PROGRESS NOTE    Patient Name: Carmen Obrien  Date of Admission: 10/3/2023  Today's Date: 10/05/23  Length of Stay: 0  Primary Care  Physician: VERA Noble MD    Subjective   Chief Complaint: Left shoulder pain  HPI   Ms. Obrien was admitted on 10/3 for elective left reverse total shoulder arthroplasty by Dr. Dominguez.  Hospitalist service consulted for medical management primary hypertension.  Patient lying in bed.  She denies complaints of chest pain, palpitations or shortness of breath.  No oxygen in use.  Left upper extremity in shoulder immobilizer.  WIC in place with no urine output.  Patient reports the need to void.  No complaints of nausea, vomiting or abdominal pain.  She is a resident at assisted living and may require SNF at discharge.  Occupational Therapy recommends SNF.  Blood pressure controlled on home medications.  Hemoglobin 7.9 yesterday but up to 9.5 today.  No increased bruising or bleeding noted at shoulder operative site.    Review of Systems   Constitutional:  Negative for chills and fever.   HENT:  Negative for congestion and trouble swallowing.    Eyes:  Negative for photophobia and visual disturbance.   Respiratory:  Negative for cough, shortness of breath and wheezing.    Cardiovascular:  Negative for chest pain, palpitations and leg swelling.   Genitourinary:  Negative for frequency and urgency.   Musculoskeletal:  Positive for gait problem.   Skin:  Positive for wound (Left shoulder surgical site).   Allergic/Immunologic: Negative for immunocompromised state.   Neurological:  Positive for weakness. Negative for light-headedness.   Hematological:  Negative for adenopathy. Does not bruise/bleed easily.   Psychiatric/Behavioral:  Negative for agitation, behavioral problems and confusion.     \  All pertinent negatives and positives are as above. All other systems have been reviewed and are negative unless otherwise stated.     Objective    Temp:  [97.4 øF (36.3 øC)-98.8 øF (37.1 øC)] 98 øF (36.7 øC)  Heart Rate:  [57-70] 57  Resp:  [16-18] 16  BP: (119-134)/(42-79) 128/79  Physical Exam  Vitals and nursing note  reviewed.   Constitutional:       Appearance: She is obese.      Comments: Lying in bed.  No oxygen use.  No visitors in room.   HENT:      Head: Normocephalic and atraumatic.      Nose: No congestion.      Mouth/Throat:      Pharynx: Oropharynx is clear. No oropharyngeal exudate or posterior oropharyngeal erythema.   Eyes:      Extraocular Movements: Extraocular movements intact.   Cardiovascular:      Rate and Rhythm: Normal rate and regular rhythm.      Heart sounds: No murmur heard.  Pulmonary:      Breath sounds: No wheezing, rhonchi or rales.      Comments: No oxygen use.  Abdominal:      Palpations: Abdomen is soft.      Tenderness: There is no abdominal tenderness.   Genitourinary:     Comments:  WIC in place with no urine return.  Musculoskeletal:         General: Tenderness (Left shoulder) present.      Cervical back: Normal range of motion and neck supple.      Comments: Left shoulder sling/immobilizer in place   Skin:     General: Skin is warm and dry.   Neurological:      General: No focal deficit present.      Mental Status: She is alert and oriented to person, place, and time.   Psychiatric:         Mood and Affect: Mood normal.         Behavior: Behavior normal.         Thought Content: Thought content normal.         Judgment: Judgment normal.     Results Review:  I have reviewed the labs, radiology results, and diagnostic studies.    Laboratory Data:   Results from last 7 days   Lab Units 10/05/23  0528 10/04/23  0540 10/03/23  1349   WBC 10*3/mm3 9.31  --  9.60   HEMOGLOBIN g/dL 9.5* 7.9* 9.8*   HEMATOCRIT % 30.7* 25.6* 30.6*   PLATELETS 10*3/mm3 293  --  278     Results from last 7 days   Lab Units 10/05/23  0528 10/04/23  0540 10/03/23  1349   SODIUM mmol/L 133* 132* 131*   POTASSIUM mmol/L 4.7 4.2 4.4   CHLORIDE mmol/L 98 101 98   CO2 mmol/L 23.0 22.0 22.0   BUN mg/dL 24* 12 14   CREATININE mg/dL 0.74 0.41* 0.36*   CALCIUM mg/dL 9.3 8.5* 9.7   BILIRUBIN mg/dL  --   --  0.6   ALK PHOS U/L  --    --  93   ALT (SGPT) U/L  --   --  52*   AST (SGOT) U/L  --   --  22   GLUCOSE mg/dL 126* 89 97       Culture Data:   No results found for: BLOODCX, URINECX, WOUNDCX, MRSACX, RESPCX, STOOLCX    Radiology Data:   Imaging Results (Last 24 Hours)       Procedure Component Value Units Date/Time    US Venous Doppler Lower Extremity Left (duplex) [[id number]] Collected: 10/04/23 1113     Updated: 10/04/23 1118    Narrative:      EXAMINATION: US VENOUS DOPPLER LOWER EXTREMITY LEFT (DUPLEX)- 10/4/2023  11:13 AM CDT     HISTORY: unilateral edema and pain; N39.0-Urinary tract infection, site  not specified.     REPORT: Sonographic images of the left lower extremity deep venous  system were obtained, using color, gray scale and spectral Doppler  technique, as well as compression, augmentation and Valsalva maneuvers.  There are no comparison studies.     The left common femoral vein is patent and normally compressible. The  left saphenous femoral junction is patent and normally compressible. The  left superficial femoral vein is patent. The left popliteal vein,  posterior tibial, peroneal and anterior tibial veins are patent. No DVT  is seen.      The left great saphenous vein junction at the femoral vein appears  normally patent     Incidentally noted is a subcutaneous shadowing mass or calcification  within the medial left mid calf, measuring at least 24 mm. This is  nonspecific, could represent an old calcified hematoma or other  calcified mass. Correlate clinically for any history of trauma involving  the medial left calf.       Impression:      No evidence of left lower extremity DVT. There is a  shadowing mass within the medial left mid calf measuring at least 24 mm,  possibly related to old trauma. Focal correlation is recommended.        This report was signed and finalized on 10/4/2023 11:15 AM CDT by Dr. Jerry Aguayo MD.             Home medications:  allopurinol, 100 mg, Oral, Daily  amLODIPine, 10 mg, Oral,  Q24H  atorvastatin, 10 mg, Oral, Nightly  carvedilol, 12.5 mg, Oral, BID With Meals  cycloSPORINE, 1 drop, Both Eyes, Q12H  enoxaparin, 40 mg, Subcutaneous, Daily  famotidine, 20 mg, Oral, BID  ferrous sulfate, 325 mg, Oral, Daily With Breakfast  furosemide, 20 mg, Oral, Daily  lisinopril, 20 mg, Oral, Q24H  potassium chloride, 10 mEq, Oral, Daily  sertraline, 100 mg, Oral, Daily  sodium chloride, 10 mL, Intravenous, Q12H         I have reviewed the patient's current medications.     Assessment/Plan   Assessment  Active Hospital Problems    Diagnosis     **Primary osteoarthritis of left shoulder     Primary osteoarthritis of right shoulder     Chronic venous stasis     Essential hypertension     Gastroesophageal reflux disease     History of DVT of lower extremity      Treatment Plan  1.  Primary osteoarthritis left shoulder.  Left reverse total shoulder arthroplasty 10/3 Dr. Dominguez.  Physical therapy per Dr. Dominguez.  Left shoulder with sling immobilizer in place.  Consult social service for discharge planning.  Patient may require skilled nursing facility at discharge.    2.  Primary hypertension.  Blood pressure 128/79.  Continue amlodipine, Coreg, lisinopril.    3.  History of DVT lower extremities.  Patient reports chronic lower extremity edema.  Venous Doppler left extremity no evidence of DVT.  There is shadowing mass within the medial left mid calf measuring at least 24 mm possibly related to old trauma.  Continue Lovenox for deep vein thrombosis prophylaxis.    4.  Normocytic anemia.  Hemoglobin 9.8 on admission.  Hemoglobin 7.9 on 10/4 suspect dilutional effect from IV fluids.  Hemoglobin 9.5 today.  No increased bruising or bleeding noted at operative site.    5.  GERD.  Continue Protonix twice daily.    Medical Decision Making  Number and Complexity of problems: 5  Primary osteoarthritis left shoulder: Acute, high complexity requiring surgical intervention, stable  Primary hypertension: Chronic, moderate  complexity, stable  History of DVT: Chronic, moderate complexity, stable  Normocytic anemia: Chronic, moderate complexity, stable  GERD, chronic, low complexity, stable    Differential Diagnosis: None    Conditions and Status        Condition is improving.     [facility]  External documents reviewed: Dr. Noble, PCP office visit 9/18/2023  Cardiac tracing (EKG, telemetry) interpretation: None  Radiology interpretation: None  Labs reviewed:   BMP 10/5/2023.  Repeat BMP in AM.  CBC is 10/5/2023.  Repeat CBC in AM.    Any tests that were considered but not ordered: None     Decision rules/scores evaluated (example [id number], [place], etc): None     Discussed with:  and patient.     Care Planning  Shared decision making: Dr. Aldrich and patient.  Patient agrees to physical therapy and social service consult.  Continue home medications.  Code status and discussions: Full code  Since POA is Wen Douglas    Disposition  Social Determinants of Health that impact treatment or disposition: Reside in assisted living prior to admission.  May require SNF.  I expect the patient to be discharged to SNF per Dr. Dominguez.    Electronically signed by ABDON Goyal, 10/05/23, 06:59 CDT.     Electronically signed by Bindu Aldrich MD at 10/05/23 0814       Consult Notes (last 24 hours)  Notes from 10/04/23 1501 through 10/05/23 1501   No notes of this type exist for this encounter.          Physical Therapy Notes (last 24 hours)        You Luna, PT Student at 10/05/23 1257  Version 1 of 1      Attestation signed by Xena Caban PT, DPT, NCS at 10/05/23 1307    I reviewed the documentation and agree.                   Goal Outcome Evaluation:  Plan of Care Reviewed With: patient        Progress: improving  Outcome Evaluation: PT eval complete. Pt is A&Ox4 with complaints of L shoulder pain 8/10 this date. She is currently in sling with abduction pillow LUE. She performed supine>sit with min A and required  verbal cueing and use of handrails. She was able to stand x3 with CGA this visit with large anterior trunk lean but no loss of balance. She was able to ambulate 2 feet from bed>chair this visit with CGA and a shuffling gait pattern and required verbal cueing for safe placement into the chair and safe transfer mechanics. She was educated on AROM exercises for distal LUE and on brace wear schedule. She was educated on DVT prevention via compression and AROM of LEs. Transfers are impaired d/t her new weightbearing precautions and immobility of her LUE. PT services are necesary to help her adapt her functional mobility to her decreased functional capacity of LUE, and to increase strength and independence with transfers. Anticipate discharge to SNF.      Anticipated Discharge Disposition (PT): skilled nursing facility    Electronically signed by Xena Caban PT, DPT, NCS at 10/05/23 6876       You Luna, PT Student at 10/05/23 0320  Version 1 of 1      Attestation signed by Xena Caban PT, DPT, NCS at 10/05/23 1304    I reviewed the documentation and agree.                   Patient Name: Carmen Obrien  : 1940    MRN: [id number]                              Today's Date: 10/5/2023       Admit Date: 10/3/2023    Visit Dx:     ICD-10-CM ICD-9-CM   1. Impaired mobility [Z74.09 (ICD-10-CM)]  Z74.09 799.89   2. Urinary tract infection without hematuria, site unspecified  N39.0 599.0     Patient Active Problem List   Diagnosis    Shoulder dislocation    Multinodular goiter    History of adenomatous polyp of colon    Family hx of colon cancer    Constipation    Esophageal dysphagia    Gastroesophageal reflux disease    Age-related osteoporosis without current pathological fracture    Essential hypertension    Acute blood loss as cause of postoperative anemia    Anemia    Atherosclerosis of native artery of both lower extremities with intermittent claudication    Avulsion of fingernail    Closed  traumatic dislocation of joint of shoulder region    Contusion of shoulder region    Shoulder strain    Decreased pulses in feet    Failure to thrive in adult    History of DVT of lower extremity    Hyperglycemia    Hyponatremia    Loose total knee arthroplasty    Falling    Osteoporosis    Rotator cuff tear arthropathy    Shoulder pain    Suspected elder neglect    Unstable knee    UTI (urinary tract infection), bacterial    Class 1 obesity due to excess calories with serious comorbidity and body mass index (BMI) of 34.0 to 34.9 in adult    Chronic venous stasis    Strain of rotator cuff capsule    Left leg cellulitis    Abscess of left thigh    Acute cystitis with hematuria    Hypokalemia    Chronic pain syndrome    Acute pain of left lower extremity    Positive result for methicillin resistant Staphylococcus aureus (MRSA) screening    Cellulitis of left lower leg    Fall    Rhabdomyolysis    Hypomagnesemia    Leukocytosis    AMS (altered mental status)    Traumatic rhabdomyolysis, initial encounter    Moderate malnutrition    RSV (acute bronchiolitis due to respiratory syncytial virus)    Type 2 MI (myocardial infarction)    THERESA (acute kidney injury)    History of ESBL E. coli infection    Hyperkalemia    Allergic conjunctivitis    C. difficile colitis    Hypertensive urgency    Primary osteoarthritis of right shoulder    Primary osteoarthritis of left shoulder     Past Medical History:   Diagnosis Date    THERESA (acute kidney injury) (HCC) due to sepsis 11/02/2022    Anxiety     Arthritis     CAD (coronary artery disease)     Colon polyp     DDD (degenerative disc disease), lumbar     Deep venous thrombosis     Depression     Diverticulosis     Esophageal stricture     Full dentures     GERD (gastroesophageal reflux disease)     History of transfusion     w/o Adverse reaction    Hypercholesteremia     Hypertension     LPRD (laryngopharyngeal reflux disease)     Macular degeneration     Multinodular goiter  02/23/2017    Osteoarthritis     Personal history of COVID-19 2020    Pruritic disorder     Spastic colon     Thyroid nodule     Type 2 MI (myocardial infarction) (HCC) due to THERESA and UTI 11/02/2022     Past Surgical History:   Procedure Laterality Date    BLADDER REPAIR      CATARACT EXTRACTION      CHOLECYSTECTOMY      COLONOSCOPY  04/22/2014    2 polyps, adenomatous, diverticulosis    COLONOSCOPY N/A 6/26/2020    Procedure: COLONOSCOPY WITH ANESTHESIA;  Surgeon: John Coleman MD;  Location:  [facility];  Service: Gastroenterology;  Laterality: N/A;  pre op: constipation  post op: diverticulosis,   PCP: Adam Delgadillo MD    CYSTOCELE REPAIR      ENDOSCOPY  04/22/2014    Schatzki's ring dilated    ENDOSCOPY N/A 6/26/2020    Procedure: ESOPHAGOGASTRODUODENOSCOPY WITH ANESTHESIA;  Surgeon: John oCleman MD;  Location:  [facility];  Service: Gastroenterology;  Laterality: N/A;  pre op: dysphagia  post op:stricture, dilated   PCP:Adam Delgadillo MD    ENDOSCOPY N/A 9/2/2021    Procedure: ESOPHAGOGASTRODUODENOSCOPY WITH ANESTHESIA;  Surgeon: John Coleman MD;  Location: [facility] ENDOSCOPY;  Service: Gastroenterology;  Laterality: N/A;  pre op: dysphagia  post op:gastritis  PCP: VERA Noble MD    FEMUR FRACTURE SURGERY      HYSTERECTOMY      INCISION AND DRAINAGE LEG Left 11/3/2021    Procedure: INCISION AND DRAINAGE LEG ABSCESS;  Surgeon: Cesia Mondragon MD;  Location: [facility] OR;  Service: General;  Laterality: Left;    REPLACEMENT TOTAL KNEE      TOTAL SHOULDER ARTHROPLASTY W/ DISTAL CLAVICLE EXCISION Left 10/3/2023    Procedure: LEFT REVERSE TOTAL SHOULDER ARTHROPLASTY;  Surgeon: Dc Dominguez MD;  Location: [facility] OR;  Service: Orthopedics;  Laterality: Left;    ULNAR NERVE TRANSPOSITION        General Information       Row Name 10/05/23 1017          Physical Therapy Time and Intention    Document Type evaluation  Admitted s/p L rTSA. Sling at all times HUGO HOGUE   -MS (r) JS (t) MS (c)     Mode of Treatment physical therapy  -MS (r) JS (t) MS (c)       Row Name 10/05/23 1017          General Information    Patient Profile Reviewed yes  -MS (r) JS (t) MS (c)     Prior Level of Function independent:;bed mobility;transfer;grooming;dressing;bathing;w/c or scooter;all household mobility  -MS (r) JS (t) MS (c)     Existing Precautions/Restrictions fall;left;shoulder;non-weight bearing;brace on at all times  -MS (r) JS (t) MS (c)     Barriers to Rehab previous functional deficit  -MS (r) JS (t) MS (c)       Row Name 10/05/23 1017          Living Environment    People in Home facility resident  -MS (r) JS (t) MS (c)       Row Name 10/05/23 1017          Home Main Entrance    Number of Stairs, Main Entrance none  -MS (r) JS (t) MS (c)       Row Name 10/05/23 1017          Stairs Within Home, Primary    Number of Stairs, Within Home, Primary none  -MS (r) JS (t) MS (c)       Row Name 10/05/23 1017          Cognition    Orientation Status (Cognition) oriented x 4  -MS (r) JS (t) MS (c)       Row Name 10/05/23 1017          Safety Issues, Functional Mobility    Impairments Affecting Function (Mobility) endurance/activity tolerance;range of motion (ROM);balance;strength;pain  -MS (r) JS (t) MS (c)     Comment, Safety Issues/Impairments (Mobility) Step in shower with grab bars and shower chair, but she does not use it. Grab  bars by toilet. Head and feet elevate on bed. W/c primarily for ambulation.  -MS (r) JS (t) MS (c)               User Key  (r) = Recorded By, (t) = Taken By, (c) = Cosigned By      Initials Name Provider Type    Xena Ramsey, PT, DPT, NCS Physical Therapist    You Chong, PT Student PT Student                   Mobility       Row Name 10/05/23 1017          Bed Mobility    Bed Mobility sit-supine  -MS (r) JS (t) MS (c)     Sit-Supine [place] (Bed Mobility) minimum assist (75% patient effort);verbal cues  -MS (r) JS (t) MS (c)     Assistive Device  (Bed Mobility) bed rails;head of bed elevated  -MS (r) JS (t) MS (c)       Row Name 10/05/23 1017          Bed-Chair Transfer    Bed-Chair [place] (Transfers) contact guard;verbal cues  -MS (r) JS (t) MS (c)       Row Name 10/05/23 1017          Sit-Stand Transfer    Sit-Stand [place] (Transfers) contact guard;other (see comments)  x3 this visit. CGA for all transfers.  -MS (r) JS (t) MS (c)       Row Name 10/05/23 1017          Gait/Stairs (Locomotion)    [place] Level (Gait) contact guard  -MS (r) JS (t) MS (c)     Distance in Feet (Gait) 2  -MS (r) JS (t) MS (c)     Deviations/Abnormal Patterns (Gait) stride length decreased;yeny decreased;gait speed decreased;festinating/shuffling  -MS (r) JS (t) MS (c)       Row Name 10/05/23 1017          Mobility    Extremity Weight-bearing Status left upper extremity  -MS (r) JS (t) MS (c)     Left Upper Extremity (Weight-bearing Status) non weight-bearing (NWB)  -MS (r) JS (t) MS (c)               User Key  (r) = Recorded By, (t) = Taken By, (c) = Cosigned By      Initials Name Provider Type    Xena Ramsey, PT, DPT, NCS Physical Therapist    You Chong, HALLEY Student PT Student                   Obj/Interventions       Row Name 10/05/23 1017          Range of Motion Comprehensive    Comment, General Range of Motion RUE shoulder impaired 50%, all other RUE WFL; LUE NT d/t precautions; BLE: WFL except LLE knee extension limited roughly 20%.  -MS (r) JS (t) MS (c)       Row Name 10/05/23 1017          Strength Comprehensive (MMT)    Comment, General Manual Muscle Testing (MMT) Assessment BLE: DF/PF 4/5 B; Knee extension 5/5 R 4/5 L with pain; Knee flexion 3+/5 B; Hip flexion 3+/5 B  -MS (r) JS (t) MS (c)       Row Name 10/05/23 1017          Balance    Balance Assessment sitting static balance;standing dynamic balance;standing static balance  -MS (r) JS (t) MS (c)     Static Sitting Balance standby assist  -MS (r) JS (t) MS (c)     Position,  Sitting Balance unsupported;sitting edge of bed;other (see comments)  Patient able to sit unsupported but preferred UE support for stability.  -MS (r) JS (t) MS (c)     Static Standing Balance contact guard  -MS (r) JS (t) MS (c)     Dynamic Standing Balance contact guard  -MS (r) JS (t) MS (c)     Position/Device Used, Standing Balance supported;other (see comments)  HHA  -MS (r) JS (t) MS (c)     Comment, Balance Patient did not have loss of balance with transfers  -MS (r) JS (t) MS (c)       Row Name 10/05/23 1017          Sensory Assessment (Somatosensory)    Sensory Assessment (Somatosensory) bilateral LE  -MS (r) JS (t) MS (c)     Bilateral LE Sensory Assessment general sensation;light touch awareness;intact  -MS (r) JS (t) MS (c)               User Key  (r) = Recorded By, (t) = Taken By, (c) = Cosigned By      Initials Name Provider Type    Xena Ramsey, PT, DPT, NCS Physical Therapist    You Chong, PT Student PT Student                   Goals/Plan       Row Name 10/05/23 1017          Bed Mobility Goal 1 (PT)    Activity/Assistive Device (Bed Mobility Goal 1, PT) sit to supine;supine to sit  -MS (r) JS (t) MS (c)     [place] Level/Cues Needed (Bed Mobility Goal 1, PT) contact guard required  -MS (r) JS (t) MS (c)     Time Frame (Bed Mobility Goal 1, PT) long term goal (LTG);by discharge  -MS (r) JS (t) MS (c)     Progress/Outcomes (Bed Mobility Goal 1, PT) new goal  -MS (r) JS (t) MS (c)       Row Name 10/05/23 1017          Transfer Goal 1 (PT)    Activity/Assistive Device (Transfer Goal 1, PT) bed-to-chair/chair-to-bed  -MS (r) JS (t) MS (c)     [place] Level/Cues Needed (Transfer Goal 1, PT) standby assist  -MS (r) JS (t) MS (c)     Time Frame (Transfer Goal 1, PT) long term goal (LTG);by discharge  -MS (r) JS (t) MS (c)     Progress/Outcome (Transfer Goal 1, PT) new goal  -MS (r) JS (t) MS (c)       Row Name 10/05/23 1017          Patient Education Goal (PT)    Activity (Patient  Education Goal, PT) Patient will demonstrate understanding of WB, ROM, and brace wear precautions of LUE  -MS (r) JS (t) MS (c)     [place]/Cues/Accuracy (Memory Goal 2, PT) demonstrates adequately;independent;verbalizes understanding  -MS (r) JS (t) MS (c)     Time Frame (Patient Education Goal, PT) long term goal (LTG);by discharge  -MS (r) JS (t) MS (c)     Progress/Outcome (Patient Education Goal, PT) new goal  -MS (r) JS (t) MS (c)       Row Name 10/05/23 1017          Therapy Assessment/Plan (PT)    Planned Therapy Interventions (PT) bed mobility training;transfer training;patient/family education;strengthening  -MS (r) JS (t) MS (c)               User Key  (r) = Recorded By, (t) = Taken By, (c) = Cosigned By      Initials Name Provider Type    Xena Ramsey, PT, DPT, NCS Physical Therapist    You Chong, PT Student PT Student                   Clinical Impression       Row Name 10/05/23 1017          Pain    Pretreatment Pain Rating 8/10  -MS (r) JS (t) MS (c)     Pain Location - Side/Orientation Left  -MS (r) JS (t) MS (c)     Pain Location - shoulder  -MS (r) JS (t) MS (c)     Pain Intervention(s) Medication (See MAR);Repositioned;Ambulation/increased activity  -MS (r) JS (t) MS (c)       Row Name 10/05/23 1017          Plan of Care Review    Plan of Care Reviewed With patient  -MS (r) JS (t) MS (c)     Progress improving  -MS (r) JS (t) MS (c)     Outcome Evaluation PT eval complete. Pt is A&Ox4 with complaints of L shoulder pain 8/10 this date. She is currently in sling with abduction pillow LUE. She performed supine>sit with min A and required verbal cueing and use of handrails. She was able to stand x3 with CGA this visit with large anterior trunk lean but no loss of balance. She was able to ambulate 2 feet from bed>chair this visit with CGA and a shuffling gait pattern and required verbal cueing for safe placement into the chair and safe transfer mechanics. She was educated on AROM  exercises for distal LUE and on brace wear schedule. She was educated on DVT prevention via compression and AROM of LEs. Transfers are impaired d/t her new weightbearing precautions and immobility of her LUE. PT services are necesary to help her adapt her functional mobility to her decreased functional capacity of LUE, and to increase strength and independence with transfers. Anticipate discharge to SNF.  -MS (r) JS (t) MS (c)       Row Name 10/05/23 1017          Therapy Assessment/Plan (PT)    Patient/Family Therapy Goals Statement (PT) Discharge back to assisted living  -MS (r) JS (t) MS (c)     Rehab Potential (PT) good, to achieve stated therapy goals  -MS (r) JS (t) MS (c)     Criteria for Skilled Interventions Met (PT) yes;meets criteria;skilled treatment is necessary  -MS (r) JS (t) MS (c)     Therapy Frequency (PT) 2 times/day  -MS (r) JS (t) MS (c)     Predicted Duration of Therapy Intervention (PT) Until discharge  -MS (r) JS (t) MS (c)       Row Name 10/05/23 1017          Vital Signs    Pre SpO2 (%) 93  -MS (r) JS (t) MS (c)     O2 Delivery Pre Treatment room air  -MS (r) JS (t) MS (c)     O2 Delivery Intra Treatment room air  -MS (r) JS (t) MS (c)     O2 Delivery Post Treatment room air  -MS (r) JS (t) MS (c)     Pre Patient Position Supine  -MS (r) JS (t) MS (c)     Intra Patient Position Standing  -MS (r) JS (t) MS (c)     Post Patient Position Sitting  -MS (r) JS (t) MS (c)       Row Name 10/05/23 1017          Positioning and Restraints    Pre-Treatment Position in bed  -MS (r) JS (t) MS (c)     Post Treatment Position chair  -MS (r) JS (t) MS (c)     In Chair exit alarm on;encouraged to call for assist;call light within reach;reclined;with nsg;legs elevated  -MS (r) JS (t) MS (c)               User Key  (r) = Recorded By, (t) = Taken By, (c) = Cosigned By      Initials Name Provider Type    Xena Ramsey, PT, DPT, NCS Physical Therapist    You Chong, PT Student PT Student                    Outcome Measures       Row Name 10/05/23 1017          How much help from another person do you currently need...    Turning from your back to your side while in flat bed without using bedrails? 4  -MS (r) JS (t) MS (c)     Moving from lying on back to sitting on the side of a flat bed without bedrails? 3  -MS (r) JS (t) MS (c)     Moving to and from a bed to a chair (including a wheelchair)? 3  -MS (r) JS (t) MS (c)     Standing up from a chair using your arms (e.g., wheelchair, bedside chair)? 3  -MS (r) JS (t) MS (c)     Climbing 3-5 steps with a railing? 1  -MS (r) JS (t) MS (c)     To walk in hospital room? 2  -MS (r) JS (t) MS (c)     AM-PAC 6 Clicks Score (PT) 16  -MS (r) JS (t)     Highest level of mobility 5 --> Static standing  -MS (r) JS (t)       Row Name 10/05/23 1017          Functional Assessment    Outcome Measure Options AM-PAC 6 Clicks Basic Mobility (PT)  -MS (r) JS (t) MS (c)               User Key  (r) = Recorded By, (t) = Taken By, (c) = Cosigned By      Initials Name Provider Type    MS Caban Xena TRINH, PT, DPT, NCS Physical Therapist    You Chong, PT Student PT Student                                 Physical Therapy Education       Title: PT OT SLP Therapies (Done)       Topic: Physical Therapy (Done)       Point: Mobility training (Done)       Learning Progress Summary             Patient Acceptance, E, VU,NR by  at 10/5/2023 1017    Comment: Educated on brace wear, NWB LUE precautions and AROM precautions. Cueing for safe transfers with bed mobility and bed>chair. Educated on LE ROM for DVT prevention. Reinforced distal LUE exercises.                         Point: Home exercise program (Done)       Learning Progress Summary             Patient Acceptance, E, VU,NR by  at 10/5/2023 1017    Comment: Educated on brace wear, NWB LUE precautions and AROM precautions. Cueing for safe transfers with bed mobility and bed>chair. Educated on LE ROM for DVT prevention.  Reinforced distal LUE exercises.                         Point: Body mechanics (Done)       Learning Progress Summary             Patient Acceptance, E, VU,NR by NATALIA at 10/5/2023 1017    Comment: Educated on brace wear, NWB LUE precautions and AROM precautions. Cueing for safe transfers with bed mobility and bed>chair. Educated on LE ROM for DVT prevention. Reinforced distal LUE exercises.                         Point: Precautions (Done)       Learning Progress Summary             Patient Acceptance, E, VU,NR by NATALIA at 10/5/2023 1017    Comment: Educated on brace wear, NWB LUE precautions and AROM precautions. Cueing for safe transfers with bed mobility and bed>chair. Educated on LE ROM for DVT prevention. Reinforced distal LUE exercises.                                         User Key       Initials Effective Dates Name Provider Type Discipline    NATALIA 07/13/23 -  You Luna, PT Student PT Student PT                  PT Recommendation and Plan  Planned Therapy Interventions (PT): bed mobility training, transfer training, patient/family education, strengthening  Plan of Care Reviewed With: patient  Progress: improving  Outcome Evaluation: PT eval complete. Pt is A&Ox4 with complaints of L shoulder pain 8/10 this date. She is currently in sling with abduction pillow LUE. She performed supine>sit with min A and required verbal cueing and use of handrails. She was able to stand x3 with CGA this visit with large anterior trunk lean but no loss of balance. She was able to ambulate 2 feet from bed>chair this visit with CGA and a shuffling gait pattern and required verbal cueing for safe placement into the chair and safe transfer mechanics. She was educated on AROM exercises for distal LUE and on brace wear schedule. She was educated on DVT prevention via compression and AROM of LEs. Transfers are impaired d/t her new weightbearing precautions and immobility of her LUE. PT services are necesary to help her adapt her  functional mobility to her decreased functional capacity of LUE, and to increase strength and independence with transfers. Anticipate discharge to SNF.     Time Calculation:         PT Charges       Row Name 10/05/23 1017             Time Calculation    Start Time 1017  10 mins spent in chart  -MS (r) JS (t) MS (c)      Stop Time 1112  -MS (r) JS (t) MS (c)      Time Calculation (min) 55 min  -MS (r) JS (t)      PT Received On 10/05/23  -MS (r) JS (t) MS (c)      PT Goal Re-Cert Due Date 10/15/23  -MS (r) JS (t) MS (c)         Untimed Charges    PT Eval/Re-eval Minutes 65  -MS (r) JS (t) MS (c)         Total Minutes    Untimed Charges Total Minutes 65  -MS (r) JS (t)       Total Minutes 65  -MS (r) JS (t)                User Key  (r) = Recorded By, (t) = Taken By, (c) = Cosigned By      Initials Name Provider Type    MS Xena Caban, PT, DPT, NCS Physical Therapist    You Chong, HALLEY Student PT Student                      PT G-Codes  Outcome Measure Options: AM-PAC 6 Clicks Basic Mobility (PT)  AM-PAC 6 Clicks Score (PT): 16  AM-PAC 6 Clicks Score (OT): 13  PT Discharge Summary  Anticipated Discharge Disposition (PT): skilled nursing facility    You Luna PT Student  10/5/2023      Electronically signed by Xena Caban, PT, DPT, NCS at 10/05/23 1307       Occupational Therapy Notes (last 24 hours)  Notes from 10/04/23 1501 through 10/05/23 1501   No notes exist for this encounter.

## 2023-10-05 NOTE — PLAN OF CARE
Goal Outcome Evaluation:  Plan of Care Reviewed With: patient        Progress: improving  Outcome Evaluation: Pt worked well during OT tx. Pt is limited in independence with self care activities due to decreased ROM and immobilizer in LUE along with limited ROM in dominant RUE. Pt mod/max for dressing and hygiene tasks. Pt would benefit from continued rehab at discharge. Continue OT POC

## 2023-10-05 NOTE — PROGRESS NOTES
[facility] Medicine Services  INPATIENT PROGRESS NOTE    Patient Name: Carmen Obrien  Date of Admission: 10/3/2023  Today's Date: 10/05/23  Length of Stay: 0  Primary Care Physician: VERA Noble MD    Subjective   Chief Complaint: Left shoulder pain  HPI   Ms. Obrien was admitted on 10/3 for elective left reverse total shoulder arthroplasty by Dr. Dominguez.  Hospitalist service consulted for medical management primary hypertension.  Patient lying in bed.  She denies complaints of chest pain, palpitations or shortness of breath.  No oxygen in use.  Left upper extremity in shoulder immobilizer.  WIC in place with no urine output.  Patient reports the need to void.  No complaints of nausea, vomiting or abdominal pain.  She is a resident at assisted living and may require SNF at discharge.  Occupational Therapy recommends SNF.  Blood pressure controlled on home medications.  Hemoglobin 7.9 yesterday but up to 9.5 today.  No increased bruising or bleeding noted at shoulder operative site.    Review of Systems   Constitutional:  Negative for chills and fever.   HENT:  Negative for congestion and trouble swallowing.    Eyes:  Negative for photophobia and visual disturbance.   Respiratory:  Negative for cough, shortness of breath and wheezing.    Cardiovascular:  Negative for chest pain, palpitations and leg swelling.   Genitourinary:  Negative for frequency and urgency.   Musculoskeletal:  Positive for gait problem.   Skin:  Positive for wound (Left shoulder surgical site).   Allergic/Immunologic: Negative for immunocompromised state.   Neurological:  Positive for weakness. Negative for light-headedness.   Hematological:  Negative for adenopathy. Does not bruise/bleed easily.   Psychiatric/Behavioral:  Negative for agitation, behavioral problems and confusion.     \  All pertinent negatives and positives are as above. All other systems have been reviewed and are negative unless  otherwise stated.     Objective    Temp:  [97.4 øF (36.3 øC)-98.8 øF (37.1 øC)] 98 øF (36.7 øC)  Heart Rate:  [57-70] 57  Resp:  [16-18] 16  BP: (119-134)/(42-79) 128/79  Physical Exam  Vitals and nursing note reviewed.   Constitutional:       Appearance: She is obese.      Comments: Lying in bed.  No oxygen use.  No visitors in room.   HENT:      Head: Normocephalic and atraumatic.      Nose: No congestion.      Mouth/Throat:      Pharynx: Oropharynx is clear. No oropharyngeal exudate or posterior oropharyngeal erythema.   Eyes:      Extraocular Movements: Extraocular movements intact.   Cardiovascular:      Rate and Rhythm: Normal rate and regular rhythm.      Heart sounds: No murmur heard.  Pulmonary:      Breath sounds: No wheezing, rhonchi or rales.      Comments: No oxygen use.  Abdominal:      Palpations: Abdomen is soft.      Tenderness: There is no abdominal tenderness.   Genitourinary:     Comments:  WIC in place with no urine return.  Musculoskeletal:         General: Tenderness (Left shoulder) present.      Cervical back: Normal range of motion and neck supple.      Comments: Left shoulder sling/immobilizer in place   Skin:     General: Skin is warm and dry.   Neurological:      General: No focal deficit present.      Mental Status: She is alert and oriented to person, place, and time.   Psychiatric:         Mood and Affect: Mood normal.         Behavior: Behavior normal.         Thought Content: Thought content normal.         Judgment: Judgment normal.     Results Review:  I have reviewed the labs, radiology results, and diagnostic studies.    Laboratory Data:   Results from last 7 days   Lab Units 10/05/23  0528 10/04/23  0540 10/03/23  1349   WBC 10*3/mm3 9.31  --  9.60   HEMOGLOBIN g/dL 9.5* 7.9* 9.8*   HEMATOCRIT % 30.7* 25.6* 30.6*   PLATELETS 10*3/mm3 293  --  278     Results from last 7 days   Lab Units 10/05/23  0528 10/04/23  0540 10/03/23  1349   SODIUM mmol/L 133* 132* 131*   POTASSIUM  mmol/L 4.7 4.2 4.4   CHLORIDE mmol/L 98 101 98   CO2 mmol/L 23.0 22.0 22.0   BUN mg/dL 24* 12 14   CREATININE mg/dL 0.74 0.41* 0.36*   CALCIUM mg/dL 9.3 8.5* 9.7   BILIRUBIN mg/dL  --   --  0.6   ALK PHOS U/L  --   --  93   ALT (SGPT) U/L  --   --  52*   AST (SGOT) U/L  --   --  22   GLUCOSE mg/dL 126* 89 97       Culture Data:   No results found for: BLOODCX, URINECX, WOUNDCX, MRSACX, RESPCX, STOOLCX    Radiology Data:   Imaging Results (Last 24 Hours)       Procedure Component Value Units Date/Time    US Venous Doppler Lower Extremity Left (duplex) [[id number]] Collected: 10/04/23 1113     Updated: 10/04/23 1118    Narrative:      EXAMINATION: US VENOUS DOPPLER LOWER EXTREMITY LEFT (DUPLEX)- 10/4/2023  11:13 AM CDT     HISTORY: unilateral edema and pain; N39.0-Urinary tract infection, site  not specified.     REPORT: Sonographic images of the left lower extremity deep venous  system were obtained, using color, gray scale and spectral Doppler  technique, as well as compression, augmentation and Valsalva maneuvers.  There are no comparison studies.     The left common femoral vein is patent and normally compressible. The  left saphenous femoral junction is patent and normally compressible. The  left superficial femoral vein is patent. The left popliteal vein,  posterior tibial, peroneal and anterior tibial veins are patent. No DVT  is seen.      The left great saphenous vein junction at the femoral vein appears  normally patent     Incidentally noted is a subcutaneous shadowing mass or calcification  within the medial left mid calf, measuring at least 24 mm. This is  nonspecific, could represent an old calcified hematoma or other  calcified mass. Correlate clinically for any history of trauma involving  the medial left calf.       Impression:      No evidence of left lower extremity DVT. There is a  shadowing mass within the medial left mid calf measuring at least 24 mm,  possibly related to old trauma. Focal  correlation is recommended.        This report was signed and finalized on 10/4/2023 11:15 AM CDT by Dr. Jerry Aguayo MD.             Home medications:  allopurinol, 100 mg, Oral, Daily  amLODIPine, 10 mg, Oral, Q24H  atorvastatin, 10 mg, Oral, Nightly  carvedilol, 12.5 mg, Oral, BID With Meals  cycloSPORINE, 1 drop, Both Eyes, Q12H  enoxaparin, 40 mg, Subcutaneous, Daily  famotidine, 20 mg, Oral, BID  ferrous sulfate, 325 mg, Oral, Daily With Breakfast  furosemide, 20 mg, Oral, Daily  lisinopril, 20 mg, Oral, Q24H  potassium chloride, 10 mEq, Oral, Daily  sertraline, 100 mg, Oral, Daily  sodium chloride, 10 mL, Intravenous, Q12H         I have reviewed the patient's current medications.     Assessment/Plan   Assessment  Active Hospital Problems    Diagnosis     **Primary osteoarthritis of left shoulder     Primary osteoarthritis of right shoulder     Chronic venous stasis     Essential hypertension     Gastroesophageal reflux disease     History of DVT of lower extremity      Treatment Plan  1.  Primary osteoarthritis left shoulder.  Left reverse total shoulder arthroplasty 10/3 Dr. Dominguez.  Physical therapy per Dr. Dominguez.  Left shoulder with sling immobilizer in place.  Consult social service for discharge planning.  Patient may require skilled nursing facility at discharge.    2.  Primary hypertension.  Blood pressure 128/79.  Continue amlodipine, Coreg, lisinopril.    3.  History of DVT lower extremities.  Patient reports chronic lower extremity edema.  Venous Doppler left extremity no evidence of DVT.  There is shadowing mass within the medial left mid calf measuring at least 24 mm possibly related to old trauma.  Continue Lovenox for deep vein thrombosis prophylaxis.    4.  Normocytic anemia.  Hemoglobin 9.8 on admission.  Hemoglobin 7.9 on 10/4 suspect dilutional effect from IV fluids.  Hemoglobin 9.5 today.  No increased bruising or bleeding noted at operative site.    5.  GERD.  Continue Protonix twice  daily.    Medical Decision Making  Number and Complexity of problems: 5  Primary osteoarthritis left shoulder: Acute, high complexity requiring surgical intervention, stable  Primary hypertension: Chronic, moderate complexity, stable  History of DVT: Chronic, moderate complexity, stable  Normocytic anemia: Chronic, moderate complexity, stable  GERD, chronic, low complexity, stable    Differential Diagnosis: None    Conditions and Status        Condition is improving.     [facility] Data  External documents reviewed: Dr. Noble, PCP office visit 9/18/2023  Cardiac tracing (EKG, telemetry) interpretation: None  Radiology interpretation: None  Labs reviewed:   BMP 10/5/2023.  Repeat BMP in AM.  CBC is 10/5/2023.  Repeat CBC in AM.    Any tests that were considered but not ordered: None     Decision rules/scores evaluated (example [id number], [place], etc): None     Discussed with:  and patient.     Care Planning  Shared decision making: Dr. Aldrich and patient.  Patient agrees to physical therapy and social service consult.  Continue home medications.  Code status and discussions: Full code  Since POA is [place]    Disposition  Social Determinants of Health that impact treatment or disposition: Reside in assisted living prior to admission.  May require SNF.  I expect the patient to be discharged to SNF per Dr. Dominguez.    Electronically signed by ABDON Goyal, 10/05/23, 06:59 CDT.

## 2023-10-05 NOTE — THERAPY EVALUATION
Patient Name: Carmen Obrien  : 1940    MRN: [id number]                              Today's Date: 10/5/2023       Admit Date: 10/3/2023    Visit Dx:     ICD-10-CM ICD-9-CM   1. Impaired mobility [Z74.09 (ICD-10-CM)]  Z74.09 799.89   2. Urinary tract infection without hematuria, site unspecified  N39.0 599.0     Patient Active Problem List   Diagnosis    Shoulder dislocation    Multinodular goiter    History of adenomatous polyp of colon    Family hx of colon cancer    Constipation    Esophageal dysphagia    Gastroesophageal reflux disease    Age-related osteoporosis without current pathological fracture    Essential hypertension    Acute blood loss as cause of postoperative anemia    Anemia    Atherosclerosis of native artery of both lower extremities with intermittent claudication    Avulsion of fingernail    Closed traumatic dislocation of joint of shoulder region    Contusion of shoulder region    Shoulder strain    Decreased pulses in feet    Failure to thrive in adult    History of DVT of lower extremity    Hyperglycemia    Hyponatremia    Loose total knee arthroplasty    Falling    Osteoporosis    Rotator cuff tear arthropathy    Shoulder pain    Suspected elder neglect    Unstable knee    UTI (urinary tract infection), bacterial    Class 1 obesity due to excess calories with serious comorbidity and body mass index (BMI) of 34.0 to 34.9 in adult    Chronic venous stasis    Strain of rotator cuff capsule    Left leg cellulitis    Abscess of left thigh    Acute cystitis with hematuria    Hypokalemia    Chronic pain syndrome    Acute pain of left lower extremity    Positive result for methicillin resistant Staphylococcus aureus (MRSA) screening    Cellulitis of left lower leg    Fall    Rhabdomyolysis    Hypomagnesemia    Leukocytosis    AMS (altered mental status)    Traumatic rhabdomyolysis, initial encounter    Moderate malnutrition    RSV (acute bronchiolitis due to respiratory syncytial virus)     Type 2 MI (myocardial infarction)    THERESA (acute kidney injury)    History of ESBL E. coli infection    Hyperkalemia    Allergic conjunctivitis    C. difficile colitis    Hypertensive urgency    Primary osteoarthritis of right shoulder    Primary osteoarthritis of left shoulder     Past Medical History:   Diagnosis Date    THERESA (acute kidney injury) (HCC) due to sepsis 11/02/2022    Anxiety     Arthritis     CAD (coronary artery disease)     Colon polyp     DDD (degenerative disc disease), lumbar     Deep venous thrombosis     Depression     Diverticulosis     Esophageal stricture     Full dentures     GERD (gastroesophageal reflux disease)     History of transfusion     w/o Adverse reaction    Hypercholesteremia     Hypertension     LPRD (laryngopharyngeal reflux disease)     Macular degeneration     Multinodular goiter 02/23/2017    Osteoarthritis     Personal history of COVID-19 2020    Pruritic disorder     Spastic colon     Thyroid nodule     Type 2 MI (myocardial infarction) (HCC) due to THERESA and UTI 11/02/2022     Past Surgical History:   Procedure Laterality Date    BLADDER REPAIR      CATARACT EXTRACTION      CHOLECYSTECTOMY      COLONOSCOPY  04/22/2014    2 polyps, adenomatous, diverticulosis    COLONOSCOPY N/A 6/26/2020    Procedure: COLONOSCOPY WITH ANESTHESIA;  Surgeon: John Coleman MD;  Location: [facility] ENDOSCOPY;  Service: Gastroenterology;  Laterality: N/A;  pre op: constipation  post op: diverticulosis,   PCP: Adam Delgadillo MD    CYSTOCELE REPAIR      ENDOSCOPY  04/22/2014    Schatzki's ring dilated    ENDOSCOPY N/A 6/26/2020    Procedure: ESOPHAGOGASTRODUODENOSCOPY WITH ANESTHESIA;  Surgeon: John Coleman MD;  Location: [facility] ENDOSCOPY;  Service: Gastroenterology;  Laterality: N/A;  pre op: dysphagia  post op:stricture, dilated   PCP:Adam Delgadillo MD    ENDOSCOPY N/A 9/2/2021    Procedure: ESOPHAGOGASTRODUODENOSCOPY WITH ANESTHESIA;  Surgeon: John Coleman MD;  Location:   [facility] ENDOSCOPY;  Service: Gastroenterology;  Laterality: N/A;  pre op: dysphagia  post op:gastritis  PCP: VERA Noble MD    FEMUR FRACTURE SURGERY      HYSTERECTOMY      INCISION AND DRAINAGE LEG Left 11/3/2021    Procedure: INCISION AND DRAINAGE LEG ABSCESS;  Surgeon: Cesia Mondragon MD;  Location:  [facility] OR;  Service: General;  Laterality: Left;    REPLACEMENT TOTAL KNEE      TOTAL SHOULDER ARTHROPLASTY W/ DISTAL CLAVICLE EXCISION Left 10/3/2023    Procedure: LEFT REVERSE TOTAL SHOULDER ARTHROPLASTY;  Surgeon: Dc Dominguez MD;  Location: [facility] OR;  Service: Orthopedics;  Laterality: Left;    ULNAR NERVE TRANSPOSITION        General Information       Row Name 10/05/23 1017          Physical Therapy Time and Intention    Document Type evaluation  Admitted s/p L rTSA. Sling at all times NWNHAN GILLETTE.  -MS (r) JS (t) MS (c)     Mode of Treatment physical therapy  -MS (r) JS (t) MS (c)       Row Name 10/05/23 1017          General Information    Patient Profile Reviewed yes  -MS (r) JS (t) MS (c)     Prior Level of Function independent:;bed mobility;transfer;grooming;dressing;bathing;w/c or scooter;all household mobility  -MS (r) JS (t) MS (c)     Existing Precautions/Restrictions fall;left;shoulder;non-weight bearing;brace on at all times  -MS (r) JS (t) MS (c)     Barriers to Rehab previous functional deficit  -MS (r) JS (t) MS (c)       Row Name 10/05/23 1017          Living Environment    People in Home facility resident  -MS (r) JS (t) MS (c)       Row Name 10/05/23 1017          Home Main Entrance    Number of Stairs, Main Entrance none  -MS (r) JS (t) MS (c)       Row Name 10/05/23 1017          Stairs Within Home, Primary    Number of Stairs, Within Home, Primary none  -MS (r) JS (t) MS (c)       Row Name 10/05/23 1017          Cognition    Orientation Status (Cognition) oriented x 4  -MS (r) JS (t) MS (c)       Row Name 10/05/23 1017          Safety Issues, Functional Mobility    Impairments  Affecting Function (Mobility) endurance/activity tolerance;range of motion (ROM);balance;strength;pain  -MS (r) JS (t) MS (c)     Comment, Safety Issues/Impairments (Mobility) Step in shower with grab bars and shower chair, but she does not use it. Grab  bars by toilet. Head and feet elevate on bed. W/c primarily for ambulation.  -MS (r) JS (t) MS (c)               User Key  (r) = Recorded By, (t) = Taken By, (c) = Cosigned By      Initials Name Provider Type    MS Xena Caban TRINH, PT, DPT, NCS Physical Therapist    You Chong, PT Student PT Student                   Mobility       Row Name 10/05/23 1017          Bed Mobility    Bed Mobility sit-supine  -MS (r) JS (t) MS (c)     Sit-Supine [place] (Bed Mobility) minimum assist (75% patient effort);verbal cues  -MS (r) JS (t) MS (c)     Assistive Device (Bed Mobility) bed rails;head of bed elevated  -MS (r) JS (t) MS (c)       Row Name 10/05/23 1017          Bed-Chair Transfer    Bed-Chair [place] (Transfers) contact guard;verbal cues  -MS (r) JS (t) MS (c)       Row Name 10/05/23 1017          Sit-Stand Transfer    Sit-Stand [place] (Transfers) contact guard;other (see comments)  x3 this visit. CGA for all transfers.  -MS (r) JS (t) MS (c)       Row Name 10/05/23 1017          Gait/Stairs (Locomotion)    [place] Level (Gait) contact guard  -MS (r) JS (t) MS (c)     Distance in Feet (Gait) 2  -MS (r) JS (t) MS (c)     Deviations/Abnormal Patterns (Gait) stride length decreased;yeny decreased;gait speed decreased;festinating/shuffling  -MS (r) JS (t) MS (c)       Row Name 10/05/23 1017          Mobility    Extremity Weight-bearing Status left upper extremity  -MS (r) JS (t) MS (c)     Left Upper Extremity (Weight-bearing Status) non weight-bearing (NWB)  -MS (r) JS (t) MS (c)               User Key  (r) = Recorded By, (t) = Taken By, (c) = Cosigned By      Initials Name Provider Type    Xena Ramsey R, PT, DPT, NCS Physical  Therapist    You Chong, PT Student PT Student                   Obj/Interventions       Row Name 10/05/23 1017          Range of Motion Comprehensive    Comment, General Range of Motion RUE shoulder impaired 50%, all other RUE WFL; LUE NT d/t precautions; BLE: WFL except LLE knee extension limited roughly 20%.  -MS (r) JS (t) MS (c)       Row Name 10/05/23 1017          Strength Comprehensive (MMT)    Comment, General Manual Muscle Testing (MMT) Assessment BLE: DF/PF 4/5 B; Knee extension 5/5 R 4/5 L with pain; Knee flexion 3+/5 B; Hip flexion 3+/5 B  -MS (r) JS (t) MS (c)       Row Name 10/05/23 1017          Balance    Balance Assessment sitting static balance;standing dynamic balance;standing static balance  -MS (r) JS (t) MS (c)     Static Sitting Balance standby assist  -MS (r) JS (t) MS (c)     Position, Sitting Balance unsupported;sitting edge of bed;other (see comments)  Patient able to sit unsupported but preferred UE support for stability.  -MS (r) JS (t) MS (c)     Static Standing Balance contact guard  -MS (r) JS (t) MS (c)     Dynamic Standing Balance contact guard  -MS (r) JS (t) MS (c)     Position/Device Used, Standing Balance supported;other (see comments)  HHA  -MS (r) JS (t) MS (c)     Comment, Balance Patient did not have loss of balance with transfers  -MS (r) JS (t) MS (c)       Row Name 10/05/23 1017          Sensory Assessment (Somatosensory)    Sensory Assessment (Somatosensory) bilateral LE  -MS (r) JS (t) MS (c)     Bilateral LE Sensory Assessment general sensation;light touch awareness;intact  -MS (r) JS (t) MS (c)               User Key  (r) = Recorded By, (t) = Taken By, (c) = Cosigned By      Initials Name Provider Type    Xena Ramsey, PT, DPT, NCS Physical Therapist    You Chong, PT Student PT Student                   Goals/Plan       Row Name 10/05/23 1017          Bed Mobility Goal 1 (PT)    Activity/Assistive Device (Bed Mobility Goal 1, PT) sit to  supine;supine to sit  -MS (r) JS (t) MS (c)     [place] Level/Cues Needed (Bed Mobility Goal 1, PT) contact guard required  -MS (r) JS (t) MS (c)     Time Frame (Bed Mobility Goal 1, PT) long term goal (LTG);by discharge  -MS (r) JS (t) MS (c)     Progress/Outcomes (Bed Mobility Goal 1, PT) new goal  -MS (r) JS (t) MS (c)       Row Name 10/05/23 1017          Transfer Goal 1 (PT)    Activity/Assistive Device (Transfer Goal 1, PT) bed-to-chair/chair-to-bed  -MS (r) JS (t) MS (c)     [place] Level/Cues Needed (Transfer Goal 1, PT) standby assist  -MS (r) JS (t) MS (c)     Time Frame (Transfer Goal 1, PT) long term goal (LTG);by discharge  -MS (r) JS (t) MS (c)     Progress/Outcome (Transfer Goal 1, PT) new goal  -MS (r) JS (t) MS (c)       Row Name 10/05/23 1017          Patient Education Goal (PT)    Activity (Patient Education Goal, PT) Patient will demonstrate understanding of WB, ROM, and brace wear precautions of LUE  -MS (r) JS (t) MS (c)     [place]/Cues/Accuracy (Memory Goal 2, PT) demonstrates adequately;independent;verbalizes understanding  -MS (r) JS (t) MS (c)     Time Frame (Patient Education Goal, PT) long term goal (LTG);by discharge  -MS (r) JS (t) MS (c)     Progress/Outcome (Patient Education Goal, PT) new goal  -MS (r) JS (t) MS (c)       Row Name 10/05/23 1017          Therapy Assessment/Plan (PT)    Planned Therapy Interventions (PT) bed mobility training;transfer training;patient/family education;strengthening  -MS (r) JS (t) MS (c)               User Key  (r) = Recorded By, (t) = Taken By, (c) = Cosigned By      Initials Name Provider Type    Xena Ramsey, PT, DPT, NCS Physical Therapist    You Chong, PT Student PT Student                   Clinical Impression       Row Name 10/05/23 1017          Pain    Pretreatment Pain Rating 8/10  -MS (r) JS (t) MS (c)     Pain Location - Side/Orientation Left  -MS (r) JS (t) MS (c)     Pain Location - shoulder  -MS (r) JS (t)  MS (c)     Pain Intervention(s) Medication (See MAR);Repositioned;Ambulation/increased activity  -MS (r) JS (t) MS (c)       Row Name 10/05/23 1017          Plan of Care Review    Plan of Care Reviewed With patient  -MS (r) JS (t) MS (c)     Progress improving  -MS (r) JS (t) MS (c)     Outcome Evaluation PT eval complete. Pt is A&Ox4 with complaints of L shoulder pain 8/10 this date. She is currently in sling with abduction pillow LUE. She performed supine>sit with min A and required verbal cueing and use of handrails. She was able to stand x3 with CGA this visit with large anterior trunk lean but no loss of balance. She was able to ambulate 2 feet from bed>chair this visit with CGA and a shuffling gait pattern and required verbal cueing for safe placement into the chair and safe transfer mechanics. She was educated on AROM exercises for distal LUE and on brace wear schedule. She was educated on DVT prevention via compression and AROM of LEs. Transfers are impaired d/t her new weightbearing precautions and immobility of her LUE. PT services are necesary to help her adapt her functional mobility to her decreased functional capacity of LUE, and to increase strength and independence with transfers. Anticipate discharge to SNF.  -MS (r) JS (t) MS (c)       Row Name 10/05/23 1017          Therapy Assessment/Plan (PT)    Patient/Family Therapy Goals Statement (PT) Discharge back to assisted living  -MS (r) JS (t) MS (c)     Rehab Potential (PT) good, to achieve stated therapy goals  -MS (r) JS (t) MS (c)     Criteria for Skilled Interventions Met (PT) yes;meets criteria;skilled treatment is necessary  -MS (r) JS (t) MS (c)     Therapy Frequency (PT) 2 times/day  -MS (r) JS (t) MS (c)     Predicted Duration of Therapy Intervention (PT) Until discharge  -MS (r) JS (t) MS (c)       Row Name 10/05/23 1017          Vital Signs    Pre SpO2 (%) 93  -MS (r) JS (t) MS (c)     O2 Delivery Pre Treatment room air  -MS (r) JS (t) MS  (c)     O2 Delivery Intra Treatment room air  -MS (r) JS (t) MS (c)     O2 Delivery Post Treatment room air  -MS (r) JS (t) MS (c)     Pre Patient Position Supine  -MS (r) JS (t) MS (c)     Intra Patient Position Standing  -MS (r) JS (t) MS (c)     Post Patient Position Sitting  -MS (r) JS (t) MS (c)       Row Name 10/05/23 1017          Positioning and Restraints    Pre-Treatment Position in bed  -MS (r) JS (t) MS (c)     Post Treatment Position chair  -MS (r) JS (t) MS (c)     In Chair exit alarm on;encouraged to call for assist;call light within reach;reclined;with nsg;legs elevated  -MS (r) JS (t) MS (c)               User Key  (r) = Recorded By, (t) = Taken By, (c) = Cosigned By      Initials Name Provider Type    Xena Ramsey, PT, DPT, NCS Physical Therapist    You Chong, PT Student PT Student                   Outcome Measures       Row Name 10/05/23 1017          How much help from another person do you currently need...    Turning from your back to your side while in flat bed without using bedrails? 4  -MS (r) JS (t) MS (c)     Moving from lying on back to sitting on the side of a flat bed without bedrails? 3  -MS (r) JS (t) MS (c)     Moving to and from a bed to a chair (including a wheelchair)? 3  -MS (r) JS (t) MS (c)     Standing up from a chair using your arms (e.g., wheelchair, bedside chair)? 3  -MS (r) JS (t) MS (c)     Climbing 3-5 steps with a railing? 1  -MS (r) JS (t) MS (c)     To walk in hospital room? 2  -MS (r) JS (t) MS (c)     AM-PAC 6 Clicks Score (PT) 16  -MS (r) JS (t)     Highest level of mobility 5 --> Static standing  -MS (r) JS (t)       Row Name 10/05/23 1017          Functional Assessment    Outcome Measure Options AM-PAC 6 Clicks Basic Mobility (PT)  -MS (r) JS (t) MS (c)               User Key  (r) = Recorded By, (t) = Taken By, (c) = Cosigned By      Initials Name Provider Type    Xena Ramsey, PT, DPT, NCS Physical Therapist    You Chong, PT  Student PT Student                                 Physical Therapy Education       Title: PT OT SLP Therapies (Done)       Topic: Physical Therapy (Done)       Point: Mobility training (Done)       Learning Progress Summary             Patient Acceptance, E, VU,NR by NATALIA at 10/5/2023 1017    Comment: Educated on brace wear, NWB LUE precautions and AROM precautions. Cueing for safe transfers with bed mobility and bed>chair. Educated on LE ROM for DVT prevention. Reinforced distal LUE exercises.                         Point: Home exercise program (Done)       Learning Progress Summary             Patient Acceptance, E, VU,NR by NATALIA at 10/5/2023 1017    Comment: Educated on brace wear, NWB LUE precautions and AROM precautions. Cueing for safe transfers with bed mobility and bed>chair. Educated on LE ROM for DVT prevention. Reinforced distal LUE exercises.                         Point: Body mechanics (Done)       Learning Progress Summary             Patient Acceptance, E, VU,NR by NATALIA at 10/5/2023 1017    Comment: Educated on brace wear, NWB LUE precautions and AROM precautions. Cueing for safe transfers with bed mobility and bed>chair. Educated on LE ROM for DVT prevention. Reinforced distal LUE exercises.                         Point: Precautions (Done)       Learning Progress Summary             Patient Acceptance, E, VU,NR by NATALIA at 10/5/2023 1017    Comment: Educated on brace wear, NWB LUE precautions and AROM precautions. Cueing for safe transfers with bed mobility and bed>chair. Educated on LE ROM for DVT prevention. Reinforced distal LUE exercises.                                         User Key       Initials Effective Dates Name Provider Type Discipline    NATALIA 07/13/23 -  You Luna, PT Student PT Student PT                  PT Recommendation and Plan  Planned Therapy Interventions (PT): bed mobility training, transfer training, patient/family education, strengthening  Plan of Care Reviewed With:  patient  Progress: improving  Outcome Evaluation: PT eval complete. Pt is A&Ox4 with complaints of L shoulder pain 8/10 this date. She is currently in sling with abduction pillow LUE. She performed supine>sit with min A and required verbal cueing and use of handrails. She was able to stand x3 with CGA this visit with large anterior trunk lean but no loss of balance. She was able to ambulate 2 feet from bed>chair this visit with CGA and a shuffling gait pattern and required verbal cueing for safe placement into the chair and safe transfer mechanics. She was educated on AROM exercises for distal LUE and on brace wear schedule. She was educated on DVT prevention via compression and AROM of LEs. Transfers are impaired d/t her new weightbearing precautions and immobility of her LUE. PT services are necesary to help her adapt her functional mobility to her decreased functional capacity of LUE, and to increase strength and independence with transfers. Anticipate discharge to SNF.     Time Calculation:         PT Charges       Row Name 10/05/23 1017             Time Calculation    Start Time 1017  10 mins spent in chart  -MS (r) JS (t) MS (c)      Stop Time 1112  -MS (r) JS (t) MS (c)      Time Calculation (min) 55 min  -MS (r) JS (t)      PT Received On 10/05/23  -MS (r) JS (t) MS (c)      PT Goal Re-Cert Due Date 10/15/23  -MS (r) JS (t) MS (c)         Untimed Charges    PT Eval/Re-eval Minutes 65  -MS (r) JS (t) MS (c)         Total Minutes    Untimed Charges Total Minutes 65  -MS (r) JS (t)       Total Minutes 65  -MS (r) JS (t)                User Key  (r) = Recorded By, (t) = Taken By, (c) = Cosigned By      Initials Name Provider Type    Xena Ramsey, PT, DPT, NCS Physical Therapist    You Chong, PT Student PT Student                      PT G-Codes  Outcome Measure Options: AM-PAC 6 Clicks Basic Mobility (PT)  AM-PAC 6 Clicks Score (PT): 16  AM-PAC 6 Clicks Score (OT): 13  PT Discharge  Summary  Anticipated Discharge Disposition (PT): skilled nursing facility    You Luna, PT Student  10/5/2023

## 2023-10-05 NOTE — CASE MANAGEMENT/SOCIAL WORK
Continued Stay Note   [facility]     Patient Name: Carmen Obrien  MRN: [id number]  Today's Date: 10/5/2023    Admit Date: 10/3/2023        Discharge Plan       Row Name 10/05/23 1502       Plan    Plan Comments Pt is agreeable for [facility] Referral has been sent, await answer.                   Discharge Codes    No documentation.                       BRAEDEN Liang

## 2023-10-05 NOTE — PLAN OF CARE
Goal Outcome Evaluation:  Plan of Care Reviewed With: patient        Progress: improving  Outcome Evaluation: A&Ox4. VSS. Medicated for c/o pain with relief noted. Drsg to left shoulder CDI. Fingers warm, mobile. Sling/immobilizer in place. Voiding via purewick. Con't to have BLE edema. Legs elevated. Resting well.

## 2023-10-06 LAB
ANION GAP SERPL CALCULATED.3IONS-SCNC: 10 MMOL/L (ref 5–15)
BUN SERPL-MCNC: 24 MG/DL (ref 8–23)
BUN/CREAT SERPL: 42.1 (ref 7–25)
CALCIUM SPEC-SCNC: 8.6 MG/DL (ref 8.6–10.5)
CHLORIDE SERPL-SCNC: 102 MMOL/L (ref 98–107)
CO2 SERPL-SCNC: 19 MMOL/L (ref 22–29)
CREAT SERPL-MCNC: 0.57 MG/DL (ref 0.57–1)
DEPRECATED RDW RBC AUTO: 48.4 FL (ref 37–54)
EGFRCR SERPLBLD CKD-EPI 2021: 91.4 ML/MIN/1.73
ERYTHROCYTE [DISTWIDTH] IN BLOOD BY AUTOMATED COUNT: 13.8 % (ref 12.3–15.4)
GLUCOSE SERPL-MCNC: 102 MG/DL (ref 65–99)
HCT VFR BLD AUTO: 25.4 % (ref 34–46.6)
HGB BLD-MCNC: 8.2 G/DL (ref 12–15.9)
MCH RBC QN AUTO: 30.9 PG (ref 26.6–33)
MCHC RBC AUTO-ENTMCNC: 32.3 G/DL (ref 31.5–35.7)
MCV RBC AUTO: 95.8 FL (ref 79–97)
PLATELET # BLD AUTO: 256 10*3/MM3 (ref 140–450)
PMV BLD AUTO: 9.7 FL (ref 6–12)
POTASSIUM SERPL-SCNC: 5 MMOL/L (ref 3.5–5.2)
RBC # BLD AUTO: 2.65 10*6/MM3 (ref 3.77–5.28)
SODIUM SERPL-SCNC: 131 MMOL/L (ref 136–145)
WBC NRBC COR # BLD: 10.35 10*3/MM3 (ref 3.4–10.8)

## 2023-10-06 PROCEDURE — 80048 BASIC METABOLIC PNL TOTAL CA: CPT | Performed by: ORTHOPAEDIC SURGERY

## 2023-10-06 PROCEDURE — 63710000001 OXYCODONE-ACETAMINOPHEN 7.5-325 MG TABLET: Performed by: ORTHOPAEDIC SURGERY

## 2023-10-06 PROCEDURE — 63710000001 FAMOTIDINE 20 MG TABLET: Performed by: ORTHOPAEDIC SURGERY

## 2023-10-06 PROCEDURE — 85027 COMPLETE CBC AUTOMATED: CPT

## 2023-10-06 PROCEDURE — 97110 THERAPEUTIC EXERCISES: CPT

## 2023-10-06 PROCEDURE — 63710000001 LISINOPRIL 20 MG TABLET: Performed by: NURSE PRACTITIONER

## 2023-10-06 PROCEDURE — G0378 HOSPITAL OBSERVATION PER HR: HCPCS

## 2023-10-06 PROCEDURE — 63710000001 HYDROXYZINE 25 MG TABLET: Performed by: ORTHOPAEDIC SURGERY

## 2023-10-06 PROCEDURE — A9270 NON-COVERED ITEM OR SERVICE: HCPCS | Performed by: ORTHOPAEDIC SURGERY

## 2023-10-06 PROCEDURE — 63710000001 SERTRALINE 100 MG TABLET: Performed by: ORTHOPAEDIC SURGERY

## 2023-10-06 PROCEDURE — A9270 NON-COVERED ITEM OR SERVICE: HCPCS | Performed by: NURSE PRACTITIONER

## 2023-10-06 PROCEDURE — 63710000001 FUROSEMIDE 20 MG TABLET: Performed by: ORTHOPAEDIC SURGERY

## 2023-10-06 PROCEDURE — 63710000001 ALLOPURINOL 100 MG TABLET: Performed by: ORTHOPAEDIC SURGERY

## 2023-10-06 PROCEDURE — 63710000001 ATORVASTATIN 10 MG TABLET: Performed by: ORTHOPAEDIC SURGERY

## 2023-10-06 PROCEDURE — 63710000001 CYCLOSPORINE 0.05 % EMULSION: Performed by: ORTHOPAEDIC SURGERY

## 2023-10-06 PROCEDURE — 63710000001 FERROUS SULFATE 325 (65 FE) MG TABLET: Performed by: ORTHOPAEDIC SURGERY

## 2023-10-06 PROCEDURE — 63710000001 POTASSIUM CHLORIDE 10 MEQ CAPSULE CONTROLLED-RELEASE: Performed by: ORTHOPAEDIC SURGERY

## 2023-10-06 PROCEDURE — 97530 THERAPEUTIC ACTIVITIES: CPT

## 2023-10-06 PROCEDURE — 97535 SELF CARE MNGMENT TRAINING: CPT

## 2023-10-06 PROCEDURE — 63710000001 AMLODIPINE 10 MG TABLET: Performed by: NURSE PRACTITIONER

## 2023-10-06 PROCEDURE — 25010000002 ENOXAPARIN PER 10 MG: Performed by: ORTHOPAEDIC SURGERY

## 2023-10-06 PROCEDURE — 63710000001 CARVEDILOL 6.25 MG TABLET: Performed by: NURSE PRACTITIONER

## 2023-10-06 RX ADMIN — POTASSIUM CHLORIDE 10 MEQ: 10 CAPSULE, COATED, EXTENDED RELEASE ORAL at 08:30

## 2023-10-06 RX ADMIN — CARVEDILOL 12.5 MG: 6.25 TABLET, FILM COATED ORAL at 17:11

## 2023-10-06 RX ADMIN — FAMOTIDINE 20 MG: 20 TABLET, FILM COATED ORAL at 21:21

## 2023-10-06 RX ADMIN — OXYCODONE AND ACETAMINOPHEN 1 TABLET: 7.5; 325 TABLET ORAL at 15:05

## 2023-10-06 RX ADMIN — FUROSEMIDE 20 MG: 20 TABLET ORAL at 08:29

## 2023-10-06 RX ADMIN — Medication 10 ML: at 21:21

## 2023-10-06 RX ADMIN — ATORVASTATIN CALCIUM 10 MG: 10 TABLET, FILM COATED ORAL at 21:20

## 2023-10-06 RX ADMIN — CYCLOSPORINE 1 DROP: 0.5 EMULSION OPHTHALMIC at 08:37

## 2023-10-06 RX ADMIN — LISINOPRIL 20 MG: 20 TABLET ORAL at 08:31

## 2023-10-06 RX ADMIN — ENOXAPARIN SODIUM 40 MG: 100 INJECTION SUBCUTANEOUS at 08:29

## 2023-10-06 RX ADMIN — ALLOPURINOL 100 MG: 100 TABLET ORAL at 08:29

## 2023-10-06 RX ADMIN — AMLODIPINE BESYLATE 10 MG: 10 TABLET ORAL at 08:30

## 2023-10-06 RX ADMIN — HYDROXYZINE HYDROCHLORIDE 25 MG: 25 TABLET ORAL at 17:14

## 2023-10-06 RX ADMIN — CARVEDILOL 12.5 MG: 6.25 TABLET, FILM COATED ORAL at 08:30

## 2023-10-06 RX ADMIN — OXYCODONE AND ACETAMINOPHEN 2 TABLET: 7.5; 325 TABLET ORAL at 19:47

## 2023-10-06 RX ADMIN — FAMOTIDINE 20 MG: 20 TABLET, FILM COATED ORAL at 08:30

## 2023-10-06 RX ADMIN — FERROUS SULFATE TAB 325 MG (65 MG ELEMENTAL FE) 325 MG: 325 (65 FE) TAB at 08:30

## 2023-10-06 RX ADMIN — OXYCODONE AND ACETAMINOPHEN 2 TABLET: 7.5; 325 TABLET ORAL at 06:28

## 2023-10-06 RX ADMIN — Medication 10 ML: at 08:30

## 2023-10-06 RX ADMIN — CYCLOSPORINE 1 DROP: 0.5 EMULSION OPHTHALMIC at 21:20

## 2023-10-06 RX ADMIN — SERTRALINE HYDROCHLORIDE 100 MG: 100 TABLET, FILM COATED ORAL at 08:29
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
## 2023-10-06 NOTE — PAYOR COMM NOTE
"Mariposa Jenkins (83 y.o. Female)       Date of Birth   1940    Social Security Number       Address   [address] [place] [address] [address] [facility] [address]    Home Phone       MRN   [id number]       [facility]    Marital Status                               Admission Date   10/3/23    Admission Type   Elective    Admitting Provider   Dc Dominguez MD    Attending Provider   Dc Dominguez MD    Department, Room/Bed   [facility] 3A, 347/1       Discharge Date       Discharge Disposition       Discharge Destination                                 Attending Provider: Dc Dominguez MD    Allergies: Codeine, Demerol [Meperidine], Levaquin [Levofloxacin], Morphine    Isolation: Contact   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), 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), 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), C.difficile (11/05/22), ESBL Klebsiella (12/12/22)   Code Status: Prior    Ht: 152.4 cm (60\")   Wt: 87 kg (191 lb 14.4 oz)    Admission Cmt: None   Principal Problem: Primary osteoarthritis of left shoulder [M19.012]                   Active Insurance as of 10/3/2023       Primary Coverage       Payor Plan Insurance Group Employer/Plan Group    HUMANA MEDICARE REPLACEMENT HUMANA MEDICARE REPLACEMENT [id number]       Payor Plan Address Payor Plan Phone Number Payor Plan Fax Number Effective Dates    [address] [address] [phone]  1/1/2019 - None Entered    [facility] [id number]         Subscriber Name Subscriber Birth Date Member ID       MARIPOSA JENKINS 1940 [id number]                     Emergency Contacts        (Rel.) Home Phone Work Phone Mobile Phone    merlynmorales (Friend) -- -- [phone]    Selene Jenkins (Relative) [phone] -- --    Adam Jenkins (Son) -- -- [phone]                 History & Physical        Dc Dominguez MD at 10/03/23 1406          Pt Name: Mariposa Jenkins  MRN: [id number]  YOB: 1940  Date of evaluation: 10/3/2023    H&P including current review " of systems was updated in the paper chart and/or the document previously scanned into the record.  There have been no significant changes or new problems since the original evaluation.  The patient's problems continue and indications for contemplated procedure have not changed.    Electronically signed by Dc Dominguez MD on 10/3/2023 at 14:06 CDT    Electronically signed by Dc Dominguez MD at 10/03/23 1406       H&P signed by [employer], [employer] at 09/27/23 1101         [Media Unavailable] Scan on 10/3/2023 by [employer]: LEFT REVERSE SHOULDER HISTORY AND PHYSICAL, [facility], 10/03/2023          Electronically signed by [employer], [employer] at 09/27/23 1101          Operative/Procedure Notes (all)        Dc Dominguez MD at 10/03/23 1952  Version 1 of 1         TOTAL SHOULDER REVERSE ARTHROPLASTY  Progress Note    Carmen BETTY Micah  10/3/2023    Pre-op Diagnosis:   M75.102       Post-Op Diagnosis Codes:     * Primary osteoarthritis of left shoulder [M19.012]    Procedure/CPTr Codes:  MT ARTHROPLASTY GLENOHUMERAL JOINT TOTAL SHOULDER [70703]      Procedure(s):  LEFT REVERSE TOTAL SHOULDER ARTHROPLASTY        SURGICAL APPROACH: Deltopectoral    SURGICAL TECHNIQUE: Peel off      Surgeon(s):  Dc Dominguez MD    Anesthesia: General with Block    Staff:   Circulator: Jaxson Boateng, TAY; Jo-Ann Melton RN  Scrub Person: Juan Carlos Douglass; Marian Pulido; Tommy Maldonado  Vendor Representative: Daniel Delgadillo         Estimated Blood Loss: <500ml    Urine Voided: * No values recorded between 10/3/2023  6:38 PM and 10/3/2023  7:53 PM *    Specimens:                None          Drains:   External Urinary Catheter (Active)       [REMOVED] External Urinary Catheter (Removed)       Findings: see op note         Complications: none          Dc Dominguez MD     Date: 10/3/2023  Time: 19:53 CDT          Electronically signed by Dc Dominguez MD at 10/03/23 1953        cD Dominguez MD at 10/03/23 1954  Version 1 of 1         Patient Name: Geneva  MRN: [id number]  : 1940      DATE of SURGERY: 10/3/2023    SURGEON: Dc Dominguez MD    ASSISTANT: NONE    PREOPERATIVE DIAGNOSIS: Right Shoulder Primary Osteoarthritis    POSTOPERATIVE DIAGNOSIS:  Right Shoulder Primary Osteoarthritis    PROCEDURE PERFORMED:  Right Reverse Total Shoulder Arthroplasty    SURGICAL APPROACH: Deltopectoral    SURGICAL TECHNIQUE: Peel    IMPLANTS: [employer] Revers                Baseplate: small                Glenosphere: 36 + 4                Poly: + 9 metal spacer, + 3 constrained poly                Suture Cup: 33 neutral                             Stem: 9 mm Gravette pressfit    ANESTHESIA USED: General endotracheal anesthesia, interscalene block    OPERATIVE INDICATIONS: 83 y.o. female with progressive loss of function and increasing pain of the upper extremity due to severe end-stage primary osteoarthritis associated with a diseased and dysfunctional rotator cuff. Due to loss of function and progressive pain, a reverse shoulder arthroplasty is planned to improve function and decrease pain.  An MRI showed atrophy of the rotator cuff musculature.  The patient had an intact axillary nerve and functioning deltoid. Surgical evaluation was discussed and the patient wished to proceed understanding risks, benefits, and alternatives. The surgical indications were to relieve pain, improve function, and prevent future disability in regards to the shoulder pathology dictated in the above diagnoses.  Risks included, but were not limited to, that of anesthesia, bleeding, infection, pain, damage to local structures, postoperative dislocation, need for further surgery, instability, stiffness, failure of implants, and loss of function.    The patient has failed a combination of the following to improve pain and function: physical therapy >12 weeks, corticosteroid injections, NSAID's,  "activity modification.     ESTIMATED BLOOD LOSS: 150 mL    DRAINS: none     COMPLICATIONS: none    SPECIMENS: none    PROCEDURE IN DETAIL:  The patient was seen in the preoperative holding room, once again the informed consent was reviewed with the patient and signed.  The site of surgery was marked with the patient's agreement.  After being transported to the operating room, a timeout was performed identifying the correct patient as well as the operative site.  Dose appropriate IV antibiotics were given prior to incision.  The patient was positioned in the beach chair position, all bony prominences were protected and a sterile prep and drape was performed.  The surgical site was draped with loban dressing.    A deltopectoral approach to the shoulder joint was utilized as soft tissue was dissected down the level of the cephalic vein which was taken laterally along with the deltoid.  The biceps tendon was located, tenodesed to the superior border of the pectoralis major tendon insertion.  The rotator interval was opened.  A tagging stitch was placed in the subscapularis and with progressive external rotation of the shoulder, the tendon and underlying capsule were peeled from the lesser tuberosity.  The humeral head was dislocated from the glenoid and strategic retractors were placed to protect the surrounding soft tissue.  The axillary nerve was palpated and protected, verified with a \"tug test.\"    Beginning at the apex of the humeral head, a starting reamer was introduced into the shaft of the humerus, followed by reaming with a 5 and 6 mm reamer.  The proximal humeral osteotomy guide was inserted and an osteotomy was performed at 135 degrees and 30 degrees of retroversion.  A protective plate was placed on the osteotomy site and attention was turned to the glenoid.      Again, strategic retractors were placed surrounding the glenoid, the axillary nerve was protected, and a complete capsulectomy was performed.  The " labrum was excised.  A guidepin was placed in the inferior-central aspect of the glenoid, following by a reaming device, and drilling of the central peg hole.  A baseplate was impacted and superior, central, and inferior locking screws were inserted.  The glenosphere was then impacted without complication.    Attention was turned back to the humeral side where progressively sized broaches were inserted until a stable fit was achieved, followed by the metaphyseal reaming guide.  The metaphysis was reamed and a trial stem inserted.  Trial polyethylenes were placed in the suture cup until range of motion and stability were adequate.  The conjoined tendon showed increased tension. With traction of the shoulder, the entire scapula was translating without dissociation of the polyethylene.    Trial implants were removed, final implants impacted, and the shoulder was once again reduced showing excellent stability and range of motion.    The incision was thoroughly irrigated, followed by closure in layers.  The skin was closed with adhesive glue.  A sterile dressing and sling were placed.  Counts were correct.    The patient was awakened by anesthesia, transported to the recovery room in stable condition.    POSTOPERATIVE PLAN:  1) Admit for observation, possible rehab placement  2) Reverse total shoulder protocol    Electronically signed by Dc Dominguez MD on 10/3/2023 at 19:54 CDT        Electronically signed by Dc Dominguez MD at 10/03/23 1954          Physician Progress Notes (all)        Fadia Coello APRN at 10/06/23 0632       Attestation signed by Bindu Aldrich MD at 10/06/23 4476    I have reviewed this documentation and agree.                      [facility] Medicine Services  INPATIENT PROGRESS NOTE    Patient Name: Carmen Obrien  Date of Admission: 10/3/2023  Today's Date: 10/06/23  Length of Stay: 0  Primary Care Physician: VERA Noble,  MD    Subjective   Chief Complaint: Left shoulder pain  HPI   Ms. Obrien was admitted on 10/3 for elective left reverse total shoulder arthroplasty by Dr. Dominguez.  Hospitalist service consulted for medical management primary hypertension.      Today  Lying in bed.  No oxygen in use.  No complaints of chest pain, palpitations or shortness of breath.  Not requiring oxygen.  Left upper extremity remains in shoulder immobilizer. WIC in place with clear urine return.  Patient denies nausea or vomiting.  No bowel movement but she had a bowel movement the day prior to admission and she had taken a laxative.  Therapy recommended continued rehab at discharge and [facility]  Hemoglobin 8.2 today without increased bruising noted at operative site.    Review of Systems   Constitutional:  Negative for chills and fever.   HENT:  Negative for congestion and trouble swallowing.    Eyes:  Negative for photophobia and visual disturbance.   Respiratory:  Negative for cough, shortness of breath and wheezing.    Cardiovascular:  Negative for chest pain, palpitations and leg swelling.   Genitourinary:  Negative for frequency and urgency.   Musculoskeletal:  Positive for gait problem.   Skin:  Positive for wound (Left shoulder surgical site).   Allergic/Immunologic: Negative for immunocompromised state.   Neurological:  Positive for weakness. Negative for light-headedness.   Hematological:  Negative for adenopathy. Does not bruise/bleed easily.   Psychiatric/Behavioral:  Negative for agitation, behavioral problems and confusion.     \  All pertinent negatives and positives are as above. All other systems have been reviewed and are negative unless otherwise stated.     Objective    Temp:  [97.8 øF (36.6 øC)-98.8 øF (37.1 øC)] 98.7 øF (37.1 øC)  Heart Rate:  [58-67] 60  Resp:  [16-18] 18  BP: (122-141)/(32-45) 128/35  Physical Exam  Vitals and nursing note reviewed.   Constitutional:        Appearance: She is obese.      Comments: Lying in bed.  No oxygen use.  No visitors in room.   HENT:      Head: Normocephalic and atraumatic.      Nose: No congestion.      Mouth/Throat:      Pharynx: Oropharynx is clear. No oropharyngeal exudate or posterior oropharyngeal erythema.   Eyes:      Extraocular Movements: Extraocular movements intact.   Cardiovascular:      Rate and Rhythm: Normal rate and regular rhythm.      Heart sounds: No murmur heard.  Pulmonary:      Breath sounds: No wheezing, rhonchi or rales.      Comments: No oxygen use.  Abdominal:      Palpations: Abdomen is soft.      Tenderness: There is no abdominal tenderness.   Genitourinary:     Comments:  WIC in place.  Musculoskeletal:         General: Tenderness (Left shoulder) present.      Cervical back: Normal range of motion and neck supple.      Comments: Left shoulder sling/immobilizer in place   Skin:     General: Skin is warm and dry.   Neurological:      General: No focal deficit present.      Mental Status: She is alert and oriented to person, place, and time.   Psychiatric:         Mood and Affect: Mood normal.         Behavior: Behavior normal.         Thought Content: Thought content normal.         Judgment: Judgment normal.     Results Review:  I have reviewed the labs, radiology results, and diagnostic studies.    Laboratory Data:   Results from last 7 days   Lab Units 10/06/23  0418 10/05/23  0528 10/04/23  0540 10/03/23  1349   WBC 10*3/mm3 10.35 9.31  --  9.60   HEMOGLOBIN g/dL 8.2* 9.5* 7.9* 9.8*   HEMATOCRIT % 25.4* 30.7* 25.6* 30.6*   PLATELETS 10*3/mm3 256 293  --  278       Results from last 7 days   Lab Units 10/06/23  0418 10/05/23  0528 10/04/23  0540 10/03/23  1349   SODIUM mmol/L 131* 133* 132* 131*   POTASSIUM mmol/L 5.0 4.7 4.2 4.4   CHLORIDE mmol/L 102 98 101 98   CO2 mmol/L 19.0* 23.0 22.0 22.0   BUN mg/dL 24* 24* 12 14   CREATININE mg/dL 0.57 0.74 0.41* 0.36*   CALCIUM mg/dL 8.6 9.3 8.5* 9.7   BILIRUBIN mg/dL  --    --   --  0.6   ALK PHOS U/L  --   --   --  93   ALT (SGPT) U/L  --   --   --  52*   AST (SGOT) U/L  --   --   --  22   GLUCOSE mg/dL 102* 126* 89 97       Imaging Results (All)       Procedure Component Value Units Date/Time    US Venous Doppler Lower Extremity Left (duplex) [[id number]] Collected: 10/04/23 1113     Updated: 10/04/23 1118    Narrative:      EXAMINATION: US VENOUS DOPPLER LOWER EXTREMITY LEFT (DUPLEX)- 10/4/2023  11:13 AM CDT     HISTORY: unilateral edema and pain; N39.0-Urinary tract infection, site  not specified.     REPORT: Sonographic images of the left lower extremity deep venous  system were obtained, using color, gray scale and spectral Doppler  technique, as well as compression, augmentation and Valsalva maneuvers.  There are no comparison studies.     The left common femoral vein is patent and normally compressible. The  left saphenous femoral junction is patent and normally compressible. The  left superficial femoral vein is patent. The left popliteal vein,  posterior tibial, peroneal and anterior tibial veins are patent. No DVT  is seen.      The left great saphenous vein junction at the femoral vein appears  normally patent     Incidentally noted is a subcutaneous shadowing mass or calcification  within the medial left mid calf, measuring at least 24 mm. This is  nonspecific, could represent an old calcified hematoma or other  calcified mass. Correlate clinically for any history of trauma involving  the medial left calf.       Impression:      No evidence of left lower extremity DVT. There is a  shadowing mass within the medial left mid calf measuring at least 24 mm,  possibly related to old trauma. Focal correlation is recommended.        This report was signed and finalized on 10/4/2023 11:15 AM CDT by Dr. Jerry Aguayo MD.       XR Shoulder 2+ View Left [[id number]] Collected: 10/03/23 2041     Updated: 10/03/23 2045    Narrative:      EXAMINATION: XR SHOULDER 2+ VW LEFT-  10/3/2023  8:41 PM CDT     HISTORY: Postop     FINDINGS: 2 view exam of the left shoulder reveals the patient is  undergone a reverse left total shoulder arthroplasty. There is good  anatomic alignment with no evidence of complications.     This report was signed and finalized on 10/3/2023 8:42 PM CDT by Dr. José Elmore MD.       XR Chest 1 View [[id number]] Collected: 10/03/23 1338     Updated: 10/03/23 1342    Narrative:      EXAM/TECHNIQUE: XR CHEST 1 VW-     INDICATION: pre op     COMPARISON: 8/8/2023     FINDINGS:     Cardiac silhouette is normal size. No pleural effusion, pneumothorax, or  focal consolidation. Advanced degenerative change in both glenohumeral  joints. Old left-sided rib fractures. Degenerative change in the  thoracic spine. No acute osseous finding.       Impression:      No acute findings.     This report was signed and finalized on 10/3/2023 1:39 PM CDT by Dr. Arsh Calderon MD.              Home medications:  allopurinol, 100 mg, Oral, Daily  amLODIPine, 10 mg, Oral, Q24H  atorvastatin, 10 mg, Oral, Nightly  carvedilol, 12.5 mg, Oral, BID With Meals  cycloSPORINE, 1 drop, Both Eyes, Q12H  enoxaparin, 40 mg, Subcutaneous, Daily  famotidine, 20 mg, Oral, BID  ferrous sulfate, 325 mg, Oral, Daily With Breakfast  furosemide, 20 mg, Oral, Daily  lisinopril, 20 mg, Oral, Q24H  potassium chloride, 10 mEq, Oral, Daily  sertraline, 100 mg, Oral, Daily  sodium chloride, 10 mL, Intravenous, Q12H         I have reviewed the patient's current medications.     Assessment/Plan   Assessment  Active Hospital Problems    Diagnosis     **Primary osteoarthritis of left shoulder     Primary osteoarthritis of right shoulder     Chronic venous stasis     Essential hypertension     Gastroesophageal reflux disease     History of DVT of lower extremity      Treatment Plan  1.  Primary osteoarthritis left shoulder.  Left reverse total shoulder arthroplasty 10/3 Dr. Dominguez.  Physical therapy per Dr. Dominguez.  Left  shoulder with sling immobilizer in place.  Social service consulted for discharge planning and referral made to [facility]  Lovenox for deep vein thrombosis prophylaxis.    2.  Primary hypertension.  Blood pressure 128/35.  Continue amlodipine, Coreg, lisinopril.    3.  History of DVT lower extremities.  Patient reports chronic lower extremity edema.  Venous Doppler left extremity no evidence of DVT.  There is shadowing mass within the medial left mid calf measuring at least 24 mm possibly related to old trauma.  Continue Lovenox for deep vein thrombosis prophylaxis.    4.  Normocytic anemia.  Hemoglobin 9.8 on admission.  Hemoglobin 7.9 on 10/4 suspect dilutional effect from IV fluids.  Hemoglobin 8.2 today.  No increased bruising or bleeding noted at operative site.    5.  GERD.  Continue Protonix twice daily.    Medical Decision Making  Number and Complexity of problems: 5  Primary osteoarthritis left shoulder: Acute, high complexity requiring surgical intervention, stable  Primary hypertension: Chronic, moderate complexity, stable  History of DVT: Chronic, moderate complexity, stable  Normocytic anemia: Chronic, moderate complexity, stable  GERD, chronic, low complexity, stable    Differential Diagnosis: None    Conditions and Status        Condition is improving.     [facility] Data  External documents reviewed: Dr. Noble, PCP office visit 9/18/2023  Cardiac tracing (EKG, telemetry) interpretation: None  Radiology interpretation: None  Labs reviewed:   BMP 10/6/2023.  Repeat BMP in AM.  CBC  10/6/2023.  Repeat CBC in AM.    Any tests that were considered but not ordered: None     Decision rules/scores evaluated (example [id number], Wells, etc): None     Discussed with:  and patient.     Care Planning  Shared decision making: Dr. Aldrich and patient.  Patient agrees to physical therapy and social service consult.  Continue home medications.  [facility]  Code status and  discussions: Full code  Since POA is Wen Wong    Disposition  Social Determinants of Health that impact treatment or disposition: Reside in assisted living prior to admission.   I expect the patient to be discharged to [facility] per Dr. Dominguez.    Electronically signed by ABDON Goyal, 10/06/23, 06:32 CDT.     Electronically signed by Bindu Aldrich MD at 10/06/23 0756       Juan Carlos Rebolledo PA-C at 10/05/23 0755       Attestation signed by Dc Dominguez MD at 10/05/23 2043    I have reviewed this documentation and agree.                    Orthopedic Surgery Progress Note    Carmen Obrien  10/5/2023      Subjective:     Systemic or Specific Complaints: doing well.     Objective:     Patient Vitals for the past 24 hrs:   BP Temp Temp src Pulse Resp SpO2   10/05/23 0357 128/79 98 øF (36.7 øC) Oral 57 16 92 %   10/04/23 2009 119/42 98.4 øF (36.9 øC) Oral 65 16 90 %   10/04/23 1550 125/52 98.8 øF (37.1 øC) Oral 70 16 95 %   10/04/23 0820 134/57 97.4 øF (36.3 øC) Oral 64 18 97 %       right upper  General: alert, appears stated age and cooperative   Wound: covered             Dressing: Clean, dry, intact   Extremity: Distal NVI           DVT Exam: No evidence of DVT                   Data Review:  Lab Results (last 24 hours)       Procedure Component Value Units Date/Time    Basic Metabolic Panel [[id number]]  (Abnormal) Collected: 10/05/23 0528    Specimen: Blood Updated: 10/05/23 0626     Glucose 126 mg/dL      BUN 24 mg/dL      Creatinine 0.74 mg/dL      Sodium 133 mmol/L      Potassium 4.7 mmol/L      Chloride 98 mmol/L      CO2 23.0 mmol/L      Calcium 9.3 mg/dL      BUN/Creatinine Ratio 32.4     Anion Gap 12.0 mmol/L      eGFR 81.4 mL/min/1.73     Narrative:      GFR Normal >60  Chronic Kidney Disease <60  Kidney Failure <15    The GFR formula is only valid for adults with stable renal function between ages 18 and 70.    CBC (No Diff) [[id number]]  (Abnormal) Collected:  10/05/23 0528    Specimen: Blood Updated: 10/05/23 0542     WBC 9.31 10*3/mm3      RBC 3.11 10*6/mm3      Hemoglobin 9.5 g/dL      Hematocrit 30.7 %      MCV 98.7 fL      MCH 30.5 pg      MCHC 30.9 g/dL      RDW 14.0 %      RDW-SD 50.4 fl      MPV 9.6 fL      Platelets 293 10*3/mm3           Imaging Results (Last 24 Hours)       Procedure Component Value Units Date/Time    US Venous Doppler Lower Extremity Left (duplex) [[id number]] Collected: 10/04/23 1113     Updated: 10/04/23 1118    Narrative:      EXAMINATION: US VENOUS DOPPLER LOWER EXTREMITY LEFT (DUPLEX)- 10/4/2023  11:13 AM CDT     HISTORY: unilateral edema and pain; N39.0-Urinary tract infection, site  not specified.     REPORT: Sonographic images of the left lower extremity deep venous  system were obtained, using color, gray scale and spectral Doppler  technique, as well as compression, augmentation and Valsalva maneuvers.  There are no comparison studies.     The left common femoral vein is patent and normally compressible. The  left saphenous femoral junction is patent and normally compressible. The  left superficial femoral vein is patent. The left popliteal vein,  posterior tibial, peroneal and anterior tibial veins are patent. No DVT  is seen.      The left great saphenous vein junction at the femoral vein appears  normally patent     Incidentally noted is a subcutaneous shadowing mass or calcification  within the medial left mid calf, measuring at least 24 mm. This is  nonspecific, could represent an old calcified hematoma or other  calcified mass. Correlate clinically for any history of trauma involving  the medial left calf.       Impression:      No evidence of left lower extremity DVT. There is a  shadowing mass within the medial left mid calf measuring at least 24 mm,  possibly related to old trauma. Focal correlation is recommended.        This report was signed and finalized on 10/4/2023 11:15 AM CDT by Dr. Jerry Aguayo MD.                Assessment:   2 Days Post-Op   Right Reverse Total Shoulder Arthroplasty     Plan:      1:  DVT prophylaxis, ICE, elevate  2:  Pain control  3:  Physical therapy/Occupational therapy  4:  Anticipate discharge in 1-2 days, Rehab vs. Returning to [facility]       Juan Carlos Rebolledo PA-C        Electronically signed by Dc Dominguez MD at 10/05/23 2043       Fadia Coello, ABDON at 10/05/23 0658       Attestation signed by Bindu Aldrich MD at 10/05/23 0814    I have reviewed this documentation and agree.                      [facility] Medicine Services  INPATIENT PROGRESS NOTE    Patient Name: Carmen Obrien  Date of Admission: 10/3/2023  Today's Date: 10/05/23  Length of Stay: 0  Primary Care Physician: VERA Noble MD    Subjective   Chief Complaint: Left shoulder pain  HPI   Ms. Obrien was admitted on 10/3 for elective left reverse total shoulder arthroplasty by Dr. Dominguez.  Hospitalist service consulted for medical management primary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   Instruct learner(s) on proper safety adaptation and remediation techniques during self care or transfers.   Instruct in proper use of assistive devices.                  Learning Progress Summary             Patient Acceptance, E, VU by TS at 10/5/2023 1529    Acceptance, E, VU,NR by CS at 10/4/2023 1335                         Point: Home exercise program (Done)       Description:   Instruct learner(s) on appropriate technique for monitoring, assisting and/or progressing therapeutic exercises/activities.                  Learning Progress Summary             Patient Acceptance, E, VU,NR by CS at 10/4/2023 1335                         Point: Precautions (Done)       Description:   Instruct learner(s) on prescribed precautions during self-care and functional transfers.                  Learning Progress Summary             Patient Acceptance, E, VU,NR by CS at 10/4/2023 1335                         Point: Body mechanics (Done)       Description:   Instruct learner(s) on proper positioning and spine alignment during self-care, functional mobility activities and/or exercises.                  Learning Progress Summary             Patient Acceptance, E, VU by TS at 10/5/2023 1529    Acceptance, E, VU,NR by CS at 10/4/2023  1335                                         User Key       Initials Effective Dates Name Provider Type Discipline    TS 02/03/23 -  Nereida Hirsch COTA Occupational Therapist Assistant OT    CS 02/03/23 -  Xiomara Flores, OTR/L, CNT Occupational Therapist OT                      OT Recommendation and Plan     Plan of Care Review  Plan of Care Reviewed With: patient  Progress: improving  Outcome Evaluation: Pt worked well during OT tx. Pt is limited in independence with self care activities due to decreased ROM and immobilizer in LUE along with limited ROM in dominant RUE. Pt mod/max for dressing and hygiene tasks. Pt would benefit from continued rehab at discharge. Continue OT POC  Plan of Care Reviewed With: patient  Outcome Evaluation: Pt worked well during OT tx. Pt is limited in independence with self care activities due to decreased ROM and immobilizer in LUE along with limited ROM in dominant RUE. Pt mod/max for dressing and hygiene tasks. Pt would benefit from continued rehab at discharge. Continue OT POC        Time Calculation:    Time Calculation- OT       Row Name 10/05/23 1534             Time Calculation- OT    OT Start Time 1334  -TS      OT Stop Time 1445  -TS      OT Time Calculation (min) 71 min  -TS      Total Timed Code Minutes- OT 71 minute(s)  -TS      OT Received On 10/05/23  -TS         Timed Charges    71089 - OT Self Care/Mgmt Minutes 71  -TS         Total Minutes    Timed Charges Total Minutes 71  -TS       Total Minutes 71  -TS                User Key  (r) = Recorded By, (t) = Taken By, (c) = Cosigned By      Initials Name Provider Type    TS Nereida Hirsch COTA Occupational Therapist Assistant                           Nereida MONTOYA. CHAPARRITA Hirsch  10/5/2023    Electronically signed by Nereida Hirsch COTA at 10/05/23 1536       Nereida Hirsch COTA at 10/05/23 1534          Goal Outcome Evaluation:  Plan of Care Reviewed With: patient        Progress:  improving  Outcome Evaluation: Pt worked well during OT tx. Pt is limited in independence with self care activities due to decreased ROM and immobilizer in LUE along with limited ROM in dominant RUE. Pt mod/max for dressing and hygiene tasks. Pt would benefit from continued rehab at discharge. Continue OT POC           Electronically signed by Nereida Hirsch COTA at 10/05/23 0257     You Luna, PT Student   PT Student  Physical Therapy     Therapy Evaluation      Attested     Date of Service: 10/05/23 1258  Creation Time: 10/05/23 1258     Attested           Attestation signed by Xena Caban, PT, DPT, NCS at 10/05/23 1307     I reviewed the documentation and agree.                Expand All Collapse All    Patient Name: Carmen Obrien                  : 1940                          MRN: [id number]                              Today's Date: 10/5/2023                                   Admit Date: 10/3/2023                        Visit Dx:   Visit Diagnosis       ICD-10-CM ICD-9-CM   1. Impaired mobility [Z74.09 (ICD-10-CM)]  Z74.09 799.89   2. Urinary tract infection without hematuria, site unspecified  N39.0 599.0         Problem List       Patient Active Problem List   Diagnosis    Shoulder dislocation    Multinodular goiter    History of adenomatous polyp of colon    Family hx of colon cancer    Constipation    Esophageal dysphagia    Gastroesophageal reflux disease    Age-related osteoporosis without current pathological fracture    Essential hypertension    Acute blood loss as cause of postoperative anemia    Anemia    Atherosclerosis of native artery of both lower extremities with intermittent claudication    Avulsion of fingernail    Closed traumatic dislocation of joint of shoulder region    Contusion of shoulder region    Shoulder strain    Decreased pulses in feet    Failure to thrive in adult    History of DVT of lower extremity    Hyperglycemia    Hyponatremia    Loose total knee  arthroplasty    Falling    Osteoporosis    Rotator cuff tear arthropathy    Shoulder pain    Suspected elder neglect    Unstable knee    UTI (urinary tract infection), bacterial    Class 1 obesity due to excess calories with serious comorbidity and body mass index (BMI) of 34.0 to 34.9 in adult    Chronic venous stasis    Strain of rotator cuff capsule    Left leg cellulitis    Abscess of left thigh    Acute cystitis with hematuria    Hypokalemia    Chronic pain syndrome    Acute pain of left lower extremity    Positive result for methicillin resistant Staphylococcus aureus (MRSA) screening    Cellulitis of left lower leg    Fall    Rhabdomyolysis    Hypomagnesemia    Leukocytosis    AMS (altered mental status)    Traumatic rhabdomyolysis, initial encounter    Moderate malnutrition    RSV (acute bronchiolitis due to respiratory syncytial virus)    Type 2 MI (myocardial infarction)    THERESA (acute kidney injury)    History of ESBL E. coli infection    Hyperkalemia    Allergic conjunctivitis    C. difficile colitis    Hypertensive urgency    Primary osteoarthritis of right shoulder    Primary osteoarthritis of left shoulder         Medical History        Past Medical History:   Diagnosis Date    THERESA (acute kidney injury) (HCC) due to sepsis 11/02/2022    Anxiety      Arthritis      CAD (coronary artery disease)      Colon polyp      DDD (degenerative disc disease), lumbar      Deep venous thrombosis      Depression      Diverticulosis      Esophageal stricture      Full dentures      GERD (gastroesophageal reflux disease)      History of transfusion       w/o Adverse reaction    Hypercholesteremia      Hypertension      LPRD (laryngopharyngeal reflux disease)      Macular degeneration      Multinodular goiter 02/23/2017    Osteoarthritis      Personal history of COVID-19 2020    Pruritic disorder      Spastic colon      Thyroid nodule      Type 2 MI (myocardial infarction) (HCC) due to THERESA and UTI 11/02/2022          Surgical History         Past Surgical History:   Procedure Laterality Date    BLADDER REPAIR        CATARACT EXTRACTION        CHOLECYSTECTOMY        COLONOSCOPY   04/22/2014     2 polyps, adenomatous, diverticulosis    COLONOSCOPY N/A 6/26/2020     Procedure: COLONOSCOPY WITH ANESTHESIA;  Surgeon: John Coleman MD;  Location:  [facility];  Service: Gastroenterology;  Laterality: N/A;  pre op: constipation  post op: diverticulosis,   PCP: Adam Delgadillo MD    CYSTOCELE REPAIR        ENDOSCOPY   04/22/2014     Schatzki's ring dilated    ENDOSCOPY N/A 6/26/2020     Procedure: ESOPHAGOGASTRODUODENOSCOPY WITH ANESTHESIA;  Surgeon: John Coleman MD;  Location:  [facility];  Service: Gastroenterology;  Laterality: N/A;  pre op: dysphagia  post op:stricture, dilated   PCP:Adam Delgadillo MD    ENDOSCOPY N/A 9/2/2021     Procedure: ESOPHAGOGASTRODUODENOSCOPY WITH ANESTHESIA;  Surgeon: John Coleman MD;  Location:  [facility];  Service: Gastroenterology;  Laterality: N/A;  pre op: dysphagia  post op:gastritis  PCP: VERA Noble MD    FEMUR FRACTURE SURGERY        HYSTERECTOMY        INCISION AND DRAINAGE LEG Left 11/3/2021     Procedure: INCISION AND DRAINAGE LEG ABSCESS;  Surgeon: Cesia Mondragon MD;  Location: [facility];  Service: General;  Laterality: Left;    REPLACEMENT TOTAL KNEE        TOTAL SHOULDER ARTHROPLASTY W/ DISTAL CLAVICLE EXCISION Left 10/3/2023     Procedure: LEFT REVERSE TOTAL SHOULDER ARTHROPLASTY;  Surgeon: Dc Dominguez MD;  Location:  [facility];  Service: Orthopedics;  Laterality: Left;    ULNAR NERVE TRANSPOSITION               General Information         Row Name 10/05/23 1017                 Physical Therapy Time and Intention     Document Type evaluation  Admitted s/p L rTSA. Sling at all times NWB LETA.  -MS (r) JS (t) MS (c)       Mode of Treatment physical therapy  -MS (r) JS (t) MS (c)          Row Name 10/05/23 1014                  General Information     Patient Profile Reviewed yes  -MS (r) JS (t) MS (c)       Prior Level of Function independent:;bed mobility;transfer;grooming;dressing;bathing;w/c or scooter;all household mobility  -MS (r) JS (t) MS (c)       Existing Precautions/Restrictions fall;left;shoulder;non-weight bearing;brace on at all times  -MS (r) JS (t) MS (c)       Barriers to Rehab previous functional deficit  -MS (r) JS (t) MS (c)          Row Name 10/05/23 1017                 Living Environment     People in Home facility resident  -MS (r) JS (t) MS (c)          Row Name 10/05/23 1017                 Home Main Entrance     Number of Stairs, Main Entrance none  -MS (r) JS (t) MS (c)          Row Name 10/05/23 1017                 Stairs Within Home, Primary     Number of Stairs, Within Home, Primary none  -MS (r) JS (t) MS (c)          Row Name 10/05/23 1017                 Cognition     Orientation Status (Cognition) oriented x 4  -MS (r) JS (t) MS (c)          Row Name 10/05/23 1017                 Safety Issues, Functional Mobility     Impairments Affecting Function (Mobility) endurance/activity tolerance;range of motion (ROM);balance;strength;pain  -MS (r) JS (t) MS (c)       Comment, Safety Issues/Impairments (Mobility) Step in shower with grab bars and shower chair, but she does not use it. Grab  bars by toilet. Head and feet elevate on bed. W/c primarily for ambulation.  -MS (r) JS (t) MS (c)                       User Key  (r) = Recorded By, (t) = Taken By, (c) = Cosigned By        Initials Name Provider Type     Xena Ramsey, PT, DPT, NCS Physical Therapist     You Chong, HALLEY Student PT Student                            Mobility         Row Name 10/05/23 1017                 Bed Mobility     Bed Mobility sit-supine  -MS (r) JS (t) MS (c)       Sit-Supine Temple (Bed Mobility) minimum assist (75% patient effort);verbal cues  -MS (r) JS (t) MS (c)       Assistive Device (Bed Mobility) bed  rails;head of bed elevated  -MS (r) JS (t) MS (c)          Row Name 10/05/23 1017                 Bed-Chair Transfer     Bed-Chair Frontier (Transfers) contact guard;verbal cues  -MS (r) JS (t) MS (c)          Row Name 10/05/23 1017                 Sit-Stand Transfer     Sit-Stand Frontier (Transfers) contact guard;other (see comments)  x3 this visit. CGA for all transfers.  -MS (r) JS (t) MS (c)          Row Name 10/05/23 1017                 Gait/Stairs (Locomotion)     Frontier Level (Gait) contact guard  -MS (r) JS (t) MS (c)       Distance in Feet (Gait) 2  -MS (r) JS (t) MS (c)       Deviations/Abnormal Patterns (Gait) stride length decreased;yeny decreased;gait speed decreased;festinating/shuffling  -MS (r) JS (t) MS (c)          Row Name 10/05/23 1017                 Mobility     Extremity Weight-bearing Status left upper extremity  -MS (r) JS (t) MS (c)       Left Upper Extremity (Weight-bearing Status) non weight-bearing (NWB)  -MS (r) JS (t) MS (c)                       User Key  (r) = Recorded By, (t) = Taken By, (c) = Cosigned By        Initials Name Provider Type     MS Arsh Xena TRINH, PT, DPT, NCS Physical Therapist     You Chong, HALLEY Student PT Student                            Obj/Interventions         Row Name 10/05/23 1017                 Range of Motion Comprehensive     Comment, General Range of Motion RUE shoulder impaired 50%, all other RUE WFL; LUE NT d/t precautions; BLE: WFL except LLE knee extension limited roughly 20%.  -MS (r) JS (t) MS (c)          Row Name 10/05/23 1017                 Strength Comprehensive (MMT)     Comment, General Manual Muscle Testing (MMT) Assessment BLE: DF/PF 4/5 B; Knee extension 5/5 R 4/5 L with pain; Knee flexion 3+/5 B; Hip flexion 3+/5 B  -MS (r) JS (t) MS (c)          Row Name 10/05/23 1017                 Balance     Balance Assessment sitting static balance;standing dynamic balance;standing static balance  -MS (r) JS (t) MS (c)        Static Sitting Balance standby assist  -MS (r) JS (t) MS (c)       Position, Sitting Balance unsupported;sitting edge of bed;other (see comments)  Patient able to sit unsupported but preferred UE support for stability.  -MS (r) JS (t) MS (c)       Static Standing Balance contact guard  -MS (r) JS (t) MS (c)       Dynamic Standing Balance contact guard  -MS (r) JS (t) MS (c)       Position/Device Used, Standing Balance supported;other (see comments)  HHA  -MS (r) JS (t) MS (c)       Comment, Balance Patient did not have loss of balance with transfers  -MS (r) JS (t) MS (c)          Row Name 10/05/23 1017                 Sensory Assessment (Somatosensory)     Sensory Assessment (Somatosensory) bilateral LE  -MS (r) JS (t) MS (c)       Bilateral LE Sensory Assessment general sensation;light touch awareness;intact  -MS (r) JS (t) MS (c)                       User Key  (r) = Recorded By, (t) = Taken By, (c) = Cosigned By        Initials Name Provider Type     Xena Ramsey, PT, DPT, NCS Physical Therapist     You Chong, PT Student PT Student                            Goals/Plan         Row Name 10/05/23 1017                 Bed Mobility Goal 1 (PT)     Activity/Assistive Device (Bed Mobility Goal 1, PT) sit to supine;supine to sit  -MS (r) JS (t) MS (c)       [place] Level/Cues Needed (Bed Mobility Goal 1, PT) contact guard required  -MS (r) JS (t) MS (c)       Time Frame (Bed Mobility Goal 1, PT) long term goal (LTG);by discharge  -MS (r) JS (t) MS (c)       Progress/Outcomes (Bed Mobility Goal 1, PT) new goal  -MS (r) JS (t) MS (c)          Row Name 10/05/23 1017                 Transfer Goal 1 (PT)     Activity/Assistive Device (Transfer Goal 1, PT) bed-to-chair/chair-to-bed  -MS (r) JS (t) MS (c)       [place] Level/Cues Needed (Transfer Goal 1, PT) standby assist  -MS (r) JS (t) MS (c)       Time Frame (Transfer Goal 1, PT) long term goal (LTG);by discharge  -MS (r) JS (t) MS (c)        Progress/Outcome (Transfer Goal 1, PT) new goal  -MS (r) JS (t) MS (c)          Row Name 10/05/23 1017                 Patient Education Goal (PT)     Activity (Patient Education Goal, PT) Patient will demonstrate understanding of WB, ROM, and brace wear precautions of LUE  -MS (r) JS (t) MS (c)       [place]ord/Cues/Accuracy (Memory Goal 2, PT) demonstrates adequately;independent;verbalizes understanding  -MS (r) JS (t) MS (c)       Time Frame (Patient Education Goal, PT) long term goal (LTG);by discharge  -MS (r) JS (t) MS (c)       Progress/Outcome (Patient Education Goal, PT) new goal  -MS (r) JS (t) MS (c)          Row Name 10/05/23 1017                 Therapy Assessment/Plan (PT)     Planned Therapy Interventions (PT) bed mobility training;transfer training;patient/family education;strengthening  -MS (r) JS (t) MS (c)                    User Key  (r) = Recorded By, (t) = Taken By, (c) = Cosigned By        Initials Name Provider Type     Xena Ramsey, PT, DPT, NCS Physical Therapist     You Chong, PT Student PT Student                         Clinical Impression         Row Name 10/05/23 1017                 Pain     Pretreatment Pain Rating 8/10  -MS (r) JS (t) MS (c)       Pain Location - Side/Orientation Left  -MS (r) JS (t) MS (c)       Pain Location - shoulder  -MS (r) JS (t) MS (c)       Pain Intervention(s) Medication (See MAR);Repositioned;Ambulation/increased activity  -MS (r) JS (t) MS (c)          Row Name 10/05/23 1017                 Plan of Care Review     Plan of Care Reviewed With patient  -MS (r) JS (t) MS (c)       Progress improving  -MS (r) JS (t) MS (c)       Outcome Evaluation PT eval complete. Pt is A&Ox4 with complaints of L shoulder pain 8/10 this date. She is currently in sling with abduction pillow LUE. She performed supine>sit with min A and required verbal cueing and use of handrails. She was able to stand x3 with CGA this visit with large anterior trunk lean but  no loss of balance. She was able to ambulate 2 feet from bed>chair this visit with CGA and a shuffling gait pattern and required verbal cueing for safe placement into the chair and safe transfer mechanics. She was educated on AROM exercises for distal LUE and on brace wear schedule. She was educated on DVT prevention via compression and AROM of LEs. Transfers are impaired d/t her new weightbearing precautions and immobility of her LUE. PT services are necesary to help her adapt her functional mobility to her decreased functional capacity of LUE, and to increase strength and independence with transfers. Anticipate discharge to SNF.  -MS (r) JS (t) MS (c)          Row Name 10/05/23 1017                 Therapy Assessment/Plan (PT)     Patient/Family Therapy Goals Statement (PT) Discharge back to assisted living  -MS (r) JS (t) MS (c)       Rehab Potential (PT) good, to achieve stated therapy goals  -MS (r) JS (t) MS (c)       Criteria for Skilled Interventions Met (PT) yes;meets criteria;skilled treatment is necessary  -MS (r) JS (t) MS (c)       Therapy Frequency (PT) 2 times/day  -MS (r) JS (t) MS (c)       Predicted Duration of Therapy Intervention (PT) Until discharge  -MS (r) JS (t) MS (c)          Row Name 10/05/23 1017                 Vital Signs     Pre SpO2 (%) 93  -MS (r) JS (t) MS (c)       O2 Delivery Pre Treatment room air  -MS (r) JS (t) MS (c)       O2 Delivery Intra Treatment room air  -MS (r) JS (t) MS (c)       O2 Delivery Post Treatment room air  -MS (r) JS (t) MS (c)       Pre Patient Position Supine  -MS (r) JS (t) MS (c)       Intra Patient Position Standing  -MS (r) JS (t) MS (c)       Post Patient Position Sitting  -MS (r) JS (t) MS (c)          Row Name 10/05/23 1017                 Positioning and Restraints     Pre-Treatment Position in bed  -MS (r) JS (t) MS (c)       Post Treatment Position chair  -MS (r) JS (t) MS (c)       In Chair exit alarm on;encouraged to call for assist;call light  within reach;reclined;with nsg;legs elevated  -MS (r) JS (t) MS (c)                       User Key  (r) = Recorded By, (t) = Taken By, (c) = Cosigned By        Initials Name Provider Type     Xena Ramsey TRINH, PT, DPT, NCS Physical Therapist     You Chong, PT Student PT Student                            Outcome Measures         Row Name 10/05/23 1017                 How much help from another person do you currently need...     Turning from your back to your side while in flat bed without using bedrails? 4  -MS (r) JS (t) MS (c)       Moving from lying on back to sitting on the side of a flat bed without bedrails? 3  -MS (r) JS (t) MS (c)       Moving to and from a bed to a chair (including a wheelchair)? 3  -MS (r) JS (t) MS (c)       Standing up from a chair using your arms (e.g., wheelchair, bedside chair)? 3  -MS (r) JS (t) MS (c)       Climbing 3-5 steps with a railing? 1  -MS (r) JS (t) MS (c)       To walk in hospital room? 2  -MS (r) JS (t) MS (c)       AM-PAC 6 Clicks Score (PT) 16  -MS (r) JS (t)       Highest level of mobility 5 --> Static standing  -MS (r) JS (t)          Row Name 10/05/23 1017                 Functional Assessment     Outcome Measure Options AM-PAC 6 Clicks Basic Mobility (PT)  -MS (r) JS (t) MS (c)                       User Key  (r) = Recorded By, (t) = Taken By, (c) = Cosigned By        Initials Name Provider Type     MS Caban Xena TRINH, PT, DPT, NCS Physical Therapist     You Chong, PT Student PT Student                          Physical Therapy Education            Title: PT OT SLP Therapies (Done)         Topic: Physical Therapy (Done)         Point: Mobility training (Done)         Learning Progress Summary               Patient Acceptance, E, VU,NR by NATALIA at 10/5/2023 1017     Comment: Educated on brace wear, NWB LUE precautions and AROM precautions. Cueing for safe transfers with bed mobility and bed>chair. Educated on LE ROM for DVT prevention. Reinforced  distal LUE exercises.                               Point: Home exercise program (Done)         Learning Progress Summary               Patient Acceptance, E, VU,NR by NATALIA at 10/5/2023 1017     Comment: Educated on brace wear, NWB LUE precautions and AROM precautions. Cueing for safe transfers with bed mobility and bed>chair. Educated on LE ROM for DVT prevention. Reinforced distal LUE exercises.                               Point: Body mechanics (Done)         Learning Progress Summary               Patient Acceptance, E, VU,NR by NATALIA at 10/5/2023 1017     Comment: Educated on brace wear, NWB LUE precautions and AROM precautions. Cueing for safe transfers with bed mobility and bed>chair. Educated on LE ROM for DVT prevention. Reinforced distal LUE exercises.                               Point: Precautions (Done)         Learning Progress Summary               Patient Acceptance, E, VU,NR by NATALIA at 10/5/2023 1017     Comment: Educated on brace wear, NWB LUE precautions and AROM precautions. Cueing for safe transfers with bed mobility and bed>chair. Educated on LE ROM for DVT prevention. Reinforced distal LUE exercises.                                                   User Key         Initials Effective Dates Name Provider Type Discipline      07/13/23 -  You Luna, PT Student PT Student PT                          PT Recommendation and Plan  Planned Therapy Interventions (PT): bed mobility training, transfer training, patient/family education, strengthening  Plan of Care Reviewed With: patient  Progress: improving  Outcome Evaluation: PT anirudhal complete. Pt is A&Ox4 with complaints of L shoulder pain 8/10 this date. She is currently in sling with abduction pillow LUE. She performed supine>sit with min A and required verbal cueing and use of handrails. She was able to stand x3 with CGA this visit with large anterior trunk lean but no loss of balance. She was able to ambulate 2 feet from bed>chair this visit with  CGA and a shuffling gait pattern and required verbal cueing for safe placement into the chair and safe transfer mechanics. She was educated on AROM exercises for distal LUE and on brace wear schedule. She was educated on DVT prevention via compression and AROM of LEs. Transfers are impaired d/t her new weightbearing precautions and immobility of her LUE. PT services are necesary to help her adapt her functional mobility to her decreased functional capacity of LUE, and to increase strength and independence with transfers. Anticipate discharge to SNF.      Time Calculation:        PT Charges         Row Name 10/05/23 1017                       Time Calculation     Start Time 1017  10 mins spent in chart  -MS (r) JS (t) MS (c)         Stop Time 1112  -MS (r) JS (t) MS (c)         Time Calculation (min) 55 min  -MS (r) JS (t)         PT Received On 10/05/23  -MS (r) JS (t) MS (c)         PT Goal Re-Cert Due Date 10/15/23  -MS (r) JS (t) MS (c)                 Untimed Charges     PT Eval/Re-eval Minutes 65  -MS (r) JS (t) MS (c)                 Total Minutes     Untimed Charges Total Minutes 65  -MS (r) JS (t)          Total Minutes 65  -MS (r) JS (t)                      User Key  (r) = Recorded By, (t) = Taken By, (c) = Cosigned By        Initials Name Provider Type     Xena Ramsey, PT, DPT, NCS Physical Therapist     You Chong PT Student PT Student                             PT G-Codes  Outcome Measure Options: AM-PAC 6 Clicks Basic Mobility (PT)  AM-PAC 6 Clicks Score (PT): 16  AM-PAC 6 Clicks Score (OT): 13  PT Discharge Summary  Anticipated Discharge Disposition (PT): skilled nursing facility     You Luna PT Student               10/5/2023                                Cosigned by: Xena Caban, PT, DPT, NCS at 10/05/23 1307     Revision History

## 2023-10-06 NOTE — PLAN OF CARE
Goal Outcome Evaluation:  Plan of Care Reviewed With: patient           Outcome Evaluation: Pt A&Ox4, needing encouragement for moving/repositioning, q 2 turns. Medication given per order, PRN Percocet given with moderate effect, PRN Morphine given with positive effect. On RA saturating in 90s. Voiding per PW, needing reminders to empty bladder. IV to R wrist maintained. SCDs for VTE. To dc to [facility] when appropriate. Safety maintained, call light within reach.

## 2023-10-06 NOTE — PLAN OF CARE
Goal Outcome Evaluation:  Plan of Care Reviewed With: patient        Progress: improving  Outcome Evaluation: PT tx completed. Pt sitting EOB on arrival with nsg present. Able to stand with CGA/min x1. Performed stand pivot to chair>BSC>chair. Required assist for hygiene care. Completed BLE AROM x15 reps seated. Will cont to follow.      Anticipated Discharge Disposition (PT): skilled nursing facility

## 2023-10-06 NOTE — PLAN OF CARE
Goal Outcome Evaluation:  Plan of Care Reviewed With: patient        Progress: no change  Outcome Evaluation: Pt A&Ox4. Purwic. Up x1ast. immobilizer to L arm. Inc CDI. PPP. Denies n/t. C/o of pain w/ PRN pain meds given. Edema noted to BLE. Diastolic BP low. MD aware. Safety maintained.

## 2023-10-06 NOTE — PROGRESS NOTES
Orthopedic Surgery Progress Note    Carmen Obrien  10/6/2023      Subjective:     Systemic or Specific Complaints: doing well.     Objective:     Patient Vitals for the past 24 hrs:   BP Temp Temp src Pulse Resp SpO2   10/06/23 1150 (!) 141/37 -- -- 62 20 95 %   10/06/23 0828 (!) 148/38 -- -- 70 -- --   10/06/23 0733 (!) 106/32 98.2 øF (36.8 øC) Oral 67 18 91 %   10/05/23 2359 (!) 128/35 98.7 øF (37.1 øC) Oral 60 18 93 %   10/05/23 2233 -- -- -- -- -- 92 %   10/05/23 1943 (!) 124/32 98.5 øF (36.9 øC) Temporal 67 16 90 %   10/05/23 1445 126/44 98.8 øF (37.1 øC) Oral 61 18 94 %       right upper  General: alert, appears stated age and cooperative   Wound: covered             Dressing: Clean, dry, intact   Extremity: Distal NVI           DVT Exam: No evidence of DVT                   Data Review:  Lab Results (last 24 hours)       Procedure Component Value Units Date/Time    Basic Metabolic Panel [[id number]]  (Abnormal) Collected: 10/06/23 0418    Specimen: Blood Updated: 10/06/23 0449     Glucose 102 mg/dL      BUN 24 mg/dL      Creatinine 0.57 mg/dL      Sodium 131 mmol/L      Potassium 5.0 mmol/L      Comment: Slight hemolysis detected by analyzer. Results may be affected.        Chloride 102 mmol/L      CO2 19.0 mmol/L      Calcium 8.6 mg/dL      BUN/Creatinine Ratio 42.1     Anion Gap 10.0 mmol/L      eGFR 91.4 mL/min/1.73     Narrative:      GFR Normal >60  Chronic Kidney Disease <60  Kidney Failure <15    The GFR formula is only valid for adults with stable renal function between ages 18 and 70.    CBC (No Diff) [[id number]]  (Abnormal) Collected: 10/06/23 0418    Specimen: Blood Updated: 10/06/23 0434     WBC 10.35 10*3/mm3      RBC 2.65 10*6/mm3      Hemoglobin 8.2 g/dL      Hematocrit 25.4 %      MCV 95.8 fL      MCH 30.9 pg      MCHC 32.3 g/dL      RDW 13.8 %      RDW-SD 48.4 fl      MPV 9.7 fL      Platelets 256 10*3/mm3           Imaging Results (Last 24 Hours)       ** No results found for the last 24  hours. **            Assessment:   3 Days Post-Op   Right Reverse Total Shoulder Arthroplasty     Plan:      1:  DVT prophylaxis, ICE, elevate  2:  Pain control  3:  Physical therapy/Occupational therapy  4:  Anticipate discharge in to [facility], pending insurance.       Juan Carlos Rebolledo PA-C
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
## 2023-10-06 NOTE — CASE MANAGEMENT/SOCIAL WORK
Continued Stay Note   [place]     Patient Name: Carmen Obrien  MRN: [id number]  Today's Date: 10/6/2023    Admit Date: 10/3/2023    Plan: [facility]   Discharge Plan       Row Name 10/06/23 1112       Plan    Plan [facility]    Patient/Family in Agreement with Plan yes    Plan Comments Patient has received a bed offer from [facility].  Patient's insurance requires prior approval.  Will advise when patient can dc to SNF.    Prior approval initiated by  manager, Juan TURCIOS                   Discharge Codes    No documentation.                       EMILY Martinez

## 2023-10-06 NOTE — PROGRESS NOTES
[facility] Medicine Services  INPATIENT PROGRESS NOTE    Patient Name: Carmen Obrien  Date of Admission: 10/3/2023  Today's Date: 10/06/23  Length of Stay: 0  Primary Care Physician: VERA Noble MD    Subjective   Chief Complaint: Left shoulder pain  HPI   Ms. Obrien was admitted on 10/3 for elective left reverse total shoulder arthroplasty by Dr. Dominguez.  Hospitalist service consulted for medical management primary hypertension.      Today  Lying in bed.  No oxygen in use.  No complaints of chest pain, palpitations or shortness of breath.  Not requiring oxygen.  Left upper extremity remains in shoulder immobilizer. WIC in place with clear urine return.  Patient denies nausea or vomiting.  No bowel movement but she had a bowel movement the day prior to admission and she had taken a laxative.  Therapy recommended continued rehab at discharge and patient agreed to [facility].  Hemoglobin 8.2 today without increased bruising noted at operative site.    Review of Systems   Constitutional:  Negative for chills and fever.   HENT:  Negative for congestion and trouble swallowing.    Eyes:  Negative for photophobia and visual disturbance.   Respiratory:  Negative for cough, shortness of breath and wheezing.    Cardiovascular:  Negative for chest pain, palpitations and leg swelling.   Genitourinary:  Negative for frequency and urgency.   Musculoskeletal:  Positive for gait problem.   Skin:  Positive for wound (Left shoulder surgical site).   Allergic/Immunologic: Negative for immunocompromised state.   Neurological:  Positive for weakness. Negative for light-headedness.   Hematological:  Negative for adenopathy. Does not bruise/bleed easily.   Psychiatric/Behavioral:  Negative for agitation, behavioral problems and confusion.     \  All pertinent negatives and positives are as above. All other systems have been reviewed and are negative unless  otherwise stated.     Objective    Temp:  [97.8 øF (36.6 øC)-98.8 øF (37.1 øC)] 98.7 øF (37.1 øC)  Heart Rate:  [58-67] 60  Resp:  [16-18] 18  BP: (122-141)/(32-45) 128/35  Physical Exam  Vitals and nursing note reviewed.   Constitutional:       Appearance: She is obese.      Comments: Lying in bed.  No oxygen use.  No visitors in room.   HENT:      Head: Normocephalic and atraumatic.      Nose: No congestion.      Mouth/Throat:      Pharynx: Oropharynx is clear. No oropharyngeal exudate or posterior oropharyngeal erythema.   Eyes:      Extraocular Movements: Extraocular movements intact.   Cardiovascular:      Rate and Rhythm: Normal rate and regular rhythm.      Heart sounds: No murmur heard.  Pulmonary:      Breath sounds: No wheezing, rhonchi or rales.      Comments: No oxygen use.  Abdominal:      Palpations: Abdomen is soft.      Tenderness: There is no abdominal tenderness.   Genitourinary:     Comments:  WIC in place.  Musculoskeletal:         General: Tenderness (Left shoulder) present.      Cervical back: Normal range of motion and neck supple.      Comments: Left shoulder sling/immobilizer in place   Skin:     General: Skin is warm and dry.   Neurological:      General: No focal deficit present.      Mental Status: She is alert and oriented to person, place, and time.   Psychiatric:         Mood and Affect: Mood normal.         Behavior: Behavior normal.         Thought Content: Thought content normal.         Judgment: Judgment normal.     Results Review:  I have reviewed the labs, radiology results, and diagnostic studies.    Laboratory Data:   Results from last 7 days   Lab Units 10/06/23  0418 10/05/23  0528 10/04/23  0540 10/03/23  1349   WBC 10*3/mm3 10.35 9.31  --  9.60   HEMOGLOBIN g/dL 8.2* 9.5* 7.9* 9.8*   HEMATOCRIT % 25.4* 30.7* 25.6* 30.6*   PLATELETS 10*3/mm3 256 293  --  278       Results from last 7 days   Lab Units 10/06/23  0418 10/05/23  0528 10/04/23  0540 10/03/23  1349   SODIUM  mmol/L 131* 133* 132* 131*   POTASSIUM mmol/L 5.0 4.7 4.2 4.4   CHLORIDE mmol/L 102 98 101 98   CO2 mmol/L 19.0* 23.0 22.0 22.0   BUN mg/dL 24* 24* 12 14   CREATININE mg/dL 0.57 0.74 0.41* 0.36*   CALCIUM mg/dL 8.6 9.3 8.5* 9.7   BILIRUBIN mg/dL  --   --   --  0.6   ALK PHOS U/L  --   --   --  93   ALT (SGPT) U/L  --   --   --  52*   AST (SGOT) U/L  --   --   --  22   GLUCOSE mg/dL 102* 126* 89 97       Imaging Results (All)       Procedure Component Value Units Date/Time    US Venous Doppler Lower Extremity Left (duplex) [[id number]] Collected: 10/04/23 1113     Updated: 10/04/23 1118    Narrative:      EXAMINATION: US VENOUS DOPPLER LOWER EXTREMITY LEFT (DUPLEX)- 10/4/2023  11:13 AM CDT     HISTORY: unilateral edema and pain; N39.0-Urinary tract infection, site  not specified.     REPORT: Sonographic images of the left lower extremity deep venous  system were obtained, using color, gray scale and spectral Doppler  technique, as well as compression, augmentation and Valsalva maneuvers.  There are no comparison studies.     The left common femoral vein is patent and normally compressible. The  left saphenous femoral junction is patent and normally compressible. The  left superficial femoral vein is patent. The left popliteal vein,  posterior tibial, peroneal and anterior tibial veins are patent. No DVT  is seen.      The left great saphenous vein junction at the femoral vein appears  normally patent     Incidentally noted is a subcutaneous shadowing mass or calcification  within the medial left mid calf, measuring at least 24 mm. This is  nonspecific, could represent an old calcified hematoma or other  calcified mass. Correlate clinically for any history of trauma involving  the medial left calf.       Impression:      No evidence of left lower extremity DVT. There is a  shadowing mass within the medial left mid calf measuring at least 24 mm,  possibly related to old trauma. Focal correlation is recommended.         This report was signed and finalized on 10/4/2023 11:15 AM CDT by Dr. Jerry Aguayo MD.       XR Shoulder 2+ View Left [[id number]] Collected: 10/03/23 2041     Updated: 10/03/23 2045    Narrative:      EXAMINATION: XR SHOULDER 2+ VW LEFT-  10/3/2023 8:41 PM CDT     HISTORY: Postop     FINDINGS: 2 view exam of the left shoulder reveals the patient is  undergone a reverse left total shoulder arthroplasty. There is good  anatomic alignment with no evidence of complications.     This report was signed and finalized on 10/3/2023 8:42 PM CDT by Dr. José Elmore MD.       XR Chest 1 View [[id number]] Collected: 10/03/23 1338     Updated: 10/03/23 1342    Narrative:      EXAM/TECHNIQUE: XR CHEST 1 VW-     INDICATION: pre op     COMPARISON: 8/8/2023     FINDINGS:     Cardiac silhouette is normal size. No pleural effusion, pneumothorax, or  focal consolidation. Advanced degenerative change in both glenohumeral  joints. Old left-sided rib fractures. Degenerative change in the  thoracic spine. No acute osseous finding.       Impression:      No acute findings.     This report was signed and finalized on 10/3/2023 1:39 PM CDT by Dr. Arsh Calderon MD.              Home medications:  allopurinol, 100 mg, Oral, Daily  amLODIPine, 10 mg, Oral, Q24H  atorvastatin, 10 mg, Oral, Nightly  carvedilol, 12.5 mg, Oral, BID With Meals  cycloSPORINE, 1 drop, Both Eyes, Q12H  enoxaparin, 40 mg, Subcutaneous, Daily  famotidine, 20 mg, Oral, BID  ferrous sulfate, 325 mg, Oral, Daily With Breakfast  furosemide, 20 mg, Oral, Daily  lisinopril, 20 mg, Oral, Q24H  potassium chloride, 10 mEq, Oral, Daily  sertraline, 100 mg, Oral, Daily  sodium chloride, 10 mL, Intravenous, Q12H         I have reviewed the patient's current medications.     Assessment/Plan   Assessment  Active Hospital Problems    Diagnosis     **Primary osteoarthritis of left shoulder     Primary osteoarthritis of right shoulder     Chronic venous stasis     Essential  hypertension     Gastroesophageal reflux disease     History of DVT of lower extremity      Treatment Plan  1.  Primary osteoarthritis left shoulder.  Left reverse total shoulder arthroplasty 10/3 Dr. Dominguez.  Physical therapy per Dr. Dominguez.  Left shoulder with sling immobilizer in place.  Social service consulted for discharge planning and referral made to [facility].  Lovenox for deep vein thrombosis prophylaxis.    2.  Primary hypertension.  Blood pressure 128/35.  Continue amlodipine, Coreg, lisinopril.    3.  History of DVT lower extremities.  Patient reports chronic lower extremity edema.  Venous Doppler left extremity no evidence of DVT.  There is shadowing mass within the medial left mid calf measuring at least 24 mm possibly related to old trauma.  Continue Lovenox for deep vein thrombosis prophylaxis.    4.  Normocytic anemia.  Hemoglobin 9.8 on admission.  Hemoglobin 7.9 on 10/4 suspect dilutional effect from IV fluids.  Hemoglobin 8.2 today.  No increased bruising or bleeding noted at operative site.    5.  GERD.  Continue Protonix twice daily.    Medical Decision Making  Number and Complexity of problems: 5  Primary osteoarthritis left shoulder: Acute, high complexity requiring surgical intervention, stable  Primary hypertension: Chronic, moderate complexity, stable  History of DVT: Chronic, moderate complexity, stable  Normocytic anemia: Chronic, moderate complexity, stable  GERD, chronic, low complexity, stable    Differential Diagnosis: None    Conditions and Status        Condition is improving.     [facility] Data  External documents reviewed: Dr. Nobel, PCP office visit 9/18/2023  Cardiac tracing (EKG, telemetry) interpretation: None  Radiology interpretation: None  Labs reviewed:   BMP 10/6/2023.  Repeat BMP in AM.  CBC  10/6/2023.  Repeat CBC in AM.    Any tests that were considered but not ordered: None     Decision rules/scores evaluated (example [id number], [place], etc): None      Discussed with:  and patient.     Care Planning  Shared decision making: Dr. Aldrich and patient.  Patient agrees to physical therapy and social service consult.  Continue home medications.  [facility]  Code status and discussions: Full code  Since POA is Wen Marvin    Disposition  Social Determinants of Health that impact treatment or disposition: Reside in assisted living prior to admission.   I expect the patient to be discharged to [facility] per Dr. Dominguez.    Electronically signed by ABDON Goyal, 10/06/23, 06:32 CDT.

## 2023-10-06 NOTE — PAYOR COMM NOTE
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  -TS           Chair-Bed Transfer    Chair-Bed [place] (Transfers) contact guard;minimum assist (75% patient effort)  -TS           Toilet Transfer    Type (Toilet Transfer) stand  pivot/stand step  -TS        Assistive Device (Toilet Transfer) commode;grab bars/safety frame  -TS           Wound 10/03/23 1858 Left anterior shoulder Incision    Wound - Properties Group Placement Date: 10/03/23  -LM Placement Time: 1858 -LM Present on Hospital Admission: N  -LM Side: Left  -LM Orientation: anterior  -LM Location: shoulder  -LM Primary Wound Type: Incision  -LM    Retired Wound - Properties Group Placement Date: 10/03/23  -LM Placement Time: 1858 -LM Present on Hospital Admission: N  -LM Side: Left  -LM Orientation: anterior  -LM Location: shoulder  -LM Primary Wound Type: Incision  -LM    Retired Wound - Properties Group Date first assessed: 10/03/23  -LM Time first assessed: 1858 -LM Present on Hospital Admission: N  -LM Side: Left  -LM Location: shoulder  -LM Primary Wound Type: Incision  -LM       Plan of Care Review    Plan of Care Reviewed With patient  -TS        Progress improving  -TS        Outcome Evaluation Pt worked well during OT tx. Pt is limited in independence with self care activities due to decreased ROM and immobilizer in LUE along with limited ROM in dominant RUE. Pt mod/max for dressing and hygiene tasks. Pt would benefit from continued rehab at discharge. Continue OT POC  -TS           Positioning and Restraints    Pre-Treatment Position sitting in chair/recliner  -TS        Post Treatment Position bed  -TS        In Bed fowlers;call light within reach;encouraged to call for assist;side rails up x2;LUE elevated;SCD pump applied  -TS                  User Key  (r) = Recorded By, (t) = Taken By, (c) = Cosigned By      Initials Name Effective Dates    TS Nereida Hirsch COTA 02/03/23 -     Jo-Ann Lora, TAY 07/05/23 -                      Occupational Therapy Education       Title: PT OT SLP Therapies (Done)       Topic: Occupational Therapy (Done)       Point: ADL training (Done)       Description:   Instruct learner(s) on proper safety adaptation and remediation  techniques during self care or transfers.   Instruct in proper use of assistive devices.                  Learning Progress Summary             Patient Acceptance, E, VU by  at 10/5/2023 1529    Acceptance, E, VU,NR by CS at 10/4/2023 1335                         Point: Home exercise program (Done)       Description:   Instruct learner(s) on appropriate technique for monitoring, assisting and/or progressing therapeutic exercises/activities.                  Learning Progress Summary             Patient Acceptance, E, VU,NR by CS at 10/4/2023 1335                         Point: Precautions (Done)       Description:   Instruct learner(s) on prescribed precautions during self-care and functional transfers.                  Learning Progress Summary             Patient Acceptance, E, VU,NR by CS at 10/4/2023 1335                         Point: Body mechanics (Done)       Description:   Instruct learner(s) on proper positioning and spine alignment during self-care, functional mobility activities and/or exercises.                  Learning Progress Summary             Patient Acceptance, E, VU by  at 10/5/2023 1529    Acceptance, E, VU,NR by  at 10/4/2023 1335                                         User Key       Initials Effective Dates Name Provider Type Discipline     02/03/23 -  Nereida Hirsch COTA Occupational Therapist Assistant OT     02/03/23 -  Xiomara Flores, OTR/L, CNT Occupational Therapist OT                      OT Recommendation and Plan     Plan of Care Review  Plan of Care Reviewed With: patient  Progress: improving  Outcome Evaluation: Pt worked well during OT tx. Pt is limited in independence with self care activities due to decreased ROM and immobilizer in LUE along with limited ROM in dominant RUE. Pt mod/max for dressing and hygiene tasks. Pt would benefit from continued rehab at discharge. Continue OT POC  Plan of Care Reviewed With: patient  Outcome Evaluation: Pt worked well  during OT tx. Pt is limited in independence with self care activities due to decreased ROM and immobilizer in LUE along with limited ROM in dominant RUE. Pt mod/max for dressing and hygiene tasks. Pt would benefit from continued rehab at discharge. Continue OT POC        Time Calculation:    Time Calculation- OT       Row Name 10/05/23 1534             Time Calculation- OT    OT Start Time 1334  -TS      OT Stop Time 1445  -TS      OT Time Calculation (min) 71 min  -TS      Total Timed Code Minutes- OT 71 minute(s)  -TS      OT Received On 10/05/23  -TS         Timed Charges    96876 - OT Self Care/Mgmt Minutes 71  -TS         Total Minutes    Timed Charges Total Minutes 71  -TS       Total Minutes 71  -TS                User Key  (r) = Recorded By, (t) = Taken By, (c) = Cosigned By      Initials Name Provider Type    TS Nereida Hirsch COTA Occupational Therapist Assistant                           Nereida MONTOYA. CHAPARRITA Hirsch  10/5/2023    Electronically signed by Nereida Hirsch COTA at 10/05/23 1536       Nereida Hirsch COTA at 10/05/23 1534          Goal Outcome Evaluation:  Plan of Care Reviewed With: patient        Progress: improving  Outcome Evaluation: Pt worked well during OT tx. Pt is limited in independence with self care activities due to decreased ROM and immobilizer in LUE along with limited ROM in dominant RUE. Pt mod/max for dressing and hygiene tasks. Pt would benefit from continued rehab at discharge. Continue OT POC           Electronically signed by Nereida Hirsch COTA at 10/05/23 1534        You Luna, PT Student   PT Student  Physical Therapy     Therapy Evaluation      Attested     Date of Service: 10/05/23 1258  Creation Time: 10/05/23 1258     Attested           Attestation signed by Xena Caban, PT, DPT, NCS at 10/05/23 1307     I reviewed the documentation and agree.                Expand All Collapse All    Patient Name: Carmen Obrien                   : 1940                          MRN: [id number]                              Today's Date: 10/5/2023                                   Admit Date: 10/3/2023                        Visit Dx:   Visit Diagnosis       ICD-10-CM ICD-9-CM   1. Impaired mobility [Z74.09 (ICD-10-CM)]  Z74.09 799.89   2. Urinary tract infection without hematuria, site unspecified  N39.0 599.0         Problem List       Patient Active Problem List   Diagnosis    Shoulder dislocation    Multinodular goiter    History of adenomatous polyp of colon    Family hx of colon cancer    Constipation    Esophageal dysphagia    Gastroesophageal reflux disease    Age-related osteoporosis without current pathological fracture    Essential hypertension    Acute blood loss as cause of postoperative anemia    Anemia    Atherosclerosis of native artery of both lower extremities with intermittent claudication    Avulsion of fingernail    Closed traumatic dislocation of joint of shoulder region    Contusion of shoulder region    Shoulder strain    Decreased pulses in feet    Failure to thrive in adult    History of DVT of lower extremity    Hyperglycemia    Hyponatremia    Loose total knee arthroplasty    Falling    Osteoporosis    Rotator cuff tear arthropathy    Shoulder pain    Suspected elder neglect    Unstable knee    UTI (urinary tract infection), bacterial    Class 1 obesity due to excess calories with serious comorbidity and body mass index (BMI) of 34.0 to 34.9 in adult    Chronic venous stasis    Strain of rotator cuff capsule    Left leg cellulitis    Abscess of left thigh    Acute cystitis with hematuria    Hypokalemia    Chronic pain syndrome    Acute pain of left lower extremity    Positive result for methicillin resistant Staphylococcus aureus (MRSA) screening    Cellulitis of left lower leg    Fall    Rhabdomyolysis    Hypomagnesemia    Leukocytosis    AMS (altered mental status)    Traumatic rhabdomyolysis, initial encounter    Moderate  malnutrition    RSV (acute bronchiolitis due to respiratory syncytial virus)    Type 2 MI (myocardial infarction)    THERESA (acute kidney injury)    History of ESBL E. coli infection    Hyperkalemia    Allergic conjunctivitis    C. difficile colitis    Hypertensive urgency    Primary osteoarthritis of right shoulder    Primary osteoarthritis of left shoulder         Medical History        Past Medical History:   Diagnosis Date    THERESA (acute kidney injury) (HCC) due to sepsis 11/02/2022    Anxiety      Arthritis      CAD (coronary artery disease)      Colon polyp      DDD (degenerative disc disease), lumbar      Deep venous thrombosis      Depression      Diverticulosis      Esophageal stricture      Full dentures      GERD (gastroesophageal reflux disease)      History of transfusion       w/o Adverse reaction    Hypercholesteremia      Hypertension      LPRD (laryngopharyngeal reflux disease)      Macular degeneration      Multinodular goiter 02/23/2017    Osteoarthritis      Personal history of COVID-19 2020    Pruritic disorder      Spastic colon      Thyroid nodule      Type 2 MI (myocardial infarction) (HCC) due to THERESA and UTI 11/02/2022         Surgical History         Past Surgical History:   Procedure Laterality Date    BLADDER REPAIR        CATARACT EXTRACTION        CHOLECYSTECTOMY        COLONOSCOPY   04/22/2014     2 polyps, adenomatous, diverticulosis    COLONOSCOPY N/A 6/26/2020     Procedure: COLONOSCOPY WITH ANESTHESIA;  Surgeon: John Coleman MD;  Location:  [facility];  Service: Gastroenterology;  Laterality: N/A;  pre op: constipation  post op: diverticulosis,   PCP: Adam Delgadillo MD    CYSTOCELE REPAIR        ENDOSCOPY   04/22/2014     Schatzki's ring dilated    ENDOSCOPY N/A 6/26/2020     Procedure: ESOPHAGOGASTRODUODENOSCOPY WITH ANESTHESIA;  Surgeon: John Coleman MD;  Location:  [facility];  Service: Gastroenterology;  Laterality: N/A;  pre op: dysphagia  post  op:stricture, dilated   PCP:Adam Delgadillo MD    ENDOSCOPY N/A 9/2/2021     Procedure: ESOPHAGOGASTRODUODENOSCOPY WITH ANESTHESIA;  Surgeon: John Coleman MD;  Location: [facility] END[facility]COPY;  Service: Gastroenterology;  Laterality: N/A;  pre op: dysphagia  post op:gastritis  PCP: VERA Noble MD    FEMUR FRACTURE SURGERY        HYSTERECTOMY        INCISION AND DRAINAGE LEG Left 11/3/2021     Procedure: INCISION AND DRAINAGE LEG ABSCESS;  Surgeon: Cesia Mondragon MD;  Location: [facility] OR;  Service: General;  Laterality: Left;    REPLACEMENT TOTAL KNEE        TOTAL SHOULDER ARTHROPLASTY W/ DISTAL CLAVICLE EXCISION Left 10/3/2023     Procedure: LEFT REVERSE TOTAL SHOULDER ARTHROPLASTY;  Surgeon: Dc Dominguez MD;  Location: [facility] OR;  Service: Orthopedics;  Laterality: Left;    ULNAR NERVE TRANSPOSITION               General Information         Row Name 10/05/23 1017                 Physical Therapy Time and Intention     Document Type evaluation  Admitted s/p L rTSA. Sling at all times NWB MYKEL  -MS (r) JS (t) MS (c)       Mode of Treatment physical therapy  -MS (r) JS (t) MS (c)          Row Name 10/05/23 1017                 General Information     Patient Profile Reviewed yes  -MS (r) JS (t) MS (c)       Prior Level of Function independent:;bed mobility;transfer;grooming;dressing;bathing;w/c or scooter;all household mobility  -MS (r) JS (t) MS (c)       Existing Precautions/Restrictions fall;left;shoulder;non-weight bearing;brace on at all times  -MS (r) JS (t) MS (c)       Barriers to Rehab previous functional deficit  -MS (r) JS (t) MS (c)          Row Name 10/05/23 1017                 Living Environment     People in Home facility resident  -MS (r) JS (t) MS (c)          Row Name 10/05/23 1017                 Home Main Entrance     Number of Stairs, Main Entrance none  -MS (r) JS (t) MS (c)          Row Name 10/05/23 1017                 Stairs Within Home, Primary     Number of Stairs,  Within Home, Primary none  -MS (r) JS (t) MS (c)          Row Name 10/05/23 1017                 Cognition     Orientation Status (Cognition) oriented x 4  -MS (r) JS (t) MS (c)          Row Name 10/05/23 1017                 Safety Issues, Functional Mobility     Impairments Affecting Function (Mobility) endurance/activity tolerance;range of motion (ROM);balance;strength;pain  -MS (r) JS (t) MS (c)       Comment, Safety Issues/Impairments (Mobility) Step in shower with grab bars and shower chair, but she does not use it. Grab  bars by toilet. Head and feet elevate on bed. W/c primarily for ambulation.  -MS (r) JS (t) MS (c)                       User Key  (r) = Recorded By, (t) = Taken By, (c) = Cosigned By        Initials Name Provider Type     Xena Ramsey, PT, DPT, NCS Physical Therapist     You Chong, HALLEY Student PT Student                            Mobility         Row Name 10/05/23 1017                 Bed Mobility     Bed Mobility sit-supine  -MS (r) JS (t) MS (c)       Sit-Supine [place] (Bed Mobility) minimum assist (75% patient effort);verbal cues  -MS (r) JS (t) MS (c)       Assistive Device (Bed Mobility) bed rails;head of bed elevated  -MS (r) JS (t) MS (c)          Row Name 10/05/23 1017                 Bed-Chair Transfer     Bed-Chair [place] (Transfers) contact guard;verbal cues  -MS (r) JS (t) MS (c)          Row Name 10/05/23 1017                 Sit-Stand Transfer     Sit-Stand [place] (Transfers) contact guard;other (see comments)  x3 this visit. CGA for all transfers.  -MS (r) JS (t) MS (c)          Row Name 10/05/23 1017                 Gait/Stairs (Locomotion)     [place] Level (Gait) contact guard  -MS (r) JS (t) MS (c)       Distance in Feet (Gait) 2  -MS (r) JS (t) MS (c)       Deviations/Abnormal Patterns (Gait) stride length decreased;yeny decreased;gait speed decreased;festinating/shuffling  -MS (r) JS (t) MS (c)          Row Name 10/05/23 1017                  Mobility     Extremity Weight-bearing Status left upper extremity  -MS (r) JS (t) MS (c)       Left Upper Extremity (Weight-bearing Status) non weight-bearing (NWB)  -MS (r) JS (t) MS (c)                       User Key  (r) = Recorded By, (t) = Taken By, (c) = Cosigned By        Initials Name Provider Type     Xena Ramsey R, PT, DPT, NCS Physical Therapist     You Chong, PT Student PT Student                            Obj/Interventions         Row Name 10/05/23 1017                 Range of Motion Comprehensive     Comment, General Range of Motion RUE shoulder impaired 50%, all other RUE WFL; LUE NT d/t precautions; BLE: WFL except LLE knee extension limited roughly 20%.  -MS (r) JS (t) MS (c)          Row Name 10/05/23 1017                 Strength Comprehensive (MMT)     Comment, General Manual Muscle Testing (MMT) Assessment BLE: DF/PF 4/5 B; Knee extension 5/5 R 4/5 L with pain; Knee flexion 3+/5 B; Hip flexion 3+/5 B  -MS (r) JS (t) MS (c)          Row Name 10/05/23 1017                 Balance     Balance Assessment sitting static balance;standing dynamic balance;standing static balance  -MS (r) JS (t) MS (c)       Static Sitting Balance standby assist  -MS (r) JS (t) MS (c)       Position, Sitting Balance unsupported;sitting edge of bed;other (see comments)  Patient able to sit unsupported but preferred UE support for stability.  -MS (r) JS (t) MS (c)       Static Standing Balance contact guard  -MS (r) JS (t) MS (c)       Dynamic Standing Balance contact guard  -MS (r) JS (t) MS (c)       Position/Device Used, Standing Balance supported;other (see comments)  HHA  -MS (r) JS (t) MS (c)       Comment, Balance Patient did not have loss of balance with transfers  -MS (r) JS (t) MS (c)          Row Name 10/05/23 1017                 Sensory Assessment (Somatosensory)     Sensory Assessment (Somatosensory) bilateral LE  -MS (r) JS (t) MS (c)       Bilateral LE Sensory Assessment general  sensation;light touch awareness;intact  -MS (r) JS (t) MS (c)                       User Key  (r) = Recorded By, (t) = Taken By, (c) = Cosigned By        Initials Name Provider Type     MS Arsh Xena TRINH, PT, DPT, NCS Physical Therapist     You Chong, PT Student PT Student                            Goals/Plan         Row Name 10/05/23 1017                 Bed Mobility Goal 1 (PT)     Activity/Assistive Device (Bed Mobility Goal 1, PT) sit to supine;supine to sit  -MS (r) JS (t) MS (c)       [place] Level/Cues Needed (Bed Mobility Goal 1, PT) contact guard required  -MS (r) JS (t) MS (c)       Time Frame (Bed Mobility Goal 1, PT) long term goal (LTG);by discharge  -MS (r) JS (t) MS (c)       Progress/Outcomes (Bed Mobility Goal 1, PT) new goal  -MS (r) JS (t) MS (c)          Row Name 10/05/23 1017                 Transfer Goal 1 (PT)     Activity/Assistive Device (Transfer Goal 1, PT) bed-to-chair/chair-to-bed  -MS (r) JS (t) MS (c)       [place] Level/Cues Needed (Transfer Goal 1, PT) standby assist  -MS (r) JS (t) MS (c)       Time Frame (Transfer Goal 1, PT) long term goal (LTG);by discharge  -MS (r) JS (t) MS (c)       Progress/Outcome (Transfer Goal 1, PT) new goal  -MS (r) JS (t) MS (c)          Row Name 10/05/23 1017                 Patient Education Goal (PT)     Activity (Patient Education Goal, PT) Patient will demonstrate understanding of WB, ROM, and brace wear precautions of LUE  -MS (r) JS (t) MS (c)       [place]/Cues/Accuracy (Memory Goal 2, PT) demonstrates adequately;independent;verbalizes understanding  -MS (r) JS (t) MS (c)       Time Frame (Patient Education Goal, PT) long term goal (LTG);by discharge  -MS (r) JS (t) MS (c)       Progress/Outcome (Patient Education Goal, PT) new goal  -MS (r) JS (t) MS (c)          Row Name 10/05/23 1017                 Therapy Assessment/Plan (PT)     Planned Therapy Interventions (PT) bed mobility training;transfer  training;patient/family education;strengthening  -MS (r) JS (t) MS (c)                    User Key  (r) = Recorded By, (t) = Taken By, (c) = Cosigned By        Initials Name Provider Type     Xena Ramsey TRINH, PT, DPT, NCS Physical Therapist     You Chong, PT Student PT Student                         Clinical Impression         Row Name 10/05/23 1017                 Pain     Pretreatment Pain Rating 8/10  -MS (r) JS (t) MS (c)       Pain Location - Side/Orientation Left  -MS (r) JS (t) MS (c)       Pain Location - shoulder  -MS (r) JS (t) MS (c)       Pain Intervention(s) Medication (See MAR);Repositioned;Ambulation/increased activity  -MS (r) JS (t) MS (c)          Row Name 10/05/23 1017                 Plan of Care Review     Plan of Care Reviewed With patient  -MS (r) JS (t) MS (c)       Progress improving  -MS (r) JS (t) MS (c)       Outcome Evaluation PT eval complete. Pt is A&Ox4 with complaints of L shoulder pain 8/10 this date. She is currently in sling with abduction pillow LUE. She performed supine>sit with min A and required verbal cueing and use of handrails. She was able to stand x3 with CGA this visit with large anterior trunk lean but no loss of balance. She was able to ambulate 2 feet from bed>chair this visit with CGA and a shuffling gait pattern and required verbal cueing for safe placement into the chair and safe transfer mechanics. She was educated on AROM exercises for distal LUE and on brace wear schedule. She was educated on DVT prevention via compression and AROM of LEs. Transfers are impaired d/t her new weightbearing precautions and immobility of her LUE. PT services are necesary to help her adapt her functional mobility to her decreased functional capacity of LUE, and to increase strength and independence with transfers. Anticipate discharge to SNF.  -MS (r) JS (t) MS (c)          Row Name 10/05/23 1017                 Therapy Assessment/Plan (PT)     Patient/Family Therapy  Goals Statement (PT) Discharge back to assisted living  -MS (r) JS (t) MS (c)       Rehab Potential (PT) good, to achieve stated therapy goals  -MS (r) JS (t) MS (c)       Criteria for Skilled Interventions Met (PT) yes;meets criteria;skilled treatment is necessary  -MS (r) JS (t) MS (c)       Therapy Frequency (PT) 2 times/day  -MS (r) JS (t) MS (c)       Predicted Duration of Therapy Intervention (PT) Until discharge  -MS (r) JS (t) MS (c)          Row Name 10/05/23 1017                 Vital Signs     Pre SpO2 (%) 93  -MS (r) JS (t) MS (c)       O2 Delivery Pre Treatment room air  -MS (r) JS (t) MS (c)       O2 Delivery Intra Treatment room air  -MS (r) JS (t) MS (c)       O2 Delivery Post Treatment room air  -MS (r) JS (t) MS (c)       Pre Patient Position Supine  -MS (r) JS (t) MS (c)       Intra Patient Position Standing  -MS (r) JS (t) MS (c)       Post Patient Position Sitting  -MS (r) JS (t) MS (c)          Row Name 10/05/23 1017                 Positioning and Restraints     Pre-Treatment Position in bed  -MS (r) JS (t) MS (c)       Post Treatment Position chair  -MS (r) JS (t) MS (c)       In Chair exit alarm on;encouraged to call for assist;call light within reach;reclined;with nsg;legs elevated  -MS (r) JS (t) MS (c)                       User Key  (r) = Recorded By, (t) = Taken By, (c) = Cosigned By        Initials Name Provider Type     Xena Ramsey, PT, DPT, NCS Physical Therapist     You Chong, PT Student PT Student                            Outcome Measures         Row Name 10/05/23 1017                 How much help from another person do you currently need...     Turning from your back to your side while in flat bed without using bedrails? 4  -MS (r) JS (t) MS (c)       Moving from lying on back to sitting on the side of a flat bed without bedrails? 3  -MS (r) JS (t) MS (c)       Moving to and from a bed to a chair (including a wheelchair)? 3  -MS (r) JS (t) MS (c)       Standing  up from a chair using your arms (e.g., wheelchair, bedside chair)? 3  -MS (r) JS (t) MS (c)       Climbing 3-5 steps with a railing? 1  -MS (r) JS (t) MS (c)       To walk in hospital room? 2  -MS (r) JS (t) MS (c)       AM-PAC 6 Clicks Score (PT) 16  -MS (r) JS (t)       Highest level of mobility 5 --> Static standing  -MS (r) JS (t)          Row Name 10/05/23 1017                 Functional Assessment     Outcome Measure Options AM-PAC 6 Clicks Basic Mobility (PT)  -MS (r) JS (t) MS (c)                       User Key  (r) = Recorded By, (t) = Taken By, (c) = Cosigned By        Initials Name Provider Type     Xena Ramsey TRINH, PT, DPT, NCS Physical Therapist     You Chong, PT Student PT Student                          Physical Therapy Education            Title: PT OT SLP Therapies (Done)         Topic: Physical Therapy (Done)         Point: Mobility training (Done)         Learning Progress Summary               Patient Acceptance, E, VU,NR by JS at 10/5/2023 1017     Comment: Educated on brace wear, NWB LUE precautions and AROM precautions. Cueing for safe transfers with bed mobility and bed>chair. Educated on LE ROM for DVT prevention. Reinforced distal LUE exercises.                               Point: Home exercise program (Done)         Learning Progress Summary               Patient Acceptance, E, VU,NR by JS at 10/5/2023 1017     Comment: Educated on brace wear, NWB LUE precautions and AROM precautions. Cueing for safe transfers with bed mobility and bed>chair. Educated on LE ROM for DVT prevention. Reinforced distal LUE exercises.                               Point: Body mechanics (Done)         Learning Progress Summary               Patient Acceptance, E, VU,NR by JS at 10/5/2023 1017     Comment: Educated on brace wear, NWB LUE precautions and AROM precautions. Cueing for safe transfers with bed mobility and bed>chair. Educated on LE ROM for DVT prevention. Reinforced distal LUE exercises.                                Point: Precautions (Done)         Learning Progress Summary               Patient Acceptance, E, VU,NR by NATALIA at 10/5/2023 1017     Comment: Educated on brace wear, NWB LUE precautions and AROM precautions. Cueing for safe transfers with bed mobility and bed>chair. Educated on LE ROM for DVT prevention. Reinforced distal LUE exercises.                                                   User Key         Initials Effective Dates Name Provider Type Discipline      07/13/23 -  You Luna, PT Student PT Student PT                          PT Recommendation and Plan  Planned Therapy Interventions (PT): bed mobility training, transfer training, patient/family education, strengthening  Plan of Care Reviewed With: patient  Progress: improving  Outcome Evaluation: PT eval complete. Pt is A&Ox4 with complaints of L shoulder pain 8/10 this date. She is currently in sling with abduction pillow LUE. She performed supine>sit with min A and required verbal cueing and use of handrails. She was able to stand x3 with CGA this visit with large anterior trunk lean but no loss of balance. She was able to ambulate 2 feet from bed>chair this visit with CGA and a shuffling gait pattern and required verbal cueing for safe placement into the chair and safe transfer mechanics. She was educated on AROM exercises for distal LUE and on brace wear schedule. She was educated on DVT prevention via compression and AROM of LEs. Transfers are impaired d/t her new weightbearing precautions and immobility of her LUE. PT services are necesary to help her adapt her functional mobility to her decreased functional capacity of LUE, and to increase strength and independence with transfers. Anticipate discharge to SNF.      Time Calculation:        PT Charges         Row Name 10/05/23 1017                       Time Calculation     Start Time 1017  10 mins spent in chart  -MS (r) JS (t) MS (c)         Stop Time 1112  -MS (r) NATALIA  (t) MS (c)         Time Calculation (min) 55 min  -MS (r) JS (t)         PT Received On 10/05/23  -MS (r) JS (t) MS (c)         PT Goal Re-Cert Due Date 10/15/23  -MS (r) JS (t) MS (c)                 Untimed Charges     PT Eval/Re-eval Minutes 65  -MS (r) JS (t) MS (c)                 Total Minutes     Untimed Charges Total Minutes 65  -MS (r) JS (t)          Total Minutes 65  -MS (r) JS (t)                      User Key  (r) = Recorded By, (t) = Taken By, (c) = Cosigned By        Initials Name Provider Type     Xena Ramsey, PT, DPT, NCS Physical Therapist     You Chong, PT Student PT Student                             PT G-Codes  Outcome Measure Options: AM-PAC 6 Clicks Basic Mobility (PT)  AM-PAC 6 Clicks Score (PT): 16  AM-PAC 6 Clicks Score (OT): 13  PT Discharge Summary  Anticipated Discharge Disposition (PT): skilled nursing facility     You Luna, PT Student               10/5/2023                                Cosigned by: Xena Caban, PT, DPT, NCS at 10/05/23 1307     Revision History

## 2023-10-07 LAB
ANION GAP SERPL CALCULATED.3IONS-SCNC: 7 MMOL/L (ref 5–15)
BUN SERPL-MCNC: 17 MG/DL (ref 8–23)
BUN/CREAT SERPL: 34 (ref 7–25)
CALCIUM SPEC-SCNC: 8.5 MG/DL (ref 8.6–10.5)
CHLORIDE SERPL-SCNC: 101 MMOL/L (ref 98–107)
CO2 SERPL-SCNC: 23 MMOL/L (ref 22–29)
CREAT SERPL-MCNC: 0.5 MG/DL (ref 0.57–1)
DEPRECATED RDW RBC AUTO: 50.3 FL (ref 37–54)
EGFRCR SERPLBLD CKD-EPI 2021: 94.4 ML/MIN/1.73
ERYTHROCYTE [DISTWIDTH] IN BLOOD BY AUTOMATED COUNT: 13.9 % (ref 12.3–15.4)
GLUCOSE SERPL-MCNC: 118 MG/DL (ref 65–99)
HCT VFR BLD AUTO: 26.5 % (ref 34–46.6)
HGB BLD-MCNC: 8.2 G/DL (ref 12–15.9)
MCH RBC QN AUTO: 30.8 PG (ref 26.6–33)
MCHC RBC AUTO-ENTMCNC: 30.9 G/DL (ref 31.5–35.7)
MCV RBC AUTO: 99.6 FL (ref 79–97)
PLATELET # BLD AUTO: 351 10*3/MM3 (ref 140–450)
PMV BLD AUTO: 9.8 FL (ref 6–12)
POTASSIUM SERPL-SCNC: 4.7 MMOL/L (ref 3.5–5.2)
RBC # BLD AUTO: 2.66 10*6/MM3 (ref 3.77–5.28)
SODIUM SERPL-SCNC: 131 MMOL/L (ref 136–145)
WBC NRBC COR # BLD: 9.01 10*3/MM3 (ref 3.4–10.8)

## 2023-10-07 PROCEDURE — 97110 THERAPEUTIC EXERCISES: CPT

## 2023-10-07 PROCEDURE — 80048 BASIC METABOLIC PNL TOTAL CA: CPT | Performed by: NURSE PRACTITIONER

## 2023-10-07 PROCEDURE — A9270 NON-COVERED ITEM OR SERVICE: HCPCS | Performed by: NURSE PRACTITIONER

## 2023-10-07 PROCEDURE — G0378 HOSPITAL OBSERVATION PER HR: HCPCS

## 2023-10-07 PROCEDURE — A9270 NON-COVERED ITEM OR SERVICE: HCPCS | Performed by: ORTHOPAEDIC SURGERY

## 2023-10-07 PROCEDURE — 97530 THERAPEUTIC ACTIVITIES: CPT

## 2023-10-07 PROCEDURE — 63710000001 POTASSIUM CHLORIDE 10 MEQ CAPSULE CONTROLLED-RELEASE: Performed by: ORTHOPAEDIC SURGERY

## 2023-10-07 PROCEDURE — 63710000001 FUROSEMIDE 20 MG TABLET: Performed by: ORTHOPAEDIC SURGERY

## 2023-10-07 PROCEDURE — 63710000001 BISACODYL 10 MG SUPPOSITORY: Performed by: NURSE PRACTITIONER

## 2023-10-07 PROCEDURE — 63710000001 SERTRALINE 100 MG TABLET: Performed by: ORTHOPAEDIC SURGERY

## 2023-10-07 PROCEDURE — 63710000001 LISINOPRIL 20 MG TABLET: Performed by: NURSE PRACTITIONER

## 2023-10-07 PROCEDURE — 63710000001 FERROUS SULFATE 325 (65 FE) MG TABLET: Performed by: ORTHOPAEDIC SURGERY

## 2023-10-07 PROCEDURE — 63710000001 AMLODIPINE 10 MG TABLET: Performed by: NURSE PRACTITIONER

## 2023-10-07 PROCEDURE — 63710000001 OXYCODONE-ACETAMINOPHEN 7.5-325 MG TABLET: Performed by: ORTHOPAEDIC SURGERY

## 2023-10-07 PROCEDURE — 63710000001 ALLOPURINOL 100 MG TABLET: Performed by: ORTHOPAEDIC SURGERY

## 2023-10-07 PROCEDURE — 63710000001 CARVEDILOL 6.25 MG TABLET: Performed by: NURSE PRACTITIONER

## 2023-10-07 PROCEDURE — 25010000002 ENOXAPARIN PER 10 MG: Performed by: ORTHOPAEDIC SURGERY

## 2023-10-07 PROCEDURE — 63710000001 FAMOTIDINE 20 MG TABLET: Performed by: ORTHOPAEDIC SURGERY

## 2023-10-07 PROCEDURE — 63710000001 CYCLOSPORINE 0.05 % EMULSION: Performed by: ORTHOPAEDIC SURGERY

## 2023-10-07 PROCEDURE — 63710000001 HYDROXYZINE 25 MG TABLET: Performed by: ORTHOPAEDIC SURGERY

## 2023-10-07 PROCEDURE — 85027 COMPLETE CBC AUTOMATED: CPT

## 2023-10-07 PROCEDURE — 63710000001 ATORVASTATIN 10 MG TABLET: Performed by: ORTHOPAEDIC SURGERY

## 2023-10-07 RX ORDER — BISACODYL 10 MG
10 SUPPOSITORY, RECTAL RECTAL ONCE
Status: COMPLETED | OUTPATIENT
Start: 2023-10-07 | End: 2023-10-07

## 2023-10-07 RX ADMIN — CARVEDILOL 12.5 MG: 6.25 TABLET, FILM COATED ORAL at 17:39

## 2023-10-07 RX ADMIN — FERROUS SULFATE TAB 325 MG (65 MG ELEMENTAL FE) 325 MG: 325 (65 FE) TAB at 08:30

## 2023-10-07 RX ADMIN — Medication 10 ML: at 08:29

## 2023-10-07 RX ADMIN — ENOXAPARIN SODIUM 40 MG: 100 INJECTION SUBCUTANEOUS at 08:29

## 2023-10-07 RX ADMIN — HYDROXYZINE HYDROCHLORIDE 25 MG: 25 TABLET ORAL at 22:34

## 2023-10-07 RX ADMIN — BISACODYL 10 MG: 10 SUPPOSITORY RECTAL at 08:29

## 2023-10-07 RX ADMIN — ATORVASTATIN CALCIUM 10 MG: 10 TABLET, FILM COATED ORAL at 20:34

## 2023-10-07 RX ADMIN — HYDROXYZINE HYDROCHLORIDE 25 MG: 25 TABLET ORAL at 03:35

## 2023-10-07 RX ADMIN — CARVEDILOL 12.5 MG: 6.25 TABLET, FILM COATED ORAL at 08:30

## 2023-10-07 RX ADMIN — OXYCODONE AND ACETAMINOPHEN 2 TABLET: 7.5; 325 TABLET ORAL at 07:42

## 2023-10-07 RX ADMIN — OXYCODONE AND ACETAMINOPHEN 2 TABLET: 7.5; 325 TABLET ORAL at 22:34

## 2023-10-07 RX ADMIN — FAMOTIDINE 20 MG: 20 TABLET, FILM COATED ORAL at 08:30

## 2023-10-07 RX ADMIN — ALLOPURINOL 100 MG: 100 TABLET ORAL at 08:30

## 2023-10-07 RX ADMIN — HYDROXYZINE HYDROCHLORIDE 25 MG: 25 TABLET ORAL at 14:18

## 2023-10-07 RX ADMIN — Medication 10 ML: at 20:35

## 2023-10-07 RX ADMIN — OXYCODONE AND ACETAMINOPHEN 2 TABLET: 7.5; 325 TABLET ORAL at 03:35

## 2023-10-07 RX ADMIN — CYCLOSPORINE 1 DROP: 0.5 EMULSION OPHTHALMIC at 20:34

## 2023-10-07 RX ADMIN — AMLODIPINE BESYLATE 10 MG: 10 TABLET ORAL at 08:30

## 2023-10-07 RX ADMIN — LISINOPRIL 20 MG: 20 TABLET ORAL at 08:30

## 2023-10-07 RX ADMIN — CYCLOSPORINE 1 DROP: 0.5 EMULSION OPHTHALMIC at 08:31

## 2023-10-07 RX ADMIN — FAMOTIDINE 20 MG: 20 TABLET, FILM COATED ORAL at 20:34

## 2023-10-07 RX ADMIN — OXYCODONE AND ACETAMINOPHEN 2 TABLET: 7.5; 325 TABLET ORAL at 14:18

## 2023-10-07 RX ADMIN — OXYCODONE AND ACETAMINOPHEN 2 TABLET: 7.5; 325 TABLET ORAL at 18:31

## 2023-10-07 RX ADMIN — FUROSEMIDE 20 MG: 20 TABLET ORAL at 08:30

## 2023-10-07 RX ADMIN — SERTRALINE HYDROCHLORIDE 100 MG: 100 TABLET, FILM COATED ORAL at 08:29

## 2023-10-07 RX ADMIN — POTASSIUM CHLORIDE 10 MEQ: 10 CAPSULE, COATED, EXTENDED RELEASE ORAL at 08:34

## 2023-10-07 NOTE — PROGRESS NOTES
"  Orthopedic Surgery Progress Note    Carmen Obrien  10/7/2023      Subjective:     Systemic or Specific Complaints:   Up to bedside commode  \"I'm doing terrible\"  Awaiting [facility] to  [facility]    Objective:     Patient Vitals for the past 24 hrs:   BP Temp Temp src Pulse Resp SpO2   10/07/23 0814 (!) 145/32 98.4 øF (36.9 øC) Oral 54 16 93 %   10/06/23 2319 145/43 98.4 øF (36.9 øC) Oral 51 16 95 %   10/06/23 1936 127/40 98.9 øF (37.2 øC) Oral 63 15 94 %   10/06/23 1430 127/82 98 øF (36.7 øC) Oral -- 18 92 %   10/06/23 1150 (!) 141/37 -- -- 62 20 95 %       left upper  General: alert, appears stated age and cooperative   Wound: covered             Dressing: Clean, dry, intact   Extremity: Distal NVI           DVT Exam: No evidence of DVT                   Data Review:  Lab Results (last 24 hours)       Procedure Component Value Units Date/Time    Basic Metabolic Panel [[id number]]  (Abnormal) Collected: 10/07/23 0952    Specimen: Blood Updated: 10/07/23 1047     Glucose 118 mg/dL      BUN 17 mg/dL      Creatinine 0.50 mg/dL      Sodium 131 mmol/L      Potassium 4.7 mmol/L      Chloride 101 mmol/L      CO2 23.0 mmol/L      Calcium 8.5 mg/dL      BUN/Creatinine Ratio 34.0     Anion Gap 7.0 mmol/L      eGFR 94.4 mL/min/1.73     Narrative:      GFR Normal >60  Chronic Kidney Disease <60  Kidney Failure <15    The GFR formula is only valid for adults with stable renal function between ages 18 and 70.    CBC (No Diff) [[id number]]  (Abnormal) Collected: 10/07/23 0419    Specimen: Blood Updated: 10/07/23 0441     WBC 9.01 10*3/mm3      RBC 2.66 10*6/mm3      Hemoglobin 8.2 g/dL      Hematocrit 26.5 %      MCV 99.6 fL      MCH 30.8 pg      MCHC 30.9 g/dL      RDW 13.9 %      RDW-SD 50.3 fl      MPV 9.8 fL      Platelets 351 10*3/mm3           Imaging Results (Last 24 Hours)       ** No results found for the last 24 hours. **            Assessment:   4 Days Post-Op  L reverse TSA    Plan:      1:  DVT " prophylaxis, ICE, elevate  2:  Pain control  3:  Physical therapy/Occupational therapy  4:  Anticipate discharge to rehab once approved  5: CECILIO Dominguez MD
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
## 2023-10-07 NOTE — PLAN OF CARE
Goal Outcome Evaluation:  Plan of Care Reviewed With: patient        Progress: improving  Outcome Evaluation: Pt. agreeable to therapy. Pt. showing improvements from AM session. She needed MIN assist for supine to sit. She performed a few LE exercises, but remains weaker on L LE. She was able to stand with MIN x 1. Pivot transfer [place] and [place] [facility] [place]. She demonstrates unsafe techniques with reaching for bed railing during transfer. She needed MAX assist to lay back in bed. Pt. would benefit from further skilled therapy following discharge for strengthening and mobility training.

## 2023-10-07 NOTE — THERAPY TREATMENT NOTE
Acute Care - Physical Therapy Treatment Note  [facility]     Patient Name: Carmen Obrien  : 1940  MRN: [id number]  Today's Date: 10/7/2023      Visit Dx:     ICD-10-CM ICD-9-CM   1. Impaired mobility [Z74.09 (ICD-10-CM)]  Z74.09 799.89   2. Urinary tract infection without hematuria, site unspecified  N39.0 599.0   3. Decreased activities of daily living (ADL) [Z78.9 (ICD-10-CM)]  Z78.9 V49.89     Patient Active Problem List   Diagnosis    Shoulder dislocation    Multinodular goiter    History of adenomatous polyp of colon    Family hx of colon cancer    Constipation    Esophageal dysphagia    Gastroesophageal reflux disease    Age-related osteoporosis without current pathological fracture    Essential hypertension    Acute blood loss as cause of postoperative anemia    Anemia    Atherosclerosis of native artery of both lower extremities with intermittent claudication    Avulsion of fingernail    Closed traumatic dislocation of joint of shoulder region    Contusion of shoulder region    Shoulder strain    Decreased pulses in feet    Failure to thrive in adult    History of DVT of lower extremity    Hyperglycemia    Hyponatremia    Loose total knee arthroplasty    Falling    Osteoporosis    Rotator cuff tear arthropathy    Shoulder pain    Suspected elder neglect    Unstable knee    UTI (urinary tract infection), bacterial    Class 1 obesity due to excess calories with serious comorbidity and body mass index (BMI) of 34.0 to 34.9 in adult    Chronic venous stasis    Strain of rotator cuff capsule    Left leg cellulitis    Abscess of left thigh    Acute cystitis with hematuria    Hypokalemia    Chronic pain syndrome    Acute pain of left lower extremity    Positive result for methicillin resistant Staphylococcus aureus (MRSA) screening    Cellulitis of left lower leg    Fall    Rhabdomyolysis    Hypomagnesemia    Leukocytosis    AMS (altered mental status)    Traumatic rhabdomyolysis, initial encounter     Moderate malnutrition    RSV (acute bronchiolitis due to respiratory syncytial virus)    Type 2 MI (myocardial infarction)    THERESA (acute kidney injury)    History of ESBL E. coli infection    Hyperkalemia    Allergic conjunctivitis    C. difficile colitis    Hypertensive urgency    Primary osteoarthritis of right shoulder    Primary osteoarthritis of left shoulder     Past Medical History:   Diagnosis Date    THERESA (acute kidney injury) (HCC) due to sepsis 11/02/2022    Anxiety     Arthritis     CAD (coronary artery disease)     Colon polyp     DDD (degenerative disc disease), lumbar     Deep venous thrombosis     Depression     Diverticulosis     Esophageal stricture     Full dentures     GERD (gastroesophageal reflux disease)     History of transfusion     w/o Adverse reaction    Hypercholesteremia     Hypertension     LPRD (laryngopharyngeal reflux disease)     Macular degeneration     Multinodular goiter 02/23/2017    Osteoarthritis     Personal history of COVID-19 2020    Pruritic disorder     Spastic colon     Thyroid nodule     Type 2 MI (myocardial infarction) (HCC) due to THERESA and UTI 11/02/2022     Past Surgical History:   Procedure Laterality Date    BLADDER REPAIR      CATARACT EXTRACTION      CHOLECYSTECTOMY      COLONOSCOPY  04/22/2014    2 polyps, adenomatous, diverticulosis    COLONOSCOPY N/A 6/26/2020    Procedure: COLONOSCOPY WITH ANESTHESIA;  Surgeon: John Coleman MD;  Location:  [facility];  Service: Gastroenterology;  Laterality: N/A;  pre op: constipation  post op: diverticulosis,   PCP: Adam Delgadillo MD    CYSTOCELE REPAIR      ENDOSCOPY  04/22/2014    Schatzki's ring dilated    ENDOSCOPY N/A 6/26/2020    Procedure: ESOPHAGOGASTRODUODENOSCOPY WITH ANESTHESIA;  Surgeon: John Coleman MD;  Location:  [facility];  Service: Gastroenterology;  Laterality: N/A;  pre op: dysphagia  post op:stricture, dilated   PCP:Adam Delgadillo MD    ENDOSCOPY N/A 9/2/2021     Procedure: ESOPHAGOGASTRODUODENOSCOPY WITH ANESTHESIA;  Surgeon: John Coleman MD;  Location:  [facility];  Service: Gastroenterology;  Laterality: N/A;  pre op: dysphagia  post op:gastritis  PCP: VERA Noble MD    FEMUR FRACTURE SURGERY      HYSTERECTOMY      INCISION AND DRAINAGE LEG Left 11/3/2021    Procedure: INCISION AND DRAINAGE LEG ABSCESS;  Surgeon: Cesia Mondragon MD;  Location:  [facility];  Service: General;  Laterality: Left;    REPLACEMENT TOTAL KNEE      TOTAL SHOULDER ARTHROPLASTY W/ DISTAL CLAVICLE EXCISION Left 10/3/2023    Procedure: LEFT REVERSE TOTAL SHOULDER ARTHROPLASTY;  Surgeon: Dc Dominguez MD;  Location:  [facility];  Service: Orthopedics;  Laterality: Left;    ULNAR NERVE TRANSPOSITION       PT Assessment (last 12 hours)       PT Evaluation and Treatment       Row Name 10/07/23 0900 10/07/23 0851       Physical Therapy Time and Intention    Subjective Information complains of;pain  -AB --    Document Type -- therapy note (daily note)  -AB    Mode of Treatment -- physical therapy  -AB      Row Name 10/07/23 0851          General Information    Existing Precautions/Restrictions fall;left;shoulder;non-weight bearing  -AB       Row Name 10/07/23 0851          Pain    Pretreatment Pain Rating 9/10  -AB     Posttreatment Pain Rating 9/10  -AB     Pain Location - Side/Orientation Left  -AB     Pain Location - shoulder  -AB       Row Name 10/07/23 0851          Bed Mobility    Bed Mobility supine-sit  -AB     Supine-Sit [place] (Bed Mobility) verbal cues;moderate assist (50% patient effort);maximum assist (25% patient effort)  -AB     Sit-Supine [place] (Bed Mobility) verbal cues;moderate assist (50% patient effort);maximum assist (25% patient effort)  -AB     Assistive Device (Bed Mobility) bed rails;head of bed elevated  -AB       Row Name 10/07/23 0851          Sit-Stand Transfer    Sit-Stand [place] (Transfers) verbal cues;maximum assist (25% patient  effort)  -AB     Comment, (Sit-Stand Transfer) attempted to stand x 4 unable to fully stand  -AB       Row Name 10/07/23 0851          Stand-Sit Transfer    Stand-Sit [place] (Transfers) verbal cues;moderate assist (50% patient effort)  -AB       Row Name 10/07/23 0851          Motor Skills    Comments, Therapeutic Exercise Derick LE AA-AROM 15 reps sitting  -AB       Row Name             Wound 10/03/23 1858 Left anterior shoulder Incision    Wound - Properties Group Placement Date: 10/03/23  -LM Placement Time: 1858  -LM Present on Original Admission: N  -LM Side: Left  -LM Orientation: anterior  -LM Location: shoulder  -LM Primary Wound Type: Incision  -LM    Retired Wound - Properties Group Placement Date: 10/03/23  -LM Placement Time: 1858 -LM Present on Original Admission: N  -LM Side: Left  -LM Orientation: anterior  -LM Location: shoulder  -LM Primary Wound Type: Incision  -LM    Retired Wound - Properties Group Date first assessed: 10/03/23  -LM Time first assessed: 1858 -LM Present on Original Admission: N  -LM Side: Left  -LM Location: shoulder  -LM Primary Wound Type: Incision  -LM      Row Name 10/07/23 0851          Positioning and Restraints    Pre-Treatment Position in bed  -AB     Post Treatment Position bed  -AB     In Bed fowlers;call light within reach  -AB               User Key  (r) = Recorded By, (t) = Taken By, (c) = Cosigned By      Initials Name Provider Type    AB Di Echols PTA Physical Therapist Assistant    Jo-Ann Lora, RN Registered Nurse                    Physical Therapy Education       Title: PT OT SLP Therapies (Done)       Topic: Physical Therapy (Done)       Point: Mobility training (Done)       Learning Progress Summary             Patient Acceptance, E, VU,NR by JS at 10/5/2023 1017    Comment: Educated on brace wear, NWB LUE precautions and AROM precautions. Cueing for safe transfers with bed mobility and bed>chair. Educated on LE ROM for DVT prevention.  Reinforced distal LUE exercises.                         Point: Home exercise program (Done)       Learning Progress Summary             Patient Acceptance, E, VU,NR by NATALIA at 10/5/2023 1017    Comment: Educated on brace wear, NWB LUE precautions and AROM precautions. Cueing for safe transfers with bed mobility and bed>chair. Educated on LE ROM for DVT prevention. Reinforced distal LUE exercises.                         Point: Body mechanics (Done)       Learning Progress Summary             Patient Acceptance, E, VU,NR by NATALIA at 10/5/2023 1017    Comment: Educated on brace wear, NWB LUE precautions and AROM precautions. Cueing for safe transfers with bed mobility and bed>chair. Educated on LE ROM for DVT prevention. Reinforced distal LUE exercises.                         Point: Precautions (Done)       Learning Progress Summary             Patient Acceptance, E, VU,NR by NATALIA at 10/5/2023 1017    Comment: Educated on brace wear, NWB LUE precautions and AROM precautions. Cueing for safe transfers with bed mobility and bed>chair. Educated on LE ROM for DVT prevention. Reinforced distal LUE exercises.                                         User Key       Initials Effective Dates Name Provider Type Discipline     07/13/23 -  You Luna, PT Student PT Student PT                  PT Recommendation and Plan         Outcome Measures       Row Name 10/06/23 1300 10/06/23 1012          How much help from another person do you currently need...    Turning from your back to your side while in flat bed without using bedrails? -- 3  -LY     Moving from lying on back to sitting on the side of a flat bed without bedrails? -- 3  -LY     Moving to and from a bed to a chair (including a wheelchair)? -- 3  -LY     Standing up from a chair using your arms (e.g., wheelchair, bedside chair)? -- 3  -LY     Climbing 3-5 steps with a railing? -- 1  -LY     To walk in hospital room? -- 2  -LY     AM-PAC 6 Clicks Score (PT) -- 15  -LY         How much help from another is currently needed...    Putting on and taking off regular lower body clothing? 2  -TS --     Bathing (including washing, rinsing, and drying) 2  -TS --     Toileting (which includes using toilet bed pan or urinal) 2  -TS --     Putting on and taking off regular upper body clothing 3  -TS --     Taking care of personal grooming (such as brushing teeth) 3  -TS --     Eating meals 4  -TS --     AM-PAC 6 Clicks Score (OT) 16  -TS --        Functional Assessment    Outcome Measure Options -- AM-PAC 6 Clicks Basic Mobility (PT)  -LY               User Key  (r) = Recorded By, (t) = Taken By, (c) = Cosigned By      Initials Name Provider Type    TS Nereida Hirsch COTA Occupational Therapist Assistant    Magdalena Sharma PTA Physical Therapist Assistant                     Time Calculation:    PT Charges       Row Name 10/07/23 0851             Time Calculation    Start Time 0851  -AB      Stop Time 0922  -AB      Time Calculation (min) 31 min  -AB      PT Received On 10/07/23  -AB         Time Calculation- PT    Total Timed Code Minutes- PT 31 minute(s)  -AB                User Key  (r) = Recorded By, (t) = Taken By, (c) = Cosigned By      Initials Name Provider Type    AB Di Echols PTA Physical Therapist Assistant                  Therapy Charges for Today       Code Description Service Date Service Provider Modifiers Qty    64450957875 HC PT THERAPEUTIC ACT EA 15 MIN 10/7/2023 Di Echols, ALFREDO GP 1    29049600347 HC PT THER PROC EA 15 MIN 10/7/2023 Di Echols, ALFREDO GP 1            PT G-Codes  Outcome Measure Options: AM-PAC 6 Clicks Basic Mobility (PT)  AM-PAC 6 Clicks Score (PT): 15  AM-PAC 6 Clicks Score (OT): 16    Di Echols PTA  10/7/2023

## 2023-10-07 NOTE — PLAN OF CARE
Goal Outcome Evaluation:  Plan of Care Reviewed With: patient        Progress: no change     Pt A/O X4, resp e/u, HRR, VSS. Pt has IV to right wrist cdi. Pt has SCD's to BLE. Pt has Lovenox for DVT prevention. Pt has purewick in place. Pt's pain well controlled with Percocet PO. Pt had BM after dulcolax suppository given, pt was offered more laxative to assist with further BM, pt refused. Pt worked well with occupational therapy today. Bed in low position, bed alarm on, call light in reach, safety maintained.

## 2023-10-07 NOTE — PROGRESS NOTES
[facility]  INPATIENT PROGRESS NOTE    Patient Name: Carmen Obrien  Date of Admission: 10/3/2023  Today's Date: 10/07/23  Length of Stay: 0  Primary Care Physician: VERA Noble MD    Subjective   Chief Complaint: Left shoulder pain  HPI   Ms. Obrien was admitted on 10/3 for elective left reverse total shoulder arthroplasty by Dr. Domignuez.  Hospitalist service consulted for medical management primary hypertension.      Today  Lying in bed.  No oxygen in use. No complaints of chest pain or palpitations.  She says she has passed gas but not had a bowel movement.  She did have a bowel movement before she came in. WIC in place.  Removed her left upper extremity immobilizer during the night.  Hemoglobin stable at 8.2.  We will give Dulcolax suppository today.  Awaiting insurance approval to [facility].     Review of Systems   Constitutional:  Negative for chills and fever.   HENT:  Negative for congestion and trouble swallowing.    Eyes:  Negative for photophobia and visual disturbance.   Respiratory:  Negative for cough, shortness of breath and wheezing.    Cardiovascular:  Negative for chest pain, palpitations and leg swelling.   Genitourinary:  Negative for frequency and urgency.   Musculoskeletal:  Positive for gait problem.   Skin:  Positive for wound (Left shoulder surgical site).   Allergic/Immunologic: Negative for immunocompromised state.   Neurological:  Positive for weakness. Negative for light-headedness.   Hematological:  Negative for adenopathy. Does not bruise/bleed easily.   Psychiatric/Behavioral:  Negative for agitation, behavioral problems and confusion.       \  All pertinent negatives and positives are as above. All other systems have been reviewed and are negative unless otherwise stated.     Objective    Temp:  [98 øF (36.7 øC)-98.9 øF (37.2 øC)] 98.4 øF (36.9 øC)  Heart Rate:  [51-70] 51  Resp:  [15-20] 16  BP:  (106-148)/(32-82) 145/43  Physical Exam  Vitals and nursing note reviewed.   Constitutional:       Appearance: She is obese.      Comments: Lying in bed.  No oxygen use.  No visitors in room.   HENT:      Head: Normocephalic and atraumatic.      Nose: No congestion.      Mouth/Throat:      Pharynx: Oropharynx is clear. No oropharyngeal exudate or posterior oropharyngeal erythema.   Eyes:      Extraocular Movements: Extraocular movements intact.   Cardiovascular:      Rate and Rhythm: Normal rate and regular rhythm.      Heart sounds: No murmur heard.  Pulmonary:      Breath sounds: No wheezing, rhonchi or rales.      Comments: No oxygen use.  Abdominal:      Palpations: Abdomen is soft.      Tenderness: There is no abdominal tenderness.   Genitourinary:     Comments:  WIC in place.  Musculoskeletal:         General: Tenderness (Left shoulder) present.      Cervical back: Normal range of motion and neck supple.      Comments: Left shoulder sling/immobilizer in place   Skin:     General: Skin is warm and dry.   Neurological:      General: No focal deficit present.      Mental Status: She is alert and oriented to person, place, and time.   Psychiatric:         Mood and Affect: Mood normal.         Behavior: Behavior normal.         Thought Content: Thought content normal.         Judgment: Judgment normal.       Results Review:  I have reviewed the labs, radiology results, and diagnostic studies.    Laboratory Data:   Results from last 7 days   Lab Units 10/07/23  0419 10/06/23  0418 10/05/23  0528   WBC 10*3/mm3 9.01 10.35 9.31   HEMOGLOBIN g/dL 8.2* 8.2* 9.5*   HEMATOCRIT % 26.5* 25.4* 30.7*   PLATELETS 10*3/mm3 351 256 293     Results from last 7 days   Lab Units 10/06/23  0418 10/05/23  0528 10/04/23  0540 10/03/23  1349   SODIUM mmol/L 131* 133* 132* 131*   POTASSIUM mmol/L 5.0 4.7 4.2 4.4   CHLORIDE mmol/L 102 98 101 98   CO2 mmol/L 19.0* 23.0 22.0 22.0   BUN mg/dL 24* 24* 12 14   CREATININE mg/dL 0.57 0.74 0.41*  0.36*   CALCIUM mg/dL 8.6 9.3 8.5* 9.7   BILIRUBIN mg/dL  --   --   --  0.6   ALK PHOS U/L  --   --   --  93   ALT (SGPT) U/L  --   --   --  52*   AST (SGOT) U/L  --   --   --  22   GLUCOSE mg/dL 102* 126* 89 97     Imaging Results (All)       Procedure Component Value Units Date/Time    US Venous Doppler Lower Extremity Left (duplex) [[id number]] Collected: 10/04/23 1113     Updated: 10/04/23 1118    Narrative:      EXAMINATION: US VENOUS DOPPLER LOWER EXTREMITY LEFT (DUPLEX)- 10/4/2023  11:13 AM CDT     HISTORY: unilateral edema and pain; N39.0-Urinary tract infection, site  not specified.     REPORT: Sonographic images of the left lower extremity deep venous  system were obtained, using color, gray scale and spectral Doppler  technique, as well as compression, augmentation and Valsalva maneuvers.  There are no comparison studies.     The left common femoral vein is patent and normally compressible. The  left saphenous femoral junction is patent and normally compressible. The  left superficial femoral vein is patent. The left popliteal vein,  posterior tibial, peroneal and anterior tibial veins are patent. No DVT  is seen.      The left great saphenous vein junction at the femoral vein appears  normally patent     Incidentally noted is a subcutaneous shadowing mass or calcification  within the medial left mid calf, measuring at least 24 mm. This is  nonspecific, could represent an old calcified hematoma or other  calcified mass. Correlate clinically for any history of trauma involving  the medial left calf.       Impression:      No evidence of left lower extremity DVT. There is a  shadowing mass within the medial left mid calf measuring at least 24 mm,  possibly related to old trauma. Focal correlation is recommended.        This report was signed and finalized on 10/4/2023 11:15 AM CDT by Dr. Jerry Aguayo MD.       XR Shoulder 2+ View Left [[id number]] Collected: 10/03/23 2041     Updated: 10/03/23 2045     Narrative:      EXAMINATION: XR SHOULDER 2+ VW LEFT-  10/3/2023 8:41 PM CDT     HISTORY: Postop     FINDINGS: 2 view exam of the left shoulder reveals the patient is  undergone a reverse left total shoulder arthroplasty. There is good  anatomic alignment with no evidence of complications.     This report was signed and finalized on 10/3/2023 8:42 PM CDT by Dr. José Elmore MD.       XR Chest 1 View [[id number]] Collected: 10/03/23 1338     Updated: 10/03/23 1342    Narrative:      EXAM/TECHNIQUE: XR CHEST 1 VW-     INDICATION: pre op     COMPARISON: 8/8/2023     FINDINGS:     Cardiac silhouette is normal size. No pleural effusion, pneumothorax, or  focal consolidation. Advanced degenerative change in both glenohumeral  joints. Old left-sided rib fractures. Degenerative change in the  thoracic spine. No acute osseous finding.       Impression:      No acute findings.     This report was signed and finalized on 10/3/2023 1:39 PM CDT by Dr. Arsh Calderon MD.              Home medications:  allopurinol, 100 mg, Oral, Daily  amLODIPine, 10 mg, Oral, Q24H  atorvastatin, 10 mg, Oral, Nightly  bisacodyl, 10 mg, Rectal, Once  carvedilol, 12.5 mg, Oral, BID With Meals  cycloSPORINE, 1 drop, Both Eyes, Q12H  enoxaparin, 40 mg, Subcutaneous, Daily  famotidine, 20 mg, Oral, BID  ferrous sulfate, 325 mg, Oral, Daily With Breakfast  furosemide, 20 mg, Oral, Daily  lisinopril, 20 mg, Oral, Q24H  potassium chloride, 10 mEq, Oral, Daily  sertraline, 100 mg, Oral, Daily  sodium chloride, 10 mL, Intravenous, Q12H         I have reviewed the patient's current medications.     Assessment/Plan   Assessment  Active Hospital Problems    Diagnosis     **Primary osteoarthritis of left shoulder     Primary osteoarthritis of right shoulder     Chronic venous stasis     Essential hypertension     Gastroesophageal reflux disease     History of DVT of lower extremity      Treatment Plan  1.  Primary osteoarthritis left shoulder.  Left  reverse total shoulder arthroplasty 10/3 Dr. Dominguez.  Physical therapy per Dr. Dominguez.  Left shoulder with sling immobilizer.  Social service consulted and referral made to [facility].  Lovenox for deep vein thrombosis prophylaxis.    2.  Primary hypertension.  Blood pressure 145/43.  Coreg, lisinopril and amlodipine continued.      3.  History of DVT lower extremities.  Patient reports chronic lower extremity edema.  Venous Doppler left extremity no evidence of DVT.  There is shadowing mass within the medial left mid calf measuring at least 24 mm possibly related to old trauma.  Continue Lovenox for deep vein thrombosis prophylaxis.    4.  Normocytic anemia.  Hemoglobin 9.8 on admission.  Hemoglobin 7.9 on 10/4. Hemoglobin 8.2 today.  No increased bruising or bleeding noted at operative site.    5.  GERD.  Continue Protonix twice daily.    6.  Dulcolax suppository today    Medical Decision Making  Number and Complexity of problems: 5  Primary osteoarthritis left shoulder: Acute, high complexity requiring surgical intervention, stable  Primary hypertension: Chronic, moderate complexity, stable  History of DVT: Chronic, moderate complexity, stable  Normocytic anemia: Chronic, moderate complexity, stable  GERD, chronic, low complexity, stable    Differential Diagnosis: None    Conditions and Status        Condition is improving.     [facility] Data  External documents reviewed: Dr. Noble, PCP office visit 9/18/2023  Cardiac tracing (EKG, telemetry) interpretation: None  Radiology interpretation: None  Labs reviewed:   BMP 10/6/2023.  Repeat BMP today  CBC  10/7/2023.  Repeat CBC in AM.    Any tests that were considered but not ordered: None     Decision rules/scores evaluated (example [id number], [place], etc): None     Discussed with:  and patient.     Care Planning  Shared decision making: Dr. Aldrich and patient.  Patient agrees to physical therapy and social service consult.  Continue home  medications.  [facility], Dulcolax suppository.  Code status and discussions: Full code  Since POA is [place]    Disposition  Social Determinants of Health that impact treatment or disposition: Reside in assisted living prior to admission.   I expect the patient to be discharged to [facility] per Dr. Dominguez.    Electronically signed by ABDON Goyal, 10/07/23, 07:23 CDT.

## 2023-10-07 NOTE — PLAN OF CARE
Problem: Pain (Shoulder Arthroplasty)  Goal: Acceptable Pain Control  Outcome: Ongoing, Not Progressing  Intervention: Prevent or Manage Pain  Recent Flowsheet Documentation  Taken 10/6/2023 2000 by Maranda Brown RN  Pain Management Interventions: see MAR  Diversional Activities: television   Goal Outcome Evaluation:           Progress: no change  Outcome Evaluation: medicated for shoulder pain with percoset x 2. Angry at this time because I didn't wake her up to give her pain medicne.

## 2023-10-08 LAB
DEPRECATED RDW RBC AUTO: 51.9 FL (ref 37–54)
ERYTHROCYTE [DISTWIDTH] IN BLOOD BY AUTOMATED COUNT: 13.8 % (ref 12.3–15.4)
HCT VFR BLD AUTO: 26.1 % (ref 34–46.6)
HGB BLD-MCNC: 7.9 G/DL (ref 12–15.9)
MCH RBC QN AUTO: 31.1 PG (ref 26.6–33)
MCHC RBC AUTO-ENTMCNC: 30.3 G/DL (ref 31.5–35.7)
MCV RBC AUTO: 102.8 FL (ref 79–97)
PLATELET # BLD AUTO: 300 10*3/MM3 (ref 140–450)
PMV BLD AUTO: 9.4 FL (ref 6–12)
RBC # BLD AUTO: 2.54 10*6/MM3 (ref 3.77–5.28)
WBC NRBC COR # BLD: 7.53 10*3/MM3 (ref 3.4–10.8)

## 2023-10-08 PROCEDURE — A9270 NON-COVERED ITEM OR SERVICE: HCPCS | Performed by: ORTHOPAEDIC SURGERY

## 2023-10-08 PROCEDURE — 63710000001 HYDROXYZINE 25 MG TABLET: Performed by: ORTHOPAEDIC SURGERY

## 2023-10-08 PROCEDURE — 63710000001 FERROUS SULFATE 325 (65 FE) MG TABLET: Performed by: ORTHOPAEDIC SURGERY

## 2023-10-08 PROCEDURE — 63710000001 ALLOPURINOL 100 MG TABLET: Performed by: ORTHOPAEDIC SURGERY

## 2023-10-08 PROCEDURE — 63710000001 FAMOTIDINE 20 MG TABLET: Performed by: ORTHOPAEDIC SURGERY

## 2023-10-08 PROCEDURE — 25010000002 ENOXAPARIN PER 10 MG: Performed by: ORTHOPAEDIC SURGERY

## 2023-10-08 PROCEDURE — G0378 HOSPITAL OBSERVATION PER HR: HCPCS

## 2023-10-08 PROCEDURE — 63710000001 POTASSIUM CHLORIDE 10 MEQ CAPSULE CONTROLLED-RELEASE: Performed by: ORTHOPAEDIC SURGERY

## 2023-10-08 PROCEDURE — 63710000001 AMLODIPINE 10 MG TABLET: Performed by: NURSE PRACTITIONER

## 2023-10-08 PROCEDURE — 63710000001 ATORVASTATIN 40 MG TABLET: Performed by: ORTHOPAEDIC SURGERY

## 2023-10-08 PROCEDURE — 63710000001 SERTRALINE 100 MG TABLET: Performed by: ORTHOPAEDIC SURGERY

## 2023-10-08 PROCEDURE — 97530 THERAPEUTIC ACTIVITIES: CPT

## 2023-10-08 PROCEDURE — 63710000001 CYCLOSPORINE 0.05 % EMULSION: Performed by: ORTHOPAEDIC SURGERY

## 2023-10-08 PROCEDURE — A9270 NON-COVERED ITEM OR SERVICE: HCPCS | Performed by: NURSE PRACTITIONER

## 2023-10-08 PROCEDURE — 63710000001 CARVEDILOL 6.25 MG TABLET: Performed by: NURSE PRACTITIONER

## 2023-10-08 PROCEDURE — 97110 THERAPEUTIC EXERCISES: CPT

## 2023-10-08 PROCEDURE — 85027 COMPLETE CBC AUTOMATED: CPT

## 2023-10-08 PROCEDURE — 63710000001 OXYCODONE-ACETAMINOPHEN 7.5-325 MG TABLET: Performed by: ORTHOPAEDIC SURGERY

## 2023-10-08 PROCEDURE — 63710000001 LISINOPRIL 20 MG TABLET: Performed by: NURSE PRACTITIONER

## 2023-10-08 PROCEDURE — 25010000002 DIPHENHYDRAMINE PER 50 MG: Performed by: ORTHOPAEDIC SURGERY

## 2023-10-08 RX ADMIN — FAMOTIDINE 20 MG: 20 TABLET, FILM COATED ORAL at 20:00

## 2023-10-08 RX ADMIN — FERROUS SULFATE TAB 325 MG (65 MG ELEMENTAL FE) 325 MG: 325 (65 FE) TAB at 08:37

## 2023-10-08 RX ADMIN — AMLODIPINE BESYLATE 10 MG: 10 TABLET ORAL at 08:40

## 2023-10-08 RX ADMIN — CARVEDILOL 12.5 MG: 6.25 TABLET, FILM COATED ORAL at 08:40

## 2023-10-08 RX ADMIN — DIPHENHYDRAMINE HYDROCHLORIDE 25 MG: 50 INJECTION, SOLUTION INTRAMUSCULAR; INTRAVENOUS at 21:36

## 2023-10-08 RX ADMIN — ALLOPURINOL 100 MG: 100 TABLET ORAL at 08:37

## 2023-10-08 RX ADMIN — ATORVASTATIN CALCIUM 10 MG: 10 TABLET, FILM COATED ORAL at 20:01

## 2023-10-08 RX ADMIN — Medication 10 ML: at 20:00

## 2023-10-08 RX ADMIN — CARVEDILOL 12.5 MG: 6.25 TABLET, FILM COATED ORAL at 17:33

## 2023-10-08 RX ADMIN — FAMOTIDINE 20 MG: 20 TABLET, FILM COATED ORAL at 08:37

## 2023-10-08 RX ADMIN — Medication 10 ML: at 08:39

## 2023-10-08 RX ADMIN — CYCLOSPORINE 1 DROP: 0.5 EMULSION OPHTHALMIC at 08:37

## 2023-10-08 RX ADMIN — LISINOPRIL 20 MG: 20 TABLET ORAL at 08:40

## 2023-10-08 RX ADMIN — CYCLOSPORINE 1 DROP: 0.5 EMULSION OPHTHALMIC at 20:30

## 2023-10-08 RX ADMIN — SERTRALINE HYDROCHLORIDE 100 MG: 100 TABLET, FILM COATED ORAL at 08:37

## 2023-10-08 RX ADMIN — OXYCODONE AND ACETAMINOPHEN 2 TABLET: 7.5; 325 TABLET ORAL at 19:51

## 2023-10-08 RX ADMIN — OXYCODONE AND ACETAMINOPHEN 2 TABLET: 7.5; 325 TABLET ORAL at 08:38

## 2023-10-08 RX ADMIN — HYDROXYZINE HYDROCHLORIDE 25 MG: 25 TABLET ORAL at 13:19

## 2023-10-08 RX ADMIN — ENOXAPARIN SODIUM 40 MG: 100 INJECTION SUBCUTANEOUS at 08:38

## 2023-10-08 RX ADMIN — HYDROXYZINE HYDROCHLORIDE 25 MG: 25 TABLET ORAL at 20:30

## 2023-10-08 RX ADMIN — POTASSIUM CHLORIDE 10 MEQ: 10 CAPSULE, COATED, EXTENDED RELEASE ORAL at 08:37

## 2023-10-08 RX ADMIN — OXYCODONE AND ACETAMINOPHEN 2 TABLET: 7.5; 325 TABLET ORAL at 15:41

## 2023-10-08 NOTE — PLAN OF CARE
Goal Outcome Evaluation:  Plan of Care Reviewed With: patient        Progress: no change  Outcome Evaluation: pain managed well. Has anxiety and unable to express needs at times

## 2023-10-08 NOTE — PLAN OF CARE
Goal Outcome Evaluation:  Plan of Care Reviewed With: patient        Progress: improving  Outcome Evaluation: Pt. agreeable to therapy, but c/o 10/10 pain in L anterior shoulder. Pt. was premedicated prior to tx. She was able to get to EOB with MIN x 1 and having HOB elevated. She sat and performed LE exercises. She remains weaker on the L LE, but she states this is her norm. Pt. was able to stand at bedside with MIN assist for 1 minute. Cues for posture. She was then able to take steps from bed to chair. Pt. does better transfering towards Right side. Small steps at this time. Will continue to work on strengthening. She would benefit from further rehab following discharge.

## 2023-10-08 NOTE — PROGRESS NOTES
[facility] Medicine Services  INPATIENT PROGRESS NOTE    Patient Name: Carmen Obrien  Date of Admission: 10/3/2023  Today's Date: 10/08/23  Length of Stay: 0  Primary Care Physician: VERA Noble MD    Subjective   Chief Complaint: Left shoulder pain  HPI   Ms. Obrien was admitted on 10/3 for elective left reverse total shoulder arthroplasty by Dr. Dominguez.  Hospitalist service consulted for medical management primary hypertension.      Today  Lying in bed.  No oxygen in use. No complaints of chest pain or palpitations.  She had small bowel movement after Dulcolax suppository yesterday.  Denies nausea or vomiting.  WIC in place. She continues to work with physical therapy and pivoted and transferred to bedside commode.  She was maximum assist to return to bed.  [facility]    Review of Systems   Constitutional:  Negative for chills and fever.   HENT:  Negative for congestion and trouble swallowing.    Eyes:  Negative for photophobia and visual disturbance.   Respiratory:  Negative for cough, shortness of breath and wheezing.    Cardiovascular:  Negative for chest pain, palpitations and leg swelling.   Genitourinary:  Negative for frequency and urgency.   Musculoskeletal:  Positive for gait problem.   Skin:  Positive for wound (Left shoulder surgical site).   Allergic/Immunologic: Negative for immunocompromised state.   Neurological:  Positive for weakness. Negative for light-headedness.   Hematological:  Negative for adenopathy. Does not bruise/bleed easily.   Psychiatric/Behavioral:  Negative for agitation, behavioral problems and confusion.       \  All pertinent negatives and positives are as above. All other systems have been reviewed and are negative unless otherwise stated.     Objective    Temp:  [97.8 øF (36.6 øC)-98.5 øF (36.9 øC)] 98 øF (36.7 øC)  Heart Rate:  [52-57] 52  Resp:  [16-20]  16  BP: (116-147)/(32-75) 147/46  Physical Exam  Vitals and nursing note reviewed.   Constitutional:       Appearance: She is obese.      Comments: Lying in bed.  No oxygen use.  No visitors in room.   HENT:      Head: Normocephalic and atraumatic.      Nose: No congestion.      Mouth/Throat:      Pharynx: Oropharynx is clear. No oropharyngeal exudate or posterior oropharyngeal erythema.   Eyes:      Extraocular Movements: Extraocular movements intact.   Cardiovascular:      Rate and Rhythm: Normal rate and regular rhythm.      Heart sounds: No murmur heard.  Pulmonary:      Breath sounds: No wheezing, rhonchi or rales.      Comments: No oxygen use.  Abdominal:      Palpations: Abdomen is soft.      Tenderness: There is no abdominal tenderness.   Genitourinary:     Comments:  WIC in place.  Musculoskeletal:         General: Tenderness (Left shoulder) present.      Cervical back: Normal range of motion and neck supple.      Comments: Left shoulder sling/immobilizer in place   Skin:     General: Skin is warm and dry.   Neurological:      General: No focal deficit present.      Mental Status: She is alert and oriented to person, place, and time.   Psychiatric:         Mood and Affect: Mood normal.         Behavior: Behavior normal.         Thought Content: Thought content normal.         Judgment: Judgment normal.       Results Review:  I have reviewed the labs, radiology results, and diagnostic studies.    Laboratory Data:   Results from last 7 days   Lab Units 10/08/23  0545 10/07/23  0419 10/06/23  0418   WBC 10*3/mm3 7.53 9.01 10.35   HEMOGLOBIN g/dL 7.9* 8.2* 8.2*   HEMATOCRIT % 26.1* 26.5* 25.4*   PLATELETS 10*3/mm3 300 351 256     Results from last 7 days   Lab Units 10/07/23  0952 10/06/23  0418 10/05/23  0528 10/04/23  0540 10/03/23  1349   SODIUM mmol/L 131* 131* 133*   < > 131*   POTASSIUM mmol/L 4.7 5.0 4.7   < > 4.4   CHLORIDE mmol/L 101 102 98   < > 98   CO2 mmol/L 23.0 19.0* 23.0   < > 22.0   BUN mg/dL  17 24* 24*   < > 14   CREATININE mg/dL 0.50* 0.57 0.74   < > 0.36*   CALCIUM mg/dL 8.5* 8.6 9.3   < > 9.7   BILIRUBIN mg/dL  --   --   --   --  0.6   ALK PHOS U/L  --   --   --   --  93   ALT (SGPT) U/L  --   --   --   --  52*   AST (SGOT) U/L  --   --   --   --  22   GLUCOSE mg/dL 118* 102* 126*   < > 97    < > = values in this interval not displayed.     Imaging Results (All)       Procedure Component Value Units Date/Time    US Venous Doppler Lower Extremity Left (duplex) [[id number]] Collected: 10/04/23 1113     Updated: 10/04/23 1118    Narrative:      EXAMINATION: US VENOUS DOPPLER LOWER EXTREMITY LEFT (DUPLEX)- 10/4/2023  11:13 AM CDT     HISTORY: unilateral edema and pain; N39.0-Urinary tract infection, site  not specified.     REPORT: Sonographic images of the left lower extremity deep venous  system were obtained, using color, gray scale and spectral Doppler  technique, as well as compression, augmentation and Valsalva maneuvers.  There are no comparison studies.     The left common femoral vein is patent and normally compressible. The  left saphenous femoral junction is patent and normally compressible. The  left superficial femoral vein is patent. The left popliteal vein,  posterior tibial, peroneal and anterior tibial veins are patent. No DVT  is seen.      The left great saphenous vein junction at the femoral vein appears  normally patent     Incidentally noted is a subcutaneous shadowing mass or calcification  within the medial left mid calf, measuring at least 24 mm. This is  nonspecific, could represent an old calcified hematoma or other  calcified mass. Correlate clinically for any history of trauma involving  the medial left calf.       Impression:      No evidence of left lower extremity DVT. There is a  shadowing mass within the medial left mid calf measuring at least 24 mm,  possibly related to old trauma. Focal correlation is recommended.        This report was signed and finalized on 10/4/2023  11:15 AM CDT by Dr. Jerry Aguayo MD.       XR Shoulder 2+ View Left [[id number]] Collected: 10/03/23 2041     Updated: 10/03/23 2045    Narrative:      EXAMINATION: XR SHOULDER 2+ VW LEFT-  10/3/2023 8:41 PM CDT     HISTORY: Postop     FINDINGS: 2 view exam of the left shoulder reveals the patient is  undergone a reverse left total shoulder arthroplasty. There is good  anatomic alignment with no evidence of complications.     This report was signed and finalized on 10/3/2023 8:42 PM CDT by Dr. José Elmore MD.       XR Chest 1 View [[id number]] Collected: 10/03/23 1338     Updated: 10/03/23 1342    Narrative:      EXAM/TECHNIQUE: XR CHEST 1 VW-     INDICATION: pre op     COMPARISON: 8/8/2023     FINDINGS:     Cardiac silhouette is normal size. No pleural effusion, pneumothorax, or  focal consolidation. Advanced degenerative change in both glenohumeral  joints. Old left-sided rib fractures. Degenerative change in the  thoracic spine. No acute osseous finding.       Impression:      No acute findings.     This report was signed and finalized on 10/3/2023 1:39 PM CDT by Dr. Arsh Calderon MD.              Home medications:  allopurinol, 100 mg, Oral, Daily  amLODIPine, 10 mg, Oral, Q24H  atorvastatin, 10 mg, Oral, Nightly  carvedilol, 12.5 mg, Oral, BID With Meals  cycloSPORINE, 1 drop, Both Eyes, Q12H  enoxaparin, 40 mg, Subcutaneous, Daily  famotidine, 20 mg, Oral, BID  ferrous sulfate, 325 mg, Oral, Daily With Breakfast  furosemide, 20 mg, Oral, Daily  lisinopril, 20 mg, Oral, Q24H  potassium chloride, 10 mEq, Oral, Daily  sertraline, 100 mg, Oral, Daily  sodium chloride, 10 mL, Intravenous, Q12H         I have reviewed the patient's current medications.     Assessment/Plan   Assessment  Active Hospital Problems    Diagnosis     **Primary osteoarthritis of left shoulder     Primary osteoarthritis of right shoulder     Chronic venous stasis     Essential hypertension     Gastroesophageal reflux disease      History of DVT of lower extremity      Treatment Plan  1.  Primary osteoarthritis left shoulder.  Left reverse total shoulder arthroplasty 10/3 Dr. Dominguez.  Physical therapy per Dr. Dominguez.  Left shoulder with sling immobilizer.  Social service consulted and referral made to [facility].  Lovenox for deep vein thrombosis prophylaxis.    2.  Primary hypertension.  Blood pressure 147/46.  Continue Coreg, lisinopril and amlodipine.    3.  History of DVT lower extremities.  Patient reports chronic lower extremity edema.  Venous Doppler left extremity no evidence of DVT.  There is shadowing mass within the medial left mid calf measuring at least 24 mm possibly related to old trauma.  Continue Lovenox for deep vein thrombosis prophylaxis.    4.  Normocytic anemia.  Hemoglobin 9.8 on admission.  Hemoglobin 7.9 on 10/4. Hemoglobin 7.9 today.  No increased bruising or bleeding noted at operative site.    5.  GERD.  Continue Protonix twice daily.    Medical Decision Making  Number and Complexity of problems: 5  Primary osteoarthritis left shoulder: Acute, high complexity requiring surgical intervention, stable  Primary hypertension: Chronic, moderate complexity, stable  History of DVT: Chronic, moderate complexity, stable  Normocytic anemia: Chronic, moderate complexity, stable  GERD, chronic, low complexity, stable    Differential Diagnosis: None    Conditions and Status        Condition is improving.     [facility] Data  External documents reviewed: Dr. Noble, PCP office visit 9/18/2023  Cardiac tracing (EKG, telemetry) interpretation: None  Radiology interpretation: None  Labs reviewed:   BMP 10/7/2023.   CBC  10/8/2023.  Repeat CBC in AM.    Any tests that were considered but not ordered: None     Decision rules/scores evaluated (example [id number], Wells, etc): None     Discussed with:  and patient.     Care Planning  Shared decision making: Dr. Aldrich and patient.  Patient agrees to physical  therapy and social service consult.  Continue home medications.  [facility]  Code status and discussions: Full code  Since POA is Wen Marvin    Disposition  Social Determinants of Health that impact treatment or disposition: Assisted living prior to admission.   I expect the patient to be discharged to [facility] per Dr. Dominguez.    Electronically signed by ABDON Goyal, 10/08/23, 07:01 CDT.

## 2023-10-08 NOTE — PLAN OF CARE
Goal Outcome Evaluation:  Patient A&Ox4. Dressing intact to left shoulder. Sling in use. Ice in place. Neurovascular WNL to left arm. PRN meds given as ordered. VSS. Safety maintained. NAD noted.
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
## 2023-10-09 VITALS
DIASTOLIC BLOOD PRESSURE: 55 MMHG | WEIGHT: 191.9 LBS | RESPIRATION RATE: 18 BRPM | HEART RATE: 52 BPM | OXYGEN SATURATION: 96 % | SYSTOLIC BLOOD PRESSURE: 158 MMHG | BODY MASS INDEX: 37.67 KG/M2 | TEMPERATURE: 98.4 F | HEIGHT: 60 IN

## 2023-10-09 LAB
DEPRECATED RDW RBC AUTO: 49.9 FL (ref 37–54)
ERYTHROCYTE [DISTWIDTH] IN BLOOD BY AUTOMATED COUNT: 13.8 % (ref 12.3–15.4)
HCT VFR BLD AUTO: 28.4 % (ref 34–46.6)
HGB BLD-MCNC: 8.8 G/DL (ref 12–15.9)
MCH RBC QN AUTO: 30.8 PG (ref 26.6–33)
MCHC RBC AUTO-ENTMCNC: 31 G/DL (ref 31.5–35.7)
MCV RBC AUTO: 99.3 FL (ref 79–97)
PLATELET # BLD AUTO: 396 10*3/MM3 (ref 140–450)
PMV BLD AUTO: 9.3 FL (ref 6–12)
RBC # BLD AUTO: 2.86 10*6/MM3 (ref 3.77–5.28)
WBC NRBC COR # BLD: 8.88 10*3/MM3 (ref 3.4–10.8)

## 2023-10-09 PROCEDURE — G0378 HOSPITAL OBSERVATION PER HR: HCPCS

## 2023-10-09 PROCEDURE — 63710000001 FUROSEMIDE 20 MG TABLET: Performed by: ORTHOPAEDIC SURGERY

## 2023-10-09 PROCEDURE — 63710000001 FAMOTIDINE 20 MG TABLET: Performed by: ORTHOPAEDIC SURGERY

## 2023-10-09 PROCEDURE — 63710000001 OXYCODONE-ACETAMINOPHEN 7.5-325 MG TABLET: Performed by: ORTHOPAEDIC SURGERY

## 2023-10-09 PROCEDURE — 25010000002 DIPHENHYDRAMINE PER 50 MG: Performed by: ORTHOPAEDIC SURGERY

## 2023-10-09 PROCEDURE — 25010000002 ENOXAPARIN PER 10 MG: Performed by: ORTHOPAEDIC SURGERY

## 2023-10-09 PROCEDURE — A9270 NON-COVERED ITEM OR SERVICE: HCPCS | Performed by: ORTHOPAEDIC SURGERY

## 2023-10-09 PROCEDURE — 85027 COMPLETE CBC AUTOMATED: CPT

## 2023-10-09 PROCEDURE — 63710000001 LISINOPRIL 20 MG TABLET: Performed by: NURSE PRACTITIONER

## 2023-10-09 PROCEDURE — 63710000001 CARVEDILOL 6.25 MG TABLET: Performed by: NURSE PRACTITIONER

## 2023-10-09 PROCEDURE — A9270 NON-COVERED ITEM OR SERVICE: HCPCS | Performed by: NURSE PRACTITIONER

## 2023-10-09 PROCEDURE — 63710000001 SERTRALINE 100 MG TABLET: Performed by: ORTHOPAEDIC SURGERY

## 2023-10-09 PROCEDURE — 63710000001 ALLOPURINOL 100 MG TABLET: Performed by: ORTHOPAEDIC SURGERY

## 2023-10-09 PROCEDURE — 97530 THERAPEUTIC ACTIVITIES: CPT

## 2023-10-09 PROCEDURE — 63710000001 FERROUS SULFATE 325 (65 FE) MG TABLET: Performed by: ORTHOPAEDIC SURGERY

## 2023-10-09 PROCEDURE — 63710000001 CYCLOSPORINE 0.05 % EMULSION: Performed by: ORTHOPAEDIC SURGERY

## 2023-10-09 PROCEDURE — 63710000001 POTASSIUM CHLORIDE 10 MEQ CAPSULE CONTROLLED-RELEASE: Performed by: ORTHOPAEDIC SURGERY

## 2023-10-09 PROCEDURE — 63710000001 AMLODIPINE 10 MG TABLET: Performed by: NURSE PRACTITIONER

## 2023-10-09 PROCEDURE — 97110 THERAPEUTIC EXERCISES: CPT

## 2023-10-09 PROCEDURE — 63710000001 HYDROXYZINE 25 MG TABLET: Performed by: ORTHOPAEDIC SURGERY

## 2023-10-09 PROCEDURE — 97535 SELF CARE MNGMENT TRAINING: CPT

## 2023-10-09 RX ORDER — OXYCODONE HYDROCHLORIDE AND ACETAMINOPHEN 5; 325 MG/1; MG/1
1 TABLET ORAL EVERY 8 HOURS PRN
Qty: 28 TABLET | Refills: 0 | Status: SHIPPED | OUTPATIENT
Start: 2023-10-09

## 2023-10-09 RX ADMIN — HYDROXYZINE HYDROCHLORIDE 25 MG: 25 TABLET ORAL at 13:50

## 2023-10-09 RX ADMIN — CARVEDILOL 12.5 MG: 6.25 TABLET, FILM COATED ORAL at 08:42

## 2023-10-09 RX ADMIN — CYCLOSPORINE 1 DROP: 0.5 EMULSION OPHTHALMIC at 08:42

## 2023-10-09 RX ADMIN — LISINOPRIL 20 MG: 20 TABLET ORAL at 08:42

## 2023-10-09 RX ADMIN — FERROUS SULFATE TAB 325 MG (65 MG ELEMENTAL FE) 325 MG: 325 (65 FE) TAB at 08:42

## 2023-10-09 RX ADMIN — ENOXAPARIN SODIUM 40 MG: 100 INJECTION SUBCUTANEOUS at 08:42

## 2023-10-09 RX ADMIN — AMLODIPINE BESYLATE 10 MG: 10 TABLET ORAL at 08:42

## 2023-10-09 RX ADMIN — FAMOTIDINE 20 MG: 20 TABLET, FILM COATED ORAL at 08:42

## 2023-10-09 RX ADMIN — HYDROXYZINE HYDROCHLORIDE 25 MG: 25 TABLET ORAL at 04:29

## 2023-10-09 RX ADMIN — FUROSEMIDE 20 MG: 20 TABLET ORAL at 08:42

## 2023-10-09 RX ADMIN — ALLOPURINOL 100 MG: 100 TABLET ORAL at 08:42

## 2023-10-09 RX ADMIN — OXYCODONE AND ACETAMINOPHEN 2 TABLET: 7.5; 325 TABLET ORAL at 00:44

## 2023-10-09 RX ADMIN — OXYCODONE AND ACETAMINOPHEN 2 TABLET: 7.5; 325 TABLET ORAL at 13:50

## 2023-10-09 RX ADMIN — SERTRALINE HYDROCHLORIDE 100 MG: 100 TABLET, FILM COATED ORAL at 08:43

## 2023-10-09 RX ADMIN — OXYCODONE AND ACETAMINOPHEN 2 TABLET: 7.5; 325 TABLET ORAL at 04:54

## 2023-10-09 RX ADMIN — POTASSIUM CHLORIDE 10 MEQ: 10 CAPSULE, COATED, EXTENDED RELEASE ORAL at 08:43

## 2023-10-09 RX ADMIN — OXYCODONE AND ACETAMINOPHEN 2 TABLET: 7.5; 325 TABLET ORAL at 08:43

## 2023-10-09 RX ADMIN — DIPHENHYDRAMINE HYDROCHLORIDE 25 MG: 50 INJECTION, SOLUTION INTRAMUSCULAR; INTRAVENOUS at 04:29

## 2023-10-09 RX ADMIN — Medication 10 ML: at 08:43

## 2023-10-09 NOTE — CASE MANAGEMENT/SOCIAL WORK
Continued Stay Note   [place]     Patient Name: Carmen Obrien  MRN: [id number]  Today's Date: 10/9/2023    Admit Date: 10/3/2023    [facility]   Discharge Plan       Row Name 10/09/23 0916       Plan    Plan Comments Precert is complete and pt can dc to [facility] today.    Final Discharge Disposition Code 03 - skilled nursing facility (SNF)    Final Note Call report number is [phone]. Fax number is [phone]. Pharmacy updated in [facility].                   Discharge Codes    No documentation.                       BRAEDEN Liang

## 2023-10-09 NOTE — PLAN OF CARE
Goal Outcome Evaluation:  Plan of Care Reviewed With: patient        Progress: no change  Outcome Evaluation: States in pain and calling out then upon entering room eyes closed resp even and unlabored. Wakened to administer med. No distress noted.

## 2023-10-09 NOTE — THERAPY TREATMENT NOTE
Acute Care - Physical Therapy Treatment Note  [facility]     Patient Name: Carmen Obrien  : 1940  MRN: [id number]  Today's Date: 10/9/2023      Visit Dx:     ICD-10-CM ICD-9-CM   1. Impaired mobility [Z74.09 (ICD-10-CM)]  Z74.09 799.89   2. Urinary tract infection without hematuria, site unspecified  N39.0 599.0   3. Decreased activities of daily living (ADL) [Z78.9 (ICD-10-CM)]  Z78.9 V49.89     Patient Active Problem List   Diagnosis    Shoulder dislocation    Multinodular goiter    History of adenomatous polyp of colon    Family hx of colon cancer    Constipation    Esophageal dysphagia    Gastroesophageal reflux disease    Age-related osteoporosis without current pathological fracture    Essential hypertension    Acute blood loss as cause of postoperative anemia    Anemia    Atherosclerosis of native artery of both lower extremities with intermittent claudication    Avulsion of fingernail    Closed traumatic dislocation of joint of shoulder region    Contusion of shoulder region    Shoulder strain    Decreased pulses in feet    Failure to thrive in adult    History of DVT of lower extremity    Hyperglycemia    Hyponatremia    Loose total knee arthroplasty    Falling    Osteoporosis    Rotator cuff tear arthropathy    Shoulder pain    Suspected elder neglect    Unstable knee    UTI (urinary tract infection), bacterial    Class 1 obesity due to excess calories with serious comorbidity and body mass index (BMI) of 34.0 to 34.9 in adult    Chronic venous stasis    Strain of rotator cuff capsule    Left leg cellulitis    Abscess of left thigh    Acute cystitis with hematuria    Hypokalemia    Chronic pain syndrome    Acute pain of left lower extremity    Positive result for methicillin resistant Staphylococcus aureus (MRSA) screening    Cellulitis of left lower leg    Fall    Rhabdomyolysis    Hypomagnesemia    Leukocytosis    AMS (altered mental status)    Traumatic rhabdomyolysis, initial encounter     Moderate malnutrition    RSV (acute bronchiolitis due to respiratory syncytial virus)    Type 2 MI (myocardial infarction)    THERESA (acute kidney injury)    History of ESBL E. coli infection    Hyperkalemia    Allergic conjunctivitis    C. difficile colitis    Hypertensive urgency    Primary osteoarthritis of right shoulder    Primary osteoarthritis of left shoulder     Past Medical History:   Diagnosis Date    THERESA (acute kidney injury) (HCC) due to sepsis 11/02/2022    Anxiety     Arthritis     CAD (coronary artery disease)     Colon polyp     DDD (degenerative disc disease), lumbar     Deep venous thrombosis     Depression     Diverticulosis     Esophageal stricture     Full dentures     GERD (gastroesophageal reflux disease)     History of transfusion     w/o Adverse reaction    Hypercholesteremia     Hypertension     LPRD (laryngopharyngeal reflux disease)     Macular degeneration     Multinodular goiter 02/23/2017    Osteoarthritis     Personal history of COVID-19 2020    Pruritic disorder     Spastic colon     Thyroid nodule     Type 2 MI (myocardial infarction) (HCC) due to THERESA and UTI 11/02/2022     Past Surgical History:   Procedure Laterality Date    BLADDER REPAIR      CATARACT EXTRACTION      CHOLECYSTECTOMY      COLONOSCOPY  04/22/2014    2 polyps, adenomatous, diverticulosis    COLONOSCOPY N/A 6/26/2020    Procedure: COLONOSCOPY WITH ANESTHESIA;  Surgeon: John Coleman MD;  Location:  [facility];  Service: Gastroenterology;  Laterality: N/A;  pre op: constipation  post op: diverticulosis,   PCP: Adam Delgadillo MD    CYSTOCELE REPAIR      ENDOSCOPY  04/22/2014    Schatzki's ring dilated    ENDOSCOPY N/A 6/26/2020    Procedure: ESOPHAGOGASTRODUODENOSCOPY WITH ANESTHESIA;  Surgeon: John Coleman MD;  Location:  [facility];  Service: Gastroenterology;  Laterality: N/A;  pre op: dysphagia  post op:stricture, dilated   PCP:Adam Delgadillo MD    ENDOSCOPY N/A 9/2/2021     Procedure: ESOPHAGOGASTRODUODENOSCOPY WITH ANESTHESIA;  Surgeon: John Coleman MD;  Location:  [facility];  Service: Gastroenterology;  Laterality: N/A;  pre op: dysphagia  post op:gastritis  PCP: VERA Noble MD    FEMUR FRACTURE SURGERY      HYSTERECTOMY      INCISION AND DRAINAGE LEG Left 11/3/2021    Procedure: INCISION AND DRAINAGE LEG ABSCESS;  Surgeon: Cesia Mondragon MD;  Location:  [facility];  Service: General;  Laterality: Left;    REPLACEMENT TOTAL KNEE      TOTAL SHOULDER ARTHROPLASTY W/ DISTAL CLAVICLE EXCISION Left 10/3/2023    Procedure: LEFT REVERSE TOTAL SHOULDER ARTHROPLASTY;  Surgeon: Dc Dominguez MD;  Location:  [facility];  Service: Orthopedics;  Laterality: Left;    ULNAR NERVE TRANSPOSITION       PT Assessment (last 12 hours)       PT Evaluation and Treatment       Row Name 10/09/23 0957          Physical Therapy Time and Intention    Subjective Information complains of;pain;weakness  -LY     Document Type therapy note (daily note)  -LY     Mode of Treatment physical therapy  -LY       Row Name 10/09/23 0957          General Information    Existing Precautions/Restrictions fall;left;shoulder;non-weight bearing  NWB LUE in sling  -LY       Row Name 10/09/23 0957          Pain    Pretreatment Pain Rating 6/10  -LY     Posttreatment Pain Rating 6/10  -LY     Pain Location - Side/Orientation Left  -LY     Pain Location - shoulder  -LY     Pain Intervention(s) Medication (See MAR);Ambulation/increased activity  -LY       Row Name 10/09/23 0957          Bed Mobility    Comment, (Bed Mobility) up in chair  -LY       Row Name 10/09/23 0957          Sit-Stand Transfer    Sit-Stand [place] (Transfers) verbal cues;minimum assist (75% patient effort);moderate assist (50% patient effort)  -LY     Assistive Device (Sit-Stand Transfers) other (see comments)  HHA x1`  -LY     Comment, (Sit-Stand Transfer) performed 3 times with focus on anterior weight shift  -LY       Row Name  10/09/23 0957          Stand-Sit Transfer    Stand-Sit [place] (Transfers) verbal cues;minimum assist (75% patient effort)  -LY       Row Name 10/09/23 0957          Motor Skills    Comments, Therapeutic Exercise BLE AROM x15 reps seated, RLE stronger than LLE  -LY       Row Name             Wound 10/03/23 1858 Left anterior shoulder Incision    Wound - Properties Group Placement Date: 10/03/23  -LM Placement Time: 1858  -LM Present on Original Admission: N  -LM Side: Left  -LM Orientation: anterior  -LM Location: shoulder  -LM Primary Wound Type: Incision  -LM    Retired Wound - Properties Group Placement Date: 10/03/23  -LM Placement Time: 1858  -LM Present on Original Admission: N  -LM Side: Left  -LM Orientation: anterior  -LM Location: shoulder  -LM Primary Wound Type: Incision  -LM    Retired Wound - Properties Group Date first assessed: 10/03/23  -LM Time first assessed: 1858  -LM Present on Original Admission: N  -LM Side: Left  -LM Location: shoulder  -LM Primary Wound Type: Incision  -LM      Row Name 10/09/23 0957          Plan of Care Review    Plan of Care Reviewed With patient  -LY     Progress improving  -LY       Row Name 10/09/23 0957          Positioning and Restraints    Pre-Treatment Position sitting in chair/recliner  -LY     Post Treatment Position chair  -LY     In Chair reclined;encouraged to call for assist;call light within reach;exit alarm on  -LY               User Key  (r) = Recorded By, (t) = Taken By, (c) = Cosigned By      Initials Name Provider Type    LY Magdalena Kim, PTA Physical Therapist Assistant    Jo-Ann Lora, RN Registered Nurse                    Physical Therapy Education       Title: PT OT SLP Therapies (Done)       Topic: Physical Therapy (Done)       Point: Mobility training (Done)       Learning Progress Summary             Patient Acceptance, E, VU,NR by JS at 10/5/2023 1017    Comment: Educated on brace wear, NWB LUE precautions and AROM precautions.  Cueing for safe transfers with bed mobility and bed>chair. Educated on LE ROM for DVT prevention. Reinforced distal LUE exercises.                         Point: Home exercise program (Done)       Learning Progress Summary             Patient Acceptance, E, VU,NR by  at 10/5/2023 1017    Comment: Educated on brace wear, NWB LUE precautions and AROM precautions. Cueing for safe transfers with bed mobility and bed>chair. Educated on LE ROM for DVT prevention. Reinforced distal LUE exercises.                         Point: Body mechanics (Done)       Learning Progress Summary             Patient Acceptance, E, VU,NR by  at 10/5/2023 1017    Comment: Educated on brace wear, NWB LUE precautions and AROM precautions. Cueing for safe transfers with bed mobility and bed>chair. Educated on LE ROM for DVT prevention. Reinforced distal LUE exercises.                         Point: Precautions (Done)       Learning Progress Summary             Patient Acceptance, E, VU,NR by  at 10/5/2023 1017    Comment: Educated on brace wear, NWB LUE precautions and AROM precautions. Cueing for safe transfers with bed mobility and bed>chair. Educated on LE ROM for DVT prevention. Reinforced distal LUE exercises.                                         User Key       Initials Effective Dates Name Provider Type Discipline     07/13/23 -  You Luna, PT Student PT Student PT                  PT Recommendation and Plan  Anticipated Discharge Disposition (PT): skilled nursing facility  Plan of Care Reviewed With: patient  Progress: improving  Outcome Evaluation: PT tx completed. Pt sitting EOB on arrival with nsg present. Able to stand with CGA/min x1. Performed stand pivot to chair>BSC>chair. Required assist for hygiene care. Completed BLE AROM x15 reps seated. Will cont to follow.   Outcome Measures       Row Name 10/09/23 0957 10/08/23 1316 10/07/23 1346       How much help from another person do you currently need...    Turning  from your back to your side while in flat bed without using bedrails? 3  -LY 3  -MF 3  -MF    Moving from lying on back to sitting on the side of a flat bed without bedrails? 2  -LY 2  -MF 3  -MF    Moving to and from a bed to a chair (including a wheelchair)? 3  -LY 3  -MF 3  -MF    Standing up from a chair using your arms (e.g., wheelchair, bedside chair)? 3  -LY 3  -MF 3  -MF    Climbing 3-5 steps with a railing? 1  -LY 1  -MF 1  -MF    To walk in hospital room? 1  -LY 1  -MF 1  -MF    AM-PAC 6 Clicks Score (PT) 13  -LY 13  -MF 14  -MF    Highest level of mobility 4 --> Transferred to chair/commode  -LY 4 --> Transferred to chair/commode  -MF 4 --> Transferred to chair/commode  -MF       Functional Assessment    Outcome Measure Options AM-PAC 6 Clicks Basic Mobility (PT)  -LY AM-PAC 6 Clicks Basic Mobility (PT)  -MF AM-PAC 6 Clicks Basic Mobility (PT)  -MF      Row Name 10/06/23 1300             How much help from another is currently needed...    Putting on and taking off regular lower body clothing? 2  -TS      Bathing (including washing, rinsing, and drying) 2  -TS      Toileting (which includes using toilet bed pan or urinal) 2  -TS      Putting on and taking off regular upper body clothing 3  -TS      Taking care of personal grooming (such as brushing teeth) 3  -TS      Eating meals 4  -TS      AM-PAC 6 Clicks Score (OT) 16  -TS                User Key  (r) = Recorded By, (t) = Taken By, (c) = Cosigned By      Initials Name Provider Type    TS Nereida Hirsch COTA Occupational Therapist Assistant    Aurelia Johnson PTA Physical Therapist Assistant    Magdalena Sharma, ALFREDO Physical Therapist Assistant                     Time Calculation:    PT Charges       Row Name 10/09/23 1039             Time Calculation    Start Time 0957  -LY      Stop Time 1021  -LY      Time Calculation (min) 24 min  -LY         Time Calculation- PT    Total Timed Code Minutes- PT 24 minute(s)  -LY         Timed  Charges    51404 - PT Therapeutic Exercise Minutes 12  -LY      29001 - PT Therapeutic Activity Minutes 12  -LY         Total Minutes    Timed Charges Total Minutes 24  -LY       Total Minutes 24  -LY                User Key  (r) = Recorded By, (t) = Taken By, (c) = Cosigned By      Initials Name Provider Type    Magdalena Sharma PTA Physical Therapist Assistant                  Therapy Charges for Today       Code Description Service Date Service Provider Modifiers Qty    93103938929 HC PT THER PROC EA 15 MIN 10/9/2023 Magdalena Kim PTA GP 1    95906350437 HC PT THERAPEUTIC ACT EA 15 MIN 10/9/2023 Magdalena Kim PTA GP 1            PT G-Codes  Outcome Measure Options: AM-PAC 6 Clicks Basic Mobility (PT)  AM-PAC 6 Clicks Score (PT): 13  AM-PAC 6 Clicks Score (OT): 12    Magdalena Kim PTA  10/9/2023

## 2023-10-09 NOTE — PLAN OF CARE
Goal Outcome Evaluation:            Pt alert and oriented x4. VSS. c/o pain in LUE controlled by PRN pain medication. PPP. SCDS on. . Tolerating diet. Skin intact. Voiding via external catheter. Last BM 10/8/2023. Contact isolation for hx of CRE. D/c in progress to [facility]. Call light within reach. Safety maintained. Edu provided.

## 2023-10-09 NOTE — CASE MANAGEMENT/SOCIAL WORK
Post-Acute Authorization Submission      Post Acute Pre-Cert Documentation  Request Submitted by Facility - Type:: Hospital  Post-Acute Authorization Type Submitted:: SNF  Date Post Acute Pre-Cert Inititated per Facility: 10/06/23  Verification from Payer: Yes  Date Post Acute Pre-Cert Completed: 10/06/23  Accepting Facility: [facility]  Hospital Discharge Date Requested: 10/06/23  All Clinicals Submitted?: Yes  Had Accepting Facility at Time of Submission: Yes  Response Received from Insurance?: Approval  Response Communicated to:: , Accepting Facility Liaison  Authorization Number:: [id number]  Post Acute Pre-Cert Initiated Comment: INS has approved for patient to go to [facility]              Savanah Serna RN

## 2023-10-09 NOTE — PROGRESS NOTES
[facility] Medicine Services  INPATIENT PROGRESS NOTE    Patient Name: Carmen Obrien  Date of Admission: 10/3/2023  Today's Date: 10/09/23  Length of Stay: 0  Primary Care Physician: VERA Noble MD    Subjective   Chief Complaint: Left shoulder pain  HPI   Ms. Obrien was admitted on 10/3 for elective left reverse total shoulder arthroplasty by Dr. Dominguez.  Hospitalist service consulted for medical management primary hypertension.      Today  Lying in bed.  No oxygen in use.  Denies shortness of breath.  Reports she had a bowel movement yesterday.  No complaints of nausea or vomiting. WIC in place with clear urine return.  Physical therapy continues to work with patient and she was able to stand at the bedside with minimal assist and take a few steps from bed to chair.  Awaiting insurance approval at [facility].  Blood pressure stable on home pressure medications.  Blood pressure 139/45, 116/50.    Review of Systems   Constitutional:  Negative for chills and fever.   HENT:  Negative for congestion and trouble swallowing.    Eyes:  Negative for photophobia and visual disturbance.   Respiratory:  Negative for cough, shortness of breath and wheezing.    Cardiovascular:  Negative for chest pain, palpitations and leg swelling.   Genitourinary:  Negative for frequency and urgency.   Musculoskeletal:  Positive for gait problem.   Skin:  Positive for wound (Left shoulder surgical site).   Allergic/Immunologic: Negative for immunocompromised state.   Neurological:  Positive for weakness. Negative for light-headedness.   Hematological:  Negative for adenopathy. Does not bruise/bleed easily.   Psychiatric/Behavioral:  Negative for agitation, behavioral problems and confusion.       \  All pertinent negatives and positives are as above. All other systems have been reviewed and are negative unless otherwise stated.     Objective    Temp:  [98.2 øF (36.8 øC)-98.3 øF  (36.8 øC)] 98.3 øF (36.8 øC)  Heart Rate:  [52-53] 52  Resp:  [16] 16  BP: (116-139)/(45-50) 139/45  Physical Exam  Vitals and nursing note reviewed.   Constitutional:       Appearance: She is obese.      Comments: Lying in bed.  No oxygen use.  No visitors in room.   HENT:      Head: Normocephalic and atraumatic.      Nose: No congestion.      Mouth/Throat:      Pharynx: Oropharynx is clear. No oropharyngeal exudate or posterior oropharyngeal erythema.   Eyes:      Extraocular Movements: Extraocular movements intact.   Cardiovascular:      Rate and Rhythm: Normal rate and regular rhythm.      Heart sounds: No murmur heard.  Pulmonary:      Breath sounds: No wheezing, rhonchi or rales.      Comments: No oxygen use.  Abdominal:      Palpations: Abdomen is soft.      Tenderness: There is no abdominal tenderness.   Genitourinary:     Comments:  WIC in place.  Musculoskeletal:         General: Tenderness (Left shoulder) present.      Cervical back: Normal range of motion and neck supple.      Comments: Left shoulder sling/immobilizer in place   Skin:     General: Skin is warm and dry.   Neurological:      General: No focal deficit present.      Mental Status: She is alert and oriented to person, place, and time.   Psychiatric:         Mood and Affect: Mood normal.         Behavior: Behavior normal.         Thought Content: Thought content normal.         Judgment: Judgment normal.       Results Review:  I have reviewed the labs, radiology results, and diagnostic studies.    Laboratory Data:   Results from last 7 days   Lab Units 10/08/23  0545 10/07/23  0419 10/06/23  0418   WBC 10*3/mm3 7.53 9.01 10.35   HEMOGLOBIN g/dL 7.9* 8.2* 8.2*   HEMATOCRIT % 26.1* 26.5* 25.4*   PLATELETS 10*3/mm3 300 351 256     Results from last 7 days   Lab Units 10/07/23  0952 10/06/23  0418 10/05/23  0528 10/04/23  0540 10/03/23  1349   SODIUM mmol/L 131* 131* 133*   < > 131*   POTASSIUM mmol/L 4.7 5.0 4.7   < > 4.4   CHLORIDE mmol/L 101  102 98   < > 98   CO2 mmol/L 23.0 19.0* 23.0   < > 22.0   BUN mg/dL 17 24* 24*   < > 14   CREATININE mg/dL 0.50* 0.57 0.74   < > 0.36*   CALCIUM mg/dL 8.5* 8.6 9.3   < > 9.7   BILIRUBIN mg/dL  --   --   --   --  0.6   ALK PHOS U/L  --   --   --   --  93   ALT (SGPT) U/L  --   --   --   --  52*   AST (SGOT) U/L  --   --   --   --  22   GLUCOSE mg/dL 118* 102* 126*   < > 97    < > = values in this interval not displayed.     Imaging Results (All)       Procedure Component Value Units Date/Time    US Venous Doppler Lower Extremity Left (duplex) [[id number]] Collected: 10/04/23 1113     Updated: 10/04/23 1118    Narrative:      EXAMINATION: US VENOUS DOPPLER LOWER EXTREMITY LEFT (DUPLEX)- 10/4/2023  11:13 AM CDT     HISTORY: unilateral edema and pain; N39.0-Urinary tract infection, site  not specified.     REPORT: Sonographic images of the left lower extremity deep venous  system were obtained, using color, gray scale and spectral Doppler  technique, as well as compression, augmentation and Valsalva maneuvers.  There are no comparison studies.     The left common femoral vein is patent and normally compressible. The  left saphenous femoral junction is patent and normally compressible. The  left superficial femoral vein is patent. The left popliteal vein,  posterior tibial, peroneal and anterior tibial veins are patent. No DVT  is seen.      The left great saphenous vein junction at the femoral vein appears  normally patent     Incidentally noted is a subcutaneous shadowing mass or calcification  within the medial left mid calf, measuring at least 24 mm. This is  nonspecific, could represent an old calcified hematoma or other  calcified mass. Correlate clinically for any history of trauma involving  the medial left calf.       Impression:      No evidence of left lower extremity DVT. There is a  shadowing mass within the medial left mid calf measuring at least 24 mm,  possibly related to old trauma. Focal correlation is  recommended.        This report was signed and finalized on 10/4/2023 11:15 AM CDT by Dr. Jerry Aguayo MD.       XR Shoulder 2+ View Left [[id number]] Collected: 10/03/23 2041     Updated: 10/03/23 2045    Narrative:      EXAMINATION: XR SHOULDER 2+ VW LEFT-  10/3/2023 8:41 PM CDT     HISTORY: Postop     FINDINGS: 2 view exam of the left shoulder reveals the patient is  undergone a reverse left total shoulder arthroplasty. There is good  anatomic alignment with no evidence of complications.     This report was signed and finalized on 10/3/2023 8:42 PM CDT by Dr. José Elmore MD.       XR Chest 1 View [[id number]] Collected: 10/03/23 1338     Updated: 10/03/23 1342    Narrative:      EXAM/TECHNIQUE: XR CHEST 1 VW-     INDICATION: pre op     COMPARISON: 8/8/2023     FINDINGS:     Cardiac silhouette is normal size. No pleural effusion, pneumothorax, or  focal consolidation. Advanced degenerative change in both glenohumeral  joints. Old left-sided rib fractures. Degenerative change in the  thoracic spine. No acute osseous finding.       Impression:      No acute findings.     This report was signed and finalized on 10/3/2023 1:39 PM CDT by Dr. Arsh Calderon MD.              Home medications:  allopurinol, 100 mg, Oral, Daily  amLODIPine, 10 mg, Oral, Q24H  atorvastatin, 10 mg, Oral, Nightly  carvedilol, 12.5 mg, Oral, BID With Meals  cycloSPORINE, 1 drop, Both Eyes, Q12H  enoxaparin, 40 mg, Subcutaneous, Daily  famotidine, 20 mg, Oral, BID  ferrous sulfate, 325 mg, Oral, Daily With Breakfast  furosemide, 20 mg, Oral, Daily  lisinopril, 20 mg, Oral, Q24H  potassium chloride, 10 mEq, Oral, Daily  sertraline, 100 mg, Oral, Daily  sodium chloride, 10 mL, Intravenous, Q12H         I have reviewed the patient's current medications.     Assessment/Plan   Assessment  Active Hospital Problems    Diagnosis     **Primary osteoarthritis of left shoulder     Primary osteoarthritis of right shoulder     Chronic venous  stasis     Essential hypertension     Gastroesophageal reflux disease     History of DVT of lower extremity      Treatment Plan  1.  Primary osteoarthritis left shoulder.  Left reverse total shoulder arthroplasty 10/3 Dr. Dominguez.  Physical therapy consulted and patient able to stand and take a few steps from the bed to the chair.  Left shoulder with sling immobilizer.  Social service made referral to [facility]  Lovenox for deep vein thrombosis prophylaxis.    2.  Primary hypertension.  Blood pressure 139/45, 116/50.  Coreg, lisinopril, amlodipine continued.    3.  History of DVT lower extremities.  Patient reports chronic lower extremity edema.  Venous Doppler left extremity no evidence of DVT.  There is shadowing mass within the medial left mid calf measuring at least 24 mm possibly related to old trauma.  Continue Lovenox for deep vein thrombosis prophylaxis.    4.  Normocytic anemia.  Hemoglobin 9.8 on admission. Hemoglobin 7.9 on 10/8, stable.  No increased bruising or bleeding noted at operative site.    5.  GERD.  Protonix continued twice daily.     Medical Decision Making  Number and Complexity of problems: 5  Primary osteoarthritis left shoulder: Acute, high complexity requiring surgical intervention, stable  Primary hypertension: Chronic, moderate complexity, stable  History of DVT: Chronic, moderate complexity, stable  Normocytic anemia: Chronic, moderate complexity, stable  GERD, chronic, low complexity, stable    Differential Diagnosis: None    Conditions and Status        Condition is improving.     [facility] Data  External documents reviewed: Dr. Noble, PCP office visit 9/18/2023  Cardiac tracing (EKG, telemetry) interpretation: None  Radiology interpretation: None  Labs reviewed:   BMP 10/7/2023.   CBC  10/8/2023.     Any tests that were considered but not ordered: None     Decision rules/scores evaluated (example [id number], [place], etc): None     Discussed with: Dr. Schneider and  patient.     Care Planning  Shared decision making: Dr. Schneider and patient.  Patient agrees to physical therapy and social service consult.  Continue home medications.  [facility]  Code status and discussions: Full code  Since POA is [place]    Disposition  Social Determinants of Health that impact treatment or disposition: Assisted living prior to admission.   I expect the patient to be discharged to [facility] per Dr. Dominguez.    Electronically signed by ABDON Goyal, 10/09/23, 06:55 CDT.

## 2023-10-09 NOTE — PLAN OF CARE
"Goal Outcome Evaluation:  Plan of Care Reviewed With: patient        Progress: no change  Outcome Evaluation: OT tx completed. Pt in fowlers upon therapist arrival; A&Ox4, however, conversationally confused at times; States that L shoulder hurts but cannot provide pain rating on this date; States \"I just don't know whats wrong with me today\" multiple times throughout session; nursing notified. Pt performed supine>sit utilizing bedrail with HOB elevated with Min A and verbal cues. Pt dependent for donning B socks while seated EOB. Pt performed sit<>stand pivot transfer from bed>recliner requiring Mod A and frequent verbal/visual/tactile cues for body mechanics and sequencing. Pt required set up assistance opening packages and utensils for breakfast. Pt continues to benefit from skilled OT intervention in order to address remaining deficits in fxl mobility, fxl activity tolerance, balance, strength, and use of adaptive techniques/equipment during performance of BADLs. Recommend SNF at discharge.      Anticipated Discharge Disposition (OT): skilled nursing facility  "
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
## 2023-10-09 NOTE — DISCHARGE SUMMARY
NAME: Carmen Obrien  : 1940  MRN: [id number]      Admission Date: 10/3/2023    Discharge Date: 10/9/2023    Final Diagnoses: Primary osteoarthritis of left shoulder [M19.012]    Procedures: Left reverse total shoulder    Consultations: Hospitalist    Reason for Admission: 82 YO female admitted postop after the above surgery for pain control and rehab placement.    Hospital Course:  The patient was admitted with the above named diagnosis, surgery was performed and tolerated well.  At the time of discharge, the patient was afebrile, vitals stable, pain was controlled with oral medication,  she was tolerating a by mouth diet, and voiding appropriately. Physical therapy and occupational therapy were consulted. Given these findings she was deemed suitable to be discharged.    Disposition: Skilled nursing facility    Activity: NWBLUE    Wound Instructions: see postop instructions    Diet: regular    Resume home meds:   Prior to Admission medications    Medication Sig Start Date End Date Taking? Authorizing Provider   acetaminophen (TYLENOL) 325 MG tablet Take 2 tablets by mouth Every 6 (Six) Hours As Needed for Mild Pain.   Yes Ying Salazar MD   allopurinol (ZYLOPRIM) 100 MG tablet Take 1 tablet by mouth Daily.   Yes Ying Salazar MD   aluminum-magnesium hydroxide-simethicone (MAALOX/MYLANTA) 200-200-20 MG/5ML suspension Take 15 mL by mouth 2 (Two) Times a Day As Needed for Indigestion or Heartburn.   Yes Ying Salazar MD   amLODIPine (NORVASC) 10 MG tablet Take 1 tablet by mouth Daily.   Yes Ying Salazar MD   ascorbic acid (VITAMIN C) 500 MG tablet Take 1 tablet by mouth Daily.   Yes Ying Salazar MD   atorvastatin (LIPITOR) 10 MG tablet Take 1 tablet by mouth Daily.   Yes Ying Salazar MD   Calcium Carbonate-Vit D-Min (Caltrate 600+D Plus Minerals) 600-800 MG-UNIT tablet Take 1 tablet by mouth 2 (Two) Times a Day.   Yes Ying Salazar MD   carvedilol  (COREG) 12.5 MG tablet Take 1 tablet by mouth 2 (Two) Times a Day With Meals.   Yes Ying Salazar MD   cycloSPORINE (RESTASIS) 0.05 % ophthalmic emulsion Administer 1 drop to both eyes Every 12 (Twelve) Hours.   Yes Ying Salazar MD   diphenhydrAMINE HCl, Sleep, (ZzzQuil) 50 MG/30ML liquid Take 6 mL by mouth Every Night.   Yes Ying Salazar MD   docusate sodium (COLACE) 100 MG capsule Take 1 capsule by mouth 2 (Two) Times a Day.   Yes Ying Salazar MD   esomeprazole (nexIUM) 20 MG capsule Take 1 capsule by mouth 2 (Two) Times a Day. 5/25/22  Yes VERA Noble MD   famotidine (PEPCID) 20 MG tablet Take 1 tablet by mouth 2 (Two) Times a Day.   Yes ProviderYing MD   FEROSUL 325 (65 Fe) MG tablet Take 1 tablet by mouth Daily With Breakfast. 11/6/19  Yes Ying Salazar MD   furosemide (LASIX) 20 MG tablet Take 1 tablet by mouth Daily. 8/4/23  Yes Vianney Figueredo APRN   lisinopril (PRINIVIL,ZESTRIL) 20 MG tablet Take 1 tablet by mouth Daily.   Yes Ying Salazar MD   loperamide (IMODIUM) 2 MG capsule Take 1 capsule by mouth Every 8 (Eight) Hours As Needed for Diarrhea.   Yes ProviderYing MD   meloxicam (MOBIC) 7.5 MG tablet Take 1 tablet by mouth Daily.   Yes ProviderYing MD   naloxone (NARCAN) 4 MG/0.1ML nasal spray Call 911. Don't prime. [place] in 1 nostril for overdose. Repeat in 2-3 minutes in other nostril if no or minimal breathing/responsiveness.  Patient taking differently: 1 spray As Needed. Call 911. Don't prime. [place] in 1 nostril for overdose. Repeat in 2-3 minutes in other nostril if no or minimal breathing/responsiveness. 7/20/23  Yes Rodriguez Schneider MD   oxyCODONE-acetaminophen (PERCOCET) 5-325 MG per tablet Take 1 tablet by mouth Every 8 (Eight) Hours As Needed for Moderate Pain. 10/9/23  Yes Dc Dominguez MD   potassium chloride (MICRO-K) 10 MEQ CR capsule Take 1 capsule by mouth Daily.   Yes Provider, MD Ying    sertraline (ZOLOFT) 100 MG tablet Take 1 tablet by mouth Daily. 8/4/23  Yes Vianney Figueredo APRN   oxyCODONE-acetaminophen (PERCOCET) 5-325 MG per tablet Take 1 tablet by mouth Every 8 (Eight) Hours As Needed for Moderate Pain.  10/9/23 Yes Ying Salazar MD   cloNIDine (CATAPRES) 0.1 MG tablet Take 1 tablet by mouth 2 (Two) Times a Day As Needed for High Blood Pressure (systolic greater than 180). 8/4/23   Vianney Figueredo APRN   hydrOXYzine (ATARAX) 25 MG tablet Take 1 tablet by mouth 3 (Three) Times a Day As Needed for Anxiety.    Ying Salazar MD   lidocaine (LIDODERM) 5 % Place 1 patch on the skin as directed by provider Daily. Apply to bilateral upper arms.   Remove & Discard patch within 12 hours or as directed by MD    Ying Salazar MD   polyethylene glycol (MIRALAX) 17 g packet Take 17 g by mouth Daily As Needed (constipation).    Ying Salazar MD   simethicone (MYLICON) 125 MG chewable tablet Chew 1 tablet Every 6 (Six) Hours As Needed for Flatulence.    Ying Salaazr MD   traZODone (DESYREL) 50 MG tablet Take 0.5 tablets by mouth Every Night.    Ying Salazar MD   vitamin B-12 (CYANOCOBALAMIN) 1000 MCG tablet Take 1 tablet by mouth Daily.    Ying Salazar MD   vitamin D (ERGOCALCIFEROL) 1.25 MG (69890 UT) capsule capsule Take 1 capsule by mouth Every 14 (Fourteen) Days. Every other Friday    Ying Salazar MD       Prescriptions for:  Percocet 5/325, #28    Return to Clinic: 1 week    Xrays: yes    Electronically signed by Dc Dominguez MD on 10/9/2023 at 11:08 CDT

## 2023-10-10 NOTE — THERAPY DISCHARGE NOTE
Acute Care - Occupational Therapy Discharge Summary  [facility]     Patient Name: Carmen Obrien  : 1940  MRN: [id number]    Today's Date: 10/10/2023                 Admit Date: 10/3/2023        OT Recommendation and Plan    Visit Dx:    ICD-10-CM ICD-9-CM   1. Impaired mobility [Z74.09 (ICD-10-CM)]  Z74.09 799.89   2. Urinary tract infection without hematuria, site unspecified  N39.0 599.0   3. Decreased activities of daily living (ADL) [Z78.9 (ICD-10-CM)]  Z78.9 V49.89   4. Primary osteoarthritis of left shoulder  M19.012 715.11                OT Rehab Goals       Row Name 10/10/23 0700             Dressing Goal 1 (OT)    Activity/Device (Dressing Goal 1, OT) upper body dressing  -JJ      [place]/Cues Needed (Dressing Goal 1, OT) minimum assist (75% or more patient effort);verbal cues required  -JJ      Time Frame (Dressing Goal 1, OT) long term goal (LTG)  -JJ      Strategies/Barriers (Dressing Goal 1, OT) to include arm sling  -JJ      Progress/Outcome (Dressing Goal 1, OT) goal not met;discharged from facility  -JJ         Toileting Goal 1 (OT)    Activity/Device (Toileting Goal 1, OT) toileting skills, all  -JJ      [place] Level/Cues Needed (Toileting Goal 1, OT) minimum assist (75% or more patient effort)  -JJ      Time Frame (Toileting Goal 1, OT) long term goal (LTG)  -JJ      Progress/Outcome (Toileting Goal 1, OT) goal not met;discharged from facility  -JJ         ROM Goal 1 (OT)    ROM Goal 1 (OT) Pt will be I with LUE distal AROM HEP.  -JJ      Time Frame (ROM Goal 1, OT) long term goal (LTG)  -JJ      Progress/Outcome (ROM Goal 1, OT) goal not met;discharged from facility  -JJ                User Key  (r) = Recorded By, (t) = Taken By, (c) = Cosigned By      Initials Name Provider Type Discipline    Monica Sprague, OTR/L, CSRS Occupational Therapist OT                     Outcome Measures       Row Name 10/09/23 0957 10/08/23 1316 10/07/23 1346       How much help from another  person do you currently need...    Turning from your back to your side while in flat bed without using bedrails? 3  -LY 3  -MF 3  -MF    Moving from lying on back to sitting on the side of a flat bed without bedrails? 2  -LY 2  -MF 3  -MF    Moving to and from a bed to a chair (including a wheelchair)? 3  -LY 3  -MF 3  -MF    Standing up from a chair using your arms (e.g., wheelchair, bedside chair)? 3  -LY 3  -MF 3  -MF    Climbing 3-5 steps with a railing? 1  -LY 1  -MF 1  -MF    To walk in hospital room? 1  -LY 1  -MF 1  -MF    AM-PAC 6 Clicks Score (PT) 13  -LY 13  -MF 14  -MF    Highest level of mobility 4 --> Transferred to chair/commode  -LY 4 --> Transferred to chair/commode  -MF 4 --> Transferred to chair/commode  -MF       Functional Assessment    Outcome Measure Options AM-PAC 6 Clicks Basic Mobility (PT)  -LY AM-PAC 6 Clicks Basic Mobility (PT)  -MF AM-PAC 6 Clicks Basic Mobility (PT)  -MF              User Key  (r) = Recorded By, (t) = Taken By, (c) = Cosigned By      Initials Name Provider Type    Aurelia Johnson PTA Physical Therapist Assistant    Magdalena Sharma PTA Physical Therapist Assistant                    Timed Therapy Charges  Total Units: 2      Suggested Charges  Total Units: 2      Procedure Name Documented Minutes Units Code    HC OT SELF CARE/MGMT/TRAIN EA 15 MIN 30 2   28215 (CPTr)                 Documented Minutes  Total Minutes: 30      Therapy Provided Minutes    50685 - OT Self Care/Mgmt Minutes 30                        OT Discharge Summary  Anticipated Discharge Disposition (OT): skilled nursing facility  Reason for Discharge: Discharge from facility  Outcomes Achieved: Refer to plan of care for updates on goals achieved  Discharge Destination: SNF      GERRI [place]/L, CSRS  10/10/2023

## 2023-10-11 NOTE — THERAPY DISCHARGE NOTE
Acute Care - Physical Therapy Discharge Summary  [facility]       Patient Name: Carmen Obrien  : 1940  MRN: [id number]    Today's Date: 10/11/2023                 Admit Date: 10/3/2023      PT Recommendation and Plan    Visit Dx:    ICD-10-CM ICD-9-CM   1. Impaired mobility [Z74.09 (ICD-10-CM)]  Z74.09 799.89   2. Urinary tract infection without hematuria, site unspecified  N39.0 599.0   3. Decreased activities of daily living (ADL) [Z78.9 (ICD-10-CM)]  Z78.9 V49.89   4. Primary osteoarthritis of left shoulder  M19.012 715.11        Outcome Measures       Row Name 10/09/23 0957 10/08/23 1316          How much help from another person do you currently need...    Turning from your back to your side while in flat bed without using bedrails? 3  -LY 3  -MF     Moving from lying on back to sitting on the side of a flat bed without bedrails? 2  -LY 2  -MF     Moving to and from a bed to a chair (including a wheelchair)? 3  -LY 3  -MF     Standing up from a chair using your arms (e.g., wheelchair, bedside chair)? 3  -LY 3  -MF     Climbing 3-5 steps with a railing? 1  -LY 1  -MF     To walk in hospital room? 1  -LY 1  -MF     AM-PAC 6 Clicks Score (PT) 13  -LY 13  -MF     Highest level of mobility 4 --> Transferred to chair/commode  -LY 4 --> Transferred to chair/commode  -MF        Functional Assessment    Outcome Measure Options AM-PAC 6 Clicks Basic Mobility (PT)  -LY AM-PAC 6 Clicks Basic Mobility (PT)  -MF               User Key  (r) = Recorded By, (t) = Taken By, (c) = Cosigned By      Initials Name Provider Type    Aurelia Johnson, PTA Physical Therapist Assistant    Magdalena Sharma, ALFREDO Physical Therapist Assistant                         PT Rehab Goals       Row Name 10/11/23 1100             Bed Mobility Goal 1 (PT)    Activity/Assistive Device (Bed Mobility Goal 1, PT) sit to supine;supine to sit  -AB      [place] Level/Cues Needed (Bed Mobility Goal 1, PT) contact guard required  -AB       Time Frame (Bed Mobility Goal 1, PT) long term goal (LTG);by discharge  -AB      Progress/Outcomes (Bed Mobility Goal 1, PT) goal not met  -AB         Transfer Goal 1 (PT)    Activity/Assistive Device (Transfer Goal 1, PT) bed-to-chair/chair-to-bed  -AB      [place] Level/Cues Needed (Transfer Goal 1, PT) standby assist  -AB      Time Frame (Transfer Goal 1, PT) long term goal (LTG);by discharge  -AB      Progress/Outcome (Transfer Goal 1, PT) goal not met  -AB         Patient Education Goal (PT)    Activity (Patient Education Goal, PT) Patient will demonstrate understanding of WB, ROM, and brace wear precautions of LUE  -AB      [place]/Cues/Accuracy (Memory Goal 2, PT) demonstrates adequately;independent;verbalizes understanding  -AB      Time Frame (Patient Education Goal, PT) long term goal (LTG);by discharge  -AB      Progress/Outcome (Patient Education Goal, PT) goal not met  -AB                User Key  (r) = Recorded By, (t) = Taken By, (c) = Cosigned By      Initials Name Provider Type Discipline    Di Osman PTA Physical Therapist Assistant PT                        PT Discharge Summary  Anticipated Discharge Disposition (PT): skilled nursing facility  Reason for Discharge: Discharge from facility  Outcomes Achieved: Refer to plan of care for updates on goals achieved  Discharge Destination: SNF      Di Echols PTA   10/11/2023

## 2023-10-14 NOTE — Clinical Note
Level of Care: Critical Care [6]   Diagnosis: Hypertensive urgency [391829]   Admitting Physician: STEVEN SIMENTAL [[id number]]   Certification: I Certify That Inpatient Hospital Services Are Medically Necessary For Greater Than 2 Midnights  
Wheelchair

## 2023-10-23 DIAGNOSIS — M19.012 PRIMARY OSTEOARTHRITIS, LEFT SHOULDER: ICD-10-CM

## 2023-10-24 RX ORDER — OXYCODONE HYDROCHLORIDE AND ACETAMINOPHEN 5; 325 MG/1; MG/1
1 TABLET ORAL EVERY 8 HOURS PRN
Qty: 90 TABLET | Refills: 0 | Status: SHIPPED | OUTPATIENT
Start: 2023-10-24 | End: 2023-11-23

## 2023-12-11 DIAGNOSIS — M19.012 PRIMARY OSTEOARTHRITIS, LEFT SHOULDER: Primary | ICD-10-CM

## 2023-12-11 RX ORDER — OXYCODONE HYDROCHLORIDE AND ACETAMINOPHEN 5; 325 MG/1; MG/1
1 TABLET ORAL EVERY 8 HOURS PRN
Qty: 90 TABLET | Refills: 0 | Status: SHIPPED | OUTPATIENT
Start: 2023-12-11 | End: 2024-01-10

## 2024-01-03 ENCOUNTER — TELEPHONE (OUTPATIENT)
Dept: INTERNAL MEDICINE | Facility: CLINIC | Age: 84
End: 2024-01-03

## 2024-01-03 NOTE — TELEPHONE ENCOUNTER
Caller: JADA    Relationship: [facility]    Best call back number: [phone]     What is the best time to reach you: ANYTIME    Who are you requesting to speak with (clinical staff, provider,  specific staff member): CLINICAL    What was the call regarding: [facility] WILL SEE HER TWICE A WEEK FOR TWO WEEKS FOR OCCUPATIONAL THERAPY. THIS WILL START THE WEEK OF 01.07.24. PHYSICAL THERAPY WILL SEE HER TWICE A WEEK FOR THREE WEEKS AND THAT IS BEGINNING TODAY, 01.03.24. PATIENT DECLINED THE SPEECH EVALUATION AND [facility] DOES NOT BELIEVE SHE NEEDS THAT. THEY STATED THE NURSING HOME USUALLY ORDERS IT WHEN THEY ARE DISCHARGED JUST IN CASE. NO CALL UNLESS QUESTIONS.     Is it okay if the provider responds through MyChart: NO

## 2024-01-04 ENCOUNTER — OUTSIDE FACILITY SERVICE (OUTPATIENT)
Dept: INTERNAL MEDICINE | Facility: CLINIC | Age: 84
End: 2024-01-04
Payer: MEDICARE

## 2024-01-08 ENCOUNTER — LAB (OUTPATIENT)
Dept: LAB | Facility: HOSPITAL | Age: 84
End: 2024-01-08
Payer: MEDICARE

## 2024-01-08 ENCOUNTER — OFFICE VISIT (OUTPATIENT)
Dept: INTERNAL MEDICINE | Facility: CLINIC | Age: 84
End: 2024-01-08
Payer: MEDICARE

## 2024-01-08 ENCOUNTER — TRANSCRIBE ORDERS (OUTPATIENT)
Dept: ADMINISTRATIVE | Facility: HOSPITAL | Age: 84
End: 2024-01-08
Payer: MEDICARE

## 2024-01-08 VITALS
TEMPERATURE: 97.4 F | DIASTOLIC BLOOD PRESSURE: 70 MMHG | OXYGEN SATURATION: 98 % | HEART RATE: 72 BPM | BODY MASS INDEX: 32.06 KG/M2 | SYSTOLIC BLOOD PRESSURE: 130 MMHG | HEIGHT: 60 IN | WEIGHT: 163.3 LBS

## 2024-01-08 DIAGNOSIS — Z98.890 HISTORY OF REVERSE TOTAL REPLACEMENT OF LEFT SHOULDER JOINT: ICD-10-CM

## 2024-01-08 DIAGNOSIS — M15.0 PRIMARY GENERALIZED (OSTEO)ARTHRITIS: ICD-10-CM

## 2024-01-08 DIAGNOSIS — G89.4 CHRONIC PAIN SYNDROME: ICD-10-CM

## 2024-01-08 DIAGNOSIS — Z09 HOSPITAL DISCHARGE FOLLOW-UP: Primary | ICD-10-CM

## 2024-01-08 DIAGNOSIS — M15.0 PRIMARY GENERALIZED (OSTEO)ARTHRITIS: Primary | ICD-10-CM

## 2024-01-08 DIAGNOSIS — Z79.899 POLYPHARMACY: ICD-10-CM

## 2024-01-08 PROBLEM — J21.0 RSV (ACUTE BRONCHIOLITIS DUE TO RESPIRATORY SYNCYTIAL VIRUS): Status: RESOLVED | Noted: 2022-11-02 | Resolved: 2024-01-08

## 2024-01-08 PROBLEM — R41.82 AMS (ALTERED MENTAL STATUS): Status: RESOLVED | Noted: 2022-10-11 | Resolved: 2024-01-08

## 2024-01-08 LAB
ALBUMIN SERPL-MCNC: 3.8 G/DL (ref 3.5–5.2)
ALBUMIN/GLOB SERPL: 1.2 G/DL
ALP SERPL-CCNC: 70 U/L (ref 39–117)
ALT SERPL W P-5'-P-CCNC: 10 U/L (ref 1–33)
ANION GAP SERPL CALCULATED.3IONS-SCNC: 12 MMOL/L (ref 5–15)
AST SERPL-CCNC: 15 U/L (ref 1–32)
BASOPHILS # BLD AUTO: 0.05 10*3/MM3 (ref 0–0.2)
BASOPHILS NFR BLD AUTO: 0.7 % (ref 0–1.5)
BILIRUB SERPL-MCNC: 0.4 MG/DL (ref 0–1.2)
BUN SERPL-MCNC: 10 MG/DL (ref 8–23)
BUN/CREAT SERPL: 26.3 (ref 7–25)
CALCIUM SPEC-SCNC: 9.5 MG/DL (ref 8.6–10.5)
CHLORIDE SERPL-SCNC: 97 MMOL/L (ref 98–107)
CO2 SERPL-SCNC: 24 MMOL/L (ref 22–29)
CREAT SERPL-MCNC: 0.38 MG/DL (ref 0.57–1)
DEPRECATED RDW RBC AUTO: 45.8 FL (ref 37–54)
EGFRCR SERPLBLD CKD-EPI 2021: 100.2 ML/MIN/1.73
EOSINOPHIL # BLD AUTO: 0.16 10*3/MM3 (ref 0–0.4)
EOSINOPHIL NFR BLD AUTO: 2.2 % (ref 0.3–6.2)
ERYTHROCYTE [DISTWIDTH] IN BLOOD BY AUTOMATED COUNT: 13.7 % (ref 12.3–15.4)
GLOBULIN UR ELPH-MCNC: 3.1 GM/DL
GLUCOSE SERPL-MCNC: 103 MG/DL (ref 65–99)
HCT VFR BLD AUTO: 36.3 % (ref 34–46.6)
HGB BLD-MCNC: 11.8 G/DL (ref 12–15.9)
IMM GRANULOCYTES # BLD AUTO: 0.04 10*3/MM3 (ref 0–0.05)
IMM GRANULOCYTES NFR BLD AUTO: 0.5 % (ref 0–0.5)
LYMPHOCYTES # BLD AUTO: 1.26 10*3/MM3 (ref 0.7–3.1)
LYMPHOCYTES NFR BLD AUTO: 17.2 % (ref 19.6–45.3)
MCH RBC QN AUTO: 29.4 PG (ref 26.6–33)
MCHC RBC AUTO-ENTMCNC: 32.5 G/DL (ref 31.5–35.7)
MCV RBC AUTO: 90.5 FL (ref 79–97)
MONOCYTES # BLD AUTO: 0.55 10*3/MM3 (ref 0.1–0.9)
MONOCYTES NFR BLD AUTO: 7.5 % (ref 5–12)
NEUTROPHILS NFR BLD AUTO: 5.27 10*3/MM3 (ref 1.7–7)
NEUTROPHILS NFR BLD AUTO: 71.9 % (ref 42.7–76)
NRBC BLD AUTO-RTO: 0 /100 WBC (ref 0–0.2)
PLATELET # BLD AUTO: 447 10*3/MM3 (ref 140–450)
PMV BLD AUTO: 8.5 FL (ref 6–12)
POTASSIUM SERPL-SCNC: 4.1 MMOL/L (ref 3.5–5.2)
PROT SERPL-MCNC: 6.9 G/DL (ref 6–8.5)
RBC # BLD AUTO: 4.01 10*6/MM3 (ref 3.77–5.28)
SODIUM SERPL-SCNC: 133 MMOL/L (ref 136–145)
WBC NRBC COR # BLD AUTO: 7.33 10*3/MM3 (ref 3.4–10.8)

## 2024-01-08 PROCEDURE — 36415 COLL VENOUS BLD VENIPUNCTURE: CPT

## 2024-01-08 PROCEDURE — 1160F RVW MEDS BY RX/DR IN RCRD: CPT | Performed by: INTERNAL MEDICINE

## 2024-01-08 PROCEDURE — 85025 COMPLETE CBC W/AUTO DIFF WBC: CPT

## 2024-01-08 PROCEDURE — 1159F MED LIST DOCD IN RCRD: CPT | Performed by: INTERNAL MEDICINE

## 2024-01-08 PROCEDURE — 99214 OFFICE O/P EST MOD 30 MIN: CPT | Performed by: INTERNAL MEDICINE

## 2024-01-08 PROCEDURE — 3078F DIAST BP <80 MM HG: CPT | Performed by: INTERNAL MEDICINE

## 2024-01-08 PROCEDURE — 3075F SYST BP GE 130 - 139MM HG: CPT | Performed by: INTERNAL MEDICINE

## 2024-01-08 PROCEDURE — 80053 COMPREHEN METABOLIC PANEL: CPT

## 2024-01-08 NOTE — PROGRESS NOTES
Chief Complaint   Patient presents with    Hospital Follow Up Visit         History:  Carmen Obrien is a 84 y.o. female who presents today for evaluation of the above problems.      Carmen had a left reverse shoulder with Dr. Dominguez on October 3, 2023.  She was in our hospital for 6 days and ultimately was discharged to rehab at [place].  She stayed there for almost 3 months and was recently discharged back to [facility] this past Tuesday.  She states that she is now on a lot of medications.  She feels weak and jittery and nervous.  She had some problems with insomnia, so she was placed on trazodone.  Other new medications include Benadryl, Atarax, and meloxicam.    She is accompanied today by staff member from [facility].    She is actively getting home health and physical therapy.    She [facility].    Has lower extremity edema.  Previous compression stockings were too small and she could not get them on.  However, the staff at [facility] may be able to assist her in getting them on.    HPI      ROS:  Review of Systems   Constitutional:  Positive for activity change.   Musculoskeletal:  Positive for arthralgias, back pain and gait problem.   Neurological:  Positive for tremors and weakness.   Psychiatric/Behavioral:  Negative for sleep disturbance (resolved).          Current Outpatient Medications:     allopurinol (ZYLOPRIM) 100 MG tablet, Take 1 tablet by mouth Daily., Disp: , Rfl:     aluminum-magnesium hydroxide-simethicone (MAALOX/MYLANTA) 200-200-20 MG/5ML suspension, Take 15 mL by mouth 2 (Two) Times a Day As Needed for Indigestion or Heartburn., Disp: , Rfl:     amLODIPine (NORVASC) 10 MG tablet, Take 1 tablet by mouth Daily., Disp: , Rfl:     ascorbic acid (VITAMIN C) 500 MG tablet, Take 1 tablet by mouth Daily., Disp: , Rfl:     atorvastatin (LIPITOR) 10 MG tablet, Take 1 tablet by mouth Daily., Disp: , Rfl:     Calcium Carbonate-Vit D-Min  (Caltrate 600+D Plus Minerals) 600-800 MG-UNIT tablet, Take 1 tablet by mouth 2 (Two) Times a Day., Disp: , Rfl:     carvedilol (COREG) 12.5 MG tablet, Take 1 tablet by mouth 2 (Two) Times a Day With Meals., Disp: , Rfl:     cloNIDine (CATAPRES) 0.1 MG tablet, Take 1 tablet by mouth 2 (Two) Times a Day As Needed for High Blood Pressure (systolic greater than 180)., Disp: 30 tablet, Rfl: 0    cycloSPORINE (RESTASIS) 0.05 % ophthalmic emulsion, Administer 1 drop to both eyes Every 12 (Twelve) Hours., Disp: , Rfl:     docusate sodium (COLACE) 100 MG capsule, Take 1 capsule by mouth 2 (Two) Times a Day., Disp: , Rfl:     esomeprazole (nexIUM) 20 MG capsule, Take 1 capsule by mouth 2 (Two) Times a Day., Disp: 60 capsule, Rfl: 11    famotidine (PEPCID) 20 MG tablet, Take 1 tablet by mouth 2 (Two) Times a Day., Disp: , Rfl:     FEROSUL 325 (65 Fe) MG tablet, Take 1 tablet by mouth Daily With Breakfast., Disp: , Rfl:     furosemide (LASIX) 20 MG tablet, Take 1 tablet by mouth Daily., Disp: 30 tablet, Rfl: 5    lidocaine (LIDODERM) 5 %, Place 1 patch on the skin as directed by provider Daily. Apply to bilateral upper arms.  Remove & Discard patch within 12 hours or as directed by MD, Disp: , Rfl:     lisinopril (PRINIVIL,ZESTRIL) 20 MG tablet, Take 1 tablet by mouth Daily., Disp: , Rfl:     loperamide (IMODIUM) 2 MG capsule, Take 1 capsule by mouth Every 8 (Eight) Hours As Needed for Diarrhea., Disp: , Rfl:     naloxone (NARCAN) 4 MG/0.1ML nasal spray, Call 911. Don't prime. Ariel in 1 nostril for overdose. Repeat in 2-3 minutes in other nostril if no or minimal breathing/responsiveness., Disp: 2 each, Rfl: 0    oxyCODONE-acetaminophen (PERCOCET) 5-325 MG per tablet, Take 1 tablet by mouth Every 8 (Eight) Hours As Needed for Moderate Pain., Disp: 28 tablet, Rfl: 0    polyethylene glycol (MIRALAX) 17 g packet, Take 17 g by mouth Daily As Needed (constipation)., Disp: , Rfl:     potassium chloride (MICRO-K) 10 MEQ CR capsule,  Take 1 capsule by mouth Daily., Disp: , Rfl:     sertraline (ZOLOFT) 100 MG tablet, Take 1 tablet by mouth Daily., Disp: 90 tablet, Rfl: 3    simethicone (MYLICON) 125 MG chewable tablet, Chew 1 tablet Every 6 (Six) Hours As Needed for Flatulence., Disp: , Rfl:     vitamin B-12 (CYANOCOBALAMIN) 1000 MCG tablet, Take 1 tablet by mouth Daily., Disp: , Rfl:     vitamin D (ERGOCALCIFEROL) 1.25 MG (91099 UT) capsule capsule, Take 1 capsule by mouth Every 14 (Fourteen) Days. Every other Friday, Disp: , Rfl:     Lab Results   Component Value Date    GLUCOSE 103 (H) 01/08/2024    BUN 10 01/08/2024    CREATININE 0.38 (L) 01/08/2024    EGFR 100.2 01/08/2024    BCR 26.3 (H) 01/08/2024    K 4.1 01/08/2024    CO2 24.0 01/08/2024    CALCIUM 9.5 01/08/2024    ALBUMIN 3.8 01/08/2024    BILITOT 0.4 01/08/2024    AST 15 01/08/2024    ALT 10 01/08/2024       WBC   Date Value Ref Range Status   01/08/2024 7.33 3.40 - 10.80 10*3/mm3 Final   07/16/2019 7.0 4.8 - 10.8 K/uL Final     RBC   Date Value Ref Range Status   01/08/2024 4.01 3.77 - 5.28 10*6/mm3 Final   07/16/2019 3.52 (L) 4.20 - 5.40 M/uL Final     Hemoglobin   Date Value Ref Range Status   01/08/2024 11.8 (L) 12.0 - 15.9 g/dL Final   07/16/2019 10.1 (L) 12.0 - 16.0 g/dL Final     Hematocrit   Date Value Ref Range Status   01/08/2024 36.3 34.0 - 46.6 % Final   07/16/2019 31.7 (L) 37.0 - 47.0 % Final     MCV   Date Value Ref Range Status   01/08/2024 90.5 79.0 - 97.0 fL Final   07/16/2019 90.1 81.0 - 99.0 fL Final     MCH   Date Value Ref Range Status   01/08/2024 29.4 26.6 - 33.0 pg Final   07/16/2019 28.7 27.0 - 31.0 pg Final     MCHC   Date Value Ref Range Status   01/08/2024 32.5 31.5 - 35.7 g/dL Final   07/16/2019 31.9 (L) 33.0 - 37.0 g/dL Final     RDW   Date Value Ref Range Status   01/08/2024 13.7 12.3 - 15.4 % Final   07/16/2019 14.7 (H) 11.5 - 14.5 % Final     RDW-SD   Date Value Ref Range Status   01/08/2024 45.8 37.0 - 54.0 fl Final     MPV   Date Value Ref Range  Status   01/08/2024 8.5 6.0 - 12.0 fL Final   07/16/2019 10.8 9.4 - 12.3 fL Final     Platelets   Date Value Ref Range Status   01/08/2024 447 140 - 450 10*3/mm3 Final   07/16/2019 248 130 - 400 K/uL Final     Neutrophil Rel %   Date Value Ref Range Status   07/04/2019 76.5 (H) 50.0 - 65.0 % Final     Neutrophil %   Date Value Ref Range Status   01/08/2024 71.9 42.7 - 76.0 % Final     Lymphocyte Rel %   Date Value Ref Range Status   07/04/2019 13.9 (L) 20.0 - 40.0 % Final     Lymphocyte %   Date Value Ref Range Status   01/08/2024 17.2 (L) 19.6 - 45.3 % Final     Monocyte Rel %   Date Value Ref Range Status   07/04/2019 7.4 0.0 - 10.0 % Final     Monocyte %   Date Value Ref Range Status   01/08/2024 7.5 5.0 - 12.0 % Final     Eosinophil Rel %   Date Value Ref Range Status   07/04/2019 1.4 0.0 - 5.0 % Final     Eosinophil %   Date Value Ref Range Status   01/08/2024 2.2 0.3 - 6.2 % Final     Basophil Rel %   Date Value Ref Range Status   07/04/2019 0.4 0.0 - 1.0 % Final     Basophil %   Date Value Ref Range Status   01/08/2024 0.7 0.0 - 1.5 % Final     Immature Grans %   Date Value Ref Range Status   01/08/2024 0.5 0.0 - 0.5 % Final     Neutrophils Absolute   Date Value Ref Range Status   07/04/2019 7.7 (H) 1.5 - 7.5 K/uL Final     Neutrophils, Absolute   Date Value Ref Range Status   01/08/2024 5.27 1.70 - 7.00 10*3/mm3 Final     Lymphocytes Absolute   Date Value Ref Range Status   07/04/2019 1.4 1.1 - 4.5 K/uL Final     Lymphocytes, Absolute   Date Value Ref Range Status   01/08/2024 1.26 0.70 - 3.10 10*3/mm3 Final     Monocytes Absolute   Date Value Ref Range Status   07/04/2019 0.80 0.00 - 0.90 K/uL Final     Monocytes, Absolute   Date Value Ref Range Status   01/08/2024 0.55 0.10 - 0.90 10*3/mm3 Final     Eosinophils Absolute   Date Value Ref Range Status   07/04/2019 0.10 0.00 - 0.60 K/uL Final     Eosinophils, Absolute   Date Value Ref Range Status   01/08/2024 0.16 0.00 - 0.40 10*3/mm3 Final     Basophils  "Absolute   Date Value Ref Range Status   2019 0.00 0.00 - 0.20 K/uL Final     Basophils, Absolute   Date Value Ref Range Status   2024 0.05 0.00 - 0.20 10*3/mm3 Final     Immature Grans, Absolute   Date Value Ref Range Status   2024 0.04 0.00 - 0.05 10*3/mm3 Final     nRBC   Date Value Ref Range Status   2024 0.0 0.0 - 0.2 /100 WBC Final         OBJECTIVE:  Visit Vitals  /70 (BP Location: Left arm, Patient Position: Sitting, Cuff Size: Adult)   Pulse 72   Temp 97.4 °F (36.3 °C) (Temporal)   Ht 152.4 cm (60\")   Wt 74.1 kg (163 lb 4.8 oz)   SpO2 98%   BMI 31.89 kg/m²      Physical Exam  Constitutional:       General: She is not in acute distress.     Appearance: She is well-developed. She is not diaphoretic.   HENT:      Head: Normocephalic and atraumatic.   Eyes:      Pupils: Pupils are equal, round, and reactive to light.   Neck:      Thyroid: No thyromegaly.      Trachea: Phonation normal.   Cardiovascular:      Rate and Rhythm: Normal rate and regular rhythm.      Heart sounds: No murmur heard.  Pulmonary:      Effort: Pulmonary effort is normal. No respiratory distress.      Breath sounds: Normal breath sounds. No wheezing, rhonchi or rales.   Musculoskeletal:      Right lower le+ Pitting Edema present.      Left lower le+ Pitting Edema present.   Skin:     Coloration: Skin is not pale.      Findings: No erythema.   Neurological:      Mental Status: She is alert.   Psychiatric:         Behavior: Behavior normal.         Thought Content: Thought content normal.         Judgment: Judgment normal.         Assessment/Plan    Diagnoses and all orders for this visit:    1. Hospital discharge follow-up (Primary)    2. 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 Polypharmacy    4. Chronic pain syndrome      Carmen is improving after her hospitalization and stay at subacute rehab.  However, she is on medications that may not be needed at this time.  Therefore, I " am going to discontinue hydroxyzine, trazodone, meloxicam, and Benadryl.    She may start seeing [facility] again for chronic pain.    Continue physical therapy as already set up at [facility].    Follow-up around 6 weeks for Medicare wellness visit or sooner if needed.    Return in about 6 weeks (around 2/19/2024) for Medicare Wellness.      AMY Noble MD  10:25 CST  1/8/2024   Electronically signed

## 2024-01-09 ENCOUNTER — TELEPHONE (OUTPATIENT)
Dept: INTERNAL MEDICINE | Facility: CLINIC | Age: 84
End: 2024-01-09

## 2024-01-09 NOTE — TELEPHONE ENCOUNTER
Caller: CARMINE    Relationship: Other    Best call back number: [phone]     What is the best time to reach you: ANY     Who are you requesting to speak with (clinical staff, provider,  specific staff member): OTHER    Do you know the [facility]    What was the call regarding:SOCIAL WORK COMPLETED EVAL YESTERDAY 1.8.24 AND NO FOLLOW-UP NEEDED. AS OF 1.7.24 KANDI GUZMÁN IS NO LONGER POWER OR

## 2024-01-15 ENCOUNTER — TELEPHONE (OUTPATIENT)
Dept: INTERNAL MEDICINE | Facility: CLINIC | Age: 84
End: 2024-01-15

## 2024-01-15 RX ORDER — NYSTATIN 100000 [USP'U]/G
POWDER TOPICAL 3 TIMES DAILY
Qty: 60 G | Refills: 11 | Status: SHIPPED | OUTPATIENT
Start: 2024-01-15

## 2024-01-19 ENCOUNTER — TELEPHONE (OUTPATIENT)
Dept: INTERNAL MEDICINE | Facility: CLINIC | Age: 84
End: 2024-01-19
Payer: MEDICARE

## 2024-01-19 NOTE — TELEPHONE ENCOUNTER
Caller: ELLIOT    Relationship: [facility]    Best call back number: [phone]           PRESCRIPTION FOR PHYSICAL THERAPY AT [facility]    Last office visit with prescribing clinician: 1/8/2024   Last telemedicine visit with prescribing clinician: Visit date not found   Next office visit with prescribing clinician: 2/22/2024       Willie Drake Rep   01/19/24 16:42 CST       PATIENT WAS DISCHARGE FORM [facility] YESTERDAY    ORDER FOR PATIENT TO BE SEEN -- [facility]

## 2024-01-22 DIAGNOSIS — G89.4 CHRONIC PAIN SYNDROME: ICD-10-CM

## 2024-01-22 DIAGNOSIS — Z98.890 HISTORY OF REVERSE TOTAL REPLACEMENT OF LEFT SHOULDER JOINT: Primary | ICD-10-CM

## 2024-02-12 ENCOUNTER — TELEPHONE (OUTPATIENT)
Dept: INTERNAL MEDICINE | Facility: CLINIC | Age: 84
End: 2024-02-12
Payer: MEDICARE

## 2024-02-13 DIAGNOSIS — Z79.899 POLYPHARMACY: ICD-10-CM

## 2024-02-13 DIAGNOSIS — G89.4 CHRONIC PAIN SYNDROME: ICD-10-CM

## 2024-02-13 DIAGNOSIS — M75.102 TEAR OF LEFT SUPRASPINATUS TENDON: ICD-10-CM

## 2024-02-13 DIAGNOSIS — Z98.890 HISTORY OF REVERSE TOTAL REPLACEMENT OF LEFT SHOULDER JOINT: Primary | ICD-10-CM

## 2024-02-22 ENCOUNTER — OFFICE VISIT (OUTPATIENT)
Dept: INTERNAL MEDICINE | Facility: CLINIC | Age: 84
End: 2024-02-22
Payer: MEDICARE

## 2024-02-22 VITALS
WEIGHT: 170.2 LBS | HEART RATE: 62 BPM | HEIGHT: 60 IN | SYSTOLIC BLOOD PRESSURE: 130 MMHG | DIASTOLIC BLOOD PRESSURE: 80 MMHG | TEMPERATURE: 97.3 F | BODY MASS INDEX: 33.41 KG/M2 | OXYGEN SATURATION: 98 %

## 2024-02-22 DIAGNOSIS — G89.4 CHRONIC PAIN SYNDROME: ICD-10-CM

## 2024-02-22 DIAGNOSIS — Z13.31 DEPRESSION SCREEN: ICD-10-CM

## 2024-02-22 DIAGNOSIS — Z79.899 POLYPHARMACY: ICD-10-CM

## 2024-02-22 DIAGNOSIS — Z98.890 HISTORY OF REVERSE TOTAL REPLACEMENT OF LEFT SHOULDER JOINT: ICD-10-CM

## 2024-02-22 DIAGNOSIS — Z91.81 AT MODERATE RISK FOR FALL: ICD-10-CM

## 2024-02-22 DIAGNOSIS — Z00.00 MEDICARE ANNUAL WELLNESS VISIT, SUBSEQUENT: Primary | ICD-10-CM
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
## 2024-03-21 ENCOUNTER — TELEPHONE (OUTPATIENT)
Dept: INTERNAL MEDICINE | Facility: CLINIC | Age: 84
End: 2024-03-21
Payer: MEDICARE

## 2024-04-05 ENCOUNTER — TELEPHONE (OUTPATIENT)
Dept: INTERNAL MEDICINE | Facility: CLINIC | Age: 84
End: 2024-04-05
Payer: MEDICARE

## 2024-04-05 NOTE — TELEPHONE ENCOUNTER
"Called and spoke with pt. She stopped taking probiotic w/ prebiotic, iron and meloxicam that was recently prescribed by pain mgnt.     She has restarted a \"plain\" probiotic.       She states the stool has \"a lot of mucus\".   She is asking how many times per day she can take the imodium.   "

## 2024-04-05 NOTE — TELEPHONE ENCOUNTER
Caller: Carmen Obiren    Relationship: Self    Best call back number: [phone]     What is the best time to reach you: ANY    Who are you requesting to speak with (clinical staff, provider,  specific staff member): CLINICAL STAFF     What was the call regarding:     PATIENT STATES SHE HAD HER LAST FLU SHOT ON 09.27.23. PATIENT STATES ANDRIA GUTIERREZ ADMINISTERED THE VACCINE.     PATIENT ALSO STATES SHE HAS HAD DIARRHEA FOR AWHILE NOW. PATIENT STATES SHE STOPPED TAKING SOME MEDICATIONS BECAUSE SHE THOUGHT HER MEDICATIONS COULD BE CAUSING THIS ISSUE. PATIENT IS WONDERING WHAT HER PROVIDER RECOMMENDS? PATIENT STATES IMODIUM HELPS HER DIARRHEA, BUT IS CONCERNED ABOUT TAKING THIS MEDICATION LONG TERM.

## 2024-04-11 NOTE — TELEPHONE ENCOUNTER
Since it has been going on for that long, she will need to be evaluated. I do not recommend that she continue daily use of Imodium. Until her appointment I do recommend adequate hydration of 64oz per day.

## 2024-04-11 NOTE — TELEPHONE ENCOUNTER
Pt returned call. She has not had any antibiotics recently.     She states she has not taken her probiotic in the last 3 days and since stopping that she has not had diarrhea. BUT she has taken imodium each morning. She is asking if its ok to take imodium daily? Also I told her to start tomorrow do not take probiotic and do not take imodium and see if diarrhea returns.

## 2024-04-18 ENCOUNTER — LAB (OUTPATIENT)
Dept: LAB | Facility: HOSPITAL | Age: 84
End: 2024-04-18
Payer: MEDICARE

## 2024-04-18 ENCOUNTER — OFFICE VISIT (OUTPATIENT)
Dept: INTERNAL MEDICINE | Facility: CLINIC | Age: 84
End: 2024-04-18
Payer: MEDICARE

## 2024-04-18 VITALS
OXYGEN SATURATION: 96 % | TEMPERATURE: 97.7 F | HEIGHT: 60 IN | BODY MASS INDEX: 34.95 KG/M2 | HEART RATE: 60 BPM | DIASTOLIC BLOOD PRESSURE: 76 MMHG | SYSTOLIC BLOOD PRESSURE: 132 MMHG | WEIGHT: 178 LBS

## 2024-04-18 DIAGNOSIS — R19.7 DIARRHEA, UNSPECIFIED TYPE: Primary | ICD-10-CM

## 2024-04-18 DIAGNOSIS — R15.9 INCONTINENCE OF FECES, UNSPECIFIED FECAL INCONTINENCE TYPE: ICD-10-CM

## 2024-04-18 DIAGNOSIS — R19.7 DIARRHEA, UNSPECIFIED TYPE: ICD-10-CM

## 2024-04-18 DIAGNOSIS — K59.00 CONSTIPATION, UNSPECIFIED CONSTIPATION TYPE: ICD-10-CM

## 2024-04-18 DIAGNOSIS — K51.419 INFLAMMATORY POLYPS OF COLON WITH UNSPECIFIED COMPLICATIONS: ICD-10-CM

## 2024-04-18 DIAGNOSIS — R60.0 EDEMA OF BOTH LOWER EXTREMITIES: ICD-10-CM

## 2024-04-18 DIAGNOSIS — Z86.19 HISTORY OF CLOSTRIDIOIDES DIFFICILE COLITIS: ICD-10-CM

## 2024-04-18 LAB
ALBUMIN SERPL-MCNC: 4.3 G/DL (ref 3.5–5.2)
ALBUMIN/GLOB SERPL: 1.4 G/DL
ALP SERPL-CCNC: 90 U/L (ref 39–117)
ALT SERPL W P-5'-P-CCNC: 18 U/L (ref 1–33)
ANION GAP SERPL CALCULATED.3IONS-SCNC: 8 MMOL/L (ref 5–15)
AST SERPL-CCNC: 22 U/L (ref 1–32)
BILIRUB SERPL-MCNC: 0.4 MG/DL (ref 0–1.2)
BUN SERPL-MCNC: 23 MG/DL (ref 8–23)
BUN/CREAT SERPL: 46 (ref 7–25)
CALCIUM SPEC-SCNC: 9.8 MG/DL (ref 8.6–10.5)
CHLORIDE SERPL-SCNC: 102 MMOL/L (ref 98–107)
CO2 SERPL-SCNC: 27 MMOL/L (ref 22–29)
CREAT SERPL-MCNC: 0.5 MG/DL (ref 0.57–1)
DEPRECATED RDW RBC AUTO: 48.8 FL (ref 37–54)
EGFRCR SERPLBLD CKD-EPI 2021: 93.8 ML/MIN/1.73
ERYTHROCYTE [DISTWIDTH] IN BLOOD BY AUTOMATED COUNT: 13.9 % (ref 12.3–15.4)
GLOBULIN UR ELPH-MCNC: 3 GM/DL
GLUCOSE SERPL-MCNC: 102 MG/DL (ref 65–99)
HCT VFR BLD AUTO: 37.2 % (ref 34–46.6)
HGB BLD-MCNC: 11.9 G/DL (ref 12–15.9)
MCH RBC QN AUTO: 30.3 PG (ref 26.6–33)
MCHC RBC AUTO-ENTMCNC: 32 G/DL (ref 31.5–35.7)
MCV RBC AUTO: 94.7 FL (ref 79–97)
PLATELET # BLD AUTO: 220 10*3/MM3 (ref 140–450)
PMV BLD AUTO: 10.5 FL (ref 6–12)
POTASSIUM SERPL-SCNC: 4.9 MMOL/L (ref 3.5–5.2)
PROT SERPL-MCNC: 7.3 G/DL (ref 6–8.5)
RBC # BLD AUTO: 3.93 10*6/MM3 (ref 3.77–5.28)
SODIUM SERPL-SCNC: 137 MMOL/L (ref 136–145)
WBC NRBC COR # BLD AUTO: 6.05 10*3/MM3 (ref 3.4–10.8)

## 2024-04-18 PROCEDURE — 80053 COMPREHEN METABOLIC PANEL: CPT

## 2024-04-18 PROCEDURE — 85027 COMPLETE CBC AUTOMATED: CPT

## 2024-04-18 PROCEDURE — 36415 COLL VENOUS BLD VENIPUNCTURE: CPT

## 2024-04-18 PROCEDURE — 87507 IADNA-DNA/RNA PROBE TQ 12-25: CPT | Performed by: INTERNAL MEDICINE

## 2024-04-18 PROCEDURE — 87338 HPYLORI STOOL AG IA: CPT | Performed by: INTERNAL MEDICINE

## 2024-04-18 RX ORDER — MELATONIN 10 MG
CAPSULE ORAL
COMMUNITY

## 2024-04-18 NOTE — PROGRESS NOTES
Chief Complaint   Patient presents with    Edema     In legs and feet    Diarrhea         History:  Carmen Obrien is a 84 y.o. female who presents today for evaluation of the above problems.      HPI    Ms. Obrien presents today for diarrhea. Diarrhea started 1 month ago and has not improved. She has occasional firmer bowel movements but diarrhea always returns. She denies abdominal pain, blood in stool, or recent antibiotic use. She note occasional mucus in the stool. She has tried probiotics and imodium. Imodium works well. She has a history of fecal incontinence, she had a surgical procedure of the anal sphincter 2 years ago in [place] to prevent fecal incontinence.     She also has bilateral edema to the lower extremities. She has tried several compression socks in the past. She was also fitted for compression stockings. All of these caused significant pain, she is not able to tolerate wearing them. She sits with her legs down for most of the day.       ROS:  Review of Systems   Constitutional:  Negative for fatigue, fever and unexpected weight change.   HENT:  Negative for sinus pain and sore throat.    Eyes:  Negative for pain and visual disturbance.   Respiratory:  Negative for chest tightness and shortness of breath.    Cardiovascular:  Positive for leg swelling. Negative for chest pain and palpitations.   Gastrointestinal:  Positive for diarrhea. Negative for abdominal distention, abdominal pain, anal bleeding, blood in stool, constipation, nausea, rectal pain and vomiting.   Genitourinary:  Negative for difficulty urinating.   Skin:  Negative for pallor.   Neurological:  Negative for dizziness, weakness, light-headedness and headaches.   Psychiatric/Behavioral:  Negative for confusion and suicidal ideas.          Current Outpatient Medications:     allopurinol (ZYLOPRIM) 100 MG tablet, Take 1 tablet by mouth Daily., Disp: , Rfl:     aluminum-magnesium hydroxide-simethicone (MAALOX/MYLANTA) 200-200-20  MG/5ML suspension, Take 15 mL by mouth 2 (Two) Times a Day As Needed for Indigestion or Heartburn., Disp: , Rfl:     amLODIPine (NORVASC) 10 MG tablet, Take 1 tablet by mouth Daily., Disp: , Rfl:     ascorbic acid (VITAMIN C) 500 MG tablet, Take 1 tablet by mouth Daily., Disp: , Rfl:     atorvastatin (LIPITOR) 10 MG tablet, Take 1 tablet by mouth Daily., Disp: , Rfl:     Calcium Carbonate-Vit D-Min (Caltrate 600+D Plus Minerals) 600-800 MG-UNIT tablet, Take 1 tablet by mouth 2 (Two) Times a Day., Disp: , Rfl:     carvedilol (COREG) 12.5 MG tablet, Take 1 tablet by mouth 2 (Two) Times a Day With Meals., Disp: , Rfl:     cloNIDine (CATAPRES) 0.1 MG tablet, Take 1 tablet by mouth 2 (Two) Times a Day As Needed for High Blood Pressure (systolic greater than 180)., Disp: 30 tablet, Rfl: 0    cycloSPORINE (RESTASIS) 0.05 % ophthalmic emulsion, Administer 1 drop to both eyes Every 12 (Twelve) Hours., Disp: , Rfl:     esomeprazole (nexIUM) 20 MG capsule, Take 1 capsule by mouth 2 (Two) Times a Day., Disp: 60 capsule, Rfl: 11    famotidine (PEPCID) 20 MG tablet, Take 1 tablet by mouth 2 (Two) Times a Day., Disp: , Rfl:     FEROSUL 325 (65 Fe) MG tablet, Take 1 tablet by mouth Daily With Breakfast., Disp: , Rfl:     furosemide (LASIX) 20 MG tablet, Take 1 tablet by mouth Daily. (Patient taking differently: Take 1 tablet by mouth Daily. Pt did not take today d/t appt), Disp: 30 tablet, Rfl: 5    lidocaine (LIDODERM) 5 %, Place 1 patch on the skin as directed by provider Daily. Apply to bilateral upper arms.  Remove & Discard patch within 12 hours or as directed by MD, Disp: , Rfl:     lisinopril (PRINIVIL,ZESTRIL) 20 MG tablet, Take 1 tablet by mouth Daily., Disp: , Rfl:     loperamide (IMODIUM) 2 MG capsule, Take 1 capsule by mouth Every 8 (Eight) Hours As Needed for Diarrhea., Disp: , Rfl:     Melatonin 10 MG capsule, Take  by mouth. 2 tab, Disp: , Rfl:     nystatin (MYCOSTATIN) 340939 UNIT/GM powder, Apply  topically to the  appropriate area as directed 3 (Three) Times a Day., Disp: 60 g, Rfl: 11    oxyCODONE-acetaminophen (PERCOCET) 5-325 MG per tablet, Take 1 tablet by mouth Every 8 (Eight) Hours As Needed for Moderate Pain., Disp: 28 tablet, Rfl: 0    polyethylene glycol (MIRALAX) 17 g packet, Take 17 g by mouth Daily As Needed (constipation)., Disp: , Rfl:     potassium chloride (MICRO-K) 10 MEQ CR capsule, Take 1 capsule by mouth Daily., Disp: , Rfl:     sertraline (ZOLOFT) 100 MG tablet, Take 1 tablet by mouth Daily., Disp: 90 tablet, Rfl: 3    simethicone (MYLICON) 125 MG chewable tablet, Chew 1 tablet Every 6 (Six) Hours As Needed for Flatulence., Disp: , Rfl:     vitamin B-12 (CYANOCOBALAMIN) 1000 MCG tablet, Take 1 tablet by mouth Daily., Disp: , Rfl:     vitamin D (ERGOCALCIFEROL) 1.25 MG (32514 UT) capsule capsule, Take 1 capsule by mouth Every 14 (Fourteen) Days. Every other Friday, Disp: , Rfl:     docusate sodium (COLACE) 100 MG capsule, Take 1 capsule by mouth 2 (Two) Times a Day. (Patient not taking: Reported on 4/18/2024), Disp: , Rfl:     Lab Results   Component Value Date    GLUCOSE 102 (H) 04/18/2024    BUN 23 04/18/2024    CREATININE 0.50 (L) 04/18/2024    EGFR 93.8 04/18/2024    BCR 46.0 (H) 04/18/2024    K 4.9 04/18/2024    CO2 27.0 04/18/2024    CALCIUM 9.8 04/18/2024    ALBUMIN 4.3 04/18/2024    BILITOT 0.4 04/18/2024    AST 22 04/18/2024    ALT 18 04/18/2024       WBC   Date Value Ref Range Status   04/18/2024 6.05 3.40 - 10.80 10*3/mm3 Final   07/16/2019 7.0 4.8 - 10.8 K/uL Final     RBC   Date Value Ref Range Status   04/18/2024 3.93 3.77 - 5.28 10*6/mm3 Final   07/16/2019 3.52 (L) 4.20 - 5.40 M/uL Final     Hemoglobin   Date Value Ref Range Status   04/18/2024 11.9 (L) 12.0 - 15.9 g/dL Final   07/16/2019 10.1 (L) 12.0 - 16.0 g/dL Final     Hematocrit   Date Value Ref Range Status   04/18/2024 37.2 34.0 - 46.6 % Final   07/16/2019 31.7 (L) 37.0 - 47.0 % Final     MCV   Date Value Ref Range Status    04/18/2024 94.7 79.0 - 97.0 fL Final   07/16/2019 90.1 81.0 - 99.0 fL Final     MCH   Date Value Ref Range Status   04/18/2024 30.3 26.6 - 33.0 pg Final   07/16/2019 28.7 27.0 - 31.0 pg Final     MCHC   Date Value Ref Range Status   04/18/2024 32.0 31.5 - 35.7 g/dL Final   07/16/2019 31.9 (L) 33.0 - 37.0 g/dL Final     RDW   Date Value Ref Range Status   04/18/2024 13.9 12.3 - 15.4 % Final   07/16/2019 14.7 (H) 11.5 - 14.5 % Final     RDW-SD   Date Value Ref Range Status   04/18/2024 48.8 37.0 - 54.0 fl Final     MPV   Date Value Ref Range Status   04/18/2024 10.5 6.0 - 12.0 fL Final   07/16/2019 10.8 9.4 - 12.3 fL Final     Platelets   Date Value Ref Range Status   04/18/2024 220 140 - 450 10*3/mm3 Final   07/16/2019 248 130 - 400 K/uL Final     Neutrophil Rel %   Date Value Ref Range Status   07/04/2019 76.5 (H) 50.0 - 65.0 % Final     Neutrophil %   Date Value Ref Range Status   01/08/2024 71.9 42.7 - 76.0 % Final     Lymphocyte Rel %   Date Value Ref Range Status   07/04/2019 13.9 (L) 20.0 - 40.0 % Final     Lymphocyte %   Date Value Ref Range Status   01/08/2024 17.2 (L) 19.6 - 45.3 % Final     Monocyte Rel %   Date Value Ref Range Status   07/04/2019 7.4 0.0 - 10.0 % Final     Monocyte %   Date Value Ref Range Status   01/08/2024 7.5 5.0 - 12.0 % Final     Eosinophil Rel %   Date Value Ref Range Status   07/04/2019 1.4 0.0 - 5.0 % Final     Eosinophil %   Date Value Ref Range Status   01/08/2024 2.2 0.3 - 6.2 % Final     Basophil Rel %   Date Value Ref Range Status   07/04/2019 0.4 0.0 - 1.0 % Final     Basophil %   Date Value Ref Range Status   01/08/2024 0.7 0.0 - 1.5 % Final     Immature Grans %   Date Value Ref Range Status   01/08/2024 0.5 0.0 - 0.5 % Final     Neutrophils Absolute   Date Value Ref Range Status   07/04/2019 7.7 (H) 1.5 - 7.5 K/uL Final     Neutrophils, Absolute   Date Value Ref Range Status   01/08/2024 5.27 1.70 - 7.00 10*3/mm3 Final     Lymphocytes Absolute   Date Value Ref Range  "Status   07/04/2019 1.4 1.1 - 4.5 K/uL Final     Lymphocytes, Absolute   Date Value Ref Range Status   01/08/2024 1.26 0.70 - 3.10 10*3/mm3 Final     Monocytes Absolute   Date Value Ref Range Status   07/04/2019 0.80 0.00 - 0.90 K/uL Final     Monocytes, Absolute   Date Value Ref Range Status   01/08/2024 0.55 0.10 - 0.90 10*3/mm3 Final     Eosinophils Absolute   Date Value Ref Range Status   07/04/2019 0.10 0.00 - 0.60 K/uL Final     Eosinophils, Absolute   Date Value Ref Range Status   01/08/2024 0.16 0.00 - 0.40 10*3/mm3 Final     Basophils Absolute   Date Value Ref Range Status   07/04/2019 0.00 0.00 - 0.20 K/uL Final     Basophils, Absolute   Date Value Ref Range Status   01/08/2024 0.05 0.00 - 0.20 10*3/mm3 Final     Immature Grans, Absolute   Date Value Ref Range Status   01/08/2024 0.04 0.00 - 0.05 10*3/mm3 Final     nRBC   Date Value Ref Range Status   01/08/2024 0.0 0.0 - 0.2 /100 WBC Final         OBJECTIVE:  Visit Vitals  /76 (BP Location: Right arm, Patient Position: Sitting, Cuff Size: Adult)   Pulse 60   Temp 97.7 °F (36.5 °C) (Oral)   Ht 152.4 cm (60\")   Wt 80.7 kg (178 lb)   SpO2 96%   BMI 34.76 kg/m²      Physical Exam  Constitutional:       Appearance: Normal appearance.   HENT:      Head: Normocephalic.      Mouth/Throat:      Mouth: Mucous membranes are moist.   Eyes:      Pupils: Pupils are equal, round, and reactive to light.   Cardiovascular:      Rate and Rhythm: Normal rate and regular rhythm.      Pulses: Normal pulses.      Heart sounds: Normal heart sounds.   Pulmonary:      Effort: Pulmonary effort is normal.      Breath sounds: Normal breath sounds.   Abdominal:      General: Bowel sounds are increased.      Palpations: Abdomen is soft.   Musculoskeletal:      Right lower leg: Edema (2+) present.      Left lower leg: Edema (2+) present.   Skin:     General: Skin is warm and dry.   Neurological:      Mental Status: She is alert and oriented to person, place, and time. "   Psychiatric:         Mood and Affect: Mood normal.         Behavior: Behavior normal.         Thought Content: Thought content normal.         Judgment: Judgment normal.         Assessment/Plan    Diagnoses and all orders for this visit:    1. Diarrhea, unspecified type (Primary)  -     Gastrointestinal Panel, PCR - Stool, Per Rectum; Future  -     Ambulatory Referral to Gastroenterology  -     CBC (No Diff); Future  -     Comprehensive Metabolic Panel; Future    2. Constipation, unspecified constipation type  -     Gastrointestinal Panel, PCR - Stool, Per Rectum; Future    3. History of Clostridioides difficile colitis  -     Gastrointestinal Panel, PCR - Stool, Per Rectum; Future    4. Inflammatory polyps of colon with unspecified complications  -     Gastrointestinal Panel, PCR - Stool, Per Rectum; Future    5. Incontinence of feces, unspecified fecal incontinence type      Labs today and Gastrointestinal panel to evaluate infectious causes, history of C. diff. Referral to gastroenterology for persistent diarrhea and fecal incontinence. Trial of fiber supplement for bulking of stool. Increase water intake. Discussed referral back to surgeon that performed anal sphincter surgery, she declines at this time and would like to see gastroenterology first.     Discussed propping feet and retrying compression sock/stockings, low salt diet, and increasing hydration. Considered increasing lasix but Ms. Obrien is likely already dehydrated given persistent diarrhea.     Return if symptoms worsen or fail to improve.    Lyly Wallace, ABDON  08:45 CDT  4/18/2024   Electronically signed

## 2024-04-19 ENCOUNTER — LAB REQUISITION (OUTPATIENT)
Dept: LAB | Facility: HOSPITAL | Age: 84
End: 2024-04-19
Payer: MEDICARE

## 2024-04-19 DIAGNOSIS — Z86.19 PERSONAL HISTORY OF OTHER INFECTIOUS AND PARASITIC DISEASES: ICD-10-CM

## 2024-04-19 DIAGNOSIS — K59.00 CONSTIPATION, UNSPECIFIED: ICD-10-CM

## 2024-04-19 DIAGNOSIS — R19.7 DIARRHEA, UNSPECIFIED: ICD-10-CM

## 2024-04-19 LAB
ADV 40+41 DNA STL QL NAA+NON-PROBE: NOT DETECTED
ASTRO TYP 1-8 RNA STL QL NAA+NON-PROBE: NOT DETECTED
C CAYETANENSIS DNA STL QL NAA+NON-PROBE: NOT DETECTED
C COLI+JEJ+UPSA DNA STL QL NAA+NON-PROBE: NOT DETECTED
CRYPTOSP DNA STL QL NAA+NON-PROBE: NOT DETECTED
E HISTOLYT DNA STL QL NAA+NON-PROBE: NOT DETECTED
EAEC PAA PLAS AGGR+AATA ST NAA+NON-PRB: NOT DETECTED
EC STX1+STX2 GENES STL QL NAA+NON-PROBE: NOT DETECTED
EPEC EAE GENE STL QL NAA+NON-PROBE: NOT DETECTED
ETEC LTA+ST1A+ST1B TOX ST NAA+NON-PROBE: NOT DETECTED
G LAMBLIA DNA STL QL NAA+NON-PROBE: NOT DETECTED
NOROVIRUS GI+II RNA STL QL NAA+NON-PROBE: DETECTED
P SHIGELLOIDES DNA STL QL NAA+NON-PROBE: NOT DETECTED
RVA RNA STL QL NAA+NON-PROBE: NOT DETECTED
S ENT+BONG DNA STL QL NAA+NON-PROBE: NOT DETECTED
SAPO I+II+IV+V RNA STL QL NAA+NON-PROBE: NOT DETECTED
SHIGELLA SP+EIEC IPAH ST NAA+NON-PROBE: NOT DETECTED
V CHOL+PARA+VUL DNA STL QL NAA+NON-PROBE: NOT DETECTED
V CHOLERAE DNA STL QL NAA+NON-PROBE: NOT DETECTED
Y ENTEROCOL DNA STL QL NAA+NON-PROBE: NOT DETECTED

## 2024-04-21 LAB — H PYLORI AG STL QL IA: NEGATIVE

## 2024-04-24 ENCOUNTER — TELEPHONE (OUTPATIENT)
Dept: INTERNAL MEDICINE | Facility: CLINIC | Age: 84
End: 2024-04-24
Payer: MEDICARE

## 2024-04-26 ENCOUNTER — TELEPHONE (OUTPATIENT)
Dept: INTERNAL MEDICINE | Facility: CLINIC | Age: 84
End: 2024-04-26
Payer: MEDICARE

## 2024-04-26 NOTE — TELEPHONE ENCOUNTER
Please let her know that norovirus is contagious. To prevent spread of the virus, I recommend good handwashing. I recommend continuing hydration with 64oz of water per day. The increased urination is from the increased fluid intake, and should not cause dehydration. Thank you

## 2024-04-26 NOTE — TELEPHONE ENCOUNTER
Patient called voicing concerns about diarrhea starting again. She reviewed lab results and would like to know if this is contagious.    Reports stopping iron tablet and mobic. Starting taking fiber and drinking Gatorade. Now having an increase in urination and wondering if this can cause dehydration.

## 2024-05-06 RX ORDER — FUROSEMIDE 20 MG/1
20 TABLET ORAL DAILY
Qty: 30 TABLET | Refills: 5 | Status: SHIPPED | OUTPATIENT
Start: 2024-05-06

## 2024-05-21 ENCOUNTER — TELEPHONE (OUTPATIENT)
Dept: INTERNAL MEDICINE | Facility: CLINIC | Age: 84
End: 2024-05-21
Payer: MEDICARE

## 2024-05-31 ENCOUNTER — TRANSCRIBE ORDERS (OUTPATIENT)
Dept: ADMINISTRATIVE | Facility: HOSPITAL | Age: 84
End: 2024-05-31
Payer: MEDICARE

## 2024-05-31 DIAGNOSIS — M75.102 ROTATOR CUFF SYNDROME OF LEFT SHOULDER: Primary | ICD-10-CM

## 2024-06-11 ENCOUNTER — OFFICE VISIT (OUTPATIENT)
Dept: GASTROENTEROLOGY | Facility: CLINIC | Age: 84
End: 2024-06-11
Payer: MEDICARE

## 2024-06-11 VITALS
BODY MASS INDEX: 34.16 KG/M2 | DIASTOLIC BLOOD PRESSURE: 64 MMHG | HEIGHT: 60 IN | TEMPERATURE: 97.3 F | OXYGEN SATURATION: 97 % | WEIGHT: 174 LBS | HEART RATE: 54 BPM | SYSTOLIC BLOOD PRESSURE: 162 MMHG

## 2024-06-11 DIAGNOSIS — R19.7 DIARRHEA, UNSPECIFIED TYPE: Primary | ICD-10-CM

## 2024-06-11 PROCEDURE — 1159F MED LIST DOCD IN RCRD: CPT | Performed by: NURSE PRACTITIONER

## 2024-06-11 PROCEDURE — 99213 OFFICE O/P EST LOW 20 MIN: CPT | Performed by: NURSE PRACTITIONER

## 2024-06-11 PROCEDURE — 3077F SYST BP >= 140 MM HG: CPT | Performed by: NURSE PRACTITIONER

## 2024-06-11 PROCEDURE — 1160F RVW MEDS BY RX/DR IN RCRD: CPT | Performed by: NURSE PRACTITIONER

## 2024-06-11 PROCEDURE — 3078F DIAST BP <80 MM HG: CPT | Performed by: NURSE PRACTITIONER

## 2024-06-11 NOTE — PROGRESS NOTES
[facility] GASTROENTEROLOGY - OFFICE NOTE    6/11/2024    Carmen Obrien   1940    Primary Physician: VERA Noble MD    Chief Complaint   Patient presents with    Diarrhea         HISTORY OF PRESENT ILLNESS    Carmen Obrien is a 84 y.o. female presents  with diarrhea.  This started 1/2024. Had n/v and diarrhea.  Had stool studies that was positive for norovirus. Diarrhea is better now. Had 3 stools yesterday. The first stool was formed.  Has used imodium prn that does help. She started fiber supplement that has helped.  No rectal bleeding or weight loss.  No fever. No abdominal pain.         Gastrointestinal Panel, PCR - Stool, Per Rectum (04/18/2024 18:30)       COLONOSCOPY (06/26/2020 09:12) recall 5 years.     Past Medical History:   Diagnosis Date    THERESA (acute kidney injury) (HCC) due to sepsis 11/02/2022    AMS (altered mental status) 10/11/2022    Anxiety     Arthritis     CAD (coronary artery disease)     Colon polyp     DDD (degenerative disc disease), lumbar     Deep venous thrombosis     Depression     Diverticulosis     Esophageal stricture     Full dentures     GERD (gastroesophageal reflux disease)     History of transfusion     w/o Adverse reaction    Hypercholesteremia     Hypertension     LPRD (laryngopharyngeal reflux disease)     Macular degeneration     Multinodular goiter 02/23/2017    Osteoarthritis     Personal history of COVID-19 2020    Pruritic disorder     RSV (acute bronchiolitis due to respiratory syncytial virus) 11/02/2022    Spastic colon     Thyroid nodule     Type 2 MI (myocardial infarction) (HCC) due to THERESA and UTI 11/02/2022       Past Surgical History:   Procedure Laterality Date    BLADDER REPAIR      CATARACT EXTRACTION      CHOLECYSTECTOMY      COLONOSCOPY  04/22/2014    2 polyps, adenomatous, diverticulosis    COLONOSCOPY N/A 6/26/2020    Procedure: COLONOSCOPY WITH ANESTHESIA;  Surgeon: John Coleman MD;  Location: [facility] ENDOSCOPY;  Service:  Gastroenterology;  Laterality: N/A;  pre op: constipation  post op: diverticulosis,   PCP: Adam Delgadillo MD    CYSTOCELE REPAIR      ENDOSCOPY  04/22/2014    Schatzki's ring dilated    ENDOSCOPY N/A 6/26/2020    Procedure: ESOPHAGOGASTRODUODENOSCOPY WITH ANESTHESIA;  Surgeon: John Coleman MD;  Location:  [facility] ENDOSCOPY;  Service: Gastroenterology;  Laterality: N/A;  pre op: dysphagia  post op:stricture, dilated   PCP:Adam Delgadillo MD    ENDOSCOPY N/A 9/2/2021    Procedure: ESOPHAGOGASTRODUODENOSCOPY WITH ANESTHESIA;  Surgeon: John Coleman MD;  Location:  [facility] ENDOSCOPY;  Service: Gastroenterology;  Laterality: N/A;  pre op: dysphagia  post op:gastritis  PCP: VERA Noble MD    FEMUR FRACTURE SURGERY      HYSTERECTOMY      INCISION AND DRAINAGE LEG Left 11/3/2021    Procedure: INCISION AND DRAINAGE LEG ABSCESS;  Surgeon: Cesia Mondragon MD;  Location:  [facility] OR;  Service: General;  Laterality: Left;    REPLACEMENT TOTAL KNEE      TOTAL SHOULDER ARTHROPLASTY W/ DISTAL CLAVICLE EXCISION Left 10/3/2023    Procedure: LEFT REVERSE TOTAL SHOULDER ARTHROPLASTY;  Surgeon: Dc Dominguez MD;  Location:  [facility] OR;  Service: Orthopedics;  Laterality: Left;    ULNAR NERVE TRANSPOSITION         Outpatient Medications Marked as Taking for the 6/11/24 encounter (Office Visit) with Mecca Lunsford APRN   Medication Sig Dispense Refill    allopurinol (ZYLOPRIM) 100 MG tablet Take 1 tablet by mouth Daily.      aluminum-magnesium hydroxide-simethicone (MAALOX/MYLANTA) 200-200-20 MG/5ML suspension Take 15 mL by mouth 2 (Two) Times a Day As Needed for Indigestion or Heartburn.      amLODIPine (NORVASC) 10 MG tablet Take 1 tablet by mouth Daily.      ascorbic acid (VITAMIN C) 500 MG tablet Take 1 tablet by mouth Daily.      atorvastatin (LIPITOR) 10 MG tablet Take 1 tablet by mouth Daily.      Calcium Carbonate-Vit D-Min (Caltrate 600+D Plus Minerals) 600-800 MG-UNIT tablet Take 1 tablet by mouth  2 (Two) Times a Day.      carvedilol (COREG) 12.5 MG tablet Take 1 tablet by mouth 2 (Two) Times a Day With Meals.      cloNIDine (CATAPRES) 0.1 MG tablet Take 1 tablet by mouth 2 (Two) Times a Day As Needed for High Blood Pressure (systolic greater than 180). 30 tablet 0    cycloSPORINE (RESTASIS) 0.05 % ophthalmic emulsion Administer 1 drop to both eyes Every 12 (Twelve) Hours.      docusate sodium (COLACE) 100 MG capsule Take 1 capsule by mouth 2 (Two) Times a Day.      esomeprazole (nexIUM) 20 MG capsule Take 1 capsule by mouth 2 (Two) Times a Day. 60 capsule 11    famotidine (PEPCID) 20 MG tablet Take 1 tablet by mouth 2 (Two) Times a Day.      FEROSUL 325 (65 Fe) MG tablet Take 1 tablet by mouth Daily With Breakfast.      furosemide (LASIX) 20 MG tablet Take 1 tablet by mouth Daily. 30 tablet 5    lidocaine (LIDODERM) 5 % Place 1 patch on the skin as directed by provider Daily. Apply to bilateral upper arms.   Remove & Discard patch within 12 hours or as directed by MD      lisinopril (PRINIVIL,ZESTRIL) 20 MG tablet Take 1 tablet by mouth Daily.      loperamide (IMODIUM) 2 MG capsule Take 1 capsule by mouth Every 8 (Eight) Hours As Needed for Diarrhea.      nystatin (MYCOSTATIN) 252118 UNIT/GM powder Apply  topically to the appropriate area as directed 3 (Three) Times a Day. 60 g 11    oxyCODONE-acetaminophen (PERCOCET) 5-325 MG per tablet Take 1 tablet by mouth Every 8 (Eight) Hours As Needed for Moderate Pain. 28 tablet 0    polyethylene glycol (MIRALAX) 17 g packet Take 17 g by mouth Daily As Needed (constipation).      potassium chloride (MICRO-K) 10 MEQ CR capsule Take 1 capsule by mouth Daily.      sertraline (ZOLOFT) 100 MG tablet Take 1 tablet by mouth Daily. 90 tablet 3    simethicone (MYLICON) 125 MG chewable tablet Chew 1 tablet Every 6 (Six) Hours As Needed for Flatulence.      vitamin B-12 (CYANOCOBALAMIN) 1000 MCG tablet Take 1 tablet by mouth Daily.      vitamin D (ERGOCALCIFEROL) 1.25 MG (01063  "UT) capsule capsule Take 1 capsule by mouth Every 14 (Fourteen) Days. Every other Friday         Allergies   Allergen Reactions    Codeine Other (See Comments)     Pt does not recall    Demerol [Meperidine] Rash    Levaquin [Levofloxacin] Other (See Comments)     Pt doesn't remember    Morphine Other (See Comments)     Unknown       Social History     Socioeconomic History    Marital status:    Tobacco Use    Smoking status: Never    Smokeless tobacco: Never   Vaping Use    Vaping status: Never Used   Substance and Sexual Activity    Alcohol use: No    Drug use: No    Sexual activity: Defer     Birth control/protection: Hysterectomy       Family History   Problem Relation Age of Onset    Colon cancer Mother     Heart disease Mother     Heart disease Father     Colon polyps Neg Hx     Esophageal cancer Neg Hx        Review of Systems   Constitutional:  Negative for chills, fever and unexpected weight change.   Respiratory:  Negative for shortness of breath.    Cardiovascular:  Negative for chest pain.   Gastrointestinal:  Negative for abdominal distention, abdominal pain, anal bleeding, blood in stool, constipation, nausea and vomiting.        Vitals:    06/11/24 1334   BP: 162/64   Pulse: 54   Temp: 97.3 °F (36.3 °C)   SpO2: 97%   Weight: 78.9 kg (174 lb)   Height: 152.4 cm (60\")      Body mass index is 33.98 kg/m².    Physical Exam  Vitals reviewed.   Constitutional:       General: She is not in acute distress.  Cardiovascular:      Rate and Rhythm: Normal rate and regular rhythm.      Heart sounds: Normal heart sounds.   Pulmonary:      Effort: Pulmonary effort is normal.      Breath sounds: Normal breath sounds.   Abdominal:      General: Bowel sounds are normal. There is no distension.      Palpations: Abdomen is soft.      Tenderness: There is no abdominal tenderness.   Skin:     General: Skin is warm and dry.   Neurological:      Mental Status: She is alert.         Results for orders placed or " performed in visit on 04/18/24   Gastrointestinal Panel, PCR - Stool, Per Rectum    Specimen: Per Rectum; Stool   Result Value Ref Range    Campylobacter Not Detected Not Detected    Plesiomonas shigelloides Not Detected Not Detected    Salmonella Not Detected Not Detected    Vibrio Not Detected Not Detected    Vibrio cholerae Not Detected Not Detected    Yersinia enterocolitica Not Detected Not Detected    Enteroaggregative E. coli (EAEC) Not Detected Not Detected    Enteropathogenic E. coli (EPEC) Not Detected Not Detected    Enterotoxigenic E. coli (ETEC) lt/st Not Detected Not Detected    Shiga-like toxin-producing E. coli (STEC) stx1/stx2 Not Detected Not Detected    Shigella/Enteroinvasive E. coli (EIEC) Not Detected Not Detected    Cryptosporidium Not Detected Not Detected    Cyclospora cayetanensis Not Detected Not Detected    Entamoeba histolytica Not Detected Not Detected    Giardia lamblia Not Detected Not Detected    Adenovirus F40/41 Not Detected Not Detected    Astrovirus Not Detected Not Detected    Norovirus GI/GII Detected (A) Not Detected    Rotavirus A Not Detected Not Detected    Sapovirus (I, II, IV or V) Not Detected Not Detected   H. Pylori Antigen, Stool - Stool, Per Rectum    Specimen: Per Rectum; Stool   Result Value Ref Range    H pylori Ag, Stl Negative Negative           ASSESSMENT AND PLAN    Assessment & Plan     Diagnoses and all orders for this visit:    1. Diarrhea, unspecified type (Primary)    This has resolved.  I recommend continue fiber supplement daily. 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 She will be due for colonoscopy 2025 unless has recurrence of symptoms.     * Surgery not found *           No follow-ups on file.            There are no Patient Instructions on file for this visit.      Mecca Lunsford, APRN

## 2024-06-20 ENCOUNTER — HOSPITAL ENCOUNTER (OUTPATIENT)
Dept: GENERAL RADIOLOGY | Facility: HOSPITAL | Age: 84
Discharge: HOME | End: 2024-06-20
Payer: MEDICARE

## 2024-06-20 ENCOUNTER — OFFICE VISIT (OUTPATIENT)
Dept: INTERNAL MEDICINE | Facility: CLINIC | Age: 84
End: 2024-06-20
Payer: MEDICARE

## 2024-06-20 ENCOUNTER — LAB (OUTPATIENT)
Dept: LAB | Facility: HOSPITAL | Age: 84
End: 2024-06-20
Payer: MEDICARE

## 2024-06-20 VITALS
OXYGEN SATURATION: 96 % | WEIGHT: 175 LBS | DIASTOLIC BLOOD PRESSURE: 64 MMHG | SYSTOLIC BLOOD PRESSURE: 138 MMHG | HEIGHT: 60 IN | HEART RATE: 58 BPM | TEMPERATURE: 97.7 F | BODY MASS INDEX: 34.36 KG/M2

## 2024-06-20 DIAGNOSIS — R35.0 URINARY FREQUENCY: Primary | ICD-10-CM

## 2024-06-20 DIAGNOSIS — M75.102 ROTATOR CUFF SYNDROME OF LEFT SHOULDER: ICD-10-CM

## 2024-06-20 DIAGNOSIS — R35.0 URINARY FREQUENCY: ICD-10-CM

## 2024-06-20 DIAGNOSIS — R32 URINARY INCONTINENCE, UNSPECIFIED TYPE: ICD-10-CM

## 2024-06-20 DIAGNOSIS — N32.81 OVERACTIVE BLADDER: ICD-10-CM

## 2024-06-20 LAB
BILIRUB UR QL STRIP: NEGATIVE
CLARITY UR: CLEAR
COLOR UR: YELLOW
GLUCOSE UR STRIP-MCNC: NEGATIVE MG/DL
HGB UR QL STRIP.AUTO: NEGATIVE
KETONES UR QL STRIP: NEGATIVE
LEUKOCYTE ESTERASE UR QL STRIP.AUTO: NEGATIVE
NITRITE UR QL STRIP: NEGATIVE
PH UR STRIP.AUTO: 6 [PH] (ref 5–8)
PROT UR QL STRIP: ABNORMAL
SP GR UR STRIP: 1.01 (ref 1–1.03)
UROBILINOGEN UR QL STRIP: ABNORMAL

## 2024-06-20 PROCEDURE — 3075F SYST BP GE 130 - 139MM HG: CPT

## 2024-06-20 PROCEDURE — 81003 URINALYSIS AUTO W/O SCOPE: CPT

## 2024-06-20 PROCEDURE — 3078F DIAST BP <80 MM HG: CPT

## 2024-06-20 PROCEDURE — 99214 OFFICE O/P EST MOD 30 MIN: CPT

## 2024-06-20 RX ORDER — TOLTERODINE 2 MG/1
2 CAPSULE, EXTENDED RELEASE ORAL DAILY
Qty: 30 CAPSULE | Refills: 5 | Status: SHIPPED | OUTPATIENT
Start: 2024-06-20

## 2024-06-20 NOTE — PROGRESS NOTES
Chief Complaint   Patient presents with    Urinary Frequency         History:  Carmen Obrien is a 84 y.o. female who presents today for evaluation of the above problems.      Urinary Frequency   Associated symptoms include frequency. Pertinent negatives include no flank pain, nausea or vomiting.     Ms. Obrien presents today to discuss urinary frequency and incontinence.  Reports that symptoms have been present for many years.  She reports that she is incontinent of urine the majority of the day and rarely makes it to the restroom for urination.  She had a bladder sling in 2020 or 2021 which significantly improved symptoms, following the bladder sling she was put on medications for overactive bladder, but she is unsure which medications.  She reports that this significantly improved incontinence and urinary frequency.  She reports that she only stopped these medications because they were not reported to the nursing home when she was discharged from the hospital.      ROS:  Review of Systems   Constitutional:  Negative for fatigue and fever.   HENT:  Negative for congestion.    Respiratory:  Negative for chest tightness and shortness of breath.    Cardiovascular:  Negative for chest pain and palpitations.   Gastrointestinal:  Positive for diarrhea. Negative for abdominal pain, constipation, nausea and vomiting.   Genitourinary:  Positive for frequency. Negative for difficulty urinating, dysuria and flank pain.         Current Outpatient Medications:     allopurinol (ZYLOPRIM) 100 MG tablet, Take 1 tablet by mouth Daily., Disp: , Rfl:     aluminum-magnesium hydroxide-simethicone (MAALOX/MYLANTA) 200-200-20 MG/5ML suspension, Take 15 mL by mouth 2 (Two) Times a Day As Needed for Indigestion or Heartburn., Disp: , Rfl:     amLODIPine (NORVASC) 10 MG tablet, Take 1 tablet by mouth Daily., Disp: , Rfl:     ascorbic acid (VITAMIN C) 500 MG tablet, Take 1 tablet by mouth Daily., Disp: , Rfl:     atorvastatin (LIPITOR) 10  MG tablet, Take 1 tablet by mouth Daily., Disp: , Rfl:     Calcium Carbonate-Vit D-Min (Caltrate 600+D Plus Minerals) 600-800 MG-UNIT tablet, Take 1 tablet by mouth 2 (Two) Times a Day., Disp: , Rfl:     carvedilol (COREG) 12.5 MG tablet, Take 1 tablet by mouth 2 (Two) Times a Day With Meals., Disp: , Rfl:     cloNIDine (CATAPRES) 0.1 MG tablet, Take 1 tablet by mouth 2 (Two) Times a Day As Needed for High Blood Pressure (systolic greater than 180)., Disp: 30 tablet, Rfl: 0    cycloSPORINE (RESTASIS) 0.05 % ophthalmic emulsion, Administer 1 drop to both eyes Every 12 (Twelve) Hours., Disp: , Rfl:     docusate sodium (COLACE) 100 MG capsule, Take 1 capsule by mouth 2 (Two) Times a Day., Disp: , Rfl:     esomeprazole (nexIUM) 20 MG capsule, Take 1 capsule by mouth 2 (Two) Times a Day., Disp: 60 capsule, Rfl: 11    famotidine (PEPCID) 20 MG tablet, Take 1 tablet by mouth 2 (Two) Times a Day., Disp: , Rfl:     FEROSUL 325 (65 Fe) MG tablet, Take 1 tablet by mouth Daily With Breakfast., Disp: , Rfl:     furosemide (LASIX) 20 MG tablet, Take 1 tablet by mouth Daily., Disp: 30 tablet, Rfl: 5    lidocaine (LIDODERM) 5 %, Place 1 patch on the skin as directed by provider Daily. Apply to bilateral upper arms.  Remove & Discard patch within 12 hours or as directed by MD, Disp: , Rfl:     lisinopril (PRINIVIL,ZESTRIL) 20 MG tablet, Take 1 tablet by mouth Daily., Disp: , Rfl:     loperamide (IMODIUM) 2 MG capsule, Take 1 capsule by mouth Every 8 (Eight) Hours As Needed for Diarrhea., Disp: , Rfl:     Melatonin 10 MG capsule, Take  by mouth. 2 tab, Disp: , Rfl:     nystatin (MYCOSTATIN) 429235 UNIT/GM powder, Apply  topically to the appropriate area as directed 3 (Three) Times a Day., Disp: 60 g, Rfl: 11    oxyCODONE-acetaminophen (PERCOCET) 5-325 MG per tablet, Take 1 tablet by mouth Every 8 (Eight) Hours As Needed for Moderate Pain., Disp: 28 tablet, Rfl: 0    polyethylene glycol (MIRALAX) 17 g packet, Take 17 g by mouth Daily  As Needed (constipation)., Disp: , Rfl:     potassium chloride (MICRO-K) 10 MEQ CR capsule, Take 1 capsule by mouth Daily., Disp: , Rfl:     sertraline (ZOLOFT) 100 MG tablet, Take 1 tablet by mouth Daily., Disp: 90 tablet, Rfl: 3    simethicone (MYLICON) 125 MG chewable tablet, Chew 1 tablet Every 6 (Six) Hours As Needed for Flatulence., Disp: , Rfl:     vitamin B-12 (CYANOCOBALAMIN) 1000 MCG tablet, Take 1 tablet by mouth Daily., Disp: , Rfl:     vitamin D (ERGOCALCIFEROL) 1.25 MG (03095 UT) capsule capsule, Take 1 capsule by mouth Every 14 (Fourteen) Days. Every other Friday, Disp: , Rfl:     tolterodine LA (DETROL LA) 2 MG 24 hr capsule, Take 1 capsule by mouth Daily., Disp: 30 capsule, Rfl: 5    Lab Results   Component Value Date    GLUCOSE 102 (H) 04/18/2024    BUN 23 04/18/2024    CREATININE 0.50 (L) 04/18/2024    EGFR 93.8 04/18/2024    BCR 46.0 (H) 04/18/2024    K 4.9 04/18/2024    CO2 27.0 04/18/2024    CALCIUM 9.8 04/18/2024    ALBUMIN 4.3 04/18/2024    BILITOT 0.4 04/18/2024    AST 22 04/18/2024    ALT 18 04/18/2024       WBC   Date Value Ref Range Status   04/18/2024 6.05 3.40 - 10.80 10*3/mm3 Final   07/16/2019 7.0 4.8 - 10.8 K/uL Final     RBC   Date Value Ref Range Status   04/18/2024 3.93 3.77 - 5.28 10*6/mm3 Final   07/16/2019 3.52 (L) 4.20 - 5.40 M/uL Final     Hemoglobin   Date Value Ref Range Status   04/18/2024 11.9 (L) 12.0 - 15.9 g/dL Final   07/16/2019 10.1 (L) 12.0 - 16.0 g/dL Final     Hematocrit   Date Value Ref Range Status   04/18/2024 37.2 34.0 - 46.6 % Final   07/16/2019 31.7 (L) 37.0 - 47.0 % Final     MCV   Date Value Ref Range Status   04/18/2024 94.7 79.0 - 97.0 fL Final   07/16/2019 90.1 81.0 - 99.0 fL Final     MCH   Date Value Ref Range Status   04/18/2024 30.3 26.6 - 33.0 pg Final   07/16/2019 28.7 27.0 - 31.0 pg Final     MCHC   Date Value Ref Range Status   04/18/2024 32.0 31.5 - 35.7 g/dL Final   07/16/2019 31.9 (L) 33.0 - 37.0 g/dL Final     RDW   Date Value Ref Range  Status   04/18/2024 13.9 12.3 - 15.4 % Final   07/16/2019 14.7 (H) 11.5 - 14.5 % Final     RDW-SD   Date Value Ref Range Status   04/18/2024 48.8 37.0 - 54.0 fl Final     MPV   Date Value Ref Range Status   04/18/2024 10.5 6.0 - 12.0 fL Final   07/16/2019 10.8 9.4 - 12.3 fL Final     Platelets   Date Value Ref Range Status   04/18/2024 220 140 - 450 10*3/mm3 Final   07/16/2019 248 130 - 400 K/uL Final     Neutrophil Rel %   Date Value Ref Range Status   07/04/2019 76.5 (H) 50.0 - 65.0 % Final     Neutrophil %   Date Value Ref Range Status   01/08/2024 71.9 42.7 - 76.0 % Final     Lymphocyte Rel %   Date Value Ref Range Status   07/04/2019 13.9 (L) 20.0 - 40.0 % Final     Lymphocyte %   Date Value Ref Range Status   01/08/2024 17.2 (L) 19.6 - 45.3 % Final     Monocyte Rel %   Date Value Ref Range Status   07/04/2019 7.4 0.0 - 10.0 % Final     Monocyte %   Date Value Ref Range Status   01/08/2024 7.5 5.0 - 12.0 % Final     Eosinophil Rel %   Date Value Ref Range Status   07/04/2019 1.4 0.0 - 5.0 % Final     Eosinophil %   Date Value Ref Range Status   01/08/2024 2.2 0.3 - 6.2 % Final     Basophil Rel %   Date Value Ref Range Status   07/04/2019 0.4 0.0 - 1.0 % Final     Basophil %   Date Value Ref Range Status   01/08/2024 0.7 0.0 - 1.5 % Final     Immature Grans %   Date Value Ref Range Status   01/08/2024 0.5 0.0 - 0.5 % Final     Neutrophils Absolute   Date Value Ref Range Status   07/04/2019 7.7 (H) 1.5 - 7.5 K/uL Final     Neutrophils, Absolute   Date Value Ref Range Status   01/08/2024 5.27 1.70 - 7.00 10*3/mm3 Final     Lymphocytes Absolute   Date Value Ref Range Status   07/04/2019 1.4 1.1 - 4.5 K/uL Final     Lymphocytes, Absolute   Date Value Ref Range Status   01/08/2024 1.26 0.70 - 3.10 10*3/mm3 Final     Monocytes Absolute   Date Value Ref Range Status   07/04/2019 0.80 0.00 - 0.90 K/uL Final     Monocytes, Absolute   Date Value Ref Range Status   01/08/2024 0.55 0.10 - 0.90 10*3/mm3 Final     Eosinophils  "Absolute   Date Value Ref Range Status   07/04/2019 0.10 0.00 - 0.60 K/uL Final     Eosinophils, Absolute   Date Value Ref Range Status   01/08/2024 0.16 0.00 - 0.40 10*3/mm3 Final     Basophils Absolute   Date Value Ref Range Status   07/04/2019 0.00 0.00 - 0.20 K/uL Final     Basophils, Absolute   Date Value Ref Range Status   01/08/2024 0.05 0.00 - 0.20 10*3/mm3 Final     Immature Grans, Absolute   Date Value Ref Range Status   01/08/2024 0.04 0.00 - 0.05 10*3/mm3 Final     nRBC   Date Value Ref Range Status   01/08/2024 0.0 0.0 - 0.2 /100 WBC Final         OBJECTIVE:  Visit Vitals  /64 (BP Location: Right arm, Patient Position: Sitting, Cuff Size: Adult)   Pulse 58   Temp 97.7 °F (36.5 °C) (Oral)   Ht 152.4 cm (60\")   Wt 79.4 kg (175 lb)   SpO2 96%   BMI 34.18 kg/m²      Physical Exam  Constitutional:       Appearance: Normal appearance.   Eyes:      Pupils: Pupils are equal, round, and reactive to light.   Cardiovascular:      Rate and Rhythm: Normal rate and regular rhythm.      Pulses: Normal pulses.      Heart sounds: Normal heart sounds.   Pulmonary:      Effort: Pulmonary effort is normal.      Breath sounds: Normal breath sounds.   Musculoskeletal:         General: Normal range of motion.   Skin:     General: Skin is warm and dry.   Neurological:      Mental Status: She is alert and oriented to person, place, and time.   Psychiatric:         Mood and Affect: Mood normal.         Behavior: Behavior normal.         Thought Content: Thought content normal.         Judgment: Judgment normal.         Assessment/Plan    Diagnoses and all orders for this visit:    1. Urinary frequency (Primary)  -     Urinalysis With Culture If Indicated -; Future  -     tolterodine LA (DETROL LA) 2 MG 24 hr capsule; Take 1 capsule by mouth Daily.  Dispense: 30 capsule; Refill: 5    2. Urinary incontinence, unspecified type  -     Urinalysis With Culture If Indicated -; Future  -     tolterodine LA (DETROL LA) 2 MG 24 hr " capsule; Take 1 capsule by mouth Daily.  Dispense: 30 capsule; Refill: 5    3. Overactive bladder  -     tolterodine LA (DETROL LA) 2 MG 24 hr capsule; Take 1 capsule by mouth Daily.  Dispense: 30 capsule; Refill: 5      Urinalysis today is negative.  She has tried medications including oxybutynin and tolterodine in the past that worked well for her.  She denies any dysuria, hematuria, pelvic pain, or abdominal pain.  She has had a bladder sling in the past and is concerned that this is no longer functioning properly.  I did encourage her to do timed voids every 2 hours to empty her bladder to avoid significant urinary incontinence.  Trial of tolterodine 2mg to pharmacy.  I have encouraged her to call if she has urinary retention or dysuria.    Return in about 3 months (around 9/20/2024).      ABDON Rosado  11:11 CDT  6/20/2024   Electronically signed

## 2024-06-24 ENCOUNTER — TRANSCRIBE ORDERS (OUTPATIENT)
Dept: ADMINISTRATIVE | Facility: HOSPITAL | Age: 84
End: 2024-06-24
Payer: MEDICARE

## 2024-06-24 DIAGNOSIS — M25.511 RIGHT SHOULDER PAIN, UNSPECIFIED CHRONICITY: Primary | ICD-10-CM

## 2024-06-24 DIAGNOSIS — M75.102 ROTATOR CUFF SYNDROME OF LEFT SHOULDER: ICD-10-CM

## 2024-07-03 ENCOUNTER — TELEPHONE (OUTPATIENT)
Dept: INTERNAL MEDICINE | Facility: CLINIC | Age: 84
End: 2024-07-03
Payer: MEDICARE

## 2024-07-03 DIAGNOSIS — Z11.1 SCREENING FOR TUBERCULOSIS: Primary | ICD-10-CM

## 2024-07-03 NOTE — TELEPHONE ENCOUNTER
PATIENT HAS CALLED, SHE IS ASKING FOR AN ORDER FOR LAB DRAW FOR TB.    [facility] IS REQUIR[facility] THIS.   SHE IS ASKING IF THIS COULD BE PUT IN SO SHE CAN GET IT DRAWN ON MONDAY     [phone]

## 2024-07-08 ENCOUNTER — LAB (OUTPATIENT)
Dept: LAB | Facility: HOSPITAL | Age: 84
End: 2024-07-08
Payer: MEDICARE

## 2024-07-08 ENCOUNTER — HOSPITAL ENCOUNTER (OUTPATIENT)
Dept: GENERAL RADIOLOGY | Facility: HOSPITAL | Age: 84
Discharge: HOME | End: 2024-07-08
Payer: MEDICARE

## 2024-07-08 DIAGNOSIS — M25.511 RIGHT SHOULDER PAIN, UNSPECIFIED CHRONICITY: ICD-10-CM

## 2024-07-08 DIAGNOSIS — Z11.1 SCREENING FOR TUBERCULOSIS: ICD-10-CM

## 2024-07-08 DIAGNOSIS — M75.102 ROTATOR CUFF SYNDROME OF LEFT SHOULDER: ICD-10-CM

## 2024-07-08 PROCEDURE — 86480 TB TEST CELL IMMUN MEASURE: CPT

## 2024-07-08 PROCEDURE — 73030 X-RAY EXAM OF SHOULDER: CPT

## 2024-07-08 PROCEDURE — 36415 COLL VENOUS BLD VENIPUNCTURE: CPT

## 2024-07-10 LAB
GAMMA INTERFERON BACKGROUND BLD IA-ACNC: 0.01 IU/ML
M TB IFN-G BLD-IMP: NEGATIVE
M TB IFN-G CD4+ BCKGRND COR BLD-ACNC: 0.01 IU/ML
M TB IFN-G CD4+CD8+ BCKGRND COR BLD-ACNC: 0.02 IU/ML
MITOGEN IGNF BCKGRD COR BLD-ACNC: >10 IU/ML
SERVICE CMNT-IMP: NORMAL

## 2024-07-12 ENCOUNTER — TELEPHONE (OUTPATIENT)
Dept: INTERNAL MEDICINE | Facility: CLINIC | Age: 84
End: 2024-07-12
Payer: MEDICARE

## 2024-08-16 ENCOUNTER — TELEPHONE (OUTPATIENT)
Dept: INTERNAL MEDICINE | Facility: CLINIC | Age: 84
End: 2024-08-16
Payer: MEDICARE

## 2024-08-16 NOTE — TELEPHONE ENCOUNTER
Please find out who prescribed this in the past for her.  We received a refill request on June 20th.  The medication was refilled but this is not something that we usually manage.  If he is still having issues would recommend referral to urology.

## 2024-08-16 NOTE — TELEPHONE ENCOUNTER
"PATIENT HAS CALLED, SHE IS ASKING FOR THIS MEDICATION  \"TOLTERODINE LA\" TO BE INCREASED TO 4mg.  SHE STATED THAT SHE FOUND HER BOTTLE AND IT WAS FOR 4mg.    PLEASE ADVISE.   SHE STATED THAT HER LEGS AND FEET ARE STILL SWELLING.  SHE STATED THAT SHE DOES KEEP HER LEGS ELEVATED  AND DOES TAKE HER MEDICATION AS PRESCRIBED.   SHE HAS TRIED THE COMPRESSION HOSE, THE BIGGEST SIZE THAT WAS SOLD AND SHE COULD NOT WEAR THEM BECAUSE THEY WAS TO TIGHT.    "

## 2024-08-16 NOTE — TELEPHONE ENCOUNTER
PATIENT HAS RETURNED CALL, GIVEN MESSAGE AND STATED THAT SHE WILL CALL THE PHARMACY TO GET A REFILL

## 2024-08-29 ENCOUNTER — TELEPHONE (OUTPATIENT)
Dept: INTERNAL MEDICINE | Facility: CLINIC | Age: 84
End: 2024-08-29
Payer: MEDICARE

## 2024-08-29 DIAGNOSIS — R32 URINARY INCONTINENCE, UNSPECIFIED TYPE: Primary | ICD-10-CM

## 2024-08-29 DIAGNOSIS — N32.81 OVERACTIVE BLADDER: ICD-10-CM

## 2024-08-29 NOTE — TELEPHONE ENCOUNTER
PATIENT IS ASKING FOR A REFERRAL TO UROLOGY.  SHE STATED SHE IS HAVING MORE ISSUES WITH HER BLADDER, ESPECIALLY AT NIGHT.     PATIENT IS SCHEDULED TO BE SEEN ON 09/03/2024

## 2024-08-30 NOTE — TELEPHONE ENCOUNTER
I have placed a referral to urology.  Once this is set up they will contact her to schedule an appointment.  Please have her know if she has any questions

## 2024-09-03 ENCOUNTER — OFFICE VISIT (OUTPATIENT)
Dept: INTERNAL MEDICINE | Facility: CLINIC | Age: 84
End: 2024-09-03
Payer: MEDICARE

## 2024-09-03 ENCOUNTER — LAB (OUTPATIENT)
Dept: LAB | Facility: HOSPITAL | Age: 84
End: 2024-09-03
Payer: MEDICARE

## 2024-09-03 VITALS
BODY MASS INDEX: 34.18 KG/M2 | HEIGHT: 60 IN | HEART RATE: 52 BPM | SYSTOLIC BLOOD PRESSURE: 134 MMHG | DIASTOLIC BLOOD PRESSURE: 78 MMHG | OXYGEN SATURATION: 95 %

## 2024-09-03 DIAGNOSIS — R32 URINARY INCONTINENCE, UNSPECIFIED TYPE: ICD-10-CM

## 2024-09-03 DIAGNOSIS — N32.81 OVERACTIVE BLADDER: Primary | ICD-10-CM

## 2024-09-03 DIAGNOSIS — N32.81 OVERACTIVE BLADDER: ICD-10-CM

## 2024-09-03 LAB
BACTERIA UR QL AUTO: NORMAL /HPF
BILIRUB UR QL STRIP: NEGATIVE
CLARITY UR: CLEAR
COLOR UR: YELLOW
GLUCOSE UR STRIP-MCNC: NEGATIVE MG/DL
HGB UR QL STRIP.AUTO: NEGATIVE
HYALINE CASTS UR QL AUTO: NORMAL /LPF
KETONES UR QL STRIP: NEGATIVE
LEUKOCYTE ESTERASE UR QL STRIP.AUTO: NEGATIVE
NITRITE UR QL STRIP: NEGATIVE
PH UR STRIP.AUTO: 6 [PH] (ref 5–8)
PROT UR QL STRIP: ABNORMAL
RBC # UR STRIP: NORMAL /HPF
REF LAB TEST METHOD: NORMAL
SP GR UR STRIP: 1.01 (ref 1–1.03)
SQUAMOUS #/AREA URNS HPF: NORMAL /HPF
UROBILINOGEN UR QL STRIP: ABNORMAL
WBC # UR STRIP: NORMAL /HPF

## 2024-09-03 PROCEDURE — 3075F SYST BP GE 130 - 139MM HG: CPT

## 2024-09-03 PROCEDURE — 3078F DIAST BP <80 MM HG: CPT

## 2024-09-03 PROCEDURE — 99213 OFFICE O/P EST LOW 20 MIN: CPT

## 2024-09-03 PROCEDURE — 81001 URINALYSIS AUTO W/SCOPE: CPT

## 2024-09-03 RX ORDER — PREGABALIN 75 MG/1
75 CAPSULE ORAL 2 TIMES DAILY
COMMUNITY

## 2024-09-03 NOTE — PROGRESS NOTES
Chief Complaint   Patient presents with    Neck Pain         History:      Carmen Obrien is a 84 y.o. female who presents today for evaluation of the above problems.      HPI  History of Present Illness  The patient is an 84-year-old female who presents for evaluation of multiple medical concerns.    She is seeking a referral to a urologist as advised by Dr. Cook. She is currently on Detrol 2 mg but has requested an increase to 4 mg, which she had been taking in [place]. This morning, she experienced urinary incontinence upon returning to her room from the bathroom. She occasionally experiences mild burning during urination, but not currently. She reports no back pain, fever, nausea, or vomiting.    She has been experiencing foot swelling for some time, which has recently improved. She is currently on Lasix 20 mg once daily. She does not monitor her diet in relation to the swelling. The swelling is sometimes painful to touch and leaves wrinkles when it subsides. She has tried compression socks but found them uncomfortable.     Her blood pressure has been well-controlled on Norvasc 10 mg.        ROS:  Review of Systems   Respiratory:  Negative for chest tightness and shortness of breath.    Cardiovascular:  Positive for leg swelling. Negative for chest pain and palpitations.         Current Outpatient Medications:     allopurinol (ZYLOPRIM) 100 MG tablet, Take 1 tablet by mouth Daily., Disp: , Rfl:     aluminum-magnesium hydroxide-simethicone (MAALOX/MYLANTA) 200-200-20 MG/5ML suspension, Take 15 mL by mouth 2 (Two) Times a Day As Needed for Indigestion or Heartburn., Disp: , Rfl:     amLODIPine (NORVASC) 10 MG tablet, Take 1 tablet by mouth Daily., Disp: , Rfl:     atorvastatin (LIPITOR) 10 MG tablet, Take 1 tablet by mouth Daily., Disp: , Rfl:     carvedilol (COREG) 12.5 MG tablet, Take 1 tablet by mouth 2 (Two) Times a Day With Meals., Disp: , Rfl:     cloNIDine (CATAPRES) 0.1 MG tablet, Take 1 tablet by  mouth 2 (Two) Times a Day As Needed for High Blood Pressure (systolic greater than 180)., Disp: 30 tablet, Rfl: 0    esomeprazole (nexIUM) 20 MG capsule, Take 1 capsule by mouth 2 (Two) Times a Day., Disp: 60 capsule, Rfl: 11    FEROSUL 325 (65 Fe) MG tablet, Take 1 tablet by mouth Daily With Breakfast., Disp: , Rfl:     furosemide (LASIX) 20 MG tablet, Take 1 tablet by mouth Daily., Disp: 30 tablet, Rfl: 5    lidocaine (LIDODERM) 5 %, Place 1 patch on the skin as directed by provider Daily. Apply to bilateral upper arms.  Remove & Discard patch within 12 hours or as directed by MD, Disp: , Rfl:     lisinopril (PRINIVIL,ZESTRIL) 20 MG tablet, Take 1 tablet by mouth Daily., Disp: , Rfl:     loperamide (IMODIUM) 2 MG capsule, Take 1 capsule by mouth Every 8 (Eight) Hours As Needed for Diarrhea., Disp: , Rfl:     nystatin (MYCOSTATIN) 485477 UNIT/GM powder, Apply  topically to the appropriate area as directed 3 (Three) Times a Day., Disp: 60 g, Rfl: 11    oxyCODONE-acetaminophen (PERCOCET) 5-325 MG per tablet, Take 1 tablet by mouth Every 8 (Eight) Hours As Needed for Moderate Pain., Disp: 28 tablet, Rfl: 0    polyethylene glycol (MIRALAX) 17 g packet, Take 17 g by mouth Daily As Needed (constipation)., Disp: , Rfl:     potassium chloride (MICRO-K) 10 MEQ CR capsule, Take 1 capsule by mouth Daily., Disp: , Rfl:     pregabalin (LYRICA) 75 MG capsule, Take 1 capsule by mouth 2 (Two) Times a Day., Disp: , Rfl:     sertraline (ZOLOFT) 100 MG tablet, Take 1 tablet by mouth Daily., Disp: 90 tablet, Rfl: 3    simethicone (MYLICON) 125 MG chewable tablet, Chew 1 tablet Every 6 (Six) Hours As Needed for Flatulence., Disp: , Rfl:     tolterodine LA (DETROL LA) 2 MG 24 hr capsule, Take 1 capsule by mouth Daily., Disp: 30 capsule, Rfl: 5    vitamin B-12 (CYANOCOBALAMIN) 1000 MCG tablet, Take 1 tablet by mouth Daily., Disp: , Rfl:     ascorbic acid (VITAMIN C) 500 MG tablet, Take 1 tablet by mouth Daily. (Patient not taking:  Reported on 9/3/2024), Disp: , Rfl:     Calcium Carbonate-Vit D-Min (Caltrate 600+D Plus Minerals) 600-800 MG-UNIT tablet, Take 1 tablet by mouth 2 (Two) Times a Day., Disp: , Rfl:     famotidine (PEPCID) 20 MG tablet, Take 1 tablet by mouth 2 (Two) Times a Day. (Patient not taking: Reported on 9/3/2024), Disp: , Rfl:     Melatonin 10 MG capsule, Take  by mouth. 2 tab (Patient not taking: Reported on 9/3/2024), Disp: , Rfl:     vitamin D (ERGOCALCIFEROL) 1.25 MG (13723 UT) capsule capsule, Take 1 capsule by mouth Every 14 (Fourteen) Days. Every other Friday (Patient not taking: Reported on 9/3/2024), Disp: , Rfl:     Lab Results   Component Value Date    GLUCOSE 102 (H) 04/18/2024    BUN 23 04/18/2024    CREATININE 0.50 (L) 04/18/2024     04/18/2024    K 4.9 04/18/2024     04/18/2024    CALCIUM 9.8 04/18/2024    PROTEINTOT 7.3 04/18/2024    ALBUMIN 4.3 04/18/2024    ALT 18 04/18/2024    AST 22 04/18/2024    ALKPHOS 90 04/18/2024    BILITOT 0.4 04/18/2024    GLOB 3.0 04/18/2024    AGRATIO 1.4 04/18/2024    BCR 46.0 (H) 04/18/2024    ANIONGAP 8.0 04/18/2024    EGFR 93.8 04/18/2024       WBC   Date Value Ref Range Status   04/18/2024 6.05 3.40 - 10.80 10*3/mm3 Final   07/16/2019 7.0 4.8 - 10.8 K/uL Final     RBC   Date Value Ref Range Status   04/18/2024 3.93 3.77 - 5.28 10*6/mm3 Final   07/16/2019 3.52 (L) 4.20 - 5.40 M/uL Final     Hemoglobin   Date Value Ref Range Status   04/18/2024 11.9 (L) 12.0 - 15.9 g/dL Final   07/16/2019 10.1 (L) 12.0 - 16.0 g/dL Final     Hematocrit   Date Value Ref Range Status   04/18/2024 37.2 34.0 - 46.6 % Final   07/16/2019 31.7 (L) 37.0 - 47.0 % Final     MCV   Date Value Ref Range Status   04/18/2024 94.7 79.0 - 97.0 fL Final   07/16/2019 90.1 81.0 - 99.0 fL Final     MCH   Date Value Ref Range Status   04/18/2024 30.3 26.6 - 33.0 pg Final   07/16/2019 28.7 27.0 - 31.0 pg Final     MCHC   Date Value Ref Range Status   04/18/2024 32.0 31.5 - 35.7 g/dL Final   07/16/2019  31.9 (L) 33.0 - 37.0 g/dL Final     RDW   Date Value Ref Range Status   04/18/2024 13.9 12.3 - 15.4 % Final   07/16/2019 14.7 (H) 11.5 - 14.5 % Final     RDW-SD   Date Value Ref Range Status   04/18/2024 48.8 37.0 - 54.0 fl Final     MPV   Date Value Ref Range Status   04/18/2024 10.5 6.0 - 12.0 fL Final   07/16/2019 10.8 9.4 - 12.3 fL Final     Platelets   Date Value Ref Range Status   04/18/2024 220 140 - 450 10*3/mm3 Final   07/16/2019 248 130 - 400 K/uL Final     Neutrophil Rel %   Date Value Ref Range Status   07/04/2019 76.5 (H) 50.0 - 65.0 % Final     Neutrophil %   Date Value Ref Range Status   01/08/2024 71.9 42.7 - 76.0 % Final     Lymphocyte Rel %   Date Value Ref Range Status   07/04/2019 13.9 (L) 20.0 - 40.0 % Final     Lymphocyte %   Date Value Ref Range Status   01/08/2024 17.2 (L) 19.6 - 45.3 % Final     Monocyte Rel %   Date Value Ref Range Status   07/04/2019 7.4 0.0 - 10.0 % Final     Monocyte %   Date Value Ref Range Status   01/08/2024 7.5 5.0 - 12.0 % Final     Eosinophil Rel %   Date Value Ref Range Status   07/04/2019 1.4 0.0 - 5.0 % Final     Eosinophil %   Date Value Ref Range Status   01/08/2024 2.2 0.3 - 6.2 % Final     Basophil Rel %   Date Value Ref Range Status   07/04/2019 0.4 0.0 - 1.0 % Final     Basophil %   Date Value Ref Range Status   01/08/2024 0.7 0.0 - 1.5 % Final     Immature Grans %   Date Value Ref Range Status   01/08/2024 0.5 0.0 - 0.5 % Final     Neutrophils Absolute   Date Value Ref Range Status   07/04/2019 7.7 (H) 1.5 - 7.5 K/uL Final     Neutrophils, Absolute   Date Value Ref Range Status   01/08/2024 5.27 1.70 - 7.00 10*3/mm3 Final     Lymphocytes Absolute   Date Value Ref Range Status   07/04/2019 1.4 1.1 - 4.5 K/uL Final     Lymphocytes, Absolute   Date Value Ref Range Status   01/08/2024 1.26 0.70 - 3.10 10*3/mm3 Final     Monocytes Absolute   Date Value Ref Range Status   07/04/2019 0.80 0.00 - 0.90 K/uL Final     Monocytes, Absolute   Date Value Ref Range  "Status   01/08/2024 0.55 0.10 - 0.90 10*3/mm3 Final     Eosinophils Absolute   Date Value Ref Range Status   07/04/2019 0.10 0.00 - 0.60 K/uL Final     Eosinophils, Absolute   Date Value Ref Range Status   01/08/2024 0.16 0.00 - 0.40 10*3/mm3 Final     Basophils Absolute   Date Value Ref Range Status   07/04/2019 0.00 0.00 - 0.20 K/uL Final     Basophils, Absolute   Date Value Ref Range Status   01/08/2024 0.05 0.00 - 0.20 10*3/mm3 Final     Immature Grans, Absolute   Date Value Ref Range Status   01/08/2024 0.04 0.00 - 0.05 10*3/mm3 Final     nRBC   Date Value Ref Range Status   01/08/2024 0.0 0.0 - 0.2 /100 WBC Final       OBJECTIVE:  Visit Vitals  /78 (BP Location: Right arm, Patient Position: Sitting, Cuff Size: Adult)   Pulse 52   Ht 152.4 cm (60\")   SpO2 95%   BMI 34.18 kg/m²      Physical Exam  Physical Exam      Results      Assessment/Plan    Diagnoses and all orders for this visit:    1. Overactive bladder (Primary)  -     Urinalysis With Culture If Indicated -; Future    2. Urinary incontinence, unspecified type  -     Urinalysis With Culture If Indicated -; Future      Assessment & Plan  1. Urinary incontinence.  A referral to urology has been initiated due to her symptoms. A urinalysis has been ordered to rule out a urinary tract infection. She is not currently experiencing burning with urination, back pain, fever, nausea, or vomiting.    2. Bilateral lower extremity edema.  Her current medication, Norvasc 10 mg, could be contributing to the swelling. Her blood pressure is well-controlled today.  She was advised to limit her sodium intake and use compression socks. She was also advised to elevate her feet while sitting. Discussed increasing lasix, but this could contribute to urinary frequency, recommend making lifestyle modifications including compression socks and limited sodium intake prior to considering increasing lasix.     Follow-up  She is scheduled for a regular checkup in 3 " weeks.      Return in about 3 weeks (around 9/24/2024).      ABDON Rosado  15:35 CDT  9/3/2024   Electronically signed      Patient or patient representative verbalized consent for the use of Ambient Listening during the visit with  ABDON Rosado for chart documentation. 9/3/2024  16:58 CDT

## 2024-09-16 RX ORDER — SERTRALINE HYDROCHLORIDE 100 MG/1
100 TABLET, FILM COATED ORAL DAILY
Qty: 90 TABLET | Refills: 3 | Status: SHIPPED | OUTPATIENT
Start: 2024-09-16

## 2024-09-25 ENCOUNTER — OFFICE VISIT (OUTPATIENT)
Dept: INTERNAL MEDICINE | Facility: CLINIC | Age: 84
End: 2024-09-25
Payer: MEDICARE

## 2024-09-25 VITALS
TEMPERATURE: 97.9 F | OXYGEN SATURATION: 98 % | BODY MASS INDEX: 34.36 KG/M2 | DIASTOLIC BLOOD PRESSURE: 72 MMHG | HEIGHT: 60 IN | WEIGHT: 175 LBS | HEART RATE: 58 BPM | SYSTOLIC BLOOD PRESSURE: 130 MMHG

## 2024-09-25 DIAGNOSIS — R29.6 RECURRENT FALLS: ICD-10-CM

## 2024-09-25 DIAGNOSIS — I10 ESSENTIAL HYPERTENSION: Primary | ICD-10-CM

## 2024-09-25 DIAGNOSIS — R32 URINARY INCONTINENCE, UNSPECIFIED TYPE: ICD-10-CM

## 2024-09-25 DIAGNOSIS — Z91.81 AT HIGH RISK FOR FALLS: ICD-10-CM

## 2024-09-25 DIAGNOSIS — R35.0 URINARY FREQUENCY: ICD-10-CM

## 2024-09-25 DIAGNOSIS — N32.81 OVERACTIVE BLADDER: ICD-10-CM

## 2024-09-25 PROCEDURE — 99213 OFFICE O/P EST LOW 20 MIN: CPT | Performed by: INTERNAL MEDICINE

## 2024-09-25 PROCEDURE — 1160F RVW MEDS BY RX/DR IN RCRD: CPT | Performed by: INTERNAL MEDICINE

## 2024-09-25 PROCEDURE — G2211 COMPLEX E/M VISIT ADD ON: HCPCS | Performed by: INTERNAL MEDICINE

## 2024-09-25 PROCEDURE — 1159F MED LIST DOCD IN RCRD: CPT | Performed by: INTERNAL MEDICINE

## 2024-09-25 PROCEDURE — 3075F SYST BP GE 130 - 139MM HG: CPT | Performed by: INTERNAL MEDICINE

## 2024-09-25 PROCEDURE — 3078F DIAST BP <80 MM HG: CPT | Performed by: INTERNAL MEDICINE

## 2024-09-25 RX ORDER — PREDNISONE 5 MG/1
1 TABLET ORAL DAILY
COMMUNITY
Start: 2024-09-03

## 2024-09-27 NOTE — PROGRESS NOTES
"Subjective    Ms. Obrien is 84 y.o. female    Chief Complaint: Urinary incontinence    History of Present Illness    84-year-old female new patient referred by PCP due to ongoing rico with urinary incontinence, inability to make it to the bathroom in time and nocturnal enuresis.  Has been on Detrol LA both 2 and 4 mg for the last couple years.  Patient reports she is \"soaking through not only her depends each night but also the bed pads that she has lining her bed with.  She is wearing a pad inside a depend during the daytime as well.  Patient is also reporting a chronic history with fecal incontinence for which patient states she has been seen by many different doctors and continues to experience the issue.    Previously seen by Dr. Guajardo who referred patient to [place] for bladder repair surgery Dr Mittal at the [facility] [phone], patient unable to recall which year had procedure completed.     The following portions of the patient's history were reviewed and updated as appropriate: allergies, current medications, past family history, past medical history, past social history, past surgical history and problem list.    Review of Systems   Constitutional:  Negative for chills and fever.   Gastrointestinal:  Positive for diarrhea. Negative for abdominal pain, anal bleeding and blood in stool.   Genitourinary:  Positive for frequency and urgency. Negative for dysuria and hematuria.         Current Outpatient Medications:     allopurinol (ZYLOPRIM) 100 MG tablet, Take 1 tablet by mouth Daily., Disp: , Rfl:     aluminum-magnesium hydroxide-simethicone (MAALOX/MYLANTA) 200-200-20 MG/5ML suspension, Take 15 mL by mouth 2 (Two) Times a Day As Needed for Indigestion or Heartburn., Disp: , Rfl:     amLODIPine (NORVASC) 10 MG tablet, Take 1 tablet by mouth Daily., Disp: , Rfl:     atorvastatin (LIPITOR) 10 MG tablet, Take 1 tablet by mouth Daily., Disp: , Rfl:     Calcium Carbonate-Vit D-Min " (Caltrate 600+D Plus Minerals) 600-800 MG-UNIT tablet, Take 1 tablet by mouth 2 (Two) Times a Day., Disp: , Rfl:     carvedilol (COREG) 12.5 MG tablet, Take 1 tablet by mouth 2 (Two) Times a Day With Meals., Disp: , Rfl:     cloNIDine (CATAPRES) 0.1 MG tablet, Take 1 tablet by mouth 2 (Two) Times a Day As Needed for High Blood Pressure (systolic greater than 180)., Disp: 30 tablet, Rfl: 0    esomeprazole (nexIUM) 20 MG capsule, Take 1 capsule by mouth 2 (Two) Times a Day., Disp: 60 capsule, Rfl: 11    famotidine (PEPCID) 20 MG tablet, Take 1 tablet by mouth 2 (Two) Times a Day., Disp: , Rfl:     FEROSUL 325 (65 Fe) MG tablet, Take 1 tablet by mouth Daily With Breakfast., Disp: , Rfl:     furosemide (LASIX) 20 MG tablet, Take 1 tablet by mouth Daily., Disp: 30 tablet, Rfl: 5    lidocaine (LIDODERM) 5 %, Place 1 patch on the skin as directed by provider Daily. Apply to bilateral upper arms.  Remove & Discard patch within 12 hours or as directed by MD, Disp: , Rfl:     lisinopril (PRINIVIL,ZESTRIL) 20 MG tablet, Take 1 tablet by mouth Daily., Disp: , Rfl:     loperamide (IMODIUM) 2 MG capsule, Take 1 capsule by mouth Every 8 (Eight) Hours As Needed for Diarrhea., Disp: , Rfl:     Melatonin 10 MG capsule, Take  by mouth. 2 tab, Disp: , Rfl:     nystatin (MYCOSTATIN) 797027 UNIT/GM powder, Apply  topically to the appropriate area as directed 3 (Three) Times a Day., Disp: 60 g, Rfl: 11    oxyCODONE-acetaminophen (PERCOCET) 5-325 MG per tablet, Take 1 tablet by mouth Every 8 (Eight) Hours As Needed for Moderate Pain., Disp: 28 tablet, Rfl: 0    polyethylene glycol (MIRALAX) 17 g packet, Take 17 g by mouth Daily As Needed (constipation)., Disp: , Rfl:     potassium chloride (MICRO-K) 10 MEQ CR capsule, Take 1 capsule by mouth Daily., Disp: , Rfl:     predniSONE (DELTASONE) 5 MG tablet, Take 1 tablet by mouth Daily., Disp: , Rfl:     pregabalin (LYRICA) 75 MG capsule, Take 1 capsule by mouth 2 (Two) Times a Day., Disp: ,  Rfl:     sertraline (ZOLOFT) 100 MG tablet, Take 1 tablet by mouth Daily., Disp: 90 tablet, Rfl: 3    simethicone (MYLICON) 125 MG chewable tablet, Chew 1 tablet Every 6 (Six) Hours As Needed for Flatulence., Disp: , Rfl:     vitamin B-12 (CYANOCOBALAMIN) 1000 MCG tablet, Take 1 tablet by mouth Daily., Disp: , Rfl:     vitamin D (ERGOCALCIFEROL) 1.25 MG (86158 UT) capsule capsule, Take 1 capsule by mouth Every 14 (Fourteen) Days. Every other Friday, Disp: , Rfl:     ascorbic acid (VITAMIN C) 500 MG tablet, Take 1 tablet by mouth Daily. (Patient not taking: Reported on 9/25/2024), Disp: , Rfl:     Vibegron 75 MG tablet, Take 1 tablet by mouth Daily., Disp: 30 tablet, Rfl: 11    Past Medical History:   Diagnosis Date    THERESA (acute kidney injury) (HCC) due to sepsis 11/02/2022    AMS (altered mental status) 10/11/2022    Anxiety     Arthritis     CAD (coronary artery disease)     Colon polyp     DDD (degenerative disc disease), lumbar     Deep venous thrombosis     Depression     Diverticulosis     Esophageal stricture     Full dentures     GERD (gastroesophageal reflux disease)     History of transfusion     w/o Adverse reaction    Hypercholesteremia     Hypertension     LPRD (laryngopharyngeal reflux disease)     Macular degeneration     Multinodular goiter 02/23/2017    Osteoarthritis     Personal history of COVID-19 2020    Pruritic disorder     RSV (acute bronchiolitis due to respiratory syncytial virus) 11/02/2022    Spastic colon     Thyroid nodule     Type 2 MI (myocardial infarction) (HCC) due to THERESA and UTI 11/02/2022       Past Surgical History:   Procedure Laterality Date    BLADDER REPAIR      CATARACT EXTRACTION      CHOLECYSTECTOMY      COLONOSCOPY  04/22/2014    2 polyps, adenomatous, diverticulosis    COLONOSCOPY N/A 6/26/2020    Procedure: COLONOSCOPY WITH ANESTHESIA;  Surgeon: John Coleman MD;  Location: [facility] ENDOSCOPY;  Service: Gastroenterology;  Laterality: N/A;  pre op: constipation  post  "op: diverticulosis,   PCP: Adam Delgadillo MD    CYSTOCELE REPAIR      ENDOSCOPY  04/22/2014    Schatzki's ring dilated    ENDOSCOPY N/A 6/26/2020    Procedure: ESOPHAGOGASTRODUODENOSCOPY WITH ANESTHESIA;  Surgeon: John Coleman MD;  Location:  [facility] ENDOSCOPY;  Service: Gastroenterology;  Laterality: N/A;  pre op: dysphagia  post op:stricture, dilated   PCP:Adam Delgadillo MD    ENDOSCOPY N/A 9/2/2021    Procedure: ESOPHAGOGASTRODUODENOSCOPY WITH ANESTHESIA;  Surgeon: John Coleman MD;  Location:  [facility] ENDOSCOPY;  Service: Gastroenterology;  Laterality: N/A;  pre op: dysphagia  post op:gastritis  PCP: VERA Noble MD    FEMUR FRACTURE SURGERY      HYSTERECTOMY      INCISION AND DRAINAGE LEG Left 11/3/2021    Procedure: INCISION AND DRAINAGE LEG ABSCESS;  Surgeon: Cesia Mondragon MD;  Location:  [facility] OR;  Service: General;  Laterality: Left;    REPLACEMENT TOTAL KNEE      TOTAL SHOULDER ARTHROPLASTY W/ DISTAL CLAVICLE EXCISION Left 10/3/2023    Procedure: LEFT REVERSE TOTAL SHOULDER ARTHROPLASTY;  Surgeon: Dc Dominguez MD;  Location:  [facility] OR;  Service: Orthopedics;  Laterality: Left;    ULNAR NERVE TRANSPOSITION         Social History     Socioeconomic History    Marital status:    Tobacco Use    Smoking status: Never    Smokeless tobacco: Never   Vaping Use    Vaping status: Never Used   Substance and Sexual Activity    Alcohol use: No    Drug use: No    Sexual activity: Defer     Birth control/protection: Hysterectomy       Family History   Problem Relation Age of Onset    Colon cancer Mother     Heart disease Mother     Heart disease Father     Colon polyps Neg Hx     Esophageal cancer Neg Hx        Objective    Temp 97.6 °F (36.4 °C)   Ht 152.4 cm (60\")   Wt 79.4 kg (175 lb)   BMI 34.18 kg/m²     Physical Exam  Nursing note reviewed.   Constitutional:       General: She is not in acute distress.     Appearance: Normal appearance. She is not ill-appearing. "   HENT:      Nose: No congestion.   Abdominal:      Tenderness: There is no right CVA tenderness or left CVA tenderness.      Hernia: No hernia is present.   Skin:     General: Skin is warm and dry.   Neurological:      Mental Status: She is alert and oriented to person, place, and time.   Psychiatric:         Mood and Affect: Mood normal.         Behavior: Behavior normal.             Results for orders placed or performed in visit on 10/10/24   POC Urinalysis Dipstick, Multipro    Specimen: Urine   Result Value Ref Range    Color Yellow Yellow, Straw, Dark Yellow, Jaz    Clarity, UA Clear Clear    Glucose, UA Negative Negative mg/dL    Bilirubin Negative Negative    Ketones, UA Negative Negative    Specific Gravity  1.015 1.005 - 1.030    Blood, UA Negative Negative    pH, Urine 5.5 5.0 - 8.0    Protein, POC 30 mg/dL (A) Negative mg/dL    Urobilinogen, UA 0.2 E.U./dL Normal, 0.2 E.U./dL    Nitrite, UA Negative Negative    Leukocytes Negative Negative   Estimation of residual urine via abdominal ultrasound  Residual Urine: 41 ml  Indication: incontinence  Position: Supine  Examination: Incremental scanning of the suprapubic area using 3 MHz transducer using copious amounts of acoustic gel.   Findings: An anechoic area was demonstrated which represented the bladder, with measurement of residual urine as noted. I inspected this myself. In that the residual urine was stable or insignificant, no treatment will be necessary at this time.      Assessment and Plan    Diagnoses and all orders for this visit:    1. Urinary incontinence, unspecified type (Primary)  -     POC Urinalysis Dipstick, Multipro  -     Vibegron 75 MG tablet; Take 1 tablet by mouth Daily.  Dispense: 30 tablet; Refill: 11      Patient with ongoing rico of both fecal and urinary incontinence inability to make it to the bathroom in time and nocturnal enuresis despite Detrol LA usage.  Has also previously tried and failed oxybutynin.    At this time  we will stop the Detrol and we will try patient on Gemtesa daily.  Have also discussed other options with patient including Botox, sacral neurostimulator placement and PTNS.  Have provided patient information regarding the stimulator specifically due to patient's ongoing report of both incontinence with urine and feces.  Patient states she will look over the material and contact our office if interested in consultation    For now we will follow-up in 2 months

## 2024-10-10 ENCOUNTER — PATIENT ROUNDING (BHMG ONLY) (OUTPATIENT)
Dept: UROLOGY | Facility: CLINIC | Age: 84
End: 2024-10-10
Payer: MEDICARE

## 2024-10-10 ENCOUNTER — OFFICE VISIT (OUTPATIENT)
Dept: UROLOGY | Facility: CLINIC | Age: 84
End: 2024-10-10
Payer: MEDICARE

## 2024-10-10 ENCOUNTER — TELEPHONE (OUTPATIENT)
Dept: INTERNAL MEDICINE | Facility: CLINIC | Age: 84
End: 2024-10-10
Payer: MEDICARE

## 2024-10-10 VITALS — WEIGHT: 175 LBS | TEMPERATURE: 97.6 F | HEIGHT: 60 IN | BODY MASS INDEX: 34.36 KG/M2

## 2024-10-10 DIAGNOSIS — R32 URINARY INCONTINENCE, UNSPECIFIED TYPE: Primary | ICD-10-CM

## 2024-10-10 LAB
BILIRUB BLD-MCNC: NEGATIVE MG/DL
CLARITY, POC: CLEAR
COLOR UR: YELLOW
GLUCOSE UR STRIP-MCNC: NEGATIVE MG/DL
KETONES UR QL: NEGATIVE
LEUKOCYTE EST, POC: NEGATIVE
NITRITE UR-MCNC: NEGATIVE MG/ML
PH UR: 5.5 [PH] (ref 5–8)
PROT UR STRIP-MCNC: ABNORMAL MG/DL
RBC # UR STRIP: NEGATIVE /UL
SP GR UR: 1.01 (ref 1–1.03)
UROBILINOGEN UR QL: ABNORMAL

## 2024-10-10 NOTE — PROGRESS NOTES
October 10, 2024    Hello, may I speak with Carmen Obrien?    My name is Evangelina      I am  with [facility]  [address], [address]  [place] [address] [phone].    Before we get started may I verify your date of birth? 1940    I am calling to officially welcome you to our practice and ask about your recent visit. Is this a good time to talk? yes    Tell me about your visit with us. What things went well?  You all are so nice.       We're always looking for ways to make our patients' experiences even better. Do you have recommendations on ways we may improve?  no    Overall were you satisfied with your first visit to our practice? yes       I appreciate you taking the time to speak with me today. Is there anything else I can do for you? no      Thank you, and have a great day.

## 2024-10-10 NOTE — TELEPHONE ENCOUNTER
PATIENT CALLED IN TO LET PROVIDER KNOW THAT SHE GOT HER     FLU SHOT  AND RWS VACCINES ON 9/27/24  FROM [facility] CAME TO [facility]  AND GAVE THESE       GOOD CALL BACK   [phone]

## 2024-10-11 ENCOUNTER — DOCUMENTATION (OUTPATIENT)
Dept: PASTORAL CARE | Facility: HOSPITAL | Age: 84
End: 2024-10-11
Payer: MEDICARE

## 2024-10-11 NOTE — ACP (ADVANCE CARE PLANNING)
Date of First Steps ACP conversation: 10-  Location of conversation: [facility] .  ACP facilitator: Warren Pulido.  Referral source: Patient.  Time spent in conversation and documentation: drop down [id number]    DOCUMENTS COMPLETED:    Living Will Directive: YES    CHOOSING A HEALTHCARE SURROGATE/DECISION MAKER:   Reviewed the qualities of healthcare surrogate: NO   Name of healthcare surrogate, if chosen: None chosen.    Relationship to patient: N/A.   Has not chosen a healthcare surrogate at this time: NO      SUMMARY OF CONVERSATION:   Carmen decided not to choose a healthcare surrogate.  She specifically marked the choices the medical staff are to follow, in the case of end-of-life care.  She understood the intent and content of the directive.  I notarized the directive and gave her two copies and the original.  I also faxed a copy to HIM.

## 2024-10-30 ENCOUNTER — TELEPHONE (OUTPATIENT)
Dept: INTERNAL MEDICINE | Facility: CLINIC | Age: 84
End: 2024-10-30
Payer: MEDICARE

## 2024-10-30 NOTE — TELEPHONE ENCOUNTER
Caller: AUDI HERRERA    Relationship: [facility]    Best call back number: [phone]     What medication are you requesting: SOMETHING FOR HER COUGH, CONGESTION,& SORE THROAT    What are your current symptoms: COUGH, CONGESTION,& SORE THROAT      How long have you been experiencing symptoms: 10.29.24      If a prescription is needed, what is your preferred pharmacy and phone number: [facility] - [facility] [address] [place] [address] - [phone] [facility] [phone] FX     Additional notes: PATIENT NOT FEELING WELL & WAS WANTING SOME MEDICATION CALLED IN.    PLEASE CALL AUDI IF PATIENT NEEDS TO BE SEEN

## 2024-10-31 ENCOUNTER — HOSPITAL ENCOUNTER (OUTPATIENT)
Dept: GENERAL RADIOLOGY | Facility: HOSPITAL | Age: 84
Discharge: HOME | End: 2024-10-31
Payer: MEDICARE

## 2024-10-31 ENCOUNTER — OFFICE VISIT (OUTPATIENT)
Dept: INTERNAL MEDICINE | Facility: CLINIC | Age: 84
End: 2024-10-31
Payer: MEDICARE

## 2024-10-31 VITALS
DIASTOLIC BLOOD PRESSURE: 74 MMHG | TEMPERATURE: 98 F | HEIGHT: 60 IN | OXYGEN SATURATION: 97 % | HEART RATE: 56 BPM | SYSTOLIC BLOOD PRESSURE: 124 MMHG | BODY MASS INDEX: 34.18 KG/M2

## 2024-10-31 DIAGNOSIS — J06.9 VIRAL UPPER RESPIRATORY TRACT INFECTION: ICD-10-CM

## 2024-10-31 DIAGNOSIS — R05.1 ACUTE COUGH: Primary | ICD-10-CM

## 2024-10-31 DIAGNOSIS — R09.81 NASAL CONGESTION: ICD-10-CM

## 2024-10-31 DIAGNOSIS — J02.9 SORE THROAT: ICD-10-CM

## 2024-10-31 DIAGNOSIS — R05.1 ACUTE COUGH: ICD-10-CM

## 2024-10-31 LAB
EXPIRATION DATE: NORMAL
EXPIRATION DATE: NORMAL
FLUAV AG UPPER RESP QL IA.RAPID: NOT DETECTED
FLUBV AG UPPER RESP QL IA.RAPID: NOT DETECTED
INTERNAL CONTROL: NORMAL
INTERNAL CONTROL: NORMAL
Lab: NORMAL
Lab: NORMAL
S PYO AG THROAT QL: NEGATIVE
SARS-COV-2 AG UPPER RESP QL IA.RAPID: NOT DETECTED

## 2024-10-31 PROCEDURE — 71046 X-RAY EXAM CHEST 2 VIEWS: CPT

## 2024-10-31 PROCEDURE — 99214 OFFICE O/P EST MOD 30 MIN: CPT

## 2024-10-31 PROCEDURE — 3074F SYST BP LT 130 MM HG: CPT

## 2024-10-31 PROCEDURE — 87428 SARSCOV & INF VIR A&B AG IA: CPT

## 2024-10-31 PROCEDURE — 3078F DIAST BP <80 MM HG: CPT

## 2024-10-31 PROCEDURE — 87880 STREP A ASSAY W/OPTIC: CPT

## 2024-10-31 RX ORDER — FLUTICASONE PROPIONATE 50 MCG
2 SPRAY, SUSPENSION (ML) NASAL DAILY
Qty: 15.8 ML | Refills: 3 | Status: SHIPPED | OUTPATIENT
Start: 2024-10-31

## 2024-10-31 RX ORDER — GUAIFENESIN 600 MG/1
1200 TABLET, EXTENDED RELEASE ORAL 2 TIMES DAILY
Qty: 120 TABLET | Refills: 1 | Status: SHIPPED | OUTPATIENT
Start: 2024-10-31

## 2024-10-31 RX ORDER — CETIRIZINE HYDROCHLORIDE 10 MG/1
10 TABLET ORAL DAILY
Qty: 30 TABLET | Refills: 0 | Status: SHIPPED | OUTPATIENT
Start: 2024-10-31

## 2024-10-31 NOTE — PATIENT INSTRUCTIONS
Saline nasal rinse twice daily, use flonase 15 minutes after each saline nasal rinse. Take zyrtec daily, Mucinex twice daily.

## 2024-10-31 NOTE — PROGRESS NOTES
Chief Complaint   Patient presents with    Cough     Off and on for a couple of weeks    Sore Throat    Nasal Congestion       History:      Carmen Obrien is a 84 y.o. female who presents today for evaluation of the above problems.      HPI  History of Present Illness  The patient presents for evaluation of a sore throat, cough, and nasal congestion.    She reports experiencing a sore throat and cough since yesterday, accompanied by difficulty breathing while seated, which she attributes to phlegm in her throat. She has not had a fever and is unaware of any recent exposure to sick individuals. She has been consuming a significant amount of fluids and has been using cough drops for her cough. She occasionally takes Zyrtec for itching but has not used any nasal sprays or saline nasal rinses. She has not received the pneumonia vaccine.    IMMUNIZATIONS  She has received influenza and RSV vaccines.      ROS:  Review of Systems   HENT:  Positive for congestion, postnasal drip and sore throat.    Respiratory:  Positive for shortness of breath. Negative for chest tightness.    Cardiovascular:  Negative for chest pain and palpitations.       Current Outpatient Medications:     allopurinol (ZYLOPRIM) 100 MG tablet, Take 1 tablet by mouth Daily., Disp: , Rfl:     aluminum-magnesium hydroxide-simethicone (MAALOX/MYLANTA) 200-200-20 MG/5ML suspension, Take 15 mL by mouth 2 (Two) Times a Day As Needed for Indigestion or Heartburn., Disp: , Rfl:     amLODIPine (NORVASC) 10 MG tablet, Take 1 tablet by mouth Daily., Disp: , Rfl:     atorvastatin (LIPITOR) 10 MG tablet, Take 1 tablet by mouth Daily., Disp: , Rfl:     Calcium Carbonate-Vit D-Min (Caltrate 600+D Plus Minerals) 600-800 MG-UNIT tablet, Take 1 tablet by mouth 2 (Two) Times a Day., Disp: , Rfl:     carvedilol (COREG) 12.5 MG tablet, Take 1 tablet by mouth 2 (Two) Times a Day With Meals., Disp: , Rfl:     cloNIDine (CATAPRES) 0.1 MG tablet, Take 1 tablet by mouth 2 (Two)  Times a Day As Needed for High Blood Pressure (systolic greater than 180)., Disp: 30 tablet, Rfl: 0    esomeprazole (nexIUM) 20 MG capsule, Take 1 capsule by mouth 2 (Two) Times a Day., Disp: 60 capsule, Rfl: 11    famotidine (PEPCID) 20 MG tablet, Take 1 tablet by mouth 2 (Two) Times a Day., Disp: , Rfl:     FEROSUL 325 (65 Fe) MG tablet, Take 1 tablet by mouth Daily With Breakfast., Disp: , Rfl:     furosemide (LASIX) 20 MG tablet, Take 1 tablet by mouth Daily., Disp: 30 tablet, Rfl: 5    lidocaine (LIDODERM) 5 %, Place 1 patch on the skin as directed by provider Daily. Apply to bilateral upper arms.  Remove & Discard patch within 12 hours or as directed by MD, Disp: , Rfl:     lisinopril (PRINIVIL,ZESTRIL) 20 MG tablet, Take 1 tablet by mouth Daily., Disp: , Rfl:     loperamide (IMODIUM) 2 MG capsule, Take 1 capsule by mouth Every 8 (Eight) Hours As Needed for Diarrhea., Disp: , Rfl:     Melatonin 10 MG capsule, Take  by mouth. 2 tab, Disp: , Rfl:     nystatin (MYCOSTATIN) 766715 UNIT/GM powder, Apply  topically to the appropriate area as directed 3 (Three) Times a Day., Disp: 60 g, Rfl: 11    oxyCODONE-acetaminophen (PERCOCET) 5-325 MG per tablet, Take 1 tablet by mouth Every 8 (Eight) Hours As Needed for Moderate Pain., Disp: 28 tablet, Rfl: 0    polyethylene glycol (MIRALAX) 17 g packet, Take 17 g by mouth Daily As Needed (constipation)., Disp: , Rfl:     potassium chloride (MICRO-K) 10 MEQ CR capsule, Take 1 capsule by mouth Daily., Disp: , Rfl:     predniSONE (DELTASONE) 5 MG tablet, Take 1 tablet by mouth Daily., Disp: , Rfl:     pregabalin (LYRICA) 75 MG capsule, Take 1 capsule by mouth 2 (Two) Times a Day., Disp: , Rfl:     sertraline (ZOLOFT) 100 MG tablet, Take 1 tablet by mouth Daily., Disp: 90 tablet, Rfl: 3    simethicone (MYLICON) 125 MG chewable tablet, Chew 1 tablet Every 6 (Six) Hours As Needed for Flatulence., Disp: , Rfl:     Vibegron 75 MG tablet, Take 1 tablet by mouth Daily., Disp: 30  tablet, Rfl: 11    vitamin B-12 (CYANOCOBALAMIN) 1000 MCG tablet, Take 1 tablet by mouth Daily., Disp: , Rfl:     vitamin D (ERGOCALCIFEROL) 1.25 MG (73324 UT) capsule capsule, Take 1 capsule by mouth Every 14 (Fourteen) Days. Every other Friday, Disp: , Rfl:     ascorbic acid (VITAMIN C) 500 MG tablet, Take 1 tablet by mouth Daily. (Patient not taking: Reported on 10/31/2024), Disp: , Rfl:     cetirizine (zyrTEC) 10 MG tablet, Take 1 tablet by mouth Daily., Disp: 30 tablet, Rfl: 0    fluticasone (FLONASE) 50 MCG/ACT nasal spray, Administer 2 sprays into the nostril(s) as directed by provider Daily., Disp: 15.8 mL, Rfl: 3    guaiFENesin (Mucinex) 600 MG 12 hr tablet, Take 2 tablets by mouth 2 (Two) Times a Day., Disp: 120 tablet, Rfl: 1    Lab Results   Component Value Date    GLUCOSE 102 (H) 04/18/2024    BUN 23 04/18/2024    CREATININE 0.50 (L) 04/18/2024     04/18/2024    K 4.9 04/18/2024     04/18/2024    CALCIUM 9.8 04/18/2024    PROTEINTOT 7.3 04/18/2024    ALBUMIN 4.3 04/18/2024    ALT 18 04/18/2024    AST 22 04/18/2024    ALKPHOS 90 04/18/2024    BILITOT 0.4 04/18/2024    GLOB 3.0 04/18/2024    AGRATIO 1.4 04/18/2024    BCR 46.0 (H) 04/18/2024    ANIONGAP 8.0 04/18/2024    EGFR 93.8 04/18/2024       WBC   Date Value Ref Range Status   04/18/2024 6.05 3.40 - 10.80 10*3/mm3 Final   07/16/2019 7.0 4.8 - 10.8 K/uL Final     RBC   Date Value Ref Range Status   04/18/2024 3.93 3.77 - 5.28 10*6/mm3 Final   07/16/2019 3.52 (L) 4.20 - 5.40 M/uL Final     Hemoglobin   Date Value Ref Range Status   04/18/2024 11.9 (L) 12.0 - 15.9 g/dL Final   07/16/2019 10.1 (L) 12.0 - 16.0 g/dL Final     Hematocrit   Date Value Ref Range Status   04/18/2024 37.2 34.0 - 46.6 % Final   07/16/2019 31.7 (L) 37.0 - 47.0 % Final     MCV   Date Value Ref Range Status   04/18/2024 94.7 79.0 - 97.0 fL Final   07/16/2019 90.1 81.0 - 99.0 fL Final     MCH   Date Value Ref Range Status   04/18/2024 30.3 26.6 - 33.0 pg Final    07/16/2019 28.7 27.0 - 31.0 pg Final     MCHC   Date Value Ref Range Status   04/18/2024 32.0 31.5 - 35.7 g/dL Final   07/16/2019 31.9 (L) 33.0 - 37.0 g/dL Final     RDW   Date Value Ref Range Status   04/18/2024 13.9 12.3 - 15.4 % Final   07/16/2019 14.7 (H) 11.5 - 14.5 % Final     RDW-SD   Date Value Ref Range Status   04/18/2024 48.8 37.0 - 54.0 fl Final     MPV   Date Value Ref Range Status   04/18/2024 10.5 6.0 - 12.0 fL Final   07/16/2019 10.8 9.4 - 12.3 fL Final     Platelets   Date Value Ref Range Status   04/18/2024 220 140 - 450 10*3/mm3 Final   07/16/2019 248 130 - 400 K/uL Final     Neutrophil Rel %   Date Value Ref Range Status   07/04/2019 76.5 (H) 50.0 - 65.0 % Final     Neutrophil %   Date Value Ref Range Status   01/08/2024 71.9 42.7 - 76.0 % Final     Lymphocyte Rel %   Date Value Ref Range Status   07/04/2019 13.9 (L) 20.0 - 40.0 % Final     Lymphocyte %   Date Value Ref Range Status   01/08/2024 17.2 (L) 19.6 - 45.3 % Final     Monocyte Rel %   Date Value Ref Range Status   07/04/2019 7.4 0.0 - 10.0 % Final     Monocyte %   Date Value Ref Range Status   01/08/2024 7.5 5.0 - 12.0 % Final     Eosinophil Rel %   Date Value Ref Range Status   07/04/2019 1.4 0.0 - 5.0 % Final     Eosinophil %   Date Value Ref Range Status   01/08/2024 2.2 0.3 - 6.2 % Final     Basophil Rel %   Date Value Ref Range Status   07/04/2019 0.4 0.0 - 1.0 % Final     Basophil %   Date Value Ref Range Status   01/08/2024 0.7 0.0 - 1.5 % Final     Immature Grans %   Date Value Ref Range Status   01/08/2024 0.5 0.0 - 0.5 % Final     Neutrophils Absolute   Date Value Ref Range Status   07/04/2019 7.7 (H) 1.5 - 7.5 K/uL Final     Neutrophils, Absolute   Date Value Ref Range Status   01/08/2024 5.27 1.70 - 7.00 10*3/mm3 Final     Lymphocytes Absolute   Date Value Ref Range Status   07/04/2019 1.4 1.1 - 4.5 K/uL Final     Lymphocytes, Absolute   Date Value Ref Range Status   01/08/2024 1.26 0.70 - 3.10 10*3/mm3 Final     Monocytes  "Absolute   Date Value Ref Range Status   07/04/2019 0.80 0.00 - 0.90 K/uL Final     Monocytes, Absolute   Date Value Ref Range Status   01/08/2024 0.55 0.10 - 0.90 10*3/mm3 Final     Eosinophils Absolute   Date Value Ref Range Status   07/04/2019 0.10 0.00 - 0.60 K/uL Final     Eosinophils, Absolute   Date Value Ref Range Status   01/08/2024 0.16 0.00 - 0.40 10*3/mm3 Final     Basophils Absolute   Date Value Ref Range Status   07/04/2019 0.00 0.00 - 0.20 K/uL Final     Basophils, Absolute   Date Value Ref Range Status   01/08/2024 0.05 0.00 - 0.20 10*3/mm3 Final     Immature Grans, Absolute   Date Value Ref Range Status   01/08/2024 0.04 0.00 - 0.05 10*3/mm3 Final     nRBC   Date Value Ref Range Status   01/08/2024 0.0 0.0 - 0.2 /100 WBC Final         OBJECTIVE:  Vitals:    10/31/24 1306 10/31/24 1335   BP: 144/72 124/74   BP Location: Left arm    Patient Position: Sitting    Cuff Size: Adult    Pulse: 56    Temp: 98 °F (36.7 °C)    TempSrc: Oral    SpO2: 97%    Height: 152.4 cm (60\")          Physical Exam  Constitutional:       Appearance: Normal appearance.   HENT:      Head: Normocephalic.      Right Ear: A middle ear effusion is present.      Left Ear: A middle ear effusion is present.      Mouth/Throat:      Pharynx: Postnasal drip present.   Cardiovascular:      Rate and Rhythm: Normal rate and regular rhythm.      Pulses: Normal pulses.      Heart sounds: Normal heart sounds.   Pulmonary:      Effort: Pulmonary effort is normal.      Breath sounds: Normal breath sounds.   Musculoskeletal:         General: Normal range of motion.   Skin:     General: Skin is warm and dry.   Neurological:      Mental Status: She is alert and oriented to person, place, and time.   Psychiatric:         Mood and Affect: Mood normal.         Behavior: Behavior normal.         Thought Content: Thought content normal.         Judgment: Judgment normal.       Physical Exam  Vital Signs  Blood pressure reading is " 124/74.      Assessment/Plan    Diagnoses and all orders for this visit:    1. Acute cough (Primary)  -     POCT SARS-CoV-2 Antigen DEVORAH + Flu  -     XR Chest PA & Lateral; Future    2. Sore throat  -     POCT SARS-CoV-2 Antigen DEVORAH + Flu  -     POCT rapid strep A    3. Nasal congestion  -     POCT SARS-CoV-2 Antigen DEVORAH + Flu    4. Viral upper respiratory tract infection  -     fluticasone (FLONASE) 50 MCG/ACT nasal spray; Administer 2 sprays into the nostril(s) as directed by provider Daily.  Dispense: 15.8 mL; Refill: 3  -     guaiFENesin (Mucinex) 600 MG 12 hr tablet; Take 2 tablets by mouth 2 (Two) Times a Day.  Dispense: 120 tablet; Refill: 1  -     cetirizine (zyrTEC) 10 MG tablet; Take 1 tablet by mouth Daily.  Dispense: 30 tablet; Refill: 0      Assessment & Plan  1. Upper respiratory infection.  Her oxygen saturation is satisfactory, with a slightly low heart rate and mildly elevated blood pressure. The COVID-19 and strep tests are negative. Symptoms suggest an upper respiratory infection, with postnasal drainage likely causing the cough. Her lungs sound clear upon auscultation. A chest x-ray will be ordered due to her reported shortness of breath. She is advised to take Zyrtec nightly and Mucinex to thin the secretions. A prescription for Zyrtec will be sent to the pharmacy. She should maintain adequate hydration to help thin the secretions. Saline nasal rinses twice daily are recommended, and she is advised to contact her pharmacy for this. The pneumonia vaccine will be administered once her current illness resolves. If the chest x-ray reveals any abnormalities, the treatment plan will be adjusted accordingly.    2. Medication Management.  She is advised to continue taking Zyrtec nightly. A prescription for Zyrtec will be sent to the pharmacy.    Follow-up  Return in 1 to 2 weeks to discuss the scooter.      Return in about 1 week (around 11/7/2024).      ABDON Rosado  13:12 CDT  11/1/2024    Electronically signed      Patient or patient representative verbalized consent for the use of Ambient Listening during the visit with  ABDON Rosado for chart documentation. 11/1/2024  10:03 CDT

## 2024-11-04 ENCOUNTER — TELEPHONE (OUTPATIENT)
Dept: INTERNAL MEDICINE | Facility: CLINIC | Age: 84
End: 2024-11-04
Payer: MEDICARE

## 2024-11-04 DIAGNOSIS — J06.9 UPPER RESPIRATORY TRACT INFECTION, UNSPECIFIED TYPE: ICD-10-CM

## 2024-11-04 DIAGNOSIS — J06.9 VIRAL UPPER RESPIRATORY TRACT INFECTION: Primary | ICD-10-CM

## 2024-11-04 RX ORDER — DOXYCYCLINE 100 MG/1
100 CAPSULE ORAL 2 TIMES DAILY
Qty: 14 CAPSULE | Refills: 0 | Status: SHIPPED | OUTPATIENT
Start: 2024-11-04

## 2024-11-04 NOTE — TELEPHONE ENCOUNTER
[facility], GIVEN MESSAGE AND UNDERSTANDING VOICED.

## 2024-11-04 NOTE — TELEPHONE ENCOUNTER
Please let her know that I have sent over a prescription for an antibiotic that she should start.  I recommend continuing to monitor her vitals call if any worsening symptoms I would recommend sending her to the emergency department if she has any persistent shortness of breath.  I also recommend continuing the other prescribed medications.  Me know if they have any questions or concerns

## 2024-11-04 NOTE — TELEPHONE ENCOUNTER
Reta Mcfadden-nurse at [facility]: [phone] called regarding patient who was seen on 10/31/24 for cough and congestion. She was prescribed flonase, mucinex and zyrtec.    States patient is having increased congestion, productive cough-green in color. Vitals are as follows: 96.6 temp, 123/70, 96 % oxygen. Wondering if something else can be called in?

## 2024-11-06 RX ORDER — ALLOPURINOL 100 MG/1
100 TABLET ORAL DAILY
Qty: 90 TABLET | Refills: 3 | Status: SHIPPED | OUTPATIENT
Start: 2024-11-06

## 2024-11-06 NOTE — TELEPHONE ENCOUNTER
Rx Refill Note  Requested Prescriptions     Pending Prescriptions Disp Refills    allopurinol (ZYLOPRIM) 100 MG tablet [Pharmacy Med Name: allopurinol 100 mg tablet] 90 tablet 3     Sig: TAKE ONE TABLET BY MOUTH EVERY DAY      Last office visit with prescribing clinician: 10/31/2024   Last telemedicine visit with prescribing clinician: Visit date not found   Next office visit with prescribing clinician: 1/2/2025                         Would you like a call back once the refill request has been completed: [] Yes [] No    If the office needs to give you a call back, can they leave a voicemail: [] Yes [] No    Michael Sutton MA  11/06/24, 08:58 CST

## 2024-11-12 ENCOUNTER — OFFICE VISIT (OUTPATIENT)
Dept: INTERNAL MEDICINE | Facility: CLINIC | Age: 84
End: 2024-11-12
Payer: MEDICARE

## 2024-11-12 VITALS
HEART RATE: 63 BPM | SYSTOLIC BLOOD PRESSURE: 134 MMHG | BODY MASS INDEX: 34.36 KG/M2 | OXYGEN SATURATION: 96 % | WEIGHT: 175 LBS | TEMPERATURE: 97.8 F | DIASTOLIC BLOOD PRESSURE: 90 MMHG | HEIGHT: 60 IN

## 2024-11-12 DIAGNOSIS — R05.1 ACUTE COUGH: ICD-10-CM

## 2024-11-12 DIAGNOSIS — G89.4 CHRONIC PAIN SYNDROME: ICD-10-CM

## 2024-11-12 DIAGNOSIS — J06.9 UPPER RESPIRATORY TRACT INFECTION, UNSPECIFIED TYPE: Primary | ICD-10-CM

## 2024-11-12 DIAGNOSIS — M15.0 PRIMARY OSTEOARTHRITIS INVOLVING MULTIPLE JOINTS: ICD-10-CM

## 2024-11-12 PROCEDURE — 3080F DIAST BP >= 90 MM HG: CPT | Performed by: INTERNAL MEDICINE

## 2024-11-12 PROCEDURE — 3075F SYST BP GE 130 - 139MM HG: CPT | Performed by: INTERNAL MEDICINE

## 2024-11-12 PROCEDURE — G2211 COMPLEX E/M VISIT ADD ON: HCPCS | Performed by: INTERNAL MEDICINE

## 2024-11-12 PROCEDURE — 99213 OFFICE O/P EST LOW 20 MIN: CPT | Performed by: INTERNAL MEDICINE

## 2024-11-12 RX ORDER — BENZONATATE 100 MG/1
100 CAPSULE ORAL 3 TIMES DAILY PRN
Qty: 60 CAPSULE | Refills: 0 | Status: SHIPPED | OUTPATIENT
Start: 2024-11-12

## 2024-11-12 NOTE — PROGRESS NOTES
Chief Complaint   Patient presents with    Follow-up     Per patient here to follow up on cough         History:  Carmen Obrien is a 84 y.o. female who presents today for evaluation of the above problems.      HPI  History of Present Illness  The patient presents for a follow-up visit.    She has been experiencing cough with mucus production for the past 2 weeks, which has recently decreased. She is currently on doxycycline and reports feeling better today. She has one capsule of the antibiotic left and is inquiring about the use of Mucinex, as she continues to cough up mucus. She has not been using cough medicine and is requesting a prescription for one. She reports no current wheezing or shortness of breath.     She also mentions that her blood pressure is elevated, with the diastolic reading at 90, which she attributes to being upset due to running late.     She had a chest x-ray during her last visit and believes her condition is not as severe as it was then.    She is seeking assistance in obtaining a scooter. She has been using a wheelchair for an extended period and expresses a desire to regain her ability to walk. She has been undergoing therapy but reports that her condition worsened during the course of treatment. She suffers from severe arthritis throughout her body and has undergone numerous surgeries, including four knee replacements. She also had a total left shoulder replacement last year.       ROS:  Review of Systems  As above    Current Outpatient Medications:     allopurinol (ZYLOPRIM) 100 MG tablet, Take 1 tablet by mouth Daily., Disp: 90 tablet, Rfl: 3    aluminum-magnesium hydroxide-simethicone (MAALOX/MYLANTA) 200-200-20 MG/5ML suspension, Take 15 mL by mouth 2 (Two) Times a Day As Needed for Indigestion or Heartburn., Disp: , Rfl:     amLODIPine (NORVASC) 10 MG tablet, Take 1 tablet by mouth Daily., Disp: , Rfl:     atorvastatin (LIPITOR) 10 MG tablet, Take 1 tablet by mouth Daily., Disp: ,  Rfl:     Calcium Carbonate-Vit D-Min (Caltrate 600+D Plus Minerals) 600-800 MG-UNIT tablet, Take 1 tablet by mouth 2 (Two) Times a Day., Disp: , Rfl:     carvedilol (COREG) 12.5 MG tablet, Take 1 tablet by mouth 2 (Two) Times a Day With Meals., Disp: , Rfl:     cetirizine (zyrTEC) 10 MG tablet, Take 1 tablet by mouth Daily., Disp: 30 tablet, Rfl: 0    cloNIDine (CATAPRES) 0.1 MG tablet, Take 1 tablet by mouth 2 (Two) Times a Day As Needed for High Blood Pressure (systolic greater than 180)., Disp: 30 tablet, Rfl: 0    esomeprazole (nexIUM) 20 MG capsule, Take 1 capsule by mouth 2 (Two) Times a Day., Disp: 60 capsule, Rfl: 11    famotidine (PEPCID) 20 MG tablet, Take 1 tablet by mouth 2 (Two) Times a Day., Disp: , Rfl:     FEROSUL 325 (65 Fe) MG tablet, Take 1 tablet by mouth Daily With Breakfast., Disp: , Rfl:     fluticasone (FLONASE) 50 MCG/ACT nasal spray, Administer 2 sprays into the nostril(s) as directed by provider Daily., Disp: 15.8 mL, Rfl: 3    furosemide (LASIX) 20 MG tablet, Take 1 tablet by mouth Daily., Disp: 30 tablet, Rfl: 5    guaiFENesin (Mucinex) 600 MG 12 hr tablet, Take 2 tablets by mouth 2 (Two) Times a Day., Disp: 120 tablet, Rfl: 1    lidocaine (LIDODERM) 5 %, Place 1 patch on the skin as directed by provider Daily. Apply to bilateral upper arms.  Remove & Discard patch within 12 hours or as directed by MD, Disp: , Rfl:     lisinopril (PRINIVIL,ZESTRIL) 20 MG tablet, Take 1 tablet by mouth Daily., Disp: , Rfl:     loperamide (IMODIUM) 2 MG capsule, Take 1 capsule by mouth Every 8 (Eight) Hours As Needed for Diarrhea., Disp: , Rfl:     Melatonin 10 MG capsule, Take  by mouth. 2 tab, Disp: , Rfl:     nystatin (MYCOSTATIN) 109696 UNIT/GM powder, Apply  topically to the appropriate area as directed 3 (Three) Times a Day., Disp: 60 g, Rfl: 11    oxyCODONE-acetaminophen (PERCOCET) 5-325 MG per tablet, Take 1 tablet by mouth Every 8 (Eight) Hours As Needed for Moderate Pain., Disp: 28 tablet, Rfl:  0    polyethylene glycol (MIRALAX) 17 g packet, Take 17 g by mouth Daily As Needed (constipation)., Disp: , Rfl:     potassium chloride (MICRO-K) 10 MEQ CR capsule, Take 1 capsule by mouth Daily., Disp: , Rfl:     predniSONE (DELTASONE) 5 MG tablet, Take 1 tablet by mouth Daily., Disp: , Rfl:     pregabalin (LYRICA) 75 MG capsule, Take 1 capsule by mouth 2 (Two) Times a Day., Disp: , Rfl:     sertraline (ZOLOFT) 100 MG tablet, Take 1 tablet by mouth Daily., Disp: 90 tablet, Rfl: 3    simethicone (MYLICON) 125 MG chewable tablet, Chew 1 tablet Every 6 (Six) Hours As Needed for Flatulence., Disp: , Rfl:     Vibegron 75 MG tablet, Take 1 tablet by mouth Daily., Disp: 30 tablet, Rfl: 11    vitamin B-12 (CYANOCOBALAMIN) 1000 MCG tablet, Take 1 tablet by mouth Daily., Disp: , Rfl:     vitamin D (ERGOCALCIFEROL) 1.25 MG (49562 UT) capsule capsule, Take 1 capsule by mouth Every 14 (Fourteen) Days. Every other Friday, Disp: , Rfl:     ascorbic acid (VITAMIN C) 500 MG tablet, Take 1 tablet by mouth Daily. (Patient not taking: Reported on 9/25/2024), Disp: , Rfl:     benzonatate (Tessalon Perles) 100 MG capsule, Take 1 capsule by mouth 3 (Three) Times a Day As Needed for Cough., Disp: 60 capsule, Rfl: 0    doxycycline (VIBRAMYCIN) 100 MG capsule, Take 1 capsule by mouth 2 (Two) Times a Day. (Patient not taking: Reported on 11/12/2024), Disp: 14 capsule, Rfl: 0    Lab Results   Component Value Date    GLUCOSE 102 (H) 04/18/2024    BUN 23 04/18/2024    CREATININE 0.50 (L) 04/18/2024     04/18/2024    K 4.9 04/18/2024     04/18/2024    CALCIUM 9.8 04/18/2024    PROTEINTOT 7.3 04/18/2024    ALBUMIN 4.3 04/18/2024    ALT 18 04/18/2024    AST 22 04/18/2024    ALKPHOS 90 04/18/2024    BILITOT 0.4 04/18/2024    GLOB 3.0 04/18/2024    AGRATIO 1.4 04/18/2024    BCR 46.0 (H) 04/18/2024    ANIONGAP 8.0 04/18/2024    EGFR 93.8 04/18/2024       WBC   Date Value Ref Range Status   04/18/2024 6.05 3.40 - 10.80 10*3/mm3 Final    07/16/2019 7.0 4.8 - 10.8 K/uL Final     RBC   Date Value Ref Range Status   04/18/2024 3.93 3.77 - 5.28 10*6/mm3 Final   07/16/2019 3.52 (L) 4.20 - 5.40 M/uL Final     Hemoglobin   Date Value Ref Range Status   04/18/2024 11.9 (L) 12.0 - 15.9 g/dL Final   07/16/2019 10.1 (L) 12.0 - 16.0 g/dL Final     Hematocrit   Date Value Ref Range Status   04/18/2024 37.2 34.0 - 46.6 % Final   07/16/2019 31.7 (L) 37.0 - 47.0 % Final     MCV   Date Value Ref Range Status   04/18/2024 94.7 79.0 - 97.0 fL Final   07/16/2019 90.1 81.0 - 99.0 fL Final     MCH   Date Value Ref Range Status   04/18/2024 30.3 26.6 - 33.0 pg Final   07/16/2019 28.7 27.0 - 31.0 pg Final     MCHC   Date Value Ref Range Status   04/18/2024 32.0 31.5 - 35.7 g/dL Final   07/16/2019 31.9 (L) 33.0 - 37.0 g/dL Final     RDW   Date Value Ref Range Status   04/18/2024 13.9 12.3 - 15.4 % Final   07/16/2019 14.7 (H) 11.5 - 14.5 % Final     RDW-SD   Date Value Ref Range Status   04/18/2024 48.8 37.0 - 54.0 fl Final     MPV   Date Value Ref Range Status   04/18/2024 10.5 6.0 - 12.0 fL Final   07/16/2019 10.8 9.4 - 12.3 fL Final     Platelets   Date Value Ref Range Status   04/18/2024 220 140 - 450 10*3/mm3 Final   07/16/2019 248 130 - 400 K/uL Final     Neutrophil Rel %   Date Value Ref Range Status   07/04/2019 76.5 (H) 50.0 - 65.0 % Final     Neutrophil %   Date Value Ref Range Status   01/08/2024 71.9 42.7 - 76.0 % Final     Lymphocyte Rel %   Date Value Ref Range Status   07/04/2019 13.9 (L) 20.0 - 40.0 % Final     Lymphocyte %   Date Value Ref Range Status   01/08/2024 17.2 (L) 19.6 - 45.3 % Final     Monocyte Rel %   Date Value Ref Range Status   07/04/2019 7.4 0.0 - 10.0 % Final     Monocyte %   Date Value Ref Range Status   01/08/2024 7.5 5.0 - 12.0 % Final     Eosinophil Rel %   Date Value Ref Range Status   07/04/2019 1.4 0.0 - 5.0 % Final     Eosinophil %   Date Value Ref Range Status   01/08/2024 2.2 0.3 - 6.2 % Final     Basophil Rel %   Date Value Ref  "Range Status   07/04/2019 0.4 0.0 - 1.0 % Final     Basophil %   Date Value Ref Range Status   01/08/2024 0.7 0.0 - 1.5 % Final     Immature Grans %   Date Value Ref Range Status   01/08/2024 0.5 0.0 - 0.5 % Final     Neutrophils Absolute   Date Value Ref Range Status   07/04/2019 7.7 (H) 1.5 - 7.5 K/uL Final     Neutrophils, Absolute   Date Value Ref Range Status   01/08/2024 5.27 1.70 - 7.00 10*3/mm3 Final     Lymphocytes Absolute   Date Value Ref Range Status   07/04/2019 1.4 1.1 - 4.5 K/uL Final     Lymphocytes, Absolute   Date Value Ref Range Status   01/08/2024 1.26 0.70 - 3.10 10*3/mm3 Final     Monocytes Absolute   Date Value Ref Range Status   07/04/2019 0.80 0.00 - 0.90 K/uL Final     Monocytes, Absolute   Date Value Ref Range Status   01/08/2024 0.55 0.10 - 0.90 10*3/mm3 Final     Eosinophils Absolute   Date Value Ref Range Status   07/04/2019 0.10 0.00 - 0.60 K/uL Final     Eosinophils, Absolute   Date Value Ref Range Status   01/08/2024 0.16 0.00 - 0.40 10*3/mm3 Final     Basophils Absolute   Date Value Ref Range Status   07/04/2019 0.00 0.00 - 0.20 K/uL Final     Basophils, Absolute   Date Value Ref Range Status   01/08/2024 0.05 0.00 - 0.20 10*3/mm3 Final     Immature Grans, Absolute   Date Value Ref Range Status   01/08/2024 0.04 0.00 - 0.05 10*3/mm3 Final     nRBC   Date Value Ref Range Status   01/08/2024 0.0 0.0 - 0.2 /100 WBC Final         OBJECTIVE:  Visit Vitals  /90 (BP Location: Left arm, Patient Position: Sitting, Cuff Size: Adult)   Pulse 63   Temp 97.8 °F (36.6 °C) (Temporal)   Ht 152.4 cm (60\")   Wt 79.4 kg (175 lb) Comment: per patient   SpO2 96%   BMI 34.18 kg/m²      Physical Exam  Vitals reviewed.   Constitutional:       General: She is not in acute distress.     Appearance: She is well-developed. She is obese. She is ill-appearing (Appears chronically ill). She is not diaphoretic.   HENT:      Head: Normocephalic and atraumatic.   Eyes:      Pupils: Pupils are equal, round, and " reactive to light.   Neck:      Thyroid: No thyromegaly.      Trachea: Phonation normal.   Cardiovascular:      Rate and Rhythm: Normal rate and regular rhythm.      Heart sounds: No murmur heard.  Pulmonary:      Effort: Pulmonary effort is normal. No respiratory distress.      Breath sounds: Normal breath sounds. No wheezing or rales.   Skin:     Coloration: Skin is not pale.      Findings: No erythema.   Neurological:      Mental Status: She is alert.   Psychiatric:         Behavior: Behavior normal.         Thought Content: Thought content normal.         Judgment: Judgment normal.       Physical Exam  Lungs are clear.      Assessment/Plan      Diagnoses and all orders for this visit:    1. Upper respiratory tract infection, unspecified type (Primary)  -     benzonatate (Tessalon Perles) 100 MG capsule; Take 1 capsule by mouth 3 (Three) Times a Day As Needed for Cough.  Dispense: 60 capsule; Refill: 0    2. Acute cough  -     benzonatate (Tessalon Perles) 100 MG capsule; Take 1 capsule by mouth 3 (Three) Times a Day As Needed for Cough.  Dispense: 60 capsule; Refill: 0      Assessment & Plan  1. Upper respiratory infection.  She has been experiencing symptoms for 2 weeks and is feeling better today. She has one capsule left of her antibiotic, doxycycline.  Since she is feeling better we can discontinue the antibiotic when she is finished with the course.  Mucinex was recommended to aid in expectoration. A prescription for Tessalon Perles will be sent to [facility] pharmacy. Her lungs sound clear, and a chest x-ray is not deemed necessary at this time.    2. Arthritis.  She has severe arthritis throughout her body and has had multiple surgeries, including four knee replacements and a recent shoulder replacement. The use of an electric scooter was discouraged due to potential dependency and subsequent muscle weakness. A new general wheelchair can be ordered for her, but she states that her insurance will not  give her another 1 as her current wheelchair is less than 5 years old.      Return if symptoms worsen or fail to improve, for Next scheduled follow up.      AMY Noble MD  14:41 CST  11/12/2024   Electronically signed      Patient or patient representative verbalized consent for the use of Ambient Listening during the visit with  VERA Noble MD for chart documentation. 11/12/2024  15:27 CST

## 2024-12-05 NOTE — PROGRESS NOTES
"Subjective    Ms. Obrien is 84 y.o. female    Chief Complaint: Urge urinary incontinence follow up    History of Present Illness    84-year-old female established patient in for urge incontinence follow-up after stopping Detrol LA and oxybutynin and trialing Gemtesa.  Patient reports approximately 50% improvement since the start of Gemtesa usage.  Is still requiring multiple pads daily and is still experiencing multiple times weekly nocturnal enuresis.  Patient is interested in further options.    Previously reporting \"soaking through not only her depends each night but also the bed pads that she has lining her bed with.  She is wearing a pad inside a depend during the daytime as well.  Patient is also reporting a chronic history with fecal incontinence for which patient states she has been seen by many different doctors and continues to experience the issue.     Previously seen by Dr. Guajardo who referred patient to [facility] for bladder repair surgery Dr Mittal [facility] .     The following portions of the patient's history were reviewed and updated as appropriate: allergies, current medications, past family history, past medical history, past social history, past surgical history and problem list.    Review of Systems   Constitutional:  Negative for chills and fever.   Gastrointestinal:  Negative for abdominal pain, anal bleeding, blood in stool, nausea and vomiting.   Genitourinary:  Positive for frequency and urgency. Negative for dysuria and hematuria.         Current Outpatient Medications:     allopurinol (ZYLOPRIM) 100 MG tablet, Take 1 tablet by mouth Daily., Disp: 90 tablet, Rfl: 3    aluminum-magnesium hydroxide-simethicone (MAALOX/MYLANTA) 200-200-20 MG/5ML suspension, Take 15 mL by mouth 2 (Two) Times a Day As Needed for Indigestion or Heartburn., Disp: , Rfl:     amLODIPine (NORVASC) 10 MG tablet, Take 1 tablet by mouth Daily., Disp: 90 tablet, Rfl: 3    ascorbic acid (VITAMIN " C) 500 MG tablet, Take 1 tablet by mouth Daily., Disp: , Rfl:     atorvastatin (LIPITOR) 10 MG tablet, Take 1 tablet by mouth Daily., Disp: , Rfl:     benzonatate (Tessalon Perles) 100 MG capsule, Take 1 capsule by mouth 3 (Three) Times a Day As Needed for Cough., Disp: 60 capsule, Rfl: 0    Calcium Carbonate-Vit D-Min (Caltrate 600+D Plus Minerals) 600-800 MG-UNIT tablet, Take 1 tablet by mouth 2 (Two) Times a Day., Disp: , Rfl:     carvedilol (COREG) 12.5 MG tablet, Take 1 tablet by mouth 2 (Two) Times a Day With Meals., Disp: , Rfl:     cetirizine (zyrTEC) 10 MG tablet, Take 1 tablet by mouth Daily., Disp: 30 tablet, Rfl: 0    cloNIDine (CATAPRES) 0.1 MG tablet, Take 1 tablet by mouth 2 (Two) Times a Day As Needed for High Blood Pressure (systolic greater than 180)., Disp: 30 tablet, Rfl: 0    doxycycline (VIBRAMYCIN) 100 MG capsule, Take 1 capsule by mouth 2 (Two) Times a Day., Disp: 14 capsule, Rfl: 0    esomeprazole (nexIUM) 20 MG capsule, Take 1 capsule by mouth 2 (Two) Times a Day., Disp: 60 capsule, Rfl: 11    famotidine (PEPCID) 20 MG tablet, Take 1 tablet by mouth 2 (Two) Times a Day., Disp: , Rfl:     FEROSUL 325 (65 Fe) MG tablet, Take 1 tablet by mouth Daily With Breakfast., Disp: , Rfl:     fluticasone (FLONASE) 50 MCG/ACT nasal spray, Administer 2 sprays into the nostril(s) as directed by provider Daily., Disp: 15.8 mL, Rfl: 3    furosemide (LASIX) 20 MG tablet, Take 1 tablet by mouth Daily., Disp: 30 tablet, Rfl: 5    guaiFENesin (Mucinex) 600 MG 12 hr tablet, Take 2 tablets by mouth 2 (Two) Times a Day., Disp: 120 tablet, Rfl: 1    lidocaine (LIDODERM) 5 %, Place 1 patch on the skin as directed by provider Daily. Apply to bilateral upper arms.  Remove & Discard patch within 12 hours or as directed by MD, Disp: , Rfl:     lisinopril (PRINIVIL,ZESTRIL) 20 MG tablet, Take 1 tablet by mouth Daily., Disp: , Rfl:     loperamide (IMODIUM) 2 MG capsule, Take 1 capsule by mouth Every 8 (Eight) Hours As  Needed for Diarrhea., Disp: , Rfl:     Melatonin 10 MG capsule, Take  by mouth. 2 tab, Disp: , Rfl:     nystatin (MYCOSTATIN) 638395 UNIT/GM powder, Apply  topically to the appropriate area as directed 3 (Three) Times a Day., Disp: 60 g, Rfl: 11    oxyCODONE-acetaminophen (PERCOCET) 5-325 MG per tablet, Take 1 tablet by mouth Every 8 (Eight) Hours As Needed for Moderate Pain., Disp: 28 tablet, Rfl: 0    polyethylene glycol (MIRALAX) 17 g packet, Take 17 g by mouth Daily As Needed (constipation)., Disp: , Rfl:     potassium chloride (KLOR-CON M10) 10 MEQ CR tablet, Take 1 tablet by mouth Daily., Disp: 90 tablet, Rfl: 3    potassium chloride (MICRO-K) 10 MEQ CR capsule, Take 1 capsule by mouth Daily., Disp: , Rfl:     predniSONE (DELTASONE) 5 MG tablet, Take 1 tablet by mouth Daily., Disp: , Rfl:     pregabalin (LYRICA) 75 MG capsule, Take 1 capsule by mouth 2 (Two) Times a Day., Disp: , Rfl:     sertraline (ZOLOFT) 100 MG tablet, Take 1 tablet by mouth Daily., Disp: 90 tablet, Rfl: 3    simethicone (MYLICON) 125 MG chewable tablet, Chew 1 tablet Every 6 (Six) Hours As Needed for Flatulence., Disp: , Rfl:     Vibegron 75 MG tablet, Take 1 tablet by mouth Daily., Disp: 30 tablet, Rfl: 11    vitamin B-12 (CYANOCOBALAMIN) 1000 MCG tablet, Take 1 tablet by mouth Daily., Disp: , Rfl:     vitamin D (ERGOCALCIFEROL) 1.25 MG (25170 UT) capsule capsule, Take 1 capsule by mouth Every 14 (Fourteen) Days. Every other Friday, Disp: , Rfl:     Past Medical History:   Diagnosis Date    THERESA (acute kidney injury) (HCC) due to sepsis 11/02/2022    AMS (altered mental status) 10/11/2022    Anxiety     Arthritis     CAD (coronary artery disease)     Colon polyp     DDD (degenerative disc disease), lumbar     Deep venous thrombosis     Depression     Diverticulosis     Esophageal stricture     Full dentures     GERD (gastroesophageal reflux disease)     History of transfusion     w/o Adverse reaction    Hypercholesteremia     Hypertension      LPRD (laryngopharyngeal reflux disease)     Macular degeneration     Multinodular goiter 02/23/2017    Osteoarthritis     Personal history of COVID-19 2020    Pruritic disorder     RSV (acute bronchiolitis due to respiratory syncytial virus) 11/02/2022    Spastic colon     Thyroid nodule     Type 2 MI (myocardial infarction) (HCC) due to THERESA and UTI 11/02/2022       Past Surgical History:   Procedure Laterality Date    BLADDER REPAIR      CATARACT EXTRACTION      CHOLECYSTECTOMY      COLONOSCOPY  04/22/2014    2 polyps, adenomatous, diverticulosis    COLONOSCOPY N/A 6/26/2020    Procedure: COLONOSCOPY WITH ANESTHESIA;  Surgeon: John Coleman MD;  Location: [facility] ENDOSCOPY;  Service: Gastroenterology;  Laterality: N/A;  pre op: constipation  post op: diverticulosis,   PCP: Adam Delgadillo MD    CYSTOCELE REPAIR      ENDOSCOPY  04/22/2014    Schatzki's ring dilated    ENDOSCOPY N/A 6/26/2020    Procedure: ESOPHAGOGASTRODUODENOSCOPY WITH ANESTHESIA;  Surgeon: John Coleman MD;  Location: [facility] ENDOSCOPY;  Service: Gastroenterology;  Laterality: N/A;  pre op: dysphagia  post op:stricture, dilated   PCP:Adam Delgadillo MD    ENDOSCOPY N/A 9/2/2021    Procedure: ESOPHAGOGASTRODUODENOSCOPY WITH ANESTHESIA;  Surgeon: John Coleman MD;  Location: [facility] ENDOSCOPY;  Service: Gastroenterology;  Laterality: N/A;  pre op: dysphagia  post op:gastritis  PCP: VERA Noble MD    FEMUR FRACTURE SURGERY      HYSTERECTOMY      INCISION AND DRAINAGE LEG Left 11/3/2021    Procedure: INCISION AND DRAINAGE LEG ABSCESS;  Surgeon: Cesia Mondragon MD;  Location: [facility] OR;  Service: General;  Laterality: Left;    REPLACEMENT TOTAL KNEE      TOTAL SHOULDER ARTHROPLASTY W/ DISTAL CLAVICLE EXCISION Left 10/3/2023    Procedure: LEFT REVERSE TOTAL SHOULDER ARTHROPLASTY;  Surgeon: Dc Dominguez MD;  Location: [facility] OR;  Service: Orthopedics;  Laterality: Left;    ULNAR NERVE TRANSPOSITION    "      Social History     Socioeconomic History    Marital status:    Tobacco Use    Smoking status: Never    Smokeless tobacco: Never   Vaping Use    Vaping status: Never Used   Substance and Sexual Activity    Alcohol use: No    Drug use: No    Sexual activity: Defer     Birth control/protection: Hysterectomy       Family History   Problem Relation Age of Onset    Colon cancer Mother     Heart disease Mother     Heart disease Father     Colon polyps Neg Hx     Esophageal cancer Neg Hx        Objective    Temp 97.8 °F (36.6 °C)   Ht 152.4 cm (60\")   Wt 79.4 kg (175 lb)   BMI 34.18 kg/m²     Physical Exam  Nursing note reviewed.   Constitutional:       General: She is not in acute distress.     Appearance: Normal appearance. She is not ill-appearing.   HENT:      Nose: No congestion.   Abdominal:      Tenderness: There is no right CVA tenderness or left CVA tenderness.      Hernia: No hernia is present.   Skin:     General: Skin is warm and dry.   Neurological:      Mental Status: She is alert and oriented to person, place, and time.   Psychiatric:         Mood and Affect: Mood normal.         Behavior: Behavior normal.             Results for orders placed or performed in visit on 12/12/24   POC Urinalysis Dipstick, Multipro    Collection Time: 12/12/24  2:14 PM    Specimen: Urine   Result Value Ref Range    Color Yellow Yellow, Straw, Dark Yellow, Jaz    Clarity, UA Clear Clear    Glucose, UA Negative Negative mg/dL    Bilirubin Negative Negative    Ketones, UA Negative Negative    Specific Gravity  1.015 1.005 - 1.030    Blood, UA Negative Negative    pH, Urine 5.5 5.0 - 8.0    Protein, POC Negative Negative mg/dL    Urobilinogen, UA 0.2 E.U./dL Normal, 0.2 E.U./dL    Nitrite, UA Negative Negative    Leukocytes Negative Negative     Assessment and Plan    Diagnoses and all orders for this visit:    1. Urinary incontinence, unspecified type (Primary)  -     POC Urinalysis Dipstick, " Multipro      Approximately 50% improvement noted since the start of Gemtesa.  Patient still requiring multiple pad changes daily and nightly patient is interested in further options.  Had provided patient information including Botox, PTNS and [employer] sacral neurostimulator.  Patient interested in Botox and would like a consultation       For now we will continue Gemtesa daily

## 2024-12-11 RX ORDER — AMLODIPINE BESYLATE 10 MG/1
10 TABLET ORAL DAILY
Qty: 90 TABLET | Refills: 3 | Status: SHIPPED | OUTPATIENT
Start: 2024-12-11

## 2024-12-11 RX ORDER — POTASSIUM CHLORIDE 750 MG/1
10 TABLET, EXTENDED RELEASE ORAL DAILY
Qty: 90 TABLET | Refills: 3 | Status: SHIPPED | OUTPATIENT
Start: 2024-12-11

## 2024-12-11 NOTE — TELEPHONE ENCOUNTER
Rx Refill Note  Requested Prescriptions     Pending Prescriptions Disp Refills    amLODIPine (NORVASC) 10 MG tablet [Pharmacy Med Name: amlodipine 10 mg tablet] 90 tablet 3     Sig: TAKE ONE TABLET BY MOUTH EVERY DAY    potassium chloride (KLOR-CON M10) 10 MEQ CR tablet [Pharmacy Med Name: potassium chloride ER 10 mEq tablet,extended release(part/cryst)] 90 tablet 3     Sig: TAKE ONE TABLET BY MOUTH EVERY DAY      Last office visit with prescribing clinician: 10/31/2024   Last telemedicine visit with prescribing clinician: Visit date not found   Next office visit with prescribing clinician: 1/2/2025                         Would you like a call back once the refill request has been completed: [] Yes [] No    If the office needs to give you a call back, can they leave a voicemail: [] Yes [] No    Michael Sutton MA  12/11/24, 14:06 CST

## 2024-12-12 ENCOUNTER — OFFICE VISIT (OUTPATIENT)
Dept: UROLOGY | Facility: CLINIC | Age: 84
End: 2024-12-12
Payer: MEDICARE

## 2024-12-12 VITALS — HEIGHT: 60 IN | WEIGHT: 175 LBS | TEMPERATURE: 97.8 F | BODY MASS INDEX: 34.36 KG/M2

## 2024-12-12 DIAGNOSIS — R32 URINARY INCONTINENCE, UNSPECIFIED TYPE: Primary | ICD-10-CM

## 2024-12-12 LAB
BILIRUB BLD-MCNC: NEGATIVE MG/DL
CLARITY, POC: CLEAR
COLOR UR: YELLOW
GLUCOSE UR STRIP-MCNC: NEGATIVE MG/DL
KETONES UR QL: NEGATIVE
LEUKOCYTE EST, POC: NEGATIVE
NITRITE UR-MCNC: NEGATIVE MG/ML
PH UR: 5.5 [PH] (ref 5–8)
PROT UR STRIP-MCNC: NEGATIVE MG/DL
RBC # UR STRIP: NEGATIVE /UL
SP GR UR: 1.01 (ref 1–1.03)
UROBILINOGEN UR QL: NORMAL

## 2024-12-26 RX ORDER — ATORVASTATIN CALCIUM 10 MG/1
10 TABLET, FILM COATED ORAL DAILY
Qty: 90 TABLET | Refills: 3 | Status: SHIPPED | OUTPATIENT
Start: 2024-12-26

## 2024-12-26 NOTE — TELEPHONE ENCOUNTER
Rx Refill Note  Requested Prescriptions     Pending Prescriptions Disp Refills    atorvastatin (LIPITOR) 10 MG tablet [Pharmacy Med Name: atorvastatin 10 mg tablet] 90 tablet 3     Sig: TAKE ONE TABLET BY MOUTH EVERY DAY      Last office visit with prescribing clinician: 11/12/2024   Last telemedicine visit with prescribing clinician: Visit date not found   Next office visit with prescribing clinician: Visit date not found 01/02/2025                      Would you like a call back once the refill request has been completed: [] Yes [] No    If the office needs to give you a call back, can they leave a voicemail: [] Yes [] No    ELIJAH Pearson  12/26/24, 13:37 CST    MEDICATION LISTED AS HISTORICAL

## 2024-12-31 NOTE — PROGRESS NOTES
Subjective    Ms. Obrien is 85 y.o. female    Chief Complaint: Discuss Botox    History of Present Illness  Patient here to discuss Botox injections.  Patient history of ESBL E. coli and Klebsiella in her urine.  Patient has failed Detrol LA and oxybutynin in the past.  Patient currently on Gemtesa.  Patient also with fecal incontinence in the past.  Patient reports 3-4 pullups/day incontinence.  + Frequency, urgency and urge incontinence.  Denies stress incontinence.      The following portions of the patient's history were reviewed and updated as appropriate: allergies, current medications, past family history, past medical history, past social history, past surgical history and problem list.    Review of Systems      Current Outpatient Medications:     allopurinol (ZYLOPRIM) 100 MG tablet, Take 1 tablet by mouth Daily., Disp: 90 tablet, Rfl: 3    aluminum-magnesium hydroxide-simethicone (MAALOX/MYLANTA) 200-200-20 MG/5ML suspension, Take 15 mL by mouth 2 (Two) Times a Day As Needed for Indigestion or Heartburn., Disp: , Rfl:     amLODIPine (NORVASC) 10 MG tablet, Take 1 tablet by mouth Daily., Disp: 90 tablet, Rfl: 3    ascorbic acid (VITAMIN C) 500 MG tablet, Take 1 tablet by mouth Daily., Disp: , Rfl:     atorvastatin (LIPITOR) 10 MG tablet, TAKE ONE TABLET BY MOUTH EVERY DAY, Disp: 90 tablet, Rfl: 3    benzonatate (Tessalon Perles) 100 MG capsule, Take 1 capsule by mouth 3 (Three) Times a Day As Needed for Cough., Disp: 60 capsule, Rfl: 0    Calcium Carbonate-Vit D-Min (Caltrate 600+D Plus Minerals) 600-800 MG-UNIT tablet, Take 1 tablet by mouth 2 (Two) Times a Day., Disp: , Rfl:     carvedilol (COREG) 12.5 MG tablet, Take 1 tablet by mouth 2 (Two) Times a Day With Meals., Disp: , Rfl:     cetirizine (zyrTEC) 10 MG tablet, Take 1 tablet by mouth Daily., Disp: 30 tablet, Rfl: 0    cloNIDine (CATAPRES) 0.1 MG tablet, Take 1 tablet by mouth 2 (Two) Times a Day As Needed for High Blood Pressure (systolic greater  than 180)., Disp: 30 tablet, Rfl: 0    doxycycline (VIBRAMYCIN) 100 MG capsule, Take 1 capsule by mouth 2 (Two) Times a Day., Disp: 14 capsule, Rfl: 0    esomeprazole (nexIUM) 20 MG capsule, Take 1 capsule by mouth 2 (Two) Times a Day., Disp: 60 capsule, Rfl: 11    famotidine (PEPCID) 20 MG tablet, Take 1 tablet by mouth 2 (Two) Times a Day., Disp: , Rfl:     FEROSUL 325 (65 Fe) MG tablet, Take 1 tablet by mouth Daily With Breakfast., Disp: , Rfl:     fluticasone (FLONASE) 50 MCG/ACT nasal spray, Administer 2 sprays into the nostril(s) as directed by provider Daily., Disp: 15.8 mL, Rfl: 3    furosemide (LASIX) 20 MG tablet, Take 1 tablet by mouth Daily., Disp: 30 tablet, Rfl: 5    guaiFENesin (Mucinex) 600 MG 12 hr tablet, Take 2 tablets by mouth 2 (Two) Times a Day., Disp: 120 tablet, Rfl: 1    lidocaine (LIDODERM) 5 %, Place 1 patch on the skin as directed by provider Daily. Apply to bilateral upper arms.  Remove & Discard patch within 12 hours or as directed by MD, Disp: , Rfl:     lisinopril (PRINIVIL,ZESTRIL) 20 MG tablet, Take 1 tablet by mouth Daily., Disp: , Rfl:     loperamide (IMODIUM) 2 MG capsule, Take 1 capsule by mouth Every 8 (Eight) Hours As Needed for Diarrhea., Disp: , Rfl:     Melatonin 10 MG capsule, Take  by mouth. 2 tab, Disp: , Rfl:     nystatin (MYCOSTATIN) 149694 UNIT/GM powder, Apply  topically to the appropriate area as directed 3 (Three) Times a Day., Disp: 60 g, Rfl: 11    oxyCODONE-acetaminophen (PERCOCET) 5-325 MG per tablet, Take 1 tablet by mouth Every 8 (Eight) Hours As Needed for Moderate Pain., Disp: 28 tablet, Rfl: 0    polyethylene glycol (MIRALAX) 17 g packet, Take 17 g by mouth Daily As Needed (constipation)., Disp: , Rfl:     potassium chloride (KLOR-CON M10) 10 MEQ CR tablet, Take 1 tablet by mouth Daily., Disp: 90 tablet, Rfl: 3    potassium chloride (MICRO-K) 10 MEQ CR capsule, Take 1 capsule by mouth Daily., Disp: , Rfl:     predniSONE (DELTASONE) 5 MG tablet, Take 1  tablet by mouth Daily., Disp: , Rfl:     pregabalin (LYRICA) 75 MG capsule, Take 1 capsule by mouth 2 (Two) Times a Day., Disp: , Rfl:     sertraline (ZOLOFT) 100 MG tablet, Take 1 tablet by mouth Daily., Disp: 90 tablet, Rfl: 3    simethicone (MYLICON) 125 MG chewable tablet, Chew 1 tablet Every 6 (Six) Hours As Needed for Flatulence., Disp: , Rfl:     Vibegron 75 MG tablet, Take 1 tablet by mouth Daily., Disp: 30 tablet, Rfl: 11    vitamin B-12 (CYANOCOBALAMIN) 1000 MCG tablet, Take 1 tablet by mouth Daily., Disp: , Rfl:     vitamin D (ERGOCALCIFEROL) 1.25 MG (55234 UT) capsule capsule, Take 1 capsule by mouth Every 14 (Fourteen) Days. Every other Friday, Disp: , Rfl:     Past Medical History:   Diagnosis Date    THERESA (acute kidney injury) (HCC) due to sepsis 11/02/2022    AMS (altered mental status) 10/11/2022    Anxiety     Arthritis     CAD (coronary artery disease)     Colon polyp     DDD (degenerative disc disease), lumbar     Deep venous thrombosis     Depression     Diverticulosis     Esophageal stricture     Full dentures     GERD (gastroesophageal reflux disease)     History of transfusion     w/o Adverse reaction    Hypercholesteremia     Hypertension     LPRD (laryngopharyngeal reflux disease)     Macular degeneration     Multinodular goiter 02/23/2017    Osteoarthritis     Personal history of COVID-19 2020    Pruritic disorder     RSV (acute bronchiolitis due to respiratory syncytial virus) 11/02/2022    Spastic colon     Thyroid nodule     Type 2 MI (myocardial infarction) (HCC) due to THERESA and UTI 11/02/2022       Past Surgical History:   Procedure Laterality Date    BLADDER REPAIR      CATARACT EXTRACTION      CHOLECYSTECTOMY      COLONOSCOPY  04/22/2014    2 polyps, adenomatous, diverticulosis    COLONOSCOPY N/A 6/26/2020    Procedure: COLONOSCOPY WITH ANESTHESIA;  Surgeon: John Coleman MD;  Location: [facility] ENDOSCOPY;  Service: Gastroenterology;  Laterality: N/A;  pre op: constipation  post  op: diverticulosis,   PCP: Adam Delgadillo MD    CYSTOCELE REPAIR      ENDOSCOPY  04/22/2014    Schatzki's ring dilated    ENDOSCOPY N/A 6/26/2020    Procedure: ESOPHAGOGASTRODUODENOSCOPY WITH ANESTHESIA;  Surgeon: John Coleman MD;  Location:  [facility] ENDOSCOPY;  Service: Gastroenterology;  Laterality: N/A;  pre op: dysphagia  post op:stricture, dilated   PCP:Adam Delgadillo MD    ENDOSCOPY N/A 9/2/2021    Procedure: ESOPHAGOGASTRODUODENOSCOPY WITH ANESTHESIA;  Surgeon: John Coleman MD;  Location:  [facility] ENDOSCOPY;  Service: Gastroenterology;  Laterality: N/A;  pre op: dysphagia  post op:gastritis  PCP: VERA Noble MD    FEMUR FRACTURE SURGERY      HYSTERECTOMY      INCISION AND DRAINAGE LEG Left 11/3/2021    Procedure: INCISION AND DRAINAGE LEG ABSCESS;  Surgeon: Cesia Mondragon MD;  Location:  [facility] OR;  Service: General;  Laterality: Left;    REPLACEMENT TOTAL KNEE      TOTAL SHOULDER ARTHROPLASTY W/ DISTAL CLAVICLE EXCISION Left 10/3/2023    Procedure: LEFT REVERSE TOTAL SHOULDER ARTHROPLASTY;  Surgeon: Dc Dominguez MD;  Location:  [facility] OR;  Service: Orthopedics;  Laterality: Left;    ULNAR NERVE TRANSPOSITION         Social History     Socioeconomic History    Marital status:    Tobacco Use    Smoking status: Never    Smokeless tobacco: Never   Vaping Use    Vaping status: Never Used   Substance and Sexual Activity    Alcohol use: No    Drug use: No    Sexual activity: Defer     Birth control/protection: Hysterectomy       Family History   Problem Relation Age of Onset    Colon cancer Mother     Heart disease Mother     Heart disease Father     Colon polyps Neg Hx     Esophageal cancer Neg Hx        Objective    There were no vitals taken for this visit.    Physical Exam        Results for orders placed or performed in visit on 12/12/24   POC Urinalysis Dipstick, Multipro    Collection Time: 12/12/24  2:14 PM    Specimen: Urine   Result Value Ref Range    Color  Yellow Yellow, Straw, Dark Yellow, Jaz    Clarity, UA Clear Clear    Glucose, UA Negative Negative mg/dL    Bilirubin Negative Negative    Ketones, UA Negative Negative    Specific Gravity  1.015 1.005 - 1.030    Blood, UA Negative Negative    pH, Urine 5.5 5.0 - 8.0    Protein, POC Negative Negative mg/dL    Urobilinogen, UA 0.2 E.U./dL Normal, 0.2 E.U./dL    Nitrite, UA Negative Negative    Leukocytes Negative Negative     Assessment and Plan    Diagnoses and all orders for this visit:    1. Urge incontinence of urine (Primary)  -     Cancel: POC Urinalysis Dipstick, Multipro    2. Frequent fecal incontinence          Patient has urgency incontinence as well as fecal incontinence.  We discussed Botox in depth with her today.  She also has a history of recurrent urinary tract infections.  Given her issues with urge and fecal incontinence I think she would likely benefit more from InterStim.  I scheduled her consultation with Dr. Colorado to discuss further.

## 2025-01-02 ENCOUNTER — OFFICE VISIT (OUTPATIENT)
Dept: INTERNAL MEDICINE | Facility: CLINIC | Age: 85
End: 2025-01-02
Payer: MEDICARE

## 2025-01-02 VITALS
HEIGHT: 60 IN | SYSTOLIC BLOOD PRESSURE: 108 MMHG | WEIGHT: 191 LBS | HEART RATE: 52 BPM | BODY MASS INDEX: 37.5 KG/M2 | DIASTOLIC BLOOD PRESSURE: 64 MMHG | OXYGEN SATURATION: 98 %

## 2025-01-02 DIAGNOSIS — R60.0 EDEMA LEG: ICD-10-CM

## 2025-01-02 DIAGNOSIS — R06.01 ORTHOPNEA: Primary | ICD-10-CM

## 2025-01-02 PROCEDURE — 3074F SYST BP LT 130 MM HG: CPT

## 2025-01-02 PROCEDURE — 99213 OFFICE O/P EST LOW 20 MIN: CPT

## 2025-01-02 PROCEDURE — 3078F DIAST BP <80 MM HG: CPT

## 2025-01-02 NOTE — PROGRESS NOTES
Chief Complaint   Patient presents with    Leg Swelling         History:      Carmen Obrien is a 85 y.o. female who presents today for evaluation of the above problems.      HPI  History of Present Illness  The patient is an 85-year-old female who presents for a 4-month follow-up of bilateral lower extremity edema, dyspnea, and rib pain.    She reports leg swelling, particularly at night, which has been managed by elevating her legs. She also mentions excessive water intake and difficulty walking. She has been unable to use compression socks due to sizing issues. She has been using a wheelchair but also sits in a recliner at home. She admits to consuming too much salt. She is on amlodipine for blood pressure management and continues to take Lasix daily.    She experiences shortness of breath when sitting in her recliner or lying down at night, but this symptom subsides once she is in a supine position. She experiences shortness of breath daily, but not during physical activity.    She is previously under the care of a urologist for bladder and bowel issues, but despite initial improvement, she is now experiencing recurrent bowel problems. She has an upcoming appointment with Dr. Calero on 01/15/2025 to discuss potential Botox injections.    She had a fall over a month ago, resulting in rib injuries that remain sore, leading her to suspect a possible rib fracture.    MEDICATIONS  Amlodipine and Lasix.      ROS:  Review of Systems   Respiratory:  Positive for shortness of breath. Negative for chest tightness.    Cardiovascular:  Positive for leg swelling. Negative for chest pain and palpitations.         Current Outpatient Medications:     allopurinol (ZYLOPRIM) 100 MG tablet, Take 1 tablet by mouth Daily., Disp: 90 tablet, Rfl: 3    aluminum-magnesium hydroxide-simethicone (MAALOX/MYLANTA) 200-200-20 MG/5ML suspension, Take 15 mL by mouth 2 (Two) Times a Day As Needed for Indigestion or Heartburn., Disp: , Rfl:      amLODIPine (NORVASC) 10 MG tablet, Take 1 tablet by mouth Daily., Disp: 90 tablet, Rfl: 3    ascorbic acid (VITAMIN C) 500 MG tablet, Take 1 tablet by mouth Daily., Disp: , Rfl:     atorvastatin (LIPITOR) 10 MG tablet, TAKE ONE TABLET BY MOUTH EVERY DAY, Disp: 90 tablet, Rfl: 3    benzonatate (Tessalon Perles) 100 MG capsule, Take 1 capsule by mouth 3 (Three) Times a Day As Needed for Cough., Disp: 60 capsule, Rfl: 0    Calcium Carbonate-Vit D-Min (Caltrate 600+D Plus Minerals) 600-800 MG-UNIT tablet, Take 1 tablet by mouth 2 (Two) Times a Day., Disp: , Rfl:     carvedilol (COREG) 12.5 MG tablet, Take 1 tablet by mouth 2 (Two) Times a Day With Meals., Disp: , Rfl:     cetirizine (zyrTEC) 10 MG tablet, Take 1 tablet by mouth Daily., Disp: 30 tablet, Rfl: 0    cloNIDine (CATAPRES) 0.1 MG tablet, Take 1 tablet by mouth 2 (Two) Times a Day As Needed for High Blood Pressure (systolic greater than 180)., Disp: 30 tablet, Rfl: 0    doxycycline (VIBRAMYCIN) 100 MG capsule, Take 1 capsule by mouth 2 (Two) Times a Day., Disp: 14 capsule, Rfl: 0    esomeprazole (nexIUM) 20 MG capsule, Take 1 capsule by mouth 2 (Two) Times a Day., Disp: 60 capsule, Rfl: 11    famotidine (PEPCID) 20 MG tablet, Take 1 tablet by mouth 2 (Two) Times a Day., Disp: , Rfl:     FEROSUL 325 (65 Fe) MG tablet, Take 1 tablet by mouth Daily With Breakfast., Disp: , Rfl:     fluticasone (FLONASE) 50 MCG/ACT nasal spray, Administer 2 sprays into the nostril(s) as directed by provider Daily., Disp: 15.8 mL, Rfl: 3    furosemide (LASIX) 20 MG tablet, Take 1 tablet by mouth Daily., Disp: 30 tablet, Rfl: 5    guaiFENesin (Mucinex) 600 MG 12 hr tablet, Take 2 tablets by mouth 2 (Two) Times a Day., Disp: 120 tablet, Rfl: 1    lidocaine (LIDODERM) 5 %, Place 1 patch on the skin as directed by provider Daily. Apply to bilateral upper arms.  Remove & Discard patch within 12 hours or as directed by MD, Disp: , Rfl:     lisinopril (PRINIVIL,ZESTRIL) 20 MG tablet, Take  1 tablet by mouth Daily., Disp: , Rfl:     loperamide (IMODIUM) 2 MG capsule, Take 1 capsule by mouth Every 8 (Eight) Hours As Needed for Diarrhea., Disp: , Rfl:     Melatonin 10 MG capsule, Take  by mouth. 2 tab, Disp: , Rfl:     nystatin (MYCOSTATIN) 309525 UNIT/GM powder, Apply  topically to the appropriate area as directed 3 (Three) Times a Day., Disp: 60 g, Rfl: 11    oxyCODONE-acetaminophen (PERCOCET) 5-325 MG per tablet, Take 1 tablet by mouth Every 8 (Eight) Hours As Needed for Moderate Pain., Disp: 28 tablet, Rfl: 0    polyethylene glycol (MIRALAX) 17 g packet, Take 17 g by mouth Daily As Needed (constipation)., Disp: , Rfl:     potassium chloride (KLOR-CON M10) 10 MEQ CR tablet, Take 1 tablet by mouth Daily., Disp: 90 tablet, Rfl: 3    potassium chloride (MICRO-K) 10 MEQ CR capsule, Take 1 capsule by mouth Daily., Disp: , Rfl:     pregabalin (LYRICA) 75 MG capsule, Take 1 capsule by mouth 2 (Two) Times a Day., Disp: , Rfl:     sertraline (ZOLOFT) 100 MG tablet, Take 1 tablet by mouth Daily., Disp: 90 tablet, Rfl: 3    simethicone (MYLICON) 125 MG chewable tablet, Chew 1 tablet Every 6 (Six) Hours As Needed for Flatulence., Disp: , Rfl:     Vibegron 75 MG tablet, Take 1 tablet by mouth Daily., Disp: 30 tablet, Rfl: 11    vitamin B-12 (CYANOCOBALAMIN) 1000 MCG tablet, Take 1 tablet by mouth Daily., Disp: , Rfl:     vitamin D (ERGOCALCIFEROL) 1.25 MG (86981 UT) capsule capsule, Take 1 capsule by mouth Every 14 (Fourteen) Days. Every other Friday, Disp: , Rfl:     predniSONE (DELTASONE) 5 MG tablet, Take 1 tablet by mouth Daily. (Patient not taking: Reported on 1/2/2025), Disp: , Rfl:     Lab Results   Component Value Date    GLUCOSE 102 (H) 04/18/2024    BUN 23 04/18/2024    CREATININE 0.50 (L) 04/18/2024     04/18/2024    K 4.9 04/18/2024     04/18/2024    CALCIUM 9.8 04/18/2024    PROTEINTOT 7.3 04/18/2024    ALBUMIN 4.3 04/18/2024    ALT 18 04/18/2024    AST 22 04/18/2024    ALKPHOS 90  04/18/2024    BILITOT 0.4 04/18/2024    GLOB 3.0 04/18/2024    AGRATIO 1.4 04/18/2024    BCR 46.0 (H) 04/18/2024    ANIONGAP 8.0 04/18/2024    EGFR 93.8 04/18/2024       WBC   Date Value Ref Range Status   04/18/2024 6.05 3.40 - 10.80 10*3/mm3 Final   07/16/2019 7.0 4.8 - 10.8 K/uL Final     RBC   Date Value Ref Range Status   04/18/2024 3.93 3.77 - 5.28 10*6/mm3 Final   07/16/2019 3.52 (L) 4.20 - 5.40 M/uL Final     Hemoglobin   Date Value Ref Range Status   04/18/2024 11.9 (L) 12.0 - 15.9 g/dL Final   07/16/2019 10.1 (L) 12.0 - 16.0 g/dL Final     Hematocrit   Date Value Ref Range Status   04/18/2024 37.2 34.0 - 46.6 % Final   07/16/2019 31.7 (L) 37.0 - 47.0 % Final     MCV   Date Value Ref Range Status   04/18/2024 94.7 79.0 - 97.0 fL Final   07/16/2019 90.1 81.0 - 99.0 fL Final     MCH   Date Value Ref Range Status   04/18/2024 30.3 26.6 - 33.0 pg Final   07/16/2019 28.7 27.0 - 31.0 pg Final     MCHC   Date Value Ref Range Status   04/18/2024 32.0 31.5 - 35.7 g/dL Final   07/16/2019 31.9 (L) 33.0 - 37.0 g/dL Final     RDW   Date Value Ref Range Status   04/18/2024 13.9 12.3 - 15.4 % Final   07/16/2019 14.7 (H) 11.5 - 14.5 % Final     RDW-SD   Date Value Ref Range Status   04/18/2024 48.8 37.0 - 54.0 fl Final     MPV   Date Value Ref Range Status   04/18/2024 10.5 6.0 - 12.0 fL Final   07/16/2019 10.8 9.4 - 12.3 fL Final     Platelets   Date Value Ref Range Status   04/18/2024 220 140 - 450 10*3/mm3 Final   07/16/2019 248 130 - 400 K/uL Final     Neutrophil Rel %   Date Value Ref Range Status   07/04/2019 76.5 (H) 50.0 - 65.0 % Final     Neutrophil %   Date Value Ref Range Status   01/08/2024 71.9 42.7 - 76.0 % Final     Lymphocyte Rel %   Date Value Ref Range Status   07/04/2019 13.9 (L) 20.0 - 40.0 % Final     Lymphocyte %   Date Value Ref Range Status   01/08/2024 17.2 (L) 19.6 - 45.3 % Final     Monocyte Rel %   Date Value Ref Range Status   07/04/2019 7.4 0.0 - 10.0 % Final     Monocyte %   Date Value Ref  "Range Status   01/08/2024 7.5 5.0 - 12.0 % Final     Eosinophil Rel %   Date Value Ref Range Status   07/04/2019 1.4 0.0 - 5.0 % Final     Eosinophil %   Date Value Ref Range Status   01/08/2024 2.2 0.3 - 6.2 % Final     Basophil Rel %   Date Value Ref Range Status   07/04/2019 0.4 0.0 - 1.0 % Final     Basophil %   Date Value Ref Range Status   01/08/2024 0.7 0.0 - 1.5 % Final     Immature Grans %   Date Value Ref Range Status   01/08/2024 0.5 0.0 - 0.5 % Final     Neutrophils Absolute   Date Value Ref Range Status   07/04/2019 7.7 (H) 1.5 - 7.5 K/uL Final     Neutrophils, Absolute   Date Value Ref Range Status   01/08/2024 5.27 1.70 - 7.00 10*3/mm3 Final     Lymphocytes Absolute   Date Value Ref Range Status   07/04/2019 1.4 1.1 - 4.5 K/uL Final     Lymphocytes, Absolute   Date Value Ref Range Status   01/08/2024 1.26 0.70 - 3.10 10*3/mm3 Final     Monocytes Absolute   Date Value Ref Range Status   07/04/2019 0.80 0.00 - 0.90 K/uL Final     Monocytes, Absolute   Date Value Ref Range Status   01/08/2024 0.55 0.10 - 0.90 10*3/mm3 Final     Eosinophils Absolute   Date Value Ref Range Status   07/04/2019 0.10 0.00 - 0.60 K/uL Final     Eosinophils, Absolute   Date Value Ref Range Status   01/08/2024 0.16 0.00 - 0.40 10*3/mm3 Final     Basophils Absolute   Date Value Ref Range Status   07/04/2019 0.00 0.00 - 0.20 K/uL Final     Basophils, Absolute   Date Value Ref Range Status   01/08/2024 0.05 0.00 - 0.20 10*3/mm3 Final     Immature Grans, Absolute   Date Value Ref Range Status   01/08/2024 0.04 0.00 - 0.05 10*3/mm3 Final     nRBC   Date Value Ref Range Status   01/08/2024 0.0 0.0 - 0.2 /100 WBC Final       OBJECTIVE:  Visit Vitals  /64 (BP Location: Right arm, Patient Position: Sitting, Cuff Size: Adult)   Pulse 52   Ht 152.4 cm (60\")   Wt 86.6 kg (191 lb)   SpO2 98%   BMI 37.30 kg/m²      Physical Exam  Constitutional:       Appearance: Normal appearance.   HENT:      Head: Normocephalic.   Cardiovascular:     "  Rate and Rhythm: Normal rate and regular rhythm.      Pulses: Normal pulses.      Heart sounds: Normal heart sounds. No murmur heard.  Pulmonary:      Effort: Pulmonary effort is normal.      Breath sounds: Normal breath sounds. No wheezing, rhonchi or rales.   Musculoskeletal:         General: Normal range of motion.      Right ankle: Swelling (2+) present. No ecchymosis. No tenderness. Normal range of motion. Normal pulse.      Left ankle: Swelling (2+) present. No ecchymosis. No tenderness. Normal range of motion. Normal pulse.   Skin:     General: Skin is warm and dry.   Neurological:      Mental Status: She is alert and oriented to person, place, and time.   Psychiatric:         Mood and Affect: Mood normal.         Behavior: Behavior normal.         Thought Content: Thought content normal.         Judgment: Judgment normal.       Physical Exam  Lungs were auscultated.  Heart was examined.    Vital Signs  Blood pressure is normal. Heart rate is slightly low.    Results  Imaging  Echocardiogram from 2 years ago showed mild regurgitation.    Assessment/Plan    Diagnoses and all orders for this visit:    1. Orthopnea (Primary)  -     Adult Transthoracic Echo Complete W/ Cont if Necessary Per Protocol; Future    2. Edema leg  -     Adult Transthoracic Echo Complete W/ Cont if Necessary Per Protocol; Future      Assessment & Plan  1. Bilateral lower extremity edema.  The patient's blood pressure is well-regulated with her current medication regimen, which includes amlodipine. However, this medication may be contributing to her leg swelling. She is also taking Lasix daily, which should help manage the edema. She is advised to monitor her sodium intake, avoid salt as much as possible, and maintain adequate hydration by consuming at least 64 ounces of water daily. The use of compression socks is recommended, and she can continue wrapping her feet if she finds it difficult to wear the socks. She is also encouraged to  elevate her feet during the day and at night. An order for a repeat echocardiogram will be placed. If the echocardiogram reveals any abnormalities, a referral to cardiology will be considered.    2. Dyspnea.  The patient reports experiencing shortness of breath when lying down or sitting in her recliner. This symptom is not present during physical activity. A repeat echocardiogram will be ordered to evaluate these symptoms. If the echocardiogram shows any issues, further evaluation by cardiology will be considered.    3. Rib pain.  The patient reports persistent rib pain following a fall over a month ago. She suspects she may have broken some ribs. An x-ray will not be ordered at this time as it has been over a month since the injury, and the pain is likely due to the healing process.    Follow-up  The patient will follow up in 4 months.      Return in about 4 months (around 5/2/2025).      ABDON Rosado  10:38 CST  1/2/2025   Electronically signed      Patient or patient representative verbalized consent for the use of Ambient Listening during the visit with  ABDON Rosado for chart documentation. 1/2/2025  13:05 CST

## 2025-01-15 ENCOUNTER — OFFICE VISIT (OUTPATIENT)
Dept: UROLOGY | Facility: CLINIC | Age: 85
End: 2025-01-15
Payer: MEDICARE

## 2025-01-15 DIAGNOSIS — N39.41 URGE INCONTINENCE OF URINE: Primary | ICD-10-CM

## 2025-01-15 DIAGNOSIS — R15.9 FREQUENT FECAL INCONTINENCE: ICD-10-CM

## 2025-01-15 PROCEDURE — 1159F MED LIST DOCD IN RCRD: CPT | Performed by: UROLOGY

## 2025-01-15 PROCEDURE — 99213 OFFICE O/P EST LOW 20 MIN: CPT | Performed by: UROLOGY

## 2025-01-15 PROCEDURE — 1160F RVW MEDS BY RX/DR IN RCRD: CPT | Performed by: UROLOGY

## 2025-01-23 ENCOUNTER — TELEPHONE (OUTPATIENT)
Dept: INTERNAL MEDICINE | Facility: CLINIC | Age: 85
End: 2025-01-23
Payer: MEDICARE

## 2025-01-23 DIAGNOSIS — G89.29 CHRONIC PAIN OF BOTH SHOULDERS: ICD-10-CM

## 2025-01-23 DIAGNOSIS — M75.100 ROTATOR CUFF TEAR ARTHROPATHY, UNSPECIFIED LATERALITY: ICD-10-CM

## 2025-01-23 DIAGNOSIS — M25.511 CHRONIC PAIN OF BOTH SHOULDERS: ICD-10-CM

## 2025-01-23 DIAGNOSIS — M25.512 CHRONIC PAIN OF BOTH SHOULDERS: ICD-10-CM

## 2025-01-23 DIAGNOSIS — M15.0 PRIMARY OSTEOARTHRITIS INVOLVING MULTIPLE JOINTS: Primary | ICD-10-CM

## 2025-01-23 DIAGNOSIS — M12.819 ROTATOR CUFF TEAR ARTHROPATHY, UNSPECIFIED LATERALITY: ICD-10-CM

## 2025-01-23 NOTE — TELEPHONE ENCOUNTER
Caller: Carmen Obrien    Relationship: Self    Best call back number: [phone]    What orders are you requesting (i.e. lab or imaging): ORDER FOR PHYSICAL THERAPY ON SHOULDERS AND LEGS     In what timeframe would the patient need to come in: SOON PLEASE     Where will you receive your lab/imaging services: [facility]     Additional notes: NOT WALKING MUCH ANYMORE

## 2025-01-23 NOTE — TELEPHONE ENCOUNTER
Staff notified that a referral was placed for physical therapy and they will notify patient as well.

## 2025-02-03 RX ORDER — FUROSEMIDE 20 MG/1
20 TABLET ORAL DAILY
Qty: 30 TABLET | Refills: 5 | Status: SHIPPED | OUTPATIENT
Start: 2025-02-03

## 2025-02-03 NOTE — TELEPHONE ENCOUNTER
Rx Refill Note  Requested Prescriptions     Pending Prescriptions Disp Refills    furosemide (LASIX) 20 MG tablet [Pharmacy Med Name: furosemide 20 mg tablet] 30 tablet 5     Sig: TAKE ONE TABLET BY MOUTH EVERY DAY      Last office visit with prescribing clinician: 11/12/2024   Last telemedicine visit with prescribing clinician: Visit date not found   Next office visit with prescribing clinician: 5/6/2025                         Would you like a call back once the refill request has been completed: [] Yes [] No    If the office needs to give you a call back, can they leave a voicemail: [] Yes [] No    ELIJAH Pearson  02/03/25, 13:41 CST

## 2025-02-11 ENCOUNTER — HOSPITAL ENCOUNTER (OUTPATIENT)
Dept: CARDIOLOGY | Facility: HOSPITAL | Age: 85
Discharge: HOME | End: 2025-02-11
Payer: MEDICARE

## 2025-02-11 VITALS
HEIGHT: 60 IN | DIASTOLIC BLOOD PRESSURE: 64 MMHG | SYSTOLIC BLOOD PRESSURE: 108 MMHG | BODY MASS INDEX: 37.5 KG/M2 | WEIGHT: 191 LBS

## 2025-02-11 DIAGNOSIS — R60.0 EDEMA LEG: ICD-10-CM

## 2025-02-11 DIAGNOSIS — R06.01 ORTHOPNEA: ICD-10-CM

## 2025-02-11 LAB
AV MEAN PRESS GRAD SYS DOP V1V2: 6.7 MMHG
AV VMAX SYS DOP: 182.5 CM/SEC
BH CV ECHO LEFT VENTRICLE GLOBAL LONGITUDINAL STRAIN: -22.3 %
BH CV ECHO MEAS - AI P1/2T: 417.7 MSEC
BH CV ECHO MEAS - AO MAX PG: 13.3 MMHG
BH CV ECHO MEAS - AO ROOT DIAM: 3.6 CM
BH CV ECHO MEAS - AO V2 VTI: 49.7 CM
BH CV ECHO MEAS - AVA(I,D): 1.67 CM2
BH CV ECHO MEAS - EDV(CUBED): 66.1 ML
BH CV ECHO MEAS - EDV(MOD-SP2): 83.9 ML
BH CV ECHO MEAS - EDV(MOD-SP4): 92.4 ML
BH CV ECHO MEAS - EF(MOD-SP2): 67.8 %
BH CV ECHO MEAS - EF(MOD-SP4): 69.4 %
BH CV ECHO MEAS - ESV(CUBED): 25.9 ML
BH CV ECHO MEAS - ESV(MOD-SP2): 27 ML
BH CV ECHO MEAS - ESV(MOD-SP4): 28.2 ML
BH CV ECHO MEAS - FS: 26.8 %
BH CV ECHO MEAS - IVS/LVPW: 1.57 CM
BH CV ECHO MEAS - IVSD: 1.37 CM
BH CV ECHO MEAS - LA DIMENSION: 4 CM
BH CV ECHO MEAS - LAT PEAK E' VEL: 8.7 CM/SEC
BH CV ECHO MEAS - LV DIASTOLIC VOL/BSA (35-75): 50.5 CM2
BH CV ECHO MEAS - LV MASS(C)D: 151.6 GRAMS
BH CV ECHO MEAS - LV MAX PG: 6.2 MMHG
BH CV ECHO MEAS - LV MEAN PG: 3.5 MMHG
BH CV ECHO MEAS - LV SYSTOLIC VOL/BSA (12-30): 15.4 CM2
BH CV ECHO MEAS - LV V1 MAX: 124 CM/SEC
BH CV ECHO MEAS - LV V1 VTI: 37.4 CM
BH CV ECHO MEAS - LVIDD: 4 CM
BH CV ECHO MEAS - LVIDS: 3 CM
BH CV ECHO MEAS - LVOT AREA: 2.22 CM2
BH CV ECHO MEAS - LVOT DIAM: 1.68 CM
BH CV ECHO MEAS - LVPWD: 0.87 CM
BH CV ECHO MEAS - MED PEAK E' VEL: 6.7 CM/SEC
BH CV ECHO MEAS - MV A MAX VEL: 118 CM/SEC
BH CV ECHO MEAS - MV DEC SLOPE: 422.6 CM/SEC2
BH CV ECHO MEAS - MV DEC TIME: 0.24 SEC
BH CV ECHO MEAS - MV E MAX VEL: 103.3 CM/SEC
BH CV ECHO MEAS - MV E/A: 0.88
BH CV ECHO MEAS - MV MAX PG: 6.3 MMHG
BH CV ECHO MEAS - MV MEAN PG: 1.55 MMHG
BH CV ECHO MEAS - MV V2 VTI: 48.6 CM
BH CV ECHO MEAS - MVA(VTI): 1.71 CM2
BH CV ECHO MEAS - PA V2 MAX: 106.9 CM/SEC
BH CV ECHO MEAS - PULM A REVS DUR: 0.16 SEC
BH CV ECHO MEAS - PULM A REVS VEL: 25 CM/SEC
BH CV ECHO MEAS - RAP SYSTOLE: 3 MMHG
BH CV ECHO MEAS - RV MAX PG: 3.4 MMHG
BH CV ECHO MEAS - RV V1 MAX: 92.7 CM/SEC
BH CV ECHO MEAS - RV V1 VTI: 29 CM
BH CV ECHO MEAS - RVSP: 34 MMHG
BH CV ECHO MEAS - SV(LVOT): 82.9 ML
BH CV ECHO MEAS - SV(MOD-SP2): 56.9 ML
BH CV ECHO MEAS - SV(MOD-SP4): 64.1 ML
BH CV ECHO MEAS - SVI(LVOT): 45.3 ML/M2
BH CV ECHO MEAS - SVI(MOD-SP2): 31.1 ML/M2
BH CV ECHO MEAS - SVI(MOD-SP4): 35 ML/M2
BH CV ECHO MEAS - TAPSE (>1.6): 3.1 CM
BH CV ECHO MEAS - TR MAX PG: 31 MMHG
BH CV ECHO MEAS - TR MAX VEL: 274.4 CM/SEC
BH CV ECHO MEASUREMENTS AVERAGE E/E' RATIO: 13.42
BH CV XLRA - RV BASE: 3.6 CM
BH CV XLRA - RV LENGTH: 6.9 CM
BH CV XLRA - RV MID: 3 CM
BH CV XLRA - TDI S': 20 CM/SEC
IVRT: 80 MS
LEFT ATRIUM VOLUME INDEX: 56 ML/M2
LEFT ATRIUM VOLUME: 94 ML
LV EF BIPLANE MOD: 69 %

## 2025-02-11 PROCEDURE — 93306 TTE W/DOPPLER COMPLETE: CPT

## 2025-02-11 PROCEDURE — 93356 MYOCRD STRAIN IMG SPCKL TRCK: CPT

## 2025-02-14 DIAGNOSIS — I35.1 AORTIC VALVE INSUFFICIENCY, ETIOLOGY OF CARDIAC VALVE DISEASE UNSPECIFIED: ICD-10-CM

## 2025-02-14 DIAGNOSIS — I51.9 IMPAIRED LEFT VENTRICULAR RELAXATION: ICD-10-CM

## 2025-02-14 DIAGNOSIS — R06.01 ORTHOPNEA: Primary | ICD-10-CM

## 2025-02-14 DIAGNOSIS — I51.7 ATRIAL DILATATION, LEFT: ICD-10-CM

## 2025-02-14 DIAGNOSIS — R60.0 EDEMA LEG: ICD-10-CM

## 2025-02-20 ENCOUNTER — HOSPITAL ENCOUNTER (OUTPATIENT)
Dept: GENERAL RADIOLOGY | Facility: HOSPITAL | Age: 85
Discharge: HOME | End: 2025-02-20
Admitting: NURSE PRACTITIONER
Payer: MEDICARE

## 2025-02-20 ENCOUNTER — TRANSCRIBE ORDERS (OUTPATIENT)
Dept: ADMINISTRATIVE | Facility: HOSPITAL | Age: 85
End: 2025-02-20
Payer: MEDICARE

## 2025-02-20 DIAGNOSIS — M25.562 ACUTE BILATERAL KNEE PAIN: ICD-10-CM

## 2025-02-20 DIAGNOSIS — M25.561 ACUTE BILATERAL KNEE PAIN: Primary | ICD-10-CM

## 2025-02-20 DIAGNOSIS — M25.562 ACUTE BILATERAL KNEE PAIN: Primary | ICD-10-CM

## 2025-02-20 DIAGNOSIS — M25.561 ACUTE BILATERAL KNEE PAIN: ICD-10-CM

## 2025-02-20 PROCEDURE — 73562 X-RAY EXAM OF KNEE 3: CPT

## 2025-03-13 ENCOUNTER — OFFICE VISIT (OUTPATIENT)
Dept: INTERNAL MEDICINE | Facility: CLINIC | Age: 85
End: 2025-03-13
Payer: MEDICARE

## 2025-03-13 ENCOUNTER — OFFICE VISIT (OUTPATIENT)
Dept: CARDIOLOGY | Facility: CLINIC | Age: 85
End: 2025-03-13
Payer: MEDICARE

## 2025-03-13 VITALS
HEIGHT: 60 IN | SYSTOLIC BLOOD PRESSURE: 152 MMHG | HEART RATE: 52 BPM | BODY MASS INDEX: 37.3 KG/M2 | DIASTOLIC BLOOD PRESSURE: 76 MMHG | OXYGEN SATURATION: 97 %

## 2025-03-13 VITALS
SYSTOLIC BLOOD PRESSURE: 143 MMHG | DIASTOLIC BLOOD PRESSURE: 63 MMHG | OXYGEN SATURATION: 97 % | HEART RATE: 53 BPM | HEIGHT: 60 IN | BODY MASS INDEX: 37.3 KG/M2

## 2025-03-13 DIAGNOSIS — Z86.718 HISTORY OF DVT OF LOWER EXTREMITY: ICD-10-CM

## 2025-03-13 DIAGNOSIS — I10 ESSENTIAL HYPERTENSION: Primary | ICD-10-CM

## 2025-03-13 DIAGNOSIS — L08.9 WOUND INFECTION: Primary | ICD-10-CM

## 2025-03-13 DIAGNOSIS — I50.32 CHRONIC DIASTOLIC CONGESTIVE HEART FAILURE: ICD-10-CM

## 2025-03-13 DIAGNOSIS — T14.8XXA WOUND INFECTION: Primary | ICD-10-CM

## 2025-03-13 DIAGNOSIS — R60.0 EDEMA LEG: ICD-10-CM

## 2025-03-13 RX ORDER — DOXYCYCLINE 100 MG/1
100 CAPSULE ORAL 2 TIMES DAILY
Qty: 14 CAPSULE | Refills: 0 | Status: SHIPPED | OUTPATIENT
Start: 2025-03-13

## 2025-03-13 RX ORDER — FUROSEMIDE 20 MG/1
40 TABLET ORAL DAILY
Qty: 90 TABLET | Refills: 6 | Status: SHIPPED | OUTPATIENT
Start: 2025-03-13

## 2025-03-13 RX ORDER — SPIRONOLACTONE 25 MG/1
25 TABLET ORAL DAILY
Qty: 90 TABLET | Refills: 3 | Status: SHIPPED | OUTPATIENT
Start: 2025-03-13

## 2025-03-13 NOTE — PROGRESS NOTES
Chief Complaint   Patient presents with    Wound Infection     Right lower leg       History:      Carmen Obrien is a 85 y.o. female who presents today for evaluation of the above problems.      HPI  History of Present Illness  The patient is an 85-year-old female who presents for follow-up of a wound on her leg.    She sustained the wound approximately 2 weeks ago when she inadvertently struck her leg against a door while returning from a doctor's visit. The impact resulted in significant bleeding, which was severe enough to soak through her shoes. The wound has since been bleeding again after playing bingo, unbeknownst to her. Despite attempts at self-care, the wound has not shown signs of healing and appears to have worsened. She reports that the wound was deep and white in color when she removed the bandage yesterday. She also experienced pain and heat in the area. She has been applying triple antibiotic ointment and using 4 x 4 gauze bandages as advised by her podiatrist. This regimen has been followed for approximately 1 week. She has been managing excess fluid accumulation with daily diuretic medication.    ALLERGIES  The patient is allergic to LEVAQUIN, MORPHINE, DEMEROL, and CODEINE.    MEDICATIONS  Current: Triple antibiotic ointment, water pills.      ROS:  Review of Systems   Constitutional:  Negative for chills and fever.   Skin:  Positive for wound.         Current Outpatient Medications:     allopurinol (ZYLOPRIM) 100 MG tablet, Take 1 tablet by mouth Daily., Disp: 90 tablet, Rfl: 3    aluminum-magnesium hydroxide-simethicone (MAALOX/MYLANTA) 200-200-20 MG/5ML suspension, Take 15 mL by mouth 2 (Two) Times a Day As Needed for Indigestion or Heartburn., Disp: , Rfl:     amLODIPine (NORVASC) 10 MG tablet, Take 1 tablet by mouth Daily., Disp: 90 tablet, Rfl: 3    ascorbic acid (VITAMIN C) 500 MG tablet, Take 1 tablet by mouth Daily., Disp: , Rfl:     atorvastatin (LIPITOR) 10 MG tablet, TAKE ONE TABLET  BY MOUTH EVERY DAY, Disp: 90 tablet, Rfl: 3    benzonatate (Tessalon Perles) 100 MG capsule, Take 1 capsule by mouth 3 (Three) Times a Day As Needed for Cough., Disp: 60 capsule, Rfl: 0    Calcium Carbonate-Vit D-Min (Caltrate 600+D Plus Minerals) 600-800 MG-UNIT tablet, Take 1 tablet by mouth 2 (Two) Times a Day., Disp: , Rfl:     carvedilol (COREG) 12.5 MG tablet, Take 1 tablet by mouth 2 (Two) Times a Day With Meals., Disp: , Rfl:     cetirizine (zyrTEC) 10 MG tablet, Take 1 tablet by mouth Daily., Disp: 30 tablet, Rfl: 0    cloNIDine (CATAPRES) 0.1 MG tablet, Take 1 tablet by mouth 2 (Two) Times a Day As Needed for High Blood Pressure (systolic greater than 180)., Disp: 30 tablet, Rfl: 0    esomeprazole (nexIUM) 20 MG capsule, Take 1 capsule by mouth 2 (Two) Times a Day., Disp: 60 capsule, Rfl: 11    famotidine (PEPCID) 20 MG tablet, Take 1 tablet by mouth 2 (Two) Times a Day., Disp: , Rfl:     FEROSUL 325 (65 Fe) MG tablet, Take 1 tablet by mouth Daily With Breakfast., Disp: , Rfl:     fluticasone (FLONASE) 50 MCG/ACT nasal spray, Administer 2 sprays into the nostril(s) as directed by provider Daily., Disp: 15.8 mL, Rfl: 3    guaiFENesin (Mucinex) 600 MG 12 hr tablet, Take 2 tablets by mouth 2 (Two) Times a Day., Disp: 120 tablet, Rfl: 1    lidocaine (LIDODERM) 5 %, Place 1 patch on the skin as directed by provider Daily. Apply to bilateral upper arms.  Remove & Discard patch within 12 hours or as directed by MD, Disp: , Rfl:     lisinopril (PRINIVIL,ZESTRIL) 20 MG tablet, Take 1 tablet by mouth Daily., Disp: , Rfl:     loperamide (IMODIUM) 2 MG capsule, Take 1 capsule by mouth Every 8 (Eight) Hours As Needed for Diarrhea., Disp: , Rfl:     Melatonin 10 MG capsule, Take  by mouth. 2 tab, Disp: , Rfl:     nystatin (MYCOSTATIN) 082980 UNIT/GM powder, Apply  topically to the appropriate area as directed 3 (Three) Times a Day., Disp: 60 g, Rfl: 11    oxyCODONE-acetaminophen (PERCOCET) 5-325 MG per tablet, Take 1  tablet by mouth Every 8 (Eight) Hours As Needed for Moderate Pain., Disp: 28 tablet, Rfl: 0    polyethylene glycol (MIRALAX) 17 g packet, Take 17 g by mouth Daily As Needed (constipation)., Disp: , Rfl:     potassium chloride (KLOR-CON M10) 10 MEQ CR tablet, Take 1 tablet by mouth Daily., Disp: 90 tablet, Rfl: 3    potassium chloride (MICRO-K) 10 MEQ CR capsule, Take 1 capsule by mouth Daily., Disp: , Rfl:     predniSONE (DELTASONE) 5 MG tablet, Take 1 tablet by mouth Daily., Disp: , Rfl:     pregabalin (LYRICA) 75 MG capsule, Take 1 capsule by mouth 2 (Two) Times a Day., Disp: , Rfl:     sertraline (ZOLOFT) 100 MG tablet, Take 1 tablet by mouth Daily., Disp: 90 tablet, Rfl: 3    simethicone (MYLICON) 125 MG chewable tablet, Chew 1 tablet Every 6 (Six) Hours As Needed for Flatulence., Disp: , Rfl:     Vibegron 75 MG tablet, Take 1 tablet by mouth Daily., Disp: 30 tablet, Rfl: 11    vitamin B-12 (CYANOCOBALAMIN) 1000 MCG tablet, Take 1 tablet by mouth Daily., Disp: , Rfl:     vitamin D (ERGOCALCIFEROL) 1.25 MG (06065 UT) capsule capsule, Take 1 capsule by mouth Every 14 (Fourteen) Days. Every other Friday, Disp: , Rfl:     doxycycline (VIBRAMYCIN) 100 MG capsule, Take 1 capsule by mouth 2 (Two) Times a Day., Disp: 14 capsule, Rfl: 0    furosemide (LASIX) 20 MG tablet, Take 2 tablets by mouth Daily., Disp: 90 tablet, Rfl: 6    spironolactone (ALDACTONE) 25 MG tablet, Take 1 tablet by mouth Daily., Disp: 90 tablet, Rfl: 3    Lab Results   Component Value Date    GLUCOSE 102 (H) 04/18/2024    BUN 23 04/18/2024    CREATININE 0.50 (L) 04/18/2024     04/18/2024    K 4.9 04/18/2024     04/18/2024    CALCIUM 9.8 04/18/2024    PROTEINTOT 7.3 04/18/2024    ALBUMIN 4.3 04/18/2024    ALT 18 04/18/2024    AST 22 04/18/2024    ALKPHOS 90 04/18/2024    BILITOT 0.4 04/18/2024    GLOB 3.0 04/18/2024    AGRATIO 1.4 04/18/2024    BCR 46.0 (H) 04/18/2024    ANIONGAP 8.0 04/18/2024    EGFR 93.8 04/18/2024       WBC   Date  Value Ref Range Status   04/18/2024 6.05 3.40 - 10.80 10*3/mm3 Final   07/16/2019 7.0 4.8 - 10.8 K/uL Final     RBC   Date Value Ref Range Status   04/18/2024 3.93 3.77 - 5.28 10*6/mm3 Final   07/16/2019 3.52 (L) 4.20 - 5.40 M/uL Final     Hemoglobin   Date Value Ref Range Status   04/18/2024 11.9 (L) 12.0 - 15.9 g/dL Final   07/16/2019 10.1 (L) 12.0 - 16.0 g/dL Final     Hematocrit   Date Value Ref Range Status   04/18/2024 37.2 34.0 - 46.6 % Final   07/16/2019 31.7 (L) 37.0 - 47.0 % Final     MCV   Date Value Ref Range Status   04/18/2024 94.7 79.0 - 97.0 fL Final   07/16/2019 90.1 81.0 - 99.0 fL Final     MCH   Date Value Ref Range Status   04/18/2024 30.3 26.6 - 33.0 pg Final   07/16/2019 28.7 27.0 - 31.0 pg Final     MCHC   Date Value Ref Range Status   04/18/2024 32.0 31.5 - 35.7 g/dL Final   07/16/2019 31.9 (L) 33.0 - 37.0 g/dL Final     RDW   Date Value Ref Range Status   04/18/2024 13.9 12.3 - 15.4 % Final   07/16/2019 14.7 (H) 11.5 - 14.5 % Final     RDW-SD   Date Value Ref Range Status   04/18/2024 48.8 37.0 - 54.0 fl Final     MPV   Date Value Ref Range Status   04/18/2024 10.5 6.0 - 12.0 fL Final   07/16/2019 10.8 9.4 - 12.3 fL Final     Platelets   Date Value Ref Range Status   04/18/2024 220 140 - 450 10*3/mm3 Final   07/16/2019 248 130 - 400 K/uL Final     Neutrophil Rel %   Date Value Ref Range Status   07/04/2019 76.5 (H) 50.0 - 65.0 % Final     Neutrophil %   Date Value Ref Range Status   01/08/2024 71.9 42.7 - 76.0 % Final     Lymphocyte Rel %   Date Value Ref Range Status   07/04/2019 13.9 (L) 20.0 - 40.0 % Final     Lymphocyte %   Date Value Ref Range Status   01/08/2024 17.2 (L) 19.6 - 45.3 % Final     Monocyte Rel %   Date Value Ref Range Status   07/04/2019 7.4 0.0 - 10.0 % Final     Monocyte %   Date Value Ref Range Status   01/08/2024 7.5 5.0 - 12.0 % Final     Eosinophil Rel %   Date Value Ref Range Status   07/04/2019 1.4 0.0 - 5.0 % Final     Eosinophil %   Date Value Ref Range Status  "  01/08/2024 2.2 0.3 - 6.2 % Final     Basophil Rel %   Date Value Ref Range Status   07/04/2019 0.4 0.0 - 1.0 % Final     Basophil %   Date Value Ref Range Status   01/08/2024 0.7 0.0 - 1.5 % Final     Immature Grans %   Date Value Ref Range Status   01/08/2024 0.5 0.0 - 0.5 % Final     Neutrophils Absolute   Date Value Ref Range Status   07/04/2019 7.7 (H) 1.5 - 7.5 K/uL Final     Neutrophils, Absolute   Date Value Ref Range Status   01/08/2024 5.27 1.70 - 7.00 10*3/mm3 Final     Lymphocytes Absolute   Date Value Ref Range Status   07/04/2019 1.4 1.1 - 4.5 K/uL Final     Lymphocytes, Absolute   Date Value Ref Range Status   01/08/2024 1.26 0.70 - 3.10 10*3/mm3 Final     Monocytes Absolute   Date Value Ref Range Status   07/04/2019 0.80 0.00 - 0.90 K/uL Final     Monocytes, Absolute   Date Value Ref Range Status   01/08/2024 0.55 0.10 - 0.90 10*3/mm3 Final     Eosinophils Absolute   Date Value Ref Range Status   07/04/2019 0.10 0.00 - 0.60 K/uL Final     Eosinophils, Absolute   Date Value Ref Range Status   01/08/2024 0.16 0.00 - 0.40 10*3/mm3 Final     Basophils Absolute   Date Value Ref Range Status   07/04/2019 0.00 0.00 - 0.20 K/uL Final     Basophils, Absolute   Date Value Ref Range Status   01/08/2024 0.05 0.00 - 0.20 10*3/mm3 Final     Immature Grans, Absolute   Date Value Ref Range Status   01/08/2024 0.04 0.00 - 0.05 10*3/mm3 Final     nRBC   Date Value Ref Range Status   01/08/2024 0.0 0.0 - 0.2 /100 WBC Final       OBJECTIVE:  Visit Vitals  /76 (BP Location: Left arm, Patient Position: Sitting, Cuff Size: Adult)   Pulse 52   Ht 152.4 cm (60\")   SpO2 97%   BMI 37.30 kg/m²      Physical Exam  Constitutional:       Appearance: Normal appearance.      Comments: Pleasant, in wheelchair   Cardiovascular:      Rate and Rhythm: Normal rate and regular rhythm.   Pulmonary:      Effort: Pulmonary effort is normal.      Breath sounds: Normal breath sounds.   Skin:     General: Skin is warm and dry.        "   Neurological:      Mental Status: She is alert.   Psychiatric:         Mood and Affect: Mood normal.         Behavior: Behavior normal.         Thought Content: Thought content normal.         Judgment: Judgment normal.       Physical Exam  Lungs were auscultated.    Results      Assessment/Plan    Diagnoses and all orders for this visit:    1. Wound infection (Primary)  -     doxycycline (VIBRAMYCIN) 100 MG capsule; Take 1 capsule by mouth 2 (Two) Times a Day.  Dispense: 14 capsule; Refill: 0      Assessment & Plan  1. Leg wound.  The wound on her leg is not healing and has worsened over the past two weeks. It appears infected due to erythema and purulent discharge.  Treatment with doxycycline 100 mg twice daily for 1 week.  She should continue applying the antibiotic ointment, Vaseline, or Aquaphor, and cover the wound with a bandage after cleaning it with soap and water. She is advised to monitor the wound closely and report any worsening of symptoms.  Reevaluate in the office in 2 weeks.  Consider referral to wound care if no improvement.    Follow-up  The patient will follow up in 2 weeks.      Return in about 2 weeks (around 3/27/2025) for Recheck wound.      ABDON Rosado  13:17 CDT  3/13/2025   Electronically signed      Patient or patient representative verbalized consent for the use of Ambient Listening during the visit with  ABDON Rosado for chart documentation. 3/13/2025  14:44 CDT

## 2025-03-14 ENCOUNTER — TELEPHONE (OUTPATIENT)
Dept: INTERNAL MEDICINE | Facility: CLINIC | Age: 85
End: 2025-03-14
Payer: MEDICARE

## 2025-03-14 NOTE — TELEPHONE ENCOUNTER
Caller: Carmen Obrien    Relationship: Self    Best call back number: [phone]     What is the best time to reach you: ANYTIME    Who are you requesting to speak with (clinical staff, provider,  specific staff member): CLINICAL    Do you know the name of the person who called: SELF    What was the call regarding: HAS QUESTIONS ABOUT A FEW MEDICATIONS    PLEASE FOLLOW UP WITH PATIENT

## 2025-03-14 NOTE — TELEPHONE ENCOUNTER
I do not recall discussing Lasix with her at her appointment.  We discussed only the wound and the antibiotic that was prescribed.  She will likely have better penetration of the antibiotics with less swelling, so I would recommend continuing with the recommendation and medication management from cardiology.  Thank you

## 2025-03-14 NOTE — TELEPHONE ENCOUNTER
Spoke with pt. She states you wanted her to wait on doubling up on lasix until leg wound was better but she seen cardio after appt yesterday and he doubled her lasix and added another medication. She is asking should she wait or go ahead and start new medication and increased dose of lasix?

## 2025-03-16 PROBLEM — I50.32 CHRONIC DIASTOLIC CONGESTIVE HEART FAILURE: Status: ACTIVE | Noted: 2025-03-16

## 2025-03-16 PROBLEM — R60.0 EDEMA LEG: Status: ACTIVE | Noted: 2025-03-16

## 2025-03-16 NOTE — PROGRESS NOTES
[facility] - CARDIOLOGY  New Patient Initial Outpatient Evaulation    Primary Care Physician: VERA Noble MD    Subjective     Chief Complaint   Patient presents with    ORTHOPEANA     NEW PATIENT        History of Present Illness  History of Present Illness  The patient is an 85-year-old female who presents for evaluation of diastolic dysfunction.    She was referred to our cardiology department by Dr. Figueroa following an echocardiogram conducted in [place]. The results were communicated over the phone, but she had difficulty comprehending them due to issues with her new phone. She recalls being informed of some form of constriction. She reports experiencing shortness of breath and fluid retention. Despite these symptoms, she has initiated therapy and utilizes a walker for mobility. However, she experiences significant breathlessness post-therapy and during periods of rest, such as lying down at night or sitting in a chair. She has been on Lasix 20 mg, amlodipine, Lipitor, carvedilol, Coreg, clonidine as needed, and lisinopril 20 mg. She reports that her blood pressure, which was previously high, is now well-controlled. She also takes potassium supplements. She reports no palpitations or tachycardia. She acknowledges being overweight and admits to enjoying food.    She has been prescribed Gemtesa 75 mg for bowel and bladder control issues, which she reports as beneficial.    She continues to experience dyspnea and edema, although the latter has improved. She has lost approximately 10 pounds, which she believes has alleviated the swelling in her legs. Previously, she experienced severe leg swelling extending from her feet to her knees. Currently, she has a wound on her leg that became infected and is scheduled to start antibiotic treatment. She reports that her blood pressure was elevated today, which she attributes to anxiety related to the clinic visit.    MEDICATIONS  Lasix, amlodipine, Lipitor, carvedilol,  Coreg, clonidine (as needed), lisinopril, Gemtesa.    Review of Systems   Constitutional: Positive for malaise/fatigue. Negative for diaphoresis and fever.   HENT:  Negative for congestion.    Eyes:  Negative for vision loss in left eye and vision loss in right eye.   Cardiovascular:  Positive for leg swelling. Negative for chest pain, claudication, dyspnea on exertion, irregular heartbeat, orthopnea, palpitations and syncope.   Respiratory:  Positive for shortness of breath. Negative for cough and wheezing.    Hematologic/Lymphatic: Negative for adenopathy.   Skin:  Negative for rash.   Musculoskeletal:  Negative for joint pain and joint swelling.   Gastrointestinal:  Negative for abdominal pain, diarrhea, nausea and vomiting.   Neurological:  Negative for excessive daytime sleepiness, dizziness, focal weakness, light-headedness, numbness and weakness.   Psychiatric/Behavioral:  Negative for depression. The patient does not have insomnia.         Otherwise complete ROS reviewed and negative except as mentioned in the HPI.      Past Medical History:   Past Medical History:   Diagnosis Date    THERESA (acute kidney injury) (HCC) due to sepsis 11/02/2022    AMS (altered mental status) 10/11/2022    Anxiety     Arthritis     CAD (coronary artery disease)     Colon polyp     DDD (degenerative disc disease), lumbar     Deep venous thrombosis     Depression     Diverticulosis     Esophageal stricture     Full dentures     GERD (gastroesophageal reflux disease)     History of transfusion     w/o Adverse reaction    Hypercholesteremia     Hypertension     LPRD (laryngopharyngeal reflux disease)     Macular degeneration     Multinodular goiter 02/23/2017    Osteoarthritis     Personal history of COVID-19 2020    Pruritic disorder     RSV (acute bronchiolitis due to respiratory syncytial virus) 11/02/2022    Spastic colon     Thyroid nodule     Type 2 MI (myocardial infarction) (HCC) due to THERESA and UTI 11/02/2022       Past  Surgical History:  Past Surgical History:   Procedure Laterality Date    BLADDER REPAIR      CATARACT EXTRACTION      CHOLECYSTECTOMY      COLONOSCOPY  04/22/2014    2 polyps, adenomatous, diverticulosis    COLONOSCOPY N/A 06/26/2020    Procedure: COLONOSCOPY WITH ANESTHESIA;  Surgeon: John Coleman MD;  Location:  [facility];  Service: Gastroenterology;  Laterality: N/A;  pre op: constipation  post op: diverticulosis,   PCP: Adam Delgadillo MD    CYSTOCELE REPAIR      ENDOSCOPY  04/22/2014    Schatzki's ring dilated    ENDOSCOPY N/A 06/26/2020    Procedure: ESOPHAGOGASTRODUODENOSCOPY WITH ANESTHESIA;  Surgeon: John Coleman MD;  Location:  [facility];  Service: Gastroenterology;  Laterality: N/A;  pre op: dysphagia  post op:stricture, dilated   PCP:Adam Delgadillo MD    ENDOSCOPY N/A 09/02/2021    Procedure: ESOPHAGOGASTRODUODENOSCOPY WITH ANESTHESIA;  Surgeon: John Coleman MD;  Location:  [facility];  Service: Gastroenterology;  Laterality: N/A;  pre op: dysphagia  post op:gastritis  PCP: VERA Noble MD    FEMUR FRACTURE SURGERY      HYSTERECTOMY      INCISION AND DRAINAGE LEG Left 11/03/2021    Procedure: INCISION AND DRAINAGE LEG ABSCESS;  Surgeon: Cesia Mondragon MD;  Location: [facility] OR;  Service: General;  Laterality: Left;    REPLACEMENT TOTAL KNEE      TOTAL SHOULDER ARTHROPLASTY W/ DISTAL CLAVICLE EXCISION Left 10/03/2023    Procedure: LEFT REVERSE TOTAL SHOULDER ARTHROPLASTY;  Surgeon: Dc Dominguez MD;  Location: [facility] OR;  Service: Orthopedics;  Laterality: Left;    ULNAR NERVE TRANSPOSITION         Family History: family history includes Colon cancer in her mother; Heart disease in her father and mother.    Social History:  reports that she has never smoked. She has never used smokeless tobacco. She reports that she does not drink alcohol and does not use drugs.    Medications:  Prior to Admission medications    Medication Sig Start Date End  Date Taking? Authorizing Provider   allopurinol (ZYLOPRIM) 100 MG tablet Take 1 tablet by mouth Daily. 11/6/24  Yes VERA Noble MD   aluminum-magnesium hydroxide-simethicone (MAALOX/MYLANTA) 200-200-20 MG/5ML suspension Take 15 mL by mouth 2 (Two) Times a Day As Needed for Indigestion or Heartburn.   Yes Ying Salazar MD   amLODIPine (NORVASC) 10 MG tablet Take 1 tablet by mouth Daily. 12/11/24  Yes VERA Noble MD   ascorbic acid (VITAMIN C) 500 MG tablet Take 1 tablet by mouth Daily.   Yes Ying Salazar MD   atorvastatin (LIPITOR) 10 MG tablet TAKE ONE TABLET BY MOUTH EVERY DAY 12/26/24  Yes VERA Noble MD   benzonatate (Tessalon Perles) 100 MG capsule Take 1 capsule by mouth 3 (Three) Times a Day As Needed for Cough. 11/12/24  Yes VERA Noble MD   Calcium Carbonate-Vit D-Min (Caltrate 600+D Plus Minerals) 600-800 MG-UNIT tablet Take 1 tablet by mouth 2 (Two) Times a Day.   Yes ProviderYing MD   carvedilol (COREG) 12.5 MG tablet Take 1 tablet by mouth 2 (Two) Times a Day With Meals.   Yes ProviderYing MD   cetirizine (zyrTEC) 10 MG tablet Take 1 tablet by mouth Daily. 10/31/24  Yes Lyly Wallace APRN   cloNIDine (CATAPRES) 0.1 MG tablet Take 1 tablet by mouth 2 (Two) Times a Day As Needed for High Blood Pressure (systolic greater than 180). 8/4/23  Yes Vianney Figueredo APRN   doxycycline (VIBRAMYCIN) 100 MG capsule Take 1 capsule by mouth 2 (Two) Times a Day. 3/13/25  Yes Lyly Wallace APRN   esomeprazole (nexIUM) 20 MG capsule Take 1 capsule by mouth 2 (Two) Times a Day. 5/25/22  Yes VERA Noble MD   famotidine (PEPCID) 20 MG tablet Take 1 tablet by mouth 2 (Two) Times a Day.   Yes ProviderYing MD   FEROSUL 325 (65 Fe) MG tablet Take 1 tablet by mouth Daily With Breakfast. 11/6/19  Yes Provider, MD Ying   fluticasone (FLONASE) 50 MCG/ACT nasal spray Administer 2 sprays into the nostril(s) as directed by  provider Daily. 10/31/24  Yes Lyly Wallace APRN   furosemide (LASIX) 20 MG tablet Take 2 tablets by mouth Daily. 3/13/25  Yes Sea Em DO   guaiFENesin (Mucinex) 600 MG 12 hr tablet Take 2 tablets by mouth 2 (Two) Times a Day. 10/31/24  Yes Lyly Wallace APRN   lidocaine (LIDODERM) 5 % Place 1 patch on the skin as directed by provider Daily. Apply to bilateral upper arms.   Remove & Discard patch within 12 hours or as directed by MD   Yes Ying Salazar MD   lisinopril (PRINIVIL,ZESTRIL) 20 MG tablet Take 1 tablet by mouth Daily.   Yes Ying Salazar MD   loperamide (IMODIUM) 2 MG capsule Take 1 capsule by mouth Every 8 (Eight) Hours As Needed for Diarrhea.   Yes ProviderYing MD   Melatonin 10 MG capsule Take  by mouth. 2 tab   Yes ProviderYing MD   nystatin (MYCOSTATIN) 035824 UNIT/GM powder Apply  topically to the appropriate area as directed 3 (Three) Times a Day. 1/15/24  Yes VERA Noble MD   oxyCODONE-acetaminophen (PERCOCET) 5-325 MG per tablet Take 1 tablet by mouth Every 8 (Eight) Hours As Needed for Moderate Pain. 10/9/23  Yes Dc Dominguez MD   polyethylene glycol (MIRALAX) 17 g packet Take 17 g by mouth Daily As Needed (constipation).   Yes Ying Salazar MD   potassium chloride (KLOR-CON M10) 10 MEQ CR tablet Take 1 tablet by mouth Daily. 12/11/24  Yes VERA Noble MD   potassium chloride (MICRO-K) 10 MEQ CR capsule Take 1 capsule by mouth Daily.   Yes ProviderYing MD   predniSONE (DELTASONE) 5 MG tablet Take 1 tablet by mouth Daily. 9/3/24  Yes Ying Salazar MD   pregabalin (LYRICA) 75 MG capsule Take 1 capsule by mouth 2 (Two) Times a Day.   Yes Ying Salazar MD   sertraline (ZOLOFT) 100 MG tablet Take 1 tablet by mouth Daily. 9/16/24  Yes Lyly Wallace APRN   simethicone (MYLICON) 125 MG chewable tablet Chew 1 tablet Every 6 (Six) Hours As Needed for Flatulence.   Yes  "ProviderYing MD   Vibegron 75 MG tablet Take 1 tablet by mouth Daily. 10/10/24  Yes Tess Stiles APRN   vitamin B-12 (CYANOCOBALAMIN) 1000 MCG tablet Take 1 tablet by mouth Daily.   Yes Ying Salazar MD   vitamin D (ERGOCALCIFEROL) 1.25 MG (30926 UT) capsule capsule Take 1 capsule by mouth Every 14 (Fourteen) Days. Every other Friday   Yes Ying Salazar MD   spironolactone (ALDACTONE) 25 MG tablet Take 1 tablet by mouth Daily. 3/13/25   Sea Em DO     Allergies:  Allergies   Allergen Reactions    Codeine Other (See Comments)     Pt does not recall    Demerol [Meperidine] Rash    Levaquin [Levofloxacin] Other (See Comments)     Pt doesn't remember    Morphine Other (See Comments)     Unknown       Objective     Vital Signs: /63   Pulse 53   Ht 152.4 cm (60\")   SpO2 97%   BMI 37.30 kg/m²     Vitals and nursing note reviewed.   Constitutional:       Appearance: Normal and healthy appearance. Well-developed and not in distress.   Eyes:      Extraocular Movements: Extraocular movements intact.      Pupils: Pupils are equal, round, and reactive to light.   HENT:      Head: Normocephalic and atraumatic.    Mouth/Throat:      Pharynx: Oropharynx is clear.   Neck:      Vascular: JVD normal.      Trachea: Trachea normal.   Pulmonary:      Effort: Pulmonary effort is normal.      Breath sounds: Normal breath sounds. No wheezing. No rhonchi. No rales.   Cardiovascular:      PMI at left midclavicular line. Normal rate. Regular rhythm. Normal S1. Normal S2.       Murmurs: There is a grade 2/6 systolic murmur.      No gallop.  No click. No rub.   Pulses:     Dorsalis pedis: 2+ bilaterally.     Posterior tibial: 2+ bilaterally.  Abdominal:      General: Bowel sounds are normal.      Palpations: Abdomen is soft.      Tenderness: There is no abdominal tenderness.   Musculoskeletal: Normal range of motion.      Cervical back: Normal range of motion and neck supple. Skin:    "  General: Skin is warm and dry.      Capillary Refill: Capillary refill takes less than 2 seconds.   Feet:      Right foot:      Skin integrity: Skin integrity normal.      Left foot:      Skin integrity: Skin integrity normal.   Neurological:      Mental Status: Alert and oriented to person, place and time.      Sensory: Sensation is intact.      Motor: Motor function is intact.      Coordination: Coordination is intact.   Psychiatric:         Speech: Speech normal.         Behavior: Behavior is cooperative.       Physical Exam      Results Reviewed:      ECG 12 Lead    Date/Time: 3/16/2025 3:10 PM  Performed by: Sea Em DO    Authorized by: Sea Em DO  Comparison: compared with previous ECG   Similar to previous ECG  Rhythm: sinus bradycardia  QRS axis: left  Other findings: non-specific ST-T wave changes    Clinical impression: abnormal EKG          Results  Imaging  Echocardiogram done 4 years ago showed normal systolic and diastolic function, mild to moderate aortic regurgitation, trace mitral regurgitation. Echocardiogram in 2022 showed normal EF (65-70), normal diastolic function, moderate aortic regurgitation, trace mitral regurgitation. Echocardiogram last month showed EF of 66-70, grade 1 diastolic dysfunction.    Lab Results   Component Value Date    CHOL 154 07/17/2023    TRIG 115 07/17/2023    HDL 51 07/17/2023    VLDL 21 07/17/2023    LDLHDL 1.57 07/17/2023     Lab Results   Component Value Date    HGBA1C 5.30 07/16/2023       Assessment / Plan        Problem List Items Addressed This Visit       Essential hypertension - Primary    Relevant Medications    spironolactone (ALDACTONE) 25 MG tablet    furosemide (LASIX) 20 MG tablet    History of DVT of lower extremity    Chronic diastolic congestive heart failure    Edema leg     Assessment & Plan  1. Diastolic dysfunction.  Her cardiac function remains robust, with no evidence of decline. However, she has developed  grade 1 diastolic dysfunction, characterized by a thickened and stiff heart muscle. This condition can lead to significant shortness of breath and fluid retention. Over the past two years, there has been a progressive thickening of her heart muscle. Her renal function is within normal limits. She is currently on an appropriate medication regimen for her condition. She will be initiated on spironolactone, a potassium-sparing diuretic, which should also aid in lowering her blood pressure. The dosage of Lasix will be increased to 40 mg daily until the edema subsides, after which it will be reduced back to 20 mg. A refill of Lasix has been ordered.    2. Edema.  She reports significant swelling in her legs, which has improved but is still present. The increased dose of Lasix to 40 mg daily until the swelling subsides should help manage this. She is advised to contact the pharmacy for a refill if she runs out of medication due to the increased dosage.    3. Medication management.  She is currently on Lasix 20 mg, amlodipine, Lipitor, carvedilol, clonidine as needed, and lisinopril 20 mg. She has been recently started on Gemtesa 75 mg for bowel and bladder issues, which has helped. She is advised to continue her current medications.      Transcribed from ambient dictation for Sea Em DO by Sea Em DO.  03/16/25   15:08 CDT    Patient or patient representative verbalized consent for the use of Ambient Listening during the visit with  Sea Em DO for chart documentation. 3/16/2025  15:09 CDT

## 2025-03-25 ENCOUNTER — TELEPHONE (OUTPATIENT)
Dept: INTERNAL MEDICINE | Facility: CLINIC | Age: 85
End: 2025-03-25

## 2025-04-01 ENCOUNTER — HOSPITAL ENCOUNTER (OUTPATIENT)
Dept: WOUND CARE | Age: 85
Discharge: HOME | End: 2025-04-01
Attending: NURSE PRACTITIONER
Payer: MEDICARE

## 2025-04-01 VITALS
SYSTOLIC BLOOD PRESSURE: 150 MMHG | WEIGHT: 186 LBS | RESPIRATION RATE: 18 BRPM | HEART RATE: 68 BPM | HEIGHT: 60 IN | DIASTOLIC BLOOD PRESSURE: 60 MMHG | BODY MASS INDEX: 36.52 KG/M2 | TEMPERATURE: 96.7 F

## 2025-04-01 DIAGNOSIS — I73.9 PVD (PERIPHERAL VASCULAR DISEASE): ICD-10-CM

## 2025-04-01 DIAGNOSIS — L97.912 SKIN ULCER OF RIGHT LOWER LEG, WITH FAT LAYER EXPOSED (HCC): Primary | ICD-10-CM

## 2025-04-01 PROCEDURE — 99214 OFFICE O/P EST MOD 30 MIN: CPT

## 2025-04-01 PROCEDURE — 97597 DBRDMT OPN WND 1ST 20 CM/<: CPT

## 2025-04-01 RX ORDER — SERTRALINE HYDROCHLORIDE 100 MG/1
100 TABLET, FILM COATED ORAL DAILY
COMMUNITY

## 2025-04-01 RX ORDER — BETAMETHASONE DIPROPIONATE 0.5 MG/G
CREAM TOPICAL PRN
OUTPATIENT
Start: 2025-04-01

## 2025-04-01 RX ORDER — LIDOCAINE HYDROCHLORIDE 40 MG/ML
SOLUTION TOPICAL PRN
OUTPATIENT
Start: 2025-04-01

## 2025-04-01 RX ORDER — CETIRIZINE HYDROCHLORIDE 10 MG/1
10 TABLET ORAL DAILY
COMMUNITY

## 2025-04-01 RX ORDER — PHENOL 1.4 %
10 AEROSOL, SPRAY (ML) MUCOUS MEMBRANE DAILY
COMMUNITY

## 2025-04-01 RX ORDER — LIDOCAINE 50 MG/G
OINTMENT TOPICAL PRN
OUTPATIENT
Start: 2025-04-01

## 2025-04-01 RX ORDER — LIDOCAINE HYDROCHLORIDE 20 MG/ML
JELLY TOPICAL PRN
OUTPATIENT
Start: 2025-04-01

## 2025-04-01 RX ORDER — SODIUM CHLOR/HYPOCHLOROUS ACID 0.033 %
SOLUTION, IRRIGATION IRRIGATION PRN
OUTPATIENT
Start: 2025-04-01

## 2025-04-01 RX ORDER — SILVER SULFADIAZINE 10 MG/G
CREAM TOPICAL PRN
OUTPATIENT
Start: 2025-04-01

## 2025-04-01 RX ORDER — CARBOXYMETHYLCELLULOSE SODIUM 5 MG/ML
1 SOLUTION/ DROPS OPHTHALMIC 4 TIMES DAILY
COMMUNITY

## 2025-04-01 RX ORDER — POTASSIUM CHLORIDE 750 MG/1
10 TABLET, EXTENDED RELEASE ORAL DAILY
COMMUNITY

## 2025-04-01 RX ORDER — GINSENG 100 MG
CAPSULE ORAL PRN
OUTPATIENT
Start: 2025-04-01

## 2025-04-01 RX ORDER — PREDNISONE 5 MG/1
5 TABLET ORAL DAILY
COMMUNITY

## 2025-04-01 RX ORDER — GENTAMICIN SULFATE 1 MG/G
OINTMENT TOPICAL PRN
OUTPATIENT
Start: 2025-04-01

## 2025-04-01 RX ORDER — PREGABALIN 75 MG/1
75 CAPSULE ORAL DAILY
COMMUNITY

## 2025-04-01 RX ORDER — OXYCODONE AND ACETAMINOPHEN 5; 325 MG/1; MG/1
1 TABLET ORAL EVERY 8 HOURS PRN
COMMUNITY

## 2025-04-01 RX ORDER — ALLOPURINOL 100 MG/1
100 TABLET ORAL DAILY
COMMUNITY

## 2025-04-01 RX ORDER — TRIAMCINOLONE ACETONIDE 1 MG/G
OINTMENT TOPICAL PRN
OUTPATIENT
Start: 2025-04-01

## 2025-04-01 RX ORDER — BACITRACIN ZINC AND POLYMYXIN B SULFATE 500; 1000 [USP'U]/G; [USP'U]/G
OINTMENT TOPICAL PRN
OUTPATIENT
Start: 2025-04-01

## 2025-04-01 RX ORDER — ASCORBIC ACID 500 MG
500 TABLET ORAL DAILY
COMMUNITY

## 2025-04-01 RX ORDER — NEOMYCIN/BACITRACIN/POLYMYXINB 3.5-400-5K
OINTMENT (GRAM) TOPICAL PRN
OUTPATIENT
Start: 2025-04-01

## 2025-04-01 RX ORDER — CARVEDILOL 12.5 MG/1
12.5 TABLET ORAL 2 TIMES DAILY WITH MEALS
COMMUNITY

## 2025-04-01 RX ORDER — FAMOTIDINE 20 MG/1
20 TABLET, FILM COATED ORAL 2 TIMES DAILY
COMMUNITY

## 2025-04-01 RX ORDER — LIDOCAINE 40 MG/G
CREAM TOPICAL PRN
OUTPATIENT
Start: 2025-04-01

## 2025-04-01 RX ORDER — MUPIROCIN 20 MG/G
OINTMENT TOPICAL PRN
OUTPATIENT
Start: 2025-04-01

## 2025-04-01 RX ORDER — CLOBETASOL PROPIONATE 0.5 MG/G
OINTMENT TOPICAL PRN
OUTPATIENT
Start: 2025-04-01

## 2025-04-01 RX ORDER — FUROSEMIDE 40 MG/1
40 TABLET ORAL DAILY
COMMUNITY

## 2025-04-01 RX ORDER — LIDOCAINE HYDROCHLORIDE 20 MG/ML
JELLY TOPICAL PRN
Status: DISCONTINUED | OUTPATIENT
Start: 2025-04-01 | End: 2025-04-03 | Stop reason: HOSPADM

## 2025-04-01 RX ORDER — SPIRONOLACTONE 100 MG/1
100 TABLET, FILM COATED ORAL DAILY
COMMUNITY

## 2025-04-01 ASSESSMENT — VISUAL ACUITY: OU: 1

## 2025-04-01 NOTE — PATIENT INSTRUCTIONS
[facility] Wound Care and Hyperbaric Oxygen Therapy   Physician Orders and Discharge Instructions  [address] [place] [address] Drive  [address]  [place] [address]  Telephone: [phone]      FAX [phone]    NAME:  Chelsy Snell  YOB: 1940  MEDICAL RECORD NUMBER:  [id number]  DATE:  4/1/2025    Discharge condition: Stable    Discharge to: Home    Left via:Private automobile    Accompanied by:  self    Please go downstairs in this building to Room 103 to register. The test will be completed in Room 401. Please be aware of appointment time for this test.  Please arrive to room 103 at 1:30 PM on April 24 for test. Please do not smoke prior to test.    Dressing Orders:  Right leg wound: Soap and water wash.  Apply a double layer of Xeroform (the yellow sticky dressing) over the wound bed, secure with optilock, ace wrap from toes to the knee-leave in place til you return to wound care    Treatment Orders:  Protein rich diet (unless restricted by your physician); Multivitamin daily; Elevate legs above the level of your heart when sitting 3-4 times daily for at least one hour each time, avoid standing for long periods of time.    [facility] follow up visit __1 week with Dr. Ashton___________________________  (Please note your next appointment above and if you are unable to keep, kindly give a 24 hour notice. Thank you.)          If you experience any of the following, please call the Wound Care Center during business hours:    * Increase in Pain  * Temperature over 101  * Increase in drainage from your wound  * Drainage with a foul odor  * Bleeding  * Increase in swelling  * Need for compression bandage changes due to slippage, breakthrough drainage.    If you need medical attention outside of the business hours of the Wound Care Centers please contact your PCP or go to the nearest emergency room.
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
## 2025-04-08 ENCOUNTER — HOSPITAL ENCOUNTER (OUTPATIENT)
Dept: WOUND CARE | Age: 85
Discharge: HOME | End: 2025-04-08
Attending: NURSE PRACTITIONER
Payer: MEDICARE

## 2025-04-08 VITALS
HEART RATE: 56 BPM | WEIGHT: 186 LBS | TEMPERATURE: 98 F | RESPIRATION RATE: 18 BRPM | SYSTOLIC BLOOD PRESSURE: 191 MMHG | BODY MASS INDEX: 36.52 KG/M2 | DIASTOLIC BLOOD PRESSURE: 76 MMHG | HEIGHT: 60 IN

## 2025-04-08 DIAGNOSIS — L97.912 SKIN ULCER OF RIGHT LOWER LEG, WITH FAT LAYER EXPOSED (HCC): Primary | ICD-10-CM

## 2025-04-08 PROBLEM — L97.522 ULCER OF TOE OF LEFT FOOT, WITH FAT LAYER EXPOSED (HCC): Status: ACTIVE | Noted: 2022-09-09

## 2025-04-08 PROCEDURE — 99213 OFFICE O/P EST LOW 20 MIN: CPT

## 2025-04-08 PROCEDURE — 99212 OFFICE O/P EST SF 10 MIN: CPT | Performed by: SURGERY

## 2025-04-08 RX ORDER — LIDOCAINE HYDROCHLORIDE 20 MG/ML
JELLY TOPICAL PRN
Status: DISCONTINUED | OUTPATIENT
Start: 2025-04-08 | End: 2025-04-10 | Stop reason: HOSPADM

## 2025-04-08 RX ORDER — MUPIROCIN 20 MG/G
OINTMENT TOPICAL PRN
OUTPATIENT
Start: 2025-04-08

## 2025-04-08 RX ORDER — GENTAMICIN SULFATE 1 MG/G
OINTMENT TOPICAL PRN
OUTPATIENT
Start: 2025-04-08

## 2025-04-08 RX ORDER — SODIUM CHLOR/HYPOCHLOROUS ACID 0.033 %
SOLUTION, IRRIGATION IRRIGATION PRN
OUTPATIENT
Start: 2025-04-08

## 2025-04-08 RX ORDER — NEOMYCIN/BACITRACIN/POLYMYXINB 3.5-400-5K
OINTMENT (GRAM) TOPICAL PRN
OUTPATIENT
Start: 2025-04-08

## 2025-04-08 RX ORDER — BACITRACIN ZINC AND POLYMYXIN B SULFATE 500; 1000 [USP'U]/G; [USP'U]/G
OINTMENT TOPICAL PRN
OUTPATIENT
Start: 2025-04-08

## 2025-04-08 RX ORDER — LIDOCAINE HYDROCHLORIDE 40 MG/ML
SOLUTION TOPICAL PRN
OUTPATIENT
Start: 2025-04-08

## 2025-04-08 RX ORDER — CLOBETASOL PROPIONATE 0.5 MG/G
OINTMENT TOPICAL PRN
OUTPATIENT
Start: 2025-04-08

## 2025-04-08 RX ORDER — LIDOCAINE 50 MG/G
OINTMENT TOPICAL PRN
OUTPATIENT
Start: 2025-04-08

## 2025-04-08 RX ORDER — LIDOCAINE HYDROCHLORIDE 20 MG/ML
JELLY TOPICAL PRN
OUTPATIENT
Start: 2025-04-08

## 2025-04-08 RX ORDER — BETAMETHASONE DIPROPIONATE 0.5 MG/G
CREAM TOPICAL PRN
OUTPATIENT
Start: 2025-04-08

## 2025-04-08 RX ORDER — LIDOCAINE 40 MG/G
CREAM TOPICAL PRN
OUTPATIENT
Start: 2025-04-08

## 2025-04-08 RX ORDER — SILVER SULFADIAZINE 10 MG/G
CREAM TOPICAL PRN
OUTPATIENT
Start: 2025-04-08

## 2025-04-08 RX ORDER — TRIAMCINOLONE ACETONIDE 1 MG/G
OINTMENT TOPICAL PRN
OUTPATIENT
Start: 2025-04-08

## 2025-04-08 RX ORDER — GINSENG 100 MG
CAPSULE ORAL PRN
OUTPATIENT
Start: 2025-04-08

## 2025-04-08 RX ADMIN — LIDOCAINE HYDROCHLORIDE: 20 JELLY TOPICAL at 14:25

## 2025-04-08 NOTE — PROGRESS NOTES
Post-Procedure Surface Area (cm^2) 5.06 cm^2 04/01/25 1459   Post-Procedure Volume (cm^3) 0.506 cm^3 04/01/25 1459   Distance Tunneling (cm) 0 cm 04/08/25 1430   Tunneling Position ___ O'Clock 0 04/08/25 1430   Undermining Starts ___ O'Clock 0 04/08/25 1430   Undermining Ends___ O'Clock 0 04/08/25 1430   Undermining Maxium Distance (cm) 0 04/08/25 1430   Wound Assessment Slough;Pink/red 04/08/25 1430   Drainage Amount Moderate (25-50%) 04/08/25 1430   Drainage Description Serosanguinous 04/08/25 1430   Odor None 04/08/25 1430   Lakshmi-wound Assessment Hemosiderin staining (brown yellow) 04/08/25 1430   Margins Attached edges 04/08/25 1430   Wound Thickness Description not for Pressure Injury Full thickness 04/08/25 1430   Number of days: 6             Plan:     Problem List Items Addressed This Visit       * (Principal) Skin ulcer of right lower leg, with fat layer exposed (HCC) - Primary (Chronic)    Relevant Medications    lidocaine (XYLOCAINE) 2 % uro-jet    Other Relevant Orders    Initiate Outpatient Wound Care Protocol     Pt could not tolerate ace wraps.  Will treat with aquacel and mepilex until after BRANDEN done.  RTO 1 week      Treatment Note please see attached Discharge Instructions    In my professional opinion this patient would benefit from HBO Therapy: No    Written patient dismissal instructions given to patient and signed by patient or POA.         Left via:Private automobile  Accompanied by: Self  Please go downstairs in this building to Room 103 to register. The test will be  completed in Room 401. Please be aware of appointment time for this test.  Please arrive to room 103 at 1:30 PM on April 24 for test. Please do not smoke prior to test.  Dressing Orders: Right leg wound:  Soap and water wash.  Silver alginate cut to fit open area, cover with adhesive bandage, change every other day  Treatment Orders:  Protein rich diet (unless restricted by your physician); Multivitamin daily; Elevate

## 2025-04-08 NOTE — PATIENT INSTRUCTIONS
[facility] Wound Care and Hyperbaric Oxygen Therapy   Physician Orders and Discharge Instructions  [address] [place] [address]  [address]  [place] [address]  Telephone: [phone]      FAX [phone]    NAME:  Chelsy Snell  YOB: 1940  MEDICAL RECORD NUMBER:  [id number]  DATE:  4/8/2025    Discharge condition: Stable    Discharge to: Home    Left via:Private automobile    Accompanied by: Self  Please go downstairs in this building to Room 103 to register. The test will be  completed in Room 401. Please be aware of appointment time for this test.  Please arrive to room 103 at 1:30 PM on April 24 for test. Please do not smoke prior to test.    Dressing Orders: Right leg wound:   Soap and water wash.   Silver alginate cut to fit open area, cover with adhesive bandage, change every other day    Treatment Orders:  Protein rich diet (unless restricted by your physician); Multivitamin daily; Elevate legs  above the level of your heart when sitting 3-4 times daily for at least one hour each  time, avoid standing for long periods of time.    [facility] follow up visit __________1 week___________________  (Please note your next appointment above and if you are unable to keep, kindly give a 24 hour notice. Thank you.)    If you experience any of the following, please call the Wound Care Center during business hours:    * Increase in Pain  * Temperature over 101  * Increase in drainage from your wound  * Drainage with a foul odor  * Bleeding  * Increase in swelling  * Need for compression bandage changes due to slippage, breakthrough drainage.    If you need medical attention outside of the business hours of the Wound Care Centers please contact your PCP or go to the nearest emergency room.

## 2025-04-15 ENCOUNTER — OFFICE VISIT (OUTPATIENT)
Dept: INTERNAL MEDICINE | Facility: CLINIC | Age: 85
End: 2025-04-15
Payer: MEDICARE

## 2025-04-15 VITALS
HEART RATE: 67 BPM | OXYGEN SATURATION: 98 % | WEIGHT: 195 LBS | RESPIRATION RATE: 18 BRPM | TEMPERATURE: 97.6 F | HEIGHT: 60 IN | SYSTOLIC BLOOD PRESSURE: 198 MMHG | DIASTOLIC BLOOD PRESSURE: 78 MMHG | BODY MASS INDEX: 38.28 KG/M2

## 2025-04-15 DIAGNOSIS — R60.0 EDEMA LEG: ICD-10-CM

## 2025-04-15 DIAGNOSIS — L97.919 ULCER OF RIGHT LOWER EXTREMITY, UNSPECIFIED ULCER STAGE: ICD-10-CM

## 2025-04-15 DIAGNOSIS — J06.9 URI WITH COUGH AND CONGESTION: ICD-10-CM

## 2025-04-15 DIAGNOSIS — I10 ESSENTIAL HYPERTENSION: Primary | ICD-10-CM

## 2025-04-15 RX ORDER — LISINOPRIL 40 MG/1
40 TABLET ORAL DAILY
Qty: 30 TABLET | Refills: 2 | Status: SHIPPED | OUTPATIENT
Start: 2025-04-15

## 2025-04-15 RX ORDER — METHYLPREDNISOLONE 4 MG/1
TABLET ORAL
Qty: 21 TABLET | Refills: 0 | Status: SHIPPED | OUTPATIENT
Start: 2025-04-15

## 2025-04-15 RX ORDER — ALBUTEROL SULFATE 90 UG/1
2 INHALANT RESPIRATORY (INHALATION) EVERY 4 HOURS PRN
Qty: 8 G | Refills: 1 | Status: SHIPPED | OUTPATIENT
Start: 2025-04-15

## 2025-04-15 RX ORDER — AZITHROMYCIN 250 MG/1
TABLET, FILM COATED ORAL
Qty: 6 TABLET | Refills: 0 | Status: SHIPPED | OUTPATIENT
Start: 2025-04-15

## 2025-04-15 RX ORDER — BENZONATATE 100 MG/1
100 CAPSULE ORAL 3 TIMES DAILY PRN
Qty: 60 CAPSULE | Refills: 0 | Status: SHIPPED | OUTPATIENT
Start: 2025-04-15

## 2025-04-15 NOTE — PROGRESS NOTES
Chief Complaint   Patient presents with    Cough     Patient complains of congestion    nasal drainage         History:  Carmen Obrien is a 85 y.o. female who presents today for evaluation of the above problems.      HPI  History of Present Illness  The patient presents for evaluation of wheezing, shortness of breath, cough, and hypertension.    Wheezing has been noted during wound care visits. Episodes of shortness of breath occur while seated in her chair, transitioning to her lift chair, and upon lying down at night. These symptoms have been present for at least a month, possibly longer. The wound care center advised discussing these symptoms with her primary care physician.     She saw Dr. Em on March 13, 2025 (note reviewed).  He increased her Lasix dosage due to edema. Her feet were significantly swollen last night. She is currently on Aldactone 25 mg and Lasix 40 mg until the swelling subsides, at which point she will revert to the 20 mg dose.    A cough has been present for approximately 2 weeks. No antibiotics have been prescribed. An over-the-counter cough suppressant has not provided relief. Tessalon Perles, prescribed in 11/2024, have previously alleviated her cough. An antibiotic was also prescribed at that time, the name of which she cannot recall. Steroids are tolerated well.    Blood pressure was elevated during the last visit to the wound care center. Compliance with antihypertensive medications is reported. Clonidine, prescribed as needed, has not been taken.    A wound in the right leg is being treated at wound care at [facility]. An ultrasound of the legs is scheduled next Thursday to check for decreased blood flow. Blood clots have been experienced before.      ROS:  Review of Systems  See above    Current Outpatient Medications:     amLODIPine (NORVASC) 10 MG tablet, Take 1 tablet by mouth Daily., Disp: 90 tablet, Rfl: 3    atorvastatin (LIPITOR) 10 MG tablet, TAKE ONE TABLET BY MOUTH EVERY  DAY, Disp: 90 tablet, Rfl: 3    benzonatate (Tessalon Perles) 100 MG capsule, Take 1 capsule by mouth 3 (Three) Times a Day As Needed for Cough., Disp: 60 capsule, Rfl: 0    carvedilol (COREG) 12.5 MG tablet, Take 1 tablet by mouth 2 (Two) Times a Day With Meals., Disp: , Rfl:     cloNIDine (CATAPRES) 0.1 MG tablet, Take 1 tablet by mouth 2 (Two) Times a Day As Needed for High Blood Pressure (systolic greater than 180)., Disp: 30 tablet, Rfl: 0    esomeprazole (nexIUM) 20 MG capsule, Take 1 capsule by mouth 2 (Two) Times a Day., Disp: 60 capsule, Rfl: 11    FEROSUL 325 (65 Fe) MG tablet, Take 1 tablet by mouth Daily With Breakfast., Disp: , Rfl:     furosemide (LASIX) 20 MG tablet, Take 2 tablets by mouth Daily., Disp: 90 tablet, Rfl: 6    lisinopril (PRINIVIL,ZESTRIL) 40 MG tablet, Take 1 tablet by mouth Daily., Disp: 30 tablet, Rfl: 2    oxyCODONE-acetaminophen (PERCOCET) 5-325 MG per tablet, Take 1 tablet by mouth Every 8 (Eight) Hours As Needed for Moderate Pain., Disp: 28 tablet, Rfl: 0    potassium chloride (KLOR-CON M10) 10 MEQ CR tablet, Take 1 tablet by mouth Daily., Disp: 90 tablet, Rfl: 3    pregabalin (LYRICA) 75 MG capsule, Take 1 capsule by mouth 2 (Two) Times a Day., Disp: , Rfl:     sertraline (ZOLOFT) 100 MG tablet, Take 1 tablet by mouth Daily., Disp: 90 tablet, Rfl: 3    spironolactone (ALDACTONE) 25 MG tablet, Take 1 tablet by mouth Daily., Disp: 90 tablet, Rfl: 3    vitamin B-12 (CYANOCOBALAMIN) 1000 MCG tablet, Take 1 tablet by mouth Daily., Disp: , Rfl:     albuterol sulfate  (90 Base) MCG/ACT inhaler, Inhale 2 puffs Every 4 (Four) Hours As Needed for Wheezing or Shortness of Air., Disp: 8 g, Rfl: 1    allopurinol (ZYLOPRIM) 100 MG tablet, Take 1 tablet by mouth Daily. (Patient not taking: Reported on 4/15/2025), Disp: 90 tablet, Rfl: 3    ascorbic acid (VITAMIN C) 500 MG tablet, Take 1 tablet by mouth Daily. (Patient not taking: Reported on 4/15/2025), Disp: , Rfl:     azithromycin  (ZITHROMAX) 250 MG tablet, Take 2 tablets the first day, then 1 tablet daily for 4 days., Disp: 6 tablet, Rfl: 0    Calcium Carbonate-Vit D-Min (Caltrate 600+D Plus Minerals) 600-800 MG-UNIT tablet, Take 1 tablet by mouth 2 (Two) Times a Day. (Patient not taking: Reported on 4/15/2025), Disp: , Rfl:     cetirizine (zyrTEC) 10 MG tablet, Take 1 tablet by mouth Daily. (Patient not taking: Reported on 4/15/2025), Disp: 30 tablet, Rfl: 0    fluticasone (FLONASE) 50 MCG/ACT nasal spray, Administer 2 sprays into the nostril(s) as directed by provider Daily. (Patient not taking: Reported on 4/15/2025), Disp: 15.8 mL, Rfl: 3    guaiFENesin (Mucinex) 600 MG 12 hr tablet, Take 2 tablets by mouth 2 (Two) Times a Day. (Patient not taking: Reported on 4/15/2025), Disp: 120 tablet, Rfl: 1    Melatonin 10 MG capsule, Take  by mouth. 2 tab (Patient not taking: Reported on 4/15/2025), Disp: , Rfl:     methylPREDNISolone (MEDROL) 4 MG dose pack, Take as directed on package instructions., Disp: 21 tablet, Rfl: 0    nystatin (MYCOSTATIN) 034246 UNIT/GM powder, Apply  topically to the appropriate area as directed 3 (Three) Times a Day. (Patient not taking: Reported on 4/15/2025), Disp: 60 g, Rfl: 11    potassium chloride (MICRO-K) 10 MEQ CR capsule, Take 1 capsule by mouth Daily. (Patient not taking: Reported on 4/15/2025), Disp: , Rfl:     Vibegron 75 MG tablet, Take 1 tablet by mouth Daily. (Patient not taking: Reported on 4/15/2025), Disp: 30 tablet, Rfl: 11    vitamin D (ERGOCALCIFEROL) 1.25 MG (45884 UT) capsule capsule, Take 1 capsule by mouth Every 14 (Fourteen) Days. Every other Friday (Patient not taking: Reported on 4/15/2025), Disp: , Rfl:     Lab Results   Component Value Date    GLUCOSE 102 (H) 04/18/2024    BUN 23 04/18/2024    CREATININE 0.50 (L) 04/18/2024     04/18/2024    K 4.9 04/18/2024     04/18/2024    CALCIUM 9.8 04/18/2024    PROTEINTOT 7.3 04/18/2024    ALBUMIN 4.3 04/18/2024    ALT 18 04/18/2024     AST 22 04/18/2024    ALKPHOS 90 04/18/2024    BILITOT 0.4 04/18/2024    GLOB 3.0 04/18/2024    AGRATIO 1.4 04/18/2024    BCR 46.0 (H) 04/18/2024    ANIONGAP 8.0 04/18/2024    EGFR 93.8 04/18/2024       WBC   Date Value Ref Range Status   04/18/2024 6.05 3.40 - 10.80 10*3/mm3 Final   07/16/2019 7.0 4.8 - 10.8 K/uL Final     RBC   Date Value Ref Range Status   04/18/2024 3.93 3.77 - 5.28 10*6/mm3 Final   07/16/2019 3.52 (L) 4.20 - 5.40 M/uL Final     Hemoglobin   Date Value Ref Range Status   04/18/2024 11.9 (L) 12.0 - 15.9 g/dL Final   07/16/2019 10.1 (L) 12.0 - 16.0 g/dL Final     Hematocrit   Date Value Ref Range Status   04/18/2024 37.2 34.0 - 46.6 % Final   07/16/2019 31.7 (L) 37.0 - 47.0 % Final     MCV   Date Value Ref Range Status   04/18/2024 94.7 79.0 - 97.0 fL Final   07/16/2019 90.1 81.0 - 99.0 fL Final     MCH   Date Value Ref Range Status   04/18/2024 30.3 26.6 - 33.0 pg Final   07/16/2019 28.7 27.0 - 31.0 pg Final     MCHC   Date Value Ref Range Status   04/18/2024 32.0 31.5 - 35.7 g/dL Final   07/16/2019 31.9 (L) 33.0 - 37.0 g/dL Final     RDW   Date Value Ref Range Status   04/18/2024 13.9 12.3 - 15.4 % Final   07/16/2019 14.7 (H) 11.5 - 14.5 % Final     RDW-SD   Date Value Ref Range Status   04/18/2024 48.8 37.0 - 54.0 fl Final     MPV   Date Value Ref Range Status   04/18/2024 10.5 6.0 - 12.0 fL Final   07/16/2019 10.8 9.4 - 12.3 fL Final     Platelets   Date Value Ref Range Status   04/18/2024 220 140 - 450 10*3/mm3 Final   07/16/2019 248 130 - 400 K/uL Final     Neutrophil Rel %   Date Value Ref Range Status   07/04/2019 76.5 (H) 50.0 - 65.0 % Final     Neutrophil %   Date Value Ref Range Status   01/08/2024 71.9 42.7 - 76.0 % Final     Lymphocyte Rel %   Date Value Ref Range Status   07/04/2019 13.9 (L) 20.0 - 40.0 % Final     Lymphocyte %   Date Value Ref Range Status   01/08/2024 17.2 (L) 19.6 - 45.3 % Final     Monocyte Rel %   Date Value Ref Range Status   07/04/2019 7.4 0.0 - 10.0 % Final  "    Monocyte %   Date Value Ref Range Status   01/08/2024 7.5 5.0 - 12.0 % Final     Eosinophil Rel %   Date Value Ref Range Status   07/04/2019 1.4 0.0 - 5.0 % Final     Eosinophil %   Date Value Ref Range Status   01/08/2024 2.2 0.3 - 6.2 % Final     Basophil Rel %   Date Value Ref Range Status   07/04/2019 0.4 0.0 - 1.0 % Final     Basophil %   Date Value Ref Range Status   01/08/2024 0.7 0.0 - 1.5 % Final     Immature Grans %   Date Value Ref Range Status   01/08/2024 0.5 0.0 - 0.5 % Final     Neutrophils Absolute   Date Value Ref Range Status   07/04/2019 7.7 (H) 1.5 - 7.5 K/uL Final     Neutrophils, Absolute   Date Value Ref Range Status   01/08/2024 5.27 1.70 - 7.00 10*3/mm3 Final     Lymphocytes Absolute   Date Value Ref Range Status   07/04/2019 1.4 1.1 - 4.5 K/uL Final     Lymphocytes, Absolute   Date Value Ref Range Status   01/08/2024 1.26 0.70 - 3.10 10*3/mm3 Final     Monocytes Absolute   Date Value Ref Range Status   07/04/2019 0.80 0.00 - 0.90 K/uL Final     Monocytes, Absolute   Date Value Ref Range Status   01/08/2024 0.55 0.10 - 0.90 10*3/mm3 Final     Eosinophils Absolute   Date Value Ref Range Status   07/04/2019 0.10 0.00 - 0.60 K/uL Final     Eosinophils, Absolute   Date Value Ref Range Status   01/08/2024 0.16 0.00 - 0.40 10*3/mm3 Final     Basophils Absolute   Date Value Ref Range Status   07/04/2019 0.00 0.00 - 0.20 K/uL Final     Basophils, Absolute   Date Value Ref Range Status   01/08/2024 0.05 0.00 - 0.20 10*3/mm3 Final     Immature Grans, Absolute   Date Value Ref Range Status   01/08/2024 0.04 0.00 - 0.05 10*3/mm3 Final     nRBC   Date Value Ref Range Status   01/08/2024 0.0 0.0 - 0.2 /100 WBC Final         OBJECTIVE:  Visit Vitals  BP (!) 198/78 (BP Location: Right arm, Patient Position: Sitting)   Pulse 67   Temp 97.6 °F (36.4 °C)   Resp 18   Ht 152.4 cm (60\")   Wt 88.5 kg (195 lb)   SpO2 98%   BMI 38.08 kg/m²      Physical Exam  Vitals and nursing note reviewed.   Constitutional:  "      General: She is not in acute distress.     Appearance: Normal appearance. She is well-developed. She is not diaphoretic.   HENT:      Head: Normocephalic and atraumatic.   Eyes:      Pupils: Pupils are equal, round, and reactive to light.   Neck:      Thyroid: No thyromegaly.      Trachea: Phonation normal.   Cardiovascular:      Rate and Rhythm: Normal rate and regular rhythm.      Heart sounds: No murmur heard.  Pulmonary:      Effort: No respiratory distress.      Breath sounds: Wheezing present. No rhonchi or rales.   Musculoskeletal:      Right lower leg: Edema present.      Left lower leg: Edema present.   Lymphadenopathy:      Cervical: No cervical adenopathy.   Skin:     Coloration: Skin is not pale.      Findings: No erythema.   Neurological:      Mental Status: She is alert.   Psychiatric:         Behavior: Behavior normal.         Thought Content: Thought content normal.         Judgment: Judgment normal.       Physical Exam  Respiratory: Wheezing noted on auscultation      Assessment/Plan      Diagnoses and all orders for this visit:    1. Essential hypertension (Primary)  -     lisinopril (PRINIVIL,ZESTRIL) 40 MG tablet; Take 1 tablet by mouth Daily.  Dispense: 30 tablet; Refill: 2    2. URI with cough and congestion  -     azithromycin (ZITHROMAX) 250 MG tablet; Take 2 tablets the first day, then 1 tablet daily for 4 days.  Dispense: 6 tablet; Refill: 0  -     methylPREDNISolone (MEDROL) 4 MG dose pack; Take as directed on package instructions.  Dispense: 21 tablet; Refill: 0  -     benzonatate (Tessalon Perles) 100 MG capsule; Take 1 capsule by mouth 3 (Three) Times a Day As Needed for Cough.  Dispense: 60 capsule; Refill: 0  -     albuterol sulfate  (90 Base) MCG/ACT inhaler; Inhale 2 puffs Every 4 (Four) Hours As Needed for Wheezing or Shortness of Air.  Dispense: 8 g; Refill: 1    3. Edema leg    4. Ulcer of right lower extremity, unspecified ulcer stage      Assessment & Plan  Upper  respiratory infection with cough and congestion  - Reports wheezing and shortness of breath, particularly when sitting in her chair, using her lift chair, and lying down at night. Symptoms have persisted for at least a month.  - Wheezing noted during wound care visits.  - Albuterol inhaler prescribed for use as needed for worsening shortness of breath and wheezing.  - Tessalon Perles prescribed for cough management. Antibiotics and steroids also prescribed to address the underlying cause of the cough.    Hypertension.  - Blood pressure recorded at 198/78 today.  - Previous blood pressure readings also elevated, as noted during wound care visits.  - Discussed current medication regimen, including lisinopril.  - Lisinopril dosage increased to 40 mg. Follow-up scheduled for 05/06/2025 to reassess blood pressure.    Leg wound and circulation concerns.  - Reports ongoing wound in the right leg, with upcoming test scheduled to assess circulation.  - Previous history of blood clots noted.  - Discussed the need for continued wound care and upcoming diagnostic test.    Follow-up  The patient will follow up on 05/06/2025 for a Medicare visit.      Return for Next scheduled follow up.      AMY Noble MD  15:18 CDT  4/15/2025   Electronically signed    Patient or patient representative verbalized consent for the use of Ambient Listening during the visit with  VERA Noble MD for chart documentation. 4/15/2025  16:16 CDT

## 2025-04-17 ENCOUNTER — HOSPITAL ENCOUNTER (OUTPATIENT)
Dept: WOUND CARE | Age: 85
Discharge: HOME | End: 2025-04-17
Attending: NURSE PRACTITIONER
Payer: MEDICARE

## 2025-04-17 VITALS
RESPIRATION RATE: 18 BRPM | SYSTOLIC BLOOD PRESSURE: 126 MMHG | WEIGHT: 186 LBS | DIASTOLIC BLOOD PRESSURE: 56 MMHG | BODY MASS INDEX: 36.52 KG/M2 | HEIGHT: 60 IN | HEART RATE: 54 BPM | TEMPERATURE: 97.3 F

## 2025-04-17 DIAGNOSIS — L97.912 SKIN ULCER OF RIGHT LOWER LEG, WITH FAT LAYER EXPOSED (HCC): Primary | ICD-10-CM

## 2025-04-17 PROCEDURE — 97597 DBRDMT OPN WND 1ST 20 CM/<: CPT | Performed by: NURSE PRACTITIONER

## 2025-04-17 PROCEDURE — 97597 DBRDMT OPN WND 1ST 20 CM/<: CPT

## 2025-04-17 RX ORDER — AZITHROMYCIN 250 MG/1
250 TABLET, FILM COATED ORAL DAILY
COMMUNITY
Start: 2025-04-15

## 2025-04-17 RX ORDER — LIDOCAINE HYDROCHLORIDE 20 MG/ML
JELLY TOPICAL PRN
OUTPATIENT
Start: 2025-04-17

## 2025-04-17 RX ORDER — SODIUM CHLOR/HYPOCHLOROUS ACID 0.033 %
SOLUTION, IRRIGATION IRRIGATION PRN
OUTPATIENT
Start: 2025-04-17

## 2025-04-17 RX ORDER — METHYLPREDNISOLONE 4 MG/1
2 TABLET ORAL SEE ADMIN INSTRUCTIONS
COMMUNITY
Start: 2025-04-15

## 2025-04-17 RX ORDER — TRIAMCINOLONE ACETONIDE 1 MG/G
OINTMENT TOPICAL PRN
OUTPATIENT
Start: 2025-04-17

## 2025-04-17 RX ORDER — BETAMETHASONE DIPROPIONATE 0.5 MG/G
CREAM TOPICAL PRN
OUTPATIENT
Start: 2025-04-17

## 2025-04-17 RX ORDER — GINSENG 100 MG
CAPSULE ORAL PRN
OUTPATIENT
Start: 2025-04-17

## 2025-04-17 RX ORDER — CLOBETASOL PROPIONATE 0.5 MG/G
OINTMENT TOPICAL PRN
OUTPATIENT
Start: 2025-04-17

## 2025-04-17 RX ORDER — LIDOCAINE HYDROCHLORIDE 20 MG/ML
JELLY TOPICAL PRN
Status: DISCONTINUED | OUTPATIENT
Start: 2025-04-17 | End: 2025-04-19 | Stop reason: HOSPADM

## 2025-04-17 RX ORDER — MUPIROCIN 20 MG/G
OINTMENT TOPICAL PRN
OUTPATIENT
Start: 2025-04-17

## 2025-04-17 RX ORDER — LIDOCAINE HYDROCHLORIDE 40 MG/ML
SOLUTION TOPICAL PRN
OUTPATIENT
Start: 2025-04-17

## 2025-04-17 RX ORDER — GENTAMICIN SULFATE 1 MG/G
OINTMENT TOPICAL PRN
OUTPATIENT
Start: 2025-04-17

## 2025-04-17 RX ORDER — SILVER SULFADIAZINE 10 MG/G
CREAM TOPICAL PRN
OUTPATIENT
Start: 2025-04-17

## 2025-04-17 RX ORDER — LIDOCAINE 40 MG/G
CREAM TOPICAL PRN
OUTPATIENT
Start: 2025-04-17

## 2025-04-17 RX ORDER — LIDOCAINE 50 MG/G
OINTMENT TOPICAL PRN
OUTPATIENT
Start: 2025-04-17

## 2025-04-17 RX ORDER — NEOMYCIN/BACITRACIN/POLYMYXINB 3.5-400-5K
OINTMENT (GRAM) TOPICAL PRN
OUTPATIENT
Start: 2025-04-17

## 2025-04-17 RX ORDER — BACITRACIN ZINC AND POLYMYXIN B SULFATE 500; 1000 [USP'U]/G; [USP'U]/G
OINTMENT TOPICAL PRN
OUTPATIENT
Start: 2025-04-17

## 2025-04-17 RX ADMIN — LIDOCAINE HYDROCHLORIDE: 20 JELLY TOPICAL at 10:28

## 2025-04-17 NOTE — PROGRESS NOTES
[facility]   Progress Note and Procedure Note      Chelsy Snell  MEDICAL RECORD NUMBER:  [id number]  AGE: 85 y.o.   GENDER: female  : 1940  EPISODE DATE:  2025    Subjective:     Chief Complaint   Patient presents with    Wound Check     Pt presents for recheck of right leg wound      HISTORY of PRESENT ILLNESS HPI     Chelsy Snell is a 85 y.o. female who presents today for wound/ulcer evaluation.   History of Wound Context: right leg wound follow up/eval and treat    Ulcer Identification:  Ulcer Type: venous  Contributing Factors: edema    Wound: N/A        PAST MEDICAL HISTORY        Diagnosis Date    Anxiety     Carotid artery stenosis     Chronic back pain     H/O blood clots     Hx of blood clots     bilat legs    Hyperlipidemia     Hypertension     Osteoarthritis        PAST SURGICAL HISTORY    Past Surgical History:   Procedure Laterality Date    CATARACT REMOVAL Bilateral     CHOLECYSTECTOMY      EYE SURGERY      HYSTERECTOMY (CERVIX STATUS UNKNOWN)      JOINT REPLACEMENT      KNEE ARTHROPLASTY Right     OTHER SURGICAL HISTORY      Decompression of ulnar nerve     REMOVE HARDWARE FEMUR Left 2019    HARDWARE REMOVAL performed by Gavin Lynne MD at [facility] OR    REVISION TOTAL KNEE ARTHROPLASTY Left 2019    LEFT KNEE REVISION performed by Gavin Lynne MD at [facility] OR    SHOULDER ARTHROPLASTY Left        FAMILY HISTORY    Family History   Problem Relation Age of Onset    Dementia Mother     Heart Disease Father     Cancer Brother     COPD Other     Other Other         Blood clot    Diabetes Other        SOCIAL HISTORY    Social History     Tobacco Use    Smoking status: Never    Smokeless tobacco: Never   Vaping Use    Vaping status: Never Used   Substance Use Topics    Alcohol use: No    Drug use: No       ALLERGIES    Allergies   Allergen Reactions    Codeine      Patient unsure of allergies.    Levofloxacin      Patient unsure of allergies.  Pt

## 2025-04-17 NOTE — HOME CARE
Wound Care Supplies      Supply Company:     [employer] [address] [place] [address] f: [phone] f: [phone] p: [phone] [email or web address]      Ordering Center:     [facility]  [address] [place] [address] [address]  [facility] [id number] [phone]  WOUND CARE Dept: [phone]   FAX NUMBER [phone]    Patient Information:      Chelsy Shelton  [facility] [address]   [phone]   : 1940  AGE: 85 y.o.     GENDER: female   EPISODE DATE: 2025    Insurance:      PRIMARY INSURANCE:  Plan: [employer] MED[employer]  Coverage: [employer] MEDICARE  Effective Date: 2019  Group Number: [unfilled]  Subscriber Number: [id number] - (Medicare Managed)    Payer/Plan Subscr  Sex Relation Sub. Ins. ID Effective Group Num   1. [employer] MED[employer]* CHELSY SHELTON 1940 Female Self [id number] 1/1/19 [id number]                                   [address] [address]       Patient Wound Information:      Problem List Items Addressed This Visit          Musculoskeletal and Integument    Skin ulcer of right lower leg, with fat layer exposed (HCC) - Primary (Chronic)    Relevant Medications    lidocaine (XYLOCAINE) 2 % uro-jet    Other Relevant Orders    MD COMMUNICATION 1    MD COMMUNICATION 2    Initiate Outpatient Wound Care Protocol       WOUNDS REQUIRING DRESSING SUPPLIES:     Wound 25 Pretibial Right WOUND 1-right leg venous (Active)   Wound Image   25 1033   Wound Etiology Venous 25 1033   Dressing Status Old drainage noted 25 1033   Wound Cleansed Soap and water 25 1033   Dressing/Treatment Alginate with Ag;Silicone border 25 1511   Wound Length (cm) 2.1 cm 25 1033   Wound Width (cm) 1.9 cm 25 1033   Wound Depth (cm) 0.2 cm 25 1033   Wound Surface Area (cm^2) 3.99 cm^2 25 1033   Change in Wound Size % (l*w) 21.15 25 1033   Wound Volume (cm^3) 0.798 cm^3 25 1033

## 2025-04-17 NOTE — PATIENT INSTRUCTIONS
[facility] Wound Care and Hyperbaric Oxygen Therapy   Physician Orders and Discharge Instructions  [address] [place] [address]  [address]  [place] [address]  Telephone: [phone]      FAX [phone]    NAME:  Chelsy Snell  YOB: 1940  MEDICAL RECORD NUMBER:  [id number]  DATE:  4/17/2025    Discharge condition: Stable    Discharge to: Home    Left via:Private automobile    Accompanied by: Self    Please go downstairs in this building to Room 103 to register. The test will be  completed in Room 401. Please be aware of appointment time for this test.  Please arrive to room 103 at 1:30 PM on April 24 for test. Please do not smoke prior to test.    Dressing Orders: Right leg wound:   Soap and water wash.   Silver alginate cut to fit open area, cover with adhesive bandage, change every other day    Treatment Orders:  Protein rich diet (unless restricted by your physician); Multivitamin daily; Elevate legs  above the level of your heart when sitting 3-4 times daily for at least one hour each  time, avoid standing for long periods of time.    [facility] follow up visit __________1 week___________________  (Please note your next appointment above and if you are unable to keep, kindly give a 24 hour notice. Thank you.)    If you experience any of the following, please call the Wound Care Center during business hours:    * Increase in Pain  * Temperature over 101  * Increase in drainage from your wound  * Drainage with a foul odor  * Bleeding  * Increase in swelling  * Need for compression bandage changes due to slippage, breakthrough drainage.    If you need medical attention outside of the business hours of the Wound Care Centers please contact your PCP or go to the nearest emergency room.

## 2025-04-21 DIAGNOSIS — J06.9 VIRAL UPPER RESPIRATORY TRACT INFECTION: ICD-10-CM

## 2025-04-21 RX ORDER — GUAIFENESIN 600 MG/1
1200 TABLET, EXTENDED RELEASE ORAL 2 TIMES DAILY
Qty: 120 TABLET | Refills: 1 | Status: SHIPPED | OUTPATIENT
Start: 2025-04-21

## 2025-04-24 ENCOUNTER — HOSPITAL ENCOUNTER (OUTPATIENT)
Dept: NON INVASIVE DIAGNOSTICS | Age: 85
End: 2025-04-24
Payer: MEDICARE

## 2025-04-24 ENCOUNTER — HOSPITAL ENCOUNTER (OUTPATIENT)
Dept: WOUND CARE | Age: 85
Discharge: HOME | End: 2025-04-24
Attending: NURSE PRACTITIONER
Payer: MEDICARE

## 2025-04-24 VITALS
SYSTOLIC BLOOD PRESSURE: 151 MMHG | TEMPERATURE: 97 F | BODY MASS INDEX: 36.52 KG/M2 | HEART RATE: 53 BPM | HEIGHT: 60 IN | DIASTOLIC BLOOD PRESSURE: 67 MMHG | WEIGHT: 186 LBS | RESPIRATION RATE: 18 BRPM

## 2025-04-24 DIAGNOSIS — L97.912 SKIN ULCER OF RIGHT LOWER LEG, WITH FAT LAYER EXPOSED (HCC): ICD-10-CM

## 2025-04-24 DIAGNOSIS — L97.912 SKIN ULCER OF RIGHT LOWER LEG, WITH FAT LAYER EXPOSED (HCC): Primary | ICD-10-CM

## 2025-04-24 LAB
ECHO BSA: 1.89 M2
VAS LEFT ARM BP: 169 MMHG
VAS LEFT CALF PRESSURE: 255 MMHG
VAS LEFT DORSALIS PEDIS BP: 255 MMHG
VAS LEFT LOW THIGH PRESSURE: 255 MMHG
VAS LEFT PTA BP: 255 MMHG
VAS LEFT TBI: 0.47
VAS LEFT TOE PRESSURE: 79 MMHG
VAS RIGHT ARM BP: 166 MMHG
VAS RIGHT CALF PRESSURE: 255 MMHG
VAS RIGHT DORSALIS PEDIS BP: 255 MMHG
VAS RIGHT LOW THIGH PRESSURE: 255 MMHG
VAS RIGHT PTA BP: 255 MMHG
VAS RIGHT TBI: 0.63
VAS RIGHT TOE PRESSURE: 106 MMHG

## 2025-04-24 PROCEDURE — 93923 UPR/LXTR ART STDY 3+ LVLS: CPT

## 2025-04-24 PROCEDURE — 97597 DBRDMT OPN WND 1ST 20 CM/<: CPT

## 2025-04-24 RX ORDER — GINSENG 100 MG
CAPSULE ORAL PRN
OUTPATIENT
Start: 2025-04-24

## 2025-04-24 RX ORDER — LIDOCAINE HYDROCHLORIDE 40 MG/ML
SOLUTION TOPICAL PRN
OUTPATIENT
Start: 2025-04-24

## 2025-04-24 RX ORDER — BETAMETHASONE DIPROPIONATE 0.5 MG/G
CREAM TOPICAL PRN
OUTPATIENT
Start: 2025-04-24

## 2025-04-24 RX ORDER — BACITRACIN ZINC AND POLYMYXIN B SULFATE 500; 1000 [USP'U]/G; [USP'U]/G
OINTMENT TOPICAL PRN
OUTPATIENT
Start: 2025-04-24

## 2025-04-24 RX ORDER — LIDOCAINE HYDROCHLORIDE 20 MG/ML
JELLY TOPICAL PRN
Status: DISCONTINUED | OUTPATIENT
Start: 2025-04-24 | End: 2025-04-26 | Stop reason: HOSPADM

## 2025-04-24 RX ORDER — LIDOCAINE 50 MG/G
OINTMENT TOPICAL PRN
OUTPATIENT
Start: 2025-04-24

## 2025-04-24 RX ORDER — TRIAMCINOLONE ACETONIDE 1 MG/G
OINTMENT TOPICAL PRN
OUTPATIENT
Start: 2025-04-24

## 2025-04-24 RX ORDER — MUPIROCIN 20 MG/G
OINTMENT TOPICAL PRN
OUTPATIENT
Start: 2025-04-24

## 2025-04-24 RX ORDER — SILVER SULFADIAZINE 10 MG/G
CREAM TOPICAL PRN
OUTPATIENT
Start: 2025-04-24

## 2025-04-24 RX ORDER — SODIUM CHLOR/HYPOCHLOROUS ACID 0.033 %
SOLUTION, IRRIGATION IRRIGATION PRN
OUTPATIENT
Start: 2025-04-24

## 2025-04-24 RX ORDER — NEOMYCIN/BACITRACIN/POLYMYXINB 3.5-400-5K
OINTMENT (GRAM) TOPICAL PRN
OUTPATIENT
Start: 2025-04-24

## 2025-04-24 RX ORDER — CLOBETASOL PROPIONATE 0.5 MG/G
OINTMENT TOPICAL PRN
OUTPATIENT
Start: 2025-04-24

## 2025-04-24 RX ORDER — LIDOCAINE HYDROCHLORIDE 20 MG/ML
JELLY TOPICAL PRN
OUTPATIENT
Start: 2025-04-24

## 2025-04-24 RX ORDER — LIDOCAINE 40 MG/G
CREAM TOPICAL PRN
OUTPATIENT
Start: 2025-04-24

## 2025-04-24 RX ORDER — GENTAMICIN SULFATE 1 MG/G
OINTMENT TOPICAL PRN
OUTPATIENT
Start: 2025-04-24

## 2025-04-24 RX ADMIN — LIDOCAINE HYDROCHLORIDE: 20 JELLY TOPICAL at 14:19

## 2025-04-24 NOTE — PLAN OF CARE
Problem: Wound:  Goal: Will show signs of wound healing; wound closure and no evidence of infection  Description: Will show signs of wound healing; wound closure and no evidence of infection  Outcome: Progressing     Problem: Venous:  Goal: Signs of wound healing will improve  Description: Signs of wound healing will improve  Outcome: Progressing     Problem: Weight control:  Goal: Ability to maintain an optimal weight for height and age will be supported  Description: Ability to maintain an optimal weight for height and age will be supported  Outcome: Progressing     Problem: Falls - Risk of:  Goal: Will remain free from falls  Description: Will remain free from falls  Outcome: Progressing

## 2025-04-24 NOTE — PATIENT INSTRUCTIONS
[facility] Wound Care and Hyperbaric Oxygen Therapy   Physician Orders and Discharge Instructions  [address] [place] [address]  [address]  [place] [address]  Telephone: [phone]      FAX [phone]    NAME:  Chelsy Snell  YOB: 1940  MEDICAL RECORD NUMBER:  [id number]  DATE:  4/24/2025    Discharge condition: Stable    Discharge to: Home    Left via:Private automobile    Accompanied by: Self    Dressing Orders: Right leg wound:   Soap and water wash.   Xeroform gauze cut to fit open area, cover with dry gauze, roll gauze, and place ace wrap from base of toes and bend of knee. Leave the dressing in place until you are seen at the next appointment.    Treatment Orders:  Protein rich diet (unless restricted by your physician); Multivitamin daily; Elevate legs  above the level of your heart when sitting 3-4 times daily for at least one hour each  time, avoid standing for long periods of time.    [facility] follow up visit ________Tuesday with Dr. Burgess___________________  (Please note your next appointment above and if you are unable to keep, kindly give a 24 hour notice. Thank you.)    If you experience any of the following, please call the Wound Care Center during business hours:    * Increase in Pain  * Temperature over 101  * Increase in drainage from your wound  * Drainage with a foul odor  * Bleeding  * Increase in swelling  * Need for compression bandage changes due to slippage, breakthrough drainage.    If you need medical attention outside of the business hours of the Wound Care Centers please contact your PCP or go to the nearest emergency room.

## 2025-04-24 NOTE — PROGRESS NOTES
[facility]   Progress Note and Procedure Note      Cehlsy Snell  MEDICAL RECORD NUMBER:  [id number]  AGE: 85 y.o.   GENDER: female  : 1940  EPISODE DATE:  2025    Subjective:     Chief Complaint   Patient presents with    Wound Check     Patient presents today for recheck right leg wound.      HISTORY of PRESENT ILLNESS HPI     Chelsy Snell is a 85 y.o. female who presents today for wound/ulcer evaluation.   History of Wound Context: right leg wound follow up/eval and treat    Ulcer Identification:  Ulcer Type: venous  Contributing Factors: edema    Wound: N/A        PAST MEDICAL HISTORY        Diagnosis Date    Anxiety     Carotid artery stenosis     Chronic back pain     H/O blood clots     Hx of blood clots     bilat legs    Hyperlipidemia     Hypertension     Osteoarthritis        PAST SURGICAL HISTORY    Past Surgical History:   Procedure Laterality Date    CATARACT REMOVAL Bilateral     CHOLECYSTECTOMY      EYE SURGERY      HYSTERECTOMY (CERVIX STATUS UNKNOWN)      JOINT REPLACEMENT      KNEE ARTHROPLASTY Right     OTHER SURGICAL HISTORY      Decompression of ulnar nerve     REMOVE HARDWARE FEMUR Left 2019    HARDWARE REMOVAL performed by Gavin Lynne MD at [facility] OR    REVISION TOTAL KNEE ARTHROPLASTY Left 2019    LEFT KNEE REVISION performed by Gavin Lynne MD at [facility] OR    SHOULDER ARTHROPLASTY Left        FAMILY HISTORY    Family History   Problem Relation Age of Onset    Dementia Mother     Heart Disease Father     Cancer Brother     COPD Other     Other Other         Blood clot    Diabetes Other        SOCIAL HISTORY    Social History     Tobacco Use    Smoking status: Never    Smokeless tobacco: Never   Vaping Use    Vaping status: Never Used   Substance Use Topics    Alcohol use: No    Drug use: No       ALLERGIES    Allergies   Allergen Reactions    Codeine      Patient unsure of allergies.    Levofloxacin      Patient unsure of

## 2025-05-01 ENCOUNTER — HOSPITAL ENCOUNTER (OUTPATIENT)
Dept: WOUND CARE | Age: 85
Discharge: HOME | End: 2025-05-01
Attending: NURSE PRACTITIONER
Payer: MEDICARE

## 2025-05-01 VITALS
SYSTOLIC BLOOD PRESSURE: 153 MMHG | WEIGHT: 186 LBS | DIASTOLIC BLOOD PRESSURE: 56 MMHG | BODY MASS INDEX: 36.52 KG/M2 | HEIGHT: 60 IN | RESPIRATION RATE: 18 BRPM | TEMPERATURE: 97.5 F | HEART RATE: 53 BPM

## 2025-05-01 DIAGNOSIS — L97.912 SKIN ULCER OF RIGHT LOWER LEG, WITH FAT LAYER EXPOSED (HCC): Primary | ICD-10-CM

## 2025-05-01 PROCEDURE — 97597 DBRDMT OPN WND 1ST 20 CM/<: CPT | Performed by: SURGERY

## 2025-05-01 PROCEDURE — 97597 DBRDMT OPN WND 1ST 20 CM/<: CPT

## 2025-05-01 RX ORDER — NEOMYCIN/BACITRACIN/POLYMYXINB 3.5-400-5K
OINTMENT (GRAM) TOPICAL PRN
OUTPATIENT
Start: 2025-05-01

## 2025-05-01 RX ORDER — GINSENG 100 MG
CAPSULE ORAL PRN
OUTPATIENT
Start: 2025-05-01

## 2025-05-01 RX ORDER — SILVER SULFADIAZINE 10 MG/G
CREAM TOPICAL PRN
OUTPATIENT
Start: 2025-05-01

## 2025-05-01 RX ORDER — MUPIROCIN 20 MG/G
OINTMENT TOPICAL PRN
OUTPATIENT
Start: 2025-05-01

## 2025-05-01 RX ORDER — LIDOCAINE HYDROCHLORIDE 40 MG/ML
SOLUTION TOPICAL PRN
OUTPATIENT
Start: 2025-05-01

## 2025-05-01 RX ORDER — LIDOCAINE HYDROCHLORIDE 20 MG/ML
JELLY TOPICAL PRN
Status: DISCONTINUED | OUTPATIENT
Start: 2025-05-01 | End: 2025-05-03 | Stop reason: HOSPADM

## 2025-05-01 RX ORDER — LIDOCAINE HYDROCHLORIDE 20 MG/ML
JELLY TOPICAL PRN
OUTPATIENT
Start: 2025-05-01

## 2025-05-01 RX ORDER — CLOBETASOL PROPIONATE 0.5 MG/G
OINTMENT TOPICAL PRN
OUTPATIENT
Start: 2025-05-01

## 2025-05-01 RX ORDER — GENTAMICIN SULFATE 1 MG/G
OINTMENT TOPICAL PRN
OUTPATIENT
Start: 2025-05-01

## 2025-05-01 RX ORDER — LIDOCAINE 50 MG/G
OINTMENT TOPICAL PRN
OUTPATIENT
Start: 2025-05-01

## 2025-05-01 RX ORDER — BETAMETHASONE DIPROPIONATE 0.5 MG/G
CREAM TOPICAL PRN
OUTPATIENT
Start: 2025-05-01

## 2025-05-01 RX ORDER — TRIAMCINOLONE ACETONIDE 1 MG/G
OINTMENT TOPICAL PRN
OUTPATIENT
Start: 2025-05-01

## 2025-05-01 RX ORDER — LIDOCAINE 40 MG/G
CREAM TOPICAL PRN
OUTPATIENT
Start: 2025-05-01

## 2025-05-01 RX ORDER — SODIUM CHLOR/HYPOCHLOROUS ACID 0.033 %
SOLUTION, IRRIGATION IRRIGATION PRN
OUTPATIENT
Start: 2025-05-01

## 2025-05-01 RX ORDER — BACITRACIN ZINC AND POLYMYXIN B SULFATE 500; 1000 [USP'U]/G; [USP'U]/G
OINTMENT TOPICAL PRN
OUTPATIENT
Start: 2025-05-01

## 2025-05-01 RX ADMIN — LIDOCAINE HYDROCHLORIDE: 20 JELLY TOPICAL at 10:54

## 2025-05-01 NOTE — PATIENT INSTRUCTIONS
[facility] Wound Care and Hyperbaric Oxygen Therapy   Physician Orders and Discharge Instructions  [address] [place] [address]  [address]  [place] [address]  Telephone: [phone]      FAX [phone]    NAME:  Chelsy Snell  YOB: 1940  MEDICAL RECORD NUMBER:  [id number]  DATE:  5/1/2025    Discharge condition: Stable    Discharge to: Home    Left via:Private automobile    Accompanied by: Self    [facility]   Home Health to change Calamine Coflex on Monday 5/5 and Wound Care to Change on Thursday    Dressing Orders: Right leg wound:  Silver Alginate to open areas  Calamine Coflex Unnaboot, Keep clean and Dry  Elevate feet to level or above heart 3-4 times daily and as needed to for 30 minutes to reduce swelling  Remove wrap and resume previous dressing orders,   If wrap gets wet, Causes increased pain, Slides down or you are unable to make your next scheduled appointment   Multi Vitamin, High Protein diet as tolerated    [facility] follow up visit __________1 week_______________  (Please note your next appointment above and if you are unable to keep, kindly give a 24 hour notice. Thank you.)    If you experience any of the following, please call the Wound Care Center during business hours:    * Increase in Pain  * Temperature over 101  * Increase in drainage from your wound  * Drainage with a foul odor  * Bleeding  * Increase in swelling  * Need for compression bandage changes due to slippage, breakthrough drainage.    If you need medical attention outside of the business hours of the Wound Care Centers please contact your PCP or go to the nearest emergency room.

## 2025-05-01 NOTE — PLAN OF CARE
Unna Boot Application   Below Knee    NAME:  Chelsy Snell  YOB: 1940  MEDICAL RECORD NUMBER:  [id number]  DATE:  5/1/2025    Unna boot: Applied moisturizing agent to dry skin as needed.   Appied primary and secondary dressing as ordered.  Applied Unna roll from toes to knee overlapping each time.   Applied ace wrap or coban from toes to below the knee.   Instructed patient/caregiver to keep dressing dry and intact. DO NOT REMOVE DRESSING.   Instructed pt/family/caregiver to report excessive draining, loose bandage, wet dressing, severe pain or tingling in toes.  Applied Unna Boot dressing below the knee to right lower leg.    Unna Boot(s) were applied per  Guidelines.     Electronically signed by Sophia Clement RN on 5/1/2025 at 11:50 AM

## 2025-05-01 NOTE — PROGRESS NOTES
25.3 05/01/25 1058   Wound Volume (cm^3) 0.378 cm^3 05/01/25 1058   Wound Healing % 25 05/01/25 1058   Post-Procedure Length (cm) 2.1 cm 05/01/25 1112   Post-Procedure Width (cm) 1.8 cm 05/01/25 1112   Post-Procedure Depth (cm) 0.1 cm 05/01/25 1112   Post-Procedure Surface Area (cm^2) 3.78 cm^2 05/01/25 1112   Post-Procedure Volume (cm^3) 0.378 cm^3 05/01/25 1112   Distance Tunneling (cm) 0 cm 05/01/25 1058   Tunneling Position ___ O'Clock 0 05/01/25 1058   Undermining Starts ___ O'Clock 0 05/01/25 1058   Undermining Ends___ O'Clock 0 05/01/25 1058   Undermining Maxium Distance (cm) 0 05/01/25 1058   Wound Assessment Pink/red;Slough 05/01/25 1058   Drainage Amount Moderate (25-50%) 05/01/25 1058   Drainage Description Serosanguinous 05/01/25 1058   Odor None 05/01/25 1058   Lakshmi-wound Assessment Hemosiderin staining (brown yellow) 05/01/25 1058   Margins Attached edges 05/01/25 1058   Wound Thickness Description not for Pressure Injury Full thickness 05/01/25 1058   Number of days: 29             Estimated Blood Loss:  Minimal    Hemostasis Achieved:  by pressure    Procedural Pain:  0  / 10     Post Procedural Pain:  0 / 10     Response to treatment:  Well tolerated by patient.         Plan:     Problem List Items Addressed This Visit       * (Principal) Skin ulcer of right lower leg, with fat layer exposed (HCC) - Primary (Chronic)    Relevant Medications    lidocaine (XYLOCAINE) 2 % uro-jet    Other Relevant Orders    MD COMMUNICATION 1    MD COMMUNICATION 2    Initiate Outpatient Wound Care Protocol    Initiate Oxygen Therapy Protocol       Start coflex RTO 2 xs week for changes    Treatment Note please see attached Discharge Instructions    In my professional opinion this patient would benefit from HBO Therapy: No    Written patient dismissal instructions given to patient and signed by patient or POA.         [facility]  Home Health to change Calamine Coflex on Monday 5/5 and Wound Care to

## 2025-05-05 DIAGNOSIS — J06.9 URI WITH COUGH AND CONGESTION: ICD-10-CM

## 2025-05-05 RX ORDER — ALBUTEROL SULFATE 90 UG/1
2 INHALANT RESPIRATORY (INHALATION) EVERY 4 HOURS PRN
Qty: 8 G | Refills: 11 | Status: SHIPPED | OUTPATIENT
Start: 2025-05-05

## 2025-05-05 NOTE — TELEPHONE ENCOUNTER
Rx Refill Note  Requested Prescriptions     Pending Prescriptions Disp Refills    albuterol sulfate  (90 Base) MCG/ACT inhaler [Pharmacy Med Name: albuterol sulfate HFA 90 mcg/actuation aerosol inhaler] 8 g 1     Sig: Inhale 2 puffs Every 4 (Four) Hours As Needed for Wheezing or Shortness of Air.      Last office visit with prescribing clinician: 4/15/2025   Last telemedicine visit with prescribing clinician: Visit date not found   Next office visit with prescribing clinician: 5/6/2025                         Would you like a call back once the refill request has been completed: [] Yes [] No    If the office needs to give you a call back, can they leave a voicemail: [] Yes [] No    ELIJAH Pearson  05/05/25, 10:05 CDT    Medication was last filled 04/15/25, qty 8 1 refill. Will need to be filled again.

## 2025-05-06 ENCOUNTER — OFFICE VISIT (OUTPATIENT)
Dept: INTERNAL MEDICINE | Facility: CLINIC | Age: 85
End: 2025-05-06
Payer: MEDICARE

## 2025-05-06 ENCOUNTER — LAB (OUTPATIENT)
Dept: LAB | Facility: HOSPITAL | Age: 85
End: 2025-05-06
Payer: MEDICARE

## 2025-05-06 ENCOUNTER — TELEPHONE (OUTPATIENT)
Dept: INTERNAL MEDICINE | Facility: CLINIC | Age: 85
End: 2025-05-06

## 2025-05-06 VITALS
DIASTOLIC BLOOD PRESSURE: 80 MMHG | WEIGHT: 186 LBS | HEIGHT: 60 IN | TEMPERATURE: 98 F | SYSTOLIC BLOOD PRESSURE: 140 MMHG | OXYGEN SATURATION: 97 % | BODY MASS INDEX: 36.52 KG/M2 | HEART RATE: 55 BPM

## 2025-05-06 DIAGNOSIS — Z13.31 DEPRESSION SCREEN: ICD-10-CM

## 2025-05-06 DIAGNOSIS — Z78.0 POSTMENOPAUSE: ICD-10-CM

## 2025-05-06 DIAGNOSIS — Z00.00 MEDICARE ANNUAL WELLNESS VISIT, SUBSEQUENT: Primary | ICD-10-CM

## 2025-05-06 DIAGNOSIS — R19.7 DIARRHEA, UNSPECIFIED TYPE: ICD-10-CM

## 2025-05-06 DIAGNOSIS — Z00.00 ENCOUNTER FOR PREVENTIVE CARE: ICD-10-CM

## 2025-05-06 DIAGNOSIS — I10 ESSENTIAL HYPERTENSION: ICD-10-CM

## 2025-05-06 DIAGNOSIS — E66.01 CLASS 2 SEVERE OBESITY DUE TO EXCESS CALORIES WITH SERIOUS COMORBIDITY AND BODY MASS INDEX (BMI) OF 36.0 TO 36.9 IN ADULT: ICD-10-CM

## 2025-05-06 DIAGNOSIS — E66.812 CLASS 2 SEVERE OBESITY DUE TO EXCESS CALORIES WITH SERIOUS COMORBIDITY AND BODY MASS INDEX (BMI) OF 36.0 TO 36.9 IN ADULT: ICD-10-CM

## 2025-05-06 DIAGNOSIS — G89.4 CHRONIC PAIN SYNDROME: ICD-10-CM

## 2025-05-06 DIAGNOSIS — Z91.81 AT HIGH RISK FOR FALLS: ICD-10-CM

## 2025-05-06 DIAGNOSIS — M81.0 AGE-RELATED OSTEOPOROSIS WITHOUT CURRENT PATHOLOGICAL FRACTURE: ICD-10-CM

## 2025-05-06 LAB
ALBUMIN SERPL-MCNC: 4.3 G/DL (ref 3.5–5.2)
ALBUMIN/GLOB SERPL: 1.7 G/DL
ALP SERPL-CCNC: 50 U/L (ref 39–117)
ALT SERPL W P-5'-P-CCNC: 20 U/L (ref 1–33)
ANION GAP SERPL CALCULATED.3IONS-SCNC: 9 MMOL/L (ref 5–15)
AST SERPL-CCNC: 21 U/L (ref 1–32)
BILIRUB SERPL-MCNC: 0.4 MG/DL (ref 0–1.2)
BUN SERPL-MCNC: 24 MG/DL (ref 8–23)
BUN/CREAT SERPL: 35.8 (ref 7–25)
CALCIUM SPEC-SCNC: 9.4 MG/DL (ref 8.6–10.5)
CHLORIDE SERPL-SCNC: 101 MMOL/L (ref 98–107)
CHOLEST SERPL-MCNC: 163 MG/DL (ref 0–200)
CO2 SERPL-SCNC: 24 MMOL/L (ref 22–29)
CREAT SERPL-MCNC: 0.67 MG/DL (ref 0.57–1)
DEPRECATED RDW RBC AUTO: 51.8 FL (ref 37–54)
EGFRCR SERPLBLD CKD-EPI 2021: 86.8 ML/MIN/1.73
ERYTHROCYTE [DISTWIDTH] IN BLOOD BY AUTOMATED COUNT: 14.5 % (ref 12.3–15.4)
GLOBULIN UR ELPH-MCNC: 2.6 GM/DL
GLUCOSE SERPL-MCNC: 101 MG/DL (ref 65–99)
HBA1C MFR BLD: 5.3 % (ref 4.8–5.6)
HCT VFR BLD AUTO: 33.2 % (ref 34–46.6)
HDLC SERPL-MCNC: 70 MG/DL (ref 40–60)
HGB BLD-MCNC: 10.4 G/DL (ref 12–15.9)
LDLC SERPL CALC-MCNC: 74 MG/DL (ref 0–100)
LDLC/HDLC SERPL: 1.03 {RATIO}
MCH RBC QN AUTO: 30.5 PG (ref 26.6–33)
MCHC RBC AUTO-ENTMCNC: 31.3 G/DL (ref 31.5–35.7)
MCV RBC AUTO: 97.4 FL (ref 79–97)
PLATELET # BLD AUTO: 171 10*3/MM3 (ref 140–450)
PMV BLD AUTO: 10.4 FL (ref 6–12)
POTASSIUM SERPL-SCNC: 5.7 MMOL/L (ref 3.5–5.2)
PROT SERPL-MCNC: 6.9 G/DL (ref 6–8.5)
RBC # BLD AUTO: 3.41 10*6/MM3 (ref 3.77–5.28)
SODIUM SERPL-SCNC: 134 MMOL/L (ref 136–145)
TRIGL SERPL-MCNC: 104 MG/DL (ref 0–150)
URATE SERPL-MCNC: 4.1 MG/DL (ref 2.4–5.7)
VLDLC SERPL-MCNC: 19 MG/DL (ref 5–40)
WBC NRBC COR # BLD AUTO: 9.42 10*3/MM3 (ref 3.4–10.8)

## 2025-05-06 PROCEDURE — 84550 ASSAY OF BLOOD/URIC ACID: CPT

## 2025-05-06 PROCEDURE — 83036 HEMOGLOBIN GLYCOSYLATED A1C: CPT

## 2025-05-06 PROCEDURE — 36415 COLL VENOUS BLD VENIPUNCTURE: CPT

## 2025-05-06 PROCEDURE — 80061 LIPID PANEL: CPT

## 2025-05-06 PROCEDURE — 80053 COMPREHEN METABOLIC PANEL: CPT

## 2025-05-06 PROCEDURE — 85027 COMPLETE CBC AUTOMATED: CPT

## 2025-05-06 NOTE — TELEPHONE ENCOUNTER
Hub staff attempted to follow warm transfer process and was unsuccessful     Caller: [facility]    Relationship to patient: Home Health    Best call back number: [phone]     Patient is needing: RETUNING A CALL TO [facility]

## 2025-05-06 NOTE — PROGRESS NOTES
Chief Complaint   Patient presents with    Medicare Wellness-subsequent         History:  Carmen Obrien is a 85 y.o. female who presents today for evaluation of the above problems.      HPI  History of Present Illness  The patient presents for a Medicare visit and annual physical.    She reports experiencing widespread arthritic pain throughout her body. She was previously advised by Dr. Evans to undergo nerve-blocking injections in both knees, but her insurance declined coverage. She is currently on Percocet and Lyrica for pain management, but limits her Lyrica intake to one dose at night due to its sedative effects. She also receives occasional shoulder injections from Dr. Antoine Morales.    She is uncertain about her use of allopurinol, as her medication box is managed by [facility].    She has been receiving injections in her left eye and is scheduled for another one on Thursday. She also needs new dentures and glasses.    She was previously engaged in physical therapy but sustained a leg injury after hitting it on the corner of a van door. The injury remains unresolved. She had it bandaged tightly with home health, which she found intolerable. As of last Thursday, she has been using two wraps and a boot as per Dr. Diaz's instructions. Home health services changed the dressing yesterday, and she is scheduled to see Dr. Diaz again on Thursday. She keeps the leg elevated when not in use.    Approximately 2 weeks ago, she fell off her bed at 2:30 AM while attempting to use the bathroom, necessitating an ambulance call for assistance. She resides in an assisted living facility and has not yet sold her house.    She has received five COVID-19 vaccines but still contracted the virus. She has also received the influenza vaccine at her assisted living facility and both doses of the shingles vaccine.  She has also had the RSV vaccine.  She is interested in getting a Prevnar 20 today.    She  has been experiencing bowel issues, including frequent diarrhea, and has received a letter from Dr. Coleman office regarding this matter. She typically abstains from food and drink on days when she goes out.    SOCIAL HISTORY  She does not use any tobacco products or drugs. She lives in an assisted living facility.    Social History     Socioeconomic History    Marital status:    Tobacco Use    Smoking status: Never    Smokeless tobacco: Never   Vaping Use    Vaping status: Never Used   Substance and Sexual Activity    Alcohol use: No    Drug use: No    Sexual activity: Defer     Birth control/protection: Hysterectomy       PHQ-9 Depression Screening  Little interest or pleasure in doing things? Not at all   Feeling down, depressed, or hopeless? Not at all   PHQ-2 Total Score 0   Trouble falling or staying asleep, or sleeping too much?     Feeling tired or having little energy?     Poor appetite or overeating?     Feeling bad about yourself - or that you are a failure or have let yourself or your family down?     Trouble concentrating on things, such as reading the newspaper or watching television?     Moving or speaking so slowly that other people could have noticed? Or the opposite - being so fidgety or restless that you have been moving around a lot more than usual?       Thoughts that you would be better off dead, or of hurting yourself in some way?     PHQ-9 Total Score     If you checked off any problems, how difficult have these problems made it for you to do your work, take care of things at home, or get along with other people? Not difficult at all       PHQ-9 Total Score:        ROS:  Review of Systems   Constitutional:  Positive for activity change.   Respiratory:  Positive for shortness of breath.    Cardiovascular:  Positive for leg swelling. Negative for chest pain and palpitations.   Gastrointestinal:  Positive for diarrhea. Negative for abdominal distention, abdominal pain and constipation.    Musculoskeletal:  Positive for arthralgias, back pain and gait problem.   Neurological:  Positive for weakness.         Current Outpatient Medications:     albuterol sulfate  (90 Base) MCG/ACT inhaler, Inhale 2 puffs Every 4 (Four) Hours As Needed for Wheezing or Shortness of Air., Disp: 8 g, Rfl: 11    amLODIPine (NORVASC) 10 MG tablet, Take 1 tablet by mouth Daily., Disp: 90 tablet, Rfl: 3    ascorbic acid (VITAMIN C) 500 MG tablet, Take 1 tablet by mouth Daily., Disp: , Rfl:     atorvastatin (LIPITOR) 10 MG tablet, TAKE ONE TABLET BY MOUTH EVERY DAY, Disp: 90 tablet, Rfl: 3    Calcium Carbonate-Vit D-Min (Caltrate 600+D Plus Minerals) 600-800 MG-UNIT tablet, Take 1 tablet by mouth 2 (Two) Times a Day., Disp: , Rfl:     carvedilol (COREG) 12.5 MG tablet, Take 1 tablet by mouth 2 (Two) Times a Day With Meals., Disp: , Rfl:     cloNIDine (CATAPRES) 0.1 MG tablet, Take 1 tablet by mouth 2 (Two) Times a Day As Needed for High Blood Pressure (systolic greater than 180)., Disp: 30 tablet, Rfl: 0    esomeprazole (nexIUM) 20 MG capsule, Take 1 capsule by mouth 2 (Two) Times a Day., Disp: 60 capsule, Rfl: 11    FEROSUL 325 (65 Fe) MG tablet, Take 1 tablet by mouth Daily With Breakfast., Disp: , Rfl:     furosemide (LASIX) 20 MG tablet, Take 2 tablets by mouth Daily., Disp: 90 tablet, Rfl: 6    guaiFENesin (MUCINEX) 600 MG 12 hr tablet, Take 2 tablets by mouth 2 (Two) Times a Day., Disp: 120 tablet, Rfl: 1    lisinopril (PRINIVIL,ZESTRIL) 40 MG tablet, Take 1 tablet by mouth Daily., Disp: 30 tablet, Rfl: 2    Melatonin 10 MG capsule, Take  by mouth. 2 tab, Disp: , Rfl:     nystatin (MYCOSTATIN) 880966 UNIT/GM powder, Apply  topically to the appropriate area as directed 3 (Three) Times a Day., Disp: 60 g, Rfl: 11    oxyCODONE-acetaminophen (PERCOCET) 5-325 MG per tablet, Take 1 tablet by mouth Every 8 (Eight) Hours As Needed for Moderate Pain., Disp: 28 tablet, Rfl: 0    potassium chloride (KLOR-CON M10) 10 MEQ CR  tablet, Take 1 tablet by mouth Daily., Disp: 90 tablet, Rfl: 3    pregabalin (LYRICA) 75 MG capsule, Take 1 capsule by mouth 2 (Two) Times a Day., Disp: , Rfl:     sertraline (ZOLOFT) 100 MG tablet, Take 1 tablet by mouth Daily., Disp: 90 tablet, Rfl: 3    spironolactone (ALDACTONE) 25 MG tablet, Take 1 tablet by mouth Daily., Disp: 90 tablet, Rfl: 3    vitamin B-12 (CYANOCOBALAMIN) 1000 MCG tablet, Take 1 tablet by mouth Daily., Disp: , Rfl:     vitamin D (ERGOCALCIFEROL) 1.25 MG (66889 UT) capsule capsule, Take 1 capsule by mouth Every 14 (Fourteen) Days. Every other Friday, Disp: , Rfl:     allopurinol (ZYLOPRIM) 100 MG tablet, Take 1 tablet by mouth Daily. (Patient not taking: Reported on 4/15/2025), Disp: 90 tablet, Rfl: 3    benzonatate (Tessalon Perles) 100 MG capsule, Take 1 capsule by mouth 3 (Three) Times a Day As Needed for Cough., Disp: 60 capsule, Rfl: 0    fluticasone (FLONASE) 50 MCG/ACT nasal spray, Administer 2 sprays into the nostril(s) as directed by provider Daily. (Patient not taking: Reported on 4/15/2025), Disp: 15.8 mL, Rfl: 3    Vibegron 75 MG tablet, Take 1 tablet by mouth Daily., Disp: 30 tablet, Rfl: 11    Lab Results   Component Value Date    GLUCOSE 102 (H) 04/18/2024    BUN 23 04/18/2024    CREATININE 0.50 (L) 04/18/2024     04/18/2024    K 4.9 04/18/2024     04/18/2024    CALCIUM 9.8 04/18/2024    PROTEINTOT 7.3 04/18/2024    ALBUMIN 4.3 04/18/2024    ALT 18 04/18/2024    AST 22 04/18/2024    ALKPHOS 90 04/18/2024    BILITOT 0.4 04/18/2024    GLOB 3.0 04/18/2024    AGRATIO 1.4 04/18/2024    BCR 46.0 (H) 04/18/2024    ANIONGAP 8.0 04/18/2024    EGFR 93.8 04/18/2024       WBC   Date Value Ref Range Status   04/18/2024 6.05 3.40 - 10.80 10*3/mm3 Final   07/16/2019 7.0 4.8 - 10.8 K/uL Final     RBC   Date Value Ref Range Status   04/18/2024 3.93 3.77 - 5.28 10*6/mm3 Final   07/16/2019 3.52 (L) 4.20 - 5.40 M/uL Final     Hemoglobin   Date Value Ref Range Status   04/18/2024  11.9 (L) 12.0 - 15.9 g/dL Final   07/16/2019 10.1 (L) 12.0 - 16.0 g/dL Final     Hematocrit   Date Value Ref Range Status   04/18/2024 37.2 34.0 - 46.6 % Final   07/16/2019 31.7 (L) 37.0 - 47.0 % Final     MCV   Date Value Ref Range Status   04/18/2024 94.7 79.0 - 97.0 fL Final   07/16/2019 90.1 81.0 - 99.0 fL Final     MCH   Date Value Ref Range Status   04/18/2024 30.3 26.6 - 33.0 pg Final   07/16/2019 28.7 27.0 - 31.0 pg Final     MCHC   Date Value Ref Range Status   04/18/2024 32.0 31.5 - 35.7 g/dL Final   07/16/2019 31.9 (L) 33.0 - 37.0 g/dL Final     RDW   Date Value Ref Range Status   04/18/2024 13.9 12.3 - 15.4 % Final   07/16/2019 14.7 (H) 11.5 - 14.5 % Final     RDW-SD   Date Value Ref Range Status   04/18/2024 48.8 37.0 - 54.0 fl Final     MPV   Date Value Ref Range Status   04/18/2024 10.5 6.0 - 12.0 fL Final   07/16/2019 10.8 9.4 - 12.3 fL Final     Platelets   Date Value Ref Range Status   04/18/2024 220 140 - 450 10*3/mm3 Final   07/16/2019 248 130 - 400 K/uL Final     Neutrophil Rel %   Date Value Ref Range Status   07/04/2019 76.5 (H) 50.0 - 65.0 % Final     Neutrophil %   Date Value Ref Range Status   01/08/2024 71.9 42.7 - 76.0 % Final     Lymphocyte Rel %   Date Value Ref Range Status   07/04/2019 13.9 (L) 20.0 - 40.0 % Final     Lymphocyte %   Date Value Ref Range Status   01/08/2024 17.2 (L) 19.6 - 45.3 % Final     Monocyte Rel %   Date Value Ref Range Status   07/04/2019 7.4 0.0 - 10.0 % Final     Monocyte %   Date Value Ref Range Status   01/08/2024 7.5 5.0 - 12.0 % Final     Eosinophil Rel %   Date Value Ref Range Status   07/04/2019 1.4 0.0 - 5.0 % Final     Eosinophil %   Date Value Ref Range Status   01/08/2024 2.2 0.3 - 6.2 % Final     Basophil Rel %   Date Value Ref Range Status   07/04/2019 0.4 0.0 - 1.0 % Final     Basophil %   Date Value Ref Range Status   01/08/2024 0.7 0.0 - 1.5 % Final     Immature Grans %   Date Value Ref Range Status   01/08/2024 0.5 0.0 - 0.5 % Final  "    Neutrophils Absolute   Date Value Ref Range Status   07/04/2019 7.7 (H) 1.5 - 7.5 K/uL Final     Neutrophils, Absolute   Date Value Ref Range Status   01/08/2024 5.27 1.70 - 7.00 10*3/mm3 Final     Lymphocytes Absolute   Date Value Ref Range Status   07/04/2019 1.4 1.1 - 4.5 K/uL Final     Lymphocytes, Absolute   Date Value Ref Range Status   01/08/2024 1.26 0.70 - 3.10 10*3/mm3 Final     Monocytes Absolute   Date Value Ref Range Status   07/04/2019 0.80 0.00 - 0.90 K/uL Final     Monocytes, Absolute   Date Value Ref Range Status   01/08/2024 0.55 0.10 - 0.90 10*3/mm3 Final     Eosinophils Absolute   Date Value Ref Range Status   07/04/2019 0.10 0.00 - 0.60 K/uL Final     Eosinophils, Absolute   Date Value Ref Range Status   01/08/2024 0.16 0.00 - 0.40 10*3/mm3 Final     Basophils Absolute   Date Value Ref Range Status   07/04/2019 0.00 0.00 - 0.20 K/uL Final     Basophils, Absolute   Date Value Ref Range Status   01/08/2024 0.05 0.00 - 0.20 10*3/mm3 Final     Immature Grans, Absolute   Date Value Ref Range Status   01/08/2024 0.04 0.00 - 0.05 10*3/mm3 Final     nRBC   Date Value Ref Range Status   01/08/2024 0.0 0.0 - 0.2 /100 WBC Final         OBJECTIVE:  Visit Vitals  /80 (BP Location: Left arm, Patient Position: Sitting, Cuff Size: Adult)   Pulse 55   Temp 98 °F (36.7 °C) (Temporal)   Ht 152.4 cm (60\")   Wt 84.4 kg (186 lb)   SpO2 97%   BMI 36.33 kg/m²      Physical Exam  Vitals and nursing note reviewed.   Constitutional:       General: She is not in acute distress.     Appearance: She is well-developed. She is obese. She is ill-appearing (Appears chronically ill). She is not diaphoretic.      Comments: Sitting in a wheelchair   HENT:      Head: Normocephalic and atraumatic.      Right Ear: Tympanic membrane, ear canal and external ear normal. There is no impacted cerumen.      Left Ear: Tympanic membrane, ear canal and external ear normal. There is no impacted cerumen.   Eyes:      Pupils: Pupils are " equal, round, and reactive to light.   Neck:      Thyroid: No thyromegaly.      Trachea: Phonation normal.   Cardiovascular:      Rate and Rhythm: Normal rate and regular rhythm.      Heart sounds: Murmur heard.   Pulmonary:      Effort: Pulmonary effort is normal. No respiratory distress.      Breath sounds: Normal breath sounds. No wheezing or rales.   Abdominal:      Palpations: Abdomen is soft.      Tenderness: There is no abdominal tenderness.   Musculoskeletal:      Comments: Right leg is wrapped   Skin:     Coloration: Skin is not pale.      Findings: No erythema.   Neurological:      Mental Status: She is alert.   Psychiatric:         Behavior: Behavior normal.         Thought Content: Thought content normal.         Judgment: Judgment normal.         Assessment/Plan    Diagnoses and all orders for this visit:    1. Medicare annual wellness visit, subsequent (Primary)    2. Encounter for preventive care  -     CBC (No Diff); Future  -     Comprehensive Metabolic Panel; Future  -     Hemoglobin A1c; Future  -     Lipid Panel; Future    3. Depression screen    4. Chronic pain syndrome  -     Uric acid; Future    5. Essential hypertension  -     Comprehensive Metabolic Panel; Future    6. At high risk for falls    7. Postmenopause  -     DEXA Bone Density Axial; Future    8. Age-related osteoporosis without current pathological fracture  -     DEXA Bone Density Axial; Future    9. Diarrhea, unspecified type    Other orders  -     Pneumococcal Conjugate Vaccine 20-Valent (PCV20)      Assessment & Plan  1. Annual physical.  - She is due for a bone density scan, which will be ordered.  - Laboratory tests will be conducted today.  - She qualifies for the pneumonia vaccine (Prevnar 20) and will receive it today.  - She is advised to update her eye and dental exams.    Arthritis.  - She experiences arthritis pain throughout her body.  - She is currently on Percocet and Lyrica but only takes one Lyrica at night due to  drowsiness.  - Prescription Drug Monitoring Program was reviewed.  - She was advised to continue her current medication regimen.  Pain management.    High fall risk.  - She qualifies for an evaluation by physical therapy, which she is not interested in pursuing.  - She reported a recent fall from her bed, requiring assistance from an ambulance.  - She resides in assisted living and is considering whether to return to her house.  I would advise her to continue assisted living.  - Physical therapy evaluation will be pursued.       Diarrhea.  - She reports frequent diarrhea.  -She recently received a letter from Dr. Coleman's office indicating that she is due for her colonoscopy.  - She is advised to contact Dr. Coleman's office for further evaluation.  - Colonoscopy was discussed and deemed necessary based on her symptoms..      Counseling/Anticipatory Guidance Discussed: nutrition, physical activity, healthy weight, misuse of tobacco, alcohol and drugs, dental health, mental health, immunizations, and screenings     Class 2 Severe Obesity (BMI >=35 and <=39.9). Obesity-related health conditions include the following: hypertension, dyslipidemias, peripheral vascular disease, and osteoarthritis. Obesity is unchanged. BMI is is above average; BMI management plan is completed. We discussed portion control and increasing exercise.        Return in about 4 months (around 9/6/2025) for Recheck.    Patient was given instructions and counseling regarding his/her condition or for health maintenance advice. Please see specific information pulled into the AVS if appropriate.     AMY Noble MD  10:21 CDT  5/6/2025  Electronically signed     Patient or patient representative verbalized consent for the use of Ambient Listening during the visit with  VERA Noble MD for chart documentation. 5/6/2025  11:00 CDT
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
## 2025-05-06 NOTE — TELEPHONE ENCOUNTER
[facility]    Relationship to patient:     Best call back number:     [phone]     Patient is needing: NEEDS PERMISSION FROM PCP TO LIST HER AS DNR ON HER CHART. CAN BE VERBAL PERMISSION.

## 2025-05-07 ENCOUNTER — TELEPHONE (OUTPATIENT)
Dept: INTERNAL MEDICINE | Facility: CLINIC | Age: 85
End: 2025-05-07
Payer: MEDICARE

## 2025-05-07 NOTE — TELEPHONE ENCOUNTER
I am okay with home health going to her home and completing the form, but they will need to contact Dr. Diaz since he is managing home health.

## 2025-05-07 NOTE — TELEPHONE ENCOUNTER
ISABEL WITH [facility] HAS CALLED, SHE IS ASKING FOR A  FOR EMS DNR TO BE SENT TO PATIENTS RESIDENCE.    PLEASE ADVISE IF THIS IS APPROPRIATE.        [phone]

## 2025-05-07 NOTE — TELEPHONE ENCOUNTER
SPOKE WITH ISABEL WITH [facility].  SHE STATED THAT THE  CAN GO TO PATIENTS HOME, FILL OUT THE FORM AND LEAVE IT ON HER REFRIGERATOR.    SHE STATED THAT DR. BAE IS WHO FOLLOWS HER FOR HOME HEALTH FOR WOUND CARE.  THEY NORMALLY GET THE ORDER FROM THE PCP.

## 2025-05-08 ENCOUNTER — HOSPITAL ENCOUNTER (OUTPATIENT)
Dept: WOUND CARE | Age: 85
Discharge: HOME | End: 2025-05-08
Attending: NURSE PRACTITIONER
Payer: MEDICARE

## 2025-05-08 VITALS
HEIGHT: 60 IN | DIASTOLIC BLOOD PRESSURE: 58 MMHG | TEMPERATURE: 97.3 F | WEIGHT: 186 LBS | HEART RATE: 49 BPM | SYSTOLIC BLOOD PRESSURE: 127 MMHG | RESPIRATION RATE: 18 BRPM | BODY MASS INDEX: 36.52 KG/M2

## 2025-05-08 DIAGNOSIS — L97.912 SKIN ULCER OF RIGHT LOWER LEG, WITH FAT LAYER EXPOSED (HCC): Primary | ICD-10-CM

## 2025-05-08 PROCEDURE — 97597 DBRDMT OPN WND 1ST 20 CM/<: CPT

## 2025-05-08 PROCEDURE — 97597 DBRDMT OPN WND 1ST 20 CM/<: CPT | Performed by: SURGERY

## 2025-05-08 RX ORDER — LIDOCAINE 40 MG/G
CREAM TOPICAL PRN
OUTPATIENT
Start: 2025-05-08

## 2025-05-08 RX ORDER — BACITRACIN ZINC AND POLYMYXIN B SULFATE 500; 1000 [USP'U]/G; [USP'U]/G
OINTMENT TOPICAL PRN
OUTPATIENT
Start: 2025-05-08

## 2025-05-08 RX ORDER — TRIAMCINOLONE ACETONIDE 1 MG/G
OINTMENT TOPICAL PRN
OUTPATIENT
Start: 2025-05-08

## 2025-05-08 RX ORDER — LIDOCAINE 50 MG/G
OINTMENT TOPICAL PRN
OUTPATIENT
Start: 2025-05-08

## 2025-05-08 RX ORDER — NEOMYCIN/BACITRACIN/POLYMYXINB 3.5-400-5K
OINTMENT (GRAM) TOPICAL PRN
OUTPATIENT
Start: 2025-05-08

## 2025-05-08 RX ORDER — MUPIROCIN 20 MG/G
OINTMENT TOPICAL PRN
OUTPATIENT
Start: 2025-05-08

## 2025-05-08 RX ORDER — SODIUM CHLOR/HYPOCHLOROUS ACID 0.033 %
SOLUTION, IRRIGATION IRRIGATION PRN
OUTPATIENT
Start: 2025-05-08

## 2025-05-08 RX ORDER — LIDOCAINE HYDROCHLORIDE 40 MG/ML
SOLUTION TOPICAL PRN
OUTPATIENT
Start: 2025-05-08

## 2025-05-08 RX ORDER — CLOBETASOL PROPIONATE 0.5 MG/G
OINTMENT TOPICAL PRN
OUTPATIENT
Start: 2025-05-08

## 2025-05-08 RX ORDER — BETAMETHASONE DIPROPIONATE 0.5 MG/G
CREAM TOPICAL PRN
OUTPATIENT
Start: 2025-05-08

## 2025-05-08 RX ORDER — LIDOCAINE HYDROCHLORIDE 20 MG/ML
JELLY TOPICAL PRN
OUTPATIENT
Start: 2025-05-08

## 2025-05-08 RX ORDER — LIDOCAINE HYDROCHLORIDE 20 MG/ML
JELLY TOPICAL PRN
Status: DISCONTINUED | OUTPATIENT
Start: 2025-05-08 | End: 2025-05-10 | Stop reason: HOSPADM

## 2025-05-08 RX ORDER — SILVER SULFADIAZINE 10 MG/G
CREAM TOPICAL PRN
OUTPATIENT
Start: 2025-05-08

## 2025-05-08 RX ORDER — GINSENG 100 MG
CAPSULE ORAL PRN
OUTPATIENT
Start: 2025-05-08

## 2025-05-08 RX ORDER — GENTAMICIN SULFATE 1 MG/G
OINTMENT TOPICAL PRN
OUTPATIENT
Start: 2025-05-08

## 2025-05-08 RX ADMIN — LIDOCAINE HYDROCHLORIDE: 20 JELLY TOPICAL at 10:57

## 2025-05-08 NOTE — PROGRESS NOTES
[facility]   Progress Note and Procedure Note      Chelsy Snell  MEDICAL RECORD NUMBER:  [id number]  AGE: 85 y.o.   GENDER: female  : 1940  EPISODE DATE:  2025    Subjective:     Chief Complaint   Patient presents with    Wound Check     Patient presents today for recheck right leg wound. Asking for nail trim today.         HISTORY of PRESENT ILLNESS HPI     Chelsy Snell is a 85 y.o. female who presents today for wound/ulcer evaluation.   Wound Context: Pt with RLE wound here fr eval/treat  Wound/Ulcer Pain Timing/Severity: none  Quality of pain: N/A  Severity:  0 / 10   Modifying Factors: None  Associated Signs/Symptoms: none    Ulcer Identification:  Ulcer Type: traumatic  Contributing Factors: shear force    Wound: Laceration        PAST MEDICAL HISTORY        Diagnosis Date    Anxiety     Carotid artery stenosis     Chronic back pain     H/O blood clots     Hx of blood clots     bilat legs    Hyperlipidemia     Hypertension     Osteoarthritis        PAST SURGICAL HISTORY    Past Surgical History:   Procedure Laterality Date    CATARACT REMOVAL Bilateral     CHOLECYSTECTOMY      EYE SURGERY      HYSTERECTOMY (CERVIX STATUS UNKNOWN)      JOINT REPLACEMENT      KNEE ARTHROPLASTY Right     OTHER SURGICAL HISTORY      Decompression of ulnar nerve     REMOVE HARDWARE FEMUR Left 2019    HARDWARE REMOVAL performed by Gavin Lynne MD at [facility] OR    REVISION TOTAL KNEE ARTHROPLASTY Left 2019    LEFT KNEE REVISION performed by Gavin Lynne MD at [facility] OR    SHOULDER ARTHROPLASTY Left        FAMILY HISTORY    Family History   Problem Relation Age of Onset    Dementia Mother     Heart Disease Father     Cancer Brother     COPD Other     Other Other         Blood clot    Diabetes Other        SOCIAL HISTORY    Social History     Tobacco Use    Smoking status: Never    Smokeless tobacco: Never   Vaping Use    Vaping status: Never Used   Substance Use Topics    Alcohol use:

## 2025-05-08 NOTE — PROGRESS NOTES
Unna Boot Application   Below Knee    NAME:  Chelsy Snell  YOB: 1940  MEDICAL RECORD NUMBER:  [id number]  DATE:  5/8/2025    Unna boot: Applied moisturizing agent to dry skin as needed.   Appied primary and secondary dressing as ordered.  Applied Unna roll from toes to knee overlapping each time.   Applied ace wrap or coban from toes to below the knee.   Instructed patient/caregiver to keep dressing dry and intact. DO NOT REMOVE DRESSING.   Instructed pt/family/caregiver to report excessive draining, loose bandage, wet dressing, severe pain or tingling in toes.  Applied Unna Boot dressing below the knee to right lower leg.    Unna Boot(s) were applied per  Guidelines.     Electronically signed by Abbie Castillo RN on 5/8/2025 at 11:27 AM

## 2025-05-08 NOTE — PATIENT INSTRUCTIONS
[facility] Wound [facility] and Hyperbaric Oxygen Therapy   Physician Orders and Discharge Instructions  [address] [place] [address]  [address]  [place] [address]  Telephone: [phone]      FAX [phone]    NAME:  Chelsy Snell  YOB: 1940  MEDICAL RECORD NUMBER:  [id number]  DATE:  5/8/2025    Discharge condition: Stable    Discharge to: Home    Left via:Private automobile    Accompanied by: Self    [facility]  Home Health to Change Calamine Coflex on Monday, Wound Care to Change on Thursday    Dressing Orders: Right leg wound:  Silver Alginate to open areas  Calamine Coflex Unnaboot, Keep clean and Dry  Elevate feet to level or above heart 3-4 times daily and as needed to for 30 minutes to reduce swelling  Remove wrap and resume previous dressing orders,  If wrap gets wet, Causes increased pain, Slides down or you are unable to make your next scheduled appointment  Multi Vitamin, High Protein diet as tolerated    [facility] follow up visit ___________1 week__________________  (Please note your next appointment above and if you are unable to keep, kindly give a 24 hour notice. Thank you.)    If you experience any of the following, please call the Wound Care Center during business hours:    * Increase in Pain  * Temperature over 101  * Increase in drainage from your wound  * Drainage with a foul odor  * Bleeding  * Increase in swelling  * Need for compression bandage changes due to slippage, breakthrough drainage.    If you need medical attention outside of the business hours of the Wound Care Centers please contact your PCP or go to the nearest emergency room.

## 2025-05-12 ENCOUNTER — TELEPHONE (OUTPATIENT)
Dept: WOUND CARE | Age: 85
End: 2025-05-12

## 2025-05-12 NOTE — TELEPHONE ENCOUNTER
[facility] health called stating that Ms Chau left leg was beginning to swell and had some blisters forming, Dr Ashton said to use a 6 inch ace wrap from base of toes to just below the knee daily as tolerated.

## 2025-05-15 ENCOUNTER — HOSPITAL ENCOUNTER (OUTPATIENT)
Dept: WOUND CARE | Age: 85
Discharge: HOME | End: 2025-05-15
Attending: NURSE PRACTITIONER
Payer: MEDICARE

## 2025-05-15 VITALS
DIASTOLIC BLOOD PRESSURE: 49 MMHG | SYSTOLIC BLOOD PRESSURE: 131 MMHG | HEART RATE: 50 BPM | BODY MASS INDEX: 36.52 KG/M2 | HEIGHT: 60 IN | RESPIRATION RATE: 20 BRPM | WEIGHT: 186 LBS | TEMPERATURE: 97.5 F

## 2025-05-15 DIAGNOSIS — L97.912 SKIN ULCER OF RIGHT LOWER LEG, WITH FAT LAYER EXPOSED (HCC): Primary | ICD-10-CM

## 2025-05-15 PROCEDURE — 97597 DBRDMT OPN WND 1ST 20 CM/<: CPT

## 2025-05-15 PROCEDURE — 29580 STRAPPING UNNA BOOT: CPT

## 2025-05-15 PROCEDURE — 97597 DBRDMT OPN WND 1ST 20 CM/<: CPT | Performed by: SURGERY

## 2025-05-15 RX ORDER — LIDOCAINE HYDROCHLORIDE 20 MG/ML
JELLY TOPICAL PRN
Status: DISCONTINUED | OUTPATIENT
Start: 2025-05-15 | End: 2025-05-17 | Stop reason: HOSPADM

## 2025-05-15 RX ORDER — GENTAMICIN SULFATE 1 MG/G
OINTMENT TOPICAL PRN
OUTPATIENT
Start: 2025-05-15

## 2025-05-15 RX ORDER — LIDOCAINE HYDROCHLORIDE 20 MG/ML
JELLY TOPICAL PRN
OUTPATIENT
Start: 2025-05-15

## 2025-05-15 RX ORDER — SILVER SULFADIAZINE 10 MG/G
CREAM TOPICAL PRN
OUTPATIENT
Start: 2025-05-15

## 2025-05-15 RX ORDER — LIDOCAINE HYDROCHLORIDE 40 MG/ML
SOLUTION TOPICAL PRN
OUTPATIENT
Start: 2025-05-15

## 2025-05-15 RX ORDER — SODIUM CHLOR/HYPOCHLOROUS ACID 0.033 %
SOLUTION, IRRIGATION IRRIGATION PRN
OUTPATIENT
Start: 2025-05-15

## 2025-05-15 RX ORDER — BACITRACIN ZINC AND POLYMYXIN B SULFATE 500; 1000 [USP'U]/G; [USP'U]/G
OINTMENT TOPICAL PRN
OUTPATIENT
Start: 2025-05-15

## 2025-05-15 RX ORDER — LIDOCAINE 50 MG/G
OINTMENT TOPICAL PRN
OUTPATIENT
Start: 2025-05-15

## 2025-05-15 RX ORDER — NEOMYCIN/BACITRACIN/POLYMYXINB 3.5-400-5K
OINTMENT (GRAM) TOPICAL PRN
OUTPATIENT
Start: 2025-05-15

## 2025-05-15 RX ORDER — LIDOCAINE 40 MG/G
CREAM TOPICAL PRN
OUTPATIENT
Start: 2025-05-15

## 2025-05-15 RX ORDER — CLOBETASOL PROPIONATE 0.5 MG/G
OINTMENT TOPICAL PRN
OUTPATIENT
Start: 2025-05-15

## 2025-05-15 RX ORDER — GINSENG 100 MG
CAPSULE ORAL PRN
OUTPATIENT
Start: 2025-05-15

## 2025-05-15 RX ORDER — BETAMETHASONE DIPROPIONATE 0.5 MG/G
CREAM TOPICAL PRN
OUTPATIENT
Start: 2025-05-15

## 2025-05-15 RX ORDER — TRIAMCINOLONE ACETONIDE 1 MG/G
OINTMENT TOPICAL PRN
OUTPATIENT
Start: 2025-05-15

## 2025-05-15 RX ORDER — MUPIROCIN 20 MG/G
OINTMENT TOPICAL PRN
OUTPATIENT
Start: 2025-05-15

## 2025-05-15 RX ADMIN — LIDOCAINE HYDROCHLORIDE: 20 JELLY TOPICAL at 10:14

## 2025-05-15 NOTE — PATIENT INSTRUCTIONS
[facility] Wound Care and Hyperbaric Oxygen Therapy   Physician Orders and Discharge Instructions  [address] [place] [address]  [address]  [place] [address]  Telephone: [phone]      FAX [phone]    NAME:  Chelsy Snell  YOB: 1940  MEDICAL RECORD NUMBER:  [id number]  DATE:  5/15/2025    Discharge condition: Stable    Discharge to: Home    Left via:Private automobile    Accompanied by: Self    [facility]    Home Health to Change Calamine Coflex on Monday, Wound Care to Change on Thursday    Dressing Orders: Right and Left Legs  Silver Alginate to open areas  Calamine Coflex Unnaboot, Keep clean and Dry  Elevate feet to level or above heart 3-4 times daily and as needed to for 30 minutes to reduce swelling  Remove wrap and resume previous dressing orders,  If wrap gets wet, Causes increased pain, Slides down or you are unable to make your next scheduled appointment  Multi Vitamin, High Protein diet as tolerated    [facility] follow up visit __________1 week___________________  (Please note your next appointment above and if you are unable to keep, kindly give a 24 hour notice. Thank you.)    If you experience any of the following, please call the Wound Care Center during business hours:    * Increase in Pain  * Temperature over 101  * Increase in drainage from your wound  * Drainage with a foul odor  * Bleeding  * Increase in swelling  * Need for compression bandage changes due to slippage, breakthrough drainage.    If you need medical attention outside of the business hours of the Wound Care Centers please contact your PCP or go to the nearest emergency room.

## 2025-05-15 NOTE — PROGRESS NOTES
Unna Boot Application   Below Knee    NAME:  Chelsy Snell  YOB: 1940  MEDICAL RECORD NUMBER:  [id number]  DATE:  5/15/2025    Unna boot: Applied moisturizing agent to dry skin as needed.   Appied primary and secondary dressing as ordered.  Applied Unna roll from toes to knee overlapping each time.   Applied ace wrap or coban from toes to below the knee.   Instructed patient/caregiver to keep dressing dry and intact. DO NOT REMOVE DRESSING.   Instructed pt/family/caregiver to report excessive draining, loose bandage, wet dressing, severe pain or tingling in toes.  Applied Unna Boot dressing below the knee to right lower leg.  Applied Unna Boot dressing below the knee to left lower leg.    Unna Boot(s) were applied per  Guidelines.     Electronically signed by Abbie Castillo RN on 5/15/2025 at 10:48 AM

## 2025-05-15 NOTE — PROGRESS NOTES
[facility]   Progress Note and Procedure Note      Chelsy Snell  MEDICAL RECORD NUMBER:  [id number]  AGE: 85 y.o.   GENDER: female  : 1940  EPISODE DATE:  5/15/2025    Subjective:     Chief Complaint   Patient presents with    Wound Check     Pt presents for recheck of right leg wound         HISTORY of PRESENT ILLNESS HPI     Chelsy Snell is a 85 y.o. female who presents today for wound/ulcer evaluation.   Wound Context: Pt with RLE wound here for eval/treat  Wound/Ulcer Pain Timing/Severity: none  Quality of pain: N/A  Severity:  0 / 10   Modifying Factors: None  Associated Signs/Symptoms: none    Ulcer Identification:  Ulcer Type: venous  Contributing Factors: edema    Wound:  venous         PAST MEDICAL HISTORY        Diagnosis Date    Anxiety     Carotid artery stenosis     Chronic back pain     H/O blood clots     Hx of blood clots     bilat legs    Hyperlipidemia     Hypertension     Osteoarthritis        PAST SURGICAL HISTORY    Past Surgical History:   Procedure Laterality Date    CATARACT REMOVAL Bilateral     CHOLECYSTECTOMY      EYE SURGERY      HYSTERECTOMY (CERVIX STATUS UNKNOWN)      JOINT REPLACEMENT      KNEE ARTHROPLASTY Right     OTHER SURGICAL HISTORY      Decompression of ulnar nerve     REMOVE HARDWARE FEMUR Left 2019    HARDWARE REMOVAL performed by Gavin Lynne MD at [facility] OR    REVISION TOTAL KNEE ARTHROPLASTY Left 2019    LEFT KNEE REVISION performed by Gavin Lynne MD at [facility] OR    SHOULDER ARTHROPLASTY Left        FAMILY HISTORY    Family History   Problem Relation Age of Onset    Dementia Mother     Heart Disease Father     Cancer Brother     COPD Other     Other Other         Blood clot    Diabetes Other        SOCIAL HISTORY    Social History     Tobacco Use    Smoking status: Never    Smokeless tobacco: Never   Vaping Use    Vaping status: Never Used   Substance Use Topics    Alcohol use: No    Drug use: No

## 2025-05-16 ENCOUNTER — TELEPHONE (OUTPATIENT)
Dept: INTERNAL MEDICINE | Facility: CLINIC | Age: 85
End: 2025-05-16
Payer: MEDICARE

## 2025-05-16 ENCOUNTER — TELEPHONE (OUTPATIENT)
Dept: CARDIOLOGY | Facility: CLINIC | Age: 85
End: 2025-05-16
Payer: MEDICARE

## 2025-05-16 NOTE — TELEPHONE ENCOUNTER
Caller: Carmen Obrien    Relationship: Self    Best call back number: [phone]    What form or medical record are you requesting: NEW AND UPDATE LIST OF MEDICATION DR BEAL HAS PT ON AND DOSAGE/INSTRUCTIONS OF MEDS    Who is requesting this form or medical record from you: PATIENT    How would you like to receive the form or medical records (pick-up, mail, fax): FAX  If fax, what is the fax number: [phone]- NURSING FACILITY PT IS AT       Timeframe paperwork needed: ASAP    Additional notes: PT STATES WHEN SHE WAS IN TO SEE DR BEAL BACK IN MARCH SHE RECEIVED A LIST OF THE MEDICATIONS HE CHANGED AND ALSO SOME HE ADDED. SHE LOST THAT LIST AND IS ASKING WE FAX HER A NEW ONE.

## 2025-05-16 NOTE — TELEPHONE ENCOUNTER
Please have her continue to keep a close eye on her blood pressure at home.  She should only take the Lasix if she notices leg swelling, worsening shortness of breath or weight gain of 5 pounds in 1 week or 3 pounds in 1 day.  She should hold off on taking any blood pressure medication today, and if her blood pressure is greater than 130 on the top number she could take the medication.  It should be noted that the diastolic blood pressure typically does go down as we age, but she should hold the medication if her diastolic is in the 50s.

## 2025-05-16 NOTE — TELEPHONE ENCOUNTER
"PATIENT HAS BEEN CALLED, SHE STATED THAT SHE FEELS BETTER THIS AFTERNOON.  SHE FELT WEAK EARLIER THIS MORNING BUT IS BETTER.  SHE STATED THAT SHE HAS NOT HAD HER 2 \"WATER PILLS\" OR HER LISINOPRIL.  SHE STATED THAT SHE FEELS BETTER SINCE HASN'T TAKEN THOSE 3 MEDICATIONS.  SHE IS ASKING IF SHE SHOULD RESTART THEM.   "

## 2025-05-16 NOTE — TELEPHONE ENCOUNTER
PATIENT HAS BEEN CALLED, SHE STATED THAT WOUND CARE HAD TOLD HER TO CALL HER PCP REGARDING HER DIASTOLIC NUMBER.  SHE IS ASKING WHAT SHE SHOULD DO.   PATIENT HAS TAKEN ALL OF HER MEDICATION AS PRESCRIBED.      157/67  05/01/2025  AT DR CHONG OFFICE.   153/56   05/08/2025 AT DR CHONG OFFICE  131/49  05/15/2025 AT WOUND CARE.  118/49  05/16/2025 AT WOUND CARE

## 2025-05-16 NOTE — TELEPHONE ENCOUNTER
Caller: Carmen Obrien    Relationship: Self    Best call back number: [phone]     [facility]# [phone]    What is the best time to reach you: ANYTIME    Who are you requesting to speak with (clinical staff, provider,  specific staff member): CLINICAL OR PROVIDER    What was the call regarding: LOW BLOOD PRESSURE    PATIENT DOESN'T HAVE A RIDE & WANTS A CALL BACK TO TALK ABOUT HER LOW BLOOD PRESSURE MEDICATION & OTHER TOPICS

## 2025-05-21 ENCOUNTER — HOSPITAL ENCOUNTER (OUTPATIENT)
Dept: BONE DENSITY | Facility: HOSPITAL | Age: 85
Discharge: HOME | End: 2025-05-21
Admitting: INTERNAL MEDICINE
Payer: MEDICARE

## 2025-05-21 DIAGNOSIS — M81.0 AGE-RELATED OSTEOPOROSIS WITHOUT CURRENT PATHOLOGICAL FRACTURE: ICD-10-CM

## 2025-05-21 DIAGNOSIS — Z78.0 POSTMENOPAUSE: ICD-10-CM

## 2025-05-21 PROCEDURE — 77080 DXA BONE DENSITY AXIAL: CPT

## 2025-05-22 ENCOUNTER — HOSPITAL ENCOUNTER (OUTPATIENT)
Dept: WOUND CARE | Age: 85
Discharge: HOME | End: 2025-05-22
Attending: NURSE PRACTITIONER
Payer: MEDICARE

## 2025-05-22 VITALS
TEMPERATURE: 97.7 F | HEART RATE: 57 BPM | RESPIRATION RATE: 18 BRPM | SYSTOLIC BLOOD PRESSURE: 177 MMHG | DIASTOLIC BLOOD PRESSURE: 62 MMHG

## 2025-05-22 DIAGNOSIS — L97.912 SKIN ULCER OF RIGHT LOWER LEG, WITH FAT LAYER EXPOSED (HCC): Primary | ICD-10-CM

## 2025-05-22 PROCEDURE — 97597 DBRDMT OPN WND 1ST 20 CM/<: CPT

## 2025-05-22 PROCEDURE — 97597 DBRDMT OPN WND 1ST 20 CM/<: CPT | Performed by: SURGERY

## 2025-05-22 RX ORDER — LIDOCAINE 40 MG/G
CREAM TOPICAL PRN
OUTPATIENT
Start: 2025-05-22

## 2025-05-22 RX ORDER — MUPIROCIN 20 MG/G
OINTMENT TOPICAL PRN
OUTPATIENT
Start: 2025-05-22

## 2025-05-22 RX ORDER — BACITRACIN ZINC AND POLYMYXIN B SULFATE 500; 1000 [USP'U]/G; [USP'U]/G
OINTMENT TOPICAL PRN
OUTPATIENT
Start: 2025-05-22

## 2025-05-22 RX ORDER — CLOBETASOL PROPIONATE 0.5 MG/G
OINTMENT TOPICAL PRN
OUTPATIENT
Start: 2025-05-22

## 2025-05-22 RX ORDER — SILVER SULFADIAZINE 10 MG/G
CREAM TOPICAL PRN
OUTPATIENT
Start: 2025-05-22

## 2025-05-22 RX ORDER — SODIUM CHLOR/HYPOCHLOROUS ACID 0.033 %
SOLUTION, IRRIGATION IRRIGATION PRN
OUTPATIENT
Start: 2025-05-22

## 2025-05-22 RX ORDER — BETAMETHASONE DIPROPIONATE 0.5 MG/G
CREAM TOPICAL PRN
OUTPATIENT
Start: 2025-05-22

## 2025-05-22 RX ORDER — TRIAMCINOLONE ACETONIDE 1 MG/G
OINTMENT TOPICAL PRN
OUTPATIENT
Start: 2025-05-22

## 2025-05-22 RX ORDER — LIDOCAINE 50 MG/G
OINTMENT TOPICAL PRN
OUTPATIENT
Start: 2025-05-22

## 2025-05-22 RX ORDER — LIDOCAINE HYDROCHLORIDE 40 MG/ML
SOLUTION TOPICAL PRN
OUTPATIENT
Start: 2025-05-22

## 2025-05-22 RX ORDER — NEOMYCIN/BACITRACIN/POLYMYXINB 3.5-400-5K
OINTMENT (GRAM) TOPICAL PRN
OUTPATIENT
Start: 2025-05-22

## 2025-05-22 RX ORDER — LIDOCAINE HYDROCHLORIDE 20 MG/ML
JELLY TOPICAL PRN
OUTPATIENT
Start: 2025-05-22

## 2025-05-22 RX ORDER — LIDOCAINE HYDROCHLORIDE 20 MG/ML
JELLY TOPICAL PRN
Status: DISCONTINUED | OUTPATIENT
Start: 2025-05-22 | End: 2025-05-24 | Stop reason: HOSPADM

## 2025-05-22 RX ORDER — GENTAMICIN SULFATE 1 MG/G
OINTMENT TOPICAL PRN
OUTPATIENT
Start: 2025-05-22

## 2025-05-22 RX ORDER — GINSENG 100 MG
CAPSULE ORAL PRN
OUTPATIENT
Start: 2025-05-22

## 2025-05-22 RX ADMIN — LIDOCAINE HYDROCHLORIDE: 20 JELLY TOPICAL at 10:32

## 2025-05-22 NOTE — WOUND CARE
Unna Boot Application   Below Knee    NAME:  Chelsy Snell  YOB: 1940  MEDICAL RECORD NUMBER:  [id number]  DATE:  5/22/2025    Unna boot: Applied moisturizing agent to dry skin as needed.   Appied primary and secondary dressing as ordered.  Applied Unna roll from toes to knee overlapping each time.   Applied ace wrap or coban from toes to below the knee.   Instructed patient/caregiver to keep dressing dry and intact. DO NOT REMOVE DRESSING.   Instructed pt/family/caregiver to report excessive draining, loose bandage, wet dressing, severe pain or tingling in toes.  Applied Unna Boot dressing below the knee to right lower leg.    Unna Boot(s) were applied per  Guidelines.     Electronically signed by Janneth Wylie RN on 5/22/2025 at 11:01 AM

## 2025-05-22 NOTE — PATIENT INSTRUCTIONS
[facility] Wound Care and Hyperbaric Oxygen Therapy   Physician Orders and Discharge Instructions  [address] [place] [address] Drive  [address]  [place] [address]  Telephone: [phone]      FAX [phone]    NAME:  Chelsy Snell  YOB: 1940  MEDICAL RECORD NUMBER:  [id number]  DATE:  5/22/2025    Discharge condition: Stable    Discharge to: Home    Left via:Private automobile    Accompanied by: Self    [facility]    Home Health to Change Calamine Coflex on Monday and Thursday unless patient has a wound care appointment on that day    Dressing Orders: Left Leg-6 inch ace wrap daily as tolerated for swelling     Dressing Orders: Right Leg  Silver Alginate to open areas  Calamine Coflex Unnaboot, Keep clean and Dry  Elevate feet to level or above heart 3-4 times daily and as needed to for 30 minutes to reduce swelling  Remove wrap and resume previous dressing orders,  If wrap gets wet, Causes increased pain, Slides down or you are unable to make your next scheduled appointment  Multi Vitamin, High Protein diet as tolerated  Ok to remove wrap the day of your wound care appointment for a shower     [facility] follow up visit ___________1 week__________________  (Please note your next appointment above and if you are unable to keep, kindly give a 24 hour notice. Thank you.)    If you experience any of the following, please call the Wound Care Center during business hours:    * Increase in Pain  * Temperature over 101  * Increase in drainage from your wound  * Drainage with a foul odor  * Bleeding  * Increase in swelling  * Need for compression bandage changes due to slippage, breakthrough drainage.    If you need medical attention outside of the business hours of the Wound Care Centers please contact your PCP or go to the nearest emergency room.

## 2025-05-22 NOTE — PROGRESS NOTES
[facility] Wound Care Center   Progress Note and Procedure Note      Chelsy Snell  MEDICAL RECORD NUMBER:  [id number]  AGE: 85 y.o.   GENDER: female  : 1940  EPISODE DATE:  2025    Subjective:     Chief Complaint   Patient presents with    Wound Check     Right leg wound check         HISTORY of PRESENT ILLNESS HPI     Chelsy Snell is a 85 y.o. female who presents today for wound/ulcer evaluation.   Wound Context: Pt with traumatic LLE wound here for eval/treat  Wound/Ulcer Pain Timing/Severity: none  Quality of pain: N/A  Severity:  0 / 10   Modifying Factors: None  Associated Signs/Symptoms: none    Ulcer Identification:  Ulcer Type: traumatic  Contributing Factors: none    Wound: Laceration        PAST MEDICAL HISTORY        Diagnosis Date    Anxiety     Carotid artery stenosis     Chronic back pain     H/O blood clots     Hx of blood clots     bilat legs    Hyperlipidemia     Hypertension     Osteoarthritis        PAST SURGICAL HISTORY    Past Surgical History:   Procedure Laterality Date    CATARACT REMOVAL Bilateral     CHOLECYSTECTOMY      EYE SURGERY      HYSTERECTOMY (CERVIX STATUS UNKNOWN)      JOINT REPLACEMENT      KNEE ARTHROPLASTY Right     OTHER SURGICAL HISTORY      Decompression of ulnar nerve     REMOVE HARDWARE FEMUR Left 2019    HARDWARE REMOVAL performed by Gavin Lynne MD at [facility] OR    REVISION TOTAL KNEE ARTHROPLASTY Left 2019    LEFT KNEE REVISION performed by Gavin Lynne MD at [facility] OR    SHOULDER ARTHROPLASTY Left        FAMILY HISTORY    Family History   Problem Relation Age of Onset    Dementia Mother     Heart Disease Father     Cancer Brother     COPD Other     Other Other         Blood clot    Diabetes Other        SOCIAL HISTORY    Social History     Tobacco Use    Smoking status: Never    Smokeless tobacco: Never   Vaping Use    Vaping status: Never Used   Substance Use Topics    Alcohol use: No    Drug use: No       ALLERGIES    Allergies

## 2025-05-30 ENCOUNTER — TELEPHONE (OUTPATIENT)
Dept: INTERNAL MEDICINE | Facility: CLINIC | Age: 85
End: 2025-05-30
Payer: MEDICARE

## 2025-05-30 NOTE — TELEPHONE ENCOUNTER
VERA Noble MD  5/22/2025  7:45 AM CDT       Can you call the patient and notify her of DEXA results? She has thinning of her bone called osteoporosis and there is increased fracture risk. She needs to ensure daily dietary intake of calcium of 1000mg and 400-800 IU of Vitamin D per day.  I would also recommend we start medication such as Fosamax to help with stabilize and potentially rebuild her bone to reduce the chances of a fracture.  Please let me know if she is interested in starting this medication.  We will repeat the DEXA scan in 2 years or sooner if necessary. Thanks         PATIENT HAS CALLED,  GIVEN MESSAGE AND UNDERSTANDING VOICED.  HAS REQUESTED THAT THIS REPORT BE MAILED.

## 2025-06-03 ENCOUNTER — LAB (OUTPATIENT)
Dept: LAB | Facility: HOSPITAL | Age: 85
End: 2025-06-03
Payer: MEDICARE

## 2025-06-03 DIAGNOSIS — E87.5 HYPERKALEMIA: ICD-10-CM

## 2025-06-03 LAB
ANION GAP SERPL CALCULATED.3IONS-SCNC: 12 MMOL/L (ref 5–15)
BUN SERPL-MCNC: 25.2 MG/DL (ref 8–23)
BUN/CREAT SERPL: 29.6 (ref 7–25)
CALCIUM SPEC-SCNC: 9.1 MG/DL (ref 8.6–10.5)
CHLORIDE SERPL-SCNC: 99 MMOL/L (ref 98–107)
CO2 SERPL-SCNC: 18 MMOL/L (ref 22–29)
CREAT SERPL-MCNC: 0.85 MG/DL (ref 0.57–1)
EGFRCR SERPLBLD CKD-EPI 2021: 68.1 ML/MIN/1.73
GLUCOSE SERPL-MCNC: 93 MG/DL (ref 65–99)
POTASSIUM SERPL-SCNC: 4.9 MMOL/L (ref 3.5–5.2)
SODIUM SERPL-SCNC: 129 MMOL/L (ref 136–145)

## 2025-06-03 PROCEDURE — 36415 COLL VENOUS BLD VENIPUNCTURE: CPT

## 2025-06-03 PROCEDURE — 80048 BASIC METABOLIC PNL TOTAL CA: CPT

## 2025-06-05 ENCOUNTER — TELEPHONE (OUTPATIENT)
Dept: INTERNAL MEDICINE | Facility: CLINIC | Age: 85
End: 2025-06-05
Payer: MEDICARE

## 2025-06-05 ENCOUNTER — HOSPITAL ENCOUNTER (OUTPATIENT)
Dept: WOUND CARE | Age: 85
Discharge: HOME | End: 2025-06-05
Attending: NURSE PRACTITIONER
Payer: MEDICARE

## 2025-06-05 VITALS
SYSTOLIC BLOOD PRESSURE: 106 MMHG | HEART RATE: 50 BPM | TEMPERATURE: 96.9 F | DIASTOLIC BLOOD PRESSURE: 42 MMHG | RESPIRATION RATE: 16 BRPM

## 2025-06-05 DIAGNOSIS — L97.912 SKIN ULCER OF RIGHT LOWER LEG, WITH FAT LAYER EXPOSED (HCC): Primary | ICD-10-CM

## 2025-06-05 PROCEDURE — 97597 DBRDMT OPN WND 1ST 20 CM/<: CPT | Performed by: SURGERY

## 2025-06-05 PROCEDURE — 97597 DBRDMT OPN WND 1ST 20 CM/<: CPT

## 2025-06-05 RX ORDER — LIDOCAINE HYDROCHLORIDE 20 MG/ML
JELLY TOPICAL PRN
OUTPATIENT
Start: 2025-06-05

## 2025-06-05 RX ORDER — NEOMYCIN/BACITRACIN/POLYMYXINB 3.5-400-5K
OINTMENT (GRAM) TOPICAL PRN
OUTPATIENT
Start: 2025-06-05

## 2025-06-05 RX ORDER — LIDOCAINE HYDROCHLORIDE 20 MG/ML
JELLY TOPICAL PRN
Status: DISCONTINUED | OUTPATIENT
Start: 2025-06-05 | End: 2025-06-07 | Stop reason: HOSPADM

## 2025-06-05 RX ORDER — GINSENG 100 MG
CAPSULE ORAL PRN
OUTPATIENT
Start: 2025-06-05

## 2025-06-05 RX ORDER — BETAMETHASONE DIPROPIONATE 0.5 MG/G
CREAM TOPICAL PRN
OUTPATIENT
Start: 2025-06-05

## 2025-06-05 RX ORDER — BACITRACIN ZINC AND POLYMYXIN B SULFATE 500; 1000 [USP'U]/G; [USP'U]/G
OINTMENT TOPICAL PRN
OUTPATIENT
Start: 2025-06-05

## 2025-06-05 RX ORDER — LIDOCAINE 50 MG/G
OINTMENT TOPICAL PRN
OUTPATIENT
Start: 2025-06-05

## 2025-06-05 RX ORDER — LIDOCAINE 40 MG/G
CREAM TOPICAL PRN
OUTPATIENT
Start: 2025-06-05

## 2025-06-05 RX ORDER — MUPIROCIN 20 MG/G
OINTMENT TOPICAL PRN
OUTPATIENT
Start: 2025-06-05

## 2025-06-05 RX ORDER — SODIUM CHLOR/HYPOCHLOROUS ACID 0.033 %
SOLUTION, IRRIGATION IRRIGATION PRN
OUTPATIENT
Start: 2025-06-05

## 2025-06-05 RX ORDER — TRIAMCINOLONE ACETONIDE 1 MG/G
OINTMENT TOPICAL PRN
OUTPATIENT
Start: 2025-06-05

## 2025-06-05 RX ORDER — LIDOCAINE HYDROCHLORIDE 40 MG/ML
SOLUTION TOPICAL PRN
OUTPATIENT
Start: 2025-06-05

## 2025-06-05 RX ORDER — SILVER SULFADIAZINE 10 MG/G
CREAM TOPICAL PRN
OUTPATIENT
Start: 2025-06-05

## 2025-06-05 RX ORDER — CLOBETASOL PROPIONATE 0.5 MG/G
OINTMENT TOPICAL PRN
OUTPATIENT
Start: 2025-06-05

## 2025-06-05 RX ORDER — GENTAMICIN SULFATE 1 MG/G
OINTMENT TOPICAL PRN
OUTPATIENT
Start: 2025-06-05

## 2025-06-05 RX ADMIN — LIDOCAINE HYDROCHLORIDE: 20 JELLY TOPICAL at 10:24

## 2025-06-05 NOTE — PATIENT INSTRUCTIONS
[facility] Wound Care and Hyperbaric Oxygen Therapy   Physician Orders and Discharge Instructions  [address] [place] [address]  [address]  [place] [address]  Telephone: [phone]      FAX [phone]    NAME:  Chelsy Snell  YOB: 1940  MEDICAL RECORD NUMBER:  [id number]  DATE:  6/5/2025    Discharge condition: Stable    Discharge to: Home    Left via:Private automobile    Accompanied by: Self    [facility] - Patient will need to Follow up with Primary Care Doctor re:continuing to feel tired    Home Health to Change Calamine Coflex on Monday and Thursday unless patient has a wound care appointment on that day          Dressing Orders: Left Leg-6 inch ace wrap daily as tolerated for swelling     Dressing Orders: Right Leg  Silver Alginate to open areas  Calamine Coflex Unnaboot, Keep clean and Dry  Elevate feet to level or above heart 3-4 times daily and as needed to for 30 minutes to reduce swelling  Remove wrap and resume previous dressing orders,  If wrap gets wet, Causes increased pain, Slides down or you are unable to make your next scheduled appointment  Multi Vitamin, High Protein diet as tolerated  Ok to remove wrap the day of your wound care appointment for a shower     [facility] follow up visit ___________1 week__________________  (Please note your next appointment above and if you are unable to keep, kindly give a 24 hour notice. Thank you.)    If you experience any of the following, please call the Wound Care Center during business hours:    * Increase in Pain  * Temperature over 101  * Increase in drainage from your wound  * Drainage with a foul odor  * Bleeding  * Increase in swelling  * Need for compression bandage changes due to slippage, breakthrough drainage.    If you need medical attention outside of the business hours of the Wound Care Centers please contact your PCP or go to the nearest emergency room.

## 2025-06-05 NOTE — PROGRESS NOTES
[facility] Wound Care Center   Progress Note and Procedure Note      Chelsy Snell  MEDICAL RECORD NUMBER:  [id number]  AGE: 85 y.o.   GENDER: female  : 1940  EPISODE DATE:  2025    Subjective:     Chief Complaint   Patient presents with    Wound Check     Right leg wound check         HISTORY of PRESENT ILLNESS HPI     Chelsy Snell is a 85 y.o. female who presents today for wound/ulcer evaluation.   Wound Context: Pt with RLE wound here for eval/treat  Wound/Ulcer Pain Timing/Severity: none  Quality of pain: N/A  Severity:  0 / 10   Modifying Factors: None  Associated Signs/Symptoms: edema    Ulcer Identification:  Ulcer Type: venous  Contributing Factors: edema and venous stasis    Wound:  traumatic/venous        PAST MEDICAL HISTORY        Diagnosis Date    Anxiety     Carotid artery stenosis     Chronic back pain     H/O blood clots     Hx of blood clots     bilat legs    Hyperlipidemia     Hypertension     Osteoarthritis        PAST SURGICAL HISTORY    Past Surgical History:   Procedure Laterality Date    CATARACT REMOVAL Bilateral     CHOLECYSTECTOMY      EYE SURGERY      HYSTERECTOMY (CERVIX STATUS UNKNOWN)      JOINT REPLACEMENT      KNEE ARTHROPLASTY Right     OTHER SURGICAL HISTORY      Decompression of ulnar nerve     REMOVE HARDWARE FEMUR Left 2019    HARDWARE REMOVAL performed by Gavin Lynne MD at [facility] OR    REVISION TOTAL KNEE ARTHROPLASTY Left 2019    LEFT KNEE REVISION performed by Gavin Lynne MD at [facility] OR    SHOULDER ARTHROPLASTY Left        FAMILY HISTORY    Family History   Problem Relation Age of Onset    Dementia Mother     Heart Disease Father     Cancer Brother     COPD Other     Other Other         Blood clot    Diabetes Other        SOCIAL HISTORY    Social History     Tobacco Use    Smoking status: Never    Smokeless tobacco: Never   Vaping Use    Vaping status: Never Used   Substance Use Topics    Alcohol use: No    Drug use: No

## 2025-06-05 NOTE — PROGRESS NOTES
Unna Boot Application   Below Knee    NAME:  Chelsy Snell  YOB: 1940  MEDICAL RECORD NUMBER:  [id number]  DATE:  6/5/2025    Unna boot: Applied moisturizing agent to dry skin as needed.   Appied primary and secondary dressing as ordered.  Applied Unna roll from toes to knee overlapping each time.   Applied ace wrap or coban from toes to below the knee.   Instructed patient/caregiver to keep dressing dry and intact. DO NOT REMOVE DRESSING.   Instructed pt/family/caregiver to report excessive draining, loose bandage, wet dressing, severe pain or tingling in toes.  Applied Unna Boot dressing below the knee to right lower leg.    Unna Boot(s) were applied per  Guidelines.     Electronically signed by Abbie Castillo RN on 6/5/2025 at 11:00 AM

## 2025-06-05 NOTE — TELEPHONE ENCOUNTER
Caller: Carmen Obrien    Relationship: Self    Best call back number:     What is the best time to reach you: SOON PLEASE     Who are you requesting to speak with (clinical staff, provider,  specific staff member): PROVIDER OR CLINICAL STAFF       What was the call regarding: PATIENT REQUESTING A CALL BACK TO DISCUSS SOME QUESTIONS SHE HAS ABOUT SEVERAL OF HER MEDICATIONS AND SOME CHANGES THAT HAVE BEEN MADE TO THEM AND IS UNSURE HOW HER PCP PREFERS HER TO BE TAKING THEM NOW    Is it okay if the provider responds through MyChart: PREFERS A CALL BACK

## 2025-06-09 ENCOUNTER — TELEPHONE (OUTPATIENT)
Dept: INTERNAL MEDICINE | Facility: CLINIC | Age: 85
End: 2025-06-09
Payer: MEDICARE

## 2025-06-09 NOTE — TELEPHONE ENCOUNTER
Pt called and states she has told us 3 times not that she wants to start the fosomax. Can we send that script in for her.

## 2025-06-09 NOTE — TELEPHONE ENCOUNTER
I tried to go over the other note with her but she was having a hard time hearing me and her phone was going in and out

## 2025-06-11 ENCOUNTER — TELEPHONE (OUTPATIENT)
Dept: INTERNAL MEDICINE | Facility: CLINIC | Age: 85
End: 2025-06-11
Payer: MEDICARE

## 2025-06-11 NOTE — TELEPHONE ENCOUNTER
Pt came in today because she thought her appt was today but it was yesterday. She brought in several pages of blood pressure readings for provider to review. She wants to know if he can go ahead and start her on the fosamax without her having to come in as she has had so many different doctors appts and she is tired. Please call pt and let her know

## 2025-06-12 ENCOUNTER — HOSPITAL ENCOUNTER (OUTPATIENT)
Dept: WOUND CARE | Age: 85
Discharge: HOME | End: 2025-06-12
Attending: NURSE PRACTITIONER
Payer: MEDICARE

## 2025-06-12 VITALS
RESPIRATION RATE: 16 BRPM | SYSTOLIC BLOOD PRESSURE: 175 MMHG | HEART RATE: 59 BPM | TEMPERATURE: 97.8 F | DIASTOLIC BLOOD PRESSURE: 66 MMHG

## 2025-06-12 DIAGNOSIS — L97.912 SKIN ULCER OF RIGHT LOWER LEG, WITH FAT LAYER EXPOSED (HCC): Primary | ICD-10-CM

## 2025-06-12 PROCEDURE — 97597 DBRDMT OPN WND 1ST 20 CM/<: CPT

## 2025-06-12 PROCEDURE — 97597 DBRDMT OPN WND 1ST 20 CM/<: CPT | Performed by: SURGERY

## 2025-06-12 RX ORDER — CLOBETASOL PROPIONATE 0.5 MG/G
OINTMENT TOPICAL PRN
OUTPATIENT
Start: 2025-06-12

## 2025-06-12 RX ORDER — BETAMETHASONE DIPROPIONATE 0.5 MG/G
CREAM TOPICAL PRN
OUTPATIENT
Start: 2025-06-12

## 2025-06-12 RX ORDER — NEOMYCIN/BACITRACIN/POLYMYXINB 3.5-400-5K
OINTMENT (GRAM) TOPICAL PRN
OUTPATIENT
Start: 2025-06-12

## 2025-06-12 RX ORDER — LIDOCAINE 50 MG/G
OINTMENT TOPICAL PRN
OUTPATIENT
Start: 2025-06-12

## 2025-06-12 RX ORDER — GENTAMICIN SULFATE 1 MG/G
OINTMENT TOPICAL PRN
OUTPATIENT
Start: 2025-06-12

## 2025-06-12 RX ORDER — LIDOCAINE HYDROCHLORIDE 20 MG/ML
JELLY TOPICAL PRN
OUTPATIENT
Start: 2025-06-12

## 2025-06-12 RX ORDER — BACITRACIN ZINC AND POLYMYXIN B SULFATE 500; 1000 [USP'U]/G; [USP'U]/G
OINTMENT TOPICAL PRN
OUTPATIENT
Start: 2025-06-12

## 2025-06-12 RX ORDER — SILVER SULFADIAZINE 10 MG/G
CREAM TOPICAL PRN
OUTPATIENT
Start: 2025-06-12

## 2025-06-12 RX ORDER — LIDOCAINE HYDROCHLORIDE 40 MG/ML
SOLUTION TOPICAL PRN
OUTPATIENT
Start: 2025-06-12

## 2025-06-12 RX ORDER — TRIAMCINOLONE ACETONIDE 1 MG/G
OINTMENT TOPICAL PRN
OUTPATIENT
Start: 2025-06-12

## 2025-06-12 RX ORDER — MUPIROCIN 2 %
OINTMENT (GRAM) TOPICAL PRN
OUTPATIENT
Start: 2025-06-12

## 2025-06-12 RX ORDER — GINSENG 100 MG
CAPSULE ORAL PRN
OUTPATIENT
Start: 2025-06-12

## 2025-06-12 RX ORDER — LIDOCAINE 40 MG/G
CREAM TOPICAL PRN
OUTPATIENT
Start: 2025-06-12

## 2025-06-12 RX ORDER — SODIUM CHLOR/HYPOCHLOROUS ACID 0.033 %
SOLUTION, IRRIGATION IRRIGATION PRN
OUTPATIENT
Start: 2025-06-12

## 2025-06-12 RX ORDER — LIDOCAINE HYDROCHLORIDE 20 MG/ML
JELLY TOPICAL PRN
Status: DISCONTINUED | OUTPATIENT
Start: 2025-06-12 | End: 2025-06-14 | Stop reason: HOSPADM

## 2025-06-12 RX ADMIN — LIDOCAINE HYDROCHLORIDE: 20 JELLY TOPICAL at 11:31

## 2025-06-12 NOTE — PATIENT INSTRUCTIONS
[facility] Wound Care and Hyperbaric Oxygen Therapy   Physician Orders and Discharge Instructions  [address] [place] [address]  [address]  [place] [address]  Telephone: [phone]      FAX [phone]    NAME:  Chelsy Snell  YOB: 1940  MEDICAL RECORD NUMBER:  [id number]  DATE:  6/12/2025    Discharge condition: Stable    Discharge to: Home    Left via:Private automobile    Accompanied by: Family    [facility]   Home Health to Change Accu Wrap on Monday and Thursday unless patient has a wound care  appointment on that day    Dressing Orders: Left Leg-6 inch ace wrap daily as tolerated for swelling    Dressing Orders: Right Leg  Silver Alginate to open areas  Accu wrap, Keep clean and Dry  Elevate feet to level or above heart 3-4 times daily and as needed to for 30 minutes to reduce swelling  Remove wrap and resume previous dressing orders,  If wrap gets wet, Causes increased pain, Slides down or you are unable to make your next scheduled appointment  Multi Vitamin, High Protein diet as tolerated  Ok to remove wrap the day of your wound care appointment for a shower    [facility] follow up visit __________1 week___________________  (Please note your next appointment above and if you are unable to keep, kindly give a 24 hour notice. Thank you.)    If you experience any of the following, please call the Wound Care Center during business hours:    * Increase in Pain  * Temperature over 101  * Increase in drainage from your wound  * Drainage with a foul odor  * Bleeding  * Increase in swelling  * Need for compression bandage changes due to slippage, breakthrough drainage.    If you need medical attention outside of the business hours of the Wound Care Centers please contact your PCP or go to the nearest emergency room.

## 2025-06-12 NOTE — PROGRESS NOTES
(cm) 2 cm 06/12/25 1148   Post-Procedure Width (cm) 1.8 cm 06/12/25 1148   Post-Procedure Depth (cm) 0.1 cm 06/12/25 1148   Post-Procedure Surface Area (cm^2) 3.6 cm^2 06/12/25 1148   Post-Procedure Volume (cm^3) 0.36 cm^3 06/12/25 1148   Distance Tunneling (cm) 0 cm 05/15/25 1016   Tunneling Position ___ O'Clock 0 05/15/25 1016   Undermining Starts ___ O'Clock 0 05/15/25 1016   Undermining Ends___ O'Clock 0 05/15/25 1016   Undermining Maxium Distance (cm) 0 05/15/25 1016   Wound Assessment Pink/red;Slough 06/12/25 1133   Drainage Amount Moderate (25-50%) 06/12/25 1133   Drainage Description Serosanguinous 06/12/25 1133   Odor None 06/12/25 1133   Lakshmi-wound Assessment Hemosiderin staining (brown yellow) 06/12/25 1133   Margins Attached edges 06/12/25 1133   Wound Thickness Description not for Pressure Injury Full thickness 06/12/25 1133   Number of days: 71             Estimated Blood Loss:  Minimal    Hemostasis Achieved:  by pressure    Procedural Pain:  0  / 10     Post Procedural Pain:  0 / 10     Response to treatment:  Well tolerated by patient.         Plan:     Problem List Items Addressed This Visit       * (Principal) Skin ulcer of right lower leg, with fat layer exposed (HCC) - Primary (Chronic)    Relevant Medications    lidocaine (XYLOCAINE) 2 % uro-jet    Other Relevant Orders    Initiate Outpatient Wound Care Protocol    Neg. Pressure Wound Therapy       Will try accu wrap for sl more compression.  Wound looks good RTO 1 week    Treatment Note please see attached Discharge Instructions    In my professional opinion this patient would benefit from HBO Therapy: No    Written patient dismissal instructions given to patient and signed by patient or POA.         Left via:Private automobile  Accompanied by: Family  [facility] to Change Accu Wrap on Monday and Thursday unless patient has a wound care  appointment on that day  Dressing Orders: Left Leg-6 inch ace wrap daily as

## 2025-06-12 NOTE — TELEPHONE ENCOUNTER
On June 5, 2025 she called our office with questions regarding many of her medications and some changes that have been made.  Due to the nature of the questions that she was recommended for an office visit.  If those questions remain I would recommend an office visit.  However, if she just wants to be placed on Fosamax I can place this order without an office visit.

## 2025-06-13 ENCOUNTER — TELEPHONE (OUTPATIENT)
Dept: INTERNAL MEDICINE | Facility: CLINIC | Age: 85
End: 2025-06-13
Payer: MEDICARE

## 2025-06-13 DIAGNOSIS — M81.0 AGE-RELATED OSTEOPOROSIS WITHOUT CURRENT PATHOLOGICAL FRACTURE: Primary | ICD-10-CM

## 2025-06-13 RX ORDER — ALENDRONATE SODIUM 70 MG/1
70 TABLET ORAL
Qty: 4 TABLET | Refills: 5 | Status: SHIPPED | OUTPATIENT
Start: 2025-06-13

## 2025-06-13 NOTE — TELEPHONE ENCOUNTER
Pt came in the next day thinking it was her appointment time. Rescheduled appt and reminded of no show policy

## 2025-06-19 ENCOUNTER — HOSPITAL ENCOUNTER (OUTPATIENT)
Dept: WOUND CARE | Age: 85
Discharge: HOME | End: 2025-06-19
Attending: NURSE PRACTITIONER
Payer: MEDICARE

## 2025-06-19 VITALS
SYSTOLIC BLOOD PRESSURE: 170 MMHG | HEART RATE: 65 BPM | WEIGHT: 186 LBS | DIASTOLIC BLOOD PRESSURE: 65 MMHG | RESPIRATION RATE: 18 BRPM | HEIGHT: 60 IN | BODY MASS INDEX: 36.52 KG/M2 | TEMPERATURE: 97 F

## 2025-06-19 DIAGNOSIS — L97.912 SKIN ULCER OF RIGHT LOWER LEG, WITH FAT LAYER EXPOSED (HCC): Primary | ICD-10-CM

## 2025-06-19 PROCEDURE — 97597 DBRDMT OPN WND 1ST 20 CM/<: CPT

## 2025-06-19 PROCEDURE — 97597 DBRDMT OPN WND 1ST 20 CM/<: CPT | Performed by: SURGERY

## 2025-06-19 RX ORDER — TRIAMCINOLONE ACETONIDE 1 MG/G
OINTMENT TOPICAL PRN
OUTPATIENT
Start: 2025-06-19

## 2025-06-19 RX ORDER — GENTAMICIN SULFATE 1 MG/G
OINTMENT TOPICAL PRN
OUTPATIENT
Start: 2025-06-19

## 2025-06-19 RX ORDER — LIDOCAINE HYDROCHLORIDE 20 MG/ML
JELLY TOPICAL PRN
Status: DISCONTINUED | OUTPATIENT
Start: 2025-06-19 | End: 2025-06-21 | Stop reason: HOSPADM

## 2025-06-19 RX ORDER — BETAMETHASONE DIPROPIONATE 0.5 MG/G
CREAM TOPICAL PRN
OUTPATIENT
Start: 2025-06-19

## 2025-06-19 RX ORDER — BACITRACIN ZINC AND POLYMYXIN B SULFATE 500; 1000 [USP'U]/G; [USP'U]/G
OINTMENT TOPICAL PRN
OUTPATIENT
Start: 2025-06-19

## 2025-06-19 RX ORDER — LIDOCAINE 40 MG/G
CREAM TOPICAL PRN
OUTPATIENT
Start: 2025-06-19

## 2025-06-19 RX ORDER — SILVER SULFADIAZINE 10 MG/G
CREAM TOPICAL PRN
OUTPATIENT
Start: 2025-06-19

## 2025-06-19 RX ORDER — GINSENG 100 MG
CAPSULE ORAL PRN
OUTPATIENT
Start: 2025-06-19

## 2025-06-19 RX ORDER — NEOMYCIN/BACITRACIN/POLYMYXINB 3.5-400-5K
OINTMENT (GRAM) TOPICAL PRN
OUTPATIENT
Start: 2025-06-19

## 2025-06-19 RX ORDER — MUPIROCIN 2 %
OINTMENT (GRAM) TOPICAL PRN
OUTPATIENT
Start: 2025-06-19

## 2025-06-19 RX ORDER — SODIUM CHLOR/HYPOCHLOROUS ACID 0.033 %
SOLUTION, IRRIGATION IRRIGATION PRN
OUTPATIENT
Start: 2025-06-19

## 2025-06-19 RX ORDER — LIDOCAINE 50 MG/G
OINTMENT TOPICAL PRN
OUTPATIENT
Start: 2025-06-19

## 2025-06-19 RX ORDER — LIDOCAINE HYDROCHLORIDE 40 MG/ML
SOLUTION TOPICAL PRN
OUTPATIENT
Start: 2025-06-19

## 2025-06-19 RX ORDER — CLOBETASOL PROPIONATE 0.5 MG/G
OINTMENT TOPICAL PRN
OUTPATIENT
Start: 2025-06-19

## 2025-06-19 RX ORDER — LIDOCAINE HYDROCHLORIDE 20 MG/ML
JELLY TOPICAL PRN
OUTPATIENT
Start: 2025-06-19

## 2025-06-19 RX ADMIN — LIDOCAINE HYDROCHLORIDE: 20 JELLY TOPICAL at 11:02

## 2025-06-19 ASSESSMENT — PAIN DESCRIPTION - ONSET: ONSET: ON-GOING

## 2025-06-19 ASSESSMENT — PAIN SCALES - GENERAL: PAINLEVEL_OUTOF10: 7

## 2025-06-19 ASSESSMENT — PAIN DESCRIPTION - FREQUENCY: FREQUENCY: INTERMITTENT

## 2025-06-19 ASSESSMENT — PAIN - FUNCTIONAL ASSESSMENT: PAIN_FUNCTIONAL_ASSESSMENT: PREVENTS OR INTERFERES SOME ACTIVE ACTIVITIES AND ADLS

## 2025-06-19 ASSESSMENT — PAIN DESCRIPTION - PAIN TYPE: TYPE: CHRONIC PAIN

## 2025-06-19 ASSESSMENT — PAIN DESCRIPTION - LOCATION: LOCATION: SHOULDER

## 2025-06-19 ASSESSMENT — PAIN DESCRIPTION - ORIENTATION: ORIENTATION: RIGHT;LEFT

## 2025-06-19 ASSESSMENT — PAIN DESCRIPTION - DESCRIPTORS: DESCRIPTORS: ACHING;SORE

## 2025-06-19 NOTE — PATIENT INSTRUCTIONS
[facility] Wound Care and Hyperbaric Oxygen Therapy   Physician Orders and Discharge Instructions  [address] [place] [address]  [address]  [place] [address]  Telephone: [phone]      FAX [phone]    NAME:  Chelsy Snell  YOB: 1940  MEDICAL RECORD NUMBER:  [id number]  DATE:  6/19/2025    Discharge condition: Stable    Discharge to: Home    Left via:Private automobile    Accompanied by: Self    [facility]  Home Health to Change Accu Wrap on Monday and Thursday unless patient has a wound care  appointment on that day    Dressing Orders: Left Leg-6 inch ace wrap daily as tolerated for swelling    Dressing Orders: Right Leg  Silver Alginate to open areas  Accu wrap, Keep clean and Dry  Elevate feet to level or above heart 3-4 times daily and as needed to for 30 minutes to reduce swelling  Remove wrap and resume previous dressing orders,  If wrap gets wet, Causes increased pain, Slides down or you are unable to make your next scheduled appointment  Multi Vitamin, High Protein diet as tolerated  Ok to remove wrap the day of your wound care appointment for a shower    [facility] follow up visit _________1 week____________________  (Please note your next appointment above and if you are unable to keep, kindly give a 24 hour notice. Thank you.)    If you experience any of the following, please call the Wound Care Center during business hours:    * Increase in Pain  * Temperature over 101  * Increase in drainage from your wound  * Drainage with a foul odor  * Bleeding  * Increase in swelling  * Need for compression bandage changes due to slippage, breakthrough drainage.    If you need medical attention outside of the business hours of the Wound Care Centers please contact your PCP or go to the nearest emergency room.

## 2025-06-19 NOTE — PLAN OF CARE
Multilayer Compression Wrap   (Not Unna) Below the Knee    NAME:  Chelsy Snell  YOB: 1940  MEDICAL RECORD NUMBER:  [id number]  DATE:  6/19/2025    Multilayer compression wrap: Removed old Multilayer wrap if indicated and wash leg with mild soap/water.  Applied moisturizing agent to dry skin as needed.   Applied primary and secondary dressing as ordered.  Applied multilayered dressing below the knee to right lower leg.  Instructed patient/caregiver not to remove dressing and to keep it clean and dry.   Instructed patient/caregiver on complications to report to provider, such as pain, numbness in toes, heavy drainage, and slippage of dressing.  Instructed patient on purpose of compression dressing and on activity and exercise recommendations.      Electronically signed by Sophia Clement RN on 6/19/2025 at 11:40 AM

## 2025-06-19 NOTE — PROGRESS NOTES
[facility] Wound Care Center   Progress Note and Procedure Note      Chelsy Snell  MEDICAL RECORD NUMBER:  [id number]  AGE: 85 y.o.   GENDER: female  : 1940  EPISODE DATE:  2025    Subjective:     Chief Complaint   Patient presents with    Wound Check     Pt presents for recheck of right leg wound         HISTORY of PRESENT ILLNESS HPI     Chelsy Snell is a 85 y.o. female who presents today for wound/ulcer evaluation.   Wound Context: Pt with RLE wound here for eval/treat  Wound/Ulcer Pain Timing/Severity: none  Quality of pain: N/A  Severity:  0 / 10   Modifying Factors: None  Associated Signs/Symptoms: none    Ulcer Identification:  Ulcer Type: traumatic  Contributing Factors: edema, venous stasis, and shear force    Wound: Laceration        PAST MEDICAL HISTORY        Diagnosis Date    Anxiety     Carotid artery stenosis     Chronic back pain     H/O blood clots     Hx of blood clots     bilat legs    Hyperlipidemia     Hypertension     Osteoarthritis        PAST SURGICAL HISTORY    Past Surgical History:   Procedure Laterality Date    CATARACT REMOVAL Bilateral     CHOLECYSTECTOMY      EYE SURGERY      HYSTERECTOMY (CERVIX STATUS UNKNOWN)      JOINT REPLACEMENT      KNEE ARTHROPLASTY Right     OTHER SURGICAL HISTORY      Decompression of ulnar nerve     REMOVE HARDWARE FEMUR Left 2019    HARDWARE REMOVAL performed by Gavin Lynne MD at [facility] OR    REVISION TOTAL KNEE ARTHROPLASTY Left 2019    LEFT KNEE REVISION performed by Gavin Lynne MD at [facility] OR    SHOULDER ARTHROPLASTY Left        FAMILY HISTORY    Family History   Problem Relation Age of Onset    Dementia Mother     Heart Disease Father     Cancer Brother     COPD Other     Other Other         Blood clot    Diabetes Other        SOCIAL HISTORY    Social History     Tobacco Use    Smoking status: Never    Smokeless tobacco: Never   Vaping Use    Vaping status: Never Used   Substance Use Topics    Alcohol use: No

## 2025-06-26 ENCOUNTER — HOSPITAL ENCOUNTER (OUTPATIENT)
Dept: WOUND CARE | Age: 85
Discharge: HOME | End: 2025-06-26
Attending: NURSE PRACTITIONER
Payer: MEDICARE

## 2025-06-26 VITALS
TEMPERATURE: 97.3 F | RESPIRATION RATE: 18 BRPM | HEIGHT: 60 IN | WEIGHT: 186 LBS | HEART RATE: 59 BPM | SYSTOLIC BLOOD PRESSURE: 128 MMHG | DIASTOLIC BLOOD PRESSURE: 57 MMHG | BODY MASS INDEX: 36.52 KG/M2

## 2025-06-26 DIAGNOSIS — L97.912 SKIN ULCER OF RIGHT LOWER LEG, WITH FAT LAYER EXPOSED (HCC): Primary | ICD-10-CM

## 2025-06-26 PROCEDURE — 97597 DBRDMT OPN WND 1ST 20 CM/<: CPT

## 2025-06-26 PROCEDURE — 97597 DBRDMT OPN WND 1ST 20 CM/<: CPT | Performed by: SURGERY

## 2025-06-26 RX ORDER — LIDOCAINE HYDROCHLORIDE 20 MG/ML
JELLY TOPICAL PRN
OUTPATIENT
Start: 2025-06-26

## 2025-06-26 RX ORDER — LIDOCAINE 40 MG/G
CREAM TOPICAL PRN
OUTPATIENT
Start: 2025-06-26

## 2025-06-26 RX ORDER — NEOMYCIN/BACITRACIN/POLYMYXINB 3.5-400-5K
OINTMENT (GRAM) TOPICAL PRN
OUTPATIENT
Start: 2025-06-26

## 2025-06-26 RX ORDER — MUPIROCIN 2 %
OINTMENT (GRAM) TOPICAL PRN
OUTPATIENT
Start: 2025-06-26

## 2025-06-26 RX ORDER — GENTAMICIN SULFATE 1 MG/G
OINTMENT TOPICAL PRN
OUTPATIENT
Start: 2025-06-26

## 2025-06-26 RX ORDER — TRIAMCINOLONE ACETONIDE 1 MG/G
OINTMENT TOPICAL PRN
OUTPATIENT
Start: 2025-06-26

## 2025-06-26 RX ORDER — LIDOCAINE 50 MG/G
OINTMENT TOPICAL PRN
OUTPATIENT
Start: 2025-06-26

## 2025-06-26 RX ORDER — LIDOCAINE HYDROCHLORIDE 20 MG/ML
JELLY TOPICAL PRN
Status: DISCONTINUED | OUTPATIENT
Start: 2025-06-26 | End: 2025-06-28 | Stop reason: HOSPADM

## 2025-06-26 RX ORDER — BETAMETHASONE DIPROPIONATE 0.5 MG/G
CREAM TOPICAL PRN
OUTPATIENT
Start: 2025-06-26

## 2025-06-26 RX ORDER — LIDOCAINE HYDROCHLORIDE 40 MG/ML
SOLUTION TOPICAL PRN
OUTPATIENT
Start: 2025-06-26

## 2025-06-26 RX ORDER — GINSENG 100 MG
CAPSULE ORAL PRN
OUTPATIENT
Start: 2025-06-26

## 2025-06-26 RX ORDER — CLOBETASOL PROPIONATE 0.5 MG/G
OINTMENT TOPICAL PRN
OUTPATIENT
Start: 2025-06-26

## 2025-06-26 RX ORDER — BACITRACIN ZINC AND POLYMYXIN B SULFATE 500; 1000 [USP'U]/G; [USP'U]/G
OINTMENT TOPICAL PRN
OUTPATIENT
Start: 2025-06-26

## 2025-06-26 RX ORDER — SODIUM CHLOR/HYPOCHLOROUS ACID 0.033 %
SOLUTION, IRRIGATION IRRIGATION PRN
OUTPATIENT
Start: 2025-06-26

## 2025-06-26 RX ORDER — SILVER SULFADIAZINE 10 MG/G
CREAM TOPICAL PRN
OUTPATIENT
Start: 2025-06-26

## 2025-06-26 RX ADMIN — LIDOCAINE HYDROCHLORIDE: 20 JELLY TOPICAL at 10:28

## 2025-06-26 ASSESSMENT — PAIN DESCRIPTION - DESCRIPTORS: DESCRIPTORS: ACHING;SORE

## 2025-06-26 ASSESSMENT — PAIN DESCRIPTION - LOCATION: LOCATION: SHOULDER

## 2025-06-26 ASSESSMENT — PAIN DESCRIPTION - FREQUENCY: FREQUENCY: INTERMITTENT

## 2025-06-26 ASSESSMENT — PAIN DESCRIPTION - ORIENTATION: ORIENTATION: RIGHT;LEFT

## 2025-06-26 ASSESSMENT — PAIN DESCRIPTION - PAIN TYPE: TYPE: CHRONIC PAIN

## 2025-06-26 ASSESSMENT — PAIN - FUNCTIONAL ASSESSMENT: PAIN_FUNCTIONAL_ASSESSMENT: PREVENTS OR INTERFERES SOME ACTIVE ACTIVITIES AND ADLS

## 2025-06-26 ASSESSMENT — PAIN SCALES - GENERAL: PAINLEVEL_OUTOF10: 5

## 2025-06-26 ASSESSMENT — PAIN DESCRIPTION - ONSET: ONSET: ON-GOING

## 2025-06-26 NOTE — PLAN OF CARE
Multilayer Compression Wrap   (Not Unna) Below the Knee    NAME:  Chelsy Snell  YOB: 1940  MEDICAL RECORD NUMBER:  [id number]  DATE:  6/26/2025    Multilayer compression wrap: Removed old Multilayer wrap if indicated and wash leg with mild soap/water.  Applied moisturizing agent to dry skin as needed.   Applied primary and secondary dressing as ordered.  Applied multilayered dressing below the knee to right lower leg.  Applied multilayered dressing below the knee to left lower leg.  Instructed patient/caregiver not to remove dressing and to keep it clean and dry.   Instructed patient/caregiver on complications to report to provider, such as pain, numbness in toes, heavy drainage, and slippage of dressing.  Instructed patient on purpose of compression dressing and on activity and exercise recommendations.      Electronically signed by Sophia Clement RN on 6/26/2025 at 11:10 AM

## 2025-06-26 NOTE — PROGRESS NOTES
[facility] Wound Care Center   Progress Note and Procedure Note      Chelsy Snell  MEDICAL RECORD NUMBER:  [id number]  AGE: 85 y.o.   GENDER: female  : 1940  EPISODE DATE:  2025    Subjective:     Chief Complaint   Patient presents with    Wound Check     Pt presents for recheck of right leg wound         HISTORY of PRESENT ILLNESS HPI     Chelsy Snell is a 85 y.o. female who presents today for wound/ulcer evaluation.   Wound Context: Pt with RLE wound here for eval/treat  Wound/Ulcer Pain Timing/Severity: none  Quality of pain: N/A  Severity:  0 / 10   Modifying Factors: None  Associated Signs/Symptoms: edema    Ulcer Identification:  Ulcer Type: lymphedema  Contributing Factors: venous stasis and lymphedema    Wound: venous        PAST MEDICAL HISTORY        Diagnosis Date    Anxiety     Carotid artery stenosis     Chronic back pain     H/O blood clots     Hx of blood clots     bilat legs    Hyperlipidemia     Hypertension     Osteoarthritis        PAST SURGICAL HISTORY    Past Surgical History:   Procedure Laterality Date    CATARACT REMOVAL Bilateral     CHOLECYSTECTOMY      EYE SURGERY      HYSTERECTOMY (CERVIX STATUS UNKNOWN)      JOINT REPLACEMENT      KNEE ARTHROPLASTY Right     OTHER SURGICAL HISTORY      Decompression of ulnar nerve     REMOVE HARDWARE FEMUR Left 2019    HARDWARE REMOVAL performed by Gavin Lynne MD at [facility] OR    REVISION TOTAL KNEE ARTHROPLASTY Left 2019    LEFT KNEE REVISION performed by Gavin Lynne MD at [facility] OR    SHOULDER ARTHROPLASTY Left        FAMILY HISTORY    Family History   Problem Relation Age of Onset    Dementia Mother     Heart Disease Father     Cancer Brother     COPD Other     Other Other         Blood clot    Diabetes Other        SOCIAL HISTORY    Social History     Tobacco Use    Smoking status: Never    Smokeless tobacco: Never   Vaping Use    Vaping status: Never Used   Substance Use Topics    Alcohol use: No    Drug use: No

## 2025-06-26 NOTE — PATIENT INSTRUCTIONS
[facility] Wound Care and Hyperbaric Oxygen Therapy   Physician Orders and Discharge Instructions  [address] [place] [address]  [address]  [place] [address]  Telephone: [phone]      FAX [phone]    NAME:  Chelsy Snell  YOB: 1940  MEDICAL RECORD NUMBER:  [id number]  DATE:  6/26/2025    Discharge condition: Stable    Discharge to: Home    Left via:Private automobile    Accompanied by: Self    [facility]  Home Health to Change Accu Wrap on Monday and Thursday unless patient has a wound care appointment on that day    Dressing Orders: Right and Left Legs  Silver Alginate to open areas  Accu wrap, Keep clean and Dry  Elevate feet to level or above heart 3-4 times daily and as needed to for 30 minutes to reduce swelling  Remove wrap and resume previous dressing orders,  If wrap gets wet, Causes increased pain, Slides down or you are unable to make your next scheduled  appointment  Multi Vitamin, High Protein diet as tolerated  Ok to remove wrap the day of your wound care appointment for a shower    [facility] follow up visit _________1 week____________________  (Please note your next appointment above and if you are unable to keep, kindly give a 24 hour notice. Thank you.)    If you experience any of the following, please call the Wound Care Center during business hours:    * Increase in Pain  * Temperature over 101  * Increase in drainage from your wound  * Drainage with a foul odor  * Bleeding  * Increase in swelling  * Need for compression bandage changes due to slippage, breakthrough drainage.    If you need medical attention outside of the business hours of the Wound Care Centers please contact your PCP or go to the nearest emergency room.

## 2025-06-30 ENCOUNTER — TELEPHONE (OUTPATIENT)
Dept: INTERNAL MEDICINE | Facility: CLINIC | Age: 85
End: 2025-06-30
Payer: MEDICARE

## 2025-06-30 NOTE — TELEPHONE ENCOUNTER
Val with [facility] called requesting a verbal for PT. After speaking with Dr. Noble a verbal was provided for PT regarding fall risk assessment

## 2025-07-01 DIAGNOSIS — I10 ESSENTIAL HYPERTENSION: ICD-10-CM

## 2025-07-01 RX ORDER — LISINOPRIL 40 MG/1
40 TABLET ORAL DAILY
Qty: 90 TABLET | Refills: 3 | Status: SHIPPED | OUTPATIENT
Start: 2025-07-01

## 2025-07-01 NOTE — TELEPHONE ENCOUNTER
Rx Refill Note  Requested Prescriptions     Pending Prescriptions Disp Refills    lisinopril (PRINIVIL,ZESTRIL) 40 MG tablet [Pharmacy Med Name: lisinopril 40 mg tablet] 30 tablet 2     Sig: Take 1 tablet by mouth Daily.      Last office visit with prescribing clinician: 5/6/2025   Last telemedicine visit with prescribing clinician: Visit date not found   Next office visit with prescribing clinician: 7/2/2025                         Would you like a call back once the refill request has been completed: [] Yes [] No    If the office needs to give you a call back, can they leave a voicemail: [] Yes [] No    ELIJAH Pearson  07/01/25, 09:53 CDT    Medication last filled 04/15/25, qty 30, 2 refills.

## 2025-07-03 ENCOUNTER — HOSPITAL ENCOUNTER (OUTPATIENT)
Dept: WOUND CARE | Age: 85
Discharge: HOME | End: 2025-07-03
Attending: NURSE PRACTITIONER
Payer: MEDICARE

## 2025-07-03 ENCOUNTER — TELEPHONE (OUTPATIENT)
Dept: WOUND CARE | Age: 85
End: 2025-07-03

## 2025-07-03 VITALS
SYSTOLIC BLOOD PRESSURE: 151 MMHG | HEART RATE: 53 BPM | DIASTOLIC BLOOD PRESSURE: 65 MMHG | BODY MASS INDEX: 36.52 KG/M2 | TEMPERATURE: 97.1 F | RESPIRATION RATE: 18 BRPM | HEIGHT: 60 IN | WEIGHT: 186 LBS

## 2025-07-03 DIAGNOSIS — L97.912 SKIN ULCER OF RIGHT LOWER LEG, WITH FAT LAYER EXPOSED (HCC): Primary | ICD-10-CM

## 2025-07-03 PROCEDURE — 97597 DBRDMT OPN WND 1ST 20 CM/<: CPT

## 2025-07-03 PROCEDURE — 97597 DBRDMT OPN WND 1ST 20 CM/<: CPT | Performed by: SURGERY

## 2025-07-03 RX ORDER — MUPIROCIN 2 %
OINTMENT (GRAM) TOPICAL PRN
OUTPATIENT
Start: 2025-07-03

## 2025-07-03 RX ORDER — SILVER SULFADIAZINE 10 MG/G
CREAM TOPICAL PRN
OUTPATIENT
Start: 2025-07-03

## 2025-07-03 RX ORDER — NEOMYCIN/BACITRACIN/POLYMYXINB 3.5-400-5K
OINTMENT (GRAM) TOPICAL PRN
OUTPATIENT
Start: 2025-07-03

## 2025-07-03 RX ORDER — GENTAMICIN SULFATE 1 MG/G
OINTMENT TOPICAL PRN
OUTPATIENT
Start: 2025-07-03

## 2025-07-03 RX ORDER — LIDOCAINE HYDROCHLORIDE 20 MG/ML
JELLY TOPICAL PRN
OUTPATIENT
Start: 2025-07-03

## 2025-07-03 RX ORDER — LIDOCAINE 50 MG/G
OINTMENT TOPICAL PRN
OUTPATIENT
Start: 2025-07-03

## 2025-07-03 RX ORDER — BACITRACIN ZINC AND POLYMYXIN B SULFATE 500; 1000 [USP'U]/G; [USP'U]/G
OINTMENT TOPICAL PRN
OUTPATIENT
Start: 2025-07-03

## 2025-07-03 RX ORDER — GINSENG 100 MG
CAPSULE ORAL PRN
OUTPATIENT
Start: 2025-07-03

## 2025-07-03 RX ORDER — LIDOCAINE 40 MG/G
CREAM TOPICAL PRN
OUTPATIENT
Start: 2025-07-03

## 2025-07-03 RX ORDER — TRIAMCINOLONE ACETONIDE 1 MG/G
OINTMENT TOPICAL PRN
OUTPATIENT
Start: 2025-07-03

## 2025-07-03 RX ORDER — LIDOCAINE HYDROCHLORIDE 20 MG/ML
JELLY TOPICAL PRN
Status: DISCONTINUED | OUTPATIENT
Start: 2025-07-03 | End: 2025-07-05 | Stop reason: HOSPADM

## 2025-07-03 RX ORDER — BETAMETHASONE DIPROPIONATE 0.5 MG/G
CREAM TOPICAL PRN
OUTPATIENT
Start: 2025-07-03

## 2025-07-03 RX ORDER — CLOBETASOL PROPIONATE 0.5 MG/G
OINTMENT TOPICAL PRN
OUTPATIENT
Start: 2025-07-03

## 2025-07-03 RX ORDER — SODIUM CHLOR/HYPOCHLOROUS ACID 0.033 %
SOLUTION, IRRIGATION IRRIGATION PRN
OUTPATIENT
Start: 2025-07-03

## 2025-07-03 RX ORDER — LIDOCAINE HYDROCHLORIDE 40 MG/ML
SOLUTION TOPICAL PRN
OUTPATIENT
Start: 2025-07-03

## 2025-07-03 RX ADMIN — LIDOCAINE HYDROCHLORIDE: 20 JELLY TOPICAL at 11:09

## 2025-07-03 ASSESSMENT — PAIN DESCRIPTION - ORIENTATION: ORIENTATION: RIGHT;LEFT

## 2025-07-03 ASSESSMENT — PAIN DESCRIPTION - PAIN TYPE: TYPE: CHRONIC PAIN

## 2025-07-03 ASSESSMENT — PAIN SCALES - GENERAL: PAINLEVEL_OUTOF10: 5

## 2025-07-03 ASSESSMENT — PAIN DESCRIPTION - LOCATION: LOCATION: SHOULDER

## 2025-07-03 ASSESSMENT — PAIN DESCRIPTION - ONSET: ONSET: ON-GOING

## 2025-07-03 ASSESSMENT — PAIN - FUNCTIONAL ASSESSMENT: PAIN_FUNCTIONAL_ASSESSMENT: PREVENTS OR INTERFERES SOME ACTIVE ACTIVITIES AND ADLS

## 2025-07-03 ASSESSMENT — PAIN DESCRIPTION - DESCRIPTORS: DESCRIPTORS: ACHING;SORE

## 2025-07-03 ASSESSMENT — PAIN DESCRIPTION - FREQUENCY: FREQUENCY: INTERMITTENT

## 2025-07-03 NOTE — PLAN OF CARE
Problem: Wound:  Goal: Will show signs of wound healing; wound closure and no evidence of infection  Description: Will show signs of wound healing; wound closure and no evidence of infection  Outcome: Progressing     Problem: Venous:  Goal: Signs of wound healing will improve  Description: Signs of wound healing will improve  Outcome: Progressing     Problem: Compression therapy:  Goal: Will be free from complications associated with compression therapy  Description: Will be free from complications associated with compression therapy  Outcome: Progressing     Problem: Weight control:  Goal: Ability to maintain an optimal weight for height and age will be supported  Description: Ability to maintain an optimal weight for height and age will be supported  Outcome: Progressing     Problem: Falls - Risk of:  Goal: Will remain free from falls  Description: Will remain free from falls  Outcome: Progressing     Problem: Pain  Goal: Verbalizes/displays adequate comfort level or baseline comfort level  Outcome: Progressing

## 2025-07-03 NOTE — PATIENT INSTRUCTIONS
[facility] Wound Care and Hyperbaric Oxygen Therapy   Physician Orders and Discharge Instructions  [address] [place] [address]  [address]  [place] [address]  Telephone: [phone]      FAX [phone]    NAME:  Chelsy Snell  YOB: 1940  MEDICAL RECORD NUMBER:  [id number]  DATE:  7/3/2025    Discharge condition: Stable    Discharge to: Home    Left via:Private automobile    Accompanied by: Self    [facility]  Home Health to Change Accu Wrap on Monday and Thursday unless patient has a wound care appointment on that day    Dressing Orders: Right and Left Legs  Silver Alginate to open areas  Accu wrap, Keep clean and Dry  Elevate feet to level or above heart 3-4 times daily and as needed to for 30 minutes to reduce swelling  If wrap gets wet, Causes increased pain, Slides down or you are unable to make your next scheduled  Appointment call home health to be rewrapped   Multi Vitamin, High Protein diet as tolerated  Ok to remove wrap the day of your wound care appointment for a shower    [facility] follow up visit __________1 week___________________  (Please note your next appointment above and if you are unable to keep, kindly give a 24 hour notice. Thank you.)    If you experience any of the following, please call the Wound Care Center during business hours:    * Increase in Pain  * Temperature over 101  * Increase in drainage from your wound  * Drainage with a foul odor  * Bleeding  * Increase in swelling  * Need for compression bandage changes due to slippage, breakthrough drainage.    If you need medical attention outside of the business hours of the Wound Care Centers please contact your PCP or go to the nearest emergency room.

## 2025-07-03 NOTE — PROGRESS NOTES
Unna Boot Application   Below Knee    NAME:  Chelsy Snell  YOB: 1940  MEDICAL RECORD NUMBER:  [id number]  DATE:  7/3/2025    Unna boot: Applied moisturizing agent to dry skin as needed.   Appied primary and secondary dressing as ordered.  Applied Unna roll from toes to knee overlapping each time.   Applied ace wrap or coban from toes to below the knee.   Instructed patient/caregiver to keep dressing dry and intact. DO NOT REMOVE DRESSING.   Instructed pt/family/caregiver to report excessive draining, loose bandage, wet dressing, severe pain or tingling in toes.  Applied Unna Boot dressing below the knee to right lower leg.  Applied Unna Boot dressing below the knee to left lower leg.    Unna Boot(s) were applied per  Guidelines.     Electronically signed by Abbie Castillo RN on 7/3/2025 at 11:46 AM

## 2025-07-03 NOTE — PROGRESS NOTES
[facility]   Progress Note and Procedure Note      Chelsy Snell  MEDICAL RECORD NUMBER:  [id number]  AGE: 85 y.o.   GENDER: female  : 1940  EPISODE DATE:  7/3/2025    Subjective:     Chief Complaint   Patient presents with    Wound Check     Pt presents for recheck of right leg wound         HISTORY of PRESENT ILLNESS HPI     Chelsy Snell is a 85 y.o. female who presents today for wound/ulcer evaluation.   Wound Context: Pt with RLE wound and lymphedema BLE  Wound/Ulcer Pain Timing/Severity: none  Quality of pain: N/A  Severity:  0 / 10   Modifying Factors: None  Associated Signs/Symptoms: none    Ulcer Identification:  Ulcer Type: venous and traumatic  Contributing Factors: edema, venous stasis, lymphedema, and shear force    Wound: Laceration        PAST MEDICAL HISTORY        Diagnosis Date    Anxiety     Carotid artery stenosis     Chronic back pain     H/O blood clots     Hx of blood clots     bilat legs    Hyperlipidemia     Hypertension     Osteoarthritis        PAST SURGICAL HISTORY    Past Surgical History:   Procedure Laterality Date    CATARACT REMOVAL Bilateral     CHOLECYSTECTOMY      EYE SURGERY      HYSTERECTOMY (CERVIX STATUS UNKNOWN)      JOINT REPLACEMENT      KNEE ARTHROPLASTY Right     OTHER SURGICAL HISTORY      Decompression of ulnar nerve     REMOVE HARDWARE FEMUR Left 2019    HARDWARE REMOVAL performed by Gavin Lynne MD at [facility] OR    REVISION TOTAL KNEE ARTHROPLASTY Left 2019    LEFT KNEE REVISION performed by Gavin Lynne MD at [facility] OR    SHOULDER ARTHROPLASTY Left        FAMILY HISTORY    Family History   Problem Relation Age of Onset    Dementia Mother     Heart Disease Father     Cancer Brother     COPD Other     Other Other         Blood clot    Diabetes Other        SOCIAL HISTORY    Social History     Tobacco Use    Smoking status: Never    Smokeless tobacco: Never   Vaping Use    Vaping status: Never Used   Substance Use Topics

## 2025-07-09 ENCOUNTER — OFFICE VISIT (OUTPATIENT)
Dept: INTERNAL MEDICINE | Facility: CLINIC | Age: 85
End: 2025-07-09
Payer: MEDICARE

## 2025-07-09 VITALS
HEIGHT: 60 IN | BODY MASS INDEX: 40.21 KG/M2 | TEMPERATURE: 97.6 F | OXYGEN SATURATION: 94 % | HEART RATE: 54 BPM | DIASTOLIC BLOOD PRESSURE: 77 MMHG | WEIGHT: 204.8 LBS | SYSTOLIC BLOOD PRESSURE: 149 MMHG

## 2025-07-09 DIAGNOSIS — F41.9 ANXIETY: ICD-10-CM

## 2025-07-09 DIAGNOSIS — M81.0 AGE-RELATED OSTEOPOROSIS WITHOUT CURRENT PATHOLOGICAL FRACTURE: Primary | ICD-10-CM

## 2025-07-09 DIAGNOSIS — I10 ESSENTIAL HYPERTENSION: ICD-10-CM

## 2025-07-09 DIAGNOSIS — R06.2 WHEEZING: ICD-10-CM

## 2025-07-09 PROCEDURE — 3077F SYST BP >= 140 MM HG: CPT | Performed by: INTERNAL MEDICINE

## 2025-07-09 PROCEDURE — 3078F DIAST BP <80 MM HG: CPT | Performed by: INTERNAL MEDICINE

## 2025-07-09 PROCEDURE — G2211 COMPLEX E/M VISIT ADD ON: HCPCS | Performed by: INTERNAL MEDICINE

## 2025-07-09 PROCEDURE — 99214 OFFICE O/P EST MOD 30 MIN: CPT | Performed by: INTERNAL MEDICINE

## 2025-07-09 RX ORDER — HYDROXYZINE HYDROCHLORIDE 25 MG/1
25 TABLET, FILM COATED ORAL EVERY 8 HOURS PRN
Qty: 60 TABLET | Refills: 5 | Status: SHIPPED | OUTPATIENT
Start: 2025-07-09

## 2025-07-09 NOTE — PROGRESS NOTES
Chief Complaint   Patient presents with    Follow-up     Osteoporosis    Med Management     Fosamax         History:  Carmen Obrien is a 85 y.o. female who presents today for evaluation of the above problems.      HPI  History of Present Illness  The patient presents for evaluation of wheezing, hypertension, and anxiety.    She reports experiencing severe wheezing, particularly when lying down at night, although this symptom has improved today. She uses an inhaler as prescribed. Wheezing returned about an hour after walking today. Dr. Diaz from the wound care center informed her that the wheezing is not due to a blood clot but could be indicative of congestive heart failure.    She has been feeling weak recently, which has affected her mobility. Physical therapy was started today. She has been using a motorized scooter for mobility but was advised to walk more. Weight gain has been noted.    She is requesting hydroxyzine 25 mg for anxiety.    She is on clonidine for high blood pressure but was advised by hospital doctors a few years ago to discontinue it as it did not effectively manage her hypertension. Blood pressure fluctuates, sometimes being high and other times low. One dose of clonidine was taken in the past month.    She has been receiving wound care for her legs every Thursday, with nurses visiting her room to dress and wrap her legs every Monday.      ROS:  Review of Systems  See above    Current Outpatient Medications:     albuterol sulfate  (90 Base) MCG/ACT inhaler, Inhale 2 puffs Every 4 (Four) Hours As Needed for Wheezing or Shortness of Air., Disp: 8 g, Rfl: 11    alendronate (Fosamax) 70 MG tablet, Take 1 tablet by mouth Every 7 (Seven) Days., Disp: 4 tablet, Rfl: 5    amLODIPine (NORVASC) 10 MG tablet, Take 1 tablet by mouth Daily., Disp: 90 tablet, Rfl: 3    ascorbic acid (VITAMIN C) 500 MG tablet, Take 1 tablet by mouth Daily., Disp: , Rfl:     atorvastatin (LIPITOR) 10 MG tablet,  TAKE ONE TABLET BY MOUTH EVERY DAY, Disp: 90 tablet, Rfl: 3    Calcium Carbonate-Vit D-Min (Caltrate 600+D Plus Minerals) 600-800 MG-UNIT tablet, Take 1 tablet by mouth 2 (Two) Times a Day., Disp: , Rfl:     carvedilol (COREG) 12.5 MG tablet, Take 1 tablet by mouth 2 (Two) Times a Day With Meals., Disp: , Rfl:     cloNIDine (CATAPRES) 0.1 MG tablet, Take 1 tablet by mouth 2 (Two) Times a Day As Needed for High Blood Pressure (systolic greater than 180)., Disp: 30 tablet, Rfl: 0    esomeprazole (nexIUM) 20 MG capsule, Take 1 capsule by mouth 2 (Two) Times a Day., Disp: 60 capsule, Rfl: 11    FEROSUL 325 (65 Fe) MG tablet, Take 1 tablet by mouth Daily With Breakfast., Disp: , Rfl:     fluticasone (FLONASE) 50 MCG/ACT nasal spray, Administer 2 sprays into the nostril(s) as directed by provider Daily., Disp: 15.8 mL, Rfl: 3    furosemide (LASIX) 20 MG tablet, Take 2 tablets by mouth Daily., Disp: 90 tablet, Rfl: 6    guaiFENesin (MUCINEX) 600 MG 12 hr tablet, Take 2 tablets by mouth 2 (Two) Times a Day., Disp: 120 tablet, Rfl: 1    lisinopril (PRINIVIL,ZESTRIL) 40 MG tablet, Take 1 tablet by mouth Daily., Disp: 90 tablet, Rfl: 3    Melatonin 10 MG capsule, Take  by mouth. 2 tab, Disp: , Rfl:     nystatin (MYCOSTATIN) 200088 UNIT/GM powder, Apply  topically to the appropriate area as directed 3 (Three) Times a Day., Disp: 60 g, Rfl: 11    oxyCODONE-acetaminophen (PERCOCET) 5-325 MG per tablet, Take 1 tablet by mouth Every 8 (Eight) Hours As Needed for Moderate Pain., Disp: 28 tablet, Rfl: 0    pregabalin (LYRICA) 75 MG capsule, Take 1 capsule by mouth 2 (Two) Times a Day., Disp: , Rfl:     sertraline (ZOLOFT) 100 MG tablet, Take 1 tablet by mouth Daily., Disp: 90 tablet, Rfl: 3    spironolactone (ALDACTONE) 25 MG tablet, Take 1 tablet by mouth Daily., Disp: 90 tablet, Rfl: 3    Vibegron 75 MG tablet, Take 1 tablet by mouth Daily., Disp: 30 tablet, Rfl: 11    vitamin B-12 (CYANOCOBALAMIN) 1000 MCG tablet, Take 1 tablet by  mouth Daily., Disp: , Rfl:     vitamin D (ERGOCALCIFEROL) 1.25 MG (22939 UT) capsule capsule, Take 1 capsule by mouth Every 14 (Fourteen) Days. Every other Friday, Disp: , Rfl:     allopurinol (ZYLOPRIM) 100 MG tablet, Take 1 tablet by mouth Daily. (Patient not taking: Reported on 7/9/2025), Disp: 90 tablet, Rfl: 3    hydrOXYzine (ATARAX) 25 MG tablet, Take 1 tablet by mouth Every 8 (Eight) Hours As Needed for Anxiety., Disp: 60 tablet, Rfl: 5    Lab Results   Component Value Date    GLUCOSE 93 06/03/2025    BUN 25.2 (H) 06/03/2025    CREATININE 0.85 06/03/2025     (L) 06/03/2025    K 4.9 06/03/2025    CL 99 06/03/2025    CALCIUM 9.1 06/03/2025    PROTEINTOT 6.9 05/06/2025    ALBUMIN 4.3 05/06/2025    ALT 20 05/06/2025    AST 21 05/06/2025    ALKPHOS 50 05/06/2025    BILITOT 0.4 05/06/2025    GLOB 2.6 05/06/2025    AGRATIO 1.7 05/06/2025    BCR 29.6 (H) 06/03/2025    ANIONGAP 12.0 06/03/2025    EGFR 68.1 06/03/2025       WBC   Date Value Ref Range Status   05/06/2025 9.42 3.40 - 10.80 10*3/mm3 Final   07/16/2019 7.0 4.8 - 10.8 K/uL Final     RBC   Date Value Ref Range Status   05/06/2025 3.41 (L) 3.77 - 5.28 10*6/mm3 Final   07/16/2019 3.52 (L) 4.20 - 5.40 M/uL Final     Hemoglobin   Date Value Ref Range Status   05/06/2025 10.4 (L) 12.0 - 15.9 g/dL Final   07/16/2019 10.1 (L) 12.0 - 16.0 g/dL Final     Hematocrit   Date Value Ref Range Status   05/06/2025 33.2 (L) 34.0 - 46.6 % Final   07/16/2019 31.7 (L) 37.0 - 47.0 % Final     MCV   Date Value Ref Range Status   05/06/2025 97.4 (H) 79.0 - 97.0 fL Final   07/16/2019 90.1 81.0 - 99.0 fL Final     MCH   Date Value Ref Range Status   05/06/2025 30.5 26.6 - 33.0 pg Final   07/16/2019 28.7 27.0 - 31.0 pg Final     MCHC   Date Value Ref Range Status   05/06/2025 31.3 (L) 31.5 - 35.7 g/dL Final   07/16/2019 31.9 (L) 33.0 - 37.0 g/dL Final     RDW   Date Value Ref Range Status   05/06/2025 14.5 12.3 - 15.4 % Final   07/16/2019 14.7 (H) 11.5 - 14.5 % Final     RDW-SD    Date Value Ref Range Status   05/06/2025 51.8 37.0 - 54.0 fl Final     MPV   Date Value Ref Range Status   05/06/2025 10.4 6.0 - 12.0 fL Final   07/16/2019 10.8 9.4 - 12.3 fL Final     Platelets   Date Value Ref Range Status   05/06/2025 171 140 - 450 10*3/mm3 Final   07/16/2019 248 130 - 400 K/uL Final     Neutrophil Rel %   Date Value Ref Range Status   07/04/2019 76.5 (H) 50.0 - 65.0 % Final     Neutrophil %   Date Value Ref Range Status   01/08/2024 71.9 42.7 - 76.0 % Final     Lymphocyte Rel %   Date Value Ref Range Status   07/04/2019 13.9 (L) 20.0 - 40.0 % Final     Lymphocyte %   Date Value Ref Range Status   01/08/2024 17.2 (L) 19.6 - 45.3 % Final     Monocyte Rel %   Date Value Ref Range Status   07/04/2019 7.4 0.0 - 10.0 % Final     Monocyte %   Date Value Ref Range Status   01/08/2024 7.5 5.0 - 12.0 % Final     Eosinophil Rel %   Date Value Ref Range Status   07/04/2019 1.4 0.0 - 5.0 % Final     Eosinophil %   Date Value Ref Range Status   01/08/2024 2.2 0.3 - 6.2 % Final     Basophil Rel %   Date Value Ref Range Status   07/04/2019 0.4 0.0 - 1.0 % Final     Basophil %   Date Value Ref Range Status   01/08/2024 0.7 0.0 - 1.5 % Final     Immature Grans %   Date Value Ref Range Status   01/08/2024 0.5 0.0 - 0.5 % Final     Neutrophils Absolute   Date Value Ref Range Status   07/04/2019 7.7 (H) 1.5 - 7.5 K/uL Final     Neutrophils, Absolute   Date Value Ref Range Status   01/08/2024 5.27 1.70 - 7.00 10*3/mm3 Final     Lymphocytes Absolute   Date Value Ref Range Status   07/04/2019 1.4 1.1 - 4.5 K/uL Final     Lymphocytes, Absolute   Date Value Ref Range Status   01/08/2024 1.26 0.70 - 3.10 10*3/mm3 Final     Monocytes Absolute   Date Value Ref Range Status   07/04/2019 0.80 0.00 - 0.90 K/uL Final     Monocytes, Absolute   Date Value Ref Range Status   01/08/2024 0.55 0.10 - 0.90 10*3/mm3 Final     Eosinophils Absolute   Date Value Ref Range Status   07/04/2019 0.10 0.00 - 0.60 K/uL Final     Eosinophils,  "Absolute   Date Value Ref Range Status   01/08/2024 0.16 0.00 - 0.40 10*3/mm3 Final     Basophils Absolute   Date Value Ref Range Status   07/04/2019 0.00 0.00 - 0.20 K/uL Final     Basophils, Absolute   Date Value Ref Range Status   01/08/2024 0.05 0.00 - 0.20 10*3/mm3 Final     Immature Grans, Absolute   Date Value Ref Range Status   01/08/2024 0.04 0.00 - 0.05 10*3/mm3 Final     nRBC   Date Value Ref Range Status   01/08/2024 0.0 0.0 - 0.2 /100 WBC Final         OBJECTIVE:  Visit Vitals  /77 (BP Location: Left arm, Patient Position: Sitting, Cuff Size: Adult)   Pulse 54   Temp 97.6 °F (36.4 °C) (Temporal)   Ht 152.4 cm (60\")   Wt 92.9 kg (204 lb 12.8 oz)   SpO2 94%   BMI 40.00 kg/m²      Physical Exam  Vitals and nursing note reviewed.   Constitutional:       General: She is not in acute distress.     Appearance: Normal appearance. She is well-developed. She is obese. She is not diaphoretic.      Comments: Sitting in a wheelchair.  No acute distress.  Pleasant   HENT:      Head: Normocephalic and atraumatic.   Eyes:      Pupils: Pupils are equal, round, and reactive to light.   Neck:      Thyroid: No thyromegaly.      Trachea: Phonation normal.   Cardiovascular:      Rate and Rhythm: Normal rate and regular rhythm.      Heart sounds: No murmur heard.  Pulmonary:      Effort: No respiratory distress.      Breath sounds: No wheezing or rales.   Musculoskeletal:      Right lower leg: No edema.      Left lower leg: No edema.   Skin:     Coloration: Skin is not pale.      Findings: No erythema.   Neurological:      Mental Status: She is alert.   Psychiatric:         Behavior: Behavior normal.         Thought Content: Thought content normal.         Judgment: Judgment normal.       Physical Exam  Respiratory: Clear to auscultation, no wheezing, rales or rhonchi      Assessment/Plan      Diagnoses and all orders for this visit:    1. Age-related osteoporosis without current pathological fracture (Primary)    2. " Essential hypertension    3. Anxiety  -     hydrOXYzine (ATARAX) 25 MG tablet; Take 1 tablet by mouth Every 8 (Eight) Hours As Needed for Anxiety.  Dispense: 60 tablet; Refill: 5      Assessment & Plan  Wheezing.  - Wheezing has improved today but worsens after walking.  - No wheezing detected during the physical exam.  - Likely due to reactive airways or asthma, not congestive heart failure.  - Continue using the prescribed inhaler.     Hypertension.  - Blood pressure readings from home: 162, 137, 160, 154, 156, 158, 136, 154, 164.  - Preferable for blood pressure to be slightly elevated rather than too low to avoid falls .  - Maintain adequate hydration.  - Clonidine is on her list but advised against daily use.    Anxiety.  - Hydroxyzine prescribed for anxiety.  - Advised to take hydroxyzine only when necessary, not every day.  - Hydroxyzine may cause drowsiness.    Continue working with therapy as already scheduled    Continue taking Fosamax.  She does not have any issues or problems with this medication.  No refills are needed at this time.      Return for Next scheduled follow up.      AMY Noble MD  14:39 CDT  7/9/2025   Electronically signed    Patient or patient representative verbalized consent for the use of Ambient Listening during the visit with  VERA Noble MD for chart documentation. 7/9/2025  14:41 CDT

## 2025-07-10 ENCOUNTER — HOSPITAL ENCOUNTER (OUTPATIENT)
Dept: WOUND CARE | Age: 85
Discharge: HOME | End: 2025-07-10
Attending: NURSE PRACTITIONER
Payer: MEDICARE

## 2025-07-10 VITALS
SYSTOLIC BLOOD PRESSURE: 160 MMHG | HEART RATE: 52 BPM | TEMPERATURE: 97.2 F | DIASTOLIC BLOOD PRESSURE: 62 MMHG | RESPIRATION RATE: 18 BRPM

## 2025-07-10 DIAGNOSIS — L97.912 SKIN ULCER OF RIGHT LOWER LEG, WITH FAT LAYER EXPOSED (HCC): Primary | ICD-10-CM

## 2025-07-10 PROCEDURE — 97597 DBRDMT OPN WND 1ST 20 CM/<: CPT

## 2025-07-10 PROCEDURE — 97597 DBRDMT OPN WND 1ST 20 CM/<: CPT | Performed by: SURGERY

## 2025-07-10 RX ORDER — LIDOCAINE HYDROCHLORIDE 20 MG/ML
JELLY TOPICAL PRN
Status: DISCONTINUED | OUTPATIENT
Start: 2025-07-10 | End: 2025-07-12 | Stop reason: HOSPADM

## 2025-07-10 RX ORDER — LIDOCAINE HYDROCHLORIDE 20 MG/ML
JELLY TOPICAL PRN
OUTPATIENT
Start: 2025-07-10

## 2025-07-10 RX ORDER — LIDOCAINE HYDROCHLORIDE 40 MG/ML
SOLUTION TOPICAL PRN
OUTPATIENT
Start: 2025-07-10

## 2025-07-10 RX ORDER — HYDROXYZINE HYDROCHLORIDE 25 MG/1
25 TABLET, FILM COATED ORAL 3 TIMES DAILY PRN
COMMUNITY

## 2025-07-10 RX ORDER — BETAMETHASONE DIPROPIONATE 0.5 MG/G
CREAM TOPICAL PRN
OUTPATIENT
Start: 2025-07-10

## 2025-07-10 RX ORDER — NEOMYCIN/BACITRACIN/POLYMYXINB 3.5-400-5K
OINTMENT (GRAM) TOPICAL PRN
OUTPATIENT
Start: 2025-07-10

## 2025-07-10 RX ORDER — CLOBETASOL PROPIONATE 0.5 MG/G
OINTMENT TOPICAL PRN
OUTPATIENT
Start: 2025-07-10

## 2025-07-10 RX ORDER — MUPIROCIN 2 %
OINTMENT (GRAM) TOPICAL PRN
OUTPATIENT
Start: 2025-07-10

## 2025-07-10 RX ORDER — SODIUM CHLOR/HYPOCHLOROUS ACID 0.033 %
SOLUTION, IRRIGATION IRRIGATION PRN
OUTPATIENT
Start: 2025-07-10

## 2025-07-10 RX ORDER — LIDOCAINE 40 MG/G
CREAM TOPICAL PRN
OUTPATIENT
Start: 2025-07-10

## 2025-07-10 RX ORDER — TRIAMCINOLONE ACETONIDE 1 MG/G
OINTMENT TOPICAL PRN
OUTPATIENT
Start: 2025-07-10

## 2025-07-10 RX ORDER — GENTAMICIN SULFATE 1 MG/G
OINTMENT TOPICAL PRN
OUTPATIENT
Start: 2025-07-10

## 2025-07-10 RX ORDER — SILVER SULFADIAZINE 10 MG/G
CREAM TOPICAL PRN
OUTPATIENT
Start: 2025-07-10

## 2025-07-10 RX ORDER — BACITRACIN ZINC AND POLYMYXIN B SULFATE 500; 1000 [USP'U]/G; [USP'U]/G
OINTMENT TOPICAL PRN
OUTPATIENT
Start: 2025-07-10

## 2025-07-10 RX ORDER — GINSENG 100 MG
CAPSULE ORAL PRN
OUTPATIENT
Start: 2025-07-10

## 2025-07-10 RX ORDER — LIDOCAINE 50 MG/G
OINTMENT TOPICAL PRN
OUTPATIENT
Start: 2025-07-10

## 2025-07-10 RX ADMIN — LIDOCAINE HYDROCHLORIDE: 20 JELLY TOPICAL at 11:13

## 2025-07-10 ASSESSMENT — PAIN DESCRIPTION - LOCATION: LOCATION: LEG

## 2025-07-10 ASSESSMENT — PAIN DESCRIPTION - ORIENTATION: ORIENTATION: RIGHT

## 2025-07-10 ASSESSMENT — PAIN DESCRIPTION - PAIN TYPE: TYPE: CHRONIC PAIN

## 2025-07-10 ASSESSMENT — PAIN DESCRIPTION - DESCRIPTORS: DESCRIPTORS: SORE;BURNING

## 2025-07-10 ASSESSMENT — PAIN DESCRIPTION - ONSET: ONSET: ON-GOING

## 2025-07-10 ASSESSMENT — PAIN SCALES - GENERAL: PAINLEVEL_OUTOF10: 7

## 2025-07-10 ASSESSMENT — PAIN - FUNCTIONAL ASSESSMENT: PAIN_FUNCTIONAL_ASSESSMENT: ACTIVITIES ARE NOT PREVENTED

## 2025-07-10 ASSESSMENT — PAIN DESCRIPTION - FREQUENCY: FREQUENCY: INTERMITTENT

## 2025-07-10 NOTE — PLAN OF CARE
Multilayer Compression Wrap   (Not Unna) Below the Knee    NAME:  Chelsy Snell  YOB: 1940  MEDICAL RECORD NUMBER:  [id number]  DATE:  7/10/2025    Multilayer compression wrap: Removed old Multilayer wrap if indicated and wash leg with mild soap/water.  Applied moisturizing agent to dry skin as needed.   Applied primary and secondary dressing as ordered.  Applied multilayered dressing below the knee to right lower leg.  Applied multilayered dressing below the knee to left lower leg.  Instructed patient/caregiver not to remove dressing and to keep it clean and dry.   Instructed patient/caregiver on complications to report to provider, such as pain, numbness in toes, heavy drainage, and slippage of dressing.  Instructed patient on purpose of compression dressing and on activity and exercise recommendations.      Electronically signed by Sophia Clement RN on 7/10/2025 at 11:58 AM

## 2025-07-10 NOTE — PROGRESS NOTES
[facility]   Progress Note and Procedure Note      Chelsy Snell  MEDICAL RECORD NUMBER:  [id number]  AGE: 85 y.o.   GENDER: female  : 1940  EPISODE DATE:  7/10/2025    Subjective:     Chief Complaint   Patient presents with    Wound Check     Right leg wound check         HISTORY of PRESENT ILLNESS HPI     Chelsy Snell is a 85 y.o. female who presents today for wound/ulcer evaluation.   Wound Context: Pt with RLE wounds here for eval/treat  Wound/Ulcer Pain Timing/Severity: none  Quality of pain: N/A  Severity:  0 / 10   Modifying Factors: None  Associated Signs/Symptoms: none    Ulcer Identification:  Ulcer Type: lymphedema  Contributing Factors: lymphedema    Wound: venous        PAST MEDICAL HISTORY        Diagnosis Date    Anxiety     Carotid artery stenosis     Chronic back pain     H/O blood clots     Hx of blood clots     bilat legs    Hyperlipidemia     Hypertension     Osteoarthritis        PAST SURGICAL HISTORY    Past Surgical History:   Procedure Laterality Date    CATARACT REMOVAL Bilateral     CHOLECYSTECTOMY      EYE SURGERY      HYSTERECTOMY (CERVIX STATUS UNKNOWN)      JOINT REPLACEMENT      KNEE ARTHROPLASTY Right     OTHER SURGICAL HISTORY      Decompression of ulnar nerve     REMOVE HARDWARE FEMUR Left 2019    HARDWARE REMOVAL performed by Gavin Lynne MD at [facility] OR    REVISION TOTAL KNEE ARTHROPLASTY Left 2019    LEFT KNEE REVISION performed by Gavin Lynne MD at [facility] OR    SHOULDER ARTHROPLASTY Left        FAMILY HISTORY    Family History   Problem Relation Age of Onset    Dementia Mother     Heart Disease Father     Cancer Brother     COPD Other     Other Other         Blood clot    Diabetes Other        SOCIAL HISTORY    Social History     Tobacco Use    Smoking status: Never    Smokeless tobacco: Never   Vaping Use    Vaping status: Never Used   Substance Use Topics    Alcohol use: No    Drug use: No       ALLERGIES    Allergies   Allergen

## 2025-07-10 NOTE — PATIENT INSTRUCTIONS
[facility] Wound Care and Hyperbaric Oxygen Therapy   Physician Orders and Discharge Instructions  [address] [place] [address]  [address]  [place] [address]  Telephone: [phone]      FAX [phone]    NAME:  Chelsy Snell  YOB: 1940  MEDICAL RECORD NUMBER:  [id number]  DATE:  7/10/2025    Discharge condition: Stable    Discharge to: Home    Left via:Private automobile    Accompanied by: Self    [facility]  Home Health to Change Accu Wrap on Monday and Thursday unless patient has a wound care appointment on that day    Dressing Orders: Right and Left Legs  Silver Alginate to open areas  Accu wrap, Keep clean and Dry  Elevate feet to level or above heart 3-4 times daily and as needed to for 30 minutes to reduce swelling  If wrap gets wet, Causes increased pain, Slides down or you are unable to make your next scheduled  Appointment call home health to be rewrapped  Multi Vitamin, High Protein diet as tolerated  Limit time in the chair to two hours at a time then lay down with legs above heart for 30 minutes to reduce and prevent swelling   Ok to remove wrap the day of your wound care appointment for a shower    [facility] follow up visit _________1 week____________________  (Please note your next appointment above and if you are unable to keep, kindly give a 24 hour notice. Thank you.)    If you experience any of the following, please call the Wound Care Center during business hours:    * Increase in Pain  * Temperature over 101  * Increase in drainage from your wound  * Drainage with a foul odor  * Bleeding  * Increase in swelling  * Need for compression bandage changes due to slippage, breakthrough drainage.    If you need medical attention outside of the business hours of the Wound Care Centers please contact your PCP or go to the nearest emergency room.

## 2025-07-17 ENCOUNTER — HOSPITAL ENCOUNTER (OUTPATIENT)
Dept: WOUND CARE | Age: 85
Discharge: HOME | End: 2025-07-17
Attending: NURSE PRACTITIONER
Payer: MEDICARE

## 2025-07-17 VITALS
TEMPERATURE: 97.1 F | HEIGHT: 60 IN | WEIGHT: 186 LBS | SYSTOLIC BLOOD PRESSURE: 136 MMHG | DIASTOLIC BLOOD PRESSURE: 63 MMHG | BODY MASS INDEX: 36.52 KG/M2 | HEART RATE: 56 BPM

## 2025-07-17 DIAGNOSIS — L97.912 SKIN ULCER OF RIGHT LOWER LEG, WITH FAT LAYER EXPOSED (HCC): Primary | ICD-10-CM

## 2025-07-17 PROCEDURE — 97597 DBRDMT OPN WND 1ST 20 CM/<: CPT

## 2025-07-17 PROCEDURE — 97597 DBRDMT OPN WND 1ST 20 CM/<: CPT | Performed by: SURGERY

## 2025-07-17 RX ORDER — SODIUM CHLOR/HYPOCHLOROUS ACID 0.033 %
SOLUTION, IRRIGATION IRRIGATION PRN
OUTPATIENT
Start: 2025-07-17

## 2025-07-17 RX ORDER — GINSENG 100 MG
CAPSULE ORAL PRN
OUTPATIENT
Start: 2025-07-17

## 2025-07-17 RX ORDER — CLOBETASOL PROPIONATE 0.5 MG/G
OINTMENT TOPICAL PRN
OUTPATIENT
Start: 2025-07-17

## 2025-07-17 RX ORDER — LIDOCAINE HYDROCHLORIDE 20 MG/ML
JELLY TOPICAL PRN
OUTPATIENT
Start: 2025-07-17

## 2025-07-17 RX ORDER — LIDOCAINE HYDROCHLORIDE 40 MG/ML
SOLUTION TOPICAL PRN
OUTPATIENT
Start: 2025-07-17

## 2025-07-17 RX ORDER — SILVER SULFADIAZINE 10 MG/G
CREAM TOPICAL PRN
OUTPATIENT
Start: 2025-07-17

## 2025-07-17 RX ORDER — GENTAMICIN SULFATE 1 MG/G
OINTMENT TOPICAL PRN
OUTPATIENT
Start: 2025-07-17

## 2025-07-17 RX ORDER — NEOMYCIN/BACITRACIN/POLYMYXINB 3.5-400-5K
OINTMENT (GRAM) TOPICAL PRN
OUTPATIENT
Start: 2025-07-17

## 2025-07-17 RX ORDER — BETAMETHASONE DIPROPIONATE 0.5 MG/G
CREAM TOPICAL PRN
OUTPATIENT
Start: 2025-07-17

## 2025-07-17 RX ORDER — BACITRACIN ZINC AND POLYMYXIN B SULFATE 500; 1000 [USP'U]/G; [USP'U]/G
OINTMENT TOPICAL PRN
OUTPATIENT
Start: 2025-07-17

## 2025-07-17 RX ORDER — LIDOCAINE 50 MG/G
OINTMENT TOPICAL PRN
OUTPATIENT
Start: 2025-07-17

## 2025-07-17 RX ORDER — LIDOCAINE 40 MG/G
CREAM TOPICAL PRN
OUTPATIENT
Start: 2025-07-17

## 2025-07-17 RX ORDER — TRIAMCINOLONE ACETONIDE 1 MG/G
OINTMENT TOPICAL PRN
OUTPATIENT
Start: 2025-07-17

## 2025-07-17 RX ORDER — MUPIROCIN 2 %
OINTMENT (GRAM) TOPICAL PRN
OUTPATIENT
Start: 2025-07-17

## 2025-07-17 RX ORDER — LIDOCAINE HYDROCHLORIDE 20 MG/ML
JELLY TOPICAL PRN
Status: DISCONTINUED | OUTPATIENT
Start: 2025-07-17 | End: 2025-07-19 | Stop reason: HOSPADM

## 2025-07-17 RX ADMIN — LIDOCAINE HYDROCHLORIDE: 20 JELLY TOPICAL at 11:02

## 2025-07-17 ASSESSMENT — PAIN DESCRIPTION - FREQUENCY: FREQUENCY: INTERMITTENT

## 2025-07-17 ASSESSMENT — PAIN - FUNCTIONAL ASSESSMENT: PAIN_FUNCTIONAL_ASSESSMENT: ACTIVITIES ARE NOT PREVENTED

## 2025-07-17 ASSESSMENT — PAIN DESCRIPTION - ORIENTATION: ORIENTATION: RIGHT

## 2025-07-17 ASSESSMENT — PAIN SCALES - GENERAL: PAINLEVEL_OUTOF10: 6

## 2025-07-17 ASSESSMENT — PAIN DESCRIPTION - PAIN TYPE: TYPE: CHRONIC PAIN

## 2025-07-17 ASSESSMENT — PAIN DESCRIPTION - DESCRIPTORS: DESCRIPTORS: SORE;BURNING

## 2025-07-17 ASSESSMENT — PAIN DESCRIPTION - LOCATION: LOCATION: LEG

## 2025-07-17 ASSESSMENT — PAIN DESCRIPTION - ONSET: ONSET: ON-GOING

## 2025-07-17 NOTE — PROGRESS NOTES
[facility] Wound Care Center   Progress Note and Procedure Note      Chelsy Snell  MEDICAL RECORD NUMBER:  [id number]  AGE: 85 y.o.   GENDER: female  : 1940  EPISODE DATE:  2025    Subjective:     Chief Complaint   Patient presents with    Wound Check     Pt presents for recheck of right leg wounds         HISTORY of PRESENT ILLNESS HPI     Chelsy Snell is a 85 y.o. female who presents today for wound/ulcer evaluation.   Wound Context: Pt with RLE wound here for eval/treat  Wound/Ulcer Pain Timing/Severity: none  Quality of pain: N/A  Severity:  0 / 10   Modifying Factors: None  Associated Signs/Symptoms: none    Ulcer Identification:  Ulcer Type: venous  Contributing Factors: edema and venous stasis    Wound: venous        PAST MEDICAL HISTORY        Diagnosis Date    Anxiety     Carotid artery stenosis     Chronic back pain     H/O blood clots     Hx of blood clots     bilat legs    Hyperlipidemia     Hypertension     Osteoarthritis        PAST SURGICAL HISTORY    Past Surgical History:   Procedure Laterality Date    CATARACT REMOVAL Bilateral     CHOLECYSTECTOMY      EYE SURGERY      HYSTERECTOMY (CERVIX STATUS UNKNOWN)      JOINT REPLACEMENT      KNEE ARTHROPLASTY Right     OTHER SURGICAL HISTORY      Decompression of ulnar nerve     REMOVE HARDWARE FEMUR Left 2019    HARDWARE REMOVAL performed by Gavin Lynne MD at [facility] OR    REVISION TOTAL KNEE ARTHROPLASTY Left 2019    LEFT KNEE REVISION performed by Gavin Lynne MD at [facility] OR    SHOULDER ARTHROPLASTY Left        FAMILY HISTORY    Family History   Problem Relation Age of Onset    Dementia Mother     Heart Disease Father     Cancer Brother     COPD Other     Other Other         Blood clot    Diabetes Other        SOCIAL HISTORY    Social History     Tobacco Use    Smoking status: Never    Smokeless tobacco: Never   Vaping Use    Vaping status: Never Used   Substance Use Topics    Alcohol use: No    Drug use: No

## 2025-07-17 NOTE — PATIENT INSTRUCTIONS
[facility] Wound Care and Hyperbaric Oxygen Therapy   Physician Orders and Discharge Instructions  [address] [place] [address]  [address]  [place] [address]  Telephone: [phone]      FAX [phone]    NAME:  Chelsy Snell  YOB: 1940  MEDICAL RECORD NUMBER:  [id number]  DATE:  7/17/2025    Discharge condition: Stable    Discharge to: Home    Left via:Private automobile    Accompanied by: Self    [facility]  Home Health to Change Accu Wrap on Monday and Thursday unless patient has a wound care appointment on that day    Dressing Orders: Right and Left Legs  Silver Alginate to open areas  Accu wrap, Keep clean and Dry  Elevate feet to level or above heart 3-4 times daily and as needed to for 30 minutes to reduce swelling  If wrap gets wet, Causes increased pain, Slides down or you are unable to make your next scheduled  Appointment call home health to be rewrapped  Multi Vitamin, High Protein diet as tolerated  Limit time in the chair to two hours at a time then lay down with legs above heart for 30 minutes to reduce and  prevent swelling  Ok to remove wrap the day of your wound care appointment for a shower    [facility] follow up visit ___________1 week__________________  (Please note your next appointment above and if you are unable to keep, kindly give a 24 hour notice. Thank you.)    If you experience any of the following, please call the Wound Care Center during business hours:    * Increase in Pain  * Temperature over 101  * Increase in drainage from your wound  * Drainage with a foul odor  * Bleeding  * Increase in swelling  * Need for compression bandage changes due to slippage, breakthrough drainage.    If you need medical attention outside of the business hours of the Wound Care Centers please contact your PCP or go to the nearest emergency room.

## 2025-07-21 ENCOUNTER — TELEPHONE (OUTPATIENT)
Dept: INTERNAL MEDICINE | Facility: CLINIC | Age: 85
End: 2025-07-21
Payer: MEDICARE

## 2025-07-21 NOTE — TELEPHONE ENCOUNTER
Please have her continue taking her inhaler.  If her symptoms do not improve with the inhaler (and/or the O2 saturation decreases below 90%) would advise her to be evaluated in the office or urgent care/ER.

## 2025-07-21 NOTE — TELEPHONE ENCOUNTER
Vla with [facility] called regarding patient and symptoms. Reports audible wheezing and took all meds as prescribed (lasix and spironolactone). She does have a slight cough. Vitals are as follows: Oxygen 91%, temp 97.8, heart rate 64, bp 118/62.

## 2025-07-21 NOTE — TELEPHONE ENCOUNTER
Val with [employer] ([phone]) notified regarding message from PCP about continuing inhaler, if O2 saturation decreases below 90 will need to report to UC/ER or be seen in the office.

## 2025-07-21 NOTE — TELEPHONE ENCOUNTER
Patient was heard in the back stating she is always tired, no body aches, non-productive cough. States symptoms comes and goes. Reports feeling  fine before breakfast.

## 2025-07-22 ENCOUNTER — RESULTS FOLLOW-UP (OUTPATIENT)
Dept: INTERNAL MEDICINE | Facility: CLINIC | Age: 85
End: 2025-07-22

## 2025-07-22 ENCOUNTER — LAB (OUTPATIENT)
Dept: LAB | Facility: HOSPITAL | Age: 85
End: 2025-07-22
Payer: MEDICARE

## 2025-07-22 ENCOUNTER — HOSPITAL ENCOUNTER (OUTPATIENT)
Dept: GENERAL RADIOLOGY | Facility: HOSPITAL | Age: 85
Discharge: HOME | End: 2025-07-22
Payer: MEDICARE

## 2025-07-22 ENCOUNTER — OFFICE VISIT (OUTPATIENT)
Dept: INTERNAL MEDICINE | Facility: CLINIC | Age: 85
End: 2025-07-22
Payer: MEDICARE

## 2025-07-22 VITALS
DIASTOLIC BLOOD PRESSURE: 56 MMHG | HEART RATE: 57 BPM | TEMPERATURE: 98.1 F | OXYGEN SATURATION: 98 % | SYSTOLIC BLOOD PRESSURE: 149 MMHG

## 2025-07-22 DIAGNOSIS — R06.2 WHEEZING: ICD-10-CM

## 2025-07-22 DIAGNOSIS — J18.9 PNEUMONIA OF RIGHT LOWER LOBE DUE TO INFECTIOUS ORGANISM: Primary | ICD-10-CM

## 2025-07-22 DIAGNOSIS — R06.02 SHORTNESS OF BREATH: ICD-10-CM

## 2025-07-22 DIAGNOSIS — E87.1 HYPONATREMIA: ICD-10-CM

## 2025-07-22 DIAGNOSIS — R06.2 WHEEZING: Primary | ICD-10-CM

## 2025-07-22 LAB
ANION GAP SERPL CALCULATED.3IONS-SCNC: 11 MMOL/L (ref 5–15)
BUN SERPL-MCNC: 29.2 MG/DL (ref 8–23)
BUN/CREAT SERPL: 34.8 (ref 7–25)
CALCIUM SPEC-SCNC: 9.2 MG/DL (ref 8.6–10.5)
CHLORIDE SERPL-SCNC: 98 MMOL/L (ref 98–107)
CO2 SERPL-SCNC: 24 MMOL/L (ref 22–29)
CREAT SERPL-MCNC: 0.84 MG/DL (ref 0.57–1)
EGFRCR SERPLBLD CKD-EPI 2021: 69 ML/MIN/1.73
EXPIRATION DATE: NORMAL
FLUAV AG UPPER RESP QL IA.RAPID: NOT DETECTED
FLUBV AG UPPER RESP QL IA.RAPID: NOT DETECTED
GLUCOSE SERPL-MCNC: 97 MG/DL (ref 65–99)
INTERNAL CONTROL: NORMAL
Lab: NORMAL
POTASSIUM SERPL-SCNC: 4.5 MMOL/L (ref 3.5–5.2)
SARS-COV-2 AG UPPER RESP QL IA.RAPID: NOT DETECTED
SODIUM SERPL-SCNC: 133 MMOL/L (ref 136–145)

## 2025-07-22 PROCEDURE — 94640 AIRWAY INHALATION TREATMENT: CPT | Performed by: INTERNAL MEDICINE

## 2025-07-22 PROCEDURE — 99214 OFFICE O/P EST MOD 30 MIN: CPT | Performed by: INTERNAL MEDICINE

## 2025-07-22 PROCEDURE — 3078F DIAST BP <80 MM HG: CPT | Performed by: INTERNAL MEDICINE

## 2025-07-22 PROCEDURE — 87428 SARSCOV & INF VIR A&B AG IA: CPT | Performed by: INTERNAL MEDICINE

## 2025-07-22 PROCEDURE — 96372 THER/PROPH/DIAG INJ SC/IM: CPT | Performed by: INTERNAL MEDICINE

## 2025-07-22 PROCEDURE — 3077F SYST BP >= 140 MM HG: CPT | Performed by: INTERNAL MEDICINE

## 2025-07-22 PROCEDURE — 80048 BASIC METABOLIC PNL TOTAL CA: CPT

## 2025-07-22 PROCEDURE — 36415 COLL VENOUS BLD VENIPUNCTURE: CPT

## 2025-07-22 PROCEDURE — 71046 X-RAY EXAM CHEST 2 VIEWS: CPT

## 2025-07-22 RX ORDER — ALBUTEROL SULFATE 0.83 MG/ML
2.5 SOLUTION RESPIRATORY (INHALATION) EVERY 4 HOURS PRN
Qty: 100 EACH | Refills: 12 | Status: SHIPPED | OUTPATIENT
Start: 2025-07-22

## 2025-07-22 RX ORDER — METHYLPREDNISOLONE SODIUM SUCCINATE 40 MG/ML
40 INJECTION, POWDER, LYOPHILIZED, FOR SOLUTION INTRAMUSCULAR; INTRAVENOUS ONCE
Status: COMPLETED | OUTPATIENT
Start: 2025-07-22 | End: 2025-07-22

## 2025-07-22 RX ORDER — AZITHROMYCIN 250 MG/1
TABLET, FILM COATED ORAL
Qty: 6 TABLET | Refills: 0 | Status: SHIPPED | OUTPATIENT
Start: 2025-07-22

## 2025-07-22 RX ORDER — ALBUTEROL SULFATE 0.83 MG/ML
2.5 SOLUTION RESPIRATORY (INHALATION) ONCE
Status: COMPLETED | OUTPATIENT
Start: 2025-07-22 | End: 2025-07-22

## 2025-07-22 RX ORDER — METHYLPREDNISOLONE 4 MG/1
TABLET ORAL
Qty: 21 TABLET | Refills: 0 | Status: SHIPPED | OUTPATIENT
Start: 2025-07-22

## 2025-07-22 RX ORDER — CEFDINIR 300 MG/1
300 CAPSULE ORAL 2 TIMES DAILY
Qty: 14 CAPSULE | Refills: 0 | Status: SHIPPED | OUTPATIENT
Start: 2025-07-22 | End: 2025-07-29

## 2025-07-22 RX ORDER — BUDESONIDE AND FORMOTEROL FUMARATE DIHYDRATE 160; 4.5 UG/1; UG/1
2 AEROSOL RESPIRATORY (INHALATION)
Qty: 10.2 G | Refills: 12 | Status: SHIPPED | OUTPATIENT
Start: 2025-07-22

## 2025-07-22 RX ADMIN — METHYLPREDNISOLONE SODIUM SUCCINATE 40 MG: 40 INJECTION, POWDER, LYOPHILIZED, FOR SOLUTION INTRAMUSCULAR; INTRAVENOUS at 11:28

## 2025-07-22 RX ADMIN — ALBUTEROL SULFATE 2.5 MG: 0.83 SOLUTION RESPIRATORY (INHALATION) at 11:31

## 2025-07-22 NOTE — PROGRESS NOTES
Chief Complaint   Patient presents with    Cough    Wheezing         History:  Carmen Obrien is a 85 y.o. female who presents today for evaluation of the above problems.      HPI  History of Present Illness  The patient presents for evaluation of wheezing.    She reports experiencing intermittent wheezing for approximately a month, which has been particularly severe over the past weekend. She also experiences shortness of breath. She reports constant fatigue for some time and is unsure why her wheezing symptoms worsened over the weekend. Her home health nurse suggested seeking immediate medical attention at an emergency room or urgent care, but she preferred to consult here first. She has previously taken steroids without any adverse effects. Her oxygen level was recorded as 91 during the night and 98 this morning by the nurse at her assisted living facility.  It is also 98% today in the office.  She experiences difficulty breathing when lying down at night, a symptom that has persisted for over a month. She does not have a nebulizer at home. She has been using albuterol inhaler, which provides some relief but not complete resolution of her wheezing. She has no known history of asthma.    Social History:  Living Condition: Assisted living facility    PAST SURGICAL HISTORY:  Esophageal dilation    FAMILY HISTORY  Her son had asthma, COPD, and pancreatitis.      ROS:  Review of Systems  See above    Current Outpatient Medications:     albuterol sulfate  (90 Base) MCG/ACT inhaler, Inhale 2 puffs Every 4 (Four) Hours As Needed for Wheezing or Shortness of Air., Disp: 8 g, Rfl: 11    alendronate (Fosamax) 70 MG tablet, Take 1 tablet by mouth Every 7 (Seven) Days., Disp: 4 tablet, Rfl: 5    allopurinol (ZYLOPRIM) 100 MG tablet, Take 1 tablet by mouth Daily., Disp: 90 tablet, Rfl: 3    amLODIPine (NORVASC) 10 MG tablet, Take 1 tablet by mouth Daily., Disp: 90 tablet, Rfl: 3    ascorbic acid (VITAMIN C) 500 MG  tablet, Take 1 tablet by mouth Daily., Disp: , Rfl:     atorvastatin (LIPITOR) 10 MG tablet, TAKE ONE TABLET BY MOUTH EVERY DAY, Disp: 90 tablet, Rfl: 3    Calcium Carbonate-Vit D-Min (Caltrate 600+D Plus Minerals) 600-800 MG-UNIT tablet, Take 1 tablet by mouth 2 (Two) Times a Day., Disp: , Rfl:     carvedilol (COREG) 12.5 MG tablet, Take 1 tablet by mouth 2 (Two) Times a Day With Meals., Disp: , Rfl:     cloNIDine (CATAPRES) 0.1 MG tablet, Take 1 tablet by mouth 2 (Two) Times a Day As Needed for High Blood Pressure (systolic greater than 180)., Disp: 30 tablet, Rfl: 0    esomeprazole (nexIUM) 20 MG capsule, Take 1 capsule by mouth 2 (Two) Times a Day., Disp: 60 capsule, Rfl: 11    FEROSUL 325 (65 Fe) MG tablet, Take 1 tablet by mouth Daily With Breakfast., Disp: , Rfl:     fluticasone (FLONASE) 50 MCG/ACT nasal spray, Administer 2 sprays into the nostril(s) as directed by provider Daily., Disp: 15.8 mL, Rfl: 3    furosemide (LASIX) 20 MG tablet, Take 2 tablets by mouth Daily., Disp: 90 tablet, Rfl: 6    guaiFENesin (MUCINEX) 600 MG 12 hr tablet, Take 2 tablets by mouth 2 (Two) Times a Day., Disp: 120 tablet, Rfl: 1    hydrOXYzine (ATARAX) 25 MG tablet, Take 1 tablet by mouth Every 8 (Eight) Hours As Needed for Anxiety., Disp: 60 tablet, Rfl: 5    lisinopril (PRINIVIL,ZESTRIL) 40 MG tablet, Take 1 tablet by mouth Daily., Disp: 90 tablet, Rfl: 3    Melatonin 10 MG capsule, Take  by mouth. 2 tab, Disp: , Rfl:     nystatin (MYCOSTATIN) 156239 UNIT/GM powder, Apply  topically to the appropriate area as directed 3 (Three) Times a Day., Disp: 60 g, Rfl: 11    oxyCODONE-acetaminophen (PERCOCET) 5-325 MG per tablet, Take 1 tablet by mouth Every 8 (Eight) Hours As Needed for Moderate Pain., Disp: 28 tablet, Rfl: 0    pregabalin (LYRICA) 75 MG capsule, Take 1 capsule by mouth 2 (Two) Times a Day., Disp: , Rfl:     sertraline (ZOLOFT) 100 MG tablet, Take 1 tablet by mouth Daily., Disp: 90 tablet, Rfl: 3    spironolactone  (ALDACTONE) 25 MG tablet, Take 1 tablet by mouth Daily., Disp: 90 tablet, Rfl: 3    Vibegron 75 MG tablet, Take 1 tablet by mouth Daily., Disp: 30 tablet, Rfl: 11    vitamin B-12 (CYANOCOBALAMIN) 1000 MCG tablet, Take 1 tablet by mouth Daily., Disp: , Rfl:     vitamin D (ERGOCALCIFEROL) 1.25 MG (69619 UT) capsule capsule, Take 1 capsule by mouth Every 14 (Fourteen) Days. Every other Friday, Disp: , Rfl:     albuterol (PROVENTIL) (2.5 MG/3ML) 0.083% nebulizer solution, Take 2.5 mg by nebulization Every 4 (Four) Hours As Needed for Wheezing., Disp: 100 each, Rfl: 12    budesonide-formoterol (Symbicort) 160-4.5 MCG/ACT inhaler, Inhale 2 puffs 2 (Two) Times a Day., Disp: 10.2 g, Rfl: 12    methylPREDNISolone (MEDROL) 4 MG dose pack, Take as directed on package instructions., Disp: 21 tablet, Rfl: 0  No current facility-administered medications for this visit.    Lab Results   Component Value Date    GLUCOSE 93 06/03/2025    BUN 25.2 (H) 06/03/2025    CREATININE 0.85 06/03/2025     (L) 06/03/2025    K 4.9 06/03/2025    CL 99 06/03/2025    CALCIUM 9.1 06/03/2025    PROTEINTOT 6.9 05/06/2025    ALBUMIN 4.3 05/06/2025    ALT 20 05/06/2025    AST 21 05/06/2025    ALKPHOS 50 05/06/2025    BILITOT 0.4 05/06/2025    GLOB 2.6 05/06/2025    AGRATIO 1.7 05/06/2025    BCR 29.6 (H) 06/03/2025    ANIONGAP 12.0 06/03/2025    EGFR 68.1 06/03/2025       WBC   Date Value Ref Range Status   05/06/2025 9.42 3.40 - 10.80 10*3/mm3 Final   07/16/2019 7.0 4.8 - 10.8 K/uL Final     RBC   Date Value Ref Range Status   05/06/2025 3.41 (L) 3.77 - 5.28 10*6/mm3 Final   07/16/2019 3.52 (L) 4.20 - 5.40 M/uL Final     Hemoglobin   Date Value Ref Range Status   05/06/2025 10.4 (L) 12.0 - 15.9 g/dL Final   07/16/2019 10.1 (L) 12.0 - 16.0 g/dL Final     Hematocrit   Date Value Ref Range Status   05/06/2025 33.2 (L) 34.0 - 46.6 % Final   07/16/2019 31.7 (L) 37.0 - 47.0 % Final     MCV   Date Value Ref Range Status   05/06/2025 97.4 (H) 79.0 - 97.0  fL Final   07/16/2019 90.1 81.0 - 99.0 fL Final     MCH   Date Value Ref Range Status   05/06/2025 30.5 26.6 - 33.0 pg Final   07/16/2019 28.7 27.0 - 31.0 pg Final     MCHC   Date Value Ref Range Status   05/06/2025 31.3 (L) 31.5 - 35.7 g/dL Final   07/16/2019 31.9 (L) 33.0 - 37.0 g/dL Final     RDW   Date Value Ref Range Status   05/06/2025 14.5 12.3 - 15.4 % Final   07/16/2019 14.7 (H) 11.5 - 14.5 % Final     RDW-SD   Date Value Ref Range Status   05/06/2025 51.8 37.0 - 54.0 fl Final     MPV   Date Value Ref Range Status   05/06/2025 10.4 6.0 - 12.0 fL Final   07/16/2019 10.8 9.4 - 12.3 fL Final     Platelets   Date Value Ref Range Status   05/06/2025 171 140 - 450 10*3/mm3 Final   07/16/2019 248 130 - 400 K/uL Final     Neutrophil Rel %   Date Value Ref Range Status   07/04/2019 76.5 (H) 50.0 - 65.0 % Final     Neutrophil %   Date Value Ref Range Status   01/08/2024 71.9 42.7 - 76.0 % Final     Lymphocyte Rel %   Date Value Ref Range Status   07/04/2019 13.9 (L) 20.0 - 40.0 % Final     Lymphocyte %   Date Value Ref Range Status   01/08/2024 17.2 (L) 19.6 - 45.3 % Final     Monocyte Rel %   Date Value Ref Range Status   07/04/2019 7.4 0.0 - 10.0 % Final     Monocyte %   Date Value Ref Range Status   01/08/2024 7.5 5.0 - 12.0 % Final     Eosinophil Rel %   Date Value Ref Range Status   07/04/2019 1.4 0.0 - 5.0 % Final     Eosinophil %   Date Value Ref Range Status   01/08/2024 2.2 0.3 - 6.2 % Final     Basophil Rel %   Date Value Ref Range Status   07/04/2019 0.4 0.0 - 1.0 % Final     Basophil %   Date Value Ref Range Status   01/08/2024 0.7 0.0 - 1.5 % Final     Immature Grans %   Date Value Ref Range Status   01/08/2024 0.5 0.0 - 0.5 % Final     Neutrophils Absolute   Date Value Ref Range Status   07/04/2019 7.7 (H) 1.5 - 7.5 K/uL Final     Neutrophils, Absolute   Date Value Ref Range Status   01/08/2024 5.27 1.70 - 7.00 10*3/mm3 Final     Lymphocytes Absolute   Date Value Ref Range Status   07/04/2019 1.4 1.1 -  4.5 K/uL Final     Lymphocytes, Absolute   Date Value Ref Range Status   01/08/2024 1.26 0.70 - 3.10 10*3/mm3 Final     Monocytes Absolute   Date Value Ref Range Status   07/04/2019 0.80 0.00 - 0.90 K/uL Final     Monocytes, Absolute   Date Value Ref Range Status   01/08/2024 0.55 0.10 - 0.90 10*3/mm3 Final     Eosinophils Absolute   Date Value Ref Range Status   07/04/2019 0.10 0.00 - 0.60 K/uL Final     Eosinophils, Absolute   Date Value Ref Range Status   01/08/2024 0.16 0.00 - 0.40 10*3/mm3 Final     Basophils Absolute   Date Value Ref Range Status   07/04/2019 0.00 0.00 - 0.20 K/uL Final     Basophils, Absolute   Date Value Ref Range Status   01/08/2024 0.05 0.00 - 0.20 10*3/mm3 Final     Immature Grans, Absolute   Date Value Ref Range Status   01/08/2024 0.04 0.00 - 0.05 10*3/mm3 Final     nRBC   Date Value Ref Range Status   01/08/2024 0.0 0.0 - 0.2 /100 WBC Final         OBJECTIVE:  Visit Vitals  /56 (BP Location: Left arm, Patient Position: Sitting, Cuff Size: Large Adult)   Pulse 57   Temp 98.1 °F (36.7 °C) (Temporal)   SpO2 98%      Physical Exam  Constitutional:       General: She is not in acute distress.     Appearance: She is well-developed. She is ill-appearing. She is not diaphoretic.   HENT:      Head: Normocephalic and atraumatic.   Eyes:      Pupils: Pupils are equal, round, and reactive to light.   Neck:      Thyroid: No thyromegaly.      Trachea: Phonation normal.   Cardiovascular:      Rate and Rhythm: Normal rate and regular rhythm.      Heart sounds: No murmur heard.  Pulmonary:      Effort: Prolonged expiration present. No accessory muscle usage or respiratory distress.      Breath sounds: No stridor. Wheezing present. No decreased breath sounds, rhonchi or rales.   Skin:     Coloration: Skin is not pale.      Findings: No erythema.   Neurological:      Mental Status: She is alert.   Psychiatric:         Behavior: Behavior normal.         Thought Content: Thought content normal.          Judgment: Judgment normal.           Assessment/Plan      Diagnoses and all orders for this visit:    1. Wheezing (Primary)  -     methylPREDNISolone (MEDROL) 4 MG dose pack; Take as directed on package instructions.  Dispense: 21 tablet; Refill: 0  -     budesonide-formoterol (Symbicort) 160-4.5 MCG/ACT inhaler; Inhale 2 puffs 2 (Two) Times a Day.  Dispense: 10.2 g; Refill: 12  -     methylPREDNISolone sodium succinate (SOLU-Medrol) injection 40 mg  -     Cancel: Nebulizer Treatment  -     POCT SARS-CoV-2 Antigen DEVORAH + Flu  -     Nebulizer Treatment  -     albuterol (PROVENTIL) nebulizer solution 0.083% 2.5 mg/3mL  -     Home Nebulizer  -     Home Nebulizer Accessories  -     albuterol (PROVENTIL) (2.5 MG/3ML) 0.083% nebulizer solution; Take 2.5 mg by nebulization Every 4 (Four) Hours As Needed for Wheezing.  Dispense: 100 each; Refill: 12  -     XR Chest PA & Lateral; Future    2. Shortness of breath  -     methylPREDNISolone (MEDROL) 4 MG dose pack; Take as directed on package instructions.  Dispense: 21 tablet; Refill: 0  -     budesonide-formoterol (Symbicort) 160-4.5 MCG/ACT inhaler; Inhale 2 puffs 2 (Two) Times a Day.  Dispense: 10.2 g; Refill: 12  -     methylPREDNISolone sodium succinate (SOLU-Medrol) injection 40 mg  -     Cancel: Nebulizer Treatment  -     POCT SARS-CoV-2 Antigen DEVORAH + Flu  -     Nebulizer Treatment  -     albuterol (PROVENTIL) nebulizer solution 0.083% 2.5 mg/3mL  -     Home Nebulizer  -     Home Nebulizer Accessories  -     albuterol (PROVENTIL) (2.5 MG/3ML) 0.083% nebulizer solution; Take 2.5 mg by nebulization Every 4 (Four) Hours As Needed for Wheezing.  Dispense: 100 each; Refill: 12  -     XR Chest PA & Lateral; Future      Assessment & Plan  1. Wheezing.  - Wheezing is more pronounced in the upper respiratory tract than in the lungs.  There was no stridor.  - Oxygen levels were 98% in the office and 91% the previous night.  - Chest x-ray will be ordered for further evaluation.  Allergies may be contributing to the condition.  Point-of-care testing for COVID-19 and influenza was negative today.  - Steroid injection administered today, nebulizer treatment provided, steroid pack to start tomorrow, new Symbicort inhaler prescribed for daily use. Nebulizer supplies, including mask and medication, will be provided for home use.  She will use albuterol nebulizer instead of the inhaler at least for now during this acute episode.  - I did note improvement in her respiratory status after the nebulizer with decreased wheezing on physical exam.  Subjectively she also felt better.    Follow-up: Follow-up appointment is scheduled for 1 week from now.    PROCEDURE  Procedure: Nebulizer treatment  - Procedural Discussion: Discussed the use of nebulizer treatment to alleviate wheezing and improve breathing. The patient agreed to the treatment.  - Medication: Albuterol  - Technique: Nebulizer treatment administered in the office.  - Post-Procedural Discussion: The patient reported feeling better after the nebulizer treatment.      Procedure: Steroid injection  - Procedural Discussion: Discussed the use of steroid injection to reduce inflammation and wheezing. The patient agreed to the injection.  - Medication: Steroid  - Technique: Steroid injection administered in the office.  - Post-Procedural Discussion: The patient reported feeling better after the steroid injection.        Return in about 1 week (around 7/29/2025) for Recheck.      AMY Noble MD  10:59 CDT  7/22/2025   Electronically signed    Patient or patient representative verbalized consent for the use of Ambient Listening during the visit with  VERA Noble MD for chart documentation. 7/22/2025  12:07 CDT

## 2025-07-24 ENCOUNTER — TELEPHONE (OUTPATIENT)
Dept: INTERNAL MEDICINE | Facility: CLINIC | Age: 85
End: 2025-07-24
Payer: MEDICARE

## 2025-07-24 NOTE — TELEPHONE ENCOUNTER
Val with [facility] called to report patient is feeling and sounds better. Val reports patient, vitals as follows: 97.8-temp, P 61, R 17, bp 131/78, and oxygen 97 % on room air. Nebulizer was delivered and she will set this up for patient. Reports patient taking all meds as prescribed.

## 2025-07-24 NOTE — TELEPHONE ENCOUNTER
Spoke to home health nurse Val who will see patient later on today. Informed that we need to know how she is feeling and possible call with vital signs.    Also patient called this morning regarding nebulizer machine and not being billed through insurance. Cost would be $40 dollars. This writer called [facility] and spoke to Sarah who advised that was taking care of and will be delivering to patient earlier this morning.   Patient called back to advise machine was there and this writer explained the message from Sarah at [facility]. Patient said thank you.

## 2025-07-24 NOTE — TELEPHONE ENCOUNTER
Caller: Carmen Obrien    Relationship: Self    Best call back number: [phone]     What is the best time to reach you: ANY    Who are you requesting to speak with (clinical staff, provider,  specific staff member): CLINICAL    Do you know the name of the person who called: SELF    What was the call regarding: WOULD LIKE A CALL BACK ABOUT THE NEBULIZER MACHINE THAT WAS DELIVERED. PLEASE CALL    Is it okay if the provider responds through MyChart: CALL BACK

## 2025-07-29 ENCOUNTER — OFFICE VISIT (OUTPATIENT)
Dept: INTERNAL MEDICINE | Facility: CLINIC | Age: 85
End: 2025-07-29
Payer: MEDICARE

## 2025-07-29 VITALS
BODY MASS INDEX: 38.64 KG/M2 | WEIGHT: 196.8 LBS | DIASTOLIC BLOOD PRESSURE: 68 MMHG | HEIGHT: 60 IN | OXYGEN SATURATION: 98 % | HEART RATE: 57 BPM | TEMPERATURE: 97.3 F | SYSTOLIC BLOOD PRESSURE: 138 MMHG

## 2025-07-29 DIAGNOSIS — R06.02 SHORTNESS OF BREATH: ICD-10-CM

## 2025-07-29 DIAGNOSIS — R06.2 WHEEZING: ICD-10-CM

## 2025-07-29 DIAGNOSIS — J02.9 SORE THROAT: ICD-10-CM

## 2025-07-29 DIAGNOSIS — J18.9 PNEUMONIA OF RIGHT LOWER LOBE DUE TO INFECTIOUS ORGANISM: Primary | ICD-10-CM

## 2025-07-29 DIAGNOSIS — L97.919 ULCER OF RIGHT LOWER EXTREMITY, UNSPECIFIED ULCER STAGE: ICD-10-CM

## 2025-07-29 PROCEDURE — 99213 OFFICE O/P EST LOW 20 MIN: CPT | Performed by: INTERNAL MEDICINE

## 2025-07-29 PROCEDURE — G2211 COMPLEX E/M VISIT ADD ON: HCPCS | Performed by: INTERNAL MEDICINE

## 2025-07-29 PROCEDURE — 3078F DIAST BP <80 MM HG: CPT | Performed by: INTERNAL MEDICINE

## 2025-07-29 PROCEDURE — 3075F SYST BP GE 130 - 139MM HG: CPT | Performed by: INTERNAL MEDICINE

## 2025-07-29 NOTE — PROGRESS NOTES
Chief Complaint   Patient presents with    Follow-up    Pneumonia         History:  Carmen Obrien is a 85 y.o. female who presents today for evaluation of the above problems.      HPI  History of Present Illness  The patient presents for 1 week follow-up for pneumonia.      She reports a sore throat, which she attributes to her use of Symbicort. Her cough has lessened, but her voice remains weak. She has been largely confined to her room, with this visit marking her first time outside. Wheezing and breathing have improved, but she continues to experience fatigue. She has never had pneumonia before. She has received both the pneumonia and RSV vaccines. Difficulty breathing occurs when lying down at night, which resolves after approximately 4 minutes. She has been using a nebulizer and Symbicort, and notes that she did not experience a sore throat when using albuterol.    Weight loss is noted due to decreased appetite, but her legs are not significantly swollen.    She has been dealing with a leg wound for the past 4 months, which is now almost healed. She has been following the advice given to her. Wound care comes and wraps it on Mondays, and she sees Dr. Diaz every Thursday.    Social History:  Sleep: She reports difficulty breathing when lying down at night, which resolves after approximately 4 minutes.  Living Condition: She has been largely confined to her room.      ROS:  Review of Systems  See above    Current Outpatient Medications:     albuterol (PROVENTIL) (2.5 MG/3ML) 0.083% nebulizer solution, Take 2.5 mg by nebulization Every 4 (Four) Hours As Needed for Wheezing., Disp: 100 each, Rfl: 12    albuterol sulfate  (90 Base) MCG/ACT inhaler, Inhale 2 puffs Every 4 (Four) Hours As Needed for Wheezing or Shortness of Air., Disp: 8 g, Rfl: 11    alendronate (Fosamax) 70 MG tablet, Take 1 tablet by mouth Every 7 (Seven) Days., Disp: 4 tablet, Rfl: 5    allopurinol (ZYLOPRIM) 100 MG tablet, Take 1  tablet by mouth Daily., Disp: 90 tablet, Rfl: 3    amLODIPine (NORVASC) 10 MG tablet, Take 1 tablet by mouth Daily., Disp: 90 tablet, Rfl: 3    ascorbic acid (VITAMIN C) 500 MG tablet, Take 1 tablet by mouth Daily., Disp: , Rfl:     atorvastatin (LIPITOR) 10 MG tablet, TAKE ONE TABLET BY MOUTH EVERY DAY, Disp: 90 tablet, Rfl: 3    budesonide-formoterol (Symbicort) 160-4.5 MCG/ACT inhaler, Inhale 2 puffs 2 (Two) Times a Day., Disp: 10.2 g, Rfl: 12    Calcium Carbonate-Vit D-Min (Caltrate 600+D Plus Minerals) 600-800 MG-UNIT tablet, Take 1 tablet by mouth 2 (Two) Times a Day., Disp: , Rfl:     carvedilol (COREG) 12.5 MG tablet, Take 1 tablet by mouth 2 (Two) Times a Day With Meals., Disp: , Rfl:     cefdinir (OMNICEF) 300 MG capsule, Take 1 capsule by mouth 2 (Two) Times a Day for 7 days., Disp: 14 capsule, Rfl: 0    cloNIDine (CATAPRES) 0.1 MG tablet, Take 1 tablet by mouth 2 (Two) Times a Day As Needed for High Blood Pressure (systolic greater than 180)., Disp: 30 tablet, Rfl: 0    esomeprazole (nexIUM) 20 MG capsule, Take 1 capsule by mouth 2 (Two) Times a Day., Disp: 60 capsule, Rfl: 11    FEROSUL 325 (65 Fe) MG tablet, Take 1 tablet by mouth Daily With Breakfast., Disp: , Rfl:     fluticasone (FLONASE) 50 MCG/ACT nasal spray, Administer 2 sprays into the nostril(s) as directed by provider Daily., Disp: 15.8 mL, Rfl: 3    furosemide (LASIX) 20 MG tablet, Take 2 tablets by mouth Daily., Disp: 90 tablet, Rfl: 6    guaiFENesin (MUCINEX) 600 MG 12 hr tablet, Take 2 tablets by mouth 2 (Two) Times a Day., Disp: 120 tablet, Rfl: 1    hydrOXYzine (ATARAX) 25 MG tablet, Take 1 tablet by mouth Every 8 (Eight) Hours As Needed for Anxiety., Disp: 60 tablet, Rfl: 5    lisinopril (PRINIVIL,ZESTRIL) 40 MG tablet, Take 1 tablet by mouth Daily., Disp: 90 tablet, Rfl: 3    Melatonin 10 MG capsule, Take  by mouth. 2 tab, Disp: , Rfl:     nystatin (MYCOSTATIN) 757462 UNIT/GM powder, Apply  topically to the appropriate area as  directed 3 (Three) Times a Day., Disp: 60 g, Rfl: 11    oxyCODONE-acetaminophen (PERCOCET) 5-325 MG per tablet, Take 1 tablet by mouth Every 8 (Eight) Hours As Needed for Moderate Pain., Disp: 28 tablet, Rfl: 0    pregabalin (LYRICA) 75 MG capsule, Take 1 capsule by mouth 2 (Two) Times a Day., Disp: , Rfl:     sertraline (ZOLOFT) 100 MG tablet, Take 1 tablet by mouth Daily., Disp: 90 tablet, Rfl: 3    spironolactone (ALDACTONE) 25 MG tablet, Take 1 tablet by mouth Daily., Disp: 90 tablet, Rfl: 3    Vibegron 75 MG tablet, Take 1 tablet by mouth Daily., Disp: 30 tablet, Rfl: 11    vitamin B-12 (CYANOCOBALAMIN) 1000 MCG tablet, Take 1 tablet by mouth Daily., Disp: , Rfl:     vitamin D (ERGOCALCIFEROL) 1.25 MG (30129 UT) capsule capsule, Take 1 capsule by mouth Every 14 (Fourteen) Days. Every other Friday, Disp: , Rfl:     Lab Results   Component Value Date    GLUCOSE 97 07/22/2025    BUN 29.2 (H) 07/22/2025    CREATININE 0.84 07/22/2025     (L) 07/22/2025    K 4.5 07/22/2025    CL 98 07/22/2025    CALCIUM 9.2 07/22/2025    PROTEINTOT 6.9 05/06/2025    ALBUMIN 4.3 05/06/2025    ALT 20 05/06/2025    AST 21 05/06/2025    ALKPHOS 50 05/06/2025    BILITOT 0.4 05/06/2025    GLOB 2.6 05/06/2025    AGRATIO 1.7 05/06/2025    BCR 34.8 (H) 07/22/2025    ANIONGAP 11.0 07/22/2025    EGFR 69.0 07/22/2025       WBC   Date Value Ref Range Status   05/06/2025 9.42 3.40 - 10.80 10*3/mm3 Final   07/16/2019 7.0 4.8 - 10.8 K/uL Final     RBC   Date Value Ref Range Status   05/06/2025 3.41 (L) 3.77 - 5.28 10*6/mm3 Final   07/16/2019 3.52 (L) 4.20 - 5.40 M/uL Final     Hemoglobin   Date Value Ref Range Status   05/06/2025 10.4 (L) 12.0 - 15.9 g/dL Final   07/16/2019 10.1 (L) 12.0 - 16.0 g/dL Final     Hematocrit   Date Value Ref Range Status   05/06/2025 33.2 (L) 34.0 - 46.6 % Final   07/16/2019 31.7 (L) 37.0 - 47.0 % Final     MCV   Date Value Ref Range Status   05/06/2025 97.4 (H) 79.0 - 97.0 fL Final   07/16/2019 90.1 81.0 - 99.0 fL  Final     MCH   Date Value Ref Range Status   05/06/2025 30.5 26.6 - 33.0 pg Final   07/16/2019 28.7 27.0 - 31.0 pg Final     MCHC   Date Value Ref Range Status   05/06/2025 31.3 (L) 31.5 - 35.7 g/dL Final   07/16/2019 31.9 (L) 33.0 - 37.0 g/dL Final     RDW   Date Value Ref Range Status   05/06/2025 14.5 12.3 - 15.4 % Final   07/16/2019 14.7 (H) 11.5 - 14.5 % Final     RDW-SD   Date Value Ref Range Status   05/06/2025 51.8 37.0 - 54.0 fl Final     MPV   Date Value Ref Range Status   05/06/2025 10.4 6.0 - 12.0 fL Final   07/16/2019 10.8 9.4 - 12.3 fL Final     Platelets   Date Value Ref Range Status   05/06/2025 171 140 - 450 10*3/mm3 Final   07/16/2019 248 130 - 400 K/uL Final     Neutrophil Rel %   Date Value Ref Range Status   07/04/2019 76.5 (H) 50.0 - 65.0 % Final     Neutrophil %   Date Value Ref Range Status   01/08/2024 71.9 42.7 - 76.0 % Final     Lymphocyte Rel %   Date Value Ref Range Status   07/04/2019 13.9 (L) 20.0 - 40.0 % Final     Lymphocyte %   Date Value Ref Range Status   01/08/2024 17.2 (L) 19.6 - 45.3 % Final     Monocyte Rel %   Date Value Ref Range Status   07/04/2019 7.4 0.0 - 10.0 % Final     Monocyte %   Date Value Ref Range Status   01/08/2024 7.5 5.0 - 12.0 % Final     Eosinophil Rel %   Date Value Ref Range Status   07/04/2019 1.4 0.0 - 5.0 % Final     Eosinophil %   Date Value Ref Range Status   01/08/2024 2.2 0.3 - 6.2 % Final     Basophil Rel %   Date Value Ref Range Status   07/04/2019 0.4 0.0 - 1.0 % Final     Basophil %   Date Value Ref Range Status   01/08/2024 0.7 0.0 - 1.5 % Final     Immature Grans %   Date Value Ref Range Status   01/08/2024 0.5 0.0 - 0.5 % Final     Neutrophils Absolute   Date Value Ref Range Status   07/04/2019 7.7 (H) 1.5 - 7.5 K/uL Final     Neutrophils, Absolute   Date Value Ref Range Status   01/08/2024 5.27 1.70 - 7.00 10*3/mm3 Final     Lymphocytes Absolute   Date Value Ref Range Status   07/04/2019 1.4 1.1 - 4.5 K/uL Final     Lymphocytes, Absolute  "  Date Value Ref Range Status   01/08/2024 1.26 0.70 - 3.10 10*3/mm3 Final     Monocytes Absolute   Date Value Ref Range Status   07/04/2019 0.80 0.00 - 0.90 K/uL Final     Monocytes, Absolute   Date Value Ref Range Status   01/08/2024 0.55 0.10 - 0.90 10*3/mm3 Final     Eosinophils Absolute   Date Value Ref Range Status   07/04/2019 0.10 0.00 - 0.60 K/uL Final     Eosinophils, Absolute   Date Value Ref Range Status   01/08/2024 0.16 0.00 - 0.40 10*3/mm3 Final     Basophils Absolute   Date Value Ref Range Status   07/04/2019 0.00 0.00 - 0.20 K/uL Final     Basophils, Absolute   Date Value Ref Range Status   01/08/2024 0.05 0.00 - 0.20 10*3/mm3 Final     Immature Grans, Absolute   Date Value Ref Range Status   01/08/2024 0.04 0.00 - 0.05 10*3/mm3 Final     nRBC   Date Value Ref Range Status   01/08/2024 0.0 0.0 - 0.2 /100 WBC Final         OBJECTIVE:  Visit Vitals  /68 (BP Location: Left arm, Patient Position: Sitting, Cuff Size: Large Adult)   Pulse 57   Temp 97.3 °F (36.3 °C) (Temporal)   Ht 152.4 cm (60\")   Wt 89.3 kg (196 lb 12.8 oz)   SpO2 98%   BMI 38.43 kg/m²      Physical Exam  Vitals and nursing note reviewed.   Constitutional:       General: She is not in acute distress.     Appearance: Normal appearance. She is well-developed. She is not diaphoretic.      Comments: Sitting in her wheelchair   HENT:      Head: Normocephalic and atraumatic.      Mouth/Throat:      Mouth: Mucous membranes are moist.      Pharynx: No oropharyngeal exudate or posterior oropharyngeal erythema.   Eyes:      Pupils: Pupils are equal, round, and reactive to light.   Neck:      Thyroid: No thyromegaly.      Trachea: Phonation normal.   Pulmonary:      Effort: Pulmonary effort is normal. No respiratory distress.      Breath sounds: Normal breath sounds. No stridor. No wheezing, rhonchi or rales.      Comments: Voice sounds weak, but there is no wheezing rales or rhonchi.  Skin:     Coloration: Skin is not pale.      Findings: " No erythema.   Neurological:      Mental Status: She is alert.   Psychiatric:         Behavior: Behavior normal.         Thought Content: Thought content normal.         Judgment: Judgment normal.       Physical Exam  Mouth/Throat: Oropharynx appears normal, no thrush observed.  Respiratory: No wheezing detected.      Assessment/Plan      Diagnoses and all orders for this visit:    1. Pneumonia of right lower lobe due to infectious organism (Primary)    2. Wheezing    3. Shortness of breath    4. Sore throat    5. Ulcer of right lower extremity, unspecified ulcer stage      Assessment & Plan  1. Pneumonia.  - Symptoms have shown significant improvement since last week, with no current signs of wheezing.  - Oxygen levels are satisfactory. The sore throat could be a result of recent illness, which included coughing and wheezing.  - Completed course of antibiotics and is not considered contagious.   - Advised to rinse mouth thoroughly after using Symbicort to prevent potential thrush development. Recommended using the albuterol nebulizer only when necessary, and albuterol as a rescue medication during episodes of wheezing. Encouraged to gradually increase physical activity, such as walking, as tolerated.  - We will repeat a chest x-ray at the appointment on September 9, 2025 to follow-up on her pneumonia    2. Leg wound.  - Reported that one of the wounds on her leg is almost healed.  - Following wound care instructions and has weekly visits with Dr. Diaz.  - Continued wound care and monitoring were advised.      Return for Next scheduled follow up.      AMY Noble MD  10:38 CDT  7/29/2025   Electronically signed    Patient or patient representative verbalized consent for the use of Ambient Listening during the visit with  VERA Noble MD for chart documentation. 7/29/2025  10:52 CDT

## 2025-07-31 ENCOUNTER — HOSPITAL ENCOUNTER (OUTPATIENT)
Dept: WOUND CARE | Age: 85
Discharge: HOME | End: 2025-07-31
Attending: NURSE PRACTITIONER
Payer: MEDICARE

## 2025-07-31 VITALS
SYSTOLIC BLOOD PRESSURE: 136 MMHG | HEART RATE: 55 BPM | DIASTOLIC BLOOD PRESSURE: 65 MMHG | BODY MASS INDEX: 36.52 KG/M2 | WEIGHT: 186 LBS | TEMPERATURE: 95.9 F | RESPIRATION RATE: 18 BRPM | HEIGHT: 60 IN

## 2025-07-31 PROCEDURE — 97597 DBRDMT OPN WND 1ST 20 CM/<: CPT | Performed by: SURGERY

## 2025-07-31 PROCEDURE — 97597 DBRDMT OPN WND 1ST 20 CM/<: CPT

## 2025-07-31 ASSESSMENT — PAIN SCALES - GENERAL: PAINLEVEL_OUTOF10: 6

## 2025-07-31 ASSESSMENT — PAIN DESCRIPTION - ONSET: ONSET: ON-GOING

## 2025-07-31 ASSESSMENT — PAIN DESCRIPTION - ORIENTATION: ORIENTATION: RIGHT

## 2025-07-31 ASSESSMENT — PAIN DESCRIPTION - FREQUENCY: FREQUENCY: INTERMITTENT

## 2025-07-31 ASSESSMENT — PAIN DESCRIPTION - PAIN TYPE: TYPE: CHRONIC PAIN

## 2025-07-31 ASSESSMENT — PAIN DESCRIPTION - LOCATION: LOCATION: LEG;SHOULDER

## 2025-07-31 ASSESSMENT — PAIN DESCRIPTION - DESCRIPTORS: DESCRIPTORS: ACHING;BURNING

## 2025-07-31 ASSESSMENT — PAIN - FUNCTIONAL ASSESSMENT: PAIN_FUNCTIONAL_ASSESSMENT: PREVENTS OR INTERFERES SOME ACTIVE ACTIVITIES AND ADLS

## 2025-07-31 NOTE — PATIENT INSTRUCTIONS
[facility] Oxygen Therapy   Physician Orders and Discharge Instructions  [address] [place] [address]  [address]  [place] [address]  Telephone: [phone]      FAX [phone]    NAME:  Chelsy Snell  YOB: 1940  MEDICAL RECORD NUMBER:  [id number]  DATE:  7/31/2025    Discharge condition: Stable    Discharge to: Home    Left via:Private automobile    Accompanied by: Self    [facility]  Home Health to Change Accu Wrap on Monday and Thursday unless patient has a wound care appointment on that day    Left Leg- Compression stocking daily as tolerated to reduce or prevent swelling     Dressing Orders: Right Leg  Silver Alginate to open areas  Accu wrap, Keep clean and Dry  Elevate feet to level or above heart 3-4 times daily and as needed to for 30 minutes to reduce swelling  If wrap gets wet, Causes increased pain, Slides down or you are unable to make your next scheduled  Appointment call home health to be rewrapped  Multi Vitamin, High Protein diet as tolerated  Limit time in the chair to two hours at a time then lay down with legs above heart for 30 minutes to reduce and  prevent swelling  Ok to remove wrap the day of your wound care appointment for a shower    [facility] follow up visit ___________1 week__________________  (Please note your next appointment above and if you are unable to keep, kindly give a 24 hour notice. Thank you.)    If you experience any of the following, please call the Wound Care Center during business hours:    * Increase in Pain  * Temperature over 101  * Increase in drainage from your wound  * Drainage with a foul odor  * Bleeding  * Increase in swelling  * Need for compression bandage changes due to slippage, breakthrough drainage.    If you need medical attention outside of the business hours of the Wound Care Centers please contact your PCP or go to the nearest emergency room.

## 2025-07-31 NOTE — PROGRESS NOTES
[facility]   Progress Note and Procedure Note      Chelsy Snell  MEDICAL RECORD NUMBER:  [id number]  AGE: 85 y.o.   GENDER: female  : 1940  EPISODE DATE:  2025    Subjective:     Chief Complaint   Patient presents with    Wound Check         HISTORY of PRESENT ILLNESS HPI     Chelsy Snell is a 85 y.o. female who presents today for wound/ulcer evaluation.   Wound Context: Pt with RLE traumatic wound here for eval/treat  Wound/Ulcer Pain Timing/Severity: none  Quality of pain: N/A  Severity:  0 / 10   Modifying Factors: None  Associated Signs/Symptoms: none    Ulcer Identification:  Ulcer Type: traumatic  Contributing Factors: edema    Wound: Laceration        PAST MEDICAL HISTORY        Diagnosis Date    Anxiety     Carotid artery stenosis     Chronic back pain     H/O blood clots     Hx of blood clots     bilat legs    Hyperlipidemia     Hypertension     Osteoarthritis        PAST SURGICAL HISTORY    Past Surgical History:   Procedure Laterality Date    CATARACT REMOVAL Bilateral     CHOLECYSTECTOMY      EYE SURGERY      HYSTERECTOMY (CERVIX STATUS UNKNOWN)      JOINT REPLACEMENT      KNEE ARTHROPLASTY Right     OTHER SURGICAL HISTORY      Decompression of ulnar nerve     REMOVE HARDWARE FEMUR Left 2019    HARDWARE REMOVAL performed by Gavin Lynne MD at [facility] OR    REVISION TOTAL KNEE ARTHROPLASTY Left 2019    LEFT KNEE REVISION performed by Gavin Lynne MD at [facility] OR    SHOULDER ARTHROPLASTY Left        FAMILY HISTORY    Family History   Problem Relation Age of Onset    Dementia Mother     Heart Disease Father     Cancer Brother     COPD Other     Other Other         Blood clot    Diabetes Other        SOCIAL HISTORY    Social History     Tobacco Use    Smoking status: Never    Smokeless tobacco: Never   Vaping Use    Vaping status: Never Used   Substance Use Topics    Alcohol use: No    Drug use: No       ALLERGIES    Allergies   Allergen Reactions

## 2025-07-31 NOTE — PLAN OF CARE
Multilayer Compression Wrap   (Not Unna) Below the Knee    NAME:  Chelsy Snell  YOB: 1940  MEDICAL RECORD NUMBER:  [id number]  DATE:  7/31/2025    Multilayer compression wrap: Removed old Multilayer wrap if indicated and wash leg with mild soap/water.  Applied moisturizing agent to dry skin as needed.   Applied primary and secondary dressing as ordered.  Applied multilayered dressing below the knee to right lower leg.  Instructed patient/caregiver not to remove dressing and to keep it clean and dry.   Instructed patient/caregiver on complications to report to provider, such as pain, numbness in toes, heavy drainage, and slippage of dressing.  Instructed patient on purpose of compression dressing and on activity and exercise recommendations.      Electronically signed by Janneth Wylie RN on 7/31/2025 at 11:10 AM

## 2025-08-07 ENCOUNTER — HOSPITAL ENCOUNTER (OUTPATIENT)
Dept: WOUND CARE | Age: 85
Discharge: HOME | End: 2025-08-07
Attending: NURSE PRACTITIONER
Payer: MEDICARE

## 2025-08-07 VITALS
HEIGHT: 60 IN | WEIGHT: 186 LBS | RESPIRATION RATE: 18 BRPM | TEMPERATURE: 97 F | DIASTOLIC BLOOD PRESSURE: 72 MMHG | SYSTOLIC BLOOD PRESSURE: 170 MMHG | BODY MASS INDEX: 36.52 KG/M2 | HEART RATE: 56 BPM

## 2025-08-07 DIAGNOSIS — L97.912 SKIN ULCER OF RIGHT LOWER LEG, WITH FAT LAYER EXPOSED (HCC): Primary | ICD-10-CM

## 2025-08-07 PROCEDURE — 97597 DBRDMT OPN WND 1ST 20 CM/<: CPT

## 2025-08-07 PROCEDURE — 97597 DBRDMT OPN WND 1ST 20 CM/<: CPT | Performed by: SURGERY

## 2025-08-07 RX ORDER — GINSENG 100 MG
CAPSULE ORAL PRN
OUTPATIENT
Start: 2025-08-07

## 2025-08-07 RX ORDER — SODIUM CHLOR/HYPOCHLOROUS ACID 0.033 %
SOLUTION, IRRIGATION IRRIGATION PRN
OUTPATIENT
Start: 2025-08-07

## 2025-08-07 RX ORDER — LIDOCAINE HYDROCHLORIDE 20 MG/ML
JELLY TOPICAL PRN
OUTPATIENT
Start: 2025-08-07

## 2025-08-07 RX ORDER — MUPIROCIN 2 %
OINTMENT (GRAM) TOPICAL PRN
OUTPATIENT
Start: 2025-08-07

## 2025-08-07 RX ORDER — SILVER SULFADIAZINE 10 MG/G
CREAM TOPICAL PRN
OUTPATIENT
Start: 2025-08-07

## 2025-08-07 RX ORDER — LIDOCAINE 50 MG/G
OINTMENT TOPICAL PRN
OUTPATIENT
Start: 2025-08-07

## 2025-08-07 RX ORDER — LIDOCAINE 40 MG/G
CREAM TOPICAL PRN
OUTPATIENT
Start: 2025-08-07

## 2025-08-07 RX ORDER — NEOMYCIN/BACITRACIN/POLYMYXINB 3.5-400-5K
OINTMENT (GRAM) TOPICAL PRN
OUTPATIENT
Start: 2025-08-07

## 2025-08-07 RX ORDER — TRIAMCINOLONE ACETONIDE 1 MG/G
OINTMENT TOPICAL PRN
OUTPATIENT
Start: 2025-08-07

## 2025-08-07 RX ORDER — LIDOCAINE HYDROCHLORIDE 40 MG/ML
SOLUTION TOPICAL PRN
OUTPATIENT
Start: 2025-08-07

## 2025-08-07 RX ORDER — BACITRACIN ZINC AND POLYMYXIN B SULFATE 500; 1000 [USP'U]/G; [USP'U]/G
OINTMENT TOPICAL PRN
OUTPATIENT
Start: 2025-08-07

## 2025-08-07 RX ORDER — BETAMETHASONE DIPROPIONATE 0.5 MG/G
CREAM TOPICAL PRN
OUTPATIENT
Start: 2025-08-07

## 2025-08-07 RX ORDER — CLOBETASOL PROPIONATE 0.5 MG/G
OINTMENT TOPICAL PRN
OUTPATIENT
Start: 2025-08-07

## 2025-08-07 RX ORDER — GENTAMICIN SULFATE 1 MG/G
OINTMENT TOPICAL PRN
OUTPATIENT
Start: 2025-08-07

## 2025-08-07 RX ORDER — LIDOCAINE HYDROCHLORIDE 20 MG/ML
JELLY TOPICAL PRN
Status: DISCONTINUED | OUTPATIENT
Start: 2025-08-07 | End: 2025-08-09 | Stop reason: HOSPADM

## 2025-08-07 RX ADMIN — LIDOCAINE HYDROCHLORIDE: 20 JELLY TOPICAL at 11:21

## 2025-08-07 ASSESSMENT — PAIN DESCRIPTION - LOCATION: LOCATION: LEG

## 2025-08-07 ASSESSMENT — PAIN DESCRIPTION - DESCRIPTORS: DESCRIPTORS: ACHING;BURNING;SORE

## 2025-08-07 ASSESSMENT — PAIN SCALES - GENERAL: PAINLEVEL_OUTOF10: 6

## 2025-08-07 ASSESSMENT — PAIN - FUNCTIONAL ASSESSMENT: PAIN_FUNCTIONAL_ASSESSMENT: PREVENTS OR INTERFERES SOME ACTIVE ACTIVITIES AND ADLS

## 2025-08-07 ASSESSMENT — PAIN DESCRIPTION - ONSET: ONSET: ON-GOING

## 2025-08-07 ASSESSMENT — PAIN DESCRIPTION - PAIN TYPE: TYPE: CHRONIC PAIN

## 2025-08-07 ASSESSMENT — PAIN DESCRIPTION - ORIENTATION: ORIENTATION: RIGHT

## 2025-08-07 ASSESSMENT — PAIN DESCRIPTION - FREQUENCY: FREQUENCY: INTERMITTENT

## 2025-08-14 ENCOUNTER — HOSPITAL ENCOUNTER (OUTPATIENT)
Dept: WOUND CARE | Age: 85
Discharge: HOME | End: 2025-08-14
Attending: NURSE PRACTITIONER
Payer: MEDICARE

## 2025-08-14 VITALS
WEIGHT: 186 LBS | HEART RATE: 54 BPM | BODY MASS INDEX: 36.52 KG/M2 | DIASTOLIC BLOOD PRESSURE: 53 MMHG | HEIGHT: 60 IN | RESPIRATION RATE: 18 BRPM | SYSTOLIC BLOOD PRESSURE: 136 MMHG | TEMPERATURE: 97.3 F

## 2025-08-14 DIAGNOSIS — L97.912 SKIN ULCER OF RIGHT LOWER LEG, WITH FAT LAYER EXPOSED (HCC): Primary | ICD-10-CM

## 2025-08-14 PROCEDURE — 97597 DBRDMT OPN WND 1ST 20 CM/<: CPT

## 2025-08-14 PROCEDURE — 97597 DBRDMT OPN WND 1ST 20 CM/<: CPT | Performed by: SURGERY

## 2025-08-14 RX ORDER — CLOBETASOL PROPIONATE 0.5 MG/G
OINTMENT TOPICAL PRN
OUTPATIENT
Start: 2025-08-14

## 2025-08-14 RX ORDER — TRIAMCINOLONE ACETONIDE 1 MG/G
OINTMENT TOPICAL PRN
OUTPATIENT
Start: 2025-08-14

## 2025-08-14 RX ORDER — LIDOCAINE HYDROCHLORIDE 20 MG/ML
JELLY TOPICAL PRN
OUTPATIENT
Start: 2025-08-14

## 2025-08-14 RX ORDER — LIDOCAINE HYDROCHLORIDE 40 MG/ML
SOLUTION TOPICAL PRN
OUTPATIENT
Start: 2025-08-14

## 2025-08-14 RX ORDER — LIDOCAINE 40 MG/G
CREAM TOPICAL PRN
OUTPATIENT
Start: 2025-08-14

## 2025-08-14 RX ORDER — BETAMETHASONE DIPROPIONATE 0.5 MG/G
CREAM TOPICAL PRN
OUTPATIENT
Start: 2025-08-14

## 2025-08-14 RX ORDER — LIDOCAINE 50 MG/G
OINTMENT TOPICAL PRN
OUTPATIENT
Start: 2025-08-14

## 2025-08-14 RX ORDER — LIDOCAINE HYDROCHLORIDE 20 MG/ML
JELLY TOPICAL PRN
Status: DISCONTINUED | OUTPATIENT
Start: 2025-08-14 | End: 2025-08-16 | Stop reason: HOSPADM

## 2025-08-14 RX ORDER — SODIUM CHLOR/HYPOCHLOROUS ACID 0.033 %
SOLUTION, IRRIGATION IRRIGATION PRN
OUTPATIENT
Start: 2025-08-14

## 2025-08-14 RX ORDER — MUPIROCIN 2 %
OINTMENT (GRAM) TOPICAL PRN
OUTPATIENT
Start: 2025-08-14

## 2025-08-14 RX ORDER — BACITRACIN ZINC AND POLYMYXIN B SULFATE 500; 1000 [USP'U]/G; [USP'U]/G
OINTMENT TOPICAL PRN
OUTPATIENT
Start: 2025-08-14

## 2025-08-14 RX ORDER — SILVER SULFADIAZINE 10 MG/G
CREAM TOPICAL PRN
OUTPATIENT
Start: 2025-08-14

## 2025-08-14 RX ORDER — GINSENG 100 MG
CAPSULE ORAL PRN
OUTPATIENT
Start: 2025-08-14

## 2025-08-14 RX ORDER — NEOMYCIN/BACITRACIN/POLYMYXINB 3.5-400-5K
OINTMENT (GRAM) TOPICAL PRN
OUTPATIENT
Start: 2025-08-14

## 2025-08-14 RX ORDER — GENTAMICIN SULFATE 1 MG/G
OINTMENT TOPICAL PRN
OUTPATIENT
Start: 2025-08-14

## 2025-08-14 RX ADMIN — LIDOCAINE HYDROCHLORIDE: 20 JELLY TOPICAL at 11:47

## 2025-08-14 ASSESSMENT — PAIN DESCRIPTION - ONSET: ONSET: ON-GOING

## 2025-08-14 ASSESSMENT — PAIN DESCRIPTION - LOCATION: LOCATION: LEG

## 2025-08-14 ASSESSMENT — PAIN DESCRIPTION - FREQUENCY: FREQUENCY: INTERMITTENT

## 2025-08-14 ASSESSMENT — PAIN DESCRIPTION - DESCRIPTORS: DESCRIPTORS: ACHING;BURNING;SORE;TENDER

## 2025-08-14 ASSESSMENT — PAIN - FUNCTIONAL ASSESSMENT: PAIN_FUNCTIONAL_ASSESSMENT: PREVENTS OR INTERFERES SOME ACTIVE ACTIVITIES AND ADLS

## 2025-08-14 ASSESSMENT — PAIN DESCRIPTION - PAIN TYPE: TYPE: CHRONIC PAIN

## 2025-08-14 ASSESSMENT — PAIN DESCRIPTION - ORIENTATION: ORIENTATION: RIGHT;LEFT

## 2025-08-21 ENCOUNTER — HOSPITAL ENCOUNTER (OUTPATIENT)
Dept: WOUND CARE | Age: 85
Discharge: HOME | End: 2025-08-21
Attending: NURSE PRACTITIONER
Payer: MEDICARE

## 2025-08-21 VITALS
TEMPERATURE: 97.6 F | RESPIRATION RATE: 20 BRPM | WEIGHT: 186 LBS | SYSTOLIC BLOOD PRESSURE: 160 MMHG | HEIGHT: 60 IN | DIASTOLIC BLOOD PRESSURE: 62 MMHG | HEART RATE: 55 BPM | BODY MASS INDEX: 36.52 KG/M2

## 2025-08-21 DIAGNOSIS — L97.912 SKIN ULCER OF RIGHT LOWER LEG, WITH FAT LAYER EXPOSED (HCC): Primary | ICD-10-CM

## 2025-08-21 PROCEDURE — 97597 DBRDMT OPN WND 1ST 20 CM/<: CPT

## 2025-08-21 PROCEDURE — 97597 DBRDMT OPN WND 1ST 20 CM/<: CPT | Performed by: SURGERY

## 2025-08-21 RX ORDER — MUPIROCIN 2 %
OINTMENT (GRAM) TOPICAL PRN
OUTPATIENT
Start: 2025-08-21

## 2025-08-21 RX ORDER — LIDOCAINE 40 MG/G
CREAM TOPICAL PRN
OUTPATIENT
Start: 2025-08-21

## 2025-08-21 RX ORDER — SODIUM CHLOR/HYPOCHLOROUS ACID 0.033 %
SOLUTION, IRRIGATION IRRIGATION PRN
OUTPATIENT
Start: 2025-08-21

## 2025-08-21 RX ORDER — CLOBETASOL PROPIONATE 0.5 MG/G
OINTMENT TOPICAL PRN
OUTPATIENT
Start: 2025-08-21

## 2025-08-21 RX ORDER — LIDOCAINE HYDROCHLORIDE 20 MG/ML
JELLY TOPICAL PRN
OUTPATIENT
Start: 2025-08-21

## 2025-08-21 RX ORDER — LIDOCAINE HYDROCHLORIDE 40 MG/ML
SOLUTION TOPICAL PRN
OUTPATIENT
Start: 2025-08-21

## 2025-08-21 RX ORDER — LIDOCAINE HYDROCHLORIDE 20 MG/ML
JELLY TOPICAL PRN
Status: DISCONTINUED | OUTPATIENT
Start: 2025-08-21 | End: 2025-08-23 | Stop reason: HOSPADM

## 2025-08-21 RX ORDER — LIDOCAINE 50 MG/G
OINTMENT TOPICAL PRN
OUTPATIENT
Start: 2025-08-21

## 2025-08-21 RX ORDER — GINSENG 100 MG
CAPSULE ORAL PRN
OUTPATIENT
Start: 2025-08-21

## 2025-08-21 RX ORDER — BETAMETHASONE DIPROPIONATE 0.5 MG/G
CREAM TOPICAL PRN
OUTPATIENT
Start: 2025-08-21

## 2025-08-21 RX ORDER — SILVER SULFADIAZINE 10 MG/G
CREAM TOPICAL PRN
OUTPATIENT
Start: 2025-08-21

## 2025-08-21 RX ORDER — NEOMYCIN/BACITRACIN/POLYMYXINB 3.5-400-5K
OINTMENT (GRAM) TOPICAL PRN
OUTPATIENT
Start: 2025-08-21

## 2025-08-21 RX ORDER — BACITRACIN ZINC AND POLYMYXIN B SULFATE 500; 1000 [USP'U]/G; [USP'U]/G
OINTMENT TOPICAL PRN
OUTPATIENT
Start: 2025-08-21

## 2025-08-21 RX ORDER — GENTAMICIN SULFATE 1 MG/G
OINTMENT TOPICAL PRN
OUTPATIENT
Start: 2025-08-21

## 2025-08-21 RX ORDER — TRIAMCINOLONE ACETONIDE 1 MG/G
OINTMENT TOPICAL PRN
OUTPATIENT
Start: 2025-08-21

## 2025-08-21 RX ADMIN — LIDOCAINE HYDROCHLORIDE: 20 JELLY TOPICAL at 10:17

## 2025-08-21 ASSESSMENT — PAIN SCALES - GENERAL: PAINLEVEL_OUTOF10: 0

## (undated) DEVICE — BNDG GZ SOF-FORM CONFRM 2X75IN LF STRL

## (undated) DEVICE — 4-PORT MANIFOLD: Brand: NEPTUNE 2

## (undated) DEVICE — CONTAINER SPECIMEN BLISTER ST

## (undated) DEVICE — Device: Brand: DEFENDO AIR/WATER/SUCTION AND BIOPSY VALVE

## (undated) DEVICE — PRECISION THIN (9.0 X 0.38 X 25.0MM)

## (undated) DEVICE — FAN SPRAY KIT: Brand: PULSAVAC®

## (undated) DEVICE — 3M™ IOBAN™ 2 ANTIMICROBIAL INCISE DRAPE 6651EZ: Brand: IOBAN™ 2

## (undated) DEVICE — PLUG BNE CEM L POLYETH FOR 14-17MM IM CNL
Type: IMPLANTABLE DEVICE | Site: KNEE | Status: NON-FUNCTIONAL
Removed: 2019-04-24

## (undated) DEVICE — GLOVE SURG SZ 8 L12IN FNGR THK13MIL BRN LTX SYN POLYMER W

## (undated) DEVICE — JP 3-SPRING RES W/10FR PVC DRAIN/TR: Brand: CARDINAL HEALTH

## (undated) DEVICE — 2108 SERIES SAGITTAL BLADE (12.5 X 0.64 X 73.8MM)

## (undated) DEVICE — ANTIBACTERIAL UNDYED BRAIDED (POLYGLACTIN 910), SYNTHETIC ABSORBABLE SUTURE: Brand: COATED VICRYL

## (undated) DEVICE — DRAPE,ORTHOMAX ,HIP,W/POUCHES: Brand: MEDLINE

## (undated) DEVICE — SURGICAL PROCEDURE PACK LOWER EXTREMITY LOURDES HOSP

## (undated) DEVICE — GLOVE SURG SZ 75 L12IN FNGR THK79MIL GRN LTX FREE

## (undated) DEVICE — BIPOLAR SEALER 23-113-1 AQM 2.3 OM NEURO: Brand: AQUAMANTYS ®

## (undated) DEVICE — SYSTEM SKIN CLSR 22CM DERMBND PRINEO

## (undated) DEVICE — GLV SURG SENSICARE PI ORTHO SZ8 LF STRL

## (undated) DEVICE — T-MAX DISPOSABLE FACE MASK 8 PER BOX

## (undated) DEVICE — Device

## (undated) DEVICE — STRYKER PERFORMANCE SERIES SAGITTAL BLADE: Brand: STRYKER PERFORMANCE SERIES

## (undated) DEVICE — APPL CHLORAPREP HI/LITE 26ML ORNG

## (undated) DEVICE — 6.3MM ROUND FAST CUTTING BUR

## (undated) DEVICE — TOWEL,OR,DSP,ST,BLUE,DLX,4/PK,20PK/CS: Brand: MEDLINE

## (undated) DEVICE — BAPTIST TURNOVER KIT: Brand: MEDLINE INDUSTRIES, INC.

## (undated) DEVICE — HANDPIECE SET WITH HIGH FLOW TIP AND SUCTION TUBE: Brand: INTERPULSE

## (undated) DEVICE — SUTURE VCRL SZ 2-0 L36IN ABSRB UD L36MM CT-1 1/2 CIR J945H

## (undated) DEVICE — CHLORAPREP 26ML ORANGE

## (undated) DEVICE — TBG SMPL FLTR LINE NASL 02/C02 A/ BX/100

## (undated) DEVICE — STERILE POLYISOPRENE POWDER-FREE SURGICAL GLOVES: Brand: PROTEXIS

## (undated) DEVICE — DRESSING FOAM W4XL12IN SIL RECT ADH WTRPRF FLM BK W/ BORD

## (undated) DEVICE — ZIMMER® STERILE DISPOSABLE TOURNIQUET CUFF WITH PLC, DUAL PORT, SINGLE BLADDER, 34 IN. (86 CM)

## (undated) DEVICE — SOLUTION IV IRRIG POUR BRL 0.9% SODIUM CHL 2F7124

## (undated) DEVICE — GLV SURG BIOGEL M LTX PF 7 1/2

## (undated) DEVICE — CONMED SCOPE SAVER BITE BLOCK, 20X27 MM: Brand: SCOPE SAVER

## (undated) DEVICE — SURGICAL PROCEDURE PACK KNEE TOT DBD CDS LOURDES HOSP LF

## (undated) DEVICE — C-ARM: Brand: UNBRANDED

## (undated) DEVICE — BIPOLAR SEALER 23-112-1 AQM 6.0: Brand: AQUAMANTYS™

## (undated) DEVICE — Z  INACTIVE USE 2750024 CEMENT BONE 40GM W/ GENTMYCN HI VISC PALACOS R+G: Type: IMPLANTABLE DEVICE | Site: KNEE | Status: NON-FUNCTIONAL

## (undated) DEVICE — PK TURNOVER RM ADV

## (undated) DEVICE — CUFF,BP,DISP,1 TUBE,ADULT,HP: Brand: MEDLINE

## (undated) DEVICE — BAG WND LAV 1L CLR ETH ACET ACID SOD ACETT BENZALKONIUM CHL

## (undated) DEVICE — THE CHANNEL CLEANING BRUSH IS A NYLON FLEXI BRUSH ATTACHED TO A FLEXIBLE PLASTIC SHEATH DESIGNED TO SAFELY REMOVE DEBRIS FROM FLEXIBLE ENDOSCOPES.

## (undated) DEVICE — YANKAUER,BULB TIP WITH VENT: Brand: ARGYLE

## (undated) DEVICE — SHOULDER STABILIZATION KIT,                                    DISPOSABLE 12 PER BOX

## (undated) DEVICE — Z INACTIVE USE 2660664 SOLUTION IRRIG 3000ML 0.9% SOD CHL USP UROMATIC PLAS CONT

## (undated) DEVICE — GOWN,PRECEPT,XLNG/XXLARGE,STRL: Brand: MEDLINE

## (undated) DEVICE — TRAP FLD MINIVAC MEGADYNE 100ML

## (undated) DEVICE — SUTURE ETHBND SZ 1 L18IN NONABSORBABLE CTX L48MM 1/2 CIR CX30D

## (undated) DEVICE — SPONGE,LAP,12"X12",XR,ST,5/PK,40PK/CS: Brand: MEDLINE

## (undated) DEVICE — SPNG GZ STRL 2S 4X4 12PLY

## (undated) DEVICE — PAD,PREPPING,CUFFED,24X48,7",NONSTERILE: Brand: MEDLINE

## (undated) DEVICE — DUAL CUT SAGITTAL BLADE

## (undated) DEVICE — ARM BOARD PAD: Brand: DEVON

## (undated) DEVICE — Z DISCONTINUED USE 2429233 DRESSING FOAM W10XL10CM 5 LAYR SELF ADH VERSATILE SAFETAC

## (undated) DEVICE — AGENT HEMSTAT 3GM PURIFIED PLNT STARCH PWD ABSRB ARISTA AH

## (undated) DEVICE — SENSR O2 OXIMAX FNGR A/ 18IN NONSTR

## (undated) DEVICE — INTENDED FOR TISSUE SEPARATION, AND OTHER PROCEDURES THAT REQUIRE A SHARP SURGICAL BLADE TO PUNCTURE OR CUT.: Brand: BARD-PARKER ® STAINLESS STEEL BLADES

## (undated) DEVICE — SUTURE VCRL SZ 1 L18IN ABSRB UD L36MM CT-1 1/2 CIR J841D

## (undated) DEVICE — FRCP BIOP ENDO CAPTURAPRO SPK SERR 2.8MM 230CM

## (undated) DEVICE — ROD RMR L950MM DIA2.5MM W/ EXTN BALL TIP

## (undated) DEVICE — PK SHLDR 30

## (undated) DEVICE — PLUG BNE CEM M POLYETH FOR 11-13MM IM CNL: Type: IMPLANTABLE DEVICE | Site: KNEE | Status: NON-FUNCTIONAL

## (undated) DEVICE — MASK,OXYGEN,MED CONC,ADLT,7' TUB, UC: Brand: PENDING

## (undated) DEVICE — TOOL MC30 LEGEND 9CM 3.0MM CARBIDE: Brand: MIDAS REX™

## (undated) DEVICE — GLV SURG DERMASSURE GRN LF PF 8.0

## (undated) DEVICE — SUTURE ETHLN SZ 3-0 L18IN NONABSORBABLE BLK FS-1 L24MM 3/8 663H

## (undated) DEVICE — Device: Brand: POWER-FLO®

## (undated) DEVICE — OPTIFOAM GENTLE SA, POSTOP, 4X8: Brand: MEDLINE

## (undated) DEVICE — 3M™ STERI-DRAPE™ INSTRUMENT POUCH 1018: Brand: STERI-DRAPE™

## (undated) DEVICE — SUT ETHIB 5 V37 30IN MB66G

## (undated) DEVICE — TBG PENCL TELESCP MEGADYNE SMOKE EVAC 10FT

## (undated) DEVICE — SOLUTION IV 250ML 0.9% SOD CHL PH 5 INJ USP VIAFLX PLAS

## (undated) DEVICE — ST PIN FIX TEMP UNIVERS REVERS W/OSTEO/GUIDE/PIN 2.4MM STRL

## (undated) DEVICE — SHEET,DRAPE,53X77,STERILE: Brand: MEDLINE

## (undated) DEVICE — DRAPE,U/ SHT,SPLIT,PLAS,STERIL: Brand: MEDLINE

## (undated) DEVICE — DRP SURG U/DRP INVISISHIELD PA 48X52IN

## (undated) DEVICE — PAD MINOR UNIVERSAL: Brand: MEDLINE INDUSTRIES, INC.

## (undated) DEVICE — ADHS SKIN PREMIERPRO EXOFIN TOPICAL HI/VISC .5ML

## (undated) DEVICE — BLADE SAW W12.5XL70MM THK1MM RECIP DBL SIDE OFFSET

## (undated) DEVICE — C-ARMOR C-ARM EQUIPMENT COVERS CLEAR STERILE UNIVERSAL FIT 12 PER CASE: Brand: C-ARMOR